# Patient Record
Sex: MALE | Race: WHITE | NOT HISPANIC OR LATINO | Employment: OTHER | URBAN - METROPOLITAN AREA
[De-identification: names, ages, dates, MRNs, and addresses within clinical notes are randomized per-mention and may not be internally consistent; named-entity substitution may affect disease eponyms.]

---

## 2022-08-03 ENCOUNTER — OFFICE VISIT (OUTPATIENT)
Dept: FAMILY MEDICINE CLINIC | Facility: HOSPITAL | Age: 77
End: 2022-08-03
Payer: COMMERCIAL

## 2022-08-03 VITALS
DIASTOLIC BLOOD PRESSURE: 62 MMHG | BODY MASS INDEX: 30.6 KG/M2 | SYSTOLIC BLOOD PRESSURE: 132 MMHG | WEIGHT: 206.6 LBS | HEIGHT: 69 IN | TEMPERATURE: 97.7 F | HEART RATE: 83 BPM

## 2022-08-03 DIAGNOSIS — E11.40 TYPE 2 DIABETES MELLITUS WITH DIABETIC NEUROPATHY, UNSPECIFIED WHETHER LONG TERM INSULIN USE (HCC): Primary | ICD-10-CM

## 2022-08-03 DIAGNOSIS — N40.0 BENIGN PROSTATIC HYPERPLASIA, UNSPECIFIED WHETHER LOWER URINARY TRACT SYMPTOMS PRESENT: ICD-10-CM

## 2022-08-03 DIAGNOSIS — Z11.4 SCREENING FOR HIV (HUMAN IMMUNODEFICIENCY VIRUS): ICD-10-CM

## 2022-08-03 DIAGNOSIS — Z11.59 NEED FOR HEPATITIS C SCREENING TEST: ICD-10-CM

## 2022-08-03 DIAGNOSIS — Z79.4 TYPE 2 DIABETES MELLITUS WITH HYPERGLYCEMIA, WITH LONG-TERM CURRENT USE OF INSULIN (HCC): ICD-10-CM

## 2022-08-03 DIAGNOSIS — K21.9 GASTROESOPHAGEAL REFLUX DISEASE WITHOUT ESOPHAGITIS: ICD-10-CM

## 2022-08-03 DIAGNOSIS — I48.91 ATRIAL FIBRILLATION, UNSPECIFIED TYPE (HCC): ICD-10-CM

## 2022-08-03 DIAGNOSIS — E11.65 TYPE 2 DIABETES MELLITUS WITH HYPERGLYCEMIA, WITH LONG-TERM CURRENT USE OF INSULIN (HCC): ICD-10-CM

## 2022-08-03 PROCEDURE — 1100F PTFALLS ASSESS-DOCD GE2>/YR: CPT | Performed by: FAMILY MEDICINE

## 2022-08-03 PROCEDURE — 3288F FALL RISK ASSESSMENT DOCD: CPT | Performed by: FAMILY MEDICINE

## 2022-08-03 PROCEDURE — 3725F SCREEN DEPRESSION PERFORMED: CPT | Performed by: FAMILY MEDICINE

## 2022-08-03 PROCEDURE — 99204 OFFICE O/P NEW MOD 45 MIN: CPT | Performed by: FAMILY MEDICINE

## 2022-08-03 PROCEDURE — 1160F RVW MEDS BY RX/DR IN RCRD: CPT | Performed by: FAMILY MEDICINE

## 2022-08-03 RX ORDER — PANTOPRAZOLE SODIUM 40 MG/1
40 TABLET, DELAYED RELEASE ORAL DAILY
COMMUNITY

## 2022-08-03 RX ORDER — METOPROLOL TARTRATE 50 MG/1
50 TABLET, FILM COATED ORAL DAILY
COMMUNITY

## 2022-08-03 RX ORDER — SERTRALINE HYDROCHLORIDE 25 MG/1
25 TABLET, FILM COATED ORAL DAILY
COMMUNITY

## 2022-08-03 RX ORDER — TRAZODONE HYDROCHLORIDE 100 MG/1
200 TABLET ORAL
COMMUNITY
End: 2022-09-29 | Stop reason: SDUPTHER

## 2022-08-03 RX ORDER — POTASSIUM CITRATE 15 MEQ/1
TABLET, EXTENDED RELEASE ORAL
COMMUNITY
End: 2022-09-01 | Stop reason: SDUPTHER

## 2022-08-03 RX ORDER — SENNOSIDES 8.6 MG
650 CAPSULE ORAL EVERY 8 HOURS PRN
COMMUNITY

## 2022-08-03 RX ORDER — BLOOD-GLUCOSE SENSOR
EACH MISCELLANEOUS
COMMUNITY
End: 2022-09-06 | Stop reason: ALTCHOICE

## 2022-08-03 RX ORDER — TAMSULOSIN HYDROCHLORIDE 0.4 MG/1
0.4 CAPSULE ORAL
COMMUNITY

## 2022-08-03 RX ORDER — DULAGLUTIDE 0.75 MG/.5ML
0.75 INJECTION, SOLUTION SUBCUTANEOUS WEEKLY
COMMUNITY
End: 2022-09-13 | Stop reason: SDUPTHER

## 2022-08-03 RX ORDER — PREGABALIN 100 MG/1
100 CAPSULE ORAL 3 TIMES DAILY
COMMUNITY
End: 2022-09-02 | Stop reason: SDUPTHER

## 2022-08-03 NOTE — PROGRESS NOTES
Assessment/Plan:      Problem List Items Addressed This Visit    None     Visit Diagnoses     Type 2 diabetes mellitus with diabetic neuropathy, unspecified whether long term insulin use (HCC)    -  Primary    Relevant Medications    insulin detemir (LEVEMIR) 100 units/mL subcutaneous injection    Insulin Aspart (NOVOLOG IJ)    saxagliptin (ONGLYZA) 5 MG tablet    Dulaglutide (Trulicity) 5 86 ZK/1 8EB SOPN    Other Relevant Orders    Ambulatory Referral to Endocrinology    CBC    Comprehensive metabolic panel    Hemoglobin A1C    Lipid Panel with Direct LDL reflex    TSH, 3rd generation with Free T4 reflex    Microalbumin / creatinine urine ratio    Magnesium    Type 2 diabetes mellitus with hyperglycemia, with long-term current use of insulin (HCC)        Relevant Medications    insulin detemir (LEVEMIR) 100 units/mL subcutaneous injection    Insulin Aspart (NOVOLOG IJ)    saxagliptin (ONGLYZA) 5 MG tablet    Dulaglutide (Trulicity) 4 08 IU/4 8TX SOPN    Gastroesophageal reflux disease without esophagitis        Relevant Medications    pantoprazole (PROTONIX) 40 mg tablet    Atrial fibrillation, unspecified type (HCC)        Relevant Medications    metoprolol tartrate (LOPRESSOR) 50 mg tablet    Other Relevant Orders    Ambulatory Referral to Cardiology    CBC    Comprehensive metabolic panel    TSH, 3rd generation with Free T4 reflex    Benign prostatic hyperplasia, unspecified whether lower urinary tract symptoms present        Relevant Orders    CBC    Comprehensive metabolic panel    Screening for HIV (human immunodeficiency virus)        Relevant Orders    Human Immunodeficiency Virus 1/2 Antigen / Antibody ( Fourth Generation) with Reflex Testing    Need for hepatitis C screening test        Relevant Orders    HCV Antibody reflex to MEE           Plan/Discussion:  Patient is here to establish care  Patient with uncontrolled diabetes mellitus with neuropathy  No changes made to his diabetic regimen  Referral to endocrinology  He reports an A1c of under 8  Update blood work  Old records to be obtained  Continue to monitor  He continues to follow-up with podiatry regularly  He will be establishing with a new ophthalmologist     Atrial fibrillation  Has been stable  He is on rate control using Lopressor as well as being on anticoagulation with Eliquis twice a day  He will continue with the same  Referral to cardiology to establish care  Reflux  Has been on Protonix 40 mg twice a day for quite a while  His symptoms are controlled  Agreeable to try to decrease Protonix to 40 mg once a day  Will obtain old records  Reports Pneumovax and Prevnar 13 are up-to-date  Will follow-up in 3 months  Subjective:   Chief Complaint   Patient presents with    Establish Care        Patient ID: Silverio Abraham is a 69 y o  male  Patient is 70-year-old male establishing care with our office due to logistics  He has known diabetes mellitus with insulin dependence, diabetic neuropathy  Reports A1c was under 8 but this was done about 8 months ago  No episodes of hypoglycemia  He uses Dexcom  Has been following up with endocrinology but needs to establish with an endocrinologist in the area  Patient with known atrial fibrillation  Has been doing well  Will be needing a cardiologist as well  Will be establishing with an ophthalmologist   He does see Podiatry regularly  Overall he has been doing okay  No new complaints  No issues with any of his medication  We will be requesting old records  The following portions of the patient's history were reviewed and updated as appropriate: allergies, current medications, past family history, past medical history, past social history, past surgical history and problem list     Review of Systems   Constitutional: Negative  Negative for activity change, appetite change, chills and diaphoresis     HENT: Negative for congestion and dental problem  Respiratory: Negative  Negative for apnea, chest tightness, shortness of breath and wheezing  Cardiovascular: Negative  Negative for chest pain, palpitations and leg swelling  Gastrointestinal: Negative  Negative for abdominal distention, abdominal pain, constipation, diarrhea and nausea  Genitourinary: Negative  Negative for difficulty urinating, dysuria and frequency  Objective:  Vitals:    08/03/22 0817   BP: 132/62   Pulse: 83   Temp: 97 7 °F (36 5 °C)   Weight: 93 7 kg (206 lb 9 6 oz)   Height: 5' 9" (1 753 m)     BP Readings from Last 6 Encounters:   08/03/22 132/62      Wt Readings from Last 6 Encounters:   08/03/22 93 7 kg (206 lb 9 6 oz)             Physical Exam  Vitals and nursing note reviewed  Constitutional:       General: He is not in acute distress  Appearance: He is well-developed  He is not ill-appearing  HENT:      Head: Normocephalic and atraumatic  Right Ear: Tympanic membrane, ear canal and external ear normal       Left Ear: Tympanic membrane, ear canal and external ear normal       Nose: Nose normal  No congestion or rhinorrhea  Mouth/Throat:      Mouth: Mucous membranes are moist       Pharynx: No oropharyngeal exudate or posterior oropharyngeal erythema  Eyes:      Extraocular Movements: Extraocular movements intact  Conjunctiva/sclera: Conjunctivae normal       Pupils: Pupils are equal, round, and reactive to light  Cardiovascular:      Rate and Rhythm: Normal rate and regular rhythm  Heart sounds: Normal heart sounds  No murmur heard  No friction rub  No gallop  Pulmonary:      Effort: Pulmonary effort is normal  No respiratory distress  Breath sounds: Normal breath sounds  No wheezing or rales  Chest:      Chest wall: No tenderness  Abdominal:      General: Bowel sounds are normal  There is no distension  Palpations: Abdomen is soft  There is no mass  Tenderness: There is no abdominal tenderness   There is no guarding or rebound  Musculoskeletal:         General: Normal range of motion  Cervical back: Normal range of motion and neck supple  Skin:     General: Skin is warm  Capillary Refill: Capillary refill takes less than 2 seconds  Neurological:      Mental Status: He is alert and oriented to person, place, and time     Psychiatric:         Mood and Affect: Mood normal          Behavior: Behavior normal

## 2022-08-03 NOTE — PROGRESS NOTES
Assessment/Plan:      Problem List Items Addressed This Visit    None     Visit Diagnoses     Type 2 diabetes mellitus with diabetic neuropathy, unspecified whether long term insulin use (HCC)    -  Primary    Relevant Medications    insulin detemir (LEVEMIR) 100 units/mL subcutaneous injection    Insulin Aspart (NOVOLOG IJ)    saxagliptin (ONGLYZA) 5 MG tablet    Dulaglutide (Trulicity) 9 94 HK/2 7ZM SOPN    Other Relevant Orders    Ambulatory Referral to Endocrinology    CBC    Comprehensive metabolic panel    Hemoglobin A1C    Lipid Panel with Direct LDL reflex    TSH, 3rd generation with Free T4 reflex    Microalbumin / creatinine urine ratio    Magnesium    Type 2 diabetes mellitus with hyperglycemia, with long-term current use of insulin (HCC)        Relevant Medications    insulin detemir (LEVEMIR) 100 units/mL subcutaneous injection    Insulin Aspart (NOVOLOG IJ)    saxagliptin (ONGLYZA) 5 MG tablet    Dulaglutide (Trulicity) 2 93 SZ/4 0TO SOPN    Gastroesophageal reflux disease without esophagitis        Relevant Medications    pantoprazole (PROTONIX) 40 mg tablet    Atrial fibrillation, unspecified type (HCC)        Relevant Medications    metoprolol tartrate (LOPRESSOR) 50 mg tablet    Other Relevant Orders    Ambulatory Referral to Cardiology    CBC    Comprehensive metabolic panel    TSH, 3rd generation with Free T4 reflex    Benign prostatic hyperplasia, unspecified whether lower urinary tract symptoms present        Relevant Orders    CBC    Comprehensive metabolic panel    Screening for HIV (human immunodeficiency virus)        Relevant Orders    Human Immunodeficiency Virus 1/2 Antigen / Antibody ( Fourth Generation) with Reflex Testing    Need for hepatitis C screening test        Relevant Orders    HCV Antibody reflex to MEE           Plan/Discussion:  Plan/Discussion:  Patient is here to establish care  Patient with uncontrolled diabetes mellitus with neuropathy    No changes made to his diabetic regimen  Referral to endocrinology  He reports an A1c of under 8  Update blood work  Old records to be obtained  Continue to monitor  He continues to follow-up with podiatry regularly  He will be establishing with a new ophthalmologist     Atrial fibrillation  Has been stable  He is on rate control using Lopressor as well as being on anticoagulation with Eliquis twice a day  He will continue with the same  Referral to cardiology to establish care  Reflux  Has been on Protonix 40 mg twice a day for quite a while  His symptoms are controlled  Agreeable to try to decrease Protonix to 40 mg once a day  Will obtain old records  Reports Pneumovax and Prevnar 13 are up-to-date  Will follow-up in 3 months  Subjective:   Chief Complaint   Patient presents with    Establish Care        Patient ID: Doretha Kidney is a 69 y o  male  Patient is 69-year-old male establishing care with our office due to logistics      He has known diabetes mellitus with insulin dependence, diabetic neuropathy  Reports A1c was under 8 but this was done about 8 months ago  No episodes of hypoglycemia  He uses Dexcom  Has been following up with endocrinology but needs to establish with an endocrinologist in the area      Patient with known atrial fibrillation  Has been doing well  Will be needing a cardiologist as well  Patient is 69-year-old male establishing care with our office due to logistics      He has known diabetes mellitus with insulin dependence, diabetic neuropathy  Reports A1c was under 8 but this was done about 8 months ago  No episodes of hypoglycemia  He uses Dexcom  Has been following up with endocrinology but needs to establish with an endocrinologist in the area      Patient with known atrial fibrillation  Has been doing well  Will be needing a cardiologist as well      Will be establishing with an ophthalmologist   He does see Podiatry regularly    Overall he has been doing okay   No new complaints  No issues with any of his medication  We will be requesting old records            The following portions of the patient's history were reviewed and updated as appropriate: allergies, current medications, past family history, past medical history, past social history, past surgical history and problem list     Review of Systems   Constitutional: Negative  Negative for activity change, appetite change, chills and diaphoresis  HENT: Negative for congestion and dental problem  Respiratory: Negative  Negative for apnea, chest tightness, shortness of breath and wheezing  Cardiovascular: Negative  Negative for chest pain, palpitations and leg swelling  Gastrointestinal: Negative  Negative for abdominal distention, abdominal pain, constipation, diarrhea and nausea  Genitourinary: Negative  Negative for difficulty urinating, dysuria and frequency  Objective:  Vitals:    08/03/22 0817   BP: 132/62   Pulse: 83   Temp: 97 7 °F (36 5 °C)   Weight: 93 7 kg (206 lb 9 6 oz)   Height: 5' 9" (1 753 m)     BP Readings from Last 6 Encounters:   08/03/22 132/62      Wt Readings from Last 6 Encounters:   08/03/22 93 7 kg (206 lb 9 6 oz)             Physical Exam  Vitals and nursing note reviewed  Constitutional:       General: He is not in acute distress  Appearance: Normal appearance  He is well-developed and normal weight  He is not ill-appearing  Comments: With hearing aids bilateral   HENT:      Head: Normocephalic and atraumatic  Right Ear: Tympanic membrane, ear canal and external ear normal       Left Ear: Tympanic membrane, ear canal and external ear normal       Nose: Nose normal  No congestion or rhinorrhea  Mouth/Throat:      Mouth: Mucous membranes are moist       Pharynx: No oropharyngeal exudate or posterior oropharyngeal erythema  Eyes:      Extraocular Movements: Extraocular movements intact        Conjunctiva/sclera: Conjunctivae normal  Pupils: Pupils are equal, round, and reactive to light  Cardiovascular:      Rate and Rhythm: Normal rate and regular rhythm  Pulses: no weak pulses          Dorsalis pedis pulses are 1+ on the right side and 1+ on the left side  Posterior tibial pulses are 1+ on the right side and 1+ on the left side  Heart sounds: Normal heart sounds  No murmur heard  No friction rub  No gallop  Pulmonary:      Effort: Pulmonary effort is normal  No respiratory distress  Breath sounds: Normal breath sounds  No wheezing or rales  Chest:      Chest wall: No tenderness  Abdominal:      General: Bowel sounds are normal  There is no distension  Palpations: Abdomen is soft  There is no mass  Tenderness: There is no abdominal tenderness  There is no guarding or rebound  Musculoskeletal:         General: Normal range of motion  Cervical back: Normal range of motion and neck supple  Feet:      Right foot:      Skin integrity: Callus and dry skin present  No ulcer, skin breakdown, erythema or warmth  Left foot:      Skin integrity: Callus and dry skin present  No ulcer, skin breakdown, erythema or warmth  Skin:     General: Skin is warm  Capillary Refill: Capillary refill takes less than 2 seconds  Neurological:      Mental Status: He is alert and oriented to person, place, and time  Psychiatric:         Mood and Affect: Mood normal          Behavior: Behavior normal        Patient's shoes and socks removed  Right Foot/Ankle   Right Foot Inspection  Skin Exam: skin normal, skin intact, dry skin, callus and callus  No warmth, no erythema, no maceration, no abnormal color, no pre-ulcer and no ulcer  Toe Exam: ROM and strength within normal limits  Sensory   Vibration: absent  Proprioception: intact  Monofilament testing: absent    Vascular  Capillary refills: < 3 seconds  The right DP pulse is 1+  The right PT pulse is 1+       Left Foot/Ankle  Left Foot Inspection  Skin Exam: skin normal, skin intact, dry skin and callus  No warmth, no erythema, no maceration, normal color, no pre-ulcer and no ulcer  Toe Exam: ROM and strength within normal limits  Sensory   Vibration: diminished  Proprioception: intact  Monofilament testing: intact    Vascular  Capillary refills: < 3 seconds  The left DP pulse is 1+  The left PT pulse is 1+       Assign Risk Category  No deformity present  No loss of protective sensation  No weak pulses  Risk: 1

## 2022-08-27 LAB
ALBUMIN SERPL-MCNC: 4 G/DL (ref 3.7–4.7)
ALBUMIN/CREAT UR: 140 MG/G CREAT (ref 0–29)
ALBUMIN/GLOB SERPL: 1.3 {RATIO} (ref 1.2–2.2)
ALP SERPL-CCNC: 96 IU/L (ref 44–121)
ALT SERPL-CCNC: 29 IU/L (ref 0–44)
AST SERPL-CCNC: 28 IU/L (ref 0–40)
BILIRUB SERPL-MCNC: 0.4 MG/DL (ref 0–1.2)
BUN SERPL-MCNC: 28 MG/DL (ref 8–27)
BUN/CREAT SERPL: 21 (ref 10–24)
CALCIUM SERPL-MCNC: 9.4 MG/DL (ref 8.6–10.2)
CHLORIDE SERPL-SCNC: 97 MMOL/L (ref 96–106)
CHOLEST SERPL-MCNC: 228 MG/DL (ref 100–199)
CO2 SERPL-SCNC: 24 MMOL/L (ref 20–29)
CREAT SERPL-MCNC: 1.35 MG/DL (ref 0.76–1.27)
CREAT UR-MCNC: 61 MG/DL
EGFR: 54 ML/MIN/1.73
ERYTHROCYTE [DISTWIDTH] IN BLOOD BY AUTOMATED COUNT: 14.6 % (ref 11.6–15.4)
EST. AVERAGE GLUCOSE BLD GHB EST-MCNC: >398 MG/DL
GLOBULIN SER-MCNC: 3 G/DL (ref 1.5–4.5)
GLUCOSE SERPL-MCNC: 349 MG/DL (ref 65–99)
HBA1C MFR BLD: >15.5 % (ref 4.8–5.6)
HCT VFR BLD AUTO: 43.6 % (ref 37.5–51)
HDLC SERPL-MCNC: 30 MG/DL
HGB BLD-MCNC: 14.2 G/DL (ref 13–17.7)
HIV 1+2 AB+HIV1 P24 AG SERPL QL IA: NON REACTIVE
LDLC SERPL CALC-MCNC: 116 MG/DL (ref 0–99)
LDLC/HDLC SERPL: 3.9 RATIO (ref 0–3.6)
MAGNESIUM SERPL-MCNC: 2 MG/DL (ref 1.6–2.3)
MCH RBC QN AUTO: 29.9 PG (ref 26.6–33)
MCHC RBC AUTO-ENTMCNC: 32.6 G/DL (ref 31.5–35.7)
MCV RBC AUTO: 92 FL (ref 79–97)
MICROALBUMIN UR-MCNC: 85.2 UG/ML
PLATELET # BLD AUTO: 203 X10E3/UL (ref 150–450)
POTASSIUM SERPL-SCNC: 5.5 MMOL/L (ref 3.5–5.2)
PROT SERPL-MCNC: 7 G/DL (ref 6–8.5)
RBC # BLD AUTO: 4.75 X10E6/UL (ref 4.14–5.8)
SL AMB VLDL CHOLESTEROL CALC: 82 MG/DL (ref 5–40)
SODIUM SERPL-SCNC: 136 MMOL/L (ref 134–144)
TRIGL SERPL-MCNC: 466 MG/DL (ref 0–149)
TSH SERPL DL<=0.005 MIU/L-ACNC: 4 UIU/ML (ref 0.45–4.5)
WBC # BLD AUTO: 5.8 X10E3/UL (ref 3.4–10.8)

## 2022-08-27 PROCEDURE — 3060F POS MICROALBUMINURIA REV: CPT | Performed by: FAMILY MEDICINE

## 2022-08-29 DIAGNOSIS — I48.91 ATRIAL FIBRILLATION, UNSPECIFIED TYPE (HCC): Primary | ICD-10-CM

## 2022-09-01 ENCOUNTER — TELEPHONE (OUTPATIENT)
Dept: FAMILY MEDICINE CLINIC | Facility: HOSPITAL | Age: 77
End: 2022-09-01

## 2022-09-01 ENCOUNTER — OFFICE VISIT (OUTPATIENT)
Dept: FAMILY MEDICINE CLINIC | Facility: HOSPITAL | Age: 77
End: 2022-09-01
Payer: COMMERCIAL

## 2022-09-01 ENCOUNTER — HOSPITAL ENCOUNTER (OUTPATIENT)
Dept: RADIOLOGY | Facility: HOSPITAL | Age: 77
Discharge: HOME | End: 2022-09-01
Payer: COMMERCIAL

## 2022-09-01 VITALS
WEIGHT: 209 LBS | HEART RATE: 66 BPM | BODY MASS INDEX: 30.96 KG/M2 | SYSTOLIC BLOOD PRESSURE: 138 MMHG | HEIGHT: 69 IN | DIASTOLIC BLOOD PRESSURE: 62 MMHG | OXYGEN SATURATION: 98 % | TEMPERATURE: 97.5 F

## 2022-09-01 DIAGNOSIS — I48.91 ATRIAL FIBRILLATION, UNSPECIFIED TYPE (HCC): ICD-10-CM

## 2022-09-01 DIAGNOSIS — E11.65 TYPE 2 DIABETES MELLITUS WITH HYPERGLYCEMIA, WITH LONG-TERM CURRENT USE OF INSULIN (HCC): ICD-10-CM
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
## 2022-09-01 DIAGNOSIS — E87.5 HYPERKALEMIA: ICD-10-CM

## 2022-09-01 DIAGNOSIS — N18.30 STAGE 3 CHRONIC KIDNEY DISEASE, UNSPECIFIED WHETHER STAGE 3A OR 3B CKD (HCC): ICD-10-CM

## 2022-09-01 DIAGNOSIS — Z00.00 MEDICARE ANNUAL WELLNESS VISIT, INITIAL: ICD-10-CM

## 2022-09-01 DIAGNOSIS — Z79.4 TYPE 2 DIABETES MELLITUS WITH HYPERGLYCEMIA, WITH LONG-TERM CURRENT USE OF INSULIN (HCC): ICD-10-CM

## 2022-09-01 DIAGNOSIS — M25.551 RIGHT HIP PAIN: ICD-10-CM

## 2022-09-01 DIAGNOSIS — M25.551 RIGHT HIP PAIN: Primary | ICD-10-CM

## 2022-09-01 DIAGNOSIS — E11.40 TYPE 2 DIABETES MELLITUS WITH DIABETIC NEUROPATHY, UNSPECIFIED WHETHER LONG TERM INSULIN USE (HCC): ICD-10-CM

## 2022-09-01 PROCEDURE — 1170F FXNL STATUS ASSESSED: CPT | Performed by: FAMILY MEDICINE

## 2022-09-01 PROCEDURE — G0439 PPPS, SUBSEQ VISIT: HCPCS | Performed by: FAMILY MEDICINE

## 2022-09-01 PROCEDURE — 3725F SCREEN DEPRESSION PERFORMED: CPT | Performed by: FAMILY MEDICINE

## 2022-09-01 PROCEDURE — 3288F FALL RISK ASSESSMENT DOCD: CPT | Performed by: FAMILY MEDICINE

## 2022-09-01 PROCEDURE — 73502 X-RAY EXAM HIP UNI 2-3 VIEWS: CPT

## 2022-09-01 PROCEDURE — 99214 OFFICE O/P EST MOD 30 MIN: CPT | Performed by: FAMILY MEDICINE

## 2022-09-01 PROCEDURE — 1125F AMNT PAIN NOTED PAIN PRSNT: CPT | Performed by: FAMILY MEDICINE

## 2022-09-01 RX ORDER — INSULIN ASPART 100 [IU]/ML
40 INJECTION, SOLUTION INTRAVENOUS; SUBCUTANEOUS
Qty: 10 ML | Refills: 0
Start: 2022-09-01

## 2022-09-01 RX ORDER — POTASSIUM CITRATE 15 MEQ/1
1 TABLET, EXTENDED RELEASE ORAL DAILY
Qty: 30 TABLET | Refills: 0
Start: 2022-09-01

## 2022-09-01 NOTE — PROGRESS NOTES
Assessment and Plan:     Problem List Items Addressed This Visit    None     Visit Diagnoses     Right hip pain    -  Primary    Atrial fibrillation, unspecified type ([place] [address] )        Type 2 diabetes mellitus with hyperglycemia, with long-term current use of insulin ([place] [address] )        Type 2 diabetes mellitus with diabetic neuropathy, unspecified whether long term insulin use ([place] [address] )        Medicare annual wellness visit, initial               Preventive health issues were discussed with patient, and age appropriate screening tests were ordered as noted in patient's After Visit Summary  Personalized health advice and appropriate referrals for health education or preventive services given if needed, as noted in patient's After Visit Summary  History of Present Illness:     Patient presents for a Medicare Wellness Visit    HPI   Patient Care Team:  Last [place] MD as PCP - General (Family Medicine)     Review of Systems:     Review of Systems     Problem List:     There is no problem list on file for this patient  Past Medical and Surgical History:     Past Medical History:   Diagnosis Date    Atrial fibrillation ([place] [address] )     Deep vein thrombosis (DVT) of right lower extremity ([place] [address] )     Diverticulitis      History reviewed  No pertinent surgical history  Family History:     History reviewed  No pertinent family history     Social History:     Social History     Socioeconomic History    Marital status: /Civil Union     Spouse name: None    Number of children: None    Years of education: None    Highest education level: None   Occupational History    None   Tobacco Use    Smoking status: Former Smoker     Start date:      Quit date:      Years since quittin 6    Smokeless tobacco: Never Used   Vaping Use    Vaping Use: Never used   Substance and Sexual Activity    Alcohol use: Yes     Comment: Socially    Drug use: Never    Sexual activity: None   Other Topics Concern    None Social History Narrative    None     Social Determinants of Health     Financial Resource Strain: Medium Risk    Difficulty of Paying Living Expenses: Somewhat hard   Food Insecurity: Not on file   Transportation Needs: No Transportation Needs    Lack of Transportation (Medical): No    Lack of Transportation (Non-Medical): No   Physical Activity: Not on file   Stress: Not on file   Social Connections: Not on file   Intimate Partner Violence: Not on file   Housing Stability: Not on file      Medications and Allergies:     Current Outpatient Medications   Medication Sig Dispense Refill    acetaminophen (TYLENOL) 650 mg CR tablet Take 650 mg by mouth every 8 (eight) hours as needed for mild pain      apixaban (ELIQUIS) 5 mg Take 5 mg by mouth 2 (two) times a day      Continuous Blood Gluc Sensor (Dexcom G4 Sensor) MISC Use      Dulaglutide (Trulicity) 1 19 KT/2 7HR SOPN Inject 0 75 mg under the skin once a week      Insulin Aspart (NOVOLOG IJ) Inject as directed 28 units at breakfast, 38 units at lunch and dinner      insulin detemir (LEVEMIR) 100 units/mL subcutaneous injection Inject 75 Units under the skin daily at bedtime      metoprolol tartrate (LOPRESSOR) 50 mg tablet Take 50 mg by mouth in the morning      Multiple Vitamin (MULTIVITAMIN ADULT PO) Take by mouth      pantoprazole (PROTONIX) 40 mg tablet Take 40 mg by mouth in the morning       Potassium Citrate ER 15 MEQ (1620 MG) TBCR Take by mouth      pregabalin (LYRICA) 100 mg capsule Take 100 mg by mouth 3 (three) times a day      saxagliptin (ONGLYZA) 5 MG tablet Take 5 mg by mouth daily      sertraline (ZOLOFT) 25 mg tablet Take 25 mg by mouth daily      SUPER B COMPLEX/C PO Take by mouth      tamsulosin (FLOMAX) 0 4 mg Take 0 4 mg by mouth daily with dinner      traZODone (DESYREL) 100 mg tablet Take 200 mg by mouth daily at bedtime       No current facility-administered medications for this visit       No Known Allergies Immunizations: There is no immunization history on file for this patient     Health Maintenance:         Topic Date Due    Hepatitis C Screening  Never done         Topic Date Due    COVID-19 Vaccine (1) Never done    Pneumococcal Vaccine: 65+ Years (1 - PCV) Never done    Influenza Vaccine (1) 09/01/2022      Medicare Screening Tests and Risk Assessments:     Annual Wellness Visit  No exam data present     Physical Exam:     /62   Pulse 66   Temp 97 5 °F (36 4 °C)   Ht 5' 9" (1 753 m)   Wt 94 8 kg (209 lb)   SpO2 98%   BMI 30 86 kg/m²     Physical Exam     Arlen Paris MD

## 2022-09-01 NOTE — PROGRESS NOTES
Assessment and Plan:     Problem List Items Addressed This Visit        Genitourinary    Stage 3 chronic kidney disease, unspecified whether stage 3a or 3b CKD ([facility] )      Other Visit Diagnoses     Right hip pain    -  Primary    Relevant Orders    XR hip/pelv 2-3 vws right if performed    Atrial fibrillation, unspecified type ([facility] )        Type 2 diabetes mellitus with hyperglycemia, with long-term current use of insulin (HCC)        Relevant Medications    Insulin Aspart (NovoLOG) 100 units/mL injection    Type 2 diabetes mellitus with diabetic neuropathy, unspecified whether long term insulin use (HCC)        Relevant Medications    Insulin Aspart (NovoLOG) 100 units/mL injection    Medicare annual wellness visit, initial        Fall, initial encounter        Hyperkalemia        Relevant Medications    Potassium Citrate ER 15 MEQ (1620 MG) TBCR    Other Relevant Orders    Basic metabolic panel      discussion:     Right hip pain  S/p fall last week  Consistent with contusion  Check plain films, stat, evlaute for fracture  Diabetes  Very uncontrolled  Increase novolog to 40 units three times a day with meals  Continue with long acting insulin, onglyza, and trulicity  Has apt with Endo in about 2 weeks  Continue to monitor   Work on dietary control  Hyperkalemia  Mild  Reduce potassium to once a day  Recheck labs  Preventive health issues were discussed with patient, and age appropriate screening tests were ordered as noted in patient's After Visit Summary  Personalized health advice and appropriate referrals for health education or preventive services given if needed, as noted in patient's After Visit Summary  History of Present Illness:     Patient presents for a Medicare Wellness Visit    Here for right hip pain  [place] Fix last week  Feels a bump on the right side  Able to walk on this  Normally uses a cane for ambulation  Diabetes  A1c > 15  5     worried about his potassium and potential side effects  Patient Care Team:  Addison Leung MD as PCP - General (Family Medicine)     Review of Systems:     Review of Systems   Constitutional: Negative  Negative for activity change, appetite change, chills and diaphoresis  HENT: Negative for congestion and dental problem  Respiratory: Negative  Negative for apnea, chest tightness, shortness of breath and wheezing  Cardiovascular: Negative  Negative for chest pain, palpitations and leg swelling  Gastrointestinal: Negative  Negative for abdominal distention, abdominal pain, constipation, diarrhea and nausea  Genitourinary: Negative  Negative for difficulty urinating, dysuria and frequency  Problem List:     Patient Active Problem List   Diagnosis    Stage 3 chronic kidney disease, unspecified whether stage 3a or 3b CKD ([place] [address] )      Past Medical and Surgical History:     Past Medical History:   Diagnosis Date    Atrial fibrillation ([place] [address] )     Deep vein thrombosis (DVT) of right lower extremity ([place] [address] )     Diverticulitis      History reviewed  No pertinent surgical history  Family History:     History reviewed  No pertinent family history     Social History:     Social History     Socioeconomic History    Marital status: /Civil Union     Spouse name: None    Number of children: None    Years of education: None    Highest education level: None   Occupational History    None   Tobacco Use    Smoking status: Former Smoker     Start date:      Quit date:      Years since quittin 6    Smokeless tobacco: Never Used   Vaping Use    Vaping Use: Never used   Substance and Sexual Activity    Alcohol use: Yes     Comment: Socially    Drug use: Never    Sexual activity: None   Other Topics Concern    None   Social History Narrative    None     Social Determinants of Health     Financial Resource Strain: Medium Risk    Difficulty of Paying Living Expenses: Somewhat hard   Food Insecurity: Not on file   Transportation Needs: No Transportation Needs    Lack of Transportation (Medical): No    Lack of Transportation (Non-Medical): No   Physical Activity: Not on file   Stress: Not on file   Social Connections: Not on file   Intimate Partner Violence: Not on file   Housing Stability: Not on file      Medications and Allergies:     Current Outpatient Medications   Medication Sig Dispense Refill    acetaminophen (TYLENOL) 650 mg CR tablet Take 650 mg by mouth every 8 (eight) hours as needed for mild pain      apixaban (ELIQUIS) 5 mg Take 5 mg by mouth 2 (two) times a day      Continuous Blood Gluc Sensor (Dexcom G4 Sensor) MISC Use      Dulaglutide (Trulicity) 7 43 KL/9 9WD SOPN Inject 0 75 mg under the skin once a week      Insulin Aspart (NovoLOG) 100 units/mL injection Inject 40 Units under the skin 3 (three) times a day with meals 28 units at breakfast, 38 units at lunch and dinner 10 mL 0    insulin detemir (LEVEMIR) 100 units/mL subcutaneous injection Inject 75 Units under the skin daily at bedtime      metoprolol tartrate (LOPRESSOR) 50 mg tablet Take 50 mg by mouth in the morning      Multiple Vitamin (MULTIVITAMIN ADULT PO) Take by mouth      pantoprazole (PROTONIX) 40 mg tablet Take 40 mg by mouth in the morning       Potassium Citrate ER 15 MEQ (1620 MG) TBCR Take 1 tablet by mouth in the morning 1 tab po daily 30 tablet 0    pregabalin (LYRICA) 100 mg capsule Take 100 mg by mouth 3 (three) times a day      saxagliptin (ONGLYZA) 5 MG tablet Take 5 mg by mouth daily      sertraline (ZOLOFT) 25 mg tablet Take 25 mg by mouth daily      SUPER B COMPLEX/C PO Take by mouth      tamsulosin (FLOMAX) 0 4 mg Take 0 4 mg by mouth daily with dinner      traZODone (DESYREL) 100 mg tablet Take 200 mg by mouth daily at bedtime       No current facility-administered medications for this visit  No Known Allergies   Immunizations:        There is no immunization history on file for this patient  Health Maintenance:         Topic Date Due    Hepatitis C Screening  Never done         Topic Date Due    COVID-19 Vaccine (1) Never done    Pneumococcal Vaccine: 65+ Years (1 - PCV) Never done    Influenza Vaccine (1) 09/01/2022      Medicare Screening Tests and Risk Assessments:     eClia Stephenson is here for his Subsequent Wellness visit  Health Risk Assessment:   Patient rates overall health as good  Patient feels that their physical health rating is same  Patient is very satisfied with their life  Eyesight was rated as same  Hearing was rated as same  Patient feels that their emotional and mental health rating is same  Patients states they are sometimes angry  Patient states they are sometimes unusually tired/fatigued  Pain experienced in the last 7 days has been a lot  Patient's pain rating has been 7/10  Patient states that he has experienced weight loss or gain in last 6 months  Depression Screening:   PHQ-2 Score: 0      Fall Risk Screening: In the past year, patient has experienced: history of falling in past year    Number of falls: 2 or more  Injured during fall?: Yes    Feels unsteady when standing or walking?: Yes    Worried about falling?: No      Home Safety:  Patient does not have trouble with stairs inside or outside of their home  Patient has working smoke alarms and has working carbon monoxide detector  Home safety hazards include: none  Nutrition:   Current diet is Regular  Medications:   Patient is currently taking over-the-counter supplements  OTC medications include: see medication list  Patient is able to manage medications  Activities of Daily Living (ADLs)/Instrumental Activities of Daily Living (IADLs):   Walk and transfer into and out of bed and chair?: Yes  Dress and groom yourself?: Yes    Bathe or shower yourself?: Yes    Feed yourself?  Yes  Do your laundry/housekeeping?: Yes  Manage your money, pay your bills and track your expenses?: Yes  Make your own meals?: Yes    Do your own shopping?: Yes    Previous Hospitalizations:   Any hospitalizations or ED visits within the last 12 months?: No      Advance Care Planning:   Living will: No    Durable POA for healthcare: No    Advanced directive: No    Advanced directive counseling given: Yes      Cognitive Screening:   Provider or family/friend/caregiver concerned regarding cognition?: No    PREVENTIVE SCREENINGS      Cardiovascular Screening:    General: Screening Current      Diabetes Screening:     General: Screening Not Indicated and History Diabetes      Colorectal Cancer Screening:     General: Screening Current and Patient Declines      Prostate Cancer Screening:    General: Screening Not Indicated      Osteoporosis Screening:    General: Risks and Benefits Discussed      Abdominal Aortic Aneurysm (AAA) Screening:    Risk factors include: tobacco use        General: Screening Not Indicated      Lung Cancer Screening:     General: Screening Not Indicated      Hepatitis C Screening:    General: Screening Not Indicated    Screening, Brief Intervention, and Referral to Treatment (SBIRT)    Screening      Single Item Drug Screening:  How often have you used an illegal drug (including marijuana) or a prescription medication for non-medical reasons in the past year? never    Single Item Drug Screen Score: 0  Interpretation: Negative screen for possible drug use disorder    No exam data present     Physical Exam:     /62   Pulse 66   Temp 97 5 °F (36 4 °C)   Ht 5' 9" (1 753 m)   Wt 94 8 kg (209 lb)   SpO2 98%   BMI 30 86 kg/m²     Physical Exam  Vitals and nursing note reviewed  Constitutional:       Appearance: Normal appearance  HENT:      Head: Normocephalic  Nose: Nose normal       Mouth/Throat:      Mouth: Mucous membranes are moist    Eyes:      Extraocular Movements: Extraocular movements intact  Pupils: Pupils are equal, round, and reactive to light     Cardiovascular:      Heart sounds: Normal heart sounds  Pulmonary:      Breath sounds: Normal breath sounds  Musculoskeletal:      Right hip: Tenderness present  No deformity, lacerations, bony tenderness or crepitus  Normal range of motion  Neurological:      General: No focal deficit present     Psychiatric:         Mood and Affect: Mood normal          Behavior: Behavior normal           Kaity Gallardo MD

## 2022-09-01 NOTE — PATIENT INSTRUCTIONS
Medicare Preventive Visit Patient Instructions  Thank you for completing your Welcome to Medicare Visit or Medicare Annual Wellness Visit today  Your next wellness visit will be due in one year (9/2/2023)  The screening/preventive services that you may require over the next 5-10 years are detailed below  Some tests may not apply to you based off risk factors and/or age  Screening tests ordered at today's visit but not completed yet may show as past due  Also, please note that scanned in results may not display below  Preventive Screenings:  Service Recommendations Previous Testing/Comments   Colorectal Cancer Screening  · Colonoscopy    · Fecal Occult Blood Test (FOBT)/Fecal Immunochemical Test (FIT)  · Fecal DNA/Cologuard Test  · Flexible Sigmoidoscopy Age: 39-70 years old   Colonoscopy: every 10 years (May be performed more frequently if at higher risk)  OR  FOBT/FIT: every 1 year  OR  Cologuard: every 3 years  OR  Sigmoidoscopy: every 5 years  Screening may be recommended earlier than age 39 if at higher risk for colorectal cancer  Also, an individualized decision between you and your healthcare provider will decide whether screening between the ages of 74-80 would be appropriate   Colonoscopy: Not on file  FOBT/FIT: Not on file  Cologuard: Not on file  Sigmoidoscopy: Not on file          Prostate Cancer Screening Individualized decision between patient and health care provider in men between ages of 53-78   Medicare will cover every 12 months beginning on the day after your 50th birthday PSA: No results in last 5 years     Screening Not Indicated     Hepatitis C Screening Once for adults born between [facility]  More frequently in patients at high risk for Hepatitis C Hep C Antibody: Not on file        Diabetes Screening 1-2 times per year if you're at risk for diabetes or have pre-diabetes Fasting glucose: No results in last 5 years (No results in last 5 years)  A1C: >15 5 % (8/26/2022)  Screening Not Indicated  History Diabetes   Cholesterol Screening Once every 5 years if you don't have a lipid disorder  May order more often based on risk factors  Lipid panel: 08/26/2022  Screening Current      Other Preventive Screenings Covered by Medicare:  1  Abdominal Aortic Aneurysm (AAA) Screening: covered once if your at risk  You're considered to be at risk if you have a family history of AAA or a male between the age of 73-68 who smoking at least 100 cigarettes in your lifetime  2  Lung Cancer Screening: covers low dose CT scan once per year if you meet all of the following conditions: (1) Age 50-69; (2) No signs or symptoms of lung cancer; (3) Current smoker or have quit smoking within the last 15 years; (4) You have a tobacco smoking history of at least 20 pack years (packs per day x number of years you smoked); (5) You get a written order from a healthcare provider  3  Glaucoma Screening: covered annually if you're considered high risk: (1) You have diabetes OR (2) Family history of glaucoma OR (3)  aged 48 and older OR (3)  American aged 72 and older  3  Osteoporosis Screening: covered every 2 years if you meet one of the following conditions: (1) Have a vertebral abnormality; (2) On glucocorticoid therapy for more than 3 months; (3) Have primary hyperparathyroidism; (4) On osteoporosis medications and need to assess response to drug therapy  5  HIV Screening: covered annually if you're between the age of 12-76  Also covered annually if you are younger than 13 and older than 72 with risk factors for HIV infection  For pregnant patients, it is covered up to 3 times per pregnancy      Immunizations:  Immunization Recommendations   Influenza Vaccine Annual influenza vaccination during flu season is recommended for all persons aged >= 6 months who do not have contraindications   Pneumococcal Vaccine   * Pneumococcal conjugate vaccine = PCV13 (Prevnar 13), PCV15 (Vaxneuvance), PCV20 (Prevnar 20)  * Pneumococcal polysaccharide vaccine = PPSV23 (Pneumovax) Adults 2364 years old: 1-3 doses may be recommended based on certain risk factors  Adults 72 years old: 1-2 doses may be recommended based off what pneumonia vaccine you previously received   Hepatitis B Vaccine 3 dose series if at intermediate or high risk (ex: diabetes, end stage renal disease, liver disease)   Tetanus (Td) Vaccine - COST NOT COVERED BY MEDICARE PART B Following completion of primary series, a booster dose should be given every 10 years to maintain immunity against tetanus  Td may also be given as tetanus wound prophylaxis  Tdap Vaccine - COST NOT COVERED BY MEDICARE PART B Recommended at least once for all adults  For pregnant patients, recommended with each pregnancy  Shingles Vaccine (Shingrix) - COST NOT COVERED BY MEDICARE PART B  2 shot series recommended in those aged 48 and above     Health Maintenance Due:      Topic Date Due    Hepatitis C Screening  Never done     Immunizations Due:      Topic Date Due    COVID-19 Vaccine (1) Never done    Pneumococcal Vaccine: 65+ Years (1 - PCV) Never done    Influenza Vaccine (1) 09/01/2022     Advance Directives   What are advance directives? Advance directives are legal documents that state your wishes and plans for medical care  These plans are made ahead of time in case you lose your ability to make decisions for yourself  Advance directives can apply to any medical decision, such as the treatments you want, and if you want to donate organs  What are the types of advance directives? There are many types of advance directives, and each state has rules about how to use them  You may choose a combination of any of the following:  · Living will: This is a written record of the treatment you want  You can also choose which treatments you do not want, which to limit, and which to stop at a certain time  This includes surgery, medicine, IV fluid, and tube feedings  · Durable power of  for healthcare [facility]): This is a written record that states who you want to make healthcare choices for you when you are unable to make them for yourself  This person, called a proxy, is usually a family member or a friend  You may choose more than 1 proxy  · Do not resuscitate (DNR) order:  A DNR order is used in case your heart stops beating or you stop breathing  It is a request not to have certain forms of treatment, such as CPR  A DNR order may be included in other types of advance directives  · Medical directive: This covers the care that you want if you are in a coma, near death, or unable to make decisions for yourself  You can list the treatments you want for each condition  Treatment may include pain medicine, surgery, blood transfusions, dialysis, IV or tube feedings, and a ventilator (breathing machine)  · Values history: This document has questions about your views, beliefs, and how you feel and think about life  This information can help others choose the care that you would choose  Why are advance directives important? An advance directive helps you control your care  Although spoken wishes may be used, it is better to have your wishes written down  Spoken wishes can be misunderstood, or not followed  Treatments may be given even if you do not want them  An advance directive may make it easier for your family to make difficult choices about your care  Fall Prevention    Fall prevention  includes ways to make your home and other areas safer  It also includes ways you can move more carefully to prevent a fall  Health conditions that cause changes in your blood pressure, vision, or muscle strength and coordination may increase your risk for falls  Medicines may also increase your risk for falls if they make you dizzy, weak, or sleepy  Fall prevention tips:   · Stand or sit up slowly  · Use assistive devices as directed      · Wear shoes that fit well and have soles that   · Wear a personal alarm  · Stay active  · Manage your medical conditions  Home Safety Tips:  · Add items to prevent falls in the bathroom  · Keep paths clear  · Install bright lights in your home  · Keep items you use often on shelves within reach  · Paint or place reflective tape on the edges of your stairs  Weight Management   Why it is important to manage your weight:  Being overweight increases your risk of health conditions such as heart disease, high blood pressure, type 2 diabetes, and certain types of cancer  It can also increase your risk for osteoarthritis, sleep apnea, and other respiratory problems  Aim for a slow, steady weight loss  Even a small amount of weight loss can lower your risk of health problems  How to lose weight safely:  A safe and healthy way to lose weight is to eat fewer calories and get regular exercise  You can lose up about 1 pound a week by decreasing the number of calories you eat by 500 calories each day  Healthy meal plan for weight management:  A healthy meal plan includes a variety of foods, contains fewer calories, and helps you stay healthy  A healthy meal plan includes the following:  · Eat whole-grain foods more often  A healthy meal plan should contain fiber  Fiber is the part of grains, fruits, and vegetables that is not broken down by your body  Whole-grain foods are healthy and provide extra fiber in your diet  Some examples of whole-grain foods are whole-wheat breads and pastas, oatmeal, brown rice, and bulgur  · Eat a variety of vegetables every day  Include dark, leafy greens such as spinach, kale, jewels greens, and mustard greens  Eat yellow and orange vegetables such as carrots, sweet potatoes, and winter squash  · Eat a variety of fruits every day  Choose fresh or canned fruit (canned in its own juice or light syrup) instead of juice  Fruit juice has very little or no fiber  · Eat low-fat dairy foods    Drink fat-free (skim) milk or 1% milk  Eat fat-free yogurt and low-fat cottage cheese  Try low-fat cheeses such as mozzarella and other reduced-fat cheeses  · Choose meat and other protein foods that are low in fat  Choose beans or other legumes such as split peas or lentils  Choose fish, skinless poultry (chicken or turkey), or lean cuts of red meat (beef or pork)  Before you cook meat or poultry, cut off any visible fat  · Use less fat and oil  Try baking foods instead of frying them  Add less fat, such as margarine, sour cream, regular salad dressing and mayonnaise to foods  Eat fewer high-fat foods  Some examples of high-fat foods include french fries, doughnuts, ice cream, and cakes  · Eat fewer sweets  Limit foods and drinks that are high in sugar  This includes candy, cookies, regular soda, and sweetened drinks  Exercise:  Exercise at least 30 minutes per day on most days of the week  Some examples of exercise include walking, biking, dancing, and swimming  You can also fit in more physical activity by taking the stairs instead of the elevator or parking farther away from stores  Ask your healthcare provider about the best exercise plan for you  © Copyright [employer] 2018 Information is for End User's use only and may not be sold, redistributed or otherwise used for commercial purposes  All illustrations and images included in CareNotes® are the copyrighted property of [employer]  or [facility] Preventive Visit Patient Instructions  Thank you for completing your Welcome to Medicare Visit or Medicare Annual Wellness Visit today  Your next wellness visit will be due in one year (9/2/2023)  The screening/preventive services that you may require over the next 5-10 years are detailed below  Some tests may not apply to you based off risk factors and/or age  Screening tests ordered at today's visit but not completed yet may show as past due   Also, please note that scanned in results may not display below  Preventive Screenings:  Service Recommendations Previous Testing/Comments   Colorectal Cancer Screening  · Colonoscopy    · Fecal Occult Blood Test (FOBT)/Fecal Immunochemical Test (FIT)  · Fecal DNA/Cologuard Test  · Flexible Sigmoidoscopy Age: 39-70 years old   Colonoscopy: every 10 years (May be performed more frequently if at higher risk)  OR  FOBT/FIT: every 1 year  OR  Cologuard: every 3 years  OR  Sigmoidoscopy: every 5 years  Screening may be recommended earlier than age 39 if at higher risk for colorectal cancer  Also, an individualized decision between you and your healthcare provider will decide whether screening between the ages of 74-80 would be appropriate  Colonoscopy: Not on file  FOBT/FIT: Not on file  Cologuard: Not on file  Sigmoidoscopy: Not on file          Prostate Cancer Screening Individualized decision between patient and health care provider in men between ages of 53-78   Medicare will cover every 12 months beginning on the day after your 50th birthday PSA: No results in last 5 years     Screening Not Indicated     Hepatitis C Screening Once for adults born between [facility]  More frequently in patients at high risk for Hepatitis C Hep C Antibody: Not on file        Diabetes Screening 1-2 times per year if you're at risk for diabetes or have pre-diabetes Fasting glucose: No results in last 5 years (No results in last 5 years)  A1C: >15 5 % (8/26/2022)  Screening Not Indicated  History Diabetes   Cholesterol Screening Once every 5 years if you don't have a lipid disorder  May order more often based on risk factors  Lipid panel: 08/26/2022  Screening Current      Other Preventive Screenings Covered by Medicare:  6  Abdominal Aortic Aneurysm (AAA) Screening: covered once if your at risk  You're considered to be at risk if you have a family history of AAA or a male between the age of 73-68 who smoking at least 100 cigarettes in your lifetime    7  Lung Cancer Screening: covers low dose CT scan once per year if you meet all of the following conditions: (1) Age 50-69; (2) No signs or symptoms of lung cancer; (3) Current smoker or have quit smoking within the last 15 years; (4) You have a tobacco smoking history of at least 20 pack years (packs per day x number of years you smoked); (5) You get a written order from a healthcare provider  8  Glaucoma Screening: covered annually if you're considered high risk: (1) You have diabetes OR (2) Family history of glaucoma OR (3)  aged 48 and older OR (3)  American aged 72 and older  5  Osteoporosis Screening: covered every 2 years if you meet one of the following conditions: (1) Have a vertebral abnormality; (2) On glucocorticoid therapy for more than 3 months; (3) Have primary hyperparathyroidism; (4) On osteoporosis medications and need to assess response to drug therapy  10  HIV Screening: covered annually if you're between the age of 12-76  Also covered annually if you are younger than 13 and older than 72 with risk factors for HIV infection  For pregnant patients, it is covered up to 3 times per pregnancy      Immunizations:  Immunization Recommendations   Influenza Vaccine Annual influenza vaccination during flu season is recommended for all persons aged >= 6 months who do not have contraindications   Pneumococcal Vaccine   * Pneumococcal conjugate vaccine = PCV13 (Prevnar 13), PCV15 (Vaxneuvance), PCV20 (Prevnar 20)  * Pneumococcal polysaccharide vaccine = PPSV23 (Pneumovax) Adults 25-60 years old: 1-3 doses may be recommended based on certain risk factors  Adults 72 years old: 1-2 doses may be recommended based off what pneumonia vaccine you previously received   Hepatitis B Vaccine 3 dose series if at intermediate or high risk (ex: diabetes, end stage renal disease, liver disease)   Tetanus (Td) Vaccine - COST NOT COVERED BY MEDICARE PART B Following completion of primary series, a booster dose should be given every 10 years to maintain immunity against tetanus  Td may also be given as tetanus wound prophylaxis  Tdap Vaccine - COST NOT COVERED BY MEDICARE PART B Recommended at least once for all adults  For pregnant patients, recommended with each pregnancy  Shingles Vaccine (Shingrix) - COST NOT COVERED BY MEDICARE PART B  2 shot series recommended in those aged 48 and above     Health Maintenance Due:      Topic Date Due    Hepatitis C Screening  Never done     Immunizations Due:      Topic Date Due    COVID-19 Vaccine (1) Never done    Pneumococcal Vaccine: 65+ Years (1 - PCV) Never done    Influenza Vaccine (1) 09/01/2022     Advance Directives   What are advance directives? Advance directives are legal documents that state your wishes and plans for medical care  These plans are made ahead of time in case you lose your ability to make decisions for yourself  Advance directives can apply to any medical decision, such as the treatments you want, and if you want to donate organs  What are the types of advance directives? There are many types of advance directives, and each state has rules about how to use them  You may choose a combination of any of the following:  · Living will: This is a written record of the treatment you want  You can also choose which treatments you do not want, which to limit, and which to stop at a certain time  This includes surgery, medicine, IV fluid, and tube feedings  · Durable power of  for healthcare [facility]): This is a written record that states who you want to make healthcare choices for you when you are unable to make them for yourself  This person, called a proxy, is usually a family member or a friend  You may choose more than 1 proxy  · Do not resuscitate (DNR) order:  A DNR order is used in case your heart stops beating or you stop breathing  It is a request not to have certain forms of treatment, such as CPR   A DNR order may be included in other types of advance directives  · Medical directive: This covers the care that you want if you are in a coma, near death, or unable to make decisions for yourself  You can list the treatments you want for each condition  Treatment may include pain medicine, surgery, blood transfusions, dialysis, IV or tube feedings, and a ventilator (breathing machine)  · Values history: This document has questions about your views, beliefs, and how you feel and think about life  This information can help others choose the care that you would choose  Why are advance directives important? An advance directive helps you control your care  Although spoken wishes may be used, it is better to have your wishes written down  Spoken wishes can be misunderstood, or not followed  Treatments may be given even if you do not want them  An advance directive may make it easier for your family to make difficult choices about your care  Fall Prevention    Fall prevention  includes ways to make your home and other areas safer  It also includes ways you can move more carefully to prevent a fall  Health conditions that cause changes in your blood pressure, vision, or muscle strength and coordination may increase your risk for falls  Medicines may also increase your risk for falls if they make you dizzy, weak, or sleepy  Fall prevention tips:   · Stand or sit up slowly  · Use assistive devices as directed  · Wear shoes that fit well and have soles that   · Wear a personal alarm  · Stay active  · Manage your medical conditions  Home Safety Tips:  · Add items to prevent falls in the bathroom  · Keep paths clear  · Install bright lights in your home  · Keep items you use often on shelves within reach  · Paint or place reflective tape on the edges of your stairs      Weight Management   Why it is important to manage your weight:  Being overweight increases your risk of health conditions such as heart disease, high blood pressure, type 2 diabetes, and certain types of cancer  It can also increase your risk for osteoarthritis, sleep apnea, and other respiratory problems  Aim for a slow, steady weight loss  Even a small amount of weight loss can lower your risk of health problems  How to lose weight safely:  A safe and healthy way to lose weight is to eat fewer calories and get regular exercise  You can lose up about 1 pound a week by decreasing the number of calories you eat by 500 calories each day  Healthy meal plan for weight management:  A healthy meal plan includes a variety of foods, contains fewer calories, and helps you stay healthy  A healthy meal plan includes the following:  · Eat whole-grain foods more often  A healthy meal plan should contain fiber  Fiber is the part of grains, fruits, and vegetables that is not broken down by your body  Whole-grain foods are healthy and provide extra fiber in your diet  Some examples of whole-grain foods are whole-wheat breads and pastas, oatmeal, brown rice, and bulgur  · Eat a variety of vegetables every day  Include dark, leafy greens such as spinach, kale, jewels greens, and mustard greens  Eat yellow and orange vegetables such as carrots, sweet potatoes, and winter squash  · Eat a variety of fruits every day  Choose fresh or canned fruit (canned in its own juice or light syrup) instead of juice  Fruit juice has very little or no fiber  · Eat low-fat dairy foods  Drink fat-free (skim) milk or 1% milk  Eat fat-free yogurt and low-fat cottage cheese  Try low-fat cheeses such as mozzarella and other reduced-fat cheeses  · Choose meat and other protein foods that are low in fat  Choose beans or other legumes such as split peas or lentils  Choose fish, skinless poultry (chicken or turkey), or lean cuts of red meat (beef or pork)  Before you cook meat or poultry, cut off any visible fat  · Use less fat and oil  Try baking foods instead of frying them   Add less fat, such as margarine, sour cream, regular salad dressing and mayonnaise to foods  Eat fewer high-fat foods  Some examples of high-fat foods include french fries, doughnuts, ice cream, and cakes  · Eat fewer sweets  Limit foods and drinks that are high in sugar  This includes candy, cookies, regular soda, and sweetened drinks  Exercise:  Exercise at least 30 minutes per day on most days of the week  Some examples of exercise include walking, biking, dancing, and swimming  You can also fit in more physical activity by taking the stairs instead of the elevator or parking farther away from stores  Ask your healthcare provider about the best exercise plan for you  © Copyright [employer] 2018 Information is for End User's use only and may not be sold, redistributed or otherwise used for commercial purposes   All illustrations and images included in CareNotes® are the copyrighted property of [employer]  or [address] [place] [address]

## 2022-09-02 DIAGNOSIS — Z79.4 TYPE 2 DIABETES MELLITUS WITH DIABETIC NEUROPATHY, WITH LONG-TERM CURRENT USE OF INSULIN (HCC): Primary | ICD-10-CM

## 2022-09-02 DIAGNOSIS — E11.40 TYPE 2 DIABETES MELLITUS WITH DIABETIC NEUROPATHY, WITH LONG-TERM CURRENT USE OF INSULIN (HCC): Primary | ICD-10-CM

## 2022-09-06 ENCOUNTER — CONSULT (OUTPATIENT)
Dept: ENDOCRINOLOGY | Facility: CLINIC | Age: 77
End: 2022-09-06
Payer: COMMERCIAL

## 2022-09-06 VITALS
DIASTOLIC BLOOD PRESSURE: 60 MMHG | HEART RATE: 70 BPM | WEIGHT: 209 LBS | SYSTOLIC BLOOD PRESSURE: 118 MMHG | BODY MASS INDEX: 28.31 KG/M2 | HEIGHT: 72 IN

## 2022-09-06 DIAGNOSIS — E11.40 TYPE 2 DIABETES MELLITUS WITH DIABETIC NEUROPATHY, UNSPECIFIED WHETHER LONG TERM INSULIN USE (HCC): Primary | ICD-10-CM

## 2022-09-06 DIAGNOSIS — G62.9 PERIPHERAL POLYNEUROPATHY: ICD-10-CM

## 2022-09-06 DIAGNOSIS — N18.30 STAGE 3 CHRONIC KIDNEY DISEASE, UNSPECIFIED WHETHER STAGE 3A OR 3B CKD (HCC): ICD-10-CM

## 2022-09-06 PROBLEM — E11.49 TYPE 2 DIABETES MELLITUS WITH NEUROLOGIC COMPLICATION, WITH LONG-TERM CURRENT USE OF INSULIN (HCC): Status: ACTIVE | Noted: 2022-09-06

## 2022-09-06 PROBLEM — Z79.4 TYPE 2 DIABETES MELLITUS WITH NEUROLOGIC COMPLICATION, WITH LONG-TERM CURRENT USE OF INSULIN (HCC): Status: ACTIVE | Noted: 2022-09-06

## 2022-09-06 PROCEDURE — 99204 OFFICE O/P NEW MOD 45 MIN: CPT | Performed by: INTERNAL MEDICINE

## 2022-09-06 PROCEDURE — 1160F RVW MEDS BY RX/DR IN RCRD: CPT | Performed by: INTERNAL MEDICINE

## 2022-09-06 NOTE — PATIENT INSTRUCTIONS
Medication:  Discontinue onglyza  Continue current insulin regimen  Continue Trulicity once weekly    Follow up:  Update office with glucose logs in 2 weeks - either via blood sugar log or through Dexcom chani  Repeat labs in 3 months

## 2022-09-06 NOTE — PROGRESS NOTES
[address] [place] [address]  OFFICE FOR DIABETES AND ENDOCRINOLOGY  ENDOCRINOLOGY OFFICE VISIT     PATIENT INFORMATION     Antione Molina   69 y o  male   MRN: [id number]    ASSESSMENT/PLAN     Diagnoses and all orders for this visit:    Type 2 diabetes mellitus with diabetic neuropathy, unspecified whether long term insulin use [facility])    Patient diagnosed diabetes but his [facility] years [facility]  Previously following with endocrinology in [facility], and since move has been establishing with new providers  Presents today to establish care for diabetes follow-up  Most recent A1c on 08/26/2022 greater than 15 5  However, patient not taking insulin for over 3 months prior to having lab work done and not following previous diabetic diet due to recent move  Blood glucose log reviewed  · Patient currently taking Levemir 80 units at bedtime, NovoLog 40 units with meals (most often BID),  Trulicity 1 time weekly, and Onglyza 5 mg at dinner  · Recommend continuing Levemir 80 units at bedtime  · Consider transitioning to concentrated insulin in future should insulin regimen continue to require such high dosing  · Continue NovoLog 40 units with meals  · Will continue Trulicity once weekly-consider increasing dose after repeat A1c in 3 months  · Discontinue Onglyza -discussed similar method of action as Trulicity, and unlikely adding significant benefit to overall blood glucose control  · Repeat lab work in 3 months prior to next office visit  · Will refer to diabetic Education and nutritionist   · Advised patient and his wife to contact office with blood glucose log either the written log (given to patient) or through a Dexcom sharing chani if able to connect  · Schedule 3 month follow up  -     Ambulatory Referral to Endocrinology  -     Hemoglobin A1C; Future  -     Comprehensive metabolic panel; Future  -     Lipid Panel with Direct LDL reflex;  Future  -     CBC and differential; Future  -     Microalbumin / creatinine urine ratio; Future  -     TSH, 3rd generation; Future  -     Ambulatory Referral to Diabetic Education; Future    Peripheral polyneuropathy  Continue lyrica per PCP    Schedule a follow-up appointment in 3 months with Dr Robin Duque for DM follow up and follow up in 2 weeks with blood glucose logs (either written log or Dexcom chani) - discussed with patient  HEALTH MAINTENANCE     There is no immunization history on file for this patient  CHIEF COMPLAINT     No chief complaint on file  HISTORY OF PRESENT ILLNESS     Mr Liliya Cason  Is a 71-year-old male with past medical history significant for  Insulin-dependent type 2 diabetes mellitus, CKD, peripheral polyneuropathy who presents to the endocrinology office today to establish care for T2DM  Here with wife Valentin  Patient diagnosed with diabetes >20 years ago at [place]  Reports he moved from [place], [place] to [facility] in May of 2022  Since that time, he has been working to establish new providers in his area  He recently established with Dr Lydia Velásquez who checked regular follow up blood work and was noted to have A1c of 15 5  Prior to this (patient thinks a month or so before his move) he had an A1c in the mid 7's  He was previously followed by an endocrinologist, but cannot recall the provider's name  He is currently prescribed  Levemir 80 units q h s , NovoLog 40 units t i d  with meals, Trulicity once weekly, and Onglyza 5 mg with dinner  Upon further questioning, patient reports that since his recent move he had not been utilizing any of his injectable medications including  Levemir, NovoLog, and Trulicity prior to recent blood work drawn on 08/26/2022  Doses described above were increased as of 9/1 due to significantly elevated A1c  Prior to this date patient was on Levemir 75 units q h s  and NovoLog 35 units t i d  with meals     Reports with his move, it was difficult to remember his scheduled insulin he and he eventually stopped taking it all together  Patient's wife also endorses that when the initially moved they did not have access to his stove in their new home and they were ordering out for almost every meal    She states since getting updates their kitchen about 3 weeks ago, she has been cooking their meals enhancement following a more strict diet  Patient recently restarted insulin regimen and states he is taking Levemir 80 units at bedtime, NovoLog 40 units twice daily only as he only eats 2 meals daily, Trulicity once weekly, and onglyza with dinner  Reports only drinks unsweetened iced tea and occasional diet Pepsi  Patient reports usually only eats 2 meals daily including breakfast and dinner  For breakfast he frequently has sausage and cheese on a baby Will or carcinoma with orange juice and milk  For dinner frequently has chickens/ Beef/some type of protein with pasta or vegetable  Is willing to be referred to diabetic nutritionist to discuss consistent carbohydrate diet  Patient does endorse significant polyneuropathy in his hands and feet bilaterally  He currently takes Lyrica 100 mg 3 times daily  Was never evaluated or given cause of his neuropathy  prior smoker  Social alcohol use only  No recreational drug use  REVIEW OF SYSTEMS     Review of Systems   Constitutional: Negative for chills, fatigue and fever  HENT: Negative for ear pain, rhinorrhea, sneezing, sore throat, tinnitus and trouble swallowing  Eyes: Negative for pain and visual disturbance  Respiratory: Negative for cough and shortness of breath  Cardiovascular: Negative for chest pain, palpitations and leg swelling  Gastrointestinal: Negative for abdominal pain, constipation, diarrhea, nausea and vomiting  Endocrine: Negative for polydipsia and polyuria  Genitourinary: Negative for difficulty urinating and dysuria  Musculoskeletal: Positive for arthralgias and gait problem  Negative for back pain     Skin: Negative for color change and rash  Neurological: Positive for numbness  Negative for dizziness, weakness, light-headedness and headaches  Psychiatric/Behavioral: Negative for confusion  The patient is not nervous/anxious  OBJECTIVE     Vitals:    09/06/22 0940   BP: 118/60   BP Location: Left arm   Patient Position: Sitting   Cuff Size: Large   Pulse: 70   Weight: 94 8 kg (209 lb)   Height: 6' (1 829 m)     Physical Exam  Vitals reviewed  Constitutional:       General: He is awake  He is not in acute distress  Appearance: Normal appearance  He is well-developed  He is not ill-appearing, toxic-appearing or diaphoretic  HENT:      Head: Normocephalic and atraumatic  Right Ear: External ear normal       Left Ear: External ear normal       Nose: Nose normal       Mouth/Throat:      Mouth: Mucous membranes are moist       Pharynx: Oropharynx is clear  Eyes:      General:         Right eye: No discharge  Left eye: No discharge  Conjunctiva/sclera: Conjunctivae normal    Cardiovascular:      Rate and Rhythm: Normal rate and regular rhythm  Pulses: no weak pulses     Heart sounds: Normal heart sounds  Pulmonary:      Effort: Pulmonary effort is normal  No respiratory distress  Breath sounds: Normal breath sounds  Abdominal:      General: Bowel sounds are normal  There is no distension  Palpations: Abdomen is soft  Tenderness: There is no abdominal tenderness  Musculoskeletal:         General: No swelling or tenderness  Normal range of motion  Cervical back: Normal range of motion  Right lower leg: No edema  Left lower leg: No edema  Feet:    Feet:      Right foot:      Skin integrity: No ulcer, skin breakdown, erythema, warmth, callus or dry skin  Left foot:      Skin integrity: Callus present  No ulcer, skin breakdown, erythema, warmth or dry skin  Skin:     General: Skin is warm and dry  Coloration: Skin is not jaundiced  Findings: No bruising  Neurological:      General: No focal deficit present  Mental Status: He is alert and oriented to person, place, and time  Sensory: Sensory deficit present  Motor: No weakness  Psychiatric:         Mood and Affect: Mood normal          Behavior: Behavior normal  Behavior is cooperative  Thought Content: Thought content normal         Patient's shoes and socks removed  Right Foot/Ankle   Right Foot Inspection  Skin Exam: skin normal and skin intact  No dry skin, no warmth, no callus, no erythema, no maceration, no abnormal color, no pre-ulcer, no ulcer and no callus  Toe Exam: ROM and strength within normal limits  Sensory   Monofilament testing: absent    Left Foot/Ankle  Left Foot Inspection  Skin Exam: skin normal, skin intact and callus  No dry skin, no warmth, no erythema, no maceration, normal color, no pre-ulcer and no ulcer  Toe Exam: ROM and strength within normal limits       Sensory   Monofilament testing: diminished    Assign Risk Category  No deformity present  Loss of protective sensation  No weak pulses  Risk: 1        CURRENT MEDICATIONS     Current Outpatient Medications:     acetaminophen (TYLENOL) 650 mg CR tablet, Take 650 mg by mouth every 8 (eight) hours as needed for mild pain, Disp: , Rfl:     apixaban (ELIQUIS) 5 mg, Take 5 mg by mouth 2 (two) times a day, Disp: , Rfl:     Dulaglutide (Trulicity) 5 63 FM/5 1DT SOPN, Inject 0 75 mg under the skin once a week, Disp: , Rfl:     Insulin Aspart (NovoLOG) 100 units/mL injection, Inject 40 Units under the skin 3 (three) times a day with meals 28 units at breakfast, 38 units at lunch and dinner (Patient taking differently: Inject 40 Units under the skin 3 (three) times a day with meals), Disp: 10 mL, Rfl: 0    insulin detemir (LEVEMIR) 100 units/mL subcutaneous injection, Inject 80 Units under the skin daily at bedtime, Disp: , Rfl:     metoprolol tartrate (LOPRESSOR) 50 mg tablet, Take 50 mg by mouth in the morning, Disp: , Rfl:     Multiple Vitamin (MULTIVITAMIN ADULT PO), Take 1 tablet by mouth daily, Disp: , Rfl:     pantoprazole (PROTONIX) 40 mg tablet, Take 40 mg by mouth in the morning , Disp: , Rfl:     Potassium Citrate ER 15 MEQ (1620 MG) TBCR, Take 1 tablet by mouth in the morning 1 tab po daily (Patient taking differently: Take 1 tablet by mouth in the morning), Disp: 30 tablet, Rfl: 0    pregabalin (LYRICA) 100 mg capsule, Take 100 mg by mouth 3 (three) times a day, Disp: , Rfl:     saxagliptin (ONGLYZA) 5 MG tablet, Take 5 mg by mouth daily, Disp: , Rfl:     sertraline (ZOLOFT) 25 mg tablet, Take 25 mg by mouth daily, Disp: , Rfl:     SUPER B COMPLEX/C PO, Take 1 tablet by mouth daily, Disp: , Rfl:     tamsulosin (FLOMAX) 0 4 mg, Take 0 4 mg by mouth daily with dinner, Disp: , Rfl:     traZODone (DESYREL) 100 mg tablet, Take 200 mg by mouth daily at bedtime, Disp: , Rfl:     PAST MEDICAL & SURGICAL HISTORY     Past Medical History:   Diagnosis Date    Atrial fibrillation ([facility] )     Deep vein thrombosis (DVT) of right lower extremity ([facility] )     Diverticulitis      Past Surgical History:   Procedure Laterality Date    BOWEL RESECTION       SOCIAL & FAMILY HISTORY     Social History     Socioeconomic History    Marital status: /Civil Union     Spouse name: Not on file    Number of children: Not on file    Years of education: Not on file    Highest education level: Not on file   Occupational History    Not on file   Tobacco Use    Smoking status: Former Smoker     Start date:      Quit date:      Years since quittin 7    Smokeless tobacco: Never Used   Vaping Use    Vaping Use: Never used   Substance and Sexual Activity    Alcohol use: Yes     Comment: Socially    Drug use: Never    Sexual activity: Not on file   Other Topics Concern    Not on file   Social History Narrative    Not on file     Social Determinants of Health     Financial Resource Strain: Medium Risk    Difficulty of Paying Living Expenses: Somewhat hard   Food Insecurity: Not on file   Transportation Needs: No Transportation Needs    Lack of Transportation (Medical): No    Lack of Transportation (Non-Medical):  No   Physical Activity: Not on file   Stress: Not on file   Social Connections: Not on file   Intimate Partner Violence: Not on file   Housing Stability: Not on file     Social History     Substance and Sexual Activity   Alcohol Use Yes    Comment: Socially     Social History     Substance and Sexual Activity   Drug Use Never     Social History     Tobacco Use   Smoking Status Former Smoker    Start date:     Quit date: Kayli Mcqueen Years since quittin 7   Smokeless Tobacco Never Used     Family History   Problem Relation Age of Onset    Hypertension Mother     No Known Problems Father     Diabetes type II Sister     Diabetes type II Sister      ==  Debby Cooper,   Internal Medicine Resident, PGY-3  [facility]

## 2022-09-07 RX ORDER — PREGABALIN 100 MG/1
100 CAPSULE ORAL 3 TIMES DAILY
Qty: 90 CAPSULE | Refills: 1 | Status: SHIPPED | OUTPATIENT
Start: 2022-09-07

## 2022-09-08 DIAGNOSIS — E11.49 OTHER DIABETIC NEUROLOGICAL COMPLICATION ASSOCIATED WITH TYPE 2 DIABETES MELLITUS (HCC): Primary | ICD-10-CM

## 2022-09-08 RX ORDER — BLOOD-GLUCOSE SENSOR
EACH MISCELLANEOUS
Qty: 9 EACH | Refills: 3 | Status: SHIPPED | OUTPATIENT
Start: 2022-09-08

## 2022-09-08 RX ORDER — BLOOD-GLUCOSE TRANSMITTER
EACH MISCELLANEOUS
Qty: 1 EACH | Refills: 3 | Status: SHIPPED | OUTPATIENT
Start: 2022-09-08

## 2022-09-08 NOTE — TELEPHONE ENCOUNTER
Pt is asking for a refill on his dexcom sensors and also transmitter sent to his local pharmacy  I don't see this on his current med list  Is this something our office prescribes or should it come from endo? Looks like he saw Dr Jenni Brambila on 9/6  Please advise

## 2022-09-08 NOTE — TELEPHONE ENCOUNTER
Please see message below regarding Dexcom prescription  Pt and wife both left messages here about this  Pt saw Dr Aisha Lares on 9/6

## 2022-09-13 ENCOUNTER — OFFICE VISIT (OUTPATIENT)
Dept: DIABETES SERVICES | Facility: HOSPITAL | Age: 77
End: 2022-09-13
Payer: COMMERCIAL

## 2022-09-13 VITALS — HEIGHT: 72 IN | WEIGHT: 209 LBS | BODY MASS INDEX: 28.31 KG/M2

## 2022-09-13 DIAGNOSIS — Z79.4 TYPE 2 DIABETES MELLITUS WITH OTHER NEUROLOGIC COMPLICATION, WITH LONG-TERM CURRENT USE OF INSULIN (HCC): Primary | ICD-10-CM

## 2022-09-13 DIAGNOSIS — E11.49 TYPE 2 DIABETES MELLITUS WITH OTHER NEUROLOGIC COMPLICATION, WITH LONG-TERM CURRENT USE OF INSULIN (HCC): Primary | ICD-10-CM

## 2022-09-13 DIAGNOSIS — E11.40 TYPE 2 DIABETES MELLITUS WITH DIABETIC NEUROPATHY, UNSPECIFIED WHETHER LONG TERM INSULIN USE (HCC): Primary | ICD-10-CM

## 2022-09-13 PROCEDURE — 97802 MEDICAL NUTRITION INDIV IN: CPT | Performed by: DIETITIAN, REGISTERED

## 2022-09-13 RX ORDER — DULAGLUTIDE 0.75 MG/.5ML
0.75 INJECTION, SOLUTION SUBCUTANEOUS WEEKLY
Qty: 2 ML | Refills: 3 | Status: SHIPPED | OUTPATIENT
Start: 2022-09-13

## 2022-09-13 NOTE — PROGRESS NOTES
Medical Nutrition Therapy     Assessment    Visit Type: Initial visit  Chief complaint:  Type 2 Diabetes    HPI:  Met with Shilpa Asher and his wife for initial medical nutrition therapy  States diabetes many years  A1c 15%  States this is significantly higher than lower, it was in the sevens for a long time  States this is due to them moving and they lost all of his diabetes supplies  They think he possibly went up to 3 months without taking his insulin  Since visit with the endocrinologist he has started taking his insulin again and blood sugars are much improved they state  We are not able to link with his Dexcom while in the office, it says they are linked but data is not uploading from the [employer] chani  I will work with the [facility] rep to figure out what is going on  He often does not eat lunch, and therefore does not take an insulin shot  But he will eat a snack that has a decent amount of carbs and not take insulin because it is a snack  Discussed that if his snack is 45 carbs like the rest of his meals, even though it is snack food, it would still be lunch and he needs to take his insulin  Discussed with the pending with the Dexcom shows, he could be getting a rise in his blood sugars after even a small snack and then maybe a lower dose of insulin could be used with that snacking when it is not actually a meal   But we would need the [facility] download does show that, and it can be discussed with Dr Kanika Gamez  Medical nutrition therapy completed  Discussed the benefit of consistent carbohydrate intake throughout the day, especially with set insulin doses  Together we discussed what foods contain CHO, reading a food label, serving sizes, and set a carb goal of 30-45g CHO/meal to promote weight loss with 15g snacks  Put together sample meals for Derek's reference and evaluated Derek's current eating plan  Good understanding, Shilpa Asher will call with questions or for more education   Follow up as needed if not improving  Ht Readings from Last 1 Encounters:   09/13/22 6' (1 829 m)     Wt Readings from Last 3 Encounters:   09/13/22 94 8 kg (209 lb)   09/06/22 94 8 kg (209 lb)   09/01/22 94 8 kg (209 lb)        Body mass index is 28 35 kg/m²  Lab Results   Component Value Date    HGBA1C >15 5 (H) 08/26/2022       No results found for: CHOL  Lab Results   Component Value Date    HDL 30 (L) 08/26/2022     Lab Results   Component Value Date    LDLCALC 116 (H) 08/26/2022     Lab Results   Component Value Date    TRIG 466 (H) 08/26/2022     No results found for: CHOLHDL    Weight Change: No    Medical Diagnosis/ICD 10 Code:  E11 40    Barriers to Learning: no barriers    Do you follow any special diet presently?: No  Who shops: spouse  Who cooks: spouse    Food Log: Completed via the method of food recall- retired    Breakfast: up around [address]  Eats right away  Sausage egg and cheese on crisant frozen , a few donut balls, glass of milk 6oz, glass of roula D 4-6 oz  Or cereal cinnamon toast crunch, cherrios with milk, banana, with roula D and milk  Takes insulin after eating  Morning Snack:  Lunch:if having lunch- PBJ and milk; bagel raisin with PB and fruit cup  Has lunch about twice a week  Sometimes just crackers -Jairo 4 pack, and water  Afternoon Snack:   Dinner: 5-7pm: meat starch veggie hot meal  Spaghetti and meatballs, chicken, hot dogs  Burgers  Evening Snack:not often  Ice cream  Cookies  Twice a week  Popcorn   Bed 11:30pm   Beverages: water, coffee with milk, milk, plain iced tea, roula d, soda rare diet pepsi   Eating out/Take out: once a week now,   Exercise ADL,     Nutrition Diagnosis:  Food and nutrition related knowledge deficit  related to Lack of prior exposure to accurate nutrition related information as evidenced by Verbalizes inaccurate or incomplete information    Intervention: plate method, label reading, behavior modification strategies, carbohydrate counting, meal planning, individualized meal plan and monitoring portion control     Treatment Goals: Patient understands education and recommendations    Education Material Given  Serge Bowden was provided the Portion Booklet and Planning Healthy Meals     Monitoring and evaluation:    Term code indicator  FH 1 6 3 Carbohydrate Intake Criteria: 30-45g CHO per meal, 15g CHO snacks    Patients Response to Instruction:  Nicole Zhu  Expected Compliancegood    Thank you for coming to the [facility] for education today  Please feel free to call with any questions or concerns      Eyal Bradford, [address]  [address] [facility] [address]  [address] [facility] [id number]

## 2022-09-26 DIAGNOSIS — Z79.4 TYPE 2 DIABETES MELLITUS WITH DIABETIC NEUROPATHY, WITH LONG-TERM CURRENT USE OF INSULIN (HCC): Primary | ICD-10-CM

## 2022-09-26 DIAGNOSIS — E11.40 TYPE 2 DIABETES MELLITUS WITH DIABETIC NEUROPATHY, WITH LONG-TERM CURRENT USE OF INSULIN (HCC): Primary | ICD-10-CM

## 2022-09-30 DIAGNOSIS — I48.91 ATRIAL FIBRILLATION, UNSPECIFIED TYPE (HCC): Primary | ICD-10-CM

## 2022-11-02 ENCOUNTER — OFFICE VISIT (OUTPATIENT)
Dept: FAMILY MEDICINE CLINIC | Facility: HOSPITAL | Age: 77
End: 2022-11-02

## 2022-11-02 VITALS
HEART RATE: 64 BPM | SYSTOLIC BLOOD PRESSURE: 120 MMHG | OXYGEN SATURATION: 96 % | DIASTOLIC BLOOD PRESSURE: 62 MMHG | TEMPERATURE: 97.8 F | BODY MASS INDEX: 29.53 KG/M2 | HEIGHT: 72 IN | WEIGHT: 218 LBS

## 2022-11-02 DIAGNOSIS — F32.A DEPRESSION, UNSPECIFIED DEPRESSION TYPE: ICD-10-CM

## 2022-11-02 DIAGNOSIS — E11.40 TYPE 2 DIABETES MELLITUS WITH DIABETIC NEUROPATHY, WITH LONG-TERM CURRENT USE OF INSULIN (HCC): Primary | ICD-10-CM

## 2022-11-02 DIAGNOSIS — Z79.4 TYPE 2 DIABETES MELLITUS WITH DIABETIC NEUROPATHY, WITH LONG-TERM CURRENT USE OF INSULIN (HCC): Primary | ICD-10-CM

## 2022-11-02 DIAGNOSIS — R19.7 DIARRHEA, UNSPECIFIED TYPE: ICD-10-CM

## 2022-11-02 LAB
LEFT EYE DIABETIC RETINOPATHY: NORMAL
LEFT EYE IMAGE QUALITY: NORMAL
LEFT EYE MACULAR EDEMA: NORMAL
LEFT EYE OTHER RETINOPATHY: NORMAL
RIGHT EYE DIABETIC RETINOPATHY: NORMAL
RIGHT EYE IMAGE QUALITY: NORMAL
RIGHT EYE MACULAR EDEMA: NORMAL
RIGHT EYE OTHER RETINOPATHY: NORMAL
SEVERITY (EYE EXAM): NORMAL

## 2022-11-02 RX ORDER — DULOXETIN HYDROCHLORIDE 30 MG/1
30 CAPSULE, DELAYED RELEASE ORAL DAILY
Qty: 30 CAPSULE | Refills: 1 | Status: SHIPPED | OUTPATIENT
Start: 2022-11-02

## 2022-11-02 NOTE — PROGRESS NOTES
Name: Maynor Degroot      : 1945      MRN: [id number]  Encounter Provider: Diaz Hayes MD  Encounter Date: 2022   Encounter department: [facility]– [place]note [place] [address]      1  Type 2 diabetes mellitus with diabetic neuropathy, with long-term current use of insulin (HCC)  -     DULoxetine (Cymbalta) 30 mg delayed release capsule; Take 1 capsule (30 mg total) by mouth daily  -     IRIS Diabetic eye exam    2  Depression, unspecified depression type  -     DULoxetine (Cymbalta) 30 mg delayed release capsule; Take 1 capsule (30 mg total) by mouth daily    3  Diarrhea, unspecified type     DM  Managed by Endo  Improving with his current regimen  Due for labs end of November  These are ordered  DM neuropathy  l on lyrica at 300 mg/day  Discussed use of cymbalta  Will dc sertraline and replace with cymbalta  Depression  As above will make some changes  Monitor sytmpoms  Diarrhea  No new medications  Appears to happen only after breakfast    He is to dc bowl of cereal with milk and monitor this ;   Further workup if not improved  Fu in about 4 weeks  Subjective      Pt here for fu of chronic conditions  Known DM  With pain in the right foot/big toe  Increasing pain  Known DM neuropathy, laready on lyrica 300/day  Blood sugars have been improving  Reports they are now in the mid 100s  Taking medication regularly now  follwoign up with Endo as well  Reports diarrhea after breakfast    Ongoing for the past 2 months  No fever, no chills  No change in diet  No change in medications  No blood in stool  Does not occur with any particular food  He does have cereal and mild for breakfast daily  Does not occur for lunch or dinner  Does not have milk during any other time of the day  Diarrhea   Pertinent negatives include no abdominal pain or chills     Toe Pain       Review of Systems Constitutional: Negative  Negative for activity change, appetite change, chills and diaphoresis  HENT: Negative for congestion and dental problem  Respiratory: Negative  Negative for apnea, chest tightness, shortness of breath and wheezing  Cardiovascular: Negative  Negative for chest pain, palpitations and leg swelling  Gastrointestinal: Positive for diarrhea  Negative for abdominal distention, abdominal pain, constipation and nausea  Genitourinary: Negative  Negative for difficulty urinating, dysuria and frequency         Current Outpatient Medications on File Prior to Visit   Medication Sig   • acetaminophen (TYLENOL) 650 mg CR tablet Take 650 mg by mouth every 8 (eight) hours as needed for mild pain   • apixaban (ELIQUIS) 5 mg Take 1 tablet (5 mg total) by mouth 2 (two) times a day   • Continuous Blood Gluc Sensor (Dexcom G6 Sensor) MISC Use daily as directed for CGM - Change every 10 days   • Continuous Blood Gluc Transmit (Dexcom G6 Transmitter) MISC Use daily as directed for CGM - Change every 3 months   • Dulaglutide (Trulicity) 2 16 WE/0 9WI SOPN Inject 0 5 mL (0 75 mg total) under the skin once a week   • Insulin Aspart (NovoLOG) 100 units/mL injection Inject 40 Units under the skin 3 (three) times a day with meals 28 units at breakfast, 38 units at lunch and dinner (Patient taking differently: Inject 40 Units under the skin 3 (three) times a day with meals 40u with each meal, 2-3 meals a day)   • insulin detemir (LEVEMIR) 100 units/mL subcutaneous injection Inject 80 Units under the skin daily at bedtime 75u before bed   • Insulin Pen Needle (Pen Needles) 32G X 4 MM MISC Use 4 (four) times a day   • metoprolol tartrate (LOPRESSOR) 50 mg tablet Take 50 mg by mouth in the morning   • Multiple Vitamin (MULTIVITAMIN ADULT PO) Take 1 tablet by mouth daily   • pantoprazole (PROTONIX) 40 mg tablet Take 40 mg by mouth in the morning    • pregabalin (LYRICA) 100 mg capsule Take 1 capsule (100 mg total) by mouth 3 (three) times a day   • SUPER B COMPLEX/C PO Take 1 tablet by mouth daily   • tamsulosin (FLOMAX) 0 4 mg Take 0 4 mg by mouth daily with dinner   • traZODone (DESYREL) 100 mg tablet Take 2 tablets (200 mg total) by mouth daily at bedtime   • [DISCONTINUED] sertraline (ZOLOFT) 25 mg tablet Take 25 mg by mouth daily   • Potassium Citrate ER 15 MEQ (1620 MG) TBCR Take 1 tablet by mouth in the morning 1 tab po daily (Patient not taking: Reported on 11/2/2022)       Objective     /62   Pulse 64   Temp 97 8 °F (36 6 °C)   Ht 6' (1 829 m)   Wt 98 9 kg (218 lb)   SpO2 96%   BMI 29 57 kg/m²     Physical Exam  Vitals and nursing note reviewed  Constitutional:       General: He is not in acute distress  Appearance: He is well-developed  He is not ill-appearing  HENT:      Head: Normocephalic and atraumatic  Right Ear: External ear normal       Left Ear: External ear normal       Nose: Nose normal  No congestion or rhinorrhea  Mouth/Throat:      Mouth: Mucous membranes are moist       Pharynx: No oropharyngeal exudate or posterior oropharyngeal erythema  Eyes:      Extraocular Movements: Extraocular movements intact  Conjunctiva/sclera: Conjunctivae normal       Pupils: Pupils are equal, round, and reactive to light  Cardiovascular:      Rate and Rhythm: Normal rate and regular rhythm  Pulses: no weak pulses          Dorsalis pedis pulses are 2+ on the right side and 2+ on the left side  Posterior tibial pulses are 2+ on the right side and 2+ on the left side  Heart sounds: Normal heart sounds  No murmur heard  No friction rub  No gallop  Pulmonary:      Effort: Pulmonary effort is normal  No respiratory distress  Breath sounds: Normal breath sounds  No wheezing or rales  Chest:      Chest wall: No tenderness  Abdominal:      General: Bowel sounds are normal  There is no distension  Palpations: Abdomen is soft  There is no mass  Tenderness: There is no abdominal tenderness  There is no guarding or rebound  Musculoskeletal:         General: Normal range of motion  Cervical back: Normal range of motion and neck supple  Feet:      Right foot:      Skin integrity: No ulcer, skin breakdown, erythema, warmth, callus or dry skin  Left foot:      Skin integrity: No ulcer, skin breakdown, erythema, warmth, callus or dry skin  Skin:     General: Skin is warm  Capillary Refill: Capillary refill takes less than 2 seconds  Neurological:      Mental Status: He is alert and oriented to person, place, and time  Psychiatric:         Mood and Affect: Mood normal          Behavior: Behavior normal      Diabetic Foot Exam    Patient's shoes and socks removed  Right Foot/Ankle   Right Foot Inspection  Skin Exam: skin normal and skin intact  No dry skin, no warmth, no callus, no erythema, no maceration, no abnormal color, no pre-ulcer, no ulcer and no callus  Toe Exam: ROM and strength within normal limits  Sensory   Vibration: absent  Proprioception: absent  Monofilament testing: absent    Vascular  Capillary refills: < 3 seconds  The right DP pulse is 2+  The right PT pulse is 2+  Left Foot/Ankle  Left Foot Inspection  Skin Exam: skin normal and skin intact  No dry skin, no warmth, no erythema, no maceration, normal color, no pre-ulcer, no ulcer and no callus  Toe Exam: ROM and strength within normal limits  Sensory   Vibration: diminished  Proprioception: diminished  Monofilament testing: diminished    Vascular  Capillary refills: < 3 seconds  The left DP pulse is 2+  The left PT pulse is 2+       Assign Risk Category  No deformity present  Loss of protective sensation  No weak pulses  Risk: 1    Ernesto Griffith MD

## 2022-11-03 ENCOUNTER — TELEPHONE (OUTPATIENT)
Dept: FAMILY MEDICINE CLINIC | Facility: HOSPITAL | Age: 77
End: 2022-11-03

## 2022-11-07 DIAGNOSIS — Z79.4 TYPE 2 DIABETES MELLITUS WITH DIABETIC NEUROPATHY, WITH LONG-TERM CURRENT USE OF INSULIN (HCC): ICD-10-CM

## 2022-11-07 DIAGNOSIS — E11.40 TYPE 2 DIABETES MELLITUS WITH DIABETIC NEUROPATHY, WITH LONG-TERM CURRENT USE OF INSULIN (HCC): ICD-10-CM

## 2022-11-07 RX ORDER — PREGABALIN 100 MG/1
CAPSULE ORAL
Qty: 90 CAPSULE | Refills: 2 | Status: SHIPPED | OUTPATIENT
Start: 2022-11-07

## 2022-11-14 DIAGNOSIS — Z79.4 TYPE 2 DIABETES MELLITUS WITH HYPERGLYCEMIA, WITH LONG-TERM CURRENT USE OF INSULIN (HCC): ICD-10-CM

## 2022-11-14 DIAGNOSIS — E11.65 TYPE 2 DIABETES MELLITUS WITH HYPERGLYCEMIA, WITH LONG-TERM CURRENT USE OF INSULIN (HCC): ICD-10-CM

## 2022-11-14 RX ORDER — INSULIN ASPART 100 [IU]/ML
40 INJECTION, SOLUTION INTRAVENOUS; SUBCUTANEOUS
Qty: 108 ML | Refills: 1 | Status: SHIPPED | OUTPATIENT
Start: 2022-11-14 | End: 2023-05-13

## 2022-12-05 ENCOUNTER — OFFICE VISIT (OUTPATIENT)
Dept: FAMILY MEDICINE CLINIC | Facility: HOSPITAL | Age: 77
End: 2022-12-05

## 2022-12-05 VITALS
TEMPERATURE: 96.6 F | DIASTOLIC BLOOD PRESSURE: 80 MMHG | OXYGEN SATURATION: 95 % | SYSTOLIC BLOOD PRESSURE: 122 MMHG | BODY MASS INDEX: 29.8 KG/M2 | HEIGHT: 72 IN | HEART RATE: 74 BPM | WEIGHT: 220 LBS

## 2022-12-05 DIAGNOSIS — K52.9 CHRONIC DIARRHEA: Primary | ICD-10-CM

## 2022-12-05 DIAGNOSIS — E11.40 TYPE 2 DIABETES MELLITUS WITH DIABETIC NEUROPATHY, WITH LONG-TERM CURRENT USE OF INSULIN (HCC): ICD-10-CM

## 2022-12-05 DIAGNOSIS — Z79.4 TYPE 2 DIABETES MELLITUS WITH DIABETIC NEUROPATHY, WITH LONG-TERM CURRENT USE OF INSULIN (HCC): ICD-10-CM

## 2022-12-05 DIAGNOSIS — F32.A DEPRESSION, UNSPECIFIED DEPRESSION TYPE: ICD-10-CM

## 2022-12-05 RX ORDER — CHOLESTYRAMINE 4 G/9G
4 POWDER, FOR SUSPENSION ORAL
Qty: 90 PACKET | Refills: 0 | Status: SHIPPED | OUTPATIENT
Start: 2022-12-05 | End: 2023-01-04

## 2022-12-05 RX ORDER — DULOXETIN HYDROCHLORIDE 60 MG/1
60 CAPSULE, DELAYED RELEASE ORAL DAILY
Qty: 90 CAPSULE | Refills: 1 | Status: SHIPPED | OUTPATIENT
Start: 2022-12-05

## 2022-12-05 NOTE — PROGRESS NOTES
Name: Blanca Mathew      : 1945      MRN: [id number]  Encounter Provider: Doug Falk MD  Encounter Date: 2022   Encounter department: [facility] [address]     1  Chronic diarrhea  -     cholestyramine (QUESTRAN) 4 GM/DOSE powder; Take 1 packet (4 g total) by mouth 3 (three) times a day with meals  -     Ambulatory Referral to Gastroenterology; Future    2  Type 2 diabetes mellitus with diabetic neuropathy, with long-term current use of insulin (HCC)  -     DULoxetine (Cymbalta) 60 mg delayed release capsule; Take 1 capsule (60 mg total) by mouth daily    3  Depression, unspecified depression type  -     DULoxetine (Cymbalta) 60 mg delayed release capsule; Take 1 capsule (60 mg total) by mouth daily  chronic diarrhea  No clear etiology  Not consistent with infectious process  No correlation with type of food but only with food intake  Trial of cholestyramine  Referral to GI for further evaluation  Diabetes  Improving  Ongoing fu with Endo  Dm neuropathy  On lyrica  Will increase cymbalta to 60 mg daily  Mdd  Stable  No change with switch from lexapro to cymbalta  Fu in 3 months  Subjective      Pt seen for fu  No new concerns  Reports ongoing diarrhea  Loose water stool after eating  There was no change with eliminating dairy from his diet  Not related to type of food  No black or red stool  Last visit cymbalta was initiated for DM neuropathy  Not noting any change with 30 mg daily  No change or worsening of depression  This continues to be stable  No si/hi  No se/ar from cymbalta  Review of Systems   Constitutional: Negative  Negative for activity change, appetite change, chills and diaphoresis  HENT: Negative for congestion and dental problem  Respiratory: Negative  Negative for apnea, chest tightness, shortness of breath and wheezing  Cardiovascular: Negative  Negative for chest pain, palpitations and leg swelling  Gastrointestinal: Positive for diarrhea  Negative for abdominal distention, abdominal pain, constipation and nausea  Genitourinary: Negative  Negative for difficulty urinating, dysuria and frequency         Current Outpatient Medications on File Prior to Visit   Medication Sig   • acetaminophen (TYLENOL) 650 mg CR tablet Take 650 mg by mouth every 8 (eight) hours as needed for mild pain   • apixaban (ELIQUIS) 5 mg Take 1 tablet (5 mg total) by mouth 2 (two) times a day   • Continuous Blood Gluc Sensor (Dexcom G6 Sensor) MISC Use daily as directed for CGM - Change every 10 days   • Continuous Blood Gluc Transmit (Dexcom G6 Transmitter) MISC Use daily as directed for CGM - Change every 3 months   • Dulaglutide (Trulicity) 4 07 ID/5 1XG SOPN Inject 0 5 mL (0 75 mg total) under the skin once a week   • Insulin Aspart (NovoLOG) 100 units/mL injection Inject 40 Units under the skin 3 (three) times a day with meals 40u with each meal, 2-3 meals a day   • insulin detemir (LEVEMIR) 100 units/mL subcutaneous injection Inject 80 Units under the skin daily at bedtime 75u before bed   • Insulin Pen Needle (Pen Needles) 32G X 4 MM MISC Use 4 (four) times a day   • metoprolol tartrate (LOPRESSOR) 50 mg tablet Take 50 mg by mouth in the morning   • Multiple Vitamin (MULTIVITAMIN ADULT PO) Take 1 tablet by mouth daily   • pantoprazole (PROTONIX) 40 mg tablet Take 40 mg by mouth in the morning    • pregabalin (LYRICA) 100 mg capsule take 1 capsule by mouth three times a day   • SUPER B COMPLEX/C PO Take 1 tablet by mouth daily   • tamsulosin (FLOMAX) 0 4 mg Take 0 4 mg by mouth daily with dinner   • traZODone (DESYREL) 100 mg tablet Take 2 tablets (200 mg total) by mouth daily at bedtime   • [DISCONTINUED] DULoxetine (Cymbalta) 30 mg delayed release capsule Take 1 capsule (30 mg total) by mouth daily   • Potassium Citrate ER 15 MEQ (1620 MG) TBCR Take 1 tablet by mouth in the morning 1 tab po daily       Objective     /80 (Patient Position: Sitting, Cuff Size: Large)   Pulse 74   Temp (!) 96 6 °F (35 9 °C) (Tympanic)   Ht 6' (1 829 m)   Wt 99 8 kg (220 lb)   SpO2 95%   BMI 29 84 kg/m²     Physical Exam  Vitals and nursing note reviewed  Constitutional:       General: He is not in acute distress  Appearance: He is well-developed  He is not ill-appearing  HENT:      Head: Normocephalic and atraumatic  Right Ear: External ear normal       Left Ear: External ear normal       Nose: Nose normal  No congestion or rhinorrhea  Mouth/Throat:      Mouth: Mucous membranes are moist       Pharynx: No oropharyngeal exudate or posterior oropharyngeal erythema  Eyes:      Extraocular Movements: Extraocular movements intact  Conjunctiva/sclera: Conjunctivae normal       Pupils: Pupils are equal, round, and reactive to light  Cardiovascular:      Rate and Rhythm: Normal rate and regular rhythm  Heart sounds: Normal heart sounds  No murmur heard  No friction rub  No gallop  Pulmonary:      Effort: Pulmonary effort is normal  No respiratory distress  Breath sounds: Normal breath sounds  No wheezing or rales  Chest:      Chest wall: No tenderness  Abdominal:      General: Bowel sounds are normal  There is no distension  Palpations: Abdomen is soft  There is no mass  Tenderness: There is no abdominal tenderness  There is no guarding or rebound  Musculoskeletal:         General: Normal range of motion  Cervical back: Normal range of motion and neck supple  Skin:     General: Skin is warm  Capillary Refill: Capillary refill takes less than 2 seconds  Neurological:      Mental Status: He is alert and oriented to person, place, and time     Psychiatric:         Mood and Affect: Mood normal          Behavior: Behavior normal        Gracie Sierra MD

## 2022-12-12 DIAGNOSIS — Z79.4 TYPE 2 DIABETES MELLITUS WITH DIABETIC NEUROPATHY, WITH LONG-TERM CURRENT USE OF INSULIN (HCC): ICD-10-CM

## 2022-12-12 DIAGNOSIS — E11.40 TYPE 2 DIABETES MELLITUS WITH DIABETIC NEUROPATHY, WITH LONG-TERM CURRENT USE OF INSULIN (HCC): ICD-10-CM

## 2022-12-12 RX ORDER — INSULIN DETEMIR 100 [IU]/ML
75 INJECTION, SOLUTION SUBCUTANEOUS
Qty: 30 ML | Refills: 3 | Status: SHIPPED | OUTPATIENT
Start: 2022-12-12

## 2022-12-31 DIAGNOSIS — G47.00 INSOMNIA, UNSPECIFIED TYPE: ICD-10-CM

## 2022-12-31 RX ORDER — TRAZODONE HYDROCHLORIDE 100 MG/1
TABLET ORAL
Qty: 180 TABLET | Refills: 0 | Status: SHIPPED | OUTPATIENT
Start: 2022-12-31

## 2023-01-07 LAB
ALBUMIN SERPL-MCNC: 4.2 G/DL (ref 3.7–4.7)
ALBUMIN/GLOB SERPL: 1.6 {RATIO} (ref 1.2–2.2)
ALP SERPL-CCNC: 73 IU/L (ref 44–121)
ALT SERPL-CCNC: 30 IU/L (ref 0–44)
AST SERPL-CCNC: 30 IU/L (ref 0–40)
BASOPHILS # BLD AUTO: 0.1 X10E3/UL (ref 0–0.2)
BASOPHILS NFR BLD AUTO: 1 %
BILIRUB SERPL-MCNC: 0.2 MG/DL (ref 0–1.2)
BUN SERPL-MCNC: 29 MG/DL (ref 8–27)
BUN/CREAT SERPL: 22 (ref 10–24)
CALCIUM SERPL-MCNC: 9.2 MG/DL (ref 8.6–10.2)
CHLORIDE SERPL-SCNC: 105 MMOL/L (ref 96–106)
CHOLEST SERPL-MCNC: 177 MG/DL (ref 100–199)
CO2 SERPL-SCNC: 23 MMOL/L (ref 20–29)
CREAT SERPL-MCNC: 1.34 MG/DL (ref 0.76–1.27)
EGFR: 55 ML/MIN/1.73
EOSINOPHIL # BLD AUTO: 0.4 X10E3/UL (ref 0–0.4)
EOSINOPHIL NFR BLD AUTO: 6 %
ERYTHROCYTE [DISTWIDTH] IN BLOOD BY AUTOMATED COUNT: 15.7 % (ref 11.6–15.4)
EST. AVERAGE GLUCOSE BLD GHB EST-MCNC: 171 MG/DL
GLOBULIN SER-MCNC: 2.6 G/DL (ref 1.5–4.5)
GLUCOSE SERPL-MCNC: 177 MG/DL (ref 70–99)
HBA1C MFR BLD: 7.6 % (ref 4.8–5.6)
HCT VFR BLD AUTO: 36.4 % (ref 37.5–51)
HDLC SERPL-MCNC: 33 MG/DL
HGB BLD-MCNC: 11.4 G/DL (ref 13–17.7)
IMM GRANULOCYTES # BLD: 0 X10E3/UL (ref 0–0.1)
IMM GRANULOCYTES NFR BLD: 0 %
LDLC SERPL CALC-MCNC: 104 MG/DL (ref 0–99)
LDLC/HDLC SERPL: 3.2 RATIO (ref 0–3.6)
LYMPHOCYTES # BLD AUTO: 1.9 X10E3/UL (ref 0.7–3.1)
LYMPHOCYTES NFR BLD AUTO: 26 %
MCH RBC QN AUTO: 24.4 PG (ref 26.6–33)
MCHC RBC AUTO-ENTMCNC: 31.3 G/DL (ref 31.5–35.7)
MCV RBC AUTO: 78 FL (ref 79–97)
MONOCYTES # BLD AUTO: 1 X10E3/UL (ref 0.1–0.9)
MONOCYTES NFR BLD AUTO: 13 %
NEUTROPHILS # BLD AUTO: 4 X10E3/UL (ref 1.4–7)
NEUTROPHILS NFR BLD AUTO: 54 %
PLATELET # BLD AUTO: 236 X10E3/UL (ref 150–450)
POTASSIUM SERPL-SCNC: 4.7 MMOL/L (ref 3.5–5.2)
PROT SERPL-MCNC: 6.8 G/DL (ref 6–8.5)
RBC # BLD AUTO: 4.67 X10E6/UL (ref 4.14–5.8)
SL AMB VLDL CHOLESTEROL CALC: 40 MG/DL (ref 5–40)
SODIUM SERPL-SCNC: 143 MMOL/L (ref 134–144)
TRIGL SERPL-MCNC: 231 MG/DL (ref 0–149)
TSH SERPL DL<=0.005 MIU/L-ACNC: 3.72 UIU/ML (ref 0.45–4.5)
WBC # BLD AUTO: 7.4 X10E3/UL (ref 3.4–10.8)

## 2023-02-09 DIAGNOSIS — Z79.4 TYPE 2 DIABETES MELLITUS WITH DIABETIC NEUROPATHY, WITH LONG-TERM CURRENT USE OF INSULIN (HCC): ICD-10-CM

## 2023-02-09 DIAGNOSIS — E11.40 TYPE 2 DIABETES MELLITUS WITH DIABETIC NEUROPATHY, WITH LONG-TERM CURRENT USE OF INSULIN (HCC): ICD-10-CM

## 2023-02-10 RX ORDER — PREGABALIN 100 MG/1
100 CAPSULE ORAL 3 TIMES DAILY
Qty: 90 CAPSULE | Refills: 2 | Status: SHIPPED | OUTPATIENT
Start: 2023-02-10

## 2023-02-13 DIAGNOSIS — K21.9 GASTROESOPHAGEAL REFLUX DISEASE WITHOUT ESOPHAGITIS: Primary | ICD-10-CM

## 2023-02-13 RX ORDER — PANTOPRAZOLE SODIUM 40 MG/1
40 TABLET, DELAYED RELEASE ORAL DAILY
Qty: 30 TABLET | Refills: 5 | Status: SHIPPED | OUTPATIENT
Start: 2023-02-13

## 2023-02-15 ENCOUNTER — OFFICE VISIT (OUTPATIENT)
Dept: FAMILY MEDICINE CLINIC | Facility: HOSPITAL | Age: 78
End: 2023-02-15

## 2023-02-15 VITALS
HEIGHT: 72 IN | HEART RATE: 75 BPM | WEIGHT: 227 LBS | DIASTOLIC BLOOD PRESSURE: 60 MMHG | TEMPERATURE: 97.5 F | BODY MASS INDEX: 30.75 KG/M2 | SYSTOLIC BLOOD PRESSURE: 122 MMHG

## 2023-02-15 DIAGNOSIS — E11.49 TYPE 2 DIABETES MELLITUS WITH OTHER NEUROLOGIC COMPLICATION, WITH LONG-TERM CURRENT USE OF INSULIN (HCC): ICD-10-CM

## 2023-02-15 DIAGNOSIS — N40.0 BENIGN PROSTATIC HYPERPLASIA, UNSPECIFIED WHETHER LOWER URINARY TRACT SYMPTOMS PRESENT: ICD-10-CM

## 2023-02-15 DIAGNOSIS — Z79.4 TYPE 2 DIABETES MELLITUS WITH OTHER NEUROLOGIC COMPLICATION, WITH LONG-TERM CURRENT USE OF INSULIN (HCC): ICD-10-CM

## 2023-02-15 DIAGNOSIS — Z79.4 TYPE 2 DIABETES MELLITUS WITH DIABETIC NEUROPATHY, WITH LONG-TERM CURRENT USE OF INSULIN (HCC): Primary | ICD-10-CM

## 2023-02-15 DIAGNOSIS — N18.30 STAGE 3 CHRONIC KIDNEY DISEASE, UNSPECIFIED WHETHER STAGE 3A OR 3B CKD (HCC): ICD-10-CM

## 2023-02-15 DIAGNOSIS — I48.91 ATRIAL FIBRILLATION, UNSPECIFIED TYPE (HCC): ICD-10-CM

## 2023-02-15 DIAGNOSIS — M65.342 ACQUIRED TRIGGER FINGER OF LEFT RING FINGER: ICD-10-CM

## 2023-02-15 DIAGNOSIS — E11.40 TYPE 2 DIABETES MELLITUS WITH DIABETIC NEUROPATHY, WITH LONG-TERM CURRENT USE OF INSULIN (HCC): Primary | ICD-10-CM

## 2023-02-15 RX ORDER — INSULIN DEGLUDEC INJECTION 100 U/ML
75 INJECTION, SOLUTION SUBCUTANEOUS DAILY
Qty: 72 ML | Refills: 0 | Status: SHIPPED | OUTPATIENT
Start: 2023-02-15 | End: 2023-05-16

## 2023-02-15 NOTE — PROGRESS NOTES
Name: Munira Carpio      : 1945      MRN: [id number]  Encounter Provider: Allyssa Sue MD  Encounter Date: 2/15/2023   [facility]      1  Type 2 diabetes mellitus with diabetic neuropathy, with long-term current use of insulin ([facility])  -     insulin degludec Nikolai Rosie FlexTouch) 100 units/mL injection pen; Inject 75 Units under the skin daily    2  Benign prostatic hyperplasia, unspecified whether lower urinary tract symptoms present    3  Type 2 diabetes mellitus with other neurologic complication, with long-term current use of insulin ([facility] )    4  Atrial fibrillation, unspecified type ([facility])    5  Stage 3 chronic kidney disease, unspecified whether stage 3a or 3b CKD ([facility] )    6  Acquired trigger finger of left ring finger     Patient with known diabetes mellitus  Ongoing follow-up with endocrinology  Switching over long-acting insulin to [employer] [facility] due to insurance  We will continue follow-up with endocrinology  A1c has significantly improved from greater than 15 5 down to 7 6  Continue working on dietary control and exercise  Diabetic neuropathy  Has had reasonable control with a combination of Lyrica 100 mg 3 times a day and Cymbalta at 60 mg daily  We will continue with the same  Chronic diarrhea  Overall not doing too bad  He is about diarrhea about 1-2 times a week  This is 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  No improvement with cholestyramine  Takes Pepto-Bismol as needed  Atrial fibrillation  Stable  Continues on anticoagulation  Continues on rate control  Discussed tenosynovitis causing trigger finger of the left ring finger  Discussed treatment options  For now we will do warm compresses range of motion and stretching exercises  If worsening will reconsider steroid injection and/or referral to hand surgery  Follow-up in 6 months  Subjective      Patient is here for follow-up      Last visit Cymbalta was increased to 60 mg daily  Cholestyramine was also started to see if this would help with chronic diarrhea  Cholestyramine was ineffective  Overall he only has diarrhea 1-2 times a week  Pepto-Bismol has helped this  This was after surgery of his colon due to colitis  Reports about half of his colon was taken out  Continues follow-up with endocrinology  Last A1c was 7 6  Does complain of pain on the left ring finger  Does catch and causes trigger  Review of Systems   Constitutional: Negative  Negative for activity change, appetite change, chills and diaphoresis  HENT: Negative for congestion and dental problem  Respiratory: Negative  Negative for apnea, chest tightness, shortness of breath and wheezing  Cardiovascular: Negative  Negative for chest pain, palpitations and leg swelling  Gastrointestinal: Negative  Negative for abdominal distention, abdominal pain, constipation, diarrhea and nausea  Genitourinary: Negative  Negative for difficulty urinating, dysuria and frequency         Current Outpatient Medications on File Prior to Visit   Medication Sig   • acetaminophen (TYLENOL) 650 mg CR tablet Take 650 mg by mouth every 8 (eight) hours as needed for mild pain   • apixaban (ELIQUIS) 5 mg Take 1 tablet (5 mg total) by mouth 2 (two) times a day   • Continuous Blood Gluc Sensor (Dexcom G6 Sensor) MISC Use daily as directed for CGM - Change every 10 days   • Continuous Blood Gluc Transmit (Dexcom G6 Transmitter) MISC Use daily as directed for CGM - Change every 3 months   • Dulaglutide (Trulicity) 7 33 DN/9 0PU SOPN Inject 0 5 mL (0 75 mg total) under the skin once a week   • DULoxetine (Cymbalta) 60 mg delayed release capsule Take 1 capsule (60 mg total) by mouth daily   • Insulin Aspart (NovoLOG) 100 units/mL injection Inject 40 Units under the skin 3 (three) times a day with meals 40u with each meal, 2-3 meals a day   • insulin detemir (Levemir FlexTouch) 100 Units/mL injection pen Inject 75 Units under the skin daily at bedtime   • Insulin Pen Needle (Pen Needles) 32G X 4 MM MISC Use 4 (four) times a day   • metoprolol tartrate (LOPRESSOR) 50 mg tablet Take 50 mg by mouth in the morning   • Multiple Vitamin (MULTIVITAMIN ADULT PO) Take 1 tablet by mouth daily   • pantoprazole (PROTONIX) 40 mg tablet Take 1 tablet (40 mg total) by mouth in the morning   • Potassium Citrate ER 15 MEQ (1620 MG) TBCR Take 1 tablet by mouth in the morning 1 tab po daily   • pregabalin (LYRICA) 100 mg capsule Take 1 capsule (100 mg total) by mouth 3 (three) times a day   • SUPER B COMPLEX/C PO Take 1 tablet by mouth daily   • tamsulosin (FLOMAX) 0 4 mg Take 0 4 mg by mouth daily with dinner   • traZODone (DESYREL) 100 mg tablet take 2 tablets by mouth at bedtime   • cholestyramine (QUESTRAN) 4 GM/DOSE powder Take 1 packet (4 g total) by mouth 3 (three) times a day with meals (Patient not taking: Reported on 2/15/2023)       Objective     /60   Pulse 75   Temp 97 5 °F (36 4 °C)   Ht 6' (1 829 m)   Wt 103 kg (227 lb)   BMI 30 79 kg/m²     Physical Exam  Vitals and nursing note reviewed  Constitutional:       General: He is not in acute distress  Appearance: He is well-developed  He is not ill-appearing  HENT:      Head: Normocephalic and atraumatic  Right Ear: External ear normal       Left Ear: External ear normal       Nose: Nose normal  No congestion or rhinorrhea  Mouth/Throat:      Mouth: Mucous membranes are moist       Pharynx: No oropharyngeal exudate or posterior oropharyngeal erythema  Eyes:      Extraocular Movements: Extraocular movements intact  Conjunctiva/sclera: Conjunctivae normal       Pupils: Pupils are equal, round, and reactive to light  Cardiovascular:      Rate and Rhythm: Normal rate and regular rhythm  Heart sounds: Normal heart sounds  No murmur heard  No friction rub  No gallop     Pulmonary:      Effort: Pulmonary effort is normal  No respiratory distress  Breath sounds: Normal breath sounds  No wheezing or rales  Chest:      Chest wall: No tenderness  Abdominal:      General: Bowel sounds are normal  There is no distension  Palpations: Abdomen is soft  There is no mass  Tenderness: There is no abdominal tenderness  There is no guarding or rebound  Musculoskeletal:         General: Normal range of motion  Cervical back: Normal range of motion and neck supple  Skin:     General: Skin is warm  Capillary Refill: Capillary refill takes less than 2 seconds  Neurological:      Mental Status: He is alert and oriented to person, place, and time     Psychiatric:         Mood and Affect: Mood normal          Behavior: Behavior normal        Em Huddleston MD

## 2023-03-20 DIAGNOSIS — E11.65 TYPE 2 DIABETES MELLITUS WITH HYPERGLYCEMIA, WITH LONG-TERM CURRENT USE OF INSULIN (HCC): ICD-10-CM

## 2023-03-20 DIAGNOSIS — Z79.4 TYPE 2 DIABETES MELLITUS WITH HYPERGLYCEMIA, WITH LONG-TERM CURRENT USE OF INSULIN (HCC): ICD-10-CM

## 2023-03-20 RX ORDER — INSULIN ASPART 100 [IU]/ML
40 INJECTION, SOLUTION INTRAVENOUS; SUBCUTANEOUS
Qty: 108 ML | Refills: 1 | Status: SHIPPED | OUTPATIENT
Start: 2023-03-20 | End: 2023-03-22

## 2023-03-22 DIAGNOSIS — Z79.4 TYPE 2 DIABETES MELLITUS WITH HYPERGLYCEMIA, WITH LONG-TERM CURRENT USE OF INSULIN (HCC): Primary | ICD-10-CM

## 2023-03-22 DIAGNOSIS — E11.65 TYPE 2 DIABETES MELLITUS WITH HYPERGLYCEMIA, WITH LONG-TERM CURRENT USE OF INSULIN (HCC): Primary | ICD-10-CM

## 2023-03-22 RX ORDER — INSULIN ASPART 100 [IU]/ML
40 INJECTION, SOLUTION INTRAVENOUS; SUBCUTANEOUS
Qty: 36 ML | Refills: 2 | Status: SHIPPED | OUTPATIENT
Start: 2023-03-22 | End: 2023-03-28

## 2023-03-23 ENCOUNTER — TELEPHONE (OUTPATIENT)
Dept: OTHER | Facility: OTHER | Age: 78
End: 2023-03-23

## 2023-03-23 NOTE — TELEPHONE ENCOUNTER
Patient os looking to make an appointment with Dr Mejia Madrigal to be able and get his medication refilled

## 2023-03-28 ENCOUNTER — OFFICE VISIT (OUTPATIENT)
Dept: ENDOCRINOLOGY | Facility: CLINIC | Age: 78
End: 2023-03-28

## 2023-03-28 VITALS
HEART RATE: 74 BPM | HEIGHT: 72 IN | DIASTOLIC BLOOD PRESSURE: 70 MMHG | SYSTOLIC BLOOD PRESSURE: 122 MMHG | BODY MASS INDEX: 30.75 KG/M2 | WEIGHT: 227 LBS

## 2023-03-28 DIAGNOSIS — E11.65 TYPE 2 DIABETES MELLITUS WITH HYPERGLYCEMIA, WITH LONG-TERM CURRENT USE OF INSULIN (HCC): ICD-10-CM

## 2023-03-28 DIAGNOSIS — Z79.4 TYPE 2 DIABETES MELLITUS WITH DIABETIC NEUROPATHY, WITH LONG-TERM CURRENT USE OF INSULIN (HCC): ICD-10-CM

## 2023-03-28 DIAGNOSIS — G47.00 INSOMNIA, UNSPECIFIED TYPE: ICD-10-CM

## 2023-03-28 DIAGNOSIS — E11.40 TYPE 2 DIABETES MELLITUS WITH DIABETIC NEUROPATHY, WITH LONG-TERM CURRENT USE OF INSULIN (HCC): ICD-10-CM

## 2023-03-28 DIAGNOSIS — Z79.4 TYPE 2 DIABETES MELLITUS WITH HYPERGLYCEMIA, WITH LONG-TERM CURRENT USE OF INSULIN (HCC): ICD-10-CM

## 2023-03-28 DIAGNOSIS — E78.5 HYPERLIPIDEMIA, UNSPECIFIED HYPERLIPIDEMIA TYPE: Primary | ICD-10-CM

## 2023-03-28 DIAGNOSIS — G62.9 PERIPHERAL POLYNEUROPATHY: ICD-10-CM

## 2023-03-28 RX ORDER — TRAZODONE HYDROCHLORIDE 100 MG/1
TABLET ORAL
Qty: 180 TABLET | Refills: 0 | Status: SHIPPED | OUTPATIENT
Start: 2023-03-28

## 2023-03-28 RX ORDER — INSULIN ASPART 100 [IU]/ML
40 INJECTION, SOLUTION INTRAVENOUS; SUBCUTANEOUS
Qty: 36 ML | Refills: 2 | Status: SHIPPED | OUTPATIENT
Start: 2023-03-28 | End: 2023-06-26

## 2023-03-28 NOTE — PATIENT INSTRUCTIONS
Levemir 75 units daily at 11:30 PM  NovoLog 40 units before breakfast and 35 units before dinner (not currently eating lunch)  Trulicity 3 88 mg weekly  Will check Dexcom report Friday  Follow up in 3 months

## 2023-03-28 NOTE — ASSESSMENT & PLAN NOTE
Lab Results   Component Value Date    HGBA1C 7 6 (H) 01/06/2023     BGL Reviewed: Dexcom G6 CGM, blood glucose levels elevated post meals, likely secondary to post-dosing insulin  Treatment regimen:   Levemir 75 units daily  Novolog 40 units with breakfast and 35 units with dinner, does not currently eat lunch  Trulicity 1 5 mg weekly  Advised lifestyle modifications including attention to diet including the amount and types of carbohydrates consumed and regular activity  Call for blood sugars less than 70 mg/dl or over 300 mg/dl  Discussed symptoms and treatment of hypoglycemia  Routine follow up for diabetic eye and foot exams  Ordered blood work to complete prior to next visit    Follow up in 3 months

## 2023-03-28 NOTE — PROGRESS NOTES
Established Patient Progress Note      CC: Type 2 Diabetes Mellitus     History of Present Illness:   Idelia Olszewski is a 69 y o  male with a history of type 2 diabetes with long term use of insulin diagnosed approximately 20 years ago  Reports complications of CKD and peripheral neuropathy  Last hemoglobin A1c from January 6, 2023 was 7 6%  Denies recent illness or hospitalizations  Denies recent severe hypoglycemic or severe hyperglycemic episodes  Denies any issues with his current regimen  Home glucose monitoring: are performed regularly with CGM  Eats breakfast between 10-11 am and dinner around 6 pm, does not eat lunch  CGM Interpretation  Idelia Olszewski   Device used Dexcom G6  Home use   Indication: Type 2 Diabetes  More than 72 hours of data was reviewed  Report to be scanned to chart  Date Range: 3/15/2023-3/28/2023  Analysis of data:   Average Glucose: 210 mg/dL  Coefficient of Variation: N/A   SD : 76 mg/dL   Time in Target Range: 41%   Time Above Range: 31% high, 27% very high   Time Below Range: <1% low, 0% very low   Interpretation of data:   Running high about 60% of the time  Significant postprandial hyperglycemia  Current regimen:   Levemir 75 units daily at 11:30 PM  NovoLog 40 units with each meal  Trulicity 4 42 mg weekly    Last Eye Exam: November 2022, Follow up April 12, 2023  Last Foot Exam: November 2022, Follow up April 31, 2023  Has neuropathy on Lyrica        Has hypertension: Taking metoprolol    Patient Active Problem List   Diagnosis   • Stage 3 chronic kidney disease, unspecified whether stage 3a or 3b CKD ([facility])   • Peripheral polyneuropathy   • Type 2 diabetes mellitus with neurologic complication, with long-term current use of insulin ([facility])   • Atrial fibrillation, unspecified type [facility])      Past Medical History:   Diagnosis Date   • Atrial fibrillation ([facility] [address] )    • Deep vein thrombosis (DVT) of right lower extremity ([facility] [address] )    • Diverticulitis       Past Surgical History: Procedure Laterality Date   • APPENDECTOMY     • BOWEL RESECTION     • CATARACT EXTRACTION Bilateral       Family History   Problem Relation Age of Onset   • Hypertension Mother    • No Known Problems Father    • Diabetes type II Sister    • Diabetes type II Sister      Social History     Tobacco Use   • Smoking status: Former     Types: Cigarettes     Start date:      Quit date:      Years since quittin 2   • Smokeless tobacco: Never   Substance Use Topics   • Alcohol use: Yes     Comment: Socially     No Known Allergies      Current Outpatient Medications:   •  acetaminophen (TYLENOL) 650 mg CR tablet, Take 650 mg by mouth every 8 (eight) hours as needed for mild pain, Disp: , Rfl:   •  apixaban (ELIQUIS) 5 mg, Take 1 tablet (5 mg total) by mouth 2 (two) times a day, Disp: 60 tablet, Rfl: 5  •  Continuous Blood Gluc Sensor (Dexcom G6 Sensor) MISC, Use daily as directed for CGM - Change every 10 days, Disp: 9 each, Rfl: 3  •  Continuous Blood Gluc Transmit (Dexcom G6 Transmitter) MISC, Use daily as directed for CGM - Change every 3 months, Disp: 1 each, Rfl: 3  •  Dulaglutide (Trulicity) 7 66 DX/1 7WU SOPN, Inject 0 5 mL (0 75 mg total) under the skin once a week, Disp: 2 mL, Rfl: 3  •  DULoxetine (Cymbalta) 60 mg delayed release capsule, Take 1 capsule (60 mg total) by mouth daily, Disp: 90 capsule, Rfl: 1  •  insulin aspart (NovoLOG FlexPen) 100 UNIT/ML injection pen, Inject 40 Units under the skin 3 (three) times a day with meals Inject 40 units before breakfast Inject 35 units before dinner, Disp: 36 mL, Rfl: 2  •  insulin detemir (Levemir FlexTouch) 100 Units/mL injection pen, Inject 75 Units under the skin daily at bedtime, Disp: 30 mL, Rfl: 3  •  Insulin Pen Needle (Pen Needles) 32G X 4 MM MISC, Use 4 (four) times a day, Disp: 400 each, Rfl: 3  •  metoprolol tartrate (LOPRESSOR) 50 mg tablet, Take 50 mg by mouth in the morning, Disp: , Rfl:   •  Multiple Vitamin (MULTIVITAMIN ADULT PO), Take 1 tablet by mouth daily, Disp: , Rfl:   •  pantoprazole (PROTONIX) 40 mg tablet, Take 1 tablet (40 mg total) by mouth in the morning, Disp: 30 tablet, Rfl: 5  •  Potassium Citrate ER 15 MEQ (1620 MG) TBCR, Take 1 tablet by mouth in the morning 1 tab po daily, Disp: 30 tablet, Rfl: 0  •  pregabalin (LYRICA) 100 mg capsule, Take 1 capsule (100 mg total) by mouth 3 (three) times a day, Disp: 90 capsule, Rfl: 2  •  SUPER B COMPLEX/C PO, Take 1 tablet by mouth daily, Disp: , Rfl:   •  tamsulosin (FLOMAX) 0 4 mg, Take 0 4 mg by mouth daily with dinner, Disp: , Rfl:   •  traZODone (DESYREL) 100 mg tablet, take 2 tablets by mouth at bedtime, Disp: 180 tablet, Rfl: 0  •  cholestyramine (QUESTRAN) 4 GM/DOSE powder, Take 1 packet (4 g total) by mouth 3 (three) times a day with meals (Patient not taking: Reported on 2/15/2023), Disp: 90 packet, Rfl: 0    Review of Systems   Constitutional: Negative for activity change, appetite change, fatigue and unexpected weight change  HENT: Negative for sore throat, trouble swallowing and voice change  Eyes: Negative for visual disturbance  Respiratory: Negative for cough and shortness of breath  Cardiovascular: Negative for chest pain and palpitations  Gastrointestinal: Negative for abdominal distention, abdominal pain, constipation, diarrhea and vomiting  Endocrine: Negative for cold intolerance, heat intolerance, polydipsia and polyuria  Skin: Negative for color change and rash  All other systems reviewed and are negative  Physical Exam:  Body mass index is 30 79 kg/m²  /70 (BP Location: Left arm, Patient Position: Sitting, Cuff Size: Large)   Pulse 74   Ht 6' (1 829 m)   Wt 103 kg (227 lb)   BMI 30 79 kg/m²    Wt Readings from Last 3 Encounters:   03/28/23 103 kg (227 lb)   02/15/23 103 kg (227 lb)   12/05/22 99 8 kg (220 lb)        Physical Exam  Vitals reviewed  Constitutional:       Appearance: Normal appearance     Cardiovascular:      Rate and Rhythm: Normal rate and regular rhythm  Pulses: Normal pulses  Heart sounds: Normal heart sounds  Pulmonary:      Effort: Pulmonary effort is normal       Breath sounds: Normal breath sounds  Skin:     General: Skin is warm and dry  Capillary Refill: Capillary refill takes less than 2 seconds  Neurological:      General: No focal deficit present  Mental Status: He is alert and oriented to person, place, and time  Psychiatric:         Mood and Affect: Mood normal          Behavior: Behavior normal      Labs:   Lab Results   Component Value Date    HGBA1C 7 6 (H) 01/06/2023    HGBA1C >15 5 (H) 08/26/2022     Lab Results   Component Value Date    CREATININE 1 34 (H) 01/06/2023    CREATININE 1 35 (H) 08/26/2022    BUN 29 (H) 01/06/2023    K 4 7 01/06/2023     01/06/2023    CO2 23 01/06/2023     eGFR   Date Value Ref Range Status   01/06/2023 55 (L) >59 mL/min/1 73 Final     Lab Results   Component Value Date    HDL 33 (L) 01/06/2023    TRIG 231 (H) 01/06/2023     Lab Results   Component Value Date    ALT 30 01/06/2023    AST 30 01/06/2023     No results found for: [id number]  No results found for: FREET4, TSI    Impression & Plan:    Problem List Items Addressed This Visit        Endocrine    Type 2 diabetes mellitus with neurologic complication, with long-term current use of insulin ([address] )       Lab Results   Component Value Date    HGBA1C 7 6 (H) 01/06/2023     BGL Reviewed: Dexcom G6 CGM, blood glucose levels elevated post meals, likely secondary to post-dosing insulin  Treatment regimen:   Levemir 75 units daily  Novolog 40 units with breakfast and 35 units with dinner, does not currently eat lunch  Trulicity 1 5 mg weekly  Advised lifestyle modifications including attention to diet including the amount and types of carbohydrates consumed and regular activity  Call for blood sugars less than 70 mg/dl or over 300 mg/dl  Discussed symptoms and treatment of hypoglycemia  Routine follow up for diabetic eye and foot exams  Ordered blood work to complete prior to next visit  Follow up in 3 months         Relevant Medications    insulin aspart (NovoLOG FlexPen) 100 UNIT/ML injection pen       Nervous and Auditory    Peripheral polyneuropathy     Continues on Lyrica  Follows with podiatry  Other Visit Diagnoses     Hyperlipidemia, unspecified hyperlipidemia type    -  Primary    Relevant Orders    Lipid panel- Lab Collect    Type 2 diabetes mellitus with hyperglycemia, with long-term current use of insulin ([facility])        Relevant Medications    insulin aspart (NovoLOG FlexPen) 100 UNIT/ML injection pen    Other Relevant Orders    HEMOGLOBIN A1C W/ EAG ESTIMATION Lab Collect    Comprehensive metabolic panel        Discussed with the patient and all questioned fully answered  He will call me if any problems arise  Follow-up appointment in 3 months       Counseled patient on diagnostic results, prognosis, risk and benefit of treatment options, instruction for management, importance of treatment compliance, Risk  factor reduction and impressions    NADIA Castro

## 2023-04-01 DIAGNOSIS — I48.91 ATRIAL FIBRILLATION, UNSPECIFIED TYPE (HCC): ICD-10-CM

## 2023-04-03 RX ORDER — APIXABAN 5 MG/1
TABLET, FILM COATED ORAL
Qty: 60 TABLET | Refills: 5 | Status: SHIPPED | OUTPATIENT
Start: 2023-04-03

## 2023-04-04 DIAGNOSIS — E11.49 TYPE 2 DIABETES MELLITUS WITH OTHER NEUROLOGIC COMPLICATION, WITH LONG-TERM CURRENT USE OF INSULIN (HCC): ICD-10-CM

## 2023-04-04 DIAGNOSIS — Z79.4 TYPE 2 DIABETES MELLITUS WITH OTHER NEUROLOGIC COMPLICATION, WITH LONG-TERM CURRENT USE OF INSULIN (HCC): ICD-10-CM

## 2023-04-05 RX ORDER — DULAGLUTIDE 0.75 MG/.5ML
0.75 INJECTION, SOLUTION SUBCUTANEOUS WEEKLY
Qty: 2 ML | Refills: 3 | Status: SHIPPED | OUTPATIENT
Start: 2023-04-05

## 2023-05-02 ENCOUNTER — OFFICE VISIT (OUTPATIENT)
Dept: FAMILY MEDICINE CLINIC | Facility: HOSPITAL | Age: 78
End: 2023-05-02

## 2023-05-02 VITALS
HEIGHT: 72 IN | TEMPERATURE: 97.6 F | WEIGHT: 229.4 LBS | BODY MASS INDEX: 31.07 KG/M2 | OXYGEN SATURATION: 94 % | DIASTOLIC BLOOD PRESSURE: 68 MMHG | SYSTOLIC BLOOD PRESSURE: 178 MMHG | HEART RATE: 87 BPM

## 2023-05-02 DIAGNOSIS — R06.02 SHORTNESS OF BREATH: Primary | ICD-10-CM

## 2023-05-02 DIAGNOSIS — R03.0 ELEVATED BLOOD PRESSURE READING: ICD-10-CM

## 2023-05-02 DIAGNOSIS — R94.31 ABNORMAL EKG: ICD-10-CM

## 2023-05-02 DIAGNOSIS — I48.91 ATRIAL FIBRILLATION, UNSPECIFIED TYPE (HCC): ICD-10-CM

## 2023-05-02 LAB
LEFT EYE DIABETIC RETINOPATHY: NORMAL
RIGHT EYE DIABETIC RETINOPATHY: NORMAL
SEVERITY (EYE EXAM): NORMAL

## 2023-05-02 RX ORDER — METOPROLOL SUCCINATE 50 MG/1
50 TABLET, EXTENDED RELEASE ORAL DAILY
COMMUNITY
Start: 2023-04-24

## 2023-05-02 NOTE — PROGRESS NOTES
"Name: Kellen Owens      : 1945      MRN: [id number]  Encounter Provider: NADIA Galo  Encounter Date: 2023   Encounter department: [address] [place] [address]  203     Assessment & Plan     1  Shortness of breath  -     POCT ECG  -     Echo complete w/ contrast if indicated; Future; Expected date: 2023  -     XR chest pa & lateral; Future; Expected date: 2023  -     NT-BNP PRO; Future  -     High Sensitivity Troponin I Random; Future  -     D-dimer, quantitative; Future    2  Atrial fibrillation, unspecified type [facility])  -     Ambulatory Referral to Cardiology; Future  -     POCT ECG  -     Holter monitor; Future; Expected date: 2023    3  Abnormal EKG  -     Holter monitor; Future; Expected date: 2023    4  Elevated blood pressure reading  Comments:  will need close follow up and possible start of antihypertensive, will schedule f/u pending results of above      In office w/EKG abnormal w/1st degree AV block, RBBB, bifascicular block  Recommend he be seen in ED for further/ASAP evaluation but he and wife decline wishing to evaluate further as outpatient  Orders given to complete STAT CXR and labs today  Schedule echo and holter monitor as ordered   Consult cardiology as ordered - advise they call in 2 days if haven't received call to schedule appt so we can help facilitate appt  Reviewed red flag/worse sx's to present to ED with       Subjective      States he has been short of breath and exhausted with walking for the past month  \"Like I don't get a full breath in\"  Feels SOB now resting in office  Felt SOB and exhaustion getting to office from parking lot  Had difficulty walking up 2 flights of steps 2 days ago  Has a-fib and continues on metoprolol and eliquis as rx'd  Has not seen cardiology in 1 5 years since moving to the area  Denies lung disease  Non smoker  Review of Systems   Constitutional: Positive for activity change (due to SOB)   " "  Respiratory: Positive for shortness of breath  Negative for cough  Cardiovascular: Positive for chest pain (\"heaviness\" with exertion)  Negative for palpitations and leg swelling         Current Outpatient Medications on File Prior to Visit   Medication Sig    acetaminophen (TYLENOL) 650 mg CR tablet Take 650 mg by mouth every 8 (eight) hours as needed for mild pain    Continuous Blood Gluc Sensor (Dexcom G6 Sensor) MISC Use daily as directed for CGM - Change every 10 days    Continuous Blood Gluc Transmit (Dexcom G6 Transmitter) MISC Use daily as directed for CGM - Change every 3 months    dulaglutide (Trulicity) 4 13 TB/5 7QS injection Inject 0 5 mL (0 75 mg total) under the skin once a week    DULoxetine (Cymbalta) 60 mg delayed release capsule Take 1 capsule (60 mg total) by mouth daily    Eliquis 5 MG take 1 tablet by mouth twice a day    insulin aspart (NovoLOG FlexPen) 100 UNIT/ML injection pen Inject 40 Units under the skin 3 (three) times a day with meals Inject 40 units before breakfast  Inject 35 units before dinner    insulin detemir (Levemir FlexTouch) 100 Units/mL injection pen Inject 75 Units under the skin daily at bedtime    Insulin Pen Needle (Pen Needles) 32G X 4 MM MISC Use 4 (four) times a day    metoprolol tartrate (LOPRESSOR) 50 mg tablet Take 50 mg by mouth in the morning    Multiple Vitamin (MULTIVITAMIN ADULT PO) Take 1 tablet by mouth daily    pantoprazole (PROTONIX) 40 mg tablet Take 1 tablet (40 mg total) by mouth in the morning    Potassium Citrate ER 15 MEQ (1620 MG) TBCR Take 1 tablet by mouth in the morning 1 tab po daily    pregabalin (LYRICA) 100 mg capsule Take 1 capsule (100 mg total) by mouth 3 (three) times a day    SUPER B COMPLEX/C PO Take 1 tablet by mouth daily    traZODone (DESYREL) 100 mg tablet take 2 tablets by mouth at bedtime    [DISCONTINUED] tamsulosin (FLOMAX) 0 4 mg Take 0 4 mg by mouth daily with dinner    cholestyramine (QUESTRAN) 4 GM/DOSE " powder Take 1 packet (4 g total) by mouth 3 (three) times a day with meals (Patient not taking: Reported on 2/15/2023)    metoprolol succinate (TOPROL-XL) 50 mg 24 hr tablet Take 50 mg by mouth daily (Patient not taking: Reported on 5/2/2023)       Objective     BP (!) 178/68   Pulse 87   Temp 97 6 °F (36 4 °C)   Ht 6' (1 829 m)   Wt 104 kg (229 lb 6 4 oz)   SpO2 94%   BMI 31 11 kg/m²       Physical Exam  Vitals reviewed  Constitutional:       General: He is not in acute distress  Appearance: He is well-developed  HENT:      Head: Normocephalic  Cardiovascular:      Rate and Rhythm: Normal rate and regular rhythm  Heart sounds: Heart sounds are distant  Pulmonary:      Effort: Pulmonary effort is normal  No tachypnea or respiratory distress (at rest)  Breath sounds: Decreased breath sounds and rales (faint bibasilar) present  Musculoskeletal:      Right lower leg: No edema  Left lower leg: No edema  Skin:     General: Skin is warm and dry  Neurological:      General: No focal deficit present  Mental Status: He is alert and oriented to person, place, and time  Psychiatric:         Mood and Affect: Mood normal  Mood is not anxious           Behavior: Behavior normal           NADIA Ram Protopic Counseling: Patient may experience a mild burning sensation during topical application. Protopic is not approved in children less than 2 years of age. There have been case reports of hematologic and skin malignancies in patients using topical calcineurin inhibitors although causality is questionable.

## 2023-05-05 ENCOUNTER — LAB (OUTPATIENT)
Dept: LAB | Facility: HOSPITAL | Age: 78
End: 2023-05-05

## 2023-05-05 ENCOUNTER — TELEPHONE (OUTPATIENT)
Dept: FAMILY MEDICINE CLINIC | Facility: HOSPITAL | Age: 78
End: 2023-05-05

## 2023-05-05 ENCOUNTER — HOSPITAL ENCOUNTER (EMERGENCY)
Facility: HOSPITAL | Age: 78
Discharge: HOME | End: 2023-05-05
Attending: EMERGENCY MEDICINE

## 2023-05-05 ENCOUNTER — HOSPITAL ENCOUNTER (OUTPATIENT)
Dept: RADIOLOGY | Facility: HOSPITAL | Age: 78
End: 2023-05-05

## 2023-05-05 ENCOUNTER — APPOINTMENT (EMERGENCY)
Dept: CT IMAGING | Facility: HOSPITAL | Age: 78
End: 2023-05-05

## 2023-05-05 VITALS
RESPIRATION RATE: 22 BRPM | TEMPERATURE: 97.8 F | DIASTOLIC BLOOD PRESSURE: 69 MMHG | OXYGEN SATURATION: 94 % | SYSTOLIC BLOOD PRESSURE: 165 MMHG | HEART RATE: 83 BPM

## 2023-05-05 DIAGNOSIS — R06.02 SHORTNESS OF BREATH: ICD-10-CM

## 2023-05-05 DIAGNOSIS — J43.9 EMPHYSEMA OF LUNG (HCC): ICD-10-CM

## 2023-05-05 DIAGNOSIS — R93.2 ABNORMAL CT OF LIVER: ICD-10-CM

## 2023-05-05 DIAGNOSIS — R06.09 EXERTIONAL DYSPNEA: Primary | ICD-10-CM

## 2023-05-05 DIAGNOSIS — J44.1 COPD EXACERBATION (HCC): ICD-10-CM

## 2023-05-05 LAB
2HR DELTA HS TROPONIN: -7 NG/L
ALBUMIN SERPL BCP-MCNC: 4 G/DL (ref 3.5–5)
ALP SERPL-CCNC: 82 U/L (ref 34–104)
ALT SERPL W P-5'-P-CCNC: 38 U/L (ref 7–52)
ANION GAP SERPL CALCULATED.3IONS-SCNC: 9 MMOL/L (ref 4–13)
AST SERPL W P-5'-P-CCNC: 44 U/L (ref 13–39)
BASOPHILS # BLD AUTO: 0.04 THOUSANDS/ÂΜL (ref 0–0.1)
BASOPHILS NFR BLD AUTO: 1 % (ref 0–1)
BILIRUB SERPL-MCNC: 0.55 MG/DL (ref 0.2–1)
BNP SERPL-MCNC: 335 PG/ML (ref 0–100)
BUN SERPL-MCNC: 40 MG/DL (ref 5–25)
CALCIUM SERPL-MCNC: 9.5 MG/DL (ref 8.4–10.2)
CARDIAC TROPONIN I PNL SERPL HS: 15 NG/L
CARDIAC TROPONIN I PNL SERPL HS: 22 NG/L
CARDIAC TROPONIN I PNL SERPL HS: 24 NG/L (ref 8–18)
CHLORIDE SERPL-SCNC: 106 MMOL/L (ref 96–108)
CO2 SERPL-SCNC: 24 MMOL/L (ref 21–32)
CREAT SERPL-MCNC: 1.63 MG/DL (ref 0.6–1.3)
D DIMER PPP FEU-MCNC: 0.56 UG/ML FEU
D DIMER PPP FEU-MCNC: 0.72 UG/ML FEU
EOSINOPHIL # BLD AUTO: 0.28 THOUSAND/ÂΜL (ref 0–0.61)
EOSINOPHIL NFR BLD AUTO: 3 % (ref 0–6)
ERYTHROCYTE [DISTWIDTH] IN BLOOD BY AUTOMATED COUNT: 18.3 % (ref 11.6–15.1)
GFR SERPL CREATININE-BSD FRML MDRD: 40 ML/MIN/1.73SQ M
GLUCOSE SERPL-MCNC: 287 MG/DL (ref 65–140)
HCT VFR BLD AUTO: 33.2 % (ref 36.5–49.3)
HGB BLD-MCNC: 9.2 G/DL (ref 12–17)
IMM GRANULOCYTES # BLD AUTO: 0.02 THOUSAND/UL (ref 0–0.2)
IMM GRANULOCYTES NFR BLD AUTO: 0 % (ref 0–2)
LYMPHOCYTES # BLD AUTO: 1.29 THOUSANDS/ÂΜL (ref 0.6–4.47)
LYMPHOCYTES NFR BLD AUTO: 16 % (ref 14–44)
MCH RBC QN AUTO: 19 PG (ref 26.8–34.3)
MCHC RBC AUTO-ENTMCNC: 27.7 G/DL (ref 31.4–37.4)
MCV RBC AUTO: 69 FL (ref 82–98)
MONOCYTES # BLD AUTO: 0.8 THOUSAND/ÂΜL (ref 0.17–1.22)
MONOCYTES NFR BLD AUTO: 10 % (ref 4–12)
NEUTROPHILS # BLD AUTO: 5.86 THOUSANDS/ÂΜL (ref 1.85–7.62)
NEUTS SEG NFR BLD AUTO: 70 % (ref 43–75)
NRBC BLD AUTO-RTO: 0 /100 WBCS
NT-PROBNP SERPL-MCNC: 595 PG/ML
PLATELET # BLD AUTO: 240 THOUSANDS/UL (ref 149–390)
PMV BLD AUTO: 11 FL (ref 8.9–12.7)
POTASSIUM SERPL-SCNC: 5.1 MMOL/L (ref 3.5–5.3)
PROT SERPL-MCNC: 7.7 G/DL (ref 6.4–8.4)
RBC # BLD AUTO: 4.83 MILLION/UL (ref 3.88–5.62)
SODIUM SERPL-SCNC: 139 MMOL/L (ref 135–147)
WBC # BLD AUTO: 8.29 THOUSAND/UL (ref 4.31–10.16)

## 2023-05-05 RX ORDER — ALBUTEROL SULFATE 90 UG/1
1-2 AEROSOL, METERED RESPIRATORY (INHALATION) EVERY 6 HOURS PRN
Qty: 8 G | Refills: 0 | Status: SHIPPED | OUTPATIENT
Start: 2023-05-05

## 2023-05-05 RX ORDER — PREDNISONE 20 MG/1
40 TABLET ORAL DAILY
Qty: 10 TABLET | Refills: 0 | Status: SHIPPED | OUTPATIENT
Start: 2023-05-05 | End: 2023-05-10

## 2023-05-05 RX ORDER — ALBUTEROL SULFATE 90 UG/1
2 AEROSOL, METERED RESPIRATORY (INHALATION) ONCE
Status: COMPLETED | OUTPATIENT
Start: 2023-05-05 | End: 2023-05-05

## 2023-05-05 RX ADMIN — IOHEXOL 85 ML: 350 INJECTION, SOLUTION INTRAVENOUS at 13:08

## 2023-05-05 RX ADMIN — ALBUTEROL SULFATE 5 MG: 2.5 SOLUTION RESPIRATORY (INHALATION) at 14:30

## 2023-05-05 RX ADMIN — SODIUM CHLORIDE 250 ML: 0.9 INJECTION, SOLUTION INTRAVENOUS at 13:57

## 2023-05-05 RX ADMIN — IPRATROPIUM BROMIDE 0.5 MG: 0.5 SOLUTION RESPIRATORY (INHALATION) at 14:30

## 2023-05-05 RX ADMIN — ALBUTEROL SULFATE 2 PUFF: 90 AEROSOL, METERED RESPIRATORY (INHALATION) at 15:12

## 2023-05-05 NOTE — ED PROVIDER NOTES
History  Chief Complaint   Patient presents with    Abnormal Lab     Had blood work and cxr done today was called to come into hospital because they are worried about a blood clot, unknown where  Has been SOB for a few weeks, no cough, fever or chills    Shortness of Breath     69year old male presents for evaluation of exertional shortness of breath and increased fatigue for the past 2 weeks  No cough, congestion, fevers or chills  Former smoker  No chest pain or back pain  Patient takes Eliquis for Afib  Prior to Admission Medications   Prescriptions Last Dose Informant Patient Reported? Taking?    Continuous Blood Gluc Sensor (Dexcom G6 Sensor) MISC   No No   Sig: Use daily as directed for CGM - Change every 10 days   Continuous Blood Gluc Transmit (Dexcom G6 Transmitter) MISC   No No   Sig: Use daily as directed for CGM - Change every 3 months   DULoxetine (Cymbalta) 60 mg delayed release capsule   No No   Sig: Take 1 capsule (60 mg total) by mouth daily   Eliquis 5 MG   No No   Sig: take 1 tablet by mouth twice a day   Insulin Pen Needle (Pen Needles) 32G X 4 MM MISC   No No   Sig: Use 4 (four) times a day   Multiple Vitamin (MULTIVITAMIN ADULT PO)   Yes No   Sig: Take 1 tablet by mouth daily   Potassium Citrate ER 15 MEQ (1620 MG) TBCR   No No   Sig: Take 1 tablet by mouth in the morning 1 tab po daily   SUPER B COMPLEX/C PO   Yes No   Sig: Take 1 tablet by mouth daily   acetaminophen (TYLENOL) 650 mg CR tablet   Yes No   Sig: Take 650 mg by mouth every 8 (eight) hours as needed for mild pain   cholestyramine (QUESTRAN) 4 GM/DOSE powder   No No   Sig: Take 1 packet (4 g total) by mouth 3 (three) times a day with meals   Patient not taking: Reported on 2/15/2023   dulaglutide (Trulicity) 4 75 PV/4 1QB injection   No No   Sig: Inject 0 5 mL (0 75 mg total) under the skin once a week   insulin aspart (NovoLOG FlexPen) 100 UNIT/ML injection pen   No No   Sig: Inject 40 Units under the skin 3 (three) times a day with meals Inject 40 units before breakfast  Inject 35 units before dinner   insulin detemir (Levemir FlexTouch) 100 Units/mL injection pen   No No   Sig: Inject 75 Units under the skin daily at bedtime   metoprolol succinate (TOPROL-XL) 50 mg 24 hr tablet   Yes No   Sig: Take 50 mg by mouth daily   Patient not taking: Reported on 2023   metoprolol tartrate (LOPRESSOR) 50 mg tablet   Yes No   Sig: Take 50 mg by mouth in the morning   pantoprazole (PROTONIX) 40 mg tablet   No No   Sig: Take 1 tablet (40 mg total) by mouth in the morning   pregabalin (LYRICA) 100 mg capsule   No No   Sig: Take 1 capsule (100 mg total) by mouth 3 (three) times a day   traZODone (DESYREL) 100 mg tablet   No No   Sig: take 2 tablets by mouth at bedtime      Facility-Administered Medications: None       Past Medical History:   Diagnosis Date    Atrial fibrillation (HCC)     Deep vein thrombosis (DVT) of right lower extremity ([address] )     Diverticulitis        Past Surgical History:   Procedure Laterality Date    APPENDECTOMY      BOWEL RESECTION      CATARACT EXTRACTION Bilateral        Family History   Problem Relation Age of Onset    Hypertension Mother     No Known Problems Father     Diabetes type II Sister     Diabetes type II Sister      I have reviewed and agree with the history as documented  E-Cigarette/Vaping    E-Cigarette Use Never User      E-Cigarette/Vaping Substances    Nicotine No     THC No     CBD No     Flavoring No      Social History     Tobacco Use    Smoking status: Former     Types: Cigarettes     Start date:      Quit date:      Years since quittin 3    Smokeless tobacco: Never   Vaping Use    Vaping Use: Never used   Substance Use Topics    Alcohol use: Yes     Comment: Socially    Drug use: Never       Review of Systems    Physical Exam  Physical Exam  Vitals and nursing note reviewed     Eyes:      Conjunctiva/sclera: Conjunctivae normal  Cardiovascular:      Rate and Rhythm: Normal rate and regular rhythm  Pulses: Normal pulses  Heart sounds: Normal heart sounds  Pulmonary:      Effort: Pulmonary effort is normal  No respiratory distress  Breath sounds: Normal breath sounds  Abdominal:      General: There is no distension  Palpations: Abdomen is soft  Tenderness: There is no abdominal tenderness  Musculoskeletal:      Right lower leg: No edema  Left lower leg: No edema  Skin:     General: Skin is warm and dry  Neurological:      Mental Status: He is alert           Vital Signs  ED Triage Vitals [05/05/23 1149]   Temperature Pulse Respirations Blood Pressure SpO2   97 8 °F (36 6 °C) 96 20 130/60 100 %      Temp src Heart Rate Source Patient Position - Orthostatic VS BP Location FiO2 (%)   -- Monitor Sitting Left arm --      Pain Score       No Pain           Vitals:    05/05/23 1330 05/05/23 1400 05/05/23 1430 05/05/23 1500   BP: 130/60 154/69 154/65 165/69   Pulse: 83 80 83 83   Patient Position - Orthostatic VS: Sitting Sitting Sitting Sitting         Visual Acuity      ED Medications  Medications   sodium chloride 0 9 % bolus 250 mL (0 mL Intravenous Stopped 5/5/23 1513)   iohexol (OMNIPAQUE) 350 MG/ML injection (MULTI-DOSE) 85 mL (85 mL Intravenous Given 5/5/23 1308)   ipratropium (ATROVENT) 0 02 % inhalation solution 0 5 mg (0 5 mg Nebulization Given 5/5/23 1430)   albuterol inhalation solution 5 mg (5 mg Nebulization Given 5/5/23 1430)   albuterol (PROVENTIL HFA,VENTOLIN HFA) inhaler 2 puff (2 puffs Inhalation Given 5/5/23 1512)       Diagnostic Studies  Results Reviewed     Procedure Component Value Units Date/Time    HS Troponin I 2hr [[id number]]  (Normal) Collected: 05/05/23 1357    Lab Status: Final result Specimen: Blood from Arm, Right Updated: 05/05/23 1425     hs TnI 2hr 15 ng/L      Delta 2hr hsTnI -7 ng/L     B-Type Natriuretic Peptide(BNP) [[id number]]  (Abnormal) Collected: 05/05/23 1150 Lab Status: Final result Specimen: Blood from Arm, Right Updated: 05/05/23 1255      pg/mL     HS Troponin 0hr (reflex protocol) [[id number]]  (Normal) Collected: 05/05/23 1159    Lab Status: Final result Specimen: Blood from Arm, Right Updated: 05/05/23 1228     hs TnI 0hr 22 ng/L     HS Troponin I 4hr [[id number]]     Lab Status: No result Specimen: Blood     D-Dimer [[id number]]  (Abnormal) Collected: 05/05/23 1159    Lab Status: Final result Specimen: Blood from Arm, Right Updated: 05/05/23 1227     D-Dimer, Quant 0 72 ug/ml FEU     Narrative: In the evaluation for possible pulmonary embolism, in the appropriate (Well's Score of 4 or less) patient, the age adjusted d-dimer cutoff for this patient can be calculated as:    Age x 0 01 (in ug/mL) for Age-adjusted D-dimer exclusion threshold for a patient over 50 years      Comprehensive metabolic panel [[id number]]  (Abnormal) Collected: 05/05/23 1159    Lab Status: Final result Specimen: Blood from Arm, Right Updated: 05/05/23 1219     Sodium 139 mmol/L      Potassium 5 1 mmol/L      Chloride 106 mmol/L      CO2 24 mmol/L      ANION GAP 9 mmol/L      BUN 40 mg/dL      Creatinine 1 63 mg/dL      Glucose 287 mg/dL      Calcium 9 5 mg/dL      AST 44 U/L      ALT 38 U/L      Alkaline Phosphatase 82 U/L      Total Protein 7 7 g/dL      Albumin 4 0 g/dL      Total Bilirubin 0 55 mg/dL      eGFR 40 ml/min/1 73sq m     Narrative:      [place] guidelines for Chronic Kidney Disease (CKD):     Stage 1 with normal or high GFR (GFR > 90 mL/min/1 73 square meters)    Stage 2 Mild CKD (GFR = 60-89 mL/min/1 73 square meters)    Stage 3A Moderate CKD (GFR = 45-59 mL/min/1 73 square meters)    Stage 3B Moderate CKD (GFR = 30-44 mL/min/1 73 square meters)    Stage 4 Severe CKD (GFR = 15-29 mL/min/1 73 square meters)    Stage 5 End Stage CKD (GFR <15 mL/min/1 73 square meters)  Note: GFR calculation is accurate only with a steady state creatinine    CBC and differential [[id number]]  (Abnormal) Collected: 05/05/23 1159    Lab Status: Final result Specimen: Blood from Arm, Right Updated: 05/05/23 1205     WBC 8 29 Thousand/uL      RBC 4 83 Million/uL      Hemoglobin 9 2 g/dL      Hematocrit 33 2 %      MCV 69 fL      MCH 19 0 pg      MCHC 27 7 g/dL      RDW 18 3 %      MPV 11 0 fL      Platelets 886 Thousands/uL      nRBC 0 /100 WBCs      Neutrophils Relative 70 %      Immat GRANS % 0 %      Lymphocytes Relative 16 %      Monocytes Relative 10 %      Eosinophils Relative 3 %      Basophils Relative 1 %      Neutrophils Absolute 5 86 Thousands/µL      Immature Grans Absolute 0 02 Thousand/uL      Lymphocytes Absolute 1 29 Thousands/µL      Monocytes Absolute 0 80 Thousand/µL      Eosinophils Absolute 0 28 Thousand/µL      Basophils Absolute 0 04 Thousands/µL                  CTA ED chest PE study   Final Result by Sofie Grubbs MD (05/05 5304)      No pulmonary embolus  2 ill-defined low-attenuation lesions in the right hepatic lobe, one partially imaged  While these could be areas of fatty infiltration, recommend further evaluation with nonemergent abdomen CT to exclude tumor  Emphysema with no acute disease  This study was marked in Epic for immediate notification and follow-up  Workstation performed: [id number]                    Procedures  ECG 12 Lead Documentation Only    Date/Time: 5/5/2023 12:00 PM  Performed by: Yolanda Yanez MD  Authorized by: Yolanda Yanez MD     Indications / Diagnosis:  Shortness of breath  Patient location:  ED  Previous ECG:     Previous ECG:  Unavailable  Interpretation:     Interpretation: abnormal    Rate:     ECG rate:  87    ECG rate assessment: normal    Rhythm:     Rhythm: sinus rhythm    Ectopy:     Ectopy: none    QRS:     QRS axis:  Left    QRS intervals:   Wide  Conduction:     Conduction: abnormal      Abnormal conduction: complete RBBB, LAFB and bifascicular block    ST segments:     ST segments:  Non-specific    Elevation:  V2 and V3             ED Course  ED Course as of 05/05/23 1548   Fri May 05, 2023   1229 Creatinine(!): 1 63  1 34 three months ago   1229 D-Dimer, Quant(!): 0 72  Increased from 0 56 two hours ago   1256 BNP(!): 335             HEART Risk Score    Flowsheet Row Most Recent Value   Heart Score Risk Calculator    History 0 Filed at: 05/05/2023 1157   ECG 1 Filed at: 05/05/2023 1157   Age 2 Filed at: 05/05/2023 1157   Risk Factors 2 Filed at: 05/05/2023 1157   Troponin 1 Filed at: 05/05/2023 1157   HEART Score 6 Filed at: 05/05/2023 1157                        SBIRT 20yo+    Flowsheet Row Most Recent Value   Initial Alcohol Screen: US AUDIT-C     1  How often do you have a drink containing alcohol? 0 Filed at: 05/05/2023 1200   2  How many drinks containing alcohol do you have on a typical day you are drinking? 0 Filed at: 05/05/2023 1200   3a  Male UNDER 65: How often do you have five or more drinks on one occasion? 0 Filed at: 05/05/2023 1200   3b  FEMALE Any Age, or MALE 65+: How often do you have 4 or more drinks on one occassion? 0 Filed at: 05/05/2023 1200   Audit-C Score 0 Filed at: 05/05/2023 1200   ORTEGA: How many times in the past year have you    Used an illegal drug or used a prescription medication for non-medical reasons?  Never Filed at: 05/05/2023 1200          Wells' Criteria for PE    Flowsheet Row Most Recent Value   Wells' Criteria for PE    Clinical signs and symptoms of DVT 0 Filed at: 05/05/2023 1157   PE is primary diagnosis or equally likely 0 Filed at: 05/05/2023 1157   HR >100 0 Filed at: 05/05/2023 1157   Immobilization at least 3 days or Surgery in the previous 4 weeks 0 Filed at: 05/05/2023 1157   Previous, objectively diagnosed PE or DVT 0 Filed at: 05/05/2023 1157   Hemoptysis 0 Filed at: 05/05/2023 1157   Malignancy with treatment within 6 months or palliative 0 Filed at: 05/05/2023 Fadi Mota Criteria Total 0 Filed at: 05/05/2023 1157                Medical Decision Making  69year old male presents for evaluation of exertional shortness of breath  Patient had outpatient CXR today which was concerning for pulmonary fibrosis  Outpatient hsTrop 24 (trending down to 22 in ED) with D-dimer 0 56 which is negative when adjusted for age; however, increasing on repeat in the ED (0 72)  HEART score 6  CT PE study negative for PE  Patient informed of incidental finding of hepatic lobe abnormality and advised to follow up with his PCP  Pulm referral placed for emphysema  Nebulizer treatment given in ED and albuterol inhaler prescribed  5 day course of prednisone prescribed and patient advised that this may increase his glucose levels  Return precautions provided  Amount and/or Complexity of Data Reviewed  Labs: ordered  Decision-making details documented in ED Course  Radiology: ordered  Risk  Prescription drug management            Disposition  Final diagnoses:   Abnormal CT of liver   Emphysema of lung ([facility] )   Exertional dyspnea   COPD exacerbation ([facility] )     Time reflects when diagnosis was documented in both MDM as applicable and the Disposition within this note     Time User Action Codes Description Comment    5/5/2023  2:06 PM William Bourgeois Add [R93 2] Abnormal CT of liver     5/5/2023  2:06 PM Latasha Levine Ala Add [T81 82XA] Subcutaneous emphysema resulting from a procedure, initial encounter     5/5/2023  2:06 PM Latasha Levine Ala Remove [T81 82XA] Subcutaneous emphysema resulting from a procedure, initial encounter     5/5/2023  2:06 PM Kassandra HERRING Add [J43 9] Emphysema of lung ([facility] )     5/5/2023  2:06 PM Kassandra Camejo J Add [R06 09] Exertional dyspnea     5/5/2023  2:07 PM William Bourgeois Modify [R93 2] Abnormal CT of liver     5/5/2023  2:07 PM William Bourgeois Modify [R06 09] Exertional dyspnea     5/5/2023  2:54 PM Latasha Levine Ala Add [J44 1] COPD exacerbation [facility])       ED Disposition     ED Disposition   Discharge    Condition   Stable    Date/Time   Fri May 5, 2023  2:54 PM    Comment   Adwoaantony  discharge to home/self care                 Follow-up Information     Follow up With Specialties Details Why Contact Info Additional Information    Ronda Wright MD Family Medicine Schedule an appointment as soon as possible for a visit in 1 week for re-evaluation of abnormal appearance of liver on CT April 80  [address] [address]  [facility] Pulmonology Schedule an appointment as soon as possible for a visit in 1 week for further evaluation and management of emphysema [address] [phone]  [phone] [place] [facility] [address], [place], [place], [address] [facility] [address] Emergency Department Emergency Medicine Go to  If symptoms worsen [address] [address]  [address] [facility] Emergency Department, [address] [facility] [address]          Discharge Medication List as of 5/5/2023  2:56 PM      START taking these medications    Details   albuterol (Ventolin HFA) 90 mcg/act inhaler Inhale 1-2 puffs every 6 (six) hours as needed for wheezing, Starting Fri 5/5/2023, Normal      predniSONE 20 mg tablet Take 2 tablets (40 mg total) by mouth daily for 5 days, Starting Fri 5/5/2023, Until Wed 5/10/2023, Normal         CONTINUE these medications which have NOT CHANGED    Details   acetaminophen (TYLENOL) 650 mg CR tablet Take 650 mg by mouth every 8 (eight) hours as needed for mild pain, Historical Med      cholestyramine (QUESTRAN) 4 GM/DOSE powder Take 1 packet (4 g total) by mouth 3 (three) times a day with meals, Starting Mon 12/5/2022, Until Wed 2/15/2023, Normal      Continuous Blood Gluc Sensor (Dexcom G6 Sensor) MISC Use daily as directed for CGM - Change every 10 days, Normal      Continuous Blood Gluc Transmit (Dexcom G6 Transmitter) MISC Use daily as directed for CGM - Change every 3 months, Normal      dulaglutide (Trulicity) 5 99 YJ/6 0YB injection Inject 0 5 mL (0 75 mg total) under the skin once a week, Starting Wed 4/5/2023, Normal      DULoxetine (Cymbalta) 60 mg delayed release capsule Take 1 capsule (60 mg total) by mouth daily, Starting Mon 12/5/2022, Normal      Eliquis 5 MG take 1 tablet by mouth twice a day, Normal      insulin aspart (NovoLOG FlexPen) 100 UNIT/ML injection pen Inject 40 Units under the skin 3 (three) times a day with meals Inject 40 units before breakfast  Inject 35 units before dinner, Starting Tue 3/28/2023, Until Mon 6/26/2023, Fill Later      insulin detemir (Levemir FlexTouch) 100 Units/mL injection pen Inject 75 Units under the skin daily at bedtime, Starting Mon 12/12/2022, Normal      Insulin Pen Needle (Pen Needles) 32G X 4 MM MISC Use 4 (four) times a day, Starting Thu 9/15/2022, Until Fri 9/15/2023, Normal      metoprolol succinate (TOPROL-XL) 50 mg 24 hr tablet Take 50 mg by mouth daily, Starting Mon 4/24/2023, Historical Med      metoprolol tartrate (LOPRESSOR) 50 mg tablet Take 50 mg by mouth in the morning, Historical Med      Multiple Vitamin (MULTIVITAMIN ADULT PO) Take 1 tablet by mouth daily, Historical Med      pantoprazole (PROTONIX) 40 mg tablet Take 1 tablet (40 mg total) by mouth in the morning, Starting Mon 2/13/2023, Normal      Potassium Citrate ER 15 MEQ (1620 MG) TBCR Take 1 tablet by mouth in the morning 1 tab po daily, Starting Thu 9/1/2022, No Print      pregabalin (LYRICA) 100 mg capsule Take 1 capsule (100 mg total) by mouth 3 (three) times a day, Starting Fri 2/10/2023, Normal      SUPER B COMPLEX/C PO Take 1 tablet by mouth daily, Historical Med      traZODone (DESYREL) 100 mg tablet take 2 tablets by mouth at bedtime, Normal                 PDMP Review     None          ED Provider  Electronically Signed by           Iman Mills MD  05/05/23 6860

## 2023-05-05 NOTE — LETTER
[address] [place] [address]  [address] [facility] [address]      May 10, 2023    MRN: [id number]     Phone: [phone]     Dear Mr Charisse Hinton recently had a(n) Diagnostic Imaging performed on 5/5/2023 at  [address] [place] [address] that was requested by Ivan Garcia  The study was reviewed by a radiologist, which is a physician who specializes in medical imaging  The radiologist issued a report describing his or her findings  In that report there was a finding that the radiologist felt warranted further discussion with your health care provider and that discussion would be beneficial to you  The results were sent to NADIA Garcia on 05/05/2023 10:50 AM  We recommend that you contact Tammy Mg at [phone] or set up an appointment to discuss the results of the imaging test  If you have already heard from Ivan Garcia regarding the results of your study, you can disregard this letter  This letter is not meant to alarm you, but intended to encourage you to follow-up on your results with the provider that sent you for the imaging study  In addition, we have enclosed answers to frequently asked questions by other patients who have also received a letter to review results with their health care provider (see page two)  Thank you for choosing [address] [place] [address] for your medical imaging needs  FREQUENTLY ASKED QUESTIONS    1  Why am I receiving this letter? [facility] requires us to notify patients who have findings on imaging exams that may require more testing or follow-up with a health professional within the next 3 months          2  How serious is the finding on the imaging test?  This letter is sent to all patients who may need follow-up or more testing within the next 3 months  Receiving this letter does not necessarily mean you have a life-threatening imaging finding or disease  Recommendations in the radiologist’s imaging report are general in nature and it is up to your healthcare provider to say whether those recommendations make sense for your situation  You are strongly encouraged to talk to your health care provider about the results and ask whether additional steps need to be taken  3  Where can I get a copy of the final report for my recent radiology exam?  To get a full copy of the report you can access your records online at [email or web address] or please contact [facility] Medical Records Department at [phone] Monday through Friday between 8 am and 6 pm          4  What do I need to do now? Please contact your health care provider who requested the imaging study to discuss what further actions (if any) are needed  You may have already heard from (your ordering provider) in regard to this test in which case you can disregard this letter  NOTICE IN ACCORDANCE WITH THE [place] STATE “PATIENT TEST RESULT INFORMATION ACT OF 2018”    You are receiving this notice as a result of a determination by your diagnostic imaging service that further discussions of your test results are warranted and would be beneficial to you  The complete results of your test or tests have been or will be sent to the health care practitioner that ordered the test or tests  It is recommended that you contact your health care practitioner to discuss your results as soon as possible

## 2023-05-05 NOTE — TELEPHONE ENCOUNTER
Frantz Garcia called to report that Lonny Zamorai had an x ray done today w/ significant findings Preceptor Attestation:   Patient seen, evaluated and discussed with the resident. I have verified the content of the note, which accurately reflects my assessment of the patient and the plan of care. I was present for the entire visit. I spent 45 min face to face with the patient and >50% was spent counselling the patient about the above medical conditions, educating patient on their medical conditions, behavioral interventions and supports.   Supervising Physician:  Jodi Sharpe MD

## 2023-05-06 LAB
ATRIAL RATE: 87 BPM
P AXIS: 19 DEGREES
PR INTERVAL: 264 MS
QRS AXIS: -53 DEGREES
QRSD INTERVAL: 128 MS
QT INTERVAL: 398 MS
QTC INTERVAL: 478 MS
T WAVE AXIS: 19 DEGREES
VENTRICULAR RATE: 87 BPM

## 2023-05-08 ENCOUNTER — VBI (OUTPATIENT)
Dept: ADMINISTRATIVE | Facility: OTHER | Age: 78
End: 2023-05-08

## 2023-05-08 NOTE — TELEPHONE ENCOUNTER
Annette Schaefer    ED Visit Information     Ed visit date: 5/5/2023  Diagnosis Description: SOB  In Network? YES, [facility]B  Discharge status: Home  Discharged with meds ? Yes  Number of ED visits to date: 1  ED Severity:2     Outreach Information    Outreach successful: Yes 1  Date letter mailed: not needed  Date Finalized: 5/8/2023    Care Coordination    Follow up appointment with pcp: yes 5/10/2023  Transportation issues ? No    [employer] type: 7 Day Outreach  Emergent necessity warranted by diagnosis: Yes  [facility] PCP: Yes  Transportation: Friend/Family Transport  Ambulance - Was Pt given choice of of ED: No  If able to choose an ED  Would you have chosen [facility]: Yes  Called PCP first?: No  Feels able to call PCP for urgent problems ?: Yes  Understands what emergencies can be handled by PCP ?: Yes  Ever any problems getting appointment with PCP for minor emergency/urgency problems?: No  Reason Patient went to ED instead of Urgent Care or PCP?: Perceived Severity of Illness  Urgent care Education?: Yes  05/08/2023 11:50 AM EDT by Anita Gibson MA 05/08/2023 11:50 AM EDT by ROSA Montgomery Cheryl (EC) [phone] ([id number])[phone] (Mobile)  [phone] (Mobile) Remove  - Communicated - reviewed ED questions with patient wife  Patient is doing well and has ED F/U with pcp 5/10/2023   No tranportation concerns

## 2023-05-10 ENCOUNTER — OFFICE VISIT (OUTPATIENT)
Dept: FAMILY MEDICINE CLINIC | Facility: HOSPITAL | Age: 78
End: 2023-05-10

## 2023-05-10 VITALS
HEART RATE: 70 BPM | SYSTOLIC BLOOD PRESSURE: 138 MMHG | WEIGHT: 224.4 LBS | TEMPERATURE: 98.8 F | DIASTOLIC BLOOD PRESSURE: 64 MMHG | HEIGHT: 72 IN | OXYGEN SATURATION: 92 % | BODY MASS INDEX: 30.4 KG/M2

## 2023-05-10 DIAGNOSIS — Z79.4 TYPE 2 DIABETES MELLITUS WITH DIABETIC NEUROPATHY, WITH LONG-TERM CURRENT USE OF INSULIN (HCC): ICD-10-CM

## 2023-05-10 DIAGNOSIS — R06.02 SOB (SHORTNESS OF BREATH) ON EXERTION: ICD-10-CM

## 2023-05-10 DIAGNOSIS — E11.40 TYPE 2 DIABETES MELLITUS WITH DIABETIC NEUROPATHY, WITH LONG-TERM CURRENT USE OF INSULIN (HCC): ICD-10-CM

## 2023-05-10 DIAGNOSIS — I48.91 ATRIAL FIBRILLATION, UNSPECIFIED TYPE (HCC): ICD-10-CM

## 2023-05-10 DIAGNOSIS — J44.1 CHRONIC OBSTRUCTIVE PULMONARY DISEASE WITH ACUTE EXACERBATION (HCC): Primary | ICD-10-CM

## 2023-05-10 DIAGNOSIS — N18.30 STAGE 3 CHRONIC KIDNEY DISEASE, UNSPECIFIED WHETHER STAGE 3A OR 3B CKD (HCC): ICD-10-CM

## 2023-05-10 DIAGNOSIS — R16.0 LIVER MASSES: ICD-10-CM

## 2023-05-12 ENCOUNTER — TELEPHONE (OUTPATIENT)
Dept: ADMINISTRATIVE | Facility: OTHER | Age: 78
End: 2023-05-12

## 2023-05-12 NOTE — PROGRESS NOTES
Name: Deshawn Huston      : 1945      MRN: [id number]  Encounter Provider: Roxana Hunt MD  Encounter Date: 5/10/2023   Encounter department: [facility]      1  Chronic obstructive pulmonary disease with acute exacerbation (HCC)  -     umeclidinium-vilanterol 62 5-25 mcg/actuation inhaler; Inhale 1 puff daily  -     Complete PFT with post bronchodilator; Future  -     Comprehensive metabolic panel; Future  -     Comprehensive metabolic panel    2  Atrial fibrillation, unspecified type ([facility] )  -     Comprehensive metabolic panel; Future  -     Comprehensive metabolic panel  -     Ambulatory Referral to Cardiology; Future    3  Stage 3 chronic kidney disease, unspecified whether stage 3a or 3b CKD ([facility] )    4  Type 2 diabetes mellitus with diabetic neuropathy, with long-term current use of insulin ([facility] )    5  SOB (shortness of breath) on exertion  -     NM myocardial perfusion spect (rx stress and/or rest); Future; Expected date: 05/10/2023  -     Ambulatory Referral to Cardiology; Future    6  Liver masses     Patient with shortness of breath on exertion  Reviewed ER notes  I do think we need to do further cardiac evaluation  Stress test is ordered  Pharmacologic stress test is ordered as patient unable to be on a treadmill while utilizing a cane  Having said that the shortness of breath may be due to emphysema  Emphysema seen on imaging  We will ask for a pulmonary function test   We will try the patient on Anoro  Continue with albuterol as needed  Currently completing steroid course  CKD 3  Stable  Blood sugars are elevated due to current use of steroids  Monitor  Diabetes mellitus  Stable  Continue with working on the dietary control  Liver masses  He will need further imaging  Has had multiple imaging studies and will defer for a couple of months due to need for contrast   Subjective      Patient is here for follow-up  He was recently in the ER due to shortness of breath  He has been having shortness of breath on exertion  This is not accompanied by any leg swelling, orthopnea, PND  There is no chest pain  No palpitations  But he does have a history of atrial fibrillation  He was given a course of steroids  No significant improvement  He does however relate he does have wheezing when he is very short of breath  He has no coughing no fever no chills  ER report reviewed  Labs reviewed  Review of Systems   Constitutional: Negative  Negative for activity change, appetite change, chills and diaphoresis  HENT: Negative for congestion and dental problem  Respiratory: Negative  Negative for apnea, chest tightness, shortness of breath and wheezing  Cardiovascular: Negative  Negative for chest pain, palpitations and leg swelling  Gastrointestinal: Negative  Negative for abdominal distention, abdominal pain, constipation, diarrhea and nausea  Genitourinary: Negative  Negative for difficulty urinating, dysuria and frequency         Current Outpatient Medications on File Prior to Visit   Medication Sig   • acetaminophen (TYLENOL) 650 mg CR tablet Take 650 mg by mouth every 8 (eight) hours as needed for mild pain   • albuterol (Ventolin HFA) 90 mcg/act inhaler Inhale 1-2 puffs every 6 (six) hours as needed for wheezing   • Continuous Blood Gluc Sensor (Dexcom G6 Sensor) MISC Use daily as directed for CGM - Change every 10 days   • Continuous Blood Gluc Transmit (Dexcom G6 Transmitter) MISC Use daily as directed for CGM - Change every 3 months   • dulaglutide (Trulicity) 3 42 UC/8 8MU injection Inject 0 5 mL (0 75 mg total) under the skin once a week   • DULoxetine (Cymbalta) 60 mg delayed release capsule Take 1 capsule (60 mg total) by mouth daily   • Eliquis 5 MG take 1 tablet by mouth twice a day   • insulin aspart (NovoLOG FlexPen) 100 UNIT/ML injection pen Inject 40 Units under the skin 3 (three) times a day with meals Inject 40 units before breakfast  Inject 35 units before dinner   • insulin detemir (Levemir FlexTouch) 100 Units/mL injection pen Inject 75 Units under the skin daily at bedtime   • Insulin Pen Needle (Pen Needles) 32G X 4 MM MISC Use 4 (four) times a day   • metoprolol tartrate (LOPRESSOR) 50 mg tablet Take 50 mg by mouth in the morning   • Multiple Vitamin (MULTIVITAMIN ADULT PO) Take 1 tablet by mouth daily   • pantoprazole (PROTONIX) 40 mg tablet Take 1 tablet (40 mg total) by mouth in the morning   • Potassium Citrate ER 15 MEQ (1620 MG) TBCR Take 1 tablet by mouth in the morning 1 tab po daily   • pregabalin (LYRICA) 100 mg capsule Take 1 capsule (100 mg total) by mouth 3 (three) times a day   • SUPER B COMPLEX/C PO Take 1 tablet by mouth daily   • traZODone (DESYREL) 100 mg tablet take 2 tablets by mouth at bedtime   • cholestyramine (QUESTRAN) 4 GM/DOSE powder Take 1 packet (4 g total) by mouth 3 (three) times a day with meals (Patient not taking: Reported on 5/10/2023)       Objective     /64   Pulse 70   Temp 98 8 °F (37 1 °C)   Ht 6' (1 829 m)   Wt 102 kg (224 lb 6 4 oz)   SpO2 92%   BMI 30 43 kg/m²     Physical Exam  Vitals and nursing note reviewed  Constitutional:       General: He is not in acute distress  Appearance: Normal appearance  He is well-developed  He is not ill-appearing  HENT:      Head: Normocephalic and atraumatic  Right Ear: External ear normal       Left Ear: External ear normal       Nose: Nose normal  No congestion or rhinorrhea  Mouth/Throat:      Mouth: Mucous membranes are moist       Pharynx: No oropharyngeal exudate or posterior oropharyngeal erythema  Eyes:      Extraocular Movements: Extraocular movements intact  Conjunctiva/sclera: Conjunctivae normal       Pupils: Pupils are equal, round, and reactive to light  Cardiovascular:      Rate and Rhythm: Normal rate and regular rhythm        Heart sounds: Normal heart sounds  No murmur heard  No friction rub  No gallop  Pulmonary:      Effort: Pulmonary effort is normal  No respiratory distress  Breath sounds: Decreased air movement present  No wheezing or rales  Chest:      Chest wall: No tenderness  Abdominal:      General: Bowel sounds are normal  There is no distension  Palpations: Abdomen is soft  There is no mass  Tenderness: There is no abdominal tenderness  There is no guarding or rebound  Musculoskeletal:         General: Normal range of motion  Cervical back: Normal range of motion and neck supple  Skin:     General: Skin is warm  Capillary Refill: Capillary refill takes less than 2 seconds  Neurological:      Mental Status: He is alert and oriented to person, place, and time     Psychiatric:         Mood and Affect: Mood normal          Behavior: Behavior normal        Diogenes Rivera MD

## 2023-05-12 NOTE — TELEPHONE ENCOUNTER
----- Message from Allen sent at 5/11/2023  2:29 PM EDT -----  Regarding: DM Eye Exam - [facility]  05/11/23 2:29 PM    Hello, our patient David Luther has had Diabetic Eye Exam completed/performed  Please assist in updating the patient chart by pulling the document from the Media Tab  The date of service is 05/03/2023       Thank you,  Jose Patel  [phone]  [phone]

## 2023-05-12 NOTE — TELEPHONE ENCOUNTER
Upon review of the In Basket request we were able to locate, review, and update the patient chart as requested for Diabetic Eye Exam     Any additional questions or concerns should be emailed to the Practice Liaisons via the appropriate education email address, please do not reply via In Basket      Thank you  Adalberto Mix MA

## 2023-05-16 LAB
ALBUMIN SERPL-MCNC: 3.9 G/DL (ref 3.7–4.7)
ALBUMIN/GLOB SERPL: 1.6 {RATIO} (ref 1.2–2.2)
ALP SERPL-CCNC: 88 IU/L (ref 44–121)
ALT SERPL-CCNC: 37 IU/L (ref 0–44)
AST SERPL-CCNC: 40 IU/L (ref 0–40)
BILIRUB SERPL-MCNC: 0.4 MG/DL (ref 0–1.2)
BUN SERPL-MCNC: 39 MG/DL (ref 8–27)
BUN/CREAT SERPL: 25 (ref 10–24)
CALCIUM SERPL-MCNC: 8.9 MG/DL (ref 8.6–10.2)
CHLORIDE SERPL-SCNC: 105 MMOL/L (ref 96–106)
CO2 SERPL-SCNC: 21 MMOL/L (ref 20–29)
CREAT SERPL-MCNC: 1.56 MG/DL (ref 0.76–1.27)
EGFR: 45 ML/MIN/1.73
GLOBULIN SER-MCNC: 2.5 G/DL (ref 1.5–4.5)
GLUCOSE SERPL-MCNC: 265 MG/DL (ref 70–99)
POTASSIUM SERPL-SCNC: 5.3 MMOL/L (ref 3.5–5.2)
PROT SERPL-MCNC: 6.4 G/DL (ref 6–8.5)
SODIUM SERPL-SCNC: 141 MMOL/L (ref 134–144)

## 2023-05-18 ENCOUNTER — HOSPITAL ENCOUNTER (OUTPATIENT)
Dept: PULMONOLOGY | Facility: HOSPITAL | Age: 78
End: 2023-05-18

## 2023-05-18 DIAGNOSIS — J44.1 CHRONIC OBSTRUCTIVE PULMONARY DISEASE WITH ACUTE EXACERBATION (HCC): ICD-10-CM

## 2023-05-18 RX ORDER — ALBUTEROL SULFATE 2.5 MG/3ML
2.5 SOLUTION RESPIRATORY (INHALATION) ONCE
Status: COMPLETED | OUTPATIENT
Start: 2023-05-18 | End: 2023-05-18

## 2023-05-18 RX ADMIN — ALBUTEROL SULFATE 2.5 MG: 2.5 SOLUTION RESPIRATORY (INHALATION) at 09:08

## 2023-05-21 DIAGNOSIS — Z79.4 TYPE 2 DIABETES MELLITUS WITH DIABETIC NEUROPATHY, WITH LONG-TERM CURRENT USE OF INSULIN (HCC): ICD-10-CM

## 2023-05-21 DIAGNOSIS — E11.40 TYPE 2 DIABETES MELLITUS WITH DIABETIC NEUROPATHY, WITH LONG-TERM CURRENT USE OF INSULIN (HCC): ICD-10-CM

## 2023-05-22 DIAGNOSIS — E11.40 TYPE 2 DIABETES MELLITUS WITH DIABETIC NEUROPATHY, WITH LONG-TERM CURRENT USE OF INSULIN (HCC): ICD-10-CM

## 2023-05-22 DIAGNOSIS — Z79.4 TYPE 2 DIABETES MELLITUS WITH DIABETIC NEUROPATHY, WITH LONG-TERM CURRENT USE OF INSULIN (HCC): ICD-10-CM

## 2023-05-22 DIAGNOSIS — F32.A DEPRESSION, UNSPECIFIED DEPRESSION TYPE: ICD-10-CM

## 2023-05-22 RX ORDER — DULOXETIN HYDROCHLORIDE 60 MG/1
CAPSULE, DELAYED RELEASE ORAL
Qty: 90 CAPSULE | Refills: 1 | Status: SHIPPED | OUTPATIENT
Start: 2023-05-22

## 2023-05-22 RX ORDER — PREGABALIN 100 MG/1
CAPSULE ORAL
Qty: 90 CAPSULE | Refills: 1 | Status: SHIPPED | OUTPATIENT
Start: 2023-05-22

## 2023-05-25 ENCOUNTER — HOSPITAL ENCOUNTER (OUTPATIENT)
Dept: NON INVASIVE DIAGNOSTICS | Facility: HOSPITAL | Age: 78
Discharge: HOME | End: 2023-05-25

## 2023-05-25 VITALS
SYSTOLIC BLOOD PRESSURE: 138 MMHG | WEIGHT: 224 LBS | HEART RATE: 68 BPM | BODY MASS INDEX: 30.38 KG/M2 | DIASTOLIC BLOOD PRESSURE: 64 MMHG

## 2023-05-25 DIAGNOSIS — I48.91 ATRIAL FIBRILLATION, UNSPECIFIED TYPE (HCC): ICD-10-CM

## 2023-05-25 DIAGNOSIS — R94.31 ABNORMAL EKG: ICD-10-CM

## 2023-05-25 DIAGNOSIS — R06.02 SHORTNESS OF BREATH: ICD-10-CM

## 2023-05-25 LAB
AORTIC ROOT: 3.9 CM
AORTIC VALVE MEAN VELOCITY: 10 M/S
APICAL FOUR CHAMBER EJECTION FRACTION: 57 %
ASCENDING AORTA: 3.4 CM
AV LVOT MEAN GRADIENT: 2 MMHG
AV LVOT PEAK GRADIENT: 4 MMHG
AV MEAN GRADIENT: 4 MMHG
AV PEAK GRADIENT: 7 MMHG
AV VELOCITY RATIO: 0.76
DOP CALC AO PEAK VEL: 1.35 M/S
DOP CALC AO VTI: 29.62 CM
DOP CALC LVOT PEAK VEL VTI: 22.83 CM
DOP CALC LVOT PEAK VEL: 1.03 M/S
DOP CALC MV VTI: 27.86 CM
E WAVE DECELERATION TIME: 297 MS
FRACTIONAL SHORTENING: 33 % (ref 28–44)
INTERVENTRICULAR SEPTUM IN DIASTOLE (PARASTERNAL SHORT AXIS VIEW): 1.2 CM
INTERVENTRICULAR SEPTUM: 1.2 CM (ref 0.6–1.1)
LAAS-AP2: 17.8 CM2
LAAS-AP4: 20 CM2
LEFT ATRIUM SIZE: 4.6 CM
LEFT INTERNAL DIMENSION IN SYSTOLE: 3.1 CM (ref 2.1–4)
LEFT VENTRICLE DIASTOLIC VOLUME (MOD BIPLANE): 113 ML
LEFT VENTRICLE SYSTOLIC VOLUME (MOD BIPLANE): 44 ML
LEFT VENTRICULAR INTERNAL DIMENSION IN DIASTOLE: 4.6 CM (ref 3.5–6)
LEFT VENTRICULAR POSTERIOR WALL IN END DIASTOLE: 1 CM
LEFT VENTRICULAR STROKE VOLUME: 61 ML
LV EF: 61 %
LVSV (TEICH): 61 ML
MV E'TISSUE VEL-LAT: 11 CM/S
MV E'TISSUE VEL-SEP: 6 CM/S
MV MEAN GRADIENT: 3 MMHG
MV PEAK A VEL: 1.33 M/S
MV PEAK E VEL: 101 CM/S
MV PEAK GRADIENT: 8 MMHG
MV STENOSIS PRESSURE HALF TIME: 86 MS
MV VALVE AREA P 1/2 METHOD: 2.56 CM2
RIGHT ATRIAL 2D VOLUME: 43 ML
RIGHT ATRIUM AREA SYSTOLE A4C: 16.1 CM2
RIGHT VENTRICLE ID DIMENSION: 4.7 CM
SL CV LEFT ATRIUM LENGTH A2C: 5.2 CM
SL CV LV EF: 60
SL CV PED ECHO LEFT VENTRICLE DIASTOLIC VOLUME (MOD BIPLANE) 2D: 99 ML
SL CV PED ECHO LEFT VENTRICLE SYSTOLIC VOLUME (MOD BIPLANE) 2D: 37 ML
TRICUSPID ANNULAR PLANE SYSTOLIC EXCURSION: 1.9 CM

## 2023-06-05 ENCOUNTER — OFFICE VISIT (OUTPATIENT)
Dept: FAMILY MEDICINE CLINIC | Facility: HOSPITAL | Age: 78
End: 2023-06-05
Payer: COMMERCIAL

## 2023-06-05 VITALS
HEART RATE: 74 BPM | WEIGHT: 225 LBS | SYSTOLIC BLOOD PRESSURE: 130 MMHG | BODY MASS INDEX: 30.48 KG/M2 | HEIGHT: 72 IN | TEMPERATURE: 97.9 F | DIASTOLIC BLOOD PRESSURE: 60 MMHG

## 2023-06-05 DIAGNOSIS — L20.9 ATOPIC DERMATITIS, UNSPECIFIED TYPE: ICD-10-CM

## 2023-06-05 DIAGNOSIS — J98.4 RESTRICTIVE LUNG DISEASE: ICD-10-CM

## 2023-06-05 DIAGNOSIS — R94.2 ABNORMAL PFTS (PULMONARY FUNCTION TESTS): ICD-10-CM

## 2023-06-05 DIAGNOSIS — K76.9 LESION OF LIVER GREATER THAN 1 CM IN DIAMETER: ICD-10-CM

## 2023-06-05 DIAGNOSIS — R06.02 SOB (SHORTNESS OF BREATH) ON EXERTION: Primary | ICD-10-CM

## 2023-06-05 PROCEDURE — 99214 OFFICE O/P EST MOD 30 MIN: CPT | Performed by: FAMILY MEDICINE

## 2023-06-05 RX ORDER — MOMETASONE FUROATE 1 MG/G
CREAM TOPICAL DAILY
Qty: 15 G | Refills: 0 | Status: SHIPPED | OUTPATIENT
Start: 2023-06-05 | End: 2023-06-12

## 2023-06-06 ENCOUNTER — HOSPITAL ENCOUNTER (OUTPATIENT)
Dept: NUCLEAR MEDICINE | Facility: HOSPITAL | Age: 78
Discharge: HOME | End: 2023-06-06
Payer: COMMERCIAL

## 2023-06-06 ENCOUNTER — HOSPITAL ENCOUNTER (OUTPATIENT)
Dept: NON INVASIVE DIAGNOSTICS | Facility: HOSPITAL | Age: 78
Discharge: HOME | End: 2023-06-06
Payer: COMMERCIAL

## 2023-06-06 DIAGNOSIS — R06.02 SOB (SHORTNESS OF BREATH) ON EXERTION: ICD-10-CM

## 2023-06-06 LAB
NUC STRESS EJECTION FRACTION: 58 %
SL CV REST NUCLEAR ISOTOPE DOSE: 9.6 MCI
SL CV STRESS NUCLEAR ISOTOPE DOSE: 32.2 MCI
STRESS ANGINA INDEX: 0
STRESS BASELINE HR: 61 BPM
STRESS POST PEAK BP: 139 MMHG
STRESS/REST PERFUSION RATIO: 0.99

## 2023-06-06 PROCEDURE — A9502 TC99M TETROFOSMIN: HCPCS

## 2023-06-06 PROCEDURE — 93017 CV STRESS TEST TRACING ONLY: CPT

## 2023-06-06 PROCEDURE — 93016 CV STRESS TEST SUPVJ ONLY: CPT | Performed by: INTERNAL MEDICINE

## 2023-06-06 PROCEDURE — 78452 HT MUSCLE IMAGE SPECT MULT: CPT | Performed by: INTERNAL MEDICINE

## 2023-06-06 PROCEDURE — G1004 CDSM NDSC: HCPCS

## 2023-06-06 PROCEDURE — 78452 HT MUSCLE IMAGE SPECT MULT: CPT

## 2023-06-06 PROCEDURE — 93018 CV STRESS TEST I&R ONLY: CPT | Performed by: INTERNAL MEDICINE

## 2023-06-06 RX ORDER — REGADENOSON 0.08 MG/ML
0.4 INJECTION, SOLUTION INTRAVENOUS ONCE
Status: COMPLETED | OUTPATIENT
Start: 2023-06-06 | End: 2023-06-06

## 2023-06-06 RX ADMIN — REGADENOSON 0.4 MG: 0.08 INJECTION, SOLUTION INTRAVENOUS at 13:03

## 2023-06-07 DIAGNOSIS — K76.9 LESION OF LIVER GREATER THAN 1 CM IN DIAMETER: Primary | ICD-10-CM

## 2023-06-07 NOTE — PROGRESS NOTES
Name: Sara Brooks      : 1945      MRN: [id number]  Encounter Provider: Oneida Eaton MD  Encounter Date: 2023   Encounter department: [facility]     1  SOB (shortness of breath) on exertion  -     Ambulatory Referral to Pulmonology; Future    2  Lesion of liver greater than 1 cm in diameter  -     CT abdomen pelvis w wo contrast; Future; Expected date: 2023    3  Restrictive lung disease  -     Ambulatory Referral to Pulmonology; Future    4  Abnormal PFTs (pulmonary function tests)  -     Ambulatory Referral to Pulmonology; Future    5  Atopic dermatitis, unspecified type  -     mometasone (ELOCON) 0 1 % cream; Apply topically daily for 7 days         Sob    pft showing restrictive pattern  Referral to Pulmonology for further evaluation  Await results of stress test scheduled  No chest pain or palptations  Lesion of the liver  Check Ct abdomen to further evaluate as recommended by Radiology  Raisa Pratt is here for fu  Ongoing shortness of breath on exertion  Does not note this at rest    No significant wheezing  No chest pain  Has a stress test scheduled  Completed PFT  reviewed results  Moderate restrice airflow defect  Carries a diagnosis of copd ( seen on imaging)  Review of Systems   Constitutional: Negative  Negative for activity change, appetite change, chills and diaphoresis  HENT: Negative for congestion and dental problem  Respiratory: Positive for cough and shortness of breath  Negative for apnea, chest tightness and wheezing  Cardiovascular: Negative  Negative for chest pain, palpitations and leg swelling  Gastrointestinal: Negative  Negative for abdominal distention, abdominal pain, constipation, diarrhea and nausea  Genitourinary: Negative  Negative for difficulty urinating, dysuria and frequency         Current Outpatient Medications on File Prior to Visit Medication Sig   • acetaminophen (TYLENOL) 650 mg CR tablet Take 650 mg by mouth every 8 (eight) hours as needed for mild pain   • albuterol (Ventolin HFA) 90 mcg/act inhaler Inhale 1-2 puffs every 6 (six) hours as needed for wheezing   • Continuous Blood Gluc Sensor (Dexcom G6 Sensor) MISC Use daily as directed for CGM - Change every 10 days   • Continuous Blood Gluc Transmit (Dexcom G6 Transmitter) MISC Use daily as directed for CGM - Change every 3 months   • dulaglutide (Trulicity) 6 56 UY/4 5MB injection Inject 0 5 mL (0 75 mg total) under the skin once a week   • DULoxetine (CYMBALTA) 60 mg delayed release capsule take 1 capsule by mouth once daily   • Eliquis 5 MG take 1 tablet by mouth twice a day   • insulin aspart (NovoLOG FlexPen) 100 UNIT/ML injection pen Inject 40 Units under the skin 3 (three) times a day with meals Inject 40 units before breakfast  Inject 35 units before dinner   • Insulin Pen Needle (Pen Needles) 32G X 4 MM MISC Use 4 (four) times a day   • Levemir FlexPen 100 units/mL injection pen INJECT 75 UNITS UNDER THE SKIN DAILY AT BEDTIME   • metoprolol tartrate (LOPRESSOR) 50 mg tablet Take 50 mg by mouth in the morning   • Multiple Vitamin (MULTIVITAMIN ADULT PO) Take 1 tablet by mouth daily   • pantoprazole (PROTONIX) 40 mg tablet Take 1 tablet (40 mg total) by mouth in the morning   • Potassium Citrate ER 15 MEQ (1620 MG) TBCR Take 1 tablet by mouth in the morning 1 tab po daily   • pregabalin (LYRICA) 100 mg capsule take 1 capsule by mouth three times a day   • SUPER B COMPLEX/C PO Take 1 tablet by mouth daily   • traZODone (DESYREL) 100 mg tablet take 2 tablets by mouth at bedtime   • umeclidinium-vilanterol 62 5-25 mcg/actuation inhaler Inhale 1 puff daily   • cholestyramine (QUESTRAN) 4 GM/DOSE powder Take 1 packet (4 g total) by mouth 3 (three) times a day with meals (Patient not taking: Reported on 5/10/2023)       Objective     /60   Pulse 74   Temp 97 9 °F (36 6 °C) Ht 6' (1 829 m)   Wt 102 kg (225 lb)   BMI 30 52 kg/m²     Physical Exam  Vitals and nursing note reviewed  Constitutional:       General: He is not in acute distress  Appearance: He is well-developed  He is not ill-appearing  HENT:      Head: Normocephalic and atraumatic  Right Ear: External ear normal       Left Ear: External ear normal       Nose: Nose normal  No congestion or rhinorrhea  Mouth/Throat:      Mouth: Mucous membranes are moist       Pharynx: No oropharyngeal exudate or posterior oropharyngeal erythema  Eyes:      Extraocular Movements: Extraocular movements intact  Conjunctiva/sclera: Conjunctivae normal       Pupils: Pupils are equal, round, and reactive to light  Cardiovascular:      Rate and Rhythm: Normal rate and regular rhythm  Heart sounds: Normal heart sounds  No murmur heard  No friction rub  No gallop  Pulmonary:      Effort: Pulmonary effort is normal  No respiratory distress  Breath sounds: Normal breath sounds  No wheezing or rales  Chest:      Chest wall: No tenderness  Abdominal:      General: Bowel sounds are normal  There is no distension  Palpations: Abdomen is soft  There is no mass  Tenderness: There is no abdominal tenderness  There is no guarding or rebound  Musculoskeletal:         General: Normal range of motion  Cervical back: Normal range of motion and neck supple  Skin:     General: Skin is warm  Capillary Refill: Capillary refill takes less than 2 seconds  Neurological:      Mental Status: He is alert and oriented to person, place, and time     Psychiatric:         Mood and Affect: Mood normal          Behavior: Behavior normal        Montserrat Terry MD

## 2023-06-08 LAB
CHEST PAIN STATEMENT: NORMAL
MAX DIASTOLIC BP: 68 MMHG
MAX HEART RATE: 82 BPM
MAX PREDICTED HEART RATE: 143 BPM
MAX. SYSTOLIC BP: 139 MMHG
PROTOCOL NAME: NORMAL
REASON FOR TERMINATION: NORMAL
TARGET HR FORMULA: NORMAL
TEST INDICATION: NORMAL
TIME IN EXERCISE PHASE: NORMAL

## 2023-06-12 ENCOUNTER — OFFICE VISIT (OUTPATIENT)
Dept: FAMILY MEDICINE CLINIC | Facility: HOSPITAL | Age: 78
End: 2023-06-12
Payer: COMMERCIAL

## 2023-06-12 VITALS
WEIGHT: 220.4 LBS | HEIGHT: 72 IN | HEART RATE: 76 BPM | SYSTOLIC BLOOD PRESSURE: 140 MMHG | DIASTOLIC BLOOD PRESSURE: 82 MMHG | BODY MASS INDEX: 29.85 KG/M2 | TEMPERATURE: 97.8 F

## 2023-06-12 DIAGNOSIS — R06.02 SOB (SHORTNESS OF BREATH) ON EXERTION: Primary | ICD-10-CM

## 2023-06-12 DIAGNOSIS — R94.2 ABNORMAL PFTS (PULMONARY FUNCTION TESTS): ICD-10-CM

## 2023-06-12 DIAGNOSIS — Z77.090 H/O ASBESTOS EXPOSURE: ICD-10-CM

## 2023-06-12 DIAGNOSIS — J98.4 RESTRICTIVE LUNG DISEASE: ICD-10-CM

## 2023-06-12 DIAGNOSIS — I48.91 ATRIAL FIBRILLATION, UNSPECIFIED TYPE (HCC): ICD-10-CM

## 2023-06-12 PROCEDURE — 99214 OFFICE O/P EST MOD 30 MIN: CPT | Performed by: FAMILY MEDICINE

## 2023-06-12 NOTE — PROGRESS NOTES
Name: Stephenie Gates      : 1945      MRN: [id number]  Encounter Provider: Lena Olson MD  Encounter Date: 2023   Encounter department: [facility] [address]     1  SOB (shortness of breath) on exertion    2  Restrictive lung disease  -     Ambulatory Referral to Pulmonology; Future    3  Abnormal PFTs (pulmonary function tests)  -     Ambulatory Referral to Pulmonology; Future    4  Atrial fibrillation, unspecified type ([address] )    5  H/O asbestos exposure         Ongoing sob  Cardiac stress testing with no evidence of ischemic disease  Has fu with cardiology  pft ocnsistent with restrictive lung disease  Imaging previously showing emphysema  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  Await evaluation by Pulmonology  Subjective      Pt here for fu  Since the last visit stress testing was done  This was reviewed  No ischemic evidence seen  He has no chest pain  Continues with sob  Using anoro currently  No sure if making a difference  Had PFT completed as well  Review of Systems   Constitutional: Positive for appetite change  Negative for activity change, diaphoresis, fatigue and fever  HENT: Negative for congestion and dental problem  Respiratory: Positive for cough and shortness of breath          Current Outpatient Medications on File Prior to Visit   Medication Sig   • acetaminophen (TYLENOL) 650 mg CR tablet Take 650 mg by mouth every 8 (eight) hours as needed for mild pain   • albuterol (Ventolin HFA) 90 mcg/act inhaler Inhale 1-2 puffs every 6 (six) hours as needed for wheezing   • Continuous Blood Gluc Sensor (Dexcom G6 Sensor) MISC Use daily as directed for CGM - Change every 10 days   • dulaglutide (Trulicity) 4 59 BW/9 3XB injection Inject 0 5 mL (0 75 mg total) under the skin once a week   • DULoxetine (CYMBALTA) 60 mg delayed release capsule take 1 capsule by mouth once daily   • Eliquis 5 MG take 1 tablet by mouth twice a day   • insulin aspart (NovoLOG FlexPen) 100 UNIT/ML injection pen Inject 40 Units under the skin 3 (three) times a day with meals Inject 40 units before breakfast  Inject 35 units before dinner   • Insulin Pen Needle (Pen Needles) 32G X 4 MM MISC Use 4 (four) times a day   • Levemir FlexPen 100 units/mL injection pen INJECT 75 UNITS UNDER THE SKIN DAILY AT BEDTIME   • metoprolol tartrate (LOPRESSOR) 50 mg tablet Take 50 mg by mouth in the morning   • mometasone (ELOCON) 0 1 % cream Apply topically daily for 7 days   • Multiple Vitamin (MULTIVITAMIN ADULT PO) Take 1 tablet by mouth daily   • pantoprazole (PROTONIX) 40 mg tablet Take 1 tablet (40 mg total) by mouth in the morning   • Potassium Citrate ER 15 MEQ (1620 MG) TBCR Take 1 tablet by mouth in the morning 1 tab po daily   • pregabalin (LYRICA) 100 mg capsule take 1 capsule by mouth three times a day   • SUPER B COMPLEX/C PO Take 1 tablet by mouth daily   • traZODone (DESYREL) 100 mg tablet take 2 tablets by mouth at bedtime   • umeclidinium-vilanterol 62 5-25 mcg/actuation inhaler Inhale 1 puff daily   • cholestyramine (QUESTRAN) 4 GM/DOSE powder Take 1 packet (4 g total) by mouth 3 (three) times a day with meals (Patient not taking: Reported on 5/10/2023)   • Continuous Blood Gluc Transmit (Dexcom G6 Transmitter) MISC Use daily as directed for CGM - Change every 3 months       Objective     /82 (BP Location: Left arm, Patient Position: Sitting, Cuff Size: Standard)   Pulse 76   Temp 97 8 °F (36 6 °C) (Tympanic)   Ht 6' (1 829 m)   Wt 100 kg (220 lb 6 4 oz)   BMI 29 89 kg/m²     Physical Exam  Vitals and nursing note reviewed  Constitutional:       General: He is not in acute distress  Appearance: He is well-developed  He is not ill-appearing  HENT:      Head: Normocephalic and atraumatic        Right Ear: External ear normal       Left Ear: External ear normal       Nose: Nose normal  No congestion or rhinorrhea  Mouth/Throat:      Mouth: Mucous membranes are moist       Pharynx: No oropharyngeal exudate or posterior oropharyngeal erythema  Eyes:      Extraocular Movements: Extraocular movements intact  Conjunctiva/sclera: Conjunctivae normal       Pupils: Pupils are equal, round, and reactive to light  Cardiovascular:      Rate and Rhythm: Normal rate and regular rhythm  Heart sounds: Normal heart sounds  No murmur heard  No friction rub  No gallop  Pulmonary:      Effort: Pulmonary effort is normal  No respiratory distress  Breath sounds: Normal breath sounds  No wheezing or rales  Chest:      Chest wall: No tenderness  Abdominal:      General: Bowel sounds are normal  There is no distension  Palpations: Abdomen is soft  There is no mass  Tenderness: There is no abdominal tenderness  There is no guarding or rebound  Musculoskeletal:         General: Normal range of motion  Cervical back: Normal range of motion and neck supple  Skin:     General: Skin is warm  Capillary Refill: Capillary refill takes less than 2 seconds  Neurological:      Mental Status: He is alert and oriented to person, place, and time     Psychiatric:         Mood and Affect: Mood normal          Behavior: Behavior normal        Jessica Singleton MD

## 2023-06-14 ENCOUNTER — HOSPITAL ENCOUNTER (OUTPATIENT)
Dept: CT IMAGING | Facility: HOSPITAL | Age: 78
Discharge: HOME | End: 2023-06-14
Payer: COMMERCIAL

## 2023-06-14 DIAGNOSIS — K76.9 LESION OF LIVER GREATER THAN 1 CM IN DIAMETER: ICD-10-CM

## 2023-06-14 PROCEDURE — 74160 CT ABDOMEN W/CONTRAST: CPT

## 2023-06-14 PROCEDURE — G1004 CDSM NDSC: HCPCS

## 2023-06-14 RX ADMIN — IOHEXOL 80 ML: 350 INJECTION, SOLUTION INTRAVENOUS at 13:59

## 2023-06-20 DIAGNOSIS — G47.00 INSOMNIA, UNSPECIFIED TYPE: ICD-10-CM

## 2023-06-20 RX ORDER — TRAZODONE HYDROCHLORIDE 100 MG/1
TABLET ORAL
Qty: 180 TABLET | Refills: 0 | Status: SHIPPED | OUTPATIENT
Start: 2023-06-20

## 2023-06-22 ENCOUNTER — OFFICE VISIT (OUTPATIENT)
Dept: CARDIOLOGY CLINIC | Facility: CLINIC | Age: 78
End: 2023-06-22
Payer: COMMERCIAL

## 2023-06-22 VITALS
DIASTOLIC BLOOD PRESSURE: 56 MMHG | HEART RATE: 79 BPM | SYSTOLIC BLOOD PRESSURE: 132 MMHG | HEIGHT: 72 IN | BODY MASS INDEX: 29.8 KG/M2 | WEIGHT: 220 LBS

## 2023-06-22 DIAGNOSIS — I48.91 ATRIAL FIBRILLATION, UNSPECIFIED TYPE (HCC): Primary | ICD-10-CM

## 2023-06-22 DIAGNOSIS — Z87.891 HISTORY OF TOBACCO ABUSE: ICD-10-CM

## 2023-06-22 PROCEDURE — 99204 OFFICE O/P NEW MOD 45 MIN: CPT | Performed by: INTERNAL MEDICINE

## 2023-06-22 PROCEDURE — 93000 ELECTROCARDIOGRAM COMPLETE: CPT | Performed by: INTERNAL MEDICINE

## 2023-06-22 RX ORDER — METOPROLOL SUCCINATE 50 MG/1
50 TABLET, EXTENDED RELEASE ORAL DAILY
COMMUNITY
Start: 2023-06-20

## 2023-06-22 NOTE — PATIENT INSTRUCTIONS
You were seen today in the Cardiology office to establish care  Please continue your current cardiac medications as prescribed  Thank you for choosing [address]  Please call our office or use [employer] with any questions

## 2023-06-22 NOTE — PROGRESS NOTES
[facility]    Name:Derek Herrmann   DOS: 6/22/2023     Chief Complaint:   Chief Complaint   Patient presents with   • Atrial Fibrillation     Consult - est care, prior of Bassam Le, Dr Corie Hutchison  • Shortness of Breath     Laying and w/ exertion   • Heart Murmur     Per prior cardio       HISTORY OF PRESENT ILLNESS:      HPI:  Julien Garcia is a 69 y o  male  He  has a past medical history of Atrial fibrillation ([facility] [address] ), Deep vein thrombosis (DVT) of right lower extremity ([facility] [address] ), and Diverticulitis  He presents to establish care with a new cardiologist  He previously followed with Dr Corie Hutchison at [facility], but has recently moved to [facility]  Records from his prior cardiologist are not available at the time of this encounter  He is chronically maintained on Eliquis 5mg BID with good compliance  No known history of CAD, stroke, or other arrhythmias  His EKG in office today demonstrates sinus rhythm with 1st degree AV block  RBBB, and LAFB  He has no active cardiopulmonary complaints, and specifically denies chest pain, shortness of breath, diaphoresis, dizziness, palpitations, orthopnea, PND, edema, syncope  Last ischemia evaluation performed earlier this month, 6/2023 as was found to demonstrate no evidence of ischemia or infarction on nuclear myocardial perfusion scan  I personally reviewed the images from his nuclear stress test, and I agree with these findings  Most recent ECHO 5/25/23, ordered in the setting of exertional dyspnea  The study demonstrated grade I diastolic dysfunction with preserved LV systolic function  Mild MR, trace TR       ROS    ROS: Pertinent positives and negatives as described in History of Present Illness  Remainder of a 14 point review of systems was negative       No Known Allergies     Current Outpatient Medications on File Prior to Visit   Medication Sig Dispense Refill   • acetaminophen (TYLENOL) 650 mg CR tablet Take 650 mg by mouth every 8 (eight) hours as needed for mild pain     • albuterol (Ventolin HFA) 90 mcg/act inhaler Inhale 1-2 puffs every 6 (six) hours as needed for wheezing 8 g 0   • Continuous Blood Gluc Sensor (Dexcom G6 Sensor) MISC Use daily as directed for CGM - Change every 10 days 9 each 3   • Continuous Blood Gluc Transmit (Dexcom G6 Transmitter) MISC Use daily as directed for CGM - Change every 3 months 1 each 3   • dulaglutide (Trulicity) 5 98 UE/6 0ZL injection Inject 0 5 mL (0 75 mg total) under the skin once a week 2 mL 3   • DULoxetine (CYMBALTA) 60 mg delayed release capsule take 1 capsule by mouth once daily 90 capsule 1   • Eliquis 5 MG take 1 tablet by mouth twice a day 60 tablet 5   • insulin aspart (NovoLOG FlexPen) 100 UNIT/ML injection pen Inject 40 Units under the skin 3 (three) times a day with meals Inject 40 units before breakfast  Inject 35 units before dinner (Patient taking differently: Inject 40 Units under the skin 3 (three) times a day with meals 40 u breakfast time, 40 u lunchtime, 35 u dinnertime) 36 mL 2   • Insulin Pen Needle (Pen Needles) 32G X 4 MM MISC Use 4 (four) times a day 400 each 3   • Levemir FlexPen 100 units/mL injection pen INJECT 75 UNITS UNDER THE SKIN DAILY AT BEDTIME 30 mL 0   • metoprolol succinate (TOPROL-XL) 50 mg 24 hr tablet Take 50 mg by mouth daily     • mometasone (ELOCON) 0 1 % cream Apply topically daily for 7 days 15 g 0   • Multiple Vitamin (MULTIVITAMIN ADULT PO) Take 1 tablet by mouth daily     • pantoprazole (PROTONIX) 40 mg tablet Take 1 tablet (40 mg total) by mouth in the morning 30 tablet 5   • Potassium Citrate ER 15 MEQ (1620 MG) TBCR Take 1 tablet by mouth in the morning 1 tab po daily 30 tablet 0   • pregabalin (LYRICA) 100 mg capsule take 1 capsule by mouth three times a day 90 capsule 1   • SUPER B COMPLEX/C PO Take 1 tablet by mouth daily     • traZODone (DESYREL) 100 mg tablet take 2 tablets by mouth at bedtime 180 tablet 0   • umeclidinium-vilanterol 62 5-25 mcg/actuation inhaler Inhale 1 puff daily 60 blister 5   • [DISCONTINUED] cholestyramine (QUESTRAN) 4 GM/DOSE powder Take 1 packet (4 g total) by mouth 3 (three) times a day with meals (Patient not taking: Reported on 5/10/2023) 90 packet 0   • [DISCONTINUED] metoprolol tartrate (LOPRESSOR) 50 mg tablet Take 50 mg by mouth in the morning       No current facility-administered medications on file prior to visit  Past Medical History:   Diagnosis Date   • Atrial fibrillation ([facility] [address] )    • Deep vein thrombosis (DVT) of right lower extremity (HCC)    • Diverticulitis        Past Surgical History:   Procedure Laterality Date   • APPENDECTOMY     • BOWEL RESECTION     • CATARACT EXTRACTION Bilateral        Family History   Problem Relation Age of Onset   • Hypertension Mother    • No Known Problems Father    • Diabetes type II Sister    • Diabetes type II Sister        Social History     Socioeconomic History   • Marital status: /Civil Union     Spouse name: Not on file   • Number of children: Not on file   • Years of education: Not on file   • Highest education level: Not on file   Occupational History   • Not on file   Tobacco Use   • Smoking status: Former     Types: Cigarettes     Start date:      Quit date:      Years since quittin 4   • Smokeless tobacco: Never   Vaping Use   • Vaping Use: Never used   Substance and Sexual Activity   • Alcohol use: Yes     Comment: Socially   • Drug use: Never   • Sexual activity: Not on file   Other Topics Concern   • Not on file   Social History Narrative   • Not on file     Social Determinants of Health     Financial Resource Strain: Medium Risk (2022)    Overall Financial Resource Strain (CARDIA)    • Difficulty of Paying Living Expenses: Somewhat hard   Food Insecurity: Not on file   Transportation Needs: No Transportation Needs (2022)    PRAPARE - Transportation    • Lack of Transportation (Medical):  No    • Lack of Transportation (Non-Medical): No   Physical Activity: Not on file   Stress: Not on file   Social Connections: Not on file   Intimate Partner Violence: Not on file   Housing Stability: Not on file       OBJECTIVE:    /56 (BP Location: Left arm, Patient Position: Sitting, Cuff Size: Standard)   Pulse 79   Ht 6' (1 829 m)   Wt 99 8 kg (220 lb)   BMI 29 84 kg/m²      BP Readings from Last 3 Encounters:   06/22/23 132/56   06/12/23 140/82   06/05/23 130/60       Wt Readings from Last 3 Encounters:   06/22/23 99 8 kg (220 lb)   06/12/23 100 kg (220 lb 6 4 oz)   06/05/23 102 kg (225 lb)         Physical Exam  Vitals reviewed  Constitutional:       General: He is not in acute distress  Appearance: Normal appearance  He is not diaphoretic  HENT:      Head: Normocephalic and atraumatic  Eyes:      Conjunctiva/sclera: Conjunctivae normal    Neck:      Vascular: No carotid bruit or JVD  Cardiovascular:      Rate and Rhythm: Normal rate and regular rhythm  Pulses: Normal pulses  Heart sounds: Normal heart sounds  No murmur heard  No friction rub  No gallop  Pulmonary:      Effort: Pulmonary effort is normal       Breath sounds: Decreased air movement present  Decreased breath sounds present  No wheezing, rhonchi or rales  Abdominal:      General: Abdomen is flat  Bowel sounds are normal  There is no distension  Palpations: Abdomen is soft  Musculoskeletal:      Right lower leg: No edema  Left lower leg: No edema  Skin:     General: Skin is warm and dry  Neurological:      General: No focal deficit present  Mental Status: He is alert and oriented to person, place, and time     Psychiatric:         Mood and Affect: Mood normal          Behavior: Behavior normal                                                        LABS:  Lab Results   Component Value Date    BUN 39 (H) 05/15/2023    CREATININE 1 56 (H) 05/15/2023    CALCIUM 9 5 05/05/2023    K 5 3 (H) 05/15/2023    CO2 21 05/15/2023  05/15/2023    ALKPHOS 82 05/05/2023    AST 40 05/15/2023    ALT 37 05/15/2023        Lab Results   Component Value Date    WBC 8 29 05/05/2023    HGB 9 2 (L) 05/05/2023    HCT 33 2 (L) 05/05/2023    MCV 69 (L) 05/05/2023     05/05/2023       Lab Results   Component Value Date    HDL 33 (L) 01/06/2023    LDLCALC 104 (H) 01/06/2023    TRIG 231 (H) 01/06/2023       Lab Results   Component Value Date    HGBA1C 7 6 (H) 01/06/2023       Lab Results   Component Value Date    TSH 3 720 01/06/2023       ASSESSMENT/PLAN:  Diagnoses and all orders for this visit:    Atrial fibrillation, unspecified type (HCC)  Paroxysmal, presently in sinus rhythm today  On Lopressor and Eliquis  I have recommended that he continue both of these medications  -     POCT ECG    History of tobacco abuse  Not actively smoking  Counseled on continued abstinence from tobacco products  Other orders  -     metoprolol succinate (TOPROL-XL) 50 mg 24 hr tablet;  Take 50 mg by mouth daily                Nnamdi Yu MD

## 2023-06-26 DIAGNOSIS — D49.0 TUMOR OF LIVER: Primary | ICD-10-CM

## 2023-07-03 DIAGNOSIS — E11.40 TYPE 2 DIABETES MELLITUS WITH DIABETIC NEUROPATHY, WITH LONG-TERM CURRENT USE OF INSULIN (HCC): ICD-10-CM

## 2023-07-03 DIAGNOSIS — Z79.4 TYPE 2 DIABETES MELLITUS WITH DIABETIC NEUROPATHY, WITH LONG-TERM CURRENT USE OF INSULIN (HCC): ICD-10-CM

## 2023-07-03 RX ORDER — INSULIN DETEMIR 100 [IU]/ML
INJECTION, SOLUTION SUBCUTANEOUS
Qty: 30 ML | Refills: 0 | Status: SHIPPED | OUTPATIENT
Start: 2023-07-03

## 2023-07-06 ENCOUNTER — HOSPITAL ENCOUNTER (OUTPATIENT)
Dept: MRI IMAGING | Facility: HOSPITAL | Age: 78
Discharge: HOME | End: 2023-07-06
Payer: COMMERCIAL

## 2023-07-06 DIAGNOSIS — D49.0 TUMOR OF LIVER: ICD-10-CM

## 2023-07-06 PROCEDURE — A9581 GADOXETATE DISODIUM INJ: HCPCS | Performed by: FAMILY MEDICINE

## 2023-07-06 PROCEDURE — 74183 MRI ABD W/O CNTR FLWD CNTR: CPT

## 2023-07-06 PROCEDURE — G1004 CDSM NDSC: HCPCS

## 2023-07-06 RX ADMIN — GADOXETATE DISODIUM 9 ML: 181.43 INJECTION, SOLUTION INTRAVENOUS at 16:43

## 2023-07-11 LAB
BUN SERPL-MCNC: 35 MG/DL (ref 8–27)
BUN/CREAT SERPL: 27 (ref 10–24)
CALCIUM SERPL-MCNC: 9 MG/DL (ref 8.6–10.2)
CHLORIDE SERPL-SCNC: 108 MMOL/L (ref 96–106)
CO2 SERPL-SCNC: 17 MMOL/L (ref 20–29)
CREAT SERPL-MCNC: 1.29 MG/DL (ref 0.76–1.27)
EGFR: 57 ML/MIN/1.73
GLUCOSE SERPL-MCNC: 165 MG/DL (ref 70–99)
POTASSIUM SERPL-SCNC: 5.2 MMOL/L (ref 3.5–5.2)
SODIUM SERPL-SCNC: 143 MMOL/L (ref 134–144)

## 2023-07-13 ENCOUNTER — TELEPHONE (OUTPATIENT)
Dept: FAMILY MEDICINE CLINIC | Facility: HOSPITAL | Age: 78
End: 2023-07-13

## 2023-07-14 ENCOUNTER — DOCUMENTATION (OUTPATIENT)
Dept: HEMATOLOGY ONCOLOGY | Facility: CLINIC | Age: 78
End: 2023-07-14

## 2023-07-14 DIAGNOSIS — R16.0 LIVER MASSES: Primary | ICD-10-CM

## 2023-07-14 NOTE — TELEPHONE ENCOUNTER
I did call patient and leave a message to call for a good time to call him. I wanted to review his MRI results. I will however already place a referral to Surgical Oncology to get this going as I do think he needs this.

## 2023-07-14 NOTE — PROGRESS NOTES
Intake received/ Chart reviewed for services completed outside of [facility]    Pathology completed: n/a    Imaging completed: MRI abd with/without 7/6/2023, CT abd 6/14/2023    All records needed are in patients chart. No records retrieval needed at this time.

## 2023-07-14 NOTE — PROGRESS NOTES
Called patient back. Discussed findings of MRI abdomen. Referral placed to Dr. Albert Vicente, Surgical Oncology.

## 2023-07-17 ENCOUNTER — PATIENT OUTREACH (OUTPATIENT)
Dept: HEMATOLOGY ONCOLOGY | Facility: CLINIC | Age: 78
End: 2023-07-17

## 2023-07-17 ENCOUNTER — PREP FOR PROCEDURE (OUTPATIENT)
Dept: INTERVENTIONAL RADIOLOGY/VASCULAR | Facility: CLINIC | Age: 78
End: 2023-07-17

## 2023-07-17 DIAGNOSIS — R16.0 LIVER MASS: Primary | ICD-10-CM

## 2023-07-17 DIAGNOSIS — R16.0 LIVER MASSES: Primary | ICD-10-CM

## 2023-07-17 NOTE — PROGRESS NOTES
Phone outreach to the patient, I introduced myself and explained my role. I informed the patient that I placed an order for Interventional Radiology for a liver biopsy and he should be receiving call from IR to schedule in the near future. He thanked me for the call.

## 2023-07-21 DIAGNOSIS — E11.40 TYPE 2 DIABETES MELLITUS WITH DIABETIC NEUROPATHY, WITH LONG-TERM CURRENT USE OF INSULIN (HCC): ICD-10-CM

## 2023-07-21 DIAGNOSIS — Z79.4 TYPE 2 DIABETES MELLITUS WITH DIABETIC NEUROPATHY, WITH LONG-TERM CURRENT USE OF INSULIN (HCC): ICD-10-CM

## 2023-07-21 RX ORDER — PREGABALIN 100 MG/1
CAPSULE ORAL
Qty: 90 CAPSULE | Refills: 0 | Status: SHIPPED | OUTPATIENT
Start: 2023-07-21

## 2023-07-26 ENCOUNTER — CONSULT (OUTPATIENT)
Dept: SURGICAL ONCOLOGY | Facility: CLINIC | Age: 78
End: 2023-07-26
Payer: COMMERCIAL

## 2023-07-26 VITALS
RESPIRATION RATE: 16 BRPM | OXYGEN SATURATION: 98 % | HEART RATE: 73 BPM | BODY MASS INDEX: 29.93 KG/M2 | SYSTOLIC BLOOD PRESSURE: 138 MMHG | DIASTOLIC BLOOD PRESSURE: 68 MMHG | HEIGHT: 72 IN | TEMPERATURE: 96.9 F | WEIGHT: 221 LBS

## 2023-07-26 DIAGNOSIS — R16.0 LIVER MASSES: Primary | ICD-10-CM

## 2023-07-26 PROCEDURE — 99205 OFFICE O/P NEW HI 60 MIN: CPT | Performed by: SURGERY

## 2023-07-26 NOTE — PROGRESS NOTES
Surgical Oncology Consult       [address] [facility] [place]  [address]  [place] [address]    Christina Furnace  1945  [id number]  [address] [facility] [place] [address]    Diagnoses and all orders for this visit:    Liver masses  -     Ambulatory Referral to Surgical Oncology  -     AFP tumor marker; Future  -     Ambulatory referral to Gastroenterology; Future  -     AFP tumor marker        Chief Complaint   Patient presents with   • Consult       Return in about 3 weeks (around 8/16/2023) for Office Visit, Labs - See Treatment Plan. Oncology History    No history exists. History of Present Illness: 78-year-old male who has been having worsening shortness of breath over the last 6 months. CTA from May 5, 2023 revealed an ill-defined mass in the liver. There was no pulmonary embolism. There was also emphysema seen. Follow-up CT from June 14, 2023 revealed a 3.8 x 3.6 x 3.6 cm mass in segment 7. This abutted the IVC. 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 Follow-up MRI from July 6, 2023 revealed a 3.7 cm segment 7 lesion. There was a 2.2 cm segment 4B lesion and a 1.4 cm segment 5 lesion. There was enhancing thrombus throughout the anterior right portal vein in the left portal vein. No suspicious adenopathy. I personally reviewed the films. He comes in now to discuss further therapy. He does get short of breath walking short distances. No change in appetite or unintentional weight loss. No history of hepatitis, cirrhosis, alcoholism or IV drug abuse. No personal history of malignancy. There is a family history of a brother with esophagus cancer and a father with some type of metastatic cancer to the bone.     Review of Systems  Complete ROS Surg Onc:   Constitutional: The patient denies new or recent history of general fatigue, no recent weight loss, no change in appetite. Eyes: No complaints of visual problems, no scleral icterus. ENT: no complaints of ear pain, no hoarseness, no difficulty swallowing,  no tinnitus and no new masses in head, oral cavity, or neck. Cardiovascular: Shortness of breath  Respiratory: Shortness of breath  Gastrointestinal: No complaints of jaundice, no bloody stools, no pale stools. Genitourinary: No complaints of dysuria, no hematuria, no nocturia, no frequent urination, no urethral discharge. Musculoskeletal: No complaints of weakness, paralysis, joint stiffness or arthralgias. Integumentary: No complaints of rash, no new lesions. Neurological: No complaints of convulsions, no seizures, no dizziness. Hematologic/Lymphatic: No complaints of easy bruising. Endocrine:  No hot or cold intolerance. No polydipsia, polyphagia, or polyuria. Allergy/immunology:  No environmental allergies. No food allergies. Not immunocompromised. Skin:  No pallor or rash. No wound.           Patient Active Problem List   Diagnosis   • Stage 3 chronic kidney disease, unspecified whether stage 3a or 3b CKD (HCC)   • Peripheral polyneuropathy   • Type 2 diabetes mellitus with neurologic complication, with long-term current use of insulin (HCC)   • Atrial fibrillation, unspecified type (HCC)   • Liver masses   • H/O asbestos exposure   • History of tobacco abuse     Past Medical History:   Diagnosis Date   • Atrial fibrillation (HCC)    • Deep vein thrombosis (DVT) of right lower extremity ([address])    • Diverticulitis      Past Surgical History:   Procedure Laterality Date   • APPENDECTOMY     • BOWEL RESECTION  2014   • CATARACT EXTRACTION Bilateral      Family History   Problem Relation Age of Onset   • Hypertension Mother    • Bone cancer Father    • Diabetes type II Sister    • Diabetes type II Sister    • Esophageal cancer Brother      Social History     Socioeconomic History   • Marital status: /Civil Union     Spouse name: Not on file   • Number of children: Not on file   • Years of education: Not on file   • Highest education level: Not on file   Occupational History   • Not on file   Tobacco Use   • Smoking status: Former     Types: Cigarettes     Start date:      Quit date: 12     Years since quittin.5   • Smokeless tobacco: Never   Vaping Use   • Vaping Use: Never used   Substance and Sexual Activity   • Alcohol use: Yes     Comment: Socially   • Drug use: Never   • Sexual activity: Not on file   Other Topics Concern   • Not on file   Social History Narrative   • Not on file     Social Determinants of Health     Financial Resource Strain: Medium Risk (2022)    Overall Financial Resource Strain (CARDIA)    • Difficulty of Paying Living Expenses: Somewhat hard   Food Insecurity: Not on file   Transportation Needs: No Transportation Needs (2022)    PRAPARE - Transportation    • Lack of Transportation (Medical): No    • Lack of Transportation (Non-Medical):  No   Physical Activity: Not on file   Stress: Not on file   Social Connections: Not on file   Intimate Partner Violence: Not on file   Housing Stability: Not on file       Current Outpatient Medications:   •  acetaminophen (TYLENOL) 650 mg CR tablet, Take 650 mg by mouth every 8 (eight) hours as needed for mild pain, Disp: , Rfl:   •  albuterol (Ventolin HFA) 90 mcg/act inhaler, Inhale 1-2 puffs every 6 (six) hours as needed for wheezing, Disp: 8 g, Rfl: 0  •  Continuous Blood Gluc Sensor (Dexcom G6 Sensor) MISC, Use daily as directed for CGM - Change every 10 days, Disp: 9 each, Rfl: 3  •  Continuous Blood Gluc Transmit (Dexcom G6 Transmitter) MISC, Use daily as directed for CGM - Change every 3 months, Disp: 1 each, Rfl: 3  •  dulaglutide (Trulicity) 7.90 BN/2.6AV injection, Inject 0.5 mL (0.75 mg total) under the skin once a week, Disp: 2 mL, Rfl: 3  •  DULoxetine (CYMBALTA) 60 mg delayed release capsule, take 1 capsule by mouth once daily, Disp: 90 capsule, Rfl: 1  •  Eliquis 5 MG, take 1 tablet by mouth twice a day, Disp: 60 tablet, Rfl: 5  •  Insulin Pen Needle (Pen Needles) 32G X 4 MM MISC, Use 4 (four) times a day, Disp: 400 each, Rfl: 3  •  Levemir FlexPen 100 units/mL injection pen, INJECT 75 UNITS UNDER THE SKIN DAILY AT BEDTIME, Disp: 30 mL, Rfl: 0  •  metoprolol succinate (TOPROL-XL) 50 mg 24 hr tablet, Take 50 mg by mouth daily, Disp: , Rfl:   •  Multiple Vitamin (MULTIVITAMIN ADULT PO), Take 1 tablet by mouth daily, Disp: , Rfl:   •  pantoprazole (PROTONIX) 40 mg tablet, Take 1 tablet (40 mg total) by mouth in the morning, Disp: 30 tablet, Rfl: 5  •  Potassium Citrate ER 15 MEQ (1620 MG) TBCR, Take 1 tablet by mouth in the morning 1 tab po daily, Disp: 30 tablet, Rfl: 0  •  pregabalin (LYRICA) 100 mg capsule, take 1 capsule by mouth three times a day, Disp: 90 capsule, Rfl: 0  •  SUPER B COMPLEX/C PO, Take 1 tablet by mouth daily, Disp: , Rfl:   •  traZODone (DESYREL) 100 mg tablet, take 2 tablets by mouth at bedtime, Disp: 180 tablet, Rfl: 0  •  umeclidinium-vilanterol 62.5-25 mcg/actuation inhaler, Inhale 1 puff daily, Disp: 60 blister, Rfl: 5  •  insulin aspart (NovoLOG FlexPen) 100 UNIT/ML injection pen, Inject 40 Units under the skin 3 (three) times a day with meals Inject 40 units before breakfast Inject 35 units before dinner (Patient taking differently: Inject 40 Units under the skin 3 (three) times a day with meals 40 u breakfast time, 40 u lunchtime, 35 u dinnertime), Disp: 36 mL, Rfl: 2  •  mometasone (ELOCON) 0.1 % cream, Apply topically daily for 7 days, Disp: 15 g, Rfl: 0  No Known Allergies  Vitals:    07/26/23 0859   BP: 138/68   Pulse: 73   Resp: 16   Temp: (!) 96.9 °F (36.1 °C)   SpO2: 98%       Physical Exam   Constitutional: General appearance: The Patient is well-developed and well-nourished who appears the stated age in no acute distress. Patient is pleasant and talkative.      HEENT: Normocephalic. Sclerae are anicteric. Mucous membranes are moist. Neck is supple without adenopathy. No JVD. Chest: The lungs are clear to auscultation. Cardiac: Heart is regular rate. Abdomen: Abdomen is soft, non-tender, non-distended and without masses. Extremities: There is no clubbing or cyanosis. There is no edema. Symmetric. Neuro: Grossly nonfocal. Gait is normal with a cane. Lymphatic: No evidence of cervical adenopathy bilaterally. No evidence of axillary adenopathy bilaterally. No evidence of inguinal adenopathy bilaterally. Skin: Warm, anicteric. Psych:  Patient is pleasant and talkative. Breasts:      Pathology:  [unfilled]    Labs:      Imaging  MRI abdomen w wo contrast    Result Date: 7/12/2023  Narrative: MRI - ABDOMEN - WITH AND WITHOUT CONTRAST INDICATION: 69 years / Male  D49.0: Neoplasm of unspecified behavior of digestive system. COMPARISON: CT abdomen 6/14/2023. TECHNIQUE:  Multiplanar/multisequence MRI of the abdomen was performed before and after administration of contrast. IV Contrast:  9 mL of gadoxetate disodium SOLN FINDINGS: LOWER CHEST:   Unremarkable. LIVER: Hepatomegaly. Numerous reidentified hepatic metastases suspicious for metastases. Dominant unchanged 3.7 cm segment 7 lesion #8/35 heterogeneous appearance and predominantly hypoenhancing to the surrounding hepatic parenchyma on all phases of contrast transit. Dominant 2.2 cm segment 4B lesion #8/279. Dominant 2.3 cm segment 6 lesion #8/319. Dominant 1.4 cm segment 5 lesion #8/319. Other smaller scattered metastatic lesions. Diffuse enhancing thrombus throughout the anterior right portal vein and diffusely throughout the left portal vein territory. Patent hepatic veins. BILE DUCTS: No intrahepatic or extrahepatic bile duct dilation. GALLBLADDER: Tiny gallstones. No signs of cholecystitis PANCREAS: Atrophic. No discrete lesion.  ADRENAL GLANDS:  Normal. SPLEEN:  Normal. KIDNEYS/PROXIMAL URETERS: No hydroureteronephrosis. No suspicious renal mass. Numerous simple renal cysts measuring up to 8 cm in the upper pole left kidney. BOWEL:   No dilated loops of bowel. PERITONEUM/RETROPERITONEUM: Trace perihepatic ascites. LYMPH NODES: A few scattered subcentimeter wiliam hepatic and celiac axis lymph nodes without adenopathy. Scattered subcentimeter mesenteric lymph nodes. VASCULAR STRUCTURES:  No aneurysm. ABDOMINAL WALL:  Unremarkable. OSSEOUS STRUCTURES:  No suspicious osseous lesion. Impression: Numerous hepatic lesions, unchanged from the recent CT, suspicious for metastases. Likely enhancing thrombus throughout the anterior right and diffusely throughout the left portal vein. The study was marked in EPIC for significant notification. Workstation performed: [id number]     I reviewed the above laboratory and imaging data. Discussion/Summary: 81-year-old male with multiple liver masses. These are likely malignant. 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 It is unclear if he really needs to be anticoagulated for this. I have recommended obtaining an AFP level. His liver does not appear cirrhotic. I discussed that this could be [address] If this is the case, I would recommend liver directed therapy if possible. If this is primary cholangiocarcinoma, chemotherapy would be the mainstay of his treatment. If this is metastatic disease, treatment would be based on the tissue type. He is already scheduled for an IR biopsy on August 9. We will await those results. His wife is going to see if this can get moved up. I have recommended that he undergo colonoscopy, since he has not had 1 for at least 5 years after his surgery for diverticulitis. I will see him once we have the biopsy results. He and his wife are agreeable to this. All of their questions were answered.

## 2023-07-28 ENCOUNTER — TELEPHONE (OUTPATIENT)
Dept: SURGICAL ONCOLOGY | Facility: CLINIC | Age: 78
End: 2023-07-28

## 2023-07-28 DIAGNOSIS — R16.0 LIVER MASSES: Primary | ICD-10-CM

## 2023-07-28 LAB — AFP-TM SERPL-MCNC: ABNORMAL NG/ML (ref 0–8.4)

## 2023-07-28 NOTE — TELEPHONE ENCOUNTER
Discussed that his AFP level is over 18,000. No need for IR biopsy. Would recommend that he undergo Y90 treatment given his disease burden in the liver. I discussed that he will need radiation and IR evaluation for SIR-Spheres. I told him that IR will reach out to him regarding SIR-Spheres.

## 2023-08-02 ENCOUNTER — TELEMEDICINE (OUTPATIENT)
Dept: INTERVENTIONAL RADIOLOGY/VASCULAR | Facility: CLINIC | Age: 78
End: 2023-08-02
Payer: COMMERCIAL

## 2023-08-02 ENCOUNTER — PREP FOR PROCEDURE (OUTPATIENT)
Dept: INTERVENTIONAL RADIOLOGY/VASCULAR | Facility: CLINIC | Age: 78
End: 2023-08-02

## 2023-08-02 ENCOUNTER — CONSULT (OUTPATIENT)
Age: 78
End: 2023-08-02
Payer: COMMERCIAL

## 2023-08-02 VITALS
DIASTOLIC BLOOD PRESSURE: 68 MMHG | OXYGEN SATURATION: 97 % | SYSTOLIC BLOOD PRESSURE: 122 MMHG | HEIGHT: 72 IN | WEIGHT: 222 LBS | BODY MASS INDEX: 30.07 KG/M2 | TEMPERATURE: 97.7 F | HEART RATE: 77 BPM

## 2023-08-02 DIAGNOSIS — R94.2 ABNORMAL PFTS (PULMONARY FUNCTION TESTS): ICD-10-CM

## 2023-08-02 DIAGNOSIS — J98.4 RESTRICTIVE LUNG DISEASE: ICD-10-CM

## 2023-08-02 DIAGNOSIS — R16.0 LIVER MASSES: Primary | ICD-10-CM

## 2023-08-02 PROBLEM — J43.8 OTHER EMPHYSEMA (HCC): Status: ACTIVE | Noted: 2023-08-02

## 2023-08-02 PROCEDURE — 99203 OFFICE O/P NEW LOW 30 MIN: CPT | Performed by: RADIOLOGY

## 2023-08-02 PROCEDURE — 99204 OFFICE O/P NEW MOD 45 MIN: CPT | Performed by: INTERNAL MEDICINE

## 2023-08-02 RX ORDER — SODIUM CHLORIDE 9 MG/ML
125 INJECTION, SOLUTION INTRAVENOUS CONTINUOUS
OUTPATIENT
Start: 2023-08-02

## 2023-08-02 RX ORDER — METHYLPREDNISOLONE 4 MG/1
TABLET ORAL
Qty: 1 EACH | Refills: 0 | Status: SHIPPED | OUTPATIENT
Start: 2023-08-02 | End: 2023-08-07 | Stop reason: CLARIF

## 2023-08-02 NOTE — PROGRESS NOTES
Virtual Regular Visit    Verification of patient location:    Patient is located at Home in the following state in which I hold an active license [place]      Assessment/Plan:    Problem List Items Addressed This Visit        Other    Liver masses - Primary    Relevant Orders    IR Y-90 PRE-ANGIO/EMBO W/ LUNG SCAN     In summary, 28-year-old male with multiple liver masses and elevated AFP (greater than 18,000) with CT and MRI work-up. Imaging was concerning for metastatic process; however given the elevated AFP this felt that these lesions represent [address] I described possible liver directed therapy options and specifically focused on Y90 split dose whole liver treatment. Discussed the procedural details, risks and benefits. Per Dr. Bebe Johnson, we will forego liver lesion biopsy. Patient will keep appointment for upcoming colonoscopy. We will coordinate with scheduling to get Y90 scheduled. Will refer to [address]. Reason for visit is   Chief Complaint   Patient presents with   • Virtual Regular Visit        Encounter provider Nitin Rojas DO    Provider located at [address]  [phone]      Recent Visits  No visits were found meeting these conditions. Showing recent visits within past 7 days and meeting all other requirements  Today's Visits  Date Type Provider Dept   08/02/23 Telemedicine DO Chong Chapin today's visits and meeting all other requirements  Future Appointments  No visits were found meeting these conditions. Showing future appointments within next 150 days and meeting all other requirements       The patient was identified by name and date of birth. Brie Gillilnad was informed that this is a telemedicine visit and that the visit is being conducted through Telephone. My office door was closed. No one else was in the room.   He acknowledged consent and understanding of privacy and security of the video platform. The patient has agreed to participate and understands they can discontinue the visit at any time. Patient is aware this is a billable service. CC: Liver masses. HPI: 68y M who presented to the emergency room in May of this year for shortness of breath with CTA 5/5/2023 incidental findings of liver masses. Follow up CT 6/14/2023 showed multiple liver masses c/f malignancy and nonspecific R colonic low attenuation density area. Follow up MRI 7/6/2023 demonstrated multiple hepatic masses and PV tumor thrombus. No overt cirrhosis. Recent AFP >18,000. He has a history of COPD, atrial fibrillation (on Eliquis), diabetes, diverticular disease (s/p left colon resection), and chronic kidney disease. He was referred to Dr. Blane Rollins of surgical oncology and then referred to IR to discuss Y90. No active cardiopulmonary complaints, abdominal pain, weight loss, fever. No GIB, jaundice or confusion. Good appetite. Mildly active, able to carry out chores.          Past Medical History:   Diagnosis Date   • Atrial fibrillation ([address])    • Deep vein thrombosis (DVT) of right lower extremity ([address])    • Diverticulitis        Past Surgical History:   Procedure Laterality Date   • APPENDECTOMY     • BOWEL RESECTION  2014   • CATARACT EXTRACTION Bilateral        Current Outpatient Medications   Medication Sig Dispense Refill   • acetaminophen (TYLENOL) 650 mg CR tablet Take 650 mg by mouth every 8 (eight) hours as needed for mild pain     • albuterol (Ventolin HFA) 90 mcg/act inhaler Inhale 1-2 puffs every 6 (six) hours as needed for wheezing 8 g 0   • Continuous Blood Gluc Sensor (Dexcom G6 Sensor) MISC Use daily as directed for CGM - Change every 10 days 9 each 3   • Continuous Blood Gluc Transmit (Dexcom G6 Transmitter) MISC Use daily as directed for CGM - Change every 3 months 1 each 3   • dulaglutide (Trulicity) 1.88 QW/2.4YM injection Inject 0.5 mL (0.75 mg total) under the skin once a week 2 mL 3   • DULoxetine (CYMBALTA) 60 mg delayed release capsule take 1 capsule by mouth once daily 90 capsule 1   • Eliquis 5 MG take 1 tablet by mouth twice a day 60 tablet 5   • insulin aspart (NovoLOG FlexPen) 100 UNIT/ML injection pen Inject 40 Units under the skin 3 (three) times a day with meals Inject 40 units before breakfast  Inject 35 units before dinner (Patient taking differently: Inject 40 Units under the skin 3 (three) times a day with meals 40 u breakfast time, 40 u lunchtime, 35 u dinnertime) 36 mL 2   • Insulin Pen Needle (Pen Needles) 32G X 4 MM MISC Use 4 (four) times a day 400 each 3   • Levemir FlexPen 100 units/mL injection pen INJECT 75 UNITS UNDER THE SKIN DAILY AT BEDTIME 30 mL 0   • metoprolol succinate (TOPROL-XL) 50 mg 24 hr tablet Take 50 mg by mouth daily     • mometasone (ELOCON) 0.1 % cream Apply topically daily for 7 days 15 g 0   • Multiple Vitamin (MULTIVITAMIN ADULT PO) Take 1 tablet by mouth daily     • pantoprazole (PROTONIX) 40 mg tablet Take 1 tablet (40 mg total) by mouth in the morning 30 tablet 5   • Potassium Citrate ER 15 MEQ (1620 MG) TBCR Take 1 tablet by mouth in the morning 1 tab po daily 30 tablet 0   • pregabalin (LYRICA) 100 mg capsule take 1 capsule by mouth three times a day 90 capsule 0   • SUPER B COMPLEX/C PO Take 1 tablet by mouth daily     • traZODone (DESYREL) 100 mg tablet take 2 tablets by mouth at bedtime 180 tablet 0   • umeclidinium-vilanterol 62.5-25 mcg/actuation inhaler Inhale 1 puff daily 60 blister 5     No current facility-administered medications for this visit. No Known Allergies    Review of Systems   All other systems reviewed and are negative.       Visit Time  Total Visit Duration: 30

## 2023-08-02 NOTE — ASSESSMENT & PLAN NOTE
Although he has radiographic evidence of emphysema, there is no obstruction on PFTs.   In addition, he has not found inhaler therapy to be beneficial.

## 2023-08-02 NOTE — PROGRESS NOTES
Pulmonary Outpatient Consultation Note   Oseas Mcdonough 69 y.o. male MRN: [id number]  8/2/2023    Referring provider: Mckay Rosales, [address],  [address]     Assessment/Plan:      Other emphysema [facility])  Although he has radiographic evidence of emphysema, there is no obstruction on PFTs. In addition, he has not found inhaler therapy to be beneficial.    Restrictive lung disease  More prominent finding on PFTs is restriction with diffusion impairment. I suspect he has an element of fibrosis/interstitial lung disease on top of emphysema. For further characterization, he will undergo high-resolution chest CT. It is certainly possible that his occupational exposures contributed to interstitial changes. If he has findings to suggest ILD, he will need serologies to exclude rheumatologic issue as well. I will call him with results to determine next appropriate steps. Visit orders:    Diagnoses and all orders for this visit:    Restrictive lung disease  -     Ambulatory Referral to Pulmonology  -     CT chest high resolution; Future    Abnormal PFTs (pulmonary function tests)  -     Ambulatory Referral to Pulmonology      History of Present Illness   HPI:  Oseas Mcdonough is a 69 y.o. male who is here today for referral regarding shortness of breath. Patient has been struggling with progressively worsening shortness of breath of the past several months. He denies dyspnea at rest.  It is most notable when he is exerting himself. He has no associated cough, wheeze or sputum production. He was prescribed Anoro Ellipta and albuterol but has not found those to be beneficial.  He has a history of right lower extremity DVT and has been chronically on Eliquis. Recently was diagnosed with liver cancer and will be starting radiation in the near future. He will be starting radiation in the near future. He has a lack of energy. He denies lower extremity edema.   He denies arthralgias or myalgias. He denies any skin rashes. He has a remote smoking history, quit . 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 Review of Systems   Constitutional: Negative for chills, fever and unexpected weight change. HENT: Negative for postnasal drip and sore throat. Eyes: Negative for visual disturbance. Respiratory:        As noted in HPI   Cardiovascular: Negative for chest pain. Gastrointestinal: Negative for abdominal pain, diarrhea and vomiting. Musculoskeletal: Negative for arthralgias. Skin: Negative for rash. Neurological: Negative for headaches. Hematological: Negative for adenopathy. Psychiatric/Behavioral: Negative. All other systems reviewed and are negative.        Historical Information   Past Medical History:   Diagnosis Date   • Atrial fibrillation ([address])    • Deep vein thrombosis (DVT) of right lower extremity (HCC)    • Diverticulitis      Past Surgical History:   Procedure Laterality Date   • APPENDECTOMY     • BOWEL RESECTION     • CATARACT EXTRACTION Bilateral      Family History   Problem Relation Age of Onset   • Hypertension Mother    • Bone cancer Father    • Diabetes type II Sister    • Diabetes type II Sister    • Esophageal cancer Brother          Social History     Tobacco Use   Smoking Status Former   • Packs/day: 1.00   • Years: 30.00   • Total pack years: 30.00   • Types: Cigarettes   • Start date:    • Quit date:    • Years since quittin.6   Smokeless Tobacco Never       Meds/Allergies     Current Outpatient Medications:   •  acetaminophen (TYLENOL) 650 mg CR tablet, Take 650 mg by mouth every 8 (eight) hours as needed for mild pain, Disp: , Rfl:   •  albuterol (Ventolin HFA) 90 mcg/act inhaler, Inhale 1-2 puffs every 6 (six) hours as needed for wheezing, Disp: 8 g, Rfl: 0  •  Continuous Blood Gluc Sensor (Dexcom G6 Sensor) MISC, Use daily as directed for CGM - Change every 10 days, Disp: 9 each, Rfl: 3  •  Continuous Blood Gluc Transmit (Dexcom G6 Transmitter) MISC, Use daily as directed for CGM - Change every 3 months, Disp: 1 each, Rfl: 3  •  dulaglutide (Trulicity) 3.48 QQ/8.5KD injection, Inject 0.5 mL (0.75 mg total) under the skin once a week, Disp: 2 mL, Rfl: 3  •  DULoxetine (CYMBALTA) 60 mg delayed release capsule, take 1 capsule by mouth once daily, Disp: 90 capsule, Rfl: 1  •  Eliquis 5 MG, take 1 tablet by mouth twice a day, Disp: 60 tablet, Rfl: 5  •  insulin aspart (NovoLOG FlexPen) 100 UNIT/ML injection pen, Inject 40 Units under the skin 3 (three) times a day with meals Inject 40 units before breakfast Inject 35 units before dinner (Patient taking differently: Inject 40 Units under the skin 3 (three) times a day with meals 40 u breakfast time, 40 u lunchtime, 35 u dinnertime), Disp: 36 mL, Rfl: 2  •  Insulin Pen Needle (Pen Needles) 32G X 4 MM MISC, Use 4 (four) times a day, Disp: 400 each, Rfl: 3  •  Levemir FlexPen 100 units/mL injection pen, INJECT 75 UNITS UNDER THE SKIN DAILY AT BEDTIME, Disp: 30 mL, Rfl: 0  •  metoprolol succinate (TOPROL-XL) 50 mg 24 hr tablet, Take 50 mg by mouth daily, Disp: , Rfl:   •  mometasone (ELOCON) 0.1 % cream, Apply topically daily for 7 days, Disp: 15 g, Rfl: 0  •  Multiple Vitamin (MULTIVITAMIN ADULT PO), Take 1 tablet by mouth daily, Disp: , Rfl:   •  pantoprazole (PROTONIX) 40 mg tablet, Take 1 tablet (40 mg total) by mouth in the morning, Disp: 30 tablet, Rfl: 5  •  Potassium Citrate ER 15 MEQ (1620 MG) TBCR, Take 1 tablet by mouth in the morning 1 tab po daily, Disp: 30 tablet, Rfl: 0  •  pregabalin (LYRICA) 100 mg capsule, take 1 capsule by mouth three times a day, Disp: 90 capsule, Rfl: 0  •  SUPER B COMPLEX/C PO, Take 1 tablet by mouth daily, Disp: , Rfl:   •  traZODone (DESYREL) 100 mg tablet, take 2 tablets by mouth at bedtime, Disp: 180 tablet, Rfl: 0  •  umeclidinium-vilanterol 62.5-25 mcg/actuation inhaler, Inhale 1 puff daily, Disp: 60 blister, Rfl: 5  No Known Allergies    Vitals: Blood pressure 122/68, pulse 77, temperature 97.7 °F (36.5 °C), height 6' (1.829 m), weight 101 kg (222 lb), SpO2 97 %. , Body mass index is 30.11 kg/m². Oxygen Therapy  SpO2: 97 %  Oxygen Therapy: None (Room air)    Physical Exam   Physical Exam  Constitutional:       General: He is not in acute distress. HENT:      Head: Normocephalic. Mouth/Throat:      Pharynx: No oropharyngeal exudate. Eyes:      General: No scleral icterus. Pupils: Pupils are equal, round, and reactive to light. Neck:      Vascular: No JVD. Cardiovascular:      Rate and Rhythm: Normal rate and regular rhythm. Pulmonary:      Breath sounds: Rales (Faint at the bases) present. No wheezing or rhonchi. Abdominal:      Palpations: Abdomen is soft. Tenderness: There is no abdominal tenderness. Musculoskeletal:      Cervical back: Neck supple. Lymphadenopathy:      Cervical: No cervical adenopathy. Skin:     General: Skin is warm and dry. Neurological:      Mental Status: He is alert and oriented to person, place, and time. Psychiatric:         Mood and Affect: Mood normal.         Labs: I have personally reviewed pertinent lab results. Lab Results   Component Value Date    WBC 8.29 05/05/2023    HGB 9.2 (L) 05/05/2023    HCT 33.2 (L) 05/05/2023    MCV 69 (L) 05/05/2023     05/05/2023     Lab Results   Component Value Date    CALCIUM 9.5 05/05/2023    K 5.2 07/10/2023    CO2 17 (L) 07/10/2023     (H) 07/10/2023    BUN 35 (H) 07/10/2023    CREATININE 1.29 (H) 07/10/2023     No results found for: "IGE"  Lab Results   Component Value Date    ALT 37 05/15/2023    AST 40 05/15/2023    ALKPHOS 82 05/05/2023     Imaging and other studies: I have personally reviewed pertinent reports. and I have personally reviewed pertinent films in PACS  CT of the chest from 5/5/2023 shows no evidence of pulmonary embolism. There is moderate emphysema.     Pulmonary function testing:  Performed May 2023 and personally interpreted  FEV1/FVC ratio 82%    FEV1 73% predicted  FVC 66% predicted  No response to bronchodilators  TLC 61 % predicted  RV 58 % predicted  DLCO corrected for hemoglobin 30 % predicted  PFTs with moderate restriction and severe diffusion impairment    Other Studies: I have personally reviewed pertinent reports.    and I have personally reviewed pertinent films in PACS  Echocardiogram from 5/25/2023 shows an EF of 60%, normal RV size and systolic function, mild mitral regurgitation

## 2023-08-02 NOTE — ASSESSMENT & PLAN NOTE
More prominent finding on PFTs is restriction with diffusion impairment. I suspect he has an element of fibrosis/interstitial lung disease on top of emphysema. For further characterization, he will undergo high-resolution chest CT. It is certainly possible that his occupational exposures contributed to interstitial changes. If he has findings to suggest ILD, he will need serologies to exclude rheumatologic issue as well. I will call him with results to determine next appropriate steps.

## 2023-08-07 ENCOUNTER — APPOINTMENT (EMERGENCY)
Dept: CT IMAGING | Facility: HOSPITAL | Age: 78
DRG: 435 | End: 2023-08-07
Payer: COMMERCIAL

## 2023-08-07 ENCOUNTER — HOSPITAL ENCOUNTER (INPATIENT)
Facility: HOSPITAL | Age: 78
LOS: 7 days | Discharge: HOME | DRG: 435 | End: 2023-08-14
Attending: EMERGENCY MEDICINE | Admitting: INTERNAL MEDICINE
Payer: COMMERCIAL

## 2023-08-07 ENCOUNTER — APPOINTMENT (EMERGENCY)
Dept: RADIOLOGY | Facility: HOSPITAL | Age: 78
DRG: 435 | End: 2023-08-07
Payer: COMMERCIAL

## 2023-08-07 DIAGNOSIS — J44.1 COPD EXACERBATION (HCC): ICD-10-CM

## 2023-08-07 DIAGNOSIS — R77.8 TROPONIN LEVEL ELEVATED: ICD-10-CM

## 2023-08-07 DIAGNOSIS — J96.21 ACUTE ON CHRONIC RESPIRATORY FAILURE WITH HYPOXIA (HCC): ICD-10-CM

## 2023-08-07 DIAGNOSIS — K92.2 ACUTE GI BLEEDING: ICD-10-CM

## 2023-08-07 DIAGNOSIS — E16.2 HYPOGLYCEMIA: ICD-10-CM

## 2023-08-07 DIAGNOSIS — K21.9 GASTROESOPHAGEAL REFLUX DISEASE WITHOUT ESOPHAGITIS: ICD-10-CM

## 2023-08-07 DIAGNOSIS — I50.33 ACUTE ON CHRONIC DIASTOLIC HF (HEART FAILURE) (HCC): ICD-10-CM

## 2023-08-07 DIAGNOSIS — N18.30 STAGE 3 CHRONIC KIDNEY DISEASE, UNSPECIFIED WHETHER STAGE 3A OR 3B CKD (HCC): ICD-10-CM

## 2023-08-07 DIAGNOSIS — D64.9 ANEMIA, UNSPECIFIED TYPE: ICD-10-CM

## 2023-08-07 DIAGNOSIS — D64.9 ACUTE ANEMIA: Primary | ICD-10-CM

## 2023-08-07 DIAGNOSIS — C22.0 HEPATOCELLULAR CARCINOMA (HCC): ICD-10-CM

## 2023-08-07 PROBLEM — R55 SYNCOPE: Status: ACTIVE | Noted: 2023-08-07

## 2023-08-07 PROBLEM — Z86.718 HISTORY OF DVT (DEEP VEIN THROMBOSIS): Status: ACTIVE | Noted: 2023-08-07

## 2023-08-07 PROBLEM — I50.32 CHRONIC DIASTOLIC HEART FAILURE (HCC): Status: ACTIVE | Noted: 2023-08-07

## 2023-08-07 PROBLEM — J96.01 ACUTE RESPIRATORY FAILURE WITH HYPOXIA (HCC): Status: ACTIVE | Noted: 2023-08-07

## 2023-08-07 LAB
2HR DELTA HS TROPONIN: 222 NG/L
ABO GROUP BLD: NORMAL
ABO GROUP BLD: NORMAL
ALBUMIN SERPL BCP-MCNC: 3.9 G/DL (ref 3.5–5)
ALP SERPL-CCNC: 69 U/L (ref 34–104)
ALT SERPL W P-5'-P-CCNC: 36 U/L (ref 7–52)
ANION GAP SERPL CALCULATED.3IONS-SCNC: 12 MMOL/L
AST SERPL W P-5'-P-CCNC: 39 U/L (ref 13–39)
ATRIAL RATE: 78 BPM
BASOPHILS # BLD AUTO: 0.04 THOUSANDS/ÂΜL (ref 0–0.1)
BASOPHILS NFR BLD AUTO: 0 % (ref 0–1)
BILIRUB SERPL-MCNC: 0.47 MG/DL (ref 0.2–1)
BLD GP AB SCN SERPL QL: NEGATIVE
BNP SERPL-MCNC: 1073 PG/ML (ref 0–100)
BUN SERPL-MCNC: 55 MG/DL (ref 5–25)
CALCIUM SERPL-MCNC: 8.6 MG/DL (ref 8.4–10.2)
CARDIAC TROPONIN I PNL SERPL HS: 3871 NG/L
CARDIAC TROPONIN I PNL SERPL HS: 4093 NG/L
CHLORIDE SERPL-SCNC: 111 MMOL/L (ref 96–108)
CO2 SERPL-SCNC: 19 MMOL/L (ref 21–32)
CREAT SERPL-MCNC: 1.66 MG/DL (ref 0.6–1.3)
EOSINOPHIL # BLD AUTO: 0.27 THOUSAND/ÂΜL (ref 0–0.61)
EOSINOPHIL NFR BLD AUTO: 3 % (ref 0–6)
ERYTHROCYTE [DISTWIDTH] IN BLOOD BY AUTOMATED COUNT: 22 % (ref 11.6–15.1)
FERRITIN SERPL-MCNC: 10 NG/ML (ref 24–336)
FLUAV RNA RESP QL NAA+PROBE: NEGATIVE
FLUBV RNA RESP QL NAA+PROBE: NEGATIVE
GFR SERPL CREATININE-BSD FRML MDRD: 39 ML/MIN/1.73SQ M
GLUCOSE SERPL-MCNC: 189 MG/DL (ref 65–140)
GLUCOSE SERPL-MCNC: 202 MG/DL (ref 65–140)
GLUCOSE SERPL-MCNC: 246 MG/DL (ref 65–140)
GLUCOSE SERPL-MCNC: 272 MG/DL (ref 65–140)
HCT VFR BLD AUTO: 24.7 % (ref 36.5–49.3)
HCT VFR BLD AUTO: 29.7 % (ref 36.5–49.3)
HCT VFR BLD AUTO: 30.3 % (ref 36.5–49.3)
HGB BLD-MCNC: 6.4 G/DL (ref 12–17)
HGB BLD-MCNC: 7.9 G/DL (ref 12–17)
HGB BLD-MCNC: 7.9 G/DL (ref 12–17)
IMM GRANULOCYTES # BLD AUTO: 0.11 THOUSAND/UL (ref 0–0.2)
IMM GRANULOCYTES NFR BLD AUTO: 1 % (ref 0–2)
IRON SATN MFR SERPL: 3 % (ref 20–50)
IRON SERPL-MCNC: 14 UG/DL (ref 65–175)
LYMPHOCYTES # BLD AUTO: 1.93 THOUSANDS/ÂΜL (ref 0.6–4.47)
LYMPHOCYTES NFR BLD AUTO: 19 % (ref 14–44)
MCH RBC QN AUTO: 16.3 PG (ref 26.8–34.3)
MCHC RBC AUTO-ENTMCNC: 25.9 G/DL (ref 31.4–37.4)
MCV RBC AUTO: 63 FL (ref 82–98)
MONOCYTES # BLD AUTO: 1.61 THOUSAND/ÂΜL (ref 0.17–1.22)
MONOCYTES NFR BLD AUTO: 16 % (ref 4–12)
NEUTROPHILS # BLD AUTO: 6.38 THOUSANDS/ÂΜL (ref 1.85–7.62)
NEUTS SEG NFR BLD AUTO: 61 % (ref 43–75)
NRBC BLD AUTO-RTO: 1 /100 WBCS
P AXIS: 65 DEGREES
PLATELET # BLD AUTO: 210 THOUSANDS/UL (ref 149–390)
POTASSIUM SERPL-SCNC: 4.5 MMOL/L (ref 3.5–5.3)
PR INTERVAL: 248 MS
PROT SERPL-MCNC: 6.8 G/DL (ref 6.4–8.4)
QRS AXIS: -27 DEGREES
QRSD INTERVAL: 134 MS
QT INTERVAL: 428 MS
QTC INTERVAL: 487 MS
RBC # BLD AUTO: 3.92 MILLION/UL (ref 3.88–5.62)
RH BLD: POSITIVE
RH BLD: POSITIVE
RSV RNA RESP QL NAA+PROBE: NEGATIVE
SARS-COV-2 RNA RESP QL NAA+PROBE: NEGATIVE
SODIUM SERPL-SCNC: 142 MMOL/L (ref 135–147)
SPECIMEN EXPIRATION DATE: NORMAL
T WAVE AXIS: 266 DEGREES
TIBC SERPL-MCNC: 462 UG/DL (ref 250–450)
VENTRICULAR RATE: 78 BPM
WBC # BLD AUTO: 10.34 THOUSAND/UL (ref 4.31–10.16)

## 2023-08-07 PROCEDURE — 86920 COMPATIBILITY TEST SPIN: CPT

## 2023-08-07 PROCEDURE — 71045 X-RAY EXAM CHEST 1 VIEW: CPT

## 2023-08-07 PROCEDURE — 99285 EMERGENCY DEPT VISIT HI MDM: CPT

## 2023-08-07 PROCEDURE — 84484 ASSAY OF TROPONIN QUANT: CPT | Performed by: INTERNAL MEDICINE

## 2023-08-07 PROCEDURE — 86850 RBC ANTIBODY SCREEN: CPT | Performed by: EMERGENCY MEDICINE

## 2023-08-07 PROCEDURE — 93010 ELECTROCARDIOGRAM REPORT: CPT | Performed by: INTERNAL MEDICINE

## 2023-08-07 PROCEDURE — 82948 REAGENT STRIP/BLOOD GLUCOSE: CPT

## 2023-08-07 PROCEDURE — 83880 ASSAY OF NATRIURETIC PEPTIDE: CPT | Performed by: INTERNAL MEDICINE

## 2023-08-07 PROCEDURE — C9113 INJ PANTOPRAZOLE SODIUM, VIA: HCPCS | Performed by: INTERNAL MEDICINE

## 2023-08-07 PROCEDURE — 99222 1ST HOSP IP/OBS MODERATE 55: CPT | Performed by: INTERNAL MEDICINE

## 2023-08-07 PROCEDURE — 30233N1 TRANSFUSION OF NONAUTOLOGOUS RED BLOOD CELLS INTO PERIPHERAL VEIN, PERCUTANEOUS APPROACH: ICD-10-PCS | Performed by: HOSPITALIST

## 2023-08-07 PROCEDURE — 99223 1ST HOSP IP/OBS HIGH 75: CPT | Performed by: INTERNAL MEDICINE

## 2023-08-07 PROCEDURE — 96374 THER/PROPH/DIAG INJ IV PUSH: CPT

## 2023-08-07 PROCEDURE — 82728 ASSAY OF FERRITIN: CPT | Performed by: NURSE PRACTITIONER

## 2023-08-07 PROCEDURE — 93005 ELECTROCARDIOGRAM TRACING: CPT

## 2023-08-07 PROCEDURE — 85018 HEMOGLOBIN: CPT

## 2023-08-07 PROCEDURE — 80053 COMPREHEN METABOLIC PANEL: CPT | Performed by: EMERGENCY MEDICINE

## 2023-08-07 PROCEDURE — 0241U HB NFCT DS VIR RESP RNA 4 TRGT: CPT | Performed by: INTERNAL MEDICINE

## 2023-08-07 PROCEDURE — 85014 HEMATOCRIT: CPT

## 2023-08-07 PROCEDURE — 99291 CRITICAL CARE FIRST HOUR: CPT | Performed by: EMERGENCY MEDICINE

## 2023-08-07 PROCEDURE — 94760 N-INVAS EAR/PLS OXIMETRY 1: CPT

## 2023-08-07 PROCEDURE — 94640 AIRWAY INHALATION TREATMENT: CPT

## 2023-08-07 PROCEDURE — 86900 BLOOD TYPING SEROLOGIC ABO: CPT | Performed by: EMERGENCY MEDICINE

## 2023-08-07 PROCEDURE — P9016 RBC LEUKOCYTES REDUCED: HCPCS

## 2023-08-07 PROCEDURE — 96361 HYDRATE IV INFUSION ADD-ON: CPT

## 2023-08-07 PROCEDURE — 94002 VENT MGMT INPAT INIT DAY: CPT

## 2023-08-07 PROCEDURE — 86901 BLOOD TYPING SEROLOGIC RH(D): CPT | Performed by: EMERGENCY MEDICINE

## 2023-08-07 PROCEDURE — 83550 IRON BINDING TEST: CPT | Performed by: NURSE PRACTITIONER

## 2023-08-07 PROCEDURE — C9113 INJ PANTOPRAZOLE SODIUM, VIA: HCPCS | Performed by: EMERGENCY MEDICINE

## 2023-08-07 PROCEDURE — 85025 COMPLETE CBC W/AUTO DIFF WBC: CPT | Performed by: EMERGENCY MEDICINE

## 2023-08-07 PROCEDURE — 36415 COLL VENOUS BLD VENIPUNCTURE: CPT | Performed by: EMERGENCY MEDICINE

## 2023-08-07 PROCEDURE — 72125 CT NECK SPINE W/O DYE: CPT

## 2023-08-07 PROCEDURE — 83540 ASSAY OF IRON: CPT | Performed by: NURSE PRACTITIONER

## 2023-08-07 PROCEDURE — 70450 CT HEAD/BRAIN W/O DYE: CPT

## 2023-08-07 RX ORDER — TAMSULOSIN HYDROCHLORIDE 0.4 MG/1
0.4 CAPSULE ORAL
Status: DISCONTINUED | OUTPATIENT
Start: 2023-08-07 | End: 2023-08-14 | Stop reason: HOSPADM

## 2023-08-07 RX ORDER — TAMSULOSIN HYDROCHLORIDE 0.4 MG/1
0.4 CAPSULE ORAL
COMMUNITY

## 2023-08-07 RX ORDER — PREGABALIN 100 MG/1
100 CAPSULE ORAL 3 TIMES DAILY
Status: DISCONTINUED | OUTPATIENT
Start: 2023-08-07 | End: 2023-08-14 | Stop reason: HOSPADM

## 2023-08-07 RX ORDER — ACETAMINOPHEN 325 MG/1
650 TABLET ORAL EVERY 6 HOURS PRN
Status: DISCONTINUED | OUTPATIENT
Start: 2023-08-07 | End: 2023-08-14 | Stop reason: HOSPADM

## 2023-08-07 RX ORDER — INSULIN LISPRO 100 [IU]/ML
1-5 INJECTION, SOLUTION INTRAVENOUS; SUBCUTANEOUS
Status: DISCONTINUED | OUTPATIENT
Start: 2023-08-07 | End: 2023-08-10

## 2023-08-07 RX ORDER — SERTRALINE HYDROCHLORIDE 25 MG/1
25 TABLET, FILM COATED ORAL DAILY
COMMUNITY

## 2023-08-07 RX ORDER — PANTOPRAZOLE SODIUM 40 MG/10ML
40 INJECTION, POWDER, LYOPHILIZED, FOR SOLUTION INTRAVENOUS EVERY 12 HOURS SCHEDULED
Status: DISCONTINUED | OUTPATIENT
Start: 2023-08-07 | End: 2023-08-13

## 2023-08-07 RX ORDER — METOPROLOL SUCCINATE 50 MG/1
50 TABLET, EXTENDED RELEASE ORAL DAILY
Status: DISCONTINUED | OUTPATIENT
Start: 2023-08-08 | End: 2023-08-07

## 2023-08-07 RX ORDER — INSULIN LISPRO 100 [IU]/ML
2-12 INJECTION, SOLUTION INTRAVENOUS; SUBCUTANEOUS
Status: DISCONTINUED | OUTPATIENT
Start: 2023-08-07 | End: 2023-08-10

## 2023-08-07 RX ORDER — FUROSEMIDE 10 MG/ML
20 INJECTION INTRAMUSCULAR; INTRAVENOUS ONCE
Status: COMPLETED | OUTPATIENT
Start: 2023-08-07 | End: 2023-08-07

## 2023-08-07 RX ORDER — METHYLPREDNISOLONE SODIUM SUCCINATE 40 MG/ML
40 INJECTION, POWDER, LYOPHILIZED, FOR SOLUTION INTRAMUSCULAR; INTRAVENOUS ONCE
Status: COMPLETED | OUTPATIENT
Start: 2023-08-07 | End: 2023-08-07

## 2023-08-07 RX ORDER — INSULIN LISPRO 100 [IU]/ML
2-12 INJECTION, SOLUTION INTRAVENOUS; SUBCUTANEOUS EVERY 6 HOURS SCHEDULED
Status: DISCONTINUED | OUTPATIENT
Start: 2023-08-07 | End: 2023-08-07

## 2023-08-07 RX ORDER — HYDRALAZINE HYDROCHLORIDE 20 MG/ML
10 INJECTION INTRAMUSCULAR; INTRAVENOUS EVERY 6 HOURS PRN
Status: DISCONTINUED | OUTPATIENT
Start: 2023-08-07 | End: 2023-08-14 | Stop reason: HOSPADM

## 2023-08-07 RX ORDER — FERROUS SULFATE 325(65) MG
325 TABLET ORAL
Status: ON HOLD | COMMUNITY
End: 2023-08-14 | Stop reason: SDUPTHER

## 2023-08-07 RX ORDER — METHYLPREDNISOLONE SODIUM SUCCINATE 125 MG/2ML
80 INJECTION, POWDER, LYOPHILIZED, FOR SOLUTION INTRAMUSCULAR; INTRAVENOUS ONCE
Status: COMPLETED | OUTPATIENT
Start: 2023-08-07 | End: 2023-08-07

## 2023-08-07 RX ORDER — SERTRALINE HYDROCHLORIDE 25 MG/1
25 TABLET, FILM COATED ORAL DAILY
Status: DISCONTINUED | OUTPATIENT
Start: 2023-08-08 | End: 2023-08-14 | Stop reason: HOSPADM

## 2023-08-07 RX ORDER — METOPROLOL SUCCINATE 50 MG/1
50 TABLET, EXTENDED RELEASE ORAL DAILY
Status: DISCONTINUED | OUTPATIENT
Start: 2023-08-07 | End: 2023-08-14 | Stop reason: HOSPADM

## 2023-08-07 RX ORDER — ONDANSETRON 2 MG/ML
4 INJECTION INTRAMUSCULAR; INTRAVENOUS EVERY 6 HOURS PRN
Status: DISCONTINUED | OUTPATIENT
Start: 2023-08-07 | End: 2023-08-14 | Stop reason: HOSPADM

## 2023-08-07 RX ORDER — FERROUS SULFATE 325(65) MG
325 TABLET ORAL
Status: DISCONTINUED | OUTPATIENT
Start: 2023-08-08 | End: 2023-08-08

## 2023-08-07 RX ADMIN — INSULIN DETEMIR 15 UNITS: 100 INJECTION, SOLUTION SUBCUTANEOUS at 22:21

## 2023-08-07 RX ADMIN — TRAZODONE HYDROCHLORIDE 200 MG: 50 TABLET ORAL at 22:29

## 2023-08-07 RX ADMIN — TAMSULOSIN HYDROCHLORIDE 0.4 MG: 0.4 CAPSULE ORAL at 17:39

## 2023-08-07 RX ADMIN — METHYLPREDNISOLONE SODIUM SUCCINATE 80 MG: 125 INJECTION, POWDER, FOR SOLUTION INTRAMUSCULAR; INTRAVENOUS at 06:19

## 2023-08-07 RX ADMIN — METOPROLOL SUCCINATE 50 MG: 50 TABLET, EXTENDED RELEASE ORAL at 17:39

## 2023-08-07 RX ADMIN — PANTOPRAZOLE SODIUM 40 MG: 40 INJECTION, POWDER, FOR SOLUTION INTRAVENOUS at 20:08

## 2023-08-07 RX ADMIN — PANTOPRAZOLE SODIUM 80 MG: 40 INJECTION, POWDER, FOR SOLUTION INTRAVENOUS at 07:42

## 2023-08-07 RX ADMIN — METHYLPREDNISOLONE SODIUM SUCCINATE 40 MG: 40 INJECTION, POWDER, FOR SOLUTION INTRAMUSCULAR; INTRAVENOUS at 20:08

## 2023-08-07 RX ADMIN — ALBUTEROL SULFATE 5 MG: 2.5 SOLUTION RESPIRATORY (INHALATION) at 06:20

## 2023-08-07 RX ADMIN — PREGABALIN 100 MG: 100 CAPSULE ORAL at 22:29

## 2023-08-07 RX ADMIN — INSULIN LISPRO 6 UNITS: 100 INJECTION, SOLUTION INTRAVENOUS; SUBCUTANEOUS at 13:17

## 2023-08-07 RX ADMIN — FUROSEMIDE 20 MG: 10 INJECTION, SOLUTION INTRAMUSCULAR; INTRAVENOUS at 20:09

## 2023-08-07 RX ADMIN — SODIUM CHLORIDE 1000 ML: 0.9 INJECTION, SOLUTION INTRAVENOUS at 06:16

## 2023-08-07 RX ADMIN — INSULIN LISPRO 2 UNITS: 100 INJECTION, SOLUTION INTRAVENOUS; SUBCUTANEOUS at 22:21

## 2023-08-07 RX ADMIN — IPRATROPIUM BROMIDE 0.5 MG: 0.5 SOLUTION RESPIRATORY (INHALATION) at 06:20

## 2023-08-07 RX ADMIN — FUROSEMIDE 20 MG: 10 INJECTION, SOLUTION INTRAMUSCULAR; INTRAVENOUS at 12:39

## 2023-08-07 NOTE — CONSULTS
Consultation - GI   Verónica Higgins 69 y.o. male MRN: [id number]  Unit/Bed#: ED 06 Encounter: [id number]      Assessment/Plan     69y.o. year old male with past medical history of diverticulitis with resection, atrial fibrillation currently on Eliquis, DVT, GERD, and recent finding of liver mass currently being evaluated by radiation oncology presented to the emergency room this morning after patient lost consciousness. Hemoglobin on admission 6.4. He is not aware of having anemia in the past.  He denies any melena or overt GI bleeding. 1.  Acute anemia  Patient is not aware of anemia history however his last hemoglobin in May of this year was 9.2 and per EMR patient has been taking iron oral supplement. Hemoglobin 6.4 on admission. No overt GI bleeding noted however patient states there was blood in stool when he lost consciousness this morning.    -Patient can have clear liquids if tolerated with current pulmonary status.  -Currently holding Eliquis  -Continue PPI, taupe resolved 40 mg IV push every 12 hours  -Monitor hemoglobin, transfuse as necessary, less than 7.  -Iron panel pending. Consider iron infusion if patient iron deficient. Patient's does not remember having an EGD in the past.  Does note colonoscopy approximately 5 years ago and history of colon resection with diverticulitis. Discuss patient with Dr. Arabella Gentile on rounds. Consider EGD pending patient's pulmonary stability. 2.  Gastroesophageal reflux disease  Patient does admit to having acid reflux symptoms however states while taking pantoprazole 40 mg daily he is symptom-free. Pantoprazole 40 mg IV push every 12 hours currently for acute anemia. 3.  History of diverticulitis with resection  Per EMR, patient did have colonoscopy approximately 5 years ago at [facility]. 4.  Hepatic mass  MRI 7/6/2023; numerous reidentified hepatic lesions suspicious for metastases.   Patient states he was to have a consult with oncology today however patient admitted to hospital.  Patient was also recommended for follow-up colonoscopy which has been scheduled for later this month 8/23. Patient had been ordered for liver biopsy as an outpatient that has not been completed to date. Inpatient consult to gastroenterology  Consult performed by: NADIA Jordan  Consult ordered by: Eladio Tirado, [address] [facility]          Physician Requesting Consult: Maylene Osgood  Reason for Consult / Principal Problem: Acute anemia    HPI: Matthew Gr is a 69y.o. year old male with past medical history of diverticulitis with resection, atrial fibrillation currently on Eliquis, DVT, GERD, and recent finding of liver mass currently being evaluated by radiation oncology presented to the emergency room this morning after patient lost consciousness. Patient states his blood sugar was approximately 40. He is not aware of having anemia in the past.  He denies any melena or overt GI bleeding. On exam, patient does have mild mid lower abdominal discomfort with palpation. Pallor in color. Patient does get [place] with questions and answers. Currently on nasal cannula O2 which was increased to 6 L with O2 sat 88%. Patient's nurse was notified. Patient denies any history of nausea vomiting. States he is having daily normal bowel movements. States she does have occasional hematochezia however states she does have a hemorrhoid. Denies melena. Former smoker, occasional EtOH use. Patient did have MRI 7/6/2023 which noted numerous hepatic lesions, unchanged from recent CT, suspicious for metastases. Patient has also ordered for biopsy of the liver. Patient states he was to have a consult with radiation oncology today however obviously missed due to admission to the hospital.  Get labs on admission, hemoglobin 6.4. Patient was ordered for 1 unit of PRBCs. Per EMR patient's more recent hemoglobin 9.2 on 5/2023.       Review of Systems: Negative for 14 point exam except for HPI.    Historical Information   Past Medical History:   Diagnosis Date   • Atrial fibrillation ([address])    • Deep vein thrombosis (DVT) of right lower extremity (HCC)    • Diverticulitis      Past Surgical History:   Procedure Laterality Date   • APPENDECTOMY     • BOWEL RESECTION     • CATARACT EXTRACTION Bilateral      Social History   Social History     Substance and Sexual Activity   Alcohol Use Yes    Comment: Socially     Social History     Substance and Sexual Activity   Drug Use Never     Social History     Tobacco Use   Smoking Status Former   • Packs/day: 1.00   • Years: 30.00   • Total pack years: 30.00   • Types: Cigarettes   • Start date:    • Quit date: 12   • Years since quittin.6   Smokeless Tobacco Never     Family History   Problem Relation Age of Onset   • Hypertension Mother    • Bone cancer Father    • Diabetes type II Sister    • Diabetes type II Sister    • Esophageal cancer Brother        Meds/Allergies   Current Facility-Administered Medications   Medication Dose Route Frequency   • acetaminophen (TYLENOL) tablet 650 mg  650 mg Oral Q6H PRN   • insulin lispro (HumaLOG) 100 units/mL subcutaneous injection 2-12 Units  2-12 Units Subcutaneous Q6H [facility]   • ondansetron (ZOFRAN) injection 4 mg  4 mg Intravenous Q6H PRN   • pantoprazole (PROTONIX) injection 40 mg  40 mg Intravenous Q12H [facility]     (Not in a hospital admission)      No Known Allergies        Physical Exam   Constitutional: Is oriented to person, place, and time. Appears well-developed and well-nourished. Pallor. HENT: WNL  Head: Normocephalic and atraumatic. Eyes: Pupils are equal, round, and reactive to light. Neck: Normal range of motion. Neck supple. Cardiovascular: Normal rate and regular rhythm. Pulmonary/Chest: Increased upper airway congestion. [place]. O2 nasal cannula 6 L, percent sat 88%. Nurse notified. Abdominal: Soft.  Bowel sounds are normal. Lower mid abdominal discomfort with palpation. Musculoskeletal: Normal range of motion. Extremities:  No edema  Neurological: Is alert and oriented to person, place, and time. Skin: Skin is warm and dry. Psychiatric: Has a normal mood and affect.        Lab Results:   Admission on 08/07/2023   Component Date Value   • WBC 08/07/2023 10.34 (H)    • RBC 08/07/2023 3.92    • Hemoglobin 08/07/2023 6.4 (LL)    • Hematocrit 08/07/2023 24.7 (L)    • MCV 08/07/2023 63 (L)    • MCH 08/07/2023 16.3 (L)    • MCHC 08/07/2023 25.9 (L)    • RDW 08/07/2023 22.0 (H)    • Platelets 76/94/4655 210    • nRBC 08/07/2023 1    • Neutrophils Relative 08/07/2023 61    • Immat GRANS % 08/07/2023 1    • Lymphocytes Relative 08/07/2023 19    • Monocytes Relative 08/07/2023 16 (H)    • Eosinophils Relative 08/07/2023 3    • Basophils Relative 08/07/2023 0    • Neutrophils Absolute 08/07/2023 6.38    • Immature Grans Absolute 08/07/2023 0.11    • Lymphocytes Absolute 08/07/2023 1.93    • Monocytes Absolute 08/07/2023 1.61 (H)    • Eosinophils Absolute 08/07/2023 0.27    • Basophils Absolute 08/07/2023 0.04    • Sodium 08/07/2023 142    • Potassium 08/07/2023 4.5    • Chloride 08/07/2023 111 (H)    • CO2 08/07/2023 19 (L)    • ANION GAP 08/07/2023 12    • BUN 08/07/2023 55 (H)    • Creatinine 08/07/2023 1.66 (H)    • Glucose 08/07/2023 189 (H)    • Calcium 08/07/2023 8.6    • AST 08/07/2023 39    • ALT 08/07/2023 36    • Alkaline Phosphatase 08/07/2023 69    • Total Protein 08/07/2023 6.8    • Albumin 08/07/2023 3.9    • Total Bilirubin 08/07/2023 0.47    • eGFR 08/07/2023 39    • Ventricular Rate 08/07/2023 78    • Atrial Rate 08/07/2023 78    • KS Interval 08/07/2023 248    • QRSD Interval 08/07/2023 134    • QT Interval 08/07/2023 428    • QTC Interval 08/07/2023 487    • P Axis 08/07/2023 65    • QRS [place] 08/07/2023 -27    • T Wave Axis 08/07/2023 266    • POC Glucose 08/07/2023 202 (H)    • ABO Grouping 08/07/2023 A    • Rh Factor 08/07/2023 Positive    • Antibody Screen 08/07/2023 Negative    • Specimen Expiration Date 08/07/2023 [id number]    • Unit Product Code 08/07/2023 [id number]    • Unit Number 08/07/2023 [id number]    • Unit ABO 08/07/2023 A    • Unit [facility] 08/07/2023 POS    • Crossmatch 08/07/2023 Compatible    • Unit Dispense Status 08/07/2023 Crossmatched    • Unit Product Volume 08/07/2023 350    • ABO Grouping 08/07/2023 A    • Rh Factor 08/07/2023 Positive      Imaging Studies: I have personally reviewed pertinent reports. EKG, Pathology, and Other Studies: I have personally reviewed pertinent reports. Counseling / Coordination of Care  Total floor / unit time spent today 45 minutes.

## 2023-08-07 NOTE — H&P
[id number]  H&P  Name: Ghazala Mckeon 69 y.o. male I MRN: [id number]  Unit/Bed#: ED 06 I Date of Admission: 8/7/2023   Date of Service: 8/7/2023 I Hospital Day: 0      Assessment/Plan   * Anemia  Assessment & Plan  Acute on chronic. No much labs to compare with but appears had h/h in 5/2023 with hgb of 9.2  Normocytic  No sign of acute GI bleed as observed stool myself noted to be brown, though heme positive  Pt w/ chronic fe deficiency on iron supplements. Pending iron studies  Will be receiving prbc x 1  GI consulted, holding endoscopy eval given tenous pulmonary status. Pt and wife denies hx EGD. Had colonoscopy 5 yrs ago and hx of colonic resection. He is actually due to have a colonoscopy on 8/23  Cont serial h/h monitoring, transfuse to keep > 7  On clears  On IV PPI  Holding eliquis    Syncope  Assessment & Plan  Likely secondary to anemia  Care as above  Will keep on telemetry monitor to rule out underlying arrhythmia  S/p CT head, CT cervical  Recent NM stress (6/2023) no evidence of ischemia  S/p echo, preserved ef, grade I diastolic, no significant AS      Acute respiratory failure with hypoxia [facility])  Assessment & Plan  Presently been evaluated per Pulm for possibility of restrictive lung disease. Plan was to proceed with high resolution CT scan  Doubt thromboembolism disease as chronically on eliquis.  Had CTA chest done in 5/2023 neg for PE  No sign of acute infectious process or volume overload  Received IV solumedrol in the ED  Will consult pulmonary  Will give IV lasix 20mg x 1 in prep for pending prbc  Cont oxygen supplementation and titrate off as able to    History of DVT (deep vein thrombosis)  Assessment & Plan  On Eliquis, holding in setting of anemia    Chronic diastolic heart failure [facility])  Assessment & Plan  Wt Readings from Last 3 Encounters:   08/07/23 103 kg (226 lb 6.6 oz)   08/02/23 101 kg (222 lb)   07/26/23 100 kg (221 lb)   No clinical sign of acute decompensation per clinical exam and CXR  Cont to monitor fluid status          Atrial fibrillation, unspecified type [facility])  Assessment & Plan  Rate controlled  Holding eliquis given anemia    Type 2 diabetes mellitus with neurologic complication, with long-term current use of insulin [facility])  Assessment & Plan  Lab Results   Component Value Date    HGBA1C 7.6 (H) 01/06/2023       Recent Labs     08/07/23  0510   POCGLU 202*       Blood Sugar Average: Last 72 hrs:  (P) 202   Given only on clears, Will decrease insulin regimen. Resumed at lower dose levemir  On ISS  Cont to monitor    Stage 3 chronic kidney disease, unspecified whether stage 3a or 3b CKD ([address])  Assessment & Plan  Lab Results   Component Value Date    EGFR 39 08/07/2023    EGFR 57 (L) 07/10/2023    EGFR 45 (L) 05/15/2023    CREATININE 1.66 (H) 08/07/2023    CREATININE 1.29 (H) 07/10/2023    CREATININE 1.56 (H) 05/15/2023   Still relatively w/in baseline range      VTE Prophylaxis: Pharmacologic VTE Prophylaxis contraindicated due to anemia  / sequential compression device   Code Status: Full code  POLST: There is no POLST form on file for this patient (pre-hospital)  Discussion with family: With patient and wife    Anticipated Length of Stay:  Patient will be admitted on an Inpatient basis with an anticipated length of stay of  > 2 midnights. Justification for Hospital Stay: Acute on chronic anemia, acute resp failure    Total Time for Visit, including Counseling / Coordination of Care: 70 minutes. Greater than 50% of this total time spent on direct patient counseling and coordination of care. Chief Complaint:   Syncope    History of Present Illness:    Ghazala Mckeon is a 69 y.o. male medical history significant for hypertension, atrial fibrillation, DVT, insulin-dependent diabetes presents to the ER with syncopal event.   Patient reports that for the past 2 days prior to the event he had been having progressively worsening generalized weakness and lightheadedness. He reports that on the morning of presentation to the ER he was alerted by his glucose monitor revealing a fingerstick at 41. He had subsequently walked to the kitchen where his wife was preparing him something to eat. His wife states that he then sat on the kitchen table and subsequently had a syncopal event. He denies chest pain, palpitation prior to the event. Upon presentation to the ER, noted with drop in hemoglobin compared to most recent one stated in 5/2023. Patient denies any melena, blood per rectum, hematic emesis, hematuria. He is chronically on Eliquis. His wife states that he is actually due for colonoscopy on 8/23/23. He has been ordered for PRBC x 1. While in the ED patient was found requiring oxygen supplementation. He also reports of progressively worsening dyspnea though has been chronic and patient is presently being worked up for pulmonary for underlying history restrictive lung disease. Review of Systems:    Review of Systems   Skin: Positive for pallor. Neurological: Positive for syncope, weakness and light-headedness. Past Medical and Surgical History:     Past Medical History:   Diagnosis Date   • Atrial fibrillation ([address])    • Deep vein thrombosis (DVT) of right lower extremity ([address])    • Diverticulitis        Past Surgical History:   Procedure Laterality Date   • APPENDECTOMY     • BOWEL RESECTION  2014   • CATARACT EXTRACTION Bilateral        Meds/Allergies:    Prior to Admission medications    Medication Sig Start Date End Date Taking?  Authorizing Provider   ferrous sulfate 325 (65 Fe) mg tablet Take 325 mg by mouth daily with breakfast   Yes Historical Provider, MD   saxagliptin (Onglyza) 5 MG tablet Take 5 mg by mouth daily with dinner   Yes Historical Provider, MD   sertraline (ZOLOFT) 25 mg tablet Take 25 mg by mouth daily   Yes Historical Provider, MD   tamsulosin (FLOMAX) 0.4 mg Take 0.4 mg by mouth daily with dinner   Yes Historical Provider, MD   acetaminophen (TYLENOL) 650 mg CR tablet Take 650 mg by mouth every 8 (eight) hours as needed for mild pain    Historical Provider, MD   Continuous Blood Gluc Sensor (Dexcom G6 Sensor) MISC Use daily as directed for CGM - Change every 10 days 9/8/22   Francis Sumner DO   Continuous Blood Gluc Transmit (Dexcom G6 Transmitter) MISC Use daily as directed for CGM - Change every 3 months 9/8/22   Francis Sumner DO   dulaglutide (Trulicity) 7.16 EG/3.7BJ injection Inject 0.5 mL (0.75 mg total) under the skin once a week 4/5/23   Nirali Fraire MD   Eliquis 5 MG take 1 tablet by mouth twice a day 4/3/23   Ernesto Griffith MD   insulin aspart (NovoLOG FlexPen) 100 UNIT/ML injection pen Inject 40 Units under the skin 3 (three) times a day with meals Inject 40 units before breakfast  Inject 35 units before dinner  Patient taking differently: Inject 40 Units under the skin 3 (three) times a day with meals 28 u breakfast time, 38 u lunchtime, 38 u dinnertime 3/28/23 8/2/23  NADIA Paez   Insulin Pen Needle (Pen Needles) 32G X 4 MM MISC Use 4 (four) times a day 9/15/22 9/15/23  Ernesto Griffith MD   Levemir FlexPen 100 units/mL injection pen INJECT 75 UNITS UNDER THE SKIN DAILY AT BEDTIME 7/3/23   Francis Sumner DO   metoprolol succinate (TOPROL-XL) 50 mg 24 hr tablet Take 50 mg by mouth daily 6/20/23   Historical Provider, MD   mometasone (ELOCON) 0.1 % cream Apply topically daily for 7 days 6/5/23 8/2/23  Nirali Fraire MD   Multiple Vitamin (MULTIVITAMIN ADULT PO) Take 1 tablet by mouth daily    Historical Provider, MD   pantoprazole (PROTONIX) 40 mg tablet Take 1 tablet (40 mg total) by mouth in the morning  Patient taking differently: Take 40 mg by mouth 2 (two) times a day 2/13/23   Nirali Fraire MD   Potassium Citrate ER 15 MEQ (1620 MG) TBCR Take 1 tablet by mouth in the morning 1 tab po daily  Patient taking differently: Take 1 tablet by mouth 2 (two) times a day 9/1/22 Ernesto Griffith MD   pregabalin (LYRICA) 100 mg capsule take 1 capsule by mouth three times a day 23   Fabian Stevens MD   SUPER B COMPLEX/C PO Take 1 tablet by mouth daily    Historical Provider, MD   traZODone (DESYREL) 100 mg tablet take 2 tablets by mouth at bedtime 23   Fabian Stevens MD   umeclidinium-vilanterol 62.5-25 mcg/actuation inhaler Inhale 1 puff daily 5/10/23   Fabian Stevens MD   albuterol (Ventolin HFA) 90 mcg/act inhaler Inhale 1-2 puffs every 6 (six) hours as needed for wheezing 23  Tika Connor MD   DULoxetine (CYMBALTA) 60 mg delayed release capsule take 1 capsule by mouth once daily  Patient not taking: Reported on 2023  Fabian Stevens MD   methylPREDNISolone 4 MG tablet therapy pack Begin day of procedure; take as directed  Patient not taking: Reported on 2023  Nayely Cox DO     I have reviewed home medications with patient personally.     Allergies: No Known Allergies    Social History:     Marital Status: /Civil Union   Occupation:   Patient Pre-hospital Living Situation: Resides with wife  Patient Pre-hospital Level of Mobility: Independent  Patient Pre-hospital Diet Restrictions: None  Substance Use History:   Social History     Substance and Sexual Activity   Alcohol Use Yes    Comment: Socially     Social History     Tobacco Use   Smoking Status Former   • Packs/day: 1.00   • Years: 30.00   • Total pack years: 30.00   • Types: Cigarettes   • Start date:    • Quit date: 12   • Years since quittin.6   Smokeless Tobacco Never     Social History     Substance and Sexual Activity   Drug Use Never       Family History:    Family History   Problem Relation Age of Onset   • Hypertension Mother    • Bone cancer Father    • Diabetes type II Sister    • Diabetes type II Sister    • Esophageal cancer Brother        Physical Exam:     Vitals:   Blood Pressure: (!) 172/76 (08/07/23 1235)  Pulse: 98 (08/07/23 1235)  Temperature: (!) 97.3 °F (36.3 °C) (08/07/23 0507)  Temp Source: Oral (08/07/23 0507)  Respirations: 16 (08/07/23 1235)  Height: 6' (182.9 cm) (08/07/23 0507)  Weight - Scale: 103 kg (226 lb 6.6 oz) (08/07/23 0507)  SpO2: 95 % (08/07/23 1320)    Physical Exam  Constitutional:       Appearance: He is obese. Cardiovascular:      Rate and Rhythm: Normal rate and regular rhythm. Pulses: Normal pulses. Heart sounds: Normal heart sounds. Pulmonary:      Effort: Pulmonary effort is normal. No respiratory distress. Breath sounds: Normal breath sounds. No wheezing or rales. Abdominal:      General: Bowel sounds are normal. There is no distension. Palpations: Abdomen is soft. Tenderness: There is no abdominal tenderness. There is no guarding. Musculoskeletal:         General: Normal range of motion. Right lower leg: No edema. Left lower leg: No edema. Skin:     General: Skin is warm and dry. Coloration: Skin is pale. Neurological:      General: No focal deficit present. Mental Status: He is alert and oriented to person, place, and time. Mental status is at baseline. Cranial Nerves: No cranial nerve deficit. Motor: No weakness. Additional Data:     Lab Results: I have personally reviewed pertinent reports.       Results from last 7 days   Lab Units 08/07/23  0515   WBC Thousand/uL 10.34*   HEMOGLOBIN g/dL 6.4*   HEMATOCRIT % 24.7*   PLATELETS Thousands/uL 210   NEUTROS PCT % 61   LYMPHS PCT % 19   MONOS PCT % 16*   EOS PCT % 3     Results from last 7 days   Lab Units 08/07/23  0515   SODIUM mmol/L 142   POTASSIUM mmol/L 4.5   CHLORIDE mmol/L 111*   CO2 mmol/L 19*   BUN mg/dL 55*   CREATININE mg/dL 1.66*   ANION GAP mmol/L 12   CALCIUM mg/dL 8.6   ALBUMIN g/dL 3.9   TOTAL BILIRUBIN mg/dL 0.47   ALK PHOS U/L 69   ALT U/L 36   AST U/L 39   GLUCOSE RANDOM mg/dL 189*         Results from last 7 days   Lab Units 08/07/23  1242 08/07/23  0510   POC GLUCOSE mg/dl 272* 202*               Imaging: I have personally reviewed pertinent reports. TRAUMA - CT head wo contrast   Final Result by Narciso Salinas MD (08/07 0543)      No acute intracranial process. No skull fracture. Chronic microangiopathy. Workstation performed: [id number]         TRAUMA - CT spine cervical wo contrast   Final Result by Narciso Salinas MD (08/07 7019)      No cervical spine fracture or traumatic malalignment. Workstation performed: [id number]         XR Trauma chest portable   ED Interpretation by Donya Park MD (08/07 0561)   No acute pulmonary pathology. No displaced rib fractures. No hemo/pneumothorax      Final Result by Teresa Kirby MD (08/07 0533)   No acute cardiopulmonary findings. Workstation performed: [id number]             EKG, Pathology, and Other Studies Reviewed on Admission:   · EKG: SR with first degree av block @ 78bpm, RBBB    Allscripts / Epic Records Reviewed: No     ** Please Note: This note has been constructed using a voice recognition system.  **

## 2023-08-07 NOTE — ASSESSMENT & PLAN NOTE
Presently been evaluated per [facility] for possibility of restrictive lung disease. Plan was to proceed with high resolution CT scan  Doubt thromboembolism disease as chronically on eliquis.  Had CTA chest done in 5/2023 neg for PE  No sign of acute infectious process or volume overload  Received IV solumedrol in the ED  Will consult pulmonary  Will give IV lasix 20mg x 1 in prep for pending prbc  Cont oxygen supplementation and titrate off as able to

## 2023-08-07 NOTE — CASE MANAGEMENT
Case Management Assessment & Discharge Planning Note    Patient name Catalina Le  Location ED 06/ED 06 MRN [id number]  : 1945 Date 2023       Current Admission Date: 2023  Current Admission Diagnosis:Stage 3 chronic kidney disease, unspecified whether stage 3a or 3b CKD [facility])   Patient Active Problem List    Diagnosis Date Noted   • Acute respiratory failure with hypoxia ([address]) 2023   • Syncope 2023   • Anemia 2023   • Restrictive lung disease 2023   • Other emphysema ([address]) 2023   • History of tobacco abuse 2023   • H/O asbestos exposure 2023   • Liver masses 05/10/2023   • Atrial fibrillation, unspecified type ([address]) 02/15/2023   • Peripheral polyneuropathy 2022   • Type 2 diabetes mellitus with neurologic complication, with long-term current use of insulin ([address]) 2022   • Stage 3 chronic kidney disease, unspecified whether stage 3a or 3b CKD ([address]) 2022      LOS (days): 0  Geometric Mean LOS (GMLOS) (days):   Days to GMLOS:     OBJECTIVE:    Risk of Unplanned Readmission Score: 16.49         Current admission status: Inpatient       Preferred Pharmacy:   [id number] [address] [address]  Phone: [phone] Fax: [phone]    Primary Care Provider: Fabian Stevens MD    Primary Insurance: Bianca Zuniga [facility]  Secondary Insurance:     ASSESSMENT:  [address]   Primary Phone: [phone] (Mobile)               Advance Directives  Does patient have a [address]?: Yes  Does patient have Advance Directives?: Yes  Advance Directives: Living will, Power of  for health care  Primary Contact: Donato Quintana         Readmission Root Cause  30 Day Readmission: No    Patient Information  Admitted from[de-identified] Home  Mental Status: Alert  During Assessment patient was accompanied by: Spouse  Assessment information provided by[de-identified] Patient, Spouse  Primary Caregiver: Self  Support Systems: Self, Spouse/significant other, Family members  [place] of Residence: [address] do you live in?: [address] entry access options. Select all that apply.: Ramp, Stairs  Number of steps to enter home. : 1  Do the steps have railings?: No  Type of Current Residence: [place]  In the last 12 months, was there a time when you were not able to pay the mortgage or rent on time?: No  In the last 12 months, how many places have you lived?: 1  In the last 12 months, was there a time when you did not have a steady place to sleep or slept in a shelter (including now)?: No  Homeless/housing insecurity resource given?: N/A  Living Arrangements: Lives w/ Spouse/significant other  Is patient a ?: No    Activities of Daily Living Prior to Admission  Functional Status: Independent  Completes ADLs independently?: Yes  Ambulates independently?: Yes  Does patient use assisted devices?: Yes  Assisted Devices (DME) used: Straight Cane  Does patient currently own DME?: Yes  What DME does the patient currently own?: Straight Cane  Does patient have a history of Outpatient Therapy (PT/OT)?: Yes  Does the patient have a history of Short-Term Rehab?: Yes  Does patient have a history of HHC?: Yes  Does patient currently have [address]?: No         Patient Information Continued  Income Source: Pension/shelter  Does patient have prescription coverage?: Yes  Within the past 12 months, you worried that your food would run out before you got the money to buy more.: Never true  Within the past 12 months, the food you bought just didn't last and you didn't have money to get more.: Never true  Food insecurity resource given?: N/A  Does patient receive dialysis treatments?: No  Does patient have a history of substance abuse?: No  Does patient have a history of Mental Health Diagnosis?: No         Means of Transportation  [facility] D[facility] Self  In the past 12 months, has lack of transportation kept you from medical appointments or from getting medications?: No  In the past 12 months, has lack of transportation kept you from meetings, work, or from getting things needed for daily living?: No  Was application for public transport provided?: N/A        DISCHARGE DETAILS:    Discharge planning discussed with[de-identified] patient and wife in the ED  Freedom of Choice: Yes  Comments - Freedom of Choice: discussed dc planning and role of Cm  CM contacted family/caregiver?: Yes  Were Treatment Team discharge recommendations reviewed with patient/caregiver?: Yes  Did patient/caregiver verbalize understanding of patient care needs?: Yes       Contacts  Patient Contacts: Jerrica Peterson  Relationship to Patient[de-identified] Family  Contact Method: In Person  Reason/Outcome: Discharge [address]         Is the patient interested in [address] at discharge?: No (pending recommendations)         Other Referral/Resources/Interventions Provided:  Interventions: HHC, None Indicated  Referral Comments: Pt/Ot to see pt. Needs TBD. Pt reports he has not been walking as well. Discharge Destination Plan[de-identified] Home, Home with [address] [facility], Short Term Rehab  Transport at Discharge : Family                             IMM Given (Date):: 08/07/23  IMM Given to[de-identified] Family  Family notified[de-identified] Jerrica Peterson  Additional Comments: Cm met with pt and his wife at bedside in the ED. Pt reports that he and his spouse reside [place] home with 1 jen[address] and a ramp access as well. pt uses a cane at baseline and has a Dexcom for his diabetic needs. Pt reports that he has been more weak recently and not walking as well. PT/OT consult requested through Attending. Pt reports that he has a hx of HHC but he does not recall the agency. He has been to STR in a facility in [facility] before. He has particpated in Op PT and has no hx of MH/DA. Pts wife is his POA.  Pt reports that typically he does drive. He sees [facility] and uses [employer] [facility]. Pt is on Eliquis pta. Pt is on oxymizer in the ED. Pt is not on O2 at baseline.

## 2023-08-07 NOTE — ED PROVIDER NOTES
Emergency Department Trauma Note  Krystin Malhotra 69 y.o. male MRN: [id number]  Unit/Bed#: ED 06/ED 06 Encounter: [id number]      Trauma Alert: Trauma Acuity: Trauma Evaluation  Model of Arrival: Mode of Arrival: ALS via Trauma Squad Name and Number: [id number]  Trauma Team: Current Providers  Attending Provider: Alma Farfan MD  Attending Provider: Lance Costa MD  Registered Nurse: Matthew Charles, RN  Registered Nurse: Chris Hurley RN  Consultants:     None      History of Present Illness     Chief Complaint:   Chief Complaint   Patient presents with   • Fall     EMS from home; wife woke pt due to dexcom reading sugar at 41, walked pt to kitchen and pt blacked out falling and hitting R side of head, takes Eliquis; EMS gave 15g PO glucose PTA     HPI:  Krystin Malhotra is a 69 y.o. male who presents with head strike on Eliquis. Mechanism:Details of Incident: Pt was woken by wife due to dexcom reading 39, wife walked pt to kitchen, pt blacked out and pt fell to floor hitting R side of head, takes Eliquis Injury Date: 08/07/23 Injury Time: 0430 Injury Occurence Location - [address] [place] [address]: [place]    69year old male brought by EMS after losing consciousness in his kitchen this morning secondary to hypoglycemia. Patient states his wife woke him at 4 am this morning because his glucose monitor was alarming. His blood glucose was 41 when he woke. He tried to go to the kitchen, but lost consciousness, striking the right side of his face on the ground. Patient was given 15 g of oral glucose with improvement in his blood glucose level to over 200. With resolution of his hypoglycemia, his mental status improved and he is currently alert and oriented x 4. Patient was able to ambulate at the scene without difficulty. He is anticoagulated with Eliquis due to Afib.           Review of Systems    Historical Information     Immunizations:   Immunization History   Administered Date(s) Administered   • Travisfort vac (Dane-sucrose, gray cap) 12 yr+ IM 2021, 2021, 10/02/2021, 10/05/2022   • INFLUENZA 10/22/2022   • Pneumococcal 20-valent Conjugate (Historical) 10/22/2022       Past Medical History:   Diagnosis Date   • Atrial fibrillation ([address])    • Deep vein thrombosis (DVT) of right lower extremity ([address])    • Diverticulitis        Family History   Problem Relation Age of Onset   • Hypertension Mother    • Bone cancer Father    • Diabetes type II Sister    • Diabetes type II Sister    • Esophageal cancer Brother      Past Surgical History:   Procedure Laterality Date   • APPENDECTOMY     • BOWEL RESECTION     • CATARACT EXTRACTION Bilateral      Social History     Tobacco Use   • Smoking status: Former     Packs/day: 1.00     Years: 30.00     Total pack years: 30.00     Types: Cigarettes     Start date:      Quit date:      Years since quittin.6   • Smokeless tobacco: Never   Vaping Use   • Vaping Use: Never used   Substance Use Topics   • Alcohol use: Yes     Comment: Socially   • Drug use: Never     E-Cigarette/Vaping   • E-Cigarette Use Never User      E-Cigarette/Vaping Substances   • Nicotine No    • THC No    • CBD No    • Flavoring No        Family History: non-contributory    Meds/Allergies   Prior to Admission Medications   Prescriptions Last Dose Informant Patient Reported? Taking?    Continuous Blood Gluc Sensor (Dexcom G6 Sensor) MISC  Self No No   Sig: Use daily as directed for CGM - Change every 10 days   Continuous Blood Gluc Transmit (Dexcom G6 Transmitter) MISC  Self No No   Sig: Use daily as directed for CGM - Change every 3 months   DULoxetine (CYMBALTA) 60 mg delayed release capsule Not Taking Self No No   Sig: take 1 capsule by mouth once daily   Patient not taking: Reported on 2023   Eliquis 5 MG  Self No No   Sig: take 1 tablet by mouth twice a day   Insulin Pen Needle (Pen Needles) 32G X 4 MM MISC  Self No No   Sig: Use 4 (four) times a day   Levemir FlexPen 100 units/mL injection pen  Self No No   Sig: INJECT 75 UNITS UNDER THE SKIN DAILY AT BEDTIME   Multiple Vitamin (MULTIVITAMIN ADULT PO)  Self Yes No   Sig: Take 1 tablet by mouth daily   Potassium Citrate ER 15 MEQ (1620 MG) TBCR  Self No No   Sig: Take 1 tablet by mouth in the morning 1 tab po daily   Patient taking differently: Take 1 tablet by mouth 2 (two) times a day   SUPER B COMPLEX/C PO  Self Yes No   Sig: Take 1 tablet by mouth daily   acetaminophen (TYLENOL) 650 mg CR tablet  Self Yes No   Sig: Take 650 mg by mouth every 8 (eight) hours as needed for mild pain   albuterol (Ventolin HFA) 90 mcg/act inhaler  Self No No   Sig: Inhale 1-2 puffs every 6 (six) hours as needed for wheezing   dulaglutide (Trulicity) 5.48 XT/7.3PW injection  Self No No   Sig: Inject 0.5 mL (0.75 mg total) under the skin once a week   ferrous sulfate 325 (65 Fe) mg tablet   Yes Yes   Sig: Take 325 mg by mouth daily with breakfast   insulin aspart (NovoLOG FlexPen) 100 UNIT/ML injection pen  Self No No   Sig: Inject 40 Units under the skin 3 (three) times a day with meals Inject 40 units before breakfast  Inject 35 units before dinner   Patient taking differently: Inject 40 Units under the skin 3 (three) times a day with meals 28 u breakfast time, 38 u lunchtime, 38 u dinnertime   methylPREDNISolone 4 MG tablet therapy pack Not Taking  No No   Sig: Begin day of procedure; take as directed   Patient not taking: Reported on 8/7/2023   metoprolol succinate (TOPROL-XL) 50 mg 24 hr tablet  Self Yes No   Sig: Take 50 mg by mouth daily   mometasone (ELOCON) 0.1 % cream  Self No No   Sig: Apply topically daily for 7 days   pantoprazole (PROTONIX) 40 mg tablet  Self No No   Sig: Take 1 tablet (40 mg total) by mouth in the morning   pregabalin (LYRICA) 100 mg capsule  Self No No   Sig: take 1 capsule by mouth three times a day   saxagliptin (Onglyza) 5 MG tablet   Yes Yes   Sig: Take 5 mg by mouth daily with dinner   sertraline (ZOLOFT) 25 mg tablet   Yes Yes   Sig: Take 25 mg by mouth daily   tamsulosin (FLOMAX) 0.4 mg   Yes Yes   Sig: Take 0.4 mg by mouth daily with dinner   traZODone (DESYREL) 100 mg tablet  Self No No   Sig: take 2 tablets by mouth at bedtime   umeclidinium-vilanterol 62.5-25 mcg/actuation inhaler  Self No No   Sig: Inhale 1 puff daily      Facility-Administered Medications: None       No Known Allergies    PHYSICAL EXAM    PE limited by: none    Objective   Vitals:   First set: Temperature: (!) 97.3 °F (36.3 °C) (08/07/23 0507)  Pulse: 81 (08/07/23 0507)  Respirations: 18 (08/07/23 0507)  Blood Pressure: 137/63 (08/07/23 0507)  SpO2: (!) 87 % (08/07/23 0507)    Primary Survey:   (A) Airway: patent  (B) Breathing: bilateral breath sounds  (C) Circulation: Pulses:   normal  (D) Disabliity:  GCS Total:  15  (E) Expose:  Completed    Secondary Survey: (Click on Physical Exam tab above)  Physical Exam  Vitals and nursing note reviewed. Constitutional:       Interventions: Cervical collar in place. HENT:      Head: Normocephalic and atraumatic. Eyes:      Extraocular Movements: Extraocular movements intact. Conjunctiva/sclera: Conjunctivae normal.      Pupils: Pupils are equal, round, and reactive to light. Cardiovascular:      Rate and Rhythm: Normal rate and regular rhythm. Pulses: Normal pulses. Heart sounds: Normal heart sounds. Pulmonary:      Effort: Pulmonary effort is normal. No respiratory distress. Breath sounds: Normal breath sounds. Abdominal:      General: There is no distension. Palpations: Abdomen is soft. Tenderness: There is no abdominal tenderness. Genitourinary:     Rectum: Guaiac result positive. Musculoskeletal:      Comments: No midline C/T/L spine tenderness. No step offs or deformities. No pelvic tenderness or instability. Skin:     General: Skin is warm and dry. Neurological:      Mental Status: He is alert. Cervical spine cleared by clinical criteria?  No (imaging required)      Invasive Devices     Peripheral Intravenous Line  Duration           Peripheral IV 08/07/23 Distal;Right;Ventral (anterior) Forearm <1 day    Peripheral IV 08/07/23 Left Hand <1 day                Lab Results:   Results Reviewed     Procedure Component Value Units Date/Time    CBC and differential [[id number]]  (Abnormal) Collected: 08/07/23 0515    Lab Status: Final result Specimen: Blood from Arm, Left Updated: 08/07/23 0607     WBC 10.34 Thousand/uL      RBC 3.92 Million/uL      Hemoglobin 6.4 g/dL      Hematocrit 24.7 %      MCV 63 fL      MCH 16.3 pg      MCHC 25.9 g/dL      RDW 22.0 %      Platelets 023 Thousands/uL      nRBC 1 /100 WBCs      Neutrophils Relative 61 %      Immat GRANS % 1 %      Lymphocytes Relative 19 %      Monocytes Relative 16 %      Eosinophils Relative 3 %      Basophils Relative 0 %      Neutrophils Absolute 6.38 Thousands/µL      Immature Grans Absolute 0.11 Thousand/uL      Lymphocytes Absolute 1.93 Thousands/µL      Monocytes Absolute 1.61 Thousand/µL      Eosinophils Absolute 0.27 Thousand/µL      Basophils Absolute 0.04 Thousands/µL     Narrative: This is an appended report. These results have been appended to a previously verified report.     Comprehensive metabolic panel [[id number]]  (Abnormal) Collected: 08/07/23 0515    Lab Status: Final result Specimen: Blood from Arm, Left Updated: 08/07/23 0539     Sodium 142 mmol/L      Potassium 4.5 mmol/L      Chloride 111 mmol/L      CO2 19 mmol/L      ANION GAP 12 mmol/L      BUN 55 mg/dL      Creatinine 1.66 mg/dL      Glucose 189 mg/dL      Calcium 8.6 mg/dL      AST 39 U/L      ALT 36 U/L      Alkaline Phosphatase 69 U/L      Total Protein 6.8 g/dL      Albumin 3.9 g/dL      Total Bilirubin 0.47 mg/dL      eGFR 39 ml/min/1.73sq m     Narrative:      [place] guidelines for Chronic Kidney Disease (CKD):   •  Stage 1 with normal or high GFR (GFR > 90 mL/min/1.73 square meters)  • Stage 2 Mild CKD (GFR = 60-89 mL/min/1.73 square meters)  •  Stage 3A Moderate CKD (GFR = 45-59 mL/min/1.73 square meters)  •  Stage 3B Moderate CKD (GFR = 30-44 mL/min/1.73 square meters)  •  Stage 4 Severe CKD (GFR = 15-29 mL/min/1.73 square meters)  •  Stage 5 End Stage CKD (GFR <15 mL/min/1.73 square meters)  Note: GFR calculation is accurate only with a steady state creatinine    Fingerstick Glucose (POCT) [[id number]]  (Abnormal) Collected: 08/07/23 0510    Lab Status: Final result Updated: 08/07/23 0512     POC Glucose 202 mg/dl                  Imaging Studies:   Direct to CT: No  TRAUMA - CT head wo contrast   Final Result by Ruben Scott MD (08/07 0525)      No acute intracranial process. No skull fracture. Chronic microangiopathy. Workstation performed: [id number]         TRAUMA - CT spine cervical wo contrast   Final Result by Ruben Scott MD (08/07 1553)      No cervical spine fracture or traumatic malalignment. Workstation performed: [id number]         XR Trauma chest portable   ED Interpretation by Alma Farfan MD (08/07 8746)   No acute pulmonary pathology. No displaced rib fractures. No hemo/pneumothorax      Final Result by Lexy Hawkins MD (08/07 0570)   No acute cardiopulmonary findings.                   Workstation performed: [id number]               Procedures  ECG 12 Lead Documentation Only    Date/Time: 8/7/2023 5:14 AM    Performed by: Alma Farfan MD  Authorized by: Alma Farfan MD    Indications / Diagnosis:  Syncope  ECG reviewed by me, the ED Provider: yes    Patient location:  ED  Previous ECG:     Previous ECG:  Compared to current    Comparison ECG info:  5/5/23 sinus rhythm with bifascicular block    Similarity:  Changes noted  Interpretation:     Interpretation: abnormal    Quality:     Tracing quality:  Limited by artifact  Rate:     ECG rate:  78    ECG rate assessment: normal    Rhythm:     Rhythm: sinus rhythm    Ectopy:     Ectopy: none    QRS:     QRS axis:  Normal    QRS intervals: Wide  Conduction:     Conduction: abnormal      Abnormal conduction: complete RBBB and 1st degree    ST segments:     ST segments:  Normal  T waves:     T waves: inverted      Inverted:  III    CriticalCare Time    Date/Time: 8/7/2023 7:18 AM    Performed by: Liang Alvarez MD  Authorized by: Liang Alvarez MD    Critical care provider statement:     Critical care time (minutes):  37    Critical care time was exclusive of:  Separately billable procedures and treating other patients and teaching time    Critical care was necessary to treat or prevent imminent or life-threatening deterioration of the following conditions:  Shock and circulatory failure    Critical care was time spent personally by me on the following activities:  Obtaining history from patient or surrogate, development of treatment plan with patient or surrogate, evaluation of patient's response to treatment, examination of patient, re-evaluation of patient's condition, ordering and review of radiographic studies, ordering and review of laboratory studies and ordering and performing treatments and interventions             ED Course  ED Course as of 08/07/23 0721   [facility] Aug 07, 2023   0549 Creatinine(!): 1.66  1.29 four weeks ago; however, 1.56 two months ago   0552 Patient 92% on 2L NC. No home O2. Hx COPD with worsening shortness of breath for the past month. No recent steriods. 4042 C-collar cleared at bedside   0718 Symptomatic improvement after nebulizer treatment; however, continues to require 2 L NC. Medical Decision Making  69year old male presents for evaluation of loss of consciousness secondary to hypoglycemia resulting in head strike while on Eliquis. Patient hypoxic on arrival requiring 2 L NC.   No acute traumatic pathology identified on imaging; however, labs with significant worsening of his chronic anemia. Stool guaiac positive. Patient states his last colonoscopy was 2 years ago and he believes it was normal.  No history of anemia requiring transfusion in the past or prior GI bleeding. Patient started on protonix and 1 unit PRBCs ordered. Solumedrol and neb treatment given for COPD exacerbation with only symptomatic improvement. Patient continues to require 2 L NC. No home oxygen. Patient admitted for further evaluation and management. Amount and/or Complexity of Data Reviewed  Labs: ordered. Decision-making details documented in ED Course. Radiology: ordered and independent interpretation performed. Risk  Prescription drug management. Decision regarding hospitalization. Disposition  [facility] Transfer: No  Final diagnoses:   Hypoglycemia   Acute anemia   Acute GI bleeding   COPD exacerbation ([address])     Time reflects when diagnosis was documented in both MDM as applicable and the Disposition within this note     Time User Action Codes Description Comment    8/7/2023  7:16 AM Richelle Gautam Add [E16.2] Hypoglycemia     8/7/2023  7:17 AM Richelle Gautam Add [D64.9] Acute anemia     8/7/2023  7:17 AM Patrick Estrada Add [K92.2] Acute GI bleeding     8/7/2023  7:17 AM Patrick Estrada Add [J44.1] COPD exacerbation ([address])     8/7/2023  7:17 AM Richelle Gautam Modify [E16.2] Hypoglycemia     8/7/2023  7:17 AM Richelle Gautam Modify [D64.9] Acute anemia       ED Disposition     ED Disposition   Admit    Condition   Stable    Date/Time   Mon Aug 7, 2023  7:17 AM    Comment   Case was discussed with VINNY and the patient's admission status was agreed to be Admission Status: inpatient status to the service of Dr. Ankur Dolan . Follow-up Information    None       Patient's Medications   Discharge Prescriptions    No medications on file     No discharge procedures on file.     PDMP Review       Value Time User    PDMP Reviewed  Yes 7/21/2023 3:04 Riky Mora MD          ED Provider  Electronically Signed by         Darío Kaiser MD  08/07/23 6014

## 2023-08-07 NOTE — ASSESSMENT & PLAN NOTE
Acute on chronic. No much labs to compare with but appears had h/h in 5/2023 with hgb of 9.2  Normocytic  No sign of acute GI bleed as observed stool myself noted to be brown, though heme positive  Pt w/ chronic fe deficiency on iron supplements. Pending iron studies  Will be receiving prbc x 1  GI consulted, holding endoscopy eval given tenous pulmonary status. Pt and wife denies hx EGD. Had colonoscopy 5 yrs ago and hx of colonic resection.  He is actually due to have a colonoscopy on 8/23  Cont serial h/h monitoring, transfuse to keep > 7  On clears  On IV PPI  Holding eliquis

## 2023-08-07 NOTE — ASSESSMENT & PLAN NOTE
Likely secondary to anemia  Care as above  Will keep on telemetry monitor to rule out underlying arrhythmia  S/p CT head, CT cervical  Recent NM stress (6/2023) no evidence of ischemia  S/p echo, preserved ef, grade I diastolic, no significant AS

## 2023-08-07 NOTE — ASSESSMENT & PLAN NOTE
Lab Results   Component Value Date    HGBA1C 7.6 (H) 01/06/2023       Recent Labs     08/07/23  0510   POCGLU 202*       Blood Sugar Average: Last 72 hrs:  (P) 202   Given only on clears, Will decrease insulin regimen.  Resumed at lower dose levemir  On ISS  Cont to monitor

## 2023-08-07 NOTE — H&P (VIEW-ONLY)
Consultation - GI   Ish Christal 69 y.o. male MRN: [id number]  Unit/Bed#: ED 06 Encounter: [id number]      Assessment/Plan     69y.o. year old male with past medical history of diverticulitis with resection, atrial fibrillation currently on Eliquis, DVT, GERD, and recent finding of liver mass currently being evaluated by radiation oncology presented to the emergency room this morning after patient lost consciousness. Hemoglobin on admission 6.4. He is not aware of having anemia in the past.  He denies any melena or overt GI bleeding. 1.  Acute anemia  Patient is not aware of anemia history however his last hemoglobin in May of this year was 9.2 and per EMR patient has been taking iron oral supplement. Hemoglobin 6.4 on admission. No overt GI bleeding noted however patient states there was blood in stool when he lost consciousness this morning.    -Patient can have clear liquids if tolerated with current pulmonary status.  -Currently holding Eliquis  -Continue PPI, taupe resolved 40 mg IV push every 12 hours  -Monitor hemoglobin, transfuse as necessary, less than 7.  -Iron panel pending. Consider iron infusion if patient iron deficient. Patient's does not remember having an EGD in the past.  Does note colonoscopy approximately 5 years ago and history of colon resection with diverticulitis. Discuss patient with Dr. Guille Kinney on rounds. Consider EGD pending patient's pulmonary stability. 2.  Gastroesophageal reflux disease  Patient does admit to having acid reflux symptoms however states while taking pantoprazole 40 mg daily he is symptom-free. Pantoprazole 40 mg IV push every 12 hours currently for acute anemia. 3.  History of diverticulitis with resection  Per EMR, patient did have colonoscopy approximately 5 years ago at [facility]. 4.  Hepatic mass  MRI 7/6/2023; numerous reidentified hepatic lesions suspicious for metastases.   Patient states he was to have a consult with oncology today however patient admitted to hospital.  Patient was also recommended for follow-up colonoscopy which has been scheduled for later this month 8/23. Patient had been ordered for liver biopsy as an outpatient that has not been completed to date. Inpatient consult to gastroenterology  Consult performed by: NADIA Larkin  Consult ordered by: Narendra Jordan, [address] [facility]          Physician Requesting Consult: Rene Gonzalez  Reason for Consult / Principal Problem: Acute anemia    HPI: Melinda Mancilla is a 69y.o. year old male with past medical history of diverticulitis with resection, atrial fibrillation currently on Eliquis, DVT, GERD, and recent finding of liver mass currently being evaluated by radiation oncology presented to the emergency room this morning after patient lost consciousness. Patient states his blood sugar was approximately 40. He is not aware of having anemia in the past.  He denies any melena or overt GI bleeding. On exam, patient does have mild mid lower abdominal discomfort with palpation. Pallor in color. Patient does get [place] with questions and answers. Currently on nasal cannula O2 which was increased to 6 L with O2 sat 88%. Patient's nurse was notified. Patient denies any history of nausea vomiting. States he is having daily normal bowel movements. States she does have occasional hematochezia however states she does have a hemorrhoid. Denies melena. Former smoker, occasional EtOH use. Patient did have MRI 7/6/2023 which noted numerous hepatic lesions, unchanged from recent CT, suspicious for metastases. Patient has also ordered for biopsy of the liver. Patient states he was to have a consult with radiation oncology today however obviously missed due to admission to the hospital.  Get labs on admission, hemoglobin 6.4. Patient was ordered for 1 unit of PRBCs. Per EMR patient's more recent hemoglobin 9.2 on 5/2023.       Review of Systems: Negative for 14 point exam except for HPI.    Historical Information   Past Medical History:   Diagnosis Date   • Atrial fibrillation ([address])    • Deep vein thrombosis (DVT) of right lower extremity (HCC)    • Diverticulitis      Past Surgical History:   Procedure Laterality Date   • APPENDECTOMY     • BOWEL RESECTION     • CATARACT EXTRACTION Bilateral      Social History   Social History     Substance and Sexual Activity   Alcohol Use Yes    Comment: Socially     Social History     Substance and Sexual Activity   Drug Use Never     Social History     Tobacco Use   Smoking Status Former   • Packs/day: 1.00   • Years: 30.00   • Total pack years: 30.00   • Types: Cigarettes   • Start date:    • Quit date: 12   • Years since quittin.6   Smokeless Tobacco Never     Family History   Problem Relation Age of Onset   • Hypertension Mother    • Bone cancer Father    • Diabetes type II Sister    • Diabetes type II Sister    • Esophageal cancer Brother        Meds/Allergies   Current Facility-Administered Medications   Medication Dose Route Frequency   • acetaminophen (TYLENOL) tablet 650 mg  650 mg Oral Q6H PRN   • insulin lispro (HumaLOG) 100 units/mL subcutaneous injection 2-12 Units  2-12 Units Subcutaneous Q6H [address]   • ondansetron (ZOFRAN) injection 4 mg  4 mg Intravenous Q6H PRN   • pantoprazole (PROTONIX) injection 40 mg  40 mg Intravenous Q12H [address]     (Not in a hospital admission)      No Known Allergies        Physical Exam   Constitutional: Is oriented to person, place, and time. Appears well-developed and well-nourished. Pallor. HENT: WNL  Head: Normocephalic and atraumatic. Eyes: Pupils are equal, round, and reactive to light. Neck: Normal range of motion. Neck supple. Cardiovascular: Normal rate and regular rhythm. Pulmonary/Chest: Increased upper airway congestion. [place]. O2 nasal cannula 6 L, percent sat 88%. Nurse notified. Abdominal: Soft.  Bowel sounds are normal. Lower mid abdominal discomfort with palpation. Musculoskeletal: Normal range of motion. Extremities:  No edema  Neurological: Is alert and oriented to person, place, and time. Skin: Skin is warm and dry. Psychiatric: Has a normal mood and affect.        Lab Results:   Admission on 08/07/2023   Component Date Value   • WBC 08/07/2023 10.34 (H)    • RBC 08/07/2023 3.92    • Hemoglobin 08/07/2023 6.4 (LL)    • Hematocrit 08/07/2023 24.7 (L)    • MCV 08/07/2023 63 (L)    • MCH 08/07/2023 16.3 (L)    • MCHC 08/07/2023 25.9 (L)    • RDW 08/07/2023 22.0 (H)    • Platelets 85/73/2148 210    • nRBC 08/07/2023 1    • Neutrophils Relative 08/07/2023 61    • Immat GRANS % 08/07/2023 1    • Lymphocytes Relative 08/07/2023 19    • Monocytes Relative 08/07/2023 16 (H)    • Eosinophils Relative 08/07/2023 3    • Basophils Relative 08/07/2023 0    • Neutrophils Absolute 08/07/2023 6.38    • Immature Grans Absolute 08/07/2023 0.11    • Lymphocytes Absolute 08/07/2023 1.93    • Monocytes Absolute 08/07/2023 1.61 (H)    • Eosinophils Absolute 08/07/2023 0.27    • Basophils Absolute 08/07/2023 0.04    • Sodium 08/07/2023 142    • Potassium 08/07/2023 4.5    • Chloride 08/07/2023 111 (H)    • CO2 08/07/2023 19 (L)    • ANION GAP 08/07/2023 12    • BUN 08/07/2023 55 (H)    • Creatinine 08/07/2023 1.66 (H)    • Glucose 08/07/2023 189 (H)    • Calcium 08/07/2023 8.6    • AST 08/07/2023 39    • ALT 08/07/2023 36    • Alkaline Phosphatase 08/07/2023 69    • Total Protein 08/07/2023 6.8    • Albumin 08/07/2023 3.9    • Total Bilirubin 08/07/2023 0.47    • eGFR 08/07/2023 39    • Ventricular Rate 08/07/2023 78    • Atrial Rate 08/07/2023 78    • AZ Interval 08/07/2023 248    • QRSD Interval 08/07/2023 134    • QT Interval 08/07/2023 428    • QTC Interval 08/07/2023 487    • P Axis 08/07/2023 65    • QRS [place] 08/07/2023 -27    • T Wave Axis 08/07/2023 266    • POC Glucose 08/07/2023 202 (H)    • ABO Grouping 08/07/2023 A    • Rh Factor 08/07/2023 Positive    • Antibody Screen 08/07/2023 Negative    • Specimen Expiration Date 08/07/2023 [id number]    • Unit Product Code 08/07/2023 [id number]    • Unit Number 08/07/2023 [id number]    • Unit ABO 08/07/2023 A    • Unit [facility] 08/07/2023 POS    • Crossmatch 08/07/2023 Compatible    • Unit Dispense Status 08/07/2023 Crossmatched    • Unit Product Volume 08/07/2023 350    • ABO Grouping 08/07/2023 A    • Rh Factor 08/07/2023 Positive      Imaging Studies: I have personally reviewed pertinent reports. EKG, Pathology, and Other Studies: I have personally reviewed pertinent reports. Counseling / Coordination of Care  Total floor / unit time spent today 45 minutes.

## 2023-08-07 NOTE — PLAN OF CARE
Problem: Potential for Falls  Goal: Patient will remain free of falls  Description: INTERVENTIONS:  - Educate patient/family on patient safety including physical limitations  - Instruct patient to call for assistance with activity   - Consult OT/PT to assist with strengthening/mobility   - Keep Call bell within reach  - Keep bed low and locked with side rails adjusted as appropriate  - Keep care items and personal belongings within reach  - Initiate and maintain comfort rounds  - Make Fall Risk Sign visible to staff  - Offer Toileting every 2 Hours, in advance of need  - Initiate/Maintain bed alarm  - Obtain necessary fall risk management equipment: non-slip socks  - Apply yellow socks and bracelet for high fall risk patients  - Consider moving patient to room near nurses station  Outcome: Progressing     Problem: MOBILITY - ADULT  Goal: Maintain or return to baseline ADL function  Description: INTERVENTIONS:  -  Assess patient's ability to carry out ADLs; assess patient's baseline for ADL function and identify physical deficits which impact ability to perform ADLs (bathing, care of mouth/teeth, toileting, grooming, dressing, etc.)  - Assess/evaluate cause of self-care deficits   - Assess range of motion  - Assess patient's mobility; develop plan if impaired  - Assess patient's need for assistive devices and provide as appropriate  - Encourage maximum independence but intervene and supervise when necessary  - Involve family in performance of ADLs  - Assess for home care needs following discharge   - Consider OT consult to assist with ADL evaluation and planning for discharge  - Provide patient education as appropriate  Outcome: Progressing  Goal: Maintains/Returns to pre admission functional level  Description: INTERVENTIONS:  - Perform BMAT or MOVE assessment daily.   - Set and communicate daily mobility goal to care team and patient/family/caregiver.    - Collaborate with rehabilitation services on mobility goals if consulted  - Perform Range of Motion 3 times a day. - Reposition patient every 2 hours.   - Dangle patient 3 times a day  - Stand patient 3 times a day  - Ambulate patient 3 times a day  - Out of bed to chair 3 times a day for meals  - Out of bed for toileting  - Record patient progress and toleration of activity level   Outcome: Progressing     Problem: Prexisting or High Potential for Compromised Skin Integrity  Goal: Skin integrity is maintained or improved  Description: INTERVENTIONS:  - Identify patients at risk for skin breakdown  - Assess and monitor skin integrity  - Assess and monitor nutrition and hydration status  - Monitor labs   - Assess for incontinence   - Turn and reposition patient  - Assist with mobility/ambulation  - Relieve pressure over bony prominences  - Avoid friction and shearing  - Provide appropriate hygiene as needed including keeping skin clean and dry  - Evaluate need for skin moisturizer/barrier cream  - Collaborate with interdisciplinary team   - Patient/family teaching  - Consider wound care consult   Outcome: Progressing     Problem: RESPIRATORY - ADULT  Goal: Achieves optimal ventilation and oxygenation  Description: INTERVENTIONS:  - Assess for changes in respiratory status  - Assess for changes in mentation and behavior  - Position to facilitate oxygenation and minimize respiratory effort  - Oxygen administered by appropriate delivery if ordered  - Initiate smoking cessation education as indicated  - Encourage broncho-pulmonary hygiene including cough, deep breathe, Incentive Spirometry  - Assess the need for suctioning and aspirate as needed  - Assess and instruct to report SOB or any respiratory difficulty  - Respiratory Therapy support as indicated  Outcome: Progressing     Problem: GASTROINTESTINAL - ADULT  Goal: Minimal or absence of nausea and/or vomiting  Description: INTERVENTIONS:  - Administer IV fluids if ordered to ensure adequate hydration  - Maintain NPO status until nausea and vomiting are resolved  - Nasogastric tube if ordered  - Administer ordered antiemetic medications as needed  - Provide nonpharmacologic comfort measures as appropriate  - Advance diet as tolerated, if ordered  - Consider nutrition services referral to assist patient with adequate nutrition and appropriate food choices  Outcome: Progressing     Problem: HEMATOLOGIC - ADULT  Goal: Maintains hematologic stability  Description: INTERVENTIONS  - Assess for signs and symptoms of bleeding or hemorrhage  - Monitor labs  - Administer supportive blood products/factors as ordered and appropriate  Outcome: Progressing     Problem: PAIN - ADULT  Goal: Verbalizes/displays adequate comfort level or baseline comfort level  Description: Interventions:  - Encourage patient to monitor pain and request assistance  - Assess pain using appropriate pain scale  - Administer analgesics based on type and severity of pain and evaluate response  - Implement non-pharmacological measures as appropriate and evaluate response  - Consider cultural and social influences on pain and pain management  - Notify physician/advanced practitioner if interventions unsuccessful or patient reports new pain  Outcome: Progressing     Problem: INFECTION - ADULT  Goal: Absence or prevention of progression during hospitalization  Description: INTERVENTIONS:  - Assess and monitor for signs and symptoms of infection  - Monitor lab/diagnostic results  - Monitor all insertion sites, i.e. indwelling lines, tubes, and drains  - Monitor endotracheal if appropriate and nasal secretions for changes in amount and color  - [place] appropriate cooling/warming therapies per order  - Administer medications as ordered  - Instruct and encourage patient and family to use good hand hygiene technique  - Identify and instruct in appropriate isolation precautions for identified infection/condition  Outcome: Progressing     Problem: DISCHARGE PLANNING  Goal: Discharge to home or other facility with appropriate resources  Description: INTERVENTIONS:  - Identify barriers to discharge w/patient and caregiver  - Arrange for needed discharge resources and transportation as appropriate  - Identify discharge learning needs (meds, wound care, etc.)  - Arrange for interpretive services to assist at discharge as needed  - Refer to Case Management Department for coordinating discharge planning if the patient needs post-hospital services based on physician/advanced practitioner order or complex needs related to functional status, cognitive ability, or social support system  Outcome: Progressing     Problem: Knowledge Deficit  Goal: Patient/family/caregiver demonstrates understanding of disease process, treatment plan, medications, and discharge instructions  Description: Complete learning assessment and assess knowledge base.   Interventions:  - Provide teaching at level of understanding  - Provide teaching via preferred learning methods  Outcome: Progressing

## 2023-08-07 NOTE — ASSESSMENT & PLAN NOTE
Wt Readings from Last 3 Encounters:   08/07/23 103 kg (226 lb 6.6 oz)   08/02/23 101 kg (222 lb)   07/26/23 100 kg (221 lb)   No clinical sign of acute decompensation per clinical exam and CXR  Cont to monitor fluid status

## 2023-08-07 NOTE — ASSESSMENT & PLAN NOTE
Lab Results   Component Value Date    EGFR 39 08/07/2023    EGFR 57 (L) 07/10/2023    EGFR 45 (L) 05/15/2023    CREATININE 1.66 (H) 08/07/2023    CREATININE 1.29 (H) 07/10/2023    CREATININE 1.56 (H) 05/15/2023   Still relatively w/in baseline range

## 2023-08-07 NOTE — ED NOTES
Report given to Lainey Patrick RN on MS 3, unit of blood still has not arrived from blood bank. Nurse made aware to call when ready to give blood.      Nishant Dodge RN  08/07/23 [id number]

## 2023-08-08 ENCOUNTER — APPOINTMENT (INPATIENT)
Dept: RADIOLOGY | Facility: HOSPITAL | Age: 78
DRG: 435 | End: 2023-08-08
Payer: COMMERCIAL

## 2023-08-08 ENCOUNTER — APPOINTMENT (INPATIENT)
Dept: NON INVASIVE DIAGNOSTICS | Facility: HOSPITAL | Age: 78
DRG: 435 | End: 2023-08-08
Payer: COMMERCIAL

## 2023-08-08 PROBLEM — K63.89 HEPATIC FLEXURE MASS: Status: ACTIVE | Noted: 2023-08-08

## 2023-08-08 PROBLEM — R77.8 ELEVATED TROPONIN: Status: ACTIVE | Noted: 2023-08-08

## 2023-08-08 PROBLEM — I50.33 ACUTE ON CHRONIC DIASTOLIC HF (HEART FAILURE) (HCC): Status: ACTIVE | Noted: 2023-08-07

## 2023-08-08 PROBLEM — R79.89 ELEVATED TROPONIN: Status: ACTIVE | Noted: 2023-08-08

## 2023-08-08 LAB
4HR DELTA HS TROPONIN: 1244 NG/L
ABO GROUP BLD BPU: NORMAL
ANION GAP SERPL CALCULATED.3IONS-SCNC: 11 MMOL/L
AORTIC VALVE MEAN VELOCITY: 8.9 M/S
ATRIAL RATE: 58 BPM
ATRIAL RATE: 83 BPM
ATRIAL RATE: 85 BPM
ATRIAL RATE: 85 BPM
ATRIAL RATE: 92 BPM
AV LVOT MEAN GRADIENT: 2 MMHG
AV LVOT PEAK GRADIENT: 3 MMHG
AV MEAN GRADIENT: 3 MMHG
AV PEAK GRADIENT: 6 MMHG
AV VELOCITY RATIO: 0.7
BPU ID: NORMAL
BUN SERPL-MCNC: 49 MG/DL (ref 5–25)
CALCIUM SERPL-MCNC: 8.7 MG/DL (ref 8.4–10.2)
CARDIAC TROPONIN I PNL SERPL HS: 3759 NG/L (ref 8–18)
CARDIAC TROPONIN I PNL SERPL HS: 5115 NG/L
CHLORIDE SERPL-SCNC: 108 MMOL/L (ref 96–108)
CO2 SERPL-SCNC: 20 MMOL/L (ref 21–32)
CREAT SERPL-MCNC: 1.53 MG/DL (ref 0.6–1.3)
CROSSMATCH: NORMAL
DOP CALC AO PEAK VEL: 1.25 M/S
DOP CALC AO VTI: 25.27 CM
DOP CALC LVOT PEAK VEL VTI: 16.97 CM
DOP CALC LVOT PEAK VEL: 0.88 M/S
GFR SERPL CREATININE-BSD FRML MDRD: 43 ML/MIN/1.73SQ M
GLUCOSE SERPL-MCNC: 176 MG/DL (ref 65–140)
GLUCOSE SERPL-MCNC: 244 MG/DL (ref 65–140)
GLUCOSE SERPL-MCNC: 251 MG/DL (ref 65–140)
GLUCOSE SERPL-MCNC: 257 MG/DL (ref 65–140)
GLUCOSE SERPL-MCNC: 257 MG/DL (ref 65–140)
HCT VFR BLD AUTO: 29.5 % (ref 36.5–49.3)
HCT VFR BLD AUTO: 31.9 % (ref 36.5–49.3)
HGB BLD-MCNC: 7.8 G/DL (ref 12–17)
HGB BLD-MCNC: 8.4 G/DL (ref 12–17)
P AXIS: 66 DEGREES
P AXIS: 91 DEGREES
POTASSIUM SERPL-SCNC: 4.6 MMOL/L (ref 3.5–5.3)
PR INTERVAL: 246 MS
PR INTERVAL: 248 MS
PR INTERVAL: 264 MS
QRS AXIS: -29 DEGREES
QRS AXIS: -30 DEGREES
QRS AXIS: -31 DEGREES
QRS AXIS: -31 DEGREES
QRS AXIS: -32 DEGREES
QRSD INTERVAL: 122 MS
QRSD INTERVAL: 126 MS
QRSD INTERVAL: 130 MS
QRSD INTERVAL: 130 MS
QRSD INTERVAL: 134 MS
QT INTERVAL: 404 MS
QT INTERVAL: 408 MS
QT INTERVAL: 412 MS
QT INTERVAL: 424 MS
QT INTERVAL: 428 MS
QTC INTERVAL: 485 MS
QTC INTERVAL: 486 MS
QTC INTERVAL: 490 MS
QTC INTERVAL: 498 MS
QTC INTERVAL: 509 MS
RIGHT VENTRICLE ID DIMENSION: 4.1 CM
SL CV LV EF: 45
SODIUM SERPL-SCNC: 139 MMOL/L (ref 135–147)
T WAVE AXIS: -32 DEGREES
T WAVE AXIS: -60 DEGREES
T WAVE AXIS: 0 DEGREES
T WAVE AXIS: 141 DEGREES
T WAVE AXIS: 242 DEGREES
TR MAX PG: 50 MMHG
TR PEAK VELOCITY: 3.5 M/S
TRICUSPID ANNULAR PLANE SYSTOLIC EXCURSION: 1.7 CM
TRICUSPID VALVE PEAK REGURGITATION VELOCITY: 3.54 M/S
UNIT DISPENSE STATUS: NORMAL
UNIT PRODUCT CODE: NORMAL
UNIT PRODUCT VOLUME: 350 ML
UNIT RH: NORMAL
VENTRICULAR RATE: 83 BPM
VENTRICULAR RATE: 85 BPM
VENTRICULAR RATE: 87 BPM

## 2023-08-08 PROCEDURE — 71045 X-RAY EXAM CHEST 1 VIEW: CPT

## 2023-08-08 PROCEDURE — 84484 ASSAY OF TROPONIN QUANT: CPT | Performed by: INTERNAL MEDICINE

## 2023-08-08 PROCEDURE — 93325 DOPPLER ECHO COLOR FLOW MAPG: CPT | Performed by: INTERNAL MEDICINE

## 2023-08-08 PROCEDURE — 99223 1ST HOSP IP/OBS HIGH 75: CPT | Performed by: INTERNAL MEDICINE

## 2023-08-08 PROCEDURE — 94760 N-INVAS EAR/PLS OXIMETRY 1: CPT

## 2023-08-08 PROCEDURE — 80048 BASIC METABOLIC PNL TOTAL CA: CPT | Performed by: INTERNAL MEDICINE

## 2023-08-08 PROCEDURE — 99232 SBSQ HOSP IP/OBS MODERATE 35: CPT | Performed by: INTERNAL MEDICINE

## 2023-08-08 PROCEDURE — 85014 HEMATOCRIT: CPT

## 2023-08-08 PROCEDURE — 94640 AIRWAY INHALATION TREATMENT: CPT

## 2023-08-08 PROCEDURE — C9113 INJ PANTOPRAZOLE SODIUM, VIA: HCPCS | Performed by: INTERNAL MEDICINE

## 2023-08-08 PROCEDURE — 93005 ELECTROCARDIOGRAM TRACING: CPT

## 2023-08-08 PROCEDURE — 93321 DOPPLER ECHO F-UP/LMTD STD: CPT | Performed by: INTERNAL MEDICINE

## 2023-08-08 PROCEDURE — 93308 TTE F-UP OR LMTD: CPT | Performed by: INTERNAL MEDICINE

## 2023-08-08 PROCEDURE — 93010 ELECTROCARDIOGRAM REPORT: CPT | Performed by: INTERNAL MEDICINE

## 2023-08-08 PROCEDURE — 85018 HEMOGLOBIN: CPT

## 2023-08-08 PROCEDURE — 82948 REAGENT STRIP/BLOOD GLUCOSE: CPT

## 2023-08-08 PROCEDURE — 93308 TTE F-UP OR LMTD: CPT

## 2023-08-08 RX ORDER — LEVALBUTEROL INHALATION SOLUTION 1.25 MG/3ML
1.25 SOLUTION RESPIRATORY (INHALATION)
Status: DISCONTINUED | OUTPATIENT
Start: 2023-08-08 | End: 2023-08-14 | Stop reason: HOSPADM

## 2023-08-08 RX ORDER — INSULIN LISPRO 100 [IU]/ML
5 INJECTION, SOLUTION INTRAVENOUS; SUBCUTANEOUS
Status: DISCONTINUED | OUTPATIENT
Start: 2023-08-08 | End: 2023-08-10

## 2023-08-08 RX ORDER — FUROSEMIDE 10 MG/ML
40 INJECTION INTRAMUSCULAR; INTRAVENOUS DAILY
Status: DISCONTINUED | OUTPATIENT
Start: 2023-08-09 | End: 2023-08-09

## 2023-08-08 RX ORDER — FUROSEMIDE 10 MG/ML
40 INJECTION INTRAMUSCULAR; INTRAVENOUS ONCE
Status: COMPLETED | OUTPATIENT
Start: 2023-08-08 | End: 2023-08-08

## 2023-08-08 RX ADMIN — LEVALBUTEROL HYDROCHLORIDE 1.25 MG: 1.25 SOLUTION RESPIRATORY (INHALATION) at 19:49

## 2023-08-08 RX ADMIN — TRAZODONE HYDROCHLORIDE 200 MG: 50 TABLET ORAL at 21:08

## 2023-08-08 RX ADMIN — IPRATROPIUM BROMIDE 0.5 MG: 0.5 SOLUTION RESPIRATORY (INHALATION) at 14:00

## 2023-08-08 RX ADMIN — INSULIN LISPRO 6 UNITS: 100 INJECTION, SOLUTION INTRAVENOUS; SUBCUTANEOUS at 08:07

## 2023-08-08 RX ADMIN — TAMSULOSIN HYDROCHLORIDE 0.4 MG: 0.4 CAPSULE ORAL at 15:56

## 2023-08-08 RX ADMIN — INSULIN LISPRO 4 UNITS: 100 INJECTION, SOLUTION INTRAVENOUS; SUBCUTANEOUS at 11:40

## 2023-08-08 RX ADMIN — INSULIN LISPRO 6 UNITS: 100 INJECTION, SOLUTION INTRAVENOUS; SUBCUTANEOUS at 16:59

## 2023-08-08 RX ADMIN — PANTOPRAZOLE SODIUM 40 MG: 40 INJECTION, POWDER, FOR SOLUTION INTRAVENOUS at 08:08

## 2023-08-08 RX ADMIN — IRON SUCROSE 100 MG: 20 INJECTION, SOLUTION INTRAVENOUS at 11:31

## 2023-08-08 RX ADMIN — INSULIN LISPRO 1 UNITS: 100 INJECTION, SOLUTION INTRAVENOUS; SUBCUTANEOUS at 21:08

## 2023-08-08 RX ADMIN — INSULIN LISPRO 5 UNITS: 100 INJECTION, SOLUTION INTRAVENOUS; SUBCUTANEOUS at 16:59

## 2023-08-08 RX ADMIN — LEVALBUTEROL HYDROCHLORIDE 1.25 MG: 1.25 SOLUTION RESPIRATORY (INHALATION) at 14:00

## 2023-08-08 RX ADMIN — METOPROLOL SUCCINATE 50 MG: 50 TABLET, EXTENDED RELEASE ORAL at 08:08

## 2023-08-08 RX ADMIN — PREGABALIN 100 MG: 100 CAPSULE ORAL at 08:08

## 2023-08-08 RX ADMIN — PREGABALIN 100 MG: 100 CAPSULE ORAL at 20:39

## 2023-08-08 RX ADMIN — PANTOPRAZOLE SODIUM 40 MG: 40 INJECTION, POWDER, FOR SOLUTION INTRAVENOUS at 20:39

## 2023-08-08 RX ADMIN — PERFLUTREN 1.2 ML/MIN: 6.52 INJECTION, SUSPENSION INTRAVENOUS at 11:59

## 2023-08-08 RX ADMIN — IPRATROPIUM BROMIDE 0.5 MG: 0.5 SOLUTION RESPIRATORY (INHALATION) at 19:49

## 2023-08-08 RX ADMIN — SERTRALINE HYDROCHLORIDE 25 MG: 25 TABLET ORAL at 08:22

## 2023-08-08 RX ADMIN — INSULIN DETEMIR 18 UNITS: 100 INJECTION, SOLUTION SUBCUTANEOUS at 21:08

## 2023-08-08 RX ADMIN — MULTIPLE VITAMINS W/ MINERALS TAB 1 TABLET: TAB ORAL at 08:08

## 2023-08-08 RX ADMIN — UMECLIDINIUM BROMIDE AND VILANTEROL TRIFENATATE 1 PUFF: 62.5; 25 POWDER RESPIRATORY (INHALATION) at 08:23

## 2023-08-08 RX ADMIN — PREGABALIN 100 MG: 100 CAPSULE ORAL at 15:56

## 2023-08-08 RX ADMIN — FERROUS SULFATE TAB 325 MG (65 MG ELEMENTAL FE) 325 MG: 325 (65 FE) TAB at 08:08

## 2023-08-08 RX ADMIN — FUROSEMIDE 40 MG: 10 INJECTION, SOLUTION INTRAMUSCULAR; INTRAVENOUS at 11:32

## 2023-08-08 NOTE — ASSESSMENT & PLAN NOTE
Acute on chronic. No much labs to compare with but appears had h/h in 5/2023 with hgb of 9.2  Normocytic  Pt w/ chronic fe deficiency on iron supplements. s/p iron studies; will start on IV venofer x 3 days  S/p prbc transfusion x 1  GI following, endoscopic evaluation pending stable pulmonary status. Pt and wife denies hx EGD. Had colonoscopy 5 yrs ago and hx of colonic resection. He is actually due to have a colonoscopy on 8/23  Cont serial h/h monitoring, transfuse to keep > 7.   With appropriate increase status post PRBC transfusion and continues to remain stable  On clears  On IV PPI  Holding eliquis

## 2023-08-08 NOTE — ASSESSMENT & PLAN NOTE
Likely non mi secondary to anemia, acute resp failure  Cardiology on board  Echo ef 45%, mildly reduced.

## 2023-08-08 NOTE — ASSESSMENT & PLAN NOTE
Wt Readings from Last 3 Encounters:   08/08/23 103 kg (226 lb)   08/02/23 101 kg (222 lb)   07/26/23 100 kg (221 lb)   Likely volume overload due to his PRBC transfusion  Status post repeat chest x-ray revealing pulmonary edema  Subsequent labs after transfusion revealing elevated BNP  Will place on IV Lasix 40 mg daily  Cardiology on board  Repeat echocardiogram pending

## 2023-08-08 NOTE — PLAN OF CARE
Problem: Potential for Falls  Goal: Patient will remain free of falls  Description: INTERVENTIONS:  - Educate patient/family on patient safety including physical limitations  - Instruct patient to call for assistance with activity   - Consult OT/PT to assist with strengthening/mobility   - Keep Call bell within reach  - Keep bed low and locked with side rails adjusted as appropriate  - Keep care items and personal belongings within reach  - Initiate and maintain comfort rounds  - Make Fall Risk Sign visible to staff  - Offer Toileting every 2 Hours, in advance of need  - Initiate/Maintain bed/chair alarm  - Obtain necessary fall risk management equipment: non-slip socks, walker  - Apply yellow socks and bracelet for high fall risk patients  - Consider moving patient to room near nurses station  Outcome: Progressing     Problem: MOBILITY - ADULT  Goal: Maintain or return to baseline ADL function  Description: INTERVENTIONS:  -  Assess patient's ability to carry out ADLs; assess patient's baseline for ADL function and identify physical deficits which impact ability to perform ADLs (bathing, care of mouth/teeth, toileting, grooming, dressing, etc.)  - Assess/evaluate cause of self-care deficits   - Assess range of motion  - Assess patient's mobility; develop plan if impaired  - Assess patient's need for assistive devices and provide as appropriate  - Encourage maximum independence but intervene and supervise when necessary  - Involve family in performance of ADLs  - Assess for home care needs following discharge   - Consider OT consult to assist with ADL evaluation and planning for discharge  - Provide patient education as appropriate  Outcome: Progressing  Goal: Maintains/Returns to pre admission functional level  Description: INTERVENTIONS:  - Perform BMAT or MOVE assessment daily.   - Set and communicate daily mobility goal to care team and patient/family/caregiver.    - Collaborate with rehabilitation services on mobility goals if consulted  - Perform Range of Motion 3 times a day. - Reposition patient every 2 hours.   - Dangle patient 3 times a day  - Stand patient 3 times a day  - Ambulate patient 3 times a day  - Out of bed to chair 3 times a day   - Out of bed for meals 3 times a day  - Out of bed for toileting  - Record patient progress and toleration of activity level   Outcome: Progressing     Problem: Prexisting or High Potential for Compromised Skin Integrity  Goal: Skin integrity is maintained or improved  Description: INTERVENTIONS:  - Identify patients at risk for skin breakdown  - Assess and monitor skin integrity  - Assess and monitor nutrition and hydration status  - Monitor labs   - Assess for incontinence   - Turn and reposition patient  - Assist with mobility/ambulation  - Relieve pressure over bony prominences  - Avoid friction and shearing  - Provide appropriate hygiene as needed including keeping skin clean and dry  - Evaluate need for skin moisturizer/barrier cream  - Collaborate with interdisciplinary team   - Patient/family teaching  - Consider wound care consult   Outcome: Progressing     Problem: RESPIRATORY - ADULT  Goal: Achieves optimal ventilation and oxygenation  Description: INTERVENTIONS:  - Assess for changes in respiratory status  - Assess for changes in mentation and behavior  - Position to facilitate oxygenation and minimize respiratory effort  - Oxygen administered by appropriate delivery if ordered  - Initiate smoking cessation education as indicated  - Encourage broncho-pulmonary hygiene including cough, deep breathe, Incentive Spirometry  - Assess the need for suctioning and aspirate as needed  - Assess and instruct to report SOB or any respiratory difficulty  - Respiratory Therapy support as indicated  Outcome: Progressing     Problem: GASTROINTESTINAL - ADULT  Goal: Minimal or absence of nausea and/or vomiting  Description: INTERVENTIONS:  - Administer IV fluids if ordered to ensure adequate hydration  - Maintain NPO status until nausea and vomiting are resolved  - Nasogastric tube if ordered  - Administer ordered antiemetic medications as needed  - Provide nonpharmacologic comfort measures as appropriate  - Advance diet as tolerated, if ordered  - Consider nutrition services referral to assist patient with adequate nutrition and appropriate food choices  Outcome: Progressing     Problem: HEMATOLOGIC - ADULT  Goal: Maintains hematologic stability  Description: INTERVENTIONS  - Assess for signs and symptoms of bleeding or hemorrhage  - Monitor labs  - Administer supportive blood products/factors as ordered and appropriate  Outcome: Progressing     Problem: PAIN - ADULT  Goal: Verbalizes/displays adequate comfort level or baseline comfort level  Description: Interventions:  - Encourage patient to monitor pain and request assistance  - Assess pain using appropriate pain scale  - Administer analgesics based on type and severity of pain and evaluate response  - Implement non-pharmacological measures as appropriate and evaluate response  - Consider cultural and social influences on pain and pain management  - Notify physician/advanced practitioner if interventions unsuccessful or patient reports new pain  Outcome: Progressing     Problem: INFECTION - ADULT  Goal: Absence or prevention of progression during hospitalization  Description: INTERVENTIONS:  - Assess and monitor for signs and symptoms of infection  - Monitor lab/diagnostic results  - Monitor all insertion sites, i.e. indwelling lines, tubes, and drains  - Monitor endotracheal if appropriate and nasal secretions for changes in amount and color  - [place] appropriate cooling/warming therapies per order  - Administer medications as ordered  - Instruct and encourage patient and family to use good hand hygiene technique  - Identify and instruct in appropriate isolation precautions for identified infection/condition  Outcome: Progressing Problem: DISCHARGE PLANNING  Goal: Discharge to home or other facility with appropriate resources  Description: INTERVENTIONS:  - Identify barriers to discharge w/patient and caregiver  - Arrange for needed discharge resources and transportation as appropriate  - Identify discharge learning needs (meds, wound care, etc.)  - Arrange for interpretive services to assist at discharge as needed  - Refer to Case Management Department for coordinating discharge planning if the patient needs post-hospital services based on physician/advanced practitioner order or complex needs related to functional status, cognitive ability, or social support system  Outcome: Progressing     Problem: Knowledge Deficit  Goal: Patient/family/caregiver demonstrates understanding of disease process, treatment plan, medications, and discharge instructions  Description: Complete learning assessment and assess knowledge base.   Interventions:  - Provide teaching at level of understanding  - Provide teaching via preferred learning methods  Outcome: Progressing     Problem: CARDIOVASCULAR - ADULT  Goal: Maintains optimal cardiac output and hemodynamic stability  Description: INTERVENTIONS:  - Monitor I/O, vital signs and rhythm  - Monitor for S/S and trends of decreased cardiac output  - Administer and titrate ordered vasoactive medications to optimize hemodynamic stability  - Assess quality of pulses, skin color and temperature  - Assess for signs of decreased coronary artery perfusion  - Instruct patient to report change in severity of symptoms  Outcome: Progressing  Goal: Absence of cardiac dysrhythmias or at baseline rhythm  Description: INTERVENTIONS:  - Continuous cardiac monitoring, vital signs, obtain 12 lead EKG if ordered  - Administer antiarrhythmic and heart rate control medications as ordered  - Monitor electrolytes and administer replacement therapy as ordered  Outcome: Progressing

## 2023-08-08 NOTE — PROGRESS NOTES
[id number]  Progress Note  Name: Magdalena Rutherford  MRN: [id number]  Unit/Bed#: -01 I Date of Admission: 8/7/2023   Date of Service: 8/8/2023 I Hospital Day: 1    Assessment/Plan   * Anemia  Assessment & Plan  Acute on chronic. No much labs to compare with but appears had h/h in 5/2023 with hgb of 9.2  Normocytic  Pt w/ chronic fe deficiency on iron supplements. s/p iron studies; will start on IV venofer x 3 days  S/p prbc transfusion x 1  GI following, endoscopic evaluation pending stable pulmonary status. Pt and wife denies hx EGD. Had colonoscopy 5 yrs ago and hx of colonic resection. He is actually due to have a colonoscopy on 8/23  Cont serial h/h monitoring, transfuse to keep > 7. With appropriate increase status post PRBC transfusion and continues to remain stable  On clears  On IV PPI  Holding eliquis    Syncope  Assessment & Plan  Likely secondary to anemia  Care as above  S/p CT head, CT cervical  Recent NM stress (6/2023) no evidence of ischemia  S/p echo [5/2023], follow-up repeat echo      Acute respiratory failure with hypoxia [facility])  Assessment & Plan  Possibly related to acute on chronic anemia and worsening may have been due to volume overload secondary to transfusion. Subsequent labs revealing elevated BNP and repeat chest x-ray revealing pulmonary edema  As outpatient been evaluated per Pulm for possibility of restrictive lung disease. Plan was to proceed with high resolution CT scan in the future  Doubt thromboembolism disease as chronically on eliquis. Had CTA chest done in 5/2023 neg for PE  Received multiple doses of IV diuretics and IV Solu-Medrol in the ED  Status post pulmonary consult. Recommending to hold on further steroids  Cont on diuresis with IV lasix daily.   Monitor input/output, daily weights  Cont oxygen supplementation and titrate off as able to    Acute on chronic diastolic HF (heart failure) (HCC)  Assessment & Plan  Wt Readings from Last 3 Encounters:   08/08/23 103 kg (226 lb)   08/02/23 101 kg (222 lb)   07/26/23 100 kg (221 lb)   Likely volume overload due to his PRBC transfusion  Status post repeat chest x-ray revealing pulmonary edema  Subsequent labs after transfusion revealing elevated BNP  Will place on IV Lasix 40 mg daily  Cardiology on board  Repeat echocardiogram pending          Elevated troponin  Assessment & Plan  Likely non mi secondary to anemia, acute resp failure  Cardiology on board  Echo pending     Hepatic flexure mass  Assessment & Plan  Being wkup as outpt. Arranged for NM liver spec scan and liver bx as outpt  Planned also to have colonoscopy on 8/23    History of DVT (deep vein thrombosis)  Assessment & Plan  On Eliquis, holding in setting of anemia    Atrial fibrillation, unspecified type [facility])  Assessment & Plan  Rate controlled  Holding eliquis given anemia    Type 2 diabetes mellitus with neurologic complication, with long-term current use of insulin [facility])  Assessment & Plan  Lab Results   Component Value Date    HGBA1C 7.6 (H) 01/06/2023       Recent Labs     08/07/23  1242 08/07/23  2002 08/08/23  0732 08/08/23  1110   POCGLU 272* 246* 257* 244*       Blood Sugar Average: Last 72 hrs:  (P) 244.2   Given only on clears, on decreased insulin regimen. Cont to monitor and titrate up for optimal control    Stage 3 chronic kidney disease, unspecified whether stage 3a or 3b CKD [facility])  Assessment & Plan  Lab Results   Component Value Date    EGFR 43 08/08/2023    EGFR 39 08/07/2023    EGFR 57 (L) 07/10/2023    CREATININE 1.53 (H) 08/08/2023    CREATININE 1.66 (H) 08/07/2023    CREATININE 1.29 (H) 07/10/2023   Still relatively w/in baseline range          VTE Pharmacologic Prophylaxis:   Pharmacologic: Pharmacologic VTE Prophylaxis contraindicated due to Anemia  Mechanical VTE Prophylaxis in Place: No    Patient Centered Rounds: I have performed bedside rounds with nursing staff today.     Discussions with Specialists or Other Care Team Provider: Gastroenterology    Education and Discussions with Family / Patient: Patient, Called wife and updated at length    Time Spent for Care: 30 minutes. More than 50% of total time spent on counseling and coordination of care as described above. Current Length of Stay: 1 day(s)    Current Patient Status: Inpatient   Certification Statement: The patient will continue to require additional inpatient hospital stay due to Presenting illness    Discharge Plan:     Code Status: Level 1 - Full Code      Subjective:   Overnight patient with worsening respiratory status requiring BiPAP and increased oxygen requirement up to 10 L. However patient states he was unable to tolerate BiPAP. Subsequent labs revealing elevated troponin and BNP. He was given another dose of IV Lasix  Patient denies chest pain and reports of improvement in dyspnea. Presently on 4 L nasal cannula  Overnight no melena, hematochezia or coffee-ground emesis  Objective:     Vitals:   Temp (24hrs), Av.9 °F (36.6 °C), Min:97.4 °F (36.3 °C), Max:98.3 °F (36.8 °C)    Temp:  [97.4 °F (36.3 °C)-98.3 °F (36.8 °C)] 97.4 °F (36.3 °C)  HR:  [] 87  Resp:  [18-28] 22  BP: (130-173)/() 140/78  SpO2:  [83 %-98 %] 92 %  Body mass index is 30.65 kg/m². Input and Output Summary (last 24 hours): Intake/Output Summary (Last 24 hours) at 2023 1417  Last data filed at 2023 1300  Gross per 24 hour   Intake 678.33 ml   Output 2650 ml   Net -1971.67 ml       Physical Exam:     Physical Exam  Constitutional:       Appearance: He is obese. Cardiovascular:      Rate and Rhythm: Normal rate and regular rhythm. Pulses: Normal pulses. Heart sounds: Normal heart sounds. Pulmonary:      Effort: Pulmonary effort is normal. No respiratory distress. Breath sounds: Rales present. No wheezing. Abdominal:      General: Abdomen is flat. Bowel sounds are normal. There is no distension. Palpations: Abdomen is soft. Tenderness: There is no abdominal tenderness. There is no guarding. Musculoskeletal:      Cervical back: Normal range of motion and neck supple. Right lower leg: No edema. Left lower leg: No edema. Skin:     General: Skin is warm and dry. Neurological:      General: No focal deficit present. Mental Status: He is alert. Mental status is at baseline. Cranial Nerves: No cranial nerve deficit. Motor: No weakness. Additional Data:     Labs:    Results from last 7 days   Lab Units 08/08/23  0952 08/07/23  2041 08/07/23  0515   WBC Thousand/uL  --   --  10.34*   HEMOGLOBIN g/dL 7.8*   < > 6.4*   HEMATOCRIT % 29.5*   < > 24.7*   PLATELETS Thousands/uL  --   --  210   NEUTROS PCT %  --   --  61   LYMPHS PCT %  --   --  19   MONOS PCT %  --   --  16*   EOS PCT %  --   --  3    < > = values in this interval not displayed. Results from last 7 days   Lab Units 08/08/23  0442 08/07/23  0515   SODIUM mmol/L 139 142   POTASSIUM mmol/L 4.6 4.5   CHLORIDE mmol/L 108 111*   CO2 mmol/L 20* 19*   BUN mg/dL 49* 55*   CREATININE mg/dL 1.53* 1.66*   ANION GAP mmol/L 11 12   CALCIUM mg/dL 8.7 8.6   ALBUMIN g/dL  --  3.9   TOTAL BILIRUBIN mg/dL  --  0.47   ALK PHOS U/L  --  69   ALT U/L  --  36   AST U/L  --  39   GLUCOSE RANDOM mg/dL 257* 189*         Results from last 7 days   Lab Units 08/08/23  1110 08/08/23  0732 08/07/23  2002 08/07/23  1242 08/07/23  0510   POC GLUCOSE mg/dl 244* 257* 246* 272* 202*                   * I Have Reviewed All Lab Data Listed Above. * Additional Pertinent Lab Tests Reviewed:  All Labs Within Last 24 Hours Reviewed    Imaging:    Imaging Reports Reviewed Today Include: Chest x-ray  Imaging Personally Reviewed by Myself Includes:      Recent Cultures (last 7 days):           Last 24 Hours Medication List:   Current Facility-Administered Medications   Medication Dose Route Frequency Provider Last Rate   • acetaminophen  650 mg Oral Q6H PRN Tressa Reese Jerre Kayser, DO     • [START ON 8/9/2023] furosemide  40 mg Intravenous Daily Gayla Osgood, DO     • hydrALAZINE  10 mg Intravenous Q6H PRN Gayla Osgood, DO     • insulin detemir  18 Units Subcutaneous HS Tressakiran Styles, DO     • insulin lispro  1-5 Units Subcutaneous HS [facility] Mary Ann Styles, DO     • insulin lispro  2-12 Units Subcutaneous TID AC [facility] Mary Ann Styles, DO     • insulin lispro  5 Units Subcutaneous TID With Meals Gayla Osgood, DO     • ipratropium  0.5 mg Nebulization TID Zamzam Bates PA-C     • iron sucrose  100 mg Intravenous Daily Gayla Osgood, DO     • levalbuterol  1.25 mg Nebulization TID Zamzam Bates PA-C     • metoprolol succinate  50 mg Oral Daily Gayla Osgood, DO     • multivitamin-minerals  1 tablet Oral Daily Gayla Osgood, DO     • ondansetron  4 mg Intravenous Q6H PRN Gayla Osgood, DO     • pantoprazole  40 mg Intravenous Q12H [facility] Mary Ann Styles, DO     • pregabalin  100 mg Oral TID Riky White PA-C     • sertraline  25 mg Oral Daily Gayla Osgood, DO     • tamsulosin  0.4 mg Oral Daily With Jossygabriella Oswald, DO     • traZODone  200 mg Oral HS Gayla Osgood, DO          Today, Patient Was Seen By: Maylene Osgood, DO    ** Please Note: Dictation voice to text software may have been used in the creation of this document.  **

## 2023-08-08 NOTE — UTILIZATION REVIEW
Initial Clinical Review  Date: 8/9/23    Day 3: Has surpassed a 2nd midnight with active treatments and services, which include diuresis with IV lasix, trnd BMP, echo, trend CBC, IV venofer, accuchecks with SSI. Transfuse  PRBC for hgb < 7. Monitor oxygen sat, wean oxygen as able. GI, Cardiology and Pulmonary following. Admission: Date/Time/Statement:   Admission Orders (From admission, onward)     Ordered        08/07/23 1029  INPATIENT ADMISSION  Once                      Orders Placed This Encounter   Procedures   • INPATIENT ADMISSION     Standing Status:   Standing     Number of Occurrences:   1     Order Specific Question:   Level of Care     Answer:   Med Surg [16]     Order Specific Question:   Estimated length of stay     Answer:   More than 2 Midnights     Order Specific Question:   Certification     Answer:   I certify that inpatient services are medically necessary for this patient for a duration of greater than two midnights. See H&P and MD Progress Notes for additional information about the patient's course of treatment. ED Arrival Information     Expected   -    Arrival   8/7/2023 05:03    Acuity   Emergent            Means of arrival   Ambulance    Escorted by   [facility]    Service   Hospitalist    Admission type   Emergency            Arrival complaint   fall/ facial injury/ hypoglycemic             Chief Complaint   Patient presents with   • Fall     EMS from home; wife woke pt due to dexcom reading sugar at 41, walked pt to kitchen and pt blacked out falling and hitting R side of head, takes Eliquis; EMS gave 15g PO glucose PTA       Initial Presentation: 69 y.o. male from home to ED via ems admitted inpatient due to Anemia/Syncope/acute respiratory failure with hypoxia. PMH of hpt, atrial fib on Eliquis, DVT, IDDM. Presented due to loss of consciousness on morning of arrival with fall striking right side of face on ground.   Per wife, he awoke at 0400 with glucose alarm of 41, given oral glucose. Starting 2 days prior to arrival with worsening weakness, lightheaded. Worsening dyspnea. On exam rectal guaiac positive. Hypoxic. Wbc 10.34. H&H 6.4/24.7. Bun 55. Creatinine 1.66 and baseline 1.29 - 1.56. [facility] Glucose 189. Ecg inverted T wave III. In the ED placed on oxygen, given Solumedrol and neb.   started on Protonix, given 1 liter IVF. Given PRBC. Plan is consult GI. Oxygen as needed. Monitor H&H. Clears. IV PPI. Hold eliquis. Consult Pulmonary. IV lasix. Start SSI. Decrease home insulin. 8/7/23 per GI - patient with acute anemia/GERD/history of diverticulitis with resection/heptic masses. Plan is hold Eliquis. Can advance diet. Optimize pulmonary status prior to any scopes. Continue PPI. Trend iron panel, Iron infusion if deficient. Date: 8/8/23    Day 2: Overnight worsening respiratory status requiring Bipap and was unable to tolerate. Oxygen to 10 liters. Given IV lasix. Net -1971. On exam obese. Rales. H&H 6.4/24.7. Bun 49. Creatinine 1.53. Glucose 257. Plan is transfuse. Serial H&H. IV PPI. Clears. Hold eliquis. Steroids on hold. Continue diuresis with IV lasix. Oxygen titration as able. Continue SSI.      8/8/23 per Pulmonary - patient with acute hypoxic respiratory failure/acute anemia/syncope/restrictive lung disease/history of DVT, CHF, PAF, CKD/non troponin MI elevation/liver mass. Suspect hypoxia due to pulmonary edema. Plan is oxygen for pulse ox of > 89%, has required up to 15 liters, now on 6 liters. IV lasix. Hold steroids. Monitor volume status. Echo. CxR. Start nebs.  egd when respiratory status more stable. 8/8/23 per Cardiology - patient with acute respiratory failure and suspect secondary to pulmonary edema. Plan is IV lasix. Echo. Anticoagulation on hold due to anemia. GI for egd. Continue Toprol XL.      ED Triage Vitals [08/07/23 0507]   Temperature Pulse Respirations Blood Pressure SpO2 Libra Hutchinson ) 97.3 °F (36.3 °C) 81 18 137/63 (!) 87 %      Temp Source Heart Rate Source Patient Position - Orthostatic VS BP Location FiO2 (%)   Oral Monitor Lying Left arm --      Pain Score       2          Wt Readings from Last 1 Encounters:   08/09/23 97 kg (213 lb 13.5 oz)     Additional Vital Signs:   08/09/23 07:33:03 -- 87 -- 143/73 96 99 % -- -- -- -- -- --   08/09/23 0727 -- -- -- -- -- 91 % 36 -- 4 L/min Nasal cannula -- --   08/08/23 2218 -- -- -- -- -- 90 % -- -- -- -- -- --   08/08/23 20:34:59 97.6 °F (36.4 °C) 88 -- 143/71 95 90 % -- -- -- -- -- --   08/08/23 1951 -- 76 20 -- -- 90 % 36 4 L/min 4 L/min Nasal cannula -- --   08/08/23 15:06:09 97.6 °F (36.4 °C) 91 -- 141/62 88 94 % 32 -- 3 L/min Nasal cannula       08/08/23 1400 -- -- -- -- -- 92 % 36 -- 4 L/min Nasal cannula -- --   08/08/23 11:42:10 -- 87 22 -- -- 91 % 44 -- 6 L/min Nasal cannula -- --   08/08/23 1031 -- 90 -- 140/78 -- -- -- -- -- -- -- --   08/08/23 07:35:53 97.4 °F (36.3 °C) Abnormal  95 -- 140/78 99 93 % -- -- -- -- -- --   08/08/23 07:17:53 97.7 °F (36.5 °C) 92 18 142/79 100 83 % Abnormal  -- -- -- -- -- --   08/08/23 0445 -- -- -- -- -- 91 % 44 6 L/min 6 L/min Simple mask -- --   08/07/23 2223 -- -- -- -- -- 94 % -- -- -- BiPAP -- --   08/07/23 21:45:57 -- 100 -- 140/82 101 95 % -- -- -- -- -- --   08/07/23 2100 98.3 °F (36.8 °C) 99 25 Abnormal  140/84 -- 93 % -- -- -- -- Face mask --   08/07/23 2040 98.3 °F (36.8 °C) 108 Abnormal  -- 140/84 103 89 % Abnormal  -- 6 L/min -- Simple mask -- --   08/07/23 19:01:38 97.5 °F (36.4 °C) 104 28 Abnormal  134/82 99 94 % -- -- -- -- -- --   08/07/23 1851 97.7 °F (36.5 °C) 102 22 133/82 99 93 % 60 -- 10 L/min Non-rebreather mask -- --   08/07/23 1818 -- -- -- -- -- 92 % 60 -- 10 L/min -- -- --   08/07/23 18:08:45 98.1 °F (36.7 °C) 116 Abnormal  20 140/101 Abnormal  109 87 % Abnormal  -- -- -- -- -- --   08/07/23 1730 -- 106 Abnormal  24 Abnormal  170/79 113 94 % -- -- -- -- -- --   08/07/23 1600 -- 104 18 173/77 Abnormal  111 98 % -- -- -- -- -- Lying   08/07/23 1320 -- -- -- -- -- 95 % -- -- -- Non-rebreather mask -- --   08/07/23 1318 -- -- -- -- -- 85 % Abnormal   40 -- 5 L/min Nasal cannula -- --   08/07/23 1235 -- 98 16 172/76 Abnormal  109 99 % 40 -- 5 L/min Nasal cannula -- Lying   08/07/23 1103 -- -- -- -- -- 96 % -- 15 L/min -- Non-rebreather mask  -- --   08/07/23 1100 -- -- -- -- -- 86 % Abnormal   44 -- 6 L/min Nasal cannula -- --   08/07/23 0810 -- 88 22 151/69 99 92 % 28 -- 2 L/min Nasal cannula -- Lying   08/07/23 0700 -- 82 19 155/70 -- 91 % -- -- -- Nasal cannula -- Lying   08/07/23 0548 -- 81 23 Abnormal  147/68 -- 90 % -- -- -- Nasal cannula       Pertinent Labs/Diagnostic Test Results:   XR chest portable   Final Result by Mary Murdock MD (08/08 1337)      CHF and pulmonary edema, superimposed on emphysema. Small right pleural effusion. Workstation performed: [id number]         TRAUMA - CT head wo contrast   Final Result by Leland Hartley MD (08/07 7785)      No acute intracranial process. No skull fracture. Chronic microangiopathy. Workstation performed: [id number]         TRAUMA - CT spine cervical wo contrast   Final Result by Leland Hartley MD (08/07 1071)      No cervical spine fracture or traumatic malalignment. Workstation performed: [id number]         XR Trauma chest portable   ED Interpretation by Kareem Ruth MD (08/07 3700)   No acute pulmonary pathology. No displaced rib fractures. No hemo/pneumothorax      Final Result by Tammie Vasques MD (08/07 8762)   No acute cardiopulmonary findings.                   Workstation performed: [id number]           8/7/23 ecg - Sinus rhythm with 1st degree A-V block  Right bundle branch block  Cannot rule out Inferior infarct , age undetermined  Abnormal ECG  When compared with ECG of 05-MAY-2023 12:28,  Minimal criteria for Inferior infarct are now Present  Nonspecific T wave abnormality, worse in Inferior leads    8/7/23 ecg Atrial fibrillation  Left axis deviation  Non-specific intra-ventricular conduction block  Nonspecific T wave abnormality  Abnormal ECG  When compared with ECG of 07-AUG-2023 05:11,  Atrial fibrillation has replaced Sinus rhythm  Non-specific intra-ventricular conduction block has replaced Right bundle branch block    8/7/23 ecg Atrial fibrillation  Left axis deviation  RSR' or QR pattern in V1 suggests right ventricular conduction delay  ST & T wave abnormality, consider lateral ischemia  Abnormal ECG  When compared with ECG of 07-AUG-2023 22:33, (unconfirmed)  ST no longer depressed in Inferior leads    8/8/23 ecg Sinus rhythm with 1st degree A-V block  Left axis deviation  Right bundle branch block  Cannot rule out Inferior infarct , age undetermined  T wave abnormality, consider lateral ischemia  Abnormal ECG  When compared with ECG of 07-AUG-2023 22:33, (unconfirmed)  Sinus rhythm has replaced Atrial fibrillation  T wave inversion less evident in Lateral leads    8/8/23 ecg Poor data quality, interpretation may be adversely affected  Sinus rhythm with 1st degree A-V block  Non-specific intra-ventricular conduction block  Abnormal ECG  When compared with ECG of 08-AUG-2023 04:44, (unconfirmed)  No significant change was found    8/8/23 ecg Sinus rhythm with 1st degree A-V block  Left axis deviation  Non-specific intra-ventricular conduction block  Cannot rule out Inferior infarct , age undetermined  T wave abnormality, consider lateral ischemia  Abnormal ECG  When compared with ECG of 08-AUG-2023 04:45, (unconfirmed)  No significant change was found    8/9/23 echo Left Ventricle: Left ventricular cavity size is normal. Wall thickness is mildly increased. The left ventricular ejection fraction is 45%. Systolic function is mildly reduced. There is mild global hypokinesis.  Diastolic function is mildly abnormal, consistent with grade I (abnormal) relaxation. •  Right Ventricle: Right ventricular cavity size is normal. Systolic function is normal.  •  Aortic Valve: There is trace regurgitation. •  Mitral Valve: There is mild regurgitation. •  Tricuspid Valve: There is mild regurgitation    Results from last 7 days   Lab Units 08/07/23  1712   SARS-COV-2  Negative     Results from last 7 days   Lab Units 08/09/23  0815 08/09/23  0036 08/08/23  1659 08/08/23  0952 08/07/23  2219 08/07/23  2041 08/07/23  0515   WBC Thousand/uL  --   --   --   --   --   --  10.34*   HEMOGLOBIN g/dL 7.7* 7.7* 8.4* 7.8* 7.9*   < > 6.4*   HEMATOCRIT % 29.2* 28.7* 31.9* 29.5* 29.7*   < > 24.7*   PLATELETS Thousands/uL  --   --   --   --   --   --  210   NEUTROS ABS Thousands/µL  --   --   --   --   --   --  6.38    < > = values in this interval not displayed.      Results from last 7 days   Lab Units 08/08/23 0442 08/07/23  0515   SODIUM mmol/L 139 142   POTASSIUM mmol/L 4.6 4.5   CHLORIDE mmol/L 108 111*   CO2 mmol/L 20* 19*   ANION GAP mmol/L 11 12   BUN mg/dL 49* 55*   CREATININE mg/dL 1.53* 1.66*   EGFR ml/min/1.73sq m 43 39   CALCIUM mg/dL 8.7 8.6     Results from last 7 days   Lab Units 08/07/23  0515   AST U/L 39   ALT U/L 36   ALK PHOS U/L 69   TOTAL PROTEIN g/dL 6.8   ALBUMIN g/dL 3.9   TOTAL BILIRUBIN mg/dL 0.47     Results from last 7 days   Lab Units 08/09/23  0713 08/08/23  2040 08/08/23  1553 08/08/23  1110 08/08/23  0732 08/07/23  2002 08/07/23  1242 08/07/23  0510   POC GLUCOSE mg/dl 177* 176* 251* 244* 257* 246* 272* 202*     Results from last 7 days   Lab Units 08/08/23  0442 08/07/23  0515   GLUCOSE RANDOM mg/dL 257* 189*     Results from last 7 days   Lab Units 08/08/23  0043 08/07/23  2219 08/07/23 2041   HS TNI 0HR ng/L  --   --  3,871*   HS TNI 2HR ng/L  --  4,093*  --    HSTNI D2 ng/L  --  222*  --    HS TNI 4HR ng/L 5,115*  --   --    HSTNI D4 ng/L 1,244*  --   --      Results from last 7 days   Lab Units 08/07/23  2041   BNP pg/mL 1,073*     Results from last 7 days   Lab Units 08/07/23  0515   FERRITIN ng/mL 10*   IRON SATURATION % 3*   IRON ug/dL 14*   TIBC ug/dL 462*     Results from last 7 days   Lab Units 08/08/23  0549   UNIT PRODUCT CODE  [id number]   UNIT NUMBER  [id number]   UNITABO  A   UNITRH  POS   CROSSMATCH  Compatible   UNIT DISPENSE STATUS  Presumed Trans   UNIT PRODUCT VOL ml 350     Results from last 7 days   Lab Units 08/07/23  1712   INFLUENZA A PCR  Negative   INFLUENZA B PCR  Negative   RSV PCR  Negative       ED Treatment:   Medication Administration from 08/07/2023 0503 to 08/07/2023 1746       Date/Time Order Dose Route Action Comments     08/07/2023 0616 EDT sodium chloride 0.9 % bolus 1,000 mL 1,000 mL Intravenous New Bag --     08/07/2023 0620 EDT ipratropium (ATROVENT) 0.02 % inhalation solution 0.5 mg 0.5 mg Nebulization Given --     08/07/2023 4612 EDT albuterol inhalation solution 5 mg 5 mg Nebulization Given --     08/07/2023 1298 EDT methylPREDNISolone sodium succinate (Solu-MEDROL) injection 80 mg 80 mg Intravenous Given --     08/07/2023 0742 EDT pantoprazole (PROTONIX) 80 mg in sodium chloride 0.9 % 100 mL IVPB 80 mg Intravenous New Bag --     08/07/2023 1317 EDT insulin lispro (HumaLOG) 100 units/mL subcutaneous injection 2-12 Units 6 Units Subcutaneous Given      08/07/2023 1239 EDT furosemide (LASIX) injection 20 mg 20 mg Intravenous Given --     08/07/2023 1739 EDT tamsulosin (FLOMAX) capsule 0.4 mg 0.4 mg Oral Given --     08/07/2023 1739 EDT metoprolol succinate (TOPROL-XL) 24 hr tablet 50 mg 50 mg Oral Given --        Past Medical History:   Diagnosis Date   • Atrial fibrillation ([address])    • Deep vein thrombosis (DVT) of right lower extremity (HCC)    • Diverticulitis      Present on Admission:  • Stage 3 chronic kidney disease, unspecified whether stage 3a or 3b CKD ([address])  • Atrial fibrillation, unspecified type ([address])      Admitting Diagnosis: Hypoglycemia [E16.2]  Acute GI bleeding [K92.2]  COPD exacerbation ([address] [facility]) [J44.1]  Facial injury [S09.93XA]  Acute on chronic respiratory failure with hypoxia (HCC) [J96.21]  Acute anemia [D64.9]  Hypoglycemic episode in patient with diabetes mellitus ([address]) [E11.649]  Age/Sex: 69 y.o. male  Admission Orders:  08/07/23 1029 inpatient   Scheduled Medications:  [START ON 8/9/2023] furosemide, 40 mg, Intravenous, Daily  insulin detemir, 18 Units, Subcutaneous, HS  insulin lispro, 1-5 Units, Subcutaneous, HS  insulin lispro, 2-12 Units, Subcutaneous, TID AC  insulin lispro, 5 Units, Subcutaneous, TID With Meals  ipratropium, 0.5 mg, Nebulization, TID  iron sucrose, 100 mg, Intravenous, Daily  levalbuterol, 1.25 mg, Nebulization, TID  metoprolol succinate, 50 mg, Oral, Daily  multivitamin-minerals, 1 tablet, Oral, Daily  pantoprazole, 40 mg, Intravenous, Q12H RANDALL  pregabalin, 100 mg, Oral, TID  sertraline, 25 mg, Oral, Daily  tamsulosin, 0.4 mg, Oral, Daily With Dinner  traZODone, 200 mg, Oral, HS    ferrous sulfate tablet 325 mg  Dose: 325 mg  Freq: Daily with breakfast Route: PO  Start: 08/08/23 0730 End: 08/08/23 0833    furosemide (LASIX) injection 20 mg  Dose: 20 mg  Freq: Once Route: IV  Start: 08/07/23 1945 End: 08/07/23 2009  furosemide (LASIX) injection 40 mg  Dose: 40 mg  Freq: Once Route: IV  Start: 08/08/23 1015 End: 08/08/23 1132    insulin detemir (LEVEMIR) subcutaneous injection 15 Units  Dose: 15 Units  Freq: Daily at bedtime Route: SC  Start: 08/07/23 2200 End: 08/08/23 1402    methylPREDNISolone sodium succinate (Solu-MEDROL) injection 40 mg  Dose: 40 mg  Freq: Once Route: IV  Start: 08/07/23 1945 End: 08/07/23 2011    umeclidinium-vilanterol 62.5-25 mcg/actuation inhaler 1 puff  Dose: 1 puff  Freq: Daily Route: IN  Start: 08/08/23 0900 End: 08/08/23 1058      Continuous IV Infusions: none      PRN Meds:not used.    acetaminophen, 650 mg, Oral, Q6H PRN  hydrALAZINE, 10 mg, Intravenous, Q6H PRN  ondansetron, 4 mg, Intravenous, Q6H PRN    Telemetry   Clears   Bipap at bedtime. Mid flow oxygen  8/7/23 transfuse leukoreduced RBC    IP CONSULT TO GASTROENTEROLOGY  IP CONSULT TO PULMONOLOGY  IP CONSULT TO CARDIOLOGY    Network Utilization Review Department  ATTENTION: Please call with any questions or concerns to [phone] and carefully listen to the prompts so that you are directed to the right person. All voicemails are confidential.  Jann Morgan all requests for admission clinical reviews, approved or denied determinations and any other requests to dedicated fax number below belonging to the campus where the patient is receiving treatment.  List of dedicated fax numbers for the Facilities:  [place] DENIALS (Administrative/Medical Necessity) [phone] [address] [facility] (Maternity/NICU/Pediatrics) [phone]   [address] [place] [address] Drive [phone]   [facility] [address] [facility] [phone] [address] [facility] [address] [address] [facility] [phone]   [address] [facility] [address] [facility]  [facility] [phone]

## 2023-08-08 NOTE — PLAN OF CARE
Problem: Potential for Falls  Goal: Patient will remain free of falls  Description: INTERVENTIONS:  - Educate patient/family on patient safety including physical limitations  - Instruct patient to call for assistance with activity   - Consult OT/PT to assist with strengthening/mobility   - Keep Call bell within reach  - Keep bed low and locked with side rails adjusted as appropriate  - Keep care items and personal belongings within reach  - Initiate and maintain comfort rounds  - Make Fall Risk Sign visible to staff  - Offer Toileting every 2 Hours, in advance of need  - Initiate/Maintain bed alarm  - Obtain necessary fall risk management equipment:   - Apply yellow socks and bracelet for high fall risk patients  - Consider moving patient to room near nurses station  Outcome: Progressing     Problem: MOBILITY - ADULT  Goal: Maintain or return to baseline ADL function  Description: INTERVENTIONS:  -  Assess patient's ability to carry out ADLs; assess patient's baseline for ADL function and identify physical deficits which impact ability to perform ADLs (bathing, care of mouth/teeth, toileting, grooming, dressing, etc.)  - Assess/evaluate cause of self-care deficits   - Assess range of motion  - Assess patient's mobility; develop plan if impaired  - Assess patient's need for assistive devices and provide as appropriate  - Encourage maximum independence but intervene and supervise when necessary  - Involve family in performance of ADLs  - Assess for home care needs following discharge   - Consider OT consult to assist with ADL evaluation and planning for discharge  - Provide patient education as appropriate  Outcome: Progressing  Goal: Maintains/Returns to pre admission functional level  Description: INTERVENTIONS:  - Perform BMAT or MOVE assessment daily.   - Set and communicate daily mobility goal to care team and patient/family/caregiver.    - Collaborate with rehabilitation services on mobility goals if consulted  - Perform Range of Motion 3 times a day. - Reposition patient every 2 hours.   - Dangle patient 3 times a day  - Stand patient 3 times a day  - Ambulate patient 3 times a day  - Out of bed to chair 3 times a day   - Out of bed for meals 3 times a day  - Out of bed for toileting  - Record patient progress and toleration of activity level   Outcome: Progressing     Problem: Prexisting or High Potential for Compromised Skin Integrity  Goal: Skin integrity is maintained or improved  Description: INTERVENTIONS:  - Identify patients at risk for skin breakdown  - Assess and monitor skin integrity  - Assess and monitor nutrition and hydration status  - Monitor labs   - Assess for incontinence   - Turn and reposition patient  - Assist with mobility/ambulation  - Relieve pressure over bony prominences  - Avoid friction and shearing  - Provide appropriate hygiene as needed including keeping skin clean and dry  - Evaluate need for skin moisturizer/barrier cream  - Collaborate with interdisciplinary team   - Patient/family teaching  - Consider wound care consult   Outcome: Progressing     Problem: RESPIRATORY - ADULT  Goal: Achieves optimal ventilation and oxygenation  Description: INTERVENTIONS:  - Assess for changes in respiratory status  - Assess for changes in mentation and behavior  - Position to facilitate oxygenation and minimize respiratory effort  - Oxygen administered by appropriate delivery if ordered  - Initiate smoking cessation education as indicated  - Encourage broncho-pulmonary hygiene including cough, deep breathe, Incentive Spirometry  - Assess the need for suctioning and aspirate as needed  - Assess and instruct to report SOB or any respiratory difficulty  - Respiratory Therapy support as indicated  Outcome: Progressing     Problem: GASTROINTESTINAL - ADULT  Goal: Minimal or absence of nausea and/or vomiting  Description: INTERVENTIONS:  - Administer IV fluids if ordered to ensure adequate hydration  - Maintain NPO status until nausea and vomiting are resolved  - Nasogastric tube if ordered  - Administer ordered antiemetic medications as needed  - Provide nonpharmacologic comfort measures as appropriate  - Advance diet as tolerated, if ordered  - Consider nutrition services referral to assist patient with adequate nutrition and appropriate food choices  Outcome: Progressing     Problem: HEMATOLOGIC - ADULT  Goal: Maintains hematologic stability  Description: INTERVENTIONS  - Assess for signs and symptoms of bleeding or hemorrhage  - Monitor labs  - Administer supportive blood products/factors as ordered and appropriate  Outcome: Progressing     Problem: CARDIOVASCULAR - ADULT  Goal: Maintains optimal cardiac output and hemodynamic stability  Description: INTERVENTIONS:  - Monitor I/O, vital signs and rhythm  - Monitor for S/S and trends of decreased cardiac output  - Administer and titrate ordered vasoactive medications to optimize hemodynamic stability  - Assess quality of pulses, skin color and temperature  - Assess for signs of decreased coronary artery perfusion  - Instruct patient to report change in severity of symptoms  Outcome: Progressing  Goal: Absence of cardiac dysrhythmias or at baseline rhythm  Description: INTERVENTIONS:  - Continuous cardiac monitoring, vital signs, obtain 12 lead EKG if ordered  - Administer antiarrhythmic and heart rate control medications as ordered  - Monitor electrolytes and administer replacement therapy as ordered  Outcome: Progressing     Problem: PAIN - ADULT  Goal: Verbalizes/displays adequate comfort level or baseline comfort level  Description: Interventions:  - Encourage patient to monitor pain and request assistance  - Assess pain using appropriate pain scale  - Administer analgesics based on type and severity of pain and evaluate response  - Implement non-pharmacological measures as appropriate and evaluate response  - Consider cultural and social influences on pain and pain management  - Notify physician/advanced practitioner if interventions unsuccessful or patient reports new pain  Outcome: Progressing     Problem: INFECTION - ADULT  Goal: Absence or prevention of progression during hospitalization  Description: INTERVENTIONS:  - Assess and monitor for signs and symptoms of infection  - Monitor lab/diagnostic results  - Monitor all insertion sites, i.e. indwelling lines, tubes, and drains  - Monitor endotracheal if appropriate and nasal secretions for changes in amount and color  - [place] appropriate cooling/warming therapies per order  - Administer medications as ordered  - Instruct and encourage patient and family to use good hand hygiene technique  - Identify and instruct in appropriate isolation precautions for identified infection/condition  Outcome: Progressing     Problem: DISCHARGE PLANNING  Goal: Discharge to home or other facility with appropriate resources  Description: INTERVENTIONS:  - Identify barriers to discharge w/patient and caregiver  - Arrange for needed discharge resources and transportation as appropriate  - Identify discharge learning needs (meds, wound care, etc.)  - Arrange for interpretive services to assist at discharge as needed  - Refer to Case Management Department for coordinating discharge planning if the patient needs post-hospital services based on physician/advanced practitioner order or complex needs related to functional status, cognitive ability, or social support system  Outcome: Progressing     Problem: Knowledge Deficit  Goal: Patient/family/caregiver demonstrates understanding of disease process, treatment plan, medications, and discharge instructions  Description: Complete learning assessment and assess knowledge base.   Interventions:  - Provide teaching at level of understanding  - Provide teaching via preferred learning methods  Outcome: Progressing

## 2023-08-08 NOTE — ASSESSMENT & PLAN NOTE
Acute on chronic. No much labs to compare with but appears had h/h in 5/2023 with hgb of 9.2  Normocytic  Pt w/ chronic fe deficiency on iron supplements. s/p iron studies; will start on IV venofer x 3 days  S/p prbc transfusion x 1  GI following, endoscopic evaluation pending stable pulmonary status. Pt and wife denies hx EGD. Had colonoscopy 5 yrs ago and hx of colonic resection. He is actually due to have a colonoscopy on 8/23  Cont serial h/h monitoring, transfuse to keep > 7. With appropriate increase status post PRBC transfusion and continues to remain stable  On IV PPI  Hb stable.     • Holding eliquis    EGD  IMPRESSION:  • 2 small angioectasias in the fundus of the stomach and body of the stomach; bleeding was observed; placed 2 clips successfully; hemostasis achieved  • Single small angioectasia in the 3rd part of the duodenum; placed 1 clip successfully  • The esophagus appeared normal.  • No evidence of primary malignancy        COLONOSCOPY  IMPRESSION:  • Subcentimeter polyp in the hepatic flexure was removed with cold snare  • One polyp measuring 5-9 mm in the descending colon; bleeding occurred after intervention; removed by cold snare; placed 1 clip successfully; hemostasis achieved  • Small hemorrhoids  • Healthy end-to-side colocolonic anastomosis in the distal descending colon 20 cm from the anal verge  • 2 benign appearing polyps removed as noted above, no evidence of primary malignancy seen

## 2023-08-08 NOTE — ASSESSMENT & PLAN NOTE
Possibly related to acute on chronic anemia and worsening may have been due to volume overload secondary to transfusion. Subsequent labs revealing elevated BNP and repeat chest x-ray revealing pulmonary edema  As outpatient been evaluated per Pulm for possibility of restrictive lung disease. Plan was to proceed with high resolution CT scan in the future  Doubt thromboembolism disease as chronically on eliquis. Had CTA chest done in 5/2023 neg for PE  Received multiple doses of IV diuretics and IV Solu-Medrol in the ED  Status post pulmonary consult. Recommending to hold on further steroids  Completed IV lasix per cards. .  Monitor input/output, daily weights  Cont oxygen supplementation and titrate off as able to

## 2023-08-08 NOTE — ASSESSMENT & PLAN NOTE
Likely secondary to anemia  Care as above  S/p CT head, CT cervical  Recent NM stress (6/2023) no evidence of ischemia  See echo results under elevated trop.

## 2023-08-08 NOTE — ASSESSMENT & PLAN NOTE
Wt Readings from Last 3 Encounters:   08/08/23 103 kg (226 lb)   08/02/23 101 kg (222 lb)   07/26/23 100 kg (221 lb)   No clinical sign of acute decompensation per clinical exam and CXR  Cont to monitor fluid status

## 2023-08-08 NOTE — CONSULTS
Consultation - Pulmonary Medicine   Oseas Mcdonough 69 y.o. male MRN: [id number]  Unit/Bed#: -01 Encounter: [id number]      Assessment & Plan:   Acute hypoxic respiratory failure  Acute anemia  Syncope  Restrictive lung disease  Hx of DVT, Eliquis on hold for anemia  Diastolic CHF  Paroxysmal Afib  Non-MI troponin elevation  CKD  Liver mass    · Patient was requiring 15 L currently 90 to 92% on 6 L. Monitor maintain pulse ox greater than 89%. No baseline oxygen needs. · Chest x-ray negative for acute pulmonary disease  · Patient to receive third dose of IV Lasix today, received 120 mg IV Solu-Medrol yesterday  · No wheezing on exam. Can hold on additional steroids for now. · Closely monitor volume status, especially as patient receiving transfusions  · Echo ordered, f/u results  · Suspect hypoxia 2/2 pulmonary edema  · Continue diuretics & rate control per primary/Cardiology  · Repeat CXR today  · Can start TID nebs while admitted. Resume Anoro at d/c  · Likely will need EGD once respiratory status is more stable. Depending on course, could pursue CT chest while inpatient. · GI following    History of Present Illness   Physician Requesting Consult: Keenan Camp  Reason for Consult / Principal Problem: hypoxia  Hx and PE limited by: none  HPI: Oseas Mcdonough is a 69y.o. year old male who presented yesterday after syncopal event at home. For few days prior, patient had been feeling weak and lightheaded. That morning, his Dexcom alerted with blood sugar of 41. When he walked to the kitchen to get something to eat he passed out and was helped by his wife. He was found to be anemic and hypoxic in the ED. He reports that his shortness of breath has been chronically worsening for months. He established with Dr. Justo Reyes from our pulmonary team last month and was being evaluated for possible asbestos related pulmonary disease. His PFTs revealed restriction and decreased DLCO.   No obstructive lung disease or bronchodilator responsiveness. Today, patient says that he is feeling much better since he was admitted. He feels less short of breath. He has a bit of a dry cough which he says is less bothersome than it was yesterday. He denies preceding fevers or chills prior to hospitalization. No sore throat. Not currently having chest pain. He does not have any lower extremity edema or pain. He reports that he has been taking his Eliquis exactly as prescribed. He has been on it since a provoked DVT he had 3 years ago. He was told that he would need to take it lifelong. He reports he was never diagnosed with any hypercoagulable or bleeding disorders and does not know of any family history of these conditions. He has suspected liver cancer. He has not been able to get his liver biopsy done yet. He does not have any prior history of malignancy. 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 Inpatient consult to Pulmonology  Consult performed by: Guerda Mcdaniels PA-C  Consult ordered by: Ren Henley DO          Review of Systems   Constitutional: Positive for fatigue. Negative for fever. HENT: Negative for congestion. Respiratory: Positive for cough and shortness of breath. Cardiovascular: Negative for leg swelling. Gastrointestinal: Negative for abdominal pain. Neurological: Positive for syncope. All other systems reviewed and are negative.       Historical Information   Past Medical History:   Diagnosis Date   • Atrial fibrillation ([address])    • Deep vein thrombosis (DVT) of right lower extremity ([address])    • Diverticulitis      Past Surgical History:   Procedure Laterality Date   • APPENDECTOMY     • BOWEL RESECTION  2014   • CATARACT EXTRACTION Bilateral      Social History   Social History     Substance and Sexual Activity   Alcohol Use Yes    Comment: Socially Social History     Substance and Sexual Activity   Drug Use Never     E-Cigarette/Vaping   • E-Cigarette Use Never User      E-Cigarette/Vaping Substances   • Nicotine No    • THC No    • CBD No    • Flavoring No      Social History     Tobacco Use   Smoking Status Former   • Packs/day: 1.00   • Years: 30.00   • Total pack years: 30.00   • Types: Cigarettes   • Start date:    • Quit date: 12   • Years since quittin.6   Smokeless Tobacco Never     Occupational History: construction    Family History:   Family History   Problem Relation Age of Onset   • Hypertension Mother    • Bone cancer Father    • Diabetes type II Sister    • Diabetes type II Sister    • Esophageal cancer Brother        Meds/Allergies   all current active meds have been reviewed    No Known Allergies    Objective   Vitals: Blood pressure 140/78, pulse 95, temperature (!) 97.4 °F (36.3 °C), resp. rate 18, height 6' (1.829 m), weight 103 kg (226 lb 6.6 oz), SpO2 93 %. ,Body mass index is 30.71 kg/m². Intake/Output Summary (Last 24 hours) at 2023 0953  Last data filed at 2023 0735  Gross per 24 hour   Intake 378.33 ml   Output 1900 ml   Net -1521.67 ml     Invasive Devices     Peripheral Intravenous Line  Duration           Peripheral IV 23 Distal;Right;Ventral (anterior) Forearm 1 day    Peripheral IV 23 Left Hand 1 day                Physical Exam  Vitals reviewed. Constitutional:       General: He is not in acute distress. Appearance: He is not toxic-appearing. HENT:      Head: Normocephalic and atraumatic. Eyes:      General: No scleral icterus. Cardiovascular:      Rate and Rhythm: Normal rate. Pulmonary:      Effort: Pulmonary effort is normal.      Breath sounds: Rales present. Musculoskeletal:         General: No tenderness or signs of injury. Right lower leg: No edema. Left lower leg: No edema. Skin:     General: Skin is warm and dry.    Neurological:      General: No focal deficit present. Mental Status: He is alert. Mental status is at baseline. Psychiatric:         Mood and Affect: Mood normal.         Behavior: Behavior normal.         Lab Results: I have personally reviewed pertinent lab results. Imaging Studies: I have personally reviewed pertinent reports. and I have personally reviewed pertinent films in PACS  EKG, Pathology, and Other Studies: I have personally reviewed pertinent reports.     VTE Prophylaxis: Reason for no pharmacologic prophylaxis anemia    Code Status: Level 1 - Full Code  Advance Directive and Living Will:      Power of :    POLST:

## 2023-08-08 NOTE — CONSULTS
Consult - Cardiology   [facility] 69 y.o. male MRN: [id number]  Unit/Bed#: -01 Encounter: [id number]        Reason For Consult: Shortness of breath  Outpatient Cardiologist: Dr. Timo Lagunas:  1. Acute respiratory failure, suspect secondary to pulmonary edema  a. Initial chest x-ray clear this admission but patient noted to be tachypneic and hypoxic after receiving a blood transfusion, suspect that he may have some mild transfusion associated circulatory overload  b. At the time of this consult he is on 6 L nasal cannula oxygen  2. Non-MI troponin elevation 3871 --> 4093 --> 5115 --> 3759  a. This is likely secondary to his acute issues including respiratory failure from volume overload as well as severe anemia requiring transfusion of blood  b. 5/2023 Echo: LVEF 10%, grade 1 diastolic dysfunction, mild mitral insufficiency  c. 6/2023 NST: No ischemia  3. Acute anemia  a. Hemoglobin 6.4 on arrival status post 1 unit of blood now currently trending in the high 7's  4. Paroxysmal atrial fibrillation  a. 5/2023 Holter: Sinus rhythm average heart rate in the 80s  b. Prehospital he is on Eliquis both for DVT and PAF  c. AV blocking Rx: Toprol 50 daily  5. CKD, baseline creat 1.3- 1.5  6. History of DVT for which he takes Eliquis  7. Type 2 diabetes    PLAN/ DISCUSSION:     • Update echo  • Give 40 of IV Lasix, consider another dose later today as well pending respone  • Repeat chest x-ray  • Eliquis on hold, restart once reasonable from GI perspective  • Continue Toprol-XL 50 daily    History Of Present Illness:   Mindi Lora is a pleasant 41-year-old gentleman who just establish care with our group. He had a full cardiac work-up in the spring and early summer of this year including Holter monitor, echocardiogram, and stress test.  These tests were unremarkable. He has been doing fine from a CV perspective. Presently he is in the hospital having sustained a syncopal episode at home.   His wife woke him up yesterday very early as his blood glucose monitor was beeping. His glucose was noted to be 40. He got out of bed and walk to his kitchen where he then sat down in a chair and had a syncopal episode falling onto the floor. 911 was called. Was brought to the hospital for evaluation. He was found to be severely anemic and given a unit of blood. He was also given IV glucose with improvement of his blood sugars. Yesterday evening he was noted to be tachypneic and hypoxic. He required Overnight. Troponins were trended which were elevated as above. There is also concern for congestive heart failure for which we are asked see him in consultation. Past Medical History:        Past Medical History:   Diagnosis Date   • Atrial fibrillation ([address])    • Deep vein thrombosis (DVT) of right lower extremity ([address])    • Diverticulitis       Past Surgical History:   Procedure Laterality Date   • APPENDECTOMY     • BOWEL RESECTION  2014   • CATARACT EXTRACTION Bilateral         Allergy:        No Known Allergies    Medications:       Prior to Admission medications    Medication Sig Start Date End Date Taking?  Authorizing Provider   ferrous sulfate 325 (65 Fe) mg tablet Take 325 mg by mouth daily with breakfast   Yes Historical Provider, MD   saxagliptin (Onglyza) 5 MG tablet Take 5 mg by mouth daily with dinner   Yes Historical Provider, MD   sertraline (ZOLOFT) 25 mg tablet Take 25 mg by mouth daily   Yes Historical Provider, MD   tamsulosin (FLOMAX) 0.4 mg Take 0.4 mg by mouth daily with dinner   Yes Historical Provider, MD   acetaminophen (TYLENOL) 650 mg CR tablet Take 650 mg by mouth every 8 (eight) hours as needed for mild pain    Historical Provider, MD   Continuous Blood Gluc Sensor (Dexcom G6 Sensor) MISC Use daily as directed for CGM - Change every 10 days 9/8/22   Yolette Briscoe,    Continuous Blood Gluc Transmit (Dexcom G6 Transmitter) MISC Use daily as directed for CGM - Change every 3 months 9/8/22   Dino Barrera DO   dulaglutide (Trulicity) 8.66 ZW/8.2RX injection Inject 0.5 mL (0.75 mg total) under the skin once a week 4/5/23   Alat Paredes MD   Eliquis 5 MG take 1 tablet by mouth twice a day 4/3/23   Ernesto Griffith MD   insulin aspart (NovoLOG FlexPen) 100 UNIT/ML injection pen Inject 40 Units under the skin 3 (three) times a day with meals Inject 40 units before breakfast  Inject 35 units before dinner  Patient taking differently: Inject 40 Units under the skin 3 (three) times a day with meals 28 u breakfast time, 38 u lunchtime, 38 u dinnertime 3/28/23 8/2/23  NADIA Avila   Insulin Pen Needle (Pen Needles) 32G X 4 MM MISC Use 4 (four) times a day 9/15/22 9/15/23  Ernesto Griffith MD   Levemir FlexPen 100 units/mL injection pen INJECT 75 UNITS UNDER THE SKIN DAILY AT BEDTIME 7/3/23   Dino Barrera DO   metoprolol succinate (TOPROL-XL) 50 mg 24 hr tablet Take 50 mg by mouth daily 6/20/23   Historical Provider, MD   mometasone (ELOCON) 0.1 % cream Apply topically daily for 7 days 6/5/23 8/2/23  Alta Paredes MD   Multiple Vitamin (MULTIVITAMIN ADULT PO) Take 1 tablet by mouth daily    Historical Provider, MD   pantoprazole (PROTONIX) 40 mg tablet Take 1 tablet (40 mg total) by mouth in the morning  Patient taking differently: Take 40 mg by mouth 2 (two) times a day 2/13/23   Alta Paredes MD   Potassium Citrate ER 15 MEQ (1620 MG) TBCR Take 1 tablet by mouth in the morning 1 tab po daily  Patient taking differently: Take 1 tablet by mouth 2 (two) times a day 9/1/22   Ernesto Griffith MD   pregabalin (LYRICA) 100 mg capsule take 1 capsule by mouth three times a day 7/21/23   Alta Paredes MD   SUPER B COMPLEX/C PO Take 1 tablet by mouth daily    Historical Provider, MD   traZODone (DESYREL) 100 mg tablet take 2 tablets by mouth at bedtime 6/20/23   Ernesto Griffith MD   umeclidinium-vilanterol 62.5-25 mcg/actuation inhaler Inhale 1 puff daily 5/10/23   Wai Greene MD       Family History:     Family History   Problem Relation Age of Onset   • Hypertension Mother    • Bone cancer Father    • Diabetes type II Sister    • Diabetes type II Sister    • Esophageal cancer Brother         Social History:       Social History     Socioeconomic History   • Marital status: /Civil Union     Spouse name: None   • Number of children: None   • Years of education: None   • Highest education level: None   Occupational History   • None   Tobacco Use   • Smoking status: Former     Packs/day: 1.00     Years: 30.00     Total pack years: 30.00     Types: Cigarettes     Start date:      Quit date:      Years since quittin.6   • Smokeless tobacco: Never   Vaping Use   • Vaping Use: Never used   Substance and Sexual Activity   • Alcohol use: Yes     Comment: Socially   • Drug use: Never   • Sexual activity: None   Other Topics Concern   • None   Social History Narrative   • None     Social Determinants of Health     Financial Resource Strain: Medium Risk (2022)    Overall Financial Resource Strain (CARDIA)    • Difficulty of Paying Living Expenses: Somewhat hard   Food Insecurity: No Food Insecurity (2023)    Hunger Vital Sign    • Worried About Running Out of Food in the Last Year: Never true    • Ran Out of Food in the Last Year: Never true   Transportation Needs: No Transportation Needs (2023)    PRAPARE - Transportation    • Lack of Transportation (Medical): No    • Lack of Transportation (Non-Medical):  No   Physical Activity: Not on file   Stress: Not on file   Social Connections: Not on file   Intimate Partner Violence: Not on file   Housing Stability: Low Risk  (2023)    Housing Stability Vital Sign    • Unable to Pay for Housing in the Last Year: No    • Number of Places Lived in the Last Year: 1    • Unstable Housing in the Last Year: No       ROS:  14 point ROS negative except as outlined above  Remainder review of systems is negative    Exam:  General:  alert, oriented and in no distress, cooperative  Head: Normocephalic, atraumatic. Eyes:  EOMI. Pupils - equal, round, reactive to accomodation. No icterus. Normal Conjunctiva. Oropharynx: moist and normal-appearing mucosa  Neck: supple, symmetrical, trachea midline and no JVD  Heart:  RRR, No: murmer, rub or gallop, S1 & S2 normal   Respiratory effort / Chest Inspection: unlabored  Lungs:  normal air entry, lungs clear to auscultation and no rales, rhonchi or wheezing   Abdomen: flat, normal findings: bowel sounds normal and soft, non-tender  Lower Limbs:  no pitting edema  Pulses[de-identified]  RLE - DP: present 2+                 LLE - DP: present 2+  Musculoskeletal: ROM grossly normal        DATA:      ECG:                     Telemetry: Normal sinus rhythm, . HR 80's          Echocardiogram:           Ischemic Testing:         Weights: Wt Readings from Last 3 Encounters:   08/07/23 103 kg (226 lb 6.6 oz)   08/02/23 101 kg (222 lb)   07/26/23 100 kg (221 lb)   , Body mass index is 30.71 kg/m².          Lab Studies:             Results from last 7 days   Lab Units 08/08/23  0952 08/07/23  2219 08/07/23  2041 08/07/23  0515   WBC Thousand/uL  --   --   --  10.34*   HEMOGLOBIN g/dL 7.8* 7.9* 7.9* 6.4*   HEMATOCRIT % 29.5* 29.7* 30.3* 24.7*   PLATELETS Thousands/uL  --   --   --  210   ,   Results from last 7 days   Lab Units 08/08/23  0442 08/07/23  0515   POTASSIUM mmol/L 4.6 4.5   CHLORIDE mmol/L 108 111*   CO2 mmol/L 20* 19*   BUN mg/dL 49* 55*   CREATININE mg/dL 1.53* 1.66*   CALCIUM mg/dL 8.7 8.6   ALK PHOS U/L  --  69   ALT U/L  --  36   AST U/L  --  39

## 2023-08-08 NOTE — ASSESSMENT & PLAN NOTE
Wt Readings from Last 3 Encounters:   08/08/23 103 kg (226 lb)   08/02/23 101 kg (222 lb)   07/26/23 100 kg (221 lb)   Likely volume overload due to his PRBC transfusion  Status post repeat chest x-ray revealing pulmonary edema  Subsequent labs after transfusion revealing elevated BNP  Transitioned to oral lasix per cards.   Cardiology on board

## 2023-08-08 NOTE — ASSESSMENT & PLAN NOTE
Possibly related to acute on chronic anemia and worsening may have been due to volume overload secondary to transfusion. Subsequent labs revealing elevated BNP and repeat chest x-ray revealing pulmonary edema  As outpatient been evaluated per Pulm for possibility of restrictive lung disease. Plan was to proceed with high resolution CT scan in the future  Doubt thromboembolism disease as chronically on eliquis. Had CTA chest done in 5/2023 neg for PE  Received multiple doses of IV diuretics and IV Solu-Medrol in the ED  Status post pulmonary consult. Recommending to hold on further steroids  Cont on diuresis with IV lasix daily.   Monitor input/output, daily weights  Cont oxygen supplementation and titrate off as able to

## 2023-08-08 NOTE — ASSESSMENT & PLAN NOTE
Lab Results   Component Value Date    EGFR 43 08/08/2023    EGFR 39 08/07/2023    EGFR 57 (L) 07/10/2023    CREATININE 1.53 (H) 08/08/2023    CREATININE 1.66 (H) 08/07/2023    CREATININE 1.29 (H) 07/10/2023   Still relatively w/in baseline range

## 2023-08-08 NOTE — ASSESSMENT & PLAN NOTE
Likely secondary to anemia  Care as above  S/p CT head, CT cervical  Recent NM stress (6/2023) no evidence of ischemia  S/p echo [5/2023], follow-up repeat echo

## 2023-08-08 NOTE — PROGRESS NOTES
Progress note - Gastroenterology   Jl Ford 69 y.o. male MRN: [id number]  Unit/Bed#: -01 Encounter: [id number]    ASSESSMENT and PLAN    69y.o. year old male with past medical history of diverticulitis with resection, atrial fibrillation currently on Eliquis, DVT, GERD, and recent finding of liver mass currently being evaluated by radiation oncology presented to the emergency room this morning after patient lost consciousness. Hemoglobin on admission 6.4. He is not aware of having anemia in the past.  He denies any melena or overt GI bleeding.     1. Acute anemia  Patient is not aware of anemia history however his last hemoglobin in May of this year was 9.2 and per EMR patient has been taking iron oral supplement. Hemoglobin 6.4 on admission. No overt GI bleeding noted however patient states there was blood in stool when he lost consciousness this morning.     -Patient can have clear liquids if tolerated with current pulmonary status.  -Currently holding Eliquis  -Continue PPI, Pantoprazole 40 mg IV push every 12 hours  -Monitor hemoglobin, transfuse as necessary, less than 7. Hgb 7.8 with a.m. labs. -Discussed with Hospitalist initiating IV iron for iron deficiency anemia     Patient's does not remember having an EGD in the past.  Does note colonoscopy approximately 5 years ago and history of colon resection with diverticulitis.     Discuss patient with Dr. Adriana Pablo on rounds. Consider EGD pending patient's pulmonary stability.     2.  Gastroesophageal reflux disease  Patient does admit to having acid reflux symptoms however states while taking pantoprazole 40 mg daily he is symptom-free. Pantoprazole 40 mg IV push every 12 hours currently for acute anemia.     3. History of diverticulitis with resection  Per EMR, patient did have colonoscopy approximately 5 years ago at [facility].       4.  Hepatic mass  MRI 7/6/2023; numerous reidentified hepatic lesions suspicious for metastases.   Patient states he was to have a consult with oncology today however patient admitted to hospital.  Patient was also recommended for follow-up colonoscopy which has been scheduled for later this month 8/23. Patient had been ordered for liver biopsy as an outpatient that has not been completed to date. Chief Complaint   Patient presents with   • Fall     EMS from home; wife woke pt due to dexcom reading sugar at 41, walked pt to kitchen and pt blacked out falling and hitting R side of head, takes Eliquis; EMS gave 15g PO glucose PTA       SUBJECTIVE/HPI   Patient denies any abdominal pain, nausea or vomiting. He is tolerating clear liquids at this time. No overt GI bleeding noted by patient's nurse. Awaiting a.m. blood work for hemoglobin. Discussed with patient IV iron will be initiated for his iron deficiency anemia. /78   Pulse 95   Temp (!) 97.4 °F (36.3 °C)   Resp 18   Ht 6' (1.829 m)   Wt 103 kg (226 lb 6.6 oz)   SpO2 93%   BMI 30.71 kg/m²     PHYSICALEXAM  General appearance: alert, appears stated age and cooperative, Pallor.    Eyes: PERLLA, EOMI, no icterus   Head: Normocephalic, without obvious abnormality, atraumatic  Lungs: Decreased bilaterally, O2 nasal cannula 6 L  Heart: regular rate and rhythm, S1, S2 normal, no murmur, click, rub or gallop  Abdomen: soft, non-tender; bowel sounds normal; no masses,  no organomegaly  Extremities: extremities normal, atraumatic, no cyanosis or edema  Neurologic: Grossly normal    Lab Results   Component Value Date    CALCIUM 8.7 08/08/2023    K 4.6 08/08/2023    CO2 20 (L) 08/08/2023     08/08/2023    BUN 49 (H) 08/08/2023    CREATININE 1.53 (H) 08/08/2023     Lab Results   Component Value Date    WBC 10.34 (H) 08/07/2023    HGB 7.9 (L) 08/07/2023    HCT 29.7 (L) 08/07/2023    MCV 63 (L) 08/07/2023     08/07/2023     Lab Results   Component Value Date    ALT 36 08/07/2023    AST 39 08/07/2023    ALKPHOS 69 08/07/2023     No results found for:  "AMYLASE"  No results found for: "LIPASE"  Lab Results   Component Value Date    IRON 14 (L) 08/07/2023    TIBC 462 (H) 08/07/2023    FERRITIN 10 (L) 08/07/2023     No results found for: "INR"

## 2023-08-08 NOTE — QUICK NOTE
1930: Brought to patient's bedside due to continual need for 6 L NC with tachycardia and some mild tachypnea. Patient had just been brought up from the ER. Admitted for syncope, anemia, hypoglycemia and possible metastatic cancer workup. Patient had been feeling short of breath for weeks but has been worsening over the past couple of days. He feels his breathing has been the same throughout the day and is no worse currently. However, stated he felt a little bit better after receiving Lasix earlier in the day for a brief period. Decreased breath sounds bilaterally with crackles in the lower lungs.     -Placed order for additional IV Lasix and IV steroids.   -Ordered lab for troponin and BNP  -EKG  -Informed nursing patient was okay to receive planned blood transfusion. 2230: Patient with continued tachycardia and mild tachypnea. Not requiring increased oxygen requirement but patient with increased work of breathing. Order placed for BiPAP initiation. Patient visibly more comfortable. CC aware. Troponin [id number], 4093, 5115. BNP 1073  EKG obtained. Patient denies having chest pain currently or at all throughout the day. Cardiology contacted. Believes increased troponin is due to demand from GI bleed. Patient unable to be started on IV heparin or aspirin due to GI bleed per cardiology. Telemetry.   Cardiology will see in consult in the morning

## 2023-08-08 NOTE — ASSESSMENT & PLAN NOTE
Lab Results   Component Value Date    HGBA1C 7.6 (H) 01/06/2023       Recent Labs     08/07/23  1242 08/07/23 2002 08/08/23  0732 08/08/23  1110   POCGLU 272* 246* 257* 244*       Blood Sugar Average: Last 72 hrs:  (P) 244.2   Given only on clears, on decreased insulin regimen.  Cont to monitor and titrate up for optimal control

## 2023-08-09 ENCOUNTER — HOSPITAL ENCOUNTER (OUTPATIENT)
Dept: CT IMAGING | Facility: HOSPITAL | Age: 78
Discharge: HOME | End: 2023-08-09
Attending: RADIOLOGY

## 2023-08-09 LAB
GLUCOSE SERPL-MCNC: 146 MG/DL (ref 65–140)
GLUCOSE SERPL-MCNC: 177 MG/DL (ref 65–140)
GLUCOSE SERPL-MCNC: 205 MG/DL (ref 65–140)
GLUCOSE SERPL-MCNC: 258 MG/DL (ref 65–140)
HCT VFR BLD AUTO: 28.7 % (ref 36.5–49.3)
HCT VFR BLD AUTO: 29.2 % (ref 36.5–49.3)
HGB BLD-MCNC: 7.7 G/DL (ref 12–17)
HGB BLD-MCNC: 7.7 G/DL (ref 12–17)

## 2023-08-09 PROCEDURE — 82948 REAGENT STRIP/BLOOD GLUCOSE: CPT

## 2023-08-09 PROCEDURE — 99232 SBSQ HOSP IP/OBS MODERATE 35: CPT | Performed by: INTERNAL MEDICINE

## 2023-08-09 PROCEDURE — 94760 N-INVAS EAR/PLS OXIMETRY 1: CPT

## 2023-08-09 PROCEDURE — 94640 AIRWAY INHALATION TREATMENT: CPT

## 2023-08-09 PROCEDURE — 99232 SBSQ HOSP IP/OBS MODERATE 35: CPT | Performed by: STUDENT IN AN ORGANIZED HEALTH CARE EDUCATION/TRAINING PROGRAM

## 2023-08-09 PROCEDURE — 85018 HEMOGLOBIN: CPT

## 2023-08-09 PROCEDURE — 85014 HEMATOCRIT: CPT

## 2023-08-09 PROCEDURE — C9113 INJ PANTOPRAZOLE SODIUM, VIA: HCPCS | Performed by: INTERNAL MEDICINE

## 2023-08-09 RX ORDER — FUROSEMIDE 40 MG/1
40 TABLET ORAL DAILY
Status: DISCONTINUED | OUTPATIENT
Start: 2023-08-10 | End: 2023-08-10

## 2023-08-09 RX ADMIN — LEVALBUTEROL HYDROCHLORIDE 1.25 MG: 1.25 SOLUTION RESPIRATORY (INHALATION) at 20:57

## 2023-08-09 RX ADMIN — PANTOPRAZOLE SODIUM 40 MG: 40 INJECTION, POWDER, FOR SOLUTION INTRAVENOUS at 20:46

## 2023-08-09 RX ADMIN — INSULIN LISPRO 2 UNITS: 100 INJECTION, SOLUTION INTRAVENOUS; SUBCUTANEOUS at 09:12

## 2023-08-09 RX ADMIN — PREGABALIN 100 MG: 100 CAPSULE ORAL at 17:15

## 2023-08-09 RX ADMIN — INSULIN LISPRO 1 UNITS: 100 INJECTION, SOLUTION INTRAVENOUS; SUBCUTANEOUS at 21:56

## 2023-08-09 RX ADMIN — PREGABALIN 100 MG: 100 CAPSULE ORAL at 09:11

## 2023-08-09 RX ADMIN — IPRATROPIUM BROMIDE 0.5 MG: 0.5 SOLUTION RESPIRATORY (INHALATION) at 13:13

## 2023-08-09 RX ADMIN — POLYETHYLENE GLYCOL 3350, SODIUM SULFATE ANHYDROUS, SODIUM BICARBONATE, SODIUM CHLORIDE, POTASSIUM CHLORIDE 4000 ML: 236; 22.74; 6.74; 5.86; 2.97 POWDER, FOR SOLUTION ORAL at 20:48

## 2023-08-09 RX ADMIN — IRON SUCROSE 100 MG: 20 INJECTION, SOLUTION INTRAVENOUS at 09:11

## 2023-08-09 RX ADMIN — INSULIN DETEMIR 18 UNITS: 100 INJECTION, SOLUTION SUBCUTANEOUS at 21:53

## 2023-08-09 RX ADMIN — PANTOPRAZOLE SODIUM 40 MG: 40 INJECTION, POWDER, FOR SOLUTION INTRAVENOUS at 09:12

## 2023-08-09 RX ADMIN — INSULIN LISPRO 6 UNITS: 100 INJECTION, SOLUTION INTRAVENOUS; SUBCUTANEOUS at 12:11

## 2023-08-09 RX ADMIN — LEVALBUTEROL HYDROCHLORIDE 1.25 MG: 1.25 SOLUTION RESPIRATORY (INHALATION) at 13:13

## 2023-08-09 RX ADMIN — LEVALBUTEROL HYDROCHLORIDE 1.25 MG: 1.25 SOLUTION RESPIRATORY (INHALATION) at 07:27

## 2023-08-09 RX ADMIN — IPRATROPIUM BROMIDE 0.5 MG: 0.5 SOLUTION RESPIRATORY (INHALATION) at 07:27

## 2023-08-09 RX ADMIN — PREGABALIN 100 MG: 100 CAPSULE ORAL at 20:46

## 2023-08-09 RX ADMIN — TAMSULOSIN HYDROCHLORIDE 0.4 MG: 0.4 CAPSULE ORAL at 17:15

## 2023-08-09 RX ADMIN — FUROSEMIDE 40 MG: 10 INJECTION, SOLUTION INTRAMUSCULAR; INTRAVENOUS at 09:11

## 2023-08-09 RX ADMIN — SERTRALINE HYDROCHLORIDE 25 MG: 25 TABLET ORAL at 09:25

## 2023-08-09 RX ADMIN — METOPROLOL SUCCINATE 50 MG: 50 TABLET, EXTENDED RELEASE ORAL at 09:11

## 2023-08-09 RX ADMIN — INSULIN LISPRO 5 UNITS: 100 INJECTION, SOLUTION INTRAVENOUS; SUBCUTANEOUS at 09:12

## 2023-08-09 RX ADMIN — INSULIN LISPRO 5 UNITS: 100 INJECTION, SOLUTION INTRAVENOUS; SUBCUTANEOUS at 12:11

## 2023-08-09 RX ADMIN — MULTIPLE VITAMINS W/ MINERALS TAB 1 TABLET: TAB ORAL at 09:11

## 2023-08-09 NOTE — PROGRESS NOTES
INTERNAL MEDICINE  PROGRESS NOTE     Name: Lauren Auguste   Age & Sex: 69 y.o. male   MRN: [id number]  Unit/Bed#: -01   Encounter: [id number]    PATIENT INFORMATION     Name: Lauren Auguste   Age & Sex: 69 y.o. male   MRN: [id number]  Hospital Stay Days: 2    ASSESSMENT/PLAN     Principal Problem:    Anemia  Active Problems:    Stage 3 chronic kidney disease, unspecified whether stage 3a or 3b CKD (HCC)    Type 2 diabetes mellitus with neurologic complication, with long-term current use of insulin (HCC)    Atrial fibrillation, unspecified type ([address])    Acute respiratory failure with hypoxia (HCC)    Syncope    Acute on chronic diastolic HF (heart failure) (HCC)    History of DVT (deep vein thrombosis)    Elevated troponin    Hepatic flexure mass      * Anemia  Assessment & Plan  Acute on chronic. No much labs to compare with but appears had h/h in 5/2023 with hgb of 9.2  Normocytic  Pt w/ chronic fe deficiency on iron supplements. s/p iron studies; will start on IV venofer x 3 days  S/p prbc transfusion x 1  GI following, endoscopic evaluation pending stable pulmonary status. Pt and wife denies hx EGD. Had colonoscopy 5 yrs ago and hx of colonic resection. He is actually due to have a colonoscopy on 8/23  Cont serial h/h monitoring, transfuse to keep > 7. With appropriate increase status post PRBC transfusion and continues to remain stable  On clears  On IV PPI  Hb stable. GI will proceed w/ EGD/Colon likely Friday    Holding eliquis    Hepatic flexure mass  Assessment & Plan  Being wkup as outpt.  Arranged for NM liver spec scan and liver bx as outpt  Planned also to have colonoscopy on 8/23    Elevated troponin  Assessment & Plan  Likely non mi secondary to anemia, acute resp failure  Cardiology on board  Echo pending     History of DVT (deep vein thrombosis)  Assessment & Plan  On Eliquis, holding in setting of anemia    Acute on chronic diastolic HF (heart failure) (HCC)  Assessment & Plan  Wt Readings from Last 3 Encounters:   08/08/23 103 kg (226 lb)   08/02/23 101 kg (222 lb)   07/26/23 100 kg (221 lb)   Likely volume overload due to his PRBC transfusion  Status post repeat chest x-ray revealing pulmonary edema  Subsequent labs after transfusion revealing elevated BNP  Will place on IV Lasix 40 mg daily  Cardiology on board  Repeat echocardiogram pending          Syncope  Assessment & Plan  Likely secondary to anemia  Care as above  S/p CT head, CT cervical  Recent NM stress (6/2023) no evidence of ischemia  S/p echo [5/2023], follow-up repeat echo      Acute respiratory failure with hypoxia (HCC)  Assessment & Plan  Possibly related to acute on chronic anemia and worsening may have been due to volume overload secondary to transfusion. Subsequent labs revealing elevated BNP and repeat chest x-ray revealing pulmonary edema  As outpatient been evaluated per Pulm for possibility of restrictive lung disease. Plan was to proceed with high resolution CT scan in the future  Doubt thromboembolism disease as chronically on eliquis. Had CTA chest done in 5/2023 neg for PE  Received multiple doses of IV diuretics and IV Solu-Medrol in the ED  Status post pulmonary consult. Recommending to hold on further steroids  Cont on diuresis with IV lasix daily. Monitor input/output, daily weights  Cont oxygen supplementation and titrate off as able to    Atrial fibrillation, unspecified type [facility])  Assessment & Plan  Rate controlled  Holding eliquis given anemia    Type 2 diabetes mellitus with neurologic complication, with long-term current use of insulin [facility])  Assessment & Plan  Lab Results   Component Value Date    HGBA1C 7.6 (H) 01/06/2023       Recent Labs     08/07/23  1242 08/07/23 2002 08/08/23  0732 08/08/23  1110   POCGLU 272* 246* 257* 244*       Blood Sugar Average: Last 72 hrs:  (P) 244.2   Given only on clears, on decreased insulin regimen.  Cont to monitor and titrate up for optimal control    Stage 3 chronic kidney disease, unspecified whether stage 3a or 3b CKD [facility])  Assessment & Plan  Lab Results   Component Value Date    EGFR 43 2023    EGFR 39 2023    EGFR 57 (L) 07/10/2023    CREATININE 1.53 (H) 2023    CREATININE 1.66 (H) 2023    CREATININE 1.29 (H) 07/10/2023   Still relatively w/in baseline range        Disposition: pending EGD/Colon     SUBJECTIVE     Patient seen and examined. No acute events overnight. Denies any chest pain, SOB, abdominal pain, nausea, vomiting, fever or chills. OBJECTIVE     Vitals:    23 0527 23 0727 23 0733 23 1313   BP:   143/73    Pulse:   87    Resp:       Temp:       TempSrc:       SpO2:  91% 99% 96%   Weight: 97 kg (213 lb 13.5 oz)      Height:          Temperature:   Temp (24hrs), Av.6 °F (36.4 °C), Min:97.6 °F (36.4 °C), Max:97.6 °F (36.4 °C)    Temperature: 97.6 °F (36.4 °C)  Intake & Output:  I/O        07 07 07 07 0701  08/10 07    P. O.  420 180    Blood 258.3      IV Piggyback       Total Intake(mL/kg) 258.3 (2.5) 420 (4.3) 180 (1.9)    Urine (mL/kg/hr) 1900 (0.8) 2600 (1.1) 450 (1.5)    Total Output 1900 2600 450    Net -1641.7 -2180 -270               Weights:   IBW (Ideal Body Weight): 77.6 kg    Body mass index is 29 kg/m². Weight (last 2 days)     Date/Time Weight    23 0527 97 (213.85)    23 1400 98.2 (216.49)    23 1031 103 (226)    23 0507 103 (226.41)        Physical Exam  Constitutional:       General: He is not in acute distress. Appearance: He is not ill-appearing, toxic-appearing or diaphoretic. HENT:      Head: Normocephalic and atraumatic. Cardiovascular:      Rate and Rhythm: Normal rate and regular rhythm. Pulses: Normal pulses. Heart sounds: Normal heart sounds. No gallop. Pulmonary:      Effort: Pulmonary effort is normal.      Breath sounds: Normal breath sounds. No wheezing or rales. Abdominal:      Tenderness:  There is no abdominal tenderness. There is no right CVA tenderness or left CVA tenderness. Hernia: No hernia is present. Musculoskeletal:      Right lower leg: No edema. Left lower leg: No edema. Neurological:      Mental Status: He is oriented to person, place, and time. Mental status is at baseline. Psychiatric:         Mood and Affect: Mood normal.         Behavior: Behavior normal.       LABORATORY DATA     Labs: I have personally reviewed pertinent reports. Results from last 7 days   Lab Units 08/09/23  0815 08/09/23  0036 08/08/23  1659 08/07/23  2041 08/07/23  0515   WBC Thousand/uL  --   --   --   --  10.34*   HEMOGLOBIN g/dL 7.7* 7.7* 8.4*   < > 6.4*   HEMATOCRIT % 29.2* 28.7* 31.9*   < > 24.7*   PLATELETS Thousands/uL  --   --   --   --  210   NEUTROS PCT %  --   --   --   --  61   MONOS PCT %  --   --   --   --  16*   EOS PCT %  --   --   --   --  3    < > = values in this interval not displayed. Results from last 7 days   Lab Units 08/08/23  0442 08/07/23  0515   POTASSIUM mmol/L 4.6 4.5   CHLORIDE mmol/L 108 111*   CO2 mmol/L 20* 19*   BUN mg/dL 49* 55*   CREATININE mg/dL 1.53* 1.66*   CALCIUM mg/dL 8.7 8.6   ALK PHOS U/L  --  69   ALT U/L  --  36   AST U/L  --  39                            IMAGING & DIAGNOSTIC TESTING     Radiology Results: I have personally reviewed pertinent reports. XR chest portable    Result Date: 8/8/2023  Impression: CHF and pulmonary edema, superimposed on emphysema. Small right pleural effusion. Workstation performed: [id number]     TRAUMA - CT spine cervical wo contrast    Result Date: 8/7/2023  Impression: No cervical spine fracture or traumatic malalignment. Workstation performed: [id number]     TRAUMA - CT head wo contrast    Result Date: 8/7/2023  Impression: No acute intracranial process. No skull fracture. Chronic microangiopathy.  Workstation performed: [id number]     XR Trauma chest portable    Result Date: 8/7/2023  Impression: No acute cardiopulmonary findings. Workstation performed: [id number]     Other Diagnostic Testing: I have personally reviewed pertinent reports. ACTIVE MEDICATIONS     Current Facility-Administered Medications   Medication Dose Route Frequency   • acetaminophen (TYLENOL) tablet 650 mg  650 mg Oral Q6H PRN   • [START ON 8/10/2023] furosemide (LASIX) tablet 40 mg  40 mg Oral Daily   • hydrALAZINE (APRESOLINE) injection 10 mg  10 mg Intravenous Q6H PRN   • insulin detemir (LEVEMIR) subcutaneous injection 18 Units  18 Units Subcutaneous HS   • insulin lispro (HumaLOG) 100 units/mL subcutaneous injection 1-5 Units  1-5 Units Subcutaneous HS   • insulin lispro (HumaLOG) 100 units/mL subcutaneous injection 2-12 Units  2-12 Units Subcutaneous TID AC   • insulin lispro (HumaLOG) 100 units/mL subcutaneous injection 5 Units  5 Units Subcutaneous TID With Meals   • ipratropium (ATROVENT) 0.02 % inhalation solution 0.5 mg  0.5 mg Nebulization TID   • iron sucrose (VENOFER) 100 mg in sodium chloride 0.9 % 100 mL IVPB  100 mg Intravenous Daily   • levalbuterol (XOPENEX) inhalation solution 1.25 mg  1.25 mg Nebulization TID   • metoprolol succinate (TOPROL-XL) 24 hr tablet 50 mg  50 mg Oral Daily   • multivitamin-minerals (CENTRUM) tablet 1 tablet  1 tablet Oral Daily   • ondansetron (ZOFRAN) injection 4 mg  4 mg Intravenous Q6H PRN   • pantoprazole (PROTONIX) injection 40 mg  40 mg Intravenous Q12H RANDALL   • polyethylene glycol (GOLYTELY) bowel prep 4,000 mL  4,000 mL Oral Once   • pregabalin (LYRICA) capsule 100 mg  100 mg Oral TID   • sertraline (ZOLOFT) tablet 25 mg  25 mg Oral Daily   • tamsulosin (FLOMAX) capsule 0.4 mg  0.4 mg Oral Daily With Dinner   • traZODone (DESYREL) tablet 200 mg  200 mg Oral HS       VTE Pharmacologic Prophylaxis: RX contraindicated due to: bleeding  VTE Mechanical Prophylaxis: sequential compression device    Portions of the record may have been created with voice recognition software.   Occasional wrong word or "sound a like" substitutions may have occurred due to the inherent limitations of voice recognition software.   Read the chart carefully and recognize, using context, where substitutions have occurred.  ==  Sd Carbajal MD  [id number]

## 2023-08-09 NOTE — PLAN OF CARE
Problem: Potential for Falls  Goal: Patient will remain free of falls  Description: INTERVENTIONS:  - Educate patient/family on patient safety including physical limitations  - Instruct patient to call for assistance with activity   - Consult OT/PT to assist with strengthening/mobility   - Keep Call bell within reach  - Keep bed low and locked with side rails adjusted as appropriate  - Keep care items and personal belongings within reach  - Initiate and maintain comfort rounds  - Make Fall Risk Sign visible to staff  - Offer Toileting every 2 Hours, in advance of need  - Initiate/Maintain bed alarm  - Obtain necessary fall risk management equipment:   - Apply yellow socks and bracelet for high fall risk patients  - Consider moving patient to room near nurses station  Outcome: Progressing     Problem: MOBILITY - ADULT  Goal: Maintain or return to baseline ADL function  Description: INTERVENTIONS:  -  Assess patient's ability to carry out ADLs; assess patient's baseline for ADL function and identify physical deficits which impact ability to perform ADLs (bathing, care of mouth/teeth, toileting, grooming, dressing, etc.)  - Assess/evaluate cause of self-care deficits   - Assess range of motion  - Assess patient's mobility; develop plan if impaired  - Assess patient's need for assistive devices and provide as appropriate  - Encourage maximum independence but intervene and supervise when necessary  - Involve family in performance of ADLs  - Assess for home care needs following discharge   - Consider OT consult to assist with ADL evaluation and planning for discharge  - Provide patient education as appropriate  Outcome: Progressing  Goal: Maintains/Returns to pre admission functional level  Description: INTERVENTIONS:  - Perform BMAT or MOVE assessment daily.   - Set and communicate daily mobility goal to care team and patient/family/caregiver.    - Collaborate with rehabilitation services on mobility goals if consulted  - Perform Range of Motion 3 times a day. - Reposition patient every 2 hours.   - Dangle patient 3 times a day  - Stand patient 3 times a day  - Ambulate patient 3 times a day  - Out of bed to chair 3 times a day   - Out of bed for meals 3 times a day  - Out of bed for toileting  - Record patient progress and toleration of activity level   Outcome: Progressing     Problem: Prexisting or High Potential for Compromised Skin Integrity  Goal: Skin integrity is maintained or improved  Description: INTERVENTIONS:  - Identify patients at risk for skin breakdown  - Assess and monitor skin integrity  - Assess and monitor nutrition and hydration status  - Monitor labs   - Assess for incontinence   - Turn and reposition patient  - Assist with mobility/ambulation  - Relieve pressure over bony prominences  - Avoid friction and shearing  - Provide appropriate hygiene as needed including keeping skin clean and dry  - Evaluate need for skin moisturizer/barrier cream  - Collaborate with interdisciplinary team   - Patient/family teaching  - Consider wound care consult   Outcome: Progressing     Problem: RESPIRATORY - ADULT  Goal: Achieves optimal ventilation and oxygenation  Description: INTERVENTIONS:  - Assess for changes in respiratory status  - Assess for changes in mentation and behavior  - Position to facilitate oxygenation and minimize respiratory effort  - Oxygen administered by appropriate delivery if ordered  - Initiate smoking cessation education as indicated  - Encourage broncho-pulmonary hygiene including cough, deep breathe, Incentive Spirometry  - Assess the need for suctioning and aspirate as needed  - Assess and instruct to report SOB or any respiratory difficulty  - Respiratory Therapy support as indicated  Outcome: Progressing     Problem: GASTROINTESTINAL - ADULT  Goal: Minimal or absence of nausea and/or vomiting  Description: INTERVENTIONS:  - Administer IV fluids if ordered to ensure adequate hydration  - Maintain NPO status until nausea and vomiting are resolved  - Nasogastric tube if ordered  - Administer ordered antiemetic medications as needed  - Provide nonpharmacologic comfort measures as appropriate  - Advance diet as tolerated, if ordered  - Consider nutrition services referral to assist patient with adequate nutrition and appropriate food choices  Outcome: Progressing     Problem: HEMATOLOGIC - ADULT  Goal: Maintains hematologic stability  Description: INTERVENTIONS  - Assess for signs and symptoms of bleeding or hemorrhage  - Monitor labs  - Administer supportive blood products/factors as ordered and appropriate  Outcome: Progressing     Problem: CARDIOVASCULAR - ADULT  Goal: Maintains optimal cardiac output and hemodynamic stability  Description: INTERVENTIONS:  - Monitor I/O, vital signs and rhythm  - Monitor for S/S and trends of decreased cardiac output  - Administer and titrate ordered vasoactive medications to optimize hemodynamic stability  - Assess quality of pulses, skin color and temperature  - Assess for signs of decreased coronary artery perfusion  - Instruct patient to report change in severity of symptoms  Outcome: Progressing  Goal: Absence of cardiac dysrhythmias or at baseline rhythm  Description: INTERVENTIONS:  - Continuous cardiac monitoring, vital signs, obtain 12 lead EKG if ordered  - Administer antiarrhythmic and heart rate control medications as ordered  - Monitor electrolytes and administer replacement therapy as ordered  Outcome: Progressing     Problem: PAIN - ADULT  Goal: Verbalizes/displays adequate comfort level or baseline comfort level  Description: Interventions:  - Encourage patient to monitor pain and request assistance  - Assess pain using appropriate pain scale  - Administer analgesics based on type and severity of pain and evaluate response  - Implement non-pharmacological measures as appropriate and evaluate response  - Consider cultural and social influences on pain and pain management  - Notify physician/advanced practitioner if interventions unsuccessful or patient reports new pain  Outcome: Progressing     Problem: INFECTION - ADULT  Goal: Absence or prevention of progression during hospitalization  Description: INTERVENTIONS:  - Assess and monitor for signs and symptoms of infection  - Monitor lab/diagnostic results  - Monitor all insertion sites, i.e. indwelling lines, tubes, and drains  - Monitor endotracheal if appropriate and nasal secretions for changes in amount and color  - [place] appropriate cooling/warming therapies per order  - Administer medications as ordered  - Instruct and encourage patient and family to use good hand hygiene technique  - Identify and instruct in appropriate isolation precautions for identified infection/condition  Outcome: Progressing     Problem: DISCHARGE PLANNING  Goal: Discharge to home or other facility with appropriate resources  Description: INTERVENTIONS:  - Identify barriers to discharge w/patient and caregiver  - Arrange for needed discharge resources and transportation as appropriate  - Identify discharge learning needs (meds, wound care, etc.)  - Arrange for interpretive services to assist at discharge as needed  - Refer to Case Management Department for coordinating discharge planning if the patient needs post-hospital services based on physician/advanced practitioner order or complex needs related to functional status, cognitive ability, or social support system  Outcome: Progressing     Problem: Knowledge Deficit  Goal: Patient/family/caregiver demonstrates understanding of disease process, treatment plan, medications, and discharge instructions  Description: Complete learning assessment and assess knowledge base.   Interventions:  - Provide teaching at level of understanding  - Provide teaching via preferred learning methods  Outcome: Progressing

## 2023-08-09 NOTE — PROGRESS NOTES
Progress note - Gastroenterology   Matthew Gr 69 y.o. male MRN: [id number]  Unit/Bed#: -01 Encounter: [id number]    ASSESSMENT and PLAN      69 y. o. year old male with past medical history of diverticulitis with resection, atrial fibrillation currently on Eliquis, DVT, GERD, and recent finding of liver mass currently being evaluated by radiation oncology presented to the emergency room this morning after patient lost consciousness.  Hemoglobin on admission 6.4. Carlota Purvis is not aware of having anemia in the past. Carlota Purvis denies any melena or overt GI bleeding.     1.  Acute anemia  Patient is not aware of anemia history however his last hemoglobin in May of this year was 9.2 and per EMR patient has been taking iron oral supplement.  Hemoglobin 6.4 on admission.  No overt GI bleeding noted however patient states there was blood in stool when he lost consciousness this morning.     -Tolerating clear liquids   -Currently holding Eliquis  -Continue PPI, Pantoprazole 40 mg IV push every 12 hours  -Monitor hemoglobin, transfuse as necessary, less than 7. Hgb 7.7 with a.m. labs. -Agree with IV iron      Patient's does not remember having an EGD in the past.  Does note colonoscopy approximately 5 years ago and history of colon resection with diverticulitis.     Discuss patient with Dr. Lima Ortega on rounds.  Scheduling patient for EGD and colonoscopy for tomorrow 8/10.       2.  Gastroesophageal reflux disease  Patient does admit to having acid reflux symptoms however states while taking pantoprazole 40 mg daily he is symptom-free.   Pantoprazole 40 mg IV push every 12 hours currently for acute anemia.     3.  History of diverticulitis with resection  Per EMR, patient did have colonoscopy approximately 5 years ago at [facility].       4.  Hepatic mass  MRI 7/6/2023; numerous reidentified hepatic lesions suspicious for metastases.  Patient states he was to have a consult with oncology today however patient admitted to hospital.  Patient was also recommended for follow-up colonoscopy which has been scheduled for later this month 8/23. Wanda Arrington had been ordered for liver biopsy as an outpatient that has not been completed to date    Chief Complaint   Patient presents with   • Fall     EMS from home; wife woke pt due to dexcom reading sugar at 41, walked pt to kitchen and pt blacked out falling and hitting R side of head, takes Eliquis; EMS gave 15g PO glucose PTA       SUBJECTIVE/HPI   Tolerating clear liquids. He denies abdominal pain, nausea or vomiting. Discussed with patient scheduling EGD and colonoscopy for tomorrow. Patient was concerned about being able to get out of bed for completing the prep. Discussed with patient's nurse.     /73   Pulse 87   Temp 97.6 °F (36.4 °C)   Resp 20   Ht 6' (1.829 m)   Wt 97 kg (213 lb 13.5 oz)   SpO2 99%   BMI 29.00 kg/m²     PHYSICALEXAM  General appearance: alert, appears stated age and cooperative, pallor  Eyes: PERLLA, EOMI, no icterus   Head: Normocephalic, without obvious abnormality, atraumatic  Lungs: Decreased to auscultation bilaterally, O2 3 L nasal cannula  Heart: regular rate and rhythm, S1, S2 normal, no murmur, click, rub or gallop  Abdomen: soft, non-tender; bowel sounds normal; no masses,  no organomegaly  Extremities: extremities normal, atraumatic, no cyanosis or edema  Neurologic: Grossly normal    Lab Results   Component Value Date    CALCIUM 8.7 08/08/2023    K 4.6 08/08/2023    CO2 20 (L) 08/08/2023     08/08/2023    BUN 49 (H) 08/08/2023    CREATININE 1.53 (H) 08/08/2023     Lab Results   Component Value Date    WBC 10.34 (H) 08/07/2023    HGB 7.7 (L) 08/09/2023    HCT 29.2 (L) 08/09/2023    MCV 63 (L) 08/07/2023     08/07/2023     Lab Results   Component Value Date    ALT 36 08/07/2023    AST 39 08/07/2023    ALKPHOS 69 08/07/2023     No results found for: "AMYLASE"  No results found for: "LIPASE"  Lab Results   Component Value Date    IRON 14 (L) 08/07/2023    TIBC 462 (H) 08/07/2023    FERRITIN 10 (L) 08/07/2023     No results found for: "INR"

## 2023-08-09 NOTE — PROGRESS NOTES
Progress Note - Cardiology   Maciej Parsons 69 y.o. male MRN: [id number]  Unit/Bed#: -01 Encounter: [id number]        Problem List:  Principal Problem:    Anemia  Active Problems:    Stage 3 chronic kidney disease, unspecified whether stage 3a or 3b CKD (HCC)    Type 2 diabetes mellitus with neurologic complication, with long-term current use of insulin (HCC)    Atrial fibrillation, unspecified type ([address])    Acute respiratory failure with hypoxia (HCC)    Syncope    Acute on chronic diastolic HF (heart failure) (HCC)    History of DVT (deep vein thrombosis)    Elevated troponin    Hepatic flexure mass      Assessment:  1. Acute respiratory failure, suspect secondary to pulmonary edema  a. Initial chest x-ray clear this admission but patient noted to be tachypneic and hypoxic after receiving a blood transfusion, suspect that he may have some mild transfusion associated circulatory overload  b. As of 8/9/2023 weaned down to 2 L  c. Currently recorded net -3.6 L  d. Renal function and electrolytes stable on 40 IV Lasix daily (baseline Cr. 1.3-1.6)  2. Non-MI troponin elevation 3871 --> 4093 --> 5115 --> 3759  a. This is likely secondary to his acute issues including respiratory failure from volume overload as well as severe anemia requiring transfusion of blood  b. 5/2023 Echo: LVEF 19%, grade 1 diastolic dysfunction, mild mitral insufficiency  c. 6/2023 NST: No ischemia  3. HFmrEF  1. 5/2023 echo: LVEF 60%  2. 8/2023 echo: LVEF 45%, mild global hypokinesis  4. Acute anemia  a. Hemoglobin 6.4 on arrival status post 1 unit of blood now currently trending in the high 7's  b. HgB still trending in high 7's to low 8's  5. Paroxysmal atrial fibrillation  a. 5/2023 Holter: Sinus rhythm average heart rate in the 80s  b. Prehospital he is on Eliquis both for DVT and PAF  c. AV blocking Rx: Toprol 50 daily  6. CKD, baseline creat 1.3- 1.6  7. History of DVT for which he takes Eliquis  8.  Type 2 diabetes    Plan/ Discussion:  • Continue IV Lasix today and hopefully transition to oral diuretic tomorrow noting that previously he is not on any diuretic he may be able to use Lasix on an as-needed basis moving forward as his volume overload is likely secondary to his blood transfusion  • Respiratory status much improved, pt acceptable for EGC/ colonoscopy tomorrow  • Restart anticoagulation once hemoglobin stable  • Continue remainder of cardiac meds  • Continue to wean oxygen  • BMP tomorrow  • Noted mild drop in LVEF, possibly related to his current acute illness. He had normal nuclear stress test earlier this year. Can consider either repeat stress testing or repeating limited echo once he is over this hospitalization. Subjective:  Feeling better today compared to yesterday. Breathing is improved. Urinating frequently.   No chest pain    Vitals:  Vitals:    08/08/23 1400 08/09/23 0527   Weight: 98.2 kg (216 lb 7.9 oz) 97 kg (213 lb 13.5 oz)   ,  Vitals:    08/08/23 2218 08/09/23 0527 08/09/23 0727 08/09/23 0733   BP:    143/73   Pulse:    87   Resp:       Temp:       TempSrc:       SpO2: 90%  91% 99%   Weight:  97 kg (213 lb 13.5 oz)     Height:           Exam:  General: Alert awake and oriented, no acute distress  Heart:  Regular rate and rhythm, no murmurs, Normal S1, no edema    Respiratory effort/ Lungs:  Breathing comfortably on 2 liters nasal cannula, clear bilaterally without wheezing, rales, crackles   Abdominal: Non-tender to palpation, + bowel sounds, soft, no masses or distension  Lower Limbs:  No edema            Telemetry:           Medications:    Current Facility-Administered Medications:   •  acetaminophen (TYLENOL) tablet 650 mg, 650 mg, Oral, Q6H PRN, Sherl Curly, DO  •  furosemide (LASIX) injection 40 mg, 40 mg, Intravenous, Daily, Christine Styles DO, 40 mg at 08/09/23 0911  •  hydrALAZINE (APRESOLINE) injection 10 mg, 10 mg, Intravenous, Q6H PRN, Sherl Sudhaly, DO  • insulin detemir (LEVEMIR) subcutaneous injection 18 Units, 18 Units, Subcutaneous, HS, Gayla Osgood, DO, 18 Units at 08/08/23 2108  •  insulin lispro (HumaLOG) 100 units/mL subcutaneous injection 1-5 Units, 1-5 Units, Subcutaneous, HS, Gayla Osgood, DO, 1 Units at 08/08/23 2108  •  insulin lispro (HumaLOG) 100 units/mL subcutaneous injection 2-12 Units, 2-12 Units, Subcutaneous, TID AC, 2 Units at 08/09/23 0912 **AND** Fingerstick Glucose (POCT), , , TID AC, Trsesa Styles DO  •  insulin lispro (HumaLOG) 100 units/mL subcutaneous injection 5 Units, 5 Units, Subcutaneous, TID With Meals, Gayla Osgood, DO, 5 Units at 08/09/23 0912  •  ipratropium (ATROVENT) 0.02 % inhalation solution 0.5 mg, 0.5 mg, Nebulization, TID, Zamzam Bates PA-C, 0.5 mg at 08/09/23 7521  •  iron sucrose (VENOFER) 100 mg in sodium chloride 0.9 % 100 mL IVPB, 100 mg, Intravenous, Daily, Gayla Osgood, DO, 100 mg at 08/09/23 0911  •  levalbuterol (XOPENEX) inhalation solution 1.25 mg, 1.25 mg, Nebulization, TID, HAKAN Krishna-C, 1.25 mg at 08/09/23 6267  •  metoprolol succinate (TOPROL-XL) 24 hr tablet 50 mg, 50 mg, Oral, Daily, Gayla Osgood, DO, 50 mg at 08/09/23 0911  •  multivitamin-minerals (CENTRUM) tablet 1 tablet, 1 tablet, Oral, Daily, Gayla Osgood, DO, 1 tablet at 08/09/23 0911  •  ondansetron (ZOFRAN) injection 4 mg, 4 mg, Intravenous, Q6H PRN, Gayla Osgood, DO  •  pantoprazole (PROTONIX) injection 40 mg, 40 mg, Intravenous, Q12H [address], Tressa Styles DO, 40 mg at 08/09/23 9004  •  pregabalin (LYRICA) capsule 100 mg, 100 mg, Oral, TID, Maria Esther Stephenson PA-C, 100 mg at 08/09/23 7346  •  sertraline (ZOLOFT) tablet 25 mg, 25 mg, Oral, Daily, Renea Styles DO, 25 mg at 08/09/23 9723  •  tamsulosin (FLOMAX) capsule 0.4 mg, 0.4 mg, Oral, Daily With Dinner, Maylene Osgood, DO, 0.4 mg at 08/08/23 1556  •  traZODone (DESYREL) tablet 200 mg, 200 mg, Oral, HS, Yenni Styles, , 200 mg at 08/08/23 2108      Labs/Data:        Results from last 7 days   Lab Units 08/09/23  0815 08/09/23  0036 08/08/23  1659 08/07/23  2041 08/07/23  0515   WBC Thousand/uL  --   --   --   --  10.34*   HEMOGLOBIN g/dL 7.7* 7.7* 8.4*   < > 6.4*   HEMATOCRIT % 29.2* 28.7* 31.9*   < > 24.7*   PLATELETS Thousands/uL  --   --   --   --  210    < > = values in this interval not displayed.      Results from last 7 days   Lab Units 08/08/23  0442 08/07/23  0515   POTASSIUM mmol/L 4.6 4.5   CHLORIDE mmol/L 108 111*   CO2 mmol/L 20* 19*   BUN mg/dL 49* 55*   CREATININE mg/dL 1.53* 1.66*

## 2023-08-09 NOTE — PROGRESS NOTES
Progress Note - Pulmonary   Jose Mckinley 69 y.o. male MRN: [id number]  Unit/Bed#: -01 Encounter: [id number]    Assessment & Plan:  Acute hypoxic respiratory failure  Acute anemia  Syncope  Restrictive lung disease  History of DVT, Eliquis on hold for anemia  Combined systolic and diastolic CHF  Paroxysmal A-fib  Non-MI troponin elevation  CKD  Liver mass     • Patient was requiring 15 L initially now down to 3 L. Monitor & maintain pulse ox greater than 89%. No baseline oxygen needs. • Chest x-ray with evidence of volume overload  • Continue diuretics per Cardiology  • No wheezing on exam. Can hold on additional steroids for now. • Closely monitor volume status, especially as patient receiving transfusions  • Continue TID nebs while admitted. Resume Anoro at d/c  • GI following    Subjective:   Gege Presume feels his breathing is doing better    Objective:     Vitals: Blood pressure 143/73, pulse 87, temperature 97.6 °F (36.4 °C), resp. rate 20, height 6' (1.829 m), weight 97 kg (213 lb 13.5 oz), SpO2 99 %. ,Body mass index is 29 kg/m². Intake/Output Summary (Last 24 hours) at 8/9/2023 0956  Last data filed at 8/9/2023 8769  Gross per 24 hour   Intake 480 ml   Output 2725 ml   Net -2245 ml       Invasive Devices     Peripheral Intravenous Line  Duration           Peripheral IV 08/07/23 Distal;Right;Ventral (anterior) Forearm 2 days                Physical Exam:   General appearance: Alert and oriented, in no acute distress  Head: Normocephalic, without obvious abnormality, atraumatic  Eyes: EOMI. No discharge bilaterally. No scleral icterus. Neck: Supple, symmetrical, trachea midline  Lungs: Decreased breath sounds  Heart: Regular rate  Abdomen:  No appreciable distension or tenderness  Extremities:No edema or tenderness  Skin: Warm and dry  Neurologic: No acute focal deficits are noted    Labs: I have personally reviewed pertinent lab results.   Imaging and other studies: I have personally reviewed pertinent reports.

## 2023-08-09 NOTE — PLAN OF CARE
Problem: Potential for Falls  Goal: Patient will remain free of falls  Description: INTERVENTIONS:  - Educate patient/family on patient safety including physical limitations  - Instruct patient to call for assistance with activity   - Consult OT/PT to assist with strengthening/mobility   - Keep Call bell within reach  - Keep bed low and locked with side rails adjusted as appropriate  - Keep care items and personal belongings within reach  - Initiate and maintain comfort rounds  - Make Fall Risk Sign visible to staff  - Offer Toileting every 2 Hours, in advance of need  - Initiate/Maintain bed alarm  - Obtain necessary fall risk management equipment:   - Apply yellow socks and bracelet for high fall risk patients  - Consider moving patient to room near nurses station  Outcome: Progressing     Problem: MOBILITY - ADULT  Goal: Maintain or return to baseline ADL function  Description: INTERVENTIONS:  -  Assess patient's ability to carry out ADLs; assess patient's baseline for ADL function and identify physical deficits which impact ability to perform ADLs (bathing, care of mouth/teeth, toileting, grooming, dressing, etc.)  - Assess/evaluate cause of self-care deficits   - Assess range of motion  - Assess patient's mobility; develop plan if impaired  - Assess patient's need for assistive devices and provide as appropriate  - Encourage maximum independence but intervene and supervise when necessary  - Involve family in performance of ADLs  - Assess for home care needs following discharge   - Consider OT consult to assist with ADL evaluation and planning for discharge  - Provide patient education as appropriate  Outcome: Progressing  Goal: Maintains/Returns to pre admission functional level  Description: INTERVENTIONS:  - Perform BMAT or MOVE assessment daily.   - Set and communicate daily mobility goal to care team and patient/family/caregiver.    - Collaborate with rehabilitation services on mobility goals if consulted  - Perform Range of Motion 3 times a day. - Reposition patient every 2 hours.   - Dangle patient 3 times a day  - Stand patient 3 times a day  - Ambulate patient 3 times a day  - Out of bed to chair 3 times a day   - Out of bed for meals 3 times a day  - Out of bed for toileting  - Record patient progress and toleration of activity level   Outcome: Progressing     Problem: Prexisting or High Potential for Compromised Skin Integrity  Goal: Skin integrity is maintained or improved  Description: INTERVENTIONS:  - Identify patients at risk for skin breakdown  - Assess and monitor skin integrity  - Assess and monitor nutrition and hydration status  - Monitor labs   - Assess for incontinence   - Turn and reposition patient  - Assist with mobility/ambulation  - Relieve pressure over bony prominences  - Avoid friction and shearing  - Provide appropriate hygiene as needed including keeping skin clean and dry  - Evaluate need for skin moisturizer/barrier cream  - Collaborate with interdisciplinary team   - Patient/family teaching  - Consider wound care consult   Outcome: Progressing     Problem: RESPIRATORY - ADULT  Goal: Achieves optimal ventilation and oxygenation  Description: INTERVENTIONS:  - Assess for changes in respiratory status  - Assess for changes in mentation and behavior  - Position to facilitate oxygenation and minimize respiratory effort  - Oxygen administered by appropriate delivery if ordered  - Initiate smoking cessation education as indicated  - Encourage broncho-pulmonary hygiene including cough, deep breathe, Incentive Spirometry  - Assess the need for suctioning and aspirate as needed  - Assess and instruct to report SOB or any respiratory difficulty  - Respiratory Therapy support as indicated  Outcome: Progressing     Problem: GASTROINTESTINAL - ADULT  Goal: Minimal or absence of nausea and/or vomiting  Description: INTERVENTIONS:  - Administer IV fluids if ordered to ensure adequate hydration  - Maintain NPO status until nausea and vomiting are resolved  - Nasogastric tube if ordered  - Administer ordered antiemetic medications as needed  - Provide nonpharmacologic comfort measures as appropriate  - Advance diet as tolerated, if ordered  - Consider nutrition services referral to assist patient with adequate nutrition and appropriate food choices  Outcome: Progressing     Problem: HEMATOLOGIC - ADULT  Goal: Maintains hematologic stability  Description: INTERVENTIONS  - Assess for signs and symptoms of bleeding or hemorrhage  - Monitor labs  - Administer supportive blood products/factors as ordered and appropriate  Outcome: Progressing     Problem: PAIN - ADULT  Goal: Verbalizes/displays adequate comfort level or baseline comfort level  Description: Interventions:  - Encourage patient to monitor pain and request assistance  - Assess pain using appropriate pain scale  - Administer analgesics based on type and severity of pain and evaluate response  - Implement non-pharmacological measures as appropriate and evaluate response  - Consider cultural and social influences on pain and pain management  - Notify physician/advanced practitioner if iventions unsuccessful or patient reports new pain  Outcome: Progressing     Problem: INFECTION - ADULT  Goal: Absence or prevention of progression during hospitalization  Description: INTERVENTIONS:  - Assess and monitor for signs and symptoms of infection  - Monitor lab/diagnostic results  - Monitor all insertion sites, i.e. indwelling lines, tubes, and drains  - Monitor endotracheal if appropriate and nasal secretions for changes in amount and color  - [place] appropriate cooling/warming therapies per order  - Administer medications as ordered  - Instruct and encourage patient and family to use good hand hygiene technique  - Identify and instruct in appropriate isolation precautions for identified infection/condition  Outcome: Progressing     Problem: DISCHARGE PLANNING  Goal: Discharge to home or other facility with appropriate resources  Description: INTERVENTIONS:  - Identify barriers to discharge w/patient and caregiver  - Arrange for needed discharge resources and transportation as appropriate  - Identify discharge learning needs (meds, wound care, etc.)  - Arrange for interpretive services to assist at discharge as needed  - Refer to Case Management Department for coordinating discharge planning if the patient needs post-hospital services based on physician/advanced practitioner order or complex needs related to functional status, cognitive ability, or social support system  Outcome: Progressing     Problem: Knowledge Deficit  Goal: Patient/family/caregiver demonstrates understanding of disease process, treatment plan, medications, and discharge instructions  Description: Complete learning assessment and assess knowledge base.   Interventions:  - Provide teaching at level of understanding  - Provide teaching via preferred learning methods  Outcome: Progressing     Problem: CARDIOVASCULAR - ADULT  Goal: Maintains optimal cardiac output and hemodynamic stability  Description: INTERVENTIONS:  - Monitor I/O, vital signs and rhythm  - Monitor for S/S and trends of decreased cardiac output  - Administer and titrate ordered vasoactive medications to optimize hemodynamic stability  - Assess quality of pulses, skin color and temperature  - Assess for signs of decreased coronary artery perfusion  - Instruct patient to report change in severity of symptoms  Outcome: Progressing  Goal: Absence of cardiac dysrhythmias or at baseline rhythm  Description: INTERVENTIONS:  - Continuous cardiac monitoring, vital signs, obtain 12 lead EKG if ordered  - Administer antiarrhythmic and heart rate control medications as ordered  - Monitor electrolytes and administer replacement therapy as ordered  Outcome: Progressing

## 2023-08-10 ENCOUNTER — APPOINTMENT (INPATIENT)
Dept: GASTROENTEROLOGY | Facility: HOSPITAL | Age: 78
DRG: 435 | End: 2023-08-10
Payer: COMMERCIAL

## 2023-08-10 ENCOUNTER — ANESTHESIA EVENT (INPATIENT)
Dept: GASTROENTEROLOGY | Facility: HOSPITAL | Age: 78
DRG: 435 | End: 2023-08-10
Payer: COMMERCIAL

## 2023-08-10 ENCOUNTER — ANESTHESIA (INPATIENT)
Dept: GASTROENTEROLOGY | Facility: HOSPITAL | Age: 78
DRG: 435 | End: 2023-08-10
Payer: COMMERCIAL

## 2023-08-10 ENCOUNTER — APPOINTMENT (INPATIENT)
Dept: RADIOLOGY | Facility: HOSPITAL | Age: 78
DRG: 435 | End: 2023-08-10
Payer: COMMERCIAL

## 2023-08-10 LAB
2HR DELTA HS TROPONIN: 9 NG/L
4HR DELTA HS TROPONIN: -13 NG/L
ALBUMIN SERPL BCP-MCNC: 4 G/DL (ref 3.5–5)
ALP SERPL-CCNC: 75 U/L (ref 34–104)
ALT SERPL W P-5'-P-CCNC: 32 U/L (ref 7–52)
AMMONIA PLAS-SCNC: 40 UMOL/L (ref 18–72)
ANION GAP SERPL CALCULATED.3IONS-SCNC: 12 MMOL/L
AST SERPL W P-5'-P-CCNC: 43 U/L (ref 13–39)
BACTERIA UR QL AUTO: ABNORMAL /HPF
BILIRUB SERPL-MCNC: 0.95 MG/DL (ref 0.2–1)
BILIRUB UR QL STRIP: NEGATIVE
BUN SERPL-MCNC: 47 MG/DL (ref 5–25)
CALCIUM SERPL-MCNC: 9 MG/DL (ref 8.4–10.2)
CARDIAC TROPONIN I PNL SERPL HS: 938 NG/L
CARDIAC TROPONIN I PNL SERPL HS: 951 NG/L
CARDIAC TROPONIN I PNL SERPL HS: 960 NG/L
CHLORIDE SERPL-SCNC: 101 MMOL/L (ref 96–108)
CLARITY UR: CLEAR
CO2 SERPL-SCNC: 26 MMOL/L (ref 21–32)
COLOR UR: YELLOW
CREAT SERPL-MCNC: 1.92 MG/DL (ref 0.6–1.3)
ERYTHROCYTE [DISTWIDTH] IN BLOOD BY AUTOMATED COUNT: 25.7 % (ref 11.6–15.1)
GFR SERPL CREATININE-BSD FRML MDRD: 32 ML/MIN/1.73SQ M
GLUCOSE SERPL-MCNC: 146 MG/DL (ref 65–140)
GLUCOSE SERPL-MCNC: 172 MG/DL (ref 65–140)
GLUCOSE SERPL-MCNC: 181 MG/DL (ref 65–140)
GLUCOSE SERPL-MCNC: 203 MG/DL (ref 65–140)
GLUCOSE SERPL-MCNC: 261 MG/DL (ref 65–140)
GLUCOSE SERPL-MCNC: 292 MG/DL (ref 65–140)
GLUCOSE SERPL-MCNC: 366 MG/DL (ref 65–140)
GLUCOSE UR STRIP-MCNC: NEGATIVE MG/DL
HCT VFR BLD AUTO: 26.5 % (ref 36.5–49.3)
HCT VFR BLD AUTO: 29.9 % (ref 36.5–49.3)
HCT VFR BLD AUTO: 32.8 % (ref 36.5–49.3)
HGB BLD-MCNC: 7.1 G/DL (ref 12–17)
HGB BLD-MCNC: 8 G/DL (ref 12–17)
HGB BLD-MCNC: 8.6 G/DL (ref 12–17)
HGB UR QL STRIP.AUTO: ABNORMAL
HYALINE CASTS #/AREA URNS LPF: ABNORMAL /LPF
KETONES UR STRIP-MCNC: NEGATIVE MG/DL
LACTATE SERPL-SCNC: 1.9 MMOL/L (ref 0.5–2)
LACTATE SERPL-SCNC: 2.6 MMOL/L (ref 0.5–2)
LACTATE SERPL-SCNC: 2.7 MMOL/L (ref 0.5–2)
LEUKOCYTE ESTERASE UR QL STRIP: NEGATIVE
MAGNESIUM SERPL-MCNC: 2 MG/DL (ref 1.9–2.7)
MCH RBC QN AUTO: 17.4 PG (ref 26.8–34.3)
MCHC RBC AUTO-ENTMCNC: 26.2 G/DL (ref 31.4–37.4)
MCV RBC AUTO: 66 FL (ref 82–98)
NITRITE UR QL STRIP: NEGATIVE
NON-SQ EPI CELLS URNS QL MICRO: ABNORMAL /HPF
PH UR STRIP.AUTO: 5 [PH]
PLATELET # BLD AUTO: 271 THOUSANDS/UL (ref 149–390)
POTASSIUM SERPL-SCNC: 4.3 MMOL/L (ref 3.5–5.3)
PROCALCITONIN SERPL-MCNC: 0.44 NG/ML
PROT SERPL-MCNC: 6.9 G/DL (ref 6.4–8.4)
PROT UR STRIP-MCNC: ABNORMAL MG/DL
RBC # BLD AUTO: 4.95 MILLION/UL (ref 3.88–5.62)
RBC #/AREA URNS AUTO: ABNORMAL /HPF
SODIUM SERPL-SCNC: 139 MMOL/L (ref 135–147)
SP GR UR STRIP.AUTO: 1.01 (ref 1–1.03)
UROBILINOGEN UR STRIP-ACNC: <2 MG/DL
WBC # BLD AUTO: 11.88 THOUSAND/UL (ref 4.31–10.16)
WBC #/AREA URNS AUTO: ABNORMAL /HPF

## 2023-08-10 PROCEDURE — 82948 REAGENT STRIP/BLOOD GLUCOSE: CPT

## 2023-08-10 PROCEDURE — 71045 X-RAY EXAM CHEST 1 VIEW: CPT

## 2023-08-10 PROCEDURE — 80053 COMPREHEN METABOLIC PANEL: CPT | Performed by: STUDENT IN AN ORGANIZED HEALTH CARE EDUCATION/TRAINING PROGRAM

## 2023-08-10 PROCEDURE — 85027 COMPLETE CBC AUTOMATED: CPT | Performed by: STUDENT IN AN ORGANIZED HEALTH CARE EDUCATION/TRAINING PROGRAM

## 2023-08-10 PROCEDURE — 85014 HEMATOCRIT: CPT | Performed by: STUDENT IN AN ORGANIZED HEALTH CARE EDUCATION/TRAINING PROGRAM

## 2023-08-10 PROCEDURE — 85018 HEMOGLOBIN: CPT | Performed by: STUDENT IN AN ORGANIZED HEALTH CARE EDUCATION/TRAINING PROGRAM

## 2023-08-10 PROCEDURE — 84145 PROCALCITONIN (PCT): CPT | Performed by: STUDENT IN AN ORGANIZED HEALTH CARE EDUCATION/TRAINING PROGRAM

## 2023-08-10 PROCEDURE — 44366 SMALL BOWEL ENDOSCOPY: CPT | Performed by: INTERNAL MEDICINE

## 2023-08-10 PROCEDURE — 83605 ASSAY OF LACTIC ACID: CPT | Performed by: PHYSICIAN ASSISTANT

## 2023-08-10 PROCEDURE — 99232 SBSQ HOSP IP/OBS MODERATE 35: CPT | Performed by: HOSPITALIST

## 2023-08-10 PROCEDURE — 94640 AIRWAY INHALATION TREATMENT: CPT

## 2023-08-10 PROCEDURE — 97116 GAIT TRAINING THERAPY: CPT

## 2023-08-10 PROCEDURE — 81001 URINALYSIS AUTO W/SCOPE: CPT | Performed by: STUDENT IN AN ORGANIZED HEALTH CARE EDUCATION/TRAINING PROGRAM

## 2023-08-10 PROCEDURE — 94760 N-INVAS EAR/PLS OXIMETRY 1: CPT

## 2023-08-10 PROCEDURE — 0DBL8ZZ EXCISION OF TRANSVERSE COLON, VIA NATURAL OR ARTIFICIAL OPENING ENDOSCOPIC: ICD-10-PCS | Performed by: INTERNAL MEDICINE

## 2023-08-10 PROCEDURE — C9113 INJ PANTOPRAZOLE SODIUM, VIA: HCPCS | Performed by: INTERNAL MEDICINE

## 2023-08-10 PROCEDURE — 97167 OT EVAL HIGH COMPLEX 60 MIN: CPT

## 2023-08-10 PROCEDURE — 93005 ELECTROCARDIOGRAM TRACING: CPT

## 2023-08-10 PROCEDURE — 83735 ASSAY OF MAGNESIUM: CPT | Performed by: STUDENT IN AN ORGANIZED HEALTH CARE EDUCATION/TRAINING PROGRAM

## 2023-08-10 PROCEDURE — 97163 PT EVAL HIGH COMPLEX 45 MIN: CPT

## 2023-08-10 PROCEDURE — 87040 BLOOD CULTURE FOR BACTERIA: CPT | Performed by: STUDENT IN AN ORGANIZED HEALTH CARE EDUCATION/TRAINING PROGRAM

## 2023-08-10 PROCEDURE — 45385 COLONOSCOPY W/LESION REMOVAL: CPT | Performed by: INTERNAL MEDICINE

## 2023-08-10 PROCEDURE — 0W3P8ZZ CONTROL BLEEDING IN GASTROINTESTINAL TRACT, VIA NATURAL OR ARTIFICIAL OPENING ENDOSCOPIC: ICD-10-PCS | Performed by: INTERNAL MEDICINE

## 2023-08-10 PROCEDURE — 84484 ASSAY OF TROPONIN QUANT: CPT | Performed by: STUDENT IN AN ORGANIZED HEALTH CARE EDUCATION/TRAINING PROGRAM

## 2023-08-10 PROCEDURE — 82140 ASSAY OF AMMONIA: CPT | Performed by: STUDENT IN AN ORGANIZED HEALTH CARE EDUCATION/TRAINING PROGRAM

## 2023-08-10 PROCEDURE — 99233 SBSQ HOSP IP/OBS HIGH 50: CPT | Performed by: STUDENT IN AN ORGANIZED HEALTH CARE EDUCATION/TRAINING PROGRAM

## 2023-08-10 PROCEDURE — 88305 TISSUE EXAM BY PATHOLOGIST: CPT | Performed by: PATHOLOGY

## 2023-08-10 PROCEDURE — 83605 ASSAY OF LACTIC ACID: CPT | Performed by: STUDENT IN AN ORGANIZED HEALTH CARE EDUCATION/TRAINING PROGRAM

## 2023-08-10 RX ORDER — PHENYLEPHRINE HCL IN 0.9% NACL 1 MG/10 ML
SYRINGE (ML) INTRAVENOUS AS NEEDED
Status: DISCONTINUED | OUTPATIENT
Start: 2023-08-10 | End: 2023-08-10

## 2023-08-10 RX ORDER — SODIUM CHLORIDE, SODIUM LACTATE, POTASSIUM CHLORIDE, CALCIUM CHLORIDE 600; 310; 30; 20 MG/100ML; MG/100ML; MG/100ML; MG/100ML
INJECTION, SOLUTION INTRAVENOUS CONTINUOUS PRN
Status: DISCONTINUED | OUTPATIENT
Start: 2023-08-10 | End: 2023-08-10

## 2023-08-10 RX ORDER — ALBUMIN, HUMAN INJ 5% 5 %
12.5 SOLUTION INTRAVENOUS ONCE
Status: COMPLETED | OUTPATIENT
Start: 2023-08-10 | End: 2023-08-10

## 2023-08-10 RX ORDER — MAGNESIUM SULFATE HEPTAHYDRATE 40 MG/ML
2 INJECTION, SOLUTION INTRAVENOUS ONCE
Status: COMPLETED | OUTPATIENT
Start: 2023-08-10 | End: 2023-08-10

## 2023-08-10 RX ORDER — PROPOFOL 10 MG/ML
INJECTION, EMULSION INTRAVENOUS AS NEEDED
Status: DISCONTINUED | OUTPATIENT
Start: 2023-08-10 | End: 2023-08-10

## 2023-08-10 RX ORDER — ALBUTEROL SULFATE 90 UG/1
AEROSOL, METERED RESPIRATORY (INHALATION) AS NEEDED
Status: DISCONTINUED | OUTPATIENT
Start: 2023-08-10 | End: 2023-08-10

## 2023-08-10 RX ORDER — SODIUM CHLORIDE, SODIUM GLUCONATE, SODIUM ACETATE, POTASSIUM CHLORIDE, MAGNESIUM CHLORIDE, SODIUM PHOSPHATE, DIBASIC, AND POTASSIUM PHOSPHATE .53; .5; .37; .037; .03; .012; .00082 G/100ML; G/100ML; G/100ML; G/100ML; G/100ML; G/100ML; G/100ML
50 INJECTION, SOLUTION INTRAVENOUS CONTINUOUS
Status: DISPENSED | OUTPATIENT
Start: 2023-08-10 | End: 2023-08-10

## 2023-08-10 RX ORDER — GLYCOPYRROLATE 0.2 MG/ML
INJECTION INTRAMUSCULAR; INTRAVENOUS AS NEEDED
Status: DISCONTINUED | OUTPATIENT
Start: 2023-08-10 | End: 2023-08-10

## 2023-08-10 RX ORDER — FENTANYL CITRATE 50 UG/ML
INJECTION, SOLUTION INTRAMUSCULAR; INTRAVENOUS AS NEEDED
Status: DISCONTINUED | OUTPATIENT
Start: 2023-08-10 | End: 2023-08-10

## 2023-08-10 RX ORDER — INSULIN LISPRO 100 [IU]/ML
1-5 INJECTION, SOLUTION INTRAVENOUS; SUBCUTANEOUS EVERY 6 HOURS SCHEDULED
Status: DISCONTINUED | OUTPATIENT
Start: 2023-08-10 | End: 2023-08-11

## 2023-08-10 RX ADMIN — INSULIN LISPRO 5 UNITS: 100 INJECTION, SOLUTION INTRAVENOUS; SUBCUTANEOUS at 16:39

## 2023-08-10 RX ADMIN — FUROSEMIDE 40 MG: 40 TABLET ORAL at 11:45

## 2023-08-10 RX ADMIN — PROPOFOL 20 MG: 10 INJECTION, EMULSION INTRAVENOUS at 10:03

## 2023-08-10 RX ADMIN — INSULIN LISPRO 1 UNITS: 100 INJECTION, SOLUTION INTRAVENOUS; SUBCUTANEOUS at 23:57

## 2023-08-10 RX ADMIN — SERTRALINE HYDROCHLORIDE 25 MG: 25 TABLET ORAL at 11:45

## 2023-08-10 RX ADMIN — TAMSULOSIN HYDROCHLORIDE 0.4 MG: 0.4 CAPSULE ORAL at 16:40

## 2023-08-10 RX ADMIN — INSULIN LISPRO 5 UNITS: 100 INJECTION, SOLUTION INTRAVENOUS; SUBCUTANEOUS at 08:21

## 2023-08-10 RX ADMIN — IRON SUCROSE 100 MG: 20 INJECTION, SOLUTION INTRAVENOUS at 11:45

## 2023-08-10 RX ADMIN — Medication 200 MCG: at 10:00

## 2023-08-10 RX ADMIN — LEVALBUTEROL HYDROCHLORIDE 1.25 MG: 1.25 SOLUTION RESPIRATORY (INHALATION) at 07:32

## 2023-08-10 RX ADMIN — PIPERACILLIN AND TAZOBACTAM 3.38 G: 3; .375 INJECTION, POWDER, LYOPHILIZED, FOR SOLUTION INTRAVENOUS at 21:13

## 2023-08-10 RX ADMIN — FENTANYL CITRATE 50 MCG: 50 INJECTION, SOLUTION INTRAMUSCULAR; INTRAVENOUS at 10:00

## 2023-08-10 RX ADMIN — INSULIN DETEMIR 18 UNITS: 100 INJECTION, SOLUTION SUBCUTANEOUS at 21:22

## 2023-08-10 RX ADMIN — GLYCOPYRROLATE 0.1 MCG: 0.2 INJECTION INTRAMUSCULAR; INTRAVENOUS at 09:50

## 2023-08-10 RX ADMIN — PREGABALIN 100 MG: 100 CAPSULE ORAL at 15:28

## 2023-08-10 RX ADMIN — INSULIN LISPRO 2 UNITS: 100 INJECTION, SOLUTION INTRAVENOUS; SUBCUTANEOUS at 08:20

## 2023-08-10 RX ADMIN — INSULIN LISPRO 3 UNITS: 100 INJECTION, SOLUTION INTRAVENOUS; SUBCUTANEOUS at 20:17

## 2023-08-10 RX ADMIN — ALBUTEROL SULFATE 2 PUFF: 90 AEROSOL, METERED RESPIRATORY (INHALATION) at 09:48

## 2023-08-10 RX ADMIN — LEVALBUTEROL HYDROCHLORIDE 1.25 MG: 1.25 SOLUTION RESPIRATORY (INHALATION) at 14:12

## 2023-08-10 RX ADMIN — UMECLIDINIUM BROMIDE AND VILANTEROL TRIFENATATE 1 PUFF: 62.5; 25 POWDER RESPIRATORY (INHALATION) at 08:29

## 2023-08-10 RX ADMIN — SODIUM CHLORIDE, SODIUM LACTATE, POTASSIUM CHLORIDE, AND CALCIUM CHLORIDE: .6; .31; .03; .02 INJECTION, SOLUTION INTRAVENOUS at 09:48

## 2023-08-10 RX ADMIN — MULTIPLE VITAMINS W/ MINERALS TAB 1 TABLET: TAB ORAL at 11:45

## 2023-08-10 RX ADMIN — PROPOFOL 30 MG: 10 INJECTION, EMULSION INTRAVENOUS at 10:15

## 2023-08-10 RX ADMIN — INSULIN LISPRO 10 UNITS: 100 INJECTION, SOLUTION INTRAVENOUS; SUBCUTANEOUS at 16:38

## 2023-08-10 RX ADMIN — LEVALBUTEROL HYDROCHLORIDE 1.25 MG: 1.25 SOLUTION RESPIRATORY (INHALATION) at 19:34

## 2023-08-10 RX ADMIN — SODIUM CHLORIDE, SODIUM GLUCONATE, SODIUM ACETATE, POTASSIUM CHLORIDE, MAGNESIUM CHLORIDE, SODIUM PHOSPHATE, DIBASIC, AND POTASSIUM PHOSPHATE 50 ML/HR: .53; .5; .37; .037; .03; .012; .00082 INJECTION, SOLUTION INTRAVENOUS at 19:59

## 2023-08-10 RX ADMIN — MAGNESIUM SULFATE HEPTAHYDRATE 2 G: 2 INJECTION, SOLUTION INTRAVENOUS at 18:26

## 2023-08-10 RX ADMIN — PROPOFOL 30 MG: 10 INJECTION, EMULSION INTRAVENOUS at 09:55

## 2023-08-10 RX ADMIN — INSULIN LISPRO 5 UNITS: 100 INJECTION, SOLUTION INTRAVENOUS; SUBCUTANEOUS at 12:41

## 2023-08-10 RX ADMIN — PROPOFOL 50 MG: 10 INJECTION, EMULSION INTRAVENOUS at 09:50

## 2023-08-10 RX ADMIN — PROPOFOL 20 MG: 10 INJECTION, EMULSION INTRAVENOUS at 09:58

## 2023-08-10 RX ADMIN — PANTOPRAZOLE SODIUM 40 MG: 40 INJECTION, POWDER, FOR SOLUTION INTRAVENOUS at 08:16

## 2023-08-10 RX ADMIN — PANTOPRAZOLE SODIUM 40 MG: 40 INJECTION, POWDER, FOR SOLUTION INTRAVENOUS at 21:05

## 2023-08-10 RX ADMIN — FENTANYL CITRATE 50 MCG: 50 INJECTION, SOLUTION INTRAMUSCULAR; INTRAVENOUS at 09:50

## 2023-08-10 RX ADMIN — ALBUMIN (HUMAN) 12.5 G: 12.5 INJECTION, SOLUTION INTRAVENOUS at 17:20

## 2023-08-10 RX ADMIN — TRAZODONE HYDROCHLORIDE 200 MG: 50 TABLET ORAL at 21:05

## 2023-08-10 RX ADMIN — PREGABALIN 100 MG: 100 CAPSULE ORAL at 08:16

## 2023-08-10 RX ADMIN — PREGABALIN 100 MG: 100 CAPSULE ORAL at 21:05

## 2023-08-10 NOTE — PLAN OF CARE
Problem: Potential for Falls  Goal: Patient will remain free of falls  Description: INTERVENTIONS:  - Educate patient/family on patient safety including physical limitations  - Instruct patient to call for assistance with activity   - Consult OT/PT to assist with strengthening/mobility   - Keep Call bell within reach  - Keep bed low and locked with side rails adjusted as appropriate  - Keep care items and personal belongings within reach  - Initiate and maintain comfort rounds  - Make Fall Risk Sign visible to staff  - Offer Toileting every 2 Hours, in advance of need  - Initiate/Maintain bed alarm  - Obtain necessary fall risk management equipment:   - Apply yellow socks and bracelet for high fall risk patients  - Consider moving patient to room near nurses station  Outcome: Progressing     Problem: MOBILITY - ADULT  Goal: Maintain or return to baseline ADL function  Description: INTERVENTIONS:  -  Assess patient's ability to carry out ADLs; assess patient's baseline for ADL function and identify physical deficits which impact ability to perform ADLs (bathing, care of mouth/teeth, toileting, grooming, dressing, etc.)  - Assess/evaluate cause of self-care deficits   - Assess range of motion  - Assess patient's mobility; develop plan if impaired  - Assess patient's need for assistive devices and provide as appropriate  - Encourage maximum independence but intervene and supervise when necessary  - Involve family in performance of ADLs  - Assess for home care needs following discharge   - Consider OT consult to assist with ADL evaluation and planning for discharge  - Provide patient education as appropriate  Outcome: Progressing  Goal: Maintains/Returns to pre admission functional level  Description: INTERVENTIONS:  - Perform BMAT or MOVE assessment daily.   - Set and communicate daily mobility goal to care team and patient/family/caregiver.    - Collaborate with rehabilitation services on mobility goals if consulted  - Perform Range of Motion 3 times a day. - Reposition patient every 2 hours.   - Dangle patient 3 times a day  - Stand patient 3 times a day  - Ambulate patient 3 times a day  - Out of bed to chair 3 times a day   - Out of bed for meals 3 times a day  - Out of bed for toileting  - Record patient progress and toleration of activity level   Outcome: Progressing     Problem: Prexisting or High Potential for Compromised Skin Integrity  Goal: Skin integrity is maintained or improved  Description: INTERVENTIONS:  - Identify patients at risk for skin breakdown  - Assess and monitor skin integrity  - Assess and monitor nutrition and hydration status  - Monitor labs   - Assess for incontinence   - Turn and reposition patient  - Assist with mobility/ambulation  - Relieve pressure over bony prominences  - Avoid friction and shearing  - Provide appropriate hygiene as needed including keeping skin clean and dry  - Evaluate need for skin moisturizer/barrier cream  - Collaborate with interdisciplinary team   - Patient/family teaching  - Consider wound care consult   Outcome: Progressing     Problem: RESPIRATORY - ADULT  Goal: Achieves optimal ventilation and oxygenation  Description: INTERVENTIONS:  - Assess for changes in respiratory status  - Assess for changes in mentation and behavior  - Position to facilitate oxygenation and minimize respiratory effort  - Oxygen administered by appropriate delivery if ordered  - Initiate smoking cessation education as indicated  - Encourage broncho-pulmonary hygiene including cough, deep breathe, Incentive Spirometry  - Assess the need for suctioning and aspirate as needed  - Assess and instruct to report SOB or any respiratory difficulty  - Respiratory Therapy support as indicated  Outcome: Progressing     Problem: GASTROINTESTINAL - ADULT  Goal: Minimal or absence of nausea and/or vomiting  Description: INTERVENTIONS:  - Administer IV fluids if ordered to ensure adequate hydration  - Maintain NPO status until nausea and vomiting are resolved  - Nasogastric tube if ordered  - Administer ordered antiemetic medications as needed  - Provide nonpharmacologic comfort measures as appropriate  - Advance diet as tolerated, if ordered  - Consider nutrition services referral to assist patient with adequate nutrition and appropriate food choices  Outcome: Progressing     Problem: HEMATOLOGIC - ADULT  Goal: Maintains hematologic stability  Description: INTERVENTIONS  - Assess for signs and symptoms of bleeding or hemorrhage  - Monitor labs  - Administer supportive blood products/factors as ordered and appropriate  Outcome: Progressing     Problem: PAIN - ADULT  Goal: Verbalizes/displays adequate comfort level or baseline comfort level  Description: Interventions:  - Encourage patient to monitor pain and request assistance  - Assess pain using appropriate pain scale  - Administer analgesics based on type and severity of pain and evaluate response  - Implement non-pharmacological measures as appropriate and evaluate response  - Consider cultural and social influences on pain and pain management  - Notify physician/advanced practitioner if interventions unsuccessful or patient reports new pain  Outcome: Progressing     Problem: INFECTION - ADULT  Goal: Absence or prevention of progression during hospitalization  Description: INTERVENTIONS:  - Assess and monitor for signs and symptoms of infection  - Monitor lab/diagnostic results  - Monitor all insertion sites, i.e. indwelling lines, tubes, and drains  - Monitor endotracheal if appropriate and nasal secretions for changes in amount and color  - [place] appropriate cooling/warming therapies per order  - Administer medications as ordered  - Instruct and encourage patient and family to use good hand hygiene technique  - Identify and instruct in appropriate isolation precautions for identified infection/condition  Outcome: Progressing     Problem: DISCHARGE PLANNING  Goal: Discharge to home or other facility with appropriate resources  Description: INTERVENTIONS:  - Identify barriers to discharge w/patient and caregiver  - Arrange for needed discharge resources and transportation as appropriate  - Identify discharge learning needs (meds, wound care, etc.)  - Arrange for interpretive services to assist at discharge as needed  - Refer to Case Management Department for coordinating discharge planning if the patient needs post-hospital services based on physician/advanced practitioner order or complex needs related to functional status, cognitive ability, or social support system  Outcome: Progressing     Problem: Knowledge Deficit  Goal: Patient/family/caregiver demonstrates understanding of disease process, treatment plan, medications, and discharge instructions  Description: Complete learning assessment and assess knowledge base.   Interventions:  - Provide teaching at level of understanding  - Provide teaching via preferred learning methods  Outcome: Progressing     Problem: CARDIOVASCULAR - ADULT  Goal: Maintains optimal cardiac output and hemodynamic stability  Description: INTERVENTIONS:  - Monitor I/O, vital signs and rhythm  - Monitor for S/S and trends of decreased cardiac output  - Administer and titrate ordered vasoactive medications to optimize hemodynamic stability  - Assess quality of pulses, skin color and temperature  - Assess for signs of decreased coronary artery perfusion  - Instruct patient to report change in severity of symptoms  Outcome: Progressing  Goal: Absence of cardiac dysrhythmias or at baseline rhythm  Description: INTERVENTIONS:  - Continuous cardiac monitoring, vital signs, obtain 12 lead EKG if ordered  - Administer antiarrhythmic and heart rate control medications as ordered  - Monitor electrolytes and administer replacement therapy as ordered  Outcome: Progressing

## 2023-08-10 NOTE — OCCUPATIONAL THERAPY NOTE
Occupational Therapy Evaluation      Noman Ganas    8/10/2023    Principal Problem:    Anemia  Active Problems:    Stage 3 chronic kidney disease, unspecified whether stage 3a or 3b CKD (HCC)    Type 2 diabetes mellitus with neurologic complication, with long-term current use of insulin (HCC)    Atrial fibrillation, unspecified type ([address])    Acute respiratory failure with hypoxia (HCC)    Syncope    Acute on chronic diastolic HF (heart failure) (HCC)    History of DVT (deep vein thrombosis)    Elevated troponin    Hepatic flexure mass      Past Medical History:   Diagnosis Date    Atrial fibrillation ([address])     Deep vein thrombosis (DVT) of right lower extremity ([address])     Diverticulitis        Past Surgical History:   Procedure Laterality Date    APPENDECTOMY      BOWEL RESECTION  2014    CATARACT EXTRACTION Bilateral         08/10/23 1215   OT Last Visit   OT Visit Date 08/10/23   Note Type   Note type Evaluation   Pain Assessment   Pain Assessment Tool 0-10   Pain Score No Pain   Restrictions/Precautions   Weight Bearing Precautions Per Order No   Other Precautions Chair Alarm; Bed Alarm; Fall Risk;O2  (3L O2 NC)   Home Living   Type of Home House  ([facility])   Home Layout One level  (1STE)   Home Equipment Quad cane   Prior Function   Level of [place] Independent with ADLs; Independent with functional mobility; Independent with IADLS   Lives With Spouse   Receives Help From Family   IADLs Independent with driving; Independent with meal prep; Independent with medication management   Falls in the last 6 months 1 to 4  (1; due to syncopal event)   Vocational Retired  ()   ADL   Eating Assistance 7  Independent   Grooming Assistance 7  [address] [facility]  Floor   Supine to [address] [facility] Sit to Stand 6  Modified independent   Stand to Sit 6  Modified independent   Stand pivot 6  Modified independent  (RW)   Functional Mobility   Functional Mobility 5  Supervision   Additional items Rolling walker   Activity Tolerance   Activity Tolerance Patient tolerated treatment well   Medical Staff Made Aware PT Lanie   Nurse Made Aware JACKIE Knox   RUDAVID Assessment   RUE Assessment WFL   LUE Assessment   LUE Assessment WFL   Cognition   Overall Cognitive Status WFL   Arousal/Participation Alert; Cooperative   Attention Within functional limits   Orientation Level Oriented X4   Memory Within functional limits   Following Commands Follows multistep commands with increased time or repetition   Assessment   Prognosis Good   Assessment Pt is a 69 y.o. male seen for OT evaluation at [facility], admitted 8/7/2023 w/ Anemia. OT completed extensive review of pt's medical and social history. Comorbidities affecting pt's functional performance at time of assessment include: CKD, DM type 2, aFib, syncope, CHF, hx DVT, liver mass, polyneuropathy, emphysema, hepatocellular carcinoma. Prior to admission, pt was living in a ranch home, [facility], with spouse, and was independent with ADL/IADL, uses a cane. Upon evaluation, pt presents to OT close to functional baseline. Pt is currently requiring use of a RW for ADL/IADL tasks, rather than a cane, and will therefore remain on PT services. The patient's raw score on the AM-PAC Daily Activity inpatient short form is 24, standardized score is 57.54, greater than 39.4. Patients at this level are likely to benefit from DC to home. Based on findings, pt is of high complexity, due to medical complexity. At this time, OT recommendations at time of discharge are level 4 & continue with PT recommendations. No further acute OT needs indicated at this time - Recommend pt continue to be OOB for meals, ambulation to/from BR, perform self care tasks, and mobility in hallway with nursing. D/C from OT caseload with above recommendations.    Plan   OT Frequency Eval only   Recommendation   UB Rehab Discharge Recommendation (PT/OT) Level 4   AM-PAC Daily Activity Inpatient   Lower Body Dressing 4   Bathing 4   Toileting 4   Upper Body Dressing 4   Grooming 4   Eating 4   Daily Activity Raw Score 24   Daily Activity Standardized Score (Calc for Raw Score >=11) 57.54   AM-PAC Applied Cognition Inpatient   Following a Speech/Presentation 4   Understanding Ordinary Conversation 4   Taking Medications 4   Remembering Where Things Are Placed or Put Away 4   Remembering List of 4-5 Errands 4   Taking Care of Complicated Tasks 4   Applied Cognition Raw Score 24   Applied Cognition Standardized Score 62.21     [employer], MS, OTR/L

## 2023-08-10 NOTE — PHYSICAL THERAPY NOTE
PHYSICAL THERAPY CANCELLATION NOTE      Patient Name: Lexie Garza  [id number] Date: 8/10/2023       08/10/23 0947   PT Last Visit   PT Visit Date 08/10/23   Note Type   Note type Cancelled Session   Cancel Reasons Patient off floor/test   Additional Comments PT orders received, chart review performed. Attempted to see pt for PT eval, however pt currently off floor for a colonoscopy per JACKIE Knox, pt has been OOB (per report) and she watched him ambulate a few steps w/ [address] w/ supervision to the kashif.  Will f/u for PT eval as appropriate         Jairo Lipscomb, PT, DPT

## 2023-08-10 NOTE — PHYSICAL THERAPY NOTE
PHYSICAL THERAPY EVALUATION NOTE    Patient Name: Ree NICOLE Date: 8/10/2023    AGE:   69 y.o. Mrn:   [id number]  ADMIT DX:  Hypoglycemia [E16.2]  Acute GI bleeding [K92.2]  COPD exacerbation (HCC) [J44.1]  Facial injury [S09.93XA]  Acute on chronic respiratory failure with hypoxia (HCC) [J96.21]  Acute anemia [D64.9]  Hypoglycemic episode in patient with diabetes mellitus ([address]) [E11.649]    Past Medical History:   Diagnosis Date    Atrial fibrillation ([address])     Deep vein thrombosis (DVT) of right lower extremity ([address])     Diverticulitis      Length Of Stay: 3  PHYSICAL THERAPY EVALUATION :   Patient's identity confirmed via 2 patient identifiers (full name and ) at start of session       08/10/23 1225   PT Last Visit   PT Visit Date 08/10/23   Note Type   Note type Evaluation   Pain Assessment   Pain Assessment Tool 0-10   Pain Score No Pain   Restrictions/Precautions   Weight Bearing Precautions Per Order No   Other Precautions Chair Alarm; Bed Alarm;O2;Fall Risk  (3L NC O2)   Home Living   Type of [address] [place] [address]  One level  (1 DASH)   Home Equipment Quad cane;Walker   Additional Comments Pt resides in a 1 SH w/ 3 DASH, uses quad cane PTA   Prior Function   Level of [place] Independent with ADLs; Independent with functional mobility   Lives With Spouse   Receives Help From Family   IADLs Independent with driving; Independent with meal prep; Independent with medication management   Falls in the last 6 months 1 to 4  (1; due to syncopal event)   Vocational Retired  ()   General   Family/Caregiver Present No   Cognition   Overall Cognitive Status WFL   Arousal/Participation Alert   Attention Within functional limits   Orientation Level Oriented X4   Memory Within functional limits   Following Commands Follows multistep commands with increased time or repetition   Comments Pt pleasant and agreeable to participate in PT evaluation   RLE Assessment   RLE Assessment WFL   Strength RLE   RLE Overall Strength 4-/5   LLE Assessment   LLE Assessment WFL   Strength LLE   LLE Overall Strength 4-/5   Vision-Basic Assessment   Current Vision Wears glasses only for reading   Bed Mobility   Supine to Sit 6  Modified independent   Additional items Assist x 1;HOB elevated; Increased time required   Transfers   Sit to Stand 5  Supervision   Additional items Assist x 1   Stand to Sit 5  Supervision   Additional items Assist x 1   Ambulation/Elevation   Gait pattern Decreased foot clearance; Short stride   Gait Assistance 4  Minimal assist  (CGA)   Additional items Assist x 1   Assistive Device Small base quad cane  (pt's own)   Distance 10 ft   Ambulation/Elevation Additional Comments see treatment note below for additional mobility   Balance   Static Sitting Fair +   Static Standing Fair   Ambulatory Fair -   Activity Tolerance   Activity Tolerance Patient tolerated treatment well   Medical Staff Made Aware ALEX Weber   Nurse Made Aware Spoke to RN Crystal   Assessment   Problem List Impaired balance;Decreased mobility; Impaired vision   Assessment Nikky Tran is a 69 y.o. Male who presents to [address] [place] [address] on 8/7/2023 from home w/ c/o s/p syncopal event due to low BG and anemia w/ dx of anemia. Orders for PT eval and treat received. Pt presents w/ comorbidities of Afib, DMII, CKD Stage 3, hx of DVT. At baseline, pt mobilizes mod I w/ SPC, and reports 1 falls in the last 6 months. Upon evaluation, pt presents w/ the following deficits: weakness, impaired balance and decreased endurance. Upon eval, pt requires modified I for bed mobility, supervision for transfers, and min A x 1 for gait. Based on this PT evaluation today, patient's discharge recommendation is for Level III.  During this admission, pt would benefit from continued skilled inpatient PT in the acute care setting in order to address the abovementioned deficits to maximize function and mobility before DC from acute care. Goals   Patient Goals to eat lunch   STG Expiration Date 08/20/23   Short Term Goal #1 Patient will: Perform all bed mobility tasks independently to improve pt's independence w/ repositioning for decrease risk of skin breakdown, Perform all transfers modified independent consistently from various height surfaces in order to improve I w/ engagement w/ real-world environments/situations, Ambulate at least 150 ft. with quad cane w/ supervision w/o LOB to facilitate return and engagement w/ previous living environment, Navigate 3 stairs w/ supervision with unilateral handrail to either improve independence w/ entering home and/or so patient can fully access living areas in home, Increase all balance 1/2 grade to decrease risk for falls and Complete exercise program independently   PT Treatment Day 0   Plan   Treatment/Interventions Functional transfer training;LE strengthening/ROM; Elevations; Therapeutic exercise; Endurance training;Patient/family training;Equipment eval/education; Bed mobility;Gait training   PT Frequency 2-3x/wk   Recommendation   UB Rehab Discharge Recommendation (PT/OT) Level 3   Equipment Recommended Walker   AM-PAC Basic Mobility Inpatient   Turning in Flat Bed Without Bedrails 4   Lying on Back to Sitting on Edge of Flat Bed Without Bedrails 3   Moving Bed to Chair 3   Standing Up From Chair Using Arms 3   Walk in Room 3   Climb 3-5 Stairs With Railing 3   Basic Mobility Inpatient Raw Score 19   Basic Mobility Standardized Score 42.48   Highest Level Of Mobility   JH-HLM Goal 6: Walk 10 steps or more   JH-HLM Achieved 7: Walk 25 feet or more   Additional Treatment Session   Start Time 1225   End Time 1233   Treatment Assessment Pt provided w/ RW as he is noted to be furniture surfing w/ open hand. Pt is able to ambulate 20 ft w/ RW w/ supervision without appartent difficulty. Pt sating 99% on 3L NC O2, no SOB.  Pt seated OOB in recliner chair at end of session w/ lunch tray arriving (pt was NPO for procedures this AM)   Equipment Use RW   Additional Treatment Day 1   End of Consult   Patient Position at End of Consult Bedside chair;Bed/Chair alarm activated; All needs within reach         The patient's AM-PAC Basic Mobility Inpatient Short Form Raw Score is 19, Standardized Score is 42.48. A standardized score greater than 38.32 (raw score of 16) suggests the patient may benefit from discharge to home which may not coincide with above PT recommendations. However please refer to therapist recommendation for discharge planning given other factors that may influence destination.     Pt would benefit from skilled inpatient PT during this admission in order to facilitate progress towards goals to maximize functional independence    Benito Jamison, PT, DPT

## 2023-08-10 NOTE — QUICK NOTE
Contacted by SLIM to discuss patient. Briefly, Malina Pino is a 67yo M with PMH CHF, CKD3, DM2, af RVR, hx DVT, hepatic flexure mass currently being worked up for [address] [facility] who is HD4 for hypoglycemia, ABLA 2/2 GIB. Per the patient's wife, he was in his normal state of health until the day of admission, when she received an alert on her cell phone from his Dexcom that his glucose was low. She woke him, walked him to the kitchen for a meal and he suddenly lost consciousness, at which time she activated EMS. In the ER, he was found to have hgb 6 and was transfused 1U PRBC on 8/7. Today, he underwent EGD and colonoscopy with GI. EGD showed oozing at the fundus and body of the stomach, for which 2 clips were successfully placed, along with 1 small angioectasia at the duodenum, from which there was no bleeding and 1 clip was placed. Colonoscopy showed small polyps, and when removing the polyp in his descending colon, bleeding occurred and 1 clip was placed successfully. He returned to his room and felt at his baseline. He states he had both lunch and dinner, however after dinner he was attempting to ambulate to the waste basket when he began to feel lightheaded and dizzy. He also shares he has been feeling tremulous since lunchtime. Glucose WNL. New tachycardia 115-120 noted with drop in SBP from his baseline. He was assisted back to bed and placed in trendelenberg, and given albumin x1 with resolution of SBP and improvement in degree of tachycardia to 110-115. On my assessment, he is AAO x3 and reports feeling tremulous. His labs reveal persistent leukocytosis, hgb 8.6, PC 0.44, LA 2.6. He appears pale but RN informs me this is partly his baseline.      Assessment:  ABLA 2/2 GIB s/p clips x4 (upper and lower)  SIRS AEB tachycardia, leukocytosis, relative drop in BP  Lactic acidosis, mild   Elevated procal, mild  Dizziness  Elevated troponin     Plan:   CBC, CMP, mag, repeat LA, BC x2  Add continuous telemetry Zosyn x1  q6h hgb   CXR largely unrevealing, aside from poss small R sided developing effusion   Isolyte 50/hr x4 hours, then reevaluate   SLIM to contact GI to update     At the time of assessment, I inserted 22g PIV in LAC and retrieved blood work. IV flushed and blood return easily obtained. No s/s infiltrate. Dressing applied by myself. Plan of care discussed with Dr. Marko Salcido and primary RN  Staff aware they may call me with any questions, concerns or updates.

## 2023-08-10 NOTE — INTERVAL H&P NOTE
H&P reviewed. After examining the patient I find no changes in the patients condition since the H&P had been written.     Vitals:    08/10/23 0844   BP: 135/63   Pulse: 82   Resp: 16   Temp: 97.9 °F (36.6 °C)   SpO2: 93%

## 2023-08-10 NOTE — ANESTHESIA POSTPROCEDURE EVALUATION
Post-Op Assessment Note    CV Status:  Stable  Pain Score: 0    Pain management: adequate     Mental Status:  Alert and awake   Hydration Status:  Euvolemic and stable   PONV Controlled:  Controlled   Airway Patency:  Patent and adequate      Post Op Vitals Reviewed: Yes      Staff: CRNA         No notable events documented.     BP      Temp      Pulse     Resp      SpO2

## 2023-08-10 NOTE — PLAN OF CARE
Problem: PHYSICAL THERAPY ADULT  Goal: Performs mobility at highest level of function for planned discharge setting. See evaluation for individualized goals. Description: Treatment/Interventions: Functional transfer training, LE strengthening/ROM, Elevations, Therapeutic exercise, Endurance training, Patient/family training, Equipment eval/education, Bed mobility, Gait training  Equipment Recommended: Riki Garza       See flowsheet documentation for full assessment, interventions and recommendations. 8/10/2023 1347 by Deb Alvarado, PT  Note:    Problem List: Impaired balance, Decreased mobility, Impaired vision  Assessment: Lauren Auguste is a 69 y.o. Male who presents to [address] [place] [address] on 8/7/2023 from home w/ c/o s/p syncopal event due to low BG and anemia w/ dx of anemia. Orders for PT eval and treat received. Pt presents w/ comorbidities of Afib, DMII, CKD Stage 3, hx of DVT. At baseline, pt mobilizes mod I w/ SPC, and reports 1 falls in the last 6 months. Upon evaluation, pt presents w/ the following deficits: weakness, impaired balance and decreased endurance. Upon eval, pt requires modified I for bed mobility, supervision for transfers, and min A x 1 for gait. Based on this PT evaluation today, patient's discharge recommendation is for Level III. During this admission, pt would benefit from continued skilled inpatient PT in the acute care setting in order to address the abovementioned deficits to maximize function and mobility before DC from acute care. See flowsheet documentation for full assessment.

## 2023-08-10 NOTE — PROGRESS NOTES
Called to bedside due to dizziness lightheadedness vertigo symptoms and shaking. Possibly due to volume depletion due to overdiuresis. Patient is also tachycardic. EKG  CBC  CMP  Lactic acid  Pro-Damian  Troponins  Mag  Ammonia    X 1 dose of albumin  X 2 2g mag    Will reevaluate after albumin has done. Patient in Trendelenburg position.     Physical exam  General alert oriented x 3 clinically unwell not diaphoretic tremulous nontraumatic  Eyes no nystagmus  Cardio irregular rhythm tachycardic  Lungs CTA BL  Abdomen soft NT ND BS x 4  Extremities no edema    Loly Meng

## 2023-08-10 NOTE — CONSULTS
e-Consult (IPC)  - Interventional Radiology  Indu Lopez 69 y.o. male MRN: [id number]  Unit/Bed#: -01 Encounter: [id number]          Interventional Radiology has been consulted to evaluate Indu Lopez    We were consulted by VINNY concerning this patient with multiple hepatic masses. Inpatient Consult to IR  Consult performed by: Renita Mansfield MD  Consult ordered by: Verena Robles MD        08/10/23    Assessment/Recommendation:   Please refer to Dr. Isis Rodriguez IR telemedicine consult from 8/2/2023.    68 yo male recently found to have multiple hepatic masses on CT 6/14/2023 and MRI 7/6/2023, with portal vein tumor thrombus, and AFP > 18K, with overall appearance most consistent with [address] Patient evaluated by Dr. Vincent Escobar on 7/26/2023 and 7/28/2023, who felt that hepatic lesion biopsy not necessary, with referral to Dr. Christiano Avalos for Y90 radioembolization, which was discussed with patient on 8/2/2023, and which is in the process of being scheduled. 11-20 minutes, >50% of the total time devoted to medical consultative verbal/EMR discussion between providers. Written report will be generated in the EMR. Thank you for allowing Interventional Radiology to participate in the care of Indu Lopez. Please don't hesitate to call or TigerText us with any questions.      Renita Mansfield MD

## 2023-08-10 NOTE — PROGRESS NOTES
[address]  Progress Note  Name: Pk Dove  MRN: [id number]  Unit/Bed#: -01 I Date of Admission: 8/7/2023   Date of Service: 8/10/2023 I Hospital Day: 3    Assessment/Plan   Hepatic flexure mass  Assessment & Plan  Being wkup as outpt. D/w GI and we will consult IR for biopsy of liver mass as patient off DOAC. Elevated troponin  Assessment & Plan  Likely non mi secondary to anemia, acute resp failure  Cardiology on board  Echo ef 45%, mildly reduced. History of DVT (deep vein thrombosis)  Assessment & Plan  On Eliquis, holding in setting of anemia    Acute on chronic diastolic HF (heart failure) (HCC)  Assessment & Plan  Wt Readings from Last 3 Encounters:   08/08/23 103 kg (226 lb)   08/02/23 101 kg (222 lb)   07/26/23 100 kg (221 lb)   Likely volume overload due to his PRBC transfusion  Status post repeat chest x-ray revealing pulmonary edema  Subsequent labs after transfusion revealing elevated BNP  Transitioned to oral lasix per cards. Cardiology on board           Syncope  Assessment & Plan  Likely secondary to anemia  Care as above  S/p CT head, CT cervical  Recent NM stress (6/2023) no evidence of ischemia  See echo results under elevated trop. Acute respiratory failure with hypoxia (HCC)  Assessment & Plan  Possibly related to acute on chronic anemia and worsening may have been due to volume overload secondary to transfusion. Subsequent labs revealing elevated BNP and repeat chest x-ray revealing pulmonary edema  As outpatient been evaluated per Pulm for possibility of restrictive lung disease. Plan was to proceed with high resolution CT scan in the future  Doubt thromboembolism disease as chronically on eliquis. Had CTA chest done in 5/2023 neg for PE  Received multiple doses of IV diuretics and IV Solu-Medrol in the ED  Status post pulmonary consult. Recommending to hold on further steroids  Completed IV lasix per cards. .  Monitor input/output, daily weights  Cont oxygen supplementation and titrate off as able to    Atrial fibrillation, unspecified type [facility])  Assessment & Plan  Rate controlled  Holding eliquis given anemia    Type 2 diabetes mellitus with neurologic complication, with long-term current use of insulin [facility])  Assessment & Plan  Lab Results   Component Value Date    HGBA1C 7.6 (H) 01/06/2023       Recent Labs     08/07/23  1242 08/07/23  2002 08/08/23  0732 08/08/23  1110   POCGLU 272* 246* 257* 244*       Blood Sugar Average: Last 72 hrs:  (P) 244.2   Given only on clears, on decreased insulin regimen. Cont to monitor and titrate up for optimal control    Stage 3 chronic kidney disease, unspecified whether stage 3a or 3b CKD [facility])  Assessment & Plan  Lab Results   Component Value Date    EGFR 43 08/08/2023    EGFR 39 08/07/2023    EGFR 57 (L) 07/10/2023    CREATININE 1.53 (H) 08/08/2023    CREATININE 1.66 (H) 08/07/2023    CREATININE 1.29 (H) 07/10/2023   Still relatively w/in baseline range      * Anemia  Assessment & Plan  Acute on chronic. No much labs to compare with but appears had h/h in 5/2023 with hgb of 9.2  Normocytic  Pt w/ chronic fe deficiency on iron supplements. s/p iron studies; will start on IV venofer x 3 days  S/p prbc transfusion x 1  GI following, endoscopic evaluation pending stable pulmonary status. Pt and wife denies hx EGD. Had colonoscopy 5 yrs ago and hx of colonic resection. He is actually due to have a colonoscopy on 8/23  Cont serial h/h monitoring, transfuse to keep > 7. With appropriate increase status post PRBC transfusion and continues to remain stable  On IV PPI  Hb stable.     • Holding eliquis    EGD  IMPRESSION:  • 2 small angioectasias in the fundus of the stomach and body of the stomach; bleeding was observed; placed 2 clips successfully; hemostasis achieved  • Single small angioectasia in the 3rd part of the duodenum; placed 1 clip successfully  • The esophagus appeared normal.  • No evidence of primary malignancy        COLONOSCOPY  IMPRESSION:  • Subcentimeter polyp in the hepatic flexure was removed with cold snare  • One polyp measuring 5-9 mm in the descending colon; bleeding occurred after intervention; removed by cold snare; placed 1 clip successfully; hemostasis achieved  • Small hemorrhoids  • Healthy end-to-side colocolonic anastomosis in the distal descending colon 20 cm from the anal verge  • 2 benign appearing polyps removed as noted above, no evidence of primary malignancy seen           VTE  Prophylaxis:   Pharmacologic: in place  Mechanical VTE Prophylaxis in Place: Yes    Patient Centered Rounds: I have performed bedside rounds with nursing staff today. Discussions with Specialists or Other Care Team Provider: case management    Education and Discussions with Family / Patient: pt      Current Length of Stay: 3 day(s)    Current Patient Status: Inpatient        Code Status: Level 1 - Full Code    Discharge Plan: Pt will require continued inpatient hospitalization. Subjective:     Pt denies phyllis gi bleeding  Had soft blood pressure/remains on o2 today. Patient is seen and examined at bedside. All other ROS are negative. Objective:     Vitals:   Temp (24hrs), Av.4 °F (36.3 °C), Min:96.6 °F (35.9 °C), Max:98 °F (36.7 °C)    Temp:  [96.6 °F (35.9 °C)-98 °F (36.7 °C)] 96.6 °F (35.9 °C)  HR:  [82-98] 91  Resp:  [12-16] 16  BP: ()/(53-79) 148/68  SpO2:  [91 %-100 %] 93 %  Body mass index is 27.75 kg/m². Input and Output Summary (last 24 hours): Intake/Output Summary (Last 24 hours) at 8/10/2023 1208  Last data filed at 8/10/2023 1026  Gross per 24 hour   Intake 800 ml   Output 350 ml   Net 450 ml       Physical Exam:       GEN: No acute distress, comfortable, on o2.   HEEENT: No JVD, PERRLA, no scleral icterus  RESP: Lungs clear to auscultation bilaterally  CV: RRR, +s1/s2   ABD: SOFT NON TENDER, POSITIVE BOWEL SOUNDS, NO DISTENTION  PSYCH: CALM  NEURO: Mentation baseline, NO FOCAL DEFICITS  SKIN: NO RASH  EXTREM: NO EDEMA    Additional Data:     Labs:    Results from last 7 days   Lab Units 08/10/23  0456 08/07/23  2041 08/07/23  0515   WBC Thousand/uL  --   --  10.34*   HEMOGLOBIN g/dL 8.0*   < > 6.4*   HEMATOCRIT % 29.9*   < > 24.7*   PLATELETS Thousands/uL  --   --  210   NEUTROS PCT %  --   --  61   LYMPHS PCT %  --   --  19   MONOS PCT %  --   --  16*   EOS PCT %  --   --  3    < > = values in this interval not displayed. Results from last 7 days   Lab Units 08/08/23  0442 08/07/23  0515   SODIUM mmol/L 139 142   POTASSIUM mmol/L 4.6 4.5   CHLORIDE mmol/L 108 111*   CO2 mmol/L 20* 19*   BUN mg/dL 49* 55*   CREATININE mg/dL 1.53* 1.66*   ANION GAP mmol/L 11 12   CALCIUM mg/dL 8.7 8.6   ALBUMIN g/dL  --  3.9   TOTAL BILIRUBIN mg/dL  --  0.47   ALK PHOS U/L  --  69   ALT U/L  --  36   AST U/L  --  39   GLUCOSE RANDOM mg/dL 257* 189*         Results from last 7 days   Lab Units 08/10/23  1121 08/10/23  0711 08/09/23  2136 08/09/23  1623 08/09/23  1046 08/09/23  0713 08/08/23  2040 08/08/23  1553 08/08/23  1110 08/08/23  0732 08/07/23  2002 08/07/23  1242   POC GLUCOSE mg/dl 146* 181* 205* 146* 258* 177* 176* 251* 244* 257* 246* 272*               Lines/Drains:  Invasive Devices     Peripheral Intravenous Line  Duration           Peripheral IV 08/07/23 Distal;Right;Ventral (anterior) Forearm 3 days                Telemetry:        * I Have Reviewed All Lab Data Listed Above. Imaging:     Results for orders placed during the hospital encounter of 08/07/23    XR chest portable    Narrative  CHEST    INDICATION:   Hypoxia. COMPARISON: Portable chest from August 7, 2023. CTA of the chest from May 5, 2023. EXAM PERFORMED/VIEWS:  XR CHEST PORTABLE      FINDINGS:    Heart enlarged. Pulmonary vessels mildly distended. Reticular opacities throughout both lungs, developing since 8/7/2023, superimposed on emphysematous changes, especially in the upper lobes.  Small right pleural effusion. No evidence of pneumothorax. Osseous structures appear within normal limits for patient age. Impression  CHF and pulmonary edema, superimposed on emphysema. Small right pleural effusion. Workstation performed: [id number]    Results for orders placed during the hospital encounter of 05/05/23    XR chest pa & lateral    Narrative  CHEST    INDICATION:   R06.02: Shortness of breath. COMPARISON:  None. EXAM PERFORMED/VIEWS:  XR CHEST PA & LATERAL. DUAL ENERGY SUBTRACTION. FINDINGS:    Cardiomediastinal silhouette normal.    No acute disease. Mild peripheral reticulation. No effusion or pneumothorax. Upper abdomen normal. Bones normal for age. Impression  No acute pulmonary disease. Mild peripheral reticulation which could be due to pulmonary fibrosis. Confirmation could be obtained with high-resolution chest CT. This study was marked in Epic for significant notification and follow up.           Workstation performed: [id number]      *I have reviewed all imaging reports listed above      Recent Cultures (last 7 days):           Last 24 Hours Medication List:   Current Facility-Administered Medications   Medication Dose Route Frequency Provider Last Rate   • acetaminophen  650 mg Oral Q6H PRN Torrey Sow MD     • furosemide  40 mg Oral Daily Torrey Sow MD     • hydrALAZINE  10 mg Intravenous Q6H PRN Torrey Sow MD     • insulin detemir  18 Units Subcutaneous HS Torrey Sow MD     • insulin lispro  1-5 Units Subcutaneous HS Torrey Sow MD     • insulin lispro  2-12 Units Subcutaneous TID AC Torrey Sow MD     • insulin lispro  5 Units Subcutaneous TID With Meals Torrey Sow MD     • iron sucrose  100 mg Intravenous Daily Carlos Alberto Almendarez DO     • levalbuterol  1.25 mg Nebulization TID Torrey Sow MD     • metoprolol succinate  50 mg Oral Daily Torrey Sow MD     • multivitamin-minerals  1 tablet Oral Daily Torrey Sow MD     • ondansetron  4 mg Intravenous Q6H PRN Zhao Quintanilla MD     • pantoprazole  40 mg Intravenous Q12H [address] Zhao Quintanilla MD     • pregabalin  100 mg Oral TID Zhao Quintanilla MD     • sertraline  25 mg Oral Daily Zhao Quintanilla MD     • tamsulosin  0.4 mg Oral Daily With Rylie Garcia MD     • traZODone  200 mg Oral HS Zhao Quintanilla MD     • umeclidinium-vilanterol  1 puff Inhalation Daily Zhao Quintanilla MD          Today, Patient Was Seen By: Sadie Chase MD    ** Please Note: Dictation voice to text software may have been used in the creation of this document.  **

## 2023-08-10 NOTE — ANESTHESIA PREPROCEDURE EVALUATION
Procedure:  EGD  COLONOSCOPY    Relevant Problems   CARDIO   (+) Atrial fibrillation, unspecified type ([address])      ENDO   (+) Type 2 diabetes mellitus with neurologic complication, with long-term current use of insulin ([facility])      /RENAL   (+) Stage 3 chronic kidney disease, unspecified whether stage 3a or 3b CKD ([facility])      HEMATOLOGY   (+) Anemia      PULMONARY   (+) Acute respiratory failure with hypoxia ([facility])   (+) Other emphysema ([address])     TTE (08/2023): Findings    Left Ventricle Left ventricular cavity size is normal. Wall thickness is mildly increased. The left ventricular ejection fraction is 45%. Systolic function is mildly reduced. There is mild global hypokinesis. Diastolic function is mildly abnormal, consistent with grade I (abnormal) relaxation. Right Ventricle Right ventricular cavity size is normal. Systolic function is normal. Wall thickness is normal.      Left Atrium The atrium is normal in size. Right Atrium The atrium is normal in size. Aortic Valve The aortic valve is trileaflet. The leaflets are not thickened. The leaflets are mildly calcified. The leaflets exhibit normal mobility. There is trace regurgitation. The aortic valve has no significant stenosis. Mitral Valve There is mild calcification. There is mild regurgitation. There is no evidence of stenosis. Tricuspid Valve Tricuspid valve structure is normal. There is mild regurgitation. There is no evidence of stenosis. There is no indirect evidence of pulmonary hypertension. Pulmonic Valve Pulmonic valve structure is normal. There is no evidence of regurgitation. There is no evidence of stenosis. Ascending Aorta The aortic root is normal in size. IVC/SVC The inferior vena cava is dilated. Pericardium There is no pericardial effusion. The pericardium is normal in appearance.          Sinus rhythm with 1st degree A-V block  Left axis deviation  Non-specific intra-ventricular conduction block  Cannot rule out Inferior infarct , age undetermined  T wave abnormality, consider lateral ischemia  Abnormal ECG  When compared with ECG of 08-AUG-2023 04:45, (unconfirmed)  No significant change was found  Confirmed by Nereyda Tavares ([id number]) on 8/8/2023 3:10:28 PM    Physical Exam    Airway    Mallampati score: III  TM Distance: >3 FB  Neck ROM: full     Dental   No notable dental hx     Cardiovascular  Rhythm: regular, Rate: normal,     Pulmonary  Breath sounds clear to auscultation,     Other Findings  Intercisor Distance > 3cm          Anesthesia Plan  ASA Score- 4     Anesthesia Type- IV sedation with anesthesia with ASA Monitors. Additional Monitors:   Airway Plan:     Comment: NPO appropriate. Discussed benefits/risks of monitored anesthetic care which involves providing a dynamic level of mild to deep sedation. Complications include awareness and/or airway obstruction/aspiration which may necessitate conversion to general anesthesia. All questions answered. Patient understands and wishes to proceed. .       Plan Factors-Exercise tolerance (METS): >4 METS. Chart reviewed. EKG reviewed. Existing labs reviewed. Induction-     Postoperative Plan- Plan for postoperative opioid use. Informed Consent- Anesthetic plan and risks discussed with patient. I personally reviewed this patient with the CRNA. Discussed and agreed on the Anesthesia Plan with the CRNA. Evita Bae

## 2023-08-10 NOTE — PLAN OF CARE
Problem: Potential for Falls  Goal: Patient will remain free of falls  Description: INTERVENTIONS:  - Educate patient/family on patient safety including physical limitations  - Instruct patient to call for assistance with activity   - Consult OT/PT to assist with strengthening/mobility   - Keep Call bell within reach  - Keep bed low and locked with side rails adjusted as appropriate  - Keep care items and personal belongings within reach  - Initiate and maintain comfort rounds  - Make Fall Risk Sign visible to staff  - Offer Toileting every 2 Hours, in advance of need  - Initiate/Maintain bed alarm  - Obtain necessary fall risk management equipment:   - Apply yellow socks and bracelet for high fall risk patients  - Consider moving patient to room near nurses station  Outcome: Progressing     Problem: MOBILITY - ADULT  Goal: Maintain or return to baseline ADL function  Description: INTERVENTIONS:  -  Assess patient's ability to carry out ADLs; assess patient's baseline for ADL function and identify physical deficits which impact ability to perform ADLs (bathing, care of mouth/teeth, toileting, grooming, dressing, etc.)  - Assess/evaluate cause of self-care deficits   - Assess range of motion  - Assess patient's mobility; develop plan if impaired  - Assess patient's need for assistive devices and provide as appropriate  - Encourage maximum independence but intervene and supervise when necessary  - Involve family in performance of ADLs  - Assess for home care needs following discharge   - Consider OT consult to assist with ADL evaluation and planning for discharge  - Provide patient education as appropriate  Outcome: Progressing  Goal: Maintains/Returns to pre admission functional level  Description: INTERVENTIONS:  - Perform BMAT or MOVE assessment daily.   - Set and communicate daily mobility goal to care team and patient/family/caregiver.    - Collaborate with rehabilitation services on mobility goals if consulted  - Perform Range of Motion 3 times a day. - Reposition patient every 2 hours.   - Dangle patient 3 times a day  - Stand patient 3 times a day  - Ambulate patient 3 times a day  - Out of bed to chair 3 times a day   - Out of bed for meals 3 times a day  - Out of bed for toileting  - Record patient progress and toleration of activity level   Outcome: Progressing     Problem: Prexisting or High Potential for Compromised Skin Integrity  Goal: Skin integrity is maintained or improved  Description: INTERVENTIONS:  - Identify patients at risk for skin breakdown  - Assess and monitor skin integrity  - Assess and monitor nutrition and hydration status  - Monitor labs   - Assess for incontinence   - Turn and reposition patient  - Assist with mobility/ambulation  - Relieve pressure over bony prominences  - Avoid friction and shearing  - Provide appropriate hygiene as needed including keeping skin clean and dry  - Evaluate need for skin moisturizer/barrier cream  - Collaborate with interdisciplinary team   - Patient/family teaching  - Consider wound care consult   Outcome: Progressing     Problem: RESPIRATORY - ADULT  Goal: Achieves optimal ventilation and oxygenation  Description: INTERVENTIONS:  - Assess for changes in respiratory status  - Assess for changes in mentation and behavior  - Position to facilitate oxygenation and minimize respiratory effort  - Oxygen administered by appropriate delivery if ordered  - Initiate smoking cessation education as indicated  - Encourage broncho-pulmonary hygiene including cough, deep breathe, Incentive Spirometry  - Assess the need for suctioning and aspirate as needed  - Assess and instruct to report SOB or any respiratory difficulty  - Respiratory Therapy support as indicated  Outcome: Progressing     Problem: GASTROINTESTINAL - ADULT  Goal: Minimal or absence of nausea and/or vomiting  Description: INTERVENTIONS:  - Administer IV fluids if ordered to ensure adequate hydration  - Maintain NPO status until nausea and vomiting are resolved  - Nasogastric tube if ordered  - Administer ordered antiemetic medications as needed  - Provide nonpharmacologic comfort measures as appropriate  - Advance diet as tolerated, if ordered  - Consider nutrition services referral to assist patient with adequate nutrition and appropriate food choices  Outcome: Progressing     Problem: HEMATOLOGIC - ADULT  Goal: Maintains hematologic stability  Description: INTERVENTIONS  - Assess for signs and symptoms of bleeding or hemorrhage  - Monitor labs  - Administer supportive blood products/factors as ordered and appropriate  Outcome: Progressing     Problem: PAIN - ADULT  Goal: Verbalizes/displays adequate comfort level or baseline comfort level  Description: Interventions:  - Encourage patient to monitor pain and request assistance  - Assess pain using appropriate pain scale  - Administer analgesics based on type and severity of pain and evaluate response  - Implement non-pharmacological measures as appropriate and evaluate response  - Consider cultural and social influences on pain and pain management  - Notify physician/advanced practitioner if interventions unsuccessful or patient reports new pain  Outcome: Progressing     Problem: INFECTION - ADULT  Goal: Absence or prevention of progression during hospitalization  Description: INTERVENTIONS:  - Assess and monitor for signs and symptoms of infection  - Monitor lab/diagnostic results  - Monitor all insertion sites, i.e. indwelling lines, tubes, and drains  - Monitor endotracheal if appropriate and nasal secretions for changes in amount and color  - Zullinger appropriate cooling/warming therapies per order  - Administer medications as ordered  - Instruct and encourage patient and family to use good hand hygiene technique  - Identify and instruct in appropriate isolation precautions for identified infection/condition  Outcome: Progressing     Problem: DISCHARGE PLANNING  Goal: Discharge to home or other facility with appropriate resources  Description: INTERVENTIONS:  - Identify barriers to discharge w/patient and caregiver  - Arrange for needed discharge resources and transportation as appropriate  - Identify discharge learning needs (meds, wound care, etc.)  - Arrange for interpretive services to assist at discharge as needed  - Refer to Case Management Department for coordinating discharge planning if the patient needs post-hospital services based on physician/advanced practitioner order or complex needs related to functional status, cognitive ability, or social support system  Outcome: Progressing     Problem: Knowledge Deficit  Goal: Patient/family/caregiver demonstrates understanding of disease process, treatment plan, medications, and discharge instructions  Description: Complete learning assessment and assess knowledge base.   Interventions:  - Provide teaching at level of understanding  - Provide teaching via preferred learning methods  Outcome: Progressing     Problem: CARDIOVASCULAR - ADULT  Goal: Maintains optimal cardiac output and hemodynamic stability  Description: INTERVENTIONS:  - Monitor I/O, vital signs and rhythm  - Monitor for S/S and trends of decreased cardiac output  - Administer and titrate ordered vasoactive medications to optimize hemodynamic stability  - Assess quality of pulses, skin color and temperature  - Assess for signs of decreased coronary artery perfusion  - Instruct patient to report change in severity of symptoms  Outcome: Progressing  Goal: Absence of cardiac dysrhythmias or at baseline rhythm  Description: INTERVENTIONS:  - Continuous cardiac monitoring, vital signs, obtain 12 lead EKG if ordered  - Administer antiarrhythmic and heart rate control medications as ordered  - Monitor electrolytes and administer replacement therapy as ordered  Outcome: Progressing

## 2023-08-10 NOTE — BRIEF OP NOTE (RAD/CATH)
DATE: 9/6/2022  PATIENT: Vanna Baldwin    Supervising Physician: Javier Kathleen M.D.     CHIEF COMPLAINT: bilateral leg pain    HISTORY:  Vanna Baldwin is a 84 y.o. female with a pmhx of DM II here for initial evaluation of bilateral leg pain (Back - 0, Leg - 10).  The pain in the back of the left leg is what bothers her most.  The pain has been present for 6 months, worsening over time. The patient describes the pain as burning.  The pain is worse with walking and improved by sitting and resting. There is positive associated numbness and tingling. There is positive subjective weakness. Prior treatments have included PT, but no ESIs or surgery. Pt was seen by Reyna Carrera PA-C on 9/2/22 for similar complaints and was given gabapentin.    The patient denies myelopathic symptoms such as handwriting changes or difficulty with buttons/coins/keys. Denies perineal paresthesias, bowel/bladder dysfunction.    PAST MEDICAL/SURGICAL HISTORY:  Past Medical History:   Diagnosis Date    Ataxia 1/23/2020    Benign paroxysmal positional vertigo of left ear 1/23/2020    Cataract     Diabetes mellitus, type 2     Hyperlipidemia     Hypertension     Hypertension, benign 4/27/2018    Imbalance 1/23/2020     Past Surgical History:   Procedure Laterality Date    black removed from breast Right 1979    removed in office    HYSTERECTOMY         Medications:   Current Outpatient Medications on File Prior to Visit   Medication Sig Dispense Refill    ACCU-CHEK JONNY PLUS TEST STRP Strp USE EVERY  strip 3    ACCU-CHEK SOFTCLIX LANCETS Misc USE EVERY  each 3    amLODIPine (NORVASC) 5 MG tablet TAKE 1 TABLET EVERY DAY 90 tablet 3    amoxicillin (AMOXIL) 500 MG Tab       atorvastatin (LIPITOR) 80 MG tablet TAKE 1 TABLET EVERY DAY 90 tablet 1    BD ALCOHOL SWABS PadM USE EVERY  each 3    blood glucose control high,low (ACCU-CHEK JONNY CONTROL SOLN) Soln Qd usage 1 each 3    calcium-vitamin D3 (OS-WANG 500 + D3) 500 mg(1,250mg)  EGD  IMPRESSION:  · 2 small angioectasias in the fundus of the stomach and body of the stomach; bleeding was observed; placed 2 clips successfully; hemostasis achieved  · Single small angioectasia in the 3rd part of the duodenum; placed 1 clip successfully  · The esophagus appeared normal.  · No evidence of primary malignancy  RECOMMENDATION:  Okay to resume usual diet  Observe for any clinical signs of rebleeding  Follow H&H and transfuse as needed  If signs of rebleeding, recommend outpatient capsule endoscopy.        COLONOSCOPY  IMPRESSION:  · Subcentimeter polyp in the hepatic flexure was removed with cold snare  · One polyp measuring 5-9 mm in the descending colon; bleeding occurred after intervention; removed by cold snare; placed 1 clip successfully; hemostasis achieved  · Small hemorrhoids  · Healthy end-to-side colocolonic anastomosis in the distal descending colon 20 cm from the anal verge  · 2 benign appearing polyps removed as noted above, no evidence of primary malignancy seen  RECOMMENDATION:   Repeat screening colonoscopy in 10 years    No further screening colonoscopies necessary     Resume usual diet  Follow H&H and transfuse as needed  If signs of recurrent bleeding consider outpatient small bowel capsule    If Eliquis is to be restarted and no further procedures, can restart med tomorrow  IR follow-up for planned biopsy of hepatic lesion -200 unit per tablet Take 1 tablet by mouth 2 (two) times daily with meals.      diclofenac sodium (VOLTAREN) 1 % Gel 4 grams 4 times daily do not exceed 32 grams per day 1 g 2    FLUAD QUAD 2021-22,65Y UP,,PF, 60 mcg (15 mcg x 4)/0.5 mL Syrg       glimepiride (AMARYL) 1 MG tablet Take 1 tablet (1 mg total) by mouth before breakfast. 90 tablet 3    lisinopriL (PRINIVIL,ZESTRIL) 40 MG tablet TAKE 1 TABLET EVERY DAY 90 tablet 3    metFORMIN (GLUCOPHAGE) 1000 MG tablet TAKE 1 TABLET TWICE DAILY WITH MEALS 180 tablet 1    ORTHOVISC 30 mg/2 mL       pyridoxine, vitamin B6, (B-6) 50 MG Tab Take 1 tablet (50 mg total) by mouth once daily. 30 tablet 12    triamterene-hydrochlorothiazide 37.5-25 mg (MAXZIDE-25) 37.5-25 mg per tablet TAKE 1 TABLET EVERY DAY 90 tablet 3    [DISCONTINUED] gabapentin (NEURONTIN) 100 MG capsule Take 1 capsule (100 mg total) by mouth 3 (three) times daily. 30 capsule 0     No current facility-administered medications on file prior to visit.       Social History:   Social History     Socioeconomic History    Marital status:    Tobacco Use    Smoking status: Never    Smokeless tobacco: Never   Substance and Sexual Activity    Alcohol use: Yes     Comment: socially    Drug use: No    Sexual activity: Not Currently       REVIEW OF SYSTEMS:  Constitution: Negative. Negative for chills, fever and night sweats.   Cardiovascular: Negative for chest pain and syncope.   Respiratory: Negative for cough and shortness of breath.   Gastrointestinal: See HPI. Negative for nausea/vomiting. Negative for abdominal pain.  Genitourinary: See HPI. Negative for discoloration or dysuria.  Skin: Negative for dry skin, itching and rash.   Hematologic/Lymphatic: Negative for bleeding problem. Does not bruise/bleed easily.   Musculoskeletal: Negative for falls and muscle weakness.   Neurological: See HPI. No seizures.   Endocrine: Negative for polydipsia, polyphagia and polyuria.   Allergic/Immunologic: Negative for  hives and persistent infections.     EXAM:  There were no vitals taken for this visit.    General: The patient is a very pleasant 84 y.o. female in no apparent distress, the patient is oriented to person, place and time.  Psych: Normal mood and affect  HEENT: Vision grossly intact, hearing intact to the spoken word.  Lungs: Respirations unlabored.  Gait: Antalgic station and gait, no difficulty with toe or heel walk.   Skin: Dorsal lumbar skin negative for rashes, lesions, hairy patches and surgical scars. There is negative lumbar tenderness to palpation.  Range of motion: Lumbar range of motion is acceptable.  Spinal Balance: Global saggital and coronal spinal balance acceptable, not significant for scoliosis and kyphosis.  Musculoskeletal: No pain with the range of motion of the bilateral hips. No trochanteric tenderness to palpation.  Vascular: Bilateral lower extremities warm and well perfused, dorsalis pedis pulses 2+ bilaterally.  Neurological: Normal strength and tone in all major motor groups in the bilateral lower extremities. Altered sensation to light touch in the L2-S1 dermatomes in LLE compared to RLE.  Deep tendon reflexes symmetric 2+ in the bilateral lower extremities.  Negative Babinski bilaterally. Straight leg raise positive on left.    IMAGING:      Today I personally reviewed AP, Lat and Flex/Ex  upright L-spine films that demonstrate significant degenerative changes with trace anterolisthesis of L3 on L4.      There is no height or weight on file to calculate BMI.    Hemoglobin A1C   Date Value Ref Range Status   06/16/2022 6.8 (H) 4.0 - 5.6 % Final     Comment:     ADA Screening Guidelines:  5.7-6.4%  Consistent with prediabetes  >or=6.5%  Consistent with diabetes    High levels of fetal hemoglobin interfere with the HbA1C  assay. Heterozygous hemoglobin variants (HbS, HgC, etc)do  not significantly interfere with this assay.   However, presence of multiple variants may affect accuracy.      05/03/2022 7.3 (H) 4.0 - 5.6 % Final     Comment:     ADA Screening Guidelines:  5.7-6.4%  Consistent with prediabetes  >or=6.5%  Consistent with diabetes    High levels of fetal hemoglobin interfere with the HbA1C  assay. Heterozygous hemoglobin variants (HbS, HgC, etc)do  not significantly interfere with this assay.   However, presence of multiple variants may affect accuracy.     03/16/2022 8.3 (H) 4.0 - 5.6 % Final     Comment:     ADA Screening Guidelines:  5.7-6.4%  Consistent with prediabetes  >or=6.5%  Consistent with diabetes    High levels of fetal hemoglobin interfere with the HbA1C  assay. Heterozygous hemoglobin variants (HbS, HgC, etc)do  not significantly interfere with this assay.   However, presence of multiple variants may affect accuracy.             ASSESSMENT/PLAN:    There are no diagnoses linked to this encounter.    Today we discussed at length all of the different treatment options including anti-inflammatories, acetaminophen, rest, ice, heat, physical therapy including strengthening and stretching exercises, home exercises, ROM, aerobic conditioning, aqua therapy, other modalities including ultrasound, massage, and dry needling, epidural steroid injections and finally surgical intervention.      Pt presents with chronic lumbar radiculopathy (L>R). Failure of conservative rx. Will send medrol dose pack to pharmacy. Will obtain lumbar MRI to further evaluate and call with results.

## 2023-08-11 ENCOUNTER — HOME HEALTH ADMISSION (OUTPATIENT)
Dept: HOME HEALTH SERVICES | Facility: HOME HEALTHCARE | Age: 78
End: 2023-08-11

## 2023-08-11 LAB
ALBUMIN SERPL BCP-MCNC: 3.5 G/DL (ref 3.5–5)
ALP SERPL-CCNC: 70 U/L (ref 34–104)
ALT SERPL W P-5'-P-CCNC: 29 U/L (ref 7–52)
ANION GAP SERPL CALCULATED.3IONS-SCNC: 10 MMOL/L
AST SERPL W P-5'-P-CCNC: 34 U/L (ref 13–39)
ATRIAL RATE: 117 BPM
BASOPHILS # BLD AUTO: 0.03 THOUSANDS/ÂΜL (ref 0–0.1)
BASOPHILS NFR BLD AUTO: 0 % (ref 0–1)
BILIRUB SERPL-MCNC: 0.88 MG/DL (ref 0.2–1)
BUN SERPL-MCNC: 38 MG/DL (ref 5–25)
CALCIUM SERPL-MCNC: 8.1 MG/DL (ref 8.4–10.2)
CHLORIDE SERPL-SCNC: 106 MMOL/L (ref 96–108)
CO2 SERPL-SCNC: 26 MMOL/L (ref 21–32)
CREAT SERPL-MCNC: 1.69 MG/DL (ref 0.6–1.3)
EOSINOPHIL # BLD AUTO: 0.36 THOUSAND/ÂΜL (ref 0–0.61)
EOSINOPHIL NFR BLD AUTO: 3 % (ref 0–6)
ERYTHROCYTE [DISTWIDTH] IN BLOOD BY AUTOMATED COUNT: 25.7 % (ref 11.6–15.1)
GFR SERPL CREATININE-BSD FRML MDRD: 38 ML/MIN/1.73SQ M
GLUCOSE SERPL-MCNC: 170 MG/DL (ref 65–140)
GLUCOSE SERPL-MCNC: 213 MG/DL (ref 65–140)
GLUCOSE SERPL-MCNC: 320 MG/DL (ref 65–140)
GLUCOSE SERPL-MCNC: 332 MG/DL (ref 65–140)
HCT VFR BLD AUTO: 26.6 % (ref 36.5–49.3)
HCT VFR BLD AUTO: 30.5 % (ref 36.5–49.3)
HGB BLD-MCNC: 7 G/DL (ref 12–17)
HGB BLD-MCNC: 8 G/DL (ref 12–17)
IMM GRANULOCYTES # BLD AUTO: 0.12 THOUSAND/UL (ref 0–0.2)
IMM GRANULOCYTES NFR BLD AUTO: 1 % (ref 0–2)
LYMPHOCYTES # BLD AUTO: 1.21 THOUSANDS/ÂΜL (ref 0.6–4.47)
LYMPHOCYTES NFR BLD AUTO: 11 % (ref 14–44)
MCH RBC QN AUTO: 17.5 PG (ref 26.8–34.3)
MCHC RBC AUTO-ENTMCNC: 26.3 G/DL (ref 31.4–37.4)
MCV RBC AUTO: 67 FL (ref 82–98)
MONOCYTES # BLD AUTO: 1.5 THOUSAND/ÂΜL (ref 0.17–1.22)
MONOCYTES NFR BLD AUTO: 14 % (ref 4–12)
NEUTROPHILS # BLD AUTO: 7.7 THOUSANDS/ÂΜL (ref 1.85–7.62)
NEUTS SEG NFR BLD AUTO: 71 % (ref 43–75)
NRBC BLD AUTO-RTO: 1 /100 WBCS
PLATELET # BLD AUTO: 216 THOUSANDS/UL (ref 149–390)
POTASSIUM SERPL-SCNC: 3.5 MMOL/L (ref 3.5–5.3)
PR INTERVAL: 216 MS
PROT SERPL-MCNC: 6.3 G/DL (ref 6.4–8.4)
QRS AXIS: -48 DEGREES
QRSD INTERVAL: 132 MS
QT INTERVAL: 350 MS
QTC INTERVAL: 488 MS
RBC # BLD AUTO: 4 MILLION/UL (ref 3.88–5.62)
SODIUM SERPL-SCNC: 142 MMOL/L (ref 135–147)
T WAVE AXIS: 34 DEGREES
VENTRICULAR RATE: 117 BPM
WBC # BLD AUTO: 10.92 THOUSAND/UL (ref 4.31–10.16)

## 2023-08-11 PROCEDURE — 85018 HEMOGLOBIN: CPT | Performed by: INTERNAL MEDICINE

## 2023-08-11 PROCEDURE — 94640 AIRWAY INHALATION TREATMENT: CPT

## 2023-08-11 PROCEDURE — 82948 REAGENT STRIP/BLOOD GLUCOSE: CPT

## 2023-08-11 PROCEDURE — 85014 HEMATOCRIT: CPT | Performed by: INTERNAL MEDICINE

## 2023-08-11 PROCEDURE — 85025 COMPLETE CBC W/AUTO DIFF WBC: CPT | Performed by: HOSPITALIST

## 2023-08-11 PROCEDURE — 99232 SBSQ HOSP IP/OBS MODERATE 35: CPT | Performed by: INTERNAL MEDICINE

## 2023-08-11 PROCEDURE — 80053 COMPREHEN METABOLIC PANEL: CPT | Performed by: HOSPITALIST

## 2023-08-11 PROCEDURE — 84145 PROCALCITONIN (PCT): CPT | Performed by: PHYSICIAN ASSISTANT

## 2023-08-11 PROCEDURE — 94760 N-INVAS EAR/PLS OXIMETRY 1: CPT

## 2023-08-11 PROCEDURE — C9113 INJ PANTOPRAZOLE SODIUM, VIA: HCPCS | Performed by: INTERNAL MEDICINE

## 2023-08-11 PROCEDURE — 94664 DEMO&/EVAL PT USE INHALER: CPT

## 2023-08-11 PROCEDURE — 93010 ELECTROCARDIOGRAM REPORT: CPT | Performed by: INTERNAL MEDICINE

## 2023-08-11 RX ORDER — FUROSEMIDE 40 MG/1
40 TABLET ORAL DAILY
Status: DISCONTINUED | OUTPATIENT
Start: 2023-08-12 | End: 2023-08-13

## 2023-08-11 RX ORDER — INSULIN LISPRO 100 [IU]/ML
1-6 INJECTION, SOLUTION INTRAVENOUS; SUBCUTANEOUS
Status: DISCONTINUED | OUTPATIENT
Start: 2023-08-12 | End: 2023-08-14 | Stop reason: HOSPADM

## 2023-08-11 RX ORDER — ALBUTEROL SULFATE 2.5 MG/3ML
2.5 SOLUTION RESPIRATORY (INHALATION) EVERY 6 HOURS PRN
Status: DISCONTINUED | OUTPATIENT
Start: 2023-08-11 | End: 2023-08-14 | Stop reason: HOSPADM

## 2023-08-11 RX ORDER — INSULIN LISPRO 100 [IU]/ML
5 INJECTION, SOLUTION INTRAVENOUS; SUBCUTANEOUS
Status: DISCONTINUED | OUTPATIENT
Start: 2023-08-12 | End: 2023-08-13

## 2023-08-11 RX ORDER — INSULIN LISPRO 100 [IU]/ML
1-5 INJECTION, SOLUTION INTRAVENOUS; SUBCUTANEOUS
Status: DISCONTINUED | OUTPATIENT
Start: 2023-08-11 | End: 2023-08-14 | Stop reason: HOSPADM

## 2023-08-11 RX ADMIN — INSULIN LISPRO 2 UNITS: 100 INJECTION, SOLUTION INTRAVENOUS; SUBCUTANEOUS at 12:06

## 2023-08-11 RX ADMIN — PREGABALIN 100 MG: 100 CAPSULE ORAL at 08:10

## 2023-08-11 RX ADMIN — TRAZODONE HYDROCHLORIDE 200 MG: 50 TABLET ORAL at 21:39

## 2023-08-11 RX ADMIN — PANTOPRAZOLE SODIUM 40 MG: 40 INJECTION, POWDER, FOR SOLUTION INTRAVENOUS at 20:12

## 2023-08-11 RX ADMIN — PREGABALIN 100 MG: 100 CAPSULE ORAL at 15:55

## 2023-08-11 RX ADMIN — INSULIN LISPRO 3 UNITS: 100 INJECTION, SOLUTION INTRAVENOUS; SUBCUTANEOUS at 21:38

## 2023-08-11 RX ADMIN — LEVALBUTEROL HYDROCHLORIDE 1.25 MG: 1.25 SOLUTION RESPIRATORY (INHALATION) at 13:54

## 2023-08-11 RX ADMIN — LEVALBUTEROL HYDROCHLORIDE 1.25 MG: 1.25 SOLUTION RESPIRATORY (INHALATION) at 07:19

## 2023-08-11 RX ADMIN — INSULIN DETEMIR 18 UNITS: 100 INJECTION, SOLUTION SUBCUTANEOUS at 21:38

## 2023-08-11 RX ADMIN — PANTOPRAZOLE SODIUM 40 MG: 40 INJECTION, POWDER, FOR SOLUTION INTRAVENOUS at 08:10

## 2023-08-11 RX ADMIN — PIPERACILLIN AND TAZOBACTAM 3.38 G: 3; .375 INJECTION, POWDER, LYOPHILIZED, FOR SOLUTION INTRAVENOUS at 04:54

## 2023-08-11 RX ADMIN — MULTIPLE VITAMINS W/ MINERALS TAB 1 TABLET: TAB ORAL at 08:10

## 2023-08-11 RX ADMIN — METOPROLOL SUCCINATE 50 MG: 50 TABLET, EXTENDED RELEASE ORAL at 08:10

## 2023-08-11 RX ADMIN — SERTRALINE HYDROCHLORIDE 25 MG: 25 TABLET ORAL at 08:15

## 2023-08-11 RX ADMIN — PIPERACILLIN AND TAZOBACTAM 3.38 G: 3; .375 INJECTION, POWDER, LYOPHILIZED, FOR SOLUTION INTRAVENOUS at 20:13

## 2023-08-11 RX ADMIN — TAMSULOSIN HYDROCHLORIDE 0.4 MG: 0.4 CAPSULE ORAL at 15:55

## 2023-08-11 RX ADMIN — UMECLIDINIUM BROMIDE AND VILANTEROL TRIFENATATE 1 PUFF: 62.5; 25 POWDER RESPIRATORY (INHALATION) at 08:10

## 2023-08-11 RX ADMIN — LEVALBUTEROL HYDROCHLORIDE 1.25 MG: 1.25 SOLUTION RESPIRATORY (INHALATION) at 19:00

## 2023-08-11 RX ADMIN — INSULIN LISPRO 3 UNITS: 100 INJECTION, SOLUTION INTRAVENOUS; SUBCUTANEOUS at 16:37

## 2023-08-11 RX ADMIN — PIPERACILLIN AND TAZOBACTAM 3.38 G: 3; .375 INJECTION, POWDER, LYOPHILIZED, FOR SOLUTION INTRAVENOUS at 12:06

## 2023-08-11 RX ADMIN — INSULIN LISPRO 2 UNITS: 100 INJECTION, SOLUTION INTRAVENOUS; SUBCUTANEOUS at 06:22

## 2023-08-11 RX ADMIN — PREGABALIN 100 MG: 100 CAPSULE ORAL at 20:13

## 2023-08-11 NOTE — PLAN OF CARE
Problem: Potential for Falls  Goal: Patient will remain free of falls  Description: INTERVENTIONS:  - Educate patient/family on patient safety including physical limitations  - Instruct patient to call for assistance with activity   - Consult OT/PT to assist with strengthening/mobility   - Keep Call bell within reach  - Keep bed low and locked with side rails adjusted as appropriate  - Keep care items and personal belongings within reach  - Initiate and maintain comfort rounds  - Make Fall Risk Sign visible to staff  - Offer Toileting every 2 Hours, in advance of need  - Initiate/Maintain bed alarm  - Obtain necessary fall risk management equipment:   - Apply yellow socks and bracelet for high fall risk patients  - Consider moving patient to room near nurses station  Outcome: Progressing     Problem: MOBILITY - ADULT  Goal: Maintain or return to baseline ADL function  Description: INTERVENTIONS:  -  Assess patient's ability to carry out ADLs; assess patient's baseline for ADL function and identify physical deficits which impact ability to perform ADLs (bathing, care of mouth/teeth, toileting, grooming, dressing, etc.)  - Assess/evaluate cause of self-care deficits   - Assess range of motion  - Assess patient's mobility; develop plan if impaired  - Assess patient's need for assistive devices and provide as appropriate  - Encourage maximum independence but intervene and supervise when necessary  - Involve family in performance of ADLs  - Assess for home care needs following discharge   - Consider OT consult to assist with ADL evaluation and planning for discharge  - Provide patient education as appropriate  Outcome: Progressing  Goal: Maintains/Returns to pre admission functional level  Description: INTERVENTIONS:  - Perform BMAT or MOVE assessment daily.   - Set and communicate daily mobility goal to care team and patient/family/caregiver.    - Collaborate with rehabilitation services on mobility goals if consulted  - Perform Range of Motion 3 times a day. - Reposition patient every 2 hours.   - Dangle patient 3 times a day  - Stand patient 3 times a day  - Ambulate patient 3 times a day  - Out of bed to chair 3 times a day   - Out of bed for meals 3 times a day  - Out of bed for toileting  - Record patient progress and toleration of activity level   Outcome: Progressing     Problem: Prexisting or High Potential for Compromised Skin Integrity  Goal: Skin integrity is maintained or improved  Description: INTERVENTIONS:  - Identify patients at risk for skin breakdown  - Assess and monitor skin integrity  - Assess and monitor nutrition and hydration status  - Monitor labs   - Assess for incontinence   - Turn and reposition patient  - Assist with mobility/ambulation  - Relieve pressure over bony prominences  - Avoid friction and shearing  - Provide appropriate hygiene as needed including keeping skin clean and dry  - Evaluate need for skin moisturizer/barrier cream  - Collaborate with interdisciplinary team   - Patient/family teaching  - Consider wound care consult   Outcome: Progressing     Problem: RESPIRATORY - ADULT  Goal: Achieves optimal ventilation and oxygenation  Description: INTERVENTIONS:  - Assess for changes in respiratory status  - Assess for changes in mentation and behavior  - Position to facilitate oxygenation and minimize respiratory effort  - Oxygen administered by appropriate delivery if ordered  - Initiate smoking cessation education as indicated  - Encourage broncho-pulmonary hygiene including cough, deep breathe, Incentive Spirometry  - Assess the need for suctioning and aspirate as needed  - Assess and instruct to report SOB or any respiratory difficulty  - Respiratory Therapy support as indicated  Outcome: Progressing     Problem: GASTROINTESTINAL - ADULT  Goal: Minimal or absence of nausea and/or vomiting  Description: INTERVENTIONS:  - Administer IV fluids if ordered to ensure adequate hydration  - Maintain NPO status until nausea and vomiting are resolved  - Nasogastric tube if ordered  - Administer ordered antiemetic medications as needed  - Provide nonpharmacologic comfort measures as appropriate  - Advance diet as tolerated, if ordered  - Consider nutrition services referral to assist patient with adequate nutrition and appropriate food choices  Outcome: Progressing     Problem: HEMATOLOGIC - ADULT  Goal: Maintains hematologic stability  Description: INTERVENTIONS  - Assess for signs and symptoms of bleeding or hemorrhage  - Monitor labs  - Administer supportive blood products/factors as ordered and appropriate  Outcome: Progressing     Problem: PAIN - ADULT  Goal: Verbalizes/displays adequate comfort level or baseline comfort level  Description: Interventions:  - Encourage patient to monitor pain and request assistance  - Assess pain using appropriate pain scale  - Administer analgesics based on type and severity of pain and evaluate response  - Implement non-pharmacological measures as appropriate and evaluate response  - Consider cultural and social influences on pain and pain management  - Notify physician/advanced practitioner if interventions unsuccessful or patient reports new pain  Outcome: Progressing     Problem: INFECTION - ADULT  Goal: Absence or prevention of progression during hospitalization  Description: INTERVENTIONS:  - Assess and monitor for signs and symptoms of infection  - Monitor lab/diagnostic results  - Monitor all insertion sites, i.e. indwelling lines, tubes, and drains  - Monitor endotracheal if appropriate and nasal secretions for changes in amount and color  - [place] appropriate cooling/warming therapies per order  - Administer medications as ordered  - Instruct and encourage patient and family to use good hand hygiene technique  - Identify and instruct in appropriate isolation precautions for identified infection/condition  Outcome: Progressing     Problem: DISCHARGE PLANNING  Goal: Discharge to home or other facility with appropriate resources  Description: INTERVENTIONS:  - Identify barriers to discharge w/patient and caregiver  - Arrange for needed discharge resources and transportation as appropriate  - Identify discharge learning needs (meds, wound care, etc.)  - Arrange for interpretive services to assist at discharge as needed  - Refer to Case Management Department for coordinating discharge planning if the patient needs post-hospital services based on physician/advanced practitioner order or complex needs related to functional status, cognitive ability, or social support system  Outcome: Progressing     Problem: Knowledge Deficit  Goal: Patient/family/caregiver demonstrates understanding of disease process, treatment plan, medications, and discharge instructions  Description: Complete learning assessment and assess knowledge base.   Interventions:  - Provide teaching at level of understanding  - Provide teaching via preferred learning methods  Outcome: Progressing     Problem: CARDIOVASCULAR - ADULT  Goal: Maintains optimal cardiac output and hemodynamic stability  Description: INTERVENTIONS:  - Monitor I/O, vital signs and rhythm  - Monitor for S/S and trends of decreased cardiac output  - Administer and titrate ordered vasoactive medications to optimize hemodynamic stability  - Assess quality of pulses, skin color and temperature  - Assess for signs of decreased coronary artery perfusion  - Instruct patient to report change in severity of symptoms  Outcome: Progressing  Goal: Absence of cardiac dysrhythmias or at baseline rhythm  Description: INTERVENTIONS:  - Continuous cardiac monitoring, vital signs, obtain 12 lead EKG if ordered  - Administer antiarrhythmic and heart rate control medications as ordered  - Monitor electrolytes and administer replacement therapy as ordered  Outcome: Progressing

## 2023-08-11 NOTE — PLAN OF CARE
Problem: Potential for Falls  Goal: Patient will remain free of falls  Description: INTERVENTIONS:  - Educate patient/family on patient safety including physical limitations  - Instruct patient to call for assistance with activity   - Consult OT/PT to assist with strengthening/mobility   - Keep Call bell within reach  - Keep bed low and locked with side rails adjusted as appropriate  - Keep care items and personal belongings within reach  - Initiate and maintain comfort rounds  - Make Fall Risk Sign visible to staff  - Offer Toileting every 2 Hours, in advance of need  - Initiate/Maintain bed alarm  - Obtain necessary fall risk management equipment:   - Apply yellow socks and bracelet for high fall risk patients  - Consider moving patient to room near nurses station  Outcome: Progressing     Problem: MOBILITY - ADULT  Goal: Maintain or return to baseline ADL function  Description: INTERVENTIONS:  -  Assess patient's ability to carry out ADLs; assess patient's baseline for ADL function and identify physical deficits which impact ability to perform ADLs (bathing, care of mouth/teeth, toileting, grooming, dressing, etc.)  - Assess/evaluate cause of self-care deficits   - Assess range of motion  - Assess patient's mobility; develop plan if impaired  - Assess patient's need for assistive devices and provide as appropriate  - Encourage maximum independence but intervene and supervise when necessary  - Involve family in performance of ADLs  - Assess for home care needs following discharge   - Consider OT consult to assist with ADL evaluation and planning for discharge  - Provide patient education as appropriate  Outcome: Progressing  Goal: Maintains/Returns to pre admission functional level  Description: INTERVENTIONS:  - Perform BMAT or MOVE assessment daily.   - Set and communicate daily mobility goal to care team and patient/family/caregiver.    - Collaborate with rehabilitation services on mobility goals if consulted  - Perform Range of Motion 3 times a day. - Reposition patient every 2 hours.   - Dangle patient 3 times a day  - Stand patient 3 times a day  - Ambulate patient 3 times a day  - Out of bed to chair 3 times a day   - Out of bed for meals 3 times a day  - Out of bed for toileting  - Record patient progress and toleration of activity level   Outcome: Progressing     Problem: Prexisting or High Potential for Compromised Skin Integrity  Goal: Skin integrity is maintained or improved  Description: INTERVENTIONS:  - Identify patients at risk for skin breakdown  - Assess and monitor skin integrity  - Assess and monitor nutrition and hydration status  - Monitor labs   - Assess for incontinence   - Turn and reposition patient  - Assist with mobility/ambulation  - Relieve pressure over bony prominences  - Avoid friction and shearing  - Provide appropriate hygiene as needed including keeping skin clean and dry  - Evaluate need for skin moisturizer/barrier cream  - Collaborate with interdisciplinary team   - Patient/family teaching  - Consider wound care consult   Outcome: Progressing     Problem: RESPIRATORY - ADULT  Goal: Achieves optimal ventilation and oxygenation  Description: INTERVENTIONS:  - Assess for changes in respiratory status  - Assess for changes in mentation and behavior  - Position to facilitate oxygenation and minimize respiratory effort  - Oxygen administered by appropriate delivery if ordered  - Initiate smoking cessation education as indicated  - Encourage broncho-pulmonary hygiene including cough, deep breathe, Incentive Spirometry  - Assess the need for suctioning and aspirate as needed  - Assess and instruct to report SOB or any respiratory difficulty  - Respiratory Therapy support as indicated  Outcome: Progressing     Problem: GASTROINTESTINAL - ADULT  Goal: Minimal or absence of nausea and/or vomiting  Description: INTERVENTIONS:  - Administer IV fluids if ordered to ensure adequate hydration  - Maintain NPO status until nausea and vomiting are resolved  - Nasogastric tube if ordered  - Administer ordered antiemetic medications as needed  - Provide nonpharmacologic comfort measures as appropriate  - Advance diet as tolerated, if ordered  - Consider nutrition services referral to assist patient with adequate nutrition and appropriate food choices  Outcome: Progressing     Problem: HEMATOLOGIC - ADULT  Goal: Maintains hematologic stability  Description: INTERVENTIONS  - Assess for signs and symptoms of bleeding or hemorrhage  - Monitor labs  - Administer supportive blood products/factors as ordered and appropriate  Outcome: Progressing     Problem: PAIN - ADULT  Goal: Verbalizes/displays adequate comfort level or baseline comfort level  Description: Interventions:  - Encourage patient to monitor pain and request assistance  - Assess pain using appropriate pain scale  - Administer analgesics based on type and severity of pain and evaluate response  - Implement non-pharmacological measures as appropriate and evaluate response  - Consider cultural and social influences on pain and pain management  - Notify physician/advanced practitioner if interventions unsuccessful or patient reports new pain  Outcome: Progressing     Problem: INFECTION - ADULT  Goal: Absence or prevention of progression during hospitalization  Description: INTERVENTIONS:  - Assess and monitor for signs and symptoms of infection  - Monitor lab/diagnostic results  - Monitor all insertion sites, i.e. indwelling lines, tubes, and drains  - Monitor endotracheal if appropriate and nasal secretions for changes in amount and color  - [place] appropriate cooling/warming therapies per order  - Administer medications as ordered  - Instruct and encourage patient and family to use good hand hygiene technique  - Identify and instruct in appropriate isolation precautions for identified infection/condition  Outcome: Progressing     Problem: DISCHARGE PLANNING  Goal: Discharge to home or other facility with appropriate resources  Description: INTERVENTIONS:  - Identify barriers to discharge w/patient and caregiver  - Arrange for needed discharge resources and transportation as appropriate  - Identify discharge learning needs (meds, wound care, etc.)  - Arrange for interpretive services to assist at discharge as needed  - Refer to Case Management Department for coordinating discharge planning if the patient needs post-hospital services based on physician/advanced practitioner order or complex needs related to functional status, cognitive ability, or social support system  Outcome: Progressing     Problem: Knowledge Deficit  Goal: Patient/family/caregiver demonstrates understanding of disease process, treatment plan, medications, and discharge instructions  Description: Complete learning assessment and assess knowledge base.   Interventions:  - Provide teaching at level of understanding  - Provide teaching via preferred learning methods  Outcome: Progressing     Problem: CARDIOVASCULAR - ADULT  Goal: Maintains optimal cardiac output and hemodynamic stability  Description: INTERVENTIONS:  - Monitor I/O, vital signs and rhythm  - Monitor for S/S and trends of decreased cardiac output  - Administer and titrate ordered vasoactive medications to optimize hemodynamic stability  - Assess quality of pulses, skin color and temperature  - Assess for signs of decreased coronary artery perfusion  - Instruct patient to report change in severity of symptoms  Outcome: Progressing  Goal: Absence of cardiac dysrhythmias or at baseline rhythm  Description: INTERVENTIONS:  - Continuous cardiac monitoring, vital signs, obtain 12 lead EKG if ordered  - Administer antiarrhythmic and heart rate control medications as ordered  - Monitor electrolytes and administer replacement therapy as ordered  Outcome: Progressing

## 2023-08-11 NOTE — PROGRESS NOTES
The patient’s standard-infusion Piperacillin-Tazobactam / Zosyn (infused over 30-60 minutes) has been converted to extended-infusion (infused over 4 hours) per [facility] Extended-Infusion Piperacillin-Tazobactam Protocol for Adults as approved by the Pharmacy and Therapeutics Committee (accessible here on MyNET).      The patient met ALL eligible criteria:    Age >= 25years old     And did NOT have ANY exclusions:     Drug incompatibilities that could NOT be avoided with timing or separate line administration    The following are reminders for Nursing regarding administration:  Infuse the first dose of Zosyn over 30min as a load (if new start), and then all subsequent doses will be given as an extended-infusion over 4 hours (see dosing below)  Use primary tubing as an intermittent infusion; change out primary tubing every 24 hours   Ensure full dose of the medication is given at the appropriate rate  Most incompatible drugs can be scheduled during times when the Zosyn is not being infused; however, if one requires administration during the same time, a separate site or lumen MUST be used  If access is limited and an incompatible medication urgently needs to be given, the Zosyn extended-infusion can be held for up to 30min (remember to flush line before/after)  Extended-infusion Zosyn does NOT require special timing around hemodialysis (it can even be given simultaneously)  If a patient needs an urgent MRI while Zosyn is infusing and there is not a MRI-compatible pump available for use, finish the infusion over the traditional length (30min) and ask Pharmacy to reschedule the next doses so that they start a few hours earlier  Pharmacy will assist nursing in troubleshooting other administration issues as they arise  Dosing for Piperacillin-Tazobactam  CrCl (mL/min) Traditional Dosing Extended-Infusion Dosing #   CrCl > 40 High-Dose  CrCl > 40 Low-Dose 4.5g Q6H (over 30min)  3.375g IV Q6H (over 30min) 3.375g IV Q8H (over 4hr)*    *1st dose loaded over 30min, then start extended-infusion dosing 4hr later   CrCl 20-40 High-Dose  CrCl 20-40 Low-Dose 3.375g IV Q6H (over 30min)  2.25g IV Q6H (over 30min)    CrCl < 20 High-Dose  CrCl < 20 Low-Dose 2.25g IV Q6H (over 30min)  2.25g IV Q8H (over 30min) 3.375g IV Q12H (over 4hr)*    *1st dose loaded over 30min, then start extended-infusion dosing 6hr later   Hemo/Peritoneal Dialysis High-Dose  Hemo/Peritoneal Dialysis Low-Dose 2.25g IV Q6H (over 30min)  2.25g IV Q8H (over 30min)    CVVH/D High-Dose  CVVH/D Low-Dose 3.375g IV Q6H (over 30min)  2.25 IV Q6H (over 30min) 3.375g IV Q8H (over 4hr)*    *1st dose loaded over 30min, then start extended-infusion dosing 4hr later   # = Use 4.5g dosing (same interval) if morbidly obese (BMI ?40)    Please call the Pharmacy with any questions or concerns.

## 2023-08-11 NOTE — ASSESSMENT & PLAN NOTE
· Has not been confirmed by tissue diagnosis. However has been following with Dr. Daisy Antonio with surgical oncology as an outpatient. He did not feel like a biopsy was necessary and was referred to Dr. Miguel Pascual for Y90 radioembolization. This is being scheduled as an outpatient. IR was consulted for possible biopsy while inpatient, though they have deferred given above.

## 2023-08-11 NOTE — ASSESSMENT & PLAN NOTE
· Acute on chronic anemia  · History of chronic iron deficiency anemia on supplementation at home  · Status post 1 unit PRBCs this admission  · Status post EGD and colonoscopy on 8/10  · 2 angioectasias in the stomach clipped  · 1 angioectasia in the duodenum clipped  · Polypectomy and clip in the descending colon    · Hemoglobin again low today at 7.0 g  · Rechecking hemoglobin this afternoon; may need additional transfusion  · Continue to monitor daily hemoglobin

## 2023-08-11 NOTE — PROGRESS NOTES
Progress Note - Cardiology   Gael Hansen 69 y.o. male MRN: [id number]  Unit/Bed#: -01 Encounter: [id number]        Problem List:  Principal Problem:    Anemia  Active Problems:    Stage 3 chronic kidney disease, unspecified whether stage 3a or 3b CKD (HCC)    Type 2 diabetes mellitus with neurologic complication, with long-term current use of insulin (HCC)    Atrial fibrillation, unspecified type ([address])    Acute respiratory failure with hypoxia (HCC)    Syncope    Acute on chronic diastolic HF (heart failure) (HCC)    History of DVT (deep vein thrombosis)    Elevated troponin    Hepatic flexure mass      Assessment:  1. Acute respiratory failure secondary to pulmonary edema  a. His pulmonary edema occurred after receiving a blood transfusion and has been treated with IV diuretics with improvement  2. Non-MI troponin elevation 3871 --> 4093 --> 5115 --> 3759  a. This is likely secondary to his acute issues including respiratory failure from volume overload as well as severe anemia requiring transfusion of blood  b. 5/2023 Echo: LVEF 86%, grade 1 diastolic dysfunction, mild mitral insufficiency  c. 6/2023 NST: No ischemia  3. HFmrEF  1. 5/2023 echo: LVEF 60%  2. 8/2023 echo: LVEF 45%, mild global hypokinesis  4. Acute anemia  a. Hemoglobin 6.4 on arrival status post 1 unit of blood now currently trending in the high 7's  b. HgB still trending in high 7's to low 8's  5. Paroxysmal atrial fibrillation  a. 5/2023 Holter: Sinus rhythm average heart rate in the 80s  b. Prehospital he is on Eliquis both for DVT and PAF  c. AV blocking Rx: Toprol 50 daily  6. CKD, baseline creat 1.3- 1.6  7. History of DVT for which he takes Eliquis  8. Type 2 diabetes    Plan/ Discussion:  • Start lasix 40 daily tomorrow  • Wean oxygen  • Restart Eliquis once HgB stable   • Continue metoprolol  • Noted this admission he has had mild drop if LVEF 60 --> 45%. He had an ischemic evaluation earlier this year which was negative.  His drop in EF may be related to his acute illness, blood loss anemia, and respiratory failure. We will plan for repeat echo as outpatient once he has recovered. • We will follow peripherally over the weekend and see back formally Monday 8/14/23. If otherwise stable for DC over the weekend, this would be fine from our perspective and he can follow up as outpatient. Message sent to office for follow up appt. Subjective:  Feeling ok. No acute complaints.  Eating and drinking ok    Vitals:  Vitals:    08/11/23 0502 08/11/23 0600   Weight: 93.8 kg (206 lb 12.7 oz) 93.8 kg (206 lb 12.7 oz)   ,  Vitals:    08/11/23 0502 08/11/23 0600 08/11/23 0721 08/11/23 0729   BP:    126/64   BP Location:       Pulse:    102   Resp:   20 17   Temp:    98.3 °F (36.8 °C)   TempSrc:       SpO2:   94% 99%   Weight: 93.8 kg (206 lb 12.7 oz) 93.8 kg (206 lb 12.7 oz)     Height:           Exam:  General: Alert awake and oriented, no acute distress  Heart:  Regular rate and rhythm, no murmurs, Normal S1, no edema    Respiratory effort/ Lungs:  Breathing comfortably on room air, clear bilaterally without wheezing, rales, crackles   Abdominal: Non-tender to palpation, + bowel sounds, soft, no masses or distension  Lower Limbs:  No edema            Telemetry:       SR 80's    Medications:    Current Facility-Administered Medications:   •  acetaminophen (TYLENOL) tablet 650 mg, 650 mg, Oral, Q6H PRN, Rafaela Prado MD  •  albuterol inhalation solution 2.5 mg, 2.5 mg, Nebulization, Q6H PRN, Pedrito Santos DO  •  hydrALAZINE (APRESOLINE) injection 10 mg, 10 mg, Intravenous, Q6H PRN, Rafaela Prado MD  •  insulin detemir (LEVEMIR) subcutaneous injection 18 Units, 18 Units, Subcutaneous, HS, Rafaela Prado MD, 18 Units at 08/10/23 2122  •  insulin lispro (HumaLOG) 100 units/mL subcutaneous injection 1-5 Units, 1-5 Units, Subcutaneous, Q6H [address], 2 Units at 08/11/23 0622 **AND** Fingerstick Glucose (POCT), , , Q6H, Мария Garcia PA-C  • levalbuterol (XOPENEX) inhalation solution 1.25 mg, 1.25 mg, Nebulization, TID, Evelia Bhakta MD, 1.25 mg at 08/11/23 0719  •  metoprolol succinate (TOPROL-XL) 24 hr tablet 50 mg, 50 mg, Oral, Daily, Evelia Bhakta MD, 50 mg at 08/11/23 0810  •  multivitamin-minerals (CENTRUM) tablet 1 tablet, 1 tablet, Oral, Daily, Evelia Bhakta MD, 1 tablet at 08/11/23 0810  •  ondansetron (ZOFRAN) injection 4 mg, 4 mg, Intravenous, Q6H PRN, Evelia Bhakta MD  •  pantoprazole (PROTONIX) injection 40 mg, 40 mg, Intravenous, Q12H [address], Evelia Bhakta MD, 40 mg at 08/11/23 0810  •  piperacillin-tazobactam (ZOSYN) IVPB 3.375 g, 3.375 g, Intravenous, Q8H, Mitchell Goeva MD, 3.375 g at 08/11/23 0454  •  pregabalin (LYRICA) capsule 100 mg, 100 mg, Oral, TID, Evelia Bhakta MD, 100 mg at 08/11/23 0810  •  sertraline (ZOLOFT) tablet 25 mg, 25 mg, Oral, Daily, Evelia Bhakta MD, 25 mg at 08/11/23 0815  •  tamsulosin (FLOMAX) capsule 0.4 mg, 0.4 mg, Oral, Daily With Amy Mejia MD, 0.4 mg at 08/10/23 1640  •  traZODone (DESYREL) tablet 200 mg, 200 mg, Oral, HS, Evelia Bhakta MD, 200 mg at 08/10/23 2105  •  umeclidinium-vilanterol 62.5-25 mcg/actuation inhaler 1 puff, 1 puff, Inhalation, Daily, Evelia Bhakta MD, 1 puff at 08/11/23 0810      Labs/Data:        Results from last 7 days   Lab Units 08/11/23  0452 08/10/23  2256 08/10/23  1751 08/07/23  2041 08/07/23  0515   WBC Thousand/uL 10.92*  --  11.88*  --  10.34*   HEMOGLOBIN g/dL 7.0* 7.1* 8.6*   < > 6.4*   HEMATOCRIT % 26.6* 26.5* 32.8*   < > 24.7*   PLATELETS Thousands/uL 216  --  271  --  210    < > = values in this interval not displayed.      Results from last 7 days   Lab Units 08/11/23  0452 08/10/23  1910 08/08/23  0442   POTASSIUM mmol/L 3.5 4.3 4.6   CHLORIDE mmol/L 106 101 108   CO2 mmol/L 26 26 20*   BUN mg/dL 38* 47* 49*   CREATININE mg/dL 1.69* 1.92* 1.53*

## 2023-08-11 NOTE — NURSING NOTE
Patient oxygen saturations om 2 liters were fluctuating between 86-90 on 2 liters -spoke with Patient educated using incentive spirometer and encouraged to use BIpap -did refuse Bipap-also spoke with respiratory in regards to this also and Nadia Sloan PA-C aware that patient oxygen needs were increased to 4 liters and was refusing Bipap.  current on 4 liters sleeping and sat are 96% on 4 liters

## 2023-08-11 NOTE — ASSESSMENT & PLAN NOTE
· Thought to be secondary to volume overload after blood transfusion. Has since been diuresed and oxygenation is improving. Currently only on 1 L. Continue p.o. Lasix 40 mg daily. · Was also started empirically on IV steroids for possible transfusion reaction. Has since been transitioned to p.o. prednisone. Would plan for short course of prednisone as patient is clinically improving.   · Continue to wean as tolerated

## 2023-08-11 NOTE — CASE MANAGEMENT
Case Management Discharge Planning Note    Patient name Dior Galeas  Location [address] [facility] [place] 336/-07 MRN [id number]  : 1945 Date 2023       Current Admission Date: 2023  Current Admission [facility]   Patient Active Problem List    Diagnosis Date Noted   • Hepatocellular carcinoma ([address])    • Elevated troponin 2023   • Hepatic flexure mass 2023   • Acute respiratory failure with hypoxia ([address]) 2023   • Syncope 2023   • Anemia 2023   • Acute on chronic diastolic HF (heart failure) ([address]) 2023   • History of DVT (deep vein thrombosis) 2023   • Restrictive lung disease 2023   • Other emphysema ([address]) 2023   • History of tobacco abuse 2023   • H/O asbestos exposure 2023   • Liver mass 05/10/2023   • Atrial fibrillation, unspecified type ([address]) 02/15/2023   • Peripheral polyneuropathy 2022   • Type 2 diabetes mellitus with neurologic complication, with long-term current use of insulin ([address]) 2022   • Stage 3 chronic kidney disease, unspecified whether stage 3a or 3b CKD ([address]) 2022      LOS (days): 4  Geometric Mean LOS (GMLOS) (days): 4.50  Days to GMLOS:0.3     OBJECTIVE:  Risk of Unplanned Readmission Score: 27.15      Current admission status: Inpatient   Preferred Pharmacy:   [facility] [place] [id number] [address] - [address] [place] [address] [address] [address]  Phone: [phone] Fax: [phone]    Primary Care Provider: Guido Greenberg MD    Primary Insurance: William Calderon [facility] REP  Secondary Insurance:     DISCHARGE DETAILS:    [address]         Is the patient interested in [address] at discharge?: Yes  [facility] requested[de-identified] Nursing, Occupational Therapy, Physical [address] Name[de-identified] Tiffanie Provider[de-identified] PCP  Home Health Services Needed[de-identified] Evaluate Functional Status and Safety, Strengthening/Theraputic Exercises to Improve Function, Gait/ADL Training  Homebound Criteria Met[de-identified] Requires the Assistance of Another Person for Safe Ambulation or to Leave the Home, Uses an Assist Device (i.e. cane, walker, etc)  Supporting Clincal Findings[de-identified] Fatigues Easliy in [employer], Limited Endurance    DME Referral Provided  Referral made for DME?: No    Treatment Team Recommendation: Home with [address] [facility]  Discharge Destination Plan[de-identified] Home with [address] at Discharge : Family     IMM Given (Date):: 08/11/23  IMM Given to[de-identified] Patient  Family notified[de-identified] Reviewed IMM with pt. Additional Comments: Cm met with pt to discuss therapy recommendations. Referral made to [facility] based on his request for home PT, OT, and RN. Pt also given IMM and cm reviewed. Pt may need Home O2. Pt approved by [facility].

## 2023-08-11 NOTE — ASSESSMENT & PLAN NOTE
· Thought to be secondary to iatrogenic volume overload from blood transfusion  · Has since been diuresed and repeat chest x-ray is clear  · Continue p.o.  Lasix 40 mg daily  · Has been weaned down to 1 L  · Continue to wean as able

## 2023-08-11 NOTE — PROGRESS NOTES
Progress note - Gastroenterology   Yoon Jenna 69 y.o. male MRN: [id number]  Unit/Bed#: -01 Encounter: [id number]    ASSESSMENT and PLAN    69y.o. year old male with past medical history of diverticulitis with resection, atrial fibrillation currently on Eliquis, DVT, GERD, and recent finding of liver mass currently being evaluated by radiation oncology presented to the emergency room this morning after patient lost consciousness. Hemoglobin on admission 6.4. He is not aware of having anemia in the past.  He denies any melena or overt GI bleeding.     Discussed patient with his nurse, no signs or symptoms of overt GI bleeding. Patient did have episode last evening after returning from endoscopy of tachycardia and orthostasis. 1.  Acute anemia  Patient is not aware of anemia history however his last hemoglobin in May of this year was 9.2 and per EMR patient has been taking iron oral supplement. Hemoglobin 6.4 on admission. No overt GI bleeding noted however patient states there was blood in stool when he lost consciousness this morning. Iron panel noted for iron deficiency. Hemoglobin 7 with a.m. labs today.      -Patient can have clear liquids if tolerated with current pulmonary status.  -Currently holding Eliquis  -Continue PPI, taupe resolved 40 mg IV push every 12 hours  -Monitor hemoglobin, transfuse as necessary, less than 7.   -IV iron x 3 doses      Patient's does not remember having an EGD in the past.  Does note colonoscopy approximately 5 years ago and history of colon resection with diverticulitis.     8/10; colonoscopy, subcentimeter polyp in the hepatic flexure removed, polyp measuring 5 to 7 mm in descending colon, bleeding occurred after intervention, placed 1 clip successfully, hemostasis achieved. Small hemorrhoid.   Healthy and an colocolonic anastomosis in the descending distal colon\    EGD;   2 small angioectasias in the fundus of the stomach and body of the stomach; bleeding was observed; placed 2 clips successfully; hemostasis achieved    Single small angioectasia in the 3rd part of the duodenum; placed 1 clip successfully  The esophagus appeared normal.  No evidence of primary malignancy    If signs of rebleeding, recommend outpatient capsule endoscopy     2. Gastroesophageal reflux disease  Patient does admit to having acid reflux symptoms however states while taking pantoprazole 40 mg daily he is symptom-free. Pantoprazole 40 mg IV push every 12 hours currently for acute anemia.     3. History of diverticulitis with resection  Per EMR, patient did have colonoscopy approximately 5 years ago at [facility].       4.  Hepatic mass  MRI 7/6/2023; numerous reidentified hepatic lesions suspicious for metastases. Patient states he was to have a consult with oncology today however patient admitted to hospital.  Patient was also recommended for follow-up colonoscopy which has been scheduled for later this month 8/23. Discussion with Hospitalist/ Dr. Janett Severs, pt will follow with [facility] surg/onc for OP testing and treatment. No liver biopsy needed at this time per Dr. Blane Rollins. Chief Complaint   Patient presents with   • Fall     EMS from home; wife woke pt due to dexcom reading sugar at 41, walked pt to kitchen and pt blacked out falling and hitting R side of head, takes Eliquis; EMS gave 15g PO glucose PTA       SUBJECTIVE/HPI   Patient currently out of bed to the chair. Patient tolerating meals well. No abdominal pain, nausea or vomiting.     /64   Pulse 102   Temp 98.3 °F (36.8 °C)   Resp 17   Ht 6' (1.829 m)   Wt 93.8 kg (206 lb 12.7 oz)   SpO2 99%   BMI 28.05 kg/m²     PHYSICALEXAM  General appearance: alert, appears stated age and cooperative  Eyes: PERLLA, EOMI, no icterus   Head: Normocephalic, without obvious abnormality, atraumatic  Lungs: clear to auscultation bilaterally, O2 nasal cannula  Heart: regular rate and rhythm, S1, S2 normal, no murmur, click, rub or gallop  Abdomen: soft, non-tender; bowel sounds normal; no masses,  no organomegaly  Extremities: extremities normal, atraumatic, no cyanosis or edema  Neurologic: Grossly normal    Lab Results   Component Value Date    CALCIUM 8.1 (L) 08/11/2023    K 3.5 08/11/2023    CO2 26 08/11/2023     08/11/2023    BUN 38 (H) 08/11/2023    CREATININE 1.69 (H) 08/11/2023     Lab Results   Component Value Date    WBC 10.92 (H) 08/11/2023    HGB 7.0 (L) 08/11/2023    HCT 26.6 (L) 08/11/2023    MCV 67 (L) 08/11/2023     08/11/2023     Lab Results   Component Value Date    ALT 29 08/11/2023    AST 34 08/11/2023    ALKPHOS 70 08/11/2023     No results found for: "AMYLASE"  No results found for: "LIPASE"  Lab Results   Component Value Date    IRON 14 (L) 08/07/2023    TIBC 462 (H) 08/07/2023    FERRITIN 10 (L) 08/07/2023     No results found for: "INR"

## 2023-08-11 NOTE — PROGRESS NOTES
[facility]  Progress Note  Name: Chitra Borden  MRN: [id number]  Unit/Bed#: -01 I Date of Admission: 8/7/2023   Date of Service: 8/11/2023 I Hospital Day: 4    Assessment/Plan   * Anemia  Assessment & Plan  · Acute on chronic anemia  · History of chronic iron deficiency anemia on supplementation at home  · Status post 1 unit PRBCs this admission  · Status post EGD and colonoscopy on 8/10  · 2 angioectasias in the stomach clipped  · 1 angioectasia in the duodenum clipped  · Polypectomy and clip in the descending colon    · Hemoglobin again low today at 7.0 g  · Rechecking hemoglobin this afternoon; may need additional transfusion  · Continue to monitor daily hemoglobin    Acute on chronic diastolic HF (heart failure) ([address])  Assessment & Plan  · Thought to be secondary to iatrogenic volume overload from blood transfusion  · Has since been diuresed and repeat chest x-ray is clear  · Continue p.o. Lasix 40 mg daily  · Has been weaned down to 1 L  · Continue to wean as able               Hepatocellular carcinoma ([address])  Assessment & Plan  · Has not been confirmed by tissue diagnosis. However has been following with Dr. Lily Land with surgical oncology as an outpatient. He did not feel like a biopsy was necessary and was referred to Dr. Lalo Crenshaw for Y90 radioembolization. This is being scheduled as an outpatient. IR was consulted for possible biopsy while inpatient, though they have deferred given above. Acute respiratory failure with hypoxia (HCC)  Assessment & Plan  · Thought to be secondary to volume overload after blood transfusion. Has since been diuresed and oxygenation is improving. Currently only on 1 L. Continue p.o. Lasix 40 mg daily. · Was also started empirically on IV steroids for possible transfusion reaction. Has since been transitioned to p.o. prednisone. Would plan for short course of prednisone as patient is clinically improving.   · Continue to wean as tolerated    History of DVT (deep vein thrombosis)  Assessment & Plan  · Eliquis on hold in the setting of GI bleed  · Restart when okay from GI standpoint    Syncope  Assessment & Plan  Likely secondary to anemia  Care as above  S/p CT head, CT cervical  Recent NM stress (2023) no evidence of ischemia  See echo results under elevated trop. Atrial fibrillation, unspecified type ([address])  Assessment & Plan  · Rates remain controlled on metoprolol  · Home Eliquis on hold given GI bleeding           VTE Pharmacologic Prophylaxis:   High Risk (Score >/= 5) - Pharmacological DVT Prophylaxis Contraindicated. Sequential Compression Devices Ordered. Patient Centered Rounds: I performed bedside rounds with nursing staff today. Discussions with Specialists or Other Care Team Provider: DEAN GARCIA. Education and Discussions with Family / Patient: Updated  (wife) at bedside. Total Time Spent on Date of Encounter in care of patient: 45 minutes This time was spent on one or more of the following: performing physical exam; counseling and coordination of care; obtaining or reviewing history; documenting in the medical record; reviewing/ordering tests, medications or procedures; communicating with other healthcare professionals and discussing with patient's family/caregivers. Current Length of Stay: 4 day(s)  Current Patient Status: Inpatient   Certification Statement: The patient will continue to require additional inpatient hospital stay due to clinical monitoring, serial laboratory monitoring. Discharge Plan: Anticipate discharge in 24-48 hrs to home with home services. Code Status: Level 1 - Full Code    Subjective:   Patient seen and examined. No further evidence of bleeding. Patient tired this morning states he did not sleep well last evening. Otherwise no new complaints.     Objective:     Vitals:   Temp (24hrs), Av.3 °F (36.8 °C), Min:97.1 °F (36.2 °C), Max:99.3 °F (37.4 °C)    Temp: [97.1 °F (36.2 °C)-99.3 °F (37.4 °C)] 97.8 °F (36.6 °C)  HR:  [] 95  Resp:  [14-20] 18  BP: (105-136)/(49-65) 105/49  SpO2:  [86 %-99 %] 96 %  Body mass index is 28.05 kg/m². Input and Output Summary (last 24 hours): Intake/Output Summary (Last 24 hours) at 8/11/2023 1625  Last data filed at 8/11/2023 0447  Gross per 24 hour   Intake 619.17 ml   Output 1525 ml   Net -905.83 ml       PHYSICAL EXAM:    Vitals signs reviewed  Constitutional   Awake and cooperative. NAD. Head/Neck   Normocephalic. Atraumatic. HEENT   No scleral icterus. EOMI. Heart   Regular rate and rhythm. No murmers. Lungs   Clear to auscultation bilaterally. Respirations unlaboured. Abdomen   Soft. Nontender. Nondistended. Skin   Skin color normal. No rashes. Extremities   No deformities. No peripheral edema. Neuro   Alert and oriented. No new deficits. Psych   Mood stable.  Affect normal.         Additional Data:     Labs:  Results from last 7 days   Lab Units 08/11/23  0452   WBC Thousand/uL 10.92*   HEMOGLOBIN g/dL 7.0*   HEMATOCRIT % 26.6*   PLATELETS Thousands/uL 216   NEUTROS PCT % 71   LYMPHS PCT % 11*   MONOS PCT % 14*   EOS PCT % 3     Results from last 7 days   Lab Units 08/11/23  0452   SODIUM mmol/L 142   POTASSIUM mmol/L 3.5   CHLORIDE mmol/L 106   CO2 mmol/L 26   BUN mg/dL 38*   CREATININE mg/dL 1.69*   ANION GAP mmol/L 10   CALCIUM mg/dL 8.1*   ALBUMIN g/dL 3.5   TOTAL BILIRUBIN mg/dL 0.88   ALK PHOS U/L 70   ALT U/L 29   AST U/L 34   GLUCOSE RANDOM mg/dL 170*         Results from last 7 days   Lab Units 08/11/23  0612 08/10/23  2353 08/10/23  2121 08/10/23  1848 08/10/23  1603 08/10/23  1121 08/10/23  0711 08/09/23  2136 08/09/23  1623 08/09/23  1046 08/09/23  0713 08/08/23  2040   POC GLUCOSE mg/dl 213* 172* 203* 292* 366* 146* 181* 205* 146* 258* 177* 176*         Results from last 7 days   Lab Units 08/10/23  2256 08/10/23  1910 08/10/23  1806 08/10/23  1751   LACTIC ACID mmol/L 1.9 2.7* 2.6*  -- PROCALCITONIN ng/ml  --   --   --  0.44*       Lines/Drains:  Invasive Devices     Peripheral Intravenous Line  Duration           Peripheral IV 08/10/23 Dorsal (posterior); Right Forearm <1 day    Peripheral IV 08/10/23 Left Antecubital <1 day    Peripheral IV 08/10/23 Right;Ventral (anterior) Wrist <1 day                  T             Imaging: No pertinent imaging reviewed. Recent Cultures (last 7 days):   Results from last 7 days   Lab Units 08/10/23  1910   BLOOD CULTURE  Received in Microbiology Lab. Culture in Progress. Received in Microbiology Lab. Culture in Progress.        Last 24 Hours Medication List:   Current Facility-Administered Medications   Medication Dose Route Frequency Provider Last Rate   • acetaminophen  650 mg Oral Q6H PRN Evelia Bhakta MD     • albuterol  2.5 mg Nebulization Q6H PRN Kelley Santos DO     • [START ON 8/12/2023] furosemide  40 mg Oral Daily Cari Bobo PA-C     • hydrALAZINE  10 mg Intravenous Q6H PRN Evelia Bhakta MD     • insulin detemir  18 Units Subcutaneous HS Evelia Bhakta MD     • insulin lispro  1-5 Units Subcutaneous Q6H [facility] Tyesha Heredia PA-C     • levalbuterol  1.25 mg Nebulization TID Evelia Bhakta MD     • metoprolol succinate  50 mg Oral Daily Evelia Bhakta MD     • multivitamin-minerals  1 tablet Oral Daily Evelia Bhakta MD     • ondansetron  4 mg Intravenous Q6H PRN Evelia Bhakta MD     • pantoprazole  40 mg Intravenous Q12H [facility] Evelia Bhakta MD     • piperacillin-tazobactam  3.375 g Intravenous Neena Ho MD     • pregabalin  100 mg Oral TID Evelia Bhakta MD     • sertraline  25 mg Oral Daily Evelia Bhakta MD     • tamsulosin  0.4 mg Oral Daily With Amy Mejia MD     • traZODone  200 mg Oral HS Evelia Bhakta MD     • umeclidinium-vilanterol  1 puff Inhalation Daily Evelia Bhakta MD          Today, Patient Was Seen By: Kelley Santos DO    **Please Note: This note may have been constructed using a voice recognition system. **

## 2023-08-12 LAB
ABO GROUP BLD: NORMAL
BLD GP AB SCN SERPL QL: NEGATIVE
ERYTHROCYTE [DISTWIDTH] IN BLOOD BY AUTOMATED COUNT: 27.8 % (ref 11.6–15.1)
GLUCOSE SERPL-MCNC: 165 MG/DL (ref 65–140)
GLUCOSE SERPL-MCNC: 178 MG/DL (ref 65–140)
GLUCOSE SERPL-MCNC: 328 MG/DL (ref 65–140)
GLUCOSE SERPL-MCNC: 419 MG/DL (ref 65–140)
HCT VFR BLD AUTO: 27 % (ref 36.5–49.3)
HGB BLD-MCNC: 7.2 G/DL (ref 12–17)
MCH RBC QN AUTO: 18.1 PG (ref 26.8–34.3)
MCHC RBC AUTO-ENTMCNC: 26.7 G/DL (ref 31.4–37.4)
MCV RBC AUTO: 68 FL (ref 82–98)
PLATELET # BLD AUTO: 186 THOUSANDS/UL (ref 149–390)
PROCALCITONIN SERPL-MCNC: 0.39 NG/ML
RBC # BLD AUTO: 3.97 MILLION/UL (ref 3.88–5.62)
RH BLD: POSITIVE
SPECIMEN EXPIRATION DATE: NORMAL
WBC # BLD AUTO: 9.12 THOUSAND/UL (ref 4.31–10.16)

## 2023-08-12 PROCEDURE — 86901 BLOOD TYPING SEROLOGIC RH(D): CPT | Performed by: PHYSICIAN ASSISTANT

## 2023-08-12 PROCEDURE — 86900 BLOOD TYPING SEROLOGIC ABO: CPT | Performed by: PHYSICIAN ASSISTANT

## 2023-08-12 PROCEDURE — 86923 COMPATIBILITY TEST ELECTRIC: CPT

## 2023-08-12 PROCEDURE — C9113 INJ PANTOPRAZOLE SODIUM, VIA: HCPCS | Performed by: INTERNAL MEDICINE

## 2023-08-12 PROCEDURE — 94640 AIRWAY INHALATION TREATMENT: CPT

## 2023-08-12 PROCEDURE — 85027 COMPLETE CBC AUTOMATED: CPT | Performed by: INTERNAL MEDICINE

## 2023-08-12 PROCEDURE — 82948 REAGENT STRIP/BLOOD GLUCOSE: CPT

## 2023-08-12 PROCEDURE — 99233 SBSQ HOSP IP/OBS HIGH 50: CPT | Performed by: PHYSICIAN ASSISTANT

## 2023-08-12 PROCEDURE — 99232 SBSQ HOSP IP/OBS MODERATE 35: CPT | Performed by: INTERNAL MEDICINE

## 2023-08-12 PROCEDURE — P9016 RBC LEUKOCYTES REDUCED: HCPCS

## 2023-08-12 PROCEDURE — 94760 N-INVAS EAR/PLS OXIMETRY 1: CPT

## 2023-08-12 PROCEDURE — 86850 RBC ANTIBODY SCREEN: CPT | Performed by: PHYSICIAN ASSISTANT

## 2023-08-12 RX ORDER — ACETAMINOPHEN 325 MG/1
650 TABLET ORAL ONCE
Status: COMPLETED | OUTPATIENT
Start: 2023-08-12 | End: 2023-08-12

## 2023-08-12 RX ORDER — METHYLPREDNISOLONE SODIUM SUCCINATE 40 MG/ML
40 INJECTION, POWDER, LYOPHILIZED, FOR SOLUTION INTRAMUSCULAR; INTRAVENOUS ONCE
Status: COMPLETED | OUTPATIENT
Start: 2023-08-12 | End: 2023-08-12

## 2023-08-12 RX ORDER — DIPHENHYDRAMINE HYDROCHLORIDE 50 MG/ML
25 INJECTION INTRAMUSCULAR; INTRAVENOUS ONCE
Status: DISCONTINUED | OUTPATIENT
Start: 2023-08-12 | End: 2023-08-12

## 2023-08-12 RX ORDER — FUROSEMIDE 10 MG/ML
20 INJECTION INTRAMUSCULAR; INTRAVENOUS ONCE
Status: COMPLETED | OUTPATIENT
Start: 2023-08-12 | End: 2023-08-12

## 2023-08-12 RX ORDER — DIPHENHYDRAMINE HYDROCHLORIDE 50 MG/ML
25 INJECTION INTRAMUSCULAR; INTRAVENOUS ONCE
Status: COMPLETED | OUTPATIENT
Start: 2023-08-12 | End: 2023-08-12

## 2023-08-12 RX ADMIN — PREGABALIN 100 MG: 100 CAPSULE ORAL at 20:55

## 2023-08-12 RX ADMIN — APIXABAN 5 MG: 5 TABLET, FILM COATED ORAL at 18:05

## 2023-08-12 RX ADMIN — PREGABALIN 100 MG: 100 CAPSULE ORAL at 17:11

## 2023-08-12 RX ADMIN — LEVALBUTEROL HYDROCHLORIDE 1.25 MG: 1.25 SOLUTION RESPIRATORY (INHALATION) at 07:21

## 2023-08-12 RX ADMIN — METHYLPREDNISOLONE SODIUM SUCCINATE 40 MG: 40 INJECTION, POWDER, FOR SOLUTION INTRAMUSCULAR; INTRAVENOUS at 13:40

## 2023-08-12 RX ADMIN — UMECLIDINIUM BROMIDE AND VILANTEROL TRIFENATATE 1 PUFF: 62.5; 25 POWDER RESPIRATORY (INHALATION) at 08:06

## 2023-08-12 RX ADMIN — ACETAMINOPHEN 650 MG: 325 TABLET, FILM COATED ORAL at 12:00

## 2023-08-12 RX ADMIN — FUROSEMIDE 40 MG: 40 TABLET ORAL at 08:06

## 2023-08-12 RX ADMIN — INSULIN LISPRO 5 UNITS: 100 INJECTION, SOLUTION INTRAVENOUS; SUBCUTANEOUS at 17:11

## 2023-08-12 RX ADMIN — PIPERACILLIN AND TAZOBACTAM 3.38 G: 3; .375 INJECTION, POWDER, LYOPHILIZED, FOR SOLUTION INTRAVENOUS at 12:01

## 2023-08-12 RX ADMIN — INSULIN LISPRO 5 UNITS: 100 INJECTION, SOLUTION INTRAVENOUS; SUBCUTANEOUS at 12:01

## 2023-08-12 RX ADMIN — SERTRALINE HYDROCHLORIDE 25 MG: 25 TABLET ORAL at 08:06

## 2023-08-12 RX ADMIN — METOPROLOL SUCCINATE 50 MG: 50 TABLET, EXTENDED RELEASE ORAL at 08:06

## 2023-08-12 RX ADMIN — INSULIN LISPRO 1 UNITS: 100 INJECTION, SOLUTION INTRAVENOUS; SUBCUTANEOUS at 12:00

## 2023-08-12 RX ADMIN — INSULIN LISPRO 5 UNITS: 100 INJECTION, SOLUTION INTRAVENOUS; SUBCUTANEOUS at 08:06

## 2023-08-12 RX ADMIN — FUROSEMIDE 20 MG: 10 INJECTION, SOLUTION INTRAMUSCULAR; INTRAVENOUS at 18:05

## 2023-08-12 RX ADMIN — PANTOPRAZOLE SODIUM 40 MG: 40 INJECTION, POWDER, FOR SOLUTION INTRAVENOUS at 08:06

## 2023-08-12 RX ADMIN — LEVALBUTEROL HYDROCHLORIDE 1.25 MG: 1.25 SOLUTION RESPIRATORY (INHALATION) at 18:55

## 2023-08-12 RX ADMIN — DIPHENHYDRAMINE HYDROCHLORIDE 25 MG: 50 INJECTION, SOLUTION INTRAMUSCULAR; INTRAVENOUS at 15:41

## 2023-08-12 RX ADMIN — PIPERACILLIN AND TAZOBACTAM 3.38 G: 3; .375 INJECTION, POWDER, LYOPHILIZED, FOR SOLUTION INTRAVENOUS at 04:41

## 2023-08-12 RX ADMIN — INSULIN LISPRO 1 UNITS: 100 INJECTION, SOLUTION INTRAVENOUS; SUBCUTANEOUS at 08:05

## 2023-08-12 RX ADMIN — MULTIPLE VITAMINS W/ MINERALS TAB 1 TABLET: TAB ORAL at 08:06

## 2023-08-12 RX ADMIN — PIPERACILLIN AND TAZOBACTAM 3.38 G: 3; .375 INJECTION, POWDER, LYOPHILIZED, FOR SOLUTION INTRAVENOUS at 20:54

## 2023-08-12 RX ADMIN — TAMSULOSIN HYDROCHLORIDE 0.4 MG: 0.4 CAPSULE ORAL at 17:11

## 2023-08-12 RX ADMIN — INSULIN LISPRO 4 UNITS: 100 INJECTION, SOLUTION INTRAVENOUS; SUBCUTANEOUS at 21:06

## 2023-08-12 RX ADMIN — PANTOPRAZOLE SODIUM 40 MG: 40 INJECTION, POWDER, FOR SOLUTION INTRAVENOUS at 20:54

## 2023-08-12 RX ADMIN — PREGABALIN 100 MG: 100 CAPSULE ORAL at 08:06

## 2023-08-12 RX ADMIN — LEVALBUTEROL HYDROCHLORIDE 1.25 MG: 1.25 SOLUTION RESPIRATORY (INHALATION) at 13:53

## 2023-08-12 RX ADMIN — INSULIN DETEMIR 18 UNITS: 100 INJECTION, SOLUTION SUBCUTANEOUS at 21:07

## 2023-08-12 RX ADMIN — TRAZODONE HYDROCHLORIDE 200 MG: 50 TABLET ORAL at 21:05

## 2023-08-12 NOTE — ASSESSMENT & PLAN NOTE
Lab Results   Component Value Date    HGBA1C 7.6 (H) 01/06/2023       Recent Labs     08/11/23  1629 08/11/23 2055 08/12/23  0726 08/12/23  1101   POCGLU 320* 332* 178* 165*       Blood Sugar Average: Last 72 hrs:  (P) 223.6   · Home regimen: Levemir 75U qHS, NovoLog 28U with breakfast and 38U with lunch/dinner.    · Intermittent hypoglycemia, likely with poor oral intake/diet  · Continue Levemir 18U qHS, Humalog 5U TID AC  · SSI coverage with Accu-Cheks

## 2023-08-12 NOTE — ASSESSMENT & PLAN NOTE
Hemoglobin 6.4 on arrival to ED. Presented after syncopal event, with 2-day history of worsening generalized weakness, lightheadedness. Hx iron-deficiency anemia on supplements.   · Likely acute blood loss on chronic anemia with AVM, likely HCC  · S/p EGD and colonoscopy on 8/10:  · 2 angioectasias in the stomach clipped  · 1 angioectasia in the duodenum clipped  · Polypectomy and clip in the descending colon  · S/p 1U PRBC on arrival to ED  · With persistent symptomatic anemia, give 1U PRBC today  · GI following:  · With no evidence of acute GIB, okay to resume Eliquis  · Continue PPI BID, PO iron supplementation  · Monitor H&H closely

## 2023-08-12 NOTE — ASSESSMENT & PLAN NOTE
Lab Results   Component Value Date    EGFR 38 08/11/2023    EGFR 32 08/10/2023    EGFR 43 08/08/2023    CREATININE 1.69 (H) 08/11/2023    CREATININE 1.92 (H) 08/10/2023    CREATININE 1.53 (H) 08/08/2023     · Baseline creatinine around 1.3-1.6   · Cr had trended up, since improving  · Monitor BMP closely

## 2023-08-12 NOTE — PLAN OF CARE
Problem: Potential for Falls  Goal: Patient will remain free of falls  Description: INTERVENTIONS:  - Educate patient/family on patient safety including physical limitations  - Instruct patient to call for assistance with activity   - Consult OT/PT to assist with strengthening/mobility   - Keep Call bell within reach  - Keep bed low and locked with side rails adjusted as appropriate  - Keep care items and personal belongings within reach  - Initiate and maintain comfort rounds  - Make Fall Risk Sign visible to staff  - Offer Toileting every 2 Hours, in advance of need  - Initiate/Maintain bed/chair alarm  - Obtain necessary fall risk management equipment: non-slip socks, walker  - Apply yellow socks and bracelet for high fall risk patients  - Consider moving patient to room near nurses station  Outcome: Progressing     Problem: MOBILITY - ADULT  Goal: Maintain or return to baseline ADL function  Description: INTERVENTIONS:  -  Assess patient's ability to carry out ADLs; assess patient's baseline for ADL function and identify physical deficits which impact ability to perform ADLs (bathing, care of mouth/teeth, toileting, grooming, dressing, etc.)  - Assess/evaluate cause of self-care deficits   - Assess range of motion  - Assess patient's mobility; develop plan if impaired  - Assess patient's need for assistive devices and provide as appropriate  - Encourage maximum independence but intervene and supervise when necessary  - Involve family in performance of ADLs  - Assess for home care needs following discharge   - Consider OT consult to assist with ADL evaluation and planning for discharge  - Provide patient education as appropriate  Outcome: Progressing  Goal: Maintains/Returns to pre admission functional level  Description: INTERVENTIONS:  - Perform BMAT or MOVE assessment daily.   - Set and communicate daily mobility goal to care team and patient/family/caregiver.    - Collaborate with rehabilitation services on mobility goals if consulted  - Perform Range of Motion 3 times a day. - Reposition patient every 2 hours.   - Dangle patient 3 times a day  - Stand patient 3 times a day  - Ambulate patient 3 times a day  - Out of bed to chair 3 times a day   - Out of bed for meals 3 times a day  - Out of bed for toileting  - Record patient progress and toleration of activity level   Outcome: Progressing     Problem: Prexisting or High Potential for Compromised Skin Integrity  Goal: Skin integrity is maintained or improved  Description: INTERVENTIONS:  - Identify patients at risk for skin breakdown  - Assess and monitor skin integrity  - Assess and monitor nutrition and hydration status  - Monitor labs   - Assess for incontinence   - Turn and reposition patient  - Assist with mobility/ambulation  - Relieve pressure over bony prominences  - Avoid friction and shearing  - Provide appropriate hygiene as needed including keeping skin clean and dry  - Evaluate need for skin moisturizer/barrier cream  - Collaborate with interdisciplinary team   - Patient/family teaching  - Consider wound care consult   Outcome: Progressing     Problem: RESPIRATORY - ADULT  Goal: Achieves optimal ventilation and oxygenation  Description: INTERVENTIONS:  - Assess for changes in respiratory status  - Assess for changes in mentation and behavior  - Position to facilitate oxygenation and minimize respiratory effort  - Oxygen administered by appropriate delivery if ordered  - Initiate smoking cessation education as indicated  - Encourage broncho-pulmonary hygiene including cough, deep breathe, Incentive Spirometry  - Assess the need for suctioning and aspirate as needed  - Assess and instruct to report SOB or any respiratory difficulty  - Respiratory Therapy support as indicated  Outcome: Progressing     Problem: GASTROINTESTINAL - ADULT  Goal: Minimal or absence of nausea and/or vomiting  Description: INTERVENTIONS:  - Administer IV fluids if ordered to ensure adequate hydration  - Maintain NPO status until nausea and vomiting are resolved  - Nasogastric tube if ordered  - Administer ordered antiemetic medications as needed  - Provide nonpharmacologic comfort measures as appropriate  - Advance diet as tolerated, if ordered  - Consider nutrition services referral to assist patient with adequate nutrition and appropriate food choices  Outcome: Progressing     Problem: HEMATOLOGIC - ADULT  Goal: Maintains hematologic stability  Description: INTERVENTIONS  - Assess for signs and symptoms of bleeding or hemorrhage  - Monitor labs  - Administer supportive blood products/factors as ordered and appropriate  Outcome: Progressing     Problem: PAIN - ADULT  Goal: Verbalizes/displays adequate comfort level or baseline comfort level  Description: Interventions:  - Encourage patient to monitor pain and request assistance  - Assess pain using appropriate pain scale  - Administer analgesics based on type and severity of pain and evaluate response  - Implement non-pharmacological measures as appropriate and evaluate response  - Consider cultural and social influences on pain and pain management  - Notify physician/advanced practitioner if interventions unsuccessful or patient reports new pain  Outcome: Progressing     Problem: INFECTION - ADULT  Goal: Absence or prevention of progression during hospitalization  Description: INTERVENTIONS:  - Assess and monitor for signs and symptoms of infection  - Monitor lab/diagnostic results  - Monitor all insertion sites, i.e. indwelling lines, tubes, and drains  - Monitor endotracheal if appropriate and nasal secretions for changes in amount and color  - [place] appropriate cooling/warming therapies per order  - Administer medications as ordered  - Instruct and encourage patient and family to use good hand hygiene technique  - Identify and instruct in appropriate isolation precautions for identified infection/condition  Outcome: Progressing Problem: DISCHARGE PLANNING  Goal: Discharge to home or other facility with appropriate resources  Description: INTERVENTIONS:  - Identify barriers to discharge w/patient and caregiver  - Arrange for needed discharge resources and transportation as appropriate  - Identify discharge learning needs (meds, wound care, etc.)  - Arrange for interpretive services to assist at discharge as needed  - Refer to Case Management Department for coordinating discharge planning if the patient needs post-hospital services based on physician/advanced practitioner order or complex needs related to functional status, cognitive ability, or social support system  Outcome: Progressing     Problem: Knowledge Deficit  Goal: Patient/family/caregiver demonstrates understanding of disease process, treatment plan, medications, and discharge instructions  Description: Complete learning assessment and assess knowledge base.   Interventions:  - Provide teaching at level of understanding  - Provide teaching via preferred learning methods  Outcome: Progressing     Problem: CARDIOVASCULAR - ADULT  Goal: Maintains optimal cardiac output and hemodynamic stability  Description: INTERVENTIONS:  - Monitor I/O, vital signs and rhythm  - Monitor for S/S and trends of decreased cardiac output  - Administer and titrate ordered vasoactive medications to optimize hemodynamic stability  - Assess quality of pulses, skin color and temperature  - Assess for signs of decreased coronary artery perfusion  - Instruct patient to report change in severity of symptoms  Outcome: Progressing  Goal: Absence of cardiac dysrhythmias or at baseline rhythm  Description: INTERVENTIONS:  - Continuous cardiac monitoring, vital signs, obtain 12 lead EKG if ordered  - Administer antiarrhythmic and heart rate control medications as ordered  - Monitor electrolytes and administer replacement therapy as ordered  Outcome: Progressing

## 2023-08-12 NOTE — ASSESSMENT & PLAN NOTE
· Initial troponin >5,000 on admission, since trended down to 938 as of 8/10  · Likely non-MI troponin elevation in setting of acute respiratory failure, volume overload and anemia  · Cardiology following, recommendations appreciated  · Notify provider if chest pain develops

## 2023-08-12 NOTE — ASSESSMENT & PLAN NOTE
· Suspected HCC with multiple hepatic masses on imaging and  AFP >18k, has not been confirmed by tissue diagnosis  · Following with Dr. Parveen Parker with surgical oncology as an outpatient, did not feel like a biopsy was necessary and was referred to Dr. Cee Cmapbell for Y90 radioembolization in process of being scheduled  · IR was consulted for possible biopsy while inpatient, deferred with radioembolization already in process of scheduling

## 2023-08-12 NOTE — ASSESSMENT & PLAN NOTE
Thought to be secondary to iatrogenic volume overload from blood transfusion, since been diuresed and repeat CXR clear. · Echo: EF 45%, mild global hypokinesis.   · Cardiology following, recommendations appreciated  · Continue Lasix 40 mg PO daily, will give additional 20 mg IV Lasix posttransfusion today  · Continue Toprol  · Daily weights, I/Os

## 2023-08-12 NOTE — ASSESSMENT & PLAN NOTE
Thought to be secondary to volume overload after initial blood transfusion. Since been diuresed and oxygenation is improving.    · S/p IV Solu-Medrol posttransfusion initially on admission  · Improving, weaned to 1L NC O2   · Give IV Solu-Medrol 40 mg x 1 prior to blood transfusion

## 2023-08-12 NOTE — PROGRESS NOTES
[facility]  Progress Note  Name: Yrn Alcantara  MRN: [id number]  Unit/Bed#: -01 I Date of Admission: 8/7/2023   Date of Service: 8/12/2023 I Hospital Day: 5    Assessment/Plan   * Anemia  Assessment & Plan  Hemoglobin 6.4 on arrival to ED. Presented after syncopal event, with 2-day history of worsening generalized weakness, lightheadedness. Hx iron-deficiency anemia on supplements. · Likely acute blood loss on chronic anemia with AVM, likely HCC  · S/p EGD and colonoscopy on 8/10:  · 2 angioectasias in the stomach clipped  · 1 angioectasia in the duodenum clipped  · Polypectomy and clip in the descending colon  · S/p 1U PRBC on arrival to ED  · With persistent symptomatic anemia, give 1U PRBC today  · GI following:  · With no evidence of acute GIB, okay to resume Eliquis  · Continue PPI BID, PO iron supplementation  · Monitor H&H closely    Acute on chronic diastolic HF (heart failure) ([address])  Assessment & Plan  Thought to be secondary to iatrogenic volume overload from blood transfusion, since been diuresed and repeat CXR clear. · Echo: EF 45%, mild global hypokinesis. · Cardiology following, recommendations appreciated  · Continue Lasix 40 mg PO daily, will give additional 20 mg IV Lasix posttransfusion today  · Continue Toprol  · Daily weights, I/Os    Acute respiratory failure with hypoxia ([address])  Assessment & Plan  Thought to be secondary to volume overload after initial blood transfusion. Since been diuresed and oxygenation is improving. · S/p IV Solu-Medrol posttransfusion initially on admission  · Improving, weaned to 1L NC O2   · Give IV Solu-Medrol 40 mg x 1 prior to blood transfusion     Syncope  Assessment & Plan  Patient initially presented after syncopal episode, worsening lightheadedness/dizziness and tremulousness throughout admission. Likely secondary to acute on chronic anemia.    · Recent NM stress (6/2023) no evidence of ischemia  · CT head, C-spine negative for acute findings  · New tachycardia with decrease in SBP (8/10), evaluated by critical care AP:  · Lactic acid: 2.7->1.9 / Procal: 0.44->0.39  · BC x2: NGTD (24 hrs)  · D3 IV Zosyn for empiric coverage, D/C monitor off abx with improvement in labs/symptoms, no underlying infectious source  · No further syncopal episodes, supportive care    Hepatocellular carcinoma (HCC)  Assessment & Plan  · Suspected HCC with multiple hepatic masses on imaging and  AFP >18k, has not been confirmed by tissue diagnosis  · Following with Dr. Lily Land with surgical oncology as an outpatient, did not feel like a biopsy was necessary and was referred to Dr. Lalo Crenshaw for Y90 radioembolization in process of being scheduled  · IR was consulted for possible biopsy while inpatient, deferred with radioembolization already in process of scheduling    Elevated troponin  Assessment & Plan  · Initial troponin >5,000 on admission, since trended down to 938 as of 8/10  · Likely non-MI troponin elevation in setting of acute respiratory failure, volume overload and anemia  · Cardiology following, recommendations appreciated  · Notify provider if chest pain develops    History of DVT (deep vein thrombosis)  Assessment & Plan  · Eliquis on hold in the setting of GI bleed  · Restart when okay from GI standpoint    Atrial fibrillation, unspecified type ([address])  Assessment & Plan  · Rates remain controlled on metoprolol  · Home Eliquis on hold given GI bleeding    Type 2 diabetes mellitus with neurologic complication, with long-term current use of insulin [facility])  Assessment & Plan  Lab Results   Component Value Date    HGBA1C 7.6 (H) 01/06/2023       Recent Labs     08/11/23  1629 08/11/23  2055 08/12/23  0726 08/12/23  1101   POCGLU 320* 332* 178* 165*       Blood Sugar Average: Last 72 hrs:  (P) 223.6   · Home regimen: Levemir 75U qHS, NovoLog 28U with breakfast and 38U with lunch/dinner.    · Intermittent hypoglycemia, likely with poor oral intake/diet  · Continue Levemir 18U qHS, Humalog 5U TID AC  · SSI coverage with [facility]    Stage 3 chronic kidney disease, unspecified whether stage 3a or 3b CKD [facility])  Assessment & Plan  Lab Results   Component Value Date    EGFR 38 08/11/2023    EGFR 32 08/10/2023    EGFR 43 08/08/2023    CREATININE 1.69 (H) 08/11/2023    CREATININE 1.92 (H) 08/10/2023    CREATININE 1.53 (H) 08/08/2023     · Baseline creatinine around 1.3-1.6   · Cr had trended up, since improving  · Monitor BMP closely           VTE Pharmacologic Prophylaxis: VTE Score: 10 High Risk (Score >/= 5) - Pharmacological DVT Prophylaxis Ordered: apixaban (Eliquis). Sequential Compression Devices Ordered. Patient Centered Rounds: I performed bedside rounds with nursing staff today. Discussions with Specialists or Other Care Team Provider: GI    Education and Discussions with Family / Patient: Updated  (wife) via phone. Total Time Spent on Date of Encounter in care of patient: 55 minutes This time was spent on one or more of the following: performing physical exam; counseling and coordination of care; obtaining or reviewing history; documenting in the medical record; reviewing/ordering tests, medications or procedures; communicating with other healthcare professionals and discussing with patient's family/caregivers. Current Length of Stay: 5 day(s)  Current Patient Status: Inpatient   Certification Statement: The patient will continue to require additional inpatient hospital stay due to Symptomatic anemia, blood transfusion, respiratory status improvement  Discharge Plan: Anticipate discharge in 24-48 hrs to home. Code Status: Level 1 - Full Code    Subjective:   Patient is seen at bedside this a.m., seen sitting up on side of bed, tremulous and reporting persistent dizziness/weakness. Denies any recent BM or active bleeding, denies chest pain, SOB, nausea/vomiting, diarrhea.   Discussed with patient plan to give blood transfusion given persistent symptomatic anemia, agreed with plan. Objective:     Vitals:   Temp (24hrs), Av °F (36.7 °C), Min:97.6 °F (36.4 °C), Max:98.7 °F (37.1 °C)    Temp:  [97.6 °F (36.4 °C)-98.7 °F (37.1 °C)] 97.6 °F (36.4 °C)  HR:  [86-98] 87  Resp:  [17-20] 20  BP: ()/(45-60) 97/45  SpO2:  [91 %-97 %] 91 %  Body mass index is 28.61 kg/m². Input and Output Summary (last 24 hours): Intake/Output Summary (Last 24 hours) at 2023 1347  Last data filed at 2023 0001  Gross per 24 hour   Intake 120 ml   Output --   Net 120 ml       Physical Exam:   Physical Exam  Constitutional:       General: He is not in acute distress. Appearance: He is not ill-appearing, toxic-appearing or diaphoretic. Cardiovascular:      Rate and Rhythm: Normal rate and regular rhythm. Pulses: Normal pulses. Heart sounds: Normal heart sounds. Pulmonary:      Effort: Pulmonary effort is normal. No respiratory distress. Comments: Decreased breath sounds across lung fields. Abdominal:      General: Bowel sounds are normal. There is no distension. Palpations: Abdomen is soft. Tenderness: There is no abdominal tenderness. Musculoskeletal:         General: No swelling or tenderness. Skin:     General: Skin is warm. Coloration: Skin is not jaundiced or pale. Neurological:      General: No focal deficit present. Mental Status: He is alert. Comments: Upper extremity tremors.    Psychiatric:         Mood and Affect: Mood normal.         Behavior: Behavior normal.          Additional Data:     Labs:  Results from last 7 days   Lab Units 23  0451 23  1628 23  0452   WBC Thousand/uL 9.12  --  10.92*   HEMOGLOBIN g/dL 7.2*   < > 7.0*   HEMATOCRIT % 27.0*   < > 26.6*   PLATELETS Thousands/uL 186  --  216   NEUTROS PCT %  --   --  71   LYMPHS PCT %  --   --  11*   MONOS PCT %  --   --  14*   EOS PCT %  --   --  3    < > = values in this interval not displayed. Results from last 7 days   Lab Units 08/11/23  0452   SODIUM mmol/L 142   POTASSIUM mmol/L 3.5   CHLORIDE mmol/L 106   CO2 mmol/L 26   BUN mg/dL 38*   CREATININE mg/dL 1.69*   ANION GAP mmol/L 10   CALCIUM mg/dL 8.1*   ALBUMIN g/dL 3.5   TOTAL BILIRUBIN mg/dL 0.88   ALK PHOS U/L 70   ALT U/L 29   AST U/L 34   GLUCOSE RANDOM mg/dL 170*         Results from last 7 days   Lab Units 08/12/23  1101 08/12/23  0726 08/11/23  2055 08/11/23  1629 08/11/23  0612 08/10/23  2353 08/10/23  2121 08/10/23  1848 08/10/23  1603 08/10/23  1121 08/10/23  0711 08/09/23  2136   POC GLUCOSE mg/dl 165* 178* 332* 320* 213* 172* 203* 292* 366* 146* 181* 205*         Results from last 7 days   Lab Units 08/11/23  0452 08/10/23  2256 08/10/23  1910 08/10/23  1806 08/10/23  1751   LACTIC ACID mmol/L  --  1.9 2.7* 2.6*  --    PROCALCITONIN ng/ml 0.39*  --   --   --  0.44*       Lines/Drains:  Invasive Devices     Peripheral Intravenous Line  Duration           Peripheral IV 08/10/23 Dorsal (posterior); Right Forearm 1 day    Peripheral IV 08/10/23 Left Antecubital 1 day    Peripheral IV 08/10/23 Right;Ventral (anterior) Wrist 1 day                      Imaging: Reviewed radiology reports from this admission including: chest xray, CT head and procedure reports    Recent Cultures (last 7 days):   Results from last 7 days   Lab Units 08/10/23  1910   BLOOD CULTURE  No Growth at 24 hrs. No Growth at 24 hrs.        Last 24 Hours Medication List:   Current Facility-Administered Medications   Medication Dose Route Frequency Provider Last Rate   • acetaminophen  650 mg Oral Q6H PRN Zhao Quintanilla MD     • albuterol  2.5 mg Nebulization Q6H PRN Guadalupe Santos DO     • apixaban  5 mg Oral BID Mark Natarajan PA-C     • furosemide  20 mg Intravenous Once Mark Natarajan PA-C     • furosemide  40 mg Oral Daily Ebenezer Bobo PA-C     • hydrALAZINE  10 mg Intravenous Q6H PRN Zhao Quintanilla MD     • insulin detemir  18 Units Subcutaneous HS Sharee Youssef MD     • insulin lispro  1-5 Units Subcutaneous HS Eliana Rodgers PA-C     • insulin lispro  1-6 Units Subcutaneous TID AC Eliana Rodgers PA-C     • insulin lispro  5 Units Subcutaneous TID With Meals Eliana Rodgers PA-C     • levalbuterol  1.25 mg Nebulization TID Sharee Youssef MD     • metoprolol succinate  50 mg Oral Daily Sharee Youssef MD     • multivitamin-minerals  1 tablet Oral Daily Sharee Youssef MD     • ondansetron  4 mg Intravenous Q6H PRN Sharee Youssef MD     • pantoprazole  40 mg Intravenous Q12H [address] Sharee Youssef MD     • piperacillin-tazobactam  3.375 g Intravenous Q8H Mali Melendez PA-C 3.375 g (08/12/23 1201)   • pregabalin  100 mg Oral TID Sharee Youssef MD     • sertraline  25 mg Oral Daily Sharee Youssef MD     • tamsulosin  0.4 mg Oral Daily With Winnie Phillips MD     • traZODone  200 mg Oral HS Sharee Youssef MD     • umeclidinium-vilanterol  1 puff Inhalation Daily Sharee Youssef MD          Today, Patient Was Seen By: Mali Melendez PA-C    **Please Note: This note may have been constructed using a voice recognition system. **

## 2023-08-12 NOTE — PROGRESS NOTES
Progress note - Gastroenterology   Jeramy Wisdom 69 y.o. male MRN: [id number]  Unit/Bed#: -01 Encounter: [id number]    ASSESSMENT and PLAN       79M h/o diverticulitis s/p resection, AFib on Eliquis, DVT, GERD, liver mass adm w anemia - EGD/COLON this week w clipping of avm. No overt GIB since but did have an episode of lightheadedness. Repeat hgb stable today at 7.2.     1. Acute blood loss anemia  a. Hgb stable, cont to trend  b. Given symptomatic anemia, consider transfusing 1U prbc. D/w SLIM AP.  c. Cont ppi bid  d. IV iron  e. Resume diet as tolerated  2. Hepatic mass, likely [address] based on imaging  a. New dx following w dr Jasper Alcantar  b. Melinda Doherty AFP  c. Going to Y90 TACE as outpatient  3. AFib - OK to resume Eliquis given no further GIB  4. Acute respiratory failure with pulm edema  - cardiology on board, resume diuretics    Chief Complaint   Patient presents with   • Fall     EMS from home; wife woke pt due to dexcom reading sugar at 41, walked pt to kitchen and pt blacked out falling and hitting R side of head, takes Eliquis; EMS gave 15g PO glucose PTA       SUBJECTIVE/HPI   No further bleeding. Still feels lightheaded when he gets up. /54 (BP Location: Left arm)   Pulse 90   Temp 98.7 °F (37.1 °C)   Resp 17   Ht 6' (1.829 m)   Wt 95.7 kg (210 lb 15.7 oz)   SpO2 92%   BMI 28.61 kg/m²     PHYSICALEXAM  General appearance: alert, appears stated age and cooperative  Eyes: Shellye Deiters, no scleral icterus   Head: Normocephalic, without obvious abnormality, atraumatic  Lungs: clear to auscultation bilaterally, no c/w/r  Heart: regular rate and rhythm, S1, S2 normal, no murmur, click, rub or gallop  Abdomen: soft, non-tender, non-distended; bowel sounds normal; no masses,  no organomegaly  Extremities: extremities normal, atraumatic, no clubbing or cyanosis.  No LE edema  Neurologic: Grossly normal, AAOx3, no asterixis    Lab Results   Component Value Date    CALCIUM 8.1 (L) 08/11/2023    K 3.5 08/11/2023 CO2 26 08/11/2023     08/11/2023    BUN 38 (H) 08/11/2023    CREATININE 1.69 (H) 08/11/2023     Lab Results   Component Value Date    WBC 9.12 08/12/2023    HGB 7.2 (L) 08/12/2023    HCT 27.0 (L) 08/12/2023    MCV 68 (L) 08/12/2023     08/12/2023     Lab Results   Component Value Date    ALT 29 08/11/2023    AST 34 08/11/2023    ALKPHOS 70 08/11/2023     No results found for: "AMYLASE"  No results found for: "LIPASE"  Lab Results   Component Value Date    IRON 14 (L) 08/07/2023    TIBC 462 (H) 08/07/2023    FERRITIN 10 (L) 08/07/2023     No results found for: "INR"

## 2023-08-12 NOTE — ASSESSMENT & PLAN NOTE
Patient initially presented after syncopal episode, worsening lightheadedness/dizziness and tremulousness throughout admission. Likely secondary to acute on chronic anemia.    · Recent NM stress (6/2023) no evidence of ischemia  · CT head, C-spine negative for acute findings  · New tachycardia with decrease in SBP (8/10), evaluated by critical care AP:  · Lactic acid: 2.7->1.9 / Procal: 0.44->0.39  · BC x2: NGTD (24 hrs)  · D3 IV Zosyn for empiric coverage, D/C monitor off abx with improvement in labs/symptoms, no underlying infectious source  · No further syncopal episodes, supportive care

## 2023-08-13 LAB
ABO GROUP BLD BPU: NORMAL
ANION GAP SERPL CALCULATED.3IONS-SCNC: 7 MMOL/L
BASOPHILS # BLD AUTO: 0.02 THOUSANDS/ÂΜL (ref 0–0.1)
BASOPHILS NFR BLD AUTO: 0 % (ref 0–1)
BPU ID: NORMAL
BUN SERPL-MCNC: 41 MG/DL (ref 5–25)
CALCIUM SERPL-MCNC: 8.7 MG/DL (ref 8.4–10.2)
CHLORIDE SERPL-SCNC: 102 MMOL/L (ref 96–108)
CO2 SERPL-SCNC: 29 MMOL/L (ref 21–32)
CREAT SERPL-MCNC: 1.82 MG/DL (ref 0.6–1.3)
CROSSMATCH: NORMAL
EOSINOPHIL # BLD AUTO: 0.01 THOUSAND/ÂΜL (ref 0–0.61)
EOSINOPHIL NFR BLD AUTO: 0 % (ref 0–6)
ERYTHROCYTE [DISTWIDTH] IN BLOOD BY AUTOMATED COUNT: 29.7 % (ref 11.6–15.1)
GFR SERPL CREATININE-BSD FRML MDRD: 35 ML/MIN/1.73SQ M
GLUCOSE SERPL-MCNC: 252 MG/DL (ref 65–140)
GLUCOSE SERPL-MCNC: 288 MG/DL (ref 65–140)
GLUCOSE SERPL-MCNC: 292 MG/DL (ref 65–140)
GLUCOSE SERPL-MCNC: 306 MG/DL (ref 65–140)
GLUCOSE SERPL-MCNC: 306 MG/DL (ref 65–140)
HCT VFR BLD AUTO: 29.6 % (ref 36.5–49.3)
HGB BLD-MCNC: 8.1 G/DL (ref 12–17)
IMM GRANULOCYTES # BLD AUTO: 0.1 THOUSAND/UL (ref 0–0.2)
IMM GRANULOCYTES NFR BLD AUTO: 1 % (ref 0–2)
LYMPHOCYTES # BLD AUTO: 0.78 THOUSANDS/ÂΜL (ref 0.6–4.47)
LYMPHOCYTES NFR BLD AUTO: 9 % (ref 14–44)
MCH RBC QN AUTO: 19.3 PG (ref 26.8–34.3)
MCHC RBC AUTO-ENTMCNC: 27.4 G/DL (ref 31.4–37.4)
MCV RBC AUTO: 71 FL (ref 82–98)
MONOCYTES # BLD AUTO: 0.84 THOUSAND/ÂΜL (ref 0.17–1.22)
MONOCYTES NFR BLD AUTO: 9 % (ref 4–12)
NEUTROPHILS # BLD AUTO: 7.31 THOUSANDS/ÂΜL (ref 1.85–7.62)
NEUTS SEG NFR BLD AUTO: 81 % (ref 43–75)
NRBC BLD AUTO-RTO: 0 /100 WBCS
PLATELET # BLD AUTO: 213 THOUSANDS/UL (ref 149–390)
POTASSIUM SERPL-SCNC: 4.6 MMOL/L (ref 3.5–5.3)
PROCALCITONIN SERPL-MCNC: 0.25 NG/ML
RBC # BLD AUTO: 4.2 MILLION/UL (ref 3.88–5.62)
SODIUM SERPL-SCNC: 138 MMOL/L (ref 135–147)
UNIT DISPENSE STATUS: NORMAL
UNIT PRODUCT CODE: NORMAL
UNIT PRODUCT VOLUME: 350 ML
UNIT RH: NORMAL
WBC # BLD AUTO: 9.06 THOUSAND/UL (ref 4.31–10.16)

## 2023-08-13 PROCEDURE — C9113 INJ PANTOPRAZOLE SODIUM, VIA: HCPCS | Performed by: INTERNAL MEDICINE

## 2023-08-13 PROCEDURE — 82948 REAGENT STRIP/BLOOD GLUCOSE: CPT

## 2023-08-13 PROCEDURE — 84145 PROCALCITONIN (PCT): CPT | Performed by: PHYSICIAN ASSISTANT

## 2023-08-13 PROCEDURE — 80048 BASIC METABOLIC PNL TOTAL CA: CPT | Performed by: PHYSICIAN ASSISTANT

## 2023-08-13 PROCEDURE — 94760 N-INVAS EAR/PLS OXIMETRY 1: CPT

## 2023-08-13 PROCEDURE — 99232 SBSQ HOSP IP/OBS MODERATE 35: CPT | Performed by: PHYSICIAN ASSISTANT

## 2023-08-13 PROCEDURE — 99232 SBSQ HOSP IP/OBS MODERATE 35: CPT | Performed by: INTERNAL MEDICINE

## 2023-08-13 PROCEDURE — 85025 COMPLETE CBC W/AUTO DIFF WBC: CPT | Performed by: PHYSICIAN ASSISTANT

## 2023-08-13 PROCEDURE — 94640 AIRWAY INHALATION TREATMENT: CPT

## 2023-08-13 RX ORDER — PANTOPRAZOLE SODIUM 40 MG/1
40 TABLET, DELAYED RELEASE ORAL
Status: DISCONTINUED | OUTPATIENT
Start: 2023-08-13 | End: 2023-08-14 | Stop reason: HOSPADM

## 2023-08-13 RX ORDER — INSULIN LISPRO 100 [IU]/ML
7 INJECTION, SOLUTION INTRAVENOUS; SUBCUTANEOUS
Status: DISCONTINUED | OUTPATIENT
Start: 2023-08-13 | End: 2023-08-14 | Stop reason: HOSPADM

## 2023-08-13 RX ORDER — FUROSEMIDE 40 MG/1
40 TABLET ORAL DAILY
Status: DISCONTINUED | OUTPATIENT
Start: 2023-08-13 | End: 2023-08-14 | Stop reason: HOSPADM

## 2023-08-13 RX ADMIN — PANTOPRAZOLE SODIUM 40 MG: 40 INJECTION, POWDER, FOR SOLUTION INTRAVENOUS at 09:24

## 2023-08-13 RX ADMIN — INSULIN LISPRO 5 UNITS: 100 INJECTION, SOLUTION INTRAVENOUS; SUBCUTANEOUS at 12:03

## 2023-08-13 RX ADMIN — LEVALBUTEROL HYDROCHLORIDE 1.25 MG: 1.25 SOLUTION RESPIRATORY (INHALATION) at 07:40

## 2023-08-13 RX ADMIN — INSULIN LISPRO 7 UNITS: 100 INJECTION, SOLUTION INTRAVENOUS; SUBCUTANEOUS at 16:35

## 2023-08-13 RX ADMIN — PANTOPRAZOLE SODIUM 40 MG: 40 TABLET, DELAYED RELEASE ORAL at 16:35

## 2023-08-13 RX ADMIN — INSULIN LISPRO 4 UNITS: 100 INJECTION, SOLUTION INTRAVENOUS; SUBCUTANEOUS at 12:03

## 2023-08-13 RX ADMIN — MULTIPLE VITAMINS W/ MINERALS TAB 1 TABLET: TAB ORAL at 09:24

## 2023-08-13 RX ADMIN — FUROSEMIDE 40 MG: 40 TABLET ORAL at 09:25

## 2023-08-13 RX ADMIN — APIXABAN 5 MG: 5 TABLET, FILM COATED ORAL at 18:03

## 2023-08-13 RX ADMIN — TRAZODONE HYDROCHLORIDE 200 MG: 50 TABLET ORAL at 21:02

## 2023-08-13 RX ADMIN — PREGABALIN 100 MG: 100 CAPSULE ORAL at 16:34

## 2023-08-13 RX ADMIN — IRON SUCROSE 200 MG: 20 INJECTION, SOLUTION INTRAVENOUS at 14:06

## 2023-08-13 RX ADMIN — INSULIN LISPRO 3 UNITS: 100 INJECTION, SOLUTION INTRAVENOUS; SUBCUTANEOUS at 07:51

## 2023-08-13 RX ADMIN — PREGABALIN 100 MG: 100 CAPSULE ORAL at 09:24

## 2023-08-13 RX ADMIN — INSULIN LISPRO 2 UNITS: 100 INJECTION, SOLUTION INTRAVENOUS; SUBCUTANEOUS at 21:03

## 2023-08-13 RX ADMIN — SERTRALINE HYDROCHLORIDE 25 MG: 25 TABLET ORAL at 09:30

## 2023-08-13 RX ADMIN — INSULIN LISPRO 4 UNITS: 100 INJECTION, SOLUTION INTRAVENOUS; SUBCUTANEOUS at 16:35

## 2023-08-13 RX ADMIN — INSULIN DETEMIR 22 UNITS: 100 INJECTION, SOLUTION SUBCUTANEOUS at 21:03

## 2023-08-13 RX ADMIN — PREGABALIN 100 MG: 100 CAPSULE ORAL at 21:04

## 2023-08-13 RX ADMIN — UMECLIDINIUM BROMIDE AND VILANTEROL TRIFENATATE 1 PUFF: 62.5; 25 POWDER RESPIRATORY (INHALATION) at 09:30

## 2023-08-13 RX ADMIN — LEVALBUTEROL HYDROCHLORIDE 1.25 MG: 1.25 SOLUTION RESPIRATORY (INHALATION) at 13:26

## 2023-08-13 RX ADMIN — LEVALBUTEROL HYDROCHLORIDE 1.25 MG: 1.25 SOLUTION RESPIRATORY (INHALATION) at 19:01

## 2023-08-13 RX ADMIN — TAMSULOSIN HYDROCHLORIDE 0.4 MG: 0.4 CAPSULE ORAL at 16:35

## 2023-08-13 RX ADMIN — APIXABAN 5 MG: 5 TABLET, FILM COATED ORAL at 09:25

## 2023-08-13 RX ADMIN — METOPROLOL SUCCINATE 50 MG: 50 TABLET, EXTENDED RELEASE ORAL at 09:25

## 2023-08-13 RX ADMIN — INSULIN LISPRO 5 UNITS: 100 INJECTION, SOLUTION INTRAVENOUS; SUBCUTANEOUS at 07:51

## 2023-08-13 NOTE — ASSESSMENT & PLAN NOTE
· Suspected HCC with multiple hepatic masses on imaging and AFP >18k, has not been confirmed by tissue diagnosis  · Following with Dr. Nikolai Romero with surgical oncology as an outpatient, did not feel like a biopsy was necessary and was referred to Dr. Francis Corral for Y90 radioembolization  · IR was consulted for possible biopsy while inpatient, deferred with radioembolization already in process of scheduling

## 2023-08-13 NOTE — PLAN OF CARE
Problem: Potential for Falls  Goal: Patient will remain free of falls  Description: INTERVENTIONS:  - Educate patient/family on patient safety including physical limitations  - Instruct patient to call for assistance with activity   - Consult OT/PT to assist with strengthening/mobility   - Keep Call bell within reach  - Keep bed low and locked with side rails adjusted as appropriate  - Keep care items and personal belongings within reach  - Initiate and maintain comfort rounds  - Make Fall Risk Sign visible to staff  - Offer Toileting every 2 Hours, in advance of need  - Initiate/Maintain bed/chair alarm  - Obtain necessary fall risk management equipment: non-slip socks, walker  - Apply yellow socks and bracelet for high fall risk patients  - Consider moving patient to room near nurses station  Outcome: Progressing     Problem: MOBILITY - ADULT  Goal: Maintain or return to baseline ADL function  Description: INTERVENTIONS:  -  Assess patient's ability to carry out ADLs; assess patient's baseline for ADL function and identify physical deficits which impact ability to perform ADLs (bathing, care of mouth/teeth, toileting, grooming, dressing, etc.)  - Assess/evaluate cause of self-care deficits   - Assess range of motion  - Assess patient's mobility; develop plan if impaired  - Assess patient's need for assistive devices and provide as appropriate  - Encourage maximum independence but intervene and supervise when necessary  - Involve family in performance of ADLs  - Assess for home care needs following discharge   - Consider OT consult to assist with ADL evaluation and planning for discharge  - Provide patient education as appropriate  Outcome: Progressing  Goal: Maintains/Returns to pre admission functional level  Description: INTERVENTIONS:  - Perform BMAT or MOVE assessment daily.   - Set and communicate daily mobility goal to care team and patient/family/caregiver.    - Collaborate with rehabilitation services on mobility goals if consulted  - Perform Range of Motion 3 times a day. - Reposition patient every 2 hours.   - Dangle patient 3 times a day  - Stand patient 3 times a day  - Ambulate patient 3 times a day  - Out of bed to chair 3 times a day for meals  - Out of bed for toileting  - Record patient progress and toleration of activity level   Outcome: Progressing     Problem: Prexisting or High Potential for Compromised Skin Integrity  Goal: Skin integrity is maintained or improved  Description: INTERVENTIONS:  - Identify patients at risk for skin breakdown  - Assess and monitor skin integrity  - Assess and monitor nutrition and hydration status  - Monitor labs   - Assess for incontinence   - Turn and reposition patient  - Assist with mobility/ambulation  - Relieve pressure over bony prominences  - Avoid friction and shearing  - Provide appropriate hygiene as needed including keeping skin clean and dry  - Evaluate need for skin moisturizer/barrier cream  - Collaborate with interdisciplinary team   - Patient/family teaching  - Consider wound care consult   Outcome: Progressing     Problem: RESPIRATORY - ADULT  Goal: Achieves optimal ventilation and oxygenation  Description: INTERVENTIONS:  - Assess for changes in respiratory status  - Assess for changes in mentation and behavior  - Position to facilitate oxygenation and minimize respiratory effort  - Oxygen administered by appropriate delivery if ordered  - Initiate smoking cessation education as indicated  - Encourage broncho-pulmonary hygiene including cough, deep breathe, Incentive Spirometry  - Assess the need for suctioning and aspirate as needed  - Assess and instruct to report SOB or any respiratory difficulty  - Respiratory Therapy support as indicated  Outcome: Progressing     Problem: GASTROINTESTINAL - ADULT  Goal: Minimal or absence of nausea and/or vomiting  Description: INTERVENTIONS:  - Administer IV fluids if ordered to ensure adequate hydration  - Maintain NPO status until nausea and vomiting are resolved  - Nasogastric tube if ordered  - Administer ordered antiemetic medications as needed  - Provide nonpharmacologic comfort measures as appropriate  - Advance diet as tolerated, if ordered  - Consider nutrition services referral to assist patient with adequate nutrition and appropriate food choices  Outcome: Progressing     Problem: HEMATOLOGIC - ADULT  Goal: Maintains hematologic stability  Description: INTERVENTIONS  - Assess for signs and symptoms of bleeding or hemorrhage  - Monitor labs  - Administer supportive blood products/factors as ordered and appropriate  Outcome: Progressing     Problem: PAIN - ADULT  Goal: Verbalizes/displays adequate comfort level or baseline comfort level  Description: Interventions:  - Encourage patient to monitor pain and request assistance  - Assess pain using appropriate pain scale  - Administer analgesics based on type and severity of pain and evaluate response  - Implement non-pharmacological measures as appropriate and evaluate response  - Consider cultural and social influences on pain and pain management  - Notify physician/advanced practitioner if interventions unsuccessful or patient reports new pain  Outcome: Progressing     Problem: INFECTION - ADULT  Goal: Absence or prevention of progression during hospitalization  Description: INTERVENTIONS:  - Assess and monitor for signs and symptoms of infection  - Monitor lab/diagnostic results  - Monitor all insertion sites, i.e. indwelling lines, tubes, and drains  - Monitor endotracheal if appropriate and nasal secretions for changes in amount and color  - [employer] appropriate cooling/warming therapies per order  - Administer medications as ordered  - Instruct and encourage patient and family to use good hand hygiene technique  - Identify and instruct in appropriate isolation precautions for identified infection/condition  Outcome: Progressing     Problem: DISCHARGE PLANNING  Goal: Discharge to home or other facility with appropriate resources  Description: INTERVENTIONS:  - Identify barriers to discharge w/patient and caregiver  - Arrange for needed discharge resources and transportation as appropriate  - Identify discharge learning needs (meds, wound care, etc.)  - Arrange for interpretive services to assist at discharge as needed  - Refer to Case Management Department for coordinating discharge planning if the patient needs post-hospital services based on physician/advanced practitioner order or complex needs related to functional status, cognitive ability, or social support system  Outcome: Progressing     Problem: Knowledge Deficit  Goal: Patient/family/caregiver demonstrates understanding of disease process, treatment plan, medications, and discharge instructions  Description: Complete learning assessment and assess knowledge base.   Interventions:  - Provide teaching at level of understanding  - Provide teaching via preferred learning methods  Outcome: Progressing     Problem: CARDIOVASCULAR - ADULT  Goal: Maintains optimal cardiac output and hemodynamic stability  Description: INTERVENTIONS:  - Monitor I/O, vital signs and rhythm  - Monitor for S/S and trends of decreased cardiac output  - Administer and titrate ordered vasoactive medications to optimize hemodynamic stability  - Assess quality of pulses, skin color and temperature  - Assess for signs of decreased coronary artery perfusion  - Instruct patient to report change in severity of symptoms  Outcome: Progressing  Goal: Absence of cardiac dysrhythmias or at baseline rhythm  Description: INTERVENTIONS:  - Continuous cardiac monitoring, vital signs, obtain 12 lead EKG if ordered  - Administer antiarrhythmic and heart rate control medications as ordered  - Monitor electrolytes and administer replacement therapy as ordered  Outcome: Progressing

## 2023-08-13 NOTE — PLAN OF CARE
Problem: Potential for Falls  Goal: Patient will remain free of falls  Description: INTERVENTIONS:  - Educate patient/family on patient safety including physical limitations  - Instruct patient to call for assistance with activity   - Consult OT/PT to assist with strengthening/mobility   - Keep Call bell within reach  - Keep bed low and locked with side rails adjusted as appropriate  - Keep care items and personal belongings within reach  - Initiate and maintain comfort rounds  - Make Fall Risk Sign visible to staff  - Offer Toileting every 2 Hours, in advance of need  - Initiate/Maintain bed alarm  - Obtain necessary fall risk management equipment:   - Apply yellow socks and bracelet for high fall risk patients  - Consider moving patient to room near nurses station  Outcome: Progressing     Problem: MOBILITY - ADULT  Goal: Maintain or return to baseline ADL function  Description: INTERVENTIONS:  -  Assess patient's ability to carry out ADLs; assess patient's baseline for ADL function and identify physical deficits which impact ability to perform ADLs (bathing, care of mouth/teeth, toileting, grooming, dressing, etc.)  - Assess/evaluate cause of self-care deficits   - Assess range of motion  - Assess patient's mobility; develop plan if impaired  - Assess patient's need for assistive devices and provide as appropriate  - Encourage maximum independence but intervene and supervise when necessary  - Involve family in performance of ADLs  - Assess for home care needs following discharge   - Consider OT consult to assist with ADL evaluation and planning for discharge  - Provide patient education as appropriate  Outcome: Progressing  Goal: Maintains/Returns to pre admission functional level  Description: INTERVENTIONS:  - Perform BMAT or MOVE assessment daily.   - Set and communicate daily mobility goal to care team and patient/family/caregiver.    - Collaborate with rehabilitation services on mobility goals if consulted  - Perform Range of Motion 3 times a day. - Reposition patient every 2 hours.   - Dangle patient 3 times a day  - Stand patient 3 times a day  - Ambulate patient 3 times a day  - Out of bed to chair 3 times a day   - Out of bed for meals 3 times a day  - Out of bed for toileting  - Record patient progress and toleration of activity level   Outcome: Progressing     Problem: RESPIRATORY - ADULT  Goal: Achieves optimal ventilation and oxygenation  Description: INTERVENTIONS:  - Assess for changes in respiratory status  - Assess for changes in mentation and behavior  - Position to facilitate oxygenation and minimize respiratory effort  - Oxygen administered by appropriate delivery if ordered  - Initiate smoking cessation education as indicated  - Encourage broncho-pulmonary hygiene including cough, deep breathe, Incentive Spirometry  - Assess the need for suctioning and aspirate as needed  - Assess and instruct to report SOB or any respiratory difficulty  - Respiratory Therapy support as indicated  Outcome: Progressing

## 2023-08-13 NOTE — ASSESSMENT & PLAN NOTE
Hemoglobin 6.4 on arrival to ED. Presented after syncopal event, with 2-day history of worsening generalized weakness, lightheadedness. Hx iron-deficiency anemia on supplements. · Likely acute blood loss on chronic anemia with AVM, likely [address]  · S/p EGD and colonoscopy on 8/10: 2 angioectasias in the stomach clipped. 1 angioectasia in the duodenum clipped. Polypectomy and clip in the descending colon.   · S/p 2U PRBCs total throughout admission  · Hgb improved to 8.1 following 1U PRBC (8/12)  · GI following:  · With no evidence of acute GIB, resumed Eliquis (8/12)  · Start IV Venofer x 2 doses, if anemia stable can plan for discharge tomorrow  · Continue PPI BID, PO iron supplementation  · Monitor H&H closely

## 2023-08-13 NOTE — DISCHARGE SUMMARY
[address] [facility]  Discharge- Emory Jiménez 1945, 69 y.o. male MRN: [id number]  Unit/Bed#: -01 Encounter: [id number]  Primary Care Provider: Varsha Breaux MD   Date and time admitted to hospital: 8/7/2023  5:05 AM    * Anemia  Assessment & Plan  Hemoglobin 6.4 on arrival to ED. Presented after syncopal event, with 2-day history of worsening generalized weakness, lightheadedness. Hx iron-deficiency anemia on supplements. · Likely acute blood loss on chronic anemia with AVM, likely [address]  · S/p EGD and colonoscopy on 8/10: 2 angioectasias in the stomach clipped. 1 angioectasia in the duodenum clipped. Polypectomy and clip in the descending colon.   · S/p 2U PRBCs total throughout admission  · Hgb improved to 8.1 following 1U PRBC (8/12)  · GI following:  · Resumed Eliquis (8/12)  · S/P IV venofer  · Hgb stable > 9, no evidence of further bleeding (last BM this morning)  · Cleared for DC per GI, will repeat CBC 1 week to monitor hgb  · Continue PPI BID, PO iron supplementation  · Monitor H&H closely    Hepatocellular carcinoma (HCC)  Assessment & Plan  · Suspected HCC with multiple hepatic masses on imaging and AFP >18k, has not been confirmed by tissue diagnosis  · Following with Dr. Nikita Kumar with surgical oncology as an outpatient, did not feel like a biopsy was necessary and was referred to Dr. Macy Fortune for Y90 radioembolization  · IR was consulted for possible biopsy while inpatient, deferred with radioembolization already in process of scheduling  · Continue outpatient follow up     Elevated troponin  Assessment & Plan  · Initial troponin >5,000 on admission, since trended down to 938 as of 8/10  · Likely non-MI troponin elevation in setting of acute respiratory failure, volume overload and anemia  · Cardiology following, recommendations appreciated  · Notify provider if chest pain develops    History of DVT (deep vein thrombosis)  Assessment & Plan  · Eliquis initially on hold in the setting of GI bleed  · Resumed Eliquis per GI    Acute on chronic diastolic HF (heart failure) ([address])  Assessment & Plan  Thought to be secondary to iatrogenic volume overload from blood transfusion, since been diuresed and repeat CXR clear. · Echo: EF 45%, mild global hypokinesis. · Cardiology following, recommendations appreciated  · Continue Toprol, Lasix 40 mg PO daily  · Cr stable at 1.52 today  · BMP in 1 week after DC  · Outpatient f/u with cardiology, cleared from cards stand point. May need repeat echo as outpatient. · Stable on RA. Syncope  Assessment & Plan  Patient initially presented after syncopal episode, worsening lightheadedness/dizziness and tremulousness throughout admission. Likely secondary to acute on chronic anemia, possible anxiety component. · Recent NM stress (6/2023) no evidence of ischemia  · CT head, C-spine negative for acute findings  · New tachycardia with decrease in SBP, evaluated by critical care AP (8/10):  · Lactic acid: 2.7->1.9 / Procal: 0.44->0.39->0.25  · BC x2: NGTD (72 hrs)  · Completed 3 days IV Zosyn for empiric coverage  · No further syncopal episodes, monitor off abx with no infectious source  · No fevers/infectious sx off of abx    Atrial fibrillation, unspecified type ([address])  Assessment & Plan  · Rates remain controlled on metoprolol  · Resumed Eliquis, cleared by GI    Type 2 diabetes mellitus with neurologic complication, with long-term current use of insulin [facility])  Assessment & Plan  Lab Results   Component Value Date    HGBA1C 7.6 (H) 01/06/2023       Recent Labs     08/13/23  1611 08/13/23  2056 08/14/23  0710 08/14/23  1105   POCGLU 306* 288* 150* 276*       Blood Sugar Average: Last 72 hrs:  (P) 271.9351770334315495   · Home regimen: Levemir 75U qHS, NovoLog 28U with breakfast and 38U with lunch/dinner.    · Regimen initially decreased due to Intermittent hypoglycemia with poor oral intake/diet, however then hperglycemic while inpatient (also did receive IV steroids for transfusion reaction ppx)  · While inpatient: Levemir 22U qHS, Humalog 5U TID AC  · Patient remains with elevated BG on this regimen, recommend resuming home regimen upon DC as patient is eating well  · SSI coverage with [facility]    Stage 3 chronic kidney disease, unspecified whether stage 3a or 3b CKD [facility])  Assessment & Plan  Lab Results   Component Value Date    EGFR 43 08/14/2023    EGFR 35 08/13/2023    EGFR 38 08/11/2023    CREATININE 1.52 (H) 08/14/2023    CREATININE 1.82 (H) 08/13/2023    CREATININE 1.69 (H) 08/11/2023     · Baseline creatinine around 1.3-1.6   · Cr slightly trended up to 1.8, possibly 2/2 blood transfusion and additional IV Lasix (8/12)  · Cr stable at 1.52  · BMP 1 week    Acute respiratory failure with hypoxia (HCC)-resolved as of 8/14/2023  Assessment & Plan  Thought to be secondary to volume overload after initial blood transfusion. Since been diuresed and oxygenation is improving. · S/p IV Solu-Medrol posttransfusion initially on admission  · S/p IV Solu-Medrol 40 mg, IV Benadryl 25 mg with blood transfusion (8/12)  · Resolved, stable on RA following diuresis       Medical Problems     Resolved Problems  Date Reviewed: 8/14/2023          Resolved    Acute respiratory failure with hypoxia ([address]) 8/14/2023     Resolved by  Jake Barroso PA-C        Discharging Physician / Practitioner: Jake Barroso PA-C  PCP: Laura Williamson MD  Admission Date:   Admission Orders (From admission, onward)     Ordered        08/07/23 1029  INPATIENT ADMISSION  Once                      Discharge Date: 08/14/23    Consultations During Hospital Stay:  · Gastroenterology  · Pulmonology  · Cardiology  · Interventional radiology  · Critical care  · PT/OT    Procedures Performed:   · EGD 8/10:   · 2 small angioectasias in the fundus of the stomach and body of the stomach; bleeding was observed; placed 2 clips successfully; hemostasis achieved.    · Single small angioectasia in the 3rd part of the duodenum; placed 1 clip successfully. · The esophagus appeared normal. No evidence of primary malignancy. · Colonoscopy 8/10:   · Subcentimeter polyp in the hepatic flexure was removed with cold snare. · One polyp measuring 5-9 mm in the descending colon; bleeding occurred after intervention; removed by cold snare; placed 1 clip successfully; hemostasis achieved. · Small hemorrhoids. Healthy end-to-side colocolonic anastomosis in the distal descending colon 20 cm from the anal verge. · 2 benign appearing polyps removed as noted above, no evidence of primary malignancy seen. Significant Findings / Test Results:   · CXR: No acute cardiopulmonary findings. · CT head, C-spine: No acute findings. · CXR (8/8): CHF and pulmonary edema, superimposed on emphysema. Small right pleural effusion. · CXR (8/10): No focal consolidation, pleural effusion, or pneumothorax. · Echo:    · Left Ventricle: Left ventricular cavity size is normal. Wall thickness is mildly increased. The left ventricular ejection fraction is 45%. Systolic function is mildly reduced. There is mild global hypokinesis. Diastolic function is mildly abnormal, consistent with grade I (abnormal) relaxation. · Right Ventricle: Right ventricular cavity size is normal. Systolic function is normal.  · Aortic Valve: There is trace regurgitation. · Mitral Valve: There is mild regurgitation. · Tricuspid Valve: There is mild regurgitation. Incidental Findings:   · None    Test Results Pending at Discharge (will require follow up):    · Final colonoscopy polyp biopsy results     Outpatient Tests Requested:  · Follow-up with GI outpatient for ongoing management of anemia, suspected hepatocellular carcinoma  · Follow-up with cardiology outpatient for management of chronic diastolic CHF  · Follow-up with endocrinology outpatient for management of DM  · Follow-up with surgical oncology, in process of being scheduled for radioembolization treatment for hepatic masses    Complications: None    Reason for Admission: Acute on chronic anemia    Hospital Course:   Pk Alonso is a 69 y.o. male patient, PMH chronic diastolic CHF, multiple hepatic masses concerning for [address], DVT on Eliquis, atrial fibrillation, DM, CKD 3, who originally presented to the hospital on 8/7/2023 due to acute on chronic anemia, presented after syncopal event. On arrival to ED, symptomatic anemia with hemoglobin 6.4. Patient initially treated with 1 unit PRBC, however had transfusion reaction requiring IV Lasix and IV Solu-Medrol, with subsequent volume overload and acute respiratory failure. Critical care evaluated patient, treated with 3 days IV Zosyn for empiric coverage with no infectious source identified. Patient also had significantly elevated troponins, which were thought to be non-MI elevation in setting of acute respiratory failure and anemia. Cardiology and pulmonology were consulted, empiric steroids eventually weaned off and was diuresed with IV Lasix with improvement in volume status. Patient will be discharged on PO lasix with outpatient cardiology follow up for repeat echo. Patient remained stable on RA prior to discharge + stable Cr on diuretic. No further inpatient work up from cards standpoint. GI evaluated patient regarding anemia concerning for GIB. EGD and colonoscopy were performed on 8/10 which revealed 2 AVMs in stomach/duodenum that were clipped, 2 colon polyps removed and clipped. Patient received additional 1 unit PRBC on 8/12, prophylactically treated with IV Solu-Medrol, IV Benadryl and IV Lasix with no significant reactions and stable volume status. Patient was also treated with IV Venofer and resumed on PO eliquis without further evidence of GI bleeding and stabilization of Hgb (>9) prior to discharge. Patient was cleared for DC per GI on protonix BID and repeat CBC in 1 week. PT/OT recommended HHC on discharge.  Recommend close outpatient follow-up with GI, cardiology outpatient. Patient already is in process for scheduling radioembolization of hepatic masses, IR deferred biopsy while inpatient. Please see above list of diagnoses and related plan for additional information. Condition at Discharge: stable    Discharge Day Visit / Exam:   Subjective:  Patient denies any pain or complaints today. Notes last BM was this morning, brown and formed, no evidence of bleeding per his report. Patient looking forward to discharge. Vitals: Blood Pressure: 133/63 (08/14/23 0712)  Pulse: 75 (08/14/23 0712)  Temperature: 97.6 °F (36.4 °C) (08/14/23 0712)  Temp Source: Oral (08/12/23 1749)  Respirations: 18 (08/14/23 0712)  Height: 6' (182.9 cm) (08/08/23 1031)  Weight - Scale: 95 kg (209 lb 7 oz) (08/14/23 0600)  SpO2: 92 % (08/14/23 1509)  Exam:   Physical Exam  Vitals and nursing note reviewed. Constitutional:       General: He is not in acute distress. Appearance: He is well-developed. He is not ill-appearing. HENT:      Head: Normocephalic and atraumatic. Eyes:      General:         Right eye: No discharge. Left eye: No discharge. Extraocular Movements: Extraocular movements intact. Conjunctiva/sclera: Conjunctivae normal.   Cardiovascular:      Rate and Rhythm: Normal rate and regular rhythm. Heart sounds: No murmur heard. Pulmonary:      Effort: Pulmonary effort is normal. No respiratory distress. Breath sounds: Normal breath sounds. No wheezing, rhonchi or rales. Abdominal:      General: Bowel sounds are normal. There is no distension. Palpations: Abdomen is soft. Tenderness: There is no abdominal tenderness. Musculoskeletal:      Cervical back: Neck supple. Right lower leg: No edema. Left lower leg: No edema. Skin:     General: Skin is warm and dry. Capillary Refill: Capillary refill takes less than 2 seconds.    Neurological:      General: No focal deficit present. Mental Status: He is alert and oriented to person, place, and time. Mental status is at baseline. Cranial Nerves: No cranial nerve deficit. Psychiatric:         Mood and Affect: Mood normal.         Behavior: Behavior normal.          Discussion with Family: Updated  (wife) at bedside. Discharge instructions/Information to patient and family:   See after visit summary for information provided to patient and family. Provisions for Follow-Up Care:  See after visit summary for information related to follow-up care and any pertinent home health orders. Disposition:   Home with [facility] (Reminder: Complete face to face encounter)    Planned Readmission: None      Discharge Statement:  I spent 65 minutes discharging the patient. This time was spent on the day of discharge. I had direct contact with the patient on the day of discharge. Greater than 50% of the total time was spent examining patient, answering all patient questions, arranging and discussing plan of care with patient as well as directly providing post-discharge instructions. Additional time then spent on discharge activities. Discharge Medications:  See after visit summary for reconciled discharge medications provided to patient and/or family.       **Please Note: This note may have been constructed using a voice recognition system**

## 2023-08-13 NOTE — ASSESSMENT & PLAN NOTE
Thought to be secondary to volume overload after initial blood transfusion. Since been diuresed and oxygenation is improving.    · S/p IV Solu-Medrol posttransfusion initially on admission  · S/p IV Solu-Medrol 40 mg, IV Benadryl 25 mg with blood transfusion (8/12)  · Improving, now stable on room air

## 2023-08-13 NOTE — ASSESSMENT & PLAN NOTE
Patient initially presented after syncopal episode, worsening lightheadedness/dizziness and tremulousness throughout admission. Likely secondary to acute on chronic anemia, possible anxiety component.   · Recent NM stress (6/2023) no evidence of ischemia  · CT head, C-spine negative for acute findings  · New tachycardia with decrease in SBP, evaluated by critical care AP (8/10):  · Lactic acid: 2.7->1.9 / Procal: 0.44->0.39->0.25  · BC x2: NGTD (48 hrs)  · Completed 3 days IV Zosyn for empiric coverage  · No further syncopal episodes, monitor off abx with no infectious source

## 2023-08-13 NOTE — PROGRESS NOTES
Progress note - Gastroenterology   Cheryl Mina 69 y.o. male MRN: [id number]  Unit/Bed#: -01 Encounter: [id number]    ASSESSMENT and PLAN    79M h/o diverticulitis s/p resection, AFib on Eliquis, DVT, GERD, liver mass adm w anemia - EGD/COLON this week w clipping of avm. No overt GIB since but did have an episode of lightheadedness. Repeat hgb stable today.     1. Acute blood loss anemia  a. Hgb stable, cont to trend  b. S/p 2U during this adm  c. Cont ppi bid  d. D/W SLIM: Rec IV Iron given TRU   e. Resume diet as tolerated  2. Hepatic mass, likely [address] based on imaging  a. New dx following w dr Kris Roldan AFP  c. Going to [facility] as outpatient  3. AFib - back on Eliquis given no further GIB  4. Acute respiratory failure with pulm edema  - cardiology on board, resume diuretics    Chief Complaint   Patient presents with   • Fall     EMS from home; wife woke pt due to dexcom reading sugar at 41, walked pt to kitchen and pt blacked out falling and hitting R side of head, takes Eliquis; EMS gave 15g PO glucose PTA       SUBJECTIVE/HPI   No complaints. No gib. /61   Pulse 75   Temp 97.6 °F (36.4 °C)   Resp 16   Ht 6' (1.829 m)   Wt 95.1 kg (209 lb 10.5 oz)   SpO2 95%   BMI 28.43 kg/m²     PHYSICALEXAM  General appearance: alert, appears stated age and cooperative  Eyes:  Pares, no scleral icterus   Head: Normocephalic, without obvious abnormality, atraumatic  Lungs: clear to auscultation bilaterally, no c/w/r  Heart: regular rate and rhythm, S1, S2 normal, no murmur, click, rub or gallop  Abdomen: soft, non-tender, non-distended; bowel sounds normal; no masses,  no organomegaly  Extremities: extremities normal, atraumatic, no clubbing or cyanosis.  No LE edema  Neurologic: Grossly normal, AAOx3, no asterixis    Lab Results   Component Value Date    CALCIUM 8.7 08/13/2023    K 4.6 08/13/2023    CO2 29 08/13/2023     08/13/2023    BUN 41 (H) 08/13/2023    CREATININE 1.82 (H) 08/13/2023 Lab Results   Component Value Date    WBC 9.06 08/13/2023    HGB 8.1 (L) 08/13/2023    HCT 29.6 (L) 08/13/2023    MCV 71 (L) 08/13/2023     08/13/2023     Lab Results   Component Value Date    ALT 29 08/11/2023    AST 34 08/11/2023    ALKPHOS 70 08/11/2023     No results found for: "AMYLASE"  No results found for: "LIPASE"  Lab Results   Component Value Date    IRON 14 (L) 08/07/2023    TIBC 462 (H) 08/07/2023    FERRITIN 10 (L) 08/07/2023     No results found for: "INR"

## 2023-08-13 NOTE — ASSESSMENT & PLAN NOTE
Lab Results   Component Value Date    EGFR 35 08/13/2023    EGFR 38 08/11/2023    EGFR 32 08/10/2023    CREATININE 1.82 (H) 08/13/2023    CREATININE 1.69 (H) 08/11/2023    CREATININE 1.92 (H) 08/10/2023     · Baseline creatinine around 1.3-1.6   · Cr slightly trended up today, possibly 2/2 blood transfusion and additional IV Lasix (8/12)  · Monitor BMP closely

## 2023-08-13 NOTE — PROGRESS NOTES
[address]  Progress Note  Name: Mimi Tsai  MRN: [id number]  Unit/Bed#: -01 I Date of Admission: 8/7/2023   Date of Service: 8/13/2023 I Hospital Day: 6    Assessment/Plan   * Anemia  Assessment & Plan  Hemoglobin 6.4 on arrival to ED. Presented after syncopal event, with 2-day history of worsening generalized weakness, lightheadedness. Hx iron-deficiency anemia on supplements. · Likely acute blood loss on chronic anemia with AVM, likely [address]  · S/p EGD and colonoscopy on 8/10: 2 angioectasias in the stomach clipped. 1 angioectasia in the duodenum clipped. Polypectomy and clip in the descending colon. · S/p 2U PRBCs total throughout admission  · Hgb improved to 8.1 following 1U PRBC (8/12)  · GI following:  · With no evidence of acute GIB, resumed Eliquis (8/12)  · Start IV Venofer x 2 doses, if anemia stable can plan for discharge tomorrow  · Continue PPI BID, PO iron supplementation  · Monitor H&H closely    Acute on chronic diastolic HF (heart failure) ([address])  Assessment & Plan  Thought to be secondary to iatrogenic volume overload from blood transfusion, since been diuresed and repeat CXR clear. · Echo: EF 45%, mild global hypokinesis. · Cardiology following, recommendations appreciated  · Received additional 20 mg IV Lasix posttransfusion (8/12), slight elevation in creatinine 1.82  · Clinically euvolemic, repeat BMP in a.m. to trend creatinine  · Continue Toprol, Lasix 40 mg PO daily, will give   · Daily weights, I/Os    Acute respiratory failure with hypoxia ([address])  Assessment & Plan  Thought to be secondary to volume overload after initial blood transfusion. Since been diuresed and oxygenation is improving.    · S/p IV Solu-Medrol posttransfusion initially on admission  · S/p IV Solu-Medrol 40 mg, IV Benadryl 25 mg with blood transfusion (8/12)  · Improving, now stable on room air    Syncope  Assessment & Plan  Patient initially presented after syncopal episode, worsening lightheadedness/dizziness and tremulousness throughout admission. Likely secondary to acute on chronic anemia, possible anxiety component.   · Recent NM stress (6/2023) no evidence of ischemia  · CT head, C-spine negative for acute findings  · New tachycardia with decrease in SBP, evaluated by critical care AP (8/10):  · Lactic acid: 2.7->1.9 / Procal: 0.44->0.39->0.25  · BC x2: NGTD (48 hrs)  · Completed 3 days IV Zosyn for empiric coverage  · No further syncopal episodes, monitor off abx with no infectious source    Hepatocellular carcinoma (HCC)  Assessment & Plan  · Suspected HCC with multiple hepatic masses on imaging and AFP >18k, has not been confirmed by tissue diagnosis  · Following with Dr. Dilia Kumar with surgical oncology as an outpatient, did not feel like a biopsy was necessary and was referred to Dr. Uche Shipman for Y90 radioembolization  · IR was consulted for possible biopsy while inpatient, deferred with radioembolization already in process of scheduling    Elevated troponin  Assessment & Plan  · Initial troponin >5,000 on admission, since trended down to 938 as of 8/10  · Likely non-MI troponin elevation in setting of acute respiratory failure, volume overload and anemia  · Cardiology following, recommendations appreciated  · Notify provider if chest pain develops    History of DVT (deep vein thrombosis)  Assessment & Plan  · Eliquis initially on hold in the setting of GI bleed  · Resumed Eliquis per GI    Atrial fibrillation, unspecified type ([address])  Assessment & Plan  · Rates remain controlled on metoprolol  · Resumed Eliquis, cleared by GI    Type 2 diabetes mellitus with neurologic complication, with long-term current use of insulin [facility])  Assessment & Plan  Lab Results   Component Value Date    HGBA1C 7.6 (H) 01/06/2023       Recent Labs     08/12/23  1620 08/12/23  2105 08/13/23  0718 08/13/23  1053   POCGLU 328* 419* 252* 306*       Blood Sugar Average: Last 72 hrs:  (P) 258.2   · Home regimen: Levemir 75U qHS, NovoLog 28U with breakfast and 38U with lunch/dinner. · Intermittent hypoglycemia with poor oral intake/diet  · Hyperglycemic today likely from IV steroids for transfusion reaction ppx  · Continue Levemir 18U qHS, Humalog 5U TID AC  · [facility]    Stage 3 chronic kidney disease, unspecified whether stage 3a or 3b CKD [facility])  Assessment & Plan  Lab Results   Component Value Date    EGFR 35 08/13/2023    EGFR 38 08/11/2023    EGFR 32 08/10/2023    CREATININE 1.82 (H) 08/13/2023    CREATININE 1.69 (H) 08/11/2023    CREATININE 1.92 (H) 08/10/2023     · Baseline creatinine around 1.3-1.6   · Cr slightly trended up today, possibly 2/2 blood transfusion and additional IV Lasix (8/12)  · Monitor BMP closely             VTE Pharmacologic Prophylaxis: VTE Score: 10 High Risk (Score >/= 5) - Pharmacological DVT Prophylaxis Ordered: apixaban (Eliquis). Sequential Compression Devices Ordered. Patient Centered Rounds: I performed bedside rounds with nursing staff today. Discussions with Specialists or Other Care Team Provider: GI    Education and Discussions with Family / Patient: Updated  (wife) via phone. Total Time Spent on Date of Encounter in care of patient: 45 minutes This time was spent on one or more of the following: performing physical exam; counseling and coordination of care; obtaining or reviewing history; documenting in the medical record; reviewing/ordering tests, medications or procedures; communicating with other healthcare professionals and discussing with patient's family/caregivers. Current Length of Stay: 6 day(s)  Current Patient Status: Inpatient   Certification Statement: The patient will continue to require additional inpatient hospital stay due to IV Venofer, monitoring creatinine  Discharge Plan: Anticipate discharge in 24-48 hrs to home with home services.     Code Status: Level 1 - Full Code    Subjective:   Patient is seen at bedside this a.m., denies further weakness, dizziness since blood transfusion yesterday. Reports that his tremors are at his current baseline, denies increased anxiety, SOB, chest pain. Objective:     Vitals:   Temp (24hrs), Av.6 °F (36.4 °C), Min:97.3 °F (36.3 °C), Max:97.9 °F (36.6 °C)    Temp:  [97.3 °F (36.3 °C)-97.9 °F (36.6 °C)] 97.6 °F (36.4 °C)  HR:  [66-96] 75  Resp:  [16-20] 16  BP: ()/(44-62) 132/61  SpO2:  [86 %-95 %] 95 %  Body mass index is 28.43 kg/m². Input and Output Summary (last 24 hours): Intake/Output Summary (Last 24 hours) at 2023 1144  Last data filed at 2023 2101  Gross per 24 hour   Intake 502.5 ml   Output --   Net 502.5 ml       Physical Exam:   Physical Exam  Constitutional:       General: He is not in acute distress. Appearance: He is not ill-appearing, toxic-appearing or diaphoretic. Cardiovascular:      Rate and Rhythm: Normal rate and regular rhythm. Pulses: Normal pulses. Heart sounds: Normal heart sounds. Pulmonary:      Effort: Pulmonary effort is normal. No respiratory distress. Comments: Diminished breath sounds across lung fields, no overt rales  Abdominal:      General: Bowel sounds are normal. There is no distension. Palpations: Abdomen is soft. Tenderness: There is no abdominal tenderness. Musculoskeletal:         General: No swelling or tenderness. Skin:     General: Skin is warm. Coloration: Skin is not pale. Neurological:      General: No focal deficit present. Mental Status: He is alert.    Psychiatric:         Mood and Affect: Mood normal.         Behavior: Behavior normal.          Additional Data:     Labs:  Results from last 7 days   Lab Units 23  0450   WBC Thousand/uL 9.06   HEMOGLOBIN g/dL 8.1*   HEMATOCRIT % 29.6*   PLATELETS Thousands/uL 213   NEUTROS PCT % 81*   LYMPHS PCT % 9*   MONOS PCT % 9   EOS PCT % 0     Results from last 7 days   Lab Units 23  0450 23  3433 SODIUM mmol/L 138 142   POTASSIUM mmol/L 4.6 3.5   CHLORIDE mmol/L 102 106   CO2 mmol/L 29 26   BUN mg/dL 41* 38*   CREATININE mg/dL 1.82* 1.69*   ANION GAP mmol/L 7 10   CALCIUM mg/dL 8.7 8.1*   ALBUMIN g/dL  --  3.5   TOTAL BILIRUBIN mg/dL  --  0.88   ALK PHOS U/L  --  70   ALT U/L  --  29   AST U/L  --  34   GLUCOSE RANDOM mg/dL 292* 170*         Results from last 7 days   Lab Units 08/13/23  1053 08/13/23  0718 08/12/23  2105 08/12/23  1620 08/12/23  1101 08/12/23  0726 08/11/23  2055 08/11/23  1629 08/11/23  0612 08/10/23  2353 08/10/23  2121 08/10/23  1848   POC GLUCOSE mg/dl 306* 252* 419* 328* 165* 178* 332* 320* 213* 172* 203* 292*         Results from last 7 days   Lab Units 08/13/23  0450 08/11/23  0452 08/10/23  2256 08/10/23  1910 08/10/23  1806 08/10/23  1751   LACTIC ACID mmol/L  --   --  1.9 2.7* 2.6*  --    PROCALCITONIN ng/ml 0.25 0.39*  --   --   --  0.44*       Lines/Drains:  Invasive Devices     Peripheral Intravenous Line  Duration           Peripheral IV 08/10/23 Dorsal (posterior); Right Forearm 2 days    Peripheral IV 08/10/23 Left Antecubital 2 days                  Imaging: No pertinent imaging reviewed. Recent Cultures (last 7 days):   Results from last 7 days   Lab Units 08/10/23  1910   BLOOD CULTURE  No Growth at 48 hrs. No Growth at 48 hrs.        Last 24 Hours Medication List:   Current Facility-Administered Medications   Medication Dose Route Frequency Provider Last Rate   • acetaminophen  650 mg Oral Q6H PRN Anirudh Feldman MD     • albuterol  2.5 mg Nebulization Q6H PRN Margie Santos DO     • apixaban  5 mg Oral BID Fitz Levels, PA-C     • furosemide  40 mg Oral Daily Fitz Levels, PA-C     • hydrALAZINE  10 mg Intravenous Q6H PRN Anirudh Feldman MD     • insulin detemir  18 Units Subcutaneous HS Anirudh Feldman MD     • insulin lispro  1-5 Units Subcutaneous HS Nilson Rojas PA-C     • insulin lispro  1-6 Units Subcutaneous TID AC Nilson Rojas PA-C • insulin lispro  5 Units Subcutaneous TID With Meals Elsy Taylor PA-C     • iron sucrose  200 mg Intravenous Daily Karen Acevedo PA-C     • levalbuterol  1.25 mg Nebulization TID Radha Pedersen MD     • metoprolol succinate  50 mg Oral Daily Radha Pedersen MD     • multivitamin-minerals  1 tablet Oral Daily Radha Pedersen MD     • ondansetron  4 mg Intravenous Q6H PRN Radha Pedersen MD     • pantoprazole  40 mg Intravenous Q12H [address] Radha Pedersen MD     • pregabalin  100 mg Oral TID Radha Pedersen MD     • sertraline  25 mg Oral Daily Radha Pedersen MD     • tamsulosin  0.4 mg Oral Daily With Nitin Huddleston MD     • traZODone  200 mg Oral HS Radha Pedersen MD     • umeclidinium-vilanterol  1 puff Inhalation Daily Radha Pedersen MD          Today, Patient Was Seen By: Karen Acevedo PA-C    **Please Note: This note may have been constructed using a voice recognition system. **

## 2023-08-13 NOTE — PROGRESS NOTES
The pantoprazole has / have been converted to Oral per [facility] IV-to-PO Auto-Conversion Protocol for Adults as approved by the Pharmacy and Therapeutics Committee. The patient met all eligible criteria:  3 Age = 25years old   2) Received at least one dose of the IV form   3) Receiving at least one other scheduled oral/enteral medication   4) Tolerating an oral/enteral diet   and did not have any exclusions:   1) Critical care patient   2) Active GI bleed (IF assessing H2RAs or PPIs)   3) Continuous tube feeding (IF assessing cipro, doxycycline, levofloxacin, minocycline, rifampin, or voriconazole)   4) Receiving PO vancomycin (IF assessing metronidazole)   5) Persistent nausea and/or vomiting   6) Ileus or gastrointestinal obstruction   7) Mani/nasogastric tube set for continuous suction   8) Specific order not to automatically convert to PO (in the order's comments or if discussed in the most recent Infectious Disease or primary team's progress notes).

## 2023-08-13 NOTE — ASSESSMENT & PLAN NOTE
Lab Results   Component Value Date    HGBA1C 7.6 (H) 01/06/2023       Recent Labs     08/12/23  1620 08/12/23  2105 08/13/23  0718 08/13/23  1053   POCGLU 328* 419* 252* 306*       Blood Sugar Average: Last 72 hrs:  (P) 258.2   · Home regimen: Levemir 75U qHS, NovoLog 28U with breakfast and 38U with lunch/dinner.    · Intermittent hypoglycemia with poor oral intake/diet  · Hyperglycemic today likely from IV steroids for transfusion reaction ppx  · Continue Levemir 18U qHS, Humalog 5U TID AC  · SSI coverage with Accu-Cheks

## 2023-08-13 NOTE — ASSESSMENT & PLAN NOTE
Thought to be secondary to iatrogenic volume overload from blood transfusion, since been diuresed and repeat CXR clear. · Echo: EF 45%, mild global hypokinesis.   · Cardiology following, recommendations appreciated  · Received additional 20 mg IV Lasix posttransfusion (8/12), slight elevation in creatinine 1.82  · Clinically euvolemic, repeat BMP in a.m. to trend creatinine  · Continue Toprol, Lasix 40 mg PO daily, will give   · Daily weights, I/Os

## 2023-08-14 VITALS
RESPIRATION RATE: 18 BRPM | DIASTOLIC BLOOD PRESSURE: 63 MMHG | HEART RATE: 75 BPM | WEIGHT: 209.44 LBS | TEMPERATURE: 97.6 F | HEIGHT: 72 IN | OXYGEN SATURATION: 92 % | SYSTOLIC BLOOD PRESSURE: 133 MMHG | BODY MASS INDEX: 28.37 KG/M2

## 2023-08-14 PROBLEM — J96.01 ACUTE RESPIRATORY FAILURE WITH HYPOXIA (HCC): Status: RESOLVED | Noted: 2023-08-07 | Resolved: 2023-08-14

## 2023-08-14 LAB
ANION GAP SERPL CALCULATED.3IONS-SCNC: 9 MMOL/L
BASOPHILS # BLD AUTO: 0.04 THOUSANDS/ÂΜL (ref 0–0.1)
BASOPHILS NFR BLD AUTO: 0 % (ref 0–1)
BUN SERPL-MCNC: 39 MG/DL (ref 5–25)
CALCIUM SERPL-MCNC: 9.3 MG/DL (ref 8.4–10.2)
CHLORIDE SERPL-SCNC: 103 MMOL/L (ref 96–108)
CO2 SERPL-SCNC: 29 MMOL/L (ref 21–32)
CREAT SERPL-MCNC: 1.52 MG/DL (ref 0.6–1.3)
EOSINOPHIL # BLD AUTO: 0.64 THOUSAND/ÂΜL (ref 0–0.61)
EOSINOPHIL NFR BLD AUTO: 5 % (ref 0–6)
ERYTHROCYTE [DISTWIDTH] IN BLOOD BY AUTOMATED COUNT: 31.1 % (ref 11.6–15.1)
GFR SERPL CREATININE-BSD FRML MDRD: 43 ML/MIN/1.73SQ M
GLUCOSE SERPL-MCNC: 150 MG/DL (ref 65–140)
GLUCOSE SERPL-MCNC: 159 MG/DL (ref 65–140)
GLUCOSE SERPL-MCNC: 276 MG/DL (ref 65–140)
HCT VFR BLD AUTO: 33.6 % (ref 36.5–49.3)
HGB BLD-MCNC: 9.1 G/DL (ref 12–17)
IMM GRANULOCYTES # BLD AUTO: 0.08 THOUSAND/UL (ref 0–0.2)
IMM GRANULOCYTES NFR BLD AUTO: 1 % (ref 0–2)
LYMPHOCYTES # BLD AUTO: 2.29 THOUSANDS/ÂΜL (ref 0.6–4.47)
LYMPHOCYTES NFR BLD AUTO: 19 % (ref 14–44)
MCH RBC QN AUTO: 19 PG (ref 26.8–34.3)
MCHC RBC AUTO-ENTMCNC: 27.1 G/DL (ref 31.4–37.4)
MCV RBC AUTO: 70 FL (ref 82–98)
MONOCYTES # BLD AUTO: 1.31 THOUSAND/ÂΜL (ref 0.17–1.22)
MONOCYTES NFR BLD AUTO: 11 % (ref 4–12)
NEUTROPHILS # BLD AUTO: 7.73 THOUSANDS/ÂΜL (ref 1.85–7.62)
NEUTS SEG NFR BLD AUTO: 64 % (ref 43–75)
NRBC BLD AUTO-RTO: 0 /100 WBCS
PLATELET # BLD AUTO: 229 THOUSANDS/UL (ref 149–390)
POTASSIUM SERPL-SCNC: 4.1 MMOL/L (ref 3.5–5.3)
RBC # BLD AUTO: 4.78 MILLION/UL (ref 3.88–5.62)
SODIUM SERPL-SCNC: 141 MMOL/L (ref 135–147)
WBC # BLD AUTO: 12.09 THOUSAND/UL (ref 4.31–10.16)

## 2023-08-14 PROCEDURE — 80048 BASIC METABOLIC PNL TOTAL CA: CPT | Performed by: PHYSICIAN ASSISTANT

## 2023-08-14 PROCEDURE — 94760 N-INVAS EAR/PLS OXIMETRY 1: CPT

## 2023-08-14 PROCEDURE — 85025 COMPLETE CBC W/AUTO DIFF WBC: CPT | Performed by: PHYSICIAN ASSISTANT

## 2023-08-14 PROCEDURE — 99239 HOSP IP/OBS DSCHRG MGMT >30: CPT

## 2023-08-14 PROCEDURE — 82948 REAGENT STRIP/BLOOD GLUCOSE: CPT

## 2023-08-14 PROCEDURE — 99232 SBSQ HOSP IP/OBS MODERATE 35: CPT | Performed by: INTERNAL MEDICINE

## 2023-08-14 PROCEDURE — 94640 AIRWAY INHALATION TREATMENT: CPT

## 2023-08-14 RX ORDER — PANTOPRAZOLE SODIUM 40 MG/1
40 TABLET, DELAYED RELEASE ORAL 2 TIMES DAILY
Qty: 30 TABLET | Refills: 0 | Status: SHIPPED | OUTPATIENT
Start: 2023-08-14

## 2023-08-14 RX ORDER — FERROUS SULFATE 325(65) MG
325 TABLET ORAL
Qty: 30 TABLET | Refills: 0 | Status: SHIPPED | OUTPATIENT
Start: 2023-08-14

## 2023-08-14 RX ORDER — FUROSEMIDE 40 MG/1
40 TABLET ORAL DAILY
Qty: 30 TABLET | Refills: 0 | Status: SHIPPED | OUTPATIENT
Start: 2023-08-15 | End: 2023-09-14

## 2023-08-14 RX ADMIN — SERTRALINE HYDROCHLORIDE 25 MG: 25 TABLET ORAL at 09:02

## 2023-08-14 RX ADMIN — INSULIN LISPRO 4 UNITS: 100 INJECTION, SOLUTION INTRAVENOUS; SUBCUTANEOUS at 13:15

## 2023-08-14 RX ADMIN — INSULIN LISPRO 7 UNITS: 100 INJECTION, SOLUTION INTRAVENOUS; SUBCUTANEOUS at 08:46

## 2023-08-14 RX ADMIN — UMECLIDINIUM BROMIDE AND VILANTEROL TRIFENATATE 1 PUFF: 62.5; 25 POWDER RESPIRATORY (INHALATION) at 09:03

## 2023-08-14 RX ADMIN — METOPROLOL SUCCINATE 50 MG: 50 TABLET, EXTENDED RELEASE ORAL at 08:47

## 2023-08-14 RX ADMIN — INSULIN LISPRO 1 UNITS: 100 INJECTION, SOLUTION INTRAVENOUS; SUBCUTANEOUS at 08:46

## 2023-08-14 RX ADMIN — INSULIN LISPRO 7 UNITS: 100 INJECTION, SOLUTION INTRAVENOUS; SUBCUTANEOUS at 13:16

## 2023-08-14 RX ADMIN — FUROSEMIDE 40 MG: 40 TABLET ORAL at 08:47

## 2023-08-14 RX ADMIN — PANTOPRAZOLE SODIUM 40 MG: 40 TABLET, DELAYED RELEASE ORAL at 04:33

## 2023-08-14 RX ADMIN — PREGABALIN 100 MG: 100 CAPSULE ORAL at 08:48

## 2023-08-14 RX ADMIN — MULTIPLE VITAMINS W/ MINERALS TAB 1 TABLET: TAB ORAL at 08:47

## 2023-08-14 RX ADMIN — APIXABAN 5 MG: 5 TABLET, FILM COATED ORAL at 09:29

## 2023-08-14 RX ADMIN — IRON SUCROSE 200 MG: 20 INJECTION, SOLUTION INTRAVENOUS at 09:21

## 2023-08-14 RX ADMIN — LEVALBUTEROL HYDROCHLORIDE 1.25 MG: 1.25 SOLUTION RESPIRATORY (INHALATION) at 07:35

## 2023-08-14 NOTE — PLAN OF CARE
Problem: Potential for Falls  Goal: Patient will remain free of falls  Description: INTERVENTIONS:  - Educate patient/family on patient safety including physical limitations  - Instruct patient to call for assistance with activity   - Consult OT/PT to assist with strengthening/mobility   - Keep Call bell within reach  - Keep bed low and locked with side rails adjusted as appropriate  - Keep care items and personal belongings within reach  - Initiate and maintain comfort rounds  - Make Fall Risk Sign visible to staff  - Offer Toileting every 2 Hours, in advance of need  - Initiate/Maintain bed alarm  - Obtain necessary fall risk management equipment:   - Apply yellow socks and bracelet for high fall risk patients  - Consider moving patient to room near nurses station  Outcome: Progressing     Problem: MOBILITY - ADULT  Goal: Maintain or return to baseline ADL function  Description: INTERVENTIONS:  -  Assess patient's ability to carry out ADLs; assess patient's baseline for ADL function and identify physical deficits which impact ability to perform ADLs (bathing, care of mouth/teeth, toileting, grooming, dressing, etc.)  - Assess/evaluate cause of self-care deficits   - Assess range of motion  - Assess patient's mobility; develop plan if impaired  - Assess patient's need for assistive devices and provide as appropriate  - Encourage maximum independence but intervene and supervise when necessary  - Involve family in performance of ADLs  - Assess for home care needs following discharge   - Consider OT consult to assist with ADL evaluation and planning for discharge  - Provide patient education as appropriate  Outcome: Progressing  Goal: Maintains/Returns to pre admission functional level  Description: INTERVENTIONS:  - Perform BMAT or MOVE assessment daily.   - Set and communicate daily mobility goal to care team and patient/family/caregiver.    - Collaborate with rehabilitation services on mobility goals if consulted  - Perform Range of Motion 3 times a day. - Reposition patient every 2 hours.   - Dangle patient 3 times a day  - Stand patient 3 times a day  - Ambulate patient 3 times a day  - Out of bed to chair 3 times a day   - Out of bed for meals 3 times a day  - Out of bed for toileting  - Record patient progress and toleration of activity level   Outcome: Progressing     Problem: Prexisting or High Potential for Compromised Skin Integrity  Goal: Skin integrity is maintained or improved  Description: INTERVENTIONS:  - Identify patients at risk for skin breakdown  - Assess and monitor skin integrity  - Assess and monitor nutrition and hydration status  - Monitor labs   - Assess for incontinence   - Turn and reposition patient  - Assist with mobility/ambulation  - Relieve pressure over bony prominences  - Avoid friction and shearing  - Provide appropriate hygiene as needed including keeping skin clean and dry  - Evaluate need for skin moisturizer/barrier cream  - Collaborate with interdisciplinary team   - Patient/family teaching  - Consider wound care consult   Outcome: Progressing     Problem: RESPIRATORY - ADULT  Goal: Achieves optimal ventilation and oxygenation  Description: INTERVENTIONS:  - Assess for changes in respiratory status  - Assess for changes in mentation and behavior  - Position to facilitate oxygenation and minimize respiratory effort  - Oxygen administered by appropriate delivery if ordered  - Initiate smoking cessation education as indicated  - Encourage broncho-pulmonary hygiene including cough, deep breathe, Incentive Spirometry  - Assess the need for suctioning and aspirate as needed  - Assess and instruct to report SOB or any respiratory difficulty  - Respiratory Therapy support as indicated  Outcome: Progressing     Problem: GASTROINTESTINAL - ADULT  Goal: Minimal or absence of nausea and/or vomiting  Description: INTERVENTIONS:  - Administer IV fluids if ordered to ensure adequate hydration  - Maintain NPO status until nausea and vomiting are resolved  - Nasogastric tube if ordered  - Administer ordered antiemetic medications as needed  - Provide nonpharmacologic comfort measures as appropriate  - Advance diet as tolerated, if ordered  - Consider nutrition services referral to assist patient with adequate nutrition and appropriate food choices  Outcome: Progressing     Problem: HEMATOLOGIC - ADULT  Goal: Maintains hematologic stability  Description: INTERVENTIONS  - Assess for signs and symptoms of bleeding or hemorrhage  - Monitor labs  - Administer supportive blood products/factors as ordered and appropriate  Outcome: Progressing     Problem: PAIN - ADULT  Goal: Verbalizes/displays adequate comfort level or baseline comfort level  Description: Interventions:  - Encourage patient to monitor pain and request assistance  - Assess pain using appropriate pain scale  - Administer analgesics based on type and severity of pain and evaluate response  - Implement non-pharmacological measures as appropriate and evaluate response  - Consider cultural and social influences on pain and pain management  - Notify physician/advanced practitioner if interventions unsuccessful or patient reports new pain  Outcome: Progressing     Problem: INFECTION - ADULT  Goal: Absence or prevention of progression during hospitalization  Description: INTERVENTIONS:  - Assess and monitor for signs and symptoms of infection  - Monitor lab/diagnostic results  - Monitor all insertion sites, i.e. indwelling lines, tubes, and drains  - Monitor endotracheal if appropriate and nasal secretions for changes in amount and color  - Jeremiah appropriate cooling/warming therapies per order  - Administer medications as ordered  - Instruct and encourage patient and family to use good hand hygiene technique  - Identify and instruct in appropriate isolation precautions for identified infection/condition  Outcome: Progressing     Problem: DISCHARGE PLANNING  Goal: Discharge to home or other facility with appropriate resources  Description: INTERVENTIONS:  - Identify barriers to discharge w/patient and caregiver  - Arrange for needed discharge resources and transportation as appropriate  - Identify discharge learning needs (meds, wound care, etc.)  - Arrange for interpretive services to assist at discharge as needed  - Refer to Case Management Department for coordinating discharge planning if the patient needs post-hospital services based on physician/advanced practitioner order or complex needs related to functional status, cognitive ability, or social support system  Outcome: Progressing     Problem: Knowledge Deficit  Goal: Patient/family/caregiver demonstrates understanding of disease process, treatment plan, medications, and discharge instructions  Description: Complete learning assessment and assess knowledge base.   Interventions:  - Provide teaching at level of understanding  - Provide teaching via preferred learning methods  Outcome: Progressing     Problem: CARDIOVASCULAR - ADULT  Goal: Maintains optimal cardiac output and hemodynamic stability  Description: INTERVENTIONS:  - Monitor I/O, vital signs and rhythm  - Monitor for S/S and trends of decreased cardiac output  - Administer and titrate ordered vasoactive medications to optimize hemodynamic stability  - Assess quality of pulses, skin color and temperature  - Assess for signs of decreased coronary artery perfusion  - Instruct patient to report change in severity of symptoms  Outcome: Progressing  Goal: Absence of cardiac dysrhythmias or at baseline rhythm  Description: INTERVENTIONS:  - Continuous cardiac monitoring, vital signs, obtain 12 lead EKG if ordered  - Administer antiarrhythmic and heart rate control medications as ordered  - Monitor electrolytes and administer replacement therapy as ordered  Outcome: Progressing

## 2023-08-14 NOTE — ASSESSMENT & PLAN NOTE
Lab Results   Component Value Date    EGFR 43 08/14/2023    EGFR 35 08/13/2023    EGFR 38 08/11/2023    CREATININE 1.52 (H) 08/14/2023    CREATININE 1.82 (H) 08/13/2023    CREATININE 1.69 (H) 08/11/2023     · Baseline creatinine around 1.3-1.6   · Cr slightly trended up to 1.8, possibly 2/2 blood transfusion and additional IV Lasix (8/12)  · Cr stable at 1.52  · BMP 1 week

## 2023-08-14 NOTE — ASSESSMENT & PLAN NOTE
Patient initially presented after syncopal episode, worsening lightheadedness/dizziness and tremulousness throughout admission. Likely secondary to acute on chronic anemia, possible anxiety component.   · Recent NM stress (6/2023) no evidence of ischemia  · CT head, C-spine negative for acute findings  · New tachycardia with decrease in SBP, evaluated by critical care AP (8/10):  · Lactic acid: 2.7->1.9 / Procal: 0.44->0.39->0.25  · BC x2: NGTD (72 hrs)  · Completed 3 days IV Zosyn for empiric coverage  · No further syncopal episodes, monitor off abx with no infectious source  · No fevers/infectious sx off of abx

## 2023-08-14 NOTE — PLAN OF CARE
Problem: Potential for Falls  Goal: Patient will remain free of falls  Description: INTERVENTIONS:  - Educate patient/family on patient safety including physical limitations  - Instruct patient to call for assistance with activity   - Consult OT/PT to assist with strengthening/mobility   - Keep Call bell within reach  - Keep bed low and locked with side rails adjusted as appropriate  - Keep care items and personal belongings within reach  - Initiate and maintain comfort rounds  - Make Fall Risk Sign visible to staff  - Offer Toileting every 2 Hours, in advance of need  - Initiate/Maintain bed alarm  - Obtain necessary fall risk management equipment:   - Apply yellow socks and bracelet for high fall risk patients  - Consider moving patient to room near nurses station  Outcome: Progressing     Problem: MOBILITY - ADULT  Goal: Maintain or return to baseline ADL function  Description: INTERVENTIONS:  -  Assess patient's ability to carry out ADLs; assess patient's baseline for ADL function and identify physical deficits which impact ability to perform ADLs (bathing, care of mouth/teeth, toileting, grooming, dressing, etc.)  - Assess/evaluate cause of self-care deficits   - Assess range of motion  - Assess patient's mobility; develop plan if impaired  - Assess patient's need for assistive devices and provide as appropriate  - Encourage maximum independence but intervene and supervise when necessary  - Involve family in performance of ADLs  - Assess for home care needs following discharge   - Consider OT consult to assist with ADL evaluation and planning for discharge  - Provide patient education as appropriate  Outcome: Progressing     Problem: RESPIRATORY - ADULT  Goal: Achieves optimal ventilation and oxygenation  Description: INTERVENTIONS:  - Assess for changes in respiratory status  - Assess for changes in mentation and behavior  - Position to facilitate oxygenation and minimize respiratory effort  - Oxygen administered by appropriate delivery if ordered  - Initiate smoking cessation education as indicated  - Encourage broncho-pulmonary hygiene including cough, deep breathe, Incentive Spirometry  - Assess the need for suctioning and aspirate as needed  - Assess and instruct to report SOB or any respiratory difficulty  - Respiratory Therapy support as indicated  Outcome: Progressing      Problem: CARDIOVASCULAR - ADULT  Goal: Maintains optimal cardiac output and hemodynamic stability  Description: INTERVENTIONS:  - Monitor I/O, vital signs and rhythm  - Monitor for S/S and trends of decreased cardiac output  - Administer and titrate ordered vasoactive medications to optimize hemodynamic stability  - Assess quality of pulses, skin color and temperature  - Assess for signs of decreased coronary artery perfusion  - Instruct patient to report change in severity of symptoms  Outcome: Progressing  Problem: PAIN - ADULT  Goal: Verbalizes/displays adequate comfort level or baseline comfort level  Description: Interventions:  - Encourage patient to monitor pain and request assistance  - Assess pain using appropriate pain scale  - Administer analgesics based on type and severity of pain and evaluate response  - Implement non-pharmacological measures as appropriate and evaluate response  - Consider cultural and social influences on pain and pain management  - Notify physician/advanced practitioner if interventions unsuccessful or patient reports new pain  Outcome: Progressing

## 2023-08-14 NOTE — ASSESSMENT & PLAN NOTE
Thought to be secondary to iatrogenic volume overload from blood transfusion, since been diuresed and repeat CXR clear. · Echo: EF 45%, mild global hypokinesis. · Cardiology following, recommendations appreciated  · Continue Toprol, Lasix 40 mg PO daily  · Cr stable at 1.52 today  · BMP in 1 week after DC  · Outpatient f/u with cardiology, cleared from cards stand point. May need repeat echo as outpatient. · Stable on RA.

## 2023-08-14 NOTE — CASE MANAGEMENT
Case Management Discharge Planning Note    Patient name Catalina Le  [facility] [id number]  : 1945 Date 2023       Current Admission Date: 2023  Current Admission [address]   Patient Active Problem List    Diagnosis Date Noted   • Hepatocellular carcinoma ([address])    • Elevated troponin 2023   • Hepatic flexure mass 2023   • Syncope 2023   • Anemia 2023   • Acute on chronic diastolic HF (heart failure) ([address]) 2023   • History of DVT (deep vein thrombosis) 2023   • Restrictive lung disease 2023   • Other emphysema ([address]) 2023   • History of tobacco abuse 2023   • H/O asbestos exposure 2023   • Liver mass 05/10/2023   • Atrial fibrillation, unspecified type ([address]) 02/15/2023   • Peripheral polyneuropathy 2022   • Type 2 diabetes mellitus with neurologic complication, with long-term current use of insulin ([address]) 2022   • Stage 3 chronic kidney disease, unspecified whether stage 3a or 3b CKD ([address]) 2022      LOS (days): 7  Geometric Mean LOS (GMLOS) (days): 4.50  Days to GMLOS:-2.7     OBJECTIVE:  Risk of Unplanned Readmission Score: 25.19      Current admission status: Inpatient   Preferred Pharmacy:   [facility] [address] [id number] [address] [address]  Phone: [phone] Fax: [phone]    Primary Care Provider: Fabian Stevens MD    Primary Insurance: Bianca Zuniga [facility]  Secondary Insurance:     DISCHARGE DETAILS:    Discharge planning discussed with[de-identified] pt  Freedom of Choice: Yes     CM contacted family/caregiver?: Yes  Were Treatment Team discharge recommendations reviewed with patient/caregiver?: Yes  Did patient/caregiver verbalize understanding of patient care needs?: Yes  Were patient/caregiver advised of the risks associated with not following Treatment Team discharge recommendations?: Yes    Contacts  Patient Contacts: Triston Schaefer Jose Angel  Relationship to Patient[de-identified] Family  Contact Method: In Person  Reason/Outcome: Discharge [address]         Is the patient interested in [facility] at discharge?: No    DME Referral Provided  Referral made for DME?: No    Treatment Team Recommendation: Home with [address]  Discharge Destination Plan[de-identified] Home  Transport at Discharge : Family     IMM Given (Date):: 08/14/23  IMM Given to[de-identified] Patient  Family notified[de-identified] Reviewed IMM with pt. Copy given. He is in agreement with d/c. Additional Comments: Pt declined home health services. He has a walker at home. Reviewed IMM. He is in agreement with d/c.

## 2023-08-14 NOTE — ASSESSMENT & PLAN NOTE
Thought to be secondary to volume overload after initial blood transfusion. Since been diuresed and oxygenation is improving.    · S/p IV Solu-Medrol posttransfusion initially on admission  · S/p IV Solu-Medrol 40 mg, IV Benadryl 25 mg with blood transfusion (8/12)  · Resolved, stable on RA following diuresis

## 2023-08-14 NOTE — ASSESSMENT & PLAN NOTE
Hemoglobin 6.4 on arrival to ED. Presented after syncopal event, with 2-day history of worsening generalized weakness, lightheadedness. Hx iron-deficiency anemia on supplements. · Likely acute blood loss on chronic anemia with AVM, likely [address]  · S/p EGD and colonoscopy on 8/10: 2 angioectasias in the stomach clipped. 1 angioectasia in the duodenum clipped. Polypectomy and clip in the descending colon.   · S/p 2U PRBCs total throughout admission  · Hgb improved to 8.1 following 1U PRBC (8/12)  · GI following:  · Resumed Eliquis (8/12)  · S/P IV venofer  · Hgb stable > 9, no evidence of further bleeding (last BM this morning)  · Cleared for DC per GI, will repeat CBC 1 week to monitor hgb  · Continue PPI BID, PO iron supplementation  · Monitor H&H closely

## 2023-08-14 NOTE — ASSESSMENT & PLAN NOTE
· Suspected HCC with multiple hepatic masses on imaging and AFP >18k, has not been confirmed by tissue diagnosis  · Following with Dr. Suzie Guo with surgical oncology as an outpatient, did not feel like a biopsy was necessary and was referred to Dr. Dayanara Huynh for Y90 radioembolization  · IR was consulted for possible biopsy while inpatient, deferred with radioembolization already in process of scheduling  · Continue outpatient follow up

## 2023-08-14 NOTE — ASSESSMENT & PLAN NOTE
Lab Results   Component Value Date    HGBA1C 7.6 (H) 01/06/2023       Recent Labs     08/13/23  1611 08/13/23  2056 08/14/23  0710 08/14/23  1105   POCGLU 306* 288* 150* 276*       Blood Sugar Average: Last 72 hrs:  (P) [id number]   · Home regimen: Levemir 75U qHS, NovoLog 28U with breakfast and 38U with lunch/dinner.    · Regimen initially decreased due to Intermittent hypoglycemia with poor oral intake/diet, however then hperglycemic while inpatient (also did receive IV steroids for transfusion reaction ppx)  · While inpatient: Levemir 22U qHS, Humalog 5U TID AC  · Patient remains with elevated BG on this regimen, recommend resuming home regimen upon DC as patient is eating well  · SSI coverage with Accu-Cheks

## 2023-08-14 NOTE — DISCHARGE INSTR - AVS FIRST PAGE
Follow up providers:   Please follow up with PCP within 1 week of discharge   Please follow up with outpatient GI, call to make appointment  Please follow up with outpatient Cardiology, call to make appointment     Medications:  Please begin taking protonix 40 mg by mouth twice daily   Please begin taking lasix 40 mg by mouth once daily   Please resume all prior home medications    Lab work:  Please complete BMP in 1 week to follow up renal function  Please complete CBC in 1 week to follow up hemoglobin     Please return to ED if you have experience fevers chills, chest pain, trouble breathing, severe abdominal pain, signs of bleeding including black or red stools,, severe fatigue, or any additional concerning symptoms.

## 2023-08-14 NOTE — PROGRESS NOTES
Progress note - Gastroenterology   Carlota Alvarez 69 y.o. male MRN: [id number]  Unit/Bed#: -01 Encounter: [id number]    ASSESSMENT and PLAN  1. Iron deficiency anemia due to acute blood loss  Admitted with dizziness and weakness. Hemoglobin 6.4 -transfused with 2 units PRBCs  Hemoglobin stable this morning at 9.1  EGD 8/10 with gastric and duodenal AVMs which were clipped  Denies melena or rectal bleeding  -Continue to trend H&H  -Continue PPI twice daily  -Continue IV iron  -Tolerating regular diet well  -Stable from GI standpoint for discharge. Check CBC in 1 week after discharge    2.   Hepatic mass  MRI 7/6/2023 showed multiple hepatic lesions suspicious for metastatic disease  Recommend outpatient follow-up with oncology      Chief Complaint   Patient presents with   • Fall     EMS from home; wife woke pt due to dexcom reading sugar at 41, walked pt to kitchen and pt blacked out falling and hitting R side of head, takes Eliquis; EMS gave 15g PO glucose PTA       SUBJECTIVE/HPI   No abdominal pain  Feels stronger  Formed bowel movement last evening-no melena or rectal bleeding with wiping  Hemoglobin stable at 9.1 this a.m.    /63   Pulse 75   Temp 97.6 °F (36.4 °C)   Resp 18   Ht 6' (1.829 m)   Wt 95 kg (209 lb 7 oz)   SpO2 92%   BMI 28.40 kg/m²     PHYSICALEXAM  General appearance: alert, NAD   eyes:  no icterus   Head: Normocephalic, without obvious abnormality, atraumatic  Lungs: clear to auscultation bilaterally  Heart: regular rate and rhythm, S1, S2 normal, no murmur, click, rub or gallop  Abdomen: soft, non-tender; bowel sounds normal.  Reports formed bowel movement last evening-no melena or rectal bleeding  Extremities: extremities normal, atraumatic, no cyanosis or edema  Neurologic: Grossly normal    Lab Results   Component Value Date    CALCIUM 9.3 08/14/2023    K 4.1 08/14/2023    CO2 29 08/14/2023     08/14/2023    BUN 39 (H) 08/14/2023    CREATININE 1.52 (H) 08/14/2023 Lab Results   Component Value Date    WBC 12.09 (H) 08/14/2023    HGB 9.1 (L) 08/14/2023    HCT 33.6 (L) 08/14/2023    MCV 70 (L) 08/14/2023     08/14/2023     Lab Results   Component Value Date    ALT 29 08/11/2023    AST 34 08/11/2023    ALKPHOS 70 08/11/2023       Lab Results   Component Value Date    IRON 14 (L) 08/07/2023    TIBC 462 (H) 08/07/2023    FERRITIN 10 (L) 08/07/2023

## 2023-08-15 ENCOUNTER — HOME CARE VISIT (OUTPATIENT)
Dept: HOME HEALTH SERVICES | Facility: HOME HEALTHCARE | Age: 78
End: 2023-08-15

## 2023-08-15 ENCOUNTER — APPOINTMENT (OUTPATIENT)
Dept: RADIOLOGY | Facility: HOSPITAL | Age: 78
DRG: 291 | End: 2023-08-15
Payer: COMMERCIAL

## 2023-08-15 ENCOUNTER — TRANSITIONAL CARE MANAGEMENT (OUTPATIENT)
Dept: FAMILY MEDICINE CLINIC | Facility: HOSPITAL | Age: 78
End: 2023-08-15

## 2023-08-15 ENCOUNTER — HOSPITAL ENCOUNTER (INPATIENT)
Facility: HOSPITAL | Age: 78
LOS: 3 days | DRG: 291 | End: 2023-08-20
Attending: EMERGENCY MEDICINE | Admitting: HOSPITALIST
Payer: COMMERCIAL

## 2023-08-15 VITALS
HEART RATE: 84 BPM | OXYGEN SATURATION: 94 % | DIASTOLIC BLOOD PRESSURE: 54 MMHG | TEMPERATURE: 96 F | SYSTOLIC BLOOD PRESSURE: 102 MMHG

## 2023-08-15 DIAGNOSIS — I50.9 CHF EXACERBATION (HCC): ICD-10-CM

## 2023-08-15 DIAGNOSIS — R07.9 CHEST PAIN: Primary | ICD-10-CM

## 2023-08-15 LAB
2HR DELTA HS TROPONIN: -32 NG/L
4HR DELTA HS TROPONIN: -7 NG/L
ALBUMIN SERPL BCP-MCNC: 4 G/DL (ref 3.5–5)
ALP SERPL-CCNC: 97 U/L (ref 34–104)
ALT SERPL W P-5'-P-CCNC: 38 U/L (ref 7–52)
ANION GAP SERPL CALCULATED.3IONS-SCNC: 7 MMOL/L
AST SERPL W P-5'-P-CCNC: 34 U/L (ref 13–39)
BASOPHILS # BLD AUTO: 0.05 THOUSANDS/ÂΜL (ref 0–0.1)
BASOPHILS NFR BLD AUTO: 0 % (ref 0–1)
BILIRUB SERPL-MCNC: 0.54 MG/DL (ref 0.2–1)
BNP SERPL-MCNC: 1278 PG/ML (ref 0–100)
BUN SERPL-MCNC: 44 MG/DL (ref 5–25)
CALCIUM SERPL-MCNC: 9.5 MG/DL (ref 8.4–10.2)
CARDIAC TROPONIN I PNL SERPL HS: 245 NG/L
CARDIAC TROPONIN I PNL SERPL HS: 270 NG/L
CARDIAC TROPONIN I PNL SERPL HS: 277 NG/L
CHLORIDE SERPL-SCNC: 105 MMOL/L (ref 96–108)
CO2 SERPL-SCNC: 31 MMOL/L (ref 21–32)
CREAT SERPL-MCNC: 1.69 MG/DL (ref 0.6–1.3)
EOSINOPHIL # BLD AUTO: 0.45 THOUSAND/ÂΜL (ref 0–0.61)
EOSINOPHIL NFR BLD AUTO: 4 % (ref 0–6)
ERYTHROCYTE [DISTWIDTH] IN BLOOD BY AUTOMATED COUNT: 31.8 % (ref 11.6–15.1)
FLUAV RNA RESP QL NAA+PROBE: NEGATIVE
FLUBV RNA RESP QL NAA+PROBE: NEGATIVE
GFR SERPL CREATININE-BSD FRML MDRD: 38 ML/MIN/1.73SQ M
GLUCOSE SERPL-MCNC: 134 MG/DL (ref 65–140)
GLUCOSE SERPL-MCNC: 164 MG/DL (ref 65–140)
GLUCOSE SERPL-MCNC: 169 MG/DL (ref 65–140)
GLUCOSE SERPL-MCNC: 65 MG/DL (ref 65–140)
HCT VFR BLD AUTO: 36.8 % (ref 36.5–49.3)
HGB BLD-MCNC: 9.9 G/DL (ref 12–17)
IMM GRANULOCYTES # BLD AUTO: 0.12 THOUSAND/UL (ref 0–0.2)
IMM GRANULOCYTES NFR BLD AUTO: 1 % (ref 0–2)
LYMPHOCYTES # BLD AUTO: 1.25 THOUSANDS/ÂΜL (ref 0.6–4.47)
LYMPHOCYTES NFR BLD AUTO: 11 % (ref 14–44)
MCH RBC QN AUTO: 19.3 PG (ref 26.8–34.3)
MCHC RBC AUTO-ENTMCNC: 26.9 G/DL (ref 31.4–37.4)
MCV RBC AUTO: 72 FL (ref 82–98)
MONOCYTES # BLD AUTO: 1.32 THOUSAND/ÂΜL (ref 0.17–1.22)
MONOCYTES NFR BLD AUTO: 11 % (ref 4–12)
NEUTROPHILS # BLD AUTO: 8.47 THOUSANDS/ÂΜL (ref 1.85–7.62)
NEUTS SEG NFR BLD AUTO: 73 % (ref 43–75)
NRBC BLD AUTO-RTO: 0 /100 WBCS
PLATELET # BLD AUTO: 225 THOUSANDS/UL (ref 149–390)
POTASSIUM SERPL-SCNC: 4.6 MMOL/L (ref 3.5–5.3)
PROT SERPL-MCNC: 7.1 G/DL (ref 6.4–8.4)
RBC # BLD AUTO: 5.12 MILLION/UL (ref 3.88–5.62)
RSV RNA RESP QL NAA+PROBE: NEGATIVE
SARS-COV-2 RNA RESP QL NAA+PROBE: NEGATIVE
SODIUM SERPL-SCNC: 143 MMOL/L (ref 135–147)
WBC # BLD AUTO: 11.66 THOUSAND/UL (ref 4.31–10.16)

## 2023-08-15 PROCEDURE — 99215 OFFICE O/P EST HI 40 MIN: CPT | Performed by: INTERNAL MEDICINE

## 2023-08-15 PROCEDURE — 84484 ASSAY OF TROPONIN QUANT: CPT

## 2023-08-15 PROCEDURE — 88305 TISSUE EXAM BY PATHOLOGIST: CPT | Performed by: PATHOLOGY

## 2023-08-15 PROCEDURE — 84484 ASSAY OF TROPONIN QUANT: CPT | Performed by: EMERGENCY MEDICINE

## 2023-08-15 PROCEDURE — 99205 OFFICE O/P NEW HI 60 MIN: CPT | Performed by: INTERNAL MEDICINE

## 2023-08-15 PROCEDURE — 0241U HB NFCT DS VIR RESP RNA 4 TRGT: CPT

## 2023-08-15 PROCEDURE — 85025 COMPLETE CBC W/AUTO DIFF WBC: CPT | Performed by: EMERGENCY MEDICINE

## 2023-08-15 PROCEDURE — 99223 1ST HOSP IP/OBS HIGH 75: CPT | Performed by: HOSPITALIST

## 2023-08-15 PROCEDURE — 94760 N-INVAS EAR/PLS OXIMETRY 1: CPT

## 2023-08-15 PROCEDURE — 36415 COLL VENOUS BLD VENIPUNCTURE: CPT

## 2023-08-15 PROCEDURE — 82948 REAGENT STRIP/BLOOD GLUCOSE: CPT

## 2023-08-15 PROCEDURE — 94664 DEMO&/EVAL PT USE INHALER: CPT

## 2023-08-15 PROCEDURE — 80053 COMPREHEN METABOLIC PANEL: CPT | Performed by: EMERGENCY MEDICINE

## 2023-08-15 PROCEDURE — 83880 ASSAY OF NATRIURETIC PEPTIDE: CPT

## 2023-08-15 PROCEDURE — 99285 EMERGENCY DEPT VISIT HI MDM: CPT | Performed by: EMERGENCY MEDICINE

## 2023-08-15 PROCEDURE — 99285 EMERGENCY DEPT VISIT HI MDM: CPT

## 2023-08-15 PROCEDURE — 71046 X-RAY EXAM CHEST 2 VIEWS: CPT

## 2023-08-15 PROCEDURE — 93005 ELECTROCARDIOGRAM TRACING: CPT

## 2023-08-15 RX ORDER — PANTOPRAZOLE SODIUM 40 MG/1
40 TABLET, DELAYED RELEASE ORAL
Status: DISCONTINUED | OUTPATIENT
Start: 2023-08-15 | End: 2023-08-20 | Stop reason: HOSPADM

## 2023-08-15 RX ORDER — TAMSULOSIN HYDROCHLORIDE 0.4 MG/1
0.4 CAPSULE ORAL
Status: DISCONTINUED | OUTPATIENT
Start: 2023-08-15 | End: 2023-08-20 | Stop reason: HOSPADM

## 2023-08-15 RX ORDER — FERROUS SULFATE 325(65) MG
325 TABLET ORAL
Status: DISCONTINUED | OUTPATIENT
Start: 2023-08-16 | End: 2023-08-20 | Stop reason: HOSPADM

## 2023-08-15 RX ORDER — INSULIN LISPRO 100 [IU]/ML
10 INJECTION, SOLUTION INTRAVENOUS; SUBCUTANEOUS
Status: DISCONTINUED | OUTPATIENT
Start: 2023-08-15 | End: 2023-08-17

## 2023-08-15 RX ORDER — INSULIN LISPRO 100 [IU]/ML
1-6 INJECTION, SOLUTION INTRAVENOUS; SUBCUTANEOUS
Status: DISCONTINUED | OUTPATIENT
Start: 2023-08-15 | End: 2023-08-20 | Stop reason: HOSPADM

## 2023-08-15 RX ORDER — FUROSEMIDE 10 MG/ML
40 INJECTION INTRAMUSCULAR; INTRAVENOUS ONCE
Status: COMPLETED | OUTPATIENT
Start: 2023-08-15 | End: 2023-08-15

## 2023-08-15 RX ORDER — SERTRALINE HYDROCHLORIDE 25 MG/1
25 TABLET, FILM COATED ORAL DAILY
Status: DISCONTINUED | OUTPATIENT
Start: 2023-08-15 | End: 2023-08-20 | Stop reason: HOSPADM

## 2023-08-15 RX ORDER — ACETAMINOPHEN 325 MG/1
650 TABLET ORAL EVERY 6 HOURS PRN
Status: DISCONTINUED | OUTPATIENT
Start: 2023-08-15 | End: 2023-08-20 | Stop reason: HOSPADM

## 2023-08-15 RX ORDER — PREGABALIN 100 MG/1
100 CAPSULE ORAL 3 TIMES DAILY
Status: DISCONTINUED | OUTPATIENT
Start: 2023-08-15 | End: 2023-08-20 | Stop reason: HOSPADM

## 2023-08-15 RX ORDER — FUROSEMIDE 10 MG/ML
40 INJECTION INTRAMUSCULAR; INTRAVENOUS
Status: DISCONTINUED | OUTPATIENT
Start: 2023-08-16 | End: 2023-08-16

## 2023-08-15 RX ORDER — METOPROLOL SUCCINATE 50 MG/1
50 TABLET, EXTENDED RELEASE ORAL DAILY
Status: DISCONTINUED | OUTPATIENT
Start: 2023-08-15 | End: 2023-08-20 | Stop reason: HOSPADM

## 2023-08-15 RX ORDER — INSULIN LISPRO 100 [IU]/ML
1-5 INJECTION, SOLUTION INTRAVENOUS; SUBCUTANEOUS
Status: DISCONTINUED | OUTPATIENT
Start: 2023-08-15 | End: 2023-08-20 | Stop reason: HOSPADM

## 2023-08-15 RX ADMIN — APIXABAN 5 MG: 5 TABLET, FILM COATED ORAL at 17:33

## 2023-08-15 RX ADMIN — PANTOPRAZOLE SODIUM 40 MG: 40 TABLET, DELAYED RELEASE ORAL at 15:49

## 2023-08-15 RX ADMIN — TAMSULOSIN HYDROCHLORIDE 0.4 MG: 0.4 CAPSULE ORAL at 15:49

## 2023-08-15 RX ADMIN — INSULIN LISPRO 1 UNITS: 100 INJECTION, SOLUTION INTRAVENOUS; SUBCUTANEOUS at 21:45

## 2023-08-15 RX ADMIN — FUROSEMIDE 40 MG: 10 INJECTION, SOLUTION INTRAMUSCULAR; INTRAVENOUS at 15:49

## 2023-08-15 RX ADMIN — INSULIN DETEMIR 30 UNITS: 100 INJECTION, SOLUTION SUBCUTANEOUS at 21:45

## 2023-08-15 RX ADMIN — PREGABALIN 100 MG: 100 CAPSULE ORAL at 15:49

## 2023-08-15 RX ADMIN — INSULIN LISPRO 10 UNITS: 100 INJECTION, SOLUTION INTRAVENOUS; SUBCUTANEOUS at 18:32

## 2023-08-15 RX ADMIN — PREGABALIN 100 MG: 100 CAPSULE ORAL at 21:45

## 2023-08-15 NOTE — CASE COMMUNICATION
Notification of Assess not Admit    [facility] has assessed your patient for Home Health services and has determined the patient is not eligible for service due to the following: Needs Beyond [address]    TT to Guido Greenberg MD. PCP on service, so TT to cardiologist Dr. Ni Nurse to report patient's report of [place] on left side patient reports funny feeling dull mild pain on sternum to left side of chest started after returning from h ospital associated with SOB intermittently. Last couple minutes and goes away. Currently having symptoms at rest. Not associated with exertion, GI symptoms or diaphoresis. Patient reports dyspnea with lying on right side. HR 84 Pulse ox 94% /54. Also reports fuzzy vision and lightheadedness at rest. Dr. Ni Nurse would like patient to return to ED since symptoms started after hospital. Patient and wife refuse ambulance and wife will  take patient to ED it is 5 minutes away.      [employer] Destiny Wynn RN

## 2023-08-15 NOTE — ASSESSMENT & PLAN NOTE
· Continue rate control with metoprolol 50 mg twice daily  · Continue OAC Eliquis 5 mg twice daily   · EKG on admission is NSR

## 2023-08-15 NOTE — ASSESSMENT & PLAN NOTE
· Patient with elevated troponins on prior admission, peaked at 1000 and down trended to 900  · Cardiology was consulted, troponin elevation thought secondary to acute hypoxia/respiratory distress rather than true ACS/ischemia  · Patient noted chest discomfort beginning overnight/early morning beginning on 8/15  · Troponin on admission 277, follow-up 2 and 4-hour troponin  · EKG interpreted by ED without ST changes, no STEMI criteria  · Cardiology consulted  · Telemetry

## 2023-08-15 NOTE — PLAN OF CARE
Problem: Potential for Falls  Goal: Patient will remain free of falls  Description: INTERVENTIONS:  - Educate patient/family on patient safety including physical limitations  - Instruct patient to call for assistance with activity   - Consult OT/PT to assist with strengthening/mobility   - Keep Call bell within reach  - Keep bed low and locked with side rails adjusted as appropriate  - Keep care items and personal belongings within reach  - Initiate and maintain comfort rounds  - Make Fall Risk Sign visible to staff  - Offer Toileting every  Hours, in advance of need  - Initiate/Maintain alarm  - Obtain necessary fall risk management equipment:   - Apply yellow socks and bracelet for high fall risk patients  - Consider moving patient to room near nurses station  Outcome: Progressing     Problem: MOBILITY - ADULT  Goal: Maintain or return to baseline ADL function  Description: INTERVENTIONS:  -  Assess patient's ability to carry out ADLs; assess patient's baseline for ADL function and identify physical deficits which impact ability to perform ADLs (bathing, care of mouth/teeth, toileting, grooming, dressing, etc.)  - Assess/evaluate cause of self-care deficits   - Assess range of motion  - Assess patient's mobility; develop plan if impaired  - Assess patient's need for assistive devices and provide as appropriate  - Encourage maximum independence but intervene and supervise when necessary  - Involve family in performance of ADLs  - Assess for home care needs following discharge   - Consider OT consult to assist with ADL evaluation and planning for discharge  - Provide patient education as appropriate  Outcome: Progressing  Goal: Maintains/Returns to pre admission functional level  Description: INTERVENTIONS:  - Perform BMAT or MOVE assessment daily.   - Set and communicate daily mobility goal to care team and patient/family/caregiver.    - Collaborate with rehabilitation services on mobility goals if consulted  - Perform Range of Motion  times a day. - Reposition patient every  hours.   - Dangle patient  times a day  - Stand patient  times a day  - Ambulate patient  times a day  - Out of bed to chair  times a day   - Out of bed for meals  times a day  - Out of bed for toileting  - Record patient progress and toleration of activity level   Outcome: Progressing     Problem: PAIN - ADULT  Goal: Verbalizes/displays adequate comfort level or baseline comfort level  Description: Interventions:  - Encourage patient to monitor pain and request assistance  - Assess pain using appropriate pain scale  - Administer analgesics based on type and severity of pain and evaluate response  - Implement non-pharmacological measures as appropriate and evaluate response  - Consider cultural and social influences on pain and pain management  - Notify physician/advanced practitioner if interventions unsuccessful or patient reports new pain  Outcome: Progressing     Problem: INFECTION - ADULT  Goal: Absence or prevention of progression during hospitalization  Description: INTERVENTIONS:  - Assess and monitor for signs and symptoms of infection  - Monitor lab/diagnostic results  - Monitor all insertion sites, i.e. indwelling lines, tubes, and drains  - Monitor endotracheal if appropriate and nasal secretions for changes in amount and color  - [place] appropriate cooling/warming therapies per order  - Administer medications as ordered  - Instruct and encourage patient and family to use good hand hygiene technique  - Identify and instruct in appropriate isolation precautions for identified infection/condition  Outcome: Progressing  Goal: Absence of fever/infection during neutropenic period  Description: INTERVENTIONS:  - Monitor WBC    Outcome: Progressing     Problem: SAFETY ADULT  Goal: Patient will remain free of falls  Description: INTERVENTIONS:  - Educate patient/family on patient safety including physical limitations  - Instruct patient to call for assistance with activity   - Consult OT/PT to assist with strengthening/mobility   - Keep Call bell within reach  - Keep bed low and locked with side rails adjusted as appropriate  - Keep care items and personal belongings within reach  - Initiate and maintain comfort rounds  - Make Fall Risk Sign visible to staff  - Offer Toileting every  Hours, in advance of need  - Initiate/Maintain alarm  - Obtain necessary fall risk management equipment:   - Apply yellow socks and bracelet for high fall risk patients  - Consider moving patient to room near nurses station  Outcome: Progressing  Goal: Maintain or return to baseline ADL function  Description: INTERVENTIONS:  -  Assess patient's ability to carry out ADLs; assess patient's baseline for ADL function and identify physical deficits which impact ability to perform ADLs (bathing, care of mouth/teeth, toileting, grooming, dressing, etc.)  - Assess/evaluate cause of self-care deficits   - Assess range of motion  - Assess patient's mobility; develop plan if impaired  - Assess patient's need for assistive devices and provide as appropriate  - Encourage maximum independence but intervene and supervise when necessary  - Involve family in performance of ADLs  - Assess for home care needs following discharge   - Consider OT consult to assist with ADL evaluation and planning for discharge  - Provide patient education as appropriate  Outcome: Progressing  Goal: Maintains/Returns to pre admission functional level  Description: INTERVENTIONS:  - Perform BMAT or MOVE assessment daily.   - Set and communicate daily mobility goal to care team and patient/family/caregiver. - Collaborate with rehabilitation services on mobility goals if consulted  - Perform Range of Motion  times a day. - Reposition patient every  hours.   - Dangle patient  times a day  - Stand patient  times a day  - Ambulate patient  times a day  - Out of bed to chair  times a day   - Out of bed for norah times a day  - Out of bed for toileting  - Record patient progress and toleration of activity level   Outcome: Progressing     Problem: DISCHARGE PLANNING  Goal: Discharge to home or other facility with appropriate resources  Description: INTERVENTIONS:  - Identify barriers to discharge w/patient and caregiver  - Arrange for needed discharge resources and transportation as appropriate  - Identify discharge learning needs (meds, wound care, etc.)  - Arrange for interpretive services to assist at discharge as needed  - Refer to Case Management Department for coordinating discharge planning if the patient needs post-hospital services based on physician/advanced practitioner order or complex needs related to functional status, cognitive ability, or social support system  Outcome: Progressing     Problem: Knowledge Deficit  Goal: Patient/family/caregiver demonstrates understanding of disease process, treatment plan, medications, and discharge instructions  Description: Complete learning assessment and assess knowledge base.   Interventions:  - Provide teaching at level of understanding  - Provide teaching via preferred learning methods  Outcome: Progressing

## 2023-08-15 NOTE — H&P
[facility]  H&P  Name: Luis Perry 69 y.o. male I MRN: [id number]  Unit/Bed#: Z2 H1 I Date of Admission: 8/15/2023   Date of Service: 8/15/2023 I Hospital Day: 0      Assessment/Plan   * Acute on chronic diastolic HF (heart failure) ([address])  Assessment & Plan  Wt Readings from Last 3 Encounters:   08/15/23 94.8 kg (209 lb)   08/14/23 95 kg (209 lb 7 oz)   08/02/23 101 kg (222 lb)     · Patient recently treated for acute on chronic CHF during prior admission which was thought 2/2 volume overload due to blood transfusion for GIB. Received IV diuretics, was transitioned to p.o. Lasix 40 mg daily, stable on room air on this dose prior to discharge 8/14. Patient returned to ED 8/15 due to new chest discomfort and intermittent SOB which began early morning 8/15 causing disruption of sleep, worse with laying on L side.   · Placed on 2 L by EMS for SPO2 88%  · CXR pending final read  · Cardiology consulted  · Repeat echo pending  · IV Lasix 40 mg x 1 today  · Telemetry, I/O, daily weights  · Wean O2 as able    Elevated troponin  Assessment & Plan  · Patient with elevated troponins on prior admission, peaked at 1000 and down trended to 900  · Cardiology was consulted, troponin elevation thought secondary to acute hypoxia/respiratory distress rather than true ACS/ischemia  · Patient noted chest discomfort beginning overnight/early morning beginning on 8/15  · Troponin on admission 277, follow-up 2 and 4-hour troponin  · EKG interpreted by ED without ST changes, no STEMI criteria  · Cardiology consulted  · Telemetry    Anemia  Assessment & Plan  · Recently treated for GIB on prior admission- s/p EGD & C-scope with clipping of gastric/duodenal angiectasias + 2 units PRBC with stabilization  · Restarted on eliquis during prior admission following GI clearance   · Hgb currently stable at 9.9, patient denies recurrent bleeding  · Continue PPI BID   · Daily CBC    Hepatocellular carcinoma [facility])  Assessment & Plan  • Suspected HCC with multiple hepatic masses on imaging and AFP >18k, has not been confirmed by tissue diagnosis  • Following with Dr. Doe Schmidt with surgical oncology as an outpatient, did not feel like a biopsy was necessary and was referred to Dr. Sigrid Liu for Y90 radioembolization  • IR was consulted for possible biopsy while inpatient, deferred with radioembolization already in process of scheduling  • Continue outpatient follow up     Atrial fibrillation, unspecified type [facility])  Assessment & Plan  · Continue rate control with metoprolol 50 mg twice daily  · Continue OAC Eliquis 5 mg twice daily   · EKG on admission is NSR    Type 2 diabetes mellitus with neurologic complication, with long-term current use of insulin (HCC)  Assessment & Plan  Lab Results   Component Value Date    HGBA1C 7.6 (H) 01/06/2023     · Home regimen:  · Trulicity, saxagliptin (hold)  · Levemir 75 units at bedtime, NovoLog 20 units breakfast, 30 units lunch/dinner  · While inpatient:  · Levemir reduced to 30 units at bedtime  · NovoLog 10 units 3 times daily AC  · ISS and Accu-Cheks, increase insulin as needed  · Diabetic diet    Stage 3 chronic kidney disease, unspecified whether stage 3a or 3b CKD [facility])  Assessment & Plan  Lab Results   Component Value Date    EGFR 38 08/15/2023    EGFR 43 08/14/2023    EGFR 35 08/13/2023    CREATININE 1.69 (H) 08/15/2023    CREATININE 1.52 (H) 08/14/2023    CREATININE 1.82 (H) 08/13/2023     · Creatinine baseline is 1.3-1.6  · Creatinine slightly elevated 1.69 up from 1.52 on discharge yesterday, suspect more likely due to volume overload rather than dehydration  · Monitor response with 1 dose IV Lasix  · Avoid hypotension and nephrotoxins         VTE Pharmacologic Prophylaxis:   High Risk (Score >/= 5) - Pharmacological DVT Prophylaxis Ordered: apixaban (Eliquis). Sequential Compression Devices Ordered.   Code Status: Level 1 - Full Code as discussed with patient and S/O  Discussion with family: Updated  (wife) at bedside. Anticipated Length of Stay: Patient will be admitted on an observation basis with an anticipated length of stay of less than 2 midnights secondary to chest pain + monitoring. Total Time Spent on Date of Encounter in care of patient: 65 minutes This time was spent on one or more of the following: performing physical exam; counseling and coordination of care; obtaining or reviewing history; documenting in the medical record; reviewing/ordering tests, medications or procedures; communicating with other healthcare professionals and discussing with patient's family/caregivers. Chief Complaint: chest pain     History of Present Illness:  Janneth Madden is a 69 y.o. male with a PMH of CKD, HTN, atrial fibrillation on Eliquis, history of DVT, history of emphysema, suspected hepatocellular carcinoma, DM 2 on insulin, anemia secondary to recent GI bleeding who presents with complaint of chest pain/discomfort with associated intermittent shortness of breath which began early morning/overnight 8/15 disrupting sleep. Patient states he woke up with left-sided chest pain and difficulty breathing when laying on left side, he denied radiation of pain to jaw, neck or arms. Patient currently sitting up in ED bed, denies any chest pain at this time. Notably patient was discharged from hospital yesterday 8/14 after admission for syncope in setting of GI bleed complicated by acute on chronic CHF requiring diuresis given volume overload resulting in hypoxia following transfusion. Patient had been discharged on p.o. Lasix 40 mg once daily, was stable on room air prior to discharge. Patient denies any fevers or chills, headaches, dizziness, congestion, cough, nausea/vomiting, abdominal pain, diarrhea, urinary changes, additional complaints. Review of Systems:  Review of Systems   Constitutional: Positive for fatigue. Negative for chills and fever.    HENT: Negative for congestion, rhinorrhea and sore throat. Eyes: Negative for visual disturbance. Respiratory: Positive for chest tightness and shortness of breath. Negative for cough and wheezing. Cardiovascular: Positive for chest pain. Negative for palpitations and leg swelling. Gastrointestinal: Negative for abdominal pain, constipation, diarrhea, nausea and vomiting. Genitourinary: Negative for difficulty urinating, dysuria and frequency. Musculoskeletal: Positive for back pain. Negative for arthralgias and myalgias. Skin: Negative for rash and wound. Neurological: Negative for dizziness, light-headedness and headaches. All other systems reviewed and are negative. Past Medical and Surgical History:   Past Medical History:   Diagnosis Date   • Atrial fibrillation ([address])    • Deep vein thrombosis (DVT) of right lower extremity ([address])    • Diverticulitis        Past Surgical History:   Procedure Laterality Date   • APPENDECTOMY     • BOWEL RESECTION  2014   • CATARACT EXTRACTION Bilateral        Meds/Allergies:  Prior to Admission medications    Medication Sig Start Date End Date Taking?  Authorizing Provider   acetaminophen (TYLENOL) 650 mg CR tablet Take 650 mg by mouth every 8 (eight) hours as needed for mild pain    Historical Provider, MD   Continuous Blood Gluc Sensor (Dexcom G6 Sensor) MISC Use daily as directed for CGM - Change every 10 days 9/8/22   Karo Gibbs DO   Continuous Blood Gluc Transmit (Dexcom G6 Transmitter) MISC Use daily as directed for CGM - Change every 3 months 9/8/22   Karo Gibbs DO   dulaglutide (Trulicity) 1.43 KH/7.4XY injection Inject 0.5 mL (0.75 mg total) under the skin once a week 4/5/23   Ernesto Griffith MD   Eliquis 5 MG take 1 tablet by mouth twice a day 4/3/23   Jayde Childers MD   ferrous sulfate 325 (65 Fe) mg tablet Take 1 tablet (325 mg total) by mouth daily with breakfast 8/14/23   Elijah Titus PA-C   furosemide (LASIX) 40 mg tablet Take 1 tablet (40 mg total) by mouth daily Do not start before August 15, 2023. 8/15/23 9/14/23  Miroslava Titus PA-C   insulin aspart (NovoLOG FlexPen) 100 UNIT/ML injection pen Inject 40 Units under the skin 3 (three) times a day with meals Inject 40 units before breakfast  Inject 35 units before dinner  Patient taking differently: Inject 40 Units under the skin 3 (three) times a day with meals 28 u breakfast time, 38 u lunchtime, 38 u dinnertime 3/28/23 8/2/23  NADIA Eid   Insulin Pen Needle (Pen Needles) 32G X 4 MM MISC Use 4 (four) times a day 9/15/22 9/15/23  Ernesto Griffith MD   Levemir FlexPen 100 units/mL injection pen INJECT 75 UNITS UNDER THE SKIN DAILY AT BEDTIME 7/3/23   Susanne Musa,    metoprolol succinate (TOPROL-XL) 50 mg 24 hr tablet Take 50 mg by mouth daily 6/20/23   Historical Provider, MD   mometasone (ELOCON) 0.1 % cream Apply topically daily for 7 days 6/5/23 8/2/23  Addi Zhao MD   Multiple Vitamin (MULTIVITAMIN ADULT PO) Take 1 tablet by mouth daily    Historical Provider, MD   pantoprazole (PROTONIX) 40 mg tablet Take 1 tablet (40 mg total) by mouth 2 (two) times a day 8/14/23   Miroslava Titus PA-C   Potassium Citrate ER 15 MEQ (1620 MG) TBCR Take 1 tablet by mouth in the morning 1 tab po daily  Patient taking differently: Take 1 tablet by mouth 2 (two) times a day 9/1/22   Ernesto Griffith MD   pregabalin (LYRICA) 100 mg capsule take 1 capsule by mouth three times a day 7/21/23   Ernesto Griffith MD   saxagliptin (Onglyza) 5 MG tablet Take 5 mg by mouth daily with dinner    Historical Provider, MD   sertraline (ZOLOFT) 25 mg tablet Take 25 mg by mouth daily    Historical Provider, MD   SUPER B COMPLEX/C PO Take 1 tablet by mouth daily    Historical Provider, MD   tamsulosin (FLOMAX) 0.4 mg Take 0.4 mg by mouth daily with dinner    Historical Provider, MD   traZODone (DESYREL) 100 mg tablet take 2 tablets by mouth at bedtime 23   Cory Travis MD   umeclidinium-vilanterol 62.5-25 mcg/actuation inhaler Inhale 1 puff daily 5/10/23   Cory Travis MD     I have reviewed home medications with patient personally. Allergies: No Known Allergies    Social History:  Marital Status: /Civil Union   Occupation: none   Patient Pre-hospital Living Situation: Home, With spouse  Patient Pre-hospital Level of Mobility: walks with cane  Patient Pre-hospital Diet Restrictions: diabetic  Substance Use History:   Social History     Substance and Sexual Activity   Alcohol Use Yes    Comment: Socially     Social History     Tobacco Use   Smoking Status Former   • Packs/day: 1.00   • Years: 30.00   • Total pack years: 30.00   • Types: Cigarettes   • Start date:    • Quit date: 12   • Years since quittin.6   Smokeless Tobacco Never     Social History     Substance and Sexual Activity   Drug Use Never       Family History:  Family History   Problem Relation Age of Onset   • Hypertension Mother    • Bone cancer Father    • Diabetes type II Sister    • Diabetes type II Sister    • Esophageal cancer Brother        Physical Exam:     Vitals:   Blood Pressure: 113/64 (08/15/23 1236)  Pulse: 74 (08/15/23 1236)  Temperature: 97.8 °F (36.6 °C) (08/15/23 1236)  Temp Source: Temporal (08/15/23 1236)  Respirations: 20 (08/15/23 1236)  Height: 6' (182.9 cm) (08/15/23 1236)  Weight - Scale: 94.8 kg (209 lb) (08/15/23 1236)  SpO2: 95 % (08/15/23 1236)    Physical Exam  Vitals and nursing note reviewed. Constitutional:       General: He is not in acute distress. Appearance: He is well-developed. He is not ill-appearing. HENT:      Head: Normocephalic and atraumatic. Eyes:      General:         Right eye: No discharge. Left eye: No discharge. Extraocular Movements: Extraocular movements intact. Conjunctiva/sclera: Conjunctivae normal.   Cardiovascular:      Rate and Rhythm: Normal rate and regular rhythm. Heart sounds: No murmur heard. Pulmonary:      Effort: Pulmonary effort is normal. No respiratory distress. Breath sounds: Normal breath sounds. No wheezing, rhonchi or rales. Abdominal:      General: Bowel sounds are normal. There is no distension. Palpations: Abdomen is soft. Tenderness: There is no abdominal tenderness. Musculoskeletal:      Cervical back: Neck supple. Right lower leg: No edema. Left lower leg: No edema. Skin:     General: Skin is warm and dry. Capillary Refill: Capillary refill takes less than 2 seconds. Neurological:      General: No focal deficit present. Mental Status: He is alert and oriented to person, place, and time. Mental status is at baseline. Cranial Nerves: No cranial nerve deficit.    Psychiatric:         Mood and Affect: Mood normal.         Behavior: Behavior normal.          Additional Data:     Lab Results:  Results from last 7 days   Lab Units 08/15/23  1242   WBC Thousand/uL 11.66*   HEMOGLOBIN g/dL 9.9*   HEMATOCRIT % 36.8   PLATELETS Thousands/uL 225   NEUTROS PCT % 73   LYMPHS PCT % 11*   MONOS PCT % 11   EOS PCT % 4     Results from last 7 days   Lab Units 08/15/23  1242   SODIUM mmol/L 143   POTASSIUM mmol/L 4.6   CHLORIDE mmol/L 105   CO2 mmol/L 31   BUN mg/dL 44*   CREATININE mg/dL 1.69*   ANION GAP mmol/L 7   CALCIUM mg/dL 9.5   ALBUMIN g/dL 4.0   TOTAL BILIRUBIN mg/dL 0.54   ALK PHOS U/L 97   ALT U/L 38   AST U/L 34   GLUCOSE RANDOM mg/dL 164*         Results from last 7 days   Lab Units 08/14/23  1105 08/14/23  0710 08/13/23  2056 08/13/23  1611 08/13/23  1053 08/13/23  0718 08/12/23  2105 08/12/23  1620 08/12/23  1101 08/12/23  0726 08/11/23  2055 08/11/23  1629   POC GLUCOSE mg/dl 276* 150* 288* 306* 306* 252* 419* 328* 165* 178* 332* 320*         Results from last 7 days   Lab Units 08/13/23  0450 08/11/23  0452 08/10/23  2256 08/10/23  1910 08/10/23  1806 08/10/23  1751   LACTIC ACID mmol/L  --   --  1.9 2.7* 2.6*  --    PROCALCITONIN ng/ml 0.25 0.39*  --   --   --  0.44*       Lines/Drains:  Invasive Devices     None                     Imaging: Reviewed radiology reports from this admission including: chest xray  XR chest pa & lateral   ED Interpretation by Jamilah Mejia MD (08/15 1312)   Small left pleural effusion. No pulmonary infiltrate      Final Result by Joann Herman MD (08/15 3539)      Minimal right-sided pleural effusion. Emphysematous changes. No focal consolidative airspace opacity to suggest pneumonia. Resident: Ann Richey, the attending radiologist, have reviewed the images and agree with the final report above. Workstation performed: [id number]             EKG and Other Studies Reviewed on Admission:   · EKG: NSR. HR 66.    ** Please Note: This note has been constructed using a voice recognition system.  **

## 2023-08-15 NOTE — UTILIZATION REVIEW
NOTIFICATION OF ADMISSION DISCHARGE   This is a Notification of Discharge from [facility] [address] [facility]. Please be advised that this patient has been discharge from our facility. Below you will find the admission and discharge date and time including the patient’s disposition. UTILIZATION REVIEW CONTACT:  Prem Joshi  Utilization   Network Utilization Review Department  Phone: [phone] carefully listen to the prompts. All voicemails are confidential.  Email: [email or web address]     ADMISSION INFORMATION  PRESENTATION DATE: 8/7/2023  5:05 AM  OBERVATION ADMISSION DATE:   INPATIENT ADMISSION DATE: 8/7/23 10:29 AM   DISCHARGE DATE: 8/14/2023  1:30 PM   DISPOSITION:Home/Self Care    IMPORTANT INFORMATION:  Send all requests for admission clinical reviews, approved or denied determinations and any other requests to dedicated fax number below belonging to the campus where the patient is receiving treatment.  List of dedicated fax numbers:  [place] DENIALS (Administrative/Medical Necessity) [phone] [address] [facility] (Maternity/NICU/Pediatrics) [phone]   [facility] [phone]   [facility] [phone] [address] [facility] [phone]   [address] [place] [address] [phone]   [facility] [phone]   [address] [place] [address] [address] [facility] [phone]   [address] [facility] [phone] [address] [facility] [phone] [address] [facility] [address] [facility] [phone]

## 2023-08-15 NOTE — ASSESSMENT & PLAN NOTE
Lab Results   Component Value Date    EGFR 38 08/15/2023    EGFR 43 08/14/2023    EGFR 35 08/13/2023    CREATININE 1.69 (H) 08/15/2023    CREATININE 1.52 (H) 08/14/2023    CREATININE 1.82 (H) 08/13/2023     · Creatinine baseline is 1.3-1.6  · Creatinine slightly elevated 1.69 up from 1.52 on discharge yesterday, suspect more likely due to volume overload rather than dehydration  · Monitor response with 1 dose IV Lasix  · Avoid hypotension and nephrotoxins

## 2023-08-15 NOTE — ASSESSMENT & PLAN NOTE
Wt Readings from Last 3 Encounters:   08/15/23 94.8 kg (209 lb)   08/14/23 95 kg (209 lb 7 oz)   08/02/23 101 kg (222 lb)     · Patient recently treated for acute on chronic CHF during prior admission which was thought 2/2 volume overload due to blood transfusion for GIB. Received IV diuretics, was transitioned to p.o. Lasix 40 mg daily, stable on room air on this dose prior to discharge 8/14. Patient returned to ED 8/15 due to new chest discomfort and intermittent SOB which began early morning 8/15 causing disruption of sleep, worse with laying on L side.   · Placed on 2 L by EMS for SPO2 88%  · CXR pending final read  · Cardiology consulted  · Repeat echo pending  · IV Lasix 40 mg x 1 today  · Telemetry, I/O, daily weights  · Wean O2 as able

## 2023-08-15 NOTE — ED PROVIDER NOTES
History  Chief Complaint   Patient presents with   • Shortness of Breath     Patient reports to ED c/o dizziness/lightheadedness, shortness of breath, and chest pain since this morning. Symptoms are intermittent. Cardiologist and home care nurse referred patient to ER. 69year old male presents for evaluation of chest pain associated with lightheadedness and shortness of breath which began at 8:45 am this morning. Patient describes the pain as dull in nature and it has been intermittent since onset. He states he was feeling well when he left the hospital yesterday, but was restless and did not sleep well last night. He awoke with symptoms this morning. Patient's home health nurse contacted cardiology and he was advised to come to the ED. Prior to Admission Medications   Prescriptions Last Dose Informant Patient Reported? Taking?    Continuous Blood Gluc Sensor (Dexcom G6 Sensor) MISC  Self No No   Sig: Use daily as directed for CGM - Change every 10 days   Continuous Blood Gluc Transmit (Dexcom G6 Transmitter) MISC  Self No No   Sig: Use daily as directed for CGM - Change every 3 months   Eliquis 5 MG  Self No No   Sig: take 1 tablet by mouth twice a day   Insulin Pen Needle (Pen Needles) 32G X 4 MM MISC  Self No No   Sig: Use 4 (four) times a day   Levemir FlexPen 100 units/mL injection pen  Self No No   Sig: INJECT 75 UNITS UNDER THE SKIN DAILY AT BEDTIME   Multiple Vitamin (MULTIVITAMIN ADULT PO)  Self Yes No   Sig: Take 1 tablet by mouth daily   Potassium Citrate ER 15 MEQ (1620 MG) TBCR  Self No No   Sig: Take 1 tablet by mouth in the morning 1 tab po daily   Patient taking differently: Take 1 tablet by mouth 2 (two) times a day   SUPER B COMPLEX/C PO  Self Yes No   Sig: Take 1 tablet by mouth daily   acetaminophen (TYLENOL) 650 mg CR tablet  Self Yes No   Sig: Take 650 mg by mouth every 8 (eight) hours as needed for mild pain   dulaglutide (Trulicity) 9.17 SJ/0.9XI injection  Self No No   Sig: Inject 0.5 mL (0.75 mg total) under the skin once a week   ferrous sulfate 325 (65 Fe) mg tablet   No No   Sig: Take 1 tablet (325 mg total) by mouth daily with breakfast   furosemide (LASIX) 40 mg tablet   No No   Sig: Take 1 tablet (40 mg total) by mouth daily Do not start before August 15, 2023.    insulin aspart (NovoLOG FlexPen) 100 UNIT/ML injection pen  Self No No   Sig: Inject 40 Units under the skin 3 (three) times a day with meals Inject 40 units before breakfast  Inject 35 units before dinner   Patient taking differently: Inject 40 Units under the skin 3 (three) times a day with meals 28 u breakfast time, 38 u lunchtime, 38 u dinnertime   metoprolol succinate (TOPROL-XL) 50 mg 24 hr tablet  Self Yes No   Sig: Take 50 mg by mouth daily   mometasone (ELOCON) 0.1 % cream  Self No No   Sig: Apply topically daily for 7 days   pantoprazole (PROTONIX) 40 mg tablet   No No   Sig: Take 1 tablet (40 mg total) by mouth 2 (two) times a day   pregabalin (LYRICA) 100 mg capsule  Self No No   Sig: take 1 capsule by mouth three times a day   saxagliptin (Onglyza) 5 MG tablet   Yes No   Sig: Take 5 mg by mouth daily with dinner   sertraline (ZOLOFT) 25 mg tablet   Yes No   Sig: Take 25 mg by mouth daily   tamsulosin (FLOMAX) 0.4 mg   Yes No   Sig: Take 0.4 mg by mouth daily with dinner   traZODone (DESYREL) 100 mg tablet  Self No No   Sig: take 2 tablets by mouth at bedtime   umeclidinium-vilanterol 62.5-25 mcg/actuation inhaler  Self No No   Sig: Inhale 1 puff daily      Facility-Administered Medications: None       Past Medical History:   Diagnosis Date   • Atrial fibrillation (HCC)    • Deep vein thrombosis (DVT) of right lower extremity (HCC)    • Diverticulitis        Past Surgical History:   Procedure Laterality Date   • APPENDECTOMY     • BOWEL RESECTION  2014   • CATARACT EXTRACTION Bilateral        Family History   Problem Relation Age of Onset   • Hypertension Mother    • Bone cancer Father    • Diabetes type II Sister    • Diabetes type II Sister    • Esophageal cancer Brother      I have reviewed and agree with the history as documented. E-Cigarette/Vaping   • E-Cigarette Use Never User      E-Cigarette/Vaping Substances   • Nicotine No    • THC No    • CBD No    • Flavoring No      Social History     Tobacco Use   • Smoking status: Former     Packs/day: 1.00     Years: 30.00     Total pack years: 30.00     Types: Cigarettes     Start date:      Quit date:      Years since quittin.6   • Smokeless tobacco: Never   Vaping Use   • Vaping Use: Never used   Substance Use Topics   • Alcohol use: Yes     Comment: Socially   • Drug use: Never       Review of Systems    Physical Exam  Physical Exam  Vitals and nursing note reviewed. HENT:      Head: Normocephalic and atraumatic. Cardiovascular:      Rate and Rhythm: Normal rate and regular rhythm. Pulses: Normal pulses. Heart sounds: Normal heart sounds. Pulmonary:      Effort: Pulmonary effort is normal. No respiratory distress. Breath sounds: Normal breath sounds. Abdominal:      General: There is no distension. Palpations: Abdomen is soft. Tenderness: There is no abdominal tenderness. Musculoskeletal:      Right lower leg: No edema. Left lower leg: No edema. Skin:     General: Skin is warm and dry. Neurological:      Mental Status: He is alert.          Vital Signs  ED Triage Vitals [08/15/23 1236]   Temperature Pulse Respirations Blood Pressure SpO2   97.8 °F (36.6 °C) 74 20 113/64 95 %      Temp Source Heart Rate Source Patient Position - Orthostatic VS BP Location FiO2 (%)   Temporal Monitor Sitting Left arm --      Pain Score       3           Vitals:    08/15/23 1236   BP: 113/64   Pulse: 74   Patient Position - Orthostatic VS: Sitting         Visual Acuity      ED Medications  Medications   furosemide (LASIX) injection 40 mg (has no administration in time range)       Diagnostic Studies  Results Reviewed Procedure Component Value Units Date/Time    COVID/FLU/RSV [[id number]]     Lab Status: No result Specimen: Nares from Nose     B-Type Natriuretic Peptide(BNP) [[id number]]  (Abnormal) Collected: 08/15/23 1242    Lab Status: Final result Specimen: Blood from Arm, Right Updated: 08/15/23 1409     BNP 1,278 pg/mL     HS Troponin 0hr (reflex protocol) [[id number]]  (Abnormal) Collected: 08/15/23 1242    Lab Status: Final result Specimen: Blood from Arm, Right Updated: 08/15/23 1316     hs TnI 0hr 277 ng/L     HS Troponin I 2hr [[id number]]     Lab Status: No result Specimen: Blood     Comprehensive metabolic panel [[id number]]  (Abnormal) Collected: 08/15/23 1242    Lab Status: Final result Specimen: Blood from Arm, Right Updated: 08/15/23 1309     Sodium 143 mmol/L      Potassium 4.6 mmol/L      Chloride 105 mmol/L      CO2 31 mmol/L      ANION GAP 7 mmol/L      BUN 44 mg/dL      Creatinine 1.69 mg/dL      Glucose 164 mg/dL      Calcium 9.5 mg/dL      AST 34 U/L      ALT 38 U/L      Alkaline Phosphatase 97 U/L      Total Protein 7.1 g/dL      Albumin 4.0 g/dL      Total Bilirubin 0.54 mg/dL      eGFR 38 ml/min/1.73sq m     Narrative:      [facility] guidelines for Chronic Kidney Disease (CKD):   •  Stage 1 with normal or high GFR (GFR > 90 mL/min/1.73 square meters)  •  Stage 2 Mild CKD (GFR = 60-89 mL/min/1.73 square meters)  •  Stage 3A Moderate CKD (GFR = 45-59 mL/min/1.73 square meters)  •  Stage 3B Moderate CKD (GFR = 30-44 mL/min/1.73 square meters)  •  Stage 4 Severe CKD (GFR = 15-29 mL/min/1.73 square meters)  •  Stage 5 End Stage CKD (GFR <15 mL/min/1.73 square meters)  Note: GFR calculation is accurate only with a steady state creatinine    CBC and differential [[id number]]  (Abnormal) Collected: 08/15/23 1242    Lab Status: Final result Specimen: Blood from Arm, Right Updated: 08/15/23 1247     WBC 11.66 Thousand/uL      RBC 5.12 Million/uL      Hemoglobin 9.9 g/dL      Hematocrit 36.8 % MCV 72 fL      MCH 19.3 pg      MCHC 26.9 g/dL      RDW 31.8 %      Platelets 394 Thousands/uL      nRBC 0 /100 WBCs      Neutrophils Relative 73 %      Immat GRANS % 1 %      Lymphocytes Relative 11 %      Monocytes Relative 11 %      Eosinophils Relative 4 %      Basophils Relative 0 %      Neutrophils Absolute 8.47 Thousands/µL      Immature Grans Absolute 0.12 Thousand/uL      Lymphocytes Absolute 1.25 Thousands/µL      Monocytes Absolute 1.32 Thousand/µL      Eosinophils Absolute 0.45 Thousand/µL      Basophils Absolute 0.05 Thousands/µL                  XR chest pa & lateral   ED Interpretation by Dennie Gibbs, MD (08/15 1312)   Small left pleural effusion. No pulmonary infiltrate                 Procedures  ECG 12 Lead Documentation Only    Date/Time: 8/15/2023 12:42 PM    Performed by: Dennie Gibbs, MD  Authorized by: Dennie Gibbs, MD    Indications / Diagnosis:  Chest pain  ECG reviewed by me, the ED Provider: yes    Patient location:  ED  Previous ECG:     Previous ECG:  Unavailable  Interpretation:     Interpretation: non-specific    Rate:     ECG rate:  75    ECG rate assessment: normal    Rhythm:     Rhythm: sinus rhythm    Ectopy:     Ectopy: none    QRS:     QRS axis:  Left    QRS intervals:  Normal  Conduction:     Conduction: normal    ST segments:     ST segments:  Normal  T waves:     T waves: inverted      Inverted:  III and aVF             ED Course  ED Course as of 08/15/23 1425   Tue Aug 15, 2023   1333 hs TnI 0hr(!): 277  951 five days ago   1336 Creatinine(!): 1.69  1.52 yesterday   3426 Discussed with cardiology. MI unlikely.   Okay to remain at [address] [facility] Most Recent Value   Heart Score Risk Calculator    History 1 Filed at: 08/15/2023 1337   ECG 1 Filed at: 08/15/2023 1337   Age 2 Filed at: 08/15/2023 1337   Risk Factors 2 Filed at: 08/15/2023 1337   Troponin 2 Filed at: 08/15/2023 1337   HEART Score 8 Filed at: 08/15/2023 1337                        SBIRT 22yo+    Flowsheet Row Most Recent Value   Initial Alcohol Screen: US AUDIT-C     1. How often do you have a drink containing alcohol? 0 Filed at: 08/15/2023 1238   2. How many drinks containing alcohol do you have on a typical day you are drinking? 0 Filed at: 08/15/2023 1238   3a. Male UNDER 65: How often do you have five or more drinks on one occasion? 0 Filed at: 08/15/2023 1238   3b. FEMALE Any Age, or MALE 65+: How often do you have 4 or more drinks on one occassion? 0 Filed at: 08/15/2023 1238   Audit-C Score 0 Filed at: 08/15/2023 1238   ORTEGA: How many times in the past year have you. .. Used an illegal drug or used a prescription medication for non-medical reasons? Never Filed at: 08/15/2023 1238                    Medical Decision Making  69year old male presents for evaluation of chest pain. EKG without acute ischemic changes or arrhythmia on my independent interpretation. CXR with small pleural effusion, no infiltrate on my independent interpretation. BNP elevated consistent with CHF exacerbation. Lasix ordered. Stable CKD and anemia on labs. Elevated troponin; however, decreased since it was checked 5 days ago. HEART score 8. Discussed with cardiology. Patient admitted for observation. Amount and/or Complexity of Data Reviewed  Labs: ordered. Decision-making details documented in ED Course. Radiology: independent interpretation performed. Risk  Prescription drug management. Decision regarding hospitalization.           Disposition  Final diagnoses:   Chest pain   CHF exacerbation ([address])     Time reflects when diagnosis was documented in both MDM as applicable and the Disposition within this note     Time User Action Codes Description Comment    8/15/2023  1:50 PM Kaila Frame Add [R07.9] Chest pain     8/15/2023  2:08 PM Lori Hence Add [R77.8] Elevated troponin     8/15/2023  2:08 PM Dimitry Titus [R77.8] Elevated troponin     8/15/2023  2:24 PM Mary Levine Add [I50.9] CHF exacerbation [facility])       ED Disposition     ED Disposition   Admit    Condition   Stable    Date/Time   Tue Aug 15, 2023  1:51 PM    Comment   Case was discussed with VINNY and the patient's admission status was agreed to be Admission Status: observation status to the service of Dr. Lisa Wilson . Follow-up Information    None         Patient's Medications   Discharge Prescriptions    No medications on file       No discharge procedures on file.     PDMP Review       Value Time User    PDMP Reviewed  Yes 8/7/2023 10:22 PM Olivier Flaherty PA-C          ED Provider  Electronically Signed by           Janay Baumann MD  08/15/23 1098       Janay Baumann MD  08/15/23 7916

## 2023-08-15 NOTE — ASSESSMENT & PLAN NOTE
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 Michelle Headley with surgical oncology as an outpatient, did not feel like a biopsy was necessary and was referred to Dr. Filipe De Santiago for Y90 radioembolization  • IR was consulted for possible biopsy while inpatient, deferred with radioembolization already in process of scheduling  • Continue outpatient follow up

## 2023-08-15 NOTE — CONSULTS
Consult - Cardiology   Robert White 69 y.o. male MRN: [id number]  Unit/Bed#: Z2 H1 Encounter: [id number]        Reason For Consult: Heart failure   outpatient Cardiologist: Dr. Rica Redding:  1. Acute on chronic heart failure with mildly reduced EF  a. 5/2023 Echo: LVEF 36%, grade 1 diastolic dysfunction, mild mitral insufficiency  b. 8/2023 echo: LVEF 45%, mild global hypokinesis  c. CXR on admission showing bilateral pulmonary edema probably with small pleural effusions and BNP increased from prior admission which was around 1000 now up to 1200  2. Paroxysmal atrial fibrillation  a. Eliquis for CVA risk reduction  b. Toprol-XL 50 daily for AV blocking  3. Non-MI troponin elevation secondary to heart failure  a. 6/2023 NST: No ischemia  a. Initial troponin 277 --> 245  4. Recent history of anemia  a. 8/2023 admission for blood loss anemia at which time he did require 2 units of packed red blood cells and underwent EGD and colonoscopy with subsequent clipping of AV malformations  b. Hemoglobin has since been stable even with restarting Eliquis currently trending around 8-9 and per patient having bowel movements at home without any noticeable blood  5. CKD baseline creatinine 1.3-1.6  6. History of DVT for which he takes Eliquis  7. Hepatocellular cancer  8. Type 2 diabetes    PLAN/ DISCUSSION:     • Diurese with IV Lasix  • Repeat limited echo  • If EF remains down likely will need repeat ischemic evaluation  • Trend daily labs, standing weight, I/O  • Continue remainder of medications as ordered    History Of Present Illness: There is a pleasant 69year-old just discharged from the hospital yesterday. He was hospitalized for acute anemia with blood loss from a GI source. He has a cardiac past medical history as outlined above and has recently established with our group having last been seen several months ago.   Earlier this year he had a cardiac work-up including echocardiogram and nuclear stress test both of which were unremarkable. On his recent admission after receiving 1 unit of blood he went to pulmonary edema. We saw him in consultation. We treated him with IV diuretics and he did well. He was able to be weaned down to room air and looks euvolemic. We placed him on 40 of oral Lasix daily. He was noted to have a significant troponin elevation this admission which peaked around 5000 and was thought to be related to his acute blood loss. An echo was performed which showed an EF of 45% which was thought to be related to his acute illness and there were plans for repeat echo once he had recovered. He felt okay on discharge yesterday but overnight he tells me he had a lot of trouble sleeping and was "tossing and turning" all night. He felt some left-sided chest pain whenever he would turn onto his left side but this would resolve with changing position. He felt somewhat weak. This morning he was in contact with home nursing who called his cardiologist who advised that he come to the ER for evaluation. Past Medical History:        Past Medical History:   Diagnosis Date   • Atrial fibrillation ([address])    • Deep vein thrombosis (DVT) of right lower extremity ([address])    • Diverticulitis       Past Surgical History:   Procedure Laterality Date   • APPENDECTOMY     • BOWEL RESECTION  2014   • CATARACT EXTRACTION Bilateral         Allergy:        No Known Allergies    Medications:       Prior to Admission medications    Medication Sig Start Date End Date Taking?  Authorizing Provider   acetaminophen (TYLENOL) 650 mg CR tablet Take 650 mg by mouth every 8 (eight) hours as needed for mild pain    Historical Provider, MD   Continuous Blood Gluc Sensor (Dexcom G6 Sensor) MISC Use daily as directed for CGM - Change every 10 days 9/8/22   Jackie Rowe DO   Continuous Blood Gluc Transmit (Dexcom G6 Transmitter) MISC Use daily as directed for CGM - Change every 3 months 9/8/22   Jackie Rowe DO   dulaglutide (Trulicity) 0.09 RU/4.1QO injection Inject 0.5 mL (0.75 mg total) under the skin once a week 4/5/23   Pollo Owusu MD   Eliquis 5 MG take 1 tablet by mouth twice a day 4/3/23   Pollo Owusu MD   ferrous sulfate 325 (65 Fe) mg tablet Take 1 tablet (325 mg total) by mouth daily with breakfast 8/14/23   Mena Titus PA-C   furosemide (LASIX) 40 mg tablet Take 1 tablet (40 mg total) by mouth daily Do not start before August 15, 2023. 8/15/23 9/14/23  Mena Titus PA-C   insulin aspart (NovoLOG FlexPen) 100 UNIT/ML injection pen Inject 40 Units under the skin 3 (three) times a day with meals Inject 40 units before breakfast  Inject 35 units before dinner  Patient taking differently: Inject 40 Units under the skin 3 (three) times a day with meals 28 u breakfast time, 38 u lunchtime, 38 u dinnertime 3/28/23 8/2/23  NADIA Bowman   Insulin Pen Needle (Pen Needles) 32G X 4 MM MISC Use 4 (four) times a day 9/15/22 9/15/23  Ernesto Griffith MD   Levemir FlexPen 100 units/mL injection pen INJECT 75 UNITS UNDER THE SKIN DAILY AT BEDTIME 7/3/23   Natalie Chavez,    metoprolol succinate (TOPROL-XL) 50 mg 24 hr tablet Take 50 mg by mouth daily 6/20/23   Historical Provider, MD   mometasone (ELOCON) 0.1 % cream Apply topically daily for 7 days 6/5/23 8/2/23  Pollo Owusu MD   Multiple Vitamin (MULTIVITAMIN ADULT PO) Take 1 tablet by mouth daily    Historical Provider, MD   pantoprazole (PROTONIX) 40 mg tablet Take 1 tablet (40 mg total) by mouth 2 (two) times a day 8/14/23   Mena Titus PA-C   Potassium Citrate ER 15 MEQ (1620 MG) TBCR Take 1 tablet by mouth in the morning 1 tab po daily  Patient taking differently: Take 1 tablet by mouth 2 (two) times a day 9/1/22   Ernesto Griffith MD   pregabalin (LYRICA) 100 mg capsule take 1 capsule by mouth three times a day 7/21/23   Ernesto Griffith MD   saxagliptin (Onglyza) 5 MG tablet Take 5 mg by mouth daily with dinner    Historical Provider, MD   sertraline (ZOLOFT) 25 mg tablet Take 25 mg by mouth daily    Historical Provider, MD   SUPER B COMPLEX/C PO Take 1 tablet by mouth daily    Historical Provider, MD   tamsulosin (FLOMAX) 0.4 mg Take 0.4 mg by mouth daily with dinner    Historical Provider, MD   traZODone (DESYREL) 100 mg tablet take 2 tablets by mouth at bedtime 23   Minnie Jacinto MD   umeclidinium-vilanterol 62.5-25 mcg/actuation inhaler Inhale 1 puff daily 5/10/23   Minnie Jacinto MD       Family History:     Family History   Problem Relation Age of Onset   • Hypertension Mother    • Bone cancer Father    • Diabetes type II Sister    • Diabetes type II Sister    • Esophageal cancer Brother         Social History:       Social History     Socioeconomic History   • Marital status: /Civil Union     Spouse name: None   • Number of children: None   • Years of education: None   • Highest education level: None   Occupational History   • None   Tobacco Use   • Smoking status: Former     Packs/day: 1.00     Years: 30.00     Total pack years: 30.00     Types: Cigarettes     Start date:      Quit date:      Years since quittin.6   • Smokeless tobacco: Never   Vaping Use   • Vaping Use: Never used   Substance and Sexual Activity   • Alcohol use: Yes     Comment: Socially   • Drug use: Never   • Sexual activity: None   Other Topics Concern   • None   Social History Narrative   • None     Social Determinants of Health     Financial Resource Strain: Medium Risk (2022)    Overall Financial Resource Strain (CARDIA)    • Difficulty of Paying Living Expenses: Somewhat hard   Food Insecurity: No Food Insecurity (2023)    Hunger Vital Sign    • Worried About Running Out of Food in the Last Year: Never true    • Ran Out of Food in the Last Year: Never true   Transportation Needs: No Transportation Needs (2023)    PRAPARE - Transportation    • Lack of Transportation (Medical): No    • Lack of Transportation (Non-Medical): No   Physical Activity: Not on file   Stress: Not on file   Social Connections: Not on file   Intimate Partner Violence: Not on file   Housing Stability: Low Risk  (8/7/2023)    Housing Stability Vital Sign    • Unable to Pay for Housing in the Last Year: No    • Number of Places Lived in the Last Year: 1    • Unstable Housing in the Last Year: No       ROS:  14 point ROS negative except as outlined above  Remainder review of systems is negative    Exam:  General:  alert, oriented and in no distress, cooperative  Head: Normocephalic, atraumatic. Eyes:  EOMI. Pupils - equal, round, reactive to accomodation. No icterus. Normal Conjunctiva. Oropharynx: moist and normal-appearing mucosa  Neck: supple, symmetrical, trachea midline and no JVD  Heart:  RRR, No: murmer, rub or gallop, S1 & S2 normal   Respiratory effort / Chest Inspection: unlabored  Lungs: No wheezing or rhonchi. Decreased breath sounds at lung bases, some faint rales at lung bases  Abdomen: flat, normal findings: bowel sounds normal and soft, non-tender  Lower Limbs:  no pitting edema  Pulses[de-identified]  RLE - DP: present 2+                 LLE - DP: present 2+  Musculoskeletal: ROM grossly normal        DATA:      ECG:                     Telemetry: Normal sinus rhythm, . HR 60s and 70s          Echocardiogram:           Ischemic Testing:         Weights: Wt Readings from Last 3 Encounters:   08/15/23 94.8 kg (209 lb)   08/14/23 95 kg (209 lb 7 oz)   08/02/23 101 kg (222 lb)   , Body mass index is 28.35 kg/m².          Lab Studies:             Results from last 7 days   Lab Units 08/15/23  1242 08/14/23  0425 08/13/23  0450   WBC Thousand/uL 11.66* 12.09* 9.06   HEMOGLOBIN g/dL 9.9* 9.1* 8.1*   HEMATOCRIT % 36.8 33.6* 29.6*   PLATELETS Thousands/uL 225 229 213   ,   Results from last 7 days   Lab Units 08/15/23  1242 08/14/23  0425 08/13/23  0450 08/11/23  0452 08/10/23  1910 POTASSIUM mmol/L 4.6 4.1 4.6 3.5 4.3   CHLORIDE mmol/L 105 103 102 106 101   CO2 mmol/L 31 29 29 26 26   BUN mg/dL 44* 39* 41* 38* 47*   CREATININE mg/dL 1.69* 1.52* 1.82* 1.69* 1.92*   CALCIUM mg/dL 9.5 9.3 8.7 8.1* 9.0   ALK PHOS U/L 97  --   --  70 75   ALT U/L 38  --   --  29 32   AST U/L 34  --   --  34 43*

## 2023-08-15 NOTE — ASSESSMENT & PLAN NOTE
Lab Results   Component Value Date    HGBA1C 7.6 (H) 01/06/2023     · Home regimen:  · Trulicity, saxagliptin (hold)  · Levemir 75 units at bedtime, NovoLog 20 units breakfast, 30 units lunch/dinner  · While inpatient:  · Levemir reduced to 30 units at bedtime  · NovoLog 10 units 3 times daily AC  · ISS and Accu-Cheks, increase insulin as needed  · Diabetic diet

## 2023-08-15 NOTE — CASE COMMUNICATION
Unable to lock [facility] visit. Please let me know when I can change the visit type to assess not admit. Trista Owens RN  ----- Message -----  From: Cherelle Francois RN  Sent: 8/15/2023   3:26 PM EDT  To: Suzette Jimenez RN; Mike Owusu, RN; *      Patient was placed on OBServation will not close this episode yet. .. ----- Message -----  From: Cherelle Francois RN  Sent: 8/15/2023  12:56 PM EDT  To: Suzette Jimenez RN; Mike Owusu RN; Senthil Cruz , I will wait till we get confirmation that the patient is admitted before I close this chart. . I see you did start a SOC. .... If they keep him in OBS or outpatient then he  could be appropriate for [address] [place] [address],[address]   ----- Message -----  From: Yamila De Los Santos RN  Sent: 8/15/2023  12:20 PM EDT  To: Suzette Jimenez RN; Jos Cintron RN; *    Notification of Assess not Admit    [place] [facility] has assessed your patient for Home Health services and has dete rmined the patient is not eligible for service due to the following: Needs [facility]    TT to Nitin Cadena MD. PCP on service, so TT to cardiologist Dr. America Zaldivar to report patient's report of Lynita Bonita on left side patient reports funny feeling dull mild pain on sternum to left side of chest started after returning from hospital associated with SOB intermittently. Last couple minutes and goes away. Currently having symptoms at rest.  Not associated with exertion, GI symptoms or diaphoresis. Patient reports dyspnea with lying on right side. HR 84 Pulse ox 94% /54. Also reports fuzzy vision and lightheadedness at rest. Dr. America Zaldivar would like patient to return to ED since symptoms started after hospital. Patient and wife refuse ambulance and wife will take patient to ED it is 5 minutes away.      Trista Owens RN

## 2023-08-15 NOTE — ASSESSMENT & PLAN NOTE
· Recently treated for GIB on prior admission- s/p EGD & C-scope with clipping of gastric/duodenal angiectasias + 2 units PRBC with stabilization  · Restarted on eliquis during prior admission following GI clearance   · Hgb currently stable at 9.9, patient denies recurrent bleeding  · Continue PPI BID   · Daily CBC

## 2023-08-16 ENCOUNTER — APPOINTMENT (OUTPATIENT)
Dept: NON INVASIVE DIAGNOSTICS | Facility: HOSPITAL | Age: 78
DRG: 291 | End: 2023-08-16
Payer: COMMERCIAL

## 2023-08-16 LAB
ALBUMIN SERPL BCP-MCNC: 3.9 G/DL (ref 3.5–5)
ALP SERPL-CCNC: 95 U/L (ref 34–104)
ALT SERPL W P-5'-P-CCNC: 36 U/L (ref 7–52)
ANION GAP SERPL CALCULATED.3IONS-SCNC: 8 MMOL/L
AORTIC ROOT: 3.6 CM
APICAL FOUR CHAMBER EJECTION FRACTION: 43 %
AST SERPL W P-5'-P-CCNC: 36 U/L (ref 13–39)
BACTERIA BLD CULT: NORMAL
BACTERIA BLD CULT: NORMAL
BASOPHILS # BLD AUTO: 0.08 THOUSANDS/ÂΜL (ref 0–0.1)
BASOPHILS NFR BLD AUTO: 1 % (ref 0–1)
BILIRUB SERPL-MCNC: 0.72 MG/DL (ref 0.2–1)
BUN SERPL-MCNC: 42 MG/DL (ref 5–25)
CALCIUM SERPL-MCNC: 9.5 MG/DL (ref 8.4–10.2)
CHLORIDE SERPL-SCNC: 101 MMOL/L (ref 96–108)
CO2 SERPL-SCNC: 32 MMOL/L (ref 21–32)
CREAT SERPL-MCNC: 1.69 MG/DL (ref 0.6–1.3)
EOSINOPHIL # BLD AUTO: 0.56 THOUSAND/ÂΜL (ref 0–0.61)
EOSINOPHIL NFR BLD AUTO: 5 % (ref 0–6)
ERYTHROCYTE [DISTWIDTH] IN BLOOD BY AUTOMATED COUNT: 32.3 % (ref 11.6–15.1)
FRACTIONAL SHORTENING: 28 % (ref 28–44)
GFR SERPL CREATININE-BSD FRML MDRD: 38 ML/MIN/1.73SQ M
GLUCOSE SERPL-MCNC: 121 MG/DL (ref 65–140)
GLUCOSE SERPL-MCNC: 160 MG/DL (ref 65–140)
GLUCOSE SERPL-MCNC: 181 MG/DL (ref 65–140)
GLUCOSE SERPL-MCNC: 225 MG/DL (ref 65–140)
GLUCOSE SERPL-MCNC: 284 MG/DL (ref 65–140)
HCT VFR BLD AUTO: 37.7 % (ref 36.5–49.3)
HGB BLD-MCNC: 10.2 G/DL (ref 12–17)
IMM GRANULOCYTES # BLD AUTO: 0.09 THOUSAND/UL (ref 0–0.2)
IMM GRANULOCYTES NFR BLD AUTO: 1 % (ref 0–2)
INTERVENTRICULAR SEPTUM IN DIASTOLE (PARASTERNAL SHORT AXIS VIEW): 0.9 CM
INTERVENTRICULAR SEPTUM: 0.9 CM (ref 0.6–1.1)
LEFT ATRIUM SIZE: 3.7 CM
LEFT INTERNAL DIMENSION IN SYSTOLE: 3.8 CM (ref 2.1–4)
LEFT VENTRICLE DIASTOLIC VOLUME (MOD BIPLANE): 140 ML
LEFT VENTRICLE SYSTOLIC VOLUME (MOD BIPLANE): 64 ML
LEFT VENTRICULAR INTERNAL DIMENSION IN DIASTOLE: 5.3 CM (ref 3.5–6)
LEFT VENTRICULAR POSTERIOR WALL IN END DIASTOLE: 0.9 CM
LEFT VENTRICULAR STROKE VOLUME: 76 ML
LV EF: 54 %
LVSV (TEICH): 76 ML
LYMPHOCYTES # BLD AUTO: 1.79 THOUSANDS/ÂΜL (ref 0.6–4.47)
LYMPHOCYTES NFR BLD AUTO: 15 % (ref 14–44)
MCH RBC QN AUTO: 19.7 PG (ref 26.8–34.3)
MCHC RBC AUTO-ENTMCNC: 27.1 G/DL (ref 31.4–37.4)
MCV RBC AUTO: 73 FL (ref 82–98)
MONOCYTES # BLD AUTO: 1.48 THOUSAND/ÂΜL (ref 0.17–1.22)
MONOCYTES NFR BLD AUTO: 13 % (ref 4–12)
NEUTROPHILS # BLD AUTO: 7.69 THOUSANDS/ÂΜL (ref 1.85–7.62)
NEUTS SEG NFR BLD AUTO: 65 % (ref 43–75)
NRBC BLD AUTO-RTO: 0 /100 WBCS
PLATELET # BLD AUTO: 233 THOUSANDS/UL (ref 149–390)
POTASSIUM SERPL-SCNC: 4.7 MMOL/L (ref 3.5–5.3)
PROT SERPL-MCNC: 6.9 G/DL (ref 6.4–8.4)
RBC # BLD AUTO: 5.18 MILLION/UL (ref 3.88–5.62)
SL CV LV EF: 55
SL CV PED ECHO LEFT VENTRICLE DIASTOLIC VOLUME (MOD BIPLANE) 2D: 138 ML
SL CV PED ECHO LEFT VENTRICLE SYSTOLIC VOLUME (MOD BIPLANE) 2D: 62 ML
SODIUM SERPL-SCNC: 141 MMOL/L (ref 135–147)
WBC # BLD AUTO: 11.69 THOUSAND/UL (ref 4.31–10.16)

## 2023-08-16 PROCEDURE — 93308 TTE F-UP OR LMTD: CPT | Performed by: INTERNAL MEDICINE

## 2023-08-16 PROCEDURE — 85025 COMPLETE CBC W/AUTO DIFF WBC: CPT

## 2023-08-16 PROCEDURE — 99232 SBSQ HOSP IP/OBS MODERATE 35: CPT

## 2023-08-16 PROCEDURE — 93308 TTE F-UP OR LMTD: CPT

## 2023-08-16 PROCEDURE — 93325 DOPPLER ECHO COLOR FLOW MAPG: CPT | Performed by: INTERNAL MEDICINE

## 2023-08-16 PROCEDURE — 93321 DOPPLER ECHO F-UP/LMTD STD: CPT | Performed by: INTERNAL MEDICINE

## 2023-08-16 PROCEDURE — 80053 COMPREHEN METABOLIC PANEL: CPT

## 2023-08-16 PROCEDURE — 99214 OFFICE O/P EST MOD 30 MIN: CPT | Performed by: INTERNAL MEDICINE

## 2023-08-16 PROCEDURE — 82948 REAGENT STRIP/BLOOD GLUCOSE: CPT

## 2023-08-16 RX ORDER — FUROSEMIDE 10 MG/ML
40 INJECTION INTRAMUSCULAR; INTRAVENOUS
Status: DISCONTINUED | OUTPATIENT
Start: 2023-08-16 | End: 2023-08-16

## 2023-08-16 RX ORDER — FUROSEMIDE 10 MG/ML
80 INJECTION INTRAMUSCULAR; INTRAVENOUS
Status: DISCONTINUED | OUTPATIENT
Start: 2023-08-16 | End: 2023-08-17

## 2023-08-16 RX ADMIN — INSULIN LISPRO 10 UNITS: 100 INJECTION, SOLUTION INTRAVENOUS; SUBCUTANEOUS at 12:44

## 2023-08-16 RX ADMIN — PREGABALIN 100 MG: 100 CAPSULE ORAL at 21:01

## 2023-08-16 RX ADMIN — INSULIN LISPRO 4 UNITS: 100 INJECTION, SOLUTION INTRAVENOUS; SUBCUTANEOUS at 12:44

## 2023-08-16 RX ADMIN — INSULIN DETEMIR 30 UNITS: 100 INJECTION, SOLUTION SUBCUTANEOUS at 21:02

## 2023-08-16 RX ADMIN — TAMSULOSIN HYDROCHLORIDE 0.4 MG: 0.4 CAPSULE ORAL at 17:00

## 2023-08-16 RX ADMIN — FUROSEMIDE 80 MG: 10 INJECTION, SOLUTION INTRAMUSCULAR; INTRAVENOUS at 17:04

## 2023-08-16 RX ADMIN — APIXABAN 5 MG: 5 TABLET, FILM COATED ORAL at 17:00

## 2023-08-16 RX ADMIN — INSULIN LISPRO 10 UNITS: 100 INJECTION, SOLUTION INTRAVENOUS; SUBCUTANEOUS at 16:58

## 2023-08-16 RX ADMIN — PANTOPRAZOLE SODIUM 40 MG: 40 TABLET, DELAYED RELEASE ORAL at 17:00

## 2023-08-16 RX ADMIN — PREGABALIN 100 MG: 100 CAPSULE ORAL at 17:00

## 2023-08-16 RX ADMIN — APIXABAN 5 MG: 5 TABLET, FILM COATED ORAL at 08:31

## 2023-08-16 RX ADMIN — INSULIN LISPRO 1 UNITS: 100 INJECTION, SOLUTION INTRAVENOUS; SUBCUTANEOUS at 08:31

## 2023-08-16 RX ADMIN — FERROUS SULFATE TAB 325 MG (65 MG ELEMENTAL FE) 325 MG: 325 (65 FE) TAB at 08:32

## 2023-08-16 RX ADMIN — PREGABALIN 100 MG: 100 CAPSULE ORAL at 08:32

## 2023-08-16 RX ADMIN — UMECLIDINIUM BROMIDE AND VILANTEROL TRIFENATATE 1 PUFF: 62.5; 25 POWDER RESPIRATORY (INHALATION) at 08:32

## 2023-08-16 RX ADMIN — INSULIN LISPRO 10 UNITS: 100 INJECTION, SOLUTION INTRAVENOUS; SUBCUTANEOUS at 08:30

## 2023-08-16 RX ADMIN — SERTRALINE HYDROCHLORIDE 25 MG: 25 TABLET ORAL at 09:11

## 2023-08-16 RX ADMIN — PERFLUTREN 0.6 ML/MIN: 6.52 INJECTION, SUSPENSION INTRAVENOUS at 09:37

## 2023-08-16 RX ADMIN — PANTOPRAZOLE SODIUM 40 MG: 40 TABLET, DELAYED RELEASE ORAL at 06:08

## 2023-08-16 RX ADMIN — FUROSEMIDE 40 MG: 10 INJECTION, SOLUTION INTRAMUSCULAR; INTRAVENOUS at 08:52

## 2023-08-16 RX ADMIN — INSULIN LISPRO 1 UNITS: 100 INJECTION, SOLUTION INTRAVENOUS; SUBCUTANEOUS at 16:59

## 2023-08-16 RX ADMIN — INSULIN LISPRO 2 UNITS: 100 INJECTION, SOLUTION INTRAVENOUS; SUBCUTANEOUS at 21:02

## 2023-08-16 NOTE — CASE MANAGEMENT
Case Management Assessment & Discharge Planning Note    Patient name Adrian Fabian  Location [address] [facility] [place] 330/-01 MRN [id number]  : 1945 Date 2023       Current Admission Date: 8/15/2023  Current Admission Diagnosis:Acute on chronic diastolic HF (heart failure) [facility])   Patient Active Problem List    Diagnosis Date Noted   • Hepatocellular carcinoma ([address])    • Elevated troponin 2023   • Hepatic flexure mass 2023   • Syncope 2023   • Anemia 2023   • Acute on chronic diastolic HF (heart failure) ([address]) 2023   • History of DVT (deep vein thrombosis) 2023   • Restrictive lung disease 2023   • Other emphysema ([address]) 2023   • History of tobacco abuse 2023   • H/O asbestos exposure 2023   • Liver mass 05/10/2023   • Atrial fibrillation, unspecified type ([address]) 02/15/2023   • Peripheral polyneuropathy 2022   • Type 2 diabetes mellitus with neurologic complication, with long-term current use of insulin ([address]) 2022   • Stage 3 chronic kidney disease, unspecified whether stage 3a or 3b CKD ([address]) 2022      LOS (days): 0  Geometric Mean LOS (GMLOS) (days):   Days to GMLOS:     OBJECTIVE:      Current admission status: Observation    [facility]   [id number] [address] [address]  Phone: [phone] Fax: [phone]    Primary Care Provider: Loretta Spears MD    Primary Insurance: Deidre Swenson [facility]  Secondary Insurance:     ASSESSMENT:  [address], [place] [facility]   Primary Phone: [phone] (Mobile)               Advance Directives  Does patient have a [address]?: Yes  Does patient have Advance Directives?: Yes  Advance Directives: Living will, Power of  for health care  Primary Contact: Alois Sol    Readmission Root Cause  30 Day Readmission:  (is obs now but it changed to inpt will be a readmission)    Patient Information  Admitted from[de-identified] Home  Mental Status: Alert  During Assessment patient was accompanied by: Not accompanied during assessment  Assessment information provided by[de-identified] Patient  Primary Caregiver: Self  Support Systems: Self, Spouse/significant other, Family members  [place] of Residence: [address] do you live in?: [facility] entry access options.  Select all that apply.: Stairs  Number of steps to enter home.: 1  Type of Current Residence: Ranch  In the last 12 months, was there a time when you were not able to pay the mortgage or rent on time?: No  In the last 12 months, how many places have you lived?: 1  In the last 12 months, was there a time when you did not have a steady place to sleep or slept in a shelter (including now)?: No  Homeless/housing insecurity resource given?: N/A  Living Arrangements: Lives w/ Spouse/significant other  Is patient a ?: No    Activities of Daily Living Prior to Admission  Functional Status: Independent  Completes ADLs independently?: Yes  Ambulates independently?: Yes  Does patient use assisted devices?: Yes  Assisted Devices (DME) used: Straight Cane  Does patient currently own DME?: Yes  What DME does the patient currently own?: Straight Cane  Does patient have a history of Outpatient Therapy (PT/OT)?: Yes  Does the patient have a history of Short-Term Rehab?: Yes (STR somewhere in [place])  Does patient have a history of HHC?: Yes  Does patient currently have [address]?: Yes    Current Home Health Care  Type of Current Home Care Services: Home PT, Home OT, Nurse visit  [facility][de-identified] [address] [place] [address] Provider[de-identified] PCP    Patient Information Continued  Does patient have prescription coverage?: Yes  Within the past 12 months, you worried that your food would run out before you got the money to buy more.: Never true  Within the past 12 months, the food you bought just didn't last and you didn't have money to get more.: Never true  Food insecurity resource given?: N/A  Does patient receive dialysis treatments?: No  Does patient have a history of substance abuse?: No  Does patient have a history of Mental Health Diagnosis?: No    Means of Transportation  Means of Transport to Appts[de-identified] Drives Self  In the past 12 months, has lack of transportation kept you from medical appointments or from getting medications?: No  In the past 12 months, has lack of transportation kept you from meetings, work, or from getting things needed for daily living?: No  Was application for public transport provided?: N/A    DISCHARGE DETAILS:    Discharge planning discussed with[de-identified] pt  Freedom of Choice: Yes     CM contacted family/caregiver?: No- see comments  Were Treatment Team discharge recommendations reviewed with patient/caregiver?: Yes  Did patient/caregiver verbalize understanding of patient care needs?: Yes  Were patient/caregiver advised of the risks associated with not following Treatment Team discharge recommendations?: Yes    [address]         Is the patient interested in [facility] at discharge?: Yes  [facility] requested[de-identified] Nursing, Occupational Therapy, Physical [address] [facility] Name[de-identified] [place] Provider[de-identified] PCP  Home Health Services Needed[de-identified] Evaluate Functional Status and Safety, Strengthening/Theraputic Exercises to Improve Function, Gait/ADL Training  Supporting Clincal Findings[de-identified] Fatigues Easliy in [employer], Limited Endurance    DME Referral Provided  Referral made for DME?: No    Other Referral/Resources/Interventions Provided:  Referral Comments[facility] Team Recommendation: Home with [facility]  Discharge Destination Plan[de-identified] Home with [facility] at Discharge : Family     Additional Comments: Cm met with pt and reviewed cm role. He will be a readmission if he becomes inpt.  He lives in a ranch home with his spouse with 1 DASH. He is ind with a cane. He has had outpt PT and was had a STR in [place] in the past. He was current with [facility]. He has no MH or D/A hx. Referral sent to [facility] on [facility] for [facility]. Pt did not need O2 at last d/c earlier this week. He uses [facility] for rx needs. He drives himself but spouse will  at d/c.

## 2023-08-16 NOTE — UTILIZATION REVIEW
Initial Clinical Review    Observation 8/15 @ 1351 and changed to Inpatient on 8/17 @ 0709. Pt requiring continued stay for Acute on chronic CHF on Iv Lasix, Increase creat and workup/ treat    Admission: Date/Time/Statement:   Admission Orders (From admission, onward)     Ordered        08/17/23 0709  Inpatient Admission  Once            08/15/23 1351  Place in Observation  Once                      Orders Placed This Encounter   Procedures   • Inpatient Admission     Standing Status:   Standing     Number of Occurrences:   1     Order Specific Question:   Level of Care     Answer:   Med Surg [16]     Order Specific Question:   Estimated length of stay     Answer:   More than 2 Midnights     Order Specific Question:   Certification     Answer:   I certify that inpatient services are medically necessary for this patient for a duration of greater than two midnights. See H&P and MD Progress Notes for additional information about the patient's course of treatment. ED Arrival Information     Expected   -    Arrival   8/15/2023 12:30    Acuity   Emergent            Means of arrival   Wheelchair    Escorted by   Family Member    Service   Hospitalist    Admission type   Emergency            Arrival complaint   Chest pain, Sob           Chief Complaint   Patient presents with   • Shortness of Breath     Patient reports to ED c/o dizziness/lightheadedness, shortness of breath, and chest pain since this morning. Symptoms are intermittent. Cardiologist and home care nurse referred patient to ER. Initial Presentation: 69 y.o. male to ED presents for chest pain/ discomfort with intermittent shortness of breath, started early am disrupting his sleet. Pt states he woke up with left-sided chest pain and difficulty breathing when laying on left side. Placed on O2 2L NC by EMS for SpO2 of 88%.  He was discharged from the hospital yesterday 8/14 after being admitted for syncope and GI bleed complicated by acute on chronic CHF, requiring diuresis given volume overload resulting in hypoxia following transfusion. Eugenie Owusu had been discharged on p.o. Lasix 40 mg once daily, was stable on room air prior to discharge. PMH for CKD, HTN, atrial fibrillation on Eliquis, DVT, history of emphysema, suspected hepatocellular carcinoma, DM 2 on insulin, anemia secondary to recent GI bleeding. Admit Observation level of care for Acute on chronic diastolic HF. Elevated troponin, Anemia. Wean O2 as able. Cardiology consult. Repeat echo. Iv Lasix 40 mg x1 today. Tele monitoring. Troponin on admit 277, f/u 2 and 4 troponin. Continue PPI Bid. Daily Cbc.     8/15  Cardiology cons; Acute on chronic heart failure with mildly reduced EF. Non-MI elevated troponin. Continue Iv Diurese with Iv Lasix. Repeat limited Echo. If EF remains down likely will need repeat ischemic evaluation. Trend daily labs. On exam; Decreased breath sounds at lung bases, some faint rales at lung bases. 8/16  Progress notes; Continue Iv Diuretic. Troponin 277 > 245 > 270. Per Cardiology; LVEF 50-55% on today's echo. There is possible inferior WMA. Continue diuretics -- increased to lasix 80 mg IV BID. Non-MI troponin elevation - although no active s/s of ACS, he does have WMA on echo and troponins were quite elevated last week, although confounded by GIB and anemia. Once euvolemic we can check NM SPECT MPI to eval for infarct or ischemia. Hgb continues to improve and is 10 today (tolerating Eliquis). Continue Toprol-XL. 8/17 Changed to Inpatient status   Progress notes; Continues on Iv diuretic with Iv Lasix 80 mg bid. Creat increase to 1.94. Check PVR, UA and monitor for urinary retention. Echo 50 to 55%, basal inferior mid inferior hypokinetic. Once euvolemic plan for ischemic evaluation. Continue tele monitoring. BMP daily.    Pt chest pain-free at this time.      Date: 8/17   Day 3: Has surpassed a 2nd midnight with active treatments and services, which include Acute CHF, continues on Iv diuretics. ED Triage Vitals [08/15/23 1236]   Temperature Pulse Respirations Blood Pressure SpO2   97.8 °F (36.6 °C) 74 20 113/64 95 %      Temp Source Heart Rate Source Patient Position - Orthostatic VS BP Location FiO2 (%)   Temporal Monitor Sitting Left arm --      Pain Score       3          Wt Readings from Last 1 Encounters:   08/17/23 94.2 kg (207 lb 10.8 oz)     Additional Vital Signs:   08/17/23 07:35:19 97.4 °F (36.3 °C)   Abnormal  90 14 133/61 85 93 % -- -- -- --   08/16/23 20:43:30 97.5 °F (36.4 °C) 85 16 136/61 86 96 % -- -- -- --   08/16/23 1841 -- -- -- -- -- 96 % 24 1 L/min Nasal cannula --   08/16/23 1704 -- 72 -- 118/61 -- 96 % -- -- None (Room air) --   08/16/23 14:20:08 97.7 °F (36.5 °C) 71 -- 109/57 74 94 % -- -- --      08/16/23 08:26:52 97.4 °F (36.3 °C)   Abnormal  80 16 109/87 94 93 % -- -- -- --   08/15/23 2100 -- -- -- -- -- -- 24 1 L/min Nasal cannula --   08/15/23 20:02:01 97.5 °F (36.4 °C) 71 18 128/59 82 93 % -- -- -- --   08/15/23 1709 -- -- -- -- -- -- 28 2 L/min Nasal cannula --   08/15/23 1602 -- -- -- -- -- 92 % 28 2 L/min Nasal cannula --   08/15/23 15:49:17 97.9 °F (36.6 °C) 67 16 137/60 86 96 % -- -- -- --   08/15/23 1309 -- -- -- -- -- -- -- -- None (Room air)      Pertinent Labs/Diagnostic Test Results:   XR chest pa & lateral   ED Interpretation by Olman Adams MD (08/15 1312)   Small left pleural effusion. No pulmonary infiltrate      Final Result by Roel Mckenna MD (08/15 2430)      Minimal right-sided pleural effusion. Emphysematous changes. No focal consolidative airspace opacity to suggest pneumonia. Resident: Roxanna Morton, the attending radiologist, have reviewed the images and agree with the final report above.       Workstation performed: [id number]           8/16 Echo -    •  Left Ventricle: Left ventricular cavity size is normal. Wall thickness is normal. The left ventricular ejection fraction is 50-55% by visual estimation. . Systolic function is low normal.  •  The following segments are hypokinetic: basal inferior and mid inferior.   •  All other segments are normal.  •  Right Ventricle: Right ventricular cavity size is normal. Systolic function is normal.    Results from last 7 days   Lab Units 08/15/23  1438   SARS-COV-2  Negative     Lab Units 08/16/23  0442 08/15/23  1242   WBC Thousand/uL 11.69* 11.66*   HEMOGLOBIN g/dL 10.2* 9.9*   HEMATOCRIT % 37.7 36.8   PLATELETS Thousands/uL 233 225   NEUTROS ABS Thousands/µL 7.69* 8.47*         Lab Units 08/16/23  0442 08/15/23  1242   SODIUM mmol/L 141 143   POTASSIUM mmol/L 4.7 4.6   CHLORIDE mmol/L 101 105   CO2 mmol/L 32 31   ANION GAP mmol/L 8 7   BUN mg/dL 42* 44*   CREATININE mg/dL 1.69* 1.69*   EGFR ml/min/1.73sq m 38 38   CALCIUM mg/dL 9.5 9.5   MAGNESIUM mg/dL  --   --      Lab Units 08/16/23  0442 08/15/23  1242   AST U/L 36 34   ALT U/L 36 38   ALK PHOS U/L 95 97   TOTAL PROTEIN g/dL 6.9 7.1   ALBUMIN g/dL 3.9 4.0   TOTAL BILIRUBIN mg/dL 0.72 0.54   AMMONIA umol/L  --   --      Lab Units 08/16/23  0733 08/15/23  2119 08/15/23  1832 08/15/23  1605   POC GLUCOSE mg/dl 160* 169* 134 65     Lab Units 08/16/23  0442 08/15/23  1242   GLUCOSE RANDOM mg/dL 121 164*         Lab Units 08/15/23  1741 08/15/23  1453 08/15/23  1242   HS TNI 0HR ng/L  --   --  277*   HS TNI 2HR ng/L  --  245*  --    HSTNI D2 ng/L  --  -32  --    HS TNI 4HR ng/L 270*  --   --    HSTNI D4 ng/L -7  --   --                Results from last 7 days   Lab Units 08/15/23  1242   BNP pg/mL 1,278*       ED Treatment:   Medication Administration from 08/15/2023 1230 to 08/15/2023 1540     None        Past Medical History:   Diagnosis Date   • Atrial fibrillation ([address])    • Deep vein thrombosis (DVT) of right lower extremity ([address])    • Diverticulitis      Present on Admission:  • Anemia  • Atrial fibrillation, unspecified type ([address])  • Acute on chronic diastolic HF (heart failure) ([facility])  • Elevated troponin  • Stage 3 chronic kidney disease, unspecified whether stage 3a or 3b CKD (HCC)  • Hepatocellular carcinoma (HCC)      Admitting Diagnosis: Shortness of breath [R06.02]  Chest pain [R07.9]  CHF exacerbation (HCC) [I50.9]  Age/Sex: 69 y.o. male     Admission Orders:  Scheduled Medications:  apixaban, 5 mg, Oral, BID  ferrous sulfate, 325 mg, Oral, Daily With Breakfast  furosemide, 80 mg, Intravenous, BID (diuretic)  insulin detemir, 32 Units, Subcutaneous, HS  insulin lispro, 1-5 Units, Subcutaneous, HS  insulin lispro, 1-6 Units, Subcutaneous, TID AC  insulin lispro, 12 Units, Subcutaneous, TID With Meals  metoprolol succinate, 50 mg, Oral, Daily  pantoprazole, 40 mg, Oral, BID AC  potassium chloride, 20 mEq, Oral, Daily  pregabalin, 100 mg, Oral, TID  sertraline, 25 mg, Oral, Daily  tamsulosin, 0.4 mg, Oral, Daily With Dinner  umeclidinium-vilanterol, 1 puff, Inhalation, Daily    furosemide (LASIX) injection 40 mg  Dose: 40 mg  Freq: 2 times daily (diuretic) Route: IV  Start: 08/16/23 0800 End: 08/16/23 1056      Continuous IV Infusions: None     PRN Meds:  acetaminophen, 650 mg, Oral, Q6H PRN        IP CONSULT TO CARDIOLOGY     Network Utilization Review Department  ATTENTION: Please call with any questions or concerns to [phone] and carefully listen to the prompts so that you are directed to the right person. All voicemails are confidential.  Owen Mckeon all requests for admission clinical reviews, approved or denied determinations and any other requests to dedicated fax number below belonging to the campus where the patient is receiving treatment.  List of dedicated fax numbers for the Facilities:  [place] DENIALS (Administrative/Medical Necessity) [phone] [address] [facility] (Maternity/NICU/Pediatrics) [phone]   [address] [facility] Kina Keys [phone]   [address] [facility] [address] [address] [facility] [address] [place] [address] [phone]

## 2023-08-16 NOTE — ASSESSMENT & PLAN NOTE
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 Solo Barber with surgical oncology as an outpatient, did not feel like a biopsy was necessary and was referred to Dr. Micky Nixon for Y90 radioembolization  • IR was consulted for possible biopsy while inpatient, deferred with radioembolization already in process of scheduling  • Continue outpatient follow up

## 2023-08-16 NOTE — ASSESSMENT & PLAN NOTE
· Recently treated for GIB on prior admission- s/p EGD & C-scope with clipping of gastric/duodenal angiectasias + 2 units PRBC with stabilization  · Restarted on eliquis during prior admission following GI clearance   · Hgb currently stable at 10.2 patient denies recurrent bleeding  · Continue PPI BID   · Daily CBC

## 2023-08-16 NOTE — ASSESSMENT & PLAN NOTE
Patient with elevated troponins on prior admission, peaked at 1000 and down trended to 900  · Cardiology consulted, thought 2/2 to acute hypoxia/volume overload rather than true ACS/ischemia  · Patient noted chest discomfort when laying on L side beginning overnight/early morning on 8/15. Has since resolved.   · Troponin on admission 277 > 245 > 270  · Suspect residual elevation from prior admission  · EKG interpreted by ED without ST changes, no STEMI criteria  · Cardiology consulted  · Telemetry

## 2023-08-16 NOTE — ASSESSMENT & PLAN NOTE
Lab Results   Component Value Date    EGFR 38 08/16/2023    EGFR 38 08/15/2023    EGFR 43 08/14/2023    CREATININE 1.69 (H) 08/16/2023    CREATININE 1.69 (H) 08/15/2023    CREATININE 1.52 (H) 08/14/2023     · Creatinine baseline is 1.3-1.6  · Creatinine slightly elevated 1.69 up from 1.52 on prior discharge , suspect more likely due to volume overload rather than dehydration  · Monitor response with IV Lasix  · Avoid hypotension and nephrotoxins

## 2023-08-16 NOTE — PROGRESS NOTES
Progress Note - Cardiology   Luis Perry 69 y.o. male MRN: [id number]  Unit/Bed#: -01 Encounter: [id number]        Problem List:  Principal Problem:    Acute on chronic diastolic HF (heart failure) ([address])  Active Problems:    Stage 3 chronic kidney disease, unspecified whether stage 3a or 3b CKD ([address])    Type 2 diabetes mellitus with neurologic complication, with long-term current use of insulin (HCC)    Atrial fibrillation, unspecified type ([address])    Anemia    Elevated troponin    Hepatocellular carcinoma ([address])      Assessment:  1. Acute on chronic heart failure with mildly reduced EF  a. 5/2023 Echo: LVEF 33%, grade 1 diastolic dysfunction, mild mitral insufficiency  b. 8/2023 echo: LVEF 45%, mild global hypokinesis  c. CXR on admission showing bilateral pulmonary edema probably with small pleural effusions and BNP increased from prior admission which was around 1000 now up to 1200  2. Paroxysmal atrial fibrillation  a. Eliquis for CVA risk reduction  b. Toprol-XL 50 daily for AV blocking  3. Non-MI troponin elevation secondary to heart failure  a. 6/2023 NST: No ischemia  a. Initial troponin 277 --> 245  4. Recent history of anemia  a. 8/2023 admission for blood loss anemia at which time he did require 2 units of packed red blood cells and underwent EGD and colonoscopy with subsequent clipping of AV malformations  b. Hemoglobin has since been stable even with restarting Eliquis currently trending around 8-9 and per patient having bowel movements at home without any noticeable blood  5. CKD baseline creatinine 1.3-1.6  6. History of DVT for which he takes Eliquis  7. Hepatocellular cancer  8.  Type 2 diabetes    Plan/ Discussion:  • Currently diuresing total output yesterday -2.4 L with net -1.6 since admission  • Creatinine remains stable at 1.7  • Hemoglobin continues to improve and is 10 today (tolerating Eliquis)  • Continue IV Lasix but can consider increasing to 80 BID  • Continue Toprol-XL  • Repeat limited echo pending if EF remains depressed will likely need ischemic evaluation    Subjective:  Feeling ok. Still has not difficulty sleeping last night due to shortness of breath. Not urinate very much.   Some left-sided chest pain when he lays on his left side last night but resolved with changing position    Vitals:  Vitals:    08/16/23 0443 08/16/23 0805   Weight: 93.4 kg (205 lb 14.6 oz) 94.8 kg (209 lb)   ,  Vitals:    08/16/23 0443 08/16/23 0805 08/16/23 0826 08/16/23 0900   BP:  113/64 109/87    BP Location:       Pulse:  72 80    Resp:   16    Temp:   (!) 97.4 °F (36.3 °C)    TempSrc:       SpO2:   93% 92%   Weight: 93.4 kg (205 lb 14.6 oz) 94.8 kg (209 lb)     Height:  6' (1.829 m)         Exam:  General: Alert awake and oriented, no acute distress  Heart:  Regular rate and rhythm, no murmurs, Normal S1, no edema    Respiratory effort/ Lungs:  Breathing comfortably on nasal cannula oxygen, clear bilaterally without wheezing, rales, crackles   Abdominal: Non-tender to palpation, + bowel sounds, soft, no masses or distension  Lower Limbs:  No edema            Telemetry:       Normal sinus rhythm, , Heart Rate 70s    Medications:    Current Facility-Administered Medications:   •  acetaminophen (TYLENOL) tablet 650 mg, 650 mg, Oral, Q6H PRN, Dianne Titus PA-C  •  apixaban (ELIQUIS) tablet 5 mg, 5 mg, Oral, BID, Dianne Titus PA-C, 5 mg at 08/16/23 2922  •  ferrous sulfate tablet 325 mg, 325 mg, Oral, Daily With Breakfast, HAKAN Cuevas-C, 325 mg at 08/16/23 7718  •  furosemide (LASIX) injection 40 mg, 40 mg, Intravenous, BID (diuretic), Queta Bobo PA-C, 40 mg at 08/16/23 0118  •  insulin detemir (LEVEMIR) subcutaneous injection 30 Units, 30 Units, Subcutaneous, HS, Dianne Titus PA-C, 30 Units at 08/15/23 5222  •  insulin lispro (HumaLOG) 100 units/mL subcutaneous injection 1-5 Units, 1-5 Units, Subcutaneous, HS, Dianne Samantha Mata PA-C, 1 Units at 08/15/23 2145  •  insulin lispro (HumaLOG) 100 units/mL subcutaneous injection 1-6 Units, 1-6 Units, Subcutaneous, TID AC, 1 Units at 08/16/23 0831 **AND** Fingerstick Glucose (POCT), , , TID AC, Yong Titus PA-C  •  insulin lispro (HumaLOG) 100 units/mL subcutaneous injection 10 Units, 10 Units, Subcutaneous, TID With Meals, Yong Titus PA-C, 10 Units at 08/16/23 0830  •  metoprolol succinate (TOPROL-XL) 24 hr tablet 50 mg, 50 mg, Oral, Daily, Yong Titus PA-C  •  pantoprazole (PROTONIX) EC tablet 40 mg, 40 mg, Oral, BID AC, Yong Titus, PA-C, 40 mg at 08/16/23 6932  •  pregabalin (LYRICA) capsule 100 mg, 100 mg, Oral, TID, Yong Titus, PA-C, 100 mg at 08/16/23 9981  •  sertraline (ZOLOFT) tablet 25 mg, 25 mg, Oral, Daily, Yong Titus, PA-C, 25 mg at 08/16/23 3900  •  tamsulosin (FLOMAX) capsule 0.4 mg, 0.4 mg, Oral, Daily With Dinner, Yong Titus, PA-C, 0.4 mg at 08/15/23 1549  •  umeclidinium-vilanterol 62.5-25 mcg/actuation inhaler 1 puff, 1 puff, Inhalation, Daily, Yong Titus, PA-C, 1 puff at 08/16/23 3467      Labs/Data:        Results from last 7 days   Lab Units 08/16/23 0442 08/15/23  1242 08/14/23  0425   WBC Thousand/uL 11.69* 11.66* 12.09*   HEMOGLOBIN g/dL 10.2* 9.9* 9.1*   HEMATOCRIT % 37.7 36.8 33.6*   PLATELETS Thousands/uL 233 225 229     Results from last 7 days   Lab Units 08/16/23  0442 08/15/23  1242 08/14/23  0425   POTASSIUM mmol/L 4.7 4.6 4.1   CHLORIDE mmol/L 101 105 103   CO2 mmol/L 32 31 29   BUN mg/dL 42* 44* 39*   CREATININE mg/dL 1.69* 1.69* 1.52*

## 2023-08-16 NOTE — RESULT ENCOUNTER NOTE
To: Christina Furnace    The polyps removed during your colonoscopy were adenomas, which are "precancerous", but completely benign, no cancer seen. No further colonoscopy is recommended for screening purposes given your age and medical issues.   No new recommendations other than those discussed in the hospital.  Lyssa Baca regards,    Susanne Lucas MD

## 2023-08-16 NOTE — PLAN OF CARE
Problem: Prexisting or High Potential for Compromised Skin Integrity  Goal: Skin integrity is maintained or improved  Description: INTERVENTIONS:  - Identify patients at risk for skin breakdown  - Assess and monitor skin integrity  - Assess and monitor nutrition and hydration status  - Monitor labs   - Assess for incontinence   - Turn and reposition patient  - Assist with mobility/ambulation  - Relieve pressure over bony prominences  - Avoid friction and shearing  - Provide appropriate hygiene as needed including keeping skin clean and dry  - Evaluate need for skin moisturizer/barrier cream  - Collaborate with interdisciplinary team   - Patient/family teaching  - Consider wound care consult   Outcome: Progressing     Problem: SAFETY ADULT  Goal: Patient will remain free of falls  Description: INTERVENTIONS:  - Educate patient/family on patient safety including physical limitations  - Instruct patient to call for assistance with activity   - Consult OT/PT to assist with strengthening/mobility   - Keep Call bell within reach  - Keep bed low and locked with side rails adjusted as appropriate  - Keep care items and personal belongings within reach  - Initiate and maintain comfort rounds  - Make Fall Risk Sign visible to staff  - Offer Toileting every 2 Hours, in advance of need  - Initiate/Maintain bed alarm  - Obtain necessary fall risk management equipment: socks, walker  - Apply yellow socks and bracelet for high fall risk patients  - Consider moving patient to room near nurses station  Outcome: Progressing  Goal: Maintain or return to baseline ADL function  Description: INTERVENTIONS:  -  Assess patient's ability to carry out ADLs; assess patient's baseline for ADL function and identify physical deficits which impact ability to perform ADLs (bathing, care of mouth/teeth, toileting, grooming, dressing, etc.)  - Assess/evaluate cause of self-care deficits   - Assess range of motion  - Assess patient's mobility; develop plan if impaired  - Assess patient's need for assistive devices and provide as appropriate  - Encourage maximum independence but intervene and supervise when necessary  - Involve family in performance of ADLs  - Assess for home care needs following discharge   - Consider OT consult to assist with ADL evaluation and planning for discharge  - Provide patient education as appropriate  Outcome: Progressing  Goal: Maintains/Returns to pre admission functional level  Description: INTERVENTIONS:  - Perform BMAT or MOVE assessment daily.   - Set and communicate daily mobility goal to care team and patient/family/caregiver. - Collaborate with rehabilitation services on mobility goals if consulted  - Perform Range of Motion 3 times a day. - Reposition patient every 3 hours.   - Dangle patient 3 times a day  - Stand patient 3 times a day  - Ambulate patient 3 times a day  - Out of bed to chair 3 times a day   - Out of bed for meals 3 times a day  - Out of bed for toileting  - Record patient progress and toleration of activity level   Outcome: Progressing     Problem: MOBILITY - ADULT  Goal: Maintain or return to baseline ADL function  Description: INTERVENTIONS:  -  Assess patient's ability to carry out ADLs; assess patient's baseline for ADL function and identify physical deficits which impact ability to perform ADLs (bathing, care of mouth/teeth, toileting, grooming, dressing, etc.)  - Assess/evaluate cause of self-care deficits   - Assess range of motion  - Assess patient's mobility; develop plan if impaired  - Assess patient's need for assistive devices and provide as appropriate  - Encourage maximum independence but intervene and supervise when necessary  - Involve family in performance of ADLs  - Assess for home care needs following discharge   - Consider OT consult to assist with ADL evaluation and planning for discharge  - Provide patient education as appropriate  Outcome: Progressing  Goal: Maintains/Returns to pre admission functional level  Description: INTERVENTIONS:  - Perform BMAT or MOVE assessment daily.   - Set and communicate daily mobility goal to care team and patient/family/caregiver. - Collaborate with rehabilitation services on mobility goals if consulted  - Perform Range of Motion 3 times a day. - Reposition patient every 3 hours.   - Dangle patient 3 times a day  - Stand patient 3 times a day  - Ambulate patient 3 times a day  - Out of bed to chair 3 times a day   - Out of bed for meals 3 times a day  - Out of bed for toileting  - Record patient progress and toleration of activity level   Outcome: Progressing

## 2023-08-16 NOTE — PROGRESS NOTES
[address] [facility]  Progress Note  Name: Adrian Fabian I  MRN: [id number]  Unit/Bed#: -01 I Date of Admission: 8/15/2023   Date of Service: 8/16/2023 I Hospital Day: 0    Assessment/Plan   * Acute on chronic diastolic HF (heart failure) ([address])  Assessment & Plan  Wt Readings from Last 3 Encounters:   08/16/23 94.8 kg (209 lb)   08/14/23 95 kg (209 lb 7 oz)   08/02/23 101 kg (222 lb)     Patient recently treated for acute on chronic CHF during prior admission which was thought 2/2 volume overload due to blood transfusion for GIB. Received IV diuretics, was transitioned to p.o. Lasix 40 mg daily, stable on room air on this dose prior to discharge 8/14. Patient returned to ED 8/15 due to new chest discomfort and intermittent SOB which began early morning 8/15 causing disruption of sleep, worse with laying on L side. · Placed on 2 L by EMS for SPO2 88% --> now on RA  · CXR: Minimal right-sided pleural effusion. Emphysematous changes. No focal consolidative airspace opacity to suggest pneumonia. · Cardiology consulted  · Repeat echo pending  · IV Lasix 40 mg BID today  · Telemetry, I/O, daily weights  · Wean O2 as able    Elevated troponin  Assessment & Plan  Patient with elevated troponins on prior admission, peaked at 1000 and down trended to 900  · Cardiology consulted, thought 2/2 to acute hypoxia/volume overload rather than true ACS/ischemia  · Patient noted chest discomfort when laying on L side beginning overnight/early morning on 8/15. Has since resolved.   · Troponin on admission 277 > 245 > 270  · Suspect residual elevation from prior admission  · EKG interpreted by ED without ST changes, no STEMI criteria  · Cardiology consulted  · Telemetry     Anemia  Assessment & Plan  · Recently treated for GIB on prior admission- s/p EGD & C-scope with clipping of gastric/duodenal angiectasias + 2 units PRBC with stabilization  · Restarted on eliquis during prior admission following GI clearance · Hgb currently stable at 10.2 patient denies recurrent bleeding  · Continue PPI BID   · Daily CBC    Hepatocellular carcinoma (HCC)  Assessment & Plan  • Suspected HCC with multiple hepatic masses on imaging and AFP >18k, has not been confirmed by tissue diagnosis  • Following with Dr. Karishma Shah with surgical oncology as an outpatient, did not feel like a biopsy was necessary and was referred to Dr. Selvin Clayton for Y90 radioembolization  • IR was consulted for possible biopsy while inpatient, deferred with radioembolization already in process of scheduling  • Continue outpatient follow up     Atrial fibrillation, unspecified type [facility])  Assessment & Plan  · Continue rate control with metoprolol 50 mg twice daily  · Continue OAC Eliquis 5 mg twice daily   · EKG on admission is NSR    Type 2 diabetes mellitus with neurologic complication, with long-term current use of insulin (HCC)  Assessment & Plan  Lab Results   Component Value Date    HGBA1C 7.6 (H) 01/06/2023     · Home regimen:  · Trulicity, saxagliptin (hold)  · Levemir 75 units at bedtime, NovoLog 20 units breakfast, 30 units lunch/dinner  · While inpatient:  · Levemir reduced to 30 units at bedtime  · NovoLog 10 units 3 times daily AC  · ISS and Accu-Cheks, increase insulin as needed  · Diabetic diet    Stage 3 chronic kidney disease, unspecified whether stage 3a or 3b CKD [facility])  Assessment & Plan  Lab Results   Component Value Date    EGFR 38 08/16/2023    EGFR 38 08/15/2023    EGFR 43 08/14/2023    CREATININE 1.69 (H) 08/16/2023    CREATININE 1.69 (H) 08/15/2023    CREATININE 1.52 (H) 08/14/2023     · Creatinine baseline is 1.3-1.6  · Creatinine slightly elevated 1.69 up from 1.52 on prior discharge , suspect more likely due to volume overload rather than dehydration  · Monitor response with IV Lasix  · Avoid hypotension and nephrotoxins               VTE Pharmacologic Prophylaxis:   High Risk (Score >/= 5) - Pharmacological DVT Prophylaxis Ordered: apixaban (Eliquis). Sequential Compression Devices Ordered. Patient Centered Rounds: I performed bedside rounds with nursing staff today. Discussions with Specialists or Other Care Team Provider: None     Education and Discussions with Family / Patient: Updated  (wife) via phone. Total Time Spent on Date of Encounter in care of patient: 35 minutes This time was spent on one or more of the following: performing physical exam; counseling and coordination of care; obtaining or reviewing history; documenting in the medical record; reviewing/ordering tests, medications or procedures; communicating with other healthcare professionals and discussing with patient's family/caregivers. Current Length of Stay: 0 day(s)  Current Patient Status: Observation   Certification Statement: The patient will continue to require additional inpatient hospital stay due to IV diuresis and echo  Discharge Plan: Anticipate discharge in 24-48 hrs to home. Code Status: Level 1 - Full Code    Subjective:   Patient denies SOB or chest pain. Stable on RA. No additional complaints. Objective:     Vitals:   Temp (24hrs), Av.3 °F (36.3 °C), Min:96 °F (35.6 °C), Max:97.9 °F (36.6 °C)    Temp:  [96 °F (35.6 °C)-97.9 °F (36.6 °C)] 97.4 °F (36.3 °C)  HR:  [67-84] 80  Resp:  [16-20] 16  BP: (102-137)/(54-87) 109/87  SpO2:  [92 %-98 %] 93 %  Body mass index is 28.35 kg/m². Input and Output Summary (last 24 hours): Intake/Output Summary (Last 24 hours) at 2023 1022  Last data filed at 2023 0857  Gross per 24 hour   Intake 840 ml   Output 2475 ml   Net -1635 ml       Physical Exam:   Physical Exam  Vitals and nursing note reviewed. Constitutional:       General: He is not in acute distress. Appearance: He is well-developed. He is not ill-appearing. HENT:      Head: Normocephalic and atraumatic. Eyes:      General:         Right eye: No discharge. Left eye: No discharge.       Extraocular Movements: Extraocular movements intact. Conjunctiva/sclera: Conjunctivae normal.   Cardiovascular:      Rate and Rhythm: Normal rate and regular rhythm. Heart sounds: No murmur heard. Pulmonary:      Effort: Pulmonary effort is normal. No respiratory distress. Breath sounds: Normal breath sounds. No wheezing, rhonchi or rales. Abdominal:      General: Bowel sounds are normal. There is no distension. Palpations: Abdomen is soft. Tenderness: There is no abdominal tenderness. Musculoskeletal:      Cervical back: Neck supple. Right lower leg: No edema. Left lower leg: No edema. Skin:     General: Skin is warm and dry. Capillary Refill: Capillary refill takes less than 2 seconds. Neurological:      General: No focal deficit present. Mental Status: He is alert and oriented to person, place, and time. Mental status is at baseline. Cranial Nerves: No cranial nerve deficit.    Psychiatric:         Mood and Affect: Mood normal.         Behavior: Behavior normal.          Additional Data:     Labs:  Results from last 7 days   Lab Units 08/16/23  0442   WBC Thousand/uL 11.69*   HEMOGLOBIN g/dL 10.2*   HEMATOCRIT % 37.7   PLATELETS Thousands/uL 233   NEUTROS PCT % 65   LYMPHS PCT % 15   MONOS PCT % 13*   EOS PCT % 5     Results from last 7 days   Lab Units 08/16/23  0442   SODIUM mmol/L 141   POTASSIUM mmol/L 4.7   CHLORIDE mmol/L 101   CO2 mmol/L 32   BUN mg/dL 42*   CREATININE mg/dL 1.69*   ANION GAP mmol/L 8   CALCIUM mg/dL 9.5   ALBUMIN g/dL 3.9   TOTAL BILIRUBIN mg/dL 0.72   ALK PHOS U/L 95   ALT U/L 36   AST U/L 36   GLUCOSE RANDOM mg/dL 121         Results from last 7 days   Lab Units 08/16/23  0733 08/15/23  2119 08/15/23  1832 08/15/23  1605 08/14/23  1105 08/14/23  0710 08/13/23  2056 08/13/23  1611 08/13/23  1053 08/13/23  0718 08/12/23  2105 08/12/23  1620   POC GLUCOSE mg/dl 160* 169* 134 65 276* 150* 288* 306* 306* 252* 419* 328*         Results from last 7 days   Lab Units 08/13/23  0450 08/11/23  0452 08/10/23  2256 08/10/23  1910 08/10/23  1806 08/10/23  1751   LACTIC ACID mmol/L  --   --  1.9 2.7* 2.6*  --    PROCALCITONIN ng/ml 0.25 0.39*  --   --   --  0.44*       Lines/Drains:  Invasive Devices     Peripheral Intravenous Line  Duration           Peripheral IV 08/15/23 Left;Proximal;Ventral (anterior) Forearm <1 day                  Telemetry:  Telemetry Orders (From admission, onward)             24 Hour Telemetry Monitoring  (ED Bridging Orders Panel)  Continuous x 24 Hours (Telem)        Expiring   Question:  Reason for 24 Hour Telemetry  Answer:  PCI/EP study (including pacer and ICD implementation), Cardiac surgery, MI, abnormal cardiac cath, and chest pain- rule out MI                 Telemetry Reviewed: Normal Sinus Rhythm  Indication for Continued Telemetry Use: Acute CHF on >200 mg lasix/day or equivalent dose or with new reduced EF. Imaging: Reviewed radiology reports from this admission including: chest xray    Recent Cultures (last 7 days):   Results from last 7 days   Lab Units 08/10/23  1910   BLOOD CULTURE  No Growth After 5 Days. No Growth After 5 Days.        Last 24 Hours Medication List:   Current Facility-Administered Medications   Medication Dose Route Frequency Provider Last Rate   • acetaminophen  650 mg Oral Q6H PRN Jarred Titus PA-C     • apixaban  5 mg Oral BID Jarred Mata PA-C     • ferrous sulfate  325 mg Oral Daily With Breakfast Jarred Titus PA-C     • furosemide  40 mg Intravenous BID (diuretic) Kyara Bobo PA-C     • insulin detemir  30 Units Subcutaneous HS Jarred Titus PA-C     • insulin lispro  1-5 Units Subcutaneous HS Jarred Titus PA-C     • insulin lispro  1-6 Units Subcutaneous TID ASHWINI Titus PA-C     • insulin lispro  10 Units Subcutaneous TID With Meals Jarred Titus PA-C     • metoprolol succinate  50 mg Oral Daily Dianne Mata PA-C     • pantoprazole  40 mg Oral BID AC Dianne Mata PA-C     • pregabalin  100 mg Oral TID Dianne Mata PA-C     • sertraline  25 mg Oral Daily Pauly Samson     • tamsulosin  0.4 mg Oral Daily With Dinner Nish Haskins PA-C     • umeclidinium-vilanterol  1 puff Inhalation Daily Pauly Roque          Today, Patient Was Seen By: Bina Zepeda PA-C    **Please Note: This note may have been constructed using a voice recognition system. **

## 2023-08-16 NOTE — ASSESSMENT & PLAN NOTE
Wt Readings from Last 3 Encounters:   08/16/23 94.8 kg (209 lb)   08/14/23 95 kg (209 lb 7 oz)   08/02/23 101 kg (222 lb)     Patient recently treated for acute on chronic CHF during prior admission which was thought 2/2 volume overload due to blood transfusion for GIB. Received IV diuretics, was transitioned to p.o. Lasix 40 mg daily, stable on room air on this dose prior to discharge 8/14. Patient returned to ED 8/15 due to new chest discomfort and intermittent SOB which began early morning 8/15 causing disruption of sleep, worse with laying on L side. · Placed on 2 L by EMS for SPO2 88% --> now on RA  · CXR: Minimal right-sided pleural effusion. Emphysematous changes. No focal consolidative airspace opacity to suggest pneumonia.   · Cardiology consulted  · Repeat echo pending  · IV Lasix 40 mg BID today  · Telemetry, I/O, daily weights  · Wean O2 as able

## 2023-08-17 ENCOUNTER — APPOINTMENT (INPATIENT)
Dept: RADIOLOGY | Facility: HOSPITAL | Age: 78
DRG: 291 | End: 2023-08-17
Payer: COMMERCIAL

## 2023-08-17 LAB
2HR DELTA HS TROPONIN: 17 NG/L
ANION GAP SERPL CALCULATED.3IONS-SCNC: 10 MMOL/L
ATRIAL RATE: 66 BPM
ATRIAL RATE: 72 BPM
ATRIAL RATE: 75 BPM
ATRIAL RATE: 81 BPM
BACTERIA UR QL AUTO: NORMAL /HPF
BASOPHILS # BLD AUTO: 0.06 THOUSANDS/ÂΜL (ref 0–0.1)
BASOPHILS NFR BLD AUTO: 1 % (ref 0–1)
BILIRUB UR QL STRIP: NEGATIVE
BUN SERPL-MCNC: 53 MG/DL (ref 5–25)
CALCIUM SERPL-MCNC: 9.6 MG/DL (ref 8.4–10.2)
CARDIAC TROPONIN I PNL SERPL HS: 108 NG/L
CARDIAC TROPONIN I PNL SERPL HS: 125 NG/L
CHLORIDE SERPL-SCNC: 98 MMOL/L (ref 96–108)
CLARITY UR: CLEAR
CO2 SERPL-SCNC: 30 MMOL/L (ref 21–32)
COLOR UR: YELLOW
CREAT SERPL-MCNC: 1.94 MG/DL (ref 0.6–1.3)
EOSINOPHIL # BLD AUTO: 0.41 THOUSAND/ÂΜL (ref 0–0.61)
EOSINOPHIL NFR BLD AUTO: 4 % (ref 0–6)
ERYTHROCYTE [DISTWIDTH] IN BLOOD BY AUTOMATED COUNT: 33.1 % (ref 11.6–15.1)
GFR SERPL CREATININE-BSD FRML MDRD: 32 ML/MIN/1.73SQ M
GLUCOSE P FAST SERPL-MCNC: 165 MG/DL (ref 65–99)
GLUCOSE SERPL-MCNC: 134 MG/DL (ref 65–140)
GLUCOSE SERPL-MCNC: 165 MG/DL (ref 65–140)
GLUCOSE SERPL-MCNC: 180 MG/DL (ref 65–140)
GLUCOSE SERPL-MCNC: 188 MG/DL (ref 65–140)
GLUCOSE SERPL-MCNC: 280 MG/DL (ref 65–140)
GLUCOSE SERPL-MCNC: 354 MG/DL (ref 65–140)
GLUCOSE UR STRIP-MCNC: NEGATIVE MG/DL
HCT VFR BLD AUTO: 37.6 % (ref 36.5–49.3)
HGB BLD-MCNC: 10.5 G/DL (ref 12–17)
HGB UR QL STRIP.AUTO: NEGATIVE
IMM GRANULOCYTES # BLD AUTO: 0.11 THOUSAND/UL (ref 0–0.2)
IMM GRANULOCYTES NFR BLD AUTO: 1 % (ref 0–2)
KETONES UR STRIP-MCNC: NEGATIVE MG/DL
LEUKOCYTE ESTERASE UR QL STRIP: NEGATIVE
LYMPHOCYTES # BLD AUTO: 1.76 THOUSANDS/ÂΜL (ref 0.6–4.47)
LYMPHOCYTES NFR BLD AUTO: 15 % (ref 14–44)
MCH RBC QN AUTO: 19.9 PG (ref 26.8–34.3)
MCHC RBC AUTO-ENTMCNC: 27.9 G/DL (ref 31.4–37.4)
MCV RBC AUTO: 71 FL (ref 82–98)
MONOCYTES # BLD AUTO: 1.54 THOUSAND/ÂΜL (ref 0.17–1.22)
MONOCYTES NFR BLD AUTO: 13 % (ref 4–12)
NEUTROPHILS # BLD AUTO: 7.69 THOUSANDS/ÂΜL (ref 1.85–7.62)
NEUTS SEG NFR BLD AUTO: 66 % (ref 43–75)
NITRITE UR QL STRIP: NEGATIVE
NON-SQ EPI CELLS URNS QL MICRO: NORMAL /HPF
NRBC BLD AUTO-RTO: 0 /100 WBCS
P AXIS: 212 DEGREES
P AXIS: 41 DEGREES
P AXIS: 45 DEGREES
PH UR STRIP.AUTO: 5.5 [PH]
PLATELET # BLD AUTO: 250 THOUSANDS/UL (ref 149–390)
POTASSIUM SERPL-SCNC: 4 MMOL/L (ref 3.5–5.3)
PR INTERVAL: 194 MS
PR INTERVAL: 194 MS
PR INTERVAL: 200 MS
PROT UR STRIP-MCNC: NEGATIVE MG/DL
QRS AXIS: -43 DEGREES
QRS AXIS: -44 DEGREES
QRS AXIS: -53 DEGREES
QRS AXIS: -54 DEGREES
QRSD INTERVAL: 124 MS
QRSD INTERVAL: 126 MS
QRSD INTERVAL: 126 MS
QRSD INTERVAL: 128 MS
QT INTERVAL: 402 MS
QT INTERVAL: 414 MS
QT INTERVAL: 426 MS
QT INTERVAL: 436 MS
QTC INTERVAL: 457 MS
QTC INTERVAL: 466 MS
QTC INTERVAL: 475 MS
QTC INTERVAL: 483 MS
RBC # BLD AUTO: 5.27 MILLION/UL (ref 3.88–5.62)
RBC #/AREA URNS AUTO: NORMAL /HPF
SODIUM SERPL-SCNC: 138 MMOL/L (ref 135–147)
SP GR UR STRIP.AUTO: 1.01 (ref 1–1.03)
T WAVE AXIS: -19 DEGREES
T WAVE AXIS: -22 DEGREES
T WAVE AXIS: -29 DEGREES
T WAVE AXIS: -38 DEGREES
UROBILINOGEN UR STRIP-ACNC: <2 MG/DL
VENTRICULAR RATE: 66 BPM
VENTRICULAR RATE: 75 BPM
VENTRICULAR RATE: 81 BPM
VENTRICULAR RATE: 82 BPM
WBC # BLD AUTO: 11.57 THOUSAND/UL (ref 4.31–10.16)
WBC #/AREA URNS AUTO: NORMAL /HPF

## 2023-08-17 PROCEDURE — 85025 COMPLETE CBC W/AUTO DIFF WBC: CPT

## 2023-08-17 PROCEDURE — 99232 SBSQ HOSP IP/OBS MODERATE 35: CPT | Performed by: INTERNAL MEDICINE

## 2023-08-17 PROCEDURE — 93010 ELECTROCARDIOGRAM REPORT: CPT | Performed by: INTERNAL MEDICINE

## 2023-08-17 PROCEDURE — 93005 ELECTROCARDIOGRAM TRACING: CPT

## 2023-08-17 PROCEDURE — 81001 URINALYSIS AUTO W/SCOPE: CPT | Performed by: PHYSICIAN ASSISTANT

## 2023-08-17 PROCEDURE — 82948 REAGENT STRIP/BLOOD GLUCOSE: CPT

## 2023-08-17 PROCEDURE — 71046 X-RAY EXAM CHEST 2 VIEWS: CPT

## 2023-08-17 PROCEDURE — 80048 BASIC METABOLIC PNL TOTAL CA: CPT

## 2023-08-17 PROCEDURE — 99232 SBSQ HOSP IP/OBS MODERATE 35: CPT | Performed by: PHYSICIAN ASSISTANT

## 2023-08-17 PROCEDURE — 84484 ASSAY OF TROPONIN QUANT: CPT | Performed by: PHYSICIAN ASSISTANT

## 2023-08-17 RX ORDER — INSULIN LISPRO 100 [IU]/ML
12 INJECTION, SOLUTION INTRAVENOUS; SUBCUTANEOUS
Status: DISCONTINUED | OUTPATIENT
Start: 2023-08-17 | End: 2023-08-20 | Stop reason: HOSPADM

## 2023-08-17 RX ORDER — FUROSEMIDE 10 MG/ML
80 INJECTION INTRAMUSCULAR; INTRAVENOUS
Status: COMPLETED | OUTPATIENT
Start: 2023-08-17 | End: 2023-08-17

## 2023-08-17 RX ORDER — POTASSIUM CHLORIDE 20 MEQ/1
20 TABLET, EXTENDED RELEASE ORAL DAILY
Status: DISCONTINUED | OUTPATIENT
Start: 2023-08-17 | End: 2023-08-20 | Stop reason: HOSPADM

## 2023-08-17 RX ADMIN — PANTOPRAZOLE SODIUM 40 MG: 40 TABLET, DELAYED RELEASE ORAL at 06:40

## 2023-08-17 RX ADMIN — SERTRALINE HYDROCHLORIDE 25 MG: 25 TABLET ORAL at 09:11

## 2023-08-17 RX ADMIN — UMECLIDINIUM BROMIDE AND VILANTEROL TRIFENATATE 1 PUFF: 62.5; 25 POWDER RESPIRATORY (INHALATION) at 08:58

## 2023-08-17 RX ADMIN — PREGABALIN 100 MG: 100 CAPSULE ORAL at 16:35

## 2023-08-17 RX ADMIN — INSULIN LISPRO 1 UNITS: 100 INJECTION, SOLUTION INTRAVENOUS; SUBCUTANEOUS at 08:58

## 2023-08-17 RX ADMIN — INSULIN LISPRO 12 UNITS: 100 INJECTION, SOLUTION INTRAVENOUS; SUBCUTANEOUS at 08:58

## 2023-08-17 RX ADMIN — APIXABAN 5 MG: 5 TABLET, FILM COATED ORAL at 08:57

## 2023-08-17 RX ADMIN — PREGABALIN 100 MG: 100 CAPSULE ORAL at 21:11

## 2023-08-17 RX ADMIN — FUROSEMIDE 80 MG: 10 INJECTION, SOLUTION INTRAMUSCULAR; INTRAVENOUS at 16:36

## 2023-08-17 RX ADMIN — INSULIN LISPRO 4 UNITS: 100 INJECTION, SOLUTION INTRAVENOUS; SUBCUTANEOUS at 11:50

## 2023-08-17 RX ADMIN — METOPROLOL SUCCINATE 50 MG: 50 TABLET, EXTENDED RELEASE ORAL at 08:57

## 2023-08-17 RX ADMIN — INSULIN LISPRO 12 UNITS: 100 INJECTION, SOLUTION INTRAVENOUS; SUBCUTANEOUS at 16:36

## 2023-08-17 RX ADMIN — POTASSIUM CHLORIDE 20 MEQ: 1500 TABLET, EXTENDED RELEASE ORAL at 08:57

## 2023-08-17 RX ADMIN — INSULIN LISPRO 12 UNITS: 100 INJECTION, SOLUTION INTRAVENOUS; SUBCUTANEOUS at 11:51

## 2023-08-17 RX ADMIN — FERROUS SULFATE TAB 325 MG (65 MG ELEMENTAL FE) 325 MG: 325 (65 FE) TAB at 08:57

## 2023-08-17 RX ADMIN — APIXABAN 5 MG: 5 TABLET, FILM COATED ORAL at 17:07

## 2023-08-17 RX ADMIN — TAMSULOSIN HYDROCHLORIDE 0.4 MG: 0.4 CAPSULE ORAL at 16:35

## 2023-08-17 RX ADMIN — INSULIN LISPRO 1 UNITS: 100 INJECTION, SOLUTION INTRAVENOUS; SUBCUTANEOUS at 21:13

## 2023-08-17 RX ADMIN — PREGABALIN 100 MG: 100 CAPSULE ORAL at 08:57

## 2023-08-17 RX ADMIN — FUROSEMIDE 80 MG: 10 INJECTION, SOLUTION INTRAMUSCULAR; INTRAVENOUS at 08:57

## 2023-08-17 RX ADMIN — INSULIN DETEMIR 32 UNITS: 100 INJECTION, SOLUTION SUBCUTANEOUS at 21:13

## 2023-08-17 RX ADMIN — PANTOPRAZOLE SODIUM 40 MG: 40 TABLET, DELAYED RELEASE ORAL at 16:36

## 2023-08-17 NOTE — ASSESSMENT & PLAN NOTE
Lab Results   Component Value Date    HGBA1C 7.6 (H) 01/06/2023     · Home regimen:  · Trulicity, saxagliptin (hold)  · Levemir 75 units at bedtime, NovoLog 20 units breakfast, 30 units lunch/dinner  · While inpatient:  · Levemir reduced to 32 units at bedtime  · NovoLog 12 units 3 times daily AC  · ISS and Accu-Cheks,adjust insulin as needed   · Diabetic diet

## 2023-08-17 NOTE — ASSESSMENT & PLAN NOTE
Patient with elevated troponins on prior admission, peaked at 1000 and down trended to 900 in the setting of GI bleed  · Cardiology consulted, non-MI troponin elevation although echo with wall motion abnormality planning for ischemic evaluation once euvolemic  · Continue to monitor on telemetry  · Chest pain-free at this time  · Continue beta-blocker, Eliquis  · Recent GI bleed not on aspirin  · Troponin on admission 277 > 245 > 270

## 2023-08-17 NOTE — ASSESSMENT & PLAN NOTE
Lab Results   Component Value Date    EGFR 32 08/17/2023    EGFR 38 08/16/2023    EGFR 38 08/15/2023    CREATININE 1.94 (H) 08/17/2023    CREATININE 1.69 (H) 08/16/2023    CREATININE 1.69 (H) 08/15/2023     · Creatinine baseline is 1.3-1.6  · Creatinine fatou to 1.9 today  · May need to allow for higher creatinine to get to euvolemia  · Check I/O, PVR and placed on retention protocol today  · Continue Flomax  · Avoid hypotension and nephrotoxins  · BMP daily

## 2023-08-17 NOTE — PLAN OF CARE
Problem: Potential for Falls  Goal: Patient will remain free of falls  Description: INTERVENTIONS:  - Educate patient/family on patient safety including physical limitations  - Instruct patient to call for assistance with activity   - Consult OT/PT to assist with strengthening/mobility   - Keep Call bell within reach  - Keep bed low and locked with side rails adjusted as appropriate  - Keep care items and personal belongings within reach  - Initiate and maintain comfort rounds  - Make Fall Risk Sign visible to staff  - Offer Toileting every 2 Hours, in advance of need  - Obtain necessary fall risk management equipment: nonskid footwear  - Apply yellow socks and bracelet for high fall risk patients  - Consider moving patient to room near nurses station  Outcome: Progressing     Problem: MOBILITY - ADULT  Goal: Maintain or return to baseline ADL function  Description: INTERVENTIONS:  -  Assess patient's ability to carry out ADLs; assess patient's baseline for ADL function and identify physical deficits which impact ability to perform ADLs (bathing, care of mouth/teeth, toileting, grooming, dressing, etc.)  - Assess/evaluate cause of self-care deficits   - Assess range of motion  - Assess patient's mobility; develop plan if impaired  - Assess patient's need for assistive devices and provide as appropriate  - Encourage maximum independence but intervene and supervise when necessary  - Involve family in performance of ADLs  - Assess for home care needs following discharge   - Consider OT consult to assist with ADL evaluation and planning for discharge  - Provide patient education as appropriate  Outcome: Progressing    Problem: PAIN - ADULT  Goal: Verbalizes/displays adequate comfort level or baseline comfort level  Description: Interventions:  - Encourage patient to monitor pain and request assistance  - Assess pain using appropriate pain scale  - Administer analgesics based on type and severity of pain and evaluate response  - Implement non-pharmacological measures as appropriate and evaluate response  - Consider cultural and social influences on pain and pain management  - Notify physician/advanced practitioner if interventions unsuccessful or patient reports new pain  Outcome: Progressing     Problem: INFECTION - ADULT  Goal: Absence or prevention of progression during hospitalization  Description: INTERVENTIONS:  - Assess and monitor for signs and symptoms of infection  - Monitor lab/diagnostic results  - Monitor all insertion sites, i.e. indwelling lines, tubes, and drains  - Monitor endotracheal if appropriate and nasal secretions for changes in amount and color  - [place] appropriate cooling/warming therapies per order  - Administer medications as ordered  - Instruct and encourage patient and family to use good hand hygiene technique  - Identify and instruct in appropriate isolation precautions for identified infection/condition  Outcome: Progressing  Goal: Absence of fever/infection during neutropenic period  Description: INTERVENTIONS:  - Monitor WBC    Outcome: Progressing     Goal: Maintain or return to baseline ADL function  Description: INTERVENTIONS:  -  Assess patient's ability to carry out ADLs; assess patient's baseline for ADL function and identify physical deficits which impact ability to perform ADLs (bathing, care of mouth/teeth, toileting, grooming, dressing, etc.)  - Assess/evaluate cause of self-care deficits   - Assess range of motion  - Assess patient's mobility; develop plan if impaired  - Assess patient's need for assistive devices and provide as appropriate  - Encourage maximum independence but intervene and supervise when necessary  - Involve family in performance of ADLs  - Assess for home care needs following discharge   - Consider OT consult to assist with ADL evaluation and planning for discharge  - Provide patient education as appropriate  Outcome: Progressing     Problem: DISCHARGE PLANNING  Goal: Discharge to home or other facility with appropriate resources  Description: INTERVENTIONS:  - Identify barriers to discharge w/patient and caregiver  - Arrange for needed discharge resources and transportation as appropriate  - Identify discharge learning needs (meds, wound care, etc.)  - Arrange for interpretive services to assist at discharge as needed  - Refer to Case Management Department for coordinating discharge planning if the patient needs post-hospital services based on physician/advanced practitioner order or complex needs related to functional status, cognitive ability, or social support system  Outcome: Progressing     Problem: Knowledge Deficit  Goal: Patient/family/caregiver demonstrates understanding of disease process, treatment plan, medications, and discharge instructions  Description: Complete learning assessment and assess knowledge base.   Interventions:  - Provide teaching at level of understanding  - Provide teaching via preferred learning methods  Outcome: Progressing     Problem: Prexisting or High Potential for Compromised Skin Integrity  Goal: Skin integrity is maintained or improved  Description: INTERVENTIONS:  - Identify patients at risk for skin breakdown  - Assess and monitor skin integrity  - Assess and monitor nutrition and hydration status  - Monitor labs   - Assess for incontinence   - Turn and reposition patient  - Assist with mobility/ambulation  - Relieve pressure over bony prominences  - Avoid friction and shearing  - Provide appropriate hygiene as needed including keeping skin clean and dry  - Evaluate need for skin moisturizer/barrier cream  - Collaborate with interdisciplinary team   - Patient/family teaching  - Consider wound care consult   Outcome: Progressing

## 2023-08-17 NOTE — PROGRESS NOTES
Progress Note - Cardiology   Suzanna Castaneda 69 y.o. male MRN: [id number]  Unit/Bed#: -01 Encounter: [id number]        Problem List:  Principal Problem:    Acute on chronic diastolic HF (heart failure) ([address])  Active Problems:    Stage 3 chronic kidney disease, unspecified whether stage 3a or 3b CKD ([address])    Type 2 diabetes mellitus with neurologic complication, with long-term current use of insulin (HCC)    Atrial fibrillation, unspecified type ([address])    Anemia    Elevated troponin    Hepatocellular carcinoma ([address])      Assessment:  1. Acute on chronic heart failure with mildly reduced EF  a. 5/2023 Echo: LVEF 21%, grade 1 diastolic dysfunction, mild mitral insufficiency  b. 8/2023 echo: LVEF 45%, mild global hypokinesis  c. 8/2023 Limited echo: LVEF 50-55%, basal inferior and mid inferior hypokinesis  2. Paroxysmal atrial fibrillation  a. Eliquis for CVA risk reduction  b. Toprol-XL 50 daily for AV blocking  3. Non-MI troponin elevation secondary to heart failure  a. 6/2023 NST: No ischemia  a. Initial troponin 277 --> 245  4. Recent history of anemia  a. 8/2023 admission for blood loss anemia at which time he did require 2 units of packed red blood cells and underwent EGD and colonoscopy with subsequent clipping of AV malformations  b. Hemoglobin has since been stable even with restarting Eliquis currently trending around 8-9 and per patient having bowel movements at home without any noticeable blood  5. CKD baseline creatinine 1.3-1.6  6. History of DVT for which he takes Eliquis  7. Hepatocellular cancer  8. Type 2 diabetes    Plan/ Discussion:  • Cr up slightly today at 1.9, but not much UOP recorded  • Check PA lateral chest x-ray  • Check nuclear stress test tomorrow  • Change to oral diuretic tomorrow  • He went to atrial flutter this morning and is possibly symptomatic with some worsening dyspnea as well as some atypical chest discomfort.  EKGs were warm which did not show any ischemic changes but do show atrial flutter with controlled VR. His heart rate is in the 70s. Will evaluate for possible rhythm control strategy pending on diuresis today and chest x-ray    Subjective:  Felt okay overnight with stable breathing. Ate breakfast fine this morning. Soon thereafter developed some left-sided chest discomfort and some shortness of breath.     Vitals:  Vitals:    08/16/23 0805 08/17/23 0536   Weight: 94.8 kg (209 lb) 94.2 kg (207 lb 10.8 oz)   ,  Vitals:    08/16/23 1841 08/16/23 2043 08/17/23 0536 08/17/23 0735   BP:  136/61  133/61   Pulse:  85  90   Resp:  16  14   Temp:  97.5 °F (36.4 °C)  (!) 97.4 °F (36.3 °C)   TempSrc:       SpO2: 96% 96%  93%   Weight:   94.2 kg (207 lb 10.8 oz)    Height:           Exam:  General: Alert awake and oriented, no acute distress  Heart: Rhythm is regular rate is well controlled, no murmurs, Normal S1, no edema    Respiratory effort/ Lungs:  Breathing comfortably on room air, clear bilaterally without wheezing, rales, crackles   Abdominal: Non-tender to palpation, + bowel sounds, soft, no masses or distension  Lower Limbs:  No edema            Telemetry:       Atrial flutter, Heart Rate 70s    Medications:    Current Facility-Administered Medications:   •  acetaminophen (TYLENOL) tablet 650 mg, 650 mg, Oral, Q6H PRN, Jolynn Titus PA-C  •  apixaban (ELIQUIS) tablet 5 mg, 5 mg, Oral, BID, Jolynn Titus PA-C, 5 mg at 08/17/23 5471  •  ferrous sulfate tablet 325 mg, 325 mg, Oral, Daily With Breakfast, HAKAN Jerez-C, 325 mg at 08/17/23 0857  •  furosemide (LASIX) injection 80 mg, 80 mg, Intravenous, BID (diuretic), Amy Foster MD, 80 mg at 08/17/23 0857  •  insulin detemir (LEVEMIR) subcutaneous injection 32 Units, 32 Units, Subcutaneous, HS, Chrissy Diggs PA-C  •  insulin lispro (HumaLOG) 100 units/mL subcutaneous injection 1-5 Units, 1-5 Units, Subcutaneous, HS, Jolynn Mata PA-C, 2 Units at 08/16/23 2102  • insulin lispro (HumaLOG) 100 units/mL subcutaneous injection 1-6 Units, 1-6 Units, Subcutaneous, TID AC, 1 Units at 08/17/23 0858 **AND** Fingerstick Glucose (POCT), , , TID AC, Ora Mavan Crooksuntaine, PA-C  •  insulin lispro (HumaLOG) 100 units/mL subcutaneous injection 12 Units, 12 Units, Subcutaneous, TID With Meals, Charmayne Shines, PA-C, 12 Units at 08/17/23 0858  •  metoprolol succinate (TOPROL-XL) 24 hr tablet 50 mg, 50 mg, Oral, Daily, Ora Mabrayanlin Fountaine, PA-C, 50 mg at 08/17/23 0857  •  pantoprazole (PROTONIX) EC tablet 40 mg, 40 mg, Oral, BID AC, Ora Mabrayanlin Fountaine, PA-C, 40 mg at 08/17/23 3127  •  potassium chloride (K-DUR,KLOR-CON) CR tablet 20 mEq, 20 mEq, Oral, Daily, Chrissy Diggs PA-C, 20 mEq at 08/17/23 0857  •  pregabalin (LYRICA) capsule 100 mg, 100 mg, Oral, TID, Ora Maudlin Fountaine, PA-C, 100 mg at 08/17/23 2660  •  sertraline (ZOLOFT) tablet 25 mg, 25 mg, Oral, Daily, Ora Maudlin Fountaine, PA-C, 25 mg at 08/17/23 8054  •  tamsulosin (FLOMAX) capsule 0.4 mg, 0.4 mg, Oral, Daily With Dinner, Ora Mavan Fountaine, PA-C, 0.4 mg at 08/16/23 1700  •  umeclidinium-vilanterol 62.5-25 mcg/actuation inhaler 1 puff, 1 puff, Inhalation, Daily, Ora Robert Crooksuntaine, PA-C, 1 puff at 08/17/23 0858      Labs/Data:        Results from last 7 days   Lab Units 08/17/23  0455 08/16/23  0442 08/15/23  1242   WBC Thousand/uL 11.57* 11.69* 11.66*   HEMOGLOBIN g/dL 10.5* 10.2* 9.9*   HEMATOCRIT % 37.6 37.7 36.8   PLATELETS Thousands/uL 250 233 225     Results from last 7 days   Lab Units 08/17/23  0455 08/16/23  0442 08/15/23  1242   POTASSIUM mmol/L 4.0 4.7 4.6   CHLORIDE mmol/L 98 101 105   CO2 mmol/L 30 32 31   BUN mg/dL 53* 42* 44*   CREATININE mg/dL 1.94* 1.69* 1.69*

## 2023-08-17 NOTE — PROGRESS NOTES
[facility]  Progress Note  Name: Phi Aaron I  MRN: [id number]  Unit/Bed#: -01 I Date of Admission: 8/15/2023   Date of Service: 8/17/2023 I Hospital Day: 0    Assessment/Plan   * Acute on chronic diastolic HF (heart failure) (HCC)  Assessment & Plan  Wt Readings from Last 3 Encounters:   08/17/23 94.2 kg (207 lb 10.8 oz)   08/14/23 95 kg (209 lb 7 oz)   08/02/23 101 kg (222 lb)     Patient recently treated for acute on chronic CHF during prior admission which was thought 2/2 volume overload due to blood transfusion for GIB. Discharged on room air 8/14 and po diuretics. Patient returned to ED 8/15 due to new chest discomfort and intermittent SOB which began early morning 8/15 causing disruption of sleep, worse with laying on L side. · Appreciate ongoing recommendations per cardiology  · Currently on IV Lasix 80 mg twice daily  · Potassium stable today  · Creatinine did rise to 1.94  · Check PVR, urinalysis, and monitor on retention protocol  · Continue to monitor I's/O  · Weight 207 today but this is reported on bed scale, please try to obtain standing weight daily   · CXR: Minimal right-sided pleural effusion. Emphysematous changes. No focal consolidative airspace opacity to suggest pneumonia.   · Echocardiogram: 50 to 55%, basal inferior mid inferior hypokinetic  · Once euvolemic plan for ischemic evaluation  · Maintain telemetry  · Continue Toprol-XL 50 mg daily  · BMP daily    Hepatocellular carcinoma (HCC)  Assessment & Plan  • Suspected HCC with multiple hepatic masses on imaging and AFP >18k, has not been confirmed by tissue diagnosis  • Following with Dr. Jose Krueger with surgical oncology as an outpatient, did not feel like a biopsy was necessary and was referred to Dr. Myriam Teran for Y90 radioembolization  • Continue outpatient follow up     Elevated troponin  Assessment & Plan  Patient with elevated troponins on prior admission, peaked at 1000 and down trended to 900 in the setting of GI bleed  · Cardiology consulted, non-MI troponin elevation although echo with wall motion abnormality planning for ischemic evaluation once euvolemic  · Continue to monitor on telemetry  · Chest pain-free at this time  · Continue beta-blocker, Eliquis  · Recent GI bleed not on aspirin  · Troponin on admission 277 > 245 > 270      Anemia  Assessment & Plan  · Recently treated for GIB on prior admission- s/p EGD & C-scope with clipping of gastric/duodenal angiectasias + 2 units PRBC with stabilization of hemoglobin  · Restarted on eliquis  following GI clearance   · Continue PPI BID   · Daily CBC    Lab Results   Component Value Date    HGB 10.5 (L) 08/17/2023    HGB 10.2 (L) 08/16/2023    HGB 9.9 (L) 08/15/2023    HGB 9.1 (L) 08/14/2023    HGB 8.1 (L) 08/13/2023         Atrial fibrillation, unspecified type ([address])  Assessment & Plan  · Continue rate control with metoprolol 50 mg daily  · Anticoagulated with Eliquis 5 mg twice daily      Type 2 diabetes mellitus with neurologic complication, with long-term current use of insulin ([facility])  Assessment & Plan  Lab Results   Component Value Date    HGBA1C 7.6 (H) 01/06/2023     · Home regimen:  · Trulicity, saxagliptin (hold)  · Levemir 75 units at bedtime, NovoLog 20 units breakfast, 30 units lunch/dinner  · While inpatient:  · Levemir reduced to 32 units at bedtime  · NovoLog 12 units 3 times daily AC  · ISS and Accu-Cheks,adjust insulin as needed   · Diabetic diet    Stage 3 chronic kidney disease, unspecified whether stage 3a or 3b CKD [facility])  Assessment & Plan  Lab Results   Component Value Date    EGFR 32 08/17/2023    EGFR 38 08/16/2023    EGFR 38 08/15/2023    CREATININE 1.94 (H) 08/17/2023    CREATININE 1.69 (H) 08/16/2023    CREATININE 1.69 (H) 08/15/2023     · Creatinine baseline is 1.3-1.6  · Creatinine fatou to 1.9 today  · May need to allow for higher creatinine to get to euvolemia  · Check I/O, PVR and placed on retention protocol today  · Continue Flomax  · Avoid hypotension and nephrotoxins  · BMP daily             VTE Pharmacologic Prophylaxis:   Moderate Risk (Score 3-4) - Pharmacological DVT Prophylaxis Ordered: apixaban (Eliquis). Patient Centered Rounds: I performed bedside rounds with nursing staff today. Discussions with Specialists or Other Care Team Provider:     Education and Discussions with Family / Patient: Updated  (wife) via phone. Total Time Spent on Date of Encounter in care of patient: 25 minutes This time was spent on one or more of the following: performing physical exam; counseling and coordination of care; obtaining or reviewing history; documenting in the medical record; reviewing/ordering tests, medications or procedures; communicating with other healthcare professionals and discussing with patient's family/caregivers. Current Length of Stay: 0 day(s)  Current Patient Status: Inpatient   Certification Statement: The patient will continue to require additional inpatient hospital stay due to acute CHF  Discharge Plan: Anticipate discharge in 48-72 hrs to once euvolemic needs ishcemic eval     Code Status: Level 1 - Full Code    Subjective:   Doing well. States feels better than yesterday. Did have episode of chest tightness this morning that resolved. Did get dizzy when walking back from the bathroom to his chair. Nursing was at his side. No longer lightheaded or dizzy or having chest discomfort. No increased shortness of breath. Currently on room air. No abdominal pain nausea or vomiting. I updated wife over the phone     Objective:     Vitals:   Temp (24hrs), Av.6 °F (36.4 °C), Min:97.4 °F (36.3 °C), Max:97.7 °F (36.5 °C)    Temp:  [97.4 °F (36.3 °C)-97.7 °F (36.5 °C)] 97.5 °F (36.4 °C)  HR:  [71-85] 85  Resp:  [16] 16  BP: (109-136)/(57-87) 136/61  SpO2:  [92 %-96 %] 96 %  Body mass index is 28.17 kg/m². Input and Output Summary (last 24 hours):      Intake/Output Summary (Last 24 hours) at 8/17/2023 0720  Last data filed at 8/16/2023 2325  Gross per 24 hour   Intake 1520 ml   Output 600 ml   Net 920 ml       Physical Exam:   Physical Exam  Vitals and nursing note reviewed. Constitutional:       General: He is not in acute distress. Appearance: He is ill-appearing. He is not toxic-appearing. Cardiovascular:      Rate and Rhythm: Normal rate and regular rhythm. Pulmonary:      Effort: Pulmonary effort is normal. No respiratory distress. Comments: decreased throughout, on room air at 94%   Abdominal:      General: Bowel sounds are normal.      Palpations: Abdomen is soft. Musculoskeletal:      Right lower leg: No edema. Left lower leg: No edema. Skin:     General: Skin is warm. Coloration: Skin is pale. Neurological:      Mental Status: He is alert. Mental status is at baseline.    Psychiatric:         Mood and Affect: Mood normal.          Additional Data:     Labs:  Results from last 7 days   Lab Units 08/17/23  0455   WBC Thousand/uL 11.57*   HEMOGLOBIN g/dL 10.5*   HEMATOCRIT % 37.6   PLATELETS Thousands/uL 250   NEUTROS PCT % 66   LYMPHS PCT % 15   MONOS PCT % 13*   EOS PCT % 4     Results from last 7 days   Lab Units 08/17/23  0455 08/16/23  0442   SODIUM mmol/L 138 141   POTASSIUM mmol/L 4.0 4.7   CHLORIDE mmol/L 98 101   CO2 mmol/L 30 32   BUN mg/dL 53* 42*   CREATININE mg/dL 1.94* 1.69*   ANION GAP mmol/L 10 8   CALCIUM mg/dL 9.6 9.5   ALBUMIN g/dL  --  3.9   TOTAL BILIRUBIN mg/dL  --  0.72   ALK PHOS U/L  --  95   ALT U/L  --  36   AST U/L  --  36   GLUCOSE RANDOM mg/dL 165* 121         Results from last 7 days   Lab Units 08/16/23  2036 08/16/23  1625 08/16/23  1135 08/16/23  0733 08/15/23  2119 08/15/23  1832 08/15/23  1605 08/14/23  1105 08/14/23  0710 08/13/23  2056 08/13/23  1611 08/13/23  1053   POC GLUCOSE mg/dl 225* 181* 284* 160* 169* 134 65 276* 150* 288* 306* 306*         Results from last 7 days   Lab Units 08/13/23  0450 08/11/23  0452 08/10/23  2253 08/10/23  1910 08/10/23  1806 08/10/23  1751   LACTIC ACID mmol/L  --   --  1.9 2.7* 2.6*  --    PROCALCITONIN ng/ml 0.25 0.39*  --   --   --  0.44*       Lines/Drains:  Invasive Devices     Peripheral Intravenous Line  Duration           Peripheral IV 08/15/23 Left;Proximal;Ventral (anterior) Forearm 1 day                  Telemetry:  Telemetry Orders (From admission, onward)             24 Hour Telemetry Monitoring  (ED Bridging Orders Panel)  Continuous x 24 Hours (Telem)        Question:  Reason for 24 Hour Telemetry  Answer:  PCI/EP study (including pacer and ICD implementation), Cardiac surgery, MI, abnormal cardiac cath, and chest pain- rule out MI                          Imaging: Reviewed radiology reports from this admission including: chest xray    Recent Cultures (last 7 days):   Results from last 7 days   Lab Units 08/10/23  1910   BLOOD CULTURE  No Growth After 5 Days. No Growth After 5 Days.        Last 24 Hours Medication List:   Current Facility-Administered Medications   Medication Dose Route Frequency Provider Last Rate   • acetaminophen  650 mg Oral Q6H PRN Genna Titus PA-C     • apixaban  5 mg Oral BID Genna Titus PA-C     • ferrous sulfate  325 mg Oral Daily With Breakfast Genna Titus PA-C     • furosemide  80 mg Intravenous BID (diuretic) Jeff Last MD     • insulin detemir  32 Units Subcutaneous HS Chrissy Diggs PA-C     • insulin lispro  1-5 Units Subcutaneous HS Genna Titus PA-C     • insulin lispro  1-6 Units Subcutaneous TID  Jennifer Titus PA-C     • insulin lispro  12 Units Subcutaneous TID With Meals Misty Leung PA-C     • metoprolol succinate  50 mg Oral Daily Genna Titus PA-C     • pantoprazole  40 [facility] B[facility]     • pregabalin  100 mg Oral TID Genna Titus PA-C     • sertraline  25 mg Oral Daily Pauly Kendall • tamsulosin  0.4 mg Oral Daily With Elijah Whiting PA-C     • umeclidinium-vilanterol  1 puff Inhalation Daily Pauly Coe          Today, Patient Was Seen By: Kyung Og PA-C    **Please Note: This note may have been constructed using a voice recognition system. **

## 2023-08-17 NOTE — ASSESSMENT & PLAN NOTE
Wt Readings from Last 3 Encounters:   08/17/23 94.2 kg (207 lb 10.8 oz)   08/14/23 95 kg (209 lb 7 oz)   08/02/23 101 kg (222 lb)     Patient recently treated for acute on chronic CHF during prior admission which was thought 2/2 volume overload due to blood transfusion for GIB. Discharged on room air 8/14 and po diuretics. Patient returned to ED 8/15 due to new chest discomfort and intermittent SOB which began early morning 8/15 causing disruption of sleep, worse with laying on L side. · Appreciate ongoing recommendations per cardiology  · Currently on IV Lasix 80 mg twice daily  · Potassium stable today  · Creatinine did rise to 1.94  · Check PVR, urinalysis, and monitor on retention protocol  · Continue to monitor I's/O  · Weight 207 today but this is reported on bed scale, please try to obtain standing weight daily   · CXR: Minimal right-sided pleural effusion. Emphysematous changes. No focal consolidative airspace opacity to suggest pneumonia.   · Echocardiogram: 50 to 55%, basal inferior mid inferior hypokinetic  · Once euvolemic plan for ischemic evaluation  · Maintain telemetry  · Continue Toprol-XL 50 mg daily  · BMP daily

## 2023-08-17 NOTE — ASSESSMENT & PLAN NOTE
· Recently treated for GIB on prior admission- s/p EGD & C-scope with clipping of gastric/duodenal angiectasias + 2 units PRBC with stabilization of hemoglobin  · Restarted on eliquis  following GI clearance   · Continue PPI BID   · Daily CBC    Lab Results   Component Value Date    HGB 10.5 (L) 08/17/2023    HGB 10.2 (L) 08/16/2023    HGB 9.9 (L) 08/15/2023    HGB 9.1 (L) 08/14/2023    HGB 8.1 (L) 08/13/2023

## 2023-08-17 NOTE — PLAN OF CARE
Problem: Potential for Falls  Goal: Patient will remain free of falls  Description: INTERVENTIONS:  - Educate patient/family on patient safety including physical limitations  - Instruct patient to call for assistance with activity   - Consult OT/PT to assist with strengthening/mobility   - Keep Call bell within reach  - Keep bed low and locked with side rails adjusted as appropriate  - Keep care items and personal belongings within reach  - Initiate and maintain comfort rounds  - Make Fall Risk Sign visible to staff  - Offer Toileting every 2 Hours, in advance of need  - Initiate/Maintain alarm  - Obtain necessary fall risk management equipment  - Apply yellow socks and bracelet for high fall risk patients  - Consider moving patient to room near nurses station  Outcome: Progressing     Problem: MOBILITY - ADULT  Goal: Maintain or return to baseline ADL function  Description: INTERVENTIONS:  -  Assess patient's ability to carry out ADLs; assess patient's baseline for ADL function and identify physical deficits which impact ability to perform ADLs (bathing, care of mouth/teeth, toileting, grooming, dressing, etc.)  - Assess/evaluate cause of self-care deficits   - Assess range of motion  - Assess patient's mobility; develop plan if impaired  - Assess patient's need for assistive devices and provide as appropriate  - Encourage maximum independence but intervene and supervise when necessary  - Involve family in performance of ADLs  - Assess for home care needs following discharge   - Consider OT consult to assist with ADL evaluation and planning for discharge  - Provide patient education as appropriate  Outcome: Progressing  Goal: Maintains/Returns to pre admission functional level  Description: INTERVENTIONS:  - Perform BMAT or MOVE assessment daily.   - Set and communicate daily mobility goal to care team and patient/family/caregiver.    - Collaborate with rehabilitation services on mobility goals if consulted  - Perform Range of Motion 3 times a day. - Reposition patient every 3 hours.   - Dangle patient 3 times a day  - Stand patient 3 times a day  - Ambulate patient 3 times a day  - Out of bed to chair 3 times a day   - Out of bed for meals 3 times a day  - Out of bed for toileting  - Record patient progress and toleration of activity level   Outcome: Progressing     Problem: PAIN - ADULT  Goal: Verbalizes/displays adequate comfort level or baseline comfort level  Description: Interventions:  - Encourage patient to monitor pain and request assistance  - Assess pain using appropriate pain scale  - Administer analgesics based on type and severity of pain and evaluate response  - Implement non-pharmacological measures as appropriate and evaluate response  - Consider cultural and social influences on pain and pain management  - Notify physician/advanced practitioner if interventions unsuccessful or patient reports new pain  Outcome: Progressing     Problem: INFECTION - ADULT  Goal: Absence or prevention of progression during hospitalization  Description: INTERVENTIONS:  - Assess and monitor for signs and symptoms of infection  - Monitor lab/diagnostic results  - Monitor all insertion sites, i.e. indwelling lines, tubes, and drains  - Monitor endotracheal if appropriate and nasal secretions for changes in amount and color  - [place] appropriate cooling/warming therapies per order  - Administer medications as ordered  - Instruct and encourage patient and family to use good hand hygiene technique  - Identify and instruct in appropriate isolation precautions for identified infection/condition  Outcome: Progressing  Goal: Absence of fever/infection during neutropenic period  Description: INTERVENTIONS:  - Monitor WBC    Outcome: Progressing     Problem: SAFETY ADULT  Goal: Patient will remain free of falls  Description: INTERVENTIONS:  - Educate patient/family on patient safety including physical limitations  - Instruct patient to call for assistance with activity   - Consult OT/PT to assist with strengthening/mobility   - Keep Call bell within reach  - Keep bed low and locked with side rails adjusted as appropriate  - Keep care items and personal belongings within reach  - Initiate and maintain comfort rounds  - Make Fall Risk Sign visible to staff  - Offer Toileting every 2 Hours, in advance of need  - Initiate/Maintain alarm  - Obtain necessary fall risk management equipment  - Apply yellow socks and bracelet for high fall risk patients  - Consider moving patient to room near nurses station  Outcome: Progressing  Goal: Maintain or return to baseline ADL function  Description: INTERVENTIONS:  -  Assess patient's ability to carry out ADLs; assess patient's baseline for ADL function and identify physical deficits which impact ability to perform ADLs (bathing, care of mouth/teeth, toileting, grooming, dressing, etc.)  - Assess/evaluate cause of self-care deficits   - Assess range of motion  - Assess patient's mobility; develop plan if impaired  - Assess patient's need for assistive devices and provide as appropriate  - Encourage maximum independence but intervene and supervise when necessary  - Involve family in performance of ADLs  - Assess for home care needs following discharge   - Consider OT consult to assist with ADL evaluation and planning for discharge  - Provide patient education as appropriate  Outcome: Progressing  Goal: Maintains/Returns to pre admission functional level  Description: INTERVENTIONS:  - Perform BMAT or MOVE assessment daily.   - Set and communicate daily mobility goal to care team and patient/family/caregiver. - Collaborate with rehabilitation services on mobility goals if consulted  - Perform Range of Motion 3 times a day. - Reposition patient every 3 hours.   - Dangle patient 3 times a day  - Stand patient 3 times a day  - Ambulate patient 3 times a day  - Out of bed to chair 3 times a day   - Out of bed for meals 3 times a day  - Out of bed for toileting  - Record patient progress and toleration of activity level   Outcome: Progressing     Problem: DISCHARGE PLANNING  Goal: Discharge to home or other facility with appropriate resources  Description: INTERVENTIONS:  - Identify barriers to discharge w/patient and caregiver  - Arrange for needed discharge resources and transportation as appropriate  - Identify discharge learning needs (meds, wound care, etc.)  - Arrange for interpretive services to assist at discharge as needed  - Refer to Case Management Department for coordinating discharge planning if the patient needs post-hospital services based on physician/advanced practitioner order or complex needs related to functional status, cognitive ability, or social support system  Outcome: Progressing     Problem: Knowledge Deficit  Goal: Patient/family/caregiver demonstrates understanding of disease process, treatment plan, medications, and discharge instructions  Description: Complete learning assessment and assess knowledge base.   Interventions:  - Provide teaching at level of understanding  - Provide teaching via preferred learning methods  Outcome: Progressing     Problem: Prexisting or High Potential for Compromised Skin Integrity  Goal: Skin integrity is maintained or improved  Description: INTERVENTIONS:  - Identify patients at risk for skin breakdown  - Assess and monitor skin integrity  - Assess and monitor nutrition and hydration status  - Monitor labs   - Assess for incontinence   - Turn and reposition patient  - Assist with mobility/ambulation  - Relieve pressure over bony prominences  - Avoid friction and shearing  - Provide appropriate hygiene as needed including keeping skin clean and dry  - Evaluate need for skin moisturizer/barrier cream  - Collaborate with interdisciplinary team   - Patient/family teaching  - Consider wound care consult   Outcome: Progressing

## 2023-08-17 NOTE — NURSING NOTE
Pt rang call bell complaining of chest pain & SOB. Pt describes his pain as tight but not radiating anywhere. Pt's BS noted to be 354. Vitals WNL. Pt's O2 94% on RA. SLIM and Cardiology PA notified. ECG obtained. Tele showing rhythm in and out of A-Flutter. Pt states he's had this before when his "A-fib acts up."  ESTEFANY ordered by Cardio AP. No other new orders @ this time. Pt states his chest pain is slowly improving. Tele reviewed showing similar rhythm as ECG. Pt's seemingly resting comfortably in bed watching TV. Care continuing.

## 2023-08-17 NOTE — ASSESSMENT & PLAN NOTE
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 Sonia Lugo with surgical oncology as an outpatient, did not feel like a biopsy was necessary and was referred to Dr. Claudine Thompson for Y90 radioembolization  • Continue outpatient follow up

## 2023-08-18 ENCOUNTER — APPOINTMENT (INPATIENT)
Dept: NON INVASIVE DIAGNOSTICS | Facility: HOSPITAL | Age: 78
DRG: 291 | End: 2023-08-18
Payer: COMMERCIAL

## 2023-08-18 ENCOUNTER — APPOINTMENT (INPATIENT)
Dept: NUCLEAR MEDICINE | Facility: HOSPITAL | Age: 78
DRG: 291 | End: 2023-08-18
Payer: COMMERCIAL

## 2023-08-18 LAB
4HR DELTA HS TROPONIN: 6 NG/L
ANION GAP SERPL CALCULATED.3IONS-SCNC: 14 MMOL/L
ATRIAL RATE: 70 BPM
ATRIAL RATE: 71 BPM
ATRIAL RATE: 72 BPM
ATRIAL RATE: 83 BPM
BUN SERPL-MCNC: 63 MG/DL (ref 5–25)
CALCIUM SERPL-MCNC: 9.8 MG/DL (ref 8.4–10.2)
CARDIAC TROPONIN I PNL SERPL HS: 114 NG/L
CHEST PAIN STATEMENT: NORMAL
CHLORIDE SERPL-SCNC: 97 MMOL/L (ref 96–108)
CO2 SERPL-SCNC: 27 MMOL/L (ref 21–32)
CREAT SERPL-MCNC: 2.14 MG/DL (ref 0.6–1.3)
ERYTHROCYTE [DISTWIDTH] IN BLOOD BY AUTOMATED COUNT: 34.3 % (ref 11.6–15.1)
GFR SERPL CREATININE-BSD FRML MDRD: 28 ML/MIN/1.73SQ M
GLUCOSE SERPL-MCNC: 136 MG/DL (ref 65–140)
GLUCOSE SERPL-MCNC: 187 MG/DL (ref 65–140)
GLUCOSE SERPL-MCNC: 259 MG/DL (ref 65–140)
GLUCOSE SERPL-MCNC: 287 MG/DL (ref 65–140)
HCT VFR BLD AUTO: 39.8 % (ref 36.5–49.3)
HGB BLD-MCNC: 11 G/DL (ref 12–17)
MAX DIASTOLIC BP: 66 MMHG
MAX HEART RATE: 86 BPM
MAX PREDICTED HEART RATE: 143 BPM
MAX. SYSTOLIC BP: 117 MMHG
MCH RBC QN AUTO: 19.9 PG (ref 26.8–34.3)
MCHC RBC AUTO-ENTMCNC: 27.6 G/DL (ref 31.4–37.4)
MCV RBC AUTO: 72 FL (ref 82–98)
NUC STRESS EJECTION FRACTION: 36 %
P AXIS: 16 DEGREES
P AXIS: 19 DEGREES
P AXIS: 20 DEGREES
P AXIS: 8 DEGREES
PLATELET # BLD AUTO: 301 THOUSANDS/UL (ref 149–390)
POTASSIUM SERPL-SCNC: 4 MMOL/L (ref 3.5–5.3)
PR INTERVAL: 224 MS
PR INTERVAL: 232 MS
PR INTERVAL: 248 MS
PR INTERVAL: 280 MS
PROTOCOL NAME: NORMAL
QRS AXIS: -50 DEGREES
QRS AXIS: -52 DEGREES
QRS AXIS: -57 DEGREES
QRS AXIS: -57 DEGREES
QRSD INTERVAL: 126 MS
QRSD INTERVAL: 126 MS
QRSD INTERVAL: 128 MS
QRSD INTERVAL: 130 MS
QT INTERVAL: 414 MS
QT INTERVAL: 416 MS
QT INTERVAL: 424 MS
QT INTERVAL: 428 MS
QTC INTERVAL: 449 MS
QTC INTERVAL: 453 MS
QTC INTERVAL: 460 MS
QTC INTERVAL: 502 MS
RBC # BLD AUTO: 5.52 MILLION/UL (ref 3.88–5.62)
REASON FOR TERMINATION: NORMAL
SL CV REST NUCLEAR ISOTOPE DOSE: 10.7 MCI
SL CV STRESS NUCLEAR ISOTOPE DOSE: 33 MCI
SODIUM SERPL-SCNC: 138 MMOL/L (ref 135–147)
STRESS BASELINE HR: 74 BPM
STRESS POST PEAK BP: 117 MMHG
STRESS/REST PERFUSION RATIO: 1.06
T WAVE AXIS: -17 DEGREES
T WAVE AXIS: -27 DEGREES
T WAVE AXIS: -28 DEGREES
T WAVE AXIS: -54 DEGREES
TARGET HR FORMULA: NORMAL
TEST INDICATION: NORMAL
TIME IN EXERCISE PHASE: NORMAL
VENTRICULAR RATE: 70 BPM
VENTRICULAR RATE: 71 BPM
VENTRICULAR RATE: 72 BPM
VENTRICULAR RATE: 83 BPM
WBC # BLD AUTO: 12 THOUSAND/UL (ref 4.31–10.16)

## 2023-08-18 PROCEDURE — 82948 REAGENT STRIP/BLOOD GLUCOSE: CPT

## 2023-08-18 PROCEDURE — 99232 SBSQ HOSP IP/OBS MODERATE 35: CPT | Performed by: INTERNAL MEDICINE

## 2023-08-18 PROCEDURE — 93016 CV STRESS TEST SUPVJ ONLY: CPT | Performed by: INTERNAL MEDICINE

## 2023-08-18 PROCEDURE — 80048 BASIC METABOLIC PNL TOTAL CA: CPT | Performed by: PHYSICIAN ASSISTANT

## 2023-08-18 PROCEDURE — 78452 HT MUSCLE IMAGE SPECT MULT: CPT | Performed by: INTERNAL MEDICINE

## 2023-08-18 PROCEDURE — 84484 ASSAY OF TROPONIN QUANT: CPT | Performed by: PHYSICIAN ASSISTANT

## 2023-08-18 PROCEDURE — G1004 CDSM NDSC: HCPCS

## 2023-08-18 PROCEDURE — 93017 CV STRESS TEST TRACING ONLY: CPT

## 2023-08-18 PROCEDURE — 85027 COMPLETE CBC AUTOMATED: CPT | Performed by: PHYSICIAN ASSISTANT

## 2023-08-18 PROCEDURE — A9502 TC99M TETROFOSMIN: HCPCS

## 2023-08-18 PROCEDURE — 93010 ELECTROCARDIOGRAM REPORT: CPT | Performed by: INTERNAL MEDICINE

## 2023-08-18 PROCEDURE — 94762 N-INVAS EAR/PLS OXIMTRY CONT: CPT

## 2023-08-18 PROCEDURE — 99232 SBSQ HOSP IP/OBS MODERATE 35: CPT | Performed by: PHYSICIAN ASSISTANT

## 2023-08-18 PROCEDURE — 93005 ELECTROCARDIOGRAM TRACING: CPT

## 2023-08-18 PROCEDURE — 78452 HT MUSCLE IMAGE SPECT MULT: CPT

## 2023-08-18 PROCEDURE — 93018 CV STRESS TEST I&R ONLY: CPT | Performed by: INTERNAL MEDICINE

## 2023-08-18 RX ORDER — REGADENOSON 0.08 MG/ML
0.4 INJECTION, SOLUTION INTRAVENOUS ONCE
Status: COMPLETED | OUTPATIENT
Start: 2023-08-18 | End: 2023-08-18

## 2023-08-18 RX ADMIN — INSULIN LISPRO 1 UNITS: 100 INJECTION, SOLUTION INTRAVENOUS; SUBCUTANEOUS at 08:15

## 2023-08-18 RX ADMIN — METOPROLOL SUCCINATE 50 MG: 50 TABLET, EXTENDED RELEASE ORAL at 08:14

## 2023-08-18 RX ADMIN — APIXABAN 5 MG: 5 TABLET, FILM COATED ORAL at 17:01

## 2023-08-18 RX ADMIN — PREGABALIN 100 MG: 100 CAPSULE ORAL at 21:54

## 2023-08-18 RX ADMIN — INSULIN LISPRO 12 UNITS: 100 INJECTION, SOLUTION INTRAVENOUS; SUBCUTANEOUS at 08:15

## 2023-08-18 RX ADMIN — REGADENOSON 0.4 MG: 0.08 INJECTION, SOLUTION INTRAVENOUS at 13:35

## 2023-08-18 RX ADMIN — PANTOPRAZOLE SODIUM 40 MG: 40 TABLET, DELAYED RELEASE ORAL at 17:01

## 2023-08-18 RX ADMIN — SERTRALINE HYDROCHLORIDE 25 MG: 25 TABLET ORAL at 08:17

## 2023-08-18 RX ADMIN — INSULIN LISPRO 12 UNITS: 100 INJECTION, SOLUTION INTRAVENOUS; SUBCUTANEOUS at 16:59

## 2023-08-18 RX ADMIN — UMECLIDINIUM BROMIDE AND VILANTEROL TRIFENATATE 1 PUFF: 62.5; 25 POWDER RESPIRATORY (INHALATION) at 08:15

## 2023-08-18 RX ADMIN — INSULIN LISPRO 3 UNITS: 100 INJECTION, SOLUTION INTRAVENOUS; SUBCUTANEOUS at 16:59

## 2023-08-18 RX ADMIN — PREGABALIN 100 MG: 100 CAPSULE ORAL at 08:14

## 2023-08-18 RX ADMIN — INSULIN DETEMIR 32 UNITS: 100 INJECTION, SOLUTION SUBCUTANEOUS at 21:56

## 2023-08-18 RX ADMIN — PREGABALIN 100 MG: 100 CAPSULE ORAL at 16:59

## 2023-08-18 RX ADMIN — PANTOPRAZOLE SODIUM 40 MG: 40 TABLET, DELAYED RELEASE ORAL at 05:33

## 2023-08-18 RX ADMIN — APIXABAN 5 MG: 5 TABLET, FILM COATED ORAL at 08:14

## 2023-08-18 RX ADMIN — TAMSULOSIN HYDROCHLORIDE 0.4 MG: 0.4 CAPSULE ORAL at 16:59

## 2023-08-18 RX ADMIN — FERROUS SULFATE TAB 325 MG (65 MG ELEMENTAL FE) 325 MG: 325 (65 FE) TAB at 08:14

## 2023-08-18 RX ADMIN — INSULIN LISPRO 3 UNITS: 100 INJECTION, SOLUTION INTRAVENOUS; SUBCUTANEOUS at 21:56

## 2023-08-18 RX ADMIN — INSULIN LISPRO 4 UNITS: 100 INJECTION, SOLUTION INTRAVENOUS; SUBCUTANEOUS at 14:49

## 2023-08-18 RX ADMIN — POTASSIUM CHLORIDE 20 MEQ: 1500 TABLET, EXTENDED RELEASE ORAL at 08:14

## 2023-08-18 NOTE — PLAN OF CARE
Problem: Potential for Falls  Goal: Patient will remain free of falls  Description: INTERVENTIONS:  - Educate patient/family on patient safety including physical limitations  - Instruct patient to call for assistance with activity   - Consult OT/PT to assist with strengthening/mobility   - Keep Call bell within reach  - Keep bed low and locked with side rails adjusted as appropriate  - Keep care items and personal belongings within reach  - Initiate and maintain comfort rounds  - Make Fall Risk Sign visible to staff  - Offer Toileting every 2 Hours, in advance of need  - Initiate/Maintain alarm  - Obtain necessary fall risk management equipment  - Apply yellow socks and bracelet for high fall risk patients  - Consider moving patient to room near nurses station  Outcome: Progressing     Problem: MOBILITY - ADULT  Goal: Maintain or return to baseline ADL function  Description: INTERVENTIONS:  -  Assess patient's ability to carry out ADLs; assess patient's baseline for ADL function and identify physical deficits which impact ability to perform ADLs (bathing, care of mouth/teeth, toileting, grooming, dressing, etc.)  - Assess/evaluate cause of self-care deficits   - Assess range of motion  - Assess patient's mobility; develop plan if impaired  - Assess patient's need for assistive devices and provide as appropriate  - Encourage maximum independence but intervene and supervise when necessary  - Involve family in performance of ADLs  - Assess for home care needs following discharge   - Consider OT consult to assist with ADL evaluation and planning for discharge  - Provide patient education as appropriate  Outcome: Progressing  Goal: Maintains/Returns to pre admission functional level  Description: INTERVENTIONS:  - Perform BMAT or MOVE assessment daily.   - Set and communicate daily mobility goal to care team and patient/family/caregiver.    - Collaborate with rehabilitation services on mobility goals if consulted  - Perform Range of Motion 3 times a day. - Reposition patient every 3 hours.   - Dangle patient 3 times a day  - Stand patient 3 times a day  - Ambulate patient 3 times a day  - Out of bed to chair 3 times a day   - Out of bed for meals 3 times a day  - Out of bed for toileting  - Record patient progress and toleration of activity level   Outcome: Progressing     Problem: PAIN - ADULT  Goal: Verbalizes/displays adequate comfort level or baseline comfort level  Description: Interventions:  - Encourage patient to monitor pain and request assistance  - Assess pain using appropriate pain scale  - Administer analgesics based on type and severity of pain and evaluate response  - Implement non-pharmacological measures as appropriate and evaluate response  - Consider cultural and social influences on pain and pain management  - Notify physician/advanced practitioner if interventions unsuccessful or patient reports new pain  Outcome: Progressing     Problem: INFECTION - ADULT  Goal: Absence or prevention of progression during hospitalization  Description: INTERVENTIONS:  - Assess and monitor for signs and symptoms of infection  - Monitor lab/diagnostic results  - Monitor all insertion sites, i.e. indwelling lines, tubes, and drains  - Monitor endotracheal if appropriate and nasal secretions for changes in amount and color  - [place] appropriate cooling/warming therapies per order  - Administer medications as ordered  - Instruct and encourage patient and family to use good hand hygiene technique  - Identify and instruct in appropriate isolation precautions for identified infection/condition  Outcome: Progressing  Goal: Absence of fever/infection during neutropenic period  Description: INTERVENTIONS:  - Monitor WBC    Outcome: Progressing     Problem: SAFETY ADULT  Goal: Patient will remain free of falls  Description: INTERVENTIONS:  - Educate patient/family on patient safety including physical limitations  - Instruct patient to call for assistance with activity   - Consult OT/PT to assist with strengthening/mobility   - Keep Call bell within reach  - Keep bed low and locked with side rails adjusted as appropriate  - Keep care items and personal belongings within reach  - Initiate and maintain comfort rounds  - Make Fall Risk Sign visible to staff  - Offer Toileting every 2 Hours, in advance of need  - Initiate/Maintain alarm  - Obtain necessary fall risk management equipment  - Apply yellow socks and bracelet for high fall risk patients  - Consider moving patient to room near nurses station  Outcome: Progressing  Goal: Maintain or return to baseline ADL function  Description: INTERVENTIONS:  -  Assess patient's ability to carry out ADLs; assess patient's baseline for ADL function and identify physical deficits which impact ability to perform ADLs (bathing, care of mouth/teeth, toileting, grooming, dressing, etc.)  - Assess/evaluate cause of self-care deficits   - Assess range of motion  - Assess patient's mobility; develop plan if impaired  - Assess patient's need for assistive devices and provide as appropriate  - Encourage maximum independence but intervene and supervise when necessary  - Involve family in performance of ADLs  - Assess for home care needs following discharge   - Consider OT consult to assist with ADL evaluation and planning for discharge  - Provide patient education as appropriate  Outcome: Progressing  Goal: Maintains/Returns to pre admission functional level  Description: INTERVENTIONS:  - Perform BMAT or MOVE assessment daily.   - Set and communicate daily mobility goal to care team and patient/family/caregiver. - Collaborate with rehabilitation services on mobility goals if consulted  - Perform Range of Motion 3 times a day. - Reposition patient every 3 hours.   - Dangle patient 3 times a day  - Stand patient 3 times a day  - Ambulate patient 3 times a day  - Out of bed to chair 3 times a day   - Out of bed for meals 3 times a day  - Out of bed for toileting  - Record patient progress and toleration of activity level   Outcome: Progressing     Problem: DISCHARGE PLANNING  Goal: Discharge to home or other facility with appropriate resources  Description: INTERVENTIONS:  - Identify barriers to discharge w/patient and caregiver  - Arrange for needed discharge resources and transportation as appropriate  - Identify discharge learning needs (meds, wound care, etc.)  - Arrange for interpretive services to assist at discharge as needed  - Refer to Case Management Department for coordinating discharge planning if the patient needs post-hospital services based on physician/advanced practitioner order or complex needs related to functional status, cognitive ability, or social support system  Outcome: Progressing     Problem: Knowledge Deficit  Goal: Patient/family/caregiver demonstrates understanding of disease process, treatment plan, medications, and discharge instructions  Description: Complete learning assessment and assess knowledge base.   Interventions:  - Provide teaching at level of understanding  - Provide teaching via preferred learning methods  Outcome: Progressing     Problem: Prexisting or High Potential for Compromised Skin Integrity  Goal: Skin integrity is maintained or improved  Description: INTERVENTIONS:  - Identify patients at risk for skin breakdown  - Assess and monitor skin integrity  - Assess and monitor nutrition and hydration status  - Monitor labs   - Assess for incontinence   - Turn and reposition patient  - Assist with mobility/ambulation  - Relieve pressure over bony prominences  - Avoid friction and shearing  - Provide appropriate hygiene as needed including keeping skin clean and dry  - Evaluate need for skin moisturizer/barrier cream  - Collaborate with interdisciplinary team   - Patient/family teaching  - Consider wound care consult   Outcome: Progressing

## 2023-08-18 NOTE — PROGRESS NOTES
[address]  Progress Note  Name: Sebastián Butterfield  MRN: [id number]  Unit/Bed#: -01 I Date of Admission: 8/15/2023   Date of Service: 8/18/2023 I Hospital Day: 1    Assessment/Plan   Hepatocellular carcinoma [facility])  Assessment & Plan  • Suspected HCC with multiple hepatic masses on imaging and AFP >18k, has not been confirmed by tissue diagnosis  • Following with Dr. Stacy Garcia with surgical oncology as an outpatient, did not feel like a biopsy was necessary and was referred to Dr. Luis Angel Lee for Y90 radioembolization  • Continue outpatient follow up     Elevated troponin  Assessment & Plan  Patient with elevated troponins on prior admission, peaked at 1000 and down trended to 900 in the setting of GI bleed  · Cardiology consulted, non-MI troponin elevation although echo with wall motion abnormality planning for ischemic evaluation once euvolemic  · Continue to monitor on telemetry  · Chest pain-free at this time  · Continue beta-blocker, Eliquis  · Recent GI bleed not on aspirin  · Troponin on admission 277 > 245 > 270  · 8/18 Nuclear stress test with no ischemic ECG changes, however does have a new reduced EF to 36% as well as large fixed defect of the inferior and inferolateral walls with associated wall motion abnormality suspicious for RCA territory infarct.   · Plan to transfer for cardiac cath      Anemia  Assessment & Plan  · Recently treated for GIB on prior admission- s/p EGD & C-scope with clipping of gastric/duodenal angiectasias + 2 units PRBC with stabilization of hemoglobin  · Restarted on eliquis  following GI clearance   · Continue PPI BID   · Daily CBC  · Hemoglobin uptrending    Lab Results   Component Value Date    HGB 11.0 (L) 08/18/2023    HGB 10.5 (L) 08/17/2023    HGB 10.2 (L) 08/16/2023    HGB 9.9 (L) 08/15/2023    HGB 9.1 (L) 08/14/2023         Atrial fibrillation, unspecified type ([address])  Assessment & Plan  · Continue rate control with metoprolol 50 mg daily  · Anticoagulated with Eliquis 5 mg twice daily      Type 2 diabetes mellitus with neurologic complication, with long-term current use of insulin ([facility])  Assessment & Plan  Lab Results   Component Value Date    HGBA1C 7.6 (H) 01/06/2023     · Home regimen:  · Trulicity, saxagliptin (hold)  · Levemir 75 units at bedtime, NovoLog 20 units breakfast, 30 units lunch/dinner  · While inpatient:  · Levemir reduced to 32 units at bedtime  · NovoLog 12 units 3 times daily AC  · ISS and Accu-Cheks,adjust insulin as needed   · Diabetic diet    Stage 3 chronic kidney disease, unspecified whether stage 3a or 3b CKD [facility])  Assessment & Plan  Lab Results   Component Value Date    EGFR 28 08/18/2023    EGFR 32 08/17/2023    EGFR 38 08/16/2023    CREATININE 2.14 (H) 08/18/2023    CREATININE 1.94 (H) 08/17/2023    CREATININE 1.69 (H) 08/16/2023     · Creatinine baseline is 1.3-1.6  · Creatinine fatou to 1.9 --> 2.1  · Check I/O, PVR and placed on retention protocol today  · Continue Flomax  · Avoid hypotension and nephrotoxins  · BMP daily  · Hold diuretics    * Acute on chronic diastolic HF (heart failure) ([facility])  Assessment & Plan  Wt Readings from Last 3 Encounters:   08/18/23 91.8 kg (202 lb 6.4 oz)   08/14/23 95 kg (209 lb 7 oz)   08/02/23 101 kg (222 lb)     Patient recently treated for acute on chronic CHF during prior admission which was thought 2/2 volume overload due to blood transfusion for GIB. Discharged on room air 8/14 and po diuretics. Patient returned to ED 8/15 due to new chest discomfort and intermittent SOB which began early morning 8/15 causing disruption of sleep, worse with laying on L side.   · Appreciate ongoing recommendations per cardiology  · Currently on IV Lasix 80 mg twice daily  · Potassium stable today  · Creatinine did rise to 1.94  · Check PVR, urinalysis, and monitor on retention protocol  · Continue to monitor I's/O  · Weight 207 today but this is reported on bed scale, please try to obtain standing weight daily   · CXR: Minimal right-sided pleural effusion. Emphysematous changes. No focal consolidative airspace opacity to suggest pneumonia. · Echocardiogram: 50 to 55%, basal inferior mid inferior hypokinetic  · Once euvolemic plan for ischemic evaluation  · Maintain telemetry  · Continue Toprol-XL 50 mg daily  · Holding diuretics in the setting of elevated renal function  · Appears close to euvolemia             VTE Pharmacologic Prophylaxis:   High Risk (Score >/= 5) - Pharmacological DVT Prophylaxis Ordered: apixaban (Eliquis). Sequential Compression Devices Ordered. Patient Centered Rounds: I performed bedside rounds with nursing staff today. Discussions with Specialists or Other Care Team Provider: Cardiology    Education and Discussions with Family / Patient: Updated  (wife) via phone. Total Time Spent on Date of Encounter in care of patient: 55 minutes This time was spent on one or more of the following: performing physical exam; counseling and coordination of care; obtaining or reviewing history; documenting in the medical record; reviewing/ordering tests, medications or procedures; communicating with other healthcare professionals and discussing with patient's family/caregivers. Current Length of Stay: 1 day(s)  Current Patient Status: Inpatient   Certification Statement: The patient will continue to require additional inpatient hospital stay due to Pending transfer for cardiac cath  Discharge Plan: To \[facility]\"" for cardiac cath    Code Status: Level 1 - Full Code    Subjective:   Had an episode of chest pain overnight. ECGs remained nonischemic, troponins flat. Discussed the results of nuclear stress test and need for transfer for cardiac cath. Patient is agreeable.     Objective:     Vitals:   Temp (24hrs), Av.4 °F (36.3 °C), Min:97.3 °F (36.3 °C), Max:97.4 °F (36.3 °C)    Temp:  [97.3 °F (36.3 °C)-97.4 °F (36.3 °C)] 97.4 °F (36.3 °C)  HR:  [69-86] 86  Resp:  [16-17] 17  BP: (105-115)/(48-63) 111/56  SpO2:  [89 %-96 %] 91 %  Body mass index is 27.45 kg/m². Input and Output Summary (last 24 hours): Intake/Output Summary (Last 24 hours) at 8/18/2023 1635  Last data filed at 8/18/2023 1441  Gross per 24 hour   Intake 450 ml   Output 200 ml   Net 250 ml       Physical Exam:   Physical Exam  Vitals and nursing note reviewed. Constitutional:       Appearance: Normal appearance. HENT:      Head: Normocephalic and atraumatic. Mouth/Throat:      Mouth: Mucous membranes are moist.      Pharynx: Oropharynx is clear. No oropharyngeal exudate. Eyes:      Extraocular Movements: Extraocular movements intact. Cardiovascular:      Rate and Rhythm: Normal rate and regular rhythm. Pulses: Normal pulses. Heart sounds: Normal heart sounds. No murmur heard. No friction rub. No gallop. Pulmonary:      Effort: Pulmonary effort is normal. No respiratory distress. Breath sounds: Normal breath sounds. No stridor. No wheezing or rales. Abdominal:      General: Abdomen is flat. Bowel sounds are normal. There is no distension. Palpations: Abdomen is soft. Tenderness: There is no abdominal tenderness. Musculoskeletal:      Right lower leg: No edema. Left lower leg: No edema. Skin:     General: Skin is warm and dry. Neurological:      General: No focal deficit present. Mental Status: He is alert and oriented to person, place, and time.           Additional Data:     Labs:  Results from last 7 days   Lab Units 08/18/23 0527 08/17/23 0455   WBC Thousand/uL 12.00* 11.57*   HEMOGLOBIN g/dL 11.0* 10.5*   HEMATOCRIT % 39.8 37.6   PLATELETS Thousands/uL 301 250   NEUTROS PCT %  --  66   LYMPHS PCT %  --  15   MONOS PCT %  --  13*   EOS PCT %  --  4     Results from last 7 days   Lab Units 08/18/23 0527 08/17/23  0455 08/16/23  0442   SODIUM mmol/L 138   < > 141   POTASSIUM mmol/L 4.0   < > 4.7   CHLORIDE mmol/L 97   < > 101   CO2 mmol/L 27   < > 32   BUN mg/dL 63*   < > 42*   CREATININE mg/dL 2.14*   < > 1.69*   ANION GAP mmol/L 14   < > 8   CALCIUM mg/dL 9.8   < > 9.5   ALBUMIN g/dL  --   --  3.9   TOTAL BILIRUBIN mg/dL  --   --  0.72   ALK PHOS U/L  --   --  95   ALT U/L  --   --  36   AST U/L  --   --  36   GLUCOSE RANDOM mg/dL 136   < > 121    < > = values in this interval not displayed.          Results from last 7 days   Lab Units 08/18/23  1618 08/18/23  0816 08/17/23  2050 08/17/23  1631 08/17/23  1107 08/17/23  1017 08/17/23  0733 08/16/23  2036 08/16/23  1625 08/16/23  1135 08/16/23  0733 08/15/23  2119   POC GLUCOSE mg/dl 259* 187* 180* 134 280* 354* 188* 225* 181* 284* 160* 169*         Results from last 7 days   Lab Units 08/13/23  0450   PROCALCITONIN ng/ml 0.25       Lines/Drains:  Invasive Devices     Peripheral Intravenous Line  Duration           Peripheral IV 08/18/23 Right Forearm <1 day                  Telemetry:  Telemetry Orders (From admission, onward)             24 Hour Telemetry Monitoring  Continuous x 24 Hours (Telem)        Question:  Reason for 24 Hour Telemetry  Answer:  PCI/EP study (including pacer and ICD implementation), Cardiac surgery, MI, abnormal cardiac cath, and chest pain- rule out MI                 Telemetry Reviewed: Flipped back and forth between A-fib and normal sinus rhythm overnight  Indication for Continued Telemetry Use: Acute MI/Unstable Angina/Rule out ACS             Imaging: Reviewed radiology reports from this admission including: Nuclear stress test    Recent Cultures (last 7 days):         Last 24 Hours Medication List:   Current Facility-Administered Medications   Medication Dose Route Frequency Provider Last Rate   • acetaminophen  650 mg Oral Q6H PRN Kirstie Titus PA-C     • apixaban  5 mg Oral BID Oreden Titus PA-C     • ferrous sulfate  325 mg Oral Daily With Breakfast Kirstie Titus PA-C     • insulin detemir  32 Units Subcutaneous HS Charmayne Shines, AURELIA     • insulin lispro  1-5 Units Subcutaneous HS Ashu Titus PA-C     • insulin lispro  1-6 Units Subcutaneous TID AC Jennifer Titus PA-C     • insulin lispro  12 Units Subcutaneous TID With Meals Clyde Gonzalez PA-C     • metoprolol succinate  50 mg Oral Daily Ashu Titus PA-C     • pantoprazole  40 mg Oral BID AC Jennifer Titus PA-C     • potassium chloride  20 mEq Oral Daily Chrissy Diggs PA-C     • pregabalin  100 mg Oral TID Ashu Titus PA-C     • sertraline  25 mg Oral Daily Ashu Mata PA-C     • tamsulosin  0.4 mg Oral Daily With Scott's AURELIA Titus     • umeclidinium-vilanterol  1 puff Inhalation Daily Melly Whipple PA-C          Today, Patient Was Seen By: Daria Nuñez PA-C    **Please Note: This note may have been constructed using a voice recognition system. **

## 2023-08-18 NOTE — PLAN OF CARE
Problem: Potential for Falls  Goal: Patient will remain free of falls  Description: INTERVENTIONS:  - Educate patient/family on patient safety including physical limitations  - Instruct patient to call for assistance with activity   - Consult OT/PT to assist with strengthening/mobility   - Keep Call bell within reach  - Keep bed low and locked with side rails adjusted as appropriate  - Keep care items and personal belongings within reach  - Initiate and maintain comfort rounds  - Make Fall Risk Sign visible to staff  - Offer Toileting every 2 Hours, in advance of need  - Obtain necessary fall risk management equipment: nonskid footwear  - Apply yellow socks and bracelet for high fall risk patients  - Consider moving patient to room near nurses station  Outcome: Progressing     Problem: MOBILITY - ADULT  Goal: Maintain or return to baseline ADL function  Description: INTERVENTIONS:  -  Assess patient's ability to carry out ADLs; assess patient's baseline for ADL function and identify physical deficits which impact ability to perform ADLs (bathing, care of mouth/teeth, toileting, grooming, dressing, etc.)  - Assess/evaluate cause of self-care deficits   - Assess range of motion  - Assess patient's mobility; develop plan if impaired  - Assess patient's need for assistive devices and provide as appropriate  - Encourage maximum independence but intervene and supervise when necessary  - Involve family in performance of ADLs  - Assess for home care needs following discharge   - Consider OT consult to assist with ADL evaluation and planning for discharge  - Provide patient education as appropriate  Outcome: Progressing     Problem: PAIN - ADULT  Goal: Verbalizes/displays adequate comfort level or baseline comfort level  Description: Interventions:  - Encourage patient to monitor pain and request assistance  - Assess pain using appropriate pain scale  - Administer analgesics based on type and severity of pain and evaluate response  - Implement non-pharmacological measures as appropriate and evaluate response  - Consider cultural and social influences on pain and pain management  - Notify physician/advanced practitioner if interventions unsuccessful or patient reports new pain  Outcome: Progressing     Problem: INFECTION - ADULT  Goal: Absence or prevention of progression during hospitalization  Description: INTERVENTIONS:  - Assess and monitor for signs and symptoms of infection  - Monitor lab/diagnostic results  - Monitor all insertion sites, i.e. indwelling lines, tubes, and drains  - Monitor endotracheal if appropriate and nasal secretions for changes in amount and color  - [place] appropriate cooling/warming therapies per order  - Administer medications as ordered  - Instruct and encourage patient and family to use good hand hygiene technique  - Identify and instruct in appropriate isolation precautions for identified infection/condition  Outcome: Progressing  Goal: Absence of fever/infection during neutropenic period  Description: INTERVENTIONS:  - Monitor WBC    Outcome: Progressing     Goal: Maintain or return to baseline ADL function  Description: INTERVENTIONS:  -  Assess patient's ability to carry out ADLs; assess patient's baseline for ADL function and identify physical deficits which impact ability to perform ADLs (bathing, care of mouth/teeth, toileting, grooming, dressing, etc.)  - Assess/evaluate cause of self-care deficits   - Assess range of motion  - Assess patient's mobility; develop plan if impaired  - Assess patient's need for assistive devices and provide as appropriate  - Encourage maximum independence but intervene and supervise when necessary  - Involve family in performance of ADLs  - Assess for home care needs following discharge   - Consider OT consult to assist with ADL evaluation and planning for discharge  - Provide patient education as appropriate  Outcome: Progressing     Problem: DISCHARGE PLANNING  Goal: Discharge to home or other facility with appropriate resources  Description: INTERVENTIONS:  - Identify barriers to discharge w/patient and caregiver  - Arrange for needed discharge resources and transportation as appropriate  - Identify discharge learning needs (meds, wound care, etc.)  - Arrange for interpretive services to assist at discharge as needed  - Refer to Case Management Department for coordinating discharge planning if the patient needs post-hospital services based on physician/advanced practitioner order or complex needs related to functional status, cognitive ability, or social support system  Outcome: Progressing     Problem: Knowledge Deficit  Goal: Patient/family/caregiver demonstrates understanding of disease process, treatment plan, medications, and discharge instructions  Description: Complete learning assessment and assess knowledge base.   Interventions:  - Provide teaching at level of understanding  - Provide teaching via preferred learning methods  Outcome: Progressing     Problem: Prexisting or High Potential for Compromised Skin Integrity  Goal: Skin integrity is maintained or improved  Description: INTERVENTIONS:  - Identify patients at risk for skin breakdown  - Assess and monitor skin integrity  - Assess and monitor nutrition and hydration status  - Monitor labs   - Assess for incontinence   - Turn and reposition patient  - Assist with mobility/ambulation  - Relieve pressure over bony prominences  - Avoid friction and shearing  - Provide appropriate hygiene as needed including keeping skin clean and dry  - Evaluate need for skin moisturizer/barrier cream  - Collaborate with interdisciplinary team   - Patient/family teaching  - Consider wound care consult   Outcome: Progressing

## 2023-08-18 NOTE — QUICK NOTE
Notified of chest pain around 1900. Patient reports chest tightness was similar to pain that occurred earlier in the day that had been attributed to possibly A-fib/a flutter by cardiology. EKG at 1900 without any signs of ischemia. Repeat EKG 30 minutes later showed some T wave flattening, so troponins were trended. Troponins were trended throughout the evening and continued to be less than troponins that were drawn on admit. T wave flattening initially seen on EKG had resolved with no residual ischemia on serial EKGs. Patient was seen sleeping around 2350. When 4-hour troponin was drawn patient states he felt slightly improved.

## 2023-08-18 NOTE — PROGRESS NOTES
Progress Note - Cardiology   Rob Gandara 69 y.o. male MRN: [id number]  Unit/Bed#: -Kevin Encounter: [id number]        Problem List:  Principal Problem:    Acute on chronic diastolic HF (heart failure) ([address])  Active Problems:    Stage 3 chronic kidney disease, unspecified whether stage 3a or 3b CKD ([address])    Type 2 diabetes mellitus with neurologic complication, with long-term current use of insulin ([facility])    Atrial fibrillation, unspecified type ([address])    Anemia    Elevated troponin    Hepatocellular carcinoma ([address])      Assessment:  1. Acute on chronic heart failure with mildly reduced EF  a. 5/2023 Echo: LVEF 29%, grade 1 diastolic dysfunction, mild mitral insufficiency  b. 8/2023 echo: LVEF 45%, mild global hypokinesis  c. 8/2023 Limited echo: LVEF 50-55%, basal inferior and mid inferior hypokinesis  2. Paroxysmal atrial fibrillation  a. Eliquis for CVA risk reduction  b. Toprol-XL 50 daily for AV blocking  3. Non-MI troponin elevation secondary to heart failure  a. 6/2023 NST: No ischemia  a. Initial troponin 277 --> 245  4. Recent history of anemia  a. 8/2023 admission for blood loss anemia at which time he did require 2 units of packed red blood cells and underwent EGD and colonoscopy with subsequent clipping of AV malformations  b. Hemoglobin has since been stable even with restarting Eliquis currently trending around 8-9 and per patient having bowel movements at home without any noticeable blood  5. CKD baseline creatinine 1.3-1.6  6. History of DVT for which he takes Eliquis  7. Hepatocellular cancer  8. Type 2 diabetes    Plan/ Discussion:  • Creat up again today, 2.1 up from 1.9. Hold off on further diuretics  • Back in NSR on telemetry  • Check nuclear stress test today  • Await renal recovery & start torsemide 40 daily once back to baseline  • Continue Eliquis  • Continue Toprol XL  • If stress test is unremarkable anticipate DC once creatinine is back to baseline. Continue to hold diuretics as above. While await labs to normalize, we will follow peripherally and see back 8/21/23. If otherwise stable for DC, could go home over the weekend from our standpoint as long as stress test shows no ischemia. Of course if stress test is ischemic, will likely need to pursue cardiac cath    Subjective:  Feeling okay. Breathing is fine. Had some chest pain this morning after breakfast but this has resolved spontaneously.     Vitals:  Vitals:    08/17/23 0536 08/18/23 0500   Weight: 94.2 kg (207 lb 10.8 oz) 91.8 kg (202 lb 6.4 oz)   ,  Vitals:    08/17/23 1918 08/18/23 0500 08/18/23 0722 08/18/23 0902   BP: (!) 105/48  115/63 111/56   Pulse: 73  84 86   Resp: 16  17    Temp: (!) 97.3 °F (36.3 °C)  (!) 97.4 °F (36.3 °C)    TempSrc:       SpO2: 94%  (!) 89% 91%   Weight:  91.8 kg (202 lb 6.4 oz)     Height:           Exam:  General: Alert awake and oriented, no acute distress  Heart:  Regular rate and rhythm, no murmurs, Normal S1, no edema    Respiratory effort/ Lungs:  Breathing comfortably on room air, clear bilaterally without wheezing, rales, crackles   Abdominal: Non-tender to palpation, + bowel sounds, soft, no masses or distension  Lower Limbs:  No edema            Telemetry:       Normal sinus rhythm, , Heart Rate 80s    Medications:    Current Facility-Administered Medications:   •  acetaminophen (TYLENOL) tablet 650 mg, 650 mg, Oral, Q6H PRN, Margarito Titus PA-C  •  apixaban (ELIQUIS) tablet 5 mg, 5 mg, Oral, BID, Margarito Titus PA-C, 5 mg at 08/18/23 9637  •  ferrous sulfate tablet 325 mg, 325 mg, Oral, Daily With Breakfast, Margarito Titus PA-C, 325 mg at 08/18/23 4524  •  insulin detemir (LEVEMIR) subcutaneous injection 32 Units, 32 Units, Subcutaneous, HS, Chrissy Diggs PA-C, 32 Units at 08/17/23 9673  •  insulin lispro (HumaLOG) 100 units/mL subcutaneous injection 1-5 Units, 1-5 Units, Subcutaneous, CAROLE, Margarito Mata PA-C, 1 Units at 08/17/23 2113  •  insulin lispro (HumaLOG) 100 units/mL subcutaneous injection 1-6 Units, 1-6 Units, Subcutaneous, TID AC, 1 Units at 08/18/23 0815 **AND** Fingerstick Glucose (POCT), , , TID AC, Gabe Titus PA-C  •  insulin lispro (HumaLOG) 100 units/mL subcutaneous injection 12 Units, 12 Units, Subcutaneous, TID With Meals, HAKAN Pastrana-C, 12 Units at 08/18/23 0815  •  metoprolol succinate (TOPROL-XL) 24 hr tablet 50 mg, 50 mg, Oral, Daily, Gabe Titus, PA-C, 50 mg at 08/18/23 5026  •  pantoprazole (PROTONIX) EC tablet 40 mg, 40 mg, Oral, BID AC, Gabe Titus, PA-C, 40 mg at 08/18/23 0533  •  potassium chloride (K-DUR,KLOR-CON) CR tablet 20 mEq, 20 mEq, Oral, Daily, HAKAN Ratliff-C, 20 mEq at 08/18/23 1840  •  pregabalin (LYRICA) capsule 100 mg, 100 mg, Oral, TID, Gabe Titus, PA-C, 100 mg at 08/18/23 0133  •  sertraline (ZOLOFT) tablet 25 mg, 25 mg, Oral, Daily, Gabe Titus, PA-C, 25 mg at 08/18/23 7086  •  tamsulosin (FLOMAX) capsule 0.4 mg, 0.4 mg, Oral, Daily With Makenzie Titus PA-C, 0.4 mg at 08/17/23 1635  •  umeclidinium-vilanterol 62.5-25 mcg/actuation inhaler 1 puff, 1 puff, Inhalation, Daily, Gabe Titus PA-C, 1 puff at 08/18/23 0815      Labs/Data:        Results from last 7 days   Lab Units 08/18/23  0527 08/17/23  0455 08/16/23  0442   WBC Thousand/uL 12.00* 11.57* 11.69*   HEMOGLOBIN g/dL 11.0* 10.5* 10.2*   HEMATOCRIT % 39.8 37.6 37.7   PLATELETS Thousands/uL 301 250 233     Results from last 7 days   Lab Units 08/18/23  0527 08/17/23  0455 08/16/23  0442   POTASSIUM mmol/L 4.0 4.0 4.7   CHLORIDE mmol/L 97 98 101   CO2 mmol/L 27 30 32   BUN mg/dL 63* 53* 42*   CREATININE mg/dL 2.14* 1.94* 1.69*

## 2023-08-18 NOTE — ASSESSMENT & PLAN NOTE
Wt Readings from Last 3 Encounters:   08/18/23 91.8 kg (202 lb 6.4 oz)   08/14/23 95 kg (209 lb 7 oz)   08/02/23 101 kg (222 lb)     Patient recently treated for acute on chronic CHF during prior admission which was thought 2/2 volume overload due to blood transfusion for GIB. Discharged on room air 8/14 and po diuretics. Patient returned to ED 8/15 due to new chest discomfort and intermittent SOB which began early morning 8/15 causing disruption of sleep, worse with laying on L side. · Appreciate ongoing recommendations per cardiology  · Currently on IV Lasix 80 mg twice daily  · Potassium stable today  · Creatinine did rise to 1.94  · Check PVR, urinalysis, and monitor on retention protocol  · Continue to monitor I's/O  · Weight 207 today but this is reported on bed scale, please try to obtain standing weight daily   · CXR: Minimal right-sided pleural effusion. Emphysematous changes. No focal consolidative airspace opacity to suggest pneumonia.   · Echocardiogram: 50 to 55%, basal inferior mid inferior hypokinetic  · Once euvolemic plan for ischemic evaluation  · Maintain telemetry  · Continue Toprol-XL 50 mg daily  · Holding diuretics in the setting of elevated renal function  · Appears close to euvolemia

## 2023-08-18 NOTE — ASSESSMENT & PLAN NOTE
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 Nikole Caba with surgical oncology as an outpatient, did not feel like a biopsy was necessary and was referred to Dr. Tyrell Logan for Y90 radioembolization  • Continue outpatient follow up

## 2023-08-18 NOTE — ASSESSMENT & PLAN NOTE
· Recently treated for GIB on prior admission- s/p EGD & C-scope with clipping of gastric/duodenal angiectasias + 2 units PRBC with stabilization of hemoglobin  · Restarted on eliquis  following GI clearance   · Continue PPI BID   · Daily CBC  · Hemoglobin uptrending    Lab Results   Component Value Date    HGB 11.0 (L) 08/18/2023    HGB 10.5 (L) 08/17/2023    HGB 10.2 (L) 08/16/2023    HGB 9.9 (L) 08/15/2023    HGB 9.1 (L) 08/14/2023

## 2023-08-18 NOTE — ASSESSMENT & PLAN NOTE
Lab Results   Component Value Date    EGFR 28 08/18/2023    EGFR 32 08/17/2023    EGFR 38 08/16/2023    CREATININE 2.14 (H) 08/18/2023    CREATININE 1.94 (H) 08/17/2023    CREATININE 1.69 (H) 08/16/2023     · Creatinine baseline is 1.3-1.6  · Creatinine fatou to 1.9 --> 2.1  · Check I/O, PVR and placed on retention protocol today  · Continue Flomax  · Avoid hypotension and nephrotoxins  · BMP daily  · Hold diuretics

## 2023-08-18 NOTE — ASSESSMENT & PLAN NOTE
Patient with elevated troponins on prior admission, peaked at 1000 and down trended to 900 in the setting of GI bleed  · Cardiology consulted, non-MI troponin elevation although echo with wall motion abnormality planning for ischemic evaluation once euvolemic  · Continue to monitor on telemetry  · Chest pain-free at this time  · Continue beta-blocker, Eliquis  · Recent GI bleed not on aspirin  · Troponin on admission 277 > 245 > 270  · 8/18 Nuclear stress test with no ischemic ECG changes, however does have a new reduced EF to 36% as well as large fixed defect of the inferior and inferolateral walls with associated wall motion abnormality suspicious for RCA territory infarct.   · Plan to transfer for cardiac cath

## 2023-08-18 NOTE — CASE MANAGEMENT
Case Management Discharge Planning Note    Patient name Amelia Maynard  Location [address] [facility] 330/-01 MRN [id number]  : 1945 Date 2023       Current Admission Date: 8/15/2023  Current Admission Diagnosis:Acute on chronic diastolic HF (heart failure) [facility])   Patient Active Problem List    Diagnosis Date Noted   • Hepatocellular carcinoma ([address])    • Elevated troponin 2023   • Hepatic flexure mass 2023   • Syncope 2023   • Anemia 2023   • Acute on chronic diastolic HF (heart failure) ([address]) 2023   • History of DVT (deep vein thrombosis) 2023   • Restrictive lung disease 2023   • Other emphysema ([address]) 2023   • History of tobacco abuse 2023   • H/O asbestos exposure 2023   • Liver mass 05/10/2023   • Atrial fibrillation, unspecified type ([address]) 02/15/2023   • Peripheral polyneuropathy 2022   • Type 2 diabetes mellitus with neurologic complication, with long-term current use of insulin ([address]) 2022   • Stage 3 chronic kidney disease, unspecified whether stage 3a or 3b CKD ([address]) 2022      LOS (days): 1  Geometric Mean LOS (GMLOS) (days): 3.90  Days to GMLOS:2.6     OBJECTIVE:  Risk of Unplanned Readmission Score: 24.96         Current admission status: Inpatient   Preferred Pharmacy:   [facility] [id number] [facility][id number] [address] [address]  Phone: [phone] Fax: [phone]    Primary Care Provider: Disha Encarnacion MD    Primary Insurance: [facility]  Secondary Insurance:     DISCHARGE DETAILS:       IMM Given (Date):: 23  IMM Given to[de-identified] Patient

## 2023-08-19 LAB
ANION GAP SERPL CALCULATED.3IONS-SCNC: 10 MMOL/L
BUN SERPL-MCNC: 63 MG/DL (ref 5–25)
CALCIUM SERPL-MCNC: 9.8 MG/DL (ref 8.4–10.2)
CHLORIDE SERPL-SCNC: 99 MMOL/L (ref 96–108)
CO2 SERPL-SCNC: 29 MMOL/L (ref 21–32)
CREAT SERPL-MCNC: 1.88 MG/DL (ref 0.6–1.3)
ERYTHROCYTE [DISTWIDTH] IN BLOOD BY AUTOMATED COUNT: 34.7 % (ref 11.6–15.1)
GFR SERPL CREATININE-BSD FRML MDRD: 33 ML/MIN/1.73SQ M
GLUCOSE SERPL-MCNC: 160 MG/DL (ref 65–140)
GLUCOSE SERPL-MCNC: 171 MG/DL (ref 65–140)
GLUCOSE SERPL-MCNC: 178 MG/DL (ref 65–140)
GLUCOSE SERPL-MCNC: 186 MG/DL (ref 65–140)
GLUCOSE SERPL-MCNC: 284 MG/DL (ref 65–140)
GLUCOSE SERPL-MCNC: 286 MG/DL (ref 65–140)
HCT VFR BLD AUTO: 40.2 % (ref 36.5–49.3)
HGB BLD-MCNC: 11.2 G/DL (ref 12–17)
MCH RBC QN AUTO: 20.6 PG (ref 26.8–34.3)
MCHC RBC AUTO-ENTMCNC: 27.9 G/DL (ref 31.4–37.4)
MCV RBC AUTO: 74 FL (ref 82–98)
PLATELET # BLD AUTO: 297 THOUSANDS/UL (ref 149–390)
POTASSIUM SERPL-SCNC: 4.4 MMOL/L (ref 3.5–5.3)
RBC # BLD AUTO: 5.44 MILLION/UL (ref 3.88–5.62)
SODIUM SERPL-SCNC: 138 MMOL/L (ref 135–147)
WBC # BLD AUTO: 11 THOUSAND/UL (ref 4.31–10.16)

## 2023-08-19 PROCEDURE — 80048 BASIC METABOLIC PNL TOTAL CA: CPT | Performed by: PHYSICIAN ASSISTANT

## 2023-08-19 PROCEDURE — 82948 REAGENT STRIP/BLOOD GLUCOSE: CPT

## 2023-08-19 PROCEDURE — 99239 HOSP IP/OBS DSCHRG MGMT >30: CPT | Performed by: PHYSICIAN ASSISTANT

## 2023-08-19 PROCEDURE — 85027 COMPLETE CBC AUTOMATED: CPT | Performed by: PHYSICIAN ASSISTANT

## 2023-08-19 RX ORDER — FERROUS SULFATE 325(65) MG
325 TABLET ORAL
Status: CANCELLED | OUTPATIENT
Start: 2023-08-20

## 2023-08-19 RX ORDER — ACETAMINOPHEN 325 MG/1
650 TABLET ORAL EVERY 6 HOURS PRN
Status: CANCELLED | OUTPATIENT
Start: 2023-08-19

## 2023-08-19 RX ORDER — PREGABALIN 100 MG/1
100 CAPSULE ORAL 3 TIMES DAILY
Status: CANCELLED | OUTPATIENT
Start: 2023-08-19

## 2023-08-19 RX ORDER — INSULIN LISPRO 100 [IU]/ML
1-6 INJECTION, SOLUTION INTRAVENOUS; SUBCUTANEOUS
Status: CANCELLED | OUTPATIENT
Start: 2023-08-20

## 2023-08-19 RX ORDER — SERTRALINE HYDROCHLORIDE 25 MG/1
25 TABLET, FILM COATED ORAL DAILY
Status: CANCELLED | OUTPATIENT
Start: 2023-08-20

## 2023-08-19 RX ORDER — TAMSULOSIN HYDROCHLORIDE 0.4 MG/1
0.4 CAPSULE ORAL
Status: CANCELLED | OUTPATIENT
Start: 2023-08-20

## 2023-08-19 RX ORDER — POTASSIUM CHLORIDE 20 MEQ/1
20 TABLET, EXTENDED RELEASE ORAL DAILY
Status: CANCELLED | OUTPATIENT
Start: 2023-08-20

## 2023-08-19 RX ORDER — INSULIN LISPRO 100 [IU]/ML
1-5 INJECTION, SOLUTION INTRAVENOUS; SUBCUTANEOUS
Status: CANCELLED | OUTPATIENT
Start: 2023-08-19

## 2023-08-19 RX ORDER — PANTOPRAZOLE SODIUM 40 MG/1
40 TABLET, DELAYED RELEASE ORAL
Status: CANCELLED | OUTPATIENT
Start: 2023-08-20

## 2023-08-19 RX ORDER — INSULIN LISPRO 100 [IU]/ML
12 INJECTION, SOLUTION INTRAVENOUS; SUBCUTANEOUS
Status: CANCELLED | OUTPATIENT
Start: 2023-08-20

## 2023-08-19 RX ORDER — METOPROLOL SUCCINATE 50 MG/1
50 TABLET, EXTENDED RELEASE ORAL DAILY
Status: CANCELLED | OUTPATIENT
Start: 2023-08-20

## 2023-08-19 RX ADMIN — INSULIN LISPRO 12 UNITS: 100 INJECTION, SOLUTION INTRAVENOUS; SUBCUTANEOUS at 17:31

## 2023-08-19 RX ADMIN — POTASSIUM CHLORIDE 20 MEQ: 1500 TABLET, EXTENDED RELEASE ORAL at 08:47

## 2023-08-19 RX ADMIN — TAMSULOSIN HYDROCHLORIDE 0.4 MG: 0.4 CAPSULE ORAL at 17:22

## 2023-08-19 RX ADMIN — INSULIN LISPRO 1 UNITS: 100 INJECTION, SOLUTION INTRAVENOUS; SUBCUTANEOUS at 17:31

## 2023-08-19 RX ADMIN — FERROUS SULFATE TAB 325 MG (65 MG ELEMENTAL FE) 325 MG: 325 (65 FE) TAB at 08:47

## 2023-08-19 RX ADMIN — INSULIN LISPRO 12 UNITS: 100 INJECTION, SOLUTION INTRAVENOUS; SUBCUTANEOUS at 12:34

## 2023-08-19 RX ADMIN — UMECLIDINIUM BROMIDE AND VILANTEROL TRIFENATATE 1 PUFF: 62.5; 25 POWDER RESPIRATORY (INHALATION) at 08:48

## 2023-08-19 RX ADMIN — INSULIN LISPRO 4 UNITS: 100 INJECTION, SOLUTION INTRAVENOUS; SUBCUTANEOUS at 12:34

## 2023-08-19 RX ADMIN — PREGABALIN 100 MG: 100 CAPSULE ORAL at 21:15

## 2023-08-19 RX ADMIN — METOPROLOL SUCCINATE 50 MG: 50 TABLET, EXTENDED RELEASE ORAL at 08:47

## 2023-08-19 RX ADMIN — APIXABAN 5 MG: 5 TABLET, FILM COATED ORAL at 08:47

## 2023-08-19 RX ADMIN — PANTOPRAZOLE SODIUM 40 MG: 40 TABLET, DELAYED RELEASE ORAL at 06:10

## 2023-08-19 RX ADMIN — APIXABAN 5 MG: 5 TABLET, FILM COATED ORAL at 17:22

## 2023-08-19 RX ADMIN — PANTOPRAZOLE SODIUM 40 MG: 40 TABLET, DELAYED RELEASE ORAL at 17:23

## 2023-08-19 RX ADMIN — INSULIN DETEMIR 32 UNITS: 100 INJECTION, SOLUTION SUBCUTANEOUS at 21:16

## 2023-08-19 RX ADMIN — SERTRALINE HYDROCHLORIDE 25 MG: 25 TABLET ORAL at 08:50

## 2023-08-19 RX ADMIN — PREGABALIN 100 MG: 100 CAPSULE ORAL at 17:22

## 2023-08-19 RX ADMIN — PREGABALIN 100 MG: 100 CAPSULE ORAL at 08:47

## 2023-08-19 RX ADMIN — INSULIN LISPRO 1 UNITS: 100 INJECTION, SOLUTION INTRAVENOUS; SUBCUTANEOUS at 21:16

## 2023-08-19 RX ADMIN — INSULIN LISPRO 1 UNITS: 100 INJECTION, SOLUTION INTRAVENOUS; SUBCUTANEOUS at 08:45

## 2023-08-19 RX ADMIN — INSULIN LISPRO 12 UNITS: 100 INJECTION, SOLUTION INTRAVENOUS; SUBCUTANEOUS at 08:45

## 2023-08-19 NOTE — ASSESSMENT & PLAN NOTE
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 Raisa Cruz with surgical oncology as an outpatient, did not feel like a biopsy was necessary and was referred to Dr. Jackson Cagle for Y90 radioembolization  • Continue outpatient follow up

## 2023-08-19 NOTE — PLAN OF CARE
Problem: PAIN - ADULT  Goal: Verbalizes/displays adequate comfort level or baseline comfort level  Description: Interventions:  - Encourage patient to monitor pain and request assistance  - Assess pain using appropriate pain scale  - Administer analgesics based on type and severity of pain and evaluate response  - Implement non-pharmacological measures as appropriate and evaluate response  - Consider cultural and social influences on pain and pain management  - Notify physician/advanced practitioner if interventions unsuccessful or patient reports new pain  Outcome: Progressing     Problem: INFECTION - ADULT  Goal: Absence or prevention of progression during hospitalization  Description: INTERVENTIONS:  - Assess and monitor for signs and symptoms of infection  - Monitor lab/diagnostic results  - Monitor all insertion sites, i.e. indwelling lines, tubes, and drains  - Monitor endotracheal if appropriate and nasal secretions for changes in amount and color  - [place] appropriate cooling/warming therapies per order  - Administer medications as ordered  - Instruct and encourage patient and family to use good hand hygiene technique  - Identify and instruct in appropriate isolation precautions for identified infection/condition  Outcome: Progressing  Goal: Absence of fever/infection during neutropenic period  Description: INTERVENTIONS:  - Monitor WBC    Outcome: Progressing     Problem: DISCHARGE PLANNING  Goal: Discharge to home or other facility with appropriate resources  Description: INTERVENTIONS:  - Identify barriers to discharge w/patient and caregiver  - Arrange for needed discharge resources and transportation as appropriate  - Identify discharge learning needs (meds, wound care, etc.)  - Arrange for interpretive services to assist at discharge as needed  - Refer to Case Management Department for coordinating discharge planning if the patient needs post-hospital services based on physician/advanced practitioner order or complex needs related to functional status, cognitive ability, or social support system  Outcome: Progressing     Problem: Knowledge Deficit  Goal: Patient/family/caregiver demonstrates understanding of disease process, treatment plan, medications, and discharge instructions  Description: Complete learning assessment and assess knowledge base.   Interventions:  - Provide teaching at level of understanding  - Provide teaching via preferred learning methods  Outcome: Progressing

## 2023-08-19 NOTE — CASE MANAGEMENT
Teaching Attestation:  I have personally interviewed and examined the patient via face-to-face. I confirmed the findings listed below:    Mild cognitive impairment  (primary encounter diagnosis)  Mixed hyperlipidemia  Primary hypertension  Permanent atrial fibrillation (CMD)     This was discussed with the resident and I agree with the assessment and plan as documented. The plan of care was discussed with the patient.    Lacy Rodriguez MD       Case Management Discharge Planning Note    Patient name Felicia Ricci  Location [address] [facility] 330/-01 MRN [id number]  : 1945 Date 2023       Current Admission Date: 8/15/2023  Current Admission Diagnosis:Acute on chronic diastolic HF (heart failure) [facility])   Patient Active Problem List    Diagnosis Date Noted   • Hepatocellular carcinoma ([address])    • Elevated troponin 2023   • Hepatic flexure mass 2023   • Syncope 2023   • Anemia 2023   • Acute on chronic diastolic HF (heart failure) ([address]) 2023   • History of DVT (deep vein thrombosis) 2023   • Restrictive lung disease 2023   • Other emphysema ([address]) 2023   • History of tobacco abuse 2023   • H/O asbestos exposure 2023   • Liver mass 05/10/2023   • Atrial fibrillation, unspecified type ([address]) 02/15/2023   • Peripheral polyneuropathy 2022   • Type 2 diabetes mellitus with neurologic complication, with long-term current use of insulin ([address]) 2022   • Stage 3 chronic kidney disease, unspecified whether stage 3a or 3b CKD ([address]) 2022      LOS (days): 2  Geometric Mean LOS (GMLOS) (days): 3.90  Days to GMLOS:1.9     OBJECTIVE:  Risk of Unplanned Readmission Score: 25.24         Current admission status: Inpatient   Preferred Pharmacy:   [facility] [address] [id number] [address]  Phone: [phone] Fax: [phone]    Primary Care Provider: Yazmin Brito MD    Primary Insurance: [facility]  Secondary Insurance:     DISCHARGE DETAILS:        CM checked that [facility] could accept pt in [address] HUIVAJAY stated pt was never opened with them and they are closed to referrals in his area due to staffing. CM reopened referral and added new providers. Awaiting acceptance from another agency.

## 2023-08-19 NOTE — ASSESSMENT & PLAN NOTE
Lab Results   Component Value Date    EGFR 33 08/19/2023    EGFR 28 08/18/2023    EGFR 32 08/17/2023    CREATININE 1.88 (H) 08/19/2023    CREATININE 2.14 (H) 08/18/2023    CREATININE 1.94 (H) 08/17/2023     · Creatinine baseline is 1.3-1.6  · Creatinine fatou to 1.9 --> 2.1  · Check I/O, PVR and placed on retention protocol today  · Continue Flomax  · Avoid hypotension and nephrotoxins  · BMP daily  · Hold diuretics

## 2023-08-19 NOTE — DISCHARGE SUMMARY
[address] [facility]  Discharge- [facility] 1945, 69 y.o. male MRN: [id number]  Unit/Bed#: -Kevin Encounter: [id number]  Primary Care Provider: Cory Travis MD   Date and time admitted to hospital: 8/15/2023 12:56 PM    Hepatocellular carcinoma [facility])  Assessment & Plan  • Suspected HCC with multiple hepatic masses on imaging and AFP >18k, has not been confirmed by tissue diagnosis  • Following with Dr. Mary Arce with surgical oncology as an outpatient, did not feel like a biopsy was necessary and was referred to Dr. Alida Dhillon for Y90 radioembolization  • Continue outpatient follow up     Elevated troponin  Assessment & Plan  Patient with elevated troponins on prior admission, peaked at 1000 and down trended to 900 in the setting of GI bleed  · Cardiology consulted, non-MI troponin elevation although echo with wall motion abnormality planning for ischemic evaluation once euvolemic  · Continue to monitor on telemetry  · Chest pain-free at this time  · Continue beta-blocker, Eliquis  · Recent GI bleed not on aspirin  · Troponin on admission 277 > 245 > 270  · 8/18 Nuclear stress test with no ischemic ECG changes, however does have a new reduced EF to 36% as well as large fixed defect of the inferior and inferolateral walls with associated wall motion abnormality suspicious for RCA territory infarct.   · Plan to transfer for cardiac cath      Anemia  Assessment & Plan  · Recently treated for GIB on prior admission- s/p EGD & C-scope with clipping of gastric/duodenal angiectasias + 2 units PRBC with stabilization of hemoglobin  · Restarted on eliquis  following GI clearance   · Continue PPI BID   · Daily CBC  · Hemoglobin uptrending    Lab Results   Component Value Date    HGB 11.2 (L) 08/19/2023    HGB 11.0 (L) 08/18/2023    HGB 10.5 (L) 08/17/2023    HGB 10.2 (L) 08/16/2023    HGB 9.9 (L) 08/15/2023         Atrial fibrillation, unspecified type ([address])  Assessment & Plan  · Continue rate control with metoprolol 50 mg daily  · Anticoagulated with Eliquis 5 mg twice daily      Type 2 diabetes mellitus with neurologic complication, with long-term current use of insulin ([facility])  Assessment & Plan  Lab Results   Component Value Date    HGBA1C 7.6 (H) 01/06/2023     · Home regimen:  · Trulicity, saxagliptin (hold)  · Levemir 75 units at bedtime, NovoLog 20 units breakfast, 30 units lunch/dinner  · While inpatient:  · Levemir reduced to 32 units at bedtime  · NovoLog 12 units 3 times daily AC  · ISS and Accu-Cheks,adjust insulin as needed   · Diabetic diet    Stage 3 chronic kidney disease, unspecified whether stage 3a or 3b CKD [facility])  Assessment & Plan  Lab Results   Component Value Date    EGFR 33 08/19/2023    EGFR 28 08/18/2023    EGFR 32 08/17/2023    CREATININE 1.88 (H) 08/19/2023    CREATININE 2.14 (H) 08/18/2023    CREATININE 1.94 (H) 08/17/2023     · Creatinine baseline is 1.3-1.6  · Creatinine fatou to 1.9 --> 2.1  · Check I/O, PVR and placed on retention protocol today  · Continue Flomax  · Avoid hypotension and nephrotoxins  · BMP daily  · Hold diuretics    * Acute on chronic diastolic HF (heart failure) ([facility])  Assessment & Plan  Wt Readings from Last 3 Encounters:   08/19/23 92.7 kg (204 lb 5.9 oz)   08/14/23 95 kg (209 lb 7 oz)   08/02/23 101 kg (222 lb)     Patient recently treated for acute on chronic CHF during prior admission which was thought 2/2 volume overload due to blood transfusion for GIB. Discharged on room air 8/14 and po diuretics. Patient returned to ED 8/15 due to new chest discomfort and intermittent SOB which began early morning 8/15 causing disruption of sleep, worse with laying on L side.   · Appreciate ongoing recommendations per cardiology  · Currently on IV Lasix 80 mg twice daily  · Potassium stable today  · Creatinine did rise to 1.94  · Check PVR, urinalysis, and monitor on retention protocol  · Continue to monitor I's/O  · Weight 207 today but this is reported on bed scale, please try to obtain standing weight daily   · CXR: Minimal right-sided pleural effusion. Emphysematous changes. No focal consolidative airspace opacity to suggest pneumonia. · Echocardiogram: 50 to 55%, basal inferior mid inferior hypokinetic  · Once euvolemic plan for ischemic evaluation  · Maintain telemetry  · Continue Toprol-XL 50 mg daily  · Holding diuretics in the setting of elevated renal function  · Appears close to euvolemia        Medical Problems     Resolved Problems  Date Reviewed: 8/19/2023   None       Discharging Physician / Practitioner: Ankur Guerrier PA-C  PCP: Shi Rayo MD  Admission Date:   Admission Orders (From admission, onward)     Ordered        08/17/23 0709  Inpatient Admission  Once            08/15/23 1351  Place in Observation  Once                      Discharge Date: 08/19/23    Consultations During Hospital Stay:  · Cardiology    Procedures Performed:   · Echo- EF 50-55%, inferior wall motion abnormality  · Nuclear stress test -with large fixed defect of the inferior and inferolateral wall suspicious for RCA territory infarct. No ECG or SPECT evidence of ischemia. XR chest pa & lateral    Result Date: 8/18/2023  Impression: No active pulmonary disease. Workstation performed: [id number]     XR chest pa & lateral    Result Date: 8/15/2023  · Impression: Minimal right-sided pleural effusion. Emphysematous changes. No focal consolidative airspace opacity to suggest pneumonia. Resident: Bradly Abdalla, the attending radiologist, have reviewed the images and agree with the final report above. Workstation performed: [id number]   ·     Significant Findings / Test Results:   · Wall motion abnormality on echo, abnormality on nuclear stress test    Incidental Findings:   · None    Test Results Pending at Discharge (will require follow up):    · None     Outpatient Tests Requested:  · None    Complications: None    Reason for Admission: Chest [facility]    Hospital Course:   Manohar Hollins is a 69 y.o. male patient with PMH CKD, HTN, A-fib on Eliquis, history of DVT, emphysema, suspected hepatocellular carcinoma, T2DM, anemia with recent admission for acute blood loss requiring blood transfusions who originally presented to the hospital on 8/15/2023 due to chest pain. Patient developed chest pain the night he was sent home from his admission for his GI bleed. Associated shortness of breath. On arrival to the ED, vital signs are stable. He had troponin elevation of 277, however had a significant troponin elevation during his recent admission which peaked at 5000, which was thought to be secondary to profound anemia. He underwent a repeat echocardiogram which demonstrated a possible inferior wall motion abnormality. Patient had intermittent episodes of chest pain throughout his hospital stay. He also had intermittent episodes of A-fib. Evaluated by cardiology, and had a nuclear stress test performed which showed a large defect consistent with RCA territory infarct, although no ECG/SPECT evidence of ischemia. Given his recurrent chest pain with shortness of breath, abnormal echo, abnormal nuclear stress test, decision was made to transfer patient to [facility] for cardiac catheterization. Please see above list of diagnoses and related plan for additional information. Condition at Discharge: stable    Discharge Day Visit / Exam:   Subjective: Did have another episode of shortness of breath overnight, lasted approximately 30 minutes. Did not have associated chest pain this time. Resolved on his own. Vitals: Blood Pressure: 127/52 (08/19/23 1642)  Pulse: 72 (08/19/23 0717)  Temperature: 97.6 °F (36.4 °C) (08/19/23 1642)  Temp Source: Temporal (08/19/23 0717)  Respirations: 18 (08/19/23 1642)  Height: 6' (182.9 cm) (08/16/23 0805)  Weight - Scale: 92.7 kg (204 lb 5.9 oz) (08/19/23 0503)  SpO2: 95 % (08/19/23 0717)  Exam:   Physical Exam  Vitals and nursing note reviewed. Constitutional:       Appearance: Normal appearance. HENT:      Head: Normocephalic and atraumatic. Mouth/Throat:      Mouth: Mucous membranes are moist.      Pharynx: Oropharynx is clear. No oropharyngeal exudate. Eyes:      Extraocular Movements: Extraocular movements intact. Cardiovascular:      Rate and Rhythm: Normal rate and regular rhythm. Pulses: Normal pulses. Heart sounds: Normal heart sounds. No murmur heard. No friction rub. No gallop. Pulmonary:      Effort: Pulmonary effort is normal. No respiratory distress. Breath sounds: Normal breath sounds. No stridor. No wheezing or rales. Abdominal:      General: Abdomen is flat. Bowel sounds are normal. There is no distension. Palpations: Abdomen is soft. Tenderness: There is no abdominal tenderness. Musculoskeletal:      Right lower leg: No edema. Left lower leg: No edema. Skin:     General: Skin is warm and dry. Neurological:      General: No focal deficit present. Mental Status: He is alert and oriented to person, place, and time. Discussion with Family: Updated  (wife) via phone. Discharge instructions/Information to patient and family:   See after visit summary for information provided to patient and family. Provisions for Follow-Up Care:  See after visit summary for information related to follow-up care and any pertinent home health orders. Disposition:   Other: [facility]    Planned Readmission: None     Discharge Statement:  I spent 60 minutes discharging the patient. This time was spent on the day of discharge. I had direct contact with the patient on the day of discharge. Greater than 50% of the total time was spent examining patient, answering all patient questions, arranging and discussing plan of care with patient as well as directly providing post-discharge instructions. Additional time then spent on discharge activities.     Discharge Medications:  See after visit summary for reconciled discharge medications provided to patient and/or family.       **Please Note: This note may have been constructed using a voice recognition system**

## 2023-08-19 NOTE — CASE MANAGEMENT
Case Management Discharge Planning Note    Patient name Joleen Holland  Location [address] [facility] 330/-01 MRN [id number]  : 1945 Date 2023       Current Admission Date: 8/15/2023  Current Admission Diagnosis:Acute on chronic diastolic HF (heart failure) [facility])   Patient Active Problem List    Diagnosis Date Noted   • Hepatocellular carcinoma ([address])    • Elevated troponin 2023   • Hepatic flexure mass 2023   • Syncope 2023   • Anemia 2023   • Acute on chronic diastolic HF (heart failure) ([address]) 2023   • History of DVT (deep vein thrombosis) 2023   • Restrictive lung disease 2023   • Other emphysema ([address]) 2023   • History of tobacco abuse 2023   • H/O asbestos exposure 2023   • Liver mass 05/10/2023   • Atrial fibrillation, unspecified type ([address]) 02/15/2023   • Peripheral polyneuropathy 2022   • Type 2 diabetes mellitus with neurologic complication, with long-term current use of insulin ([address]) 2022   • Stage 3 chronic kidney disease, unspecified whether stage 3a or 3b CKD ([address]) 2022      LOS (days): 2  Geometric Mean LOS (GMLOS) (days): 3.90  Days to GMLOS:1.7     OBJECTIVE:  Risk of Unplanned Readmission Score: 25.29         Current admission status: Inpatient   Preferred Pharmacy:   [facility] [address] [facility] #[id number] [address] [address]  Phone: [phone] Fax: [phone]    Primary Care Provider: Flex Muniz MD    Primary Insurance: [facility]  Secondary Insurance:     DISCHARGE DETAILS:     Informed pt is being transferred to [facility] for a cardiac cath and will need cardiac monitoring en route. Medical Necessity completed and placed on chart.

## 2023-08-19 NOTE — PROGRESS NOTES
Accidentally documented in acudose that we wasted 25mg  Of pregablin with Temitope Tamez RN instead of 100mg. Called Tia the pharmacist to make her aware of what happened. My co worker and I actually wasted 100 mg of Pregablin.

## 2023-08-19 NOTE — TRANSPORTATION MEDICAL NECESSITY
Section I - General Information    Name of Patient: Thierry Matthews                 : 1945    Medicare #: [id number]  Transport Date: 23 (PCS is valid for round trips on this date and for all repetitive trips in the 60-day range as noted below.)  Origin: [facility] MED SURG [facility]IT                                                         Destination: [facility]  Is the pt's stay covered under Medicare Part A (PPS/DRG)   []     Closest appropriate facility? If no, why is transport to more distant facility required? Yes  If hospice pt, is this transport related to pt's terminal illness? NA       Section II - Medical Necessity Questionnaire  Ambulance transportation is medically necessary only if other means of transport are contraindicated or would be potentially harmful to the patient. To meet this requirement, the patient must either be "bed confined" or suffer from a condition such that transport by means other than ambulance is contraindicated by the patient's condition. The following questions must be answered by the medical professional signing below for this form to be valid:    1)  Describe the MEDICAL CONDITION (physical and/or mental) of this patient  [address] that requires the patient to be transported in an ambulance and why transport by other means is contraindicated by the patient's condition:cardiac monitor    2) Is the patient "bed confined" as defined below? No  To be "be confined" the patient must satisfy all three of the following conditions: (1) unable to get up from bed without Assistance; AND (2) unable to ambulate; AND (3) unable to sit in a chair or wheelchair. 3) Can this patient safely be transported by car or wheelchair van (i.e., seated during transport without a medical attendant or monitoring)?    No    4) In addition to completing questions 1-3 above, please check any of the following conditions that apply*:   *Note: supporting documentation for any boxes checked must be maintained in the patient's medical records. If hosp-hosp transfer, describe services needed at 2nd facility not available at 1st facility? Medical attendant required   Cardiac monitoring required en route       Section III - Signature of Physician or Healthcare Professional  I certify that the above information is true and correct based on my evaluation of this patient, and represent that the patient requires transport by ambulance and that other forms of transport are contraindicated. I understand that this information will be used by the Centers for Medicare and Medicaid Services (CMS) to support the determination of medical necessity for ambulance services, and I represent that I have personal knowledge of the patient's condition at time of transport. []  If this box is checked, I also certify that the patient is physically or mentally incapable of signing the ambulance service's claim and that the institution with which I am affiliated has furnished care, services, or assistance to the patient. My signature below is made on behalf of the patient pursuant to 42 CFR §424.36(b)(4). In accordance with 42 CFR §424.37, the specific reason(s) that the patient is physically or mentally incapable of signing the claim form is as follows: Ryan Morrell of Physician* or Healthcare Professional__________________________________________  Signature Date 08/19/23 (For scheduled repetitive transports, this form is not valid for transports performed more than 60 days after this date)    Printed Name & Credentials of Physician or Healthcare Professional (MD, , RN, etc.)_Radha Marks RN  *Form must be signed by patient's attending physician for scheduled, repetitive transports.  For non-repetitive, unscheduled ambulance transports, if unable to obtain the signature of the attending physician, any of the following may sign (choose appropriate option below)  [] Physician Assistant []  Clinical Nurse Specialist []  Registered Nurse  []  Nurse Practitioner  [x] Discharge Planner

## 2023-08-19 NOTE — ASSESSMENT & PLAN NOTE
· Recently treated for GIB on prior admission- s/p EGD & C-scope with clipping of gastric/duodenal angiectasias + 2 units PRBC with stabilization of hemoglobin  · Restarted on eliquis  following GI clearance   · Continue PPI BID   · Daily CBC  · Hemoglobin uptrending    Lab Results   Component Value Date    HGB 11.2 (L) 08/19/2023    HGB 11.0 (L) 08/18/2023    HGB 10.5 (L) 08/17/2023    HGB 10.2 (L) 08/16/2023    HGB 9.9 (L) 08/15/2023

## 2023-08-19 NOTE — ASSESSMENT & PLAN NOTE
Wt Readings from Last 3 Encounters:   08/19/23 92.7 kg (204 lb 5.9 oz)   08/14/23 95 kg (209 lb 7 oz)   08/02/23 101 kg (222 lb)     Patient recently treated for acute on chronic CHF during prior admission which was thought 2/2 volume overload due to blood transfusion for GIB. Discharged on room air 8/14 and po diuretics. Patient returned to ED 8/15 due to new chest discomfort and intermittent SOB which began early morning 8/15 causing disruption of sleep, worse with laying on L side. · Appreciate ongoing recommendations per cardiology  · Currently on IV Lasix 80 mg twice daily  · Potassium stable today  · Creatinine did rise to 1.94  · Check PVR, urinalysis, and monitor on retention protocol  · Continue to monitor I's/O  · Weight 207 today but this is reported on bed scale, please try to obtain standing weight daily   · CXR: Minimal right-sided pleural effusion. Emphysematous changes. No focal consolidative airspace opacity to suggest pneumonia.   · Echocardiogram: 50 to 55%, basal inferior mid inferior hypokinetic  · Once euvolemic plan for ischemic evaluation  · Maintain telemetry  · Continue Toprol-XL 50 mg daily  · Holding diuretics in the setting of elevated renal function  · Appears close to euvolemia

## 2023-08-19 NOTE — PLAN OF CARE
Problem: Potential for Falls  Goal: Patient will remain free of falls  Description: INTERVENTIONS:  - Educate patient/family on patient safety including physical limitations  - Instruct patient to call for assistance with activity   - Consult OT/PT to assist with strengthening/mobility   - Keep Call bell within reach  - Keep bed low and locked with side rails adjusted as appropriate  - Keep care items and personal belongings within reach  - Initiate and maintain comfort rounds  - Make Fall Risk Sign visible to staff  - Offer Toileting every 2 Hours, in advance of need  - Initiate/Maintain bed alarm  - Obtain necessary fall risk management equipment: yellow socks, yellow bracelet  - Apply yellow socks and bracelet for high fall risk patients  - Consider moving patient to room near nurses station  Outcome: Progressing     Problem: MOBILITY - ADULT  Goal: Maintain or return to baseline ADL function  Description: INTERVENTIONS:  -  Assess patient's ability to carry out ADLs; assess patient's baseline for ADL function and identify physical deficits which impact ability to perform ADLs (bathing, care of mouth/teeth, toileting, grooming, dressing, etc.)  - Assess/evaluate cause of self-care deficits   - Assess range of motion  - Assess patient's mobility; develop plan if impaired  - Assess patient's need for assistive devices and provide as appropriate  - Encourage maximum independence but intervene and supervise when necessary  - Involve family in performance of ADLs  - Assess for home care needs following discharge   - Consider OT consult to assist with ADL evaluation and planning for discharge  - Provide patient education as appropriate  Outcome: Progressing  Goal: Maintains/Returns to pre admission functional level  Description: INTERVENTIONS:  - Perform BMAT or MOVE assessment daily.   - Set and communicate daily mobility goal to care team and patient/family/caregiver.    - Collaborate with rehabilitation services on mobility goals if consulted  - Perform Range of Motion 2 times a day. - Reposition patient every 2 hours.   - Dangle patient 2 times a day  - Stand patient 2 times a day  - Ambulate patient 2 times a day  - Out of bed to chair 2 times a day   - Out of bed for meals 2 times a day  - Out of bed for toileting  - Record patient progress and toleration of activity level   Outcome: Progressing     Problem: PAIN - ADULT  Goal: Verbalizes/displays adequate comfort level or baseline comfort level  Description: Interventions:  - Encourage patient to monitor pain and request assistance  - Assess pain using appropriate pain scale  - Administer analgesics based on type and severity of pain and evaluate response  - Implement non-pharmacological measures as appropriate and evaluate response  - Consider cultural and social influences on pain and pain management  - Notify physician/advanced practitioner if interventions unsuccessful or patient reports new pain  Outcome: Progressing     Problem: INFECTION - ADULT  Goal: Absence or prevention of progression during hospitalization  Description: INTERVENTIONS:  - Assess and monitor for signs and symptoms of infection  - Monitor lab/diagnostic results  - Monitor all insertion sites, i.e. indwelling lines, tubes, and drains  - Monitor endotracheal if appropriate and nasal secretions for changes in amount and color  - [place] appropriate cooling/warming therapies per order  - Administer medications as ordered  - Instruct and encourage patient and family to use good hand hygiene technique  - Identify and instruct in appropriate isolation precautions for identified infection/condition  Outcome: Progressing  Goal: Absence of fever/infection during neutropenic period  Description: INTERVENTIONS:  - Monitor WBC    Outcome: Progressing     Problem: SAFETY ADULT  Goal: Patient will remain free of falls  Description: INTERVENTIONS:  - Educate patient/family on patient safety including physical limitations  - Instruct patient to call for assistance with activity   - Consult OT/PT to assist with strengthening/mobility   - Keep Call bell within reach  - Keep bed low and locked with side rails adjusted as appropriate  - Keep care items and personal belongings within reach  - Initiate and maintain comfort rounds  - Make Fall Risk Sign visible to staff  - Offer Toileting every 2 Hours, in advance of need  - Initiate/Maintain bed alarm  - Obtain necessary fall risk management equipment: yellow socks, yellow bracelet, walker  - Apply yellow socks and bracelet for high fall risk patients  - Consider moving patient to room near nurses station  Outcome: Progressing  Goal: Maintain or return to baseline ADL function  Description: INTERVENTIONS:  -  Assess patient's ability to carry out ADLs; assess patient's baseline for ADL function and identify physical deficits which impact ability to perform ADLs (bathing, care of mouth/teeth, toileting, grooming, dressing, etc.)  - Assess/evaluate cause of self-care deficits   - Assess range of motion  - Assess patient's mobility; develop plan if impaired  - Assess patient's need for assistive devices and provide as appropriate  - Encourage maximum independence but intervene and supervise when necessary  - Involve family in performance of ADLs  - Assess for home care needs following discharge   - Consider OT consult to assist with ADL evaluation and planning for discharge  - Provide patient education as appropriate  Outcome: Progressing  Goal: Maintains/Returns to pre admission functional level  Description: INTERVENTIONS:  - Perform BMAT or MOVE assessment daily.   - Set and communicate daily mobility goal to care team and patient/family/caregiver. - Collaborate with rehabilitation services on mobility goals if consulted  - Perform Range of Motion 2 times a day. - Reposition patient every 2 hours.   - Dangle patient 2 times a day  - Stand patient 2 times a day  - Ambulate patient 2 times a day  - Out of bed to chair 2 times a day   - Out of bed for meals 2 times a day  - Out of bed for toileting  - Record patient progress and toleration of activity level   Outcome: Progressing     Problem: DISCHARGE PLANNING  Goal: Discharge to home or other facility with appropriate resources  Description: INTERVENTIONS:  - Identify barriers to discharge w/patient and caregiver  - Arrange for needed discharge resources and transportation as appropriate  - Identify discharge learning needs (meds, wound care, etc.)  - Arrange for interpretive services to assist at discharge as needed  - Refer to Case Management Department for coordinating discharge planning if the patient needs post-hospital services based on physician/advanced practitioner order or complex needs related to functional status, cognitive ability, or social support system  Outcome: Progressing     Problem: Knowledge Deficit  Goal: Patient/family/caregiver demonstrates understanding of disease process, treatment plan, medications, and discharge instructions  Description: Complete learning assessment and assess knowledge base.   Interventions:  - Provide teaching at level of understanding  - Provide teaching via preferred learning methods  Outcome: Progressing     Problem: Prexisting or High Potential for Compromised Skin Integrity  Goal: Skin integrity is maintained or improved  Description: INTERVENTIONS:  - Identify patients at risk for skin breakdown  - Assess and monitor skin integrity  - Assess and monitor nutrition and hydration status  - Monitor labs   - Assess for incontinence   - Turn and reposition patient  - Assist with mobility/ambulation  - Relieve pressure over bony prominences  - Avoid friction and shearing  - Provide appropriate hygiene as needed including keeping skin clean and dry  - Evaluate need for skin moisturizer/barrier cream  - Collaborate with interdisciplinary team   - Patient/family teaching  - Consider wound care consult   Outcome: Progressing

## 2023-08-20 ENCOUNTER — HOSPITAL ENCOUNTER (INPATIENT)
Facility: HOSPITAL | Age: 78
LOS: 6 days | Discharge: HOME HEALTH | DRG: 246 | End: 2023-08-26
Attending: FAMILY MEDICINE | Admitting: INTERNAL MEDICINE
Payer: COMMERCIAL

## 2023-08-20 DIAGNOSIS — Z79.4 TYPE 2 DIABETES MELLITUS WITH HYPERGLYCEMIA, WITH LONG-TERM CURRENT USE OF INSULIN (HCC): ICD-10-CM

## 2023-08-20 DIAGNOSIS — Z95.5 STATUS POST INSERTION OF DRUG ELUTING CORONARY ARTERY STENT: Primary | ICD-10-CM

## 2023-08-20 DIAGNOSIS — I25.10 CAD (CORONARY ARTERY DISEASE): ICD-10-CM

## 2023-08-20 DIAGNOSIS — N18.30 STAGE 3 CHRONIC KIDNEY DISEASE, UNSPECIFIED WHETHER STAGE 3A OR 3B CKD (HCC): ICD-10-CM

## 2023-08-20 DIAGNOSIS — E87.5 HYPERKALEMIA: ICD-10-CM

## 2023-08-20 DIAGNOSIS — E11.65 TYPE 2 DIABETES MELLITUS WITH HYPERGLYCEMIA, WITH LONG-TERM CURRENT USE OF INSULIN (HCC): ICD-10-CM

## 2023-08-20 DIAGNOSIS — R77.8 ELEVATED TROPONIN: ICD-10-CM

## 2023-08-20 DIAGNOSIS — I50.33 ACUTE ON CHRONIC DIASTOLIC HF (HEART FAILURE) (HCC): ICD-10-CM

## 2023-08-20 LAB
ANION GAP SERPL CALCULATED.3IONS-SCNC: 5 MMOL/L
BUN SERPL-MCNC: 58 MG/DL (ref 5–25)
CALCIUM SERPL-MCNC: 9.5 MG/DL (ref 8.3–10.1)
CHLORIDE SERPL-SCNC: 108 MMOL/L (ref 96–108)
CO2 SERPL-SCNC: 27 MMOL/L (ref 21–32)
CREAT SERPL-MCNC: 1.82 MG/DL (ref 0.6–1.3)
ERYTHROCYTE [DISTWIDTH] IN BLOOD BY AUTOMATED COUNT: 35.2 % (ref 11.6–15.1)
GFR SERPL CREATININE-BSD FRML MDRD: 35 ML/MIN/1.73SQ M
GLUCOSE SERPL-MCNC: 130 MG/DL (ref 65–140)
GLUCOSE SERPL-MCNC: 151 MG/DL (ref 65–140)
GLUCOSE SERPL-MCNC: 157 MG/DL (ref 65–140)
GLUCOSE SERPL-MCNC: 228 MG/DL (ref 65–140)
GLUCOSE SERPL-MCNC: 236 MG/DL (ref 65–140)
HCT VFR BLD AUTO: 38.8 % (ref 36.5–49.3)
HGB BLD-MCNC: 10.9 G/DL (ref 12–17)
MCH RBC QN AUTO: 20.9 PG (ref 26.8–34.3)
MCHC RBC AUTO-ENTMCNC: 28.1 G/DL (ref 31.4–37.4)
MCV RBC AUTO: 75 FL (ref 82–98)
PLATELET # BLD AUTO: 287 THOUSANDS/UL (ref 149–390)
POTASSIUM SERPL-SCNC: 4.4 MMOL/L (ref 3.5–5.3)
RBC # BLD AUTO: 5.21 MILLION/UL (ref 3.88–5.62)
SODIUM SERPL-SCNC: 140 MMOL/L (ref 135–147)
TSH SERPL DL<=0.05 MIU/L-ACNC: 3.27 UIU/ML (ref 0.45–4.5)
WBC # BLD AUTO: 9.66 THOUSAND/UL (ref 4.31–10.16)

## 2023-08-20 PROCEDURE — 85027 COMPLETE CBC AUTOMATED: CPT | Performed by: INTERNAL MEDICINE

## 2023-08-20 PROCEDURE — 82948 REAGENT STRIP/BLOOD GLUCOSE: CPT

## 2023-08-20 PROCEDURE — 94760 N-INVAS EAR/PLS OXIMETRY 1: CPT

## 2023-08-20 PROCEDURE — 99232 SBSQ HOSP IP/OBS MODERATE 35: CPT | Performed by: INTERNAL MEDICINE

## 2023-08-20 PROCEDURE — 99223 1ST HOSP IP/OBS HIGH 75: CPT | Performed by: INTERNAL MEDICINE

## 2023-08-20 PROCEDURE — 84443 ASSAY THYROID STIM HORMONE: CPT | Performed by: INTERNAL MEDICINE

## 2023-08-20 PROCEDURE — 99232 SBSQ HOSP IP/OBS MODERATE 35: CPT | Performed by: NURSE PRACTITIONER

## 2023-08-20 PROCEDURE — NC001 PR NO CHARGE: Performed by: INTERNAL MEDICINE

## 2023-08-20 PROCEDURE — 80048 BASIC METABOLIC PNL TOTAL CA: CPT | Performed by: INTERNAL MEDICINE

## 2023-08-20 PROCEDURE — 99222 1ST HOSP IP/OBS MODERATE 55: CPT | Performed by: INTERNAL MEDICINE

## 2023-08-20 RX ORDER — INSULIN LISPRO 100 [IU]/ML
12 INJECTION, SOLUTION INTRAVENOUS; SUBCUTANEOUS
Status: DISCONTINUED | OUTPATIENT
Start: 2023-08-20 | End: 2023-08-26 | Stop reason: HOSPADM

## 2023-08-20 RX ORDER — METOPROLOL SUCCINATE 50 MG/1
50 TABLET, EXTENDED RELEASE ORAL DAILY
Status: DISCONTINUED | OUTPATIENT
Start: 2023-08-20 | End: 2023-08-25

## 2023-08-20 RX ORDER — ACETYLCYSTEINE 200 MG/ML
1200 SOLUTION ORAL; RESPIRATORY (INHALATION) 2 TIMES DAILY
Status: COMPLETED | OUTPATIENT
Start: 2023-08-20 | End: 2023-08-21

## 2023-08-20 RX ORDER — PREGABALIN 100 MG/1
100 CAPSULE ORAL 3 TIMES DAILY
Status: DISCONTINUED | OUTPATIENT
Start: 2023-08-20 | End: 2023-08-26 | Stop reason: HOSPADM

## 2023-08-20 RX ORDER — ACETAMINOPHEN 325 MG/1
650 TABLET ORAL EVERY 6 HOURS PRN
Status: DISCONTINUED | OUTPATIENT
Start: 2023-08-20 | End: 2023-08-26 | Stop reason: HOSPADM

## 2023-08-20 RX ORDER — FERROUS SULFATE 325(65) MG
325 TABLET ORAL
Status: DISCONTINUED | OUTPATIENT
Start: 2023-08-20 | End: 2023-08-26 | Stop reason: HOSPADM

## 2023-08-20 RX ORDER — SODIUM CHLORIDE 9 MG/ML
50 INJECTION, SOLUTION INTRAVENOUS CONTINUOUS
Status: DISCONTINUED | OUTPATIENT
Start: 2023-08-21 | End: 2023-08-21

## 2023-08-20 RX ORDER — PANTOPRAZOLE SODIUM 40 MG/1
40 TABLET, DELAYED RELEASE ORAL
Status: DISCONTINUED | OUTPATIENT
Start: 2023-08-20 | End: 2023-08-26 | Stop reason: HOSPADM

## 2023-08-20 RX ORDER — TAMSULOSIN HYDROCHLORIDE 0.4 MG/1
0.4 CAPSULE ORAL
Status: DISCONTINUED | OUTPATIENT
Start: 2023-08-20 | End: 2023-08-26 | Stop reason: HOSPADM

## 2023-08-20 RX ORDER — POTASSIUM CHLORIDE 20 MEQ/1
20 TABLET, EXTENDED RELEASE ORAL DAILY
Status: DISCONTINUED | OUTPATIENT
Start: 2023-08-20 | End: 2023-08-20

## 2023-08-20 RX ORDER — SERTRALINE HYDROCHLORIDE 25 MG/1
25 TABLET, FILM COATED ORAL DAILY
Status: DISCONTINUED | OUTPATIENT
Start: 2023-08-20 | End: 2023-08-26 | Stop reason: HOSPADM

## 2023-08-20 RX ORDER — INSULIN LISPRO 100 [IU]/ML
1-5 INJECTION, SOLUTION INTRAVENOUS; SUBCUTANEOUS
Status: DISCONTINUED | OUTPATIENT
Start: 2023-08-20 | End: 2023-08-26 | Stop reason: HOSPADM

## 2023-08-20 RX ORDER — INSULIN LISPRO 100 [IU]/ML
1-6 INJECTION, SOLUTION INTRAVENOUS; SUBCUTANEOUS
Status: DISCONTINUED | OUTPATIENT
Start: 2023-08-20 | End: 2023-08-26 | Stop reason: HOSPADM

## 2023-08-20 RX ADMIN — PREGABALIN 100 MG: 100 CAPSULE ORAL at 09:05

## 2023-08-20 RX ADMIN — ACETYLCYSTEINE 1200 MG: 200 SOLUTION ORAL; RESPIRATORY (INHALATION) at 13:29

## 2023-08-20 RX ADMIN — INSULIN LISPRO 12 UNITS: 100 INJECTION, SOLUTION INTRAVENOUS; SUBCUTANEOUS at 13:48

## 2023-08-20 RX ADMIN — PREGABALIN 100 MG: 100 CAPSULE ORAL at 17:59

## 2023-08-20 RX ADMIN — INSULIN LISPRO 12 UNITS: 100 INJECTION, SOLUTION INTRAVENOUS; SUBCUTANEOUS at 09:11

## 2023-08-20 RX ADMIN — UMECLIDINIUM BROMIDE AND VILANTEROL TRIFENATATE 1 PUFF: 62.5; 25 POWDER RESPIRATORY (INHALATION) at 09:07

## 2023-08-20 RX ADMIN — TAMSULOSIN HYDROCHLORIDE 0.4 MG: 0.4 CAPSULE ORAL at 17:59

## 2023-08-20 RX ADMIN — APIXABAN 5 MG: 5 TABLET, FILM COATED ORAL at 09:05

## 2023-08-20 RX ADMIN — FERROUS SULFATE TAB 325 MG (65 MG ELEMENTAL FE) 325 MG: 325 (65 FE) TAB at 09:05

## 2023-08-20 RX ADMIN — PANTOPRAZOLE SODIUM 40 MG: 40 TABLET, DELAYED RELEASE ORAL at 09:05

## 2023-08-20 RX ADMIN — POTASSIUM CHLORIDE 20 MEQ: 1500 TABLET, EXTENDED RELEASE ORAL at 09:05

## 2023-08-20 RX ADMIN — INSULIN LISPRO 12 UNITS: 100 INJECTION, SOLUTION INTRAVENOUS; SUBCUTANEOUS at 17:59

## 2023-08-20 RX ADMIN — INSULIN LISPRO 1 UNITS: 100 INJECTION, SOLUTION INTRAVENOUS; SUBCUTANEOUS at 21:20

## 2023-08-20 RX ADMIN — PREGABALIN 100 MG: 100 CAPSULE ORAL at 21:19

## 2023-08-20 RX ADMIN — APIXABAN 5 MG: 5 TABLET, FILM COATED ORAL at 17:59

## 2023-08-20 RX ADMIN — ACETYLCYSTEINE 1200 MG: 200 SOLUTION ORAL; RESPIRATORY (INHALATION) at 17:59

## 2023-08-20 RX ADMIN — METOPROLOL SUCCINATE 50 MG: 50 TABLET, EXTENDED RELEASE ORAL at 09:05

## 2023-08-20 RX ADMIN — SODIUM CHLORIDE 50 ML/HR: 0.9 INJECTION, SOLUTION INTRAVENOUS at 23:18

## 2023-08-20 RX ADMIN — SERTRALINE HYDROCHLORIDE 25 MG: 25 TABLET ORAL at 09:05

## 2023-08-20 RX ADMIN — INSULIN DETEMIR 32 UNITS: 100 INJECTION, SOLUTION SUBCUTANEOUS at 21:19

## 2023-08-20 RX ADMIN — INSULIN LISPRO 3 UNITS: 100 INJECTION, SOLUTION INTRAVENOUS; SUBCUTANEOUS at 09:11

## 2023-08-20 RX ADMIN — INSULIN LISPRO 4 UNITS: 100 INJECTION, SOLUTION INTRAVENOUS; SUBCUTANEOUS at 13:47

## 2023-08-20 RX ADMIN — PANTOPRAZOLE SODIUM 40 MG: 40 TABLET, DELAYED RELEASE ORAL at 17:59

## 2023-08-20 NOTE — ASSESSMENT & PLAN NOTE
Wt Readings from Last 3 Encounters:   08/20/23 92.6 kg (204 lb 3.2 oz)   08/19/23 92.7 kg (204 lb 5.9 oz)   08/14/23 95 kg (209 lb 7 oz)     · Patient originally presented to [facility] ED with complaint of chest discomfort and intermittent shortness of breath  · In ED, patient hypoxic required 2 L oxygen via nasal cannula, elevated proBNP noted  · Chest x-ray showed right-sided pleural effusion.   · Echocardiogram: 50 to 55%, basal inferior mid inferior hypokinetic  · Underwent nuclear stress test that showed large fixed defect suspicious for RCA territory infarct  · Patient currently appears euvolemic  · Diuretics on hold in setting of elevated cr and need for cardiac cath  · Nephrology consulted for CKD management  · Patient to be started on gentle IV hydration at midnight and 4 hours post procedure  · Mucomyst 2 doses before and 2 doses after

## 2023-08-20 NOTE — PROGRESS NOTES
General Cardiology   Progress Note -  Team One   Claire Cardona 69 y.o. male MRN: [id number]    Unit/Bed#: -Kevin Encounter: [id number]    Assessment/ Plan    1. Acute on chronic HFmrEF    Received IV diuretics this admission: furosemide 80 mg IV BID. Last received 8/17. On furosemide 40 mg PO daily at home   Creatinine 1.8 today   He reports no SOB or orthopnea. Appears euvolemic on exam. Will hold on further diuretics today in prep for cath tomorrow   I/Os  Daily weights 204 lbs today     2. Chest pain with elevated troponin believed to be type II MI in setting of anemia   S/p nuclear stress test showing fixed defect in inferior and inferolateral walls  Transferred from SLUB to B for cardiac cath now that volume status has improved  No complaint of chest pain since admission     3. Paroxysmal atrial fibrillation   On metoprolol XL 50 mg PO daily   On eliquis 5 mg PO BID for OAC   Currently in NSR     4. Recent history of Anemia  Recent admission for anemia 8/7-8/14  Received 2 units of PRBC  S/p EGD and colonoscopy 8/10 with clipping of AV malformations   H/H stable now. Hemoglobin 10.9 today     5. CKD stage III   Creatinine baseline 1.3-1.6  Creatinine 1.8 today     6. Type II diabetes   Hemoglobin A1C 7.6    7. Hx of DVT   On eliquis 5 mg PO BID     8. Type II diabetes   On levemir, novolog and SSI       Subjective  Patient resting in bed. He reports no complaint of chest pain or SOB. No fever or chills. He is laying flat with no complaint of orthopnea. Review of Systems   Constitutional: Negative for chills and fever. HENT: Negative for congestion. Cardiovascular: Negative for chest pain, dyspnea on exertion, leg swelling, orthopnea and palpitations. Respiratory: Negative for shortness of breath. Musculoskeletal: Negative for falls. Gastrointestinal: Negative for bloating, nausea and vomiting. Neurological: Negative for dizziness and light-headedness.    Psychiatric/Behavioral: Negative for altered mental status. All other systems reviewed and are negative. Objective:   Vitals: Blood pressure 132/62, pulse 74, temperature 97.5 °F (36.4 °C), resp. rate 18, height 6' (1.829 m), weight 92.6 kg (204 lb 3.2 oz), SpO2 96 %. ,       Body mass index is 27.69 kg/m². ,     Systolic (86DWD), DMN:221 , Min:127 , LYV:930     Diastolic (01YUQ), DK, Min:47, Max:62          Intake/Output Summary (Last 24 hours) at 2023 0944  Last data filed at 2023 0301  Gross per 24 hour   Intake --   Output 250 ml   Net -250 ml     Weight (last 2 days)     Date/Time Weight    23 0600 92.6 (204.2)    23 2300 93 (205)        Telemetry Review: normal sinus rhythm     Physical Exam  Constitutional:       General: He is not in acute distress. HENT:      Head: Normocephalic. Mouth/Throat:      Mouth: Mucous membranes are moist.   Cardiovascular:      Rate and Rhythm: Normal rate and regular rhythm. Pulses: Normal pulses. Pulmonary:      Effort: Pulmonary effort is normal. No respiratory distress. Breath sounds: Normal breath sounds. Abdominal:      General: Bowel sounds are normal.      Palpations: Abdomen is soft. Musculoskeletal:         General: No swelling. Normal range of motion. Cervical back: Neck supple. Skin:     General: Skin is warm and dry. Capillary Refill: Capillary refill takes less than 2 seconds. Neurological:      General: No focal deficit present. Mental Status: He is alert and oriented to person, place, and time.    Psychiatric:         Mood and Affect: Mood normal.         LABORATORY RESULTS      CBC with diff:   Results from last 7 days   Lab Units 23  0456 23  0503 23  0527 23  0455 23  0442 08/15/23  1242 23  0425   WBC Thousand/uL 9.66 11.00* 12.00* 11.57* 11.69* 11.66* 12.09*   HEMOGLOBIN g/dL 10.9* 11.2* 11.0* 10.5* 10.2* 9.9* 9.1*   HEMATOCRIT % 38.8 40.2 39.8 37.6 37.7 36.8 33.6*   MCV fL 75* 74* 72* 71* 73* 72* 70*   PLATELETS Thousands/uL 287 297 301 250 233 225 229   RBC Million/uL 5.21 5.44 5.52 5.27 5.18 5.12 4.78   MCH pg 20.9* 20.6* 19.9* 19.9* 19.7* 19.3* 19.0*   MCHC g/dL 28.1* 27.9* 27.6* 27.9* 27.1* 26.9* 27.1*   RDW % 35.2* 34.7* 34.3* 33.1* 32.3* 31.8* 31.1*   NRBC AUTO /100 WBCs  --   --   --  0 0 0 0       CMP:  Results from last 7 days   Lab Units 08/20/23  0456 08/19/23  0503 08/18/23  0527 08/17/23  0455 08/16/23  0442 08/15/23  1242 08/14/23  0425   POTASSIUM mmol/L 4.4 4.4 4.0 4.0 4.7 4.6 4.1   CHLORIDE mmol/L 108 99 97 98 101 105 103   CO2 mmol/L 27 29 27 30 32 31 29   BUN mg/dL 58* 63* 63* 53* 42* 44* 39*   CREATININE mg/dL 1.82* 1.88* 2.14* 1.94* 1.69* 1.69* 1.52*   CALCIUM mg/dL 9.5 9.8 9.8 9.6 9.5 9.5 9.3   AST U/L  --   --   --   --  36 34  --    ALT U/L  --   --   --   --  36 38  --    ALK PHOS U/L  --   --   --   --  95 97  --    EGFR ml/min/1.73sq m 35 33 28 32 38 38 43       BMP:  Results from last 7 days   Lab Units 08/20/23  0456 08/19/23  0503 08/18/23  0527 08/17/23  0455 08/16/23  0442 08/15/23  1242 08/14/23  0425   POTASSIUM mmol/L 4.4 4.4 4.0 4.0 4.7 4.6 4.1   CHLORIDE mmol/L 108 99 97 98 101 105 103   CO2 mmol/L 27 29 27 30 32 31 29   BUN mg/dL 58* 63* 63* 53* 42* 44* 39*   CREATININE mg/dL 1.82* 1.88* 2.14* 1.94* 1.69* 1.69* 1.52*   CALCIUM mg/dL 9.5 9.8 9.8 9.6 9.5 9.5 9.3       Lab Results   Component Value Date    NTBNP 595 (H) 05/05/2023                           Results from last 7 days   Lab Units 08/20/23  0456   TSH 3RD GENERATON uIU/mL 3.267             Lipid Profile:   No results found for: "CHOL"  Lab Results   Component Value Date    HDL 33 (L) 01/06/2023    HDL 30 (L) 08/26/2022     Lab Results   Component Value Date    LDLCALC 104 (H) 01/06/2023    LDLCALC 116 (H) 08/26/2022     Lab Results   Component Value Date    TRIG 231 (H) 01/06/2023    TRIG 466 (H) 08/26/2022       Cardiac testing:   No results found for this or any previous visit.     No results found for this or any previous visit. No results found for this or any previous visit. No valid procedures specified. No results found for this or any previous visit.     Meds/Allergies   all current active meds have been reviewed and current meds:   Current Facility-Administered Medications   Medication Dose Route Frequency   • acetaminophen (TYLENOL) tablet 650 mg  650 mg Oral Q6H PRN   • apixaban (ELIQUIS) tablet 5 mg  5 mg Oral BID   • ferrous sulfate tablet 325 mg  325 mg Oral Daily With Breakfast   • insulin detemir (LEVEMIR) subcutaneous injection 32 Units  32 Units Subcutaneous HS   • insulin lispro (HumaLOG) 100 units/mL subcutaneous injection 1-5 Units  1-5 Units Subcutaneous HS   • insulin lispro (HumaLOG) 100 units/mL subcutaneous injection 1-6 Units  1-6 Units Subcutaneous TID AC   • insulin lispro (HumaLOG) 100 units/mL subcutaneous injection 12 Units  12 Units Subcutaneous TID With Meals   • metoprolol succinate (TOPROL-XL) 24 hr tablet 50 mg  50 mg Oral Daily   • pantoprazole (PROTONIX) EC tablet 40 mg  40 mg Oral BID AC   • potassium chloride (K-DUR,KLOR-CON) CR tablet 20 mEq  20 mEq Oral Daily   • pregabalin (LYRICA) capsule 100 mg  100 mg Oral TID   • sertraline (ZOLOFT) tablet 25 mg  25 mg Oral Daily   • tamsulosin (FLOMAX) capsule 0.4 mg  0.4 mg Oral Daily With Dinner   • umeclidinium-vilanterol 62.5-25 mcg/actuation inhaler 1 puff  1 puff Inhalation Daily     Medications Prior to Admission   Medication   • acetaminophen (TYLENOL) 650 mg CR tablet   • Continuous Blood Gluc Sensor (Dexcom G6 Sensor) MISC   • Continuous Blood Gluc Transmit (Dexcom G6 Transmitter) MISC   • dulaglutide (Trulicity) 0.46 GD/1.4LK injection   • Eliquis 5 MG   • ferrous sulfate 325 (65 Fe) mg tablet   • furosemide (LASIX) 40 mg tablet   • insulin aspart (NovoLOG FlexPen) 100 UNIT/ML injection pen   • Insulin Pen Needle (Pen Needles) 32G X 4 MM MISC   • Levemir FlexPen 100 units/mL injection pen   • metoprolol succinate (TOPROL-XL) 50 mg 24 hr tablet   • mometasone (ELOCON) 0.1 % cream   • Multiple Vitamin (MULTIVITAMIN ADULT PO)   • pantoprazole (PROTONIX) 40 mg tablet   • Potassium Citrate ER 15 MEQ (1620 MG) TBCR   • pregabalin (LYRICA) 100 mg capsule   • saxagliptin (Onglyza) 5 MG tablet   • sertraline (ZOLOFT) 25 mg tablet   • SUPER B COMPLEX/C PO   • tamsulosin (FLOMAX) 0.4 mg   • traZODone (DESYREL) 100 mg tablet   • umeclidinium-vilanterol 62.5-25 mcg/actuation inhaler     Counseling / Coordination of Care  Total floor / unit time spent today 20 minutes. Greater than 50% of total time was spent with the patient and / or family counseling and / or coordination of care. ** Please Note: Dragon 360 Dictation voice to text software may have been used in the creation of this document.  **

## 2023-08-20 NOTE — PLAN OF CARE
Problem: MOBILITY - ADULT  Goal: Maintain or return to baseline ADL function  Description: INTERVENTIONS:  -  Assess patient's ability to carry out ADLs; assess patient's baseline for ADL function and identify physical deficits which impact ability to perform ADLs (bathing, care of mouth/teeth, toileting, grooming, dressing, etc.)  - Assess/evaluate cause of self-care deficits   - Assess range of motion  - Assess patient's mobility; develop plan if impaired  - Assess patient's need for assistive devices and provide as appropriate  - Encourage maximum independence but intervene and supervise when necessary  - Involve family in performance of ADLs  - Assess for home care needs following discharge   - Consider OT consult to assist with ADL evaluation and planning for discharge  - Provide patient education as appropriate  Outcome: Progressing  Goal: Maintains/Returns to pre admission functional level

## 2023-08-20 NOTE — ASSESSMENT & PLAN NOTE
Lab Results   Component Value Date    EGFR 33 08/19/2023    EGFR 28 08/18/2023    EGFR 32 08/17/2023    CREATININE 1.88 (H) 08/19/2023    CREATININE 2.14 (H) 08/18/2023    CREATININE 1.94 (H) 08/17/2023     · Creatinine baseline is 1.3-1.6  · Creatinine peaked at 2.1, today 1.80  · Hold diuretics

## 2023-08-20 NOTE — CONSULTS
Consultation    Nephrology   Rob [place] 69 y.o. male MRN: [id number]  Unit/Bed#: -91 Encounter: [id number]    History of Present Illness   Physician Requesting Consult: Fernando Benz, *  Reason for Consult : -Perioperative optimization to reduce incidence for acute kidney injury    ASSESSMENT/PLAN:   69 y.o.  male with pmh of A-fib, CHF, prior DVT, hypertension, diabetes, suspected hepatocellular carcinoma and CKD stage III who initially presented with syncopal episode on 8/7 and was treated for volume overload and anemia subsequently discharged on 8/14 and readmitted on 8/15 with chest pain and shortness of breath at outside facility found to have NSTEMI and work-up concerning for RCA infarct subsequently transferred over to [facility] and need for cardiac catheterization. Nephrology has been consulted for perioperative optimization to reduce incidence for acute kidney injury as patient is likely for cardiac cath on 8/21/2023. Elevated creatinine on CKD stage III/perioperative optimization to reduce incidence for acute kidney injury:  At risk for NA multifactorial most likely secondary to ongoing cardiorenal syndrome with NSTEMI and ischemic injury from anemia and due to DAVON from planned cardiac cath on 8/21/2023 in light of underlying comorbidities. After review of records In [facility] as well as Care everywhere patient has a baseline Creatinine of 1.3-1.7 mg/dL. Admission creatinine of 1.69 mg/dL on 8/15/2023. Peak creatinine thus far this hospitalization at 2.14 mg/dL on 8/18/2023  Creatinine today is at 1.82 mg/dL. patient is stable and cleared from renal standpoint to undergo planned cardiac catheterization tomorrow as long as serum creatinine is stable at or below above-mentioned baseline. Minimize IV contrast exposure as much as possible both in terms of the amount of dye used and in the frequency of studies performed.     Please use iso-osmolar or low osmolar contrast material.  Please initiate intravenous hydration with saline @ 50cc/hr at midnight and DC 4 hours postprocedure. Recommend giving mucomyst 1200mg po bid for two doses pre procedure and two doses post procedure for total of 4 doses. Please check the renal function approxinately 48 hours after dye exposure to assess renal response to contrast.   Please avoid the radial or brachial artery access for cardiac cath for those with ESRD or stage 4 CKD. All risks and benefits of the procedure from a renal perspective were discussed with the patient in depth including risk for acute kidney injury due to DAVON as well as probability of needing renal replacement therapy for severe acute kidney injury if it occurs and the patient voiced understanding. All questions were answered. check BMP in a.m. Await renal recovery. CT chest abdomen pelvis from 6/14/2023 showing multiple hepatic lesions concerning for malignancy, bilateral renal cysts and small nonobstructing left renal calculi. Bosniak 2 cysts  Optimize hemodynamic status to avoid delay in renal recovery. Avoid nephrotoxins, adjust meds to appropriate GFR. Strict I/O. 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   Outpatient nephrology follow-up with no nephrologist    Acid-base electrolytes:    Electrolytes:    Most recent potassium stable at 4.4 mill equivalents currently on K-Dur 20 mg p.o. daily will hold for now check BMP in a.m. if needs further supplementation    Acid-base:    Most recent carbon 27 stable    H/H/anemia:  most recent hemoglobin at 10.9 g/dL  maintain hemoglobin greater than 8 grams/deciliter  Management primary team follow-up with GI status post recent PRBC transfusion at recent hospitalization. Continue p.o. iron    Blood pressure/A-fib:  home medications: Lasix 40 mg p.o. daily, potassium citrate 15 mEq p.o. daily  current medications: Eliquis  recommendations: Avoid diuretics unless patient with volume overload  Optimize hemodynamics. Maintain MAP > 65mmHg  Avoid BP fluctuations. Other medical problems:  Diabetes:  Management per primary team.  On insulin. Most recent A1c 7.6% as of January 2023. Encourage tight glycemic control  CHF:  Management per primary team.  Hold diuretics for now unless patient with worsening volume status as patient plan for likely cardiac cath in a.m.  Echo from 8/16/2023 EF of 50 to 55%. Nuclear stress test from 8/18 showing EF of 36% suspicious RCC a infarct territory infarct  HCC: Suspected [address] with multiple hepatic masses follow-up with surgical oncology as an outpatient. Has not had outpatient biopsy  NSTEMI: Follow-up with cardiology plan for likely cardiac cath tomorrow stress test concerning for RCA infarct. Thanks for the consult  Will continue to follow  Please call with questions/ concerns. Above-mentioned orders and Plan starting IV fluids at midnight discontinuing potassium checking blood work in a.m. were discussed with the team in depth and they agree. TERESE FANG MD, FASN, 8/20/2023, 12:34 PM          HISTORY OF PRESENT ILLNESS:   Linda Johnson is a 69 y.o.  male with pmh of A-fib, CHF, prior DVT, hypertension, diabetes, suspected hepatocellular carcinoma and CKD stage III who initially presented with syncopal episode on 8/7 and was treated for volume overload and anemia subsequently discharged on 8/14 and readmitted on 8/15 with chest pain and shortness of breath at outside facility found to have NSTEMI and work-up concerning for RCA infarct subsequently transferred over to [facility] and need for cardiac catheterization.  Nephrology has been consulted for perioperative optimization to reduce incidence for acute kidney injury as patient is likely for cardiac cath on 8/21/2023. Patient seen and examined in his room no overnight events hemodynamically stable remains afebrile urine output 250 cc plus. Records reveal patient is a baseline creatinine 1.3 to 1.7 mg/dL did have some elevation in creatinine up to 1.82 mg/dL on 8/13 on recent hospitalization. Now readmitted on 8/15 with a creatinine 1.69 mg/dL peak creatinine thus far 2.14 mg/dL on 8/18/2023 creatinine today at 1.82 mg/dL. No history of NSAID use never seen a nephrologist before no history of NA needing dialysis no complaints at this time overall feels well  History obtained from chart review and the patient    Inpatient consult to Nephrology  Consult performed by: TERESE FANG MD  Consult ordered by: NADIA Santos          Review of Systems   Constitutional: Negative for chills and fever. HENT: Negative for congestion. Respiratory: Negative for cough and shortness of breath. Cardiovascular: Negative for leg swelling. Gastrointestinal: Negative for abdominal pain and diarrhea. Genitourinary: Negative for dysuria. Musculoskeletal: Negative for back pain. Neurological: Negative for dizziness and headaches. Psychiatric/Behavioral: Negative for agitation and confusion. All other systems reviewed and are negative.        Historical Information   Patient Active Problem List   Diagnosis   • Stage 3 chronic kidney disease, unspecified whether stage 3a or 3b CKD ([address])   • Peripheral polyneuropathy   • Type 2 diabetes mellitus with neurologic complication, with long-term current use of insulin (HCC)   • Atrial fibrillation, unspecified type (HCC)   • Liver mass   • H/O asbestos exposure   • History of tobacco abuse   • Restrictive lung disease   • Other emphysema (HCC)   • Syncope   • Anemia   • Acute on chronic diastolic HF (heart failure) (HCC)   • History of DVT (deep vein thrombosis)   • Elevated troponin   • Hepatic flexure mass   • Hepatocellular carcinoma [facility])     Past Medical History:   Diagnosis Date   • Atrial fibrillation ([address])    • Deep vein thrombosis (DVT) of right lower extremity (HCC)    • Diverticulitis      Past Surgical History:   Procedure Laterality Date   • APPENDECTOMY     • BOWEL RESECTION     • CATARACT EXTRACTION Bilateral      Social History   Social History     Substance and Sexual Activity   Alcohol Use Yes    Comment: Socially     Social History     Substance and Sexual Activity   Drug Use Never     Social History     Tobacco Use   Smoking Status Former   • Packs/day: 1.00   • Years: 30.00   • Total pack years: 30.00   • Types: Cigarettes   • Start date:    • Quit date: 12   • Years since quittin.6   Smokeless Tobacco Never     Family History   Problem Relation Age of Onset   • Hypertension Mother    • Bone cancer Father    • Diabetes type II Sister    • Diabetes type II Sister    • Esophageal cancer Brother        Meds/Allergies   current meds:   Current Facility-Administered Medications   Medication Dose Route Frequency   • acetaminophen (TYLENOL) tablet 650 mg  650 mg Oral Q6H PRN   • acetylcysteine (MUCOMYST) 200 mg/mL oral solution 1,200 mg  1,200 mg Oral BID   • apixaban (ELIQUIS) tablet 5 mg  5 mg Oral BID   • ferrous sulfate tablet 325 mg  325 mg Oral Daily With Breakfast   • insulin detemir (LEVEMIR) subcutaneous injection 32 Units  32 Units Subcutaneous HS   • insulin lispro (HumaLOG) 100 units/mL subcutaneous injection 1-5 Units  1-5 Units Subcutaneous HS   • insulin lispro (HumaLOG) 100 units/mL subcutaneous injection 1-6 Units  1-6 Units Subcutaneous TID AC   • insulin lispro (HumaLOG) 100 units/mL subcutaneous injection 12 Units  12 Units Subcutaneous TID With Meals   • metoprolol succinate (TOPROL-XL) 24 hr tablet 50 mg  50 mg Oral Daily   • pantoprazole (PROTONIX) EC tablet 40 mg  40 mg Oral BID AC   • pregabalin (LYRICA) capsule 100 mg  100 mg Oral TID   • sertraline (ZOLOFT) tablet 25 mg  25 mg Oral Daily   • [START ON 8/21/2023] sodium chloride 0.9 % infusion  50 mL/hr Intravenous Continuous   • tamsulosin (FLOMAX) capsule 0.4 mg  0.4 mg Oral Daily With Dinner   • umeclidinium-vilanterol 62.5-25 mcg/actuation inhaler 1 puff  1 puff Inhalation Daily    and PTA meds:   Prior to Admission Medications   Prescriptions Last Dose Informant Patient Reported? Taking? Continuous Blood Gluc Sensor (Dexcom G6 Sensor) MISC  Self No No   Sig: Use daily as directed for CGM - Change every 10 days   Continuous Blood Gluc Transmit (Dexcom G6 Transmitter) MISC  Self No No   Sig: Use daily as directed for CGM - Change every 3 months   Eliquis 5 MG  Self No No   Sig: take 1 tablet by mouth twice a day   Insulin Pen Needle (Pen Needles) 32G X 4 MM MISC  Self No No   Sig: Use 4 (four) times a day   Levemir FlexPen 100 units/mL injection pen  Self No No   Sig: INJECT 75 UNITS UNDER THE SKIN DAILY AT BEDTIME   Multiple Vitamin (MULTIVITAMIN ADULT PO)  Self Yes No   Sig: Take 1 tablet by mouth daily   Potassium Citrate ER 15 MEQ (1620 MG) TBCR  Self No No   Sig: Take 1 tablet by mouth in the morning 1 tab po daily   Patient taking differently: Take 1 tablet by mouth 2 (two) times a day   SUPER B COMPLEX/C PO  Self Yes No   Sig: Take 1 tablet by mouth daily   acetaminophen (TYLENOL) 650 mg CR tablet  Self Yes No   Sig: Take 650 mg by mouth every 8 (eight) hours as needed for mild pain   dulaglutide (Trulicity) 6.91 CN/7.5FV injection  Self No No   Sig: Inject 0.5 mL (0.75 mg total) under the skin once a week   ferrous sulfate 325 (65 Fe) mg tablet   No No   Sig: Take 1 tablet (325 mg total) by mouth daily with breakfast   furosemide (LASIX) 40 mg tablet   No No   Sig: Take 1 tablet (40 mg total) by mouth daily Do not start before August 15, 2023.    insulin aspart (NovoLOG FlexPen) 100 UNIT/ML injection pen  Self No No   Sig: Inject 40 Units under the skin 3 (three) times a day with meals Inject 40 units before breakfast  Inject 35 units before dinner   Patient taking differently: Inject 40 Units under the skin 3 (three) times a day with meals 28 u breakfast time, 38 u lunchtime, 38 u dinnertime   metoprolol succinate (TOPROL-XL) 50 mg 24 hr tablet  Self Yes No   Sig: Take 50 mg by mouth daily   mometasone (ELOCON) 0.1 % cream  Self No No   Sig: Apply topically daily for 7 days   pantoprazole (PROTONIX) 40 mg tablet   No No   Sig: Take 1 tablet (40 mg total) by mouth 2 (two) times a day   pregabalin (LYRICA) 100 mg capsule  Self No No   Sig: take 1 capsule by mouth three times a day   saxagliptin (Onglyza) 5 MG tablet   Yes No   Sig: Take 5 mg by mouth daily with dinner   sertraline (ZOLOFT) 25 mg tablet   Yes No   Sig: Take 25 mg by mouth daily   tamsulosin (FLOMAX) 0.4 mg   Yes No   Sig: Take 0.4 mg by mouth daily with dinner   traZODone (DESYREL) 100 mg tablet  Self No No   Sig: take 2 tablets by mouth at bedtime   umeclidinium-vilanterol 62.5-25 mcg/actuation inhaler  Self No No   Sig: Inhale 1 puff daily      Facility-Administered Medications: None       Scheduled Meds:  Current Facility-Administered Medications   Medication Dose Route Frequency Provider Last Rate   • acetaminophen  650 mg Oral Q6H PRN Dolly Stack DO     • apixaban  5 mg Oral BID Dolly Stack, DO     • ferrous sulfate  325 mg Oral Daily With Breakfast Dolly Stack, DO     • insulin detemir  32 Units Subcutaneous HS Dolly Stack DO     • insulin lispro  1-5 Units Subcutaneous HS Dolly Stack DO     • insulin lispro  1-6 Units Subcutaneous TID AC Dolly Stack DO     • insulin lispro  12 Units Subcutaneous TID With Meals Dolly Stack, DO     • metoprolol succinate  50 mg Oral Daily Dolly Stack DO     • pantoprazole  40 mg Oral BID AC Dolly Stack DO     • potassium chloride  20 mEq Oral Daily Dolly Stack DO     • pregabalin  100 mg Oral TID Dolly Stack DO     • sertraline  25 mg Oral Daily Dolly Stack, DO     • tamsulosin  0.4 mg Oral Daily With Kacy Felton DO     • umeclidinium-vilanterol  1 puff Inhalation Daily So Mena DO         PRN Meds:.•  acetaminophen    Continuous Infusions:     No Known Allergies      Invasive Devices: Invasive Devices     Peripheral Intravenous Line  Duration           Peripheral IV 23 Right Forearm 1 day                  PHYSICAL EXAM  /61   Pulse 64   Temp 97.7 °F (36.5 °C)   Resp 17   Ht 6' (1.829 m)   Wt 92.6 kg (204 lb 3.2 oz)   SpO2 94%   BMI 27.69 kg/m²   Temp (24hrs), Av.7 °F (36.5 °C), Min:97.2 °F (36.2 °C), Max:98.5 °F (36.9 °C)        Intake/Output Summary (Last 24 hours) at 2023 1234  Last data filed at 2023 0907  Gross per 24 hour   Intake 240 ml   Output 250 ml   Net -10 ml       I/O last 24 hours: In: 240 [P.O.:240]  Out: 250 [Urine:250]          Current Weight: Weight - Scale: 92.6 kg (204 lb 3.2 oz)  First Weight: Weight - Scale: 93 kg (205 lb)  Physical Exam  Vitals and nursing note reviewed. Constitutional:       General: He is not in acute distress. Appearance: Normal appearance. He is normal weight. He is not ill-appearing, toxic-appearing or diaphoretic. HENT:      Head: Normocephalic and atraumatic. Mouth/Throat:      Mouth: Mucous membranes are moist.      Pharynx: No oropharyngeal exudate. Eyes:      General: No scleral icterus. Conjunctiva/sclera: Conjunctivae normal.   Neck:      Comments: No jvd    Cardiovascular:      Rate and Rhythm: Normal rate. Heart sounds: No friction rub. Pulmonary:      Effort: No respiratory distress. Breath sounds: Normal breath sounds. No stridor. Abdominal:      General: There is no distension. Palpations: Abdomen is soft. There is no mass. Tenderness: There is no abdominal tenderness. Musculoskeletal:         General: No swelling. Cervical back: Normal range of motion. No rigidity. Skin:     General: Skin is warm.       Coloration: Skin is not jaundiced. Neurological:      General: No focal deficit present. Mental Status: He is alert and oriented to person, place, and time. Psychiatric:         Mood and Affect: Mood normal.         Behavior: Behavior normal.           LABORATORY:    Results from last 7 days   Lab Units 08/20/23  0456 08/19/23  0503 08/18/23  0527 08/17/23  0455 08/16/23  0442 08/15/23  1242 08/14/23  0425   WBC Thousand/uL 9.66 11.00* 12.00* 11.57* 11.69* 11.66* 12.09*   HEMOGLOBIN g/dL 10.9* 11.2* 11.0* 10.5* 10.2* 9.9* 9.1*   HEMATOCRIT % 38.8 40.2 39.8 37.6 37.7 36.8 33.6*   PLATELETS Thousands/uL 287 297 301 250 233 225 229   POTASSIUM mmol/L 4.4 4.4 4.0 4.0 4.7 4.6 4.1   CHLORIDE mmol/L 108 99 97 98 101 105 103   CO2 mmol/L 27 29 27 30 32 31 29   BUN mg/dL 58* 63* 63* 53* 42* 44* 39*   CREATININE mg/dL 1.82* 1.88* 2.14* 1.94* 1.69* 1.69* 1.52*   CALCIUM mg/dL 9.5 9.8 9.8 9.6 9.5 9.5 9.3      rest all reviewed    RADIOLOGY:  No orders to display     Rest all reviewed    Portions of the record may have been created with voice recognition software. Occasional wrong word or "sound a like" substitutions may have occurred due to the inherent limitations of voice recognition software. Read the chart carefully and recognize, using context, where substitutions have occurred. If you have any questions, please contact the dictating provider.

## 2023-08-20 NOTE — ASSESSMENT & PLAN NOTE
· Heart rate currently controlled  · Continue metoprolol with parameters  · Continue Eliquis for [facility]

## 2023-08-20 NOTE — H&P
[address] [facility]  H&P  Name: Osmar Anderson 69 y.o. male I MRN: [id number]  Unit/Bed#: -81 I Date of Admission: 8/20/2023   Date of Service: 8/20/2023 I Hospital Day: 0      Assessment/Plan   Hepatocellular carcinoma [facility])  Assessment & Plan  • Suspected HCC with multiple hepatic masses on imaging and AFP >18k, has not been confirmed by tissue diagnosis  • Following with Dr. Stacy Garcia with surgical oncology as an outpatient, did not feel like a biopsy was necessary and was referred to Dr. Luis Angel Lee for Y90 radioembolization  • Continue outpatient follow up     Elevated troponin  Assessment & Plan  Patient with elevated troponins on prior admission, peaked at 1000 and down trended to 900 in the setting of GI bleed  · Cardiology consulted, non-MI troponin elevation although echo with wall motion abnormality planning for ischemic evaluation once euvolemic  · 8/18 Nuclear stress test with no ischemic ECG changes, however does have a new reduced EF to 36% as well as large fixed defect of the inferior and inferolateral walls with associated wall motion abnormality suspicious for RCA territory infarct.   · Transferred to [address] [place] [address] for cardiac catheterization      Anemia  Assessment & Plan  · Recently treated for GIB on prior admission- s/p EGD & C-scope with clipping of gastric/duodenal angiectasias + 2 units PRBC with stabilization of hemoglobin  · Restarted on eliquis  following GI clearance   · Continue PPI BID   · Daily CBC  · Hemoglobin uptrending    Lab Results   Component Value Date    HGB 11.2 (L) 08/19/2023    HGB 11.0 (L) 08/18/2023    HGB 10.5 (L) 08/17/2023    HGB 10.2 (L) 08/16/2023    HGB 9.9 (L) 08/15/2023         Atrial fibrillation, unspecified type ([address])  Assessment & Plan  · Continue rate control with metoprolol 50 mg daily  · Anticoagulated with Eliquis 5 mg twice daily      Type 2 diabetes mellitus with neurologic complication, with long-term current use of insulin [facility])  Assessment & Plan  Lab Results   Component Value Date    HGBA1C 7.6 (H) 01/06/2023     · Home regimen:  · Trulicity, saxagliptin (hold)  · Levemir 75 units at bedtime, NovoLog 20 units breakfast, 30 units lunch/dinner  · While inpatient:  · Levemir reduced to 32 units at bedtime  · NovoLog 12 units 3 times daily AC  · ISS and Accu-Cheks,adjust insulin as needed   · Diabetic diet    Stage 3 chronic kidney disease, unspecified whether stage 3a or 3b CKD [facility])  Assessment & Plan  Lab Results   Component Value Date    EGFR 33 08/19/2023    EGFR 28 08/18/2023    EGFR 32 08/17/2023    CREATININE 1.88 (H) 08/19/2023    CREATININE 2.14 (H) 08/18/2023    CREATININE 1.94 (H) 08/17/2023     · Creatinine baseline is 1.3-1.6  · Creatinine peaked at 2.1, today 1.80  · Hold diuretics    * Acute on chronic diastolic HF (heart failure) ([facility])  Assessment & Plan  Wt Readings from Last 3 Encounters:   08/19/23 92.7 kg (204 lb 5.9 oz)   08/14/23 95 kg (209 lb 7 oz)   08/02/23 101 kg (222 lb)     · Patient originally presented to [facility] ED with complaint of chest discomfort and intermittent shortness of breath  · In ED, patient hypoxic required 2 L oxygen via nasal cannula, elevated proBNP noted  · Chest x-ray showed right-sided pleural effusion. · Echocardiogram: 50 to 55%, basal inferior mid inferior hypokinetic  · Underwent nuclear stress test that showed large fixed defect suspicious for RCA territory infarct  · Was treated with IV Lasix, currently on hold due to elevated creatinine  · Appears close to euvolemia  · Transferred to [facility] for cardiac catheterization       VTE Prophylaxis: Apixaban (Eliquis)  / sequential compression device   Code Status: Full code  POLST: POLST form is not discussed and not completed at this time.   Discussion with family: Patient declines at this time, would like family updated during daytime hours    Anticipated Length of Stay:  Patient will be admitted on an Inpatient basis with an anticipated length of stay of  > 2 midnights. Justification for Hospital Stay: Acute on chronic diastolic heart failure, abnormal nuclear stress test, need cardiac cath    Total Time for Visit, including Counseling / Coordination of Care: 60 minutes. Greater than 50% of this total time spent on direct patient counseling and coordination of care. Chief Complaint:   Chest pain, shortness of breath    History of Present Illness:    Huy Abrams is a 69 y.o. male with PMH of diastolic CHF, CKD 3, A-fib on Eliquis, HTN, HCC, IDDM 2, originally presented to [facility] ED with complaint of chest pain, and intermittent shortness of breath. In ER, patient noted to be hypoxic requiring up to 2 L oxygen via nasal cannula, elevated proBNP. Chest x-ray revealed right-sided pleural effusion. Patient was admitted for CHF exacerbation, started on IV Lasix. Patient only was discharged the day prior to admission after GI bleed and transfusions when he had elevated troponin. Therefore a nuclear stress test was obtained this admission that revealed large fixed defect concerning for RCA infarct. Cardiology was consulted who recommended patient to be transferred to [address] [place] [address] for cardiac catheterization. Patient's IV Lasix is currently on hold due to elevated creatinine. Currently patient is lying in bed in no acute distress and comfortable appearing. Offers no acute complaints at this time; specifically denies any chest pain/pressure, shortness of breath, nausea/vomiting, or diaphoresis. Review of Systems:    Review of Systems   Constitutional: Negative for chills and fever. HENT: Negative for ear pain and sore throat. Eyes: Negative for pain and visual disturbance. Respiratory: Negative for cough and shortness of breath. Cardiovascular: Negative for chest pain and palpitations. Gastrointestinal: Negative for abdominal pain and vomiting.    Genitourinary: Negative for dysuria and hematuria. Musculoskeletal: Negative for arthralgias and back pain. Skin: Negative for color change and rash. Neurological: Negative for seizures and syncope. All other systems reviewed and are negative. Past Medical and Surgical History:     Past Medical History:   Diagnosis Date   • Atrial fibrillation ([address])    • Deep vein thrombosis (DVT) of right lower extremity ([address])    • Diverticulitis        Past Surgical History:   Procedure Laterality Date   • APPENDECTOMY     • BOWEL RESECTION  2014   • CATARACT EXTRACTION Bilateral        Meds/Allergies:    Prior to Admission medications    Medication Sig Start Date End Date Taking?  Authorizing Provider   acetaminophen (TYLENOL) 650 mg CR tablet Take 650 mg by mouth every 8 (eight) hours as needed for mild pain    Historical Provider, MD   Continuous Blood Gluc Sensor (Dexcom G6 Sensor) MISC Use daily as directed for CGM - Change every 10 days 9/8/22   Jessica Pretty, DO   Continuous Blood Gluc Transmit (Dexcom G6 Transmitter) MISC Use daily as directed for CGM - Change every 3 months 9/8/22   Jessica Pretty, DO   dulaglutide (Trulicity) 2.68 NS/5.6QE injection Inject 0.5 mL (0.75 mg total) under the skin once a week 4/5/23   Yazmin Brito MD   Eliquis 5 MG take 1 tablet by mouth twice a day 4/3/23   Yazmin Brito MD   ferrous sulfate 325 (65 Fe) mg tablet Take 1 tablet (325 mg total) by mouth daily with breakfast 8/14/23   Tayla Titus PA-C   furosemide (LASIX) 40 mg tablet Take 1 tablet (40 mg total) by mouth daily Do not start before August 15, 2023. 8/15/23 9/14/23  Tayla Titus PA-C   insulin aspart (NovoLOG FlexPen) 100 UNIT/ML injection pen Inject 40 Units under the skin 3 (three) times a day with meals Inject 40 units before breakfast  Inject 35 units before dinner  Patient taking differently: Inject 40 Units under the skin 3 (three) times a day with meals 28 u breakfast time, 38 u lunchtime, 38 u dinnertime 3/28/23 8/2/23  NADIA Cerda   Insulin Pen Needle (Pen Needles) 32G X 4 MM MISC Use 4 (four) times a day 9/15/22 9/15/23  Ernesto Griffith MD   Levemir FlexPen 100 units/mL injection pen INJECT 75 UNITS UNDER THE SKIN DAILY AT BEDTIME 7/3/23   Jose G Orlando DO   metoprolol succinate (TOPROL-XL) 50 mg 24 hr tablet Take 50 mg by mouth daily 6/20/23   Historical Provider, MD   mometasone (ELOCON) 0.1 % cream Apply topically daily for 7 days 6/5/23 8/2/23  Flex Muniz MD   Multiple Vitamin (MULTIVITAMIN ADULT PO) Take 1 tablet by mouth daily    Historical Provider, MD   pantoprazole (PROTONIX) 40 mg tablet Take 1 tablet (40 mg total) by mouth 2 (two) times a day 8/14/23   Jennifer Titus PA-C   Potassium Citrate ER 15 MEQ (1620 MG) TBCR Take 1 tablet by mouth in the morning 1 tab po daily  Patient taking differently: Take 1 tablet by mouth 2 (two) times a day 9/1/22   Ernesto Griffith MD   pregabalin (LYRICA) 100 mg capsule take 1 capsule by mouth three times a day 7/21/23   Ernesto Griffith MD   saxagliptin (Onglyza) 5 MG tablet Take 5 mg by mouth daily with dinner    Historical Provider, MD   sertraline (ZOLOFT) 25 mg tablet Take 25 mg by mouth daily    Historical Provider, MD   SUPER B COMPLEX/C PO Take 1 tablet by mouth daily    Historical Provider, MD   tamsulosin (FLOMAX) 0.4 mg Take 0.4 mg by mouth daily with dinner    Historical Provider, MD   traZODone (DESYREL) 100 mg tablet take 2 tablets by mouth at bedtime 6/20/23   Flex Muniz MD   umeclidinium-vilanterol 62.5-25 mcg/actuation inhaler Inhale 1 puff daily 5/10/23   Flex Muniz MD     I have reviewed home medications using allscripts.     Allergies: No Known Allergies    Social History:     Marital Status: /Civil Union     Substance Use History:   Social History     Substance and Sexual Activity   Alcohol Use Yes    Comment: Socially     Social History     Tobacco Use   Smoking Status Former   • Packs/day: 1.00   • Years: 30.00   • Total pack years: 30.00   • Types: Cigarettes   • Start date: 57   • Quit date: 12   • Years since quittin.6   Smokeless Tobacco Never     Social History     Substance and Sexual Activity   Drug Use Never       Family History:    Family History   Problem Relation Age of Onset   • Hypertension Mother    • Bone cancer Father    • Diabetes type II Sister    • Diabetes type II Sister    • Esophageal cancer Brother        Physical Exam:     Vitals:   Blood Pressure: (!) 132/47 (23 0042)  Pulse: 65 (23)  Temperature: 98.5 °F (36.9 °C) (23)  Temp Source: Oral (23)  Respirations: 18 (23)  Height: 6' (182.9 cm) (23)  Weight - Scale: 93 kg (205 lb) (23)  SpO2: 95 % (23)    Physical Exam  Vitals and nursing note reviewed. Constitutional:       General: He is not in acute distress. Appearance: He is well-developed. HENT:      Head: Normocephalic and atraumatic. Mouth/Throat:      Mouth: Mucous membranes are moist.      Pharynx: Oropharynx is clear. Eyes:      Conjunctiva/sclera: Conjunctivae normal.   Cardiovascular:      Rate and Rhythm: Normal rate and regular rhythm. Heart sounds: No murmur heard. Pulmonary:      Effort: Pulmonary effort is normal. No respiratory distress. Breath sounds: Normal breath sounds. Abdominal:      Palpations: Abdomen is soft. Tenderness: There is no abdominal tenderness. Musculoskeletal:         General: No swelling or tenderness. Normal range of motion. Cervical back: Neck supple. No rigidity or tenderness. Skin:     General: Skin is warm and dry. Capillary Refill: Capillary refill takes less than 2 seconds. Neurological:      General: No focal deficit present. Mental Status: He is alert and oriented to person, place, and time. Mental status is at baseline.    Psychiatric:         Mood and Affect: Mood normal. Behavior: Behavior normal.         Thought Content: Thought content normal.         Judgment: Judgment normal.           Additional Data:     Lab Results: I have personally reviewed pertinent reports. Results from last 7 days   Lab Units 08/19/23  0503 08/18/23  0527 08/17/23  0455   WBC Thousand/uL 11.00*   < > 11.57*   HEMOGLOBIN g/dL 11.2*   < > 10.5*   HEMATOCRIT % 40.2   < > 37.6   PLATELETS Thousands/uL 297   < > 250   NEUTROS PCT %  --   --  66   LYMPHS PCT %  --   --  15   MONOS PCT %  --   --  13*   EOS PCT %  --   --  4    < > = values in this interval not displayed. Results from last 7 days   Lab Units 08/19/23  0503 08/17/23 0455 08/16/23  0442   SODIUM mmol/L 138   < > 141   POTASSIUM mmol/L 4.4   < > 4.7   CHLORIDE mmol/L 99   < > 101   CO2 mmol/L 29   < > 32   BUN mg/dL 63*   < > 42*   CREATININE mg/dL 1.88*   < > 1.69*   ANION GAP mmol/L 10   < > 8   CALCIUM mg/dL 9.8   < > 9.5   ALBUMIN g/dL  --   --  3.9   TOTAL BILIRUBIN mg/dL  --   --  0.72   ALK PHOS U/L  --   --  95   ALT U/L  --   --  36   AST U/L  --   --  36   GLUCOSE RANDOM mg/dL 171*   < > 121    < > = values in this interval not displayed. Results from last 7 days   Lab Units 08/19/23 2058 08/19/23  1638 08/19/23  1138 08/19/23  0715 08/18/23  2115 08/18/23  1618 08/18/23  1142 08/18/23  0816 08/17/23  2050 08/17/23  1631 08/17/23  1107 08/17/23  1017   POC GLUCOSE mg/dl 178* 160* 286* 186* 287* 259* 284* 187* 180* 134 280* 354*         Results from last 7 days   Lab Units 08/13/23  0450   PROCALCITONIN ng/ml 0.25       Imaging: I have personally reviewed pertinent reports. No orders to display       EKG, Pathology, and Other Studies Reviewed on Admission:   · EKG: Reviewed    Allscripts / Epic Records Reviewed: Yes     ** Please Note: This note has been constructed using a voice recognition system.  **

## 2023-08-20 NOTE — PLAN OF CARE
Problem: MOBILITY - ADULT  Goal: Maintain or return to baseline ADL function  Description: INTERVENTIONS:  -  Assess patient's ability to carry out ADLs; assess patient's baseline for ADL function and identify physical deficits which impact ability to perform ADLs (bathing, care of mouth/teeth, toileting, grooming, dressing, etc.)  - Assess/evaluate cause of self-care deficits   - Assess range of motion  - Assess patient's mobility; develop plan if impaired  - Assess patient's need for assistive devices and provide as appropriate  - Encourage maximum independence but intervene and supervise when necessary  - Involve family in performance of ADLs  - Assess for home care needs following discharge   - Consider OT consult to assist with ADL evaluation and planning for discharge  - Provide patient education as appropriate  Outcome: Progressing     Problem: PAIN - ADULT  Goal: Verbalizes/displays adequate comfort level or baseline comfort level  Description: Interventions:  - Encourage patient to monitor pain and request assistance  - Assess pain using appropriate pain scale  - Administer analgesics based on type and severity of pain and evaluate response  - Implement non-pharmacological measures as appropriate and evaluate response  - Consider cultural and social influences on pain and pain management  - Notify physician/advanced practitioner if interventions unsuccessful or patient reports new pain  Outcome: Progressing     Problem: INFECTION - ADULT  Goal: Absence or prevention of progression during hospitalization  Description: INTERVENTIONS:  - Assess and monitor for signs and symptoms of infection  - Monitor lab/diagnostic results  - Monitor all insertion sites, i.e. indwelling lines, tubes, and drains  - Monitor endotracheal if appropriate and nasal secretions for changes in amount and color  - [place] appropriate cooling/warming therapies per order  - Administer medications as ordered  - Instruct and encourage patient and family to use good hand hygiene technique  - Identify and instruct in appropriate isolation precautions for identified infection/condition  Outcome: Progressing

## 2023-08-20 NOTE — RESPIRATORY THERAPY NOTE
RT Protocol Note  Rob Gandara 69 y.o. male MRN: [id number]  Unit/Bed#: -01 Encounter: [id number]    Assessment    Principal Problem:    Acute on chronic diastolic HF (heart failure) ([address])  Active Problems:    Stage 3 chronic kidney disease, unspecified whether stage 3a or 3b CKD (HCC)    Type 2 diabetes mellitus with neurologic complication, with long-term current use of insulin (HCC)    Atrial fibrillation, unspecified type (HCC)    Anemia    Elevated troponin    Hepatocellular carcinoma (HCC)      Home Pulmonary Medications:  Umeclidinium/Vilanterol MDI       Past Medical History:   Diagnosis Date    Atrial fibrillation (HCC)     Deep vein thrombosis (DVT) of right lower extremity (HCC)     Diverticulitis      Social History     Socioeconomic History    Marital status: /Civil Union     Spouse name: Not on file    Number of children: Not on file    Years of education: Not on file    Highest education level: Not on file   Occupational History    Not on file   Tobacco Use    Smoking status: Former     Packs/day: 1.00     Years: 30.00     Total pack years: 30.00     Types: Cigarettes     Start date:      Quit date: 12     Years since quittin.6    Smokeless tobacco: Never   Vaping Use    Vaping Use: Never used   Substance and Sexual Activity    Alcohol use: Yes     Comment: Socially    Drug use: Never    Sexual activity: Not on file   Other Topics Concern    Not on file   Social History Narrative    Not on file     Social Determinants of Health     Financial Resource Strain: Medium Risk (2022)    Overall Financial Resource Strain (CARDIA)     Difficulty of Paying Living Expenses: Somewhat hard   Food Insecurity: No Food Insecurity (2023)    Hunger Vital Sign     Worried About Running Out of Food in the Last Year: Never true     Ran Out of Food in the Last Year: Never true   Transportation Needs: No Transportation Needs (2023)    PRAPARE - Transportation     Lack of Transportation (Medical): No     Lack of Transportation (Non-Medical): No   Physical Activity: Not on file   Stress: Not on file   Social Connections: Not on file   Intimate Partner Violence: Not on file   Housing Stability: Low Risk  (8/16/2023)    Housing Stability Vital Sign     Unable to Pay for Housing in the Last Year: No     Number of Places Lived in the Last Year: 1     Unstable Housing in the Last Year: No       Subjective         Objective    Physical Exam:   Assessment Type: Assess only  General Appearance: Awake  Respiratory Pattern: Spontaneous, Normal  Chest Assessment: Chest expansion symmetrical  Bilateral Breath Sounds: Diminished  Cough: None    Vitals:  Blood pressure (!) 130/47, pulse 60, temperature 98.5 °F (36.9 °C), temperature source Oral, resp. rate 18, height 6' (1.829 m), weight 92.6 kg (204 lb 3.2 oz), SpO2 95 %. Imaging and other studies: I have personally reviewed pertinent reports. Plan    Respiratory Plan: (P) Home Bronchodilator Patient pathway        Resp Comments: (P) Assessed for respiratory protocol, currently on room air - SpO2 95%, VS stable, no distress; Home bronchodilator pathway indicated.

## 2023-08-20 NOTE — ASSESSMENT & PLAN NOTE
Patient with elevated troponins on prior admission, peaked at 1000 and down trended to 900 in the setting of GI bleed  · Cardiology consulted, non-MI troponin elevation although echo with wall motion abnormality planning for ischemic evaluation once euvolemic  · 8/18 Nuclear stress test with no ischemic ECG changes, however does have a new reduced EF to 36% as well as large fixed defect of the inferior and inferolateral walls with associated wall motion abnormality suspicious for RCA territory infarct.   · Transferred to [facility] for cardiac catheterization  · Cath tentative for tomorrow NPO at midnight

## 2023-08-20 NOTE — Clinical Note
The ECG shows a sinus bradycardia and an A-V block. The A-V block is 1st degree.  ECG rate  = 57 bpm.

## 2023-08-20 NOTE — ASSESSMENT & PLAN NOTE
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 Vero Spencer with surgical oncology as an outpatient, did not feel like a biopsy was necessary and was referred to Dr. Zuly Casarez for Y90 radioembolization  • Continue outpatient follow up

## 2023-08-20 NOTE — CASE MANAGEMENT
Case Management Assessment & Discharge Planning Note    Patient name Robert White  [id number]N [id number]  : 1945 Date 2023       Current Admission Date: 2023  Current Admission Diagnosis:Acute on chronic diastolic HF (heart failure) [facility])   Patient Active Problem List    Diagnosis Date Noted   • Hepatocellular carcinoma ([address])    • Elevated troponin 2023   • Hepatic flexure mass 2023   • Syncope 2023   • Anemia 2023   • Acute on chronic diastolic HF (heart failure) ([address]) 2023   • History of DVT (deep vein thrombosis) 2023   • Restrictive lung disease 2023   • Other emphysema ([address]) 2023   • History of tobacco abuse 2023   • H/O asbestos exposure 2023   • Liver mass 05/10/2023   • Atrial fibrillation, unspecified type ([address]) 02/15/2023   • Peripheral polyneuropathy 2022   • Type 2 diabetes mellitus with neurologic complication, with long-term current use of insulin ([address]) 2022   • Stage 3 chronic kidney disease, unspecified whether stage 3a or 3b CKD ([address]) 2022      LOS (days): 0  Geometric Mean LOS (GMLOS) (days):   Days to GMLOS:     OBJECTIVE:  PATIENT READMITTED TO HOSPITAL  Risk of Unplanned Readmission Score: 26.16         Current admission status: Inpatient       Preferred Pharmacy:   [facility] [address] [id number] [address] [address]  Phone: [phone] Fax: [phone]    Primary Care Provider: Jenny Barnett MD    Primary Insurance: [facility]  Secondary Insurance:     ASSESSMENT:  [address], [address]   Primary Phone: [phone] (Mobile)                              Patient Information  Admitted from[de-identified] Facility ([facility] [place] transfer)  Mental Status: Alert  During Assessment patient was accompanied by: Not accompanied during assessment  Assessment information provided by[de-identified] Patient  Primary Caregiver: Self  Support Systems: Self, Spouse/significant other, Family members  [place] of Residence: [address] do you live in?: [address] [facility] entry access options. Select all that apply.: Stairs  Number of steps to enter home. : 1  Do the steps have railings?: Yes  Type of Current Residence: [facility]  In the last 12 months, was there a time when you were not able to pay the mortgage or rent on time?: No  In the last 12 months, how many places have you lived?: 1  In the last 12 months, was there a time when you did not have a steady place to sleep or slept in a shelter (including now)?: No  Homeless/housing insecurity resource given?: N/A  Living Arrangements: Lives w/ Spouse/significant other  Is patient a ?: No    Activities of Daily Living Prior to Admission  Functional Status: Independent  Completes ADLs independently?: Yes  Ambulates independently?: Yes  Does patient use assisted devices?: Yes  Assisted Devices (DME) used: Straight Cane  Does patient currently own DME?: Yes  What DME does the patient currently own?: Straight Cane  Does patient have a history of Outpatient Therapy (PT/OT)?: Yes  Does the patient have a history of Short-Term Rehab?: Yes  Does patient have a history of HHC?: Yes  Does patient currently have [address] [facility]?: No    Current Home Health Care  Type of Current Home Care Services: Home PT, Home OT, Nurse visit  [address] [facility][de-identified] [facility] Provider[de-identified] PCP    Patient Information Continued  Does patient have prescription coverage?: Yes  Within the past 12 months, you worried that your food would run out before you got the money to buy more.: Never true  Within the past 12 months, the food you bought just didn't last and you didn't have money to get more.: Never true  Food insecurity resource given?: N/A  Does patient receive dialysis treatments?: No         Means of Transportation  Means of Transport to [facility][de-identified] Drives Self  In the past 12 months, has lack of transportation kept you from medical appointments or from getting medications?: No  In the past 12 months, has lack of transportation kept you from meetings, work, or from getting things needed for daily living?: No  Was application for public transport provided?: N/A        Discharge planning discussed with[de-identified] Pt  Freedom of Choice: Yes  Comments - Freedom of Choice: Choice offered  CM contacted family/caregiver?: No- see comments  Were Treatment Team discharge recommendations reviewed with patient/caregiver?: Yes          Contacts  Patient Contacts: Nikolay Jaquez  Relationship to Patient[de-identified] Family  Contact Method: Phone  Phone Number: [phone]  Reason/Outcome: Discharge [address]         Is the patient interested in [address] at discharge?: Yes  [address] [facility] requested[de-identified] Nursing, Physical Therapy, Occupational [address] [facility] Name[de-identified] [facility] External Referral Reason (only applicable if external HHA name selected): Services not provided in network or near patient location (denial in [facility] reviewed from [facility], not open for new referrals at this time)  [address] [facility] Provider[de-identified] PCP  Home Health Services Needed[de-identified] Evaluate Functional Status and Safety, Gait/ADL Training, Heart Failure Management  Homebound Criteria Met[de-identified] Requires the Assistance of Another Person for Safe Ambulation or to Leave the Home, Uses an Assist Device (i.e. cane, walker, etc)  Supporting Clincal Findings[de-identified] Dyspnea with Exertion, Fatigues Easliy in [employer], Limited Endurance    DME Referral Provided  Referral made for DME?: No    Other Referral/Resources/Interventions Provided:  Interventions: [facility]  Referral Comments: CM reviewed [address] referrals in [facility] completed by [facility] [place] CM. [facility] denial. [facility] and [facility] both accepting. Review w pt, chose [facility], reserved in [facility] for post d/c f/u.          Treatment Team Recommendation: Home with Home Health Care  Discharge Destination Plan[de-identified] Home with [address] at Discharge : Family                                      Additional Comments: Initial CM assessment completed with pt. Pt was referred for [address] while IP at [facility], is still agreeable to this plan upon d/c to home. Reviewed accepting agency's w pt, pt chose [facility]. CM reserved in [address] CM encourageed pt to reach out if additional questions. CM dept following for d/c coordination.

## 2023-08-20 NOTE — PLAN OF CARE
Problem: MOBILITY - ADULT  Goal: Maintain or return to baseline ADL function  Description: INTERVENTIONS:  -  Assess patient's ability to carry out ADLs; assess patient's baseline for ADL function and identify physical deficits which impact ability to perform ADLs (bathing, care of mouth/teeth, toileting, grooming, dressing, etc.)  - Assess/evaluate cause of self-care deficits   - Assess range of motion  - Assess patient's mobility; develop plan if impaired  - Assess patient's need for assistive devices and provide as appropriate  - Encourage maximum independence but intervene and supervise when necessary  - Involve family in performance of ADLs  - Assess for home care needs following discharge   - Consider OT consult to assist with ADL evaluation and planning for discharge  - Provide patient education as appropriate  Outcome: Progressing  Goal: Maintains/Returns to pre admission functional level  Description: INTERVENTIONS:  - Perform BMAT or MOVE assessment daily.   - Set and communicate daily mobility goal to care team and patient/family/caregiver. - Collaborate with rehabilitation services on mobility goals if consulted  - Perform Range of Motion  times a day. - Reposition patient every  hours.   - Dangle patient  times a day  - Stand patient  times a day  - Ambulate patient  times a day  - Out of bed to chair  times a day   - Out of bed for meals  times a day  - Out of bed for toileting  - Record patient progress and toleration of activity level   Outcome: Progressing     Problem: PAIN - ADULT  Goal: Verbalizes/displays adequate comfort level or baseline comfort level  Description: Interventions:  - Encourage patient to monitor pain and request assistance  - Assess pain using appropriate pain scale  - Administer analgesics based on type and severity of pain and evaluate response  - Implement non-pharmacological measures as appropriate and evaluate response  - Consider cultural and social influences on pain and pain management  - Notify physician/advanced practitioner if interventions unsuccessful or patient reports new pain  Outcome: Progressing     Problem: INFECTION - ADULT  Goal: Absence or prevention of progression during hospitalization  Description: INTERVENTIONS:  - Assess and monitor for signs and symptoms of infection  - Monitor lab/diagnostic results  - Monitor all insertion sites, i.e. indwelling lines, tubes, and drains  - Monitor endotracheal if appropriate and nasal secretions for changes in amount and color  - [place] appropriate cooling/warming therapies per order  - Administer medications as ordered  - Instruct and encourage patient and family to use good hand hygiene technique  - Identify and instruct in appropriate isolation precautions for identified infection/condition  Outcome: Progressing  Goal: Absence of fever/infection during neutropenic period  Description: INTERVENTIONS:  - Monitor WBC    Outcome: Progressing     Problem: SAFETY ADULT  Goal: Maintain or return to baseline ADL function  Description: INTERVENTIONS:  -  Assess patient's ability to carry out ADLs; assess patient's baseline for ADL function and identify physical deficits which impact ability to perform ADLs (bathing, care of mouth/teeth, toileting, grooming, dressing, etc.)  - Assess/evaluate cause of self-care deficits   - Assess range of motion  - Assess patient's mobility; develop plan if impaired  - Assess patient's need for assistive devices and provide as appropriate  - Encourage maximum independence but intervene and supervise when necessary  - Involve family in performance of ADLs  - Assess for home care needs following discharge   - Consider OT consult to assist with ADL evaluation and planning for discharge  - Provide patient education as appropriate  Outcome: Progressing  Goal: Maintains/Returns to pre admission functional level  Description: INTERVENTIONS:  - Perform BMAT or MOVE assessment daily.   - Set and communicate daily mobility goal to care team and patient/family/caregiver. - Collaborate with rehabilitation services on mobility goals if consulted  - Perform Range of Motion  times a day. - Reposition patient every  hours.   - Dangle patient  times a day  - Stand patient  times a day  - Ambulate patient  times a day  - Out of bed to chair  times a day   - Out of bed for meals  times a day  - Out of bed for toileting  - Record patient progress and toleration of activity level   Outcome: Progressing  Goal: Patient will remain free of falls  Description: INTERVENTIONS:  - Educate patient/family on patient safety including physical limitations  - Instruct patient to call for assistance with activity   - Consult OT/PT to assist with strengthening/mobility   - Keep Call bell within reach  - Keep bed low and locked with side rails adjusted as appropriate  - Keep care items and personal belongings within reach  - Initiate and maintain comfort rounds  - Make Fall Risk Sign visible to staff  - Offer Toileting every  Hours, in advance of need  - Initiate/Maintain alarm  - Obtain necessary fall risk management equipment  - Apply yellow socks and bracelet for high fall risk patients  - Consider moving patient to room near nurses station  Outcome: Progressing     Problem: DISCHARGE PLANNING  Goal: Discharge to home or other facility with appropriate resources  Description: INTERVENTIONS:  - Identify barriers to discharge w/patient and caregiver  - Arrange for needed discharge resources and transportation as appropriate  - Identify discharge learning needs (meds, wound care, etc.)  - Arrange for interpretive services to assist at discharge as needed  - Refer to Case Management Department for coordinating discharge planning if the patient needs post-hospital services based on physician/advanced practitioner order or complex needs related to functional status, cognitive ability, or social support system  Outcome: Progressing Problem: Knowledge Deficit  Goal: Patient/family/caregiver demonstrates understanding of disease process, treatment plan, medications, and discharge instructions  Description: Complete learning assessment and assess knowledge base. Interventions:  - Provide teaching at level of understanding  - Provide teaching via preferred learning methods  Outcome: Progressing     Problem: Nutrition/Hydration-ADULT  Goal: Nutrient/Hydration intake appropriate for improving, restoring or maintaining nutritional needs  Description: Monitor and assess patient's nutrition/hydration status for malnutrition. Collaborate with interdisciplinary team and initiate plan and interventions as ordered. Monitor patient's weight and dietary intake as ordered or per policy. Utilize nutrition screening tool and intervene as necessary. Determine patient's food preferences and provide high-protein, high-caloric foods as appropriate.      INTERVENTIONS:  - Monitor oral intake, urinary output, labs, and treatment plans  - Assess nutrition and hydration status and recommend course of action  - Evaluate amount of meals eaten  - Assist patient with eating if necessary   - Allow adequate time for meals  - Recommend/ encourage appropriate diets, oral nutritional supplements, and vitamin/mineral supplements  - Order, calculate, and assess calorie counts as needed  - Recommend, monitor, and adjust tube feedings and TPN/PPN based on assessed needs  - Assess need for intravenous fluids  - Provide specific nutrition/hydration education as appropriate  - Include patient/family/caregiver in decisions related to nutrition  Outcome: Progressing

## 2023-08-20 NOTE — Clinical Note
The ECG shows a sinus bradycardia and an A-V block. The A-V block is 1st degree.  ECG rate  = 53 bpm.

## 2023-08-20 NOTE — PROGRESS NOTES
[address] [facility]  Progress Note  Name: Rob Gandara I  MRN: [id number]  Unit/Bed#: -38 I Date of Admission: 8/20/2023   Date of Service: 8/20/2023 I Hospital Day: 0    Assessment/Plan   * Acute on chronic diastolic HF (heart failure) (HCC)  Assessment & Plan  Wt Readings from Last 3 Encounters:   08/20/23 92.6 kg (204 lb 3.2 oz)   08/19/23 92.7 kg (204 lb 5.9 oz)   08/14/23 95 kg (209 lb 7 oz)     · Patient originally presented to [facility] ED with complaint of chest discomfort and intermittent shortness of breath  · In ED, patient hypoxic required 2 L oxygen via nasal cannula, elevated proBNP noted  · Chest x-ray showed right-sided pleural effusion.   · Echocardiogram: 50 to 55%, basal inferior mid inferior hypokinetic  · Underwent nuclear stress test that showed large fixed defect suspicious for RCA territory infarct  · Patient currently appears euvolemic  · Diuretics on hold in setting of elevated cr and need for cardiac cath  · Nephrology consulted for CKD management  · Patient to be started on gentle IV hydration at midnight and 4 hours post procedure  · Mucomyst 2 doses before and 2 doses after    Hepatocellular carcinoma (HCC)  Assessment & Plan  • Suspected HCC with multiple hepatic masses on imaging and AFP >18k, has not been confirmed by tissue diagnosis  • Following with Dr. Saúl Horne with surgical oncology as an outpatient, did not feel like a biopsy was necessary and was referred to Dr. Maureen Magaña for Y90 radioembolization  • Continue outpatient follow up     Elevated troponin  Assessment & Plan  Patient with elevated troponins on prior admission, peaked at 1000 and down trended to 900 in the setting of GI bleed  · Cardiology consulted, non-MI troponin elevation although echo with wall motion abnormality planning for ischemic evaluation once euvolemic  · 8/18 Nuclear stress test with no ischemic ECG changes, however does have a new reduced EF to 36% as well as large fixed defect of the inferior and inferolateral walls with associated wall motion abnormality suspicious for RCA territory infarct.   · Transferred to [facility] for cardiac catheterization  · Cath tentative for tomorrow NPO at midnight       Anemia  Assessment & Plan  · Recently treated for GIB on prior admission- s/p EGD & C-scope with clipping of gastric/duodenal angiectasias + 2 units PRBC with stabilization of hemoglobin  · Restarted on eliquis  following GI clearance   · Continue PPI BID   · Daily CBC  · Hemoglobin uptrending    Lab Results   Component Value Date    HGB 10.9 (L) 08/20/2023    HGB 11.2 (L) 08/19/2023    HGB 11.0 (L) 08/18/2023    HGB 10.5 (L) 08/17/2023    HGB 10.2 (L) 08/16/2023         Atrial fibrillation, unspecified type ([address])  Assessment & Plan  · Heart rate currently controlled  · Continue metoprolol with parameters  · Continue Eliquis for AC    Type 2 diabetes mellitus with neurologic complication, with long-term current use of insulin ([facility])  Assessment & Plan  Lab Results   Component Value Date    HGBA1C 7.6 (H) 01/06/2023     · Home regimen:  · Trulicity, saxagliptin (hold)  · Levemir 75 units at bedtime, NovoLog 20 units breakfast, 30 units lunch/dinner  · While inpatient:  · Levemir reduced to 32 units at bedtime  · NovoLog 12 units 3 times daily AC  · ISS and Accu-Cheks,adjust insulin as needed   · Diabetic diet  · Adjust as needed     Stage 3 chronic kidney disease, unspecified whether stage 3a or 3b CKD [facility])  Assessment & Plan  Lab Results   Component Value Date    EGFR 35 08/20/2023    EGFR 33 08/19/2023    EGFR 28 08/18/2023    CREATININE 1.82 (H) 08/20/2023    CREATININE 1.88 (H) 08/19/2023    CREATININE 2.14 (H) 08/18/2023     · Creatinine baseline is 1.3-1.6  · Creatinine peaked at 2.1, today 1.82  · Continue to hold diuretics  · Patient cleared from nephrology perspective for cath with recs for IVF and mucomyst              VTE Pharmacologic Prophylaxis: VTE Score: 7 High Risk (Score >/= 5) - Pharmacological DVT Prophylaxis Ordered: apixaban (Eliquis). Sequential Compression Devices Ordered. Patient Centered Rounds: I performed bedside rounds with nursing staff today. Discussions with Specialists or Other Care Team Provider: cardiology and nephrology discussions     Education and Discussions with Family / Patient: Updated  (wife) at bedside. Total Time Spent on Date of Encounter in care of patient: 35 minutes This time was spent on one or more of the following: performing physical exam; counseling and coordination of care; obtaining or reviewing history; documenting in the medical record; reviewing/ordering tests, medications or procedures; communicating with other healthcare professionals and discussing with patient's family/caregivers. Current Length of Stay: 0 day(s)  Current Patient Status: Inpatient   Certification Statement: The patient will continue to require additional inpatient hospital stay due to elevated troponin, chest pain with need for cardiac cath  Discharge Plan: Anticipate discharge in 24-48 hrs to home. Code Status: Level 1 - Full Code    Subjective:   Patient currently denies chest pain, dizziness, sob, palpitations and has no concerns at this time. Patient and wife at bedside remain agreeable to the plan of care and proceeding with the cardiac cath. Objective:     Vitals:   Temp (24hrs), Av.7 °F (36.5 °C), Min:97.2 °F (36.2 °C), Max:98.5 °F (36.9 °C)    Temp:  [97.2 °F (36.2 °C)-98.5 °F (36.9 °C)] 97.7 °F (36.5 °C)  HR:  [60-74] 64  Resp:  [16-18] 17  BP: (127-132)/(47-62) 129/61  SpO2:  [94 %-96 %] 94 %  Body mass index is 27.69 kg/m². Input and Output Summary (last 24 hours): Intake/Output Summary (Last 24 hours) at 2023 1404  Last data filed at 2023 0907  Gross per 24 hour   Intake 240 ml   Output 250 ml   Net -10 ml       Physical Exam:   Physical Exam  Vitals and nursing note reviewed.    Constitutional: General: He is not in acute distress. Appearance: He is well-developed. HENT:      Head: Normocephalic and atraumatic. Eyes:      Conjunctiva/sclera: Conjunctivae normal.   Cardiovascular:      Rate and Rhythm: Normal rate and regular rhythm. Heart sounds: No murmur heard. Pulmonary:      Effort: Pulmonary effort is normal. No respiratory distress. Breath sounds: Normal breath sounds. Abdominal:      Palpations: Abdomen is soft. Tenderness: There is no abdominal tenderness. Musculoskeletal:         General: No swelling. Cervical back: Neck supple. Skin:     General: Skin is warm and dry. Capillary Refill: Capillary refill takes less than 2 seconds. Neurological:      Mental Status: He is alert and oriented to person, place, and time. Psychiatric:         Mood and Affect: Mood normal.           Additional Data:     Labs:  Results from last 7 days   Lab Units 08/20/23  0456 08/18/23  0527 08/17/23  0455   WBC Thousand/uL 9.66   < > 11.57*   HEMOGLOBIN g/dL 10.9*   < > 10.5*   HEMATOCRIT % 38.8   < > 37.6   PLATELETS Thousands/uL 287   < > 250   NEUTROS PCT %  --   --  66   LYMPHS PCT %  --   --  15   MONOS PCT %  --   --  13*   EOS PCT %  --   --  4    < > = values in this interval not displayed. Results from last 7 days   Lab Units 08/20/23  0456 08/17/23  0455 08/16/23  0442   SODIUM mmol/L 140   < > 141   POTASSIUM mmol/L 4.4   < > 4.7   CHLORIDE mmol/L 108   < > 101   CO2 mmol/L 27   < > 32   BUN mg/dL 58*   < > 42*   CREATININE mg/dL 1.82*   < > 1.69*   ANION GAP mmol/L 5   < > 8   CALCIUM mg/dL 9.5   < > 9.5   ALBUMIN g/dL  --   --  3.9   TOTAL BILIRUBIN mg/dL  --   --  0.72   ALK PHOS U/L  --   --  95   ALT U/L  --   --  36   AST U/L  --   --  36   GLUCOSE RANDOM mg/dL 157*   < > 121    < > = values in this interval not displayed.          Results from last 7 days   Lab Units 08/20/23  1204 08/20/23  0905 08/19/23  2058 08/19/23  1638 08/19/23  1132 08/19/23  0715 08/18/23  2115 08/18/23  1618 08/18/23  1142 08/18/23  0816 08/17/23  2050 08/17/23  1631   POC GLUCOSE mg/dl 228* 236* 178* 160* 286* 186* 287* 259* 284* 187* 180* 134               Lines/Drains:  Invasive Devices     Peripheral Intravenous Line  Duration           Peripheral IV 08/18/23 Right Forearm 2 days                  Telemetry:  Telemetry Orders (From admission, onward)             24 Hour Telemetry Monitoring  Continuous x 24 Hours (Telem)        Question:  Reason for 24 Hour Telemetry  Answer:  PCI/EP study (including pacer and ICD implementation), Cardiac surgery, MI, abnormal cardiac cath, and chest pain- rule out MI                 Telemetry Reviewed: Normal Sinus Rhythm  Indication for Continued Telemetry Use: Acute MI/Unstable Angina/Rule out ACS             Imaging: Reviewed radiology reports from this admission including: chest xray    Recent Cultures (last 7 days):         Last 24 Hours Medication List:   Current Facility-Administered Medications   Medication Dose Route Frequency Provider Last Rate   • acetaminophen  650 mg Oral Q6H PRN Penelope Chen DO     • acetylcysteine  1,200 mg Oral BID Yanni Cabral MD     • apixaban  5 mg Oral BID Penelope Chen, DO     • ferrous sulfate  325 mg Oral Daily With Breakfast Penelope Chen DO     • insulin detemir  32 Units Subcutaneous HS Penelope Chen, DO     • insulin lispro  1-5 Units Subcutaneous HS Penelope Chen, DO     • insulin lispro  1-6 Units Subcutaneous TID AC Penelope Chen DO     • insulin lispro  12 Units Subcutaneous TID With Meals Penelope Chen DO     • metoprolol succinate  50 mg Oral Daily Penelope Chen DO     • pantoprazole  40 mg Oral BID AC Penelope Chen DO     • pregabalin  100 mg Oral TID Penelope Chen, DO     • sertraline  25 mg Oral Daily Penelope Chen DO     • [START ON 8/21/2023] sodium chloride  50 mL/hr Intravenous Continuous Yanni Cabral MD     • tamsulosin  0.4 mg Oral Daily With [employer] DO Juan     • umeclidinium-vilanterol  1 puff Inhalation Daily Elizabeth Melendez DO          Today, Patient Was Seen By: NADIA Coles    **Please Note: This note may have been constructed using a voice recognition system. **

## 2023-08-20 NOTE — ASSESSMENT & PLAN NOTE
Patient with elevated troponins on prior admission, peaked at 1000 and down trended to 900 in the setting of GI bleed  · Cardiology consulted, non-MI troponin elevation although echo with wall motion abnormality planning for ischemic evaluation once euvolemic  · 8/18 Nuclear stress test with no ischemic ECG changes, however does have a new reduced EF to 36% as well as large fixed defect of the inferior and inferolateral walls with associated wall motion abnormality suspicious for RCA territory infarct.   · Transferred to [address] [place] [address] for cardiac catheterization

## 2023-08-20 NOTE — ASSESSMENT & PLAN NOTE
Lab Results   Component Value Date    HGBA1C 7.6 (H) 01/06/2023     · Home regimen:  · Trulicity, saxagliptin (hold)  · Levemir 75 units at bedtime, NovoLog 20 units breakfast, 30 units lunch/dinner  · While inpatient:  · Levemir reduced to 32 units at bedtime  · NovoLog 12 units 3 times daily AC  · ISS and Accu-Cheks,adjust insulin as needed   · Diabetic diet  · Adjust as needed

## 2023-08-20 NOTE — ASSESSMENT & PLAN NOTE
· Recently treated for GIB on prior admission- s/p EGD & C-scope with clipping of gastric/duodenal angiectasias + 2 units PRBC with stabilization of hemoglobin  · Restarted on eliquis  following GI clearance   · Continue PPI BID   · Daily CBC  · Hemoglobin uptrending    Lab Results   Component Value Date    HGB 10.9 (L) 08/20/2023    HGB 11.2 (L) 08/19/2023    HGB 11.0 (L) 08/18/2023    HGB 10.5 (L) 08/17/2023    HGB 10.2 (L) 08/16/2023

## 2023-08-20 NOTE — ASSESSMENT & PLAN NOTE
Lab Results   Component Value Date    EGFR 35 08/20/2023    EGFR 33 08/19/2023    EGFR 28 08/18/2023    CREATININE 1.82 (H) 08/20/2023    CREATININE 1.88 (H) 08/19/2023    CREATININE 2.14 (H) 08/18/2023     · Creatinine baseline is 1.3-1.6  · Creatinine peaked at 2.1, today 1.82  · Continue to hold diuretics  · Patient cleared from nephrology perspective for cath with recs for IVF and mucomyst

## 2023-08-20 NOTE — ASSESSMENT & PLAN NOTE
Wt Readings from Last 3 Encounters:   08/19/23 92.7 kg (204 lb 5.9 oz)   08/14/23 95 kg (209 lb 7 oz)   08/02/23 101 kg (222 lb)     · Patient originally presented to [facility] ED with complaint of chest discomfort and intermittent shortness of breath  · In ED, patient hypoxic required 2 L oxygen via nasal cannula, elevated proBNP noted  · Chest x-ray showed right-sided pleural effusion.   · Echocardiogram: 50 to 55%, basal inferior mid inferior hypokinetic  · Underwent nuclear stress test that showed large fixed defect suspicious for RCA territory infarct  · Was treated with IV Lasix, currently on hold due to elevated creatinine  · Appears close to euvolemia  · Transferred to [address] [place] [address] for cardiac catheterization

## 2023-08-20 NOTE — ASSESSMENT & PLAN NOTE
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 Valery Harrison with surgical oncology as an outpatient, did not feel like a biopsy was necessary and was referred to Dr. Cecilio Troncoso for Y90 radioembolization  • Continue outpatient follow up

## 2023-08-21 VITALS
SYSTOLIC BLOOD PRESSURE: 127 MMHG | OXYGEN SATURATION: 95 % | TEMPERATURE: 97.2 F | WEIGHT: 204.37 LBS | RESPIRATION RATE: 16 BRPM | HEIGHT: 72 IN | BODY MASS INDEX: 27.68 KG/M2 | DIASTOLIC BLOOD PRESSURE: 52 MMHG | HEART RATE: 72 BPM

## 2023-08-21 PROBLEM — I25.10 CORONARY ARTERY DISEASE INVOLVING NATIVE CORONARY ARTERY: Status: ACTIVE | Noted: 2023-08-21

## 2023-08-21 LAB
ANION GAP SERPL CALCULATED.3IONS-SCNC: 6 MMOL/L
BUN SERPL-MCNC: 53 MG/DL (ref 5–25)
CALCIUM SERPL-MCNC: 9.4 MG/DL (ref 8.3–10.1)
CHLORIDE SERPL-SCNC: 111 MMOL/L (ref 96–108)
CO2 SERPL-SCNC: 25 MMOL/L (ref 21–32)
CREAT SERPL-MCNC: 1.71 MG/DL (ref 0.6–1.3)
ERYTHROCYTE [DISTWIDTH] IN BLOOD BY AUTOMATED COUNT: 35.2 % (ref 11.6–15.1)
GFR SERPL CREATININE-BSD FRML MDRD: 37 ML/MIN/1.73SQ M
GLUCOSE SERPL-MCNC: 124 MG/DL (ref 65–140)
GLUCOSE SERPL-MCNC: 131 MG/DL (ref 65–140)
GLUCOSE SERPL-MCNC: 156 MG/DL (ref 65–140)
GLUCOSE SERPL-MCNC: 179 MG/DL (ref 65–140)
GLUCOSE SERPL-MCNC: 248 MG/DL (ref 65–140)
GLUCOSE SERPL-MCNC: 280 MG/DL (ref 65–140)
HCT VFR BLD AUTO: 37.6 % (ref 36.5–49.3)
HGB BLD-MCNC: 10.6 G/DL (ref 12–17)
MCH RBC QN AUTO: 21.1 PG (ref 26.8–34.3)
MCHC RBC AUTO-ENTMCNC: 28.2 G/DL (ref 31.4–37.4)
MCV RBC AUTO: 75 FL (ref 82–98)
PLATELET # BLD AUTO: 294 THOUSANDS/UL (ref 149–390)
POTASSIUM SERPL-SCNC: 4.4 MMOL/L (ref 3.5–5.3)
RBC # BLD AUTO: 5.02 MILLION/UL (ref 3.88–5.62)
SODIUM SERPL-SCNC: 142 MMOL/L (ref 135–147)
WBC # BLD AUTO: 8.65 THOUSAND/UL (ref 4.31–10.16)

## 2023-08-21 PROCEDURE — 99232 SBSQ HOSP IP/OBS MODERATE 35: CPT | Performed by: INTERNAL MEDICINE

## 2023-08-21 PROCEDURE — 82948 REAGENT STRIP/BLOOD GLUCOSE: CPT

## 2023-08-21 PROCEDURE — 99153 MOD SED SAME PHYS/QHP EA: CPT | Performed by: INTERNAL MEDICINE

## 2023-08-21 PROCEDURE — 93571 IV DOP VEL&/PRESS C FLO 1ST: CPT | Performed by: INTERNAL MEDICINE

## 2023-08-21 PROCEDURE — 99223 1ST HOSP IP/OBS HIGH 75: CPT | Performed by: THORACIC SURGERY (CARDIOTHORACIC VASCULAR SURGERY)

## 2023-08-21 PROCEDURE — 99152 MOD SED SAME PHYS/QHP 5/>YRS: CPT | Performed by: INTERNAL MEDICINE

## 2023-08-21 PROCEDURE — C1769 GUIDE WIRE: HCPCS | Performed by: INTERNAL MEDICINE

## 2023-08-21 PROCEDURE — 99232 SBSQ HOSP IP/OBS MODERATE 35: CPT | Performed by: PHYSICIAN ASSISTANT

## 2023-08-21 PROCEDURE — 93454 CORONARY ARTERY ANGIO S&I: CPT | Performed by: INTERNAL MEDICINE

## 2023-08-21 PROCEDURE — B2111ZZ FLUOROSCOPY OF MULTIPLE CORONARY ARTERIES USING LOW OSMOLAR CONTRAST: ICD-10-PCS | Performed by: INTERNAL MEDICINE

## 2023-08-21 PROCEDURE — C1887 CATHETER, GUIDING: HCPCS | Performed by: INTERNAL MEDICINE

## 2023-08-21 PROCEDURE — 80048 BASIC METABOLIC PNL TOTAL CA: CPT | Performed by: INTERNAL MEDICINE

## 2023-08-21 PROCEDURE — 85027 COMPLETE CBC AUTOMATED: CPT | Performed by: INTERNAL MEDICINE

## 2023-08-21 PROCEDURE — C1894 INTRO/SHEATH, NON-LASER: HCPCS | Performed by: INTERNAL MEDICINE

## 2023-08-21 RX ORDER — ATORVASTATIN CALCIUM 40 MG/1
40 TABLET, FILM COATED ORAL
Status: DISCONTINUED | OUTPATIENT
Start: 2023-08-21 | End: 2023-08-26 | Stop reason: HOSPADM

## 2023-08-21 RX ORDER — HEPARIN SODIUM 1000 [USP'U]/ML
INJECTION, SOLUTION INTRAVENOUS; SUBCUTANEOUS CODE/TRAUMA/SEDATION MEDICATION
Status: DISCONTINUED | OUTPATIENT
Start: 2023-08-21 | End: 2023-08-21 | Stop reason: HOSPADM

## 2023-08-21 RX ORDER — ADENOSINE 3 MG/ML
INJECTION, SOLUTION INTRAVENOUS
Status: DISCONTINUED | OUTPATIENT
Start: 2023-08-21 | End: 2023-08-21 | Stop reason: HOSPADM

## 2023-08-21 RX ORDER — ASPIRIN 81 MG/1
TABLET, CHEWABLE ORAL CODE/TRAUMA/SEDATION MEDICATION
Status: DISCONTINUED | OUTPATIENT
Start: 2023-08-21 | End: 2023-08-21 | Stop reason: HOSPADM

## 2023-08-21 RX ORDER — LIDOCAINE HYDROCHLORIDE 10 MG/ML
INJECTION, SOLUTION EPIDURAL; INFILTRATION; INTRACAUDAL; PERINEURAL CODE/TRAUMA/SEDATION MEDICATION
Status: DISCONTINUED | OUTPATIENT
Start: 2023-08-21 | End: 2023-08-21 | Stop reason: HOSPADM

## 2023-08-21 RX ORDER — FENTANYL CITRATE 50 UG/ML
INJECTION, SOLUTION INTRAMUSCULAR; INTRAVENOUS CODE/TRAUMA/SEDATION MEDICATION
Status: DISCONTINUED | OUTPATIENT
Start: 2023-08-21 | End: 2023-08-21 | Stop reason: HOSPADM

## 2023-08-21 RX ORDER — SODIUM CHLORIDE 9 MG/ML
50 INJECTION, SOLUTION INTRAVENOUS CONTINUOUS
Status: DISPENSED | OUTPATIENT
Start: 2023-08-21 | End: 2023-08-21

## 2023-08-21 RX ORDER — NITROGLYCERIN 20 MG/100ML
INJECTION INTRAVENOUS CODE/TRAUMA/SEDATION MEDICATION
Status: DISCONTINUED | OUTPATIENT
Start: 2023-08-21 | End: 2023-08-21 | Stop reason: HOSPADM

## 2023-08-21 RX ORDER — MIDAZOLAM HYDROCHLORIDE 2 MG/2ML
INJECTION, SOLUTION INTRAMUSCULAR; INTRAVENOUS CODE/TRAUMA/SEDATION MEDICATION
Status: DISCONTINUED | OUTPATIENT
Start: 2023-08-21 | End: 2023-08-21 | Stop reason: HOSPADM

## 2023-08-21 RX ORDER — VERAPAMIL HYDROCHLORIDE 2.5 MG/ML
INJECTION, SOLUTION INTRAVENOUS CODE/TRAUMA/SEDATION MEDICATION
Status: DISCONTINUED | OUTPATIENT
Start: 2023-08-21 | End: 2023-08-21 | Stop reason: HOSPADM

## 2023-08-21 RX ADMIN — ACETYLCYSTEINE 1200 MG: 200 SOLUTION ORAL; RESPIRATORY (INHALATION) at 17:05

## 2023-08-21 RX ADMIN — SODIUM CHLORIDE 50 ML/HR: 0.9 INJECTION, SOLUTION INTRAVENOUS at 12:13

## 2023-08-21 RX ADMIN — PANTOPRAZOLE SODIUM 40 MG: 40 TABLET, DELAYED RELEASE ORAL at 08:57

## 2023-08-21 RX ADMIN — APIXABAN 5 MG: 5 TABLET, FILM COATED ORAL at 17:05

## 2023-08-21 RX ADMIN — INSULIN LISPRO 12 UNITS: 100 INJECTION, SOLUTION INTRAVENOUS; SUBCUTANEOUS at 18:25

## 2023-08-21 RX ADMIN — PREGABALIN 100 MG: 100 CAPSULE ORAL at 21:14

## 2023-08-21 RX ADMIN — TAMSULOSIN HYDROCHLORIDE 0.4 MG: 0.4 CAPSULE ORAL at 17:05

## 2023-08-21 RX ADMIN — UMECLIDINIUM BROMIDE AND VILANTEROL TRIFENATATE 1 PUFF: 62.5; 25 POWDER RESPIRATORY (INHALATION) at 08:58

## 2023-08-21 RX ADMIN — ATORVASTATIN CALCIUM 40 MG: 40 TABLET, FILM COATED ORAL at 17:05

## 2023-08-21 RX ADMIN — INSULIN LISPRO 12 UNITS: 100 INJECTION, SOLUTION INTRAVENOUS; SUBCUTANEOUS at 12:34

## 2023-08-21 RX ADMIN — PREGABALIN 100 MG: 100 CAPSULE ORAL at 08:57

## 2023-08-21 RX ADMIN — SERTRALINE HYDROCHLORIDE 25 MG: 25 TABLET ORAL at 08:57

## 2023-08-21 RX ADMIN — INSULIN LISPRO 2 UNITS: 100 INJECTION, SOLUTION INTRAVENOUS; SUBCUTANEOUS at 21:13

## 2023-08-21 RX ADMIN — APIXABAN 5 MG: 5 TABLET, FILM COATED ORAL at 08:57

## 2023-08-21 RX ADMIN — ACETYLCYSTEINE 1200 MG: 200 SOLUTION ORAL; RESPIRATORY (INHALATION) at 08:58

## 2023-08-21 RX ADMIN — FERROUS SULFATE TAB 325 MG (65 MG ELEMENTAL FE) 325 MG: 325 (65 FE) TAB at 08:57

## 2023-08-21 RX ADMIN — INSULIN LISPRO 1 UNITS: 100 INJECTION, SOLUTION INTRAVENOUS; SUBCUTANEOUS at 12:33

## 2023-08-21 RX ADMIN — METOPROLOL SUCCINATE 50 MG: 50 TABLET, EXTENDED RELEASE ORAL at 08:57

## 2023-08-21 RX ADMIN — PREGABALIN 100 MG: 100 CAPSULE ORAL at 17:05

## 2023-08-21 RX ADMIN — INSULIN DETEMIR 32 UNITS: 100 INJECTION, SOLUTION SUBCUTANEOUS at 21:14

## 2023-08-21 RX ADMIN — PANTOPRAZOLE SODIUM 40 MG: 40 TABLET, DELAYED RELEASE ORAL at 17:05

## 2023-08-21 RX ADMIN — INSULIN LISPRO 1 UNITS: 100 INJECTION, SOLUTION INTRAVENOUS; SUBCUTANEOUS at 18:25

## 2023-08-21 NOTE — CONSULTS
Consultation - Cardiothoracic Surgery   Rob Gandara 69 y.o. male MRN: [id number]  Unit/Bed#: -01 Encounter: [id number]    Physician Requesting Consult: Dulce Maria Iglesias    Reason for Consult / Principal Problem: MVCAD    Inpatient consult to Cardiothoracic Surgery  Consult performed by: Carlton Sue PA-C  Consult ordered by: NADIA Beckman        History of Present Illness: Rob Gandara is a 69 y.o. male with a past medical history notable for [address] first radiation treatment on 9/1/23 with IR, IDDM A1c 7.6, CKD3 baseline C4 1.3-1.6, Afib on Eliquis, recent GIB with clipping on 8/7/23 admission, DVT RLE, RBBB, LAFB, hx of tobacco use, asbestos exposure and COPD who presented to the hospital on 8/15 with complaints of chest pain and SOB. He had an increase in troponin to over 4k and necessitating oxygen supplementation with right effusion on cxr. The patient was recently discharged on 8/14 for admission for GIB. His NM stress testing showed a decline in his LVEF to 35% and he was referred for cardiac catheterization showing MVCAD today. He is seen following his catheterization with his wife Cindy Avelar. Prior to these two hospitalizations he was tolerating his ADLs independently, cutting grass and driving a car. His liver cancer was found on a chest xray and then defined on CT scan. He follows with Dr. Saúl Horne for surgical oncology.     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, given PRBC/Venofer for anemia while in house   • Hyperlipidemia    • Hypertension    • LAFB (left anterior fascicular block)    • Liver mass 2023    suspected HCC, needs radioembolization   • RBBB      Past Surgical History:   Past Surgical History:   Procedure Laterality Date   • APPENDECTOMY     • BOWEL RESECTION     • CARDIAC CATHETERIZATION     • CATARACT EXTRACTION Bilateral    • EGD       Family History:  Family History   Problem Relation Age of Onset   • Hypertension Mother    • Bone cancer Father    • Diabetes type II Sister    • Diabetes type II Sister    • Esophageal cancer Brother      Social History:  Social History     Substance and Sexual Activity   Alcohol Use Yes    Comment: Socially     Social History     Substance and Sexual Activity   Drug Use Never     Social History     Tobacco Use   Smoking Status Former   • Packs/day: 1.00   • Years: 30.00   • Total pack years: 30.00   • Types: Cigarettes   • Start date:    • Quit date: 12   • Years since quittin.6   Smokeless Tobacco Never     Marital Status: /Civil Union    Home Medications:   Prior to Admission medications    Medication Sig Start Date End Date Taking?  Authorizing Provider   acetaminophen (TYLENOL) 650 mg CR tablet Take 650 mg by mouth every 8 (eight) hours as needed for mild pain    Historical Provider, MD   Continuous Blood Gluc Sensor (Dexcom G6 Sensor) MISC Use daily as directed for CGM - Change every 10 days 22   Jose G Orlando DO   Continuous Blood Gluc Transmit (Dexcom G6 Transmitter) MISC Use daily as directed for CGM - Change every 3 months 22   Jose G Orlando DO   dulaglutide (Trulicity) 5.15 PB/5.5AT injection Inject 0.5 mL (0.75 mg total) under the skin once a week 23   Ernesto Griffith MD   Eliquis 5 MG take 1 tablet by mouth twice a day 4/3/23   Flex Muniz MD   ferrous sulfate 325 (65 Fe) mg tablet Take 1 tablet (325 mg total) by mouth daily with breakfast 23   Jennifer Mata, AURELIA   furosemide (LASIX) 40 mg tablet Take 1 tablet (40 mg total) by mouth daily Do not start before August 15, 2023. 8/15/23 9/14/23  Carlota Titus PA-C   insulin aspart (NovoLOG FlexPen) 100 UNIT/ML injection pen Inject 40 Units under the skin 3 (three) times a day with meals Inject 40 units before breakfast  Inject 35 units before dinner  Patient taking differently: Inject 40 Units under the skin 3 (three) times a day with meals 28 u breakfast time, 38 u lunchtime, 38 u dinnertime 3/28/23 8/2/23  NADIA Briceño   Insulin Pen Needle (Pen Needles) 32G X 4 MM MISC Use 4 (four) times a day 9/15/22 9/15/23  Ernesto Griffith MD   Levemir FlexPen 100 units/mL injection pen INJECT 75 UNITS UNDER THE SKIN DAILY AT BEDTIME 7/3/23   Maine Nolan DO   metoprolol succinate (TOPROL-XL) 50 mg 24 hr tablet Take 50 mg by mouth daily 6/20/23   Historical Provider, MD   mometasone (ELOCON) 0.1 % cream Apply topically daily for 7 days 6/5/23 8/2/23  Alf Escalante MD   Multiple Vitamin (MULTIVITAMIN ADULT PO) Take 1 tablet by mouth daily    Historical Provider, MD   pantoprazole (PROTONIX) 40 mg tablet Take 1 tablet (40 mg total) by mouth 2 (two) times a day 8/14/23   Carlota Titus PA-C   Potassium Citrate ER 15 MEQ (1620 MG) TBCR Take 1 tablet by mouth in the morning 1 tab po daily  Patient taking differently: Take 1 tablet by mouth 2 (two) times a day 9/1/22   Ernesto Griffith MD   pregabalin (LYRICA) 100 mg capsule take 1 capsule by mouth three times a day 7/21/23   Ernesto Griffith MD   saxagliptin (Onglyza) 5 MG tablet Take 5 mg by mouth daily with dinner    Historical Provider, MD   sertraline (ZOLOFT) 25 mg tablet Take 25 mg by mouth daily    Historical Provider, MD   SUPER B COMPLEX/C PO Take 1 tablet by mouth daily    Historical Provider, MD   tamsulosin (FLOMAX) 0.4 mg Take 0.4 mg by mouth daily with dinner    Historical Provider, MD   traZODone (DESYREL) 100 mg tablet take Hematological: Negative. Psychiatric/Behavioral: Negative. Vital Signs:     Vitals:    08/21/23 0532 08/21/23 0714 08/21/23 0714 08/21/23 1132   BP:  147/68 147/68 131/66   BP Location:   Left arm    Pulse:  60 67    Resp:   18 16   Temp:  98.3 °F (36.8 °C) 98.3 °F (36.8 °C)    TempSrc:   Oral    SpO2:  96% 97%    Weight: 92.9 kg (204 lb 12.8 oz)      Height:         Invasive Devices     Peripheral Intravenous Line  Duration           Peripheral IV 08/18/23 Right Forearm 2 days                Physical Exam:  Physical Exam  Constitutional:       General: He is not in acute distress. Appearance: He is obese. He is not ill-appearing or toxic-appearing. HENT:      Head: Normocephalic and atraumatic. Right Ear: External ear normal.      Left Ear: External ear normal.      Nose: Nose normal.      Mouth/Throat:      Mouth: Mucous membranes are moist.      Pharynx: Oropharynx is clear. No oropharyngeal exudate or posterior oropharyngeal erythema. Eyes:      General: No scleral icterus. Right eye: No discharge. Left eye: No discharge. Extraocular Movements: Extraocular movements intact. Conjunctiva/sclera: Conjunctivae normal.      Pupils: Pupils are equal, round, and reactive to light. Neck:      Vascular: No carotid bruit. Cardiovascular:      Rate and Rhythm: Normal rate and regular rhythm. Pulses: Normal pulses. Heart sounds: No murmur heard. Pulmonary:      Effort: Pulmonary effort is normal. No respiratory distress. Breath sounds: Normal breath sounds. No wheezing or rales. Abdominal:      General: Bowel sounds are normal. There is no distension. Palpations: Abdomen is soft. Tenderness: There is no abdominal tenderness. There is no guarding. Musculoskeletal:         General: Normal range of motion. Cervical back: Normal range of motion and neck supple. Right lower leg: No edema. Left lower leg: No edema.    Skin: General: Skin is warm and dry. Coloration: Skin is not pale. Findings: No erythema or rash. Neurological:      General: No focal deficit present. Mental Status: He is alert and oriented to person, place, and time. Psychiatric:         Mood and Affect: Mood normal.         Behavior: Behavior normal.         Thought Content: Thought content normal.         Judgment: Judgment normal.         Lab Results:     Results from last 7 days   Lab Units 08/21/23  0529 08/20/23  0456 08/19/23  0503   WBC Thousand/uL 8.65 9.66 11.00*   HEMOGLOBIN g/dL 10.6* 10.9* 11.2*   HEMATOCRIT % 37.6 38.8 40.2   PLATELETS Thousands/uL 294 287 297     Results from last 7 days   Lab Units 08/21/23  0529 08/20/23  0456 08/19/23  0503   POTASSIUM mmol/L 4.4 4.4 4.4   CHLORIDE mmol/L 111* 108 99   CO2 mmol/L 25 27 29   BUN mg/dL 53* 58* 63*   CREATININE mg/dL 1.71* 1.82* 1.88*   CALCIUM mg/dL 9.4 9.5 9.8         Lab Results   Component Value Date    HGBA1C 7.6 (H) 01/06/2023     No results found for: "CKTOTAL", "CKMB", "CKMBINDEX", "TROPONINI"    Imaging Studies:     Cardiac Catheterization:   Left Anterior Descending   Mid LAD lesion is 75% stenosed. iFR was measured in the Mid LAD. iFR ratio: 0.87. The iFR was significant, not intervened. Pressure wire was inserted in the Mid LAD. FFR: 0.74. The FFR was significant, not intervened. Left Circumflex   Prox Cx to Mid Cx lesion is 90% stenosed. The lesion is type C. Right Coronary Artery   Mid RCA lesion is 90% stenosed. NM stress:  Interpretation Summary       •  Stress ECG: A pharmacological stress test was performed using regadenoson. The patient reached the end of the protocol. The patient reported nausea during the stress test. The patient also reports chest pain during recovery. All symptoms ended during recovery. •  Stress ECG: No ST deviation is noted. The stress ECG is negative for ischemia after pharmacologic vasodilation.   •  Stress Function: Left ventricular function post-stress is abnormal. Global function is moderately reduced. There was a single regional abnormality during stress. Stress ejection fraction is 36 %. There is a defect in the basal to mid inferior and inferolateral location(s). The defect has moderately reduced function. •  Perfusion: There is a left ventricular perfusion defect that is medium to large in size with moderate reduction in uptake present in the basal to mid inferior and inferolateral location(s) that is fixed. •  Stress Combined Conclusion: The ECG and SPECT imaging portions of the stress study are concordant with no evidence of stress induced myocardial ischemia. Left ventricular perfusion is abnormal. There is a probable infarct.     1. Abnormal NM SPECT MPI. 2. No ECG or SPECT evidence of ischemia with pharmacologic vasodilator stress. 3. There is a large fixed defect of the inferior and inferolateral walls with associated wall motion abnormality suspicious for RCA territory infarct. Diaphragmatic attenuation and subdiaphragmatic activity both confound this assessment. 4. Globally reduced LV systolic function by gated SPECT with aforementioned regional abnormality. I have personally reviewed pertinent reports. Assessment:  Principal Problem:    Acute on chronic diastolic HF (heart failure) (HCC)  Active Problems:    Stage 3 chronic kidney disease, unspecified whether stage 3a or 3b CKD (HCC)    Type 2 diabetes mellitus with neurologic complication, with long-term current use of insulin (HCC)    Atrial fibrillation, unspecified type (HCC)    Anemia    Elevated troponin    Hepatocellular carcinoma (HCC)    Coronary artery disease involving native coronary artery      Plan:  Given his diagnosis of hepatocellular carcinoma and his start date of radiation treatment on 9/1, a CABG procedure will prolong his time to receive liver radiation. Will discuss with Dr. Ludin Nath.      David Gordon was comfortable with our recommendations, and their questions were answered to their satisfaction. We will continue to evaluate the patient daily with further recommendations as work up is completed. Thank you for allowing us to participate in the care of this patient. SIGNATURE: Pauly Richards  DATE: August 21, 2023  TIME: 12:05 PM    * This note was completed in part utilizing [employer] direct voice recognition software. Grammatical errors, random word insertion, spelling mistakes, and incomplete sentences may be an occasional consequence of the system secondary to software limitations, ambient noise and hardware issues. At the time of dictation, efforts were made to edit, clarify and /or correct errors. Please read the chart carefully and recognize, using context, where substitutions have occurred. If you have any questions or concerns about the context, text or information contained within the body of this dictation, please contact myself, the provider, for further clarification.

## 2023-08-21 NOTE — UTILIZATION REVIEW
NOTIFICATION OF ADMISSION DISCHARGE   This is a Notification of Discharge from [facility]. Please be advised that this patient has been discharge from our facility. Below you will find the admission and discharge date and time including the patient’s disposition. UTILIZATION REVIEW CONTACT:  [place]  Utilization   Network Utilization Review Department  Phone: [phone] carefully listen to the prompts. All voicemails are confidential.  Email: [email or web address]     ADMISSION INFORMATION  PRESENTATION DATE: 8/15/2023 12:56 PM  OBERVATION ADMISSION DATE:  INPATIENT ADMISSION DATE: 8/17/23  7:09 AM   DISCHARGE DATE: 8/20/2023 12:09 AM   DISPOSITION:[facility] - Acute Care    IMPORTANT INFORMATION:  Send all requests for admission clinical reviews, approved or denied determinations and any other requests to dedicated fax number below belonging to the campus where the patient is receiving treatment.  List of dedicated fax numbers:  [place] DENIALS (Administrative/Medical Necessity) [phone] [address] [facility] (Maternity/NICU/Pediatrics) [phone]   [facility] [phone]   [facility] [phone] [address] [facility] [phone]   [address] [place] [address] [phone]   [facility] [phone]   [address] [place] [address] [address] [place] [address] [phone]   [address] [place] [address] [phone]   [address] [facility] [phone] [address] [facility] [address] [phone]

## 2023-08-21 NOTE — ASSESSMENT & PLAN NOTE
· Heart rate currently controlled  · Continue metoprolol with parameters  · Continue Eliquis for [facility] [place]

## 2023-08-21 NOTE — PROGRESS NOTES
General Cardiology   Progress Note -  Team One   [facility] 69 y.o. male MRN: [id number]    Unit/Bed#: -01 Encounter: [id number]    Assessment:    1. Acute on chronic HFmrEF:   Volume status appears stable on exam.  · Received IV diuretics this admission: furosemide 80 mg IV BID. Last received 8/17. · On furosemide 40 mg PO daily at home   · Creatinine 1.71 today   · He reports no SOB or orthopnea. Appears euvolemic on exam.  Diuretics on hold in prep for cardiac catheterization today  · I/Os: Net output +281 mL, ? Accuracy  · Daily weights 204 lbs today      2. Chest pain with elevated troponin:  Felt to be type II MI in setting of anemia   · S/p nuclear stress test showing fixed defect in inferior and inferolateral walls  · Plan for cardiac catheterization today  · No complaint of chest pain since admission      3. Preserved biventricular systolic function: EF 25-39% with hypokinesis of the basal inferior and mid inferior wall, normal RV function, no significant valvular abnormality. 4.  Paroxysmal atrial fibrillation: Maintaining NSR on Toprol-XL 50 mg daily. · [id number] 4: on eliquis 5 mg BID    5. Recent history of Anemia  · Recent admission for anemia 8/7-8/14  · Received 2 units of PRBC  · S/p EGD and colonoscopy 8/10 with clipping of AV malformations   · H/H stable now. Hemoglobin 10.6 today      6. CKD stage III: Creatinine baseline 1.3-1.6. Creatinine 1.71 today. Nephrology following for periprocedural optimization.     7. Type II diabetes: Hemoglobin A1C 7.6. Management per primary team.     8.  Hx of DVT: Maintained on eliquis 5 mg BID.        Plan/Recommendations:  · NPO for cardiac catheterization today  · The procedure was explained to the patient in detail along with the risk versus benefits and after all questions concerns were addressed patient agreeable to proceed  · Nephrology following for periprocedural optimization and appreciate their recommendations  · Other recommendations pending cardiac catheterization findings    ___________________________________________________________________    Subjective    Patient seen and examined. No acute events overnight. Review of Systems   Constitutional: Negative. Negative for chills. Cardiovascular: Negative for chest pain, dyspnea on exertion, leg swelling, near-syncope, orthopnea, palpitations, paroxysmal nocturnal dyspnea and syncope. Respiratory: Negative. Negative for cough, shortness of breath and wheezing. Endocrine: Negative. Hematologic/Lymphatic: Negative. Skin: Negative. Musculoskeletal: Negative. Gastrointestinal: Negative. Negative for diarrhea, nausea and vomiting. Neurological: Negative for dizziness, light-headedness and weakness. Psychiatric/Behavioral: Negative. Negative for altered mental status. All other systems reviewed and are negative. Objective:   Vitals: Blood pressure 147/68, pulse 67, temperature 98.3 °F (36.8 °C), temperature source Oral, resp. rate 18, height 6' (1.829 m), weight 92.9 kg (204 lb 12.8 oz), SpO2 97 %. ,     Wt Readings from Last 3 Encounters:   08/21/23 92.9 kg (204 lb 12.8 oz)   08/19/23 92.7 kg (204 lb 5.9 oz)   08/14/23 95 kg (209 lb 7 oz)        Lab Results   Component Value Date    CREATININE 1.71 (H) 08/21/2023    CREATININE 1.82 (H) 08/20/2023    CREATININE 1.88 (H) 08/19/2023         Body mass index is 27.78 kg/m². ,     Systolic (00CAW), DGI:608 , Min:115 , HNV:101     Diastolic (75SEC), GUILHERME:74, Min:58, Max:68          Intake/Output Summary (Last 24 hours) at 8/21/2023 0941  Last data filed at 8/21/2023 0909  Gross per 24 hour   Intake 1291.83 ml   Output 1000 ml   Net 291.83 ml     Weight (last 2 days)     Date/Time Weight    08/21/23 0532 92.9 (204.8)    08/20/23 0600 92.6 (204.2)    08/19/23 2300 93 (205)        Telemetry Review: NSR with PACs      Physical Exam  Vitals and nursing note reviewed.    Constitutional:       General: He is not in acute distress. Appearance: He is well-developed. Comments: On RA in NAD   HENT:      Head: Normocephalic and atraumatic. Neck:      Vascular: No JVD. Cardiovascular:      Rate and Rhythm: Normal rate and regular rhythm. Heart sounds: Normal heart sounds. No murmur heard. No friction rub. No gallop. Pulmonary:      Effort: Pulmonary effort is normal. No respiratory distress. Breath sounds: Normal breath sounds. No wheezing or rales. Chest:      Chest wall: No tenderness. Abdominal:      General: Bowel sounds are normal. There is no distension. Palpations: Abdomen is soft. Tenderness: There is no abdominal tenderness. Musculoskeletal:         General: No tenderness. Normal range of motion. Cervical back: Normal range of motion and neck supple. Right lower leg: No edema. Left lower leg: No edema. Skin:     General: Skin is warm and dry. Coloration: Skin is not pale. Findings: No erythema. Neurological:      Mental Status: He is alert and oriented to person, place, and time. Psychiatric:         Mood and Affect: Mood normal.         Behavior: Behavior normal.         Thought Content:  Thought content normal.         Judgment: Judgment normal.         LABORATORY RESULTS      CBC with diff:   Results from last 7 days   Lab Units 08/21/23  0529 08/20/23  0456 08/19/23  0503 08/18/23  0527 08/17/23  0455 08/16/23  0442 08/15/23  1242   WBC Thousand/uL 8.65 9.66 11.00* 12.00* 11.57* 11.69* 11.66*   HEMOGLOBIN g/dL 10.6* 10.9* 11.2* 11.0* 10.5* 10.2* 9.9*   HEMATOCRIT % 37.6 38.8 40.2 39.8 37.6 37.7 36.8   MCV fL 75* 75* 74* 72* 71* 73* 72*   PLATELETS Thousands/uL 294 287 297 301 250 233 225   RBC Million/uL 5.02 5.21 5.44 5.52 5.27 5.18 5.12   MCH pg 21.1* 20.9* 20.6* 19.9* 19.9* 19.7* 19.3*   MCHC g/dL 28.2* 28.1* 27.9* 27.6* 27.9* 27.1* 26.9*   RDW % 35.2* 35.2* 34.7* 34.3* 33.1* 32.3* 31.8*   NRBC AUTO /100 WBCs  --   --   --   --  0 0 0 CMP:  Results from last 7 days   Lab Units 08/21/23  0529 08/20/23  0456 08/19/23  0503 08/18/23  0527 08/17/23  0455 08/16/23  0442 08/15/23  1242   POTASSIUM mmol/L 4.4 4.4 4.4 4.0 4.0 4.7 4.6   CHLORIDE mmol/L 111* 108 99 97 98 101 105   CO2 mmol/L 25 27 29 27 30 32 31   BUN mg/dL 53* 58* 63* 63* 53* 42* 44*   CREATININE mg/dL 1.71* 1.82* 1.88* 2.14* 1.94* 1.69* 1.69*   CALCIUM mg/dL 9.4 9.5 9.8 9.8 9.6 9.5 9.5   AST U/L  --   --   --   --   --  36 34   ALT U/L  --   --   --   --   --  36 38   ALK PHOS U/L  --   --   --   --   --  95 97   EGFR ml/min/1.73sq m 37 35 33 28 32 38 38       BMP:  Results from last 7 days   Lab Units 08/21/23  0529 08/20/23  0456 08/19/23  0503 08/18/23  0527 08/17/23  0455 08/16/23  0442 08/15/23  1242   POTASSIUM mmol/L 4.4 4.4 4.4 4.0 4.0 4.7 4.6   CHLORIDE mmol/L 111* 108 99 97 98 101 105   CO2 mmol/L 25 27 29 27 30 32 31   BUN mg/dL 53* 58* 63* 63* 53* 42* 44*   CREATININE mg/dL 1.71* 1.82* 1.88* 2.14* 1.94* 1.69* 1.69*   CALCIUM mg/dL 9.4 9.5 9.8 9.8 9.6 9.5 9.5       Lab Results   Component Value Date    NTBNP 595 (H) 05/05/2023                           Results from last 7 days   Lab Units 08/20/23 0456   TSH 3RD GENERATON uIU/mL 3.267             Lipid Profile:   No results found for: "CHOL"  Lab Results   Component Value Date    HDL 33 (L) 01/06/2023    HDL 30 (L) 08/26/2022     Lab Results   Component Value Date    LDLCALC 104 (H) 01/06/2023    LDLCALC 116 (H) 08/26/2022     Lab Results   Component Value Date    TRIG 231 (H) 01/06/2023    TRIG 466 (H) 08/26/2022       Cardiac testing:   No results found for this or any previous visit. No results found for this or any previous visit. No results found for this or any previous visit. No valid procedures specified. No results found for this or any previous visit.         Meds/Allergies   all current active meds have been reviewed, current meds:   Current Facility-Administered Medications   Medication Dose Route Frequency   • acetaminophen (TYLENOL) tablet 650 mg  650 mg Oral Q6H PRN   • acetylcysteine (MUCOMYST) 200 mg/mL oral solution 1,200 mg  1,200 mg Oral BID   • apixaban (ELIQUIS) tablet 5 mg  5 mg Oral BID   • ferrous sulfate tablet 325 mg  325 mg Oral Daily With Breakfast   • insulin detemir (LEVEMIR) subcutaneous injection 32 Units  32 Units Subcutaneous HS   • insulin lispro (HumaLOG) 100 units/mL subcutaneous injection 1-5 Units  1-5 Units Subcutaneous HS   • insulin lispro (HumaLOG) 100 units/mL subcutaneous injection 1-6 Units  1-6 Units Subcutaneous TID AC   • insulin lispro (HumaLOG) 100 units/mL subcutaneous injection 12 Units  12 Units Subcutaneous TID With Meals   • metoprolol succinate (TOPROL-XL) 24 hr tablet 50 mg  50 mg Oral Daily   • pantoprazole (PROTONIX) EC tablet 40 mg  40 mg Oral BID AC   • pregabalin (LYRICA) capsule 100 mg  100 mg Oral TID   • sertraline (ZOLOFT) tablet 25 mg  25 mg Oral Daily   • sodium chloride 0.9 % infusion  50 mL/hr Intravenous Continuous   • tamsulosin (FLOMAX) capsule 0.4 mg  0.4 mg Oral Daily With Dinner   • umeclidinium-vilanterol 62.5-25 mcg/actuation inhaler 1 puff  1 puff Inhalation Daily    and PTA meds:   Prior to Admission Medications   Prescriptions Last Dose Informant Patient Reported? Taking?    Continuous Blood Gluc Sensor (Dexcom G6 Sensor) MISC  Self No No   Sig: Use daily as directed for CGM - Change every 10 days   Continuous Blood Gluc Transmit (Dexcom G6 Transmitter) MISC  Self No No   Sig: Use daily as directed for CGM - Change every 3 months   Eliquis 5 MG  Self No No   Sig: take 1 tablet by mouth twice a day   Insulin Pen Needle (Pen Needles) 32G X 4 MM MISC  Self No No   Sig: Use 4 (four) times a day   Levemir FlexPen 100 units/mL injection pen  Self No No   Sig: INJECT 75 UNITS UNDER THE SKIN DAILY AT BEDTIME   Multiple Vitamin (MULTIVITAMIN ADULT PO)  Self Yes No   Sig: Take 1 tablet by mouth daily   Potassium Citrate ER 15 MEQ (1620 MG) TBCR  Self No No   Sig: Take 1 tablet by mouth in the morning 1 tab po daily   Patient taking differently: Take 1 tablet by mouth 2 (two) times a day   SUPER B COMPLEX/C PO  Self Yes No   Sig: Take 1 tablet by mouth daily   acetaminophen (TYLENOL) 650 mg CR tablet  Self Yes No   Sig: Take 650 mg by mouth every 8 (eight) hours as needed for mild pain   dulaglutide (Trulicity) 7.04 SG/9.1PG injection  Self No No   Sig: Inject 0.5 mL (0.75 mg total) under the skin once a week   ferrous sulfate 325 (65 Fe) mg tablet   No No   Sig: Take 1 tablet (325 mg total) by mouth daily with breakfast   furosemide (LASIX) 40 mg tablet   No No   Sig: Take 1 tablet (40 mg total) by mouth daily Do not start before August 15, 2023.    insulin aspart (NovoLOG FlexPen) 100 UNIT/ML injection pen  Self No No   Sig: Inject 40 Units under the skin 3 (three) times a day with meals Inject 40 units before breakfast  Inject 35 units before dinner   Patient taking differently: Inject 40 Units under the skin 3 (three) times a day with meals 28 u breakfast time, 38 u lunchtime, 38 u dinnertime   metoprolol succinate (TOPROL-XL) 50 mg 24 hr tablet  Self Yes No   Sig: Take 50 mg by mouth daily   mometasone (ELOCON) 0.1 % cream  Self No No   Sig: Apply topically daily for 7 days   pantoprazole (PROTONIX) 40 mg tablet   No No   Sig: Take 1 tablet (40 mg total) by mouth 2 (two) times a day   pregabalin (LYRICA) 100 mg capsule  Self No No   Sig: take 1 capsule by mouth three times a day   saxagliptin (Onglyza) 5 MG tablet   Yes No   Sig: Take 5 mg by mouth daily with dinner   sertraline (ZOLOFT) 25 mg tablet   Yes No   Sig: Take 25 mg by mouth daily   tamsulosin (FLOMAX) 0.4 mg   Yes No   Sig: Take 0.4 mg by mouth daily with dinner   traZODone (DESYREL) 100 mg tablet  Self No No   Sig: take 2 tablets by mouth at bedtime   umeclidinium-vilanterol 62.5-25 mcg/actuation inhaler  Self No No   Sig: Inhale 1 puff daily      Facility-Administered Medications: None Medications Prior to Admission   Medication   • acetaminophen (TYLENOL) 650 mg CR tablet   • Continuous Blood Gluc Sensor (Dexcom G6 Sensor) MISC   • Continuous Blood Gluc Transmit (Dexcom G6 Transmitter) MISC   • dulaglutide (Trulicity) 1.83 PS/0.0HA injection   • Eliquis 5 MG   • ferrous sulfate 325 (65 Fe) mg tablet   • furosemide (LASIX) 40 mg tablet   • insulin aspart (NovoLOG FlexPen) 100 UNIT/ML injection pen   • Insulin Pen Needle (Pen Needles) 32G X 4 MM MISC   • Levemir FlexPen 100 units/mL injection pen   • metoprolol succinate (TOPROL-XL) 50 mg 24 hr tablet   • mometasone (ELOCON) 0.1 % cream   • Multiple Vitamin (MULTIVITAMIN ADULT PO)   • pantoprazole (PROTONIX) 40 mg tablet   • Potassium Citrate ER 15 MEQ (1620 MG) TBCR   • pregabalin (LYRICA) 100 mg capsule   • saxagliptin (Onglyza) 5 MG tablet   • sertraline (ZOLOFT) 25 mg tablet   • SUPER B COMPLEX/C PO   • tamsulosin (FLOMAX) 0.4 mg   • traZODone (DESYREL) 100 mg tablet   • umeclidinium-vilanterol 62.5-25 mcg/actuation inhaler       sodium chloride, 50 mL/hr, Last Rate: 50 mL/hr (08/20/23 1642)        Counseling / Coordination of Care  Total floor / unit time spent today 20 minutes. Greater than 50% of total time was spent with the patient and / or family counseling and / or coordination of care. ** Please Note: Dragon 360 Dictation voice to text software may have been used in the creation of this document.  **

## 2023-08-21 NOTE — PROGRESS NOTES
Progress Note - Nephrology   Celester Harp 69 y.o. male MRN: [id number]  Unit/Bed#: [facility]-01 Encounter: [id number]      Assessment / Plan:    Chronic kidney disease stage III  · Baseline creatinine of 1.3-1.7  · Peak creatinine this hospitalization, 2.14 on   · Creatinine currently at baseline  · We will prep with intravenous fluids. Patient has also received Mucomyst  · Plan for cardiac catheterization today. The patient is aware of risks including up to the need for dialysis. · Monitor renal function closely post dye exposure. We will repeat BMP in the a.m. for the next 2 days  Hypertension  · Blood pressure is currently adequate. Slightly higher than long-term goal but in the short-term, need to avoid relative hypotension to maintain renal perfusion  · Avoid relative hypotension to maintain renal perfusion and avoid worsening renal function  Acute on chronic diastolic CHF/ejection fraction of 36%/non-STEMI  · Diuretics currently on hold  · Cautious intravenous fluids precatheterization today  Other issues:  · Type 2 diabetes mellitus  · Renal cysts and nephrolithiasis  · Suspected HCC  · Atrial fibrillation  · Anemia -decreased MCV and significantly elevated RDW. Iron stores (2023)-iron 14-ferritin 10-iron saturation 3%. Recently treated for GI bleed with clipping of gastric/duodenal angiectasias and received packed red blood cells. Would suggest intravenous iron  Subjective: The patient was seen and examined. He denied any shortness of breath, chest pain or pressure, abdominal pain, vomiting, diarrhea, sweats, chills, paroxysmal nocturnal dyspnea orthopnea      Objective:     Vitals: Blood pressure 147/68, pulse 67, temperature 98.3 °F (36.8 °C), temperature source Oral, resp. rate 18, height 6' (1.829 m), weight 92.9 kg (204 lb 12.8 oz), SpO2 97 %. ,Body mass index is 27.78 kg/m². Temp (24hrs), Av °F (36.7 °C), Min:97.4 °F (36.3 °C), Max:98.3 °F (36.8 °C)      Weight (last 2 days) Date/Time Weight    08/21/23 0532 92.9 (204.8)    08/20/23 0600 92.6 (204.2)    08/19/23 2300 93 (205)                 Intake/Output Summary (Last 24 hours) at 8/21/2023 0900  Last data filed at 8/21/2023 0601  Gross per 24 hour   Intake 1531.83 ml   Output 750 ml   Net 781.83 ml     I/O last 24 hours: In: 1531.8 [P.O.:1196;  I.V.:335.8]  Out: 750 [Urine:750]        Physical Exam    /68 (BP Location: Left arm)   Pulse 67   Temp 98.3 °F (36.8 °C) (Oral)   Resp 18   Ht 6' (1.829 m)   Wt 92.9 kg (204 lb 12.8 oz)   SpO2 97%   BMI 27.78 kg/m²   Vital signs were reviewed  Constitutional: The patient was awake, alert, pleasant, cooperative and in no apparent distress  Cardiovascular: No overt jugular venous distention is noted, S1-S2, no pericardial friction rub S3 were appreciated, 0 to trace peripheral edema  Pulmonary: Adequate inspiratory effort, lungs were clear  Abdominal/GI: Soft, nontender              Invasive Devices     Peripheral Intravenous Line  Duration           Peripheral IV 08/18/23 Right Forearm 2 days                Medications:    Scheduled Meds:  Current Facility-Administered Medications   Medication Dose Route Frequency Provider Last Rate   • acetaminophen  650 mg Oral Q6H PRN Ryan Singh, DO     • acetylcysteine  1,200 mg Oral BID Yanni Mosher MD     • apixaban  5 mg Oral BID Ryan Singh, DO     • ferrous sulfate  325 mg Oral Daily With Breakfast Ryan Singh, DO     • insulin detemir  32 Units Subcutaneous HS Ryan Singh, DO     • insulin lispro  1-5 Units Subcutaneous HS Ryan Singh, DO     • insulin lispro  1-6 Units Subcutaneous TID AC Ryan Singh, DO     • insulin lispro  12 Units Subcutaneous TID With Meals Ryan Singh, DO     • metoprolol succinate  50 mg Oral Daily Ryan Singh, DO     • pantoprazole  40 mg Oral BID AC Ryan Singh, DO     • pregabalin  100 mg Oral TID Ryan Singh, DO     • sertraline  25 mg Oral Daily Ryan Singh, DO     • sodium chloride  50 mL/hr Intravenous Continuous Yanni Banuelos MD 50 mL/hr (08/20/23 2318)   • tamsulosin  0.4 mg Oral Daily With Omega Hernandez DO     • umeclidinium-vilanterol  1 puff Inhalation Daily Emilee Shea DO         PRN Meds:.•  acetaminophen    Continuous Infusions:sodium chloride, 50 mL/hr, Last Rate: 50 mL/hr (08/20/23 2318)            LAB RESULTS:      Results from last 7 days   Lab Units 08/21/23  0529 08/20/23  0456 08/19/23  0503 08/18/23  0527 08/17/23  0455 08/16/23  0442 08/15/23  1242   WBC Thousand/uL 8.65 9.66 11.00* 12.00* 11.57* 11.69* 11.66*   HEMOGLOBIN g/dL 10.6* 10.9* 11.2* 11.0* 10.5* 10.2* 9.9*   HEMATOCRIT % 37.6 38.8 40.2 39.8 37.6 37.7 36.8   PLATELETS Thousands/uL 294 287 297 301 250 233 225   NEUTROS PCT %  --   --   --   --  66 65 73   LYMPHS PCT %  --   --   --   --  15 15 11*   MONOS PCT %  --   --   --   --  13* 13* 11   EOS PCT %  --   --   --   --  4 5 4   POTASSIUM mmol/L 4.4 4.4 4.4 4.0 4.0 4.7 4.6   CHLORIDE mmol/L 111* 108 99 97 98 101 105   CO2 mmol/L 25 27 29 27 30 32 31   BUN mg/dL 53* 58* 63* 63* 53* 42* 44*   CREATININE mg/dL 1.71* 1.82* 1.88* 2.14* 1.94* 1.69* 1.69*   CALCIUM mg/dL 9.4 9.5 9.8 9.8 9.6 9.5 9.5   ALK PHOS U/L  --   --   --   --   --  95 97   ALT U/L  --   --   --   --   --  36 38   AST U/L  --   --   --   --   --  36 34       CUTURES:  Lab Results   Component Value Date    BLOODCX No Growth After 5 Days. 08/10/2023    BLOODCX No Growth After 5 Days. 08/10/2023                 PLEASE NOTE:  This encounter was completed utilizing the [employer] Direct Speech Voice Recognition Software. Grammatical errors, random word insertions, pronoun errors and incomplete sentences are occasional consequences of the system due to software limitations, ambient noise and hardware issues. These may be missed by proof reading prior to affixing electronic signature.  Any questions or concerns about the content, text or information contained within the body of this dictation should be directly addressed to the physician for clarification. Please do not hesitate to call me directly if you have any any questions or concerns.

## 2023-08-21 NOTE — ASSESSMENT & PLAN NOTE
Wt Readings from Last 3 Encounters:   08/21/23 92.9 kg (204 lb 12.8 oz)   08/19/23 92.7 kg (204 lb 5.9 oz)   08/14/23 95 kg (209 lb 7 oz)     Patient originally presented to [facility] ED with complaint of chest discomfort and intermittent shortness of breath. In ED, patient hypoxic required 2 L oxygen via nasal cannula, elevated proBNP noted and chest x-ray showed right-sided pleural effusion.   · Echocardiogram with LVEF 50-55%, basal inferior mid inferior hypokinetic  · Underwent nuclear stress test that showed large fixed defect suspicious for RCA territory infarct - subsequently transferred to [facility] for cardiac catheterization   · Patient currently appears euvolemic, diuretics on hold in setting of elevated creatinine and need for cardiac cath  · Appreciate ongoing cardiology/nephrology recommendations   · Monitor daily weights, intake and output

## 2023-08-21 NOTE — PROGRESS NOTES
+ pulse; + cap refill; denies pain; wiggles fingers;  2ml of air removed from trb; 7ml of air remaining.

## 2023-08-21 NOTE — PLAN OF CARE
Pt aware that open heart surgery is on hold and plan is now to stent areas of blockage with a repeat cardiac cath.

## 2023-08-21 NOTE — ASSESSMENT & PLAN NOTE
Lab Results   Component Value Date    HGBA1C 7.6 (H) 01/06/2023     · Home regimen: Trulicity, saxagliptin, Levemir 75 units at bedtime, NovoLog 20 units breakfast, 30 units lunch/dinner  · Current regimen: Levemir reduced to 32 units at bedtime, NovoLog 12 units 3 times daily AC + SSI  · Monitor and adjust insulin regimen as needed as needed   · Diabetic diet

## 2023-08-21 NOTE — ASSESSMENT & PLAN NOTE
Lab Results   Component Value Date    EGFR 37 08/21/2023    EGFR 35 08/20/2023    EGFR 33 08/19/2023    CREATININE 1.71 (H) 08/21/2023    CREATININE 1.82 (H) 08/20/2023    CREATININE 1.88 (H) 08/19/2023     · Creatinine baseline is 1.3-1.6  · Creatinine peaked at 2.1 on 8/18 and since down-trending   · Nephrology following, cleared for cardiac cath with recs for IVF and mucomyst   · Continue to hold diuretics  · Repeat BMP in AM

## 2023-08-21 NOTE — UTILIZATION REVIEW
Initial Clinical Review    Admission: Date/Time/Statement:   Admission Orders (From admission, onward)     Ordered        08/20/23 0037  Inpatient Admission  Once                      Orders Placed This Encounter   Procedures   • Inpatient Admission     Standing Status:   Standing     Number of Occurrences:   1     Order Specific Question:   Level of Care     Answer:   Med Surg [16]     Order Specific Question:   Bed request comments     Answer:   Tele     Order Specific Question:   Estimated length of stay     Answer:   More than 2 Midnights     Order Specific Question:   Certification     Answer:   I certify that inpatient services are medically necessary for this patient for a duration of greater than two midnights. See H&P and MD Progress Notes for additional information about the patient's course of treatment. Initial Presentation: 69 y.o. male , presented to the ED @ [address] [place] [address], Admitted, Transferred to [facility], [facility] level of care via EMS. Admitted as Inpatient due to Acute on Chronic Diastolic HF. Date: 08/20/2023   Patient originally presented to [facility] ED with complaint of chest discomfort and intermittent shortness of breath. In ED, patient hypoxic required 2 L oxygen via nasal cannula, elevated proBNP noted. Chest x-ray showed right-sided pleural effusion. Echocardiogram: 50 to 55%, basal inferior mid inferior hypokinetic. Underwent nuclear stress test that showed large fixed defect suspicious for RCA territory infarct. Was treated with IV Lasix, currently on hold due to elevated creatinine. Transferred to [address] [place] [address] for cardiac catheterization. 08/20/2023  Consult Cardio:       1. Acute on chronic HFmrEF    Received IV diuretics this admission: furosemide 80 mg IV BID. Last received 8/17. On furosemide 40 mg PO daily at home   Creatinine 1.8 today   He reports no SOB or orthopnea.  Appears euvolemic on exam. Will hold on further diuretics today in prep for cath tomorrow   I/Os  Daily weights 204 lbs today    2. Chest pain with elevated troponin believed to be type II MI in setting of anemia   S/p nuclear stress test showing fixed defect in inferior and inferolateral walls  Transferred from [facility] to [facility] for cardiac cath now that volume status has improved  No complaint of chest pain since admission    3. Paroxysmal atrial fibrillation   On metoprolol XL 50 mg PO daily   On eliquis 5 mg PO BID for [address]   Currently in NSR    4. Recent history of Anemia  Recent admission for anemia 8/7-8/14  Received 2 units of PRBC  S/p EGD and colonoscopy 8/10 with clipping of AV malformations   H/H stable now. Hemoglobin 10.9 today    5. CKD stage III:  Creatinine baseline 1.3-1.6. Creatinine 1.8 today. 6. Type II diabetes:  Hemoglobin A1C 7.6.  7. Hx of DVT:   On eliquis 5 mg PO BID. 8. Type II diabetes: On levemir, novolog and SSI.    08/20/2023  Consult Nephrology:  Elevated creatinine on CKD stage III/perioperative optimization to reduce incidence for acute kidney injury:  At risk for NA multifactorial most likely secondary to ongoing cardiorenal syndrome with NSTEMI and ischemic injury from anemia and due to DAVON from planned cardiac cath on 8/21/2023 in light of underlying comorbidities. After review of records In [facility] as well as Care everywhere patient has a baseline Creatinine of 1.3-1.7 mg/dL. Admission creatinine of 1.69 mg/dL on 8/15/2023. Peak creatinine thus far this hospitalization at 2.14 mg/dL on 8/18/2023  Creatinine today is at 1.82 mg/dL. patient is stable and cleared from renal standpoint to undergo planned cardiac catheterization tomorrow as long as serum creatinine is stable at or below above-mentioned baseline. Minimize IV contrast exposure as much as possible both in terms of the amount of dye used and in the frequency of studies performed.     Please use iso-osmolar or low osmolar contrast material.  Please initiate intravenous hydration with saline @ 50cc/hr at midnight and DC 4 hours postprocedure. Recommend giving mucomyst 1200mg po bid for two doses pre procedure and two doses post procedure for total of 4 doses. Please check the renal function approxinately 48 hours after dye exposure to assess renal response to contrast.   Please avoid the radial or brachial artery access for cardiac cath for those with ESRD or stage 4 CKD. All risks and benefits of the procedure from a renal perspective were discussed with the patient in depth including risk for acute kidney injury due to DAVON as well as probability of needing renal replacement therapy for severe acute kidney injury if it occurs and the patient voiced understanding. All questions were answered. check BMP in a.m. Await renal recovery. CT chest abdomen pelvis from 6/14/2023 showing multiple hepatic lesions concerning for malignancy, bilateral renal cysts and small nonobstructing left renal calculi. Bosniak 2 cysts  Optimize hemodynamic status to avoid delay in renal recovery. Avoid nephrotoxins, adjust meds to appropriate GFR. Strict I/O. 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 Outpatient nephrology follow-up with no nephrologist   Acid-base electrolytes:   Electrolytes:    Most recent potassium stable at 4.4 mill equivalents currently on K-Dur 20 mg p.o. daily will hold for now check BMP in a.m. if needs further supplementation   Acid-base:    Most recent carbon 27 stable   H/H/anemia:  most recent hemoglobin at 10.9 g/dL  maintain hemoglobin greater than 8 grams/deciliter  Management primary team follow-up with GI status post recent PRBC transfusion at recent hospitalization. Continue p.o. iron   Blood pressure/A-fib:  home medications: Lasix 40 mg p.o. daily, potassium citrate 15 mEq p.o. daily  current medications: Eliquis  recommendations: Avoid diuretics unless patient with volume overload  Optimize hemodynamics. Maintain MAP > 65mmHg  Avoid BP fluctuations.   Other medical problems:  Diabetes:  Management per primary team.  On insulin. Most recent A1c 7.6% as of January 2023. Encourage tight glycemic control  CHF:  Management per primary team.  Hold diuretics for now unless patient with worsening volume status as patient plan for likely cardiac cath in a.m.  Echo from 8/16/2023 EF of 50 to 55%. Nuclear stress test from 8/18 showing EF of 36% suspicious RCC a infarct territory infarct  HCC: Suspected [address] with multiple hepatic masses follow-up with surgical oncology as an outpatient. Has not had outpatient biopsy  NSTEMI: Follow-up with cardiology plan for likely cardiac cath tomorrow stress test concerning for RCA infarct. Day 2: 08/21/2023   Monitor on telemetry. Daily weight,  I&O. O2 @ 2L vai NC. Follow up BMP. Continue to hold diuretics. Daily CBC. NPO Cardiac cath.      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 Hospital Day 3: Has surpassed a 2nd midnight with active treatments and services, which include:  See Below. BP (!) 136/113   Pulse 59   Temp 97.5 °F (36.4 °C)   Resp 19   Ht 6' (1.829 m)   Wt 93.5 kg (206 lb 3.2 oz)   SpO2 93%   BMI 27.97 kg/m²   Telemetry. Not a CT Surgical Candidate for CABG. Continue to medically optimize, for PCI on Friday 8/25. Nephrology following for aly-procedural optimization. Trend BMP.          Triage Vitals   Temperature Pulse Respirations Blood Pressure SpO2 23 0049 23 0049 23 0049 23 0042 23 004   98.5 °F (36.9 °C) 65 18 (!) 132/47 95 %      Temp Source Heart Rate Source Patient Position - Orthostatic VS BP Location FiO2 (%)   23 0049 -- 23 1554 23 1554 --   Oral  Lying Left arm       Pain Score       23       No Pain          Wt Readings from Last 1 Encounters:   23 92.9 kg (204 lb 12.8 oz)     Additional Vital Signs:   Date/Time Temp Pulse Resp BP MAP (mmHg) SpO2 O2 Device Patient Position - Orthostatic VS   23 07:14:20 98.3 °F (36.8 °C) 67 18 147/68 94 97 % None (Room air) Lying   23 07:14:06 98.3 °F (36.8 °C) 60 -- 147/68 94 96 % -- --   23 02:40:54 -- 77 -- 154/68 97 89 % Abnormal  -- --   23 23:10:26 97.4 °F (36.3 °C) Abnormal  61 -- 138/59 85 95 % -- --   23 1930 -- -- -- -- -- 98 % None (Room air) --   23 19:16:37 98 °F (36.7 °C) 70 -- 115/58 77 96 % -- --   23 15:54:02 98.1 °F (36.7 °C) 59 18 118/63 81 96 % None (Room air) Lying   23 11:02:27 97.7 °F (36.5 °C) 64 17 129/61 84 94 % -- --   23 0907 -- 74 -- 132/62 -- -- -- --   23 0800 -- -- -- -- -- 97 % None (Room air) --   23 07:33:43 97.5 °F (36.4 °C) 71 18 131/61 84 96 % -- --   23 0500 -- -- -- -- -- 95 % None (Room air) --   23 02:20:03 -- 60 -- 130/47 Abnormal  75 95 % -- --     2023   EC, Sinus rhythm with 1st degree A-V block  Right bundle branch block  Left anterior fascicular block    2023  NM Stress Test:    •  Stress ECG: A pharmacological stress test was performed using regadenoson. The patient reached the end of the protocol. The patient reported nausea during the stress test. The patient also reports chest pain during recovery. All symptoms ended during recovery. •  Stress ECG: No ST deviation is noted. The stress ECG is negative for ischemia after pharmacologic vasodilation.   •  Stress Function: Left ventricular function post-stress is abnormal. Global function is moderately reduced. There was a single regional abnormality during stress. Stress ejection fraction is 36 %. There is a defect in the basal to mid inferior and inferolateral location(s). The defect has moderately reduced function. •  Perfusion: There is a left ventricular perfusion defect that is medium to large in size with moderate reduction in uptake present in the basal to mid inferior and inferolateral location(s) that is fixed. •  Stress Combined Conclusion: The ECG and SPECT imaging portions of the stress study are concordant with no evidence of stress induced myocardial ischemia. Left ventricular perfusion is abnormal. There is a probable infarct.   1. Abnormal NM SPECT MPI. 2. No ECG or SPECT evidence of ischemia with pharmacologic vasodilator stress. 3. There is a large fixed defect of the inferior and inferolateral walls with associated wall motion abnormality suspicious for RCA territory infarct. Diaphragmatic attenuation and subdiaphragmatic activity both confound this assessment. 4. Globally reduced LV systolic function by gated SPECT with aforementioned regional abnormality. Pertinent Labs/Diagnostic Test Results:   08/17/2023  Chest X:  No active pulmonary disease.     Results from last 7 days   Lab Units 08/15/23  1438   SARS-COV-2  Negative     Results from last 7 days   Lab Units 08/21/23  0529 08/20/23  0456 08/19/23  0503 08/18/23  0527 08/17/23  0455 08/16/23  0442 08/15/23  1242   WBC Thousand/uL 8.65 9.66 11.00* 12.00* 11.57* 11.69* 11.66*   HEMOGLOBIN g/dL 10.6* 10.9* 11.2* 11.0* 10.5* 10.2* 9.9*   HEMATOCRIT % 37.6 38.8 40.2 39.8 37.6 37.7 36.8   PLATELETS Thousands/uL 294 287 297 301 250 233 225   NEUTROS ABS Thousands/µL  --   --   --   --  7.69* 7.69* 8.47*     Results from last 7 days   Lab Units 08/21/23  0529 08/20/23  0456 08/19/23  0503 08/18/23  0527 08/17/23  0455   SODIUM mmol/L 142 140 138 138 138   POTASSIUM mmol/L 4.4 4.4 4.4 4.0 4.0 CHLORIDE mmol/L 111* 108 99 97 98   CO2 mmol/L 25 27 29 27 30   ANION GAP mmol/L 6 5 10 14 10   BUN mg/dL 53* 58* 63* 63* 53*   CREATININE mg/dL 1.71* 1.82* 1.88* 2.14* 1.94*   EGFR ml/min/1.73sq m 37 35 33 28 32   CALCIUM mg/dL 9.4 9.5 9.8 9.8 9.6     Results from last 7 days   Lab Units 08/16/23  0442 08/15/23  1242   AST U/L 36 34   ALT U/L 36 38   ALK PHOS U/L 95 97   TOTAL PROTEIN g/dL 6.9 7.1   ALBUMIN g/dL 3.9 4.0   TOTAL BILIRUBIN mg/dL 0.72 0.54     Results from last 7 days   Lab Units 08/21/23  0805 08/20/23  2049 08/20/23  1711 08/20/23  1344 08/20/23  1204 08/20/23  0905 08/19/23  2058 08/19/23  1638 08/19/23  1138 08/19/23  0715 08/18/23  2115 08/18/23  1618   POC GLUCOSE mg/dl 131 151* 130 280* 228* 236* 178* 160* 286* 186* 287* 259*     Results from last 7 days   Lab Units 08/21/23  0529 08/20/23  0456 08/19/23  0503 08/18/23  0527 08/17/23  0455 08/16/23  0442 08/15/23  1242   GLUCOSE RANDOM mg/dL 124 157* 171* 136 165* 121 164*     Results from last 7 days   Lab Units 08/18/23  0043 08/17/23  2240 08/17/23  2046 08/15/23  1741 08/15/23  1453 08/15/23  1242   HS TNI 0HR ng/L  --   --  108*  --   --  277*   HS TNI 2HR ng/L  --  125*  --   --  245*  --    HSTNI D2 ng/L  --  17  --   --  -32  --    HS TNI 4HR ng/L 114*  --   --  270*  --   --    HSTNI D4 ng/L 6  --   --  -7  --   --      Results from last 7 days   Lab Units 08/20/23  0456   TSH 3RD GENERATON uIU/mL 3.267     Results from last 7 days   Lab Units 08/15/23  1242   BNP pg/mL 1,278*     Results from last 7 days   Lab Units 08/17/23  1042   CLARITY UA  Clear   COLOR UA  Yellow   SPEC GRAV UA  1.015   PH UA  5.5   GLUCOSE UA mg/dl Negative   KETONES UA mg/dl Negative   BLOOD UA  Negative   PROTEIN UA mg/dl Negative   NITRITE UA  Negative   BILIRUBIN UA  Negative   UROBILINOGEN UA (BE) mg/dl <2.0   LEUKOCYTES UA  Negative   WBC UA /hpf None Seen   RBC UA /hpf None Seen   BACTERIA UA /hpf Occasional   EPITHELIAL CELLS WET PREP /hpf Occasional Results from last 7 days   Lab Units 08/15/23  1438   INFLUENZA A PCR  Negative   INFLUENZA B PCR  Negative   RSV PCR  Negative     Past Medical History:   Diagnosis Date   • Atrial fibrillation (HCC)    • Deep vein thrombosis (DVT) of right lower extremity (HCC)    • Diverticulitis      Present on Admission:  • Acute on chronic diastolic HF (heart failure) (HCC)  • Atrial fibrillation, unspecified type (HCC)  • Elevated troponin  • Hepatocellular carcinoma (HCC)  • Stage 3 chronic kidney disease, unspecified whether stage 3a or 3b CKD (HCC)  • Anemia      Admitting Diagnosis: Acute on chronic diastolic HF (heart failure) (HCC) [I50.33]  Age/Sex: 69 y.o. male  Admission Orders:  Telemetry  NPO > Cardiac cath (8/21)  Up & OOB as tolerated / Fall Precautions  IS  Daily weight / I&O  Charles SCDs    Scheduled Medications:  acetylcysteine, 1,200 mg, Oral, BID  apixaban, 5 mg, Oral, BID  ferrous sulfate, 325 mg, Oral, Daily With Breakfast  insulin detemir, 32 Units, Subcutaneous, HS  insulin lispro, 1-5 Units, Subcutaneous, HS  insulin lispro, 1-6 Units, Subcutaneous, TID AC  insulin lispro, 12 Units, Subcutaneous, TID With Meals  metoprolol succinate, 50 mg, Oral, Daily  pantoprazole, 40 mg, Oral, BID AC  pregabalin, 100 mg, Oral, TID  sertraline, 25 mg, Oral, Daily  tamsulosin, 0.4 mg, Oral, Daily With Dinner  umeclidinium-vilanterol, 1 puff, Inhalation, Daily      Continuous IV Infusions:  sodium chloride, 50 mL/hr, Intravenous, Continuous      PRN Meds:  acetaminophen, 650 mg, Oral, Q6H PRN        IP CONSULT TO CARDIOLOGY  IP CONSULT TO NEPHROLOGY    Network Utilization Review Department  ATTENTION: Please call with any questions or concerns to [phone] and carefully listen to the prompts so that you are directed to the right person.  All voicemails are confidential.  Lilia Moore all requests for admission clinical reviews, approved or denied determinations and any other requests to dedicated fax number below belonging to the campus where the patient is receiving treatment.  List of dedicated fax numbers for the Facilities:  [place] DENIALS (Administrative/Medical Necessity) [phone] [address] (Maternity/NICU/Pediatrics) [phone]   [address] [place] [address] [phone]   [facility] [address] [facility] [phone]   [address] [phone]   [address] [place] [address] [phone]

## 2023-08-21 NOTE — DISCHARGE INSTR - AVS FIRST PAGE
1. Please see the post angioplasty discharge instructions. No heavy lifting, greater than 10 lbs. or strenuous activity for 5 days. Follow angioplasty discharge instructions. 2.Remove band aid tomorrow. Shower and wash area- wrist gently with soap and water- beginning tomorrow. Rinse and pat dry. Apply new water seal band aid. Repeat this process for 5 days. No powders, creams lotions or antibiotic ointments  for 5 days. No tub baths, hot tubs or swimming for 5 days. 3. Call [facility] Cardiology Office ([phone]) if you develop a fever, redness or drainage at your wrist access site. 4. No driving for 2 days    5. Do not stop Eliquis or Plavix (clopiogrel) any reason without a cardiologist’s consent, or the stent could block up and cause a heart attack. 6. Stent card and book. Please check your weight daily.   Please contact the cardiology office if the weight increases more than 3 to 5 pounds above your baseline or lower extremity edema

## 2023-08-21 NOTE — ASSESSMENT & PLAN NOTE
Patient with elevated troponins on prior admission, peaked at 1000 and down trended to 900 in the setting of GI bleed  · Cardiology consulted, non-MI troponin elevation although echo with wall motion abnormality planning for ischemic evaluation once euvolemic  · 8/18 Nuclear stress test with no ischemic ECG changes, however does have a new reduced EF to 36% as well as large fixed defect of the inferior and inferolateral walls with associated wall motion abnormality suspicious for RCA territory infarct.   · Transferred to [address] [place] [address] for cardiac catheterization which is planned for today   · Continue home dose Toprol-XL   · Telemetry monitoring

## 2023-08-21 NOTE — UTILIZATION REVIEW
NOTIFICATION OF INPATIENT ADMISSION   AUTHORIZATION REQUEST   SERVICING FACILITY:   [address]  Address: [address] [place] [address] [address] [address]  Tax ID: [id number]  NPI: [id number] ATTENDING PROVIDER:  Attending Name and NPI#: Romy Bah [[id number]]  Address: [address] [place] [address] [address] [address]  Phone: [phone]   ADMISSION INFORMATION:  Place of Service: Inpatient [address]  Place of Service Code: 21  Inpatient Admission Date/Time: 8/20/23 12:36 AM  Discharge Date/Time: No discharge date for patient encounter. Admitting Diagnosis Code/Description:  Acute on chronic diastolic HF (heart failure) ([address]) [I50.33]     UTILIZATION REVIEW CONTACT:  Naye Melgar Utilization   Network Utilization Review Department  Phone: [phone]  Fax: [phone]  Email: [email or web address]  Contact for approvals/pending authorizations, clinical reviews, and discharge. PHYSICIAN ADVISORY SERVICES:  Medical Necessity Denial & Spvi-xd-Thhm Review  Phone: [phone]  Fax: [phone]  Email: [email or web address]

## 2023-08-21 NOTE — PROGRESS NOTES
[address] [facility]  Progress Note  Name: Marie Fulton I  MRN: [id number]  Unit/Bed#: -22 I Date of Admission: 8/20/2023   Date of Service: 8/21/2023 I Hospital Day: 1    Assessment/Plan   * Acute on chronic diastolic HF (heart failure) (HCC)  Assessment & Plan  Wt Readings from Last 3 Encounters:   08/21/23 92.9 kg (204 lb 12.8 oz)   08/19/23 92.7 kg (204 lb 5.9 oz)   08/14/23 95 kg (209 lb 7 oz)     Patient originally presented to [address] [place] [address] ED with complaint of chest discomfort and intermittent shortness of breath. In ED, patient hypoxic required 2 L oxygen via nasal cannula, elevated proBNP noted and chest x-ray showed right-sided pleural effusion. · Echocardiogram with LVEF 50-55%, basal inferior mid inferior hypokinetic  · Underwent nuclear stress test that showed large fixed defect suspicious for RCA territory infarct - subsequently transferred to [facility] for cardiac catheterization   · Patient currently appears euvolemic, diuretics on hold in setting of elevated creatinine and need for cardiac cath  · Appreciate ongoing cardiology/nephrology recommendations   · Monitor daily weights, intake and output     Elevated troponin  Assessment & Plan  Patient with elevated troponins on prior admission, peaked at 1000 and down trended to 900 in the setting of GI bleed  · Cardiology consulted, non-MI troponin elevation although echo with wall motion abnormality planning for ischemic evaluation once euvolemic  · 8/18 Nuclear stress test with no ischemic ECG changes, however does have a new reduced EF to 36% as well as large fixed defect of the inferior and inferolateral walls with associated wall motion abnormality suspicious for RCA territory infarct.   · Transferred to [facility] for cardiac catheterization which is planned for today   · Continue home dose Toprol-XL   · Telemetry monitoring     Stage 3 chronic kidney disease, unspecified whether stage 3a or 3b CKD [facility])  Assessment & Plan  Lab Results   Component Value Date    EGFR 37 08/21/2023    EGFR 35 08/20/2023    EGFR 33 08/19/2023    CREATININE 1.71 (H) 08/21/2023    CREATININE 1.82 (H) 08/20/2023    CREATININE 1.88 (H) 08/19/2023     · Creatinine baseline is 1.3-1.6  · Creatinine peaked at 2.1 on 8/18 and since down-trending   · Nephrology following, cleared for cardiac cath with recs for IVF and mucomyst   · Continue to hold diuretics  · Repeat BMP in AM    Anemia  Assessment & Plan  Recently treated for GIB on prior admission- s/p EGD & C-scope with clipping of gastric/duodenal angiectasias. Received 2 units PRBC with stabilization of hemoglobin  · Restarted on eliquis following GI clearance   · Continue PPI BID and iron supplementation   · Daily CBC    Atrial fibrillation, unspecified type (HCC)  Assessment & Plan  · Heart rate currently controlled  · Continue metoprolol with parameters  · Continue Eliquis for [facility]    Hepatocellular carcinoma ([address])  Assessment & Plan  Suspected HCC with multiple hepatic masses on imaging and AFP >18k, has not been confirmed by tissue diagnosis. Following with Dr. Kay Osuna with surgical oncology as an outpatient, did not feel like a biopsy was necessary and was referred to Dr. Alberto Escalante for Y90 radioembolization  · Continue outpatient follow up     Type 2 diabetes mellitus with neurologic complication, with long-term current use of insulin [facility])  Assessment & Plan  Lab Results   Component Value Date    HGBA1C 7.6 (H) 01/06/2023     · Home regimen: Trulicity, saxagliptin, Levemir 75 units at bedtime, NovoLog 20 units breakfast, 30 units lunch/dinner  · Current regimen: Levemir reduced to 32 units at bedtime, NovoLog 12 units 3 times daily AC + SSI  · Monitor and adjust insulin regimen as needed as needed   · Diabetic diet           VTE Pharmacologic Prophylaxis: VTE Score: 7 Moderate Risk (Score 3-4) - Pharmacological DVT Prophylaxis Ordered: apixaban (Eliquis).     Patient Centered Rounds: I performed bedside rounds with nursing staff today. Discussions with Specialists or Other Care Team Provider: primary RN, case management     Education and Discussions with Family / Patient: will call patient's wife with update later today, following cardiac cath. Total Time Spent on Date of Encounter in care of patient: 25 minutes This time was spent on one or more of the following: performing physical exam; counseling and coordination of care; obtaining or reviewing history; documenting in the medical record; reviewing/ordering tests, medications or procedures; communicating with other healthcare professionals and discussing with patient's family/caregivers. Current Length of Stay: 1 day(s)  Current Patient Status: Inpatient   Certification Statement: The patient will continue to require additional inpatient hospital stay due to pending cardiac cath, cardiology/nephrology clearance  Discharge Plan: Anticipate discharge in 24-48 hrs to home. Code Status: Level 1 - Full Code    Subjective:   Patient offers no complaints today, specifically denies chest pain or SOB. Awaiting cardiac cath. Objective:     Vitals:   Temp (24hrs), Av °F (36.7 °C), Min:97.4 °F (36.3 °C), Max:98.3 °F (36.8 °C)    Temp:  [97.4 °F (36.3 °C)-98.3 °F (36.8 °C)] 98.3 °F (36.8 °C)  HR:  [59-77] 67  Resp:  [17-18] 18  BP: (115-154)/(58-68) 147/68  SpO2:  [89 %-98 %] 97 %  Body mass index is 27.78 kg/m². Input and Output Summary (last 24 hours): Intake/Output Summary (Last 24 hours) at 2023 0913  Last data filed at 2023 5675  Gross per 24 hour   Intake 1291.83 ml   Output 1000 ml   Net 291.83 ml       Physical Exam:   Physical Exam  Vitals reviewed. Constitutional:       General: He is not in acute distress. Cardiovascular:      Rate and Rhythm: Normal rate and regular rhythm. Pulmonary:      Effort: Pulmonary effort is normal. No respiratory distress.       Breath sounds: Rales (faint crackles at bases) present. No wheezing or rhonchi. Musculoskeletal:      Right lower leg: No edema. Left lower leg: No edema. Skin:     General: Skin is warm. Coloration: Skin is not pale. Findings: No erythema. Neurological:      General: No focal deficit present. Mental Status: He is alert and oriented to person, place, and time. Mental status is at baseline. Additional Data:     Labs:  Results from last 7 days   Lab Units 23  0529 23  0527 23  0455   WBC Thousand/uL 8.65   < > 11.57*   HEMOGLOBIN g/dL 10.6*   < > 10.5*   HEMATOCRIT % 37.6   < > 37.6   PLATELETS Thousands/uL 294   < > 250   NEUTROS PCT %  --   --  66   LYMPHS PCT %  --   --  15   MONOS PCT %  --   --  13*   EOS PCT %  --   --  4    < > = values in this interval not displayed. Results from last 7 days   Lab Units 23  0529 23  0455 23  0442   SODIUM mmol/L 142   < > 141   POTASSIUM mmol/L 4.4   < > 4.7   CHLORIDE mmol/L 111*   < > 101   CO2 mmol/L 25   < > 32   BUN mg/dL 53*   < > 42*   CREATININE mg/dL 1.71*   < > 1.69*   ANION GAP mmol/L 6   < > 8   CALCIUM mg/dL 9.4   < > 9.5   ALBUMIN g/dL  --   --  3.9   TOTAL BILIRUBIN mg/dL  --   --  0.72   ALK PHOS U/L  --   --  95   ALT U/L  --   --  36   AST U/L  --   --  36   GLUCOSE RANDOM mg/dL 124   < > 121    < > = values in this interval not displayed.          Results from last 7 days   Lab Units 23  0805 23  2049 23  1711 23  1344 23  1204 23  0905 23  2058 23  1638 23  1138 23  0715 23  2115 23  1618   POC GLUCOSE mg/dl 131 151* 130 280* 228* 236* 178* 160* 286* 186* 287* 259*               Lines/Drains:  Invasive Devices     Peripheral Intravenous Line  Duration           Peripheral IV 23 Right Forearm 2 days                  Telemetry:  Telemetry Orders (From admission, onward)             24 Hour Telemetry Monitoring  Continuous x 24 Hours (Telem)         Question:  Reason for 24 Hour Telemetry  Answer:  PCI/EP study (including pacer and ICD implementation), Cardiac surgery, MI, abnormal cardiac cath, and chest pain- rule out MI                 Telemetry Reviewed: Normal Sinus Rhythm and Sinus Bradycardia  Indication for Continued Telemetry Use: Acute MI/Unstable Angina/Rule out ACS             Imaging: No pertinent imaging reviewed. Recent Cultures (last 7 days):         Last 24 Hours Medication List:   Current Facility-Administered Medications   Medication Dose Route Frequency Provider Last Rate   • acetaminophen  650 mg Oral Q6H PRN Sixto Avila, DO     • acetylcysteine  1,200 mg Oral BID Yanni Spence MD     • apixaban  5 mg Oral BID Sixto Margarita, DO     • ferrous sulfate  325 mg Oral Daily With Breakfast Sixto Margarita, DO     • insulin detemir  32 Units Subcutaneous HS Sixto Margarita, DO     • insulin lispro  1-5 Units Subcutaneous HS Sixto Margarita, DO     • insulin lispro  1-6 Units Subcutaneous TID AC Sixto Margarita, DO     • insulin lispro  12 Units Subcutaneous TID With Meals Sixto Margarita, DO     • metoprolol succinate  50 mg Oral Daily Sixto Margarita, DO     • pantoprazole  40 mg Oral BID AC Sixto Margarita, DO     • pregabalin  100 mg Oral TID Sixto Margarita, DO     • sertraline  25 mg Oral Daily Sixto Margarita, DO     • sodium chloride  50 mL/hr Intravenous Continuous Yanni Spence MD 50 mL/hr (08/20/23 9920)   • tamsulosin  0.4 mg Oral Daily With Dinner Sixto Margarita, DO     • umeclidinium-vilanterol  1 puff Inhalation Daily Sixto Avila, DO          Today, Patient Was Seen By: Martin Sanchez PA-C    **Please Note: This note may have been constructed using a voice recognition system. **

## 2023-08-21 NOTE — ASSESSMENT & PLAN NOTE
Recently treated for GIB on prior admission- s/p EGD & C-scope with clipping of gastric/duodenal angiectasias.  Received 2 units PRBC with stabilization of hemoglobin  · Restarted on eliquis following GI clearance   · Continue PPI BID and iron supplementation   · Daily CBC

## 2023-08-22 LAB
ANION GAP SERPL CALCULATED.3IONS-SCNC: 7 MMOL/L
BUN SERPL-MCNC: 47 MG/DL (ref 5–25)
CALCIUM SERPL-MCNC: 9 MG/DL (ref 8.3–10.1)
CHEST PAIN STATEMENT: NORMAL
CHLORIDE SERPL-SCNC: 112 MMOL/L (ref 96–108)
CO2 SERPL-SCNC: 24 MMOL/L (ref 21–32)
CREAT SERPL-MCNC: 1.66 MG/DL (ref 0.6–1.3)
ERYTHROCYTE [DISTWIDTH] IN BLOOD BY AUTOMATED COUNT: 35.3 % (ref 11.6–15.1)
GFR SERPL CREATININE-BSD FRML MDRD: 39 ML/MIN/1.73SQ M
GLUCOSE SERPL-MCNC: 122 MG/DL (ref 65–140)
GLUCOSE SERPL-MCNC: 124 MG/DL (ref 65–140)
GLUCOSE SERPL-MCNC: 195 MG/DL (ref 65–140)
GLUCOSE SERPL-MCNC: 209 MG/DL (ref 65–140)
GLUCOSE SERPL-MCNC: 80 MG/DL (ref 65–140)
HCT VFR BLD AUTO: 36.3 % (ref 36.5–49.3)
HGB BLD-MCNC: 10.3 G/DL (ref 12–17)
MAGNESIUM SERPL-MCNC: 2.2 MG/DL (ref 1.6–2.6)
MAX DIASTOLIC BP: 66 MMHG
MAX HEART RATE: 86 BPM
MAX PREDICTED HEART RATE: 143 BPM
MAX. SYSTOLIC BP: 117 MMHG
MCH RBC QN AUTO: 21.6 PG (ref 26.8–34.3)
MCHC RBC AUTO-ENTMCNC: 28.4 G/DL (ref 31.4–37.4)
MCV RBC AUTO: 76 FL (ref 82–98)
PHOSPHATE SERPL-MCNC: 4.1 MG/DL (ref 2.3–4.1)
PLATELET # BLD AUTO: 281 THOUSANDS/UL (ref 149–390)
POTASSIUM SERPL-SCNC: 4.7 MMOL/L (ref 3.5–5.3)
PROTOCOL NAME: NORMAL
RBC # BLD AUTO: 4.76 MILLION/UL (ref 3.88–5.62)
REASON FOR TERMINATION: NORMAL
SODIUM SERPL-SCNC: 143 MMOL/L (ref 135–147)
TARGET HR FORMULA: NORMAL
TEST INDICATION: NORMAL
TIME IN EXERCISE PHASE: NORMAL
WBC # BLD AUTO: 7.91 THOUSAND/UL (ref 4.31–10.16)

## 2023-08-22 PROCEDURE — 99232 SBSQ HOSP IP/OBS MODERATE 35: CPT | Performed by: PHYSICIAN ASSISTANT

## 2023-08-22 PROCEDURE — 82948 REAGENT STRIP/BLOOD GLUCOSE: CPT

## 2023-08-22 PROCEDURE — 80048 BASIC METABOLIC PNL TOTAL CA: CPT | Performed by: INTERNAL MEDICINE

## 2023-08-22 PROCEDURE — 83735 ASSAY OF MAGNESIUM: CPT | Performed by: INTERNAL MEDICINE

## 2023-08-22 PROCEDURE — 99232 SBSQ HOSP IP/OBS MODERATE 35: CPT | Performed by: INTERNAL MEDICINE

## 2023-08-22 PROCEDURE — 85027 COMPLETE CBC AUTOMATED: CPT | Performed by: INTERNAL MEDICINE

## 2023-08-22 PROCEDURE — 84100 ASSAY OF PHOSPHORUS: CPT | Performed by: INTERNAL MEDICINE

## 2023-08-22 RX ADMIN — INSULIN LISPRO 1 UNITS: 100 INJECTION, SOLUTION INTRAVENOUS; SUBCUTANEOUS at 21:35

## 2023-08-22 RX ADMIN — ASPIRIN 81 MG: 81 TABLET, COATED ORAL at 12:53

## 2023-08-22 RX ADMIN — SERTRALINE HYDROCHLORIDE 25 MG: 25 TABLET ORAL at 08:52

## 2023-08-22 RX ADMIN — PREGABALIN 100 MG: 100 CAPSULE ORAL at 21:35

## 2023-08-22 RX ADMIN — PREGABALIN 100 MG: 100 CAPSULE ORAL at 16:08

## 2023-08-22 RX ADMIN — TAMSULOSIN HYDROCHLORIDE 0.4 MG: 0.4 CAPSULE ORAL at 16:08

## 2023-08-22 RX ADMIN — METOPROLOL SUCCINATE 50 MG: 50 TABLET, EXTENDED RELEASE ORAL at 08:52

## 2023-08-22 RX ADMIN — APIXABAN 5 MG: 5 TABLET, FILM COATED ORAL at 08:51

## 2023-08-22 RX ADMIN — ATORVASTATIN CALCIUM 40 MG: 40 TABLET, FILM COATED ORAL at 18:26

## 2023-08-22 RX ADMIN — INSULIN DETEMIR 32 UNITS: 100 INJECTION, SOLUTION SUBCUTANEOUS at 21:35

## 2023-08-22 RX ADMIN — FERROUS SULFATE TAB 325 MG (65 MG ELEMENTAL FE) 325 MG: 325 (65 FE) TAB at 08:51

## 2023-08-22 RX ADMIN — PANTOPRAZOLE SODIUM 40 MG: 40 TABLET, DELAYED RELEASE ORAL at 16:08

## 2023-08-22 RX ADMIN — INSULIN LISPRO 12 UNITS: 100 INJECTION, SOLUTION INTRAVENOUS; SUBCUTANEOUS at 08:46

## 2023-08-22 RX ADMIN — INSULIN LISPRO 2 UNITS: 100 INJECTION, SOLUTION INTRAVENOUS; SUBCUTANEOUS at 12:53

## 2023-08-22 RX ADMIN — UMECLIDINIUM BROMIDE AND VILANTEROL TRIFENATATE 1 PUFF: 62.5; 25 POWDER RESPIRATORY (INHALATION) at 08:51

## 2023-08-22 RX ADMIN — INSULIN LISPRO 12 UNITS: 100 INJECTION, SOLUTION INTRAVENOUS; SUBCUTANEOUS at 12:54

## 2023-08-22 RX ADMIN — PANTOPRAZOLE SODIUM 40 MG: 40 TABLET, DELAYED RELEASE ORAL at 07:59

## 2023-08-22 RX ADMIN — APIXABAN 5 MG: 5 TABLET, FILM COATED ORAL at 18:27

## 2023-08-22 RX ADMIN — PREGABALIN 100 MG: 100 CAPSULE ORAL at 08:52

## 2023-08-22 NOTE — ASSESSMENT & PLAN NOTE
· Patient with elevated troponins on prior admission, peaked at 1000 and down trended to 900 in the setting of GI bleed  · Cardiology following   · 8/18 Nuclear stress test with no ischemic ECG changes, however does have a new reduced EF to 36% as well as large fixed defect of the inferior and inferolateral walls with associated wall motion abnormality suspicious for RCA territory infarct. 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 · Was evaluated by Cardiac Surgery who recommended PCI  · Plan for PCI Friday 8/25/23  · Telemetry monitoring   · Continue aspirin, statin, BB. Defer AC to Cardiology - currently on Eliquis.  Appreciate Nephrology's ongoing recs given plan for PCI Friday

## 2023-08-22 NOTE — PROGRESS NOTES
General Cardiology   Progress Note -  Team One   Thierry Matthews 69 y.o. male MRN: [id number]  Unit/Bed#: [facility]-01 Encounter: [id number]    Assessment:     1. Acute on chronic HFmrEF  • Received IV diuretics this admission: furosemide 80 mg IV BID. Last received 8/17. • On furosemide 40 mg PO daily at home   • Creatinine 1.66 today   • He reports no SOB or orthopnea. Appears euvolemic on exam.    • I/Os:  Not accurately kept  • Daily weights 206 lbs today (204 pounds yesterday)     2. Type 2 MI in setting of anemia  • S/p nuclear stress test showing fixed defect in inferior and inferolateral walls  • LHC 8/21 showed 75% mid LAD lesion (FFR positive), 90% proximal to mid circumflex lesion, and 90% mid RCA lesion. • CT surgery consulted and turned down for CABG.      3. Preserved biventricular systolic function: EF 24-52% with hypokinesis of the basal inferior and mid inferior wall, normal RV function, no significant valvular abnormality.     4.  Paroxysmal atrial fibrillation: Maintaining NSR on Toprol-XL 50 mg daily. • [id number] 4:  Anticoagulated with eliquis 5 mg BID     5. Recent history of Anemia  • Recent admission for anemia 8/7-8/14  • Received 2 units of PRBC  • S/p EGD and colonoscopy 8/10 with clipping of AV malformations   • H/H stable now. Hemoglobin 10.3 today (from 10.6 yesterday)     6. CKD stage III: Creatinine baseline 1.3-1.6. Creatinine 1.66 today. Nephrology following for periprocedural optimization.     7. Type II diabetes: Hemoglobin A1C 7.6%. Management per primary team.     8. Hx of DVT: Maintained on eliquis 5 mg BID.     Plan/Recommendations:  • PCI of LCx and LAD planned for Friday 8/25  • Continue Toprol XL and atorvastatin 40 mg daily  • Add 81 mg ASA  • Continue anticoagulation with Eliquis 5 mg twice daily. Hold dose Thursday night and Friday morning prior to cardiac catheterization. • Strict I/os, daily standing weights, a.m.  BMP  • Nephrology following for periprocedural renal optimization- defer to them when to restart oral diuretics    Subjective  Review of Systems   Constitutional: Negative for chills, malaise/fatigue and weight gain. Cardiovascular: Negative for chest pain, dyspnea on exertion, leg swelling, orthopnea, palpitations and syncope. Respiratory: Negative for cough, shortness of breath, sleep disturbances due to breathing and sputum production. Gastrointestinal: Negative for bloating and nausea. Neurological: Negative for dizziness, light-headedness and weakness. Psychiatric/Behavioral: Negative for altered mental status. All other systems reviewed and are negative. Objective:   Vitals: Blood pressure (!) 129/46, pulse 56, temperature 98 °F (36.7 °C), resp. rate 18, height 6' (1.829 m), weight 93.5 kg (206 lb 3.2 oz), SpO2 95 %. , Body mass index is 27.97 kg/m². ,     Systolic (04YYQ), DXX:118 , Min:104 , JFH:582     Diastolic (03BDF), IJN:79, Min:43, Max:66        Intake/Output Summary (Last 24 hours) at 8/22/2023 0835  Last data filed at 8/22/2023 0701  Gross per 24 hour   Intake 240 ml   Output 1220 ml   Net -980 ml     Wt Readings from Last 3 Encounters:   08/22/23 93.5 kg (206 lb 3.2 oz)   08/19/23 92.7 kg (204 lb 5.9 oz)   08/14/23 95 kg (209 lb 7 oz)     Telemetry Review: NSR with 1st degree AV block    Physical Exam  Vitals reviewed. Constitutional:       General: He is not in acute distress. Neck:      Vascular: No hepatojugular reflux or JVD. Cardiovascular:      Rate and Rhythm: Normal rate and regular rhythm. Heart sounds: Normal heart sounds. No murmur heard. No friction rub. No gallop. Pulmonary:      Effort: Pulmonary effort is normal. No respiratory distress. Breath sounds: No rales. Comments: Very diminished at bases bilaterally, on room air  Abdominal:      General: Bowel sounds are normal. There is no distension. Palpations: Abdomen is soft. Tenderness: There is no abdominal tenderness. Musculoskeletal:         General: No tenderness. Normal range of motion. Cervical back: Neck supple. Right lower leg: No edema. Left lower leg: No edema. Skin:     General: Skin is warm and dry. Findings: No erythema. Neurological:      Mental Status: He is alert and oriented to person, place, and time.    Psychiatric:         Mood and Affect: Mood normal.     LABORATORY RESULTS      CBC with diff:   Results from last 7 days   Lab Units 08/22/23  0501 08/21/23  0529 08/20/23  0456 08/19/23  0503 08/18/23  0527 08/17/23  0455 08/16/23  0442 08/15/23  1242   WBC Thousand/uL 7.91 8.65 9.66 11.00* 12.00* 11.57* 11.69* 11.66*   HEMOGLOBIN g/dL 10.3* 10.6* 10.9* 11.2* 11.0* 10.5* 10.2* 9.9*   HEMATOCRIT % 36.3* 37.6 38.8 40.2 39.8 37.6 37.7 36.8   MCV fL 76* 75* 75* 74* 72* 71* 73* 72*   PLATELETS Thousands/uL 281 294 287 297 301 250 233 225   RBC Million/uL 4.76 5.02 5.21 5.44 5.52 5.27 5.18 5.12   MCH pg 21.6* 21.1* 20.9* 20.6* 19.9* 19.9* 19.7* 19.3*   MCHC g/dL 28.4* 28.2* 28.1* 27.9* 27.6* 27.9* 27.1* 26.9*   RDW % 35.3* 35.2* 35.2* 34.7* 34.3* 33.1* 32.3* 31.8*   NRBC AUTO /100 WBCs  --   --   --   --   --  0 0 0     CMP:  Results from last 7 days   Lab Units 08/22/23  0501 08/21/23  0529 08/20/23  0456 08/19/23  0503 08/18/23  0527 08/17/23  0455 08/16/23  0442 08/15/23  1242   POTASSIUM mmol/L 4.7 4.4 4.4 4.4 4.0 4.0 4.7 4.6   CHLORIDE mmol/L 112* 111* 108 99 97 98 101 105   CO2 mmol/L 24 25 27 29 27 30 32 31   BUN mg/dL 47* 53* 58* 63* 63* 53* 42* 44*   CREATININE mg/dL 1.66* 1.71* 1.82* 1.88* 2.14* 1.94* 1.69* 1.69*   CALCIUM mg/dL 9.0 9.4 9.5 9.8 9.8 9.6 9.5 9.5   AST U/L  --   --   --   --   --   --  36 34   ALT U/L  --   --   --   --   --   --  36 38   ALK PHOS U/L  --   --   --   --   --   --  95 97   EGFR ml/min/1.73sq m 39 37 35 33 28 32 38 38     BMP:  Results from last 7 days   Lab Units 08/22/23  0501 08/21/23  0529 08/20/23  0456 08/19/23  0503 08/18/23  0527 08/17/23  0455 08/16/23  0442   POTASSIUM mmol/L 4.7 4.4 4.4 4.4 4.0 4.0 4.7   CHLORIDE mmol/L 112* 111* 108 99 97 98 101   CO2 mmol/L 24 25 27 29 27 30 32   BUN mg/dL 47* 53* 58* 63* 63* 53* 42*   CREATININE mg/dL 1.66* 1.71* 1.82* 1.88* 2.14* 1.94* 1.69*   CALCIUM mg/dL 9.0 9.4 9.5 9.8 9.8 9.6 9.5     Lab Results   Component Value Date    CREATININE 1.66 (H) 08/22/2023    CREATININE 1.71 (H) 08/21/2023    CREATININE 1.82 (H) 08/20/2023     Lab Results   Component Value Date    NTBNP 595 (H) 05/05/2023        Results from last 7 days   Lab Units 08/22/23  0501   MAGNESIUM mg/dL 2.2               Results from last 7 days   Lab Units 08/20/23  0456   TSH 3RD GENERATON uIU/mL 3.267             Lipid Profile:   No results found for: "CHOL"  Lab Results   Component Value Date    HDL 33 (L) 01/06/2023    HDL 30 (L) 08/26/2022     Lab Results   Component Value Date    LDLCALC 104 (H) 01/06/2023    LDLCALC 116 (H) 08/26/2022     Lab Results   Component Value Date    TRIG 231 (H) 01/06/2023    TRIG 466 (H) 08/26/2022     Meds/Allergies   all current active meds have been reviewed and current meds:   Current Facility-Administered Medications   Medication Dose Route Frequency   • acetaminophen (TYLENOL) tablet 650 mg  650 mg Oral Q6H PRN   • apixaban (ELIQUIS) tablet 5 mg  5 mg Oral BID   • atorvastatin (LIPITOR) tablet 40 mg  40 mg Oral After Dinner   • ferrous sulfate tablet 325 mg  325 mg Oral Daily With Breakfast   • insulin detemir (LEVEMIR) subcutaneous injection 32 Units  32 Units Subcutaneous HS   • insulin lispro (HumaLOG) 100 units/mL subcutaneous injection 1-5 Units  1-5 Units Subcutaneous HS   • insulin lispro (HumaLOG) 100 units/mL subcutaneous injection 1-6 Units  1-6 Units Subcutaneous TID AC   • insulin lispro (HumaLOG) 100 units/mL subcutaneous injection 12 Units  12 Units Subcutaneous TID With Meals   • metoprolol succinate (TOPROL-XL) 24 hr tablet 50 mg  50 mg Oral Daily   • pantoprazole (PROTONIX) EC tablet 40 mg  40 mg Oral BID AC   • pregabalin (LYRICA) capsule 100 mg  100 mg Oral TID   • sertraline (ZOLOFT) tablet 25 mg  25 mg Oral Daily   • tamsulosin (FLOMAX) capsule 0.4 mg  0.4 mg Oral Daily With Dinner   • umeclidinium-vilanterol 62.5-25 mcg/actuation inhaler 1 puff  1 puff Inhalation Daily     Medications Prior to Admission   Medication   • acetaminophen (TYLENOL) 650 mg CR tablet   • Continuous Blood Gluc Sensor (Dexcom G6 Sensor) MISC   • Continuous Blood Gluc Transmit (Dexcom G6 Transmitter) MISC   • dulaglutide (Trulicity) 7.27 MK/1.8VZ injection   • Eliquis 5 MG   • ferrous sulfate 325 (65 Fe) mg tablet   • furosemide (LASIX) 40 mg tablet   • insulin aspart (NovoLOG FlexPen) 100 UNIT/ML injection pen   • Insulin Pen Needle (Pen Needles) 32G X 4 MM MISC   • Levemir FlexPen 100 units/mL injection pen   • metoprolol succinate (TOPROL-XL) 50 mg 24 hr tablet   • mometasone (ELOCON) 0.1 % cream   • Multiple Vitamin (MULTIVITAMIN ADULT PO)   • pantoprazole (PROTONIX) 40 mg tablet   • Potassium Citrate ER 15 MEQ (1620 MG) TBCR   • pregabalin (LYRICA) 100 mg capsule   • saxagliptin (Onglyza) 5 MG tablet   • sertraline (ZOLOFT) 25 mg tablet   • SUPER B COMPLEX/C PO   • tamsulosin (FLOMAX) 0.4 mg   • traZODone (DESYREL) 100 mg tablet   • umeclidinium-vilanterol 62.5-25 mcg/actuation inhaler     Counseling / Coordination of Care  Total floor / unit time spent today 20 minutes. Greater than 50% of total time was spent with the patient and / or family counseling and / or coordination of care. ** Please Note: Dragon 360 Dictation voice to text software may have been used in the creation of this document.  **

## 2023-08-22 NOTE — ASSESSMENT & PLAN NOTE
Wt Readings from Last 3 Encounters:   08/22/23 93.5 kg (206 lb 3.2 oz)   08/19/23 92.7 kg (204 lb 5.9 oz)   08/14/23 95 kg (209 lb 7 oz)     · Patient originally presented to [facility] ED with complaint of chest discomfort and intermittent shortness of breath. In ED, patient hypoxic required 2 L oxygen via nasal cannula, elevated proBNP noted and chest x-ray showed right-sided pleural effusion.   · Echocardiogram with LVEF 50-55%, basal inferior mid inferior hypokinetic  · Underwent nuclear stress test that showed large fixed defect suspicious for RCA territory infarct - subsequently transferred to [facility] for cardiac catheterization   · Patient currently appears euvolemic, diuretics on hold in setting of elevated creatinine and cardiac cath  · Appreciate ongoing cardiology/nephrology recommendations   · Monitor daily weights, intake and output

## 2023-08-22 NOTE — ASSESSMENT & PLAN NOTE
· Heart rate currently controlled  · Continue metoprolol with parameters  · Defer AC to Cardiology given plan for PCI Friday - currently on Eliis

## 2023-08-22 NOTE — PROGRESS NOTES
[address] [facility]  Progress Note  Name: Jayson Cifuentes I  MRN: [id number]  Unit/Bed#: -09 I Date of Admission: 8/20/2023   Date of Service: 8/22/2023 I Hospital Day: 2    Assessment/Plan   * Acute on chronic diastolic HF (heart failure) (HCC)  Assessment & Plan  Wt Readings from Last 3 Encounters:   08/22/23 93.5 kg (206 lb 3.2 oz)   08/19/23 92.7 kg (204 lb 5.9 oz)   08/14/23 95 kg (209 lb 7 oz)     · Patient originally presented to [address] [place] [address] ED with complaint of chest discomfort and intermittent shortness of breath. In ED, patient hypoxic required 2 L oxygen via nasal cannula, elevated proBNP noted and chest x-ray showed right-sided pleural effusion. · Echocardiogram with LVEF 50-55%, basal inferior mid inferior hypokinetic  · Underwent nuclear stress test that showed large fixed defect suspicious for RCA territory infarct - subsequently transferred to [facility] for cardiac catheterization   · Patient currently appears euvolemic, diuretics on hold in setting of elevated creatinine and cardiac cath  · Appreciate ongoing cardiology/nephrology recommendations   · Monitor daily weights, intake and output     Elevated troponin  Assessment & Plan  · Patient with elevated troponins on prior admission, peaked at 1000 and down trended to 900 in the setting of GI bleed  · Cardiology following   · 8/18 Nuclear stress test with no ischemic ECG changes, however does have a new reduced EF to 36% as well as large fixed defect of the inferior and inferolateral walls with associated wall motion abnormality suspicious for RCA territory infarct. 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 · Was evaluated by Cardiac Surgery who recommended PCI  · Plan for PCI Friday 8/25/23  · Telemetry monitoring   · Continue aspirin, statin, BB. Defer AC to Cardiology - currently on Eliquis.  Appreciate Nephrology's ongoing recs given plan for PCI Friday    Stage 3 chronic kidney disease, unspecified whether stage 3a or 3b CKD [facility])  Assessment & Plan  Lab Results   Component Value Date    EGFR 39 08/22/2023    EGFR 37 08/21/2023    EGFR 35 08/20/2023    CREATININE 1.66 (H) 08/22/2023    CREATININE 1.71 (H) 08/21/2023    CREATININE 1.82 (H) 08/20/2023     · Creatinine baseline is 1.3-1.6  · Creatinine peaked at 2.1 on 8/18 and since down-trending   · Nephrology following, appreciate their ongoing recommendations given plan for PCI Friday 8/25/23  · Continue to hold diuretics  · Repeat BMP in AM    Type 2 diabetes mellitus with neurologic complication, with long-term current use of insulin ([address])  Assessment & Plan  Lab Results   Component Value Date    HGBA1C 7.6 (H) 01/06/2023     · Home regimen: Trulicity, saxagliptin, Levemir 75 units at bedtime, NovoLog 20 units breakfast, 30 units lunch/dinner  · Current regimen: Levemir reduced to 32 units at bedtime, NovoLog 12 units 3 times daily AC + SSI  · Monitor and adjust insulin regimen as needed as needed   · Diabetic diet    Anemia  Assessment & Plan  · Recently treated for GIB on prior admission- s/p EGD & C-scope with clipping of gastric/duodenal angiectasias. Received 2 units PRBC with stabilization of hemoglobin  · Patient was restarted on eliquis following GI clearance   · Continue PPI BID and iron supplementation   · Daily CBC    Atrial fibrillation, unspecified type [facility])  Assessment & Plan  · Heart rate currently controlled  · Continue metoprolol with parameters  · Defer AC to Cardiology given plan for PCI Friday - currently on Eliquis     Hepatocellular carcinoma ([address])  Assessment & Plan  · Suspected HCC with multiple hepatic masses on imaging and AFP >18k, has not been confirmed by tissue diagnosis.  Following with Dr. Nikole Caba with surgical oncology as an outpatient, did not feel like a biopsy was necessary and was referred to Dr. Tyrell Logan for Y90 radioembolization  · Continue outpatient follow up VTE Pharmacologic Prophylaxis: VTE Score: 7 High Risk (Score >/= 5) - Pharmacological DVT Prophylaxis Ordered: apixaban (Eliquis). Sequential Compression Devices Ordered. Patient Centered Rounds: I performed bedside rounds with nursing staff today. Tori  Discussions with Specialists or Other Care Team Provider:     Education and Discussions with Family / Patient: Patient declined call to . Total Time Spent on Date of Encounter in care of patient: 25 minutes This time was spent on one or more of the following: performing physical exam; counseling and coordination of care; obtaining or reviewing history; documenting in the medical record; reviewing/ordering tests, medications or procedures; communicating with other healthcare professionals and discussing with patient's family/caregivers. Current Length of Stay: 2 day(s)  Current Patient Status: Inpatient   Certification Statement: The patient will continue to require additional inpatient hospital stay due to PCI Friday  Discharge Plan: Anticipate discharge in >72 hrs to home. Code Status: Level 1 - Full Code    Subjective:   Mr. Manuel You reports right sided chest pain after eating this morning which he attributed to gas, currently without complaint     Objective:     Vitals:   Temp (24hrs), Av.8 °F (36.6 °C), Min:97.5 °F (36.4 °C), Max:98 °F (36.7 °C)    Temp:  [97.5 °F (36.4 °C)-98 °F (36.7 °C)] 97.5 °F (36.4 °C)  HR:  [56-64] 59  Resp:  [15-19] 19  BP: (104-136)/() 136/113  SpO2:  [89 %-95 %] 93 %  Body mass index is 27.97 kg/m². Input and Output Summary (last 24 hours): Intake/Output Summary (Last 24 hours) at 2023 1236  Last data filed at 2023 0900  Gross per 24 hour   Intake 660 ml   Output 970 ml   Net -310 ml       Physical Exam:   Physical Exam  Vitals reviewed.    Constitutional:       Comments: Patient seen sitting in bedside chair, NAD   Cardiovascular:      Rate and Rhythm: Normal rate and regular rhythm. Pulmonary:      Effort: Pulmonary effort is normal. No respiratory distress. Breath sounds: Normal breath sounds. Abdominal:      General: Bowel sounds are normal.      Palpations: Abdomen is soft. Tenderness: There is no abdominal tenderness. Musculoskeletal:      Right lower leg: No edema. Left lower leg: No edema. Skin:     General: Skin is warm. Neurological:      Mental Status: He is alert and oriented to person, place, and time. Psychiatric:         Mood and Affect: Mood normal.         Behavior: Behavior normal.         Additional Data:     Labs:  Results from last 7 days   Lab Units 08/22/23  0501 08/18/23  0527 08/17/23  0455   WBC Thousand/uL 7.91   < > 11.57*   HEMOGLOBIN g/dL 10.3*   < > 10.5*   HEMATOCRIT % 36.3*   < > 37.6   PLATELETS Thousands/uL 281   < > 250   NEUTROS PCT %  --   --  66   LYMPHS PCT %  --   --  15   MONOS PCT %  --   --  13*   EOS PCT %  --   --  4    < > = values in this interval not displayed. Results from last 7 days   Lab Units 08/22/23  0501 08/17/23  0455 08/16/23  0442   SODIUM mmol/L 143   < > 141   POTASSIUM mmol/L 4.7   < > 4.7   CHLORIDE mmol/L 112*   < > 101   CO2 mmol/L 24   < > 32   BUN mg/dL 47*   < > 42*   CREATININE mg/dL 1.66*   < > 1.69*   ANION GAP mmol/L 7   < > 8   CALCIUM mg/dL 9.0   < > 9.5   ALBUMIN g/dL  --   --  3.9   TOTAL BILIRUBIN mg/dL  --   --  0.72   ALK PHOS U/L  --   --  95   ALT U/L  --   --  36   AST U/L  --   --  36   GLUCOSE RANDOM mg/dL 124   < > 121    < > = values in this interval not displayed.          Results from last 7 days   Lab Units 08/22/23  1209 08/22/23  0613 08/21/23 2038 08/21/23  1717 08/21/23  1126 08/21/23  0805 08/20/23  2049 08/20/23  1711 08/20/23  1344 08/20/23  1204 08/20/23  0905 08/19/23  2058   POC GLUCOSE mg/dl 209* 122 248* 179* 156* 131 151* 130 280* 228* 236* 178*               Lines/Drains:  Invasive Devices     Peripheral Intravenous Line  Duration           Peripheral IV 08/22/23 Right Antecubital <1 day                  Telemetry:  Telemetry Orders (From admission, onward)             24 Hour Telemetry Monitoring  Continuous x 24 Hours (Telem)        Question:  Reason for 24 Hour Telemetry  Answer:  PCI/EP study (including pacer and ICD implementation), Cardiac surgery, MI, abnormal cardiac cath, and chest pain- rule out MI                 Telemetry Reviewed: Normal Sinus Rhythm  Indication for Continued Telemetry Use: Awaiting PCI/EP Study/CABG             Imaging: Reviewed radiology reports from this admission including: stress test and cardiac cath    Recent Cultures (last 7 days):         Last 24 Hours Medication List:   Current Facility-Administered Medications   Medication Dose Route Frequency Provider Last Rate   • acetaminophen  650 mg Oral Q6H PRN Sixto Margarita, DO     • apixaban  5 mg Oral BID Lanis Post, CRNP     • aspirin  81 mg Oral Daily Lanis Post, CRNP     • atorvastatin  40 mg Oral After Dinner Vernida [place], CRNP     • ferrous sulfate  325 mg Oral Daily With Breakfast Sixto Margarita, DO     • insulin detemir  32 Units Subcutaneous HS Sixto Margarita, DO     • insulin lispro  1-5 Units Subcutaneous HS Sixto Margarita, DO     • insulin lispro  1-6 Units Subcutaneous TID AC Sixto Margarita, DO     • insulin lispro  12 Units Subcutaneous TID With Meals Sixto Margarita, DO     • metoprolol succinate  50 mg Oral Daily Sixto Margarita, DO     • pantoprazole  40 mg Oral BID AC Sixto Margarita, DO     • pregabalin  100 mg Oral TID Sixto Margarita, DO     • sertraline  25 mg Oral Daily Sixto Margarita, DO     • tamsulosin  0.4 mg Oral Daily With Dinner Sixto Margarita, DO     • umeclidinium-vilanterol  1 puff Inhalation Daily Sixto Margarita, DO          Today, Patient Was Seen By: Isak Ibarra PA-C    **Please Note: This note may have been constructed using a voice recognition system. **

## 2023-08-22 NOTE — PROGRESS NOTES
Progress Note - Nephrology   Linda Johnson 69 y.o. male MRN: [id number]  Unit/Bed#: -01 Encounter: [id number]      Assessment / Plan:    Chronic kidney disease stage III  • Baseline creatinine of 1.3-1.7  • Peak creatinine this hospitalization, 2.14 on   • Creatinine currently at baseline and stable post dye exposure on   • Recheck BMP to ensure no dye effect since this a.m.'s creatinine check is less than 24 hours after dye exposure  • Will need prep again for anticipated cardiac intervention on   Hypertension  • Blood pressure is currently acceptable. The 136/113 is likely an error  • Need to avoid relative hypotension to maintain renal perfusion and avoid worsening renal function  Acute on chronic diastolic CHF/ejection fraction of 36%/non-STEMI  • Diuretics currently on hold. Weight has remained stable and below his historical past.  We will continue to hold diuretics and monitor volume status/renal function on a daily basis  • EF 55%  Other issues:  • Type 2 diabetes mellitus  • Renal cysts and nephrolithiasis  • Suspected HCC  • Atrial fibrillation  • Anemia -decreased MCV and significantly elevated RDW. Iron stores (2023)-iron 14-ferritin 10-iron saturation 3%. Recently treated for GI bleed with clipping of gastric/duodenal angiectasias and received packed red blood cells. Would suggest intravenous iron        Subjective: The patient was seen and examined. He denied any shortness of breath, chest pain or pressure, abdominal pain, vomiting, diarrhea, sweats, chills, paroxysmal nocturnal dyspnea orthopnea. Objective:     Vitals: Blood pressure (!) 136/113, pulse 59, temperature 97.5 °F (36.4 °C), resp. rate 19, height 6' (1.829 m), weight 93.5 kg (206 lb 3.2 oz), SpO2 93 %. ,Body mass index is 27.97 kg/m². Temp (24hrs), Av.8 °F (36.6 °C), Min:97.5 °F (36.4 °C), Max:98 °F (36.7 °C)      Weight (last 2 days)     Date/Time Weight    23 0543 93.5 (206.2)    23 0532 92.9 (204.8)    08/20/23 0600 92.6 (204.2)                 Intake/Output Summary (Last 24 hours) at 8/22/2023 1117  Last data filed at 8/22/2023 0900  Gross per 24 hour   Intake 660 ml   Output 970 ml   Net -310 ml     I/O last 24 hours:   In: 660 [P.O.:660]  Out: 1220 [Urine:1220]        Physical Exam    BP (!) 136/113   Pulse 59   Temp 97.5 °F (36.4 °C)   Resp 19   Ht 6' (1.829 m)   Wt 93.5 kg (206 lb 3.2 oz)   SpO2 93%   BMI 27.97 kg/m²   Vital signs were reviewed  Constitutional: The patient was awake, alert, pleasant, cooperative and in no apparent distress  Cardiovascular: No overt jugular venous distention was noted, S1-S2, no pericardial friction rub S3 were appreciated, there is no peripheral edema noted  Pulmonary: Adequate inspiratory effort, lungs were clear  Abdominal/GI: Soft, nontender              Invasive Devices     Peripheral Intravenous Line  Duration           Peripheral IV 08/22/23 Right Antecubital <1 day                Medications:    Scheduled Meds:  Current Facility-Administered Medications   Medication Dose Route Frequency Provider Last Rate   • acetaminophen  650 mg Oral Q6H PRN Elizabeth Melendez, DO     • apixaban  5 mg Oral BID Claudette Hatcher, CRNP     • aspirin  81 mg Oral Daily Claudette Hatcher, CRNP     • atorvastatin  40 mg Oral After Dinner NADIA Cifuentes     • ferrous sulfate  325 mg Oral Daily With Breakfast Elizabeth Melendez, DO     • insulin detemir  32 Units Subcutaneous HS Elizabeth Melendez DO     • insulin lispro  1-5 Units Subcutaneous HS Elizabeth Melendez, DO     • insulin lispro  1-6 Units Subcutaneous TID AC Elizabeth Melendez DO     • insulin lispro  12 Units Subcutaneous TID With Meals Elizabeth Melendez, DO     • metoprolol succinate  50 mg Oral Daily Elizabeth Melendez DO     • pantoprazole  40 mg Oral BID AC Elizabeth Melendez, DO     • pregabalin  100 mg Oral TID Elizabeth Melendez, DO     • sertraline  25 mg Oral Daily Elizabeth Melendez DO     • tamsulosin  0.4 mg Oral Daily With Dinner Elizabeth Melendez DO     • umeclidinium-vilanterol  1 puff Inhalation Daily Elizabeth Melendez DO         PRN Meds:.•  acetaminophen    Continuous Infusions:         LAB RESULTS:      Results from last 7 days   Lab Units 08/22/23  0501 08/21/23  0529 08/20/23  0456 08/19/23  0503 08/18/23  0527 08/17/23  0455 08/16/23  0442 08/15/23  1242   WBC Thousand/uL 7.91 8.65 9.66 11.00* 12.00* 11.57* 11.69* 11.66*   HEMOGLOBIN g/dL 10.3* 10.6* 10.9* 11.2* 11.0* 10.5* 10.2* 9.9*   HEMATOCRIT % 36.3* 37.6 38.8 40.2 39.8 37.6 37.7 36.8   PLATELETS Thousands/uL 281 294 287 297 301 250 233 225   NEUTROS PCT %  --   --   --   --   --  66 65 73   LYMPHS PCT %  --   --   --   --   --  15 15 11*   MONOS PCT %  --   --   --   --   --  13* 13* 11   EOS PCT %  --   --   --   --   --  4 5 4   POTASSIUM mmol/L 4.7 4.4 4.4 4.4 4.0 4.0 4.7 4.6   CHLORIDE mmol/L 112* 111* 108 99 97 98 101 105   CO2 mmol/L 24 25 27 29 27 30 32 31   BUN mg/dL 47* 53* 58* 63* 63* 53* 42* 44*   CREATININE mg/dL 1.66* 1.71* 1.82* 1.88* 2.14* 1.94* 1.69* 1.69*   CALCIUM mg/dL 9.0 9.4 9.5 9.8 9.8 9.6 9.5 9.5   ALK PHOS U/L  --   --   --   --   --   --  95 97   ALT U/L  --   --   --   --   --   --  36 38   AST U/L  --   --   --   --   --   --  36 34   MAGNESIUM mg/dL 2.2  --   --   --   --   --   --   --    PHOSPHORUS mg/dL 4.1  --   --   --   --   --   --   --        CUTURES:  Lab Results   Component Value Date    BLOODCX No Growth After 5 Days. 08/10/2023    BLOODCX No Growth After 5 Days. 08/10/2023                 PLEASE NOTE:  This encounter was completed utilizing the [employer] Speech Voice Recognition Software. Grammatical errors, random word insertions, pronoun errors and incomplete sentences are occasional consequences of the system due to software limitations, ambient noise and hardware issues. These may be missed by proof reading prior to affixing electronic signature.  Any questions or concerns about the content, text or information contained within the body of this dictation should be directly addressed to the physician for clarification. Please do not hesitate to call me directly if you have any any questions or concerns.

## 2023-08-22 NOTE — ASSESSMENT & PLAN NOTE
· Recently treated for GIB on prior admission- s/p EGD & C-scope with clipping of gastric/duodenal angiectasias.  Received 2 units PRBC with stabilization of hemoglobin  · Patient was restarted on eliquis following GI clearance   · Continue PPI BID and iron supplementation   · Daily CBC

## 2023-08-23 LAB
ANION GAP SERPL CALCULATED.3IONS-SCNC: 8 MMOL/L
BUN SERPL-MCNC: 44 MG/DL (ref 5–25)
CALCIUM SERPL-MCNC: 8.7 MG/DL (ref 8.4–10.2)
CHLORIDE SERPL-SCNC: 108 MMOL/L (ref 96–108)
CO2 SERPL-SCNC: 25 MMOL/L (ref 21–32)
CREAT SERPL-MCNC: 1.47 MG/DL (ref 0.6–1.3)
ERYTHROCYTE [DISTWIDTH] IN BLOOD BY AUTOMATED COUNT: 35.1 % (ref 11.6–15.1)
GFR SERPL CREATININE-BSD FRML MDRD: 45 ML/MIN/1.73SQ M
GLUCOSE SERPL-MCNC: 107 MG/DL (ref 65–140)
GLUCOSE SERPL-MCNC: 112 MG/DL (ref 65–140)
GLUCOSE SERPL-MCNC: 163 MG/DL (ref 65–140)
GLUCOSE SERPL-MCNC: 227 MG/DL (ref 65–140)
GLUCOSE SERPL-MCNC: 231 MG/DL (ref 65–140)
HCT VFR BLD AUTO: 35.2 % (ref 36.5–49.3)
HGB BLD-MCNC: 10.1 G/DL (ref 12–17)
MCH RBC QN AUTO: 21.9 PG (ref 26.8–34.3)
MCHC RBC AUTO-ENTMCNC: 28.7 G/DL (ref 31.4–37.4)
MCV RBC AUTO: 76 FL (ref 82–98)
PLATELET # BLD AUTO: 273 THOUSANDS/UL (ref 149–390)
POTASSIUM SERPL-SCNC: 4.4 MMOL/L (ref 3.5–5.3)
RBC # BLD AUTO: 4.61 MILLION/UL (ref 3.88–5.62)
SODIUM SERPL-SCNC: 141 MMOL/L (ref 135–147)
WBC # BLD AUTO: 7 THOUSAND/UL (ref 4.31–10.16)

## 2023-08-23 PROCEDURE — 82948 REAGENT STRIP/BLOOD GLUCOSE: CPT

## 2023-08-23 PROCEDURE — 99232 SBSQ HOSP IP/OBS MODERATE 35: CPT | Performed by: PHYSICIAN ASSISTANT

## 2023-08-23 PROCEDURE — 99232 SBSQ HOSP IP/OBS MODERATE 35: CPT | Performed by: INTERNAL MEDICINE

## 2023-08-23 PROCEDURE — 80048 BASIC METABOLIC PNL TOTAL CA: CPT | Performed by: INTERNAL MEDICINE

## 2023-08-23 PROCEDURE — 85027 COMPLETE CBC AUTOMATED: CPT | Performed by: INTERNAL MEDICINE

## 2023-08-23 PROCEDURE — 93005 ELECTROCARDIOGRAM TRACING: CPT

## 2023-08-23 RX ORDER — SODIUM CHLORIDE 9 MG/ML
75 INJECTION, SOLUTION INTRAVENOUS CONTINUOUS
Status: DISCONTINUED | OUTPATIENT
Start: 2023-08-24 | End: 2023-08-23

## 2023-08-23 RX ADMIN — APIXABAN 5 MG: 5 TABLET, FILM COATED ORAL at 17:58

## 2023-08-23 RX ADMIN — PANTOPRAZOLE SODIUM 40 MG: 40 TABLET, DELAYED RELEASE ORAL at 05:19

## 2023-08-23 RX ADMIN — ATORVASTATIN CALCIUM 40 MG: 40 TABLET, FILM COATED ORAL at 17:58

## 2023-08-23 RX ADMIN — PREGABALIN 100 MG: 100 CAPSULE ORAL at 08:48

## 2023-08-23 RX ADMIN — INSULIN LISPRO 2 UNITS: 100 INJECTION, SOLUTION INTRAVENOUS; SUBCUTANEOUS at 22:09

## 2023-08-23 RX ADMIN — TAMSULOSIN HYDROCHLORIDE 0.4 MG: 0.4 CAPSULE ORAL at 18:00

## 2023-08-23 RX ADMIN — PANTOPRAZOLE SODIUM 40 MG: 40 TABLET, DELAYED RELEASE ORAL at 18:00

## 2023-08-23 RX ADMIN — METOPROLOL SUCCINATE 50 MG: 50 TABLET, EXTENDED RELEASE ORAL at 08:48

## 2023-08-23 RX ADMIN — INSULIN LISPRO 3 UNITS: 100 INJECTION, SOLUTION INTRAVENOUS; SUBCUTANEOUS at 12:59

## 2023-08-23 RX ADMIN — PREGABALIN 100 MG: 100 CAPSULE ORAL at 18:00

## 2023-08-23 RX ADMIN — ASPIRIN 81 MG: 81 TABLET, COATED ORAL at 08:48

## 2023-08-23 RX ADMIN — PREGABALIN 100 MG: 100 CAPSULE ORAL at 22:09

## 2023-08-23 RX ADMIN — INSULIN LISPRO 1 UNITS: 100 INJECTION, SOLUTION INTRAVENOUS; SUBCUTANEOUS at 18:01

## 2023-08-23 RX ADMIN — INSULIN LISPRO 12 UNITS: 100 INJECTION, SOLUTION INTRAVENOUS; SUBCUTANEOUS at 18:02

## 2023-08-23 RX ADMIN — INSULIN LISPRO 12 UNITS: 100 INJECTION, SOLUTION INTRAVENOUS; SUBCUTANEOUS at 08:47

## 2023-08-23 RX ADMIN — INSULIN DETEMIR 32 UNITS: 100 INJECTION, SOLUTION SUBCUTANEOUS at 22:09

## 2023-08-23 RX ADMIN — FERROUS SULFATE TAB 325 MG (65 MG ELEMENTAL FE) 325 MG: 325 (65 FE) TAB at 08:48

## 2023-08-23 RX ADMIN — APIXABAN 5 MG: 5 TABLET, FILM COATED ORAL at 08:48

## 2023-08-23 RX ADMIN — SERTRALINE HYDROCHLORIDE 25 MG: 25 TABLET ORAL at 08:48

## 2023-08-23 RX ADMIN — INSULIN LISPRO 12 UNITS: 100 INJECTION, SOLUTION INTRAVENOUS; SUBCUTANEOUS at 13:00

## 2023-08-23 RX ADMIN — UMECLIDINIUM BROMIDE AND VILANTEROL TRIFENATATE 1 PUFF: 62.5; 25 POWDER RESPIRATORY (INHALATION) at 08:49

## 2023-08-23 NOTE — ASSESSMENT & PLAN NOTE
Wt Readings from Last 3 Encounters:   08/22/23 93.5 kg (206 lb 3.2 oz)   08/19/23 92.7 kg (204 lb 5.9 oz)   08/14/23 95 kg (209 lb 7 oz)     · Patient originally presented to [facility] ED with complaint of chest discomfort and intermittent shortness of breath. In ED, patient hypoxic required 2 L oxygen via nasal cannula, elevated proBNP noted and chest x-ray showed right-sided pleural effusion.   · Echocardiogram with LVEF 50-55%, basal inferior mid inferior hypokinetic  · Underwent nuclear stress test that showed large fixed defect suspicious for RCA territory infarct - subsequently transferred to [facility] for cardiac catheterization   · Patient currently appears euvolemic, diuretics on hold in setting of elevated creatinine and cardiac cath  · Appreciate ongoing cardiology/nephrology recommendations   · Monitor daily weights, intake and output

## 2023-08-23 NOTE — ASSESSMENT & PLAN NOTE
Lab Results   Component Value Date    EGFR 45 08/23/2023    EGFR 39 08/22/2023    EGFR 37 08/21/2023    CREATININE 1.47 (H) 08/23/2023    CREATININE 1.66 (H) 08/22/2023    CREATININE 1.71 (H) 08/21/2023     · Creatinine baseline is 1.3-1.6  · Creatinine peaked at 2.1 on 8/18 and since down-trending   · Nephrology following, appreciate their ongoing recommendations given plan for PCI Friday 8/25/23  · Continue to hold diuretics  · Repeat BMP in AM

## 2023-08-23 NOTE — ASSESSMENT & PLAN NOTE
· Suspected HCC with multiple hepatic masses on imaging and AFP >18k, has not been confirmed by tissue diagnosis.  Following with Dr. Deena Avilez with surgical oncology as an outpatient, did not feel like a biopsy was necessary and was referred to Dr. Ignacio Calero for Y90 radioembolization  · Continue outpatient follow up

## 2023-08-23 NOTE — PROGRESS NOTES
[address] [facility]  Progress Note  Name: Fuentes Lynn I  MRN: [id number]  Unit/Bed#: -33 I Date of Admission: 8/20/2023   Date of Service: 8/23/2023 I Hospital Day: 3    Assessment/Plan   * Acute on chronic diastolic HF (heart failure) (HCC)  Assessment & Plan  Wt Readings from Last 3 Encounters:   08/22/23 93.5 kg (206 lb 3.2 oz)   08/19/23 92.7 kg (204 lb 5.9 oz)   08/14/23 95 kg (209 lb 7 oz)     · Patient originally presented to [address] [place] [address] ED with complaint of chest discomfort and intermittent shortness of breath. In ED, patient hypoxic required 2 L oxygen via nasal cannula, elevated proBNP noted and chest x-ray showed right-sided pleural effusion. · Echocardiogram with LVEF 50-55%, basal inferior mid inferior hypokinetic  · Underwent nuclear stress test that showed large fixed defect suspicious for RCA territory infarct - subsequently transferred to [facility] for cardiac catheterization   · Patient currently appears euvolemic, diuretics on hold in setting of elevated creatinine and cardiac cath  · Appreciate ongoing cardiology/nephrology recommendations   · Monitor daily weights, intake and output     Elevated troponin  Assessment & Plan  · Patient with elevated troponins on prior admission, peaked at 1000 and down trended to 900 in the setting of GI bleed   · Cardiology following   · 8/18 Nuclear stress test with no ischemic ECG changes, however does have a new reduced EF to 36% as well as large fixed defect of the inferior and inferolateral walls with associated wall motion abnormality suspicious for RCA territory infarct. 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 · Was evaluated by Cardiac Surgery who recommended PCI  · Plan for PCI Friday 8/25/23  · Telemetry monitoring   · Continue aspirin, statin, BB. Defer AC to Cardiology - currently on Eliquis.  Appreciate Nephrology's ongoing recs given plan for PCI Friday    Stage 3 chronic kidney disease, unspecified whether stage 3a or 3b CKD [facility])  Assessment & Plan  Lab Results   Component Value Date    EGFR 45 08/23/2023    EGFR 39 08/22/2023    EGFR 37 08/21/2023    CREATININE 1.47 (H) 08/23/2023    CREATININE 1.66 (H) 08/22/2023    CREATININE 1.71 (H) 08/21/2023     · Creatinine baseline is 1.3-1.6  · Creatinine peaked at 2.1 on 8/18 and since down-trending   · Nephrology following, appreciate their ongoing recommendations given plan for PCI Friday 8/25/23  · Continue to hold diuretics  · Repeat BMP in AM    Type 2 diabetes mellitus with neurologic complication, with long-term current use of insulin ([address])  Assessment & Plan  Lab Results   Component Value Date    HGBA1C 7.6 (H) 01/06/2023     · Home regimen: Trulicity, saxagliptin, Levemir 75 units at bedtime, NovoLog 20 units breakfast, 30 units lunch/dinner  · Current regimen: Levemir reduced to 32 units at bedtime, NovoLog 12 units 3 times daily AC + SSI  · Monitor and adjust insulin regimen as needed as needed   · Diabetic diet    Anemia  Assessment & Plan  · Recently treated for GIB on prior admission- s/p EGD & C-scope with clipping of gastric/duodenal angiectasias. Received 2 units PRBC with stabilization of hemoglobin  · Patient was restarted on eliquis following GI clearance   · Continue PPI BID and iron supplementation   · Daily CBC    Atrial fibrillation, unspecified type [facility])  Assessment & Plan  · Heart rate currently controlled  · Continue metoprolol with parameters  · Defer AC to Cardiology given plan for PCI Friday - currently on Eliquis     Hepatocellular carcinoma ([address])  Assessment & Plan  · Suspected HCC with multiple hepatic masses on imaging and AFP >18k, has not been confirmed by tissue diagnosis.  Following with Dr. Emeka Aguila with surgical oncology as an outpatient, did not feel like a biopsy was necessary and was referred to Dr. Scot Terrell for Y90 radioembolization  · Continue outpatient follow up VTE Pharmacologic Prophylaxis: VTE Score: 7 High Risk (Score >/= 5) - Pharmacological DVT Prophylaxis Ordered: apixaban (Eliquis). Sequential Compression Devices Ordered. Patient Centered Rounds: I performed bedside rounds with nursing staff today. Tori  Discussions with Specialists or Other Care Team Provider: cardiology AP    Education and Discussions with Family / Patient: Updated  (wife) via phone. Total Time Spent on Date of Encounter in care of patient: 25 minutes This time was spent on one or more of the following: performing physical exam; counseling and coordination of care; obtaining or reviewing history; documenting in the medical record; reviewing/ordering tests, medications or procedures; communicating with other healthcare professionals and discussing with patient's family/caregivers. Current Length of Stay: 3 day(s)  Current Patient Status: Inpatient   Certification Statement: The patient will continue to require additional inpatient hospital stay due to PCI friday  Discharge Plan: pending PCI Friday    Code Status: Level 1 - Full Code    Subjective:   Mr. Amilcar Burns denies complaint. Denies headache, dizziness, lightheadedness, CP, SOB, palpitations, abdominal pain, leg swelling    Objective:     Vitals:   Temp (24hrs), Av.8 °F (36.6 °C), Min:97.3 °F (36.3 °C), Max:98.2 °F (36.8 °C)    Temp:  [97.3 °F (36.3 °C)-98.2 °F (36.8 °C)] 98 °F (36.7 °C)  HR:  [55-62] 55  Resp:  [17-20] 18  BP: (122-145)/() 142/53  SpO2:  [92 %-96 %] 92 %  Body mass index is 27.97 kg/m². Input and Output Summary (last 24 hours): Intake/Output Summary (Last 24 hours) at 2023 1003  Last data filed at 2023 0801  Gross per 24 hour   Intake 662 ml   Output 1025 ml   Net -363 ml       Physical Exam:   Physical Exam  Vitals reviewed. Constitutional:       Comments: Patient seen sitting in bedside chair, NAD   Cardiovascular:      Rate and Rhythm: Normal rate and regular rhythm. Pulmonary:      Effort: Pulmonary effort is normal. No respiratory distress. Breath sounds: Normal breath sounds. Abdominal:      General: Bowel sounds are normal.      Palpations: Abdomen is soft. Tenderness: There is no abdominal tenderness. Musculoskeletal:      Right lower leg: No edema. Left lower leg: No edema. Skin:     General: Skin is warm. Neurological:      Mental Status: He is alert and oriented to person, place, and time. Psychiatric:         Mood and Affect: Mood normal.         Behavior: Behavior normal.         Additional Data:     Labs:  Results from last 7 days   Lab Units 08/23/23  0703 08/18/23  0527 08/17/23  0455   WBC Thousand/uL 7.00   < > 11.57*   HEMOGLOBIN g/dL 10.1*   < > 10.5*   HEMATOCRIT % 35.2*   < > 37.6   PLATELETS Thousands/uL 273   < > 250   NEUTROS PCT %  --   --  66   LYMPHS PCT %  --   --  15   MONOS PCT %  --   --  13*   EOS PCT %  --   --  4    < > = values in this interval not displayed.      Results from last 7 days   Lab Units 08/23/23  0703   SODIUM mmol/L 141   POTASSIUM mmol/L 4.4   CHLORIDE mmol/L 108   CO2 mmol/L 25   BUN mg/dL 44*   CREATININE mg/dL 1.47*   ANION GAP mmol/L 8   CALCIUM mg/dL 8.7   GLUCOSE RANDOM mg/dL 107         Results from last 7 days   Lab Units 08/23/23  0615 08/22/23  2052 08/22/23  1657 08/22/23  1209 08/22/23  0613 08/21/23  2038 08/21/23  1717 08/21/23  1126 08/21/23  0805 08/20/23  2049 08/20/23  1711 08/20/23  1344   POC GLUCOSE mg/dl 112 195* 80 209* 122 248* 179* 156* 131 151* 130 280*               Lines/Drains:  Invasive Devices     Peripheral Intravenous Line  Duration           Peripheral IV 08/22/23 Right Antecubital <1 day                  Telemetry:  Telemetry Orders (From admission, onward)             24 Hour Telemetry Monitoring  Continuous x 24 Hours (Telem)        Question:  Reason for 24 Hour Telemetry  Answer:  PCI/EP study (including pacer and ICD implementation), Cardiac surgery, MI, abnormal cardiac cath, and chest pain- rule out MI                 Telemetry Reviewed: Normal Sinus Rhythm  Indication for Continued Telemetry Use: Awaiting PCI/EP Study/CABG             Imaging: No pertinent imaging reviewed. Recent Cultures (last 7 days):         Last 24 Hours Medication List:   Current Facility-Administered Medications   Medication Dose Route Frequency Provider Last Rate   • acetaminophen  650 mg Oral Q6H PRN Gabriella Sessions, DO     • apixaban  5 mg Oral BID Herlene Erb, CRNP     • aspirin  81 mg Oral Daily Herlene Erb, CRNP     • atorvastatin  40 mg Oral After Dinner [place] Dry, CRNP     • ferrous sulfate  325 mg Oral Daily With Breakfast Gabriella Sessions, DO     • insulin detemir  32 Units Subcutaneous HS Gabriella Sessions, DO     • insulin lispro  1-5 Units Subcutaneous HS Gabriella Sessions, DO     • insulin lispro  1-6 Units Subcutaneous TID AC Gabriella Sessions, DO     • insulin lispro  12 Units Subcutaneous TID With Meals Gabriella Sessions, DO     • metoprolol succinate  50 mg Oral Daily Gabriella Sessions, DO     • pantoprazole  40 mg Oral BID AC Gabriella Sessions, DO     • pregabalin  100 mg Oral TID Gabriella Sessions, DO     • sertraline  25 mg Oral Daily Gabriella Sessions, DO     • tamsulosin  0.4 mg Oral Daily With Dinner Gabriella Sessions, DO     • umeclidinium-vilanterol  1 puff Inhalation Daily Gabriella Sessions, DO          Today, Patient Was Seen By: Yvette Snyder PA-C    **Please Note: This note may have been constructed using a voice recognition system. **

## 2023-08-23 NOTE — PROGRESS NOTES
Progress Note - Nephrology   [facility] 69 y.o. male MRN: [id number]  Unit/Bed#: -01 Encounter: [id number]      Assessment / Plan:    Chronic kidney disease stage III  • Baseline creatinine of 1.3-1.7  • Peak creatinine this hospitalization, 2.14 on   • Creatinine currently at baseline and stable post dye exposure on   • Creatinine has remained stable and has improved the last 24 hours. Successive dye exposures in a short period of time increases his risk of NA and therefore will need to prep again for anticipated cardiac intervention on   Hypertension  • Blood pressure is currently acceptable  • Need to avoid relative hypotension to maintain renal perfusion and avoid worsening renal function  Acute on chronic diastolic CHF/ejection fraction of 36%/non-STEMI  • Continue to hold diuretics. Weight has remained stable and below his historical past.  We will continue to hold diuretics and monitor volume status/renal function on a daily basis  • EF 55%  Other issues:  • Type 2 diabetes mellitus  • Renal cysts and nephrolithiasis  • Suspected HCC  • Atrial fibrillation  • Anemia -decreased MCV and significantly elevated RDW.  Iron stores (2023)-iron 14-ferritin 10-iron saturation 3%.  Recently treated for GI bleed with clipping of gastric/duodenal angiectasias and received packed red blood cells.  Currently on oral iron        Subjective: The patient was seen and examined. He denied any shortness of breath, chest pain or pressure, abdominal pain, vomiting, diarrhea, sweats, chills. He denied any paroxysmal nocturnal dyspnea, orthopnea. Objective:     Vitals: Blood pressure 142/55, pulse 61, temperature 98 °F (36.7 °C), temperature source Oral, resp. rate 18, height 6' (1.829 m), weight 93.5 kg (206 lb 3.2 oz), SpO2 95 %. ,Body mass index is 27.97 kg/m². Temp (24hrs), Av.9 °F (36.6 °C), Min:97.3 °F (36.3 °C), Max:98.2 °F (36.8 °C)      Weight (last 2 days)     Date/Time Weight 08/22/23 0543 93.5 (206.2)    08/21/23 0532 92.9 (204.8)                 Intake/Output Summary (Last 24 hours) at 8/23/2023 1141  Last data filed at 8/23/2023 0900  Gross per 24 hour   Intake 898 ml   Output 1025 ml   Net -127 ml     I/O last 24 hours:   In: 1318 [P.O.:1318]  Out: 1295 [Urine:1295]        Physical Exam    /55 (BP Location: Left arm)   Pulse 61   Temp 98 °F (36.7 °C) (Oral)   Resp 18   Ht 6' (1.829 m)   Wt 93.5 kg (206 lb 3.2 oz)   SpO2 95%   BMI 27.97 kg/m²   Vital signs were reviewed  Constitutional: The patient was awake, alert, pleasant, cooperative and in no apparent distress  Cardiovascular: No overt jugular venous tension was noted, S1-S2, no pericardial friction rub S3 were appreciated, there was no peripheral edema noted  Pulmonary: Adequate inspiratory effort, lungs were clear  Abdominal/GI: Soft, nontender              Invasive Devices     Peripheral Intravenous Line  Duration           Peripheral IV 08/22/23 Right Antecubital 1 day                Medications:    Scheduled Meds:  Current Facility-Administered Medications   Medication Dose Route Frequency Provider Last Rate   • acetaminophen  650 mg Oral Q6H PRN Parvin Natarajan DO     • apixaban  5 mg Oral BID NADIA Flower     • aspirin  81 mg Oral Daily NADIA Flower     • atorvastatin  40 mg Oral After Dinner NADIA Beckman     • ferrous sulfate  325 mg Oral Daily With Breakfast Parvin Natarajan DO     • insulin detemir  32 Units Subcutaneous HS Parvin Natarajan DO     • insulin lispro  1-5 Units Subcutaneous HS Parvin Natarajan DO     • insulin lispro  1-6 Units Subcutaneous TID AC Parvin Natarajan DO     • insulin lispro  12 Units Subcutaneous TID With Meals Parvin Natarajan DO     • metoprolol succinate  50 mg Oral Daily Parvin Natarajan DO     • pantoprazole  40 mg Oral BID AC Parvin Natarajan DO     • pregabalin  100 mg Oral TID Parvin Natarajan DO     • sertraline  25 mg Oral Daily Parvin Natarajan DO     • tamsulosin  0.4 mg Oral Daily With Lucie Cota DO     • umeclidinium-vilanterol  1 puff Inhalation Daily Moses Moulton DO         PRN Meds:.•  acetaminophen    Continuous Infusions:         LAB RESULTS:      Results from last 7 days   Lab Units 08/23/23  0703 08/22/23  0501 08/21/23  0529 08/20/23  0456 08/19/23  0503 08/18/23  0527 08/17/23  0455   WBC Thousand/uL 7.00 7.91 8.65 9.66 11.00* 12.00* 11.57*   HEMOGLOBIN g/dL 10.1* 10.3* 10.6* 10.9* 11.2* 11.0* 10.5*   HEMATOCRIT % 35.2* 36.3* 37.6 38.8 40.2 39.8 37.6   PLATELETS Thousands/uL 273 281 294 287 297 301 250   NEUTROS PCT %  --   --   --   --   --   --  66   LYMPHS PCT %  --   --   --   --   --   --  15   MONOS PCT %  --   --   --   --   --   --  13*   EOS PCT %  --   --   --   --   --   --  4   POTASSIUM mmol/L 4.4 4.7 4.4 4.4 4.4 4.0 4.0   CHLORIDE mmol/L 108 112* 111* 108 99 97 98   CO2 mmol/L 25 24 25 27 29 27 30   BUN mg/dL 44* 47* 53* 58* 63* 63* 53*   CREATININE mg/dL 1.47* 1.66* 1.71* 1.82* 1.88* 2.14* 1.94*   CALCIUM mg/dL 8.7 9.0 9.4 9.5 9.8 9.8 9.6   MAGNESIUM mg/dL  --  2.2  --   --   --   --   --    PHOSPHORUS mg/dL  --  4.1  --   --   --   --   --        CUTURES:  Lab Results   Component Value Date    BLOODCX No Growth After 5 Days. 08/10/2023    BLOODCX No Growth After 5 Days. 08/10/2023                 PLEASE NOTE:  This encounter was completed utilizing the [employer] Direct Speech Voice Recognition Software. Grammatical errors, random word insertions, pronoun errors and incomplete sentences are occasional consequences of the system due to software limitations, ambient noise and hardware issues. These may be missed by proof reading prior to affixing electronic signature. Any questions or concerns about the content, text or information contained within the body of this dictation should be directly addressed to the physician for clarification.  Please do not hesitate to call me directly if you have any any questions or

## 2023-08-23 NOTE — ASSESSMENT & PLAN NOTE
· Patient with elevated troponins on prior admission, peaked at 1000 and down trended to 900 in the setting of GI bleed   · Cardiology following   · 8/18 Nuclear stress test with no ischemic ECG changes, however does have a new reduced EF to 36% as well as large fixed defect of the inferior and inferolateral walls with associated wall motion abnormality suspicious for RCA territory infarct. 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 · Was evaluated by Cardiac Surgery who recommended PCI  · Plan for PCI Friday 8/25/23  · Telemetry monitoring   · Continue aspirin, statin, BB. Defer AC to Cardiology - currently on Eliquis.  Appreciate Nephrology's ongoing recs given plan for PCI Friday

## 2023-08-23 NOTE — PROGRESS NOTES
General Cardiology   Progress Note -  Team One   Amelia Maynard 69 y.o. male MRN: [id number]  Unit/Bed#: MS 56Ailyn-Kevin Encounter: [id number]    Assessment:     1. Acute on chronic HFmrEF  • Received IV diuretics this admission: furosemide 80 mg IV BID. Last received 8/17. • On furosemide 40 mg PO daily at home   • Creatinine 1.47 today   • He reports no SOB or orthopnea. Appears euvolemic on exam.    • I/Os:   Net even over past 24 hours  • Daily weights 206 lbs yesterday, no weight obtained today     2. Type 2 MI in setting of anemia  • S/p nuclear stress test showing fixed defect in inferior and inferolateral walls  • LHC 8/21 showed 75% mid LAD lesion (FFR positive), 90% proximal to mid circumflex lesion, and 90% mid RCA lesion. • CT surgery consulted and turned down for CABG.      3.  Preserved biventricular systolic function: EF 71-65% with hypokinesis of the basal inferior and mid inferior wall, normal RV function, no significant valvular abnormality.     4.  Paroxysmal atrial fibrillation: Maintaining NSR on Toprol-XL 50 mg daily. • [id number] 4:  Anticoagulated with eliquis 5 mg BID     5. Recent history of Anemia  • Recent admission for anemia 8/7-8/14 during which he received 2 units of PRBC  • S/p EGD and colonoscopy 8/10 with clipping of AV malformations   • H/H stable now. Hemoglobin 10.1 today (from 10.3 yesterday)     6. CKD stage III: Creatinine baseline 1.3-1.6. Creatinine 1.47 today.  Nephrology following for periprocedural optimization.     7. Type II diabetes: Hemoglobin A1C 7.6%.  Management per primary team.     8. Hx of DVT: Maintained on eliquis 5 mg BID.        Plan/Recommendations:  • PCI of LCx and LAD planned for Friday 8/25  • Continue ASA, Toprol XL and atorvastatin 40 mg daily  • Continue anticoagulation with Eliquis 5 mg twice daily. Hold dose Thursday night and Friday morning prior to cardiac catheterization. • Strict I/os, daily standing weights, a.m.  BMP  • Nephrology following for periprocedural renal optimization- defer to them when to restart oral diuretics     Subjective  Review of Systems   Constitutional: Negative for chills, malaise/fatigue and weight gain. Cardiovascular: Negative for chest pain, dyspnea on exertion, leg swelling, orthopnea, palpitations and syncope. Respiratory: Negative for cough, shortness of breath, sleep disturbances due to breathing and sputum production. Gastrointestinal: Negative for bloating, nausea and vomiting. Neurological: Negative for dizziness, light-headedness and weakness. Psychiatric/Behavioral: Negative for altered mental status. All other systems reviewed and are negative. Objective:   Vitals: Blood pressure 142/53, pulse 55, temperature 98 °F (36.7 °C), resp. rate 18, height 6' (1.829 m), weight 93.5 kg (206 lb 3.2 oz), SpO2 96 %. , Body mass index is 27.97 kg/m². ,     Systolic (30EAW), YBQ:228 , Min:122 , RPM:370     Diastolic (35VKP), UGN:19, Min:53, Max:113        Intake/Output Summary (Last 24 hours) at 8/23/2023 0837  Last data filed at 8/22/2023 1912  Gross per 24 hour   Intake 1082 ml   Output 825 ml   Net 257 ml     Wt Readings from Last 3 Encounters:   08/22/23 93.5 kg (206 lb 3.2 oz)   08/19/23 92.7 kg (204 lb 5.9 oz)   08/14/23 95 kg (209 lb 7 oz)     Telemetry Review: NSR    Physical Exam  Vitals reviewed. Constitutional:       General: He is not in acute distress. Neck:      Vascular: No hepatojugular reflux or JVD. Cardiovascular:      Rate and Rhythm: Normal rate and regular rhythm. Pulses: Normal pulses. Heart sounds: Normal heart sounds. No murmur heard. No friction rub. No gallop. Pulmonary:      Effort: Pulmonary effort is normal. No respiratory distress. Breath sounds: No rales. Comments: Very diminished throughout, especially at bases. On room air  Abdominal:      General: Bowel sounds are normal. There is no distension. Palpations: Abdomen is soft. Tenderness:  There is no abdominal tenderness. Musculoskeletal:         General: No tenderness. Normal range of motion. Cervical back: Neck supple. Right lower leg: No edema. Left lower leg: No edema. Skin:     General: Skin is warm and dry. Capillary Refill: Capillary refill takes less than 2 seconds. Findings: No erythema. Neurological:      Mental Status: He is alert and oriented to person, place, and time.    Psychiatric:         Mood and Affect: Mood normal.         LABORATORY RESULTS      CBC with diff:   Results from last 7 days   Lab Units 08/23/23  0703 08/22/23  0501 08/21/23  0529 08/20/23  0456 08/19/23  0503 08/18/23  0527 08/17/23 0455   WBC Thousand/uL 7.00 7.91 8.65 9.66 11.00* 12.00* 11.57*   HEMOGLOBIN g/dL 10.1* 10.3* 10.6* 10.9* 11.2* 11.0* 10.5*   HEMATOCRIT % 35.2* 36.3* 37.6 38.8 40.2 39.8 37.6   MCV fL 76* 76* 75* 75* 74* 72* 71*   PLATELETS Thousands/uL 273 281 294 287 297 301 250   RBC Million/uL 4.61 4.76 5.02 5.21 5.44 5.52 5.27   MCH pg 21.9* 21.6* 21.1* 20.9* 20.6* 19.9* 19.9*   MCHC g/dL 28.7* 28.4* 28.2* 28.1* 27.9* 27.6* 27.9*   RDW % 35.1* 35.3* 35.2* 35.2* 34.7* 34.3* 33.1*   NRBC AUTO /100 WBCs  --   --   --   --   --   --  0     CMP:  Results from last 7 days   Lab Units 08/22/23  0501 08/21/23  0529 08/20/23 0456 08/19/23  0503 08/18/23  0527 08/17/23  0455   POTASSIUM mmol/L 4.7 4.4 4.4 4.4 4.0 4.0   CHLORIDE mmol/L 112* 111* 108 99 97 98   CO2 mmol/L 24 25 27 29 27 30   BUN mg/dL 47* 53* 58* 63* 63* 53*   CREATININE mg/dL 1.66* 1.71* 1.82* 1.88* 2.14* 1.94*   CALCIUM mg/dL 9.0 9.4 9.5 9.8 9.8 9.6   EGFR ml/min/1.73sq m 39 37 35 33 28 32     BMP:  Results from last 7 days   Lab Units 08/22/23  0501 08/21/23  0529 08/20/23  0456 08/19/23  0503 08/18/23  0527 08/17/23  0455   POTASSIUM mmol/L 4.7 4.4 4.4 4.4 4.0 4.0   CHLORIDE mmol/L 112* 111* 108 99 97 98   CO2 mmol/L 24 25 27 29 27 30   BUN mg/dL 47* 53* 58* 63* 63* 53*   CREATININE mg/dL 1.66* 1.71* 1.82* 1.88* 2.14* 1.94*   CALCIUM mg/dL 9.0 9.4 9.5 9.8 9.8 9.6     Lab Results   Component Value Date    CREATININE 1.66 (H) 08/22/2023    CREATININE 1.71 (H) 08/21/2023    CREATININE 1.82 (H) 08/20/2023     Lab Results   Component Value Date    NTBNP 595 (H) 05/05/2023      Results from last 7 days   Lab Units 08/22/23  0501   MAGNESIUM mg/dL 2.2      Results from last 7 days   Lab Units 08/20/23  0456   TSH 3RD GENERATON uIU/mL 3.267     Lipid Profile:   No results found for: "CHOL"  Lab Results   Component Value Date    HDL 33 (L) 01/06/2023    HDL 30 (L) 08/26/2022     Lab Results   Component Value Date    LDLCALC 104 (H) 01/06/2023    LDLCALC 116 (H) 08/26/2022     Lab Results   Component Value Date    TRIG 231 (H) 01/06/2023    TRIG 466 (H) 08/26/2022   Meds/Allergies   all current active meds have been reviewed and current meds:   Current Facility-Administered Medications   Medication Dose Route Frequency   • acetaminophen (TYLENOL) tablet 650 mg  650 mg Oral Q6H PRN   • apixaban (ELIQUIS) tablet 5 mg  5 mg Oral BID   • aspirin (ECOTRIN LOW STRENGTH) EC tablet 81 mg  81 mg Oral Daily   • atorvastatin (LIPITOR) tablet 40 mg  40 mg Oral After Dinner   • ferrous sulfate tablet 325 mg  325 mg Oral Daily With Breakfast   • insulin detemir (LEVEMIR) subcutaneous injection 32 Units  32 Units Subcutaneous HS   • insulin lispro (HumaLOG) 100 units/mL subcutaneous injection 1-5 Units  1-5 Units Subcutaneous HS   • insulin lispro (HumaLOG) 100 units/mL subcutaneous injection 1-6 Units  1-6 Units Subcutaneous TID AC   • insulin lispro (HumaLOG) 100 units/mL subcutaneous injection 12 Units  12 Units Subcutaneous TID With Meals   • metoprolol succinate (TOPROL-XL) 24 hr tablet 50 mg  50 mg Oral Daily   • pantoprazole (PROTONIX) EC tablet 40 mg  40 mg Oral BID AC   • pregabalin (LYRICA) capsule 100 mg  100 mg Oral TID   • sertraline (ZOLOFT) tablet 25 mg  25 mg Oral Daily   • tamsulosin (FLOMAX) capsule 0.4 mg  0.4 mg Oral Daily With [employer] • umeclidinium-vilanterol 62.5-25 mcg/actuation inhaler 1 puff  1 puff Inhalation Daily     Medications Prior to Admission   Medication   • acetaminophen (TYLENOL) 650 mg CR tablet   • Continuous Blood Gluc Sensor (Dexcom G6 Sensor) MISC   • Continuous Blood Gluc Transmit (Dexcom G6 Transmitter) MISC   • dulaglutide (Trulicity) 6.45 BJ/6.2EU injection   • Eliquis 5 MG   • ferrous sulfate 325 (65 Fe) mg tablet   • furosemide (LASIX) 40 mg tablet   • insulin aspart (NovoLOG FlexPen) 100 UNIT/ML injection pen   • Insulin Pen Needle (Pen Needles) 32G X 4 MM MISC   • Levemir FlexPen 100 units/mL injection pen   • metoprolol succinate (TOPROL-XL) 50 mg 24 hr tablet   • mometasone (ELOCON) 0.1 % cream   • Multiple Vitamin (MULTIVITAMIN ADULT PO)   • pantoprazole (PROTONIX) 40 mg tablet   • Potassium Citrate ER 15 MEQ (1620 MG) TBCR   • pregabalin (LYRICA) 100 mg capsule   • saxagliptin (Onglyza) 5 MG tablet   • sertraline (ZOLOFT) 25 mg tablet   • SUPER B COMPLEX/C PO   • tamsulosin (FLOMAX) 0.4 mg   • traZODone (DESYREL) 100 mg tablet   • umeclidinium-vilanterol 62.5-25 mcg/actuation inhaler     Counseling / Coordination of Care  Total floor / unit time spent today 20 minutes. Greater than 50% of total time was spent with the patient and / or family counseling and / or coordination of care. ** Please Note: Dragon 360 Dictation voice to text software may have been used in the creation of this document.  **

## 2023-08-24 DIAGNOSIS — C22.0 HEPATOCELLULAR CARCINOMA (HCC): Primary | ICD-10-CM

## 2023-08-24 PROBLEM — I25.10 CAD (CORONARY ARTERY DISEASE): Status: ACTIVE | Noted: 2023-08-08

## 2023-08-24 LAB
ANION GAP SERPL CALCULATED.3IONS-SCNC: 9 MMOL/L
ATRIAL RATE: 51 BPM
BUN SERPL-MCNC: 45 MG/DL (ref 5–25)
CALCIUM SERPL-MCNC: 8.6 MG/DL (ref 8.4–10.2)
CHLORIDE SERPL-SCNC: 108 MMOL/L (ref 96–108)
CO2 SERPL-SCNC: 25 MMOL/L (ref 21–32)
CREAT SERPL-MCNC: 1.45 MG/DL (ref 0.6–1.3)
ERYTHROCYTE [DISTWIDTH] IN BLOOD BY AUTOMATED COUNT: 34.9 % (ref 11.6–15.1)
GFR SERPL CREATININE-BSD FRML MDRD: 46 ML/MIN/1.73SQ M
GLUCOSE SERPL-MCNC: 102 MG/DL (ref 65–140)
GLUCOSE SERPL-MCNC: 107 MG/DL (ref 65–140)
GLUCOSE SERPL-MCNC: 162 MG/DL (ref 65–140)
GLUCOSE SERPL-MCNC: 196 MG/DL (ref 65–140)
GLUCOSE SERPL-MCNC: 199 MG/DL (ref 65–140)
HCT VFR BLD AUTO: 35.3 % (ref 36.5–49.3)
HGB BLD-MCNC: 10.1 G/DL (ref 12–17)
MCH RBC QN AUTO: 22.1 PG (ref 26.8–34.3)
MCHC RBC AUTO-ENTMCNC: 28.6 G/DL (ref 31.4–37.4)
MCV RBC AUTO: 77 FL (ref 82–98)
P AXIS: 32 DEGREES
PLATELET # BLD AUTO: 257 THOUSANDS/UL (ref 149–390)
POTASSIUM SERPL-SCNC: 4.2 MMOL/L (ref 3.5–5.3)
PR INTERVAL: 280 MS
QRS AXIS: -46 DEGREES
QRSD INTERVAL: 138 MS
QT INTERVAL: 452 MS
QTC INTERVAL: 416 MS
RBC # BLD AUTO: 4.58 MILLION/UL (ref 3.88–5.62)
SODIUM SERPL-SCNC: 142 MMOL/L (ref 135–147)
T WAVE AXIS: -43 DEGREES
VENTRICULAR RATE: 51 BPM
WBC # BLD AUTO: 7.73 THOUSAND/UL (ref 4.31–10.16)

## 2023-08-24 PROCEDURE — 80048 BASIC METABOLIC PNL TOTAL CA: CPT | Performed by: INTERNAL MEDICINE

## 2023-08-24 PROCEDURE — 99232 SBSQ HOSP IP/OBS MODERATE 35: CPT | Performed by: INTERNAL MEDICINE

## 2023-08-24 PROCEDURE — 93010 ELECTROCARDIOGRAM REPORT: CPT | Performed by: INTERNAL MEDICINE

## 2023-08-24 PROCEDURE — 82948 REAGENT STRIP/BLOOD GLUCOSE: CPT

## 2023-08-24 PROCEDURE — 99232 SBSQ HOSP IP/OBS MODERATE 35: CPT | Performed by: FAMILY MEDICINE

## 2023-08-24 PROCEDURE — 85027 COMPLETE CBC AUTOMATED: CPT | Performed by: INTERNAL MEDICINE

## 2023-08-24 RX ORDER — ACETYLCYSTEINE 200 MG/ML
1200 SOLUTION ORAL; RESPIRATORY (INHALATION) 2 TIMES DAILY
Status: COMPLETED | OUTPATIENT
Start: 2023-08-24 | End: 2023-08-26

## 2023-08-24 RX ORDER — SODIUM CHLORIDE 9 MG/ML
70 INJECTION, SOLUTION INTRAVENOUS CONTINUOUS
Status: DISCONTINUED | OUTPATIENT
Start: 2023-08-24 | End: 2023-08-26 | Stop reason: HOSPADM

## 2023-08-24 RX ORDER — ASPIRIN 81 MG/1
324 TABLET, CHEWABLE ORAL ONCE
Status: COMPLETED | OUTPATIENT
Start: 2023-08-25 | End: 2023-08-25

## 2023-08-24 RX ADMIN — PANTOPRAZOLE SODIUM 40 MG: 40 TABLET, DELAYED RELEASE ORAL at 16:15

## 2023-08-24 RX ADMIN — ATORVASTATIN CALCIUM 40 MG: 40 TABLET, FILM COATED ORAL at 17:59

## 2023-08-24 RX ADMIN — INSULIN LISPRO 12 UNITS: 100 INJECTION, SOLUTION INTRAVENOUS; SUBCUTANEOUS at 17:59

## 2023-08-24 RX ADMIN — ACETYLCYSTEINE 1200 MG: 200 SOLUTION ORAL; RESPIRATORY (INHALATION) at 21:02

## 2023-08-24 RX ADMIN — SODIUM CHLORIDE 70 ML/HR: 0.9 INJECTION, SOLUTION INTRAVENOUS at 22:14

## 2023-08-24 RX ADMIN — FERROUS SULFATE TAB 325 MG (65 MG ELEMENTAL FE) 325 MG: 325 (65 FE) TAB at 06:43

## 2023-08-24 RX ADMIN — INSULIN LISPRO 2 UNITS: 100 INJECTION, SOLUTION INTRAVENOUS; SUBCUTANEOUS at 12:26

## 2023-08-24 RX ADMIN — PREGABALIN 100 MG: 100 CAPSULE ORAL at 16:15

## 2023-08-24 RX ADMIN — INSULIN LISPRO 2 UNITS: 100 INJECTION, SOLUTION INTRAVENOUS; SUBCUTANEOUS at 18:00

## 2023-08-24 RX ADMIN — PANTOPRAZOLE SODIUM 40 MG: 40 TABLET, DELAYED RELEASE ORAL at 06:44

## 2023-08-24 RX ADMIN — PREGABALIN 100 MG: 100 CAPSULE ORAL at 21:02

## 2023-08-24 RX ADMIN — INSULIN DETEMIR 32 UNITS: 100 INJECTION, SOLUTION SUBCUTANEOUS at 21:02

## 2023-08-24 RX ADMIN — PREGABALIN 100 MG: 100 CAPSULE ORAL at 08:21

## 2023-08-24 RX ADMIN — METOPROLOL SUCCINATE 50 MG: 50 TABLET, EXTENDED RELEASE ORAL at 08:20

## 2023-08-24 RX ADMIN — INSULIN LISPRO 12 UNITS: 100 INJECTION, SOLUTION INTRAVENOUS; SUBCUTANEOUS at 12:27

## 2023-08-24 RX ADMIN — UMECLIDINIUM BROMIDE AND VILANTEROL TRIFENATATE 1 PUFF: 62.5; 25 POWDER RESPIRATORY (INHALATION) at 08:20

## 2023-08-24 RX ADMIN — TAMSULOSIN HYDROCHLORIDE 0.4 MG: 0.4 CAPSULE ORAL at 16:15

## 2023-08-24 RX ADMIN — ASPIRIN 81 MG: 81 TABLET, COATED ORAL at 08:21

## 2023-08-24 RX ADMIN — SERTRALINE HYDROCHLORIDE 25 MG: 25 TABLET ORAL at 08:21

## 2023-08-24 RX ADMIN — INSULIN LISPRO 1 UNITS: 100 INJECTION, SOLUTION INTRAVENOUS; SUBCUTANEOUS at 21:02

## 2023-08-24 RX ADMIN — INSULIN LISPRO 12 UNITS: 100 INJECTION, SOLUTION INTRAVENOUS; SUBCUTANEOUS at 08:19

## 2023-08-24 RX ADMIN — APIXABAN 5 MG: 5 TABLET, FILM COATED ORAL at 08:20

## 2023-08-24 NOTE — ASSESSMENT & PLAN NOTE
Wt Readings from Last 3 Encounters:   08/24/23 93.4 kg (206 lb)   08/19/23 92.7 kg (204 lb 5.9 oz)   08/14/23 95 kg (209 lb 7 oz)     · Patient originally presented to [address] [place] [address] ED with complaint of chest discomfort and intermittent shortness of breath. In ED, patient hypoxic required 2 L oxygen via nasal cannula, elevated proBNP noted and chest x-ray showed right-sided pleural effusion.   · Echocardiogram with LVEF 50-55%, basal inferior mid inferior hypokinetic  · Underwent nuclear stress test that showed large fixed defect suspicious for RCA territory infarct - subsequently transferred to [facility] for cardiac catheterization   · Patient currently appears euvolemic, diuretics on hold in setting of elevated creatinine and cardiac cath  · Appreciate ongoing cardiology/nephrology recommendations   · Monitor daily weights, intake and output   · Monitor creatinine

## 2023-08-24 NOTE — ASSESSMENT & PLAN NOTE
· Recently treated for GIB on prior admission- s/p EGD & C-scope with clipping of gastric/duodenal angiectasias.  Received 2 units PRBC with stabilization of hemoglobin  · Patient was restarted on eliquis following GI clearance   · Continue PPI BID and iron supplementation   · Daily CBC  · Hemoglobin remains stable

## 2023-08-24 NOTE — ASSESSMENT & PLAN NOTE
· Patient with elevated troponins on prior admission, peaked at 1000 and down trended to 900 in the setting of GI bleed   · Cardiology following   · 8/18 Nuclear stress test with no ischemic ECG changes, however does have a new reduced EF to 36% as well as large fixed defect of the inferior and inferolateral walls with associated wall motion abnormality suspicious for RCA territory infarct. · Transferred to [address] [place] [address] for cardiac catheterization - revealed 3V CAD in nondominant RCA system. · Was evaluated by Cardiac Surgery who recommended PCI  · Plan for PCI Friday 8/25/23  · Telemetry monitoring   · Continue aspirin, statin, BB. Defer AC to Cardiology - currently on Eliquis.  Appreciate Nephrology's ongoing recs given plan for PCI Friday

## 2023-08-24 NOTE — PROGRESS NOTES
Progress Note - Nephrology   Huy Abrams 69 y.o. male MRN: [id number]  Unit/Bed#: -01 Encounter: [id number]      Assessment / Plan:    Chronic kidney disease stage III  • Baseline creatinine of 1.3-1.7  • Peak creatinine this hospitalization, 2.14 on   • Creatinine currently at baseline and stable  • Successive dye exposures in a short period of time increases his risk of NA and therefore will need to prep again for anticipated cardiac intervention on   • We will prep with intravenous fluids precath exposure  Hypertension  • Blood pressure is currently acceptable  • Need to avoid relative hypotension to maintain renal perfusion and avoid worsening renal function  Acute on chronic diastolic CHF/ejection fraction of 36% by stress testing and 50- 55% by echo/non-STEMI  • Continue to hold diuretics.  Weight has remained stable and below his historical past. Hortencia Borden will continue to hold diuretics and monitor volume status/renal function on a daily basis EF 55%  Other issues:  • Type 2 diabetes mellitus  • Renal cysts and nephrolithiasis  • Suspected HCC  • Atrial fibrillation  • Anemia -decreased MCV and significantly elevated RDW.  Iron stores (2023)-iron 14-ferritin 10-iron saturation 3%.  Recently treated for GI bleed with clipping of gastric/duodenal angiectasias and received packed red blood cells.  Currently on oral iron        Subjective: The patient was seen and examined earlier this morning. Overall, he felt well. He denied any shortness of breath, chest pain or pressure, abdominal pain, vomiting, diarrhea, sweats, chills, paroxysmal nocturnal dyspnea orthopnea. Objective:     Vitals: Blood pressure 125/53, pulse (!) 54, temperature 97.8 °F (36.6 °C), temperature source Oral, resp. rate 18, height 6' (1.829 m), weight 93.4 kg (206 lb), SpO2 93 %. ,Body mass index is 27.94 kg/m². Temp (24hrs), Av.8 °F (36.6 °C), Min:97.4 °F (36.3 °C), Max:98.3 °F (36.8 °C)      Weight (last 2 days) Date/Time Weight    08/24/23 0500 93.4 (206)    08/22/23 0543 93.5 (206.2)                 Intake/Output Summary (Last 24 hours) at 8/24/2023 1241  Last data filed at 8/24/2023 1028  Gross per 24 hour   Intake 1540 ml   Output 900 ml   Net 640 ml     I/O last 24 hours:   In: 7371 [P.O.:1776]  Out: 1100 [Urine:1100]        Physical Exam    /53 (BP Location: Left arm)   Pulse (!) 54   Temp 97.8 °F (36.6 °C) (Oral)   Resp 18   Ht 6' (1.829 m)   Wt 93.4 kg (206 lb)   SpO2 93%   BMI 27.94 kg/m²   Vital signs were reviewed  Constitutional: The patient was awake, alert, pleasant, cooperative and in no apparent distress  Cardiovascular: No overt jugular venous tension was noted, S1-S2, no pericardial friction rub S3 were appreciated, there was no peripheral edema noted  Pulmonary: Adequate inspiratory effort, lungs were clear  Abdominal/GI: Soft, nontender              Invasive Devices     Peripheral Intravenous Line  Duration           Peripheral IV 08/22/23 Right Antecubital 2 days                Medications:    Scheduled Meds:  Current Facility-Administered Medications   Medication Dose Route Frequency Provider Last Rate   • acetaminophen  650 mg Oral Q6H PRN Roselyn Cristinaine, DO     • [START ON 8/26/2023] aspirin  81 mg Oral Daily NADIA Márquez     • [START ON 8/25/2023] aspirin  324 mg Oral Once NADIA Vidal     • atorvastatin  40 mg Oral After Dinner NADIA Ahumada     • ferrous sulfate  325 mg Oral Daily With Breakfast Roselyn Cristinaine, DO     • insulin detemir  32 Units Subcutaneous HS Roselyn Alvine, DO     • insulin lispro  1-5 Units Subcutaneous HS Roselyn Cristinaine, DO     • insulin lispro  1-6 Units Subcutaneous TID AC Roselyn Cristinaine, DO     • insulin lispro  12 Units Subcutaneous TID With Meals Roselyn Cristinaine, DO     • metoprolol succinate  50 mg Oral Daily Roselyn Cristinaine, DO     • pantoprazole  40 mg Oral BID AC Roselyn Cristinaine, DO     • pregabalin  100 mg Oral TID Roselyn Cristinaine, DO • sertraline  25 mg Oral Daily Sixto Avila DO     • tamsulosin  0.4 mg Oral Daily With Dinner Sixto Avila DO     • umeclidinium-vilanterol  1 puff Inhalation Daily Sixto Avila DO         PRN Meds:.•  acetaminophen    Continuous Infusions:         LAB RESULTS:      Results from last 7 days   Lab Units 08/24/23  0542 08/23/23  0703 08/22/23  0501 08/21/23  0529 08/20/23  0456 08/19/23  0503 08/18/23  0527   WBC Thousand/uL 7.73 7.00 7.91 8.65 9.66 11.00* 12.00*   HEMOGLOBIN g/dL 10.1* 10.1* 10.3* 10.6* 10.9* 11.2* 11.0*   HEMATOCRIT % 35.3* 35.2* 36.3* 37.6 38.8 40.2 39.8   PLATELETS Thousands/uL 257 273 281 294 287 297 301   POTASSIUM mmol/L 4.2 4.4 4.7 4.4 4.4 4.4 4.0   CHLORIDE mmol/L 108 108 112* 111* 108 99 97   CO2 mmol/L 25 25 24 25 27 29 27   BUN mg/dL 45* 44* 47* 53* 58* 63* 63*   CREATININE mg/dL 1.45* 1.47* 1.66* 1.71* 1.82* 1.88* 2.14*   CALCIUM mg/dL 8.6 8.7 9.0 9.4 9.5 9.8 9.8   MAGNESIUM mg/dL  --   --  2.2  --   --   --   --    PHOSPHORUS mg/dL  --   --  4.1  --   --   --   --        CUTURES:  Lab Results   Component Value Date    BLOODCX No Growth After 5 Days. 08/10/2023    BLOODCX No Growth After 5 Days. 08/10/2023                 PLEASE NOTE:  This encounter was completed utilizing the [employer] Speech Voice Recognition Software. Grammatical errors, random word insertions, pronoun errors and incomplete sentences are occasional consequences of the system due to software limitations, ambient noise and hardware issues. These may be missed by proof reading prior to affixing electronic signature. Any questions or concerns about the content, text or information contained within the body of this dictation should be directly addressed to the physician for clarification. Please do not hesitate to call me directly if you have any any questions or concerns.

## 2023-08-24 NOTE — PROGRESS NOTES
[address] [facility]  Progress Note  Name: Rob Gandara I  MRN: [id number]  Unit/Bed#: -22 I Date of Admission: 8/20/2023   Date of Service: 8/24/2023 I Hospital Day: 4    Assessment/Plan   Hepatocellular carcinoma [facility])  Assessment & Plan  · Suspected HCC with multiple hepatic masses on imaging and AFP >18k, has not been confirmed by tissue diagnosis. Following with Dr. Saúl Horne with surgical oncology as an outpatient, did not feel like a biopsy was necessary and was referred to Dr. Maureen Magaña for Y90 radioembolization  · Continue outpatient follow up     CAD (coronary artery disease)  Assessment & Plan  · Patient with elevated troponins on prior admission, peaked at 1000 and down trended to 900 in the setting of GI bleed   · Cardiology following   · 8/18 Nuclear stress test with no ischemic ECG changes, however does have a new reduced EF to 36% as well as large fixed defect of the inferior and inferolateral walls with associated wall motion abnormality suspicious for RCA territory infarct. · Transferred to [address] [place] [address] for cardiac catheterization - revealed 3V CAD in nondominant RCA system. · Was evaluated by Cardiac Surgery who recommended PCI  · Plan for PCI Friday 8/25/23  · Telemetry monitoring   · Continue aspirin, statin, BB. Defer AC to Cardiology - currently on Eliquis. Appreciate Nephrology's ongoing recs given plan for PCI Friday    Anemia  Assessment & Plan  · Recently treated for GIB on prior admission- s/p EGD & C-scope with clipping of gastric/duodenal angiectasias.  Received 2 units PRBC with stabilization of hemoglobin  · Patient was restarted on eliquis following GI clearance   · Continue PPI BID and iron supplementation   · Daily CBC  · Hemoglobin remains stable    Atrial fibrillation, unspecified type [facility])  Assessment & Plan  · Heart rate currently controlled  · Continue metoprolol with parameters  · Defer AC to Cardiology given plan for PCI Friday - currently on Eliquis   ·     Type 2 diabetes mellitus with neurologic complication, with long-term current use of insulin (Formerly [facility])  Assessment & Plan  Lab Results   Component Value Date    HGBA1C 7.6 (H) 01/06/2023     · Home regimen: Trulicity, saxagliptin, Levemir 75 units at bedtime, NovoLog 20 units breakfast, 30 units lunch/dinner  · Current regimen: Levemir reduced to 32 units at bedtime, NovoLog 12 units 3 times daily AC + SSI  · Monitor and adjust insulin regimen as needed as needed   · Diabetic diet    Stage 3 chronic kidney disease, unspecified whether stage 3a or 3b CKD [facility])  Assessment & Plan  Lab Results   Component Value Date    EGFR 46 08/24/2023    EGFR 45 08/23/2023    EGFR 39 08/22/2023    CREATININE 1.45 (H) 08/24/2023    CREATININE 1.47 (H) 08/23/2023    CREATININE 1.66 (H) 08/22/2023     · Creatinine baseline is 1.3-1.6  · Creatinine peaked at 2.1 on 8/18 and since down-trending   · Nephrology following, appreciate their ongoing recommendations given plan for PCI Friday 8/25/23  · Continue to hold diuretics  · Repeat BMP in AM    * Acute on chronic diastolic HF (heart failure) ([address])  Assessment & Plan  Wt Readings from Last 3 Encounters:   08/24/23 93.4 kg (206 lb)   08/19/23 92.7 kg (204 lb 5.9 oz)   08/14/23 95 kg (209 lb 7 oz)     · Patient originally presented to [facility] ED with complaint of chest discomfort and intermittent shortness of breath. In ED, patient hypoxic required 2 L oxygen via nasal cannula, elevated proBNP noted and chest x-ray showed right-sided pleural effusion.   · Echocardiogram with LVEF 50-55%, basal inferior mid inferior hypokinetic  · Underwent nuclear stress test that showed large fixed defect suspicious for RCA territory infarct - subsequently transferred to [facility] for cardiac catheterization   · Patient currently appears euvolemic, diuretics on hold in setting of elevated creatinine and cardiac cath  · Appreciate ongoing cardiology/nephrology recommendations · Monitor daily weights, intake and output   · Monitor creatinine       . VTE Pharmacologic Prophylaxis: VTE Score: 7 Moderate Risk (Score 3-4) - Pharmacological DVT Prophylaxis Ordered: apixaban (Eliquis). Patient Centered Rounds: I performed bedside rounds with nursing staff today. Discussions with Specialists or Other Care Team Provider:     Education and Discussions with Family / Patient: Updated patient. Total Time Spent on Date of Encounter in care of patient: 35 minutes This time was spent on one or more of the following: performing physical exam; counseling and coordination of care; obtaining or reviewing history; documenting in the medical record; reviewing/ordering tests, medications or procedures; communicating with other healthcare professionals and discussing with patient's family/caregivers. Current Length of Stay: 4 day(s)  Current Patient Status: Inpatient   Certification Statement: The patient will continue to require additional inpatient hospital stay due to Pending cardiac catheterization tomorrow  Discharge Plan:     Code Status: Level 1 - Full Code    Subjective:   Patient seen and examined. No events overnight  Waiting for cardiac catheterization with PCI tomorrow    Objective:     Vitals:   Temp (24hrs), Av.8 °F (36.6 °C), Min:97.4 °F (36.3 °C), Max:98.3 °F (36.8 °C)    Temp:  [97.4 °F (36.3 °C)-98.3 °F (36.8 °C)] 97.8 °F (36.6 °C)  HR:  [50-83] 54  Resp:  [17-18] 18  BP: (111-174)/(53-74) 125/53  SpO2:  [93 %-97 %] 93 %  Body mass index is 27.94 kg/m². Input and Output Summary (last 24 hours): Intake/Output Summary (Last 24 hours) at 2023 1411  Last data filed at 2023 1028  Gross per 24 hour   Intake 780 ml   Output 700 ml   Net 80 ml       Physical Exam:   Physical Exam  Constitutional:       General: He is not in acute distress. HENT:      Head: Normocephalic. Nose: Nose normal.   Eyes:      General: No scleral icterus.   Cardiovascular:      Rate and Rhythm: Normal rate and regular rhythm. Pulses: Normal pulses. Heart sounds: Normal heart sounds. Pulmonary:      Effort: Pulmonary effort is normal. No respiratory distress. Comments: Decreased breath sounds bilateral  Abdominal:      General: Abdomen is flat. There is no distension. Tenderness: There is no abdominal tenderness. Musculoskeletal:         General: No swelling. Normal range of motion. Skin:     General: Skin is warm. Coloration: Skin is not jaundiced. Neurological:      Mental Status: He is alert. Mental status is at baseline. Cranial Nerves: No cranial nerve deficit.           Additional Data:     Labs:  Results from last 7 days   Lab Units 08/24/23  0542   WBC Thousand/uL 7.73   HEMOGLOBIN g/dL 10.1*   HEMATOCRIT % 35.3*   PLATELETS Thousands/uL 257     Results from last 7 days   Lab Units 08/24/23  0542   SODIUM mmol/L 142   POTASSIUM mmol/L 4.2   CHLORIDE mmol/L 108   CO2 mmol/L 25   BUN mg/dL 45*   CREATININE mg/dL 1.45*   ANION GAP mmol/L 9   CALCIUM mg/dL 8.6   GLUCOSE RANDOM mg/dL 102         Results from last 7 days   Lab Units 08/24/23  1152 08/24/23  0550 08/23/23  2037 08/23/23  1723 08/23/23  1211 08/23/23  0615 08/22/23  2052 08/22/23  1657 08/22/23  1209 08/22/23  0613 08/21/23  2038 08/21/23  1717   POC GLUCOSE mg/dl 196* 107 227* 163* 231* 112 195* 80 209* 122 248* 179*               Lines/Drains:  Invasive Devices     Peripheral Intravenous Line  Duration           Peripheral IV 08/22/23 Right Antecubital 2 days                  Telemetry:  Telemetry Orders (From admission, onward)             24 Hour Telemetry Monitoring  Continuous x 24 Hours (Telem)        Question:  Reason for 24 Hour Telemetry  Answer:  PCI/EP study (including pacer and ICD implementation), Cardiac surgery, MI, abnormal cardiac cath, and chest pain- rule out MI                 Telemetry Reviewed: Normal Sinus Rhythm  Indication for Continued Telemetry Use: Awaiting PCI/EP Study/CABG             Imaging: Reviewed radiology reports from this admission including: chest xray    Recent Cultures (last 7 days):         Last 24 Hours Medication List:   Current Facility-Administered Medications   Medication Dose Route Frequency Provider Last Rate   • acetaminophen  650 mg Oral Q6H PRN Enedelia Brannon, DO     • [START ON 8/26/2023] aspirin  81 mg Oral Daily NADIA Hewitt     • [START ON 8/25/2023] aspirin  324 mg Oral Once NADIA Hewitt     • atorvastatin  40 mg Oral After Dinner NADIA Cisneros     • ferrous sulfate  325 mg Oral Daily With Breakfast Enedelia Brannon, DO     • insulin detemir  32 Units Subcutaneous HS Enedelia Brannon, DO     • insulin lispro  1-5 Units Subcutaneous HS Enedelia Brannon, DO     • insulin lispro  1-6 Units Subcutaneous TID AC Enedelia Brannon, DO     • insulin lispro  12 Units Subcutaneous TID With Meals Enedelia Brannon, DO     • metoprolol succinate  50 mg Oral Daily Enedelia Brannon, DO     • pantoprazole  40 mg Oral BID AC Enedelia Brannon, DO     • pregabalin  100 mg Oral TID Enedelia Brannon, DO     • sertraline  25 mg Oral Daily Enedelia Brannon, DO     • sodium chloride  70 mL/hr Intravenous Continuous Charisse Aguilera MD     • tamsulosin  0.4 mg Oral Daily With Dinner Enedelia Brannon, DO     • umeclidinium-vilanterol  1 puff Inhalation Daily Enedelia Brannon DO          Today, Patient Was Seen By: Priscilla Fajardo MD    **Please Note: This note may have been constructed using a voice recognition system. **

## 2023-08-24 NOTE — PROGRESS NOTES
General Cardiology   Progress Note -  Team One   Miguelito Chaparro 69 y.o. male MRN: [id number]  Unit/Bed#: -Kevin Encounter: [id number]    Assessment:     1. Acute on chronic HFmrEF  • Received IV diuretics this admission: furosemide 80 mg IV BID. Last received 8/17. • On furosemide 40 mg PO daily at home   • Creatinine stable, 1.45 today   • He reports no SOB or orthopnea. Appears euvolemic on exam.    • I/Os:  +676 mL/24 hours  • Daily weights 206 lbs (stable)     2. Type 2 MI in setting of anemia  • S/p nuclear stress test showing fixed defect in inferior and inferolateral walls  • LHC 8/21 showed 75% mid LAD lesion (FFR positive), 90% proximal to mid circumflex lesion, and 90% mid RCA lesion. • CT surgery consulted and turned down for CABG. • Plan for PCI of OM and LAD on Friday 8/25      3.  Preserved biventricular systolic function: EF 79-64% with hypokinesis of the basal inferior and mid inferior wall, normal RV function, no significant valvular abnormality.     4.  Paroxysmal atrial fibrillation: Maintaining NSR on Toprol-XL 50 mg daily. • [id number] 4:  Anticoagulated with eliquis 5 mg BID     5. Recent history of Anemia  • Recent admission for anemia 8/7-8/14 during which he received 2 units of PRBC  • S/p EGD and colonoscopy 8/10 with clipping of AV malformations   • H/H stable now. Hemoglobin 10.1 today (from 10.3 yesterday)     6. CKD stage III: Creatinine baseline 1.3-1.6. Creatinine 1.45 today.  Nephrology following for periprocedural optimization.     7. Type II diabetes: Hemoglobin A1C 7.6%.  Management per primary team.     8. Hx of DVT: Maintained on eliquis 5 mg BID.        Plan/Recommendations:  • PCI of LCx and LAD planned for Friday 8/25  • Continue ASA, Toprol XL and atorvastatin 40 mg daily  • Continue anticoagulation with Eliquis 5 mg twice daily. Hold dose tonight and tomorrow morning prior to cardiac catheterization. • Strict I/os, daily standing weights, a.m.  BMP  • Nephrology following for periprocedural renal optimization- [facility] planned for 8/25 at 10:30 am.    Subjective  Review of Systems   Constitutional: Negative for chills, malaise/fatigue and weight gain. Cardiovascular: Negative for chest pain, dyspnea on exertion, leg swelling, orthopnea, palpitations and syncope. Respiratory: Negative for cough, shortness of breath, sleep disturbances due to breathing and sputum production. Endocrine: Negative. Hematologic/Lymphatic: Negative. Gastrointestinal: Negative for bloating and nausea. Genitourinary: Negative. Neurological: Negative for dizziness, light-headedness and weakness. Psychiatric/Behavioral: Negative for altered mental status. All other systems reviewed and are negative. Objective:   Vitals: Blood pressure 125/54, pulse 59, temperature (!) 97.4 °F (36.3 °C), temperature source Oral, resp. rate 18, height 6' (1.829 m), weight 93.4 kg (206 lb), SpO2 94 %. , Body mass index is 27.94 kg/m². ,     Systolic (53NWU), OBM:439 , Min:111 , CBZ:981     Diastolic (53IXG), CBU:02, Min:53, Max:74      Intake/Output Summary (Last 24 hours) at 8/24/2023 0857  Last data filed at 8/24/2023 0801  Gross per 24 hour   Intake 1476 ml   Output 900 ml   Net 576 ml     Wt Readings from Last 3 Encounters:   08/24/23 93.4 kg (206 lb)   08/19/23 92.7 kg (204 lb 5.9 oz)   08/14/23 95 kg (209 lb 7 oz)     Telemetry Review: NSR with first-degree block    Physical Exam  Vitals reviewed. Constitutional:       General: He is not in acute distress. Neck:      Vascular: No hepatojugular reflux or JVD. Cardiovascular:      Rate and Rhythm: Normal rate and regular rhythm. Heart sounds: Normal heart sounds. No murmur heard. No friction rub. No gallop. Pulmonary:      Effort: Pulmonary effort is normal. No respiratory distress. Breath sounds: No rales.       Comments: Diminished throughout, on room air  Abdominal:      General: Bowel sounds are normal. There is no distension. Palpations: Abdomen is soft. Tenderness: There is no abdominal tenderness. Musculoskeletal:         General: No tenderness. Normal range of motion. Cervical back: Neck supple. Right lower leg: No edema. Left lower leg: No edema. Skin:     General: Skin is warm and dry. Findings: No erythema. Neurological:      Mental Status: He is alert and oriented to person, place, and time.    Psychiatric:         Mood and Affect: Mood normal.         LABORATORY RESULTS      CBC with diff:   Results from last 7 days   Lab Units 08/24/23  0542 08/23/23  0703 08/22/23  0501 08/21/23  0529 08/20/23  0456 08/19/23  0503 08/18/23  0527   WBC Thousand/uL 7.73 7.00 7.91 8.65 9.66 11.00* 12.00*   HEMOGLOBIN g/dL 10.1* 10.1* 10.3* 10.6* 10.9* 11.2* 11.0*   HEMATOCRIT % 35.3* 35.2* 36.3* 37.6 38.8 40.2 39.8   MCV fL 77* 76* 76* 75* 75* 74* 72*   PLATELETS Thousands/uL 257 273 281 294 287 297 301   RBC Million/uL 4.58 4.61 4.76 5.02 5.21 5.44 5.52   MCH pg 22.1* 21.9* 21.6* 21.1* 20.9* 20.6* 19.9*   MCHC g/dL 28.6* 28.7* 28.4* 28.2* 28.1* 27.9* 27.6*   RDW % 34.9* 35.1* 35.3* 35.2* 35.2* 34.7* 34.3*     CMP:  Results from last 7 days   Lab Units 08/24/23  0542 08/23/23  0703 08/22/23  0501 08/21/23  0529 08/20/23  0456 08/19/23  0503 08/18/23  0527   POTASSIUM mmol/L 4.2 4.4 4.7 4.4 4.4 4.4 4.0   CHLORIDE mmol/L 108 108 112* 111* 108 99 97   CO2 mmol/L 25 25 24 25 27 29 27   BUN mg/dL 45* 44* 47* 53* 58* 63* 63*   CREATININE mg/dL 1.45* 1.47* 1.66* 1.71* 1.82* 1.88* 2.14*   CALCIUM mg/dL 8.6 8.7 9.0 9.4 9.5 9.8 9.8   EGFR ml/min/1.73sq m 46 45 39 37 35 33 28     BMP:  Results from last 7 days   Lab Units 08/24/23  0542 08/23/23  0703 08/22/23  0501 08/21/23  0529 08/20/23  0456 08/19/23  0503 08/18/23  0527   POTASSIUM mmol/L 4.2 4.4 4.7 4.4 4.4 4.4 4.0   CHLORIDE mmol/L 108 108 112* 111* 108 99 97   CO2 mmol/L 25 25 24 25 27 29 27   BUN mg/dL 45* 44* 47* 53* 58* 63* 63*   CREATININE mg/dL 1.45* 1.47* 1.66* 1.71* 1.82* 1.88* 2.14*   CALCIUM mg/dL 8.6 8.7 9.0 9.4 9.5 9.8 9.8     Lab Results   Component Value Date    CREATININE 1.45 (H) 08/24/2023    CREATININE 1.47 (H) 08/23/2023    CREATININE 1.66 (H) 08/22/2023     Lab Results   Component Value Date    NTBNP 595 (H) 05/05/2023      Results from last 7 days   Lab Units 08/22/23  0501   MAGNESIUM mg/dL 2.2     Results from last 7 days   Lab Units 08/20/23  0456   TSH 3RD GENERATON uIU/mL 3.267      Lipid Profile:   No results found for: "CHOL"  Lab Results   Component Value Date    HDL 33 (L) 01/06/2023    HDL 30 (L) 08/26/2022     Lab Results   Component Value Date    LDLCALC 104 (H) 01/06/2023    LDLCALC 116 (H) 08/26/2022     Lab Results   Component Value Date    TRIG 231 (H) 01/06/2023    TRIG 466 (H) 08/26/2022     Meds/Allergies   all current active meds have been reviewed and current meds:   Current Facility-Administered Medications   Medication Dose Route Frequency   • acetaminophen (TYLENOL) tablet 650 mg  650 mg Oral Q6H PRN   • [START ON 8/26/2023] aspirin (ECOTRIN LOW STRENGTH) EC tablet 81 mg  81 mg Oral Daily   • [START ON 8/25/2023] aspirin chewable tablet 324 mg  324 mg Oral Once   • atorvastatin (LIPITOR) tablet 40 mg  40 mg Oral After Dinner   • ferrous sulfate tablet 325 mg  325 mg Oral Daily With Breakfast   • insulin detemir (LEVEMIR) subcutaneous injection 32 Units  32 Units Subcutaneous HS   • insulin lispro (HumaLOG) 100 units/mL subcutaneous injection 1-5 Units  1-5 Units Subcutaneous HS   • insulin lispro (HumaLOG) 100 units/mL subcutaneous injection 1-6 Units  1-6 Units Subcutaneous TID AC   • insulin lispro (HumaLOG) 100 units/mL subcutaneous injection 12 Units  12 Units Subcutaneous TID With Meals   • metoprolol succinate (TOPROL-XL) 24 hr tablet 50 mg  50 mg Oral Daily   • pantoprazole (PROTONIX) EC tablet 40 mg  40 mg Oral BID AC   • pregabalin (LYRICA) capsule 100 mg  100 mg Oral TID   • sertraline (ZOLOFT) tablet 25 mg 25 mg Oral Daily   • tamsulosin (FLOMAX) capsule 0.4 mg  0.4 mg Oral Daily With Dinner   • umeclidinium-vilanterol 62.5-25 mcg/actuation inhaler 1 puff  1 puff Inhalation Daily     Medications Prior to Admission   Medication   • acetaminophen (TYLENOL) 650 mg CR tablet   • Continuous Blood Gluc Sensor (Dexcom G6 Sensor) MISC   • Continuous Blood Gluc Transmit (Dexcom G6 Transmitter) MISC   • dulaglutide (Trulicity) 6.06 OU/0.8OZ injection   • Eliquis 5 MG   • ferrous sulfate 325 (65 Fe) mg tablet   • furosemide (LASIX) 40 mg tablet   • insulin aspart (NovoLOG FlexPen) 100 UNIT/ML injection pen   • Insulin Pen Needle (Pen Needles) 32G X 4 MM MISC   • Levemir FlexPen 100 units/mL injection pen   • metoprolol succinate (TOPROL-XL) 50 mg 24 hr tablet   • mometasone (ELOCON) 0.1 % cream   • Multiple Vitamin (MULTIVITAMIN ADULT PO)   • pantoprazole (PROTONIX) 40 mg tablet   • Potassium Citrate ER 15 MEQ (1620 MG) TBCR   • pregabalin (LYRICA) 100 mg capsule   • saxagliptin (Onglyza) 5 MG tablet   • sertraline (ZOLOFT) 25 mg tablet   • SUPER B COMPLEX/C PO   • tamsulosin (FLOMAX) 0.4 mg   • traZODone (DESYREL) 100 mg tablet   • umeclidinium-vilanterol 62.5-25 mcg/actuation inhaler     Counseling / Coordination of Care  Total floor / unit time spent today 20 minutes. Greater than 50% of total time was spent with the patient and / or family counseling and / or coordination of care. ** Please Note: Dragon 360 Dictation voice to text software may have been used in the creation of this document.  **

## 2023-08-24 NOTE — ASSESSMENT & PLAN NOTE
· Heart rate currently controlled  · Continue metoprolol with parameters  · Defer AC to Cardiology given plan for PCI Friday - currently on Eliquis   ·

## 2023-08-24 NOTE — ASSESSMENT & PLAN NOTE
· Suspected HCC with multiple hepatic masses on imaging and AFP >18k, has not been confirmed by tissue diagnosis.  Following with Dr. Chadd Jones with surgical oncology as an outpatient, did not feel like a biopsy was necessary and was referred to Dr. Natalie Moura for Y90 radioembolization  · Continue outpatient follow up

## 2023-08-24 NOTE — ASSESSMENT & PLAN NOTE
Lab Results   Component Value Date    EGFR 46 08/24/2023    EGFR 45 08/23/2023    EGFR 39 08/22/2023    CREATININE 1.45 (H) 08/24/2023    CREATININE 1.47 (H) 08/23/2023    CREATININE 1.66 (H) 08/22/2023     · Creatinine baseline is 1.3-1.6  · Creatinine peaked at 2.1 on 8/18 and since down-trending   · Nephrology following, appreciate their ongoing recommendations given plan for PCI Friday 8/25/23  · Continue to hold diuretics  · Repeat BMP in AM

## 2023-08-25 PROBLEM — Z95.5 STATUS POST INSERTION OF DRUG ELUTING CORONARY ARTERY STENT: Status: ACTIVE | Noted: 2023-08-25

## 2023-08-25 LAB
ANION GAP SERPL CALCULATED.3IONS-SCNC: 8 MMOL/L
ATRIAL RATE: 51 BPM
BUN SERPL-MCNC: 42 MG/DL (ref 5–25)
CALCIUM SERPL-MCNC: 8.5 MG/DL (ref 8.4–10.2)
CHLORIDE SERPL-SCNC: 110 MMOL/L (ref 96–108)
CO2 SERPL-SCNC: 23 MMOL/L (ref 21–32)
CREAT SERPL-MCNC: 1.38 MG/DL (ref 0.6–1.3)
GFR SERPL CREATININE-BSD FRML MDRD: 48 ML/MIN/1.73SQ M
GLUCOSE SERPL-MCNC: 104 MG/DL (ref 65–140)
GLUCOSE SERPL-MCNC: 115 MG/DL (ref 65–140)
GLUCOSE SERPL-MCNC: 138 MG/DL (ref 65–140)
GLUCOSE SERPL-MCNC: 179 MG/DL (ref 65–140)
GLUCOSE SERPL-MCNC: 187 MG/DL (ref 65–140)
HCT VFR BLD AUTO: 34.7 % (ref 36.5–49.3)
HGB BLD-MCNC: 10.2 G/DL (ref 12–17)
KCT BLD-ACNC: 341 SEC (ref 89–137)
MCH RBC QN AUTO: 22.6 PG (ref 26.8–34.3)
MCHC RBC AUTO-ENTMCNC: 29.4 G/DL (ref 31.4–37.4)
MCV RBC AUTO: 77 FL (ref 82–98)
P AXIS: 40 DEGREES
PLATELET # BLD AUTO: 291 THOUSANDS/UL (ref 149–390)
POTASSIUM SERPL-SCNC: 4.4 MMOL/L (ref 3.5–5.3)
PR INTERVAL: 264 MS
QRS AXIS: -51 DEGREES
QRSD INTERVAL: 136 MS
QT INTERVAL: 492 MS
QTC INTERVAL: 453 MS
RBC # BLD AUTO: 4.51 MILLION/UL (ref 3.88–5.62)
SODIUM SERPL-SCNC: 141 MMOL/L (ref 135–147)
SPECIMEN SOURCE: ABNORMAL
T WAVE AXIS: -34 DEGREES
VENTRICULAR RATE: 51 BPM
WBC # BLD AUTO: 7.44 THOUSAND/UL (ref 4.31–10.16)

## 2023-08-25 PROCEDURE — C1769 GUIDE WIRE: HCPCS | Performed by: INTERNAL MEDICINE

## 2023-08-25 PROCEDURE — 82948 REAGENT STRIP/BLOOD GLUCOSE: CPT

## 2023-08-25 PROCEDURE — C1874 STENT, COATED/COV W/DEL SYS: HCPCS | Performed by: INTERNAL MEDICINE

## 2023-08-25 PROCEDURE — C1725 CATH, TRANSLUMIN NON-LASER: HCPCS | Performed by: INTERNAL MEDICINE

## 2023-08-25 PROCEDURE — 85347 COAGULATION TIME ACTIVATED: CPT

## 2023-08-25 PROCEDURE — 027236Z DILATION OF CORONARY ARTERY, THREE ARTERIES WITH THREE DRUG-ELUTING INTRALUMINAL DEVICES, PERCUTANEOUS APPROACH: ICD-10-PCS | Performed by: INTERNAL MEDICINE

## 2023-08-25 PROCEDURE — C1887 CATHETER, GUIDING: HCPCS | Performed by: INTERNAL MEDICINE

## 2023-08-25 PROCEDURE — 93454 CORONARY ARTERY ANGIO S&I: CPT | Performed by: INTERNAL MEDICINE

## 2023-08-25 PROCEDURE — C1894 INTRO/SHEATH, NON-LASER: HCPCS | Performed by: INTERNAL MEDICINE

## 2023-08-25 PROCEDURE — C1753 CATH, INTRAVAS ULTRASOUND: HCPCS | Performed by: INTERNAL MEDICINE

## 2023-08-25 PROCEDURE — 80048 BASIC METABOLIC PNL TOTAL CA: CPT | Performed by: INTERNAL MEDICINE

## 2023-08-25 PROCEDURE — 99232 SBSQ HOSP IP/OBS MODERATE 35: CPT | Performed by: INTERNAL MEDICINE

## 2023-08-25 PROCEDURE — 92928 PRQ TCAT PLMT NTRAC ST 1 LES: CPT | Performed by: INTERNAL MEDICINE

## 2023-08-25 PROCEDURE — B2111ZZ FLUOROSCOPY OF MULTIPLE CORONARY ARTERIES USING LOW OSMOLAR CONTRAST: ICD-10-PCS | Performed by: INTERNAL MEDICINE

## 2023-08-25 PROCEDURE — 99152 MOD SED SAME PHYS/QHP 5/>YRS: CPT | Performed by: INTERNAL MEDICINE

## 2023-08-25 PROCEDURE — 99232 SBSQ HOSP IP/OBS MODERATE 35: CPT | Performed by: FAMILY MEDICINE

## 2023-08-25 PROCEDURE — 92978 ENDOLUMINL IVUS OCT C 1ST: CPT | Performed by: INTERNAL MEDICINE

## 2023-08-25 PROCEDURE — C9600 PERC DRUG-EL COR STENT SING: HCPCS | Performed by: INTERNAL MEDICINE

## 2023-08-25 PROCEDURE — 93005 ELECTROCARDIOGRAM TRACING: CPT

## 2023-08-25 PROCEDURE — 93010 ELECTROCARDIOGRAM REPORT: CPT | Performed by: INTERNAL MEDICINE

## 2023-08-25 PROCEDURE — 99153 MOD SED SAME PHYS/QHP EA: CPT | Performed by: INTERNAL MEDICINE

## 2023-08-25 PROCEDURE — 85027 COMPLETE CBC AUTOMATED: CPT | Performed by: INTERNAL MEDICINE

## 2023-08-25 DEVICE — STENT ONYXNG27526UX ONYX 2.75X26RX
Type: IMPLANTABLE DEVICE | Site: CORONARY | Status: FUNCTIONAL
Brand: ONYX FRONTIER™

## 2023-08-25 DEVICE — STENT ONYXNG30034UX ONYX 3.00X34RX
Type: IMPLANTABLE DEVICE | Site: CORONARY | Status: FUNCTIONAL
Brand: ONYX FRONTIER™

## 2023-08-25 DEVICE — STENT ONYXNG30038UX ONYX 3.00X38RX
Type: IMPLANTABLE DEVICE | Site: CORONARY | Status: FUNCTIONAL
Brand: ONYX FRONTIER™

## 2023-08-25 RX ORDER — MIDAZOLAM HYDROCHLORIDE 2 MG/2ML
INJECTION, SOLUTION INTRAMUSCULAR; INTRAVENOUS CODE/TRAUMA/SEDATION MEDICATION
Status: DISCONTINUED | OUTPATIENT
Start: 2023-08-25 | End: 2023-08-25 | Stop reason: HOSPADM

## 2023-08-25 RX ORDER — CLOPIDOGREL BISULFATE 75 MG/1
600 TABLET ORAL ONCE
Status: COMPLETED | OUTPATIENT
Start: 2023-08-25 | End: 2023-08-25

## 2023-08-25 RX ORDER — CLOPIDOGREL BISULFATE 75 MG/1
75 TABLET ORAL DAILY
Status: DISCONTINUED | OUTPATIENT
Start: 2023-08-26 | End: 2023-08-26 | Stop reason: HOSPADM

## 2023-08-25 RX ORDER — NITROGLYCERIN 20 MG/100ML
INJECTION INTRAVENOUS CODE/TRAUMA/SEDATION MEDICATION
Status: DISCONTINUED | OUTPATIENT
Start: 2023-08-25 | End: 2023-08-25 | Stop reason: HOSPADM

## 2023-08-25 RX ORDER — HEPARIN SODIUM 1000 [USP'U]/ML
INJECTION, SOLUTION INTRAVENOUS; SUBCUTANEOUS CODE/TRAUMA/SEDATION MEDICATION
Status: DISCONTINUED | OUTPATIENT
Start: 2023-08-25 | End: 2023-08-25 | Stop reason: HOSPADM

## 2023-08-25 RX ORDER — FENTANYL CITRATE 50 UG/ML
INJECTION, SOLUTION INTRAMUSCULAR; INTRAVENOUS CODE/TRAUMA/SEDATION MEDICATION
Status: DISCONTINUED | OUTPATIENT
Start: 2023-08-25 | End: 2023-08-25 | Stop reason: HOSPADM

## 2023-08-25 RX ORDER — SODIUM CHLORIDE 9 MG/ML
75 INJECTION, SOLUTION INTRAVENOUS CONTINUOUS
Status: DISPENSED | OUTPATIENT
Start: 2023-08-25 | End: 2023-08-25

## 2023-08-25 RX ORDER — VERAPAMIL HYDROCHLORIDE 2.5 MG/ML
INJECTION, SOLUTION INTRAVENOUS CODE/TRAUMA/SEDATION MEDICATION
Status: DISCONTINUED | OUTPATIENT
Start: 2023-08-25 | End: 2023-08-25 | Stop reason: HOSPADM

## 2023-08-25 RX ORDER — METOPROLOL SUCCINATE 25 MG/1
25 TABLET, EXTENDED RELEASE ORAL DAILY
Status: DISCONTINUED | OUTPATIENT
Start: 2023-08-25 | End: 2023-08-26 | Stop reason: HOSPADM

## 2023-08-25 RX ORDER — LIDOCAINE HYDROCHLORIDE 10 MG/ML
INJECTION, SOLUTION EPIDURAL; INFILTRATION; INTRACAUDAL; PERINEURAL CODE/TRAUMA/SEDATION MEDICATION
Status: DISCONTINUED | OUTPATIENT
Start: 2023-08-25 | End: 2023-08-25 | Stop reason: HOSPADM

## 2023-08-25 RX ADMIN — INSULIN DETEMIR 32 UNITS: 100 INJECTION, SOLUTION SUBCUTANEOUS at 21:00

## 2023-08-25 RX ADMIN — PREGABALIN 100 MG: 100 CAPSULE ORAL at 08:16

## 2023-08-25 RX ADMIN — SODIUM CHLORIDE 70 ML/HR: 0.9 INJECTION, SOLUTION INTRAVENOUS at 11:39

## 2023-08-25 RX ADMIN — UMECLIDINIUM BROMIDE AND VILANTEROL TRIFENATATE 1 PUFF: 62.5; 25 POWDER RESPIRATORY (INHALATION) at 08:16

## 2023-08-25 RX ADMIN — FERROUS SULFATE TAB 325 MG (65 MG ELEMENTAL FE) 325 MG: 325 (65 FE) TAB at 08:16

## 2023-08-25 RX ADMIN — INSULIN LISPRO 1 UNITS: 100 INJECTION, SOLUTION INTRAVENOUS; SUBCUTANEOUS at 18:03

## 2023-08-25 RX ADMIN — PANTOPRAZOLE SODIUM 40 MG: 40 TABLET, DELAYED RELEASE ORAL at 06:29

## 2023-08-25 RX ADMIN — INSULIN LISPRO 1 UNITS: 100 INJECTION, SOLUTION INTRAVENOUS; SUBCUTANEOUS at 23:10

## 2023-08-25 RX ADMIN — INSULIN LISPRO 12 UNITS: 100 INJECTION, SOLUTION INTRAVENOUS; SUBCUTANEOUS at 13:06

## 2023-08-25 RX ADMIN — PREGABALIN 100 MG: 100 CAPSULE ORAL at 17:37

## 2023-08-25 RX ADMIN — ATORVASTATIN CALCIUM 40 MG: 40 TABLET, FILM COATED ORAL at 17:37

## 2023-08-25 RX ADMIN — METOPROLOL SUCCINATE 25 MG: 25 TABLET, EXTENDED RELEASE ORAL at 08:16

## 2023-08-25 RX ADMIN — CLOPIDOGREL BISULFATE 600 MG: 75 TABLET ORAL at 10:05

## 2023-08-25 RX ADMIN — INSULIN LISPRO 12 UNITS: 100 INJECTION, SOLUTION INTRAVENOUS; SUBCUTANEOUS at 18:03

## 2023-08-25 RX ADMIN — SODIUM CHLORIDE 75 ML/HR: 0.9 INJECTION, SOLUTION INTRAVENOUS at 12:05

## 2023-08-25 RX ADMIN — ACETYLCYSTEINE 1200 MG: 200 SOLUTION ORAL; RESPIRATORY (INHALATION) at 08:15

## 2023-08-25 RX ADMIN — ASPIRIN 81 MG CHEWABLE TABLET 324 MG: 81 TABLET CHEWABLE at 06:29

## 2023-08-25 RX ADMIN — PANTOPRAZOLE SODIUM 40 MG: 40 TABLET, DELAYED RELEASE ORAL at 17:37

## 2023-08-25 RX ADMIN — SERTRALINE HYDROCHLORIDE 25 MG: 25 TABLET ORAL at 08:16

## 2023-08-25 RX ADMIN — TAMSULOSIN HYDROCHLORIDE 0.4 MG: 0.4 CAPSULE ORAL at 17:37

## 2023-08-25 RX ADMIN — APIXABAN 5 MG: 5 TABLET, FILM COATED ORAL at 20:49

## 2023-08-25 RX ADMIN — ACETYLCYSTEINE 1200 MG: 200 SOLUTION ORAL; RESPIRATORY (INHALATION) at 17:39

## 2023-08-25 NOTE — PROGRESS NOTES
[address] [facility]  Progress Note  Name: Vaibhav Munoz I  MRN: [id number]  Unit/Bed#: -65 I Date of Admission: 8/20/2023   Date of Service: 8/25/2023 I Hospital Day: 5    Assessment/Plan   Hepatocellular carcinoma [facility])  Assessment & Plan  · Suspected HCC with multiple hepatic masses on imaging and AFP >18k, has not been confirmed by tissue diagnosis. Following with Dr. Chadd Jones with surgical oncology as an outpatient, did not feel like a biopsy was necessary and was referred to Dr. Natalie Moura for Y90 radioembolization  · Continue outpatient follow up     CAD (coronary artery disease)  Assessment & Plan  · Patient with elevated troponins on prior admission, peaked at 1000 and down trended to 900 in the setting of GI bleed   · Cardiology following   · As per cardiology progress note type II MI in the setting of anemia  · 8/18 Nuclear stress test with no ischemic ECG changes, however does have a new reduced EF to 36% as well as large fixed defect of the inferior and inferolateral walls with associated wall motion abnormality suspicious for RCA territory infarct. · Transferred to [address] [place] [address] for cardiac catheterization - revealed 3V CAD in nondominant RCA system. · Was evaluated by Cardiac Surgery who recommended PCI  · Status post cardiac catheterization with PCI today. CALLIE x3 to LAD ,first ramus and left circumflex  · Current on aspirin Plavix  · Patient is also on Eliquis  · Continue statin and beta-blocker    Anemia  Assessment & Plan  · Recently treated for GIB on prior admission- s/p EGD & C-scope with clipping of gastric/duodenal angiectasias.  Received 2 units PRBC with stabilization of hemoglobin  · Patient was restarted on eliquis following GI clearance   · Continue PPI BID and iron supplementation   · Daily CBC  · Hemoglobin remains stable    Atrial fibrillation, unspecified type ([address])  Assessment & Plan  · Heart rate currently controlled  · Continue metoprolol with parameters  · Anticoagulated with Eliquis      Type 2 diabetes mellitus with neurologic complication, with long-term current use of insulin ([facility])  Assessment & Plan  Lab Results   Component Value Date    HGBA1C 7.6 (H) 01/06/2023     · Home regimen: Trulicity, saxagliptin, Levemir 75 units at bedtime, NovoLog 20 units breakfast, 30 units lunch/dinner  · Current regimen: Levemir reduced to 32 units at bedtime, NovoLog 12 units 3 times daily AC + SSI  · Monitor and adjust insulin regimen as needed as needed   · Diabetic diet  · Blood sugar at acceptable range    Stage 3 chronic kidney disease, unspecified whether stage 3a or 3b CKD [facility])  Assessment & Plan  Lab Results   Component Value Date    EGFR 48 08/25/2023    EGFR 46 08/24/2023    EGFR 45 08/23/2023    CREATININE 1.38 (H) 08/25/2023    CREATININE 1.45 (H) 08/24/2023    CREATININE 1.47 (H) 08/23/2023     · Creatinine baseline is 1.3-1.6  · Creatinine peaked at 2.1 on 8/18 and since down-trending   · Nephrology following, appreciate their ongoing recommendations given plan for PCI Friday 8/25/23  · Diuretics on hold. Patient received IV fluids per protocol for the cardiac cath  · Patient also received Mucomyst  · Monitor creatinine    * Acute on chronic diastolic HF (heart failure) ([facility])  Assessment & Plan  Wt Readings from Last 3 Encounters:   08/25/23 94.3 kg (208 lb)   08/19/23 92.7 kg (204 lb 5.9 oz)   08/14/23 95 kg (209 lb 7 oz)     · Patient originally presented to [address] [place] [address] ED with complaint of chest discomfort and intermittent shortness of breath. In ED, patient hypoxic required 2 L oxygen via nasal cannula, elevated proBNP noted and chest x-ray showed right-sided pleural effusion.   · Echocardiogram with LVEF 50-55%, basal inferior mid inferior hypokinetic  · Underwent nuclear stress test that showed large fixed defect suspicious for RCA territory infarct - subsequently transferred to [facility] for cardiac catheterization   · Patient currently appears euvolemic, diuretics on hold in setting of elevated creatinine and cardiac cath  · Appreciate ongoing cardiology/nephrology recommendations   · Monitor daily weights, intake and output   · Monitor creatinine  · Appears euvolemic on exam-patient is receiving IV fluids per cardiac catheterization protocol            VTE Pharmacologic Prophylaxis: VTE Score: 7 Moderate Risk (Score 3-4) - Pharmacological DVT Prophylaxis Ordered: apixaban (Eliquis). Patient Centered Rounds: I performed bedside rounds with nursing staff today. Discussions with Specialists or Other Care Team Provider: Cardiology    Education and Discussions with Family / Patient: Updated  (wife) at bedside. Total Time Spent on Date of Encounter in care of patient: 35 minutes This time was spent on one or more of the following: performing physical exam; counseling and coordination of care; obtaining or reviewing history; documenting in the medical record; reviewing/ordering tests, medications or procedures; communicating with other healthcare professionals and discussing with patient's family/caregivers. Current Length of Stay: 5 day(s)  Current Patient Status: Inpatient   Certification Statement: The patient will continue to require additional inpatient hospital stay due to Status post cardiac catheterization and stent placement  Discharge Plan: Anticipate discharge in 24-48 hrs to home with home services. Code Status: Level 1 - Full Code    Subjective:   Patient seen and examined. Status post cardiac catheterization with CALLIE x3. Currently patient is on aspirin and Plavix. Patient got loading dose of handspring and Plavix. Restarted back on the Eliquis. Patient denies any pain.      Objective:     Vitals:   Temp (24hrs), Av.8 °F (36.6 °C), Min:97.4 °F (36.3 °C), Max:98.2 °F (36.8 °C)    Temp:  [97.4 °F (36.3 °C)-98.2 °F (36.8 °C)] 97.7 °F (36.5 °C)  HR:  [50-63] 50  Resp:  [17-20] 20  BP: (119-156)/(47-96) 156/54  SpO2: [92 %-100 %] 92 %  Body mass index is 28.21 kg/m². Input and Output Summary (last 24 hours): Intake/Output Summary (Last 24 hours) at 8/25/2023 1457  Last data filed at 8/25/2023 1300  Gross per 24 hour   Intake 458 ml   Output 950 ml   Net -492 ml       Physical Exam:   Physical Exam  Constitutional:       General: He is not in acute distress. HENT:      Head: Normocephalic and atraumatic. Nose: Nose normal.   Eyes:      General: No scleral icterus. Conjunctiva/sclera: Conjunctivae normal.   Cardiovascular:      Rate and Rhythm: Normal rate. Pulmonary:      Effort: Pulmonary effort is normal.      Breath sounds: Normal breath sounds. Abdominal:      General: Bowel sounds are normal. There is no distension. Tenderness: There is no abdominal tenderness. Musculoskeletal:         General: Normal range of motion. Comments: Right wrist cath site stable   Neurological:      General: No focal deficit present. Mental Status: He is alert.          Additional Data:     Labs:  Results from last 7 days   Lab Units 08/25/23  0608   WBC Thousand/uL 7.44   HEMOGLOBIN g/dL 10.2*   HEMATOCRIT % 34.7*   PLATELETS Thousands/uL 291     Results from last 7 days   Lab Units 08/25/23  0608   SODIUM mmol/L 141   POTASSIUM mmol/L 4.4   CHLORIDE mmol/L 110*   CO2 mmol/L 23   BUN mg/dL 42*   CREATININE mg/dL 1.38*   ANION GAP mmol/L 8   CALCIUM mg/dL 8.5   GLUCOSE RANDOM mg/dL 104         Results from last 7 days   Lab Units 08/25/23  1234 08/25/23  0604 08/24/23  2101 08/24/23  1706 08/24/23  1152 08/24/23  0550 08/23/23  2037 08/23/23  1723 08/23/23  1211 08/23/23  0615 08/22/23  2052 08/22/23  1657   POC GLUCOSE mg/dl 138 115 162* 199* 196* 107 227* 163* 231* 112 195* 80               Lines/Drains:  Invasive Devices     Peripheral Intravenous Line  Duration           Peripheral IV 08/22/23 Right Antecubital 3 days                  Telemetry:  Telemetry Orders (From admission, onward)             24 Hour Telemetry Monitoring  Continuous x 24 Hours (Telem)        Question:  Reason for 24 Hour Telemetry  Answer:  PCI/EP study (including pacer and ICD implementation), Cardiac surgery, MI, abnormal cardiac cath, and chest pain- rule out MI                 Telemetry Reviewed: Normal Sinus Rhythm -bradycardia  Indication for Continued Telemetry Use: Awaiting PCI/EP Study/CABG             Imaging: No new images to review  Recent Cultures (last 7 days):         Last 24 Hours Medication List:   Current Facility-Administered Medications   Medication Dose Route Frequency Provider Last Rate   • acetaminophen  650 mg Oral Q6H PRN Calvin Sing, DO     • acetylcysteine  1,200 mg Oral BID Camila Paz MD     • apixaban  5 mg Oral BID NADIA Dixon     • [START ON 8/26/2023] aspirin  81 mg Oral Daily NADIA Anderson     • atorvastatin  40 mg Oral After Dinner NADIA Dixon     • [START ON 8/26/2023] clopidogrel  75 mg Oral Daily NADIA Dioxn     • ferrous sulfate  325 mg Oral Daily With Breakfast Calvin Sing, DO     • insulin detemir  32 Units Subcutaneous HS Calvin Sing, DO     • insulin lispro  1-5 Units Subcutaneous HS Calvin Sing, DO     • insulin lispro  1-6 Units Subcutaneous TID AC Calvin Sing, DO     • insulin lispro  12 Units Subcutaneous TID With Meals Calvin Sing, DO     • metoprolol succinate  25 mg Oral Daily Rica Muñiz MD     • pantoprazole  40 mg Oral BID AC Calvin Sing, DO     • pregabalin  100 mg Oral TID Calvin Sing, DO     • sertraline  25 mg Oral Daily Calvin Sing, DO     • sodium chloride  70 mL/hr Intravenous Continuous Una Schreiber MD 70 mL/hr (08/25/23 1139)   • sodium chloride  75 mL/hr Intravenous Continuous NADIA Dixon 75 mL/hr (08/25/23 1205)   • tamsulosin  0.4 mg Oral Daily With Dinner Calvin Sing, DO     • umeclidinium-vilanterol  1 puff Inhalation Daily Calvin Sing, DO          Today, Patient Was Seen By: Miranda Ahumada, MD    **Please Note: This note may have been constructed using a voice recognition system. **

## 2023-08-25 NOTE — ASSESSMENT & PLAN NOTE
· Patient with elevated troponins on prior admission, peaked at 1000 and down trended to 900 in the setting of GI bleed   · Cardiology following   · 8/18 Nuclear stress test with no ischemic ECG changes, however does have a new reduced EF to 36% as well as large fixed defect of the inferior and inferolateral walls with associated wall motion abnormality suspicious for RCA territory infarct. · Transferred to [facility] for cardiac catheterization - revealed 3V CAD in nondominant RCA system. · Was evaluated by Cardiac Surgery who recommended PCI  · Status post cardiac catheterization with PCI today.   CALLIE x3 to LAD ,first ramus and left circumflex  · Current on aspirin Plavix  · Patient is also on Eliquis  · Continue statin and beta-blocker

## 2023-08-25 NOTE — PROGRESS NOTES
NEPHROLOGY PROGRESS NOTE   Linda Johnson 69 y.o. male MRN: [id number]  Unit/Bed#: -01 Encounter: [id number]  Reason for Consult: CKD    ASSESSMENT/PLAN:  1. Chronic kidney disease, stage III, baseline creatinine 1.3-1.7 most recent creatinine stable at 1.38  2. Coronary artery disease status post cardiac catheterization with angioplasty and stent placement to the proximal LAD, ramus lesion and mid circumflex. 3. 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 Hypertension blood pressure currently appears stable no changes in his current regimen  5. Ischemic cardiomyopathy ejection fraction of 36%  6. Atrial fibrillation    PLAN:  · Function overall appears stable to creatinine 1.38 current EGFR 48  · Continue with IV fluids pre and postcardiac catheterization  · Okay to continue with Mucomyst pre and postcardiac catheter  · No changes from nephrology standpoint    SUBJECTIVE:  Seen and examined. Patient resting comfortably. Status post cardiac catheterization. Wife at bedside. Review of Systems    OBJECTIVE:  Current Weight: Weight - Scale: 94.3 kg (208 lb)  Vitals:    08/25/23 0600 08/25/23 0734 08/25/23 1030 08/25/23 1153   BP:  119/96  156/54   BP Location:  Left arm  Left arm   Pulse:  58  (!) 50   Resp:  20     Temp:  (!) 97.4 °F (36.3 °C)  97.7 °F (36.5 °C)   TempSrc:  Oral     SpO2:  95% 100% 92%   Weight: 94.3 kg (208 lb)      Height:           Intake/Output Summary (Last 24 hours) at 8/25/2023 1455  Last data filed at 8/25/2023 1300  Gross per 24 hour   Intake 458 ml   Output 950 ml   Net -492 ml       Physical Exam  Constitutional:       Appearance: He is not ill-appearing. Cardiovascular:      Rate and Rhythm: Normal rate and regular rhythm. Pulmonary:      Effort: Pulmonary effort is normal.      Breath sounds: Normal breath sounds. Abdominal:      General: There is no distension. Palpations: Abdomen is soft. Musculoskeletal:      Right lower leg: No edema.       Left lower leg: No edema. Skin:     General: Skin is warm and dry. Neurological:      Mental Status: He is alert and oriented to person, place, and time.          Medications:    Current Facility-Administered Medications:   •  acetaminophen (TYLENOL) tablet 650 mg, 650 mg, Oral, Q6H PRN, Mark Hammondnds, DO  •  acetylcysteine (MUCOMYST) 200 mg/mL oral solution 1,200 mg, 1,200 mg, Oral, BID, Fidel Campbell MD, 1,200 mg at 08/25/23 0815  •  apixaban (ELIQUIS) tablet 5 mg, 5 mg, Oral, BID, NADIA Belcher  •  [START ON 8/26/2023] aspirin (ECOTRIN LOW STRENGTH) EC tablet 81 mg, 81 mg, Oral, Daily, NADIA Borrego  •  atorvastatin (LIPITOR) tablet 40 mg, 40 mg, Oral, After Dinner, NADIA Belcher, 40 mg at 08/24/23 1759  •  [START ON 8/26/2023] clopidogrel (PLAVIX) tablet 75 mg, 75 mg, Oral, Daily, NADIA Belcher  •  ferrous sulfate tablet 325 mg, 325 mg, Oral, Daily With Breakfast, Mark Lackeys, DO, 325 mg at 08/25/23 0816  •  insulin detemir (LEVEMIR) subcutaneous injection 32 Units, 32 Units, Subcutaneous, HS, Elgabriella Pounds, DO, 32 Units at 08/24/23 2102  •  insulin lispro (HumaLOG) 100 units/mL subcutaneous injection 1-5 Units, 1-5 Units, Subcutaneous, HS, Elgabriella Hammondnds, DO, 1 Units at 08/24/23 2102  •  insulin lispro (HumaLOG) 100 units/mL subcutaneous injection 1-6 Units, 1-6 Units, Subcutaneous, TID AC, 2 Units at 08/24/23 1800 **AND** Fingerstick Glucose (POCT), , , TID AC, Elgabriella Hammondnds, DO  •  insulin lispro (HumaLOG) 100 units/mL subcutaneous injection 12 Units, 12 Units, Subcutaneous, TID With Meals, Mark Lackeys, DO, 12 Units at 08/25/23 1306  •  metoprolol succinate (TOPROL-XL) 24 hr tablet 25 mg, 25 mg, Oral, Daily, Daphine Federico, MD, 25 mg at 08/25/23 0816  •  pantoprazole (PROTONIX) EC tablet 40 mg, 40 mg, Oral, BID AC, Mark Joe DO, 40 mg at 08/25/23 7774  •  pregabalin (LYRICA) capsule 100 mg, 100 mg, Oral, TID, Mark Joe DO, 100 mg at 08/25/23 9563  •  sertraline (ZOLOFT) tablet 25 mg, 25 mg, Oral, Daily, Ryan Singh DO, 25 mg at 08/25/23 2154  •  sodium chloride 0.9 % infusion, 70 mL/hr, Intravenous, Continuous, Marika Cotton MD, Last Rate: 70 mL/hr at 08/25/23 1139, 70 mL/hr at 08/25/23 1139  •  sodium chloride 0.9 % infusion, 75 mL/hr, Intravenous, Continuous, NADIA Esparza, Last Rate: 75 mL/hr at 08/25/23 1205, 75 mL/hr at 08/25/23 1205  •  tamsulosin (FLOMAX) capsule 0.4 mg, 0.4 mg, Oral, Daily With Dinner, Ryan Singh DO, 0.4 mg at 08/24/23 1615  •  umeclidinium-vilanterol 62.5-25 mcg/actuation inhaler 1 puff, 1 puff, Inhalation, Daily, Ryan Singh DO, 1 puff at 08/25/23 0816    Laboratory Results:  Results from last 7 days   Lab Units 08/25/23  0608 08/24/23  0542 08/23/23  0703 08/22/23  0501 08/21/23  0529 08/20/23  0456 08/19/23  0503   WBC Thousand/uL 7.44 7.73 7.00 7.91 8.65 9.66 11.00*   HEMOGLOBIN g/dL 10.2* 10.1* 10.1* 10.3* 10.6* 10.9* 11.2*   HEMATOCRIT % 34.7* 35.3* 35.2* 36.3* 37.6 38.8 40.2   PLATELETS Thousands/uL 291 257 273 281 294 287 297   POTASSIUM mmol/L 4.4 4.2 4.4 4.7 4.4 4.4 4.4   CHLORIDE mmol/L 110* 108 108 112* 111* 108 99   CO2 mmol/L 23 25 25 24 25 27 29   BUN mg/dL 42* 45* 44* 47* 53* 58* 63*   CREATININE mg/dL 1.38* 1.45* 1.47* 1.66* 1.71* 1.82* 1.88*   CALCIUM mg/dL 8.5 8.6 8.7 9.0 9.4 9.5 9.8   MAGNESIUM mg/dL  --   --   --  2.2  --   --   --    PHOSPHORUS mg/dL  --   --   --  4.1  --   --   --

## 2023-08-25 NOTE — ASSESSMENT & PLAN NOTE
Lab Results   Component Value Date    EGFR 48 08/25/2023    EGFR 46 08/24/2023    EGFR 45 08/23/2023    CREATININE 1.38 (H) 08/25/2023    CREATININE 1.45 (H) 08/24/2023    CREATININE 1.47 (H) 08/23/2023     · Creatinine baseline is 1.3-1.6  · Creatinine peaked at 2.1 on 8/18 and since down-trending   · Nephrology following, appreciate their ongoing recommendations given plan for PCI Friday 8/25/23  · Diuretics on hold.   Patient received IV fluids per protocol for the cardiac cath  · Patient also received Mucomyst  · Monitor creatinine

## 2023-08-25 NOTE — ASSESSMENT & PLAN NOTE
· Heart rate currently controlled  · Continue metoprolol with parameters  · Anticoagulated with Eliquis

## 2023-08-25 NOTE — ASSESSMENT & PLAN NOTE
· Suspected HCC with multiple hepatic masses on imaging and AFP >18k, has not been confirmed by tissue diagnosis.  Following with Dr. Deena White with surgical oncology as an outpatient, did not feel like a biopsy was necessary and was referred to Dr. Rosa M Alcala for Y90 radioembolization  · Continue outpatient follow up

## 2023-08-25 NOTE — ASSESSMENT & PLAN NOTE
Lab Results   Component Value Date    HGBA1C 7.6 (H) 01/06/2023     · Home regimen: Trulicity, saxagliptin, Levemir 75 units at bedtime, NovoLog 20 units breakfast, 30 units lunch/dinner  · Current regimen: Levemir reduced to 32 units at bedtime, NovoLog 12 units 3 times daily AC + SSI  · Monitor and adjust insulin regimen as needed as needed   · Diabetic diet  · Blood sugar at acceptable range

## 2023-08-25 NOTE — PROGRESS NOTES
Cardiology Progress Note - Kasandra Tran 69 y.o. male MRN: [id number]    Unit/Bed#: -01 Encounter: [id number]      Assessment:  Principal Problem:    Acute on chronic diastolic HF (heart failure) ([address])  Active Problems:    Stage 3 chronic kidney disease, unspecified whether stage 3a or 3b CKD (HCC)    Type 2 diabetes mellitus with neurologic complication, with long-term current use of insulin (HCC)    Atrial fibrillation, unspecified type (HCC)    Anemia    CAD (coronary artery disease)    Hepatocellular carcinoma (HCC)    Coronary artery disease involving native coronary artery      Plan:  Patient comfortable this morning. He has no chest pain or significant dyspnea. Nephrology assistance appreciated. Patient for PCI today. Noted on telemetry to be bradycardic. We will reduce dose of metoprolol succinate. No evidence of high degree AV block or significant sinus pause. BMP today with potassium of 4.4 and creatinine of 1.38. Hemoglobin 10.2. Subjective:   Patient seen and examined. No significant events overnight.  negative. Objective:     Vitals: Blood pressure 119/96, pulse 58, temperature (!) 97.4 °F (36.3 °C), resp. rate 20, height 6' (1.829 m), weight 94.3 kg (208 lb), SpO2 95 %. , Body mass index is 28.21 kg/m².,   Orthostatic Blood Pressures    Flowsheet Row Most Recent Value   Blood Pressure 119/96 filed at 08/25/2023 0257   Patient Position - Orthostatic VS Lying filed at 08/24/2023 2213      ,      Intake/Output Summary (Last 24 hours) at 8/25/2023 0846  Last data filed at 8/24/2023 1900  Gross per 24 hour   Intake 776 ml   Output 250 ml   Net 526 ml       No significant arrhythmias seen on telemetry review.   Sinus bradycardia      Physical Exam:    GEN: Kasandra Tran appears well, alert and oriented x 3, pleasant and cooperative   NECK: supple, no carotid bruits, no JVD or HJR  HEART: normal rate, regular rhythm, normal S1 and S2, no murmurs, clicks, gallops or rubs   LUNGS: clear to auscultation bilaterally; no wheezes, rales, or rhonchi   ABDOMEN: normal bowel sounds, soft, no tenderness, no distention  EXTREMITIES: peripheral pulses normal; no clubbing, cyanosis, or edema  SKIN: warm and well perfused, no suspicious lesions on exposed skin    Labs & Results:    Admission on 08/20/2023   Component Date Value   • WBC 08/20/2023 9.66    • RBC 08/20/2023 5.21    • Hemoglobin 08/20/2023 10.9 (L)    • Hematocrit 08/20/2023 38.8    • MCV 08/20/2023 75 (L)    • MCH 08/20/2023 20.9 (L)    • MCHC 08/20/2023 28.1 (L)    • RDW 08/20/2023 35.2 (H)    • Platelets 85/04/7451 287    • Sodium 08/20/2023 140    • Potassium 08/20/2023 4.4    • Chloride 08/20/2023 108    • CO2 08/20/2023 27    • ANION GAP 08/20/2023 5    • BUN 08/20/2023 58 (H)    • Creatinine 08/20/2023 1.82 (H)    • Glucose 08/20/2023 157 (H)    • Calcium 08/20/2023 9.5    • eGFR 08/20/2023 35    • TSH 3RD GENERATON 08/20/2023 3.267    • POC Glucose 08/20/2023 236 (H)    • POC Glucose 08/20/2023 228 (H)    • POC Glucose 08/20/2023 130    • POC Glucose 08/20/2023 151 (H)    • WBC 08/21/2023 8.65    • RBC 08/21/2023 5.02    • Hemoglobin 08/21/2023 10.6 (L)    • Hematocrit 08/21/2023 37.6    • MCV 08/21/2023 75 (L)    • MCH 08/21/2023 21.1 (L)    • MCHC 08/21/2023 28.2 (L)    • RDW 08/21/2023 35.2 (H)    • Platelets 79/00/4875 294    • Sodium 08/21/2023 142    • Potassium 08/21/2023 4.4    • Chloride 08/21/2023 111 (H)    • CO2 08/21/2023 25    • ANION GAP 08/21/2023 6    • BUN 08/21/2023 53 (H)    • Creatinine 08/21/2023 1.71 (H)    • Glucose 08/21/2023 124    • Calcium 08/21/2023 9.4    • eGFR 08/21/2023 37    • POC Glucose 08/20/2023 280 (H)    • POC Glucose 08/21/2023 131    • POC Glucose 08/21/2023 156 (H)    • POC Glucose 08/21/2023 179 (H)    • POC Glucose 08/21/2023 248 (H)    • WBC 08/22/2023 7.91    • RBC 08/22/2023 4.76    • Hemoglobin 08/22/2023 10.3 (L)    • Hematocrit 08/22/2023 36.3 (L)    • MCV 08/22/2023 76 (L)    • MCH 08/22/2023 21.6 (L)    • MCHC 08/22/2023 28.4 (L)    • RDW 08/22/2023 35.3 (H)    • Platelets 31/36/1355 281    • Sodium 08/22/2023 143    • Potassium 08/22/2023 4.7    • Chloride 08/22/2023 112 (H)    • CO2 08/22/2023 24    • ANION GAP 08/22/2023 7    • BUN 08/22/2023 47 (H)    • Creatinine 08/22/2023 1.66 (H)    • Glucose 08/22/2023 124    • Calcium 08/22/2023 9.0    • eGFR 08/22/2023 39    • Magnesium 08/22/2023 2.2    • Phosphorus 08/22/2023 4.1    • POC Glucose 08/22/2023 122    • POC Glucose 08/22/2023 209 (H)    • POC Glucose 08/22/2023 80    • POC Glucose 08/22/2023 195 (H)    • WBC 08/23/2023 7.00    • RBC 08/23/2023 4.61    • Hemoglobin 08/23/2023 10.1 (L)    • Hematocrit 08/23/2023 35.2 (L)    • MCV 08/23/2023 76 (L)    • MCH 08/23/2023 21.9 (L)    • MCHC 08/23/2023 28.7 (L)    • RDW 08/23/2023 35.1 (H)    • Platelets 12/57/5525 273    • Sodium 08/23/2023 141    • Potassium 08/23/2023 4.4    • Chloride 08/23/2023 108    • CO2 08/23/2023 25    • ANION GAP 08/23/2023 8    • BUN 08/23/2023 44 (H)    • Creatinine 08/23/2023 1.47 (H)    • Glucose 08/23/2023 107    • Calcium 08/23/2023 8.7    • eGFR 08/23/2023 45    • POC Glucose 08/23/2023 112    • POC Glucose 08/23/2023 231 (H)    • POC Glucose 08/23/2023 163 (H)    • POC Glucose 08/23/2023 227 (H)    • Ventricular Rate 08/23/2023 51    • Atrial Rate 08/23/2023 51    • UT Interval 08/23/2023 280    • QRSD Interval 08/23/2023 138    • QT Interval 08/23/2023 452    • QTC Interval 08/23/2023 416    • P Axis 08/23/2023 32    • QRS Axis 08/23/2023 -46    • T Wave Axis 08/23/2023 -43    • WBC 08/24/2023 7.73    • RBC 08/24/2023 4.58    • Hemoglobin 08/24/2023 10.1 (L)    • Hematocrit 08/24/2023 35.3 (L)    • MCV 08/24/2023 77 (L)    • MCH 08/24/2023 22.1 (L)    • MCHC 08/24/2023 28.6 (L)    • RDW 08/24/2023 34.9 (H)    • Platelets 49/76/6233 257    • Sodium 08/24/2023 142    • Potassium 08/24/2023 4.2    • Chloride 08/24/2023 108    • CO2 08/24/2023 25    • ANION GAP 08/24/2023 9    • BUN 08/24/2023 45 (H)    • Creatinine 08/24/2023 1.45 (H)    • Glucose 08/24/2023 102    • Calcium 08/24/2023 8.6    • eGFR 08/24/2023 46    • POC Glucose 08/24/2023 107    • POC Glucose 08/24/2023 196 (H)    • POC Glucose 08/24/2023 199 (H)    • POC Glucose 08/24/2023 162 (H)    • WBC 08/25/2023 7.44    • RBC 08/25/2023 4.51    • Hemoglobin 08/25/2023 10.2 (L)    • Hematocrit 08/25/2023 34.7 (L)    • MCV 08/25/2023 77 (L)    • MCH 08/25/2023 22.6 (L)    • MCHC 08/25/2023 29.4 (L)    • Platelets 62/24/9140 291    • Sodium 08/25/2023 141    • Potassium 08/25/2023 4.4    • Chloride 08/25/2023 110 (H)    • CO2 08/25/2023 23    • ANION GAP 08/25/2023 8    • BUN 08/25/2023 42 (H)    • Creatinine 08/25/2023 1.38 (H)    • Glucose 08/25/2023 104    • Calcium 08/25/2023 8.5    • eGFR 08/25/2023 48    • POC Glucose 08/25/2023 115        Stress strip    Result Date: 8/22/2023  Narrative: Confirmed by UPPER BUCKS, INBASKET ([id number]),  Tiffanie Ruggiero ([id number]) on 5/97/3361 10:24:20 AM    Cardiac catheterization    Result Date: 8/21/2023  Narrative: •  3V CAD in nondominant RCA system     NM myocardial perfusion spect (rx stress and/or rest)    Result Date: 8/18/2023  Narrative: •  Stress ECG: A pharmacological stress test was performed using regadenoson. The patient reached the end of the protocol. The patient reported nausea during the stress test. The patient also reports chest pain during recovery. All symptoms ended during recovery. •  Stress ECG: No ST deviation is noted. The stress ECG is negative for ischemia after pharmacologic vasodilation. •  Stress Function: Left ventricular function post-stress is abnormal. Global function is moderately reduced. There was a single regional abnormality during stress. Stress ejection fraction is 36 %. There is a defect in the basal to mid inferior and inferolateral location(s). The defect has moderately reduced function.  •  Perfusion: There is a left ventricular perfusion defect that is medium to large in size with moderate reduction in uptake present in the basal to mid inferior and inferolateral location(s) that is fixed. •  Stress Combined Conclusion: The ECG and SPECT imaging portions of the stress study are concordant with no evidence of stress induced myocardial ischemia. Left ventricular perfusion is abnormal. There is a probable infarct. Abnormal NM SPECT MPI. No ECG or SPECT evidence of ischemia with pharmacologic vasodilator stress. There is a large fixed defect of the inferior and inferolateral walls with associated wall motion abnormality suspicious for RCA territory infarct. Diaphragmatic attenuation and subdiaphragmatic activity both confound this assessment. Globally reduced LV systolic function by gated SPECT with aforementioned regional abnormality. XR chest pa & lateral    Result Date: 8/18/2023  Narrative: CHEST INDICATION:   Shortness of breath. COMPARISON: Chest x-ray 8/15/2023 EXAM PERFORMED/VIEWS:  XR CHEST PA & LATERAL FINDINGS: Heart is mildly enlarged, stable. The lungs are clear. No pneumothorax or pleural effusion. Osseous structures appear within normal limits for patient age. Impression: No active pulmonary disease. Workstation performed: [id number]     Echo follow up/limited w/ contrast if indicated    Result Date: 8/16/2023  Narrative: •  Left Ventricle: Left ventricular cavity size is normal. Wall thickness is normal. The left ventricular ejection fraction is 50-55% by visual estimation. . Systolic function is low normal. •  The following segments are hypokinetic: basal inferior and mid inferior. •  All other segments are normal. •  Right Ventricle: Right ventricular cavity size is normal. Systolic function is normal.     XR chest pa & lateral    Result Date: 8/15/2023  Narrative: CHEST INDICATION:   Chest pain, dizziness and shortness of breath. COMPARISON: Chest radiograph dated August 10, 2023.  EXAM PERFORMED/VIEWS:  XR CHEST PA & LATERAL Images: 3. FINDINGS: Cardiomediastinal silhouette appears unremarkable. Prominent interstitial markings likely secondary to poor inspiratory effort and posture of patient. Lungs otherwise clear. Slight blunting of the right costophrenic angle. No pneumothorax. No acute osseous abnormality. Impression: Minimal right-sided pleural effusion. Emphysematous changes. No focal consolidative airspace opacity to suggest pneumonia. Resident: Chaim Hurley, the attending radiologist, have reviewed the images and agree with the final report above. Workstation performed: [id number]     XR chest portable    Result Date: 8/11/2023  Narrative: CHEST INDICATION:   tachycardia post op. COMPARISON: Chest radiograph August 8, 2020. EXAM PERFORMED/VIEWS:  XR CHEST PORTABLE FINDINGS: Cardiomediastinal silhouette appears unremarkable. No focal consolidation, pneumothorax or pleural effusion. Osseous structures appear within normal limits for patient age. Impression: No focal consolidation, pleural effusion, or pneumothorax. Workstation performed: [id number]     Colonoscopy    Addendum Date: 8/10/2023 Addendum:   [address] [facility] Endoscopy [address] [facility],Third Floor [address] [phone] DATE OF SERVICE: 8/10/23 PHYSICIAN(S): Attending: Efe Laws MD Fellow: No Staff Documented INDICATION: Anemia, unspecified type POST-OP DIAGNOSIS: See the impression below. HISTORY: Prior colonoscopy: 6 years ago. BOWEL PREPARATION: Golytely/Colyte/Trilyte PREPROCEDURE: Informed consent was obtained for the procedure, including sedation. Risks including but not limited to bleeding, infection, perforation, adverse drug reaction and aspiration were explained in detail. Also explained about less than 100% sensitivity with the exam and other alternatives. The patient was placed in the left lateral decubitus position.  Procedure: Colonoscopy DETAILS OF PROCEDURE: Patient was taken to the procedure room where a time out was performed to confirm correct patient and correct procedure. The patient underwent monitored anesthesia care, which was administered by an anesthesia professional. The patient's blood pressure, heart rate, level of consciousness, oxygen, respirations and ECG were monitored throughout the procedure. A digital rectal exam was performed. A perianal exam was performed. The scope was introduced through the anus and advanced to the cecum. Retroflexion was performed in the cecum and rectum. The quality of bowel preparation was evaluated using the [facility] Bowel Preparation Scale with scores of: right colon = 2, transverse colon = 2, left colon = 2. The total BBPS score was 6. Bowel prep was adequate. The patient experienced no blood loss. The procedure was not difficult. The patient tolerated the procedure well. There were no apparent adverse events.  ANESTHESIA INFORMATION: ASA: IV Anesthesia Type: IV Sedation with Anesthesia MEDICATIONS: No administrations occurring from 0945 to 1034 on 08/10/23 FINDINGS: One sessile, benign-appearing polyp measuring 5-9 mm in the hepatic flexure; no bleeding was identified; completely removed en bloc by cold snare and retrieved specimen One sessile, benign-appearing polyp measuring 5-9 mm in the descending colon; bleeding occurred after intervention; completely removed en bloc by cold snare and retrieved specimen; placed 1 clip successfully; hemostasis achieved Internal small hemorrhoids; no bleeding was identified Healthy end-to-side colocolonic anastomosis in the distal descending colon 20 cm from the anal verge; no bleeding was identified EVENTS: Procedure Events Event Event Time ENDO SCOPE OUT TIME 8/10/2023 10:04 AM ENDO CECUM REACHED 8/10/2023 10:11 AM ENDO SCOPE OUT TIME 8/10/2023 10:23 AM SPECIMENS: ID Type Source Tests Collected by Time Destination 1 : polyp Tissue Large Intestine, Hepatic Flexure TISSUE EXAM Chick MD Vanda 8/10/2023 10:17 AM  2 : polyp Tissue Large Intestine, Left/Descending Colon TISSUE EXAM Amna Franz MD 8/10/2023 10:18 AM  EQUIPMENT: Colonoscope -[id number] IMPRESSION: Subcentimeter polyp in the hepatic flexure was removed with cold snare One polyp measuring 5-9 mm in the descending colon; bleeding occurred after intervention; removed by cold snare; placed 1 clip successfully; hemostasis achieved Small hemorrhoids Healthy end-to-side colocolonic anastomosis in the distal descending colon 20 cm from the anal verge 2 benign appearing polyps removed as noted above, no evidence of primary malignancy seen RECOMMENDATION:  Repeat screening colonoscopy in 10 years  No further screening colonoscopies necessary Resume usual diet Follow H&H and transfuse as needed If signs of recurrent bleeding consider outpatient small bowel capsule If Eliquis is to be restarted and no further procedures, can restart med tomorrow IR follow-up for planned biopsy of hepatic lesion  Amna Franz MD     Addendum Date: 8/10/2023 Addendum:   [address] [facility] Endoscopy [address] [facility],Third Floor [address] [phone] DATE OF SERVICE: 8/10/23 PHYSICIAN(S): Attending: Amna Franz MD Fellow: No Staff Documented INDICATION: Anemia, unspecified type POST-OP DIAGNOSIS: See the impression below. HISTORY: Prior colonoscopy: 6 years ago. BOWEL PREPARATION: Golytely/Colyte/Trilyte PREPROCEDURE: Informed consent was obtained for the procedure, including sedation. Risks including but not limited to bleeding, infection, perforation, adverse drug reaction and aspiration were explained in detail. Also explained about less than 100% sensitivity with the exam and other alternatives. The patient was placed in the left lateral decubitus position. Procedure: Colonoscopy DETAILS OF PROCEDURE: Patient was taken to the procedure room where a time out was performed to confirm correct patient and correct procedure.  The patient underwent monitored anesthesia care, which was administered by an anesthesia professional. The patient's blood pressure, heart rate, level of consciousness, oxygen, respirations and ECG were monitored throughout the procedure. A digital rectal exam was performed. A perianal exam was performed. The scope was introduced through the anus and advanced to the cecum. Retroflexion was performed in the cecum and rectum. The quality of bowel preparation was evaluated using the [facility] Bowel Preparation Scale with scores of: right colon = 2, transverse colon = 2, left colon = 2. The total BBPS score was 6. Bowel prep was adequate. The patient experienced no blood loss. The procedure was not difficult. The patient tolerated the procedure well. There were no apparent adverse events.  ANESTHESIA INFORMATION: ASA: IV Anesthesia Type: IV Sedation with Anesthesia MEDICATIONS: No administrations occurring from 0945 to 1034 on 08/10/23 FINDINGS: One sessile, benign-appearing polyp measuring 5-9 mm in the hepatic flexure; no bleeding was identified; completely removed en bloc by cold snare and retrieved specimen One sessile, benign-appearing polyp measuring 5-9 mm in the descending colon; bleeding occurred after intervention; completely removed en bloc by cold snare and retrieved specimen; placed 1 clip successfully; hemostasis achieved Internal small hemorrhoids; no bleeding was identified Healthy end-to-side colocolonic anastomosis in the distal descending colon 20 cm from the anal verge; no bleeding was identified EVENTS: Procedure Events Event Event Time ENDO SCOPE OUT TIME 8/10/2023 10:04 AM ENDO CECUM REACHED 8/10/2023 10:11 AM ENDO SCOPE OUT TIME 8/10/2023 10:23 AM SPECIMENS: ID Type Source Tests Collected by Time Destination 1 : polyp Tissue Large Intestine, Hepatic Flexure TISSUE EXAM Joanie Estrada MD 8/10/2023 10:17 AM  2 : polyp Tissue Large Intestine, Left/Descending Colon TISSUE EXAM Joanie Estrada MD 8/10/2023 10:18 AM  EQUIPMENT: Colonoscope -[id number] IMPRESSION: Subcentimeter polyp in the hepatic flexure was removed with cold snare One polyp measuring 5-9 mm in the descending colon; bleeding occurred after intervention; removed by cold snare; placed 1 clip successfully; hemostasis achieved Small hemorrhoids Healthy end-to-side colocolonic anastomosis in the distal descending colon 20 cm from the anal verge 2 benign appearing polyps removed as noted above, no evidence of primary malignancy seen RECOMMENDATION:  Repeat screening colonoscopy in 10 years  No further screening colonoscopies necessary Resume usual diet Follow H&H and transfuse as needed If signs of recurrent bleeding consider outpatient small bowel capsule IR follow-up for planned biopsy of hepatic lesion  Vance Henry MD     Result Date: 8/10/2023  Narrative: Table formatting from the original result was not included. [address] [facility] Endoscopy [address] [facility],[address] [facility] [address] [phone] DATE OF SERVICE: 8/10/23 PHYSICIAN(S): Attending: Vance Henry MD Fellow: No Staff Documented INDICATION: Anemia, unspecified type POST-OP DIAGNOSIS: See the impression below. HISTORY: Prior colonoscopy: 6 years ago. BOWEL PREPARATION: Golytely/Colyte/Trilyte PREPROCEDURE: Informed consent was obtained for the procedure, including sedation. Risks including but not limited to bleeding, infection, perforation, adverse drug reaction and aspiration were explained in detail. Also explained about less than 100% sensitivity with the exam and other alternatives. The patient was placed in the left lateral decubitus position. Procedure: Colonoscopy DETAILS OF PROCEDURE: Patient was taken to the procedure room where a time out was performed to confirm correct patient and correct procedure.  The patient underwent monitored anesthesia care, which was administered by an anesthesia professional. The patient's blood pressure, heart rate, level of consciousness, oxygen, respirations and ECG were monitored throughout the procedure. A digital rectal exam was performed. A perianal exam was performed. The scope was introduced through the anus and advanced to the cecum. Retroflexion was performed in the cecum and rectum. The quality of bowel preparation was evaluated using the [facility] Bowel Preparation Scale with scores of: right colon = 2, transverse colon = 2, left colon = 2. The total BBPS score was 6. Bowel prep was adequate. The patient experienced no blood loss. The procedure was not difficult. The patient tolerated the procedure well. There were no apparent adverse events.  ANESTHESIA INFORMATION: ASA: IV Anesthesia Type: IV Sedation with Anesthesia MEDICATIONS: No administrations occurring from 0945 to 1034 on 08/10/23 FINDINGS: One sessile, benign-appearing polyp measuring 5-9 mm in the hepatic flexure; no bleeding was identified; completely removed en bloc by cold snare and retrieved specimen One sessile, benign-appearing polyp measuring 5-9 mm in the descending colon; bleeding occurred after intervention; completely removed en bloc by cold snare and retrieved specimen; placed 1 clip successfully; hemostasis achieved Internal small hemorrhoids; no bleeding was identified Healthy end-to-side colocolonic anastomosis in the distal descending colon 20 cm from the anal verge; no bleeding was identified EVENTS: Procedure Events Event Event Time ENDO SCOPE OUT TIME 8/10/2023 10:04 AM ENDO CECUM REACHED 8/10/2023 10:11 AM ENDO SCOPE OUT TIME 8/10/2023 10:23 AM SPECIMENS: ID Type Source Tests Collected by Time Destination 1 : polyp Tissue Large Intestine, Hepatic Flexure TISSUE EXAM Joe Arteaga MD 8/10/2023 10:17 AM  2 : polyp Tissue Large Intestine, Left/Descending Colon TISSUE EXAM Joe Arteaga MD 8/10/2023 10:18 AM  EQUIPMENT: Colonoscope -[id number]     Impression: Subcentimeter polyp in the hepatic flexure was removed with cold snare One polyp measuring 5-9 mm in the descending colon; bleeding occurred after intervention; removed by cold snare; placed 1 clip successfully; hemostasis achieved Small hemorrhoids Healthy end-to-side colocolonic anastomosis in the distal descending colon 20 cm from the anal verge 2 benign appearing polyps removed as noted above, no evidence of primary malignancy seen RECOMMENDATION:  Repeat screening colonoscopy in 10 years  No further screening colonoscopies necessary  Age greater than 72 Overall health  Resume usual diet Follow H&H and transfuse as needed If signs of recurrent bleeding consider outpatient small bowel capsule IR follow-up for planned biopsy of hepatic lesion  Julia Calhoun MD     EGD    Addendum Date: 8/10/2023 Addendum:   [address] [facility] Endoscopy [address] [facility],Third Floor [address] [phone] DATE OF SERVICE: 8/10/23 PHYSICIAN(S): Attending: Julia Calhoun MD Fellow: No Staff Documented INDICATION: Anemia, unspecified type POST-OP DIAGNOSIS: See the impression below. PREPROCEDURE: Informed consent was obtained for the procedure, including sedation. Risks of perforation, hemorrhage, adverse drug reaction and aspiration were discussed. The patient was placed in the left lateral decubitus position. Patient was explained about the risks and benefits of the procedure. Risks including but not limited to bleeding, infection, and perforation were explained in detail. Also explained about less than 100% sensitivity with the exam and other alternatives. PROCEDURE: EGD DETAILS OF PROCEDURE: Patient was taken to the procedure room where a time out was performed to confirm correct patient and correct procedure. The patient underwent monitored anesthesia care, which was administered by an anesthesia professional. The patient's blood pressure, heart rate, level of consciousness, respirations and oxygen were monitored throughout the procedure. The scope was introduced through the mouth and advanced to the proximal part of the jejunum.  Retroflexion was performed in the fundus. The patient experienced no blood loss. The procedure was not difficult. The patient tolerated the procedure well. There were no apparent adverse events. ANESTHESIA INFORMATION: ASA: IV Anesthesia Type: IV Sedation with Anesthesia MEDICATIONS: No administrations occurring from 0945 to 1031 on 08/10/23 FINDINGS: 2 small angioectasias in the fundus of the stomach and body of the stomach; bleeding was observed; placed 2 clips successfully; hemostasis achieved. 2 small gastric angioectasias, 1 in the fundus with oozing prior to clipping. Good hemostasis after clipping of both lesions. Single small angioectasia in the 3rd part of the duodenum; no bleeding was identified; placed 1 clip successfully The esophagus appeared normal. Z-line is 39 cm from the incisors. SPECIMENS: ID Type Source Tests Collected by Time Destination 1 : polyp Tissue Large Intestine, Hepatic Flexure TISSUE EXAM Caryle Bridge, MD 8/10/2023 10:17 AM  2 : polyp Tissue Large Intestine, Left/Descending Colon TISSUE EXAM Caryle Bridge, MD 8/10/2023 10:18 AM  IMPRESSION: 2 small angioectasias in the fundus of the stomach and body of the stomach; bleeding was observed; placed 2 clips successfully; hemostasis achieved Single small angioectasia in the 3rd part of the duodenum; placed 1 clip successfully The esophagus appeared normal. No evidence of primary malignancy RECOMMENDATION: Okay to resume usual diet Observe for any clinical signs of rebleeding Follow H&H and transfuse as needed If signs of rebleeding, recommend outpatient capsule endoscopy. Caryle Bridge, MD     Result Date: 8/10/2023  Narrative: Table formatting from the original result was not included.  [address] [facility] End[facility] [address] [facility] [address] [phone] DATE OF SERVICE: 8/10/23 PHYSICIAN(S): Attending: Caryle Bridge, MD Fellow: No Staff Documented INDICATION: Anemia, unspecified type POST-OP DIAGNOSIS: See the impression below. PREPROCEDURE: Informed consent was obtained for the procedure, including sedation. Risks of perforation, hemorrhage, adverse drug reaction and aspiration were discussed. The patient was placed in the left lateral decubitus position. Patient was explained about the risks and benefits of the procedure. Risks including but not limited to bleeding, infection, and perforation were explained in detail. Also explained about less than 100% sensitivity with the exam and other alternatives. PROCEDURE: EGD DETAILS OF PROCEDURE: Patient was taken to the procedure room where a time out was performed to confirm correct patient and correct procedure. The patient underwent monitored anesthesia care, which was administered by an anesthesia professional. The patient's blood pressure, heart rate, level of consciousness, respirations and oxygen were monitored throughout the procedure. The scope was introduced through the mouth and advanced to the proximal part of the jejunum. Retroflexion was performed in the fundus. The patient experienced no blood loss. The procedure was not difficult. The patient tolerated the procedure well. There were no apparent adverse events. ANESTHESIA INFORMATION: ASA: IV Anesthesia Type: IV Sedation with Anesthesia MEDICATIONS: No administrations occurring from 0945 to 1031 on 08/10/23 FINDINGS: 2 small angioectasias in the fundus of the stomach and body of the stomach; bleeding was observed; placed 2 clips successfully; hemostasis achieved. 2 small gastric angioectasias, 1 in the fundus with oozing prior to clipping. Good hemostasis after clipping of both lesions. Single small angioectasia in the 3rd part of the duodenum; no bleeding was identified; placed 1 clip successfully The esophagus appeared normal. Z-line is 39 cm from the incisors.  SPECIMENS: ID Type Source Tests Collected by Time Destination 1 : polyp Tissue Large Intestine, Hepatic Flexure TISSUE EXAM Faizan Ontiveros MD 8/10/2023 10:17 AM 2 : polyp Tissue Large Intestine, Left/Descending Colon TISSUE EXAM Amna Franz MD 8/10/2023 10:18 AM      Impression: 2 small angioectasias in the fundus of the stomach and body of the stomach; bleeding was observed; placed 2 clips successfully; hemostasis achieved Single small angioectasia in the 3rd part of the duodenum; placed 1 clip successfully The esophagus appeared normal. RECOMMENDATION: Okay to resume usual diet Observe for any clinical signs of rebleeding Follow H&H and transfuse as needed If signs of rebleeding, recommend outpatient capsule endoscopy. Amna Franz MD     XR chest portable    Result Date: 8/8/2023  Narrative: CHEST INDICATION:   Hypoxia. COMPARISON: Portable chest from August 7, 2023. CTA of the chest from May 5, 2023. EXAM PERFORMED/VIEWS:  XR CHEST PORTABLE FINDINGS: Heart enlarged. Pulmonary vessels mildly distended. Reticular opacities throughout both lungs, developing since 8/7/2023, superimposed on emphysematous changes, especially in the upper lobes. Small right pleural effusion. No evidence of pneumothorax. Osseous structures appear within normal limits for patient age. Impression: CHF and pulmonary edema, superimposed on emphysema. Small right pleural effusion. Workstation performed: [id number]     Echo follow up/limited w/ contrast if indicated    Result Date: 8/8/2023  Narrative: •  Left Ventricle: Left ventricular cavity size is normal. Wall thickness is mildly increased. The left ventricular ejection fraction is 45%. Systolic function is mildly reduced. There is mild global hypokinesis. Diastolic function is mildly abnormal, consistent with grade I (abnormal) relaxation. •  Right Ventricle: Right ventricular cavity size is normal. Systolic function is normal. •  Aortic Valve: There is trace regurgitation. •  Mitral Valve: There is mild regurgitation. •  Tricuspid Valve: There is mild regurgitation.      TRAUMA - CT spine cervical wo contrast    Result Date: 8/7/2023  Narrative: CT CERVICAL SPINE - WITHOUT CONTRAST INDICATION:   TRAUMA. COMPARISON:  None. TECHNIQUE:  CT examination of the cervical spine was performed without intravenous contrast.  Contiguous axial images were obtained. Multiplanar 2D reformatted images were created from the source data. Radiation dose length product (DLP) for this visit:  390 mGy-cm . This examination, like all CT scans performed in the [address] [place] [address], was performed utilizing techniques to minimize radiation dose exposure, including the use of iterative reconstruction and automated exposure control. IMAGE QUALITY:  Diagnostic. FINDINGS: ALIGNMENT:  Normal alignment of the cervical spine. No subluxation. VERTEBRAE:  No fracture. DEGENERATIVE CHANGES: Mild multilevel cervical degenerative changes are noted without critical central canal stenosis. PREVERTEBRAL AND PARASPINAL SOFT TISSUES: No prevertebral soft tissue swelling. Bilateral carotid artery calcification. Focal calcification of the nuchal ligament at the level of C4-5. THORACIC INLET: COPD. Impression: No cervical spine fracture or traumatic malalignment. Workstation performed: [id number]     TRAUMA - CT head wo contrast    Result Date: 8/7/2023  Narrative: CT BRAIN - WITHOUT CONTRAST INDICATION:   TRAUMA. COMPARISON:  None. TECHNIQUE:  CT examination of the brain was performed. Multiplanar 2D reformatted images were created from the source data. Radiation dose length product (DLP) for this visit:  932 mGy-cm . This examination, like all CT scans performed in the [address] [place] [address], was performed utilizing techniques to minimize radiation dose exposure, including the use of iterative reconstruction and automated exposure control. IMAGE QUALITY:  Diagnostic. FINDINGS: PARENCHYMA:  No intracranial mass, mass effect or midline shift. No CT signs of acute infarction. No acute parenchymal hemorrhage.  Periventricular, centrum semiovale, and subcortical white matter hypodensity is a nonspecific finding likely representing chronic microangiopathy. Calcification bilateral cavernous internal carotid arteries. VENTRICLES AND EXTRA-AXIAL SPACES: No acute hydrocephalus. Mild prominence of CSF spaces diffusely attributed to generalized volume loss. VISUALIZED ORBITS: Changes of bilateral lens replacements noted. PARANASAL SINUSES: Trace bifrontal and right maxillary sinus mucosal thickening. CALVARIUM AND EXTRACRANIAL SOFT TISSUES:  Normal.     Impression: No acute intracranial process. No skull fracture. Chronic microangiopathy. Workstation performed: [id number]     XR Trauma chest portable    Result Date: 8/7/2023  Narrative: CHEST INDICATION:   TRAUMA. COMPARISON: 5/5/2023 EXAM PERFORMED/VIEWS:  XR CHEST PORTABLE FINDINGS: Study limited by portable technique and large body habitus. Lungs are clear. No effusion or pneumothorax. Heart, mediastinal and hilar structures are within normal limits. No acute osseous or soft tissue pathology. Findings agree with the ED preliminary interpretation. Impression: No acute cardiopulmonary findings. Workstation performed: [id number]       EKG personally reviewed by Bren Berumen MD.     Counseling / Coordination of Care  Total floor / unit time spent today 30 minutes. Greater than 50% of total time was spent with the patient and / or family counseling and / or coordination of care.

## 2023-08-25 NOTE — ASSESSMENT & PLAN NOTE
Wt Readings from Last 3 Encounters:   08/25/23 94.3 kg (208 lb)   08/19/23 92.7 kg (204 lb 5.9 oz)   08/14/23 95 kg (209 lb 7 oz)     · Patient originally presented to [address] [place] [address] ED with complaint of chest discomfort and intermittent shortness of breath. In ED, patient hypoxic required 2 L oxygen via nasal cannula, elevated proBNP noted and chest x-ray showed right-sided pleural effusion.   · Echocardiogram with LVEF 50-55%, basal inferior mid inferior hypokinetic  · Underwent nuclear stress test that showed large fixed defect suspicious for RCA territory infarct - subsequently transferred to [facility] for cardiac catheterization   · Patient currently appears euvolemic, diuretics on hold in setting of elevated creatinine and cardiac cath  · Appreciate ongoing cardiology/nephrology recommendations   · Monitor daily weights, intake and output   · Monitor creatinine  · Appears euvolemic on exam-patient is receiving IV fluids per cardiac catheterization protocol

## 2023-08-25 NOTE — TREATMENT TEAM
Renal on call note. Received message from pt RN that pt and wife are requesting mucomyst as they had received it prior to recent cath earlier this week    From a data perspective data is mixed on mucomyst, but given the risk vs benefit, no objection to dose. Main aspect of DAVON prevention is already in progress with IVF/vol expansion. I have asked RN to review with pt about mucomyst. Order placed.

## 2023-08-26 VITALS
TEMPERATURE: 98.2 F | RESPIRATION RATE: 18 BRPM | DIASTOLIC BLOOD PRESSURE: 59 MMHG | OXYGEN SATURATION: 95 % | HEIGHT: 72 IN | HEART RATE: 58 BPM | WEIGHT: 208.22 LBS | SYSTOLIC BLOOD PRESSURE: 134 MMHG | BODY MASS INDEX: 28.2 KG/M2

## 2023-08-26 LAB
ANION GAP SERPL CALCULATED.3IONS-SCNC: 6 MMOL/L
BUN SERPL-MCNC: 35 MG/DL (ref 5–25)
CALCIUM SERPL-MCNC: 8.4 MG/DL (ref 8.4–10.2)
CHLORIDE SERPL-SCNC: 111 MMOL/L (ref 96–108)
CO2 SERPL-SCNC: 23 MMOL/L (ref 21–32)
CREAT SERPL-MCNC: 1.24 MG/DL (ref 0.6–1.3)
GFR SERPL CREATININE-BSD FRML MDRD: 55 ML/MIN/1.73SQ M
GLUCOSE SERPL-MCNC: 116 MG/DL (ref 65–140)
GLUCOSE SERPL-MCNC: 119 MG/DL (ref 65–140)
GLUCOSE SERPL-MCNC: 182 MG/DL (ref 65–140)
HCT VFR BLD AUTO: 33.1 % (ref 36.5–49.3)
HCT VFR BLD AUTO: 35.2 % (ref 36.5–49.3)
HGB BLD-MCNC: 9.5 G/DL (ref 12–17)
HGB BLD-MCNC: 9.9 G/DL (ref 12–17)
MCH RBC QN AUTO: 22.2 PG (ref 26.8–34.3)
MCHC RBC AUTO-ENTMCNC: 28.7 G/DL (ref 31.4–37.4)
MCV RBC AUTO: 78 FL (ref 82–98)
PLATELET # BLD AUTO: 264 THOUSANDS/UL (ref 149–390)
POTASSIUM SERPL-SCNC: 4.4 MMOL/L (ref 3.5–5.3)
RBC # BLD AUTO: 4.27 MILLION/UL (ref 3.88–5.62)
SODIUM SERPL-SCNC: 140 MMOL/L (ref 135–147)
WBC # BLD AUTO: 8.28 THOUSAND/UL (ref 4.31–10.16)

## 2023-08-26 PROCEDURE — 85018 HEMOGLOBIN: CPT | Performed by: FAMILY MEDICINE

## 2023-08-26 PROCEDURE — 85027 COMPLETE CBC AUTOMATED: CPT | Performed by: INTERNAL MEDICINE

## 2023-08-26 PROCEDURE — 99232 SBSQ HOSP IP/OBS MODERATE 35: CPT | Performed by: INTERNAL MEDICINE

## 2023-08-26 PROCEDURE — 85014 HEMATOCRIT: CPT | Performed by: FAMILY MEDICINE

## 2023-08-26 PROCEDURE — 82948 REAGENT STRIP/BLOOD GLUCOSE: CPT

## 2023-08-26 PROCEDURE — 99239 HOSP IP/OBS DSCHRG MGMT >30: CPT | Performed by: FAMILY MEDICINE

## 2023-08-26 PROCEDURE — 80048 BASIC METABOLIC PNL TOTAL CA: CPT | Performed by: INTERNAL MEDICINE

## 2023-08-26 RX ORDER — POTASSIUM CITRATE 15 MEQ/1
1 TABLET, EXTENDED RELEASE ORAL 2 TIMES DAILY
Start: 2023-08-26

## 2023-08-26 RX ORDER — METOPROLOL SUCCINATE 50 MG/1
25 TABLET, EXTENDED RELEASE ORAL DAILY
Refills: 0
Start: 2023-08-26 | End: 2023-08-30 | Stop reason: SDUPTHER

## 2023-08-26 RX ORDER — CLOPIDOGREL BISULFATE 75 MG/1
75 TABLET ORAL DAILY
Qty: 30 TABLET | Refills: 0 | Status: SHIPPED | OUTPATIENT
Start: 2023-08-27 | End: 2023-08-30 | Stop reason: SDUPTHER

## 2023-08-26 RX ORDER — ATORVASTATIN CALCIUM 40 MG/1
40 TABLET, FILM COATED ORAL
Qty: 30 TABLET | Refills: 0 | Status: SHIPPED | OUTPATIENT
Start: 2023-08-26 | End: 2023-08-30 | Stop reason: SDUPTHER

## 2023-08-26 RX ORDER — INSULIN ASPART 100 [IU]/ML
40 INJECTION, SOLUTION INTRAVENOUS; SUBCUTANEOUS
Start: 2023-08-26 | End: 2023-11-24

## 2023-08-26 RX ADMIN — CLOPIDOGREL BISULFATE 75 MG: 75 TABLET ORAL at 08:23

## 2023-08-26 RX ADMIN — PREGABALIN 100 MG: 100 CAPSULE ORAL at 08:23

## 2023-08-26 RX ADMIN — APIXABAN 5 MG: 5 TABLET, FILM COATED ORAL at 08:23

## 2023-08-26 RX ADMIN — UMECLIDINIUM BROMIDE AND VILANTEROL TRIFENATATE 1 PUFF: 62.5; 25 POWDER RESPIRATORY (INHALATION) at 08:27

## 2023-08-26 RX ADMIN — SERTRALINE HYDROCHLORIDE 25 MG: 25 TABLET ORAL at 08:23

## 2023-08-26 RX ADMIN — FERROUS SULFATE TAB 325 MG (65 MG ELEMENTAL FE) 325 MG: 325 (65 FE) TAB at 08:23

## 2023-08-26 RX ADMIN — PANTOPRAZOLE SODIUM 40 MG: 40 TABLET, DELAYED RELEASE ORAL at 06:00

## 2023-08-26 RX ADMIN — INSULIN LISPRO 1 UNITS: 100 INJECTION, SOLUTION INTRAVENOUS; SUBCUTANEOUS at 12:21

## 2023-08-26 RX ADMIN — INSULIN LISPRO 12 UNITS: 100 INJECTION, SOLUTION INTRAVENOUS; SUBCUTANEOUS at 08:21

## 2023-08-26 RX ADMIN — ACETYLCYSTEINE 1200 MG: 200 SOLUTION ORAL; RESPIRATORY (INHALATION) at 08:22

## 2023-08-26 RX ADMIN — METOPROLOL SUCCINATE 25 MG: 25 TABLET, EXTENDED RELEASE ORAL at 08:26

## 2023-08-26 RX ADMIN — INSULIN LISPRO 12 UNITS: 100 INJECTION, SOLUTION INTRAVENOUS; SUBCUTANEOUS at 12:21

## 2023-08-26 NOTE — PROGRESS NOTES
Cardiology Progress Note - Felicia Ricci 69 y.o. male MRN: [id number]    Unit/Bed#: -01 Encounter: [id number]      Assessment:  Principal Problem:    Acute on chronic diastolic HF (heart failure) ([address])  Active Problems:    Stage 3 chronic kidney disease, unspecified whether stage 3a or 3b CKD (HCC)    Type 2 diabetes mellitus with neurologic complication, with long-term current use of insulin (HCC)    Atrial fibrillation, unspecified type (HCC)    Anemia    CAD (coronary artery disease)    Hepatocellular carcinoma (HCC)    Coronary artery disease involving native coronary artery    Status post insertion of drug eluting coronary artery stent      Plan:  Patient is comfortable today. He has no chest pain or significant dyspnea. He is in sinus rhythm on telemetry with heart rate in the 60s. Had LAD and LCx PCI yesterday. Patient feels well. Hemoglobin today 9.5. BMP pending. Nephrology note appreciated. Okay for discharge from cardiac point of view as long as creatinine is stable and nephrology approves. He will follow-up with our office as an outpatient at the [address] [place] [address] office. Subjective:   Patient seen and examined. No significant events overnight.  negative. Objective:     Vitals: Blood pressure (!) 125/48, pulse 62, temperature 98.2 °F (36.8 °C), resp. rate 18, height 6' (1.829 m), weight 94.4 kg (208 lb 3.6 oz), SpO2 94 %. , Body mass index is 28.24 kg/m².,   Orthostatic Blood Pressures    Flowsheet Row Most Recent Value   Blood Pressure 125/48 filed at 08/26/2023 0240   Patient Position - Orthostatic VS Lying filed at 08/25/2023 1153      ,      Intake/Output Summary (Last 24 hours) at 8/26/2023 0746  Last data filed at 8/26/2023 0600  Gross per 24 hour   Intake 744.5 ml   Output 2050 ml   Net -1305.5 ml       No significant arrhythmias seen on telemetry review.        Physical Exam:    GEN: Felicia Ricci appears well, alert and oriented x 3, pleasant and cooperative   NECK: supple, no carotid bruits, no JVD or HJR  HEART: normal rate, regular rhythm, normal S1 and S2, no murmurs, clicks, gallops or rubs   LUNGS: clear to auscultation bilaterally; no wheezes, rales, or rhonchi   ABDOMEN: normal bowel sounds, soft, no tenderness, no distention  EXTREMITIES: peripheral pulses normal; no clubbing, cyanosis, or edema  SKIN: warm and well perfused, no suspicious lesions on exposed skin    Labs & Results:    Admission on 08/20/2023   Component Date Value   • WBC 08/20/2023 9.66    • RBC 08/20/2023 5.21    • Hemoglobin 08/20/2023 10.9 (L)    • Hematocrit 08/20/2023 38.8    • MCV 08/20/2023 75 (L)    • MCH 08/20/2023 20.9 (L)    • MCHC 08/20/2023 28.1 (L)    • RDW 08/20/2023 35.2 (H)    • Platelets 98/61/8891 287    • Sodium 08/20/2023 140    • Potassium 08/20/2023 4.4    • Chloride 08/20/2023 108    • CO2 08/20/2023 27    • ANION GAP 08/20/2023 5    • BUN 08/20/2023 58 (H)    • Creatinine 08/20/2023 1.82 (H)    • Glucose 08/20/2023 157 (H)    • Calcium 08/20/2023 9.5    • eGFR 08/20/2023 35    • TSH 3RD GENERATON 08/20/2023 3.267    • POC Glucose 08/20/2023 236 (H)    • POC Glucose 08/20/2023 228 (H)    • POC Glucose 08/20/2023 130    • POC Glucose 08/20/2023 151 (H)    • WBC 08/21/2023 8.65    • RBC 08/21/2023 5.02    • Hemoglobin 08/21/2023 10.6 (L)    • Hematocrit 08/21/2023 37.6    • MCV 08/21/2023 75 (L)    • MCH 08/21/2023 21.1 (L)    • MCHC 08/21/2023 28.2 (L)    • RDW 08/21/2023 35.2 (H)    • Platelets 35/61/4157 294    • Sodium 08/21/2023 142    • Potassium 08/21/2023 4.4    • Chloride 08/21/2023 111 (H)    • CO2 08/21/2023 25    • ANION GAP 08/21/2023 6    • BUN 08/21/2023 53 (H)    • Creatinine 08/21/2023 1.71 (H)    • Glucose 08/21/2023 124    • Calcium 08/21/2023 9.4    • eGFR 08/21/2023 37    • POC Glucose 08/20/2023 280 (H)    • POC Glucose 08/21/2023 131    • POC Glucose 08/21/2023 156 (H)    • POC Glucose 08/21/2023 179 (H)    • POC Glucose 08/21/2023 248 (H)    • WBC 08/22/2023 7.91    • RBC 08/22/2023 4.76    • Hemoglobin 08/22/2023 10.3 (L)    • Hematocrit 08/22/2023 36.3 (L)    • MCV 08/22/2023 76 (L)    • MCH 08/22/2023 21.6 (L)    • MCHC 08/22/2023 28.4 (L)    • RDW 08/22/2023 35.3 (H)    • Platelets 75/87/8774 281    • Sodium 08/22/2023 143    • Potassium 08/22/2023 4.7    • Chloride 08/22/2023 112 (H)    • CO2 08/22/2023 24    • ANION GAP 08/22/2023 7    • BUN 08/22/2023 47 (H)    • Creatinine 08/22/2023 1.66 (H)    • Glucose 08/22/2023 124    • Calcium 08/22/2023 9.0    • eGFR 08/22/2023 39    • Magnesium 08/22/2023 2.2    • Phosphorus 08/22/2023 4.1    • POC Glucose 08/22/2023 122    • POC Glucose 08/22/2023 209 (H)    • POC Glucose 08/22/2023 80    • POC Glucose 08/22/2023 195 (H)    • WBC 08/23/2023 7.00    • RBC 08/23/2023 4.61    • Hemoglobin 08/23/2023 10.1 (L)    • Hematocrit 08/23/2023 35.2 (L)    • MCV 08/23/2023 76 (L)    • MCH 08/23/2023 21.9 (L)    • MCHC 08/23/2023 28.7 (L)    • RDW 08/23/2023 35.1 (H)    • Platelets 00/41/2042 273    • Sodium 08/23/2023 141    • Potassium 08/23/2023 4.4    • Chloride 08/23/2023 108    • CO2 08/23/2023 25    • ANION GAP 08/23/2023 8    • BUN 08/23/2023 44 (H)    • Creatinine 08/23/2023 1.47 (H)    • Glucose 08/23/2023 107    • Calcium 08/23/2023 8.7    • eGFR 08/23/2023 45    • POC Glucose 08/23/2023 112    • POC Glucose 08/23/2023 231 (H)    • POC Glucose 08/23/2023 163 (H)    • POC Glucose 08/23/2023 227 (H)    • Ventricular Rate 08/23/2023 51    • Atrial Rate 08/23/2023 51    • WA Interval 08/23/2023 280    • QRSD Interval 08/23/2023 138    • QT Interval 08/23/2023 452    • QTC Interval 08/23/2023 416    • P Axis 08/23/2023 32    • QRS Axis 08/23/2023 -46    • T Wave Axis 08/23/2023 -43    • WBC 08/24/2023 7.73    • RBC 08/24/2023 4.58    • Hemoglobin 08/24/2023 10.1 (L)    • Hematocrit 08/24/2023 35.3 (L)    • MCV 08/24/2023 77 (L)    • MCH 08/24/2023 22.1 (L)    • MCHC 08/24/2023 28.6 (L)    • RDW 08/24/2023 34.9 (H)    • Platelets 86/15/4024 257    • Sodium 08/24/2023 142    • Potassium 08/24/2023 4.2    • Chloride 08/24/2023 108    • CO2 08/24/2023 25    • ANION GAP 08/24/2023 9    • BUN 08/24/2023 45 (H)    • Creatinine 08/24/2023 1.45 (H)    • Glucose 08/24/2023 102    • Calcium 08/24/2023 8.6    • eGFR 08/24/2023 46    • POC Glucose 08/24/2023 107    • POC Glucose 08/24/2023 196 (H)    • POC Glucose 08/24/2023 199 (H)    • POC Glucose 08/24/2023 162 (H)    • WBC 08/25/2023 7.44    • RBC 08/25/2023 4.51    • Hemoglobin 08/25/2023 10.2 (L)    • Hematocrit 08/25/2023 34.7 (L)    • MCV 08/25/2023 77 (L)    • MCH 08/25/2023 22.6 (L)    • MCHC 08/25/2023 29.4 (L)    • Platelets 14/77/6769 291    • Sodium 08/25/2023 141    • Potassium 08/25/2023 4.4    • Chloride 08/25/2023 110 (H)    • CO2 08/25/2023 23    • ANION GAP 08/25/2023 8    • BUN 08/25/2023 42 (H)    • Creatinine 08/25/2023 1.38 (H)    • Glucose 08/25/2023 104    • Calcium 08/25/2023 8.5    • eGFR 08/25/2023 48    • POC Glucose 08/25/2023 115    • Activated Clotting Time,* 08/25/2023 341 (H)    • Specimen Type 08/25/2023 VENOUS    • Ventricular Rate 08/25/2023 51    • Atrial Rate 08/25/2023 51    • VA Interval 08/25/2023 264    • QRSD Interval 08/25/2023 136    • QT Interval 08/25/2023 492    • QTC Interval 08/25/2023 453    • P Axis 08/25/2023 40    • QRS Axis 08/25/2023 -51    • T Wave Axis 08/25/2023 -34    • POC Glucose 08/25/2023 138    • POC Glucose 08/25/2023 179 (H)    • POC Glucose 08/25/2023 187 (H)    • WBC 08/26/2023 8.28    • RBC 08/26/2023 4.27    • Hemoglobin 08/26/2023 9.5 (L)    • Hematocrit 08/26/2023 33.1 (L)    • MCV 08/26/2023 78 (L)    • MCH 08/26/2023 22.2 (L)    • MCHC 08/26/2023 28.7 (L)    • Platelets 47/93/1733 264    • POC Glucose 08/26/2023 119        Cardiac catheterization    Result Date: 8/25/2023  Narrative: •  Multivessel PCI with CALLIE placement circumflex, ramus/high diagonal branch and proximal LAD     Stress strip    Result Date: 8/22/2023  Narrative: Confirmed by UPPER BUCKS, INBASKET ([id number]),  Tiffanie Ruggiero ([id number]) on 5/47/7979 10:24:20 AM    Cardiac catheterization    Result Date: 8/21/2023  Narrative: •  3V CAD in nondominant RCA system     NM myocardial perfusion spect (rx stress and/or rest)    Result Date: 8/18/2023  Narrative: •  Stress ECG: A pharmacological stress test was performed using regadenoson. The patient reached the end of the protocol. The patient reported nausea during the stress test. The patient also reports chest pain during recovery. All symptoms ended during recovery. •  Stress ECG: No ST deviation is noted. The stress ECG is negative for ischemia after pharmacologic vasodilation. •  Stress Function: Left ventricular function post-stress is abnormal. Global function is moderately reduced. There was a single regional abnormality during stress. Stress ejection fraction is 36 %. There is a defect in the basal to mid inferior and inferolateral location(s). The defect has moderately reduced function. •  Perfusion: There is a left ventricular perfusion defect that is medium to large in size with moderate reduction in uptake present in the basal to mid inferior and inferolateral location(s) that is fixed. •  Stress Combined Conclusion: The ECG and SPECT imaging portions of the stress study are concordant with no evidence of stress induced myocardial ischemia. Left ventricular perfusion is abnormal. There is a probable infarct. Abnormal NM SPECT MPI. No ECG or SPECT evidence of ischemia with pharmacologic vasodilator stress. There is a large fixed defect of the inferior and inferolateral walls with associated wall motion abnormality suspicious for RCA territory infarct. Diaphragmatic attenuation and subdiaphragmatic activity both confound this assessment. Globally reduced LV systolic function by gated SPECT with aforementioned regional abnormality.      XR chest pa & lateral    Result Date: 8/18/2023  Narrative: CHEST INDICATION:   Shortness of breath. COMPARISON: Chest x-ray 8/15/2023 EXAM PERFORMED/VIEWS:  XR CHEST PA & LATERAL FINDINGS: Heart is mildly enlarged, stable. The lungs are clear. No pneumothorax or pleural effusion. Osseous structures appear within normal limits for patient age. Impression: No active pulmonary disease. Workstation performed: [id number]     Echo follow up/limited w/ contrast if indicated    Result Date: 8/16/2023  Narrative: •  Left Ventricle: Left ventricular cavity size is normal. Wall thickness is normal. The left ventricular ejection fraction is 50-55% by visual estimation. . Systolic function is low normal. •  The following segments are hypokinetic: basal inferior and mid inferior. •  All other segments are normal. •  Right Ventricle: Right ventricular cavity size is normal. Systolic function is normal.     XR chest pa & lateral    Result Date: 8/15/2023  Narrative: CHEST INDICATION:   Chest pain, dizziness and shortness of breath. COMPARISON: Chest radiograph dated August 10, 2023. EXAM PERFORMED/VIEWS:  XR CHEST PA & LATERAL Images: 3. FINDINGS: Cardiomediastinal silhouette appears unremarkable. Prominent interstitial markings likely secondary to poor inspiratory effort and posture of patient. Lungs otherwise clear. Slight blunting of the right costophrenic angle. No pneumothorax. No acute osseous abnormality. Impression: Minimal right-sided pleural effusion. Emphysematous changes. No focal consolidative airspace opacity to suggest pneumonia. Resident: Xenia Daily, the attending radiologist, have reviewed the images and agree with the final report above. Workstation performed: [id number]     XR chest portable    Result Date: 8/11/2023  Narrative: CHEST INDICATION:   tachycardia post op. COMPARISON: Chest radiograph August 8, 2020. EXAM PERFORMED/VIEWS:  XR CHEST PORTABLE FINDINGS: Cardiomediastinal silhouette appears unremarkable. No focal consolidation, pneumothorax or pleural effusion.  Osseous structures appear within normal limits for patient age. Impression: No focal consolidation, pleural effusion, or pneumothorax. Workstation performed: [id number]     Colonoscopy    Addendum Date: 8/10/2023 Addendum:   [address] [facility] Endoscopy [address] [facility],Third Floor [address] [phone] DATE OF SERVICE: 8/10/23 PHYSICIAN(S): Attending: Jacque Chavez MD Fellow: No Staff Documented INDICATION: Anemia, unspecified type POST-OP DIAGNOSIS: See the impression below. HISTORY: Prior colonoscopy: 6 years ago. BOWEL PREPARATION: Golytely/Colyte/Trilyte PREPROCEDURE: Informed consent was obtained for the procedure, including sedation. Risks including but not limited to bleeding, infection, perforation, adverse drug reaction and aspiration were explained in detail. Also explained about less than 100% sensitivity with the exam and other alternatives. The patient was placed in the left lateral decubitus position. Procedure: Colonoscopy DETAILS OF PROCEDURE: Patient was taken to the procedure room where a time out was performed to confirm correct patient and correct procedure. The patient underwent monitored anesthesia care, which was administered by an anesthesia professional. The patient's blood pressure, heart rate, level of consciousness, oxygen, respirations and ECG were monitored throughout the procedure. A digital rectal exam was performed. A perianal exam was performed. The scope was introduced through the anus and advanced to the cecum. Retroflexion was performed in the cecum and rectum. The quality of bowel preparation was evaluated using the [facility] Bowel Preparation Scale with scores of: right colon = 2, transverse colon = 2, left colon = 2. The total BBPS score was 6. Bowel prep was adequate. The patient experienced no blood loss. The procedure was not difficult. The patient tolerated the procedure well. There were no apparent adverse events.  ANESTHESIA INFORMATION: ASA: IV Anesthesia Type: IV Sedation with Anesthesia MEDICATIONS: No administrations occurring from 0945 to 1034 on 08/10/23 FINDINGS: One sessile, benign-appearing polyp measuring 5-9 mm in the hepatic flexure; no bleeding was identified; completely removed en bloc by cold snare and retrieved specimen One sessile, benign-appearing polyp measuring 5-9 mm in the descending colon; bleeding occurred after intervention; completely removed en bloc by cold snare and retrieved specimen; placed 1 clip successfully; hemostasis achieved Internal small hemorrhoids; no bleeding was identified Healthy end-to-side colocolonic anastomosis in the distal descending colon 20 cm from the anal verge; no bleeding was identified EVENTS: Procedure Events Event Event Time ENDO SCOPE OUT TIME 8/10/2023 10:04 AM ENDO CECUM REACHED 8/10/2023 10:11 AM ENDO SCOPE OUT TIME 8/10/2023 10:23 AM SPECIMENS: ID Type Source Tests Collected by Time Destination 1 : polyp Tissue Large Intestine, Hepatic Flexure TISSUE EXAM Inetta Hodgkins, MD 8/10/2023 10:17 AM  2 : polyp Tissue Large Intestine, Left/Descending Colon TISSUE EXAM Inetta Hodgkins, MD 8/10/2023 10:18 AM  EQUIPMENT: Colonoscope -[id number] IMPRESSION: Subcentimeter polyp in the hepatic flexure was removed with cold snare One polyp measuring 5-9 mm in the descending colon; bleeding occurred after intervention; removed by cold snare; placed 1 clip successfully; hemostasis achieved Small hemorrhoids Healthy end-to-side colocolonic anastomosis in the distal descending colon 20 cm from the anal verge 2 benign appearing polyps removed as noted above, no evidence of primary malignancy seen RECOMMENDATION:  Repeat screening colonoscopy in 10 years  No further screening colonoscopies necessary Resume usual diet Follow H&H and transfuse as needed If signs of recurrent bleeding consider outpatient small bowel capsule If Eliquis is to be restarted and no further procedures, can restart med tomorrow IR follow-up for planned biopsy of hepatic lesion  Autumn Velazquez MD     Addendum Date: 8/10/2023 Addendum:   [address] [facility] Endoscopy [address] [facility],Third [address] [address] [phone] DATE OF SERVICE: 8/10/23 PHYSICIAN(S): Attending: Autumn Velazquez MD Fellow: No Staff Documented INDICATION: Anemia, unspecified type POST-OP DIAGNOSIS: See the impression below. HISTORY: Prior colonoscopy: 6 years ago. BOWEL PREPARATION: Golytely/Colyte/Trilyte PREPROCEDURE: Informed consent was obtained for the procedure, including sedation. Risks including but not limited to bleeding, infection, perforation, adverse drug reaction and aspiration were explained in detail. Also explained about less than 100% sensitivity with the exam and other alternatives. The patient was placed in the left lateral decubitus position. Procedure: Colonoscopy DETAILS OF PROCEDURE: Patient was taken to the procedure room where a time out was performed to confirm correct patient and correct procedure. The patient underwent monitored anesthesia care, which was administered by an anesthesia professional. The patient's blood pressure, heart rate, level of consciousness, oxygen, respirations and ECG were monitored throughout the procedure. A digital rectal exam was performed. A perianal exam was performed. The scope was introduced through the anus and advanced to the cecum. Retroflexion was performed in the cecum and rectum. The quality of bowel preparation was evaluated using the [facility] Bowel Preparation Scale with scores of: right colon = 2, transverse colon = 2, left colon = 2. The total BBPS score was 6. Bowel prep was adequate. The patient experienced no blood loss. The procedure was not difficult. The patient tolerated the procedure well. There were no apparent adverse events.  ANESTHESIA INFORMATION: ASA: IV Anesthesia Type: IV Sedation with Anesthesia MEDICATIONS: No administrations occurring from 0945 to 1034 on 08/10/23 FINDINGS: One sessile, benign-appearing polyp measuring 5-9 mm in the hepatic flexure; no bleeding was identified; completely removed en bloc by cold snare and retrieved specimen One sessile, benign-appearing polyp measuring 5-9 mm in the descending colon; bleeding occurred after intervention; completely removed en bloc by cold snare and retrieved specimen; placed 1 clip successfully; hemostasis achieved Internal small hemorrhoids; no bleeding was identified Healthy end-to-side colocolonic anastomosis in the distal descending colon 20 cm from the anal verge; no bleeding was identified EVENTS: Procedure Events Event Event Time ENDO SCOPE OUT TIME 8/10/2023 10:04 AM ENDO CECUM REACHED 8/10/2023 10:11 AM ENDO SCOPE OUT TIME 8/10/2023 10:23 AM SPECIMENS: ID Type Source Tests Collected by Time Destination 1 : polyp Tissue Large Intestine, Hepatic Flexure TISSUE EXAM Cruzito Brito MD 8/10/2023 10:17 AM  2 : polyp Tissue Large Intestine, Left/Descending Colon TISSUE EXAM Cruzito Brito MD 8/10/2023 10:18 AM  EQUIPMENT: Colonoscope -[id number] IMPRESSION: Subcentimeter polyp in the hepatic flexure was removed with cold snare One polyp measuring 5-9 mm in the descending colon; bleeding occurred after intervention; removed by cold snare; placed 1 clip successfully; hemostasis achieved Small hemorrhoids Healthy end-to-side colocolonic anastomosis in the distal descending colon 20 cm from the anal verge 2 benign appearing polyps removed as noted above, no evidence of primary malignancy seen RECOMMENDATION:  Repeat screening colonoscopy in 10 years  No further screening colonoscopies necessary Resume usual diet Follow H&H and transfuse as needed If signs of recurrent bleeding consider outpatient small bowel capsule IR follow-up for planned biopsy of hepatic lesion  Cruzito Brito MD     Result Date: 8/10/2023  Narrative: Table formatting from the original result was not included.  [address] [facility] Endoscopy [address] [place] [address] [phone] DATE OF SERVICE: 8/10/23 PHYSICIAN(S): Attending: Srinivas Spence MD Fellow: No Staff Documented INDICATION: Anemia, unspecified type POST-OP DIAGNOSIS: See the impression below. HISTORY: Prior colonoscopy: 6 years ago. BOWEL PREPARATION: Golytely/Colyte/Trilyte PREPROCEDURE: Informed consent was obtained for the procedure, including sedation. Risks including but not limited to bleeding, infection, perforation, adverse drug reaction and aspiration were explained in detail. Also explained about less than 100% sensitivity with the exam and other alternatives. The patient was placed in the left lateral decubitus position. Procedure: Colonoscopy DETAILS OF PROCEDURE: Patient was taken to the procedure room where a time out was performed to confirm correct patient and correct procedure. The patient underwent monitored anesthesia care, which was administered by an anesthesia professional. The patient's blood pressure, heart rate, level of consciousness, oxygen, respirations and ECG were monitored throughout the procedure. A digital rectal exam was performed. A perianal exam was performed. The scope was introduced through the anus and advanced to the cecum. Retroflexion was performed in the cecum and rectum. The quality of bowel preparation was evaluated using the [facility] Bowel Preparation Scale with scores of: right colon = 2, transverse colon = 2, left colon = 2. The total BBPS score was 6. Bowel prep was adequate. The patient experienced no blood loss. The procedure was not difficult. The patient tolerated the procedure well. There were no apparent adverse events.  ANESTHESIA INFORMATION: ASA: IV Anesthesia Type: IV Sedation with Anesthesia MEDICATIONS: No administrations occurring from 0945 to 1034 on 08/10/23 FINDINGS: One sessile, benign-appearing polyp measuring 5-9 mm in the hepatic flexure; no bleeding was identified; completely removed en bloc by cold snare and retrieved specimen One sessile, benign-appearing polyp measuring 5-9 mm in the descending colon; bleeding occurred after intervention; completely removed en bloc by cold snare and retrieved specimen; placed 1 clip successfully; hemostasis achieved Internal small hemorrhoids; no bleeding was identified Healthy end-to-side colocolonic anastomosis in the distal descending colon 20 cm from the anal verge; no bleeding was identified EVENTS: Procedure Events Event Event Time ENDO SCOPE OUT TIME 8/10/2023 10:04 AM ENDO CECUM REACHED 8/10/2023 10:11 AM ENDO SCOPE OUT TIME 8/10/2023 10:23 AM SPECIMENS: ID Type Source Tests Collected by Time Destination 1 : polyp Tissue Large Intestine, Hepatic Flexure TISSUE EXAM Sapna Spence MD 8/10/2023 10:17 AM  2 : polyp Tissue Large Intestine, Left/Descending Colon TISSUE EXAM Sapna Spence MD 8/10/2023 10:18 AM  EQUIPMENT: Colonoscope -[id number]     Impression: Subcentimeter polyp in the hepatic flexure was removed with cold snare One polyp measuring 5-9 mm in the descending colon; bleeding occurred after intervention; removed by cold snare; placed 1 clip successfully; hemostasis achieved Small hemorrhoids Healthy end-to-side colocolonic anastomosis in the distal descending colon 20 cm from the anal verge 2 benign appearing polyps removed as noted above, no evidence of primary malignancy seen RECOMMENDATION:  Repeat screening colonoscopy in 10 years  No further screening colonoscopies necessary  Age greater than 72 Overall health  Resume usual diet Follow H&H and transfuse as needed If signs of recurrent bleeding consider outpatient small bowel capsule IR follow-up for planned biopsy of hepatic lesion  Sapna Spence MD     EGD    Addendum Date: 8/10/2023 Addendum:   [address] [facility] Endoscopy [address] [address] [phone] DATE OF SERVICE: 8/10/23 PHYSICIAN(S): Attending: Sapna Spence MD Fellow: No Staff Documented INDICATION: Anemia, unspecified type POST-OP DIAGNOSIS: See the impression below. PREPROCEDURE: Informed consent was obtained for the procedure, including sedation. Risks of perforation, hemorrhage, adverse drug reaction and aspiration were discussed. The patient was placed in the left lateral decubitus position. Patient was explained about the risks and benefits of the procedure. Risks including but not limited to bleeding, infection, and perforation were explained in detail. Also explained about less than 100% sensitivity with the exam and other alternatives. PROCEDURE: EGD DETAILS OF PROCEDURE: Patient was taken to the procedure room where a time out was performed to confirm correct patient and correct procedure. The patient underwent monitored anesthesia care, which was administered by an anesthesia professional. The patient's blood pressure, heart rate, level of consciousness, respirations and oxygen were monitored throughout the procedure. The scope was introduced through the mouth and advanced to the proximal part of the jejunum. Retroflexion was performed in the fundus. The patient experienced no blood loss. The procedure was not difficult. The patient tolerated the procedure well. There were no apparent adverse events. ANESTHESIA INFORMATION: ASA: IV Anesthesia Type: IV Sedation with Anesthesia MEDICATIONS: No administrations occurring from 0945 to 1031 on 08/10/23 FINDINGS: 2 small angioectasias in the fundus of the stomach and body of the stomach; bleeding was observed; placed 2 clips successfully; hemostasis achieved. 2 small gastric angioectasias, 1 in the fundus with oozing prior to clipping. Good hemostasis after clipping of both lesions. Single small angioectasia in the 3rd part of the duodenum; no bleeding was identified; placed 1 clip successfully The esophagus appeared normal. Z-line is 39 cm from the incisors.  SPECIMENS: ID Type Source Tests Collected by Time Destination 1 : polyp Tissue Large Intestine, Hepatic Flexure TISSUE EXAM Ryan Marquez Kay Snell MD 8/10/2023 10:17 AM  2 : polyp Tissue Large Intestine, Left/Descending Colon TISSUE EXAM Stacie Cruz MD 8/10/2023 10:18 AM  IMPRESSION: 2 small angioectasias in the fundus of the stomach and body of the stomach; bleeding was observed; placed 2 clips successfully; hemostasis achieved Single small angioectasia in the 3rd part of the duodenum; placed 1 clip successfully The esophagus appeared normal. No evidence of primary malignancy RECOMMENDATION: Okay to resume usual diet Observe for any clinical signs of rebleeding Follow H&H and transfuse as needed If signs of rebleeding, recommend outpatient capsule endoscopy. Stacie Cruz MD     Result Date: 8/10/2023  Narrative: Table formatting from the original result was not included. [address] [facility] Endoscopy [address] [facility],Third Floor [id number] [phone] DATE OF SERVICE: 8/10/23 PHYSICIAN(S): Attending: Stacie Cruz MD Fellow: No Staff Documented INDICATION: Anemia, unspecified type POST-OP DIAGNOSIS: See the impression below. PREPROCEDURE: Informed consent was obtained for the procedure, including sedation. Risks of perforation, hemorrhage, adverse drug reaction and aspiration were discussed. The patient was placed in the left lateral decubitus position. Patient was explained about the risks and benefits of the procedure. Risks including but not limited to bleeding, infection, and perforation were explained in detail. Also explained about less than 100% sensitivity with the exam and other alternatives. PROCEDURE: EGD DETAILS OF PROCEDURE: Patient was taken to the procedure room where a time out was performed to confirm correct patient and correct procedure. The patient underwent monitored anesthesia care, which was administered by an anesthesia professional. The patient's blood pressure, heart rate, level of consciousness, respirations and oxygen were monitored throughout the procedure.  The scope was introduced through the mouth and advanced to the proximal part of the jejunum. Retroflexion was performed in the fundus. The patient experienced no blood loss. The procedure was not difficult. The patient tolerated the procedure well. There were no apparent adverse events. ANESTHESIA INFORMATION: ASA: IV Anesthesia Type: IV Sedation with Anesthesia MEDICATIONS: No administrations occurring from 0945 to 1031 on 08/10/23 FINDINGS: 2 small angioectasias in the fundus of the stomach and body of the stomach; bleeding was observed; placed 2 clips successfully; hemostasis achieved. 2 small gastric angioectasias, 1 in the fundus with oozing prior to clipping. Good hemostasis after clipping of both lesions. Single small angioectasia in the 3rd part of the duodenum; no bleeding was identified; placed 1 clip successfully The esophagus appeared normal. Z-line is 39 cm from the incisors. SPECIMENS: ID Type Source Tests Collected by Time Destination 1 : polyp Tissue Large Intestine, Hepatic Flexure TISSUE EXAM Joanie Estrada MD 8/10/2023 10:17 AM  2 : polyp Tissue Large Intestine, Left/Descending Colon TISSUE EXAM Joanie Estrada MD 8/10/2023 10:18 AM      Impression: 2 small angioectasias in the fundus of the stomach and body of the stomach; bleeding was observed; placed 2 clips successfully; hemostasis achieved Single small angioectasia in the 3rd part of the duodenum; placed 1 clip successfully The esophagus appeared normal. RECOMMENDATION: Okay to resume usual diet Observe for any clinical signs of rebleeding Follow H&H and transfuse as needed If signs of rebleeding, recommend outpatient capsule endoscopy. Joanie Estrada MD     XR chest portable    Result Date: 8/8/2023  Narrative: CHEST INDICATION:   Hypoxia. COMPARISON: Portable chest from August 7, 2023. CTA of the chest from May 5, 2023. EXAM PERFORMED/VIEWS:  XR CHEST PORTABLE FINDINGS: Heart enlarged. Pulmonary vessels mildly distended.  Reticular opacities throughout both lungs, developing since 8/7/2023, superimposed on emphysematous changes, especially in the upper lobes. Small right pleural effusion. No evidence of pneumothorax. Osseous structures appear within normal limits for patient age. Impression: CHF and pulmonary edema, superimposed on emphysema. Small right pleural effusion. Workstation performed: [id number]     Echo follow up/limited w/ contrast if indicated    Result Date: 8/8/2023  Narrative: •  Left Ventricle: Left ventricular cavity size is normal. Wall thickness is mildly increased. The left ventricular ejection fraction is 45%. Systolic function is mildly reduced. There is mild global hypokinesis. Diastolic function is mildly abnormal, consistent with grade I (abnormal) relaxation. •  Right Ventricle: Right ventricular cavity size is normal. Systolic function is normal. •  Aortic Valve: There is trace regurgitation. •  Mitral Valve: There is mild regurgitation. •  Tricuspid Valve: There is mild regurgitation. TRAUMA - CT spine cervical wo contrast    Result Date: 8/7/2023  Narrative: CT CERVICAL SPINE - WITHOUT CONTRAST INDICATION:   TRAUMA. COMPARISON:  None. TECHNIQUE:  CT examination of the cervical spine was performed without intravenous contrast.  Contiguous axial images were obtained. Multiplanar 2D reformatted images were created from the source data. Radiation dose length product (DLP) for this visit:  390 mGy-cm . This examination, like all CT scans performed in the [facility], was performed utilizing techniques to minimize radiation dose exposure, including the use of iterative reconstruction and automated exposure control. IMAGE QUALITY:  Diagnostic. FINDINGS: ALIGNMENT:  Normal alignment of the cervical spine. No subluxation. VERTEBRAE:  No fracture. DEGENERATIVE CHANGES: Mild multilevel cervical degenerative changes are noted without critical central canal stenosis.  PREVERTEBRAL AND PARASPINAL SOFT TISSUES: No prevertebral soft tissue swelling. Bilateral carotid artery calcification. Focal calcification of the nuchal ligament at the level of C4-5. THORACIC INLET: COPD. Impression: No cervical spine fracture or traumatic malalignment. Workstation performed: [id number]     TRAUMA - CT head wo contrast    Result Date: 8/7/2023  Narrative: CT BRAIN - WITHOUT CONTRAST INDICATION:   TRAUMA. COMPARISON:  None. TECHNIQUE:  CT examination of the brain was performed. Multiplanar 2D reformatted images were created from the source data. Radiation dose length product (DLP) for this visit:  932 mGy-cm . This examination, like all CT scans performed in the [facility], was performed utilizing techniques to minimize radiation dose exposure, including the use of iterative reconstruction and automated exposure control. IMAGE QUALITY:  Diagnostic. FINDINGS: PARENCHYMA:  No intracranial mass, mass effect or midline shift. No CT signs of acute infarction. No acute parenchymal hemorrhage. Periventricular, centrum semiovale, and subcortical white matter hypodensity is a nonspecific finding likely representing chronic microangiopathy. Calcification bilateral cavernous internal carotid arteries. VENTRICLES AND EXTRA-AXIAL SPACES: No acute hydrocephalus. Mild prominence of CSF spaces diffusely attributed to generalized volume loss. VISUALIZED ORBITS: Changes of bilateral lens replacements noted. PARANASAL SINUSES: Trace bifrontal and right maxillary sinus mucosal thickening. CALVARIUM AND EXTRACRANIAL SOFT TISSUES:  Normal.     Impression: No acute intracranial process. No skull fracture. Chronic microangiopathy. Workstation performed: [id number]     XR Trauma chest portable    Result Date: 8/7/2023  Narrative: CHEST INDICATION:   TRAUMA. COMPARISON: 5/5/2023 EXAM PERFORMED/VIEWS:  XR CHEST PORTABLE FINDINGS: Study limited by portable technique and large body habitus. Lungs are clear. No effusion or pneumothorax.  Heart, mediastinal and hilar structures are within normal limits. No acute osseous or soft tissue pathology. Findings agree with the ED preliminary interpretation. Impression: No acute cardiopulmonary findings. Workstation performed: [id number]       EKG personally reviewed by Hunter Green MD.     Counseling / Coordination of Care  Total floor / unit time spent today 30 minutes. Greater than 50% of total time was spent with the patient and / or family counseling and / or coordination of care.

## 2023-08-26 NOTE — PROGRESS NOTES
NEPHROLOGY PROGRESS NOTE   Miguelito Chaparro 69 y.o. male MRN: [id number]  Unit/Bed#: -60 Encounter: [id number]  Reason for Consult: Chronic kidney disease    ASSESSMENT/PLAN:  1. Chronic kidney disease, stage III, baseline creatinine 1.3-1.7 most recent creatinine stable at 1.24  2. Coronary artery disease status post cardiac catheterization with angioplasty and stent placement to the proximal LAD, ramus lesion and mid circumflex. 3. 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 Hypertension blood pressure currently appears stable no changes in his current regimen  5. Anemia of chronic disease hemoglobin currently 9.5.  6. Ischemic cardiomyopathy ejection fraction of 36%  7. Atrial fibrillation     PLAN:  · Renal function overall appears stable with a creatinine of 1.24  · Volume status and blood pressure appear acceptable  · Okay for discharge from nephrology standpoint. SUBJECTIVE:  Seen and examined. Offers no complaints. Denies any chest pain shortness of breath or abdominal pain. Denies any lower extremity swelling. Urinating without difficulty. Review of Systems    OBJECTIVE:  Current Weight: Weight - Scale: 94.4 kg (208 lb 3.6 oz)  Vitals:    08/25/23 1922 08/25/23 2241 08/26/23 0240 08/26/23 0600   BP: 153/60 151/61 (!) 125/48    BP Location:       Pulse: 66 67 62    Resp: 16 16 18    Temp: (!) 97.4 °F (36.3 °C) 98.5 °F (36.9 °C) 98.2 °F (36.8 °C)    TempSrc:       SpO2: 95% 95% 94%    Weight:    94.4 kg (208 lb 3.6 oz)   Height:           Intake/Output Summary (Last 24 hours) at 8/26/2023 0818  Last data filed at 8/26/2023 0600  Gross per 24 hour   Intake 744.5 ml   Output 2050 ml   Net -1305.5 ml       Physical Exam  Constitutional:       Appearance: He is not ill-appearing. Cardiovascular:      Rate and Rhythm: Normal rate and regular rhythm. Pulmonary:      Effort: Pulmonary effort is normal.      Breath sounds: Rales (Few rales right base) present.    Abdominal:      General: There is no distension. Palpations: Abdomen is soft. Musculoskeletal:      Right lower leg: No edema. Left lower leg: No edema. Skin:     General: Skin is warm and dry. Neurological:      Mental Status: He is alert and oriented to person, place, and time.          Medications:    Current Facility-Administered Medications:   •  acetaminophen (TYLENOL) tablet 650 mg, 650 mg, Oral, Q6H PRN, Nidhi Heads, DO  •  acetylcysteine (MUCOMYST) 200 mg/mL oral solution 1,200 mg, 1,200 mg, Oral, BID, Vale Soni MD, 1,200 mg at 08/25/23 1739  •  apixaban (ELIQUIS) tablet 5 mg, 5 mg, Oral, BID, NADIA Esparza, 5 mg at 08/25/23 2049  •  atorvastatin (LIPITOR) tablet 40 mg, 40 mg, Oral, After Dinner, Ele Guido, CRNP, 40 mg at 08/25/23 1737  •  clopidogrel (PLAVIX) tablet 75 mg, 75 mg, Oral, Daily, Ele Erp, CRNP  •  ferrous sulfate tablet 325 mg, 325 mg, Oral, Daily With Breakfast, Nidhi Heads, DO, 325 mg at 08/25/23 0816  •  insulin detemir (LEVEMIR) subcutaneous injection 32 Units, 32 Units, Subcutaneous, HS, Nidhi Heads, DO, 32 Units at 08/25/23 2100  •  insulin lispro (HumaLOG) 100 units/mL subcutaneous injection 1-5 Units, 1-5 Units, Subcutaneous, HS, Nidhi Heads, DO, 1 Units at 08/25/23 2310  •  insulin lispro (HumaLOG) 100 units/mL subcutaneous injection 1-6 Units, 1-6 Units, Subcutaneous, TID AC, 1 Units at 08/25/23 1803 **AND** Fingerstick Glucose (POCT), , , TID AC, Nidhi Heads, DO  •  insulin lispro (HumaLOG) 100 units/mL subcutaneous injection 12 Units, 12 Units, Subcutaneous, TID With Meals, Nidhi Heads, DO, 12 Units at 08/25/23 1803  •  metoprolol succinate (TOPROL-XL) 24 hr tablet 25 mg, 25 mg, Oral, Daily, Yaya Sol MD, 25 mg at 08/25/23 0816  •  pantoprazole (PROTONIX) EC tablet 40 mg, 40 mg, Oral, BID ACNidhi DO, 40 mg at 08/26/23 0600  •  pregabalin (LYRICA) capsule 100 mg, 100 mg, Oral, TID, Nidhi Elmore DO, 100 mg at 08/25/23 1737  •  sertraline (ZOLOFT) tablet 25 mg, 25 mg, Oral, Daily, Nidhi Elmore DO, 25 mg at 08/25/23 0064  •  sodium chloride 0.9 % infusion, 70 mL/hr, Intravenous, Continuous, Larry Salinas MD, Last Rate: 70 mL/hr at 08/25/23 1139, 70 mL/hr at 08/25/23 1139  •  tamsulosin (FLOMAX) capsule 0.4 mg, 0.4 mg, Oral, Daily With Dinner, Nidhi Elmore, , 0.4 mg at 08/25/23 1737  •  umeclidinium-vilanterol 62.5-25 mcg/actuation inhaler 1 puff, 1 puff, Inhalation, Daily, Nidhi Elmore DO, 1 puff at 08/25/23 0816    Laboratory Results:  Results from last 7 days   Lab Units 08/26/23  0651 08/25/23  0608 08/24/23  0542 08/23/23  0703 08/22/23  0501 08/21/23  0529 08/20/23  0456   WBC Thousand/uL 8.28 7.44 7.73 7.00 7.91 8.65 9.66   HEMOGLOBIN g/dL 9.5* 10.2* 10.1* 10.1* 10.3* 10.6* 10.9*   HEMATOCRIT % 33.1* 34.7* 35.3* 35.2* 36.3* 37.6 38.8   PLATELETS Thousands/uL 264 291 257 273 281 294 287   POTASSIUM mmol/L 4.4 4.4 4.2 4.4 4.7 4.4 4.4   CHLORIDE mmol/L 111* 110* 108 108 112* 111* 108   CO2 mmol/L 23 23 25 25 24 25 27   BUN mg/dL 35* 42* 45* 44* 47* 53* 58*   CREATININE mg/dL 1.24 1.38* 1.45* 1.47* 1.66* 1.71* 1.82*   CALCIUM mg/dL 8.4 8.5 8.6 8.7 9.0 9.4 9.5   MAGNESIUM mg/dL  --   --   --   --  2.2  --   --    PHOSPHORUS mg/dL  --   --   --   --  4.1  --   --

## 2023-08-28 ENCOUNTER — TRANSITIONAL CARE MANAGEMENT (OUTPATIENT)
Dept: FAMILY MEDICINE CLINIC | Facility: HOSPITAL | Age: 78
End: 2023-08-28

## 2023-08-28 ENCOUNTER — TELEPHONE (OUTPATIENT)
Dept: NEPHROLOGY | Facility: CLINIC | Age: 78
End: 2023-08-28

## 2023-08-28 ENCOUNTER — TELEPHONE (OUTPATIENT)
Dept: CARDIOLOGY CLINIC | Facility: CLINIC | Age: 78
End: 2023-08-28

## 2023-08-28 DIAGNOSIS — N18.30 STAGE 3 CHRONIC KIDNEY DISEASE, UNSPECIFIED WHETHER STAGE 3A OR 3B CKD (HCC): Primary | ICD-10-CM

## 2023-08-28 NOTE — DISCHARGE SUMMARY
Discharge Summary - [facility] Internal Medicine    Patient Information: Filemon Chu 69 y.o. male MRN: [id number]  Unit/Bed#: -01 Encounter: [id number]    Discharging Physician / Practitioner: Melvina Cuenca MD  PCP: Harsha Baxter MD  Admission Date: 8/20/2023  Discharge Date: 8/26/2020  Disposition:     Home with VNA    Reason for Admission: Chest pain with elevated troponin    Discharge Diagnoses:     Principal Problem:    Acute on chronic diastolic HF (heart failure) ([facility])  Active Problems:    Stage 3 chronic kidney disease, unspecified whether stage 3a or 3b CKD ([address] )    Type 2 diabetes mellitus with neurologic complication, with long-term current use of insulin (HCC)    Atrial fibrillation, unspecified type ([facility])    Anemia    CAD (coronary artery disease)    Hepatocellular carcinoma ([facility])    Coronary artery disease involving native coronary artery    Status post insertion of drug eluting coronary artery stent  Resolved Problems:    * No resolved hospital problems. *      Consultations During Hospital Stay:  · Nephrology  · Cardiology  · Cardiothoracic surgery    Procedures Performed:   Cardiac catheterization-multivessel coronary artery disease CALLIE x3 to LAD first ramus and left circumflex  ·     Significant Findings / Test Results:     · Chest x-ray-no active pulmonary disease    Incidental Findings:   ·     Test Results Pending at Discharge (will require follow up):   ·      Outpatient Tests Requested:  · BMP in a week    Complications:      Hospital Course:     Filemon Chu is a 69 y.o. male patient who originally presented to the hospital on 8/20/2023 as a transfer from [address]. This is a 81-year-old male with past medical history significant for diastolic CHF, CKD, A-fib on Eliquis, hypertension hepatocellular carcinoma, type 2 diabetes mellitus originally presented to [facility] ED with complaints of chest pain and intermittent shortness of breath.   Patient noted to be hypoxic in the emergency room requiring oxygen and noted to have elevated proBNP. Chest x-ray revealed right-sided pleural effusion. Patient was admitted with CHF exacerbation and started on IV Lasix. Day before admission to [address] [place] patient was discharged from the hospital after a GI bleed with transfusion. Patient had a nuclear stress test obtained due to elevated troponin during the prior admission and noted to have a fixed defect concerning for an RCA infract. Cardiology was consulted and plan to switch to [facility] for cardiac catheterization. Patient also with elevated creatinine so nephrology evaluated the patient. Patient had a initial cardiac catheterization done on 8/21. Noted to have multivessel coronary artery disease. Was evaluated by CT surgery. CT surgery recommended PCI. Had repeat get a catheterization on 8/25 with PCI and stent placement to LAD first ramus and left circumflex. Patient tolerated the procedure well. Postprocedure creatinine remained stable. Cleared by cardiology and nephrology for discharge. Patient will be discharged with Plavix and Eliquis. For details refer to the chart. Patient noted to have a slight drop in hemoglobin but a repeat hemoglobin was obtained which was stable and uptrending. Condition at Discharge: good     Discharge Day Visit / Exam:     Subjective: Patient seen and examined. No events overnight. Status post cardiac catheterization. Cleared by cardiology and nephrology for discharge  Vitals: Blood Pressure: 134/59 (08/26/23 0826)  Pulse: 58 (08/26/23 0826)  Temperature: 98.2 °F (36.8 °C) (08/26/23 0240)  Temp Source: Oral (08/25/23 0734)  Respirations: 18 (08/26/23 0240)  Height: 6' (182.9 cm) (08/19/23 2300)  Weight - Scale: 94.4 kg (208 lb 3.6 oz) (08/26/23 0600)  SpO2: 95 % (08/26/23 0826)  Exam:   Physical Exam  Constitutional:       General: He is not in acute distress. HENT:      Head: Normocephalic.       Nose: Nose normal.   Eyes:      General: No scleral icterus. Cardiovascular:      Rate and Rhythm: Normal rate and regular rhythm. Pulses: Normal pulses. Pulmonary:      Effort: Pulmonary effort is normal.   Abdominal:      General: There is no distension. Musculoskeletal:         General: Normal range of motion. Skin:     General: Skin is warm. Coloration: Skin is not jaundiced. Neurological:      Mental Status: He is alert. Mental status is at baseline. Discussion with Family: Wife in the room    Discharge instructions/Information to patient and family:   See after visit summary for information provided to patient and family. Provisions for Follow-Up Care:  See after visit summary for information related to follow-up care and any pertinent home health orders. Planned Readmission:  none     Discharge Statement:  I spent 45 minutes discharging the patient. This time was spent on the day of discharge. I had direct contact with the patient on the day of discharge. Greater than 50% of the total time was spent examining patient, answering all patient questions, arranging and discussing plan of care with patient as well as directly providing post-discharge instructions. Additional time then spent on discharge activities. Discharge Medications:  See after visit summary for reconciled discharge medications provided to patient and family.       ** Please Note: This note has been constructed using a voice recognition system **

## 2023-08-28 NOTE — TELEPHONE ENCOUNTER
Symptoms:  -leg swelling []  -abdominal bloating, distension, pants fitting tighter []    -loss of appetite, feeling full sooner than usual []  -worsening shortness of breath/[place], activity intolerance[]  -difficulty lying flat, waking up a night feeling breathless, using more pillows, sleeping in a recliner []  -palpitations []  -chest pain/pressure []  -new or worsening cough []  -unusual fatigue []    Comments:  Spoke with wife , denies any CHF symptoms. Weight:   -weighs self daily [x]  -dry/target weight _____  -today's weight _206____  -understands what to do for rapid weight gain, ie 3lbs in 24 hours or 5 lbs in one week[x]    Comments:        Diet:   -consuming any high sodium foods (canned soups, lunch meats, fast food/take out, snack food, prepared foods, sport drinks) yes[]no[x]  - fluid consumed per day-        60-64   oz   -2g (2000 mg) Sodium, 2L (2000 ml, around 60-64 oz) fluid restriction] reinforced [x]      Comments:         Medications:  -med list reviewed [x]   -missed doses or taking a different dose than prescribed?  yes[]no[x]  -still urinates well on current diuretic ? yes[x]no[]  -using O2 as directed? yes[]no[]    Comments:      Home vital signs: (if available)  BP ____  HR____  Pulse ox____    Recent labs:   BMP/CMP -   BNP, NT Pro BNP-  CBC-    Device check: n/A  Arrhythmia burden ? Optivol/Corvue crossed ?       Other possible triggers ?:  Recent viral symptoms/infection []  NSAID use []  Steroids []  Anemia []  COPD/hypoxia []  Not using CPAP/BiPAP []    Comments:  Self-management/education and teach back:  Primary learner: wife  Following low sodium diet: y  Following fluid restriction:y  Hospital discharge weight: 208.4  Weighing daily:    y        Recording:y  1st home weight 206      Weight today:206  Monitoring symptoms: y  Any current symptoms: n  Knows when to call provider: educated on when to call office  Medication reviewed and taking all as prescribed:y  Knows name of diuretic:y  Escalation plan: not yet    Care Coordination:  Aware of cardiology follow up appointment: went over date, time and location  Aware of PCP follow up appointment:y  Transportation:y  Social Support:lives with wife  Insurance/financial concerns:n  Home health care: y  Health literacy:good  [address] stay out of the hospital  Additional comments:they are ok with me calling weekly for at least a mth for updates.

## 2023-08-28 NOTE — TELEPHONE ENCOUNTER
----- Message from Yajaira Galvez DO sent at 8/27/2023  7:26 AM EDT -----  Recent hospitalization status post cardiac catheterization. Renal function stable with a creatinine of 1.2. Recommend repeat BMP in 1 week. Will need nonurgent follow-up scheduled. Thank you.

## 2023-08-28 NOTE — TELEPHONE ENCOUNTER
Called and spoke with pt's wife. Pt was scheduled for hospital follow up on 12/5 in [facility] with Dr Howell Left. Aware of location.  Wife requested pt be added to waitlist.

## 2023-08-29 ENCOUNTER — PATIENT OUTREACH (OUTPATIENT)
Dept: HEMATOLOGY ONCOLOGY | Facility: CLINIC | Age: 78
End: 2023-08-29

## 2023-08-29 ENCOUNTER — TELEPHONE (OUTPATIENT)
Dept: INTERVENTIONAL RADIOLOGY/VASCULAR | Facility: HOSPITAL | Age: 78
End: 2023-08-29

## 2023-08-29 NOTE — PROGRESS NOTES
Phone outreach to the patient for initial contact, tracking, and assessment. I left message with my contact information on the patient's vm introducing myself and asking to please return my call to discuss next steps.

## 2023-08-29 NOTE — PROGRESS NOTES
Heart Failure Outpatient Progress Note - Joleen Holland 69 y.o. male MRN: [id number]    @ Encounter: [id number]      Assessment/Plan:    Patient Active Problem List    Diagnosis Date Noted   • Status post insertion of drug eluting coronary artery stent 08/25/2023   • Coronary artery disease involving native coronary artery 08/21/2023   • Hepatocellular carcinoma ([address])    • CAD (coronary artery disease) 08/08/2023   • Hepatic flexure mass 08/08/2023   • Syncope 08/07/2023   • Anemia 08/07/2023   • Acute on chronic diastolic HF (heart failure) ([address]) 08/07/2023   • History of DVT (deep vein thrombosis) 08/07/2023   • Restrictive lung disease 08/02/2023   • Other emphysema ([address]) 08/02/2023   • History of tobacco abuse 06/22/2023   • H/O asbestos exposure 06/12/2023   • Liver mass 05/10/2023   • Atrial fibrillation, unspecified type ([address]) 02/15/2023   • Peripheral polyneuropathy 09/06/2022   • Type 2 diabetes mellitus with neurologic complication, with long-term current use of insulin ([address]) 09/06/2022   • Stage 3 chronic kidney disease, unspecified whether stage 3a or 3b CKD ([address]) 09/01/2022     Chronic HFmrEF. Etiology: iCM with PAF. Warm well perfused on exam. Euvolemic. Needs to start daily weights. Continue 2gNa, 2 L fluid restriction. Consider addition of SGLT2i in the future. Weight at discharge 208 lbs, today, 211 lbs. -C 8/25/23: Multivessel PCI with CALLIE placement circumflex, ramus/high diagonal branch and proximal LAD  -C 8/21/23 showed 75% mid LAD lesion (FFR positive), 90% proximal to mid circumflex lesion, and 90% mid RCA lesion  -Nuclear stress test 8/18/23 showing fixed defect in inferior and inferolateral walls  -TTE 8/16/23: LVEF 50-55%    Paroxysmal atrial fibrillation: Maintaining NSR on Toprol-XL 50 mg daily. • [id number] 4:  Anticoagulated with eliquis 5 mg BID    CAD. Not a surgical candidate. Recent PCIs as below. Rx Metoprolol, Plavix, statin, on Eliquis 5 mg BID for AF.  Triple therapy discouraged per interventional.  -[facility] 8/25/23: Multivessel PCI with CALLIE placement circumflex, ramus/high diagonal branch and proximal LAD  -[facility] 8/21/23 showed 75% mid LAD lesion (FFR positive), 90% proximal to mid circumflex lesion, and 90% mid RCA lesion  -Nuclear stress test 8/18/23 showing fixed defect in inferior and inferolateral walls    Anemia related to GIB  • Recent admission for anemia 8/7-8/14 during which he received 2 units of PRBC  • S/p EGD and colonoscopy 8/10 with clipping of AV malformations   • Hgb 9.9 on 8/26    CKD stage III: Creatinine baseline 1.3-1.6  Type II diabetes: Hemoglobin A1C 7.6%.  Management per primary team.  Hx of DVT: Maintained on eliquis 5 mg BID.   Hepatocellular carcinoma. To start radiation treatments in upcoming weeks. HPI:  69year old with PMH as above who presents for hospital follow up. Had two recent admissions. First one for anemia related to a GIB requiring transfusion and a second due to acute on chronic CHF. Was diuresed. Transferred from the [address] [place] [address] to [facility] for [facility]. Ultimately underwent stenting to a proximal LAD lesion, Ramus/high Diagonal branch and the Left Circumflex. Plavix and Eliquis recommended, no ASA. Discharged home on Lasix 40 mg daily. He feels ok today, just deconditioned. His wife is helping him with monitoring salt and fluid intake. He has a referral to start cardiac rehab in upcoming weeks. Denies CP, SOB, orthopnea or PND. Adherent to all medications.    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     Current Outpatient Medications:   •  acetaminophen (TYLENOL) 650 mg CR tablet, Take 650 mg by mouth every 8 (eight) hours as needed for mild pain, Disp: , Rfl:   •  atorvastatin (LIPITOR) 40 mg tablet, Take 1 tablet (40 mg total) by mouth daily after dinner, Disp: 30 tablet, Rfl: 0  •  clopidogrel (PLAVIX) 75 mg tablet, Take 1 tablet (75 mg total) by mouth daily Do not start before August 27, 2023., Disp: 30 tablet, Rfl: 0  •  Continuous Blood Gluc Sensor (Dexcom G6 Sensor) MISC, Use daily as directed for CGM - Change every 10 days, Disp: 9 each, Rfl: 3  •  Continuous Blood Gluc Transmit (Dexcom G6 Transmitter) MISC, Use daily as directed for CGM - Change every 3 months, Disp: 1 each, Rfl: 3  •  dulaglutide (Trulicity) 1.55 QJ/1.0GQ injection, Inject 0.5 mL (0.75 mg total) under the skin once a week, Disp: 2 mL, Rfl: 3  •  Eliquis 5 MG, take 1 tablet by mouth twice a day, Disp: 60 tablet, Rfl: 5  •  ferrous sulfate 325 (65 Fe) mg tablet, Take 1 tablet (325 mg total) by mouth daily with breakfast, Disp: 30 tablet, Rfl: 0  •  furosemide (LASIX) 40 mg tablet, Take 1 tablet (40 mg total) by mouth daily Do not start before August 15, 2023., Disp: 30 tablet, Rfl: 0  •  insulin aspart (NovoLOG FlexPen) 100 UNIT/ML injection pen, Inject 40 Units under the skin 3 (three) times a day with meals 28 u breakfast time, 38 u lunchtime, 38 u dinnertime, Disp: , Rfl:   •  Insulin Pen Needle (Pen Needles) 32G X 4 MM MISC, Use 4 (four) times a day, Disp: 400 each, Rfl: 3  •  Levemir FlexPen 100 units/mL injection pen, INJECT 75 UNITS UNDER THE SKIN DAILY AT BEDTIME, Disp: 30 mL, Rfl: 0  • metoprolol succinate (TOPROL-XL) 50 mg 24 hr tablet, Take 0.5 tablets (25 mg total) by mouth daily, Disp: , Rfl: 0  •  mometasone (ELOCON) 0.1 % cream, Apply topically daily for 7 days, Disp: 15 g, Rfl: 0  •  Multiple Vitamin (MULTIVITAMIN ADULT PO), Take 1 tablet by mouth daily, Disp: , Rfl:   •  pantoprazole (PROTONIX) 40 mg tablet, Take 1 tablet (40 mg total) by mouth 2 (two) times a day, Disp: 30 tablet, Rfl: 0  •  Potassium Citrate ER 15 MEQ (1620 MG) TBCR, Take 1 tablet by mouth 2 (two) times a day, Disp: , Rfl:   •  pregabalin (LYRICA) 100 mg capsule, take 1 capsule by mouth three times a day, Disp: 90 capsule, Rfl: 0  •  saxagliptin (Onglyza) 5 MG tablet, Take 5 mg by mouth daily with dinner, Disp: , Rfl:   •  sertraline (ZOLOFT) 25 mg tablet, Take 25 mg by mouth daily, Disp: , Rfl:   •  SUPER B COMPLEX/C PO, Take 1 tablet by mouth daily, Disp: , Rfl:   •  tamsulosin (FLOMAX) 0.4 mg, Take 0.4 mg by mouth daily with dinner, Disp: , Rfl:   •  traZODone (DESYREL) 100 mg tablet, take 2 tablets by mouth at bedtime, Disp: 180 tablet, Rfl: 0  •  umeclidinium-vilanterol 62.5-25 mcg/actuation inhaler, Inhale 1 puff daily, Disp: 60 blister, Rfl: 5    Social History     Socioeconomic History   • Marital status: /Civil Union     Spouse name: Not on file   • Number of children: Not on file   • Years of education: Not on file   • Highest education level: Not on file   Occupational History   • Not on file   Tobacco Use   • Smoking status: Former     Packs/day: 1.00     Years: 30.00     Total pack years: 30.00     Types: Cigarettes     Start date: 57     Quit date: 12     Years since quittin.6   • Smokeless tobacco: Never   Vaping Use   • Vaping Use: Never used   Substance and Sexual Activity   • Alcohol use: Yes     Comment: Socially   • Drug use: Never   • Sexual activity: Not on file   Other Topics Concern   • Not on file   Social History Narrative   • Not on file     Social Determinants of Health Financial Resource Strain: Medium Risk (9/1/2022)    Overall Financial Resource Strain (CARDIA)    • Difficulty of Paying Living Expenses: Somewhat hard   Food Insecurity: No Food Insecurity (8/20/2023)    Hunger Vital Sign    • Worried About Running Out of Food in the Last Year: Never true    • Ran Out of Food in the Last Year: Never true   Transportation Needs: No Transportation Needs (8/20/2023)    PRAPARE - Transportation    • Lack of Transportation (Medical): No    • Lack of Transportation (Non-Medical):  No   Physical Activity: Not on file   Stress: Not on file   Social Connections: Not on file   Intimate Partner Violence: Not on file   Housing Stability: Low Risk  (8/20/2023)    Housing Stability Vital Sign    • Unable to Pay for Housing in the Last Year: No    • Number of Places Lived in the Last Year: 1    • Unstable Housing in the Last Year: No       Family History   Problem Relation Age of Onset   • Hypertension Mother    • Bone cancer Father    • Diabetes type II Sister    • Diabetes type II Sister    • Esophageal cancer Brother        Physical Exam:    Vitals: /50 (BP Location: Left arm, Patient Position: Sitting, Cuff Size: Standard)   Pulse 60   Ht 6' (1.829 m)   Wt 95.9 kg (211 lb 8 oz)   SpO2 95%   BMI 28.68 kg/m²     Wt Readings from Last 3 Encounters:   08/26/23 94.4 kg (208 lb 3.6 oz)   08/19/23 92.7 kg (204 lb 5.9 oz)   08/14/23 95 kg (209 lb 7 oz)       GEN: Priscilla Caro appears well, alert and oriented x 3, pleasant and cooperative   HEENT: pupils equal, round, and reactive to light; extraocular muscles intact  NECK: supple, no carotid bruits   HEART: regular rhythm, normal S1 and S2, no murmurs, clicks, gallops or rubs, JVP is  flat  LUNGS: clear to auscultation bilaterally; no wheezes, rales, or rhonchi   ABDOMEN: normal bowel sounds, soft, no tenderness, no distention  EXTREMITIES: peripheral pulses normal; no clubbing, cyanosis, or edema  NEURO: no focal findings   SKIN: normal without suspicious lesions on exposed skin    Labs & Results:    Chemistry        Component Value Date/Time    K 4.4 08/26/2023 0651    K 5.2 07/10/2023 1146     (H) 08/26/2023 0651     (H) 07/10/2023 1146    CO2 23 08/26/2023 0651    CO2 17 (L) 07/10/2023 1146    BUN 35 (H) 08/26/2023 0651    BUN 35 (H) 07/10/2023 1146    CREATININE 1.24 08/26/2023 0651        Component Value Date/Time    CALCIUM 8.4 08/26/2023 0651    ALKPHOS 95 08/16/2023 0442    AST 36 08/16/2023 0442    AST 40 05/15/2023 1143    ALT 36 08/16/2023 0442    ALT 37 05/15/2023 1143        Lab Results   Component Value Date    WBC 8.28 08/26/2023    HGB 9.9 (L) 08/26/2023    HCT 35.2 (L) 08/26/2023    MCV 78 (L) 08/26/2023     08/26/2023     Lab Results   Component Value Date    BNP 1,278 (H) 08/15/2023      Lab Results   Component Value Date    LDLCALC 104 (H) 01/06/2023     Lab Results   Component Value Date    JET1VTDZCPUY 3.267 08/20/2023    TSH 3.720 01/06/2023       EKG personally reviewed by NADIA Lopez. No results found for this visit on 08/30/23. Counseling / Coordination of Care  Total floor / unit time spent today 40 minutes. Greater than 50% of total time was spent with the patient and / or family counseling and / or coordination of care. A description of the counseling / coordination of care: 20. Thank you for the opportunity to participate in the care of this patient.     Nestor Fagan Grant Door

## 2023-08-30 ENCOUNTER — PATIENT OUTREACH (OUTPATIENT)
Dept: HEMATOLOGY ONCOLOGY | Facility: CLINIC | Age: 78
End: 2023-08-30

## 2023-08-30 ENCOUNTER — OFFICE VISIT (OUTPATIENT)
Dept: CARDIOLOGY CLINIC | Facility: CLINIC | Age: 78
End: 2023-08-30
Payer: COMMERCIAL

## 2023-08-30 ENCOUNTER — TELEPHONE (OUTPATIENT)
Dept: ENDOCRINOLOGY | Facility: CLINIC | Age: 78
End: 2023-08-30

## 2023-08-30 VITALS
OXYGEN SATURATION: 95 % | HEART RATE: 60 BPM | HEIGHT: 72 IN | BODY MASS INDEX: 28.65 KG/M2 | WEIGHT: 211.5 LBS | SYSTOLIC BLOOD PRESSURE: 126 MMHG | DIASTOLIC BLOOD PRESSURE: 50 MMHG

## 2023-08-30 DIAGNOSIS — J96.21 ACUTE ON CHRONIC RESPIRATORY FAILURE WITH HYPOXIA (HCC): ICD-10-CM

## 2023-08-30 DIAGNOSIS — Z79.4 TYPE 2 DIABETES MELLITUS WITH DIABETIC NEUROPATHY, WITH LONG-TERM CURRENT USE OF INSULIN (HCC): ICD-10-CM

## 2023-08-30 DIAGNOSIS — E11.40 TYPE 2 DIABETES MELLITUS WITH DIABETIC NEUROPATHY, WITH LONG-TERM CURRENT USE OF INSULIN (HCC): ICD-10-CM

## 2023-08-30 DIAGNOSIS — I50.33 ACUTE ON CHRONIC DIASTOLIC HF (HEART FAILURE) (HCC): ICD-10-CM

## 2023-08-30 DIAGNOSIS — I25.10 CAD (CORONARY ARTERY DISEASE): ICD-10-CM

## 2023-08-30 PROCEDURE — 99215 OFFICE O/P EST HI 40 MIN: CPT | Performed by: NURSE PRACTITIONER

## 2023-08-30 RX ORDER — FUROSEMIDE 40 MG/1
40 TABLET ORAL DAILY
Qty: 30 TABLET | Refills: 6 | Status: SHIPPED | OUTPATIENT
Start: 2023-08-30

## 2023-08-30 RX ORDER — ATORVASTATIN CALCIUM 40 MG/1
40 TABLET, FILM COATED ORAL
Qty: 30 TABLET | Refills: 11 | Status: SHIPPED | OUTPATIENT
Start: 2023-08-30

## 2023-08-30 RX ORDER — METOPROLOL SUCCINATE 50 MG/1
25 TABLET, EXTENDED RELEASE ORAL DAILY
Qty: 30 TABLET | Refills: 6 | Status: SHIPPED | OUTPATIENT
Start: 2023-08-30

## 2023-08-30 RX ORDER — CLOPIDOGREL BISULFATE 75 MG/1
75 TABLET ORAL DAILY
Qty: 30 TABLET | Refills: 11 | Status: SHIPPED | OUTPATIENT
Start: 2023-08-30

## 2023-08-30 NOTE — PATIENT INSTRUCTIONS
Weigh yourself daily  If you gain 3 lbs in one day or 5 lbs in one week, please call the office at [phone] and ask for a nurse or the heart failure nurse  Keep your sodium intake to <2 grams, (2000 mg) per day, and fluids <2 Liters (2000 ml) per day. This is around 6-7, 8 oz glasses of fluid per day    Get a scale and start weighing yourself  Start cardiac rehab when ready.

## 2023-08-30 NOTE — TELEPHONE ENCOUNTER
PT and his Wife Jose J Osborne in [phone] in regards to the Levemir Flex Pen she stated he only picked up 1 pen advise the scrip info was 100 U days supply 34 quantity 3 ml was they had to cancel his last appt and has one for 9-13 she will call her pharmacy to double check

## 2023-08-30 NOTE — PROGRESS NOTES
Phone outreach to the patient, I introduced myself and explained my service to him. I went over his upcoming appointments and the patient is aware of them. The patient reports understanding his diagnosis and was "calm" when speaking with him. Pt denies any pain at present. He reports eating well at 3 meals daily with snacks as normal with a good appetite. He denies N/V or diarrhea at present. We discussed his upcoming appointment with IR for mapping angiogram and Radiation Oncology for dosing. Pt reports living at home with his wife and is a good support for him. He denies any barriers getting to or from any of his appointment and states she will drive him. He denies any physical barriers at present. No referrals were placed at this time based off my interaction with him. We completed a General Assessment  together, I provided my contact information and [facility] and instructed him to please call if he has any questions or concerns. I thanked him for his time.

## 2023-08-31 RX ORDER — INSULIN DETEMIR 100 [IU]/ML
INJECTION, SOLUTION SUBCUTANEOUS
Qty: 30 ML | Refills: 0 | Status: SHIPPED | OUTPATIENT
Start: 2023-08-31

## 2023-09-01 ENCOUNTER — PATIENT OUTREACH (OUTPATIENT)
Dept: HEMATOLOGY ONCOLOGY | Facility: CLINIC | Age: 78
End: 2023-09-01

## 2023-09-01 ENCOUNTER — HOSPITAL ENCOUNTER (OUTPATIENT)
Dept: INTERVENTIONAL RADIOLOGY/VASCULAR | Facility: HOSPITAL | Age: 78
Discharge: HOME | End: 2023-09-01
Attending: RADIOLOGY
Payer: COMMERCIAL

## 2023-09-01 ENCOUNTER — HOSPITAL ENCOUNTER (OUTPATIENT)
Dept: CT IMAGING | Facility: HOSPITAL | Age: 78
Discharge: HOME | End: 2023-09-01
Payer: COMMERCIAL

## 2023-09-01 ENCOUNTER — HOSPITAL ENCOUNTER (OUTPATIENT)
Dept: NUCLEAR MEDICINE | Facility: HOSPITAL | Age: 78
End: 2023-09-01
Attending: RADIOLOGY
Payer: COMMERCIAL

## 2023-09-01 VITALS
TEMPERATURE: 97.3 F | SYSTOLIC BLOOD PRESSURE: 161 MMHG | RESPIRATION RATE: 16 BRPM | HEART RATE: 61 BPM | OXYGEN SATURATION: 97 % | DIASTOLIC BLOOD PRESSURE: 69 MMHG

## 2023-09-01 DIAGNOSIS — R16.0 LIVER MASSES: ICD-10-CM

## 2023-09-01 DIAGNOSIS — R16.0 LIVER MASSES: Primary | ICD-10-CM

## 2023-09-01 LAB
ALBUMIN SERPL BCP-MCNC: 4.1 G/DL (ref 3.5–5)
ALP SERPL-CCNC: 99 U/L (ref 34–104)
ALT SERPL W P-5'-P-CCNC: 45 U/L (ref 7–52)
ANION GAP SERPL CALCULATED.3IONS-SCNC: 10 MMOL/L
AST SERPL W P-5'-P-CCNC: 55 U/L (ref 13–39)
BILIRUB SERPL-MCNC: 0.37 MG/DL (ref 0.2–1)
BUN SERPL-MCNC: 43 MG/DL (ref 5–25)
CALCIUM SERPL-MCNC: 9.4 MG/DL (ref 8.4–10.2)
CHLORIDE SERPL-SCNC: 107 MMOL/L (ref 96–108)
CO2 SERPL-SCNC: 24 MMOL/L (ref 21–32)
CREAT SERPL-MCNC: 1.58 MG/DL (ref 0.6–1.3)
GFR SERPL CREATININE-BSD FRML MDRD: 41 ML/MIN/1.73SQ M
GLUCOSE P FAST SERPL-MCNC: 91 MG/DL (ref 65–99)
GLUCOSE SERPL-MCNC: 91 MG/DL (ref 65–140)
HCT VFR BLD AUTO: 36.4 % (ref 36.5–49.3)
HGB BLD-MCNC: 10.5 G/DL (ref 12–17)
INR PPP: 0.99 (ref 0.84–1.19)
MCH RBC QN AUTO: 23.2 PG (ref 26.8–34.3)
MCHC RBC AUTO-ENTMCNC: 28.8 G/DL (ref 31.4–37.4)
MCV RBC AUTO: 81 FL (ref 82–98)
PLATELET # BLD AUTO: 247 THOUSANDS/UL (ref 149–390)
POTASSIUM SERPL-SCNC: 4.3 MMOL/L (ref 3.5–5.3)
PROT SERPL-MCNC: 7.3 G/DL (ref 6.4–8.4)
PROTHROMBIN TIME: 13.8 SECONDS (ref 11.6–14.5)
RBC # BLD AUTO: 4.52 MILLION/UL (ref 3.88–5.62)
SODIUM SERPL-SCNC: 141 MMOL/L (ref 135–147)
WBC # BLD AUTO: 10.14 THOUSAND/UL (ref 4.31–10.16)

## 2023-09-01 PROCEDURE — C1769 GUIDE WIRE: HCPCS

## 2023-09-01 PROCEDURE — 76937 US GUIDE VASCULAR ACCESS: CPT

## 2023-09-01 PROCEDURE — C1894 INTRO/SHEATH, NON-LASER: HCPCS

## 2023-09-01 PROCEDURE — 36200 PLACE CATHETER IN AORTA: CPT

## 2023-09-01 PROCEDURE — 75726 ARTERY X-RAYS ABDOMEN: CPT

## 2023-09-01 PROCEDURE — G1004 CDSM NDSC: HCPCS

## 2023-09-01 PROCEDURE — 80053 COMPREHEN METABOLIC PANEL: CPT | Performed by: RADIOLOGY

## 2023-09-01 PROCEDURE — 99152 MOD SED SAME PHYS/QHP 5/>YRS: CPT

## 2023-09-01 PROCEDURE — 36247 INS CATH ABD/L-EXT ART 3RD: CPT

## 2023-09-01 PROCEDURE — 78830 RP LOCLZJ TUM SPECT W/CT 1: CPT

## 2023-09-01 PROCEDURE — C1760 CLOSURE DEV, VASC: HCPCS

## 2023-09-01 PROCEDURE — 85610 PROTHROMBIN TIME: CPT | Performed by: RADIOLOGY

## 2023-09-01 PROCEDURE — C1887 CATHETER, GUIDING: HCPCS

## 2023-09-01 PROCEDURE — 85027 COMPLETE CBC AUTOMATED: CPT | Performed by: RADIOLOGY

## 2023-09-01 PROCEDURE — 71250 CT THORAX DX C-: CPT

## 2023-09-01 PROCEDURE — 74176 CT ABD & PELVIS W/O CONTRAST: CPT

## 2023-09-01 PROCEDURE — 99153 MOD SED SAME PHYS/QHP EA: CPT

## 2023-09-01 PROCEDURE — A9540 TC99M MAA: HCPCS

## 2023-09-01 PROCEDURE — 82948 REAGENT STRIP/BLOOD GLUCOSE: CPT

## 2023-09-01 RX ORDER — FENTANYL CITRATE 50 UG/ML
INJECTION, SOLUTION INTRAMUSCULAR; INTRAVENOUS AS NEEDED
Status: COMPLETED | OUTPATIENT
Start: 2023-09-01 | End: 2023-09-01

## 2023-09-01 RX ORDER — MIDAZOLAM HYDROCHLORIDE 2 MG/2ML
INJECTION, SOLUTION INTRAMUSCULAR; INTRAVENOUS AS NEEDED
Status: COMPLETED | OUTPATIENT
Start: 2023-09-01 | End: 2023-09-01

## 2023-09-01 RX ORDER — SODIUM CHLORIDE 9 MG/ML
125 INJECTION, SOLUTION INTRAVENOUS CONTINUOUS
Status: DISCONTINUED | OUTPATIENT
Start: 2023-09-01 | End: 2023-09-02 | Stop reason: HOSPADM

## 2023-09-01 RX ORDER — LIDOCAINE HYDROCHLORIDE 10 MG/ML
INJECTION, SOLUTION EPIDURAL; INFILTRATION; INTRACAUDAL; PERINEURAL AS NEEDED
Status: COMPLETED | OUTPATIENT
Start: 2023-09-01 | End: 2023-09-01

## 2023-09-01 RX ADMIN — FENTANYL CITRATE 100 MCG: 50 INJECTION, SOLUTION INTRAMUSCULAR; INTRAVENOUS at 09:03

## 2023-09-01 RX ADMIN — MIDAZOLAM 2 MG: 1 INJECTION INTRAMUSCULAR; INTRAVENOUS at 09:03

## 2023-09-01 RX ADMIN — IOHEXOL 58 ML: 350 INJECTION, SOLUTION INTRAVENOUS at 11:05

## 2023-09-01 RX ADMIN — LIDOCAINE HYDROCHLORIDE 8 ML: 10 INJECTION, SOLUTION EPIDURAL; INFILTRATION; INTRACAUDAL; PERINEURAL at 09:06

## 2023-09-01 NOTE — BRIEF OP NOTE (RAD/CATH)
IR Y-90 PRE-ANGIO/EMBO W/ LUNG SCAN  Procedure Note    PATIENT NAME: Luis Perry  : 1945  MRN: [id number]     Pre-op Diagnosis:   1. Liver masses      Post-op Diagnosis:   1. Liver masses        Surgeon:   Juan Frias DO  Assistants:     No qualified resident was available.     Estimated Blood Loss: None  Findings:   R CFA 5F sheath access, closed with Vascade    Y90 mapping performed, plan for split dose whole liver    Specimens: none    Complications:  none    Anesthesia: conscious sedation and local    Juan Frias DO     Date: 2023  Time: 10:25 AM

## 2023-09-01 NOTE — DISCHARGE INSTRUCTIONS
ARTERIOGRAM    WHAT YOU SHOULD KNOW:   An angiogram is a procedure to look at arteries in your body. Arteries are the blood vessels that carry blood from your heart to your body. AFTER YOU LEAVE:     Self-care:   Limit activity: Rest for the remainder of the day of your procedure. Have some one with you until the next morning. Keep your arm or leg straight as much as possible. Rest as much as possible, sitting lying or reclining. Walk only to go to the bathroom, to bed or to eat. If the angiogram catheter was put in your leg, use the stairs as little as possible. No driving for 64-94 hours. No heavy lifting, >10 lbs. Or strenuous activity for 48 hours. Keep your wound clean and dry. Remove band aid/ dressing tomorrow. You may shower 24 hours after your procedure. Shower and wash groin area or wrist area gently with soap and water: beginning tomorrow. Rinse and pat Dry. Apply new water seal band aid. Repeat this process for 5 days. If there is any drainage from the puncture site, you should put on a clean bandage. No Powders, creams, lotions or antibiotic ointments for 5 days. No tub baths, hot tubs or swimming for 5 days. Watch for bleeding and bruising: It is normal to have a bruise and soreness where the angiogram catheter went in. Medication: If your angiogram was performed to treat blockages in your leg arteries, it is strongly recommended that you take both an antiplatelet medication (like aspirin or Plavix) to prevent clotting AND a statin drug (like Lipitor or Crestor), even if you have normal cholesterol. If these drugs are not ordered for you please contact either your Vascular Surgery office or the Interventional Radiology Dept during normal daytime working hours. See Interventional Radiology telephone numbers below.   You Should Have Follow up with the vascular surgeon   call [phone] with questions  Diet:   You may resume your regular diet, Sips of flat soda will help with mild nausea. Drink more liquids than usual for the next 24 hours        IMMEDIATELY Contact Interventional Radiology at [phone] if any of the following occur: If your bruise gets larger or if you notice any active bleeding. APPLY DIRECT PRESSURE TO THE BLEEDING SITE. If you notice increased swelling or have increased pain at the puncture site   If you have any numbness or pain in the extremity of the puncture site   If that extremity seems cold or pale. You have fever greater than 101  Persistent nausea or vomitting    Follow up with your primary healthcare provider  as directed: Write down your questions so you remember to ask them during your visits.

## 2023-09-01 NOTE — SEDATION DOCUMENTATION
Right groin sheath removed and hemostasis achieved with vascade and 10mins of digital pressure. Patient tolerated procedure well and stable for transfer to PACU. Report and care assumed by primary RN.

## 2023-09-01 NOTE — H&P
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 male. History of Present Illness  69 y M w/ multifocal HCC / PV involvement. Recent PCI, feels ok, no CP / SOB. Review of Systems   All other systems reviewed and are negative. 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, given PRBC/Venofer for anemia while in house   • Hyperlipidemia    • Hypertension    • LAFB (left anterior fascicular block)    • Liver mass 08/2023    suspected HCC, needs radioembolization   • RBBB         Past Surgical History:   Procedure Laterality Date   • APPENDECTOMY     • BOWEL RESECTION  2014   • CARDIAC CATHETERIZATION     • CARDIAC CATHETERIZATION N/A 8/25/2023    Procedure: Cardiac pci;  Surgeon: Debra Ribera DO;  Location: [facility]; Service: Cardiology   • CARDIAC CATHETERIZATION N/A 8/21/2023    Procedure: Cardiac catheterization;  Surgeon: Debra Ribera DO;  Location: [facility]; Service: Cardiology   • CARDIAC CATHETERIZATION N/A 8/21/2023    Procedure: Cardiac Coronary Angiogram;  Surgeon: Debra Ribera DO;  Location: [facility];   Service: Cardiology   • CATARACT EXTRACTION Bilateral    • EGD          Social History     Tobacco Use   Smoking Status Former   • Packs/day: 1.00   • Years: 30.00   • Total pack years: 30.00   • Types: Cigarettes   • Start date:    • Quit date: 12   • Years since quittin.6   Smokeless Tobacco Never        Social History     Substance and Sexual Activity   Alcohol Use Yes    Comment: Socially        Social History     Substance and Sexual Activity   Drug Use Never        No Known Allergies    Current Outpatient Medications   Medication Sig Dispense Refill   • acetaminophen (TYLENOL) 650 mg CR tablet Take 650 mg by mouth every 8 (eight) hours as needed for mild pain     • atorvastatin (LIPITOR) 40 mg tablet Take 1 tablet (40 mg total) by mouth daily after dinner 30 tablet 11   • clopidogrel (PLAVIX) 75 mg tablet Take 1 tablet (75 mg total) by mouth daily 30 tablet 11   • Eliquis 5 MG take 1 tablet by mouth twice a day 60 tablet 5   • ferrous sulfate 325 (65 Fe) mg tablet Take 1 tablet (325 mg total) by mouth daily with breakfast 30 tablet 0   • furosemide (LASIX) 40 mg tablet Take 1 tablet (40 mg total) by mouth daily 30 tablet 6   • insulin aspart (NovoLOG FlexPen) 100 UNIT/ML injection pen Inject 40 Units under the skin 3 (three) times a day with meals 28 u breakfast time, 38 u lunchtime, 38 u dinnertime     • Levemir FlexPen 100 units/mL injection pen INJECT 75 UNITS UNDER THE SKIN DAILY AT BEDTIME 30 mL 0   • metoprolol succinate (TOPROL-XL) 50 mg 24 hr tablet Take 0.5 tablets (25 mg total) by mouth daily 30 tablet 6   • Multiple Vitamin (MULTIVITAMIN ADULT PO) Take 1 tablet by mouth daily     • pantoprazole (PROTONIX) 40 mg tablet Take 1 tablet (40 mg total) by mouth 2 (two) times a day 30 tablet 0   • Potassium Citrate ER 15 MEQ (1620 MG) TBCR Take 1 tablet by mouth 2 (two) times a day     • pregabalin (LYRICA) 100 mg capsule take 1 capsule by mouth three times a day 90 capsule 0   • saxagliptin (Onglyza) 5 MG tablet Take 5 mg by mouth daily with dinner     • sertraline (ZOLOFT) 25 mg tablet Take 25 mg by mouth daily     • SUPER B COMPLEX/C PO Take 1 tablet by mouth daily     • tamsulosin (FLOMAX) 0.4 mg Take 0.4 mg by mouth daily with dinner     • traZODone (DESYREL) 100 mg tablet take 2 tablets by mouth at bedtime 180 tablet 0   • umeclidinium-vilanterol 62.5-25 mcg/actuation inhaler Inhale 1 puff daily 60 blister 5   • Continuous Blood Gluc Sensor (Dexcom G6 Sensor) MISC Use daily as directed for CGM - Change every 10 days 9 each 3   • Continuous Blood Gluc Transmit (Dexcom G6 Transmitter) MISC Use daily as directed for CGM - Change every 3 months 1 each 3   • dulaglutide (Trulicity) 3.32 OB/2.2IS injection Inject 0.5 mL (0.75 mg total) under the skin once a week 2 mL 3   • Insulin Pen Needle (Pen Needles) 32G X 4 MM MISC Use 4 (four) times a day 400 each 3   • mometasone (ELOCON) 0.1 % cream Apply topically daily for 7 days 15 g 0     Current Facility-Administered Medications   Medication Dose Route Frequency Provider Last Rate Last Admin   • sodium chloride 0.9 % infusion  125 mL/hr Intravenous Continuous Neo Silver DO              Objective:    Vitals:    09/01/23 0749   BP: (!) 172/74   Pulse: 61   Resp: 18   Temp: (!) 96.9 °F (36.1 °C)   TempSrc: Temporal   SpO2: 95%        Physical Exam  HENT:      Head: Normocephalic. Eyes:      Pupils: Pupils are equal, round, and reactive to light. Cardiovascular:      Rate and Rhythm: Normal rate. Pulmonary:      Effort: Pulmonary effort is normal.   Abdominal:      General: Abdomen is flat. Musculoskeletal:         General: Normal range of motion. Skin:     General: Skin is warm. Neurological:      Mental Status: He is alert and oriented to person, place, and time.            Lab Results   Component Value Date    BNP 1,278 (H) 08/15/2023      Lab Results   Component Value Date    WBC 10.14 09/01/2023    HGB 10.5 (L) 09/01/2023    HCT 36.4 (L) 09/01/2023    MCV 81 (L) 09/01/2023     09/01/2023     No results found for: "INR", "PROTIME"  No results found for: "PTT"      I have personally reviewed pertinent imaging and laboratory results. Code Status: Prior  Advance Directive and Living Will:      Power of :    POLST:      This text is generated with voice recognition software. There may be translation, syntax,  or grammatical errors. If you have any questions, please contact the dictating provider.

## 2023-09-01 NOTE — PROGRESS NOTES
Phone call received from patient's wife Alvino Geronimo asking if she can have the number to [facility] to return their phone call to establish physical therapy. I provided their number and she thanked me. Nothing further needed at this time.

## 2023-09-01 NOTE — H&P (VIEW-ONLY)
Interventional Radiology  History and Physical 9/1/2023     Selvin Mcmahon   1945   [id number]    Assessment/Plan:  Multifocal HCC, here for Y90 mapping    Problem List Items Addressed This Visit    None  Visit Diagnoses     Liver masses        Relevant Orders    IR Y-90 PRE-ANGIO/EMBO W/ LUNG SCAN             Subjective:     Patient ID: Selvin Mcmahon is a 69 y.o. male. History of Present Illness  69 y M w/ multifocal HCC / PV involvement. Recent PCI, feels ok, no CP / SOB. Review of Systems   All other systems reviewed and are negative. 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, given PRBC/Venofer for anemia while in house   • Hyperlipidemia    • Hypertension    • LAFB (left anterior fascicular block)    • Liver mass 08/2023    suspected HCC, needs radioembolization   • RBBB         Past Surgical History:   Procedure Laterality Date   • APPENDECTOMY     • BOWEL RESECTION  2014   • CARDIAC CATHETERIZATION     • CARDIAC CATHETERIZATION N/A 8/25/2023    Procedure: Cardiac pci;  Surgeon: Ifeoma Bejarano DO;  [facility]; Service: Cardiology   • CARDIAC CATHETERIZATION N/A 8/21/2023    Procedure: Cardiac catheterization;  Surgeon: Ifeoma Bejarano DO;  [facility]; Service: Cardiology   • CARDIAC CATHETERIZATION N/A 8/21/2023    Procedure: Cardiac Coronary Angiogram;  Surgeon: Ifeoma Bejarano DO;  [facility];   Service: Cardiology   • CATARACT EXTRACTION Bilateral    • EGD          Social History     Tobacco Use   Smoking Status Former   • Packs/day: 1.00   • Years: 30.00   • Total pack years: 30.00   • Types: Cigarettes   • Start date:    • Quit date: 12   • Years since quittin.6   Smokeless Tobacco Never        Social History     Substance and Sexual Activity   Alcohol Use Yes    Comment: Socially        Social History     Substance and Sexual Activity   Drug Use Never        No Known Allergies    Current Outpatient Medications   Medication Sig Dispense Refill   • acetaminophen (TYLENOL) 650 mg CR tablet Take 650 mg by mouth every 8 (eight) hours as needed for mild pain     • atorvastatin (LIPITOR) 40 mg tablet Take 1 tablet (40 mg total) by mouth daily after dinner 30 tablet 11   • clopidogrel (PLAVIX) 75 mg tablet Take 1 tablet (75 mg total) by mouth daily 30 tablet 11   • Eliquis 5 MG take 1 tablet by mouth twice a day 60 tablet 5   • ferrous sulfate 325 (65 Fe) mg tablet Take 1 tablet (325 mg total) by mouth daily with breakfast 30 tablet 0   • furosemide (LASIX) 40 mg tablet Take 1 tablet (40 mg total) by mouth daily 30 tablet 6   • insulin aspart (NovoLOG FlexPen) 100 UNIT/ML injection pen Inject 40 Units under the skin 3 (three) times a day with meals 28 u breakfast time, 38 u lunchtime, 38 u dinnertime     • Levemir FlexPen 100 units/mL injection pen INJECT 75 UNITS UNDER THE SKIN DAILY AT BEDTIME 30 mL 0   • metoprolol succinate (TOPROL-XL) 50 mg 24 hr tablet Take 0.5 tablets (25 mg total) by mouth daily 30 tablet 6   • Multiple Vitamin (MULTIVITAMIN ADULT PO) Take 1 tablet by mouth daily     • pantoprazole (PROTONIX) 40 mg tablet Take 1 tablet (40 mg total) by mouth 2 (two) times a day 30 tablet 0   • Potassium Citrate ER 15 MEQ (1620 MG) TBCR Take 1 tablet by mouth 2 (two) times a day     • pregabalin (LYRICA) 100 mg capsule take 1 capsule by mouth three times a day 90 capsule 0   • saxagliptin (Onglyza) 5 MG tablet Take 5 mg by mouth daily with dinner     • sertraline (ZOLOFT) 25 mg tablet Take 25 mg by mouth daily     • SUPER B COMPLEX/C PO Take 1 tablet by mouth daily     • tamsulosin (FLOMAX) 0.4 mg Take 0.4 mg by mouth daily with dinner     • traZODone (DESYREL) 100 mg tablet take 2 tablets by mouth at bedtime 180 tablet 0   • umeclidinium-vilanterol 62.5-25 mcg/actuation inhaler Inhale 1 puff daily 60 blister 5   • Continuous Blood Gluc Sensor (Dexcom G6 Sensor) MISC Use daily as directed for CGM - Change every 10 days 9 each 3   • Continuous Blood Gluc Transmit (Dexcom G6 Transmitter) MISC Use daily as directed for CGM - Change every 3 months 1 each 3   • dulaglutide (Trulicity) 8.73 AQ/9.6GZ injection Inject 0.5 mL (0.75 mg total) under the skin once a week 2 mL 3   • Insulin Pen Needle (Pen Needles) 32G X 4 MM MISC Use 4 (four) times a day 400 each 3   • mometasone (ELOCON) 0.1 % cream Apply topically daily for 7 days 15 g 0     Current Facility-Administered Medications   Medication Dose Route Frequency Provider Last Rate Last Admin   • sodium chloride 0.9 % infusion  125 mL/hr Intravenous Continuous Ed Schulz DO              Objective:    Vitals:    09/01/23 0749   BP: (!) 172/74   Pulse: 61   Resp: 18   Temp: (!) 96.9 °F (36.1 °C)   TempSrc: Temporal   SpO2: 95%        Physical Exam  HENT:      Head: Normocephalic. Eyes:      Pupils: Pupils are equal, round, and reactive to light. Cardiovascular:      Rate and Rhythm: Normal rate. Pulmonary:      Effort: Pulmonary effort is normal.   Abdominal:      General: Abdomen is flat. Musculoskeletal:         General: Normal range of motion. Skin:     General: Skin is warm. Neurological:      Mental Status: He is alert and oriented to person, place, and time.            Lab Results   Component Value Date    BNP 1,278 (H) 08/15/2023      Lab Results   Component Value Date    WBC 10.14 09/01/2023    HGB 10.5 (L) 09/01/2023    HCT 36.4 (L) 09/01/2023    MCV 81 (L) 09/01/2023     09/01/2023     No results found for: "INR", "PROTIME"  No results found for: "PTT"      I have personally reviewed pertinent imaging and laboratory results. Code Status: Prior  Advance Directive and Living Will:      Power of :    POLST:      This text is generated with voice recognition software. There may be translation, syntax,  or grammatical errors. If you have any questions, please contact the dictating provider.

## 2023-09-04 LAB — GLUCOSE SERPL-MCNC: 96 MG/DL (ref 65–140)

## 2023-09-05 ENCOUNTER — TELEPHONE (OUTPATIENT)
Dept: RADIOLOGY | Facility: HOSPITAL | Age: 78
End: 2023-09-05

## 2023-09-05 ENCOUNTER — TELEPHONE (OUTPATIENT)
Dept: CARDIOLOGY CLINIC | Facility: CLINIC | Age: 78
End: 2023-09-05

## 2023-09-05 ENCOUNTER — CLINICAL SUPPORT (OUTPATIENT)
Dept: RADIATION ONCOLOGY | Facility: CLINIC | Age: 78
End: 2023-09-05
Attending: RADIOLOGY
Payer: COMMERCIAL

## 2023-09-05 VITALS
SYSTOLIC BLOOD PRESSURE: 148 MMHG | DIASTOLIC BLOOD PRESSURE: 64 MMHG | HEIGHT: 72 IN | OXYGEN SATURATION: 93 % | RESPIRATION RATE: 20 BRPM | WEIGHT: 214.29 LBS | TEMPERATURE: 97 F | HEART RATE: 72 BPM | BODY MASS INDEX: 29.02 KG/M2

## 2023-09-05 DIAGNOSIS — C22.0 HEPATOCELLULAR CARCINOMA (HCC): Primary | ICD-10-CM

## 2023-09-05 DIAGNOSIS — R16.0 LIVER MASSES: Primary | ICD-10-CM

## 2023-09-05 PROCEDURE — G0463 HOSPITAL OUTPT CLINIC VISIT: HCPCS | Performed by: RADIOLOGY

## 2023-09-05 PROCEDURE — 99205 OFFICE O/P NEW HI 60 MIN: CPT | Performed by: RADIOLOGY

## 2023-09-05 PROCEDURE — 99211 OFF/OP EST MAY X REQ PHY/QHP: CPT | Performed by: RADIOLOGY

## 2023-09-05 RX ORDER — METHYLPREDNISOLONE 4 MG/1
TABLET ORAL
Qty: 1 EACH | Refills: 0 | Status: SHIPPED | OUTPATIENT
Start: 2023-09-05 | End: 2023-09-13

## 2023-09-05 RX ORDER — SODIUM CHLORIDE 9 MG/ML
75 INJECTION, SOLUTION INTRAVENOUS CONTINUOUS
Status: CANCELLED | OUTPATIENT
Start: 2023-09-05

## 2023-09-05 NOTE — PROGRESS NOTES
Georgina Arriola 1945 is a 69 y.o. male referred to radiation oncology by Dr. Elisa Matute  to discuss radiation for newly diagnosed liver mass. History of having increased SOB over the last 6 months. Was seen in ED in May 2023 d/t abnormal CXR had CTA PE study that was negative for PE but did reveal  incidental finding of hepatic lobe abnormality and advised to follow up with his PCP. He was seen by his PCP: He will need further imaging. Has had multiple imaging studies and will defer for a couple of months due to need for contrast, d/t elevated bun/cr. Meanwhile he was being treated by pulmonary team with medication adjustments for his history of smoking and emphysema. 5/5/23 CTA ED Chest PE study:    No pulmonary embolus. 2 ill-defined low-attenuation lesions in the right hepatic lobe, one partially imaged. While these could be areas of fatty infiltration, recommend further evaluation with nonemergent abdomen CT to exclude tumor. Emphysema with no acute disease. 5/10/23- PFT completed  Results:  FEV1/FVC Ratio: 82 % (110% predicted)  Forced Vital Capacity: 2.84 L    66 % predicted  FEV1: 2.34 L     73 % predicted  After administration of bronchodilator FEV1: 2.43 (+3%)  Lung volumes by body plethysmography: Total Lung Capacity 61 % predicted Residual volume 58 % predicted  RV/TLC ratio 92%  DLCO corrected for patients hemoglobin level: 30 %  Interpretation:  • Moderate restrictive airflow defect  • No response to the administration to bronchodilator per ATS Standards  • Decreased lung volumes with decreased residual volume indicative of restriction  • Very severe diffusion defect. Given history of is less than 90% of capacity, this DLCO may underestimate actual value.   • Restricted flow volume loops     Component      Latest Ref Rng 5/5/2023 5/15/2023   Glucose, Random      70 - 99 mg/dL 287 (H)  265 (H)    BUN      8 - 27 mg/dL 40 (H)  39 (H)    Creatinine      0.76 - 1.27 mg/dL 1.63 (H)  1.56 (H) eGFR      >59 mL/min/1.73 40  45 (L)    SL AMB BUN/CREATININE RATIO      10 - 24   25 (H)    Sodium      134 - 144 mmol/L 139  141    Potassium      3.5 - 5.2 mmol/L 5.1  5.3 (H)    Chloride      96 - 106 mmol/L 106  105    CO2      20 - 29 mmol/L 24  21    CALCIUM      8.6 - 10.2 mg/dL  8.9    Total Protein      6.0 - 8.5 g/dL 7.7  6.4    Albumin      3.7 - 4.7 g/dL 4.0  3.9    Globulin, Total      1.5 - 4.5 g/dL  2.5    Albumin/Globulin Ratio      1.2 - 2.2   1.6    TOTAL BILIRUBIN      0.0 - 1.2 mg/dL 0.55  0.4    ALKALINE PHOSPHATASE ISOENZYMES      44 - 121 IU/L  88    AST      0 - 40 IU/L 44 (H)  40    ALT      0 - 44 IU/L 38  37        6/14/23 CT Abdomen w/wo contrast:   1. Multiple indeterminate hepatic lesions, concerning for malignancy. Dominant 3.8 cm right hepatic lobe segment 7 lesion abutting the intrahepatic IVC with slight mass effect but no vascular invasion. Furthermore, there is suspicion for potential tumor   thrombosis involving the left portal and anterior branch right portal veins which may be indicative of occult infiltrating hepatic mass. Further evaluation with MRI abdomen with IV contrast (Eovist) is recommended now for more definitive   characterization. Correlation with alpha-fetoprotein level may be helpful. 2. Low attenuation density seen along the outer margin of the right colon and inferior right paracolic gutter (such as series 302/). Although this measures near water density and may represent small volume ascites, mucinous serosal tumor implant on   the colon cannot be entirely excluded. 3. Cholelithiasis. 4. Bilateral renal cysts and small nonobstructing left renal calculus. 7/6/23 MRI Liver  w/wo contrast:   Numerous hepatic lesions, unchanged from the recent CT, suspicious for metastases. Likely enhancing thrombus throughout the anterior right and diffusely throughout the left portal vein.      7/26/23 Surgical Oncology Consult Dr. Karely Hernandez:  70 yo male with multiple liver masses. Likely malignant, appears to have tumor thrombus. AFP level ordered. Liver appears cirrhotic. There was discussion that this could be HHC. If this is the case the recommendation would be liver directed therapy if possible. If this is primary cholangiocarcinoma, chemotherapy would be the mainstay of his treatment. If metastatic disease treatment would be based on the tissue type. IR biopsy planned for 8/9. He should have a colonoscopy since he has not had one for at least 5 years after is surgery for diverticulitis. He will be seen back in the office after his biopsy results return. 7/28/23 Surgical Oncology Dr. Wise Mart: Telephone call to patient: AFP over 18,000. No need for IR biopsy. Recommend proceeding with Y-90 treatment given his disease burden in the liver. IR will reach out to you, and consult with radiation oncology. 8/2/23 IR Virtual  Consult: Dr. Garcia Los: Liver mass with elevated AFP greater than 18,000. Imaging work up was concerning for metastatic process. However, with the elevated AFP this if felt these lesions represent [address] Will forego the liver biopsy. Plan for patient to have his colonoscopy. Proceed with Y-90 split dose whole liver treatment. Component      Latest Ref Rng 7/27/2023   AFP-TUMOR MARKER      0.0 - 8.4 ng/mL 18,200.0 (H)       8/7/23- 8/14/23 Admission:  [facility] d/t fall with hypoglycemia. Trauma x-ray series cleared. Hypoglycemia resolved. Hgb 6.4 on admission. Transfused, H/H trended. Found to have CHF with Pulmonary edema superimposed on emphysema. Small right pleural effusion on CXR. Elevated troponin. Cardiology and GI consulted. EGD/Colonoscopy on 8/10/23.    8/7/23 CT C-spine wo contrast  IMPRESSION:  No cervical spine fracture or traumatic malalignment. CT head wo contrast  IMPRESSION:  No acute intracranial process. No skull fracture. Chronic microangiopathy.      8/10/23 Biopsy Colonoscopy  Final Diagnosis  A. Large Intestine, Hepatic Flexure, polyp:  Tubular adenoma; negative for high grade dysplasia and malignancy  B. Large Intestine, Left/Descending Colon, polyp:  Tubular adenoma; negative for high grade dysplasia and malignancy    8/15/23-8/20/23 Readmitted to [facility]: chest pain with previous elevated troponin. Initially admitted for right side pleural effusion and CHF. 8/18 Nuclear stress test with no ischemic ECG changes, however does have a new reduced EF to 36% as well as large fixed defect of the inferior and inferolateral walls with associated wall motion abnormality suspicious for RCA territory infarct. Plan to transfer for cardiac cath. 8/17/23 Xray chest  IMPRESSION:  No active pulmonary disease. 8/20/23-8/26/23 Transferred to [facility]: from [facility]. He had a Cardiac catheterization done on 8/21/23 and PCI and stent placement to LAD first ramus and left circumflex. on 8/25/23.     Component      Latest Ref Rng 8/14/2023 8/15/2023 8/16/2023   Glucose, Random      65 - 140 mg/dL 159 (H)  164 (H)  121    BUN      5 - 25 mg/dL 39 (H)  44 (H)  42 (H)    Creatinine      0.60 - 1.30 mg/dL 1.52 (H)  1.69 (H)  1.69 (H)    eGFR      ml/min/1.73sq m 43  38  38    Sodium      135 - 147 mmol/L 141  143  141    Potassium      3.5 - 5.3 mmol/L 4.1  4.6  4.7    Chloride      96 - 108 mmol/L 103  105  101    CO2      21 - 32 mmol/L 29  31  32    Total Protein      6.4 - 8.4 g/dL  7.1  6.9    Albumin      3.5 - 5.0 g/dL  4.0  3.9    TOTAL BILIRUBIN      0.20 - 1.00 mg/dL  0.54  0.72    AST      13 - 39 U/L  34  36    ALT      7 - 52 U/L  38  36    Anion Gap      mmol/L 9  7  8    Calcium      8.4 - 10.2 mg/dL 9.3  9.5  9.5    Alkaline Phosphatase      34 - 104 U/L  97  95        Component      Latest Ref Rng 8/25/2023 8/26/2023   Glucose, Random      65 - 140 mg/dL 104  116    BUN      5 - 25 mg/dL 42 (H)  35 (H)    Creatinine      0.60 - 1.30 mg/dL 1.38 (H)  1.24    eGFR      ml/min/1.73sq m 48  55 9/1/23 Y-90 mapping  9/8/23 Y-90 treatment      Up coming:   Ordered needs to be scheduled: Chest CT High resolution not completed d/t admission and MRI abdomen w wo contrast            Oncology History   Hepatocellular carcinoma ([address])    Initial Diagnosis    Hepatocellular carcinoma ([address])         Review of Systems:  Review of Systems   Constitutional: Positive for fatigue. Negative for appetite change, chills, fever and unexpected weight change. HENT: Positive for rhinorrhea. Eyes: Negative. Uses reading glasses   Respiratory: Positive for shortness of breath (with activity). Negative for cough, chest tightness and wheezing. Has daily inhaler   Cardiovascular: Negative. Negative for chest pain. Gastrointestinal: Positive for abdominal distention (sometime after dinner). Negative for abdominal pain, anal bleeding, blood in stool, constipation, diarrhea, nausea and vomiting. Endocrine: Negative. Negative for cold intolerance and heat intolerance. Genitourinary: Negative for decreased urine volume, difficulty urinating, dysuria, frequency, hematuria and urgency. Nocturia x1   Musculoskeletal: Positive for arthralgias (bilateral knee pain) and gait problem (uses cane ). Skin: Negative. Allergic/Immunologic: Negative for environmental allergies. Neurological: Positive for dizziness ("once in a while"), light-headedness, numbness (neuropathy in bilateral feet and hands) and headaches ("once in a while"). Hematological: Bruises/bleeds easily. Psychiatric/Behavioral: Negative for sleep disturbance.        Clinical Trial: no        Pain assessment: 0    PFT completed    Prior Radiation No    Teaching [employer] pamphlet and handout given and discussed    MST completed    Implantable Devices (Port, pacemaker, pain stimulator) Cardiac stents    Hip Replacement No    Health Maintenance   Topic Date Due   • Hepatitis C Screening  Never done   • Pneumococcal Vaccine: 65+ Years (1 - PCV) 03/27/1951   • BMI: Followup Plan  Never done   • COVID-19 Vaccine (5 - Pfizer series) 11/30/2022   • HEMOGLOBIN A1C  07/06/2023   • Kidney Health Evaluation: Albumin/Creatinine Ratio  08/26/2023   • Fall Risk  09/01/2023   • Medicare Annual Wellness Visit (AWV)  09/01/2023   • Influenza Vaccine (1) 09/01/2023   • Diabetic Foot Exam  11/02/2023   • BMI: Adult  08/30/2024   • Kidney Health Evaluation: GFR  09/01/2024   • Depression Screening  09/05/2024   • DM Eye Exam  05/02/2025   • HIB Vaccine  Aged Out   • IPV Vaccine  Aged Out   • Hepatitis A Vaccine  Aged Out   • Meningococcal ACWY Vaccine  Aged Out   • HPV Vaccine  Aged Out   • Colorectal Cancer Screening  Discontinued       Past Medical History:   Diagnosis Date   • Atrial fibrillation ([address])     Eliquis   • AVB (atrioventricular block)     1st degree   • CAD (coronary artery disease)    • CKD (chronic kidney disease), stage III ([address])     baseline Cr 1.2-1.6   • Diabetes mellitus ([address])     type 2, insulin dependent   • Diverticulosis    • Emphysema lung ([address])    • Former tobacco use    • History of asbestos exposure    • History of DVT (deep vein thrombosis)     RLE, tx w/ AC   • History of GI bleed 08/2023    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, given PRBC/Venofer for anemia while in house   • Hyperlipidemia    • Hypertension    • LAFB (left anterior fascicular block)    • Liver cancer ([address])    • Liver mass 08/2023    suspected HCC, needs radioembolization   • RBBB        Past Surgical History:   Procedure Laterality Date   • APPENDECTOMY     • BOWEL RESECTION  2014   • CARDIAC CATHETERIZATION     • CARDIAC CATHETERIZATION N/A 8/25/2023    Procedure: Cardiac pci;  Surgeon: Senait Tran DO;  [facility]; Service: Cardiology   • CARDIAC CATHETERIZATION N/A 8/21/2023    Procedure: Cardiac catheterization;  Surgeon: Senait Tran DO;  [facility];   Service: Cardiology   • CARDIAC CATHETERIZATION N/A 2023    Procedure: Cardiac Coronary Angiogram;  Surgeon: Jared Ortiz DO;  [facility];   Service: Cardiology   • CATARACT EXTRACTION Bilateral    • EGD         Family History   Problem Relation Age of Onset   • Hypertension Mother    • Bone cancer Father    • Diabetes type II Sister    • Diabetes type II Sister    • Esophageal cancer Brother        Social History     Tobacco Use   • Smoking status: Former     Packs/day: 1.00     Years: 30.00     Total pack years: 30.00     Types: Cigarettes     Start date:      Quit date:      Years since quittin.6   • Smokeless tobacco: Never   Vaping Use   • Vaping Use: Never used   Substance Use Topics   • Alcohol use: Yes     Comment: Socially   • Drug use: Never          Current Outpatient Medications:   •  acetaminophen (TYLENOL) 650 mg CR tablet, Take 650 mg by mouth every 8 (eight) hours as needed for mild pain, Disp: , Rfl:   •  atorvastatin (LIPITOR) 40 mg tablet, Take 1 tablet (40 mg total) by mouth daily after dinner, Disp: 30 tablet, Rfl: 11  •  clopidogrel (PLAVIX) 75 mg tablet, Take 1 tablet (75 mg total) by mouth daily, Disp: 30 tablet, Rfl: 11  •  Continuous Blood Gluc Sensor (Dexcom G6 Sensor) MISC, Use daily as directed for CGM - Change every 10 days, Disp: 9 each, Rfl: 3  •  Continuous Blood Gluc Transmit (Dexcom G6 Transmitter) MISC, Use daily as directed for CGM - Change every 3 months, Disp: 1 each, Rfl: 3  •  dulaglutide (Trulicity) 7.26 XL/6.7YM injection, Inject 0.5 mL (0.75 mg total) under the skin once a week, Disp: 2 mL, Rfl: 3  •  Eliquis 5 MG, take 1 tablet by mouth twice a day, Disp: 60 tablet, Rfl: 5  •  ferrous sulfate 325 (65 Fe) mg tablet, Take 1 tablet (325 mg total) by mouth daily with breakfast, Disp: 30 tablet, Rfl: 0  •  furosemide (LASIX) 40 mg tablet, Take 1 tablet (40 mg total) by mouth daily, Disp: 30 tablet, Rfl: 6  •  insulin aspart (NovoLOG FlexPen) 100 UNIT/ML injection pen, Inject 40 Units under the skin 3 (three) times a day with meals 28 u breakfast time, 38 u lunchtime, 38 u dinnertime (Patient taking differently: Inject 40 Units under the skin 3 (three) times a day with meals 40 units for breakfast and lunch; 35 units with dinner), Disp: , Rfl:   •  Insulin Pen Needle (Pen Needles) 32G X 4 MM MISC, Use 4 (four) times a day, Disp: 400 each, Rfl: 3  •  Levemir FlexPen 100 units/mL injection pen, INJECT 75 UNITS UNDER THE SKIN DAILY AT BEDTIME, Disp: 30 mL, Rfl: 0  •  metoprolol succinate (TOPROL-XL) 50 mg 24 hr tablet, Take 0.5 tablets (25 mg total) by mouth daily, Disp: 30 tablet, Rfl: 6  •  Multiple Vitamin (MULTIVITAMIN ADULT PO), Take 1 tablet by mouth daily, Disp: , Rfl:   •  pantoprazole (PROTONIX) 40 mg tablet, Take 1 tablet (40 mg total) by mouth 2 (two) times a day (Patient taking differently: Take 40 mg by mouth daily), Disp: 30 tablet, Rfl: 0  •  pregabalin (LYRICA) 100 mg capsule, take 1 capsule by mouth three times a day, Disp: 90 capsule, Rfl: 0  •  saxagliptin (Onglyza) 5 MG tablet, Take 5 mg by mouth daily with dinner, Disp: , Rfl:   •  sertraline (ZOLOFT) 25 mg tablet, Take 25 mg by mouth daily, Disp: , Rfl:   •  SUPER B COMPLEX/C PO, Take 1 tablet by mouth daily, Disp: , Rfl:   •  tamsulosin (FLOMAX) 0.4 mg, Take 0.4 mg by mouth daily with dinner, Disp: , Rfl:   •  traZODone (DESYREL) 100 mg tablet, take 2 tablets by mouth at bedtime, Disp: 180 tablet, Rfl: 0  •  umeclidinium-vilanterol 62.5-25 mcg/actuation inhaler, Inhale 1 puff daily, Disp: 60 blister, Rfl: 5  •  mometasone (ELOCON) 0.1 % cream, Apply topically daily for 7 days (Patient not taking: Reported on 9/5/2023), Disp: 15 g, Rfl: 0  •  Potassium Citrate ER 15 MEQ (1620 MG) TBCR, Take 1 tablet by mouth 2 (two) times a day, Disp: , Rfl:     No Known Allergies     Vitals:    09/05/23 0827   BP: 148/64   BP Location: Left arm   Patient Position: Sitting   Cuff Size: Standard   Pulse: 72   Resp: 20 Temp: (!) 97 °F (36.1 °C)   TempSrc: Temporal   SpO2: 93%   Weight: 97.2 kg (214 lb 4.6 oz)   Height: 6' (1.829 m)       Pain Score: 0-No pain

## 2023-09-05 NOTE — PROGRESS NOTES
Consultation - Radiation Oncology      QGE:[id number] : 1945  Encounter: [id number]  Patient Information: Claire Cardona      CHIEF COMPLAINT  Chief Complaint   Patient presents with   • Consult     Radiation Oncology     Cancer Staging   Hepatocellular carcinoma [facility])  Staging form: Liver (Excluding Intrahepatic Bile Ducts), AJCC 8th Edition  - Clinical stage from 2023: Stage II (cT2, cN0, cM0) - Signed by María Oro MD on 2023  Histopathologic type: Hepatocellular carcinoma, NOS           History of Present Illness   Claire Cardona is a 69y.o. year old male who presents to radiation oncology by Dr. Sonia Lugo  to discuss radiation for newly diagnosed hepatocellular carcinoma of the liver. He was initially scheduled for consultation here 2023 but this had to be canceled due to him being admitted to the hospital.    History of having increased SOB over the last 6 months. He was seen in ED in May 2023 d/t abnormal CXR had CTA PE study that was negative for PE but did reveal  incidental finding of hepatic lobe abnormality and advised to follow up with his PCP. He was seen by his PCP: He will need further imaging[facility] imaging studies and will defer for a couple of months due to need for contrast, d/t elevated bun/cr. Meanwhile he was being treated by pulmonary team with medication adjustments for his history of smoking and emphysema.        23 CTA ED Chest PE study:    No pulmonary embolus.   2 ill-defined low-attenuation lesions in the right hepatic lobe, one partially imaged.  While these could be areas of fatty infiltration, recommend further evaluation with nonemergent abdomen CT to exclude tumor.   Emphysema with no acute disease.     5/10/23- PFT completed  Results:  FEV1/FVC Ratio: 82 % (110% predicted)  Forced Vital Capacity: 2.84 L    66 % predicted  FEV1: 2.34 L     73 % predicted  After administration of bronchodilator FEV1: 2.43 (+3%)  Lung volumes by body plethysmography: Total Lung Capacity 61 % predicted Residual volume 58 % predicted  RV/TLC ratio 92%  DLCO corrected for patients hemoglobin level: 30 %  Interpretation:  • Moderate restrictive airflow defect  • No response to the administration to bronchodilator per ATS Standards  • Decreased lung volumes with decreased residual volume indicative of restriction  • Very severe diffusion defect.  Given history of is less than 90% of capacity, this DLCO may underestimate actual value. • Restricted flow volume loops     Component      Latest Ref Rng 5/5/2023 5/15/2023   Glucose, Random      70 - 99 mg/dL 287 (H)  265 (H)    BUN      8 - 27 mg/dL 40 (H)  39 (H)    Creatinine      0.76 - 1.27 mg/dL 1.63 (H)  1.56 (H)    eGFR      >59 mL/min/1.73 40  45 (L)    SL AMB BUN/CREATININE RATIO      10 - 24    25 (H)    Sodium      134 - 144 mmol/L 139  141    Potassium      3.5 - 5.2 mmol/L 5.1  5.3 (H)    Chloride      96 - 106 mmol/L 106  105    CO2      20 - 29 mmol/L 24  21    CALCIUM      8.6 - 10.2 mg/dL   8.9    Total Protein      6.0 - 8.5 g/dL 7.7  6.4    Albumin      3.7 - 4.7 g/dL 4.0  3.9    Globulin, Total      1.5 - 4.5 g/dL   2.5    Albumin/Globulin Ratio      1.2 - 2.2    1.6    TOTAL BILIRUBIN      0.0 - 1.2 mg/dL 0.55  0.4    ALKALINE PHOSPHATASE ISOENZYMES      44 - 121 IU/L   88    AST      0 - 40 IU/L 44 (H)  40    ALT      0 - 44 IU/L 38  37          6/14/23 CT Abdomen w/wo contrast:   1. Multiple indeterminate hepatic lesions, concerning for malignancy. Dominant 3.8 cm right hepatic lobe segment 7 lesion abutting the intrahepatic IVC with slight mass effect but no vascular invasion. Furthermore, there is suspicion for potential tumor   thrombosis involving the left portal and anterior branch right portal veins which may be indicative of occult infiltrating hepatic mass. Further evaluation with MRI abdomen with IV contrast (Eovist) is recommended now for more definitive   characterization. Correlation with alpha-fetoprotein level may be helpful.   2. Low attenuation density seen along the outer margin of the right colon and inferior right paracolic gutter (such as series 302/). Although this measures near water density and may represent small volume ascites, mucinous serosal tumor implant on   the colon cannot be entirely excluded.   3. Cholelithiasis.   4. Bilateral renal cysts and small nonobstructing left renal calculus.     7/6/23 MRI Liver  w/wo contrast:   Numerous hepatic lesions, unchanged from the recent CT, suspicious for metastases.   Likely enhancing thrombus throughout the anterior right and diffusely throughout the left portal vein.     7/26/23 Surgical Oncology Consult Dr. Mitra Arguelles:  68 yo male with multiple liver masses. Likely malignant, appears to have tumor thrombus. AFP level ordered. Liver appears cirrhotic. There was discussion that this could be HHC. If this is the case the recommendation would be liver directed therapy if possible. If this is primary cholangiocarcinoma, chemotherapy would be the mainstay of his treatment. If metastatic disease treatment would be based on the tissue type. IR biopsy planned for 8/9. He should have a colonoscopy since he has not had one for at least 5 years after is surgery for diverticulitis. He will be seen back in the office after his biopsy results return.       7/28/23 Surgical Oncology Dr. Dolly Izquierdo: Telephone call to patient: AFP over 18,000. No need for IR biopsy. Recommend proceeding with Y-90 treatment given his disease burden in the liver. IR will reach out to you, and consult with radiation oncology.       8/2/23 IR Virtual  Consult: Dr. Miquel Majano: Liver mass with elevated AFP greater than 18,000. Imaging work up was concerning for metastatic process. However, with the elevated AFP this if felt these lesions represent [address] Will forego the liver biopsy. Plan for patient to have his colonoscopy.   Proceed with Y-90 split dose whole liver treatment.      Component      Latest Ref Rng 7/27/2023   AFP-TUMOR MARKER      0.0 - 8.4 ng/mL 18,200.0 (H)       8/7/23- 8/14/23 Admission:  [facility] d/t fall with hypoglycemia. Trauma x-ray series cleared. Hypoglycemia resolved. Hgb 6.4 on admission. Transfused, H/H trended. Found to have CHF with Pulmonary edema superimposed on emphysema. Small right pleural effusion on CXR. Elevated troponin. Cardiology and GI consulted. EGD/Colonoscopy on 8/10/23.    8/7/23 CT C-spine wo contrast  IMPRESSION:  No cervical spine fracture or traumatic malalignment. CT head wo contrast  IMPRESSION:  No acute intracranial process. No skull fracture. Chronic microangiopathy.     8/10/23 Biopsy Colonoscopy -performed due to having anemia. He has had several colonoscopies since the age of 49 with benign polyps removed in the past.  Final Diagnosis  A. Large Intestine, Hepatic Flexure, polyp:  Tubular adenoma; negative for high grade dysplasia and malignancy  B. Large Intestine, Left/Descending Colon, polyp:  Tubular adenoma; negative for high grade dysplasia and malignancy     8/15/23-8/20/23 Readmitted to [facility]: chest pain with previous elevated troponin. Initially admitted for right side pleural effusion and CHF. 8/18 Nuclear stress test with no ischemic ECG changes, however does have a new reduced EF to 36% as well as large fixed defect of the inferior and inferolateral walls with associated wall motion abnormality suspicious for RCA territory infarct. Plan to transfer for cardiac cath. 8/17/23 Xray chest  IMPRESSION:  No active pulmonary disease. 8/20/23-8/26/23 Transferred to [facility]: from [facility]. He had a Cardiac catheterization done on 8/21/23 and PCI and stent placement to LAD first ramus and left circumflex.   on 8/25/23.     Component      Latest Ref Rng 8/14/2023 8/15/2023 8/16/2023   Glucose, Random      65 - 140 mg/dL 159 (H)  164 (H)  121    BUN      5 - 25 mg/dL 39 (H)  44 (H)  42 (H)    Creatinine      0.60 - 1.30 mg/dL 1.52 (H)  1.69 (H)  1.69 (H)    eGFR      ml/min/1.73sq m 43  38  38    Sodium      135 - 147 mmol/L 141  143  141    Potassium      3.5 - 5.3 mmol/L 4.1  4.6  4.7    Chloride      96 - 108 mmol/L 103  105  101    CO2      21 - 32 mmol/L 29  31  32    Total Protein      6.4 - 8.4 g/dL   7.1  6.9    Albumin      3.5 - 5.0 g/dL   4.0  3.9    TOTAL BILIRUBIN      0.20 - 1.00 mg/dL   0.54  0.72    AST      13 - 39 U/L   34  36    ALT      7 - 52 U/L   38  36    Anion Gap      mmol/L 9  7  8    Calcium      8.4 - 10.2 mg/dL 9.3  9.5  9.5    Alkaline Phosphatase      34 - 104 U/L   97  95        Component      Latest Ref Rng 8/25/2023 8/26/2023   Glucose, Random      65 - 140 mg/dL 104  116    BUN      5 - 25 mg/dL 42 (H)  35 (H)    Creatinine      0.60 - 1.30 mg/dL 1.38 (H)  1.24    eGFR      ml/min/1.73sq m 48  55       9/1/23 Y-90 mapping with Dr. Windy Morris    Patient seen for consultation today with his wife. He lives with his wife and 2 dogs. He reports no prior history of heart disease until recent diagnosis and admission for congestive heart failure outlined above. This is the first time he had cardiac stents placed and he had 3 stents placed last month on August 21, 2023. He denies any previous myocardial infarctions. He will be starting on cardiac rehabilitation. He denies any previous history of cancer including no skin cancers. He denies any previous radiation therapy and no chemotherapy. He is having no abdominal pains nor bloating. He has no nausea and no vomiting. He reports normal bowel movements. He has been walking with a quad cane for several years due to having bilateral knee arthritis. He states he needs knee replacement surgery but this has not been performed. He smoked tobacco in the past but quit over 30 years ago in 1992. He drinks alcohol only on social occasions. He has 1 child who is a 80-year-old son who is healthy.   He is a retired  that worked on the [email or web address]. His wife continues to work as a third-party  for a financial institution called [employer]. She works from home. He has a prior history of a DVT and has been on Eliquis since  with no further blood clots. He knows to stop his Eliquis on Wednesday 2 days prior to his Y-90 procedure. He will remain on Plavix.     Upcomin23 Y-90 treatment with Dr. Christina Madera     Ordered needs to be scheduled: Chest CT High resolution not completed d/t admission and MRI abdomen w wo contrast    Historical Information   Oncology History   Hepatocellular carcinoma [facility])    Initial Diagnosis    Hepatocellular carcinoma ([address])     2023 -  Cancer Staged    Staging form: Liver (Excluding Intrahepatic Bile Ducts), AJCC 8th Edition  - Clinical stage from 2023: Stage II (cT2, cN0, cM0) - Signed by Mohsen Rodríguez MD on 2023  Histopathologic type: Hepatocellular carcinoma, NOS         Past Medical History:   Diagnosis Date   • Atrial fibrillation (HCC)     Eliquis   • AVB (atrioventricular block)     1st degree   • CAD (coronary artery disease)    • CKD (chronic kidney disease), stage III (HCC)     baseline Cr 1.2-1.6   • Diabetes mellitus ([address])     type 2, insulin dependent   • Diverticulosis    • Emphysema lung ([address])    • Former tobacco use    • History of asbestos exposure    • History of DVT (deep vein thrombosis)     RLE, tx w/ AC   • History of GI bleed 2023    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, given PRBC/Venofer for anemia while in house   • Hyperlipidemia    • Hypertension    • LAFB (left anterior fascicular block)    • Liver cancer ([address])    • Liver mass 2023    suspected HCC, needs radioembolization   • RBBB      Past Surgical History:   Procedure Laterality Date   • APPENDECTOMY     • BOWEL RESECTION     • CARDIAC CATHETERIZATION     • CARDIAC CATHETERIZATION N/A 2023    Procedure: Cardiac pci;  Surgeon: Anastasia Herr DO;  Location: [facility]; Service: Cardiology   • CARDIAC CATHETERIZATION N/A 2023    Procedure: Cardiac catheterization;  Surgeon: Anastasia Herr DO;  Location: [facility]; Service: Cardiology   • CARDIAC CATHETERIZATION N/A 2023    Procedure: Cardiac Coronary Angiogram;  Surgeon: Anastasia Herr DO;  Location: [facility];   Service: Cardiology   • CATARACT EXTRACTION Bilateral    • EGD         Family History   Problem Relation Age of Onset   • Hypertension Mother    • Bone cancer Father    • Diabetes type II Sister    • Diabetes type II Sister    • Esophageal cancer Brother        Social History   Social History     Substance and Sexual Activity   Alcohol Use Yes    Comment: Socially     Social History     Substance and Sexual Activity   Drug Use Never     Social History     Tobacco Use   Smoking Status Former   • Packs/day: 1.00   • Years: 30.00   • Total pack years: 30.00   • Types: Cigarettes   • Start date:    • Quit date:    • Years since quittin.6   Smokeless Tobacco Never       Meds/Allergies     Current Outpatient Medications:   •  acetaminophen (TYLENOL) 650 mg CR tablet, Take 650 mg by mouth every 8 (eight) hours as needed for mild pain, Disp: , Rfl:   •  atorvastatin (LIPITOR) 40 mg tablet, Take 1 tablet (40 mg total) by mouth daily after dinner, Disp: 30 tablet, Rfl: 11  •  clopidogrel (PLAVIX) 75 mg tablet, Take 1 tablet (75 mg total) by mouth daily, Disp: 30 tablet, Rfl: 11  •  Continuous Blood Gluc Sensor (Dexcom G6 Sensor) MISC, Use daily as directed for CGM - Change every 10 days, Disp: 9 each, Rfl: 3  •  Continuous Blood Gluc Transmit (Dexcom G6 Transmitter) MISC, Use daily as directed for CGM - Change every 3 months, Disp: 1 each, Rfl: 3  •  dulaglutide (Trulicity) 9.20 GF/3.1IC injection, Inject 0.5 mL (0.75 mg total) under the skin once a week, Disp: 2 mL, Rfl: 3  •  Eliquis 5 MG, take 1 tablet by mouth twice a day, Disp: 60 tablet, Rfl: 5  •  ferrous sulfate 325 (65 Fe) mg tablet, Take 1 tablet (325 mg total) by mouth daily with breakfast, Disp: 30 tablet, Rfl: 0  •  furosemide (LASIX) 40 mg tablet, Take 1 tablet (40 mg total) by mouth daily, Disp: 30 tablet, Rfl: 6  •  insulin aspart (NovoLOG FlexPen) 100 UNIT/ML injection pen, Inject 40 Units under the skin 3 (three) times a day with meals 28 u breakfast time, 38 u lunchtime, 38 u dinnertime (Patient taking differently: Inject 40 Units under the skin 3 (three) times a day with meals 40 units for breakfast and lunch; 35 units with dinner), Disp: , Rfl:   •  Insulin Pen Needle (Pen Needles) 32G X 4 MM MISC, Use 4 (four) times a day, Disp: 400 each, Rfl: 3  •  Levemir FlexPen 100 units/mL injection pen, INJECT 75 UNITS UNDER THE SKIN DAILY AT BEDTIME, Disp: 30 mL, Rfl: 0  •  metoprolol succinate (TOPROL-XL) 50 mg 24 hr tablet, Take 0.5 tablets (25 mg total) by mouth daily, Disp: 30 tablet, Rfl: 6  •  Multiple Vitamin (MULTIVITAMIN ADULT PO), Take 1 tablet by mouth daily, Disp: , Rfl:   •  pantoprazole (PROTONIX) 40 mg tablet, Take 1 tablet (40 mg total) by mouth 2 (two) times a day (Patient taking differently: Take 40 mg by mouth daily), Disp: 30 tablet, Rfl: 0  •  pregabalin (LYRICA) 100 mg capsule, take 1 capsule by mouth three times a day, Disp: 90 capsule, Rfl: 0  •  saxagliptin (Onglyza) 5 MG tablet, Take 5 mg by mouth daily with dinner, Disp: , Rfl:   •  sertraline (ZOLOFT) 25 mg tablet, Take 25 mg by mouth daily, Disp: , Rfl:   •  SUPER B COMPLEX/C PO, Take 1 tablet by mouth daily, Disp: , Rfl:   •  tamsulosin (FLOMAX) 0.4 mg, Take 0.4 mg by mouth daily with dinner, Disp: , Rfl:   •  traZODone (DESYREL) 100 mg tablet, take 2 tablets by mouth at bedtime, Disp: 180 tablet, Rfl: 0  •  umeclidinium-vilanterol 62.5-25 mcg/actuation inhaler, Inhale 1 puff daily, Disp: 60 blister, Rfl: 5  •  methylPREDNISolone 4 MG tablet therapy pack, Use as directed on package, start night of procedure, Disp: 1 each, Rfl: 0  •  mometasone (ELOCON) 0.1 % cream, Apply topically daily for 7 days (Patient not taking: Reported on 9/5/2023), Disp: 15 g, Rfl: 0  •  Potassium Citrate ER 15 MEQ (1620 MG) TBCR, Take 1 tablet by mouth 2 (two) times a day, Disp: , Rfl:   No Known Allergies    Review of Systems  Constitutional: Positive for fatigue. Negative for appetite change, chills, fever and unexpected weight change. HENT: Positive for rhinorrhea. Eyes: Negative. Uses reading glasses   Respiratory: Positive for shortness of breath (with activity). Negative for cough, chest tightness and wheezing. Has daily inhaler   Cardiovascular: Negative. Negative for chest pain. Gastrointestinal: Positive for abdominal distention (sometime after dinner). Negative for abdominal pain, anal bleeding, blood in stool, constipation, diarrhea, nausea and vomiting. Endocrine: Negative. Negative for cold intolerance and heat intolerance. Genitourinary: Negative for decreased urine volume, difficulty urinating, dysuria, frequency, hematuria and urgency. Nocturia x1   Musculoskeletal: Positive for arthralgias (bilateral knee pain) and gait problem (uses cane ). Skin: Negative. Allergic/Immunologic: Negative for environmental allergies. Neurological: Positive for dizziness ("once in a while"), light-headedness, numbness (neuropathy in bilateral feet and hands) and headaches ("once in a while"). Hematological: Bruises/bleeds easily.    Psychiatric/Behavioral: Negative for sleep disturbance.      Clinical Trial: no    OBJECTIVE:   /64 (BP Location: Left arm, Patient Position: Sitting, Cuff Size: Standard)   Pulse 72   Temp (!) 97 °F (36.1 °C) (Temporal)   Resp 20   Ht 6' (1.829 m)   Wt 97.2 kg (214 lb 4.6 oz)   SpO2 93%   BMI 29.06 kg/m²   Pain Assessment:  0  Performance Status: ECOG/Zubrod/WHO: 2 - Symptomatic, <50% confined to bed    Physical Exam  Vitals and nursing note reviewed. Constitutional:       General: He is not in acute distress. Appearance: He is well-developed. He is not diaphoretic. HENT:      Head: Normocephalic and atraumatic. Mouth/Throat:      Pharynx: No oropharyngeal exudate. Eyes:      General: No scleral icterus. Conjunctiva/sclera: Conjunctivae normal.      Pupils: Pupils are equal, round, and reactive to light. Neck:      Thyroid: No thyromegaly. Trachea: No tracheal deviation. Cardiovascular:      Rate and Rhythm: Normal rate and regular rhythm. Heart sounds: Normal heart sounds. Pulmonary:      Effort: Pulmonary effort is normal. No respiratory distress. Breath sounds: Normal breath sounds. No stridor. No wheezing, rhonchi or rales. Chest:      Chest wall: No tenderness. Abdominal:      General: Bowel sounds are normal. There is no distension. Palpations: Abdomen is soft. There is no mass. Tenderness: There is no abdominal tenderness. There is no guarding or rebound. Hernia: No hernia is present. Musculoskeletal:         General: No swelling or tenderness. Normal range of motion. Cervical back: Normal range of motion and neck supple. Right lower leg: No edema. Left lower leg: No edema. Comments: He is walking with a quad cane. Lymphadenopathy:      Cervical: No cervical adenopathy. Upper Body:      Right upper body: No supraclavicular adenopathy. Left upper body: No supraclavicular adenopathy. Lower Body: No right inguinal adenopathy. No left inguinal adenopathy. Skin:     General: Skin is warm and dry. Coloration: Skin is not jaundiced or pale. Findings: No erythema or rash. Neurological:      General: No focal deficit present. Mental Status: He is alert and oriented to person, place, and time. Cranial Nerves: No cranial nerve deficit.       Coordination: Coordination normal.   Psychiatric:         Mood and Affect: Mood normal.         Behavior: Behavior normal.         Thought Content: Thought content normal.         Judgment: Judgment normal.       RESULTS  Lab Results    Chemistry        Component Value Date/Time    K 4.3 09/01/2023 0805    K 5.2 07/10/2023 1146     09/01/2023 0805     (H) 07/10/2023 1146    CO2 24 09/01/2023 0805    CO2 17 (L) 07/10/2023 1146    BUN 43 (H) 09/01/2023 0805    BUN 35 (H) 07/10/2023 1146    CREATININE 1.58 (H) 09/01/2023 0805        Component Value Date/Time    CALCIUM 9.4 09/01/2023 0805    ALKPHOS 99 09/01/2023 0805    AST 55 (H) 09/01/2023 0805    AST 40 05/15/2023 1143    ALT 45 09/01/2023 0805    ALT 37 05/15/2023 1143            Lab Results   Component Value Date    WBC 10.14 09/01/2023    HGB 10.5 (L) 09/01/2023    HCT 36.4 (L) 09/01/2023    MCV 81 (L) 09/01/2023     09/01/2023     Imaging Studies  Cardiac catheterization    Result Date: 8/25/2023  Narrative: •  Multivessel PCI with CALLIE placement circumflex, ramus/high diagonal branch and proximal LAD     Stress strip    Result Date: 8/22/2023  Narrative: Confirmed by UPPER BUCKS, INBASKET [phone]),  Carli Jeter ([id number]) on 1/00/1875 10:24:20 AM    Cardiac catheterization    Result Date: 8/21/2023  Narrative: •  3V CAD in nondominant RCA system     NM myocardial perfusion spect (rx stress and/or rest)    Result Date: 8/18/2023  Narrative: •  Stress ECG: A pharmacological stress test was performed using regadenoson. The patient reached the end of the protocol. The patient reported nausea during the stress test. The patient also reports chest pain during recovery. All symptoms ended during recovery. •  Stress ECG: No ST deviation is noted. The stress ECG is negative for ischemia after pharmacologic vasodilation. •  Stress Function: Left ventricular function post-stress is abnormal. Global function is moderately reduced. There was a single regional abnormality during stress.  Stress ejection fraction is 36 %. There is a defect in the basal to mid inferior and inferolateral location(s). The defect has moderately reduced function. •  Perfusion: There is a left ventricular perfusion defect that is medium to large in size with moderate reduction in uptake present in the basal to mid inferior and inferolateral location(s) that is fixed. •  Stress Combined Conclusion: The ECG and SPECT imaging portions of the stress study are concordant with no evidence of stress induced myocardial ischemia. Left ventricular perfusion is abnormal. There is a probable infarct. Abnormal NM SPECT MPI. No ECG or SPECT evidence of ischemia with pharmacologic vasodilator stress. There is a large fixed defect of the inferior and inferolateral walls with associated wall motion abnormality suspicious for RCA territory infarct. Diaphragmatic attenuation and subdiaphragmatic activity both confound this assessment. Globally reduced LV systolic function by gated SPECT with aforementioned regional abnormality. XR chest pa & lateral    Result Date: 8/18/2023  Narrative: CHEST INDICATION:   Shortness of breath. COMPARISON: Chest x-ray 8/15/2023 EXAM PERFORMED/VIEWS:  XR CHEST PA & LATERAL FINDINGS: Heart is mildly enlarged, stable. The lungs are clear. No pneumothorax or pleural effusion. Osseous structures appear within normal limits for patient age. Impression: No active pulmonary disease. Workstation performed: [id number]     Echo follow up/limited w/ contrast if indicated    Result Date: 8/16/2023  Narrative: •  Left Ventricle: Left ventricular cavity size is normal. Wall thickness is normal. The left ventricular ejection fraction is 50-55% by visual estimation. . Systolic function is low normal. •  The following segments are hypokinetic: basal inferior and mid inferior.  •  All other segments are normal. •  Right Ventricle: Right ventricular cavity size is normal. Systolic function is normal.     XR chest pa & lateral    Result Date: 8/15/2023  Narrative: CHEST INDICATION:   Chest pain, dizziness and shortness of breath. COMPARISON: Chest radiograph dated August 10, 2023. EXAM PERFORMED/VIEWS:  XR CHEST PA & LATERAL Images: 3. FINDINGS: Cardiomediastinal silhouette appears unremarkable. Prominent interstitial markings likely secondary to poor inspiratory effort and posture of patient. Lungs otherwise clear. Slight blunting of the right costophrenic angle. No pneumothorax. No acute osseous abnormality. Impression: Minimal right-sided pleural effusion. Emphysematous changes. No focal consolidative airspace opacity to suggest pneumonia. Resident: Chris Bejarano, the attending radiologist, have reviewed the images and agree with the final report above. Workstation performed: [id number]     XR chest portable    Result Date: 8/11/2023  Narrative: CHEST INDICATION:   tachycardia post op. COMPARISON: Chest radiograph August 8, 2020. EXAM PERFORMED/VIEWS:  XR CHEST PORTABLE FINDINGS: Cardiomediastinal silhouette appears unremarkable. No focal consolidation, pneumothorax or pleural effusion. Osseous structures appear within normal limits for patient age. Impression: No focal consolidation, pleural effusion, or pneumothorax. Workstation performed: [id number]     Colonoscopy    Addendum Date: 8/10/2023 Addendum:   [address] [facility] Endoscopy [address] [facility],Third Floor [address] [phone] DATE OF SERVICE: 8/10/23 PHYSICIAN(S): Attending: Caryle Bridge, MD Fellow: No Staff Documented INDICATION: Anemia, unspecified type POST-OP DIAGNOSIS: See the impression below. HISTORY: Prior colonoscopy: 6 years ago. BOWEL PREPARATION: Golytely/Colyte/Trilyte PREPROCEDURE: Informed consent was obtained for the procedure, including sedation. Risks including but not limited to bleeding, infection, perforation, adverse drug reaction and aspiration were explained in detail.  Also explained about less than 100% sensitivity with the exam and other alternatives. The patient was placed in the left lateral decubitus position. Procedure: Colonoscopy DETAILS OF PROCEDURE: Patient was taken to the procedure room where a time out was performed to confirm correct patient and correct procedure. The patient underwent monitored anesthesia care, which was administered by an anesthesia professional. The patient's blood pressure, heart rate, level of consciousness, oxygen, respirations and ECG were monitored throughout the procedure. A digital rectal exam was performed. A perianal exam was performed. The scope was introduced through the anus and advanced to the cecum. Retroflexion was performed in the cecum and rectum. The quality of bowel preparation was evaluated using the [facility] Bowel Preparation Scale with scores of: right colon = 2, transverse colon = 2, left colon = 2. The total BBPS score was 6. Bowel prep was adequate. The patient experienced no blood loss. The procedure was not difficult. The patient tolerated the procedure well. There were no apparent adverse events.  ANESTHESIA INFORMATION: ASA: IV Anesthesia Type: IV Sedation with Anesthesia MEDICATIONS: No administrations occurring from 0945 to 1034 on 08/10/23 FINDINGS: One sessile, benign-appearing polyp measuring 5-9 mm in the hepatic flexure; no bleeding was identified; completely removed en bloc by cold snare and retrieved specimen One sessile, benign-appearing polyp measuring 5-9 mm in the descending colon; bleeding occurred after intervention; completely removed en bloc by cold snare and retrieved specimen; placed 1 clip successfully; hemostasis achieved Internal small hemorrhoids; no bleeding was identified Healthy end-to-side colocolonic anastomosis in the distal descending colon 20 cm from the anal verge; no bleeding was identified EVENTS: Procedure Events Event Event Time ENDO SCOPE OUT TIME 8/10/2023 10:04 AM ENDO CECUM REACHED 8/10/2023 10:11 AM ENDO SCOPE OUT TIME 8/10/2023 10:23 AM SPECIMENS: ID Type Source Tests Collected by Time Destination 1 : polyp Tissue Large Intestine, Hepatic Flexure TISSUE EXAM Julia Calhoun MD 8/10/2023 10:17 AM  2 : polyp Tissue Large Intestine, Left/Descending Colon TISSUE EXAM Julia Calhoun MD 8/10/2023 10:18 AM  EQUIPMENT: Colonoscope -[id number] IMPRESSION: Subcentimeter polyp in the hepatic flexure was removed with cold snare One polyp measuring 5-9 mm in the descending colon; bleeding occurred after intervention; removed by cold snare; placed 1 clip successfully; hemostasis achieved Small hemorrhoids Healthy end-to-side colocolonic anastomosis in the distal descending colon 20 cm from the anal verge 2 benign appearing polyps removed as noted above, no evidence of primary malignancy seen RECOMMENDATION:  Repeat screening colonoscopy in 10 years  No further screening colonoscopies necessary Resume usual diet Follow H&H and transfuse as needed If signs of recurrent bleeding consider outpatient small bowel capsule If Eliquis is to be restarted and no further procedures, can restart med tomorrow IR follow-up for planned biopsy of hepatic lesion  Julia Calhoun MD     Addendum Date: 8/10/2023 Addendum:   [address] [facility] Endoscopy [address] [address] [phone] DATE OF SERVICE: 8/10/23 PHYSICIAN(S): Attending: Julia Calhoun MD Fellow: No Staff Documented INDICATION: Anemia, unspecified type POST-OP DIAGNOSIS: See the impression below. HISTORY: Prior colonoscopy: 6 years ago. BOWEL PREPARATION: Golytely/Colyte/Trilyte PREPROCEDURE: Informed consent was obtained for the procedure, including sedation. Risks including but not limited to bleeding, infection, perforation, adverse drug reaction and aspiration were explained in detail. Also explained about less than 100% sensitivity with the exam and other alternatives. The patient was placed in the left lateral decubitus position.  Procedure: Colonoscopy DETAILS OF PROCEDURE: Patient was taken to the procedure room where a time out was performed to confirm correct patient and correct procedure. The patient underwent monitored anesthesia care, which was administered by an anesthesia professional. The patient's blood pressure, heart rate, level of consciousness, oxygen, respirations and ECG were monitored throughout the procedure. A digital rectal exam was performed. A perianal exam was performed. The scope was introduced through the anus and advanced to the cecum. Retroflexion was performed in the cecum and rectum. The quality of bowel preparation was evaluated using the [facility] Bowel Preparation Scale with scores of: right colon = 2, transverse colon = 2, left colon = 2. The total BBPS score was 6. Bowel prep was adequate. The patient experienced no blood loss. The procedure was not difficult. The patient tolerated the procedure well. There were no apparent adverse events.  ANESTHESIA INFORMATION: ASA: IV Anesthesia Type: IV Sedation with Anesthesia MEDICATIONS: No administrations occurring from 0945 to 1034 on 08/10/23 FINDINGS: One sessile, benign-appearing polyp measuring 5-9 mm in the hepatic flexure; no bleeding was identified; completely removed en bloc by cold snare and retrieved specimen One sessile, benign-appearing polyp measuring 5-9 mm in the descending colon; bleeding occurred after intervention; completely removed en bloc by cold snare and retrieved specimen; placed 1 clip successfully; hemostasis achieved Internal small hemorrhoids; no bleeding was identified Healthy end-to-side colocolonic anastomosis in the distal descending colon 20 cm from the anal verge; no bleeding was identified EVENTS: Procedure Events Event Event Time ENDO SCOPE OUT TIME 8/10/2023 10:04 AM ENDO CECUM REACHED 8/10/2023 10:11 AM ENDO SCOPE OUT TIME 8/10/2023 10:23 AM SPECIMENS: ID Type Source Tests Collected by Time Destination 1 : polyp Tissue Large Intestine, Hepatic Flexure TISSUE EXAM eBverly Cleary Paola Gaming MD 8/10/2023 10:17 AM  2 : polyp Tissue Large Intestine, Left/Descending Colon TISSUE EXAM Hilda Moses MD 8/10/2023 10:18 AM  EQUIPMENT: Colonoscope -[id number] IMPRESSION: Subcentimeter polyp in the hepatic flexure was removed with cold snare One polyp measuring 5-9 mm in the descending colon; bleeding occurred after intervention; removed by cold snare; placed 1 clip successfully; hemostasis achieved Small hemorrhoids Healthy end-to-side colocolonic anastomosis in the distal descending colon 20 cm from the anal verge 2 benign appearing polyps removed as noted above, no evidence of primary malignancy seen RECOMMENDATION:  Repeat screening colonoscopy in 10 years  No further screening colonoscopies necessary Resume usual diet Follow H&H and transfuse as needed If signs of recurrent bleeding consider outpatient small bowel capsule IR follow-up for planned biopsy of hepatic lesion  Hilda Msoes MD     Result Date: 8/10/2023  Narrative: Table formatting from the original result was not included. [address] [facility] Endoscopy [address] [facility],Third Floor [address] [phone] DATE OF SERVICE: 8/10/23 PHYSICIAN(S): Attending: Hilda Moses MD Fellow: No Staff Documented INDICATION: Anemia, unspecified type POST-OP DIAGNOSIS: See the impression below. HISTORY: Prior colonoscopy: 6 years ago. BOWEL PREPARATION: Golytely/Colyte/Trilyte PREPROCEDURE: Informed consent was obtained for the procedure, including sedation. Risks including but not limited to bleeding, infection, perforation, adverse drug reaction and aspiration were explained in detail. Also explained about less than 100% sensitivity with the exam and other alternatives. The patient was placed in the left lateral decubitus position. Procedure: Colonoscopy DETAILS OF PROCEDURE: Patient was taken to the procedure room where a time out was performed to confirm correct patient and correct procedure.  The patient underwent monitored anesthesia care, which was administered by an anesthesia professional. The patient's blood pressure, heart rate, level of consciousness, oxygen, respirations and ECG were monitored throughout the procedure. A digital rectal exam was performed. A perianal exam was performed. The scope was introduced through the anus and advanced to the cecum. Retroflexion was performed in the cecum and rectum. The quality of bowel preparation was evaluated using the [facility] Bowel Preparation Scale with scores of: right colon = 2, transverse colon = 2, left colon = 2. The total BBPS score was 6. Bowel prep was adequate. The patient experienced no blood loss. The procedure was not difficult. The patient tolerated the procedure well. There were no apparent adverse events.  ANESTHESIA INFORMATION: ASA: IV Anesthesia Type: IV Sedation with Anesthesia MEDICATIONS: No administrations occurring from 0945 to 1034 on 08/10/23 FINDINGS: One sessile, benign-appearing polyp measuring 5-9 mm in the hepatic flexure; no bleeding was identified; completely removed en bloc by cold snare and retrieved specimen One sessile, benign-appearing polyp measuring 5-9 mm in the descending colon; bleeding occurred after intervention; completely removed en bloc by cold snare and retrieved specimen; placed 1 clip successfully; hemostasis achieved Internal small hemorrhoids; no bleeding was identified Healthy end-to-side colocolonic anastomosis in the distal descending colon 20 cm from the anal verge; no bleeding was identified EVENTS: Procedure Events Event Event Time ENDO SCOPE OUT TIME 8/10/2023 10:04 AM ENDO CECUM REACHED 8/10/2023 10:11 AM ENDO SCOPE OUT TIME 8/10/2023 10:23 AM SPECIMENS: ID Type Source Tests Collected by Time Destination 1 : polyp Tissue Large Intestine, Hepatic Flexure TISSUE EXAM Yisel Muniz MD 8/10/2023 10:17 AM  2 : polyp Tissue Large Intestine, Left/Descending Colon TISSUE EXAM Yisel Muniz MD 8/10/2023 10:18 AM  EQUIPMENT: Colonoscope -[id number]     Impression: Subcentimeter polyp in the hepatic flexure was removed with cold snare One polyp measuring 5-9 mm in the descending colon; bleeding occurred after intervention; removed by cold snare; placed 1 clip successfully; hemostasis achieved Small hemorrhoids Healthy end-to-side colocolonic anastomosis in the distal descending colon 20 cm from the anal verge 2 benign appearing polyps removed as noted above, no evidence of primary malignancy seen RECOMMENDATION:  Repeat screening colonoscopy in 10 years  No further screening colonoscopies necessary  Age greater than 72 Overall health  Resume usual diet Follow H&H and transfuse as needed If signs of recurrent bleeding consider outpatient small bowel capsule IR follow-up for planned biopsy of hepatic lesion  Christine Clark MD     EGD    Addendum Date: 8/10/2023 Addendum:   [address] [facility] Endoscopy [address] [facility] [address] [address] [phone] DATE OF SERVICE: 8/10/23 PHYSICIAN(S): Attending: Christine Clark MD Fellow: No Staff Documented INDICATION: Anemia, unspecified type POST-OP DIAGNOSIS: See the impression below. PREPROCEDURE: Informed consent was obtained for the procedure, including sedation. Risks of perforation, hemorrhage, adverse drug reaction and aspiration were discussed. The patient was placed in the left lateral decubitus position. Patient was explained about the risks and benefits of the procedure. Risks including but not limited to bleeding, infection, and perforation were explained in detail. Also explained about less than 100% sensitivity with the exam and other alternatives. PROCEDURE: EGD DETAILS OF PROCEDURE: Patient was taken to the procedure room where a time out was performed to confirm correct patient and correct procedure.  The patient underwent monitored anesthesia care, which was administered by an anesthesia professional. The patient's blood pressure, heart rate, level of consciousness, respirations and oxygen were monitored throughout the procedure. The scope was introduced through the mouth and advanced to the proximal part of the jejunum. Retroflexion was performed in the fundus. The patient experienced no blood loss. The procedure was not difficult. The patient tolerated the procedure well. There were no apparent adverse events. ANESTHESIA INFORMATION: ASA: IV Anesthesia Type: IV Sedation with Anesthesia MEDICATIONS: No administrations occurring from 0945 to 1031 on 08/10/23 FINDINGS: 2 small angioectasias in the fundus of the stomach and body of the stomach; bleeding was observed; placed 2 clips successfully; hemostasis achieved. 2 small gastric angioectasias, 1 in the fundus with oozing prior to clipping. Good hemostasis after clipping of both lesions. Single small angioectasia in the 3rd part of the duodenum; no bleeding was identified; placed 1 clip successfully The esophagus appeared normal. Z-line is 39 cm from the incisors. SPECIMENS: ID Type Source Tests Collected by Time Destination 1 : polyp Tissue Large Intestine, Hepatic Flexure TISSUE EXAM Kristen Stephens MD 8/10/2023 10:17 AM  2 : polyp Tissue Large Intestine, Left/Descending Colon TISSUE EXAM Kristen Stephens MD 8/10/2023 10:18 AM  IMPRESSION: 2 small angioectasias in the fundus of the stomach and body of the stomach; bleeding was observed; placed 2 clips successfully; hemostasis achieved Single small angioectasia in the 3rd part of the duodenum; placed 1 clip successfully The esophagus appeared normal. No evidence of primary malignancy RECOMMENDATION: Okay to resume usual diet Observe for any clinical signs of rebleeding Follow H&H and transfuse as needed If signs of rebleeding, recommend outpatient capsule endoscopy. Kristen Stephens MD     Result Date: 8/10/2023  Narrative: Table formatting from the original result was not included.  [facility] [facility] Endoscopy [address] [facility] [place] [address]: 8/10/23 PHYSICIAN(S): Attending: Rodolfo Dc MD Fellow: No Staff Documented INDICATION: Anemia, unspecified type POST-OP DIAGNOSIS: See the impression below. PREPROCEDURE: Informed consent was obtained for the procedure, including sedation. Risks of perforation, hemorrhage, adverse drug reaction and aspiration were discussed. The patient was placed in the left lateral decubitus position. Patient was explained about the risks and benefits of the procedure. Risks including but not limited to bleeding, infection, and perforation were explained in detail. Also explained about less than 100% sensitivity with the exam and other alternatives. PROCEDURE: EGD DETAILS OF PROCEDURE: Patient was taken to the procedure room where a time out was performed to confirm correct patient and correct procedure. The patient underwent monitored anesthesia care, which was administered by an anesthesia professional. The patient's blood pressure, heart rate, level of consciousness, respirations and oxygen were monitored throughout the procedure. The scope was introduced through the mouth and advanced to the proximal part of the jejunum. Retroflexion was performed in the fundus. The patient experienced no blood loss. The procedure was not difficult. The patient tolerated the procedure well. There were no apparent adverse events. ANESTHESIA INFORMATION: ASA: IV Anesthesia Type: IV Sedation with Anesthesia MEDICATIONS: No administrations occurring from 0945 to 1031 on 08/10/23 FINDINGS: 2 small angioectasias in the fundus of the stomach and body of the stomach; bleeding was observed; placed 2 clips successfully; hemostasis achieved. 2 small gastric angioectasias, 1 in the fundus with oozing prior to clipping. Good hemostasis after clipping of both lesions.  Single small angioectasia in the 3rd part of the duodenum; no bleeding was identified; placed 1 clip successfully The esophagus appeared normal. Z-line is 39 cm from the incisors. SPECIMENS: ID Type Source Tests Collected by Time Destination 1 : polyp Tissue Large Intestine, Hepatic Flexure TISSUE EXAM Faizan Ontiveros MD 8/10/2023 10:17 AM  2 : polyp Tissue Large Intestine, Left/Descending Colon TISSUE EXAM Faizan Ontiveros MD 8/10/2023 10:18 AM      Impression: 2 small angioectasias in the fundus of the stomach and body of the stomach; bleeding was observed; placed 2 clips successfully; hemostasis achieved Single small angioectasia in the 3rd part of the duodenum; placed 1 clip successfully The esophagus appeared normal. RECOMMENDATION: Okay to resume usual diet Observe for any clinical signs of rebleeding Follow H&H and transfuse as needed If signs of rebleeding, recommend outpatient capsule endoscopy. Faizan Ontiveros MD     XR chest portable    Result Date: 8/8/2023  Narrative: CHEST INDICATION:   Hypoxia. COMPARISON: Portable chest from August 7, 2023. CTA of the chest from May 5, 2023. EXAM PERFORMED/VIEWS:  XR CHEST PORTABLE FINDINGS: Heart enlarged. Pulmonary vessels mildly distended. Reticular opacities throughout both lungs, developing since 8/7/2023, superimposed on emphysematous changes, especially in the upper lobes. Small right pleural effusion. No evidence of pneumothorax. Osseous structures appear within normal limits for patient age. Impression: CHF and pulmonary edema, superimposed on emphysema. Small right pleural effusion. Workstation performed: [id number]     Echo follow up/limited w/ contrast if indicated    Result Date: 8/8/2023  Narrative: •  Left Ventricle: Left ventricular cavity size is normal. Wall thickness is mildly increased. The left ventricular ejection fraction is 45%. Systolic function is mildly reduced. There is mild global hypokinesis. Diastolic function is mildly abnormal, consistent with grade I (abnormal) relaxation.  •  Right Ventricle: Right ventricular cavity size is normal. Systolic function is normal. • Aortic Valve: There is trace regurgitation. •  Mitral Valve: There is mild regurgitation. •  Tricuspid Valve: There is mild regurgitation. TRAUMA - CT spine cervical wo contrast    Result Date: 8/7/2023  Narrative: CT CERVICAL SPINE - WITHOUT CONTRAST INDICATION:   TRAUMA. COMPARISON:  None. TECHNIQUE:  CT examination of the cervical spine was performed without intravenous contrast.  Contiguous axial images were obtained. Multiplanar 2D reformatted images were created from the source data. Radiation dose length product (DLP) for this visit:  390 mGy-cm . This examination, like all CT scans performed in the [facility], was performed utilizing techniques to minimize radiation dose exposure, including the use of iterative reconstruction and automated exposure control. IMAGE QUALITY:  Diagnostic. FINDINGS: ALIGNMENT:  Normal alignment of the cervical spine. No subluxation. VERTEBRAE:  No fracture. DEGENERATIVE CHANGES: Mild multilevel cervical degenerative changes are noted without critical central canal stenosis. PREVERTEBRAL AND PARASPINAL SOFT TISSUES: No prevertebral soft tissue swelling. Bilateral carotid artery calcification. Focal calcification of the nuchal ligament at the level of C4-5. THORACIC INLET: COPD. Impression: No cervical spine fracture or traumatic malalignment. Workstation performed: [id number]     TRAUMA - CT head wo contrast    Result Date: 8/7/2023  Narrative: CT BRAIN - WITHOUT CONTRAST INDICATION:   TRAUMA. COMPARISON:  None. TECHNIQUE:  CT examination of the brain was performed. Multiplanar 2D reformatted images were created from the source data. Radiation dose length product (DLP) for this visit:  932 mGy-cm . This examination, like all CT scans performed in the [facility], was performed utilizing techniques to minimize radiation dose exposure, including the use of iterative reconstruction and automated exposure control.  IMAGE QUALITY: Diagnostic. FINDINGS: PARENCHYMA:  No intracranial mass, mass effect or midline shift. No CT signs of acute infarction. No acute parenchymal hemorrhage. Periventricular, centrum semiovale, and subcortical white matter hypodensity is a nonspecific finding likely representing chronic microangiopathy. Calcification bilateral cavernous internal carotid arteries. VENTRICLES AND EXTRA-AXIAL SPACES: No acute hydrocephalus. Mild prominence of CSF spaces diffusely attributed to generalized volume loss. VISUALIZED ORBITS: Changes of bilateral lens replacements noted. PARANASAL SINUSES: Trace bifrontal and right maxillary sinus mucosal thickening. CALVARIUM AND EXTRACRANIAL SOFT TISSUES:  Normal.     Impression: No acute intracranial process. No skull fracture. Chronic microangiopathy. Workstation performed: [id number]     XR Trauma chest portable    Result Date: 8/7/2023  Narrative: CHEST INDICATION:   TRAUMA. COMPARISON: 5/5/2023 EXAM PERFORMED/VIEWS:  XR CHEST PORTABLE FINDINGS: Study limited by portable technique and large body habitus. Lungs are clear. No effusion or pneumothorax. Heart, mediastinal and hilar structures are within normal limits. No acute osseous or soft tissue pathology. Findings agree with the ED preliminary interpretation. Impression: No acute cardiopulmonary findings. Workstation performed: [id number]     Pathology: See MRI and AFP results    ASSESSMENT  1. Hepatocellular carcinoma ([address])          Cancer Staging   Hepatocellular carcinoma ([address])  Staging form: Liver (Excluding Intrahepatic Bile Ducts), AJCC 8th Edition  - Clinical stage from 9/5/2023: Stage II (cT2, cN0, cM0) - Signed by Brenda Gonzalez MD on 9/5/2023  Histopathologic type: Hepatocellular carcinoma, NOS      PLAN/DISCUSSION  No orders of the defined types were placed in this encounter.         Amelia Maynard is a 69y.o. year old male with multifocal hepatocellular carcinoma that was recently diagnosed with multiple lesions seen on CT of the abdomen and pelvis along with a dominant right 3.8 cm hepatic lobe segment 7 lesion and smaller lesions throughout the right and left hepatic lobes. MRI of the liver confirmed multiple hepatic lesions that were suspicious for metastasis and there was a probable enhancing thrombus throughout the right anterior and diffusely throughout the left portal vein. He saw Dr. Nikole Caba on July 26, 2023 who recommended liver directed therapy as he is not a candidate for surgery. AFP was ordered and he had AFP tumor marker July 27, 2023 that was 18,200. Based on the elevated AFP as well as the multiple lesions seen on CAT scan and MRI, he has hepatocellular carcinoma. He had been scheduled for IR biopsy but this was canceled because this is not necessary. This was discussed and explained today with the patient and his wife. He saw Dr. Raine Monique for consultation from IR on August 2, 2023. Recommendations were made for liver directed therapy with Y90. He is seen for consultation today. His blood work with liver function studies as well as bilirubin are all normal.  We recommend radiation therapy to the entire liver using SIR-Spheres to both the right and left hepatic lobes with a split dose on the same day. He had his mapping study on September 1, 2023 and is now scheduled for SIR-Spheres treatment on September 8, 2023 with Dr. Raine Monique. We discussed the procedure SIR-Spheres including the acute side effects and the potential chronic complications with the patient and his wife. He does consent to proceed with the SIR-Spheres treatment on September 8, 2023. Jitendra Romero MD  1/6/1804,1:37 AM      Portions of the record may have been created with voice recognition software. Occasional wrong word or "sound a like" substitutions may have occurred due to the inherent limitations of voice recognition software.   Read the chart carefully and recognize, using context, where substitutions have occurred.

## 2023-09-05 NOTE — PRE-PROCEDURE INSTRUCTIONS
Pre-procedure Instructions for Interventional Radiology  [address] [address] [place]  [phone]    You are scheduled for a/an Y-90 Radioembolization. On Friday 9/8/23. Your tentative arrival time is 0715. Short stay will notify you the day before your procedure with the exact arrival time and the location to arrive. To prepare for your procedure:  1. Please arrange for someone to drive you home after the procedure and stay with you until the next morning if you are instructed to do so. This is typically for patients receiving some type of sedative or anesthetic for the procedure. 2. DO NOT EAT OR DRINK ANYTHING after midnight on the evening before your procedure including candy & gum.  3. ONLY SIPS OF WATER with your medications are allowed on the morning of your procedure. 4. TAKE ALL OF YOUR REGULAR MEDICATIONS THE MORNING OF YOUR PROCEDURE with sips of water! We may call you to stop some of your blood sugar, blood pressure and blood thinning medications depending on the procedure. Please take all of these medications unless we instruct you to stop them. 5. If you have an allergy to x-ray dye, please contact Interventional Radiology for an x-ray dye preparation which usually consists of an oral steroid and Benadryl. The day of your procedure:  1. Bring a list of the medications you take at home. 2. Bring medications you take for breathing problems (such as inhalers), medications for chest pain, or both. 3. Bring a case for your glasses or contacts. 4. Bring your insurance card and a form of photo ID.  5. Please leave all valuables such as credit cards and jewelry at home. 6. Report to the registration desk in the main lobby at the [address] [place] [address], Entrance B. Ask to be directed to [facility]. 7. While your procedure is being performed, your family may wait in the Radiology Waiting Room on the 1st floor in Radiology. if they need to leave, they may provide a number to be called following the procedure. 8. Be prepared to stay overnight just in case. Sometimes procedures will indicate the need for further observation or treatment. 9. If you are scheduled for a follow-up visit with the Interventional Radiologist after your procedure, you will be called with a date and time. Special Instructions (Medications to stop taking before your procedure etc.):  Eliquis LD AM 9/5/23 and restart 9/9/23;Lasix and NovoLog Insulin hold AM 9/823. Continue Protonix and start Medral Dose Pack on arrival home 9/8/23. Above reviewed with his wife Richie Hall.

## 2023-09-05 NOTE — TELEPHONE ENCOUNTER
Spoke with wife. Pt is doing really good. Wt is 214 lbs , which is up from last appt. Wife states it is the sweets he is eating. Educated on when to call the office and advised to keep an eye on that wt. We would like to keep the pt out of the hospital. Verbally understood.

## 2023-09-06 ENCOUNTER — CLINICAL SUPPORT (OUTPATIENT)
Dept: CARDIAC REHAB | Facility: HOSPITAL | Age: 78
End: 2023-09-06
Payer: COMMERCIAL

## 2023-09-06 ENCOUNTER — OFFICE VISIT (OUTPATIENT)
Dept: FAMILY MEDICINE CLINIC | Facility: HOSPITAL | Age: 78
End: 2023-09-06
Payer: COMMERCIAL

## 2023-09-06 VITALS
WEIGHT: 215.8 LBS | BODY MASS INDEX: 29.23 KG/M2 | HEART RATE: 85 BPM | HEIGHT: 72 IN | SYSTOLIC BLOOD PRESSURE: 130 MMHG | TEMPERATURE: 98 F | DIASTOLIC BLOOD PRESSURE: 54 MMHG

## 2023-09-06 DIAGNOSIS — C22.0 HEPATOCELLULAR CARCINOMA (HCC): ICD-10-CM

## 2023-09-06 DIAGNOSIS — Z79.4 TYPE 2 DIABETES MELLITUS WITH DIABETIC NEUROPATHY, WITH LONG-TERM CURRENT USE OF INSULIN (HCC): ICD-10-CM

## 2023-09-06 DIAGNOSIS — Z95.5 STATUS POST INSERTION OF DRUG ELUTING CORONARY ARTERY STENT: ICD-10-CM

## 2023-09-06 DIAGNOSIS — J31.0 RHINITIS, UNSPECIFIED TYPE: ICD-10-CM

## 2023-09-06 DIAGNOSIS — Z95.5 STATUS POST INSERTION OF DRUG ELUTING CORONARY ARTERY STENT: Primary | ICD-10-CM

## 2023-09-06 DIAGNOSIS — G47.00 INSOMNIA, UNSPECIFIED TYPE: ICD-10-CM

## 2023-09-06 DIAGNOSIS — I48.91 ATRIAL FIBRILLATION, UNSPECIFIED TYPE (HCC): ICD-10-CM

## 2023-09-06 DIAGNOSIS — J43.8 OTHER EMPHYSEMA (HCC): ICD-10-CM

## 2023-09-06 DIAGNOSIS — N18.30 STAGE 3 CHRONIC KIDNEY DISEASE, UNSPECIFIED WHETHER STAGE 3A OR 3B CKD (HCC): ICD-10-CM

## 2023-09-06 DIAGNOSIS — E11.40 TYPE 2 DIABETES MELLITUS WITH DIABETIC NEUROPATHY, WITH LONG-TERM CURRENT USE OF INSULIN (HCC): ICD-10-CM

## 2023-09-06 DIAGNOSIS — I25.10 CORONARY ARTERY DISEASE INVOLVING NATIVE HEART WITHOUT ANGINA PECTORIS, UNSPECIFIED VESSEL OR LESION TYPE: Primary | ICD-10-CM

## 2023-09-06 PROCEDURE — 99496 TRANSJ CARE MGMT HIGH F2F 7D: CPT | Performed by: FAMILY MEDICINE

## 2023-09-06 PROCEDURE — 93797 PHYS/QHP OP CAR RHAB WO ECG: CPT

## 2023-09-06 RX ORDER — AZELASTINE 1 MG/ML
1 SPRAY, METERED NASAL 2 TIMES DAILY
Qty: 1 ML | Refills: 0 | Status: SHIPPED | OUTPATIENT
Start: 2023-09-06

## 2023-09-06 RX ORDER — TRAZODONE HYDROCHLORIDE 100 MG/1
TABLET ORAL
Qty: 180 TABLET | Refills: 0 | Status: SHIPPED | OUTPATIENT
Start: 2023-09-06

## 2023-09-06 NOTE — PROGRESS NOTES
Cardiac Rehabilitation Plan of Care   Initial Care Plan          Today's date: 2023   # of Exercise Sessions Completed: 1-evaluation  Patient name: Miguelito Chaparro      : 1945  Age: 69 y.o. MRN: [id number]  Referring Physician: Dr. Lis Slade  Cardiologist: Dr. Lis Slade  Provider: Amanda Campbell  Clinician: Latisha Lao MS, CEP      Dx:   Encounter Diagnosis   Name Primary? • Status post insertion of drug eluting coronary artery stent      Date of onset: 2023      SUMMARY OF PROGRESS:  Mr. Samaria Jacobo is here today for his cardiac rehabilitation evaluation after recent hospitalization for CHF and stenting procedure. He reports he is feeling well overall since hospitalization. He does notice weakness, especially in his legs. He is using cane for stability when walking. He is 100% compliant with his medications. His goals for his rehab program are to increase overall strength and endurance with regular exercise, improve balance by increasing leg strength, and increase walking distance and speed. He feels he is doing well following a low fat, low salt diet. He is also IDDM and has dexcom . He feels DM is well controlled most of the time. Will plan to check blood glucose pre and post exercise sessions. He denies feelings of depression (PHQ-9=2) or anxiety (JANES-7=0). He reports he has excellent support from his wife and family at this time. Will no need to reassess PHQ-9 at 30 days due to no concerns with depression or anxiety. He is not currently allowed to drive and is awaiting clearance to resume more independence. He completed 6 min walk test for assessment today. He walked a total of 600 ft, needing to stop to rest for one minute due to lightheadedness. BP was stable 122/70 at rest and 132/78 with exercise. HR was 78 at rest and increased to 99 bpm with exercise. He showed normal hemodynamic response to exercise and tolerated well overall.  Will plan to increase exercise times and intensities as tolerated over next 30 days of rehab. He is in agreement to attend cardiac rehab sessions 2x/week for 12 weeks, or up to 36 sessions. He will plan to participate in weekly education sessions on cardiac risk factor management. He will plan to start his sessions on 2023.       Medication compliance: Yes   Comments: Pt reports to be compliant with medications  Fall Risk: Moderate   Comments: Patient uses walking assist device (walker/cane/rollator)    EKG Interpretation: NSR      EXERCISE ASSESSMENT and PLAN    Exercise Prescription:      Frequency: 2 days/week   Supplement with home exercise 2+ days/wk as tolerated       Minutes: 30-50         METS: 2.0-3.5            HR: 30 beats above resting   RPE: 4-6         Modalities: UBE, Lifecycle, NuStep, Recumbent bike and Room walking      30 Day Goals for Exercise Progression:    Frequency: 2 days/week of cardiac rehab       Supplement with home exercise 2+ days/wk as tolerated    Minutes: 30-40                              >150 mins/wk of moderate intensity exercise   METS: 2.0-2.5   HR: 30 beats above resting    RPE: 4-6   Modalities: UBE, NuStep, Recumbent bike and Room walking    Strength trainin-3 days / week  12-15 repetitions  1-2 sets per modality   Will be added following 2-3 weeks of monitored exercise sessions   Modalities: Pull Downs, Lateral Raise, Arm Extension, Arm Curl, Sit to [employer] Exercise: none    Goals: 10% improvement in functional capacity - based on max METs achieved in fitness assessment, improved DASI score by 10%, Increase in exercise capacity by 40% - based on peak METs tolerated in cardiac rehab exercise session, Exercise 5 days/wk, >150mins/wk of moderate intensity exercise, Resume ADLs with increased strength, Attend Rehab regularly, Decrease sitting time and Start a walking program    Progression Toward Goals:  Reviewed Pt goals and determined plan of care    Education: benefit of exercise for CAD risk factors, home exercise guidelines, AHA guidelines to achieve >150 mins/wk of moderate exercise and RPE scale   Plan:education on home exercise guidelines, home exercise 30+ mins 2 days opposite CR and Education class: Risk Factors for Heart Disease  Readiness to change: Preparation:  (Getting ready to change)       NUTRITION ASSESSMENT AND PLAN    Weight control:    Starting weight: 216.2 lb   Current weight:       Diabetes: T2D, on Insulin   A1c: 7.6    last measured: 1/6/2023    Lipid management: Discussed diet and lipid management and Last lipid profile 1/6/2023  Chol 177    HDL 33      Goals:LDL <100, HDL >40, TRG <150 and CHOL <200    Measurable goals were based Rate Your Plate Dietary Self-Assessment. These are the areas in which the patient could score higher on the assessment. Goals include recommendations for a heart healthy diet based on American Heart Association.     Progression Toward Goals: Reviewed Pt goals and determined plan of care    Education: heart healthy eating  low sodium diet  hydration  nutrition for  lipid management  Plan: Education class: Reading Food Labels and Education Class: Heart Healthy Eating  Readiness to change: Action:  (Changing behavior)      PSYCHOSOCIAL ASSESSMENT AND PLAN    Emotional:  Depression assessment:  PHQ-9 = 2  1-4 = Minimal Depression            Anxiety measure:  JANES-7 = 1  0-4  = Not anxious  Self-reported stress level:  4  Social support: Excellent and Patient reports excellent emotional/social support from wife and family    Goals:  Physical Fitness in [place] Score < 3, Daily Activity in [place] Score < 3, Pain in [place] Score < 3, Overall Health in [place] Score < 3 and increased energy    Progression Toward Goals: Reviewed Pt goals and determined plan of care    Education: signs/sxs of depression, benefits of a positive support system, stress management techniques and depression and CAD  Plan: Class: Stress and Your Health and Class: Relaxation  Readiness to change: Action:  (Changing behavior)      OTHER CORE COMPONENTS     Tobacco:   Social History     Tobacco Use   Smoking Status Former   • Packs/day: 1.00   • Years: 30.00   • Total pack years: 30.00   • Types: Cigarettes   • Start date:    • Quit date: 12   • Years since quittin.7   Smokeless Tobacco Never       Tobacco Use Intervention:   N/A:  Patient is a non-smoker     Anginal Symptoms:  None   NTG use: No prescription    Blood pressure:    Restin/70   Exercise: 132/78    Goals: consistent BP < 130/80, reduced dietary sodium <2300mg, moderate intensity exercise >150 mins/wk and medication compliance    Progression Toward Goals: Reviewed Pt goals and determined plan of care    Education:  understanding high blood pressure and it's relationship to CAD and low sodium diet and HTN  Plan: Class: Understanding Heart Disease and Class: Common Heart Medications  Readiness to change: Action:  (Changing behavior)

## 2023-09-06 NOTE — PROGRESS NOTES
Assessment & Plan     1. Coronary artery disease involving native heart without angina pectoris, unspecified vessel or lesion type    2. Type 2 diabetes mellitus with diabetic neuropathy, with long-term current use of insulin ([address])    3. Atrial fibrillation, unspecified type ([address])    4. Other emphysema ([address])    5. Status post insertion of drug eluting coronary artery stent    6. Hepatocellular carcinoma ([address])    7. Rhinitis, unspecified type  -     azelastine (ASTELIN) 0.1 % nasal spray; 1 spray into each nostril 2 (two) times a day Use in each nostril as directed    8. Stage 3 chronic kidney disease, unspecified whether stage 3a or 3b CKD ([address])  Comments:  stable. had a rise in creatinine. monitor. fu with nephrology. 1. CAD. Doing well. S/p stenting. Continue on statin and plavix. No current sytmpoms. Regular fu with Cardiology. 2. Afib. eliquis on hold due to upcoming radiation. On rate control. 3. DM. Stable. Working with Endo. No episodes of hypoglycemia. 4. Copd/emphysema. Stable. No exacerbation. 5. [address] Upcoming radiation. Fu with raidation oncology, surgical oncology,and medical oncology. 6. Mild rhinitis. Vasomotor vs viral etiology. Trial of astelin. Subjective     Transitional Care Management Review:   Filemon Chu is a 69 y.o. male here for TCM follow up. During the TCM phone call patient stated:  TCM Call     Date and time call was made  8/28/2023 10:00 AM    Hospital care reviewed  Records reviewed    Patient was hospitialized at  [address] [place] [address]    Date of Admission  08/20/23    Date of discharge  08/26/23    Diagnosis  Acute on chronic diastolic HF (heart failure)    Disposition  Home    Were the patients medications reviewed and updated  No    Current Symptoms  None      TCM Call     Post hospital issues  None    Should patient be enrolled in anticoag monitoring? No    Scheduled for follow up?   Yes    Did you obtain your prescribed medications Yes    Do you need help managing your prescriptions or medications  No    Is transportation to your appointment needed  No    I have advised the patient to call PCP with any new or worsening symptoms  Florecita Solorio MA        Patient is seen for TCM. Known HCC, emphysema, htn, dm, hld. Presented to ER with sob/chest pain. Had stress testing that was abnormal recently. He underwent cardiac cath and ended up with stenting after Ct surgery advising against OHS at the time. He has done well. Feeling well with no sob, no chest pain. Reviewed cardiology consultations, labs, imaging. Reviewed hospital dc summary. Review of Systems   Constitutional: Negative. Negative for activity change, appetite change, chills and diaphoresis. HENT: Negative for congestion and dental problem. Respiratory: Negative. Negative for apnea, chest tightness, shortness of breath and wheezing. Cardiovascular: Negative. Negative for chest pain, palpitations and leg swelling. Gastrointestinal: Negative. Negative for abdominal distention, abdominal pain, constipation, diarrhea and nausea. Genitourinary: Negative. Negative for difficulty urinating, dysuria and frequency. Objective     /54   Pulse 85   Temp 98 °F (36.7 °C)   Ht 6' (1.829 m)   Wt 97.9 kg (215 lb 12.8 oz)   BMI 29.27 kg/m²      Physical Exam  Vitals and nursing note reviewed. Constitutional:       General: He is not in acute distress. Appearance: He is well-developed. He is not ill-appearing. HENT:      Head: Normocephalic and atraumatic. Right Ear: Tympanic membrane, ear canal and external ear normal.      Left Ear: Tympanic membrane, ear canal and external ear normal.      Mouth/Throat:      Mouth: Mucous membranes are moist.      Pharynx: Oropharynx is clear. Eyes:      Extraocular Movements: Extraocular movements intact.       Conjunctiva/sclera: Conjunctivae normal.      Pupils: Pupils are equal, round, and reactive to light. Cardiovascular:      Rate and Rhythm: Normal rate and regular rhythm. Heart sounds: Normal heart sounds. No murmur heard. Pulmonary:      Effort: Pulmonary effort is normal.      Breath sounds: Normal breath sounds. Abdominal:      General: Bowel sounds are normal. There is no distension. Palpations: Abdomen is soft. There is no mass. Tenderness: There is no abdominal tenderness. There is no guarding. Hernia: No hernia is present. Genitourinary:     Penis: Normal.    Musculoskeletal:         General: Normal range of motion. Cervical back: Normal range of motion and neck supple. Skin:     General: Skin is warm and dry. Capillary Refill: Capillary refill takes less than 2 seconds. Neurological:      General: No focal deficit present. Mental Status: He is alert and oriented to person, place, and time.    Psychiatric:         Mood and Affect: Mood normal.         Behavior: Behavior normal.       Medications have been reviewed by provider in current encounter    Addi Zhao MD

## 2023-09-06 NOTE — PROGRESS NOTES
CARDIAC REHAB ASSESSMENT    Today's date: 2023  Patient name: Galina Jarrett     : 1945       MRN: [id number]  PCP: Maria Alejandra Valentine MD  Referring Physician: Dr. Linda Epperson  Cardiologist: Dr. Linda Epperson  Surgeon: N/A  Dx:   Encounter Diagnosis   Name Primary?    • Status post insertion of drug eluting coronary artery stent        Date of onset: 2023  Cultural needs: n/a    Weight    Wt Readings from Last 1 Encounters:   23 97.9 kg (215 lb 12.8 oz)      Height:   Ht Readings from Last 1 Encounters:   23 6' (1.829 m)     Medical History:   Past Medical History:   Diagnosis Date   • Atrial fibrillation (HCC)     Eliquis   • AVB (atrioventricular block)     1st degree   • CAD (coronary artery disease)    • CKD (chronic kidney disease), stage III (HCC)     baseline Cr 1.2-1.6   • Diabetes mellitus (HCC)     type 2, insulin dependent   • Diverticulosis    • Emphysema lung (HCC)    • Former tobacco use    • History of asbestos exposure    • History of DVT (deep vein thrombosis)     RLE, tx w/ AC   • History of GI bleed 2023    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, given PRBC/Venofer for anemia while in house   • Hyperlipidemia    • Hypertension    • LAFB (left anterior fascicular block)    • Liver cancer ([address])    • Liver mass 2023    suspected HCC, needs radioembolization   • RBBB          Physical Limitations: B/L knee pain, B/L foot and hand neuropathy    Fall Risk: Moderate   Comments: Patient uses walking assist device (walker/cane/rollator) and Reports a fall in the past 6 months    Anginal Equivalent: None/denies angina   NTG use: No prescription    Risk Factors   Cholesterol: Yes  Smoking: Former user  HTN: Yes  DM: Type 2   insulin  Obesity: Yes   Inactivity: Yes  Stress:  perceived  stress: 4/10   Stressors:minimal stressors   Goals for Stress Management:Practice Relaxation Techniques, Exercise, Keep a positive mindset, Enjoy a hobby and Spend time with family    Family History:  Family History   Problem Relation Age of Onset   • Hypertension Mother    • Bone cancer Father    • Diabetes type II Sister    • Diabetes type II Sister    • Esophageal cancer Brother        Allergies: Patient has no known allergies.   ETOH:   Social History     Substance and Sexual Activity   Alcohol Use Yes    Comment: Socially         Current Medications:   Current Outpatient Medications   Medication Sig Dispense Refill   • acetaminophen (TYLENOL) 650 mg CR tablet Take 650 mg by mouth every 8 (eight) hours as needed for mild pain     • atorvastatin (LIPITOR) 40 mg tablet Take 1 tablet (40 mg total) by mouth daily after dinner 30 tablet 11   • azelastine (ASTELIN) 0.1 % nasal spray 1 spray into each nostril 2 (two) times a day Use in each nostril as directed 1 mL 0   • clopidogrel (PLAVIX) 75 mg tablet Take 1 tablet (75 mg total) by mouth daily 30 tablet 11   • Continuous Blood Gluc Sensor (Dexcom G6 Sensor) MISC Use daily as directed for CGM - Change every 10 days 9 each 3   • Continuous Blood Gluc Transmit (Dexcom G6 Transmitter) MISC Use daily as directed for CGM - Change every 3 months 1 each 3   • dulaglutide (Trulicity) 3.08 AS/1.5TA injection Inject 0.5 mL (0.75 mg total) under the skin once a week 2 mL 3   • Eliquis 5 MG take 1 tablet by mouth twice a day 60 tablet 5   • ferrous sulfate 325 (65 Fe) mg tablet Take 1 tablet (325 mg total) by mouth daily with breakfast 30 tablet 0   • furosemide (LASIX) 40 mg tablet Take 1 tablet (40 mg total) by mouth daily 30 tablet 6   • insulin aspart (NovoLOG FlexPen) 100 UNIT/ML injection pen Inject 40 Units under the skin 3 (three) times a day with meals 28 u breakfast time, 38 u lunchtime, 38 u dinnertime (Patient taking differently: Inject 40 Units under the skin 3 (three) times a day with meals 40 units for breakfast and lunch; 35 units with dinner)     • Insulin Pen Needle (Pen Needles) 32G X 4 MM MISC Use 4 (four) times a day 400 each 3   • Levemir FlexPen 100 units/mL injection pen INJECT 75 UNITS UNDER THE SKIN DAILY AT BEDTIME 30 mL 0   • methylPREDNISolone 4 MG tablet therapy pack Use as directed on package, start night of procedure (Patient not taking: Reported on 9/6/2023) 1 each 0   • metoprolol succinate (TOPROL-XL) 50 mg 24 hr tablet Take 0.5 tablets (25 mg total) by mouth daily 30 tablet 6   • mometasone (ELOCON) 0.1 % cream Apply topically daily for 7 days 15 g 0   • Multiple Vitamin (MULTIVITAMIN ADULT PO) Take 1 tablet by mouth daily     • pantoprazole (PROTONIX) 40 mg tablet Take 1 tablet (40 mg total) by mouth 2 (two) times a day (Patient taking differently: Take 40 mg by mouth daily) 30 tablet 0   • Potassium Citrate ER 15 MEQ (1620 MG) TBCR Take 1 tablet by mouth 2 (two) times a day     • pregabalin (LYRICA) 100 mg capsule take 1 capsule by mouth three times a day 90 capsule 0   • saxagliptin (Onglyza) 5 MG tablet Take 5 mg by mouth daily with dinner     • sertraline (ZOLOFT) 25 mg tablet Take 25 mg by mouth daily     • SUPER B COMPLEX/C PO Take 1 tablet by mouth daily     • tamsulosin (FLOMAX) 0.4 mg Take 0.4 mg by mouth daily with dinner     • traZODone (DESYREL) 100 mg tablet take 2 tablets by mouth at bedtime 180 tablet 0   • umeclidinium-vilanterol 62.5-25 mcg/actuation inhaler Inhale 1 puff daily 60 blister 5     No current facility-administered medications for this visit. Functional Status Prior to Diagnosis for Treatment   Occupation: retired  Recreation: none  ADL’s: Capable of performing light to moderate ADLs  [place]: No limitations  Exercise: no regular aerobic exercise  Other: n/a    Current Functional Status  Occupation: retired  Recreation: none  ADL’s: Capable of performing light to moderate ADLs  [place]: No limitations  Exercise: no regular aerobic exercise  Other: n/a    Patient Specific Goals:   Increase overall strength and endurance with regular exercise, improve balance by increasing leg strength, increase walking distance and speed    Short Term Program Goals: dietary modifications increased strength improved energy/stamina with ADLs exercise 120-150 mins/wk    Long Term Goals: intial claudication time extended  increased maximal walking duration  increased intial training workload  Improved [place] Activity Status score  Improved functional capacity based on initial fitness assessment  improved exercise tolerance  Improved Quality of Life - [facility] score reduced  improved Rate Your Plate Score    Ability to reach goals/rehabilitation potential:  Very Good     Projected return to function: 12 weeks  Objective tests: sub-max TM ETT      Nutritional   Reviewed details of Rate your Plate. Discussed key elements of heart healthy eating. Reviewed patient goals for dietary modifications and their clinical implications. Reviewed most recent lipid profile.      Goals for dietary modification based on Rate Your Plate Dietary Assessment:  choose lean cuts of meat  poultry without the skin  low fat ground meat and poultry  eliminate processed meats  reduce portions of meat to 3 oz  increase fish intake  more meatless meals  low fat dairy   reduced fat cheese  increase whole grains  increase fruits and vegetables  eliminate butter  low sodium  improved snack choices  more nuts/seeds  reduce sweets/frozen desserts  heathier choices while dining out      Emotional/Social  Patient reports they are coping well with good social support and denies depression or anxiety  Reports sufficient emotional support from wife and family    Marital status:     Domestic Violence Screening: No    Comments: n/a

## 2023-09-08 ENCOUNTER — APPOINTMENT (OUTPATIENT)
Dept: RADIATION ONCOLOGY | Facility: HOSPITAL | Age: 78
End: 2023-09-08
Payer: COMMERCIAL

## 2023-09-08 ENCOUNTER — HOSPITAL ENCOUNTER (OUTPATIENT)
Dept: RADIOLOGY | Facility: HOSPITAL | Age: 78
Discharge: HOME | End: 2023-09-08
Attending: RADIOLOGY
Payer: COMMERCIAL

## 2023-09-08 VITALS
WEIGHT: 211 LBS | OXYGEN SATURATION: 95 % | HEART RATE: 61 BPM | HEIGHT: 72 IN | RESPIRATION RATE: 18 BRPM | DIASTOLIC BLOOD PRESSURE: 74 MMHG | BODY MASS INDEX: 28.58 KG/M2 | SYSTOLIC BLOOD PRESSURE: 174 MMHG | TEMPERATURE: 96.9 F

## 2023-09-08 DIAGNOSIS — R16.0 LIVER MASSES: ICD-10-CM

## 2023-09-08 LAB
HCT VFR BLD AUTO: 33.6 % (ref 36.5–49.3)
HGB BLD-MCNC: 10.1 G/DL (ref 12–17)
INR PPP: 1.06 (ref 0.84–1.19)
MCH RBC QN AUTO: 24.9 PG (ref 26.8–34.3)
MCHC RBC AUTO-ENTMCNC: 30.1 G/DL (ref 31.4–37.4)
MCV RBC AUTO: 83 FL (ref 82–98)
PLATELET # BLD AUTO: 222 THOUSANDS/UL (ref 149–390)
PROTHROMBIN TIME: 14 SECONDS (ref 11.6–14.5)
RBC # BLD AUTO: 4.06 MILLION/UL (ref 3.88–5.62)
WBC # BLD AUTO: 7.94 THOUSAND/UL (ref 4.31–10.16)

## 2023-09-08 PROCEDURE — 36248 INS CATH ABD/L-EXT ART ADDL: CPT | Performed by: RADIOLOGY

## 2023-09-08 PROCEDURE — 36248 INS CATH ABD/L-EXT ART ADDL: CPT

## 2023-09-08 PROCEDURE — C1760 CLOSURE DEV, VASC: HCPCS

## 2023-09-08 PROCEDURE — 77300 RADIATION THERAPY DOSE PLAN: CPT | Performed by: RADIOLOGY

## 2023-09-08 PROCEDURE — 79445 NUCLEAR RX INTRA-ARTERIAL: CPT | Performed by: RADIOLOGY

## 2023-09-08 PROCEDURE — 78830 RP LOCLZJ TUM SPECT W/CT 1: CPT

## 2023-09-08 PROCEDURE — G1004 CDSM NDSC: HCPCS

## 2023-09-08 PROCEDURE — C1769 GUIDE WIRE: HCPCS

## 2023-09-08 PROCEDURE — 37243 VASC EMBOLIZE/OCCLUDE ORGAN: CPT | Performed by: RADIOLOGY

## 2023-09-08 PROCEDURE — 77470 SPECIAL RADIATION TREATMENT: CPT | Performed by: RADIOLOGY

## 2023-09-08 PROCEDURE — 85027 COMPLETE CBC AUTOMATED: CPT | Performed by: RADIOLOGY

## 2023-09-08 PROCEDURE — 77370 RADIATION PHYSICS CONSULT: CPT | Performed by: RADIOLOGY

## 2023-09-08 PROCEDURE — 37243 VASC EMBOLIZE/OCCLUDE ORGAN: CPT

## 2023-09-08 PROCEDURE — 99152 MOD SED SAME PHYS/QHP 5/>YRS: CPT | Performed by: RADIOLOGY

## 2023-09-08 PROCEDURE — 77263 THER RADIOLOGY TX PLNG CPLX: CPT | Performed by: RADIOLOGY

## 2023-09-08 PROCEDURE — 99153 MOD SED SAME PHYS/QHP EA: CPT

## 2023-09-08 PROCEDURE — 76937 US GUIDE VASCULAR ACCESS: CPT

## 2023-09-08 PROCEDURE — C1887 CATHETER, GUIDING: HCPCS

## 2023-09-08 PROCEDURE — C1894 INTRO/SHEATH, NON-LASER: HCPCS

## 2023-09-08 PROCEDURE — 36247 INS CATH ABD/L-EXT ART 3RD: CPT | Performed by: RADIOLOGY

## 2023-09-08 PROCEDURE — 85610 PROTHROMBIN TIME: CPT | Performed by: RADIOLOGY

## 2023-09-08 PROCEDURE — 76937 US GUIDE VASCULAR ACCESS: CPT | Performed by: RADIOLOGY

## 2023-09-08 PROCEDURE — 36246 INS CATH ABD/L-EXT ART 2ND: CPT

## 2023-09-08 PROCEDURE — 99152 MOD SED SAME PHYS/QHP 5/>YRS: CPT

## 2023-09-08 PROCEDURE — 36247 INS CATH ABD/L-EXT ART 3RD: CPT

## 2023-09-08 RX ORDER — IODIXANOL 320 MG/ML
50 INJECTION, SOLUTION INTRAVASCULAR
Status: COMPLETED | OUTPATIENT
Start: 2023-09-08 | End: 2023-09-08

## 2023-09-08 RX ORDER — CEFAZOLIN SODIUM 2 G/50ML
2000 SOLUTION INTRAVENOUS ONCE
Status: COMPLETED | OUTPATIENT
Start: 2023-09-08 | End: 2023-09-08

## 2023-09-08 RX ORDER — METRONIDAZOLE 500 MG/100ML
500 INJECTION, SOLUTION INTRAVENOUS ONCE
Status: COMPLETED | OUTPATIENT
Start: 2023-09-08 | End: 2023-09-08

## 2023-09-08 RX ORDER — SODIUM CHLORIDE 9 MG/ML
75 INJECTION, SOLUTION INTRAVENOUS CONTINUOUS
Status: DISCONTINUED | OUTPATIENT
Start: 2023-09-08 | End: 2023-09-09 | Stop reason: HOSPADM

## 2023-09-08 RX ORDER — MIDAZOLAM HYDROCHLORIDE 2 MG/2ML
INJECTION, SOLUTION INTRAMUSCULAR; INTRAVENOUS AS NEEDED
Status: COMPLETED | OUTPATIENT
Start: 2023-09-08 | End: 2023-09-08

## 2023-09-08 RX ORDER — FENTANYL CITRATE 50 UG/ML
INJECTION, SOLUTION INTRAMUSCULAR; INTRAVENOUS AS NEEDED
Status: COMPLETED | OUTPATIENT
Start: 2023-09-08 | End: 2023-09-08

## 2023-09-08 RX ORDER — LIDOCAINE HYDROCHLORIDE 10 MG/ML
INJECTION, SOLUTION EPIDURAL; INFILTRATION; INTRACAUDAL; PERINEURAL AS NEEDED
Status: COMPLETED | OUTPATIENT
Start: 2023-09-08 | End: 2023-09-08

## 2023-09-08 RX ADMIN — FENTANYL CITRATE 50 MCG: 50 INJECTION, SOLUTION INTRAMUSCULAR; INTRAVENOUS at 08:44

## 2023-09-08 RX ADMIN — IODIXANOL 36 ML: 320 INJECTION, SOLUTION INTRAVASCULAR at 10:48

## 2023-09-08 RX ADMIN — FENTANYL CITRATE 50 MCG: 50 INJECTION, SOLUTION INTRAMUSCULAR; INTRAVENOUS at 09:03

## 2023-09-08 RX ADMIN — MIDAZOLAM 1 MG: 1 INJECTION INTRAMUSCULAR; INTRAVENOUS at 08:44

## 2023-09-08 RX ADMIN — METRONIDAZOLE 500 MG: 500 INJECTION, SOLUTION INTRAVENOUS at 08:39

## 2023-09-08 RX ADMIN — MIDAZOLAM 1 MG: 1 INJECTION INTRAMUSCULAR; INTRAVENOUS at 09:02

## 2023-09-08 RX ADMIN — FENTANYL CITRATE 50 MCG: 50 INJECTION, SOLUTION INTRAMUSCULAR; INTRAVENOUS at 09:53

## 2023-09-08 RX ADMIN — SODIUM CHLORIDE 75 ML/HR: 0.9 INJECTION, SOLUTION INTRAVENOUS at 07:51

## 2023-09-08 RX ADMIN — CEFAZOLIN SODIUM 2000 MG: 2 SOLUTION INTRAVENOUS at 08:51

## 2023-09-08 RX ADMIN — LIDOCAINE HYDROCHLORIDE 10 ML: 10 INJECTION, SOLUTION EPIDURAL; INFILTRATION; INTRACAUDAL; PERINEURAL at 09:07

## 2023-09-08 NOTE — SEDATION DOCUMENTATION
Y90 implant performed by Dr. Adi Prince without complications. Right CFA access obtained, closed with a vascade and c/d/d applied. Procedure tolerated well by patient, VSS. IR Procedure Bedrest Start Time is 1025.

## 2023-09-08 NOTE — DISCHARGE INSTRUCTIONS
SIRS IMPLANT DISCHARGE INSTRUCTIONS    WHAT YOU SHOULD KNOW:  For the next 72 hours after your SIRS implant NO pregnant visitors or family. No physical contact with others for more than two hours. Sleep alone in bed. No children or pets sitting on your lap. Keep a distance of three feet between yourself and others. AFTER YOU LEAVE:     Self-care:   Limit activity: Rest for the remainder of the day of your procedure. Have some one with you until the next morning. Keep your arm or leg straight as much as possible. Rest as much as possible, sitting lying or reclining. Walk only to go to the bathroom, to bed or to eat. If the angiogram catheter was put in your leg, use the stairs as little as possible. No driving. Keep your wound clean and dry. Remove band aid/ dressing tomorrow. You may shower 24 hours after your procedure. Shower and wash groin area or wrist area gently with soap and water: beginning tomorrow. Rinse and pat Dry. Apply new water seal band aid. Repeat this process for 5 days. If there is any drainage from the puncture site, you should put on a clean bandage. No Powders, creams, lotions or antibiotic ointments for 5 days. No tub baths, hot tubs or swimming for 5 days. Watch for bleeding and bruising: It is normal to have a bruise and soreness where the angiogram catheter went in. Medication Continue  to take Prilosec 20 mg daily for a total of  30 days after the initial mapping procedure. Start taking the  Medrol as directed. Diet: You may resume your regular diet. Small sips of flat soda will help with mild nausea. Drink more liquids than usual for the next 24 hours      IMMEDIATELY Contact Interventional Radiology at [phone] Aj PATIENTS: Contact Interventional Radiology at [phone]) Virgie Thompson PATIENTS: Contact Interventional Radiology at [phone]) if any of the following occur: If your bruise gets larger or if you notice any active bleeding.  APPLY DIRECT PRESSURE TO THE BLEEDING SITE. If you notice increased swelling or have increased pain at the puncture site   If you have any numbness or pain in the extremity of the puncture site   If that extremity seems cold or pale. You have fever greater than 101  Persistent nausea or vomiting. Follow up with your primary healthcare provider  as directed: Write down your questions so you remember to ask them during your visits.

## 2023-09-08 NOTE — INTERVAL H&P NOTE
H&P reviewed. After examining the patient, I find no changed to the H&P since it had been written. BP (!) 181/77 (BP Location: Right arm)   Pulse 65   Temp 97.6 °F (36.4 °C) (Oral)   Resp 16   Ht 6' (1.829 m)   Wt 95.7 kg (211 lb)   SpO2 93%   BMI 28.62 kg/m²     Patient re-evaluated.  Accept as history and physical.    Holden Moore, DO/September 8, 2023/8:44 AM

## 2023-09-08 NOTE — BRIEF OP NOTE (RAD/CATH)
IR Y-90 RADIOEMBOLIZATION  Procedure Note    PATIENT NAME: Claire Cardona  : 1945  MRN: [id number]     Pre-op Diagnosis:   1. Liver masses      Post-op Diagnosis:   1. Liver masses        Surgeon:   Valentina Emmanuel DO  Assistants:     No qualified resident was available.     Estimated Blood Loss: None  Findings:   · R CFA 5F sheath access, closed with Vascade  · Whole liver split dose Y90 Sirspheres    Specimens: none    Complications:  none    Anesthesia: conscious sedation and local    Valentina Emmanuel DO     Date: 2023  Time: 10:23 AM

## 2023-09-10 DIAGNOSIS — E11.40 TYPE 2 DIABETES MELLITUS WITH DIABETIC NEUROPATHY, WITH LONG-TERM CURRENT USE OF INSULIN (HCC): ICD-10-CM

## 2023-09-10 DIAGNOSIS — Z79.4 TYPE 2 DIABETES MELLITUS WITH DIABETIC NEUROPATHY, WITH LONG-TERM CURRENT USE OF INSULIN (HCC): ICD-10-CM

## 2023-09-11 RX ORDER — PREGABALIN 100 MG/1
CAPSULE ORAL
Qty: 90 CAPSULE | Refills: 2 | Status: SHIPPED | OUTPATIENT
Start: 2023-09-11

## 2023-09-12 ENCOUNTER — CLINICAL SUPPORT (OUTPATIENT)
Dept: CARDIAC REHAB | Facility: HOSPITAL | Age: 78
End: 2023-09-12
Payer: COMMERCIAL

## 2023-09-12 ENCOUNTER — APPOINTMENT (OUTPATIENT)
Dept: CARDIAC REHAB | Facility: HOSPITAL | Age: 78
End: 2023-09-12
Payer: COMMERCIAL

## 2023-09-12 DIAGNOSIS — Z95.5 STATUS POST INSERTION OF DRUG ELUTING CORONARY ARTERY STENT: Primary | ICD-10-CM

## 2023-09-12 PROCEDURE — 93798 PHYS/QHP OP CAR RHAB W/ECG: CPT

## 2023-09-13 ENCOUNTER — TELEPHONE (OUTPATIENT)
Dept: CARDIOLOGY CLINIC | Facility: CLINIC | Age: 78
End: 2023-09-13

## 2023-09-13 ENCOUNTER — OFFICE VISIT (OUTPATIENT)
Dept: ENDOCRINOLOGY | Facility: CLINIC | Age: 78
End: 2023-09-13
Payer: COMMERCIAL

## 2023-09-13 VITALS
BODY MASS INDEX: 28.99 KG/M2 | DIASTOLIC BLOOD PRESSURE: 60 MMHG | SYSTOLIC BLOOD PRESSURE: 130 MMHG | WEIGHT: 214 LBS | HEIGHT: 72 IN | HEART RATE: 67 BPM

## 2023-09-13 DIAGNOSIS — E11.40 TYPE 2 DIABETES MELLITUS WITH DIABETIC NEUROPATHY, WITH LONG-TERM CURRENT USE OF INSULIN (HCC): Primary | ICD-10-CM

## 2023-09-13 DIAGNOSIS — E11.49 OTHER DIABETIC NEUROLOGICAL COMPLICATION ASSOCIATED WITH TYPE 2 DIABETES MELLITUS (HCC): ICD-10-CM

## 2023-09-13 DIAGNOSIS — Z79.4 TYPE 2 DIABETES MELLITUS WITH DIABETIC NEUROPATHY, WITH LONG-TERM CURRENT USE OF INSULIN (HCC): Primary | ICD-10-CM

## 2023-09-13 LAB — SL AMB POCT HEMOGLOBIN AIC: 6.5 (ref ?–6.5)

## 2023-09-13 PROCEDURE — 83036 HEMOGLOBIN GLYCOSYLATED A1C: CPT | Performed by: INTERNAL MEDICINE

## 2023-09-13 PROCEDURE — 99214 OFFICE O/P EST MOD 30 MIN: CPT | Performed by: INTERNAL MEDICINE

## 2023-09-13 RX ORDER — BLOOD-GLUCOSE METER
EACH MISCELLANEOUS 4 TIMES DAILY
Qty: 1 KIT | Refills: 0 | Status: SHIPPED | OUTPATIENT
Start: 2023-09-13

## 2023-09-13 RX ORDER — PROCHLORPERAZINE 25 MG/1
SUPPOSITORY RECTAL
Qty: 1 EACH | Refills: 0 | Status: SHIPPED | OUTPATIENT
Start: 2023-09-13

## 2023-09-13 RX ORDER — PROCHLORPERAZINE 25 MG/1
SUPPOSITORY RECTAL
Qty: 9 EACH | Refills: 3 | Status: SHIPPED | OUTPATIENT
Start: 2023-09-13

## 2023-09-13 RX ORDER — BLOOD SUGAR DIAGNOSTIC
STRIP MISCELLANEOUS
Qty: 100 STRIP | Refills: 2 | Status: SHIPPED | OUTPATIENT
Start: 2023-09-13

## 2023-09-13 RX ORDER — INSULIN DETEMIR 100 [IU]/ML
65 INJECTION, SOLUTION SUBCUTANEOUS
Qty: 30 ML | Refills: 0 | Status: SHIPPED | OUTPATIENT
Start: 2023-09-13

## 2023-09-13 NOTE — TELEPHONE ENCOUNTER
Spoke with pt , wt is 214 lbs today. He denies any CHF symptoms presently. He is aware when to call the office. He is aware of appt on 9/15/23 in [address] office.

## 2023-09-13 NOTE — PATIENT INSTRUCTIONS
Your insulin dose has been changed today- decrease levemir to 65 units daily at bedtime  Continue on current Novolog 40 units with breakfast and lunch, 35 units with dinner  You have been started on a new medication- Jardiance 10 mg daily for your diabetes, also has cardiac and kidney benefits  Discontinue Onglyza    Refill prescription for dexcom and glucose meter sent to your pharmacy  Plan to send in dexcom report download to the office in 3weeks  Office follow up in 3 months

## 2023-09-13 NOTE — PROGRESS NOTES
Progress Note      Chief complaint: diabetes follow up     Referring Provider  No referring provider defined for this encounter. History of Present Illness:   Amelia Maynard is a 69 y.o. male with a history of type 2 diabetes seen at follow up. Last office visit was in 03/2023. Presents to the office accompanied by his wife. Since last visit, wife reports at least 3 episodes of hypoglycemia with sugars running in the 40s usually overnight when patient is asleep (dexcom alarm goes off). Last episode of hypoglycemia occurred the night prior to recent hospital admission where he was found to have anemia requiring blood transfusion and CAD s/p stents. He is currently undergoing cardiac rehab. During hospitalization, he lost his dexcom sensor with no extra supplies. Does not have a glucose meter at home. Was also started on a short course of steroid, last dose was today. Current regimen insulin Levemir 75 units daily at bedtime, Novolog 40 units with breakfast and lunch, 35 units with dinner,  Trulicity 2.46 mg weekly, Onglyza 5 mg daily. further diabetic ROS: Ongoing polyuria (patient is on diuretics), denies polydipsia or blurry vision. Diabetes complications: CKD, diabetic neuropathy, CAD status post stents. Denies CVA/TIA, retinopathy    Diet: 3 meals per day    Opthamology: Follows with his ophthalmologist every year, has an upcoming appointment. Podiatry: UTD with podiatry visit    Has hyperlipidemia: followed by PCP; on Atorvastatin - tolerating well, no myalgias.     Thyroid disorders: None  History of pancreatitis: None    Patient Active Problem List   Diagnosis   • Stage 3 chronic kidney disease, unspecified whether stage 3a or 3b CKD ([address])   • Peripheral polyneuropathy   • Type 2 diabetes mellitus with neurologic complication, with long-term current use of insulin (HCC)   • Atrial fibrillation, unspecified type ([address])   • Liver mass   • H/O asbestos exposure   • History of tobacco abuse • Restrictive lung disease   • Other emphysema ([address])   • Syncope   • Anemia   • Acute on chronic diastolic HF (heart failure) (HCC)   • History of DVT (deep vein thrombosis)   • CAD (coronary artery disease)   • Hepatic flexure mass   • Hepatocellular carcinoma (HCC)   • Coronary artery disease involving native coronary artery   • Status post insertion of drug eluting coronary artery stent      Past Medical History:   Diagnosis Date   • Atrial fibrillation (HCC)     Eliquis   • AVB (atrioventricular block)     1st degree   • CAD (coronary artery disease)    • CKD (chronic kidney disease), stage III (HCC)     baseline Cr 1.2-1.6   • Diabetes mellitus (HCC)     type 2, insulin dependent   • Diverticulosis    • Emphysema lung (HCC)    • Former tobacco use    • History of asbestos exposure    • History of DVT (deep vein thrombosis)     RLE, tx w/ AC   • History of GI bleed 08/2023    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, given PRBC/Venofer for anemia while in house   • Hyperlipidemia    • Hypertension    • LAFB (left anterior fascicular block)    • Liver cancer ([address])    • Liver mass 08/2023    suspected HCC, needs radioembolization   • RBBB       Past Surgical History:   Procedure Laterality Date   • APPENDECTOMY     • BOWEL RESECTION  2014   • CARDIAC CATHETERIZATION     • CARDIAC CATHETERIZATION N/A 8/25/2023    Procedure: Cardiac pci;  Surgeon: Taty Cole DO;  Location: [facility]; Service: Cardiology   • CARDIAC CATHETERIZATION N/A 8/21/2023    Procedure: Cardiac catheterization;  Surgeon: Taty Cole DO;  Location: [facility]; Service: Cardiology   • CARDIAC CATHETERIZATION N/A 8/21/2023    Procedure: Cardiac Coronary Angiogram;  Surgeon: Taty Cole DO;  Location: [facility];   Service: Cardiology   • CATARACT EXTRACTION Bilateral    • EGD     • IR Y-90 PRE-ANGIO/EMBO W/ LUNG SCAN  9/1/2023   • IR Y-90 RADIOEMBOLIZATION  9/8/2023      Family History   Problem Relation Age of Onset   • Hypertension Mother    • Bone cancer Father    • Diabetes type II Sister    • Diabetes type II Sister    • Esophageal cancer Brother      Social History     Tobacco Use   • Smoking status: Former     Packs/day: 1.00     Years: 30.00     Total pack years: 30.00     Types: Cigarettes     Start date:      Quit date:      Years since quittin.7   • Smokeless tobacco: Never   Substance Use Topics   • Alcohol use: Yes     Comment: Socially     No Known Allergies      Current Outpatient Medications:   •  acetaminophen (TYLENOL) 650 mg CR tablet, Take 650 mg by mouth every 8 (eight) hours as needed for mild pain, Disp: , Rfl:   •  atorvastatin (LIPITOR) 40 mg tablet, Take 1 tablet (40 mg total) by mouth daily after dinner, Disp: 30 tablet, Rfl: 11  •  azelastine (ASTELIN) 0.1 % nasal spray, 1 spray into each nostril 2 (two) times a day Use in each nostril as directed, Disp: 1 mL, Rfl: 0  •  Blood Glucose Monitoring Suppl (OneTouch Verio) w/Device KIT, Use 4 (four) times a day, Disp: 1 kit, Rfl: 0  •  clopidogrel (PLAVIX) 75 mg tablet, Take 1 tablet (75 mg total) by mouth daily, Disp: 30 tablet, Rfl: 11  •  Continuous Blood Gluc  (Dexcom G6 ) NOHEMY, Use with dexcom sensor, Disp: 1 each, Rfl: 0  •  Continuous Blood Gluc Sensor (Dexcom G6 Sensor) MISC, Use daily as directed for CGM - Change every 10 days, Disp: 9 each, Rfl: 3  •  Continuous Blood Gluc Transmit (Dexcom G6 Transmitter) MISC, Use daily as directed for CGM - Change every 3 months, Disp: 1 each, Rfl: 3  •  dulaglutide (Trulicity) 6.44 VL/2.9GF injection, Inject 0.5 mL (0.75 mg total) under the skin once a week, Disp: 2 mL, Rfl: 3  •  Eliquis 5 MG, take 1 tablet by mouth twice a day, Disp: 60 tablet, Rfl: 5  •  Empagliflozin (Jardiance) 10 MG TABS tablet, Take 1 tablet (10 mg total) by mouth every morning, Disp: 90 tablet, Rfl: 0  •  ferrous sulfate 325 (65 Fe) mg tablet, Take 1 tablet (325 mg total) by mouth daily with breakfast, Disp: 30 tablet, Rfl: 0  •  furosemide (LASIX) 40 mg tablet, Take 1 tablet (40 mg total) by mouth daily, Disp: 30 tablet, Rfl: 6  •  glucose blood (OneTouch Verio) test strip, Use as instructed, Disp: 100 strip, Rfl: 2  •  insulin aspart (NovoLOG FlexPen) 100 UNIT/ML injection pen, Inject 40 Units under the skin 3 (three) times a day with meals 28 u breakfast time, 38 u lunchtime, 38 u dinnertime (Patient taking differently: Inject 40 Units under the skin 3 (three) times a day with meals 40 units for breakfast and lunch; 35 units with dinner), Disp: , Rfl:   •  insulin detemir (Levemir FlexPen) 100 Units/mL injection pen, Inject 65 Units under the skin daily at bedtime, Disp: 30 mL, Rfl: 0  •  Insulin Pen Needle (Pen Needles) 32G X 4 MM MISC, Use 4 (four) times a day, Disp: 400 each, Rfl: 3  •  metoprolol succinate (TOPROL-XL) 50 mg 24 hr tablet, Take 0.5 tablets (25 mg total) by mouth daily, Disp: 30 tablet, Rfl: 6  •  mometasone (ELOCON) 0.1 % cream, Apply topically daily for 7 days, Disp: 15 g, Rfl: 0  •  Multiple Vitamin (MULTIVITAMIN ADULT PO), Take 1 tablet by mouth daily, Disp: , Rfl:   •  pantoprazole (PROTONIX) 40 mg tablet, Take 1 tablet (40 mg total) by mouth 2 (two) times a day (Patient taking differently: Take 40 mg by mouth daily), Disp: 30 tablet, Rfl: 0  •  Potassium Citrate ER 15 MEQ (1620 MG) TBCR, Take 1 tablet by mouth 2 (two) times a day, Disp: , Rfl:   •  pregabalin (LYRICA) 100 mg capsule, take 1 capsule by mouth three times a day, Disp: 90 capsule, Rfl: 2  •  sertraline (ZOLOFT) 25 mg tablet, Take 25 mg by mouth daily, Disp: , Rfl:   •  SUPER B COMPLEX/C PO, Take 1 tablet by mouth daily, Disp: , Rfl:   •  tamsulosin (FLOMAX) 0.4 mg, Take 0.4 mg by mouth daily with dinner, Disp: , Rfl:   •  traZODone (DESYREL) 100 mg tablet, take 2 tablets by mouth at bedtime, Disp: 180 tablet, Rfl: 0  •  umeclidinium-vilanterol 62.5-25 mcg/actuation inhaler, Inhale 1 puff daily, Disp: 60 blister, Rfl: 5      ROS  Constitutional: Negative for appetite change. Negative for activity change, fatigue and unexpected weight change. Respiratory: Negative for shortness of breath, wheezing, cough. Cardiovascular: Negative for chest pain and palpitations. Gastrointestinal: Negative for abdominal pain, nausea and vomiting. Negative for diarrhea or constipation. Musculoskeletal: Negative for arthralgias. Neurological: Negative for dizziness, light-headedness and headaches. All other ROS reviewed and negative    Physical Exam:  Body mass index is 29.02 kg/m².   /60   Pulse 67   Ht 6' (1.829 m)   Wt 97.1 kg (214 lb)   BMI 29.02 kg/m²    Wt Readings from Last 3 Encounters:   09/13/23 97.1 kg (214 lb)   09/08/23 95.7 kg (211 lb)   09/06/23 97.9 kg (215 lb 12.8 oz)       GEN: Well appearing, not in acute distress  Eyes: no stare or proptosis, nl lids and conjunctiva, EOMI  Neck: trachea midline, thyroid NT to palpation, nl in size, no nodules or neck masses noted, no cervical LAD  CV; heart reg rate s1s2 nl  Resp: CTAB, good effort  MS: no c/c in digits, moves all 4 ext, nl muscle bulk, gait nl  Skin: warm and dry, no palmar erythema  Ext:  no edema bilaterally  Psych: nl mood and affect, no gross lapses in memory    Labs:   Component Ref Range & Units 9/13/23  8:24 AM 1/6/23  9:45 AM 8/26/22  8:46 AM   Hemoglobin A1C 6.5 6.5  7.6 High  R, CM  >15.5 High  R, CM          Lab Results   Component Value Date    CREATININE 1.58 (H) 09/01/2023    CREATININE 1.24 08/26/2023    CREATININE 1.38 (H) 08/25/2023    BUN 43 (H) 09/01/2023    K 4.3 09/01/2023     09/01/2023    CO2 24 09/01/2023     eGFR   Date Value Ref Range Status   09/01/2023 41 ml/min/1.73sq m Final     No components found for: "MALBCRER"    Lab Results   Component Value Date    HDL 33 (L) 01/06/2023    TRIG 231 (H) 01/06/2023       Lab Results   Component Value Date    ALT 45 09/01/2023    AST 55 (H) 09/01/2023 ALKPHOS 99 09/01/2023       Lab Results   Component Value Date    TSH 3.720 01/06/2023       Impression:  1. Type 2 diabetes mellitus with diabetic neuropathy, with long-term current use of insulin ([address])    2. Other diabetic neurological complication associated with type 2 diabetes mellitus ([address])           Plan:    Diagnoses and all orders for this visit:    Type 2 diabetes mellitus with diabetic neuropathy, with long-term current use of insulin ([facility])  -     POCT hemoglobin A1c  -     Empagliflozin (Jardiance) 10 MG TABS tablet; Take 1 tablet (10 mg total) by mouth every morning  -     insulin detemir (Levemir FlexPen) 100 Units/mL injection pen; Inject 65 Units under the skin daily at bedtime  -     Continuous Blood Gluc  (Dexcom G6 ) NOHEMY; Use with dexcom sensor  -     Albumin / creatinine urine ratio; Future  -     Basic metabolic panel Lab Collect; Future  -     HEMOGLOBIN A1C W/ EAG ESTIMATION Lab Collect; Future  -     Blood Glucose Monitoring Suppl (OneTouch Verio) w/Device KIT; Use 4 (four) times a day  -     glucose blood (OneTouch Verio) test strip; Use as instructed  -     Lancets  -     Albumin / creatinine urine ratio  -     Basic metabolic panel Lab Collect  -     HEMOGLOBIN A1C W/ EAG ESTIMATION Lab Collect    Other diabetic neurological complication associated with type 2 diabetes mellitus ([facility])  -     Continuous Blood Gluc Sensor (Dexcom G6 Sensor) MISC; Use daily as directed for CGM - Change every 10 days      1. Type 2 diabetes mellitus with diabetic neuropathy, with long-term current use of insulin ([facility])  - POCT hemoglobin A1c  - Empagliflozin (Jardiance) 10 MG TABS tablet; Take 1 tablet (10 mg total) by mouth every morning  Dispense: 90 tablet; Refill: 0  - insulin detemir (Levemir FlexPen) 100 Units/mL injection pen;  Inject 65 Units under the skin daily at bedtime  Dispense: 30 mL; Refill: 0  - Continuous Blood Gluc  (Dexcom G6 ) NOHEMY; Use with dexcom sensor Dispense: 1 each; Refill: 0  - Albumin / creatinine urine ratio; Future  - Basic metabolic panel Lab Collect; Future  - HEMOGLOBIN A1C W/ EAG ESTIMATION Lab Collect; Future  - Blood Glucose Monitoring Suppl (OneTouch Verio) w/Device KIT; Use 4 (four) times a day  Dispense: 1 kit; Refill: 0  - glucose blood (OneTouch Verio) test strip; Use as instructed  Dispense: 100 strip; Refill: 2  - Lancets  - Albumin / creatinine urine ratio  - Basic metabolic panel Lab Collect  - HEMOGLOBIN A1C W/ EAG ESTIMATION Lab Collect    2. Other diabetic neurological complication associated with type 2 diabetes mellitus (HCC)  - Continuous Blood Gluc Sensor (Dexcom G6 Sensor) MISC; Use daily as directed for CGM - Change every 10 days  Dispense: 9 each; Refill: 3    #T2DM on long term insulin with improving glycemic control based on reported glucose checks at home. A1c today unreliable given recent blood transfusion. Given reported hypoglycemia, will decrease Levemir to 65 units daily at bedtime. Continue on current dose of NovoLog and Trulicity. Discontinue Onglyza. Start on Jardiance 10 mg daily for glycemic control, with cardiovascular and renoprotective benefits. Reordered Dexcom G6 and glucometer, prescriptions sent to pharmacy. Send in Dexcom report download in 3 weeks for review. Check A1c, microalbumin creatinine ratio prior to next visit. Office follow-up in 3 months. Discussed with the patient and all questioned fully answered. He will call me if any problems arise.

## 2023-09-15 ENCOUNTER — OFFICE VISIT (OUTPATIENT)
Dept: CARDIOLOGY CLINIC | Facility: CLINIC | Age: 78
End: 2023-09-15
Payer: COMMERCIAL

## 2023-09-15 VITALS
HEART RATE: 76 BPM | DIASTOLIC BLOOD PRESSURE: 54 MMHG | HEIGHT: 72 IN | SYSTOLIC BLOOD PRESSURE: 126 MMHG | WEIGHT: 213 LBS | BODY MASS INDEX: 28.85 KG/M2

## 2023-09-15 DIAGNOSIS — D64.9 ANEMIA, UNSPECIFIED TYPE: Primary | ICD-10-CM

## 2023-09-15 PROCEDURE — 99214 OFFICE O/P EST MOD 30 MIN: CPT | Performed by: PHYSICIAN ASSISTANT

## 2023-09-15 NOTE — PROGRESS NOTES
Cardiology Office Follow Up  Vaibhav Munoz  1945  [id number]        ASSESSMENT:  1. Multivessel CAD  2. Paroxysmal atrial fibrillation  3. Chronic HFrEF, EF 50-55%  4. Anemia related to GI bleed  5. CKD stage III  6. Type II DM  7. Hx of DVT  8. Hepatocellular carcinoma     PLAN:  Continue dual therapy with Eliquis/Plavix; denies any bleeding issues  Continue Toprol-XL, Jardiance  Not on ACE/ARB/ARNI secondary to CKD stage III  Cardiac rehab strongly recommended to help his fatigability  Check CBC given fatigue with hx of anemia  RTO in 3 months or sooner if needed.     Interval History/ HPI:   Vaibhav Munoz is a 71-year-old male with PMH as above who presents for hospital follow-up visit. Patient known to cardiologist Dr. Wilian Lynn, [facility] 6/22/2023.      Patient was admitted to [address] [place] [address] twice in August. First hospitalization presented on 8/8/2023 with syncopal episode while at home. EMS called for transport to the ED. Was found tachypneic and hypoxic on arrival CBC which showed hemoglobin of 6.5. Received 2 units packed RBCs. He underwent EGD which identified 2 small areas of bleeding in the fundus and body of the stomach with 2 clips placed. Globin stabilized around 8.1. CXR showed pulmonary edema consistent with congestive heart failure and started on IV diuretics with good response. TTE showed mildly reduced EF 45% and elevated troponins with max value of 5115 which was attributed to his acute anemia/volume overload. He was discharged 8/14/2023 with Toprol-XL and Lasix 40 mg daily. He presented to [address] [place] [address] on 8/15/2023. Patient reports he awoke with remittent, dull chest discomfort, shortness of breath and lightheadedness. Home health nurse contacted cardiology and was advised to come to the ED for evaluation.   He underwent eventual NST on 8/18/2023 with no ischemic EKG changes but EF 36% as well as large fixed defect of the inferior and inferolateral walls with associated wall motion abnormality suspicious for RCA territory infarct. He was planned for transfer to [facility] for cardiac catheterization to which he underwent on 8/21/2023 showed 75% mid LAD lesion which was IFR positive, 90% proximal to mid circumflex lesion, 90% mid RCA lesion. Patient was recommended for CTS evaluation but ultimately was turned down for CABG given his oncological status. Was then opted for staged PCI with nephrology consult for renal optimization. Underwent successful PCI to LAD and LCx. Post cath was placed on dual therapy with Plavix and Eliquis. Eventually discharged on 8/26/2023 with cardiology follow-up. Since discharge, patient reports he is doing reasonably well. He does have bouts of fatigue which have been present since his recent discharge but denies any further chest pain, discomfort, palpitations, shortness of breath, thyroid, PND lower extremity edema. He denies any sleep disturbances. He uses a cane at baseline but able to get around his home without issues.        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, given PRBC/Venofer for anemia while in house   • Hyperlipidemia     • Hypertension     • LAFB (left anterior fascicular block)     • Liver cancer ([address])     • Liver mass 08/2023     suspected HCC, needs radioembolization   • RBBB        Social History            Socioeconomic History   • Marital status: /Civil Union       Spouse name: Not on file • Number of children: Not on file   • Years of education: Not on file   • Highest education level: Not on file   Occupational History   • Not on file   Tobacco Use   • Smoking status: Former       Packs/day: 1.00       Years: 30.00       Total pack years: 30.00       Types: Cigarettes       Start date: 57       Quit date: 12       Years since quittin.7   • Smokeless tobacco: Never   Vaping Use   • Vaping Use: Never used   Substance and Sexual Activity   • Alcohol use: Yes       Comment: Socially   • Drug use: Never   • Sexual activity: Not on file   Other Topics Concern   • Not on file   Social History Narrative   • Not on file      Social Determinants of Health           Financial Resource Strain: Medium Risk (2022)     Overall Financial Resource Strain (CARDIA)     • Difficulty of Paying Living Expenses: Somewhat hard   Food Insecurity: No Food Insecurity (2023)     Hunger Vital Sign     • Worried About Running Out of Food in the Last Year: Never true     • Ran Out of Food in the Last Year: Never true   Transportation Needs: No Transportation Needs (2023)     PRAPARE - Transportation     • Lack of Transportation (Medical): No     • Lack of Transportation (Non-Medical):  No   Physical Activity: Not on file   Stress: Not on file   Social Connections: Not on file   Intimate Partner Violence: Not on file   Housing Stability: Low Risk  (2023)     Housing Stability Vital Sign     • Unable to Pay for Housing in the Last Year: No     • Number of Places Lived in the Last Year: 1     • Unstable Housing in the Last Year: No            Family History   Problem Relation Age of Onset   • Hypertension Mother     • Bone cancer Father     • Diabetes type II Sister     • Diabetes type II Sister     • Esophageal cancer Brother              Past Surgical History:   Procedure Laterality Date   • APPENDECTOMY       • BOWEL RESECTION      • CARDIAC CATHETERIZATION       • CARDIAC CATHETERIZATION N/A 8/25/2023     Procedure: Cardiac pci;  Surgeon: Radha Wolff DO;  Location: [facility]; Service: Cardiology   • CARDIAC CATHETERIZATION N/A 8/21/2023     Procedure: Cardiac catheterization;  Surgeon: Radha Wolff DO;  Location: [facility]; Service: Cardiology   • CARDIAC CATHETERIZATION N/A 8/21/2023     Procedure: Cardiac Coronary Angiogram;  Surgeon: Radha Wolff DO;  Location: [facility];   Service: Cardiology   • CATARACT EXTRACTION Bilateral     • EGD       • IR Y-90 PRE-ANGIO/EMBO W/ LUNG SCAN   9/1/2023   • IR Y-90 RADIOEMBOLIZATION   9/8/2023         Current Outpatient Medications:   •  acetaminophen (TYLENOL) 650 mg CR tablet, Take 650 mg by mouth every 8 (eight) hours as needed for mild pain, Disp: , Rfl:   •  atorvastatin (LIPITOR) 40 mg tablet, Take 1 tablet (40 mg total) by mouth daily after dinner, Disp: 30 tablet, Rfl: 11  •  azelastine (ASTELIN) 0.1 % nasal spray, 1 spray into each nostril 2 (two) times a day Use in each nostril as directed, Disp: 1 mL, Rfl: 0  •  Blood Glucose Monitoring Suppl (OneTouch Verio) w/Device KIT, Use 4 (four) times a day, Disp: 1 kit, Rfl: 0  •  clopidogrel (PLAVIX) 75 mg tablet, Take 1 tablet (75 mg total) by mouth daily, Disp: 30 tablet, Rfl: 11  •  Continuous Blood Gluc  (Dexcom G6 ) NOHEMY, Use with dexcom sensor, Disp: 1 each, Rfl: 0  •  Continuous Blood Gluc Sensor (Dexcom G6 Sensor) MISC, Use daily as directed for CGM - Change every 10 days, Disp: 9 each, Rfl: 3  •  Continuous Blood Gluc Transmit (Dexcom G6 Transmitter) MISC, Use daily as directed for CGM - Change every 3 months, Disp: 1 each, Rfl: 3  •  dulaglutide (Trulicity) 4.90 WQ/2.6ZN injection, Inject 0.5 mL (0.75 mg total) under the skin once a week, Disp: 2 mL, Rfl: 3  •  Eliquis 5 MG, take 1 tablet by mouth twice a day, Disp: 60 tablet, Rfl: 5  •  Empagliflozin (Jardiance) 10 MG TABS tablet, Take 1 tablet (10 mg total) by mouth every morning, Disp: 90 tablet, Rfl: 0  •  ferrous sulfate 325 (65 Fe) mg tablet, Take 1 tablet (325 mg total) by mouth daily with breakfast, Disp: 30 tablet, Rfl: 0  •  furosemide (LASIX) 40 mg tablet, Take 1 tablet (40 mg total) by mouth daily, Disp: 30 tablet, Rfl: 6  •  glucose blood (OneTouch Verio) test strip, Use as instructed, Disp: 100 strip, Rfl: 2  •  insulin aspart (NovoLOG FlexPen) 100 UNIT/ML injection pen, Inject 40 Units under the skin 3 (three) times a day with meals 28 u breakfast time, 38 u lunchtime, 38 u dinnertime (Patient taking differently: Inject 40 Units under the skin 3 (three) times a day with meals 40 units for breakfast and lunch; 35 units with dinner), Disp: , Rfl:   •  insulin detemir (Levemir FlexPen) 100 Units/mL injection pen, Inject 65 Units under the skin daily at bedtime, Disp: 30 mL, Rfl: 0  •  Insulin Pen Needle (Pen Needles) 32G X 4 MM MISC, Use 4 (four) times a day, Disp: 400 each, Rfl: 3  •  metoprolol succinate (TOPROL-XL) 50 mg 24 hr tablet, Take 0.5 tablets (25 mg total) by mouth daily, Disp: 30 tablet, Rfl: 6  •  Multiple Vitamin (MULTIVITAMIN ADULT PO), Take 1 tablet by mouth daily, Disp: , Rfl:   •  pantoprazole (PROTONIX) 40 mg tablet, Take 1 tablet (40 mg total) by mouth 2 (two) times a day (Patient taking differently: Take 40 mg by mouth daily), Disp: 30 tablet, Rfl: 0  •  Potassium Citrate ER 15 MEQ (1620 MG) TBCR, Take 1 tablet by mouth 2 (two) times a day, Disp: , Rfl:   •  pregabalin (LYRICA) 100 mg capsule, take 1 capsule by mouth three times a day, Disp: 90 capsule, Rfl: 2  •  sertraline (ZOLOFT) 25 mg tablet, Take 25 mg by mouth daily, Disp: , Rfl:   •  SUPER B COMPLEX/C PO, Take 1 tablet by mouth daily, Disp: , Rfl:   •  tamsulosin (FLOMAX) 0.4 mg, Take 0.4 mg by mouth daily with dinner, Disp: , Rfl:   •  traZODone (DESYREL) 100 mg tablet, take 2 tablets by mouth at bedtime, Disp: 180 tablet, Rfl: 0  •  umeclidinium-vilanterol 62.5-25 mcg/actuation inhaler, Inhale 1 puff daily, Disp: 60 blister, Rfl: 5  •  mometasone (ELOCON) 0.1 % cream, Apply topically daily for 7 days, Disp: 15 g, Rfl: 0        Review of Systems:  Review of Systems   Constitutional: Positive for fatigue. Negative for appetite change, chills, diaphoresis and fever. Respiratory: Negative for cough, chest tightness and shortness of breath. Cardiovascular: Negative for chest pain, palpitations and leg swelling. Gastrointestinal: Negative for diarrhea, nausea and vomiting. Endocrine: Negative for cold intolerance and heat intolerance. Genitourinary: Negative for difficulty urinating, dysuria and enuresis. Musculoskeletal: Negative for arthralgias, back pain and gait problem. Allergic/Immunologic: Negative for environmental allergies and food allergies. Neurological: Negative for dizziness, facial asymmetry and headaches. Hematological: Negative for adenopathy. Does not bruise/bleed easily. Psychiatric/Behavioral: Negative for agitation, behavioral problems and confusion.         Physical Exam:  Physical Exam  Constitutional:       Appearance: He is well-developed. HENT:      Right Ear: External ear normal.      Left Ear: External ear normal.   Eyes:      Pupils: Pupils are equal, round, and reactive to light. Cardiovascular:      Rate and Rhythm: Normal rate and regular rhythm. Heart sounds: Normal heart sounds. No murmur heard. No friction rub. No gallop. Pulmonary:      Effort: Pulmonary effort is normal.      Breath sounds: Normal breath sounds. Abdominal:      Palpations: Abdomen is soft. Musculoskeletal:         General: Normal range of motion. Cervical back: Normal range of motion. Skin:     General: Skin is warm and dry. Neurological:      Mental Status: He is alert and oriented to person, place, and time. Deep Tendon Reflexes: Reflexes are normal and symmetric. Psychiatric:         Behavior: Behavior normal.         Thought Content:  Thought content normal.         Judgment: Judgment normal.            This note was completed in part utilizing M-Modal Fluency Direct Software. Grammatical errors, random word insertions, spelling mistakes, and incomplete sentences can be an occasional consequence of this system secondary to software limitations, ambient noise, and hardware issues. If you have any questions or concerns about the content, text, or information contained within the body of this dictation, please contact the provider for clarification.

## 2023-09-19 ENCOUNTER — APPOINTMENT (OUTPATIENT)
Dept: CARDIAC REHAB | Facility: HOSPITAL | Age: 78
End: 2023-09-19
Payer: COMMERCIAL

## 2023-09-19 ENCOUNTER — CLINICAL SUPPORT (OUTPATIENT)
Dept: CARDIAC REHAB | Facility: HOSPITAL | Age: 78
End: 2023-09-19
Payer: COMMERCIAL

## 2023-09-19 DIAGNOSIS — Z95.5 STATUS POST INSERTION OF DRUG ELUTING CORONARY ARTERY STENT: Primary | ICD-10-CM

## 2023-09-19 PROCEDURE — 93798 PHYS/QHP OP CAR RHAB W/ECG: CPT

## 2023-09-20 ENCOUNTER — TELEPHONE (OUTPATIENT)
Dept: CARDIOLOGY CLINIC | Facility: CLINIC | Age: 78
End: 2023-09-20

## 2023-09-20 ENCOUNTER — TELEPHONE (OUTPATIENT)
Dept: FAMILY MEDICINE CLINIC | Facility: HOSPITAL | Age: 78
End: 2023-09-20

## 2023-09-20 NOTE — TELEPHONE ENCOUNTER
Spoke with pt and he is doing well. Wt is stable at 206 lbs. Denies any issues or symptoms of CHF. Next appt is not until Dec 2023. Verbally understood.

## 2023-09-20 NOTE — TELEPHONE ENCOUNTER
Please call and verify. He is asking for cymbalta which appears to have been stopped at one point. He appears to be on sertraline , a different antidepressant, that is on his list.     Normally it is one or the other.

## 2023-09-21 ENCOUNTER — CLINICAL SUPPORT (OUTPATIENT)
Dept: CARDIAC REHAB | Facility: HOSPITAL | Age: 78
End: 2023-09-21
Payer: COMMERCIAL

## 2023-09-21 ENCOUNTER — APPOINTMENT (OUTPATIENT)
Dept: CARDIAC REHAB | Facility: HOSPITAL | Age: 78
End: 2023-09-21
Payer: COMMERCIAL

## 2023-09-21 DIAGNOSIS — Z95.5 STATUS POST INSERTION OF DRUG ELUTING CORONARY ARTERY STENT: Primary | ICD-10-CM

## 2023-09-21 PROCEDURE — 93798 PHYS/QHP OP CAR RHAB W/ECG: CPT

## 2023-09-21 NOTE — TELEPHONE ENCOUNTER
Pt is taking the sertraline, he was unsure of the duloxetine since it was empty and there were no refills left on it. Pt does not need this refilled, he will continue to take the sertraline as prescribed.

## 2023-09-22 ENCOUNTER — APPOINTMENT (OUTPATIENT)
Dept: CARDIAC REHAB | Facility: HOSPITAL | Age: 78
End: 2023-09-22
Payer: COMMERCIAL

## 2023-09-24 DIAGNOSIS — E11.65 TYPE 2 DIABETES MELLITUS WITH HYPERGLYCEMIA, WITH LONG-TERM CURRENT USE OF INSULIN (HCC): ICD-10-CM

## 2023-09-24 DIAGNOSIS — I25.10 CAD (CORONARY ARTERY DISEASE): ICD-10-CM

## 2023-09-24 DIAGNOSIS — Z79.4 TYPE 2 DIABETES MELLITUS WITH HYPERGLYCEMIA, WITH LONG-TERM CURRENT USE OF INSULIN (HCC): ICD-10-CM

## 2023-09-25 RX ORDER — INSULIN ASPART 100 [IU]/ML
INJECTION, SOLUTION INTRAVENOUS; SUBCUTANEOUS
Qty: 36 ML | Refills: 2 | Status: SHIPPED | OUTPATIENT
Start: 2023-09-25

## 2023-09-26 ENCOUNTER — APPOINTMENT (OUTPATIENT)
Dept: CARDIAC REHAB | Facility: HOSPITAL | Age: 78
End: 2023-09-26
Payer: COMMERCIAL

## 2023-09-26 ENCOUNTER — TELEPHONE (OUTPATIENT)
Dept: CARDIOLOGY CLINIC | Facility: CLINIC | Age: 78
End: 2023-09-26

## 2023-09-27 RX ORDER — ATORVASTATIN CALCIUM 40 MG/1
40 TABLET, FILM COATED ORAL
Qty: 30 TABLET | Refills: 0 | Status: SHIPPED | OUTPATIENT
Start: 2023-09-27

## 2023-09-28 ENCOUNTER — APPOINTMENT (OUTPATIENT)
Dept: CARDIAC REHAB | Facility: HOSPITAL | Age: 78
End: 2023-09-28
Payer: COMMERCIAL

## 2023-09-29 ENCOUNTER — APPOINTMENT (OUTPATIENT)
Dept: CARDIAC REHAB | Facility: HOSPITAL | Age: 78
End: 2023-09-29
Payer: COMMERCIAL

## 2023-10-03 ENCOUNTER — CLINICAL SUPPORT (OUTPATIENT)
Dept: CARDIAC REHAB | Facility: HOSPITAL | Age: 78
End: 2023-10-03
Payer: COMMERCIAL

## 2023-10-03 DIAGNOSIS — Z95.5 STATUS POST INSERTION OF DRUG ELUTING CORONARY ARTERY STENT: Primary | ICD-10-CM

## 2023-10-03 PROCEDURE — 93798 PHYS/QHP OP CAR RHAB W/ECG: CPT

## 2023-10-03 NOTE — PROGRESS NOTES
Cardiac Rehabilitation Plan of Care   30 Day Reassessment          Today's date: 10/3/2023   # of Exercise Sessions Completed: 5  Patient name: Thierry Matthews      : 1945  Age: 69 y.o. MRN: [id number]  Referring Physician: Dr. Yong Jeter  Cardiologist: Dr. Yong Jeter  Provider: Deanna Talbert  Clinician: Malathi Gonzalez MS, CEP      Dx:   Encounter Diagnosis   Name Primary? • Status post insertion of drug eluting coronary artery stent Yes     Date of onset: 2023      SUMMARY OF PROGRESS:  Mr. Nicole Lindquist has started his cardiac rehabilitation program after recent hospitalization for CHF and stenting procedure. He reports he is feeling well overall since hospitalization. He does notice weakness, especially in his legs. He is using cane for stability when walking. He reports he had a fall last week at home. He is 100% compliant with his medications. He hs returned to rehab this week after recovering from fall. He has attended 5 sessions of his rehab program over the past 30 days. He is currently completing 25-30 min of aerobic exercise at 2.1 METs. He is gradually increasing exercise times as he is able to tolerated. He fatigues easily with exercise. Will continue to progress exercise times and intensities over next 30 days of rehab. His goals for his rehab program are to increase overall strength and endurance with regular exercise, improve balance by increasing leg strength, and increase walking distance and speed. He reports he is making slow progress at 30 days, but is doing his best to get better. He feels he is doing well following a low fat, low salt diet. He is also IDDM and has dexcom . He feels DM is well controlled most of the time. Will plan to check blood glucose pre and post exercise sessions. He denies feelings of depression (PHQ-9=2) or anxiety (JANES-7=0). He reports he has excellent support from his wife and family at this time.  Will no need to reassess PHQ-9 at 30 days due to no concerns with depression or anxiety. He is not currently allowed to drive and is awaiting clearance to resume more independence. He is in agreement to continue to attend cardiac rehab sessions 2x/week for 12 weeks, or up to 36 sessions. He will plan to participate in weekly education sessions on cardiac risk factor management.        Medication compliance: Yes   Comments: Pt reports to be compliant with medications  Fall Risk: Moderate   Comments: Patient uses walking assist device (walker/cane/rollator)    EKG Interpretation: NSR      EXERCISE ASSESSMENT and PLAN    Exercise Prescription:      Frequency: 2 days/week   Supplement with home exercise 2+ days/wk as tolerated       Minutes: 30-50         METS: 2.0-3.5            HR: 30 beats above resting   RPE: 4-6         Modalities: UBE, Lifecycle, NuStep, Recumbent bike and Room walking      30 Day Goals for Exercise Progression:    Frequency: 2 days/week of cardiac rehab       Supplement with home exercise 2+ days/wk as tolerated    Minutes: 30-40                              >150 mins/wk of moderate intensity exercise   METS: 2.0-2.5   HR: 30 beats above resting    RPE: 4-6   Modalities: UBE, NuStep, Recumbent bike and Room walking    Strength trainin-3 days / week  12-15 repetitions  1-2 sets per modality   Will be added following 2-3 weeks of monitored exercise sessions   Modalities: Pull Downs, Lateral Raise, Arm Extension, Arm Curl, Sit to [employer] Exercise: none    Goals: 10% improvement in functional capacity - based on max METs achieved in fitness assessment, improved DASI score by 10%, Increase in exercise capacity by 40% - based on peak METs tolerated in cardiac rehab exercise session, Exercise 5 days/wk, >150mins/wk of moderate intensity exercise, Resume ADLs with increased strength, Attend Rehab regularly, Decrease sitting time and Start a walking program    Progression Toward Goals:  Pt is progressing and showing improvement  toward the following goals:  gradually increasing exercise times as he tolerates, has missed several sessions due to not feeling well and having recent fall, will continue to progress over next 30 days. Education: benefit of exercise for CAD risk factors, home exercise guidelines, AHA guidelines to achieve >150 mins/wk of moderate exercise and RPE scale   Plan:education on home exercise guidelines, home exercise 30+ mins 2 days opposite CR and Education class: Risk Factors for Heart Disease  Readiness to change: Preparation:  (Getting ready to change)       NUTRITION ASSESSMENT AND PLAN    Weight control:    Starting weight: 216.2 lb   Current weight: 216 lb    Diabetes: T2D, on Insulin   A1c: 7.6    last measured: 1/6/2023    Lipid management: Discussed diet and lipid management and Last lipid profile 1/6/2023  Chol 177    HDL 33      Goals:LDL <100, HDL >40, TRG <150 and CHOL <200    Measurable goals were based Rate Your Plate Dietary Self-Assessment. These are the areas in which the patient could score higher on the assessment. Goals include recommendations for a heart healthy diet based on American Heart Association. Progression Toward Goals: Pt is progressing and showing improvement  toward the following goals:  reports trying to make healthier choices eating and decreasing fat and salt intake.         Education: heart healthy eating  low sodium diet  hydration  nutrition for  lipid management  Plan: Education class: Reading Food Labels and Education Class: Heart Healthy Eating  Readiness to change: Action:  (Changing behavior)      PSYCHOSOCIAL ASSESSMENT AND PLAN    Emotional:  Depression assessment:  PHQ-9 = 2  1-4 = Minimal Depression            Anxiety measure:  JANES-7 = 1  0-4  = Not anxious  Self-reported stress level:  4  Social support: Excellent and Patient reports excellent emotional/social support from wife and family    Goals:  Physical Fitness in [facility] Score < 3, Daily Activity in [place] Score < 3, Pain in [place] Score < 3, Overall Health in [place] Score < 3 and increased energy    Progression Toward Goals: Pt is progressing and showing improvement  toward the following goals:  reprots no concerns with depression or anxiety at this time. Has great family support. Education: signs/sxs of depression, benefits of a positive support system, stress management techniques and depression and CAD  Plan: Class: Stress and Your Health and Class: Relaxation  Readiness to change: Action:  (Changing behavior)      OTHER CORE COMPONENTS     Tobacco:   Social History     Tobacco Use   Smoking Status Former   • Packs/day: 1.00   • Years: 30.00   • Total pack years: 30.00   • Types: Cigarettes   • Start date:    • Quit date: 12   • Years since quittin.7   Smokeless Tobacco Never       Tobacco Use Intervention:   N/A:  Patient is a non-smoker     Anginal Symptoms:  None   NTG use: No prescription    Blood pressure:    Restin//70   Exercise: 132//82    Goals: consistent BP < 130/80, reduced dietary sodium <2300mg, moderate intensity exercise >150 mins/wk and medication compliance    Progression Toward Goals: Pt is progressing and showing improvement  toward the following goals:  BP is normally within normal resting limits, has normal hemodynamic response to exercise, decreasing salt intake and increasing exercise minutes weekly to help manage BP.       Education:  understanding high blood pressure and it's relationship to CAD and low sodium diet and HTN  Plan: Class: Understanding Heart Disease and Class: Common Heart Medications  Readiness to change: Action:  (Changing behavior)

## 2023-10-04 DIAGNOSIS — E11.49 TYPE 2 DIABETES MELLITUS WITH OTHER NEUROLOGIC COMPLICATION, WITH LONG-TERM CURRENT USE OF INSULIN (HCC): ICD-10-CM

## 2023-10-04 DIAGNOSIS — Z79.4 TYPE 2 DIABETES MELLITUS WITH OTHER NEUROLOGIC COMPLICATION, WITH LONG-TERM CURRENT USE OF INSULIN (HCC): ICD-10-CM

## 2023-10-05 ENCOUNTER — CLINICAL SUPPORT (OUTPATIENT)
Dept: CARDIAC REHAB | Facility: HOSPITAL | Age: 78
End: 2023-10-05
Payer: COMMERCIAL

## 2023-10-05 ENCOUNTER — TELEPHONE (OUTPATIENT)
Dept: ENDOCRINOLOGY | Facility: CLINIC | Age: 78
End: 2023-10-05

## 2023-10-05 DIAGNOSIS — E11.49 OTHER DIABETIC NEUROLOGICAL COMPLICATION ASSOCIATED WITH TYPE 2 DIABETES MELLITUS (HCC): ICD-10-CM

## 2023-10-05 DIAGNOSIS — Z95.5 STATUS POST INSERTION OF DRUG ELUTING CORONARY ARTERY STENT: Primary | ICD-10-CM

## 2023-10-05 PROCEDURE — 93798 PHYS/QHP OP CAR RHAB W/ECG: CPT

## 2023-10-05 RX ORDER — PEN NEEDLE, DIABETIC 32GX 5/32"
NEEDLE, DISPOSABLE MISCELLANEOUS
Qty: 1000 EACH | Refills: 1 | Status: SHIPPED | OUTPATIENT
Start: 2023-10-05

## 2023-10-05 NOTE — TELEPHONE ENCOUNTER
It looks like we may not have an updated dexcom data. If we can ask the patient for updated glucose data, that would be helpful to assess how things are going after his appointment and changes made.

## 2023-10-05 NOTE — PROGRESS NOTES
Exercise session details     See attached rhythm strips - (page 2) - Patient resting and going in and out of afib. Goes back to the Sinus with exertion but still has small bursts of Afib as well. Asymptomatic. Patient complaint with medications.  Just BEREKET

## 2023-10-06 DIAGNOSIS — E11.49 OTHER DIABETIC NEUROLOGICAL COMPLICATION ASSOCIATED WITH TYPE 2 DIABETES MELLITUS (HCC): ICD-10-CM

## 2023-10-06 RX ORDER — PROCHLORPERAZINE 25 MG/1
SUPPOSITORY RECTAL
Qty: 1 EACH | Refills: 3 | Status: SHIPPED | OUTPATIENT
Start: 2023-10-06

## 2023-10-10 ENCOUNTER — CLINICAL SUPPORT (OUTPATIENT)
Dept: CARDIAC REHAB | Facility: HOSPITAL | Age: 78
End: 2023-10-10
Payer: COMMERCIAL

## 2023-10-10 DIAGNOSIS — Z95.5 STATUS POST INSERTION OF DRUG ELUTING CORONARY ARTERY STENT: Primary | ICD-10-CM

## 2023-10-10 PROCEDURE — 93798 PHYS/QHP OP CAR RHAB W/ECG: CPT

## 2023-10-12 ENCOUNTER — CLINICAL SUPPORT (OUTPATIENT)
Dept: CARDIAC REHAB | Facility: HOSPITAL | Age: 78
End: 2023-10-12
Payer: COMMERCIAL

## 2023-10-12 DIAGNOSIS — Z95.5 STATUS POST INSERTION OF DRUG ELUTING CORONARY ARTERY STENT: Primary | ICD-10-CM

## 2023-10-12 PROCEDURE — 93798 PHYS/QHP OP CAR RHAB W/ECG: CPT

## 2023-10-13 ENCOUNTER — OFFICE VISIT (OUTPATIENT)
Dept: FAMILY MEDICINE CLINIC | Facility: HOSPITAL | Age: 78
End: 2023-10-13
Payer: COMMERCIAL

## 2023-10-13 VITALS
HEART RATE: 84 BPM | WEIGHT: 214.8 LBS | BODY MASS INDEX: 29.09 KG/M2 | TEMPERATURE: 98 F | HEIGHT: 72 IN | SYSTOLIC BLOOD PRESSURE: 150 MMHG | DIASTOLIC BLOOD PRESSURE: 78 MMHG

## 2023-10-13 DIAGNOSIS — Z23 ENCOUNTER FOR IMMUNIZATION: ICD-10-CM

## 2023-10-13 DIAGNOSIS — I50.33 ACUTE ON CHRONIC DIASTOLIC HF (HEART FAILURE) (HCC): ICD-10-CM

## 2023-10-13 DIAGNOSIS — J96.21 ACUTE ON CHRONIC RESPIRATORY FAILURE WITH HYPOXIA (HCC): ICD-10-CM

## 2023-10-13 DIAGNOSIS — D64.9 ACUTE ANEMIA: ICD-10-CM

## 2023-10-13 DIAGNOSIS — E11.49 OTHER DIABETIC NEUROLOGICAL COMPLICATION ASSOCIATED WITH TYPE 2 DIABETES MELLITUS (HCC): ICD-10-CM

## 2023-10-13 DIAGNOSIS — I25.10 CAD (CORONARY ARTERY DISEASE): ICD-10-CM

## 2023-10-13 DIAGNOSIS — E11.42 TYPE 2 DIABETES MELLITUS WITH DIABETIC POLYNEUROPATHY, WITH LONG-TERM CURRENT USE OF INSULIN (HCC): ICD-10-CM

## 2023-10-13 DIAGNOSIS — Z79.4 TYPE 2 DIABETES MELLITUS WITH DIABETIC POLYNEUROPATHY, WITH LONG-TERM CURRENT USE OF INSULIN (HCC): ICD-10-CM

## 2023-10-13 DIAGNOSIS — Z00.00 MEDICARE ANNUAL WELLNESS VISIT, SUBSEQUENT: Primary | ICD-10-CM

## 2023-10-13 PROCEDURE — G0439 PPPS, SUBSEQ VISIT: HCPCS | Performed by: FAMILY MEDICINE

## 2023-10-13 PROCEDURE — G0009 ADMIN PNEUMOCOCCAL VACCINE: HCPCS

## 2023-10-13 PROCEDURE — 90677 PCV20 VACCINE IM: CPT

## 2023-10-13 PROCEDURE — 99214 OFFICE O/P EST MOD 30 MIN: CPT | Performed by: FAMILY MEDICINE

## 2023-10-13 RX ORDER — FUROSEMIDE 40 MG/1
40 TABLET ORAL DAILY
Qty: 30 TABLET | Refills: 5 | Status: SHIPPED | OUTPATIENT
Start: 2023-10-13 | End: 2024-04-10

## 2023-10-13 RX ORDER — PROCHLORPERAZINE 25 MG/1
SUPPOSITORY RECTAL
Qty: 1 EACH | Refills: 3 | Status: SHIPPED | OUTPATIENT
Start: 2023-10-13

## 2023-10-13 RX ORDER — CLOPIDOGREL BISULFATE 75 MG/1
75 TABLET ORAL DAILY
Qty: 30 TABLET | Refills: 11 | Status: SHIPPED | OUTPATIENT
Start: 2023-10-13

## 2023-10-13 RX ORDER — FERROUS SULFATE 325(65) MG
325 TABLET ORAL
Qty: 30 TABLET | Refills: 5 | Status: SHIPPED | OUTPATIENT
Start: 2023-10-13 | End: 2024-04-10

## 2023-10-13 RX ORDER — PROCHLORPERAZINE 25 MG/1
SUPPOSITORY RECTAL
Qty: 9 EACH | Refills: 3 | Status: SHIPPED | OUTPATIENT
Start: 2023-10-13

## 2023-10-13 NOTE — PATIENT INSTRUCTIONS
Medicare Preventive Visit Patient Instructions  Thank you for completing your Welcome to Medicare Visit or Medicare Annual Wellness Visit today. Your next wellness visit will be due in one year (10/13/2024). The screening/preventive services that you may require over the next 5-10 years are detailed below. Some tests may not apply to you based off risk factors and/or age. Screening tests ordered at today's visit but not completed yet may show as past due. Also, please note that scanned in results may not display below. Preventive Screenings:  Service Recommendations Previous Testing/Comments   Colorectal Cancer Screening  Colonoscopy    Fecal Occult Blood Test (FOBT)/Fecal Immunochemical Test (FIT)  Fecal DNA/Cologuard Test  Flexible Sigmoidoscopy Age: 43-73 years old   Colonoscopy: every 10 years (May be performed more frequently if at higher risk)  OR  FOBT/FIT: every 1 year  OR  Cologuard: every 3 years  OR  Sigmoidoscopy: every 5 years  Screening may be recommended earlier than age 39 if at higher risk for colorectal cancer. Also, an individualized decision between you and your healthcare provider will decide whether screening between the ages of 77-80 would be appropriate. Colonoscopy: 08/10/2023  FOBT/FIT: Not on file  Cologuard: Not on file  Sigmoidoscopy: Not on file          Prostate Cancer Screening Individualized decision between patient and health care provider in men between ages of 53-66   Medicare will cover every 12 months beginning on the day after your 50th birthday PSA: No results in last 5 years           Hepatitis C Screening Once for adults born between [birth year] and 1965  More frequently in patients at high risk for Hepatitis C Hep C Antibody: Not on file        Diabetes Screening 1-2 times per year if you're at risk for diabetes or have pre-diabetes Fasting glucose: 91 mg/dL (9/1/2023)  A1C: 6.5 (9/13/2023)      Cholesterol Screening Once every 5 years if you don't have a lipid disorder.  May order more often based on risk factors. Lipid panel: 01/06/2023         Other Preventive Screenings Covered by Medicare:  Abdominal Aortic Aneurysm (AAA) Screening: covered once if your at risk. You're considered to be at risk if you have a family history of AAA or a male between the age of 70-76 who smoking at least 100 cigarettes in your lifetime. Lung Cancer Screening: covers low dose CT scan once per year if you meet all of the following conditions: (1) Age 48-67; (2) No signs or symptoms of lung cancer; (3) Current smoker or have quit smoking within the last 15 years; (4) You have a tobacco smoking history of at least 20 pack years (packs per day x number of years you smoked); (5) You get a written order from a healthcare provider. Glaucoma Screening: covered annually if you're considered high risk: (1) You have diabetes OR (2) Family history of glaucoma OR (3)  aged 48 and older OR (3)  American aged 72 and older  Osteoporosis Screening: covered every 2 years if you meet one of the following conditions: (1) Have a vertebral abnormality; (2) On glucocorticoid therapy for more than 3 months; (3) Have primary hyperparathyroidism; (4) On osteoporosis medications and need to assess response to drug therapy. HIV Screening: covered annually if you're between the age of 14-79. Also covered annually if you are younger than 13 and older than 72 with risk factors for HIV infection. For pregnant patients, it is covered up to 3 times per pregnancy.     Immunizations:  Immunization Recommendations   Influenza Vaccine Annual influenza vaccination during flu season is recommended for all persons aged >= 6 months who do not have contraindications   Pneumococcal Vaccine   * Pneumococcal conjugate vaccine = PCV13 (Prevnar 13), PCV15 (Vaxneuvance), PCV20 (Prevnar 20)  * Pneumococcal polysaccharide vaccine = PPSV23 (Pneumovax) Adults 74-95 yo with certain risk factors or if 69+ yo  If never received any pneumonia vaccine: recommend Prevnar 21 (PCV20)  Give PCV20 if previously received 1 dose of PCV13 or PPSV23   Hepatitis B Vaccine 3 dose series if at intermediate or high risk (ex: diabetes, end stage renal disease, liver disease)   Respiratory syncytial virus (RSV) Vaccine - COVERED BY MEDICARE PART D  * RSVPreF3 (Arexvy) CDC recommends that adults 61years of age and older may receive a single dose of RSV vaccine using shared clinical decision-making (SCDM)   Tetanus (Td) Vaccine - COST NOT COVERED BY MEDICARE PART B Following completion of primary series, a booster dose should be given every 10 years to maintain immunity against tetanus. Td may also be given as tetanus wound prophylaxis. Tdap Vaccine - COST NOT COVERED BY MEDICARE PART B Recommended at least once for all adults. For pregnant patients, recommended with each pregnancy. Shingles Vaccine (Shingrix) - COST NOT COVERED BY MEDICARE PART B  2 shot series recommended in those 19 years and older who have or will have weakened immune systems or those 50 years and older     Health Maintenance Due:      Topic Date Due   • Hepatitis C Screening  Never done   • Colorectal Cancer Screening  Discontinued     Immunizations Due:      Topic Date Due   • Pneumococcal Vaccine: 65+ Years (1 - PCV) 03/27/1951   • COVID-19 Vaccine (5 - Pfizer series) 11/30/2022     Advance Directives   What are advance directives? Advance directives are legal documents that state your wishes and plans for medical care. These plans are made ahead of time in case you lose your ability to make decisions for yourself. Advance directives can apply to any medical decision, such as the treatments you want, and if you want to donate organs. What are the types of advance directives? There are many types of advance directives, and each state has rules about how to use them. You may choose a combination of any of the following:  Living will:   This is a written record of the treatment you want. You can also choose which treatments you do not want, which to limit, and which to stop at a certain time. This includes surgery, medicine, IV fluid, and tube feedings. Durable power of  for healthcare [facility]): This is a written record that states who you want to make healthcare choices for you when you are unable to make them for yourself. This person, called a proxy, is usually a family member or a friend. You may choose more than 1 proxy. Do not resuscitate (DNR) order:  A DNR order is used in case your heart stops beating or you stop breathing. It is a request not to have certain forms of treatment, such as CPR. A DNR order may be included in other types of advance directives. Medical directive: This covers the care that you want if you are in a coma, near death, or unable to make decisions for yourself. You can list the treatments you want for each condition. Treatment may include pain medicine, surgery, blood transfusions, dialysis, IV or tube feedings, and a ventilator (breathing machine). Values history: This document has questions about your views, beliefs, and how you feel and think about life. This information can help others choose the care that you would choose. Why are advance directives important? An advance directive helps you control your care. Although spoken wishes may be used, it is better to have your wishes written down. Spoken wishes can be misunderstood, or not followed. Treatments may be given even if you do not want them. An advance directive may make it easier for your family to make difficult choices about your care. Weight Management   Why it is important to manage your weight:  Being overweight increases your risk of health conditions such as heart disease, high blood pressure, type 2 diabetes, and certain types of cancer. It can also increase your risk for osteoarthritis, sleep apnea, and other respiratory problems. Aim for a slow, steady weight loss.  Even a small amount of weight loss can lower your risk of health problems. How to lose weight safely:  A safe and healthy way to lose weight is to eat fewer calories and get regular exercise. You can lose up about 1 pound a week by decreasing the number of calories you eat by 500 calories each day. Healthy meal plan for weight management:  A healthy meal plan includes a variety of foods, contains fewer calories, and helps you stay healthy. A healthy meal plan includes the following:  Eat whole-grain foods more often. A healthy meal plan should contain fiber. Fiber is the part of grains, fruits, and vegetables that is not broken down by your body. Whole-grain foods are healthy and provide extra fiber in your diet. Some examples of whole-grain foods are whole-wheat breads and pastas, oatmeal, brown rice, and bulgur. Eat a variety of vegetables every day. Include dark, leafy greens such as spinach, kale, jewels greens, and mustard greens. Eat yellow and orange vegetables such as carrots, sweet potatoes, and winter squash. Eat a variety of fruits every day. Choose fresh or canned fruit (canned in its own juice or light syrup) instead of juice. Fruit juice has very little or no fiber. Eat low-fat dairy foods. Drink fat-free (skim) milk or 1% milk. Eat fat-free yogurt and low-fat cottage cheese. Try low-fat cheeses such as mozzarella and other reduced-fat cheeses. Choose meat and other protein foods that are low in fat. Choose beans or other legumes such as split peas or lentils. Choose fish, skinless poultry (chicken or turkey), or lean cuts of red meat (beef or pork). Before you cook meat or poultry, cut off any visible fat. Use less fat and oil. Try baking foods instead of frying them. Add less fat, such as margarine, sour cream, regular salad dressing and mayonnaise to foods. Eat fewer high-fat foods. Some examples of high-fat foods include french fries, doughnuts, ice cream, and cakes. Eat fewer sweets. Limit foods and drinks that are high in sugar. This includes candy, cookies, regular soda, and sweetened drinks. Exercise:  Exercise at least 30 minutes per day on most days of the week. Some examples of exercise include walking, biking, dancing, and swimming. You can also fit in more physical activity by taking the stairs instead of the elevator or parking farther away from stores. Ask your healthcare provider about the best exercise plan for you. © Copyright [address] 2018 Information is for End User's use only and may not be sold, redistributed or otherwise used for commercial purposes.  All illustrations and images included in CareNotes® are the copyrighted property of [employer] or  Daniel

## 2023-10-13 NOTE — PROGRESS NOTES
Assessment and Plan:     Problem List Items Addressed This Visit        Endocrine    Type 2 diabetes mellitus with neurologic complication, with long-term current use of insulin ([address])    Relevant Orders    Ambulatory Referral to Podiatry    Albumin / creatinine urine ratio       Cardiovascular and Mediastinum    Acute on chronic diastolic HF (heart failure) (HCC)    Relevant Medications    furosemide (LASIX) 40 mg tablet    clopidogrel (PLAVIX) 75 mg tablet    CAD (coronary artery disease)    Relevant Medications    clopidogrel (PLAVIX) 75 mg tablet   Other Visit Diagnoses     Medicare annual wellness visit, subsequent    -  Primary    Encounter for immunization        Relevant Orders    Pneumococcal Conjugate Vaccine 20-valent (Pcv20) (Completed)    Other diabetic neurological complication associated with type 2 diabetes mellitus (HCC)        Relevant Medications    Continuous Blood Gluc Sensor (Dexcom G6 Sensor) MISC    Continuous Blood Gluc Transmit (Dexcom G6 Transmitter) MISC    Acute anemia        Relevant Medications    ferrous sulfate 325 (65 Fe) mg tablet    Acute on chronic respiratory failure with hypoxia (HCC)        Relevant Medications    furosemide (LASIX) 40 mg tablet      Fransico Morales is doing relatively well. He has ongoing fu with oncology and cardiology for liver ca and cad. No new concerns. Diabetes. Stable. Also following with Endo. No med changes made. Refills sent. Depression Screening and Follow-up Plan: Patient was screened for depression during today's encounter. They screened negative with a PHQ-2 score of 0. Preventive health issues were discussed with patient, and age appropriate screening tests were ordered as noted in patient's After Visit Summary. Personalized health advice and appropriate referrals for health education or preventive services given if needed, as noted in patient's After Visit Summary.      History of Present Illness:     Patient presents for a Medicare Wellness Visit    Ngozi Coat is here for fu of chronic conditions. Working with Oncology regareding liver Ca. Has been tolerating treatments well. Has been going to cardiac rehab and doing well. Ongoing fu with cASrdiology. Diabetes. Stable. No issues with hypoglycemia. Doing well with regimen. No new concerns today. Patient Care Team:  J Luis Daley MD as PCP - General (Family Medicine)  Eleni Carrillo DO as PCP - Endocrinology (Endocrinology)  Debra Oakley MD (Surgical Oncology)  Kajal Nguyen MD (Radiation Oncology)  Michelle Ambrosio, JACKIE as Nurse Navigator (Oncology)  Evaristo Dennis as Care Coordinator (Oncology)  Bailey Mccormick RN as Registered Nurse (Cardiology)  Willard Pederson MD as Consulting Physician (Radiation Oncology)     Review of Systems:     Review of Systems   Constitutional: Negative. Negative for activity change, appetite change, chills and diaphoresis. HENT:  Negative for congestion and dental problem. Respiratory: Negative. Negative for apnea, chest tightness, shortness of breath and wheezing. Cardiovascular: Negative. Negative for chest pain, palpitations and leg swelling. Gastrointestinal: Negative. Negative for abdominal distention, abdominal pain, constipation, diarrhea and nausea. Genitourinary: Negative. Negative for difficulty urinating, dysuria and frequency.         Problem List:     Patient Active Problem List   Diagnosis   • Stage 3 chronic kidney disease, unspecified whether stage 3a or 3b CKD (HCC)   • Peripheral polyneuropathy   • Type 2 diabetes mellitus with neurologic complication, with long-term current use of insulin (HCC)   • Atrial fibrillation, unspecified type ([address])   • Liver mass   • H/O asbestos exposure   • History of tobacco abuse   • Restrictive lung disease   • Other emphysema (HCC)   • Syncope   • Anemia   • Acute on chronic diastolic HF (heart failure) (HCC)   • History of DVT (deep vein thrombosis) • CAD (coronary artery disease)   • Hepatic flexure mass   • Hepatocellular carcinoma (HCC)   • Coronary artery disease involving native coronary artery   • Status post insertion of drug eluting coronary artery stent      Past Medical and Surgical History:     Past Medical History:   Diagnosis Date   • Atrial fibrillation (HCC)     Eliquis   • AVB (atrioventricular block)     1st degree   • CAD (coronary artery disease)    • CKD (chronic kidney disease), stage III (HCC)     baseline Cr 1.2-1.6   • Diabetes mellitus (HCC)     type 2, insulin dependent   • Diverticulosis    • Emphysema lung (HCC)    • Former tobacco use    • History of asbestos exposure    • History of DVT (deep vein thrombosis)     RLE, tx w/ AC   • History of GI bleed 08/2023    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, given PRBC/Venofer for anemia while in house   • Hyperlipidemia    • Hypertension    • LAFB (left anterior fascicular block)    • Liver cancer ([address])    • Liver mass 08/2023    suspected HCC, needs radioembolization   • RBBB      Past Surgical History:   Procedure Laterality Date   • APPENDECTOMY     • BOWEL RESECTION  2014   • CARDIAC CATHETERIZATION     • CARDIAC CATHETERIZATION N/A 8/25/2023    Procedure: Cardiac pci;  Surgeon: Chano Jensen DO;  Location: [facility]; Service: Cardiology   • CARDIAC CATHETERIZATION N/A 8/21/2023    Procedure: Cardiac catheterization;  Surgeon: Chano Jensen DO;  Location: [facility]; Service: Cardiology   • CARDIAC CATHETERIZATION N/A 8/21/2023    Procedure: Cardiac Coronary Angiogram;  Surgeon: Chano Jensen DO;  Location: [facility];   Service: Cardiology   • CATARACT EXTRACTION Bilateral    • EGD     • IR Y-90 PRE-ANGIO/EMBO W/ LUNG SCAN  9/1/2023   • IR Y-90 RADIOEMBOLIZATION  9/8/2023      Family History:     Family History   Problem Relation Age of Onset   • Hypertension Mother    • Bone cancer Father    • Diabetes type II Sister    • Diabetes type II Sister    • Esophageal cancer Brother       Social History:     Social History     Socioeconomic History   • Marital status: /Civil Union     Spouse name: None   • Number of children: None   • Years of education: None   • Highest education level: None   Occupational History   • None   Tobacco Use   • Smoking status: Former     Packs/day: 1.00     Years: 30.00     Total pack years: 30.00     Types: Cigarettes     Start date:      Quit date:      Years since quittin.8   • Smokeless tobacco: Never   Vaping Use   • Vaping Use: Never used   Substance and Sexual Activity   • Alcohol use: Yes     Comment: Socially   • Drug use: Never   • Sexual activity: None   Other Topics Concern   • None   Social History Narrative   • None     Social Determinants of Health     Financial Resource Strain: Low Risk  (10/13/2023)    Overall Financial Resource Strain (CARDIA)    • Difficulty of Paying Living Expenses: Not hard at all   Food Insecurity: No Food Insecurity (2023)    Hunger Vital Sign    • Worried About Running Out of Food in the Last Year: Never true    • Ran Out of Food in the Last Year: Never true   Transportation Needs: No Transportation Needs (10/13/2023)    PRAPARE - Transportation    • Lack of Transportation (Medical): No    • Lack of Transportation (Non-Medical):  No   Physical Activity: Not on file   Stress: Not on file   Social Connections: Not on file   Intimate Partner Violence: Not on file   Housing Stability: Low Risk  (2023)    Housing Stability Vital Sign    • Unable to Pay for Housing in the Last Year: No    • Number of Places Lived in the Last Year: 1    • Unstable Housing in the Last Year: No      Medications and Allergies:     Current Outpatient Medications   Medication Sig Dispense Refill   • acetaminophen (TYLENOL) 650 mg CR tablet Take 650 mg by mouth every 8 (eight) hours as needed for mild pain     • atorvastatin (LIPITOR) 40 mg tablet Take 1 tablet (40 mg total) by mouth daily after dinner 30 tablet 0   • azelastine (ASTELIN) 0.1 % nasal spray 1 spray into each nostril 2 (two) times a day Use in each nostril as directed 1 mL 0   • clopidogrel (PLAVIX) 75 mg tablet Take 1 tablet (75 mg total) by mouth daily 30 tablet 11   • Continuous Blood Gluc  (Dexcom G6 ) NOHEMY Use with dexcom sensor 1 each 0   • Continuous Blood Gluc Sensor (Dexcom G6 Sensor) MISC Use daily as directed for CGM - Change every 10 days 9 each 3   • Continuous Blood Gluc Transmit (Dexcom G6 Transmitter) MISC Use daily as directed for CGM - Change every 3 months 1 each 3   • Continuous Blood Gluc Transmit (Dexcom G6 Transmitter) MISC Use daily as directed for CGM - Change every 3 months 1 each 3   • dulaglutide (Trulicity) 3.01 RT/1.0HQ injection Inject 0.5 mL (0.75 mg total) under the skin once a week 2 mL 3   • Eliquis 5 MG take 1 tablet by mouth twice a day 60 tablet 5   • Empagliflozin (Jardiance) 10 MG TABS tablet Take 1 tablet (10 mg total) by mouth every morning 90 tablet 0   • ferrous sulfate 325 (65 Fe) mg tablet Take 1 tablet (325 mg total) by mouth daily with breakfast 30 tablet 5   • furosemide (LASIX) 40 mg tablet Take 1 tablet (40 mg total) by mouth daily 30 tablet 5   • insulin detemir (Levemir FlexPen) 100 Units/mL injection pen Inject 65 Units under the skin daily at bedtime 30 mL 0   • Insulin Pen Needle (Droplet Pen Needles) 32G X 4 MM MISC use 1 PEN NEEDLE to inject MEDICATION subcutaneously four times a day 1000 each 1   • metoprolol succinate (TOPROL-XL) 50 mg 24 hr tablet Take 0.5 tablets (25 mg total) by mouth daily 30 tablet 6   • mometasone (ELOCON) 0.1 % cream Apply topically daily for 7 days 15 g 0   • Multiple Vitamin (MULTIVITAMIN ADULT PO) Take 1 tablet by mouth daily     • NovoLOG FlexPen 100 units/mL injection pen INJECT 40 UNITS UNDER THE SKIN 3 TIMES DAILY WITH MEALS 36 mL 2   • pantoprazole (PROTONIX) 40 mg tablet Take 1 tablet (40 mg total) by mouth 2 (two) times a day (Patient taking differently: Take 40 mg by mouth daily) 30 tablet 0   • Potassium Citrate ER 15 MEQ (1620 MG) TBCR Take 1 tablet by mouth 2 (two) times a day     • pregabalin (LYRICA) 100 mg capsule take 1 capsule by mouth three times a day 90 capsule 2   • sertraline (ZOLOFT) 25 mg tablet Take 25 mg by mouth daily     • SUPER B COMPLEX/C PO Take 1 tablet by mouth daily     • tamsulosin (FLOMAX) 0.4 mg Take 0.4 mg by mouth daily with dinner     • traZODone (DESYREL) 100 mg tablet take 2 tablets by mouth at bedtime 180 tablet 0   • umeclidinium-vilanterol 62.5-25 mcg/actuation inhaler Inhale 1 puff daily 60 blister 5   • Blood Glucose Monitoring Suppl (OneTouch Verio) w/Device KIT Use 4 (four) times a day (Patient not taking: Reported on 10/13/2023) 1 kit 0   • glucose blood (OneTouch Verio) test strip Use as instructed (Patient not taking: Reported on 10/13/2023) 100 strip 2     No current facility-administered medications for this visit. No Known Allergies   Immunizations:     Immunization History   Administered Date(s) Administered   • COVID-19 Pfizer vac (Dane-sucrose, gray cap) 12 yr+ IM 02/20/2021, 03/13/2021, 10/02/2021, 10/05/2022   • INFLUENZA 10/22/2022, 10/11/2023   • Pneumococcal 20-valent Conjugate (Historical) 10/22/2022   • Pneumococcal Conjugate Vaccine 20-valent (Pcv20), Polysace 10/13/2023      Health Maintenance:         Topic Date Due   • Hepatitis C Screening  Never done   • Colorectal Cancer Screening  Discontinued         Topic Date Due   • COVID-19 Vaccine (5 - Pfizer series) 11/30/2022      Medicare Screening Tests and Risk Assessments:     Michelle Julio is here for his Subsequent Wellness visit. Health Risk Assessment:   Patient rates overall health as good. Patient feels that their physical health rating is slightly worse. Patient is satisfied with their life. Eyesight was rated as same. Hearing was rated as same.  Patient feels that their emotional and mental health rating is same. Patients states they are never, rarely angry. Patient states they are sometimes unusually tired/fatigued. Pain experienced in the last 7 days has been some. Patient's pain rating has been 5/10. Patient states that he has experienced no weight loss or gain in last 6 months. Depression Screening:   PHQ-2 Score: 0      Fall Risk Screening: In the past year, patient has experienced: history of falling in past year    Number of falls: 2 or more  Injured during fall?: Yes      Home Safety:  Patient does not have trouble with stairs inside or outside of their home. Patient has working smoke alarms and has working carbon monoxide detector. Home safety hazards include: none. Nutrition:   Current diet is Diabetic. Medications:   Patient is currently taking over-the-counter supplements. OTC medications include: see medication list. Patient is able to manage medications. Activities of Daily Living (ADLs)/Instrumental Activities of Daily Living (IADLs):   Walk and transfer into and out of bed and chair?: Yes  Dress and groom yourself?: Yes    Bathe or shower yourself?: Yes    Feed yourself? Yes  Do your laundry/housekeeping?: Yes  Manage your money, pay your bills and track your expenses?: Yes  Make your own meals?: Yes    Do your own shopping?: Yes    Previous Hospitalizations:   Any hospitalizations or ED visits within the last 12 months?: Yes    How many hospitalizations have you had in the last year?: 1-2    Advance Care Planning:   Living will: Yes    Durable POA for healthcare:  Yes    Advanced directive: Yes      Cognitive Screening:   Provider or family/friend/caregiver concerned regarding cognition?: No    PREVENTIVE SCREENINGS      Cardiovascular Screening:    General: Screening Not Indicated and History Lipid Disorder      Diabetes Screening:     General: Screening Not Indicated and History Diabetes      Colorectal Cancer Screening:     General: Screening Current      Prostate Cancer Screening:    General: Screening Current      Osteoporosis Screening:    General: Screening Not Indicated      Abdominal Aortic Aneurysm (AAA) Screening:    Risk factors include: tobacco use        General: Screening Not Indicated      Lung Cancer Screening:     General: Screening Not Indicated      Hepatitis C Screening:    General: Screening Not Indicated    Screening, Brief Intervention, and Referral to Treatment (SBIRT)    Screening  Typical number of drinks in a day: 0  Typical number of drinks in a week: 0  Interpretation: Low risk drinking behavior. AUDIT-C Screenin) How often did you have a drink containing alcohol in the past year? monthly or less  2) How many drinks did you have on a typical day when you were drinking in the past year? 1 to 2  3) How often did you have 6 or more drinks on one occasion in the past year? never    AUDIT-C Score: 1  Interpretation: Score 0-3 (male): Negative screen for alcohol misuse    Single Item Drug Screening:  How often have you used an illegal drug (including marijuana) or a prescription medication for non-medical reasons in the past year? never    Single Item Drug Screen Score: 0  Interpretation: Negative screen for possible drug use disorder    Vision Screening    Right eye Left eye Both eyes   Without correction 20/40 20/50 20/40   With correction           Physical Exam:     /78   Pulse 84   Temp 98 °F (36.7 °C)   Ht 6' (1.829 m)   Wt 97.4 kg (214 lb 12.8 oz)   BMI 29.13 kg/m²     Physical Exam  Vitals and nursing note reviewed. Constitutional:       General: He is not in acute distress. Appearance: He is well-developed. He is not ill-appearing. HENT:      Head: Normocephalic and atraumatic. Right Ear: Tympanic membrane, ear canal and external ear normal.      Left Ear: Tympanic membrane, ear canal and external ear normal.      Mouth/Throat:      Mouth: Mucous membranes are moist.      Pharynx: Oropharynx is clear.    Eyes: Extraocular Movements: Extraocular movements intact. Conjunctiva/sclera: Conjunctivae normal.      Pupils: Pupils are equal, round, and reactive to light. Cardiovascular:      Rate and Rhythm: Normal rate and regular rhythm. Pulses: no weak pulses          Dorsalis pedis pulses are 2+ on the right side and 2+ on the left side. Posterior tibial pulses are 2+ on the right side and 2+ on the left side. Heart sounds: Normal heart sounds. No murmur heard. Pulmonary:      Effort: Pulmonary effort is normal.      Breath sounds: Normal breath sounds. Abdominal:      General: Bowel sounds are normal. There is no distension. Palpations: Abdomen is soft. There is no mass. Tenderness: There is no abdominal tenderness. There is no guarding. Hernia: No hernia is present. Genitourinary:     Penis: Normal.    Musculoskeletal:         General: Normal range of motion. Cervical back: Normal range of motion and neck supple. Feet:      Right foot:      Skin integrity: Callus present. No ulcer, skin breakdown, erythema, warmth or dry skin. Left foot:      Skin integrity: Callus present. No ulcer, skin breakdown, erythema, warmth or dry skin. Skin:     General: Skin is warm and dry. Capillary Refill: Capillary refill takes less than 2 seconds. Neurological:      General: No focal deficit present. Mental Status: He is alert and oriented to person, place, and time. Psychiatric:         Mood and Affect: Mood normal.         Behavior: Behavior normal.        Diabetic Foot Exam    Patient's shoes and socks removed. Right Foot/Ankle   Right Foot Inspection  Skin Exam: skin normal, skin intact, callus and callus. No dry skin, no warmth, no erythema, no maceration, no abnormal color, no pre-ulcer and no ulcer. Toe Exam: ROM and strength within normal limits.      Sensory   Vibration: intact  Proprioception: intact  Monofilament testing: intact    Vascular  Capillary refills: < 3 seconds  The right DP pulse is 2+. The right PT pulse is 2+. Left Foot/Ankle  Left Foot Inspection  Skin Exam: skin normal, skin intact and callus. No dry skin, no warmth, no erythema, no maceration, normal color, no pre-ulcer and no ulcer. Toe Exam: ROM and strength within normal limits. Sensory   Vibration: intact  Proprioception: intact  Monofilament testing: intact    Vascular  Capillary refills: < 3 seconds  The left DP pulse is 2+. The left PT pulse is 2+.      Assign Risk Category  No deformity present  Loss of protective sensation  No weak pulses  Risk: 1    Ernesto Griffith MD

## 2023-10-14 LAB
ALBUMIN/CREAT UR: 221 MG/G CREAT (ref 0–29)
CREAT UR-MCNC: 91.3 MG/DL
MICROALBUMIN UR-MCNC: 201.7 UG/ML

## 2023-10-17 ENCOUNTER — CLINICAL SUPPORT (OUTPATIENT)
Dept: CARDIAC REHAB | Facility: HOSPITAL | Age: 78
End: 2023-10-17
Payer: COMMERCIAL

## 2023-10-17 DIAGNOSIS — Z95.5 STATUS POST INSERTION OF DRUG ELUTING CORONARY ARTERY STENT: Primary | ICD-10-CM

## 2023-10-17 DIAGNOSIS — Z79.4 TYPE 2 DIABETES MELLITUS WITH OTHER NEUROLOGIC COMPLICATION, WITH LONG-TERM CURRENT USE OF INSULIN (HCC): ICD-10-CM

## 2023-10-17 DIAGNOSIS — E11.49 TYPE 2 DIABETES MELLITUS WITH OTHER NEUROLOGIC COMPLICATION, WITH LONG-TERM CURRENT USE OF INSULIN (HCC): ICD-10-CM

## 2023-10-17 PROCEDURE — 93798 PHYS/QHP OP CAR RHAB W/ECG: CPT

## 2023-10-17 RX ORDER — DULAGLUTIDE 0.75 MG/.5ML
INJECTION, SOLUTION SUBCUTANEOUS
Qty: 2 ML | Refills: 3 | Status: SHIPPED | OUTPATIENT
Start: 2023-10-17

## 2023-10-19 ENCOUNTER — CLINICAL SUPPORT (OUTPATIENT)
Dept: CARDIAC REHAB | Facility: HOSPITAL | Age: 78
End: 2023-10-19
Payer: COMMERCIAL

## 2023-10-19 DIAGNOSIS — Z95.5 STATUS POST INSERTION OF DRUG ELUTING CORONARY ARTERY STENT: Primary | ICD-10-CM

## 2023-10-19 PROCEDURE — 93798 PHYS/QHP OP CAR RHAB W/ECG: CPT

## 2023-10-21 DIAGNOSIS — I25.10 CAD (CORONARY ARTERY DISEASE): ICD-10-CM

## 2023-10-23 RX ORDER — ATORVASTATIN CALCIUM 40 MG/1
TABLET, FILM COATED ORAL
Qty: 30 TABLET | Refills: 0 | Status: SHIPPED | OUTPATIENT
Start: 2023-10-23

## 2023-10-24 ENCOUNTER — CLINICAL SUPPORT (OUTPATIENT)
Dept: CARDIAC REHAB | Facility: HOSPITAL | Age: 78
End: 2023-10-24
Payer: COMMERCIAL

## 2023-10-24 DIAGNOSIS — Z95.5 STATUS POST INSERTION OF DRUG ELUTING CORONARY ARTERY STENT: Primary | ICD-10-CM

## 2023-10-24 PROCEDURE — 93798 PHYS/QHP OP CAR RHAB W/ECG: CPT

## 2023-10-31 ENCOUNTER — APPOINTMENT (OUTPATIENT)
Dept: CARDIAC REHAB | Facility: HOSPITAL | Age: 78
End: 2023-10-31
Payer: COMMERCIAL

## 2023-11-02 LAB
ALBUMIN SERPL-MCNC: 3.9 G/DL (ref 3.8–4.8)
ALBUMIN/CREAT UR: 73 MG/G CREAT (ref 0–29)
ALBUMIN/GLOB SERPL: 1.1 {RATIO} (ref 1.2–2.2)
ALP SERPL-CCNC: 400 IU/L (ref 44–121)
ALT SERPL-CCNC: 69 IU/L (ref 0–44)
AST SERPL-CCNC: 97 IU/L (ref 0–40)
BASOPHILS # BLD AUTO: 0 X10E3/UL (ref 0–0.2)
BASOPHILS NFR BLD AUTO: 1 %
BILIRUB SERPL-MCNC: 0.6 MG/DL (ref 0–1.2)
BUN SERPL-MCNC: 44 MG/DL (ref 8–27)
BUN SERPL-MCNC: 45 MG/DL (ref 8–27)
BUN/CREAT SERPL: 26 (ref 10–24)
BUN/CREAT SERPL: 27 (ref 10–24)
CALCIUM SERPL-MCNC: 9.5 MG/DL (ref 8.6–10.2)
CALCIUM SERPL-MCNC: 9.9 MG/DL (ref 8.6–10.2)
CHLORIDE SERPL-SCNC: 107 MMOL/L (ref 96–106)
CHLORIDE SERPL-SCNC: 107 MMOL/L (ref 96–106)
CHOLEST SERPL-MCNC: 190 MG/DL (ref 100–199)
CHOLEST/HDLC SERPL: 5.1 RATIO (ref 0–5)
CO2 SERPL-SCNC: 19 MMOL/L (ref 20–29)
CO2 SERPL-SCNC: 20 MMOL/L (ref 20–29)
CREAT SERPL-MCNC: 1.64 MG/DL (ref 0.76–1.27)
CREAT SERPL-MCNC: 1.7 MG/DL (ref 0.76–1.27)
CREAT UR-MCNC: 21.5 MG/DL
EGFR: 41 ML/MIN/1.73
EGFR: 43 ML/MIN/1.73
EOSINOPHIL # BLD AUTO: 0.3 X10E3/UL (ref 0–0.4)
EOSINOPHIL NFR BLD AUTO: 4 %
ERYTHROCYTE [DISTWIDTH] IN BLOOD BY AUTOMATED COUNT: 18.5 % (ref 11.6–15.4)
EST. AVERAGE GLUCOSE BLD GHB EST-MCNC: 183 MG/DL
EST. AVERAGE GLUCOSE BLD GHB EST-MCNC: 186 MG/DL
GLOBULIN SER-MCNC: 3.6 G/DL (ref 1.5–4.5)
GLUCOSE SERPL-MCNC: 356 MG/DL (ref 70–99)
GLUCOSE SERPL-MCNC: 356 MG/DL (ref 70–99)
HBA1C MFR BLD: 8 % (ref 4.8–5.6)
HBA1C MFR BLD: 8.1 % (ref 4.8–5.6)
HCT VFR BLD AUTO: 46 % (ref 37.5–51)
HDLC SERPL-MCNC: 37 MG/DL
HGB BLD-MCNC: 14 G/DL (ref 13–17.7)
IMM GRANULOCYTES # BLD: 0 X10E3/UL (ref 0–0.1)
IMM GRANULOCYTES NFR BLD: 0 %
LDLC SERPL CALC-MCNC: 98 MG/DL (ref 0–99)
LDLC SERPL DIRECT ASSAY-MCNC: 93 MG/DL (ref 0–99)
LYMPHOCYTES # BLD AUTO: 0.6 X10E3/UL (ref 0.7–3.1)
LYMPHOCYTES NFR BLD AUTO: 9 %
MCH RBC QN AUTO: 26.8 PG (ref 26.6–33)
MCHC RBC AUTO-ENTMCNC: 30.4 G/DL (ref 31.5–35.7)
MCV RBC AUTO: 88 FL (ref 79–97)
MICROALBUMIN UR-MCNC: 15.7 UG/ML
MONOCYTES # BLD AUTO: 1 X10E3/UL (ref 0.1–0.9)
MONOCYTES NFR BLD AUTO: 14 %
NEUTROPHILS # BLD AUTO: 4.8 X10E3/UL (ref 1.4–7)
NEUTROPHILS NFR BLD AUTO: 72 %
PLATELET # BLD AUTO: 204 X10E3/UL (ref 150–450)
POTASSIUM SERPL-SCNC: 4.7 MMOL/L (ref 3.5–5.2)
POTASSIUM SERPL-SCNC: 4.7 MMOL/L (ref 3.5–5.2)
PROT SERPL-MCNC: 7.5 G/DL (ref 6–8.5)
RBC # BLD AUTO: 5.22 X10E6/UL (ref 4.14–5.8)
SL AMB VLDL CHOLESTEROL CALC: 55 MG/DL (ref 5–40)
SODIUM SERPL-SCNC: 144 MMOL/L (ref 134–144)
SODIUM SERPL-SCNC: 146 MMOL/L (ref 134–144)
TRIGL SERPL-MCNC: 327 MG/DL (ref 0–149)
WBC # BLD AUTO: 6.7 X10E3/UL (ref 3.4–10.8)

## 2023-11-02 NOTE — PROGRESS NOTES
Cardiac Rehabilitation Plan of Care   30 Day Reassessment          Today's date: 2023   # of Exercise Sessions Completed: 11  Patient name: Adrian Fabian      : 1945  Age: 69 y.o. MRN: [id number]  Referring Physician: Dr. Rodney Angel  Cardiologist: Dr. Rodney Angel  Provider: Saji nobles  Clinician: Kaya Gale MS, CEP      Dx:   Encounter Diagnosis   Name Primary? Status post insertion of drug eluting coronary artery stent Yes     Date of onset: 2023      SUMMARY OF PROGRESS:  Mr. Linnea Cruz has started his cardiac rehabilitation program after recent hospitalization for CHF and stenting procedure. He does notice weakness, especially in his legs. He is using cane for stability when walking. He is 100% compliant with his medications. He has attended 11 sessions of his rehab program over the past 60 days, only 6 over the past 30 days due to many days not feeling well. He is currently completing 25-30 min of aerobic exercise at 2.1 METs. He is gradually increasing exercise times as he is able to tolerated. He fatigues easily with exercise. Will continue to progress exercise times and intensities over next 30 days of rehab. His goals for his rehab program are to increase overall strength and endurance with regular exercise, improve balance by increasing leg strength, and increase walking distance and speed. He is making slow progress at 60 days, but is doing his best to get better. He feels he is doing well following a low fat, low salt diet. He is also IDDM and has dexcom . He feels DM is well controlled most of the time. Will plan to check blood glucose pre and post exercise sessions. He denies feelings of depression (PHQ-9=2) or anxiety (JANES-7=0). He reports he has excellent support from his wife and family at this time. Will not need to reassess PHQ-9 at 30 days due to no concerns with depression or anxiety. He is not currently allowed to drive and is awaiting clearance to resume more independence. He is in agreement to continue to attend cardiac rehab sessions 2x/week for 12 weeks, or up to 36 sessions. He will plan to participate in weekly education sessions on cardiac risk factor management.        Medication compliance: Yes   Comments: Pt reports to be compliant with medications  Fall Risk: Moderate   Comments: Patient uses walking assist device (walker/cane/rollator)    EKG Interpretation: NSR      EXERCISE ASSESSMENT and PLAN    Exercise Prescription:      Frequency: 2 days/week   Supplement with home exercise 2+ days/wk as tolerated       Minutes: 30-50         METS: 2.0-3.5            HR: 30 beats above resting   RPE: 4-6         Modalities: UBE, Lifecycle, NuStep, Recumbent bike and Room walking      30 Day Goals for Exercise Progression:    Frequency: 2 days/week of cardiac rehab       Supplement with home exercise 2+ days/wk as tolerated    Minutes: 30-40                              >150 mins/wk of moderate intensity exercise   METS: 2.0-2.5   HR: 30 beats above resting    RPE: 4-6   Modalities: UBE, NuStep, Recumbent bike and Room walking    Strength trainin-3 days / week  12-15 repetitions  1-2 sets per modality   Will be added following 2-3 weeks of monitored exercise sessions   Modalities: Pull Downs, Lateral Raise, Arm Extension, Arm Curl, Sit to [employer] Exercise: none    Goals: 10% improvement in functional capacity - based on max METs achieved in fitness assessment, improved DASI score by 10%, Increase in exercise capacity by 40% - based on peak METs tolerated in cardiac rehab exercise session, Exercise 5 days/wk, >150mins/wk of moderate intensity exercise, Resume ADLs with increased strength, Attend Rehab regularly, Decrease sitting time and Start a walking program    Progression Toward Goals:  Pt is progressing and showing improvement  toward the following goals:  gradually increasing exercise times as he tolerates, has missed several sessions due to not feeling well and having recent fall, will continue to progress over next 30 days. Education: benefit of exercise for CAD risk factors, home exercise guidelines, AHA guidelines to achieve >150 mins/wk of moderate exercise and RPE scale   Plan:education on home exercise guidelines, home exercise 30+ mins 2 days opposite CR and Education class: Risk Factors for Heart Disease  Readiness to change: Preparation:  (Getting ready to change)       NUTRITION ASSESSMENT AND PLAN    Weight control:    Starting weight: 216.2 lb   Current weight: 216 lb    Diabetes: T2D, on Insulin   A1c: 7.6    last measured: 1/6/2023    Lipid management: Discussed diet and lipid management and Last lipid profile 1/6/2023  Chol 177    HDL 33      Goals:LDL <100, HDL >40, TRG <150 and CHOL <200    Measurable goals were based Rate Your Plate Dietary Self-Assessment. These are the areas in which the patient could score higher on the assessment. Goals include recommendations for a heart healthy diet based on American Heart Association. Progression Toward Goals: Pt is progressing and showing improvement  toward the following goals:  reports trying to make healthier choices eating and decreasing fat and salt intake.         Education: heart healthy eating  low sodium diet  hydration  nutrition for  lipid management  Plan: Education class: Reading Food Labels and Education Class: Heart Healthy Eating  Readiness to change: Action:  (Changing behavior)      PSYCHOSOCIAL ASSESSMENT AND PLAN    Emotional:  Depression assessment:  PHQ-9 = 2  1-4 = Minimal Depression            Anxiety measure:  JANES-7 = 1  0-4  = Not anxious  Self-reported stress level:  4  Social support: Excellent and Patient reports excellent emotional/social support from wife and family    Goals:  Physical Fitness in [place] Score < 3, Daily Activity in [place] Score < 3, Pain in [place] Score < 3, Overall Health in [place] Score < 3 and increased energy    Progression Toward Goals: Pt is progressing and showing improvement  toward the following goals:  reprots no concerns with depression or anxiety at this time. Has great family support. Education: signs/sxs of depression, benefits of a positive support system, stress management techniques and depression and CAD  Plan: Class: Stress and Your Health and Class: Relaxation  Readiness to change: Action:  (Changing behavior)      OTHER CORE COMPONENTS     Tobacco:   Social History     Tobacco Use   Smoking Status Former    Packs/day: 1.00    Years: 30.00    Total pack years: 30.00    Types: Cigarettes    Start date:     Quit date:     Years since quittin.8   Smokeless Tobacco Never       Tobacco Use Intervention:   N/A:  Patient is a non-smoker     Anginal Symptoms:  None   NTG use: No prescription    Blood pressure:    Restin//70   Exercise: 132//82    Goals: consistent BP < 130/80, reduced dietary sodium <2300mg, moderate intensity exercise >150 mins/wk and medication compliance    Progression Toward Goals: Pt is progressing and showing improvement  toward the following goals:  BP is normally within normal resting limits, has normal hemodynamic response to exercise, decreasing salt intake and increasing exercise minutes weekly to help manage BP.       Education:  understanding high blood pressure and it's relationship to CAD and low sodium diet and HTN  Plan: Class: Understanding Heart Disease and Class: Common Heart Medications  Readiness to change: Action:  (Changing behavior)

## 2023-11-03 ENCOUNTER — HOSPITAL ENCOUNTER (EMERGENCY)
Facility: HOSPITAL | Age: 78
Discharge: HOME | End: 2023-11-03
Attending: EMERGENCY MEDICINE
Payer: COMMERCIAL

## 2023-11-03 ENCOUNTER — APPOINTMENT (OUTPATIENT)
Dept: RADIOLOGY | Facility: HOSPITAL | Age: 78
End: 2023-11-03
Payer: COMMERCIAL

## 2023-11-03 VITALS
HEART RATE: 87 BPM | OXYGEN SATURATION: 96 % | SYSTOLIC BLOOD PRESSURE: 143 MMHG | RESPIRATION RATE: 18 BRPM | DIASTOLIC BLOOD PRESSURE: 93 MMHG | TEMPERATURE: 98 F

## 2023-11-03 DIAGNOSIS — I25.10 CAD (CORONARY ARTERY DISEASE): ICD-10-CM

## 2023-11-03 DIAGNOSIS — R04.0 RIGHT-SIDED EPISTAXIS: Primary | ICD-10-CM

## 2023-11-03 LAB
APTT PPP: 35 SECONDS (ref 23–37)
BASOPHILS # BLD AUTO: 0.04 THOUSANDS/ÂΜL (ref 0–0.1)
BASOPHILS NFR BLD AUTO: 1 % (ref 0–1)
EOSINOPHIL # BLD AUTO: 0.21 THOUSAND/ÂΜL (ref 0–0.61)
EOSINOPHIL NFR BLD AUTO: 3 % (ref 0–6)
ERYTHROCYTE [DISTWIDTH] IN BLOOD BY AUTOMATED COUNT: 19.9 % (ref 11.6–15.1)
HCT VFR BLD AUTO: 43.6 % (ref 36.5–49.3)
HGB BLD-MCNC: 13.2 G/DL (ref 12–17)
IMM GRANULOCYTES # BLD AUTO: 0.03 THOUSAND/UL (ref 0–0.2)
IMM GRANULOCYTES NFR BLD AUTO: 1 % (ref 0–2)
INR PPP: 1.59 (ref 0.84–1.19)
LYMPHOCYTES # BLD AUTO: 0.7 THOUSANDS/ÂΜL (ref 0.6–4.47)
LYMPHOCYTES NFR BLD AUTO: 11 % (ref 14–44)
MCH RBC QN AUTO: 26.6 PG (ref 26.8–34.3)
MCHC RBC AUTO-ENTMCNC: 30.3 G/DL (ref 31.4–37.4)
MCV RBC AUTO: 88 FL (ref 82–98)
MONOCYTES # BLD AUTO: 0.96 THOUSAND/ÂΜL (ref 0.17–1.22)
MONOCYTES NFR BLD AUTO: 15 % (ref 4–12)
NEUTROPHILS # BLD AUTO: 4.48 THOUSANDS/ÂΜL (ref 1.85–7.62)
NEUTS SEG NFR BLD AUTO: 69 % (ref 43–75)
NRBC BLD AUTO-RTO: 0 /100 WBCS
PLATELET # BLD AUTO: 178 THOUSANDS/UL (ref 149–390)
PROTHROMBIN TIME: 19.4 SECONDS (ref 11.6–14.5)
RBC # BLD AUTO: 4.96 MILLION/UL (ref 3.88–5.62)
WBC # BLD AUTO: 6.42 THOUSAND/UL (ref 4.31–10.16)

## 2023-11-03 PROCEDURE — 99284 EMERGENCY DEPT VISIT MOD MDM: CPT | Performed by: PHYSICIAN ASSISTANT

## 2023-11-03 PROCEDURE — 85610 PROTHROMBIN TIME: CPT | Performed by: PHYSICIAN ASSISTANT

## 2023-11-03 PROCEDURE — 36415 COLL VENOUS BLD VENIPUNCTURE: CPT | Performed by: PHYSICIAN ASSISTANT

## 2023-11-03 PROCEDURE — 99283 EMERGENCY DEPT VISIT LOW MDM: CPT

## 2023-11-03 PROCEDURE — 85730 THROMBOPLASTIN TIME PARTIAL: CPT | Performed by: PHYSICIAN ASSISTANT

## 2023-11-03 PROCEDURE — 85025 COMPLETE CBC W/AUTO DIFF WBC: CPT | Performed by: PHYSICIAN ASSISTANT

## 2023-11-03 PROCEDURE — 71046 X-RAY EXAM CHEST 2 VIEWS: CPT

## 2023-11-03 NOTE — ED NOTES
Pt ambulatory to restroom. Steady, independent gait with cane. Denies dizziness. No epistaxis at this time.       Richard Connolly RN  11/03/23 6142

## 2023-11-03 NOTE — ED PROVIDER NOTES
History  Chief Complaint   Patient presents with    Nose Bleed     Pt reports a nose bleed that started at 1030 am. On thinners. Also reports coughing up bright red blood  3-4 times. Denies chest pain      Patient is a 68 y/o M with h/o afib on eliquis, HTN that presents to the ED with right epistaxis that started at 10:30AM.  The bleeding stopped about an hour ago while in the waiting room. Patient states the blood was dripping down the back of his throat and he was coughing up blood. No fevers, chills. Patient does get frequent nosebleeds, but never this long. He denies dizziness/lightheadedness. No sinus pain. No recent illness. History provided by:  Patient  Nose Bleed  Associated symptoms: no congestion, no cough, no dizziness, no fever and no headaches        Prior to Admission Medications   Prescriptions Last Dose Informant Patient Reported? Taking?    Blood Glucose Monitoring Suppl (OneTouch Verio) w/Device KIT  Self No No   Sig: Use 4 (four) times a day   Patient not taking: Reported on 10/13/2023   Continuous Blood Gluc  (Dexcom G6 ) NOHEMY  Self No No   Sig: Use with dexcom sensor   Continuous Blood Gluc Sensor (Dexcom G6 Sensor) MISC   No No   Sig: Use daily as directed for CGM - Change every 10 days   Continuous Blood Gluc Transmit (Dexcom G6 Transmitter) MISC   No No   Sig: Use daily as directed for CGM - Change every 3 months   Continuous Blood Gluc Transmit (Dexcom G6 Transmitter) MISC   No No   Sig: Use daily as directed for CGM - Change every 3 months   Eliquis 5 MG  Self No No   Sig: take 1 tablet by mouth twice a day   Empagliflozin (Jardiance) 10 MG TABS tablet  Self No No   Sig: Take 1 tablet (10 mg total) by mouth every morning   Insulin Pen Needle (Droplet Pen Needles) 32G X 4 MM MISC   No No   Sig: use 1 PEN NEEDLE to inject MEDICATION subcutaneously four times a day   Multiple Vitamin (MULTIVITAMIN ADULT PO)  Self Yes No   Sig: Take 1 tablet by mouth daily   NovoLOG FlexPen 100 units/mL injection pen   No No   Sig: INJECT 40 UNITS UNDER THE SKIN 3 TIMES DAILY WITH MEALS   Potassium Citrate ER 15 MEQ (1620 MG) TBCR  Self No No   Sig: Take 1 tablet by mouth 2 (two) times a day   SUPER B COMPLEX/C PO  Self Yes No   Sig: Take 1 tablet by mouth daily   Trulicity 3.90 EI/5.8YV injection   No No   Sig: inject 0.5 milliliters ( 0.75 milligrams ) subcutaneously every week IN THE ABDOMEN THIGHS OR OUTER AREA OF UPPER ARM ROTATE INJECTION SITES   acetaminophen (TYLENOL) 650 mg CR tablet  Self Yes No   Sig: Take 650 mg by mouth every 8 (eight) hours as needed for mild pain   atorvastatin (LIPITOR) 40 mg tablet   No No   Sig: take 1 tablet by mouth once daily after dinner   azelastine (ASTELIN) 0.1 % nasal spray  Self No No   Si spray into each nostril 2 (two) times a day Use in each nostril as directed   clopidogrel (PLAVIX) 75 mg tablet   No No   Sig: Take 1 tablet (75 mg total) by mouth daily   ferrous sulfate 325 (65 Fe) mg tablet   No No   Sig: Take 1 tablet (325 mg total) by mouth daily with breakfast   furosemide (LASIX) 40 mg tablet   No No   Sig: Take 1 tablet (40 mg total) by mouth daily   glucose blood (OneTouch Verio) test strip  Self No No   Sig: Use as instructed   Patient not taking: Reported on 10/13/2023   insulin detemir (Levemir FlexPen) 100 Units/mL injection pen  Self No No   Sig: Inject 65 Units under the skin daily at bedtime   metoprolol succinate (TOPROL-XL) 50 mg 24 hr tablet  Self No No   Sig: Take 0.5 tablets (25 mg total) by mouth daily   mometasone (ELOCON) 0.1 % cream  Self No No   Sig: Apply topically daily for 7 days   pantoprazole (PROTONIX) 40 mg tablet  Self No No   Sig: Take 1 tablet (40 mg total) by mouth 2 (two) times a day   Patient taking differently: Take 40 mg by mouth daily   pregabalin (LYRICA) 100 mg capsule  Self No No   Sig: take 1 capsule by mouth three times a day   sertraline (ZOLOFT) 25 mg tablet  Self Yes No   Sig: Take 25 mg by mouth daily   tamsulosin (FLOMAX) 0.4 mg  Self Yes No   Sig: Take 0.4 mg by mouth daily with dinner   traZODone (DESYREL) 100 mg tablet  Self No No   Sig: take 2 tablets by mouth at bedtime   umeclidinium-vilanterol 62.5-25 mcg/actuation inhaler  Self No No   Sig: Inhale 1 puff daily      Facility-Administered Medications: None       Past Medical History:   Diagnosis Date    Atrial fibrillation (HCC)     Eliquis    AVB (atrioventricular block)     1st degree    CAD (coronary artery disease)     CKD (chronic kidney disease), stage III (HCC)     baseline Cr 1.2-1.6    Diabetes mellitus ([address])     type 2, insulin dependent    Diverticulosis     Emphysema lung (HCC)     Former tobacco use     History of asbestos exposure     History of DVT (deep vein thrombosis)     RLE, tx w/ AC    History of GI bleed 08/2023    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, given PRBC/Venofer for anemia while in house    Hyperlipidemia     Hypertension     LAFB (left anterior fascicular block)     Liver cancer ([address] [facility])     Liver mass 08/2023    suspected HCC, needs radioembolization    RBBB        Past Surgical History:   Procedure Laterality Date    APPENDECTOMY      BOWEL RESECTION  2014    CARDIAC CATHETERIZATION      CARDIAC CATHETERIZATION N/A 8/25/2023    Procedure: Cardiac pci;  Surgeon: Rodrigo Bryant DO;  Location: [facility]; Service: Cardiology    CARDIAC CATHETERIZATION N/A 8/21/2023    Procedure: Cardiac catheterization;  Surgeon: Rodrigo Bryant DO;  Location: [facility]; Service: Cardiology    CARDIAC CATHETERIZATION N/A 8/21/2023    Procedure: Cardiac Coronary Angiogram;  Surgeon: Rodrigo Bryant DO;  Location: [facility];   Service: Cardiology    CATARACT EXTRACTION Bilateral     EGD      IR Y-90 PRE-ANGIO/EMBO W/ LUNG SCAN  9/1/2023    IR Y-90 RADIOEMBOLIZATION  9/8/2023       Family History   Problem Relation Age of Onset    Hypertension Mother     Bone cancer Father     Diabetes type II Sister     Diabetes type II Sister     Esophageal cancer Brother      I have reviewed and agree with the history as documented. E-Cigarette/Vaping    E-Cigarette Use Never User      E-Cigarette/Vaping Substances    Nicotine No     THC No     CBD No     Flavoring No      Social History     Tobacco Use    Smoking status: Former     Packs/day: 1.00     Years: 30.00     Total pack years: 30.00     Types: Cigarettes     Start date:      Quit date:      Years since quittin.8    Smokeless tobacco: Never   Vaping Use    Vaping Use: Never used   Substance Use Topics    Alcohol use: Yes     Comment: Socially    Drug use: Never       Review of Systems   Constitutional:  Negative for chills and fever. HENT:  Positive for nosebleeds. Negative for congestion. Respiratory:  Negative for cough and shortness of breath. Gastrointestinal:  Negative for abdominal pain, diarrhea, nausea and vomiting. Skin:  Negative for color change, pallor and rash. Neurological:  Negative for dizziness, weakness, light-headedness, numbness and headaches. Psychiatric/Behavioral:  Negative for confusion. All other systems reviewed and are negative. Physical Exam  Physical Exam  Vitals and nursing note reviewed. Constitutional:       General: He is not in acute distress. Appearance: Normal appearance. He is well-developed, well-groomed and normal weight. He is not ill-appearing or diaphoretic. HENT:      Head: Normocephalic and atraumatic. Right Ear: Hearing and tympanic membrane normal.      Left Ear: Hearing and tympanic membrane normal.      Nose:      Right Nostril: Epistaxis present. No foreign body. Left Nostril: No foreign body or epistaxis. Comments: Dried blood right nare. No active bleeding. Mouth/Throat:      Mouth: Mucous membranes are moist.      Pharynx: Oropharynx is clear. Comments: No blood in pharynx.    Eyes:      Conjunctiva/sclera: Conjunctivae normal. Pupils: Pupils are equal.   Cardiovascular:      Rate and Rhythm: Normal rate and regular rhythm. Pulmonary:      Effort: Pulmonary effort is normal.   Musculoskeletal:         General: Normal range of motion. Cervical back: Normal range of motion and neck supple. Skin:     General: Skin is warm and dry. Coloration: Skin is not jaundiced or pale. Findings: No rash. Neurological:      General: No focal deficit present. Mental Status: He is alert and oriented to person, place, and time. Psychiatric:         Mood and Affect: Mood normal.         Behavior: Behavior is cooperative.          Vital Signs  ED Triage Vitals [11/03/23 1231]   Temperature Pulse Respirations Blood Pressure SpO2   98 °F (36.7 °C) 87 18 143/93 96 %      Temp Source Heart Rate Source Patient Position - Orthostatic VS BP Location FiO2 (%)   Temporal Monitor Sitting Left arm --      Pain Score       --           Vitals:    11/03/23 1231   BP: 143/93   Pulse: 87   Patient Position - Orthostatic VS: Sitting         Visual Acuity      ED Medications  Medications - No data to display    Diagnostic Studies  Results Reviewed       Procedure Component Value Units Date/Time    Protime-INR [[id number]]  (Abnormal) Collected: 11/03/23 1409    Lab Status: Final result Specimen: Blood from Arm, Right Updated: 11/03/23 1433     Protime 19.4 seconds      INR 1.59    APTT [[id number]]  (Normal) Collected: 11/03/23 1409    Lab Status: Final result Specimen: Blood from Arm, Right Updated: 11/03/23 1433     PTT 35 seconds     CBC and differential [[id number]]  (Abnormal) Collected: 11/03/23 1409    Lab Status: Final result Specimen: Blood from Arm, Right Updated: 11/03/23 1418     WBC 6.42 Thousand/uL      RBC 4.96 Million/uL      Hemoglobin 13.2 g/dL      Hematocrit 43.6 %      MCV 88 fL      MCH 26.6 pg      MCHC 30.3 g/dL      RDW 19.9 %      Platelets 124 Thousands/uL      nRBC 0 /100 WBCs      Neutrophils Relative 69 %      Immat GRANS % 1 %      Lymphocytes Relative 11 %      Monocytes Relative 15 %      Eosinophils Relative 3 %      Basophils Relative 1 %      Neutrophils Absolute 4.48 Thousands/µL      Immature Grans Absolute 0.03 Thousand/uL      Lymphocytes Absolute 0.70 Thousands/µL      Monocytes Absolute 0.96 Thousand/µL      Eosinophils Absolute 0.21 Thousand/µL      Basophils Absolute 0.04 Thousands/µL                    XR chest 2 views   Final Result by Medardo Saldana MD (11/03 1339)      No acute cardiopulmonary disease. Workstation performed: [id number]                    Procedures  Procedures         ED Course                               SBIRT 20yo+      Flowsheet Row Most Recent Value   Initial Alcohol Screen: US AUDIT-C     1. How often do you have a drink containing alcohol? 0 Filed at: 11/03/2023 1232   2. How many drinks containing alcohol do you have on a typical day you are drinking? 0 Filed at: 11/03/2023 1232   3a. Male UNDER 65: How often do you have five or more drinks on one occasion? 0 Filed at: 11/03/2023 1232   3b. FEMALE Any Age, or MALE 65+: How often do you have 4 or more drinks on one occassion? 0 Filed at: 11/03/2023 1232   Audit-C Score 0 Filed at: 11/03/2023 1232   [place]: How many times in the past year have you. .. Used an illegal drug or used a prescription medication for non-medical reasons? Never Filed at: 11/03/2023 1232                      Medical Decision Making  Patient with right sided epistaxis, resolved prior to exam.  Will check labs to r/o anemia or thrombocytopenia. Advised vaseline/bacitracin to nares and f/u with ENT if nosebleeds continue. Return precautions given. Amount and/or Complexity of Data Reviewed  Labs: ordered. Radiology: ordered.              Disposition  Final diagnoses:   Right-sided epistaxis     Time reflects when diagnosis was documented in both MDM as applicable and the Disposition within this note       Time User Action Codes Description Comment 11/3/2023  2:35 PM Darlys Needle Add [R04.0] Right-sided epistaxis           ED Disposition       ED Disposition   Discharge    Condition   Stable    Date/Time   Fri Nov 3, 2023 [address] discharge to home/self care. Follow-up Information       Follow up With Specialties Details Why Contact Info Additional Information    [facility] Schedule an appointment as soon as possible for a visit  For recheck [id number] [address] [address]  [facility], [address] [facility], [address], [place], [address]            Discharge Medication List as of 11/3/2023  2:38 PM        CONTINUE these medications which have NOT CHANGED    Details   acetaminophen (TYLENOL) 650 mg CR tablet Take 650 mg by mouth every 8 (eight) hours as needed for mild pain, Historical Med      atorvastatin (LIPITOR) 40 mg tablet take 1 tablet by mouth once daily after dinner, Normal      azelastine (ASTELIN) 0.1 % nasal spray 1 spray into each nostril 2 (two) times a day Use in each nostril as directed, Starting Wed 9/6/2023, Normal      Blood Glucose Monitoring Suppl (OneTouch Verio) w/Device KIT Use 4 (four) times a day, Starting Wed 9/13/2023, Normal      clopidogrel (PLAVIX) 75 mg tablet Take 1 tablet (75 mg total) by mouth daily, Starting Fri 10/13/2023, Normal      Continuous Blood Gluc  (Dexcom G6 ) NOHEMY Use with dexcom sensor, Normal      Continuous Blood Gluc Sensor (Dexcom G6 Sensor) MISC Use daily as directed for CGM - Change every 10 days, Normal      !! Continuous Blood Gluc Transmit (Dexcom G6 Transmitter) MISC Use daily as directed for CGM - Change every 3 months, Normal      !!  Continuous Blood Gluc Transmit (Dexcom G6 Transmitter) MISC Use daily as directed for CGM - Change every 3 months, Normal      Eliquis 5 MG take 1 tablet by mouth twice a day, Normal      Empagliflozin (Jardiance) 10 MG TABS tablet Take 1 tablet (10 mg total) by mouth every morning, Starting Wed 9/13/2023, Normal      ferrous sulfate 325 (65 Fe) mg tablet Take 1 tablet (325 mg total) by mouth daily with breakfast, Starting Fri 10/13/2023, Until Wed 4/10/2024, Normal      furosemide (LASIX) 40 mg tablet Take 1 tablet (40 mg total) by mouth daily, Starting Fri 10/13/2023, Until Wed 4/10/2024, Normal      glucose blood (OneTouch Verio) test strip Use as instructed, Normal      insulin detemir (Levemir FlexPen) 100 Units/mL injection pen Inject 65 Units under the skin daily at bedtime, Starting Wed 9/13/2023, Normal      Insulin Pen Needle (Droplet Pen Needles) 32G X 4 MM MISC use 1 PEN NEEDLE to inject MEDICATION subcutaneously four times a day, Normal      metoprolol succinate (TOPROL-XL) 50 mg 24 hr tablet Take 0.5 tablets (25 mg total) by mouth daily, Starting Wed 8/30/2023, Normal      mometasone (ELOCON) 0.1 % cream Apply topically daily for 7 days, Starting Mon 6/5/2023, Until Fri 10/13/2023, Normal      Multiple Vitamin (MULTIVITAMIN ADULT PO) Take 1 tablet by mouth daily, Historical Med      NovoLOG FlexPen 100 units/mL injection pen INJECT 40 UNITS UNDER THE SKIN 3 TIMES DAILY WITH MEALS, Normal      pantoprazole (PROTONIX) 40 mg tablet Take 1 tablet (40 mg total) by mouth 2 (two) times a day, Starting Mon 8/14/2023, Normal      Potassium Citrate ER 15 MEQ (1620 MG) TBCR Take 1 tablet by mouth 2 (two) times a day, Starting Sat 8/26/2023, No Print      pregabalin (LYRICA) 100 mg capsule take 1 capsule by mouth three times a day, Normal      sertraline (ZOLOFT) 25 mg tablet Take 25 mg by mouth daily, Historical Med      SUPER B COMPLEX/C PO Take 1 tablet by mouth daily, Historical Med      tamsulosin (FLOMAX) 0.4 mg Take 0.4 mg by mouth daily with dinner, Historical Med      traZODone (DESYREL) 100 mg tablet take 2 tablets by mouth at bedtime, Normal      Trulicity 1.78 IT/7.1YL injection inject 0.5 milliliters ( 0.75 milligrams ) subcutaneously every week IN THE ABDOMEN THIGHS OR OUTER AREA OF UPPER ARM ROTATE INJECTION SITES, Normal      umeclidinium-vilanterol 62.5-25 mcg/actuation inhaler Inhale 1 puff daily, Starting Wed 5/10/2023, Normal       !! - Potential duplicate medications found. Please discuss with provider. No discharge procedures on file.     PDMP Review         Value Time User    PDMP Reviewed  Yes 9/11/2023  8:12 AM Bailey Fountain MD            ED Provider  Electronically Signed by             Francisco J Garcia PA-C  11/03/23 [id number]

## 2023-11-03 NOTE — DISCHARGE INSTRUCTIONS
Apply vaseline and bacitracin to nose daily. Follow up with ENT if nosebleeds continues. IF bleeding starts, hold pressure to nose for 10 minutes, if this does not help, return to ER.

## 2023-11-06 ENCOUNTER — VBI (OUTPATIENT)
Dept: FAMILY MEDICINE CLINIC | Facility: HOSPITAL | Age: 78
End: 2023-11-06

## 2023-11-06 DIAGNOSIS — I48.91 ATRIAL FIBRILLATION, UNSPECIFIED TYPE (HCC): ICD-10-CM

## 2023-11-06 RX ORDER — ATORVASTATIN CALCIUM 40 MG/1
TABLET, FILM COATED ORAL
Qty: 30 TABLET | Refills: 0 | Status: SHIPPED | OUTPATIENT
Start: 2023-11-06

## 2023-11-06 NOTE — TELEPHONE ENCOUNTER
11/06/23 10:26 AM    Patient contacted post ED visit, [facility] department spoke with patient/caregiver and outreach was successful. Thank you.   Zahra Lee MA  [facility]

## 2023-11-09 RX ORDER — APIXABAN 5 MG/1
TABLET, FILM COATED ORAL
Qty: 60 TABLET | Refills: 5 | Status: SHIPPED | OUTPATIENT
Start: 2023-11-09

## 2023-11-28 DIAGNOSIS — E11.40 TYPE 2 DIABETES MELLITUS WITH DIABETIC NEUROPATHY, WITH LONG-TERM CURRENT USE OF INSULIN (HCC): ICD-10-CM

## 2023-11-28 DIAGNOSIS — Z79.4 TYPE 2 DIABETES MELLITUS WITH DIABETIC NEUROPATHY, WITH LONG-TERM CURRENT USE OF INSULIN (HCC): ICD-10-CM

## 2023-11-28 RX ORDER — EMPAGLIFLOZIN 10 MG/1
10 TABLET, FILM COATED ORAL EVERY MORNING
Qty: 90 TABLET | Refills: 0 | Status: SHIPPED | OUTPATIENT
Start: 2023-11-28

## 2023-12-01 ENCOUNTER — TELEPHONE (OUTPATIENT)
Dept: ADMINISTRATIVE | Facility: OTHER | Age: 78
End: 2023-12-01

## 2023-12-01 DIAGNOSIS — E11.40 TYPE 2 DIABETES MELLITUS WITH DIABETIC NEUROPATHY, WITH LONG-TERM CURRENT USE OF INSULIN (HCC): ICD-10-CM

## 2023-12-01 DIAGNOSIS — Z79.4 TYPE 2 DIABETES MELLITUS WITH DIABETIC NEUROPATHY, WITH LONG-TERM CURRENT USE OF INSULIN (HCC): ICD-10-CM

## 2023-12-01 RX ORDER — INSULIN DETEMIR 100 [IU]/ML
INJECTION, SOLUTION SUBCUTANEOUS
Qty: 30 ML | Refills: 0 | Status: SHIPPED | OUTPATIENT
Start: 2023-12-01

## 2023-12-01 NOTE — TELEPHONE ENCOUNTER
12/01/23 1:22 PM    Patient contacted (spoke with patient) to bring Advance Directive, POLST, or Living Will document to next scheduled pcp visit. Thank you.   Kim Hernandez [facility]

## 2023-12-05 ENCOUNTER — OFFICE VISIT (OUTPATIENT)
Dept: NEPHROLOGY | Facility: CLINIC | Age: 78
End: 2023-12-05
Payer: COMMERCIAL

## 2023-12-05 VITALS
BODY MASS INDEX: 28.85 KG/M2 | WEIGHT: 213 LBS | HEIGHT: 72 IN | SYSTOLIC BLOOD PRESSURE: 144 MMHG | DIASTOLIC BLOOD PRESSURE: 72 MMHG

## 2023-12-05 DIAGNOSIS — N18.30 STAGE 3 CHRONIC KIDNEY DISEASE, UNSPECIFIED WHETHER STAGE 3A OR 3B CKD (HCC): ICD-10-CM

## 2023-12-05 DIAGNOSIS — I50.33 ACUTE ON CHRONIC DIASTOLIC HF (HEART FAILURE) (HCC): ICD-10-CM

## 2023-12-05 DIAGNOSIS — R60.1 GENERALIZED EDEMA: Primary | ICD-10-CM

## 2023-12-05 DIAGNOSIS — I25.10 CORONARY ARTERY DISEASE INVOLVING NATIVE HEART WITHOUT ANGINA PECTORIS, UNSPECIFIED VESSEL OR LESION TYPE: ICD-10-CM

## 2023-12-05 DIAGNOSIS — E11.42 TYPE 2 DIABETES MELLITUS WITH DIABETIC POLYNEUROPATHY, WITH LONG-TERM CURRENT USE OF INSULIN (HCC): ICD-10-CM

## 2023-12-05 DIAGNOSIS — Z79.4 TYPE 2 DIABETES MELLITUS WITH DIABETIC POLYNEUROPATHY, WITH LONG-TERM CURRENT USE OF INSULIN (HCC): ICD-10-CM

## 2023-12-05 DIAGNOSIS — I48.91 ATRIAL FIBRILLATION, UNSPECIFIED TYPE (HCC): ICD-10-CM

## 2023-12-05 PROCEDURE — 99214 OFFICE O/P EST MOD 30 MIN: CPT | Performed by: INTERNAL MEDICINE

## 2023-12-05 RX ORDER — FUROSEMIDE 40 MG/1
40 TABLET ORAL EVERY OTHER DAY
Qty: 90 TABLET | Refills: 3 | Status: SHIPPED | OUTPATIENT
Start: 2023-12-05

## 2023-12-05 NOTE — PATIENT INSTRUCTIONS
Chronic Kidney Disease   WHAT YOU NEED TO KNOW:   Chronic kidney disease (CKD) is the gradual and permanent loss of kidney function. It is also called chronic kidney failure, or chronic renal insufficiency. Normally, the kidneys remove fluid, chemicals, and waste from your blood. These wastes are turned into urine by your kidneys. CKD may worsen over time and lead to kidney failure. Your CKD team will help you and your family plan for your care at home. The team will help you create goals and find ways to meet your goals. Your care plan may change over time as your needs change. DISCHARGE INSTRUCTIONS:   Call your local emergency number (911 in the [address]) if:   You have a seizure. You have shortness of breath. Return to the emergency department if:   You are confused and very drowsy. Call your doctor or nephrologist if:   You suddenly gain or lose more weight than your healthcare provider has told you is okay. You have itchy skin or a rash. You urinate more or less than you normally do. You have blood in your urine. You have nausea and are vomiting. You have fatigue or muscle weakness. You have hiccups that will not stop. You have questions or concerns about your condition or care. Medicines:   Medicines  may be given to decrease blood pressure and get rid of extra fluid. You may also receive medicine to manage health conditions that may occur with CKD, such as anemia, diabetes, and heart disease. Take your medicine as directed. Contact your healthcare provider if you think your medicine is not helping or if you have side effects. Tell your provider if you are allergic to any medicine. Keep a list of the medicines, vitamins, and herbs you take. Include the amounts, and when and why you take them. Bring the list or the pill bottles to follow-up visits. Carry your medicine list with you in case of an emergency. What you can do to manage CKD:   Management may include making some lifestyle changes. Tell your healthcare provider if you have any concerns about being able to make changes. He or she can help you find solutions, including working with specialists. Ask for help creating a plan to break large goals into smaller steps. Your plan may include any of the following:  Manage other health conditions. Your healthcare provider will work with you to make a care plan that meets your needs. You will be checked regularly for heart disease or other conditions that can make CKD worse, such as diabetes. Your blood pressure will be closely monitored. You will also get a target blood pressure and help making a plan to reach your target. This may include taking your blood pressure at home. Maintain a healthy weight. Your weight and body mass index (BMI) will be checked regularly. BMI helps find if your weight is healthy for your height. Your healthcare provider will use other tests to check your muscle and protein levels. Extra weight can strain your kidneys. A low weight or low muscle mass can make you feel more tired. You may have trouble doing your daily activities. Ask your provider what a healthy weight is for you. He or she can help you create a plan to lose or gain weight safely, if needed. The plan may include keeping a food diary. This is a list of foods and liquids you have each day. Your provider will use the diary to help you make changes, if needed. Changes are based on your health and any other conditions you have, such as diabetes. Create an exercise plan. Regular exercise can help you manage CKD, high blood pressure, and diabetes. Exercise also helps control weight. Your provider can help you create exercise goals and a plan to reach those goals. For example, your goal may be to exercise for 30 minutes in a day. Your plan can include breaking exercise into 10 minute sessions, 3 times during the day. Create a healthy eating plan.   Your provider may tell you to eat food low in potassium, phosphorus, or protein. Your provider may also recommend vitamin or mineral supplements. Do not take any supplements without talking to your provider. A dietitian can help you plan meals if needed. Ask how much liquid to drink each day and which liquids are best for you. Limit sodium (salt) as directed. You may need to limit sodium to less than 2,300 milligrams (mg) each day. Ask your dietitian or healthcare provider how much sodium you can have each day. The amount depends on your stage of kidney disease. Table salt, canned foods, soups, salted snacks, and processed meats, like deli meats and sausage, are high in sodium. Your provider or a dietitian can show you how to read food labels for sodium. Limit alcohol as directed. Alcohol can cause fluid retention and can affect your kidneys. Ask how much alcohol is safe for you. A drink of alcohol is 12 ounces of beer, 5 ounces of wine, or 1½ ounces of liquor. Do not smoke. Nicotine and other chemicals in cigarettes and cigars can cause kidney damage. Ask your provider for information if you currently smoke and need help to quit. E-cigarettes or smokeless tobacco still contain nicotine. Talk to your provider before you use these products. Ask about over-the-counter medicines. Medicines such as NSAIDs and laxatives may harm your kidneys. Some cough and cold medicines can raise your blood pressure. Always ask if a medicine is safe before you take it. Ask about vaccines you may need. CKD can increase your risk for infections such as pneumonia, influenza, and hepatitis. Vaccines lower your risk for infection. Your healthcare provider will tell you which vaccines you need and when to get them. Follow up with your doctor or nephrologist as directed: You will need to return for tests to monitor your kidney and nerve function, and your parathyroid hormone level.  Your medicines may be changed, based on certain test results. Write down your questions so you remember to ask them during your visits. © Copyright [employer] 2023 Information is for End User's use only and may not be sold, redistributed or otherwise used for commercial purposes. The above information is an  only. It is not intended as medical advice for individual conditions or treatments. Talk to your doctor, nurse or pharmacist before following any medical regimen to see if it is safe and effective for you.

## 2023-12-06 ENCOUNTER — OFFICE VISIT (OUTPATIENT)
Dept: FAMILY MEDICINE CLINIC | Facility: HOSPITAL | Age: 78
End: 2023-12-06
Payer: COMMERCIAL

## 2023-12-06 VITALS
SYSTOLIC BLOOD PRESSURE: 130 MMHG | HEIGHT: 72 IN | TEMPERATURE: 97.5 F | BODY MASS INDEX: 29.01 KG/M2 | DIASTOLIC BLOOD PRESSURE: 68 MMHG | WEIGHT: 214.2 LBS | HEART RATE: 67 BPM

## 2023-12-06 DIAGNOSIS — R04.0 BLEEDING FROM THE NOSE: ICD-10-CM

## 2023-12-06 DIAGNOSIS — C22.0 HEPATOCELLULAR CARCINOMA (HCC): ICD-10-CM

## 2023-12-06 DIAGNOSIS — N18.30 STAGE 3 CHRONIC KIDNEY DISEASE, UNSPECIFIED WHETHER STAGE 3A OR 3B CKD (HCC): ICD-10-CM

## 2023-12-06 DIAGNOSIS — I25.10 CORONARY ARTERY DISEASE INVOLVING NATIVE HEART WITHOUT ANGINA PECTORIS, UNSPECIFIED VESSEL OR LESION TYPE: Primary | ICD-10-CM

## 2023-12-06 DIAGNOSIS — G47.00 INSOMNIA, UNSPECIFIED TYPE: ICD-10-CM

## 2023-12-06 DIAGNOSIS — I48.91 ATRIAL FIBRILLATION, UNSPECIFIED TYPE (HCC): ICD-10-CM

## 2023-12-06 DIAGNOSIS — Z79.4 TYPE 2 DIABETES MELLITUS WITH DIABETIC POLYNEUROPATHY, WITH LONG-TERM CURRENT USE OF INSULIN (HCC): ICD-10-CM

## 2023-12-06 DIAGNOSIS — E11.42 TYPE 2 DIABETES MELLITUS WITH DIABETIC POLYNEUROPATHY, WITH LONG-TERM CURRENT USE OF INSULIN (HCC): ICD-10-CM

## 2023-12-06 DIAGNOSIS — J43.8 OTHER EMPHYSEMA (HCC): ICD-10-CM

## 2023-12-06 PROBLEM — R55 SYNCOPE: Status: RESOLVED | Noted: 2023-08-07 | Resolved: 2023-12-06

## 2023-12-06 PROCEDURE — 99214 OFFICE O/P EST MOD 30 MIN: CPT | Performed by: FAMILY MEDICINE

## 2023-12-06 RX ORDER — TRAZODONE HYDROCHLORIDE 100 MG/1
TABLET ORAL
Qty: 180 TABLET | Refills: 0 | Status: SHIPPED | OUTPATIENT
Start: 2023-12-06

## 2023-12-06 NOTE — PROGRESS NOTES
Name: David Gordon      : 1945      MRN: [id number]  Encounter Provider: Giovani Mena MD  Encounter Date: 2023   Encounter department: [address]     1. Coronary artery disease involving native heart without angina pectoris, unspecified vessel or lesion type  -no current sytmpoms. - daily weights   - low salt , heart helathy diet.   -   2. Atrial fibrillation, unspecified type ([address])  -stable. On AC and rate control.   -continue fu with Cardiology. 3. Type 2 diabetes mellitus with diabetic polyneuropathy, with long-term current use of insulin ([address])    -working w Endo. -DM friendly diet. -monitor for hypoglycemia. 4. Other emphysema ([address])    -stable. - continue with anoro.   -ongoing fu with pulmo  5. Stage 3 chronic kidney disease, unspecified whether stage 3a- or 3b CKD ([facility]CC)    -stable. Avoid nsaids.   -continue with every other day lasix. On jardiance. 6. Hepatocellular carcinoma ([address])    - fu with Surgical oncology upcoming.   - MRI to be completed tomorrow. Seb Garcia is here for fu of chronic conditions. Overall feeling okay. Eating well. Wife has him on a low salt diabetic friendly diet. He is working with Endo regarding DM control. More recently was seen by nephrology for CKD. Lasix was adjusted to every other day as he really was not taking his lasix due to increase urination. He has been doing well with every other day dosing. No sob, no leg swelling, no orthopnea. Liver ca. Working w surgical oncology. Has mri upcoming with apt fu with dr. Florencio Means next week. Review of Systems   Constitutional: Negative. Negative for activity change, appetite change, chills and diaphoresis. HENT:  Negative for congestion and dental problem. Respiratory: Negative. Negative for apnea, chest tightness, shortness of breath and wheezing. Cardiovascular: Negative.   Negative for chest pain, palpitations and leg swelling. Gastrointestinal: Negative. Negative for abdominal distention, abdominal pain, constipation, diarrhea and nausea. Genitourinary: Negative. Negative for difficulty urinating, dysuria and frequency.        Current Outpatient Medications on File Prior to Visit   Medication Sig   • acetaminophen (TYLENOL) 650 mg CR tablet Take 650 mg by mouth every 8 (eight) hours as needed for mild pain   • atorvastatin (LIPITOR) 40 mg tablet take 1 tablet by mouth once daily after dinner   • azelastine (ASTELIN) 0.1 % nasal spray 1 spray into each nostril 2 (two) times a day Use in each nostril as directed   • clopidogrel (PLAVIX) 75 mg tablet Take 1 tablet (75 mg total) by mouth daily   • Continuous Blood Gluc  (Dexcom G6 ) NOHEMY Use with dexcom sensor   • Continuous Blood Gluc Sensor (Dexcom G6 Sensor) MISC Use daily as directed for CGM - Change every 10 days   • Continuous Blood Gluc Transmit (Dexcom G6 Transmitter) MISC Use daily as directed for CGM - Change every 3 months   • Continuous Blood Gluc Transmit (Dexcom G6 Transmitter) MISC Use daily as directed for CGM - Change every 3 months   • Eliquis 5 MG take 1 tablet by mouth twice a day   • ferrous sulfate 325 (65 Fe) mg tablet Take 1 tablet (325 mg total) by mouth daily with breakfast   • furosemide (LASIX) 40 mg tablet Take 1 tablet (40 mg total) by mouth every other day   • insulin detemir (Levemir FlexPen) 100 Units/mL injection pen 65 units daily   • Insulin Pen Needle (Droplet Pen Needles) 32G X 4 MM MISC use 1 PEN NEEDLE to inject MEDICATION subcutaneously four times a day   • Jardiance 10 MG TABS tablet take 1 tablet by mouth daily every morning   • metoprolol succinate (TOPROL-XL) 50 mg 24 hr tablet Take 0.5 tablets (25 mg total) by mouth daily   • mometasone (ELOCON) 0.1 % cream Apply topically daily for 7 days   • Multiple Vitamin (MULTIVITAMIN ADULT PO) Take 1 tablet by mouth daily   • NovoLOG FlexPen 100 units/mL injection pen INJECT 40 UNITS UNDER THE SKIN 3 TIMES DAILY WITH MEALS   • pantoprazole (PROTONIX) 40 mg tablet Take 1 tablet (40 mg total) by mouth 2 (two) times a day (Patient taking differently: Take 40 mg by mouth daily)   • Potassium Citrate ER 15 MEQ (1620 MG) TBCR Take 1 tablet by mouth 2 (two) times a day   • pregabalin (LYRICA) 100 mg capsule take 1 capsule by mouth three times a day   • sertraline (ZOLOFT) 25 mg tablet Take 25 mg by mouth daily   • SUPER B COMPLEX/C PO Take 1 tablet by mouth daily   • tamsulosin (FLOMAX) 0.4 mg Take 0.4 mg by mouth daily with dinner   • Trulicity 6.51 DL/2.5BF injection inject 0.5 milliliters ( 0.75 milligrams ) subcutaneously every week IN THE ABDOMEN THIGHS OR OUTER AREA OF UPPER ARM ROTATE INJECTION SITES   • umeclidinium-vilanterol 62.5-25 mcg/actuation inhaler Inhale 1 puff daily   • Blood Glucose Monitoring Suppl (OneTouch Verio) w/Device KIT Use 4 (four) times a day (Patient not taking: Reported on 12/6/2023)   • glucose blood (OneTouch Verio) test strip Use as instructed (Patient not taking: Reported on 10/13/2023)   • [DISCONTINUED] traZODone (DESYREL) 100 mg tablet take 2 tablets by mouth at bedtime       Objective     /68   Pulse 67   Temp 97.5 °F (36.4 °C)   Ht 6' (1.829 m)   Wt 97.2 kg (214 lb 3.2 oz)   BMI 29.05 kg/m²     Physical Exam  Constitutional:       General: He is not in acute distress. Appearance: Normal appearance. He is well-developed. HENT:      Head: Normocephalic and atraumatic. Right Ear: External ear normal.      Left Ear: External ear normal.      Nose: Nose normal. No congestion or rhinorrhea. Mouth/Throat:      Mouth: Mucous membranes are moist.   Eyes:      Conjunctiva/sclera: Conjunctivae normal.      Pupils: Pupils are equal, round, and reactive to light. Cardiovascular:      Rate and Rhythm: Normal rate and regular rhythm. Heart sounds: Normal heart sounds. No murmur heard.      No friction rub. No gallop. Pulmonary:      Effort: Pulmonary effort is normal. No respiratory distress. Breath sounds: Normal breath sounds. No wheezing or rales. Chest:      Chest wall: No tenderness. Abdominal:      General: Bowel sounds are normal. There is no distension. Palpations: Abdomen is soft. There is no mass. Tenderness: There is no abdominal tenderness. There is no guarding or rebound. Musculoskeletal:         General: Normal range of motion. Cervical back: Normal range of motion and neck supple. Skin:     General: Skin is warm. Neurological:      Mental Status: He is alert and oriented to person, place, and time. Psychiatric:         Mood and Affect: Mood normal.         Behavior: Behavior normal.         Thought Content:  Thought content normal.         Judgment: Judgment normal.       Anastasia Hartman MD

## 2023-12-06 NOTE — PROGRESS NOTES
OFFICE FOLLOW UP - Nephrology   Claire Cardona 69 y.o. male MRN: [id number]    Encounter: [id number]        ASSESSMENT & PLAN    Diagnoses and all orders for this visit:    69-year-old male with extensive cardiac history had 2 hospitalizations in August, for syncope and anemia secondary to GI bleed and subsequently also had acute on chronic diastolic heart failure this is all complicated by chronic kidney disease    Stage 3 chronic kidney disease, unspecified whether stage 3a or 3b CKD ([address])  -His GFR is around 40 to 43 mL/min  -LFTs are elevated  -Previous albumin to creatinine ratio 73  -CT of the chest abdomen pelvis on September 1 shows 1 or more simple renal cysts and a nonobstructing 4 mm lower pole with no hydronephrosis  -Blood pressures are adequate for renal perfusion  -Medications reviewed and are acceptable  -On potassium citrate 15 mill equivalents 2 times daily for kidney stones    Generalized edema  -Lower extremity edema currently being treated with furosemide he did stop this because he was "peeing too much "  -We discussed that he has some lower extremity edema and has had a history of congestive heart failure he has agreed to restart it every other day  -Uptitrate furosemide  -Continue Jardiance    Type 2 diabetes mellitus with diabetic polyneuropathy, with long-term current use of insulin ([address])  -Continue cardiovascular risk reduction  -Continue Jardiance 10 mg every morning    Acute on chronic diastolic HF (heart failure) ([address])  -Discontinued cardiac rehab on his own  -Continue to monitor  -Beta-blockade low-sodium diet diuretics, Jardiance    Atrial fibrillation, unspecified type ([address])  -Remains on anticoagulation  -Remains on metoprolol  -Globin remained stable    Coronary artery disease involving native heart without angina pectoris, unspecified vessel or lesion type  -Following up with cardiology      DISCUSSION:    It was nice meeting [facility] today his blood work was from November 1 so we will repeat it now. Depending on this we will continue active surveillance and monitoring his volume status. He was in agreement with this plan and had no further questions    HPI: Miguelito Chaparro is a 69 y.o. male who is here for Follow-up and Chronic Kidney Disease    60-year-old male overall with no acute complaints, now off his diuretic because he states he was peeing too much, his weight and shortness of breath are stable. He does have lower extremity edema. His blood work was from November 1. Blood pressures have been stable. Has not been hospitalized since August.      ROS:   Review of Systems   Constitutional: Negative. HENT: Negative. Eyes: Negative. Respiratory: Negative. Cardiovascular: Negative. Gastrointestinal: Negative. Endocrine: Negative. Genitourinary: Negative. Musculoskeletal: Negative. Allergic/Immunologic: Negative. Neurological: Negative. Hematological: Negative. Psychiatric/Behavioral: Negative. All other systems reviewed and are negative. Allergies: Patient has no known allergies.     Medications:   Current Outpatient Medications:     acetaminophen (TYLENOL) 650 mg CR tablet, Take 650 mg by mouth every 8 (eight) hours as needed for mild pain, Disp: , Rfl:     atorvastatin (LIPITOR) 40 mg tablet, take 1 tablet by mouth once daily after dinner, Disp: 30 tablet, Rfl: 0    azelastine (ASTELIN) 0.1 % nasal spray, 1 spray into each nostril 2 (two) times a day Use in each nostril as directed, Disp: 1 mL, Rfl: 0    clopidogrel (PLAVIX) 75 mg tablet, Take 1 tablet (75 mg total) by mouth daily, Disp: 30 tablet, Rfl: 11    Continuous Blood Gluc  (Dexcom G6 ) NOHEMY, Use with dexcom sensor, Disp: 1 each, Rfl: 0    Continuous Blood Gluc Sensor (Dexcom G6 Sensor) MISC, Use daily as directed for CGM - Change every 10 days, Disp: 9 each, Rfl: 3    Continuous Blood Gluc Transmit (Dexcom G6 Transmitter) MISC, Use daily as directed for CGM - Change every 3 months, Disp: 1 each, Rfl: 3    Continuous Blood Gluc Transmit (Dexcom G6 Transmitter) MISC, Use daily as directed for CGM - Change every 3 months, Disp: 1 each, Rfl: 3    Eliquis 5 MG, take 1 tablet by mouth twice a day, Disp: 60 tablet, Rfl: 5    ferrous sulfate 325 (65 Fe) mg tablet, Take 1 tablet (325 mg total) by mouth daily with breakfast, Disp: 30 tablet, Rfl: 5    furosemide (LASIX) 40 mg tablet, Take 1 tablet (40 mg total) by mouth every other day, Disp: 90 tablet, Rfl: 3    insulin detemir (Levemir FlexPen) 100 Units/mL injection pen, 65 units daily, Disp: 30 mL, Rfl: 0    Insulin Pen Needle (Droplet Pen Needles) 32G X 4 MM MISC, use 1 PEN NEEDLE to inject MEDICATION subcutaneously four times a day, Disp: 1000 each, Rfl: 1    Jardiance 10 MG TABS tablet, take 1 tablet by mouth daily every morning, Disp: 90 tablet, Rfl: 0    metoprolol succinate (TOPROL-XL) 50 mg 24 hr tablet, Take 0.5 tablets (25 mg total) by mouth daily, Disp: 30 tablet, Rfl: 6    Multiple Vitamin (MULTIVITAMIN ADULT PO), Take 1 tablet by mouth daily, Disp: , Rfl:     NovoLOG FlexPen 100 units/mL injection pen, INJECT 40 UNITS UNDER THE SKIN 3 TIMES DAILY WITH MEALS, Disp: 36 mL, Rfl: 2    pantoprazole (PROTONIX) 40 mg tablet, Take 1 tablet (40 mg total) by mouth 2 (two) times a day (Patient taking differently: Take 40 mg by mouth daily), Disp: 30 tablet, Rfl: 0    Potassium Citrate ER 15 MEQ (1620 MG) TBCR, Take 1 tablet by mouth 2 (two) times a day, Disp: , Rfl:     pregabalin (LYRICA) 100 mg capsule, take 1 capsule by mouth three times a day, Disp: 90 capsule, Rfl: 2    sertraline (ZOLOFT) 25 mg tablet, Take 25 mg by mouth daily, Disp: , Rfl:     SUPER B COMPLEX/C PO, Take 1 tablet by mouth daily, Disp: , Rfl:     tamsulosin (FLOMAX) 0.4 mg, Take 0.4 mg by mouth daily with dinner, Disp: , Rfl:     Trulicity 1.43 MO/6.5SZ injection, inject 0.5 milliliters ( 0.75 milligrams ) subcutaneously every week IN THE ABDOMEN THIGHS OR OUTER AREA OF UPPER ARM ROTATE INJECTION SITES, Disp: 2 mL, Rfl: 3    umeclidinium-vilanterol 62.5-25 mcg/actuation inhaler, Inhale 1 puff daily, Disp: 60 blister, Rfl: 5    Blood Glucose Monitoring Suppl (OneTouch Verio) w/Device KIT, Use 4 (four) times a day (Patient not taking: Reported on 10/13/2023), Disp: 1 kit, Rfl: 0    glucose blood (OneTouch Verio) test strip, Use as instructed (Patient not taking: Reported on 10/13/2023), Disp: 100 strip, Rfl: 2    mometasone (ELOCON) 0.1 % cream, Apply topically daily for 7 days, Disp: 15 g, Rfl: 0    traZODone (DESYREL) 100 mg tablet, take 2 tablets by mouth at bedtime, Disp: 180 tablet, Rfl: 0    Past Medical History:   Diagnosis Date    Atrial fibrillation (HCC)     Eliquis    AVB (atrioventricular block)     1st degree    CAD (coronary artery disease)     CKD (chronic kidney disease), stage III (HCC)     baseline Cr 1.2-1.6    Diabetes mellitus ([address])     type 2, insulin dependent    Diverticulosis     Emphysema lung ([address])     Former tobacco use     History of asbestos exposure     History of DVT (deep vein thrombosis)     RLE, tx w/ AC    History of GI bleed 08/2023    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, given PRBC/Venofer for anemia while in house    Hyperlipidemia     Hypertension     LAFB (left anterior fascicular block)     Liver cancer ([address])     Liver mass 08/2023    suspected HCC, needs radioembolization    RBBB      Past Surgical History:   Procedure Laterality Date    APPENDECTOMY      BOWEL RESECTION  2014    CARDIAC CATHETERIZATION      CARDIAC CATHETERIZATION N/A 8/25/2023    Procedure: Cardiac pci;  Surgeon: David Vail DO;  [facility]; Service: Cardiology    CARDIAC CATHETERIZATION N/A 8/21/2023    Procedure: Cardiac catheterization;  Surgeon: David Vail DO;  [facility];   Service: Cardiology    CARDIAC CATHETERIZATION N/A 8/21/2023    Procedure: Cardiac Coronary Angiogram;  Surgeon: Bob Salcido DO Tami;  Location:  CARDIAC CATH LAB; Service: Cardiology    CATARACT EXTRACTION Bilateral     EGD      IR Y-90 PRE-ANGIO/EMBO W/ LUNG SCAN  9/1/2023    IR Y-90 RADIOEMBOLIZATION  9/8/2023     Family History   Problem Relation Age of Onset    Hypertension Mother     Bone cancer Father     Diabetes type II Sister     Diabetes type II Sister     Esophageal cancer Brother       reports that he quit smoking about 31 years ago. His smoking use included cigarettes. He started smoking about 59 years ago. He has a 30.00 pack-year smoking history. He has never used smokeless tobacco. He reports current alcohol use. He reports that he does not use drugs. OBJECTIVE:    Vitals:    12/05/23 1607   BP: 144/72   BP Location: Left arm   Patient Position: Sitting   Cuff Size: Adult   Weight: 96.6 kg (213 lb)   Height: 6' (1.829 m)        Body mass index is 28.89 kg/m². [unfilled]     Weight (last 2 days)       Date/Time Weight    12/05/23 1607 96.6 (213)               Physical exam:  Physical Exam  Vitals reviewed. Constitutional:       Appearance: Normal appearance. He is normal weight. HENT:      Head: Normocephalic and atraumatic. Right Ear: External ear normal.      Left Ear: External ear normal.      Nose: Nose normal.      Mouth/Throat:      Mouth: Mucous membranes are dry. Pharynx: Oropharynx is clear. Eyes:      Extraocular Movements: Extraocular movements intact. Conjunctiva/sclera: Conjunctivae normal.   Cardiovascular:      Rate and Rhythm: Regular rhythm. Tachycardia present. Pulses: Normal pulses. Heart sounds: Normal heart sounds. Pulmonary:      Effort: Pulmonary effort is normal.      Breath sounds: Normal breath sounds. Abdominal:      General: Abdomen is flat. Bowel sounds are normal.      Palpations: Abdomen is soft. Musculoskeletal:         General: Normal range of motion. Skin:     General: Skin is warm and dry.    Neurological:      General: No focal deficit present. Mental Status: He is alert and oriented to person, place, and time. Mental status is at baseline. Psychiatric:         Mood and Affect: Mood normal.         Behavior: Behavior normal.         Thought Content: Thought content normal.         Judgment: Judgment normal.         Data Review:    Results for orders placed or performed during the hospital encounter of 11/03/23   CBC and differential   Result Value Ref Range    WBC 6.42 4.31 - 10.16 Thousand/uL    RBC 4.96 3.88 - 5.62 Million/uL    Hemoglobin 13.2 12.0 - 17.0 g/dL    Hematocrit 43.6 36.5 - 49.3 %    MCV 88 82 - 98 fL    MCH 26.6 (L) 26.8 - 34.3 pg    MCHC 30.3 (L) 31.4 - 37.4 g/dL    RDW 19.9 (H) 11.6 - 15.1 %    Platelets 119 722 - 961 Thousands/uL    nRBC 0 /100 WBCs    Neutrophils Relative 69 43 - 75 %    Immat GRANS % 1 0 - 2 %    Lymphocytes Relative 11 (L) 14 - 44 %    Monocytes Relative 15 (H) 4 - 12 %    Eosinophils Relative 3 0 - 6 %    Basophils Relative 1 0 - 1 %    Neutrophils Absolute 4.48 1.85 - 7.62 Thousands/µL    Immature Grans Absolute 0.03 0.00 - 0.20 Thousand/uL    Lymphocytes Absolute 0.70 0.60 - 4.47 Thousands/µL    Monocytes Absolute 0.96 0.17 - 1.22 Thousand/µL    Eosinophils Absolute 0.21 0.00 - 0.61 Thousand/µL    Basophils Absolute 0.04 0.00 - 0.10 Thousands/µL   Protime-INR   Result Value Ref Range    Protime 19.4 (H) 11.6 - 14.5 seconds    INR 1.59 (H) 0.84 - 1.19   APTT   Result Value Ref Range    PTT 35 23 - 37 seconds             Portions of the record may have been created with voice recognition software. Occasional wrong word or "sound a like" substitutions may have occurred due to the inherent limitations of voice recognition software. Read the chart carefully and recognize, using context, where substitutions have occurred. If you have any questions, please contact the dictating provider.

## 2023-12-07 ENCOUNTER — HOSPITAL ENCOUNTER (OUTPATIENT)
Dept: MRI IMAGING | Facility: HOSPITAL | Age: 78
End: 2023-12-07
Attending: SURGERY
Payer: COMMERCIAL

## 2023-12-07 DIAGNOSIS — C22.0 HEPATOCELLULAR CARCINOMA (HCC): ICD-10-CM

## 2023-12-07 PROCEDURE — G1004 CDSM NDSC: HCPCS

## 2023-12-07 PROCEDURE — 74183 MRI ABD W/O CNTR FLWD CNTR: CPT

## 2023-12-07 PROCEDURE — A9581 GADOXETATE DISODIUM INJ: HCPCS | Performed by: SURGERY

## 2023-12-07 RX ADMIN — GADOXETATE DISODIUM 9 ML: 181.43 INJECTION, SOLUTION INTRAVENOUS at 18:50

## 2023-12-13 ENCOUNTER — OFFICE VISIT (OUTPATIENT)
Dept: GASTROENTEROLOGY | Facility: CLINIC | Age: 78
End: 2023-12-13
Payer: COMMERCIAL

## 2023-12-13 VITALS
HEIGHT: 72 IN | DIASTOLIC BLOOD PRESSURE: 72 MMHG | WEIGHT: 212.2 LBS | SYSTOLIC BLOOD PRESSURE: 166 MMHG | BODY MASS INDEX: 28.74 KG/M2

## 2023-12-13 DIAGNOSIS — R16.0 LIVER MASSES: ICD-10-CM

## 2023-12-13 DIAGNOSIS — D64.9 ANEMIA, UNSPECIFIED TYPE: ICD-10-CM

## 2023-12-13 DIAGNOSIS — I48.91 ATRIAL FIBRILLATION, UNSPECIFIED TYPE (HCC): ICD-10-CM

## 2023-12-13 DIAGNOSIS — C22.0 HEPATOCELLULAR CARCINOMA (HCC): Primary | ICD-10-CM

## 2023-12-13 PROCEDURE — 99214 OFFICE O/P EST MOD 30 MIN: CPT | Performed by: INTERNAL MEDICINE

## 2023-12-13 NOTE — PROGRESS NOTES
[facility] Jamie Canales [facility] - Outpatient Follow-up Note  Robert White 69 y.o. male MRN: [id number]  Encounter: [id number]    ASSESSMENT AND PLAN:      1. Anemia, unspecified type  Patient which is probably multifactorial including anemia of chronic disease related to his cancer as well as iron deficiency related to occult GI blood loss exacerbated by the Eliquis. EGD with multiple AVMs. His last CBC in November was normal  -Continue iron  -Continue pantoprazole  -If has recurrent anemia may need to give IV iron or reevaluate the pros/cons of continuing the Eliquis    2. Liver masses  3. Hepatocellular carcinoma ([address])  Diagnosed in August. S/P chemoembolization. Had MRI earlier this week  -Follow-up with Dr. Jb Iqbal as scheduled    4. Atrial fibrillation, unspecified type ([address])  Chronically on Eliquis      Followup Appointment: 6 months  ______________________________________________________________________    Chief Complaint   Patient presents with    Hospital follow up GI bleed     HPI: The patient is seen back in the office today following recent hospitalization. He was evaluated secondary to anemia. He was on Eliquis at that time. An EGD revealed AVMs in the stomach and the duodenum that were treated. Colonoscopy showed a previous anastomosis and several small benign polyps. He was treated with iron. As part of the imaging his workup he was found to have what appeared to be cirrhosis and liver masses. A biopsy was entertained but when the alpha-fetoprotein came back at 18,000 oncology felt this confirmed hepatocellular carcinoma. He was not a surgical candidate and received his first treatment of radioembolization in September. He to follow-up MRI earlier this week and is scheduled to see Dr. Jb Iqbal in the office tomorrow. From a GI standpoint he is doing well. He denies any heartburn or indigestion. Denies any dysphagia, odynophagia, early satiety. Denies any abdominal pain.   He is moving his bowels a little bit on the constipated side with the iron. He denies any rectal bleeding or melena. His weight is relatively stable. 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, given PRBC/Venofer for anemia while in house    Hyperlipidemia     Hypertension     LAFB (left anterior fascicular block)     Liver cancer ([address])     Liver mass 08/2023    suspected HCC, needs radioembolization    RBBB     Syncope 08/07/2023     Past Surgical History:   Procedure Laterality Date    APPENDECTOMY      BOWEL RESECTION  2014    CARDIAC CATHETERIZATION      CARDIAC CATHETERIZATION N/A 08/25/2023    Procedure: Cardiac pci;  Surgeon: Geo Zamora DO;  [facility]; Service: Cardiology    CARDIAC CATHETERIZATION N/A 08/21/2023    Procedure: Cardiac catheterization;  Surgeon: Geo Zamora DO;  [facility]; Service: Cardiology    CARDIAC CATHETERIZATION N/A 08/21/2023    Procedure: Cardiac Coronary Angiogram;  Surgeon: Geo Zamora DO;  [facility];   Service: Cardiology    CATARACT EXTRACTION Bilateral     COLONOSCOPY      EGD      IR Y-90 PRE-ANGIO/EMBO W/ LUNG SCAN  09/01/2023    IR Y-90 RADIOEMBOLIZATION  09/08/2023     Social History     Substance and Sexual Activity   Alcohol Use Yes    Comment: Socially     Social History     Substance and Sexual Activity   Drug Use Never     Social History     Tobacco Use   Smoking Status Former    Current packs/day: 0.00    Average packs/day: 1 pack/day for 30.0 years (30.0 ttl pk-yrs)    Types: Cigarettes    Start date: 57    Quit date: 13    Years since quittin.9   Smokeless Tobacco Never     Family History   Problem Relation Age of Onset    Hypertension Mother     Bone cancer Father     Diabetes type II Sister     Diabetes type II Sister     Esophageal cancer Brother     Colon cancer Neg Hx          Current Outpatient Medications:     acetaminophen (TYLENOL) 650 mg CR tablet    atorvastatin (LIPITOR) 40 mg tablet    azelastine (ASTELIN) 0.1 % nasal spray    clopidogrel (PLAVIX) 75 mg tablet    Continuous Blood Gluc  (Dexcom G6 ) NOHEMY    Eliquis 5 MG    ferrous sulfate 325 (65 Fe) mg tablet    furosemide (LASIX) 40 mg tablet    insulin detemir (Levemir FlexPen) 100 Units/mL injection pen    Insulin Pen Needle (Droplet Pen Needles) 32G X 4 MM MISC    Jardiance 10 MG TABS tablet    metoprolol succinate (TOPROL-XL) 50 mg 24 hr tablet    Multiple Vitamin (MULTIVITAMIN ADULT PO)    NovoLOG FlexPen 100 units/mL injection pen    pantoprazole (PROTONIX) 40 mg tablet    Potassium Citrate ER 15 MEQ (1620 MG) TBCR    pregabalin (LYRICA) 100 mg capsule    sertraline (ZOLOFT) 25 mg tablet    SUPER B COMPLEX/C PO    tamsulosin (FLOMAX) 0.4 mg    traZODone (DESYREL) 100 mg tablet    Trulicity 8.07 YJ/5.8QV injection    umeclidinium-vilanterol 62.5-25 mcg/actuation inhaler    Blood Glucose Monitoring Suppl (OneTouch Verio) w/Device KIT    Continuous Blood Gluc Sensor (Dexcom G6 Sensor) MISC    Continuous Blood Gluc Transmit (Dexcom G6 Transmitter) MISC    Continuous Blood Gluc Transmit (Dexcom G6 Transmitter) MISC    glucose blood (OneTouch Verio) test strip    mometasone (ELOCON) 0.1 % cream  No Known Allergies  Reviewed medications and allergies and updated as indicated    PHYSICAL EXAM:    Blood pressure 166/72, height 6' (1.829 m), weight 96.3 kg (212 lb 3.2 oz).  Body mass index is 28.78 kg/m². General Appearance: NAD, cooperative, alert  Eyes: Anicteric, PERRLA, EOMI  ENT:  Normocephalic, atraumatic, normal mucosa. Neck:  Supple, symmetrical, trachea midline  Resp:  Clear to auscultation bilaterally; no rales, rhonchi or wheezing; respirations unlabored   CV:  S1 S2, Regular rate and rhythm; no murmur, rub, or gallop. GI:  Soft, non-tender, non-distended; normal bowel sounds; no masses, no organomegaly   Rectal: Deferred  Musculoskeletal: No cyanosis, clubbing or edema. Normal ROM. Skin:  No jaundice, rashes, or lesions   Heme/Lymph: No palpable cervical lymphadenopathy  Psych: Normal affect, good eye contact  Neuro: No gross deficits, AAOx3    Lab Results:   Lab Results   Component Value Date    WBC 6.42 11/03/2023    HGB 13.2 11/03/2023    HCT 43.6 11/03/2023    MCV 88 11/03/2023     11/03/2023     Lab Results   Component Value Date    K 4.7 11/01/2023     (H) 11/01/2023    CO2 20 11/01/2023    BUN 45 (H) 11/01/2023    CREATININE 1.70 (H) 11/01/2023    GLUF 91 09/01/2023    CALCIUM 9.4 09/01/2023    AST 97 (H) 11/01/2023    ALT 69 (H) 11/01/2023    ALKPHOS 99 09/01/2023    EGFR 41 (L) 11/01/2023     Lab Results   Component Value Date    IRON 14 (L) 08/07/2023    TIBC 462 (H) 08/07/2023    FERRITIN 10 (L) 08/07/2023       Radiology Results:   No results found.

## 2023-12-14 ENCOUNTER — OFFICE VISIT (OUTPATIENT)
Dept: SURGICAL ONCOLOGY | Facility: CLINIC | Age: 78
End: 2023-12-14
Payer: COMMERCIAL

## 2023-12-14 VITALS
BODY MASS INDEX: 27.44 KG/M2 | TEMPERATURE: 97.3 F | HEIGHT: 74 IN | HEART RATE: 86 BPM | OXYGEN SATURATION: 94 % | DIASTOLIC BLOOD PRESSURE: 60 MMHG | WEIGHT: 213.8 LBS | SYSTOLIC BLOOD PRESSURE: 130 MMHG

## 2023-12-14 DIAGNOSIS — C22.0 HEPATOCELLULAR CARCINOMA (HCC): Primary | ICD-10-CM

## 2023-12-14 PROCEDURE — 99215 OFFICE O/P EST HI 40 MIN: CPT | Performed by: SURGERY

## 2023-12-14 NOTE — PROGRESS NOTES
Surgical Oncology Follow Up        S.  [facility] [place]  [address]  [facility] [address] [phone]    Qian Gentile  1945  [id number]  [id number] [facility]  [address]  [facility] [id number] [phone]    Diagnoses and all orders for this visit:    Hepatocellular carcinoma ([address])  -     MRI abdomen w wo contrast; Future  -     BUN; Future  -     Creatinine, serum; Future  -     AFP tumor marker; Future  -     BUN  -     Creatinine, serum  -     AFP tumor marker  -     AFP tumor marker; Future  -     AFP tumor marker        Chief Complaint   Patient presents with    Follow-up       Return in about 3 months (around 3/14/2024) for Office Visit, Imaging - See orders, Labs - See Treatment Plan. Oncology History   Hepatocellular carcinoma ([address])    Initial Diagnosis    Hepatocellular carcinoma ([address])     2023 -  Cancer Staged    Staging form: Liver (Excluding Intrahepatic Bile Ducts), AJCC 8th Edition  - Clinical stage from 2023: Stage II (cT2, cN0, cM0) - Signed by Augusto Asher MD on 2023  Histopathologic type: Hepatocellular carcinoma, NOS           [address]  Treatment history:   Current treatment: Observation  Disease status: Stable    History of Present Illness: Patient returns in follow-up of his [address] He is doing relatively well. He denies any abdominal pain, nausea or vomiting. His appetite is good. No unintentional weight loss. MRI from 2023 shows a similar lesion in segment 7 measuring 3.9 x 4 cm. Segment 6 lesion is 2.1 x 2.1 cm. The other smaller lesions are not seen well. The tumor thrombus appears stable. I personally reviewed the films.     Review of Systems  Complete ROS Surg Onc:   Complete ROS Surg Onc:   Constitutional: The patient denies new or recent history of general fatigue, no recent weight loss, no change in appetite. Eyes: No complaints of visual problems, no scleral icterus. ENT: no complaints of ear pain, no hoarseness, no difficulty swallowing,  no tinnitus and no new masses in head, oral cavity, or neck. Cardiovascular: No complaints of chest pain, no palpitations, no ankle edema. Respiratory: No complaints of shortness of breath, no cough. Gastrointestinal: No complaints of jaundice, no bloody stools, no pale stools. Genitourinary: No complaints of dysuria, no hematuria, no nocturia, no frequent urination, no urethral discharge. Musculoskeletal: No complaints of weakness, paralysis, joint stiffness or arthralgias. Integumentary: No complaints of rash, no new lesions. Neurological: No complaints of convulsions, no seizures, no dizziness. Hematologic/Lymphatic: No complaints of easy bruising. Endocrine:  No hot or cold intolerance. No polydipsia, polyphagia, or polyuria. Allergy/immunology:  No environmental allergies. No food allergies. Not immunocompromised. Skin:  No pallor or rash. No wound.         Patient Active Problem List   Diagnosis    Stage 3 chronic kidney disease, unspecified whether stage 3a or 3b CKD ([address])    Peripheral polyneuropathy    Type 2 diabetes mellitus with neurologic complication, with long-term current use of insulin (HCC)    Atrial fibrillation, unspecified type (HCC)    Liver mass    H/O asbestos exposure    History of tobacco abuse    Restrictive lung disease    Other emphysema (HCC)    Anemia    Acute on chronic diastolic HF (heart failure) (HCC)    History of DVT (deep vein thrombosis)    CAD (coronary artery disease)    Hepatic flexure mass    Hepatocellular carcinoma (HCC)    Coronary artery disease involving native coronary artery    Status post insertion of drug eluting coronary artery stent     Past Medical History:   Diagnosis Date    Atrial fibrillation (HCC)     Eliquis    AVB (atrioventricular block)     1st degree CAD (coronary artery disease)     CKD (chronic kidney disease), stage III (HCC)     baseline Cr 1.2-1.6    Colon polyp     Diabetes mellitus ([address])     type 2, insulin dependent    Diverticulosis     Emphysema lung ([address])     Former tobacco use     History of asbestos exposure     History of DVT (deep vein thrombosis)     RLE, tx w/ AC    History of GI bleed 08/2023    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, given PRBC/Venofer for anemia while in house    Hyperlipidemia     Hypertension     LAFB (left anterior fascicular block)     Liver cancer ([address])     Liver mass 08/2023    suspected HCC, needs radioembolization    RBBB     Syncope 08/07/2023     Past Surgical History:   Procedure Laterality Date    APPENDECTOMY      BOWEL RESECTION  2014    CARDIAC CATHETERIZATION      CARDIAC CATHETERIZATION N/A 08/25/2023    Procedure: Cardiac pci;  Surgeon: Pat Winston DO;  Location: [facility]; Service: Cardiology    CARDIAC CATHETERIZATION N/A 08/21/2023    Procedure: Cardiac catheterization;  Surgeon: Pat Winston DO;  Location: [facility]; Service: Cardiology    CARDIAC CATHETERIZATION N/A 08/21/2023    Procedure: Cardiac Coronary Angiogram;  Surgeon: Pat Winston DO;  Location: [facility];   Service: Cardiology    CATARACT EXTRACTION Bilateral     COLONOSCOPY      EGD      IR Y-90 PRE-ANGIO/EMBO W/ LUNG SCAN  09/01/2023    IR Y-90 RADIOEMBOLIZATION  09/08/2023     Family History   Problem Relation Age of Onset    Hypertension Mother     Bone cancer Father     Diabetes type II Sister     Diabetes type II Sister     Esophageal cancer Brother     Colon cancer Neg Hx      Social History     Socioeconomic History    Marital status: /Civil Union     Spouse name: Not on file    Number of children: Not on file    Years of education: Not on file    Highest education level: Not on file   Occupational History    Not on file   Tobacco Use    Smoking status: Former Current packs/day: 0.00     Average packs/day: 1 pack/day for 30.0 years (30.0 ttl pk-yrs)     Types: Cigarettes     Start date: 57     Quit date: 12     Years since quittin.9    Smokeless tobacco: Never   Vaping Use    Vaping status: Never Used   Substance and Sexual Activity    Alcohol use: Yes     Comment: Socially    Drug use: Never    Sexual activity: Not on file   Other Topics Concern    Not on file   Social History Narrative    Not on file     Social Determinants of Health     Financial Resource Strain: Low Risk  (10/13/2023)    Overall Financial Resource Strain (CARDIA)     Difficulty of Paying Living Expenses: Not hard at all   Food Insecurity: No Food Insecurity (2023)    Hunger Vital Sign     Worried About Running Out of Food in the Last Year: Never true     Ran Out of Food in the Last Year: Never true   Transportation Needs: No Transportation Needs (10/13/2023)    PRAPARE - Transportation     Lack of Transportation (Medical): No     Lack of Transportation (Non-Medical):  No   Physical Activity: Not on file   Stress: Not on file   Social Connections: Not on file   Intimate Partner Violence: Not on file   Housing Stability: Low Risk  (2023)    Housing Stability Vital Sign     Unable to Pay for Housing in the Last Year: No     Number of Places Lived in the Last Year: 1     Unstable Housing in the Last Year: No       Current Outpatient Medications:     acetaminophen (TYLENOL) 650 mg CR tablet, Take 650 mg by mouth every 8 (eight) hours as needed for mild pain, Disp: , Rfl:     atorvastatin (LIPITOR) 40 mg tablet, take 1 tablet by mouth once daily after dinner, Disp: 30 tablet, Rfl: 0    azelastine (ASTELIN) 0.1 % nasal spray, 1 spray into each nostril 2 (two) times a day Use in each nostril as directed, Disp: 1 mL, Rfl: 0    clopidogrel (PLAVIX) 75 mg tablet, Take 1 tablet (75 mg total) by mouth daily, Disp: 30 tablet, Rfl: 11    Continuous Blood Gluc  (Dexcom G6 ) NOHEMY Use with dexcom sensor, Disp: 1 each, Rfl: 0    Eliquis 5 MG, take 1 tablet by mouth twice a day, Disp: 60 tablet, Rfl: 5    ferrous sulfate 325 (65 Fe) mg tablet, Take 1 tablet (325 mg total) by mouth daily with breakfast, Disp: 30 tablet, Rfl: 5    furosemide (LASIX) 40 mg tablet, Take 1 tablet (40 mg total) by mouth every other day, Disp: 90 tablet, Rfl: 3    insulin detemir (Levemir FlexPen) 100 Units/mL injection pen, 65 units daily, Disp: 30 mL, Rfl: 0    Insulin Pen Needle (Droplet Pen Needles) 32G X 4 MM MISC, use 1 PEN NEEDLE to inject MEDICATION subcutaneously four times a day, Disp: 1000 each, Rfl: 1    Jardiance 10 MG TABS tablet, take 1 tablet by mouth daily every morning, Disp: 90 tablet, Rfl: 0    metoprolol succinate (TOPROL-XL) 50 mg 24 hr tablet, Take 0.5 tablets (25 mg total) by mouth daily, Disp: 30 tablet, Rfl: 6    Multiple Vitamin (MULTIVITAMIN ADULT PO), Take 1 tablet by mouth daily, Disp: , Rfl:     NovoLOG FlexPen 100 units/mL injection pen, INJECT 40 UNITS UNDER THE SKIN 3 TIMES DAILY WITH MEALS, Disp: 36 mL, Rfl: 2    pantoprazole (PROTONIX) 40 mg tablet, Take 1 tablet (40 mg total) by mouth 2 (two) times a day (Patient taking differently: Take 40 mg by mouth daily), Disp: 30 tablet, Rfl: 0    Potassium Citrate ER 15 MEQ (1620 MG) TBCR, Take 1 tablet by mouth 2 (two) times a day, Disp: , Rfl:     pregabalin (LYRICA) 100 mg capsule, take 1 capsule by mouth three times a day, Disp: 90 capsule, Rfl: 2    sertraline (ZOLOFT) 25 mg tablet, Take 25 mg by mouth daily, Disp: , Rfl:     SUPER B COMPLEX/C PO, Take 1 tablet by mouth daily, Disp: , Rfl:     tamsulosin (FLOMAX) 0.4 mg, Take 0.4 mg by mouth daily with dinner, Disp: , Rfl:     traZODone (DESYREL) 100 mg tablet, take 2 tablets by mouth at bedtime, Disp: 180 tablet, Rfl: 0    Trulicity 9.84 IO/2.4MW injection, inject 0.5 milliliters ( 0.75 milligrams ) subcutaneously every week IN THE ABDOMEN THIGHS OR OUTER AREA OF UPPER ARM ROTATE INJECTION SITES, Disp: 2 mL, Rfl: 3    umeclidinium-vilanterol 62.5-25 mcg/actuation inhaler, Inhale 1 puff daily, Disp: 60 blister, Rfl: 5    Blood Glucose Monitoring Suppl (OneTouch Verio) w/Device KIT, Use 4 (four) times a day (Patient not taking: Reported on 12/6/2023), Disp: 1 kit, Rfl: 0    Continuous Blood Gluc Sensor (Dexcom G6 Sensor) MISC, Use daily as directed for CGM - Change every 10 days (Patient not taking: Reported on 12/13/2023), Disp: 9 each, Rfl: 3    Continuous Blood Gluc Transmit (Dexcom G6 Transmitter) MISC, Use daily as directed for CGM - Change every 3 months (Patient not taking: Reported on 12/13/2023), Disp: 1 each, Rfl: 3    Continuous Blood Gluc Transmit (Dexcom G6 Transmitter) MISC, Use daily as directed for CGM - Change every 3 months (Patient not taking: Reported on 12/13/2023), Disp: 1 each, Rfl: 3    glucose blood (OneTouch Verio) test strip, Use as instructed (Patient not taking: Reported on 10/13/2023), Disp: 100 strip, Rfl: 2    mometasone (ELOCON) 0.1 % cream, Apply topically daily for 7 days, Disp: 15 g, Rfl: 0  No Known Allergies  Vitals:    12/14/23 1444   BP: 130/60   Pulse: 86   Temp: (!) 97.3 °F (36.3 °C)   SpO2: 94%       Physical Exam  Constitutional: General appearance: The Patient is well-developed and well-nourished who appears the stated age in no acute distress. Patient is pleasant and talkative. HEENT:  Normocephalic. Sclerae are anicteric. Mucous membranes are moist. Neck is supple without adenopathy. No JVD. Chest: The lungs are clear to auscultation. Cardiac: Heart is regular rate. Abdomen: Abdomen is soft, non-tender, non-distended and without masses. Extremities: There is no clubbing or cyanosis. There is no edema. Symmetric. Neuro: Grossly nonfocal. Gait is normal with a cane. Lymphatic: No evidence of cervical adenopathy bilaterally. No evidence of axillary adenopathy bilaterally. Skin: Warm, anicteric.     Psych:  Patient is pleasant and talkative. Breasts:        Pathology:  [unfilled]    Labs:      Imaging  MRI abdomen w wo contrast    Result Date: 12/13/2023  Narrative: MRI - ABDOMEN - WITH AND WITHOUT CONTRAST INDICATION: 69 years / Male  C22.0: Liver cell carcinoma. A biopsy was entertained but when the alpha-fetoprotein came back at 18,000 oncology felt this confirmed hepatocellular carcinoma. COMPARISON: CTA chest abdomen pelvis 9/1/2023. MR abdomen 7/6/2023 TECHNIQUE:  Multiplanar/multisequence MRI of the abdomen was performed before and after administration of contrast. Imaging performed on 1.5T MRI IV Contrast:  9 mL of gadoxetate disodium SOLN FINDINGS: Limited study due to extensive motion artifact. LOWER CHEST:   Unremarkable. LIVER: Similar size of the lesion in segment 7 abutting the IVC measuring 3.9 x 4.0 cm (series 5 image 10) compared to prior MR 7/6/2023 when measured similarly. Again this lesion is heterogeneous and predominantly hypoenhancing to surrounding hepatic parenchyma on all phases similar to prior. Similar size of the lesion in segment 6 measuring 2.1 x 2.1 cm (series 8 image 83). There is persistent arterial phase hyperenhancement (series 10,008 image 84). Cannot evaluate for residual tumor due to short time interval since recent Y90 embolization. The other previously seen liver smaller lesions are not well evaluated due to extensive motion artifact. Susceptibility effect also degrades evaluation of the left hepatic lobe. Previously noted tumor thrombus throughout the left portal vein and anterior right portal vein appears grossly similar to prior accounting for study limitations. BILE DUCTS: No intrahepatic or extrahepatic bile duct dilation. GALLBLADDER: Cholelithiasis without wall thickening. PANCREAS: Again there appears to be small cystic lesions in the pancreatic head/neck region measuring 0.9 cm and 0.7 cm (series 6 image 8), stable from prior MR 7/6/2023. Evaluation is limited without 3D MRCP. No main pancreatic ductal dilation. ADRENAL GLANDS:  Unremarkable. SPLEEN:  Normal. KIDNEYS/PROXIMAL URETERS: No hydroureteronephrosis. No suspicious renal mass. Multiple bilateral simple renal cysts. BOWEL:   No dilated loops of bowel. PERITONEUM/RETROPERITONEUM: Small volume ascites increased from prior CT 9/1/2023. LYMPH NODES: No abdominal lymphadenopathy. VASCULAR STRUCTURES:  No aneurysm. ABDOMINAL WALL:  Unremarkable. OSSEOUS STRUCTURES:  No suspicious osseous lesion. Impression: Markedly limited study due to extensive motion artifact. 1. Similar appearance of previously seen heterogeneous hypoenhancing segment 7 lesion measuring 4.0 cm. 2. Similar size of the lesion in segment 6 measuring 2.1 cm with persistent arterial phase enhancement. Cannot evaluate for residual tumor due to short time interval since recent Y90 embolization. 3. The other previously seen smaller lesions are not well evaluated due to extensive motion artifact and can be reassessed on follow-up. 4. Previously noted tumor thrombus throughout the left portal vein and anterior right portal vein appears grossly similar to prior. 5. Small volume ascites increased from prior CT 9/1/2023. Please note that follow-ups should be performed with extracellular contrast agent [employer]). Workstation performed: [id number]     I personally reviewed and interpreted the above laboratory and imaging data. Discussion/Summary: 56-year-old male with multifocal HCC associated with tumor thrombus. He was treated with Y90 given his markedly elevated AFP level. His current MRI looks stable. I have reviewed the film with interventional radiology and we feel these are likely stable and posttreatment related changes. I would recommend observation. I will obtain an AFP level now, just to see if this has come down from his pretreatment value. I will plan on repeating his MRI with AFP level in 3 months.   I also discussed potentially having him see medical oncology to discuss systemic therapy, but he wishes to hold off. I will see him again in 3 months with his MRI and AFP level. He is agreeable to this. All his questions were answered.

## 2023-12-14 NOTE — LETTER
2023     Maria Alejandra Valentine MD  [place] [address]    Patient: Galina Jarrett   YOB: 1945   Date of Visit: 2023       Dear Dr. Yehuda Zhu: Thank you for referring Galina Jarrett to me for evaluation. Below are my notes for this consultation. If you have questions, please do not hesitate to call me. I look forward to following your patient along with you. Sincerely,        Eyal Silva MD        CC: DO Eyal Baez MD  2023  3:07 PM  Incomplete               Surgical Oncology Follow Up       [address] [facility] [address] [address] [phone]    Galina Jarrett  1945  [id number]  [address] [facility] CANC[facility] [address] [place] [address] Road [address] [phone]    Diagnoses and all orders for this visit:    Hepatocellular carcinoma ([address] )  -     MRI abdomen w wo contrast; Future  -     BUN; Future  -     Creatinine, serum; Future  -     AFP tumor marker; Future  -     BUN  -     Creatinine, serum  -     AFP tumor marker  -     AFP tumor marker; Future  -     AFP tumor marker        Chief Complaint   Patient presents with   • Follow-up       Return in about 3 months (around 3/14/2024) for Office Visit, Imaging - See orders, Labs - See Treatment Plan.       Oncology History   Hepatocellular carcinoma [facility])    Initial Diagnosis    Hepatocellular carcinoma ([address] )     2023 -  Cancer Staged    Staging form: Liver (Excluding Intrahepatic Bile Ducts), AJCC 8th Edition  - Clinical stage from 2023: Stage II (cT2, cN0, cM0) - Signed by Bryant Wilkinson MD on 2023  Histopathologic type: Hepatocellular carcinoma, NOS           [address]   Treatment history:   Current treatment: Observation  Disease status: Stable    History of Present Illness: Patient returns in follow-up of his [address] [facility]. He is doing relatively well. He denies any abdominal pain, nausea or vomiting. His appetite is good. No unintentional weight loss. MRI from December 7, 2023 shows a similar lesion in segment 7 measuring 3.9 x 4 cm. Segment 6 lesion is 2.1 x 2.1 cm. The other smaller lesions are not seen well. The tumor thrombus appears stable. I personally reviewed the films. Review of Systems  Complete ROS Surg Onc:   Complete ROS Surg Onc:   Constitutional: The patient denies new or recent history of general fatigue, no recent weight loss, no change in appetite. Eyes: No complaints of visual problems, no scleral icterus. ENT: no complaints of ear pain, no hoarseness, no difficulty swallowing,  no tinnitus and no new masses in head, oral cavity, or neck. Cardiovascular: No complaints of chest pain, no palpitations, no ankle edema. Respiratory: No complaints of shortness of breath, no cough. Gastrointestinal: No complaints of jaundice, no bloody stools, no pale stools. Genitourinary: No complaints of dysuria, no hematuria, no nocturia, no frequent urination, no urethral discharge. Musculoskeletal: No complaints of weakness, paralysis, joint stiffness or arthralgias. Integumentary: No complaints of rash, no new lesions. Neurological: No complaints of convulsions, no seizures, no dizziness. Hematologic/Lymphatic: No complaints of easy bruising. Endocrine:  No hot or cold intolerance. No polydipsia, polyphagia, or polyuria. Allergy/immunology:  No environmental allergies. No food allergies. Not immunocompromised. Skin:  No pallor or rash. No wound.         Patient Active Problem List   Diagnosis   • Stage 3 chronic kidney disease, unspecified whether stage 3a or 3b CKD ([address] [facility])   • Peripheral polyneuropathy   • Type 2 diabetes mellitus with neurologic complication, with long-term current use of insulin (HCC)   • Atrial fibrillation, unspecified type [facility])   • Liver mass   • H/O asbestos exposure   • History of tobacco abuse   • Restrictive lung disease   • Other emphysema (HCC)   • Anemia   • Acute on chronic diastolic HF (heart failure) (HCC)   • History of DVT (deep vein thrombosis)   • CAD (coronary artery disease)   • Hepatic flexure mass   • Hepatocellular carcinoma (HCC)   • Coronary artery disease involving native coronary artery   • Status post insertion of drug eluting coronary artery stent     Past Medical History:   Diagnosis Date   • Atrial fibrillation (HCC)     Eliquis   • AVB (atrioventricular block)     1st degree   • CAD (coronary artery disease)    • CKD (chronic kidney disease), stage III (HCC)     baseline Cr 1.2-1.6   • Colon polyp    • Diabetes mellitus (HCC)     type 2, insulin dependent   • Diverticulosis    • Emphysema lung (HCC)    • Former tobacco use    • History of asbestos exposure    • History of DVT (deep vein thrombosis)     RLE, tx w/ AC   • History of GI bleed 08/2023    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, given PRBC/Venofer for anemia while in house   • Hyperlipidemia    • Hypertension    • LAFB (left anterior fascicular block)    • Liver cancer ([address])    • Liver mass 08/2023    suspected HCC, needs radioembolization   • RBBB    • Syncope 08/07/2023     Past Surgical History:   Procedure Laterality Date   • APPENDECTOMY     • BOWEL RESECTION  2014   • CARDIAC CATHETERIZATION     • CARDIAC CATHETERIZATION N/A 08/25/2023    Procedure: Cardiac pci;  Surgeon: Charly Skelton DO;  Location: [facility]; Service: Cardiology   • CARDIAC CATHETERIZATION N/A 08/21/2023    Procedure: Cardiac catheterization;  Surgeon: Charly Skelton DO;  [facility]; Service: Cardiology   • CARDIAC CATHETERIZATION N/A 08/21/2023    Procedure: Cardiac Coronary Angiogram;  Surgeon: Charly Skelton DO;  Location: [facility];   Service: Cardiology   • CATARACT EXTRACTION Bilateral    • COLONOSCOPY     • EGD • IR Y-90 PRE-ANGIO/EMBO W/ LUNG SCAN  2023   • IR Y-90 RADIOEMBOLIZATION  2023     Family History   Problem Relation Age of Onset   • Hypertension Mother    • Bone cancer Father    • Diabetes type II Sister    • Diabetes type II Sister    • Esophageal cancer Brother    • Colon cancer Neg Hx      Social History     Socioeconomic History   • Marital status: /Civil Union     Spouse name: Not on file   • Number of children: Not on file   • Years of education: Not on file   • Highest education level: Not on file   Occupational History   • Not on file   Tobacco Use   • Smoking status: Former     Current packs/day: 0.00     Average packs/day: 1 pack/day for 30.0 years (30.0 ttl pk-yrs)     Types: Cigarettes     Start date: 57     Quit date: 13     Years since quittin.9   • Smokeless tobacco: Never   Vaping Use   • Vaping status: Never Used   Substance and Sexual Activity   • Alcohol use: Yes     Comment: Socially   • Drug use: Never   • Sexual activity: Not on file   Other Topics Concern   • Not on file   Social History Narrative   • Not on file     Social Determinants of Health     Financial Resource Strain: Low Risk  (10/13/2023)    Overall Financial Resource Strain (CARDIA)    • Difficulty of Paying Living Expenses: Not hard at all   Food Insecurity: No Food Insecurity (2023)    Hunger Vital Sign    • Worried About Running Out of Food in the Last Year: Never true    • Ran Out of Food in the Last Year: Never true   Transportation Needs: No Transportation Needs (10/13/2023)    PRAPARE - Transportation    • Lack of Transportation (Medical): No    • Lack of Transportation (Non-Medical):  No   Physical Activity: Not on file   Stress: Not on file   Social Connections: Not on file   Intimate Partner Violence: Not on file   Housing Stability: Low Risk  (2023)    Housing Stability Vital Sign    • Unable to Pay for Housing in the Last Year: No    • Number of Places Lived in the Last Year: 1    • Unstable Housing in the Last Year: No       Current Outpatient Medications:   •  acetaminophen (TYLENOL) 650 mg CR tablet, Take 650 mg by mouth every 8 (eight) hours as needed for mild pain, Disp: , Rfl:   •  atorvastatin (LIPITOR) 40 mg tablet, take 1 tablet by mouth once daily after dinner, Disp: 30 tablet, Rfl: 0  •  azelastine (ASTELIN) 0.1 % nasal spray, 1 spray into each nostril 2 (two) times a day Use in each nostril as directed, Disp: 1 mL, Rfl: 0  •  clopidogrel (PLAVIX) 75 mg tablet, Take 1 tablet (75 mg total) by mouth daily, Disp: 30 tablet, Rfl: 11  •  Continuous Blood Gluc  (Dexcom G6 ) NOHEMY, Use with dexcom sensor, Disp: 1 each, Rfl: 0  •  Eliquis 5 MG, take 1 tablet by mouth twice a day, Disp: 60 tablet, Rfl: 5  •  ferrous sulfate 325 (65 Fe) mg tablet, Take 1 tablet (325 mg total) by mouth daily with breakfast, Disp: 30 tablet, Rfl: 5  •  furosemide (LASIX) 40 mg tablet, Take 1 tablet (40 mg total) by mouth every other day, Disp: 90 tablet, Rfl: 3  •  insulin detemir (Levemir FlexPen) 100 Units/mL injection pen, 65 units daily, Disp: 30 mL, Rfl: 0  •  Insulin Pen Needle (Droplet Pen Needles) 32G X 4 MM MISC, use 1 PEN NEEDLE to inject MEDICATION subcutaneously four times a day, Disp: 1000 each, Rfl: 1  •  Jardiance 10 MG TABS tablet, take 1 tablet by mouth daily every morning, Disp: 90 tablet, Rfl: 0  •  metoprolol succinate (TOPROL-XL) 50 mg 24 hr tablet, Take 0.5 tablets (25 mg total) by mouth daily, Disp: 30 tablet, Rfl: 6  •  Multiple Vitamin (MULTIVITAMIN ADULT PO), Take 1 tablet by mouth daily, Disp: , Rfl:   •  NovoLOG FlexPen 100 units/mL injection pen, INJECT 40 UNITS UNDER THE SKIN 3 TIMES DAILY WITH MEALS, Disp: 36 mL, Rfl: 2  •  pantoprazole (PROTONIX) 40 mg tablet, Take 1 tablet (40 mg total) by mouth 2 (two) times a day (Patient taking differently: Take 40 mg by mouth daily), Disp: 30 tablet, Rfl: 0  •  Potassium Citrate ER 15 MEQ (1620 MG) TBCR, Take 1 tablet by mouth 2 (two) times a day, Disp: , Rfl:   •  pregabalin (LYRICA) 100 mg capsule, take 1 capsule by mouth three times a day, Disp: 90 capsule, Rfl: 2  •  sertraline (ZOLOFT) 25 mg tablet, Take 25 mg by mouth daily, Disp: , Rfl:   •  SUPER B COMPLEX/C PO, Take 1 tablet by mouth daily, Disp: , Rfl:   •  tamsulosin (FLOMAX) 0.4 mg, Take 0.4 mg by mouth daily with dinner, Disp: , Rfl:   •  traZODone (DESYREL) 100 mg tablet, take 2 tablets by mouth at bedtime, Disp: 180 tablet, Rfl: 0  •  Trulicity 7.54 DL/7.8IJ injection, inject 0.5 milliliters ( 0.75 milligrams ) subcutaneously every week IN THE ABDOMEN THIGHS OR OUTER AREA OF UPPER ARM ROTATE INJECTION SITES, Disp: 2 mL, Rfl: 3  •  umeclidinium-vilanterol 62.5-25 mcg/actuation inhaler, Inhale 1 puff daily, Disp: 60 blister, Rfl: 5  •  Blood Glucose Monitoring Suppl (OneTouch Verio) w/Device KIT, Use 4 (four) times a day (Patient not taking: Reported on 12/6/2023), Disp: 1 kit, Rfl: 0  •  Continuous Blood Gluc Sensor (Dexcom G6 Sensor) MISC, Use daily as directed for CGM - Change every 10 days (Patient not taking: Reported on 12/13/2023), Disp: 9 each, Rfl: 3  •  Continuous Blood Gluc Transmit (Dexcom G6 Transmitter) MISC, Use daily as directed for CGM - Change every 3 months (Patient not taking: Reported on 12/13/2023), Disp: 1 each, Rfl: 3  •  Continuous Blood Gluc Transmit (Dexcom G6 Transmitter) MISC, Use daily as directed for CGM - Change every 3 months (Patient not taking: Reported on 12/13/2023), Disp: 1 each, Rfl: 3  •  glucose blood (OneTouch Verio) test strip, Use as instructed (Patient not taking: Reported on 10/13/2023), Disp: 100 strip, Rfl: 2  •  mometasone (ELOCON) 0.1 % cream, Apply topically daily for 7 days, Disp: 15 g, Rfl: 0  No Known Allergies  Vitals:    12/14/23 1444   BP: 130/60   Pulse: 86   Temp: (!) 97.3 °F (36.3 °C)   SpO2: 94%       Physical Exam  Constitutional: General appearance:  The Patient is well-developed and well-nourished who appears the stated age in no acute distress. Patient is pleasant and talkative. HEENT:  Normocephalic. Sclerae are anicteric. Mucous membranes are moist. Neck is supple without adenopathy. No JVD. Chest: The lungs are clear to auscultation. Cardiac: Heart is regular rate. Abdomen: Abdomen is soft, non-tender, non-distended and without masses. Extremities: There is no clubbing or cyanosis. There is no edema. Symmetric. Neuro: Grossly nonfocal. Gait is normal with a cane. Lymphatic: No evidence of cervical adenopathy bilaterally. No evidence of axillary adenopathy bilaterally. Skin: Warm, anicteric. Psych:  Patient is pleasant and talkative. Breasts:        Pathology:  [unfilled]    Labs:      Imaging  MRI abdomen w wo contrast    Result Date: 12/13/2023  Narrative: MRI - ABDOMEN - WITH AND WITHOUT CONTRAST INDICATION: 69 years / Male  C22.0: Liver cell carcinoma. A biopsy was entertained but when the alpha-fetoprotein came back at 18,000 oncology felt this confirmed hepatocellular carcinoma. COMPARISON: CTA chest abdomen pelvis 9/1/2023. MR abdomen 7/6/2023 TECHNIQUE:  Multiplanar/multisequence MRI of the abdomen was performed before and after administration of contrast. Imaging performed on 1.5T MRI IV Contrast:  9 mL of gadoxetate disodium SOLN FINDINGS: Limited study due to extensive motion artifact. LOWER CHEST:   Unremarkable. LIVER: Similar size of the lesion in segment 7 abutting the IVC measuring 3.9 x 4.0 cm (series 5 image 10) compared to prior MR 7/6/2023 when measured similarly. Again this lesion is heterogeneous and predominantly hypoenhancing to surrounding hepatic parenchyma on all phases similar to prior. Similar size of the lesion in segment 6 measuring 2.1 x 2.1 cm (series 8 image 83). There is persistent arterial phase hyperenhancement (series 10,008 image 84). Cannot evaluate for residual tumor due to short time interval since recent Y90 embolization.  The other previously seen liver smaller lesions are not well evaluated due to extensive motion artifact. Susceptibility effect also degrades evaluation of the left hepatic lobe. Previously noted tumor thrombus throughout the left portal vein and anterior right portal vein appears grossly similar to prior accounting for study limitations. BILE DUCTS: No intrahepatic or extrahepatic bile duct dilation. GALLBLADDER: Cholelithiasis without wall thickening. PANCREAS: Again there appears to be small cystic lesions in the pancreatic head/neck region measuring 0.9 cm and 0.7 cm (series 6 image 8), stable from prior MR 7/6/2023. Evaluation is limited without 3D MRCP. No main pancreatic ductal dilation. ADRENAL GLANDS:  Unremarkable. SPLEEN:  Normal. KIDNEYS/PROXIMAL URETERS: No hydroureteronephrosis. No suspicious renal mass. Multiple bilateral simple renal cysts. BOWEL:   No dilated loops of bowel. PERITONEUM/RETROPERITONEUM: Small volume ascites increased from prior CT 9/1/2023. LYMPH NODES: No abdominal lymphadenopathy. VASCULAR STRUCTURES:  No aneurysm. ABDOMINAL WALL:  Unremarkable. OSSEOUS STRUCTURES:  No suspicious osseous lesion. Impression: Markedly limited study due to extensive motion artifact. 1. Similar appearance of previously seen heterogeneous hypoenhancing segment 7 lesion measuring 4.0 cm. 2. Similar size of the lesion in segment 6 measuring 2.1 cm with persistent arterial phase enhancement. Cannot evaluate for residual tumor due to short time interval since recent Y90 embolization. 3. The other previously seen smaller lesions are not well evaluated due to extensive motion artifact and can be reassessed on follow-up. 4. Previously noted tumor thrombus throughout the left portal vein and anterior right portal vein appears grossly similar to prior. 5. Small volume ascites increased from prior CT 9/1/2023. Please note that follow-ups should be performed with extracellular contrast agent [employer]).  Workstation performed: [id number]     I personally reviewed and interpreted the above laboratory and imaging data. Discussion/Summary: 76-year-old male with multifocal HCC associated with tumor thrombus. He was treated with Y90 given his markedly elevated AFP level. His current MRI looks stable. I have reviewed the film with interventional radiology and we feel these are likely stable and posttreatment related changes. I would recommend observation. I will obtain an AFP level now, just to see if this has come down from his pretreatment value. I will plan on repeating his MRI with AFP level in 3 months. I also discussed potentially having him see medical oncology to discuss systemic therapy, but he wishes to hold off. I will see him again in 3 months with his MRI and AFP level. He is agreeable to this. All his questions were answered.

## 2023-12-15 DIAGNOSIS — I25.10 CAD (CORONARY ARTERY DISEASE): ICD-10-CM

## 2023-12-15 RX ORDER — ATORVASTATIN CALCIUM 40 MG/1
TABLET, FILM COATED ORAL
Qty: 30 TABLET | Refills: 0 | Status: SHIPPED | OUTPATIENT
Start: 2023-12-15

## 2023-12-20 ENCOUNTER — OFFICE VISIT (OUTPATIENT)
Dept: PODIATRY | Facility: CLINIC | Age: 78
End: 2023-12-20
Payer: COMMERCIAL

## 2023-12-20 VITALS
BODY MASS INDEX: 28.23 KG/M2 | WEIGHT: 213 LBS | SYSTOLIC BLOOD PRESSURE: 166 MMHG | DIASTOLIC BLOOD PRESSURE: 78 MMHG | HEART RATE: 68 BPM | HEIGHT: 73 IN

## 2023-12-20 DIAGNOSIS — M20.41 HAMMER TOES OF BOTH FEET: ICD-10-CM

## 2023-12-20 DIAGNOSIS — Z79.4 TYPE 2 DIABETES MELLITUS WITH DIABETIC POLYNEUROPATHY, WITH LONG-TERM CURRENT USE OF INSULIN (HCC): Primary | ICD-10-CM

## 2023-12-20 DIAGNOSIS — B35.1 ONYCHOMYCOSIS: ICD-10-CM

## 2023-12-20 DIAGNOSIS — M20.42 HAMMER TOES OF BOTH FEET: ICD-10-CM

## 2023-12-20 DIAGNOSIS — E11.42 TYPE 2 DIABETES MELLITUS WITH DIABETIC POLYNEUROPATHY, WITH LONG-TERM CURRENT USE OF INSULIN (HCC): Primary | ICD-10-CM

## 2023-12-20 PROCEDURE — 99203 OFFICE O/P NEW LOW 30 MIN: CPT | Performed by: PODIATRIST

## 2023-12-20 PROCEDURE — 11721 DEBRIDE NAIL 6 OR MORE: CPT | Performed by: PODIATRIST

## 2023-12-20 NOTE — PROGRESS NOTES
PATIENT:  Derek Walter    1945    ASSESSMENT:     1. Type 2 diabetes mellitus with diabetic polyneuropathy, with long-term current use of insulin ([facility])  Ambulatory Referral to Podiatry      2. Hammer toes of both feet        3. Onychomycosis  Debridement            PLAN:  1.  Reviewed medical records.  Reviewed the note from PCP.  Patient was counseled on the condition and diagnosis.    2.  Educated disease prevention and risks related to diabetes.    3.  Educated proper daily foot care and exam.  Instructed proper skin care / protection and footwear.  Instructed to identify any signs of infection and related foot problem.    4.  The recent blood work was reviewed / discussed and the last HbA1c was 8.0.  Discussed proper blood glucose control with diet and exercise.    5.  Recommended periodic foot care concerning class findings.  6. The patient will return in 10 weeks.    Imaging: I have personally reviewed pertinent films in PACS  Labs, pathology, and Other Studies: I have personally reviewed pertinent reports.        Procedure: All mycotic toenails were reduced and debrided in length, width, and girth using a nail nipper and dremel. Patient tolerated procedure(s) well without complications.        Subjective:          HPI  The patient was referred to my office for diabetic foot evaluation.  The patient has diabetes for many years.  The blood glucose is not in good control.  The patient denied any history of diabetic foot ulcer or related foot infection.   He has neuropathy with numbness, paresthesia, and cold sensation in his feet.  The patient denied any acute pedal disorder or injury.  Denied symptoms of arterial claudication in legs.  He has thick nails and tends to cut his skin because he does not have a good sensation.    The following portions of the patient's history were reviewed and updated as appropriate: allergies, current medications, past family history, past medical history, past social  history, past surgical history and problem list.  All pertinent labs and images were reviewed.    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, given PRBC/Venofer for anemia while in house    Hyperlipidemia     Hypertension     LAFB (left anterior fascicular block)     Liver cancer (HCC)     Liver mass 08/2023    suspected HCC, needs radioembolization    RBBB     Syncope 08/07/2023       Past Surgical History  Past Surgical History:   Procedure Laterality Date    APPENDECTOMY      BOWEL RESECTION  2014    CARDIAC CATHETERIZATION      CARDIAC CATHETERIZATION N/A 08/25/2023    Procedure: Cardiac pci;  Surgeon: Dom Garrett DO;  Location: [facility];  Service: Cardiology    CARDIAC CATHETERIZATION N/A 08/21/2023    Procedure: Cardiac catheterization;  Surgeon: Dom Garrett DO;  Location: [facility];  Service: Cardiology    CARDIAC CATHETERIZATION N/A 08/21/2023    Procedure: Cardiac Coronary Angiogram;  Surgeon: Dom Garrett DO;  Location: [facility];  Service: Cardiology    CATARACT EXTRACTION Bilateral     COLONOSCOPY      EGD      IR Y-90 PRE-ANGIO/EMBO W/ LUNG SCAN  09/01/2023    IR Y-90 RADIOEMBOLIZATION  09/08/2023        Allergies:  Patient has no known allergies.    Medications:  Current Outpatient Medications   Medication Sig Dispense Refill    acetaminophen (TYLENOL) 650 mg CR tablet Take 650 mg by mouth every 8 (eight) hours as needed for  mild pain      atorvastatin (LIPITOR) 40 mg tablet take 1 tablet by mouth once daily after dinner 30 tablet 0    azelastine (ASTELIN) 0.1 % nasal spray 1 spray into each nostril 2 (two) times a day Use in each nostril as directed 1 mL 0    clopidogrel (PLAVIX) 75 mg tablet Take 1 tablet (75 mg total) by mouth daily 30 tablet 11    Continuous Blood Gluc  (Dexcom G6 ) NOHEMY Use with dexcom sensor 1 each 0    Eliquis 5 MG take 1 tablet by mouth twice a day 60 tablet 5    ferrous sulfate 325 (65 Fe) mg tablet Take 1 tablet (325 mg total) by mouth daily with breakfast 30 tablet 5    furosemide (LASIX) 40 mg tablet Take 1 tablet (40 mg total) by mouth every other day 90 tablet 3    insulin detemir (Levemir FlexPen) 100 Units/mL injection pen 65 units daily 30 mL 0    Insulin Pen Needle (Droplet Pen Needles) 32G X 4 MM MISC use 1 PEN NEEDLE to inject MEDICATION subcutaneously four times a day 1000 each 1    Jardiance 10 MG TABS tablet take 1 tablet by mouth daily every morning 90 tablet 0    metoprolol succinate (TOPROL-XL) 50 mg 24 hr tablet Take 0.5 tablets (25 mg total) by mouth daily 30 tablet 6    Multiple Vitamin (MULTIVITAMIN ADULT PO) Take 1 tablet by mouth daily      NovoLOG FlexPen 100 units/mL injection pen INJECT 40 UNITS UNDER THE SKIN 3 TIMES DAILY WITH MEALS 36 mL 2    pantoprazole (PROTONIX) 40 mg tablet Take 1 tablet (40 mg total) by mouth 2 (two) times a day (Patient taking differently: Take 40 mg by mouth daily) 30 tablet 0    Potassium Citrate ER 15 MEQ (1620 MG) TBCR Take 1 tablet by mouth 2 (two) times a day      pregabalin (LYRICA) 100 mg capsule take 1 capsule by mouth three times a day 90 capsule 2    sertraline (ZOLOFT) 25 mg tablet Take 25 mg by mouth daily      SUPER B COMPLEX/C PO Take 1 tablet by mouth daily      tamsulosin (FLOMAX) 0.4 mg Take 0.4 mg by mouth daily with dinner      traZODone (DESYREL) 100 mg tablet take 2 tablets by mouth at bedtime 180 tablet 0    Trulicity  0.75 MG/0.5ML injection inject 0.5 milliliters ( 0.75 milligrams ) subcutaneously every week IN THE ABDOMEN THIGHS OR OUTER AREA OF UPPER ARM ROTATE INJECTION SITES 2 mL 3    umeclidinium-vilanterol 62.5-25 mcg/actuation inhaler Inhale 1 puff daily 60 blister 5    Blood Glucose Monitoring Suppl (OneTouch Verio) w/Device KIT Use 4 (four) times a day (Patient not taking: Reported on 2023) 1 kit 0    Continuous Blood Gluc Sensor (Dexcom G6 Sensor) MISC Use daily as directed for CGM - Change every 10 days (Patient not taking: Reported on 2023) 9 each 3    Continuous Blood Gluc Transmit (Dexcom G6 Transmitter) MISC Use daily as directed for CGM - Change every 3 months (Patient not taking: Reported on 2023) 1 each 3    Continuous Blood Gluc Transmit (Dexcom G6 Transmitter) MISC Use daily as directed for CGM - Change every 3 months (Patient not taking: Reported on 2023) 1 each 3    glucose blood (OneTouch Verio) test strip Use as instructed (Patient not taking: Reported on 10/13/2023) 100 strip 2    mometasone (ELOCON) 0.1 % cream Apply topically daily for 7 days 15 g 0     No current facility-administered medications for this visit.       Social History:  Social History     Socioeconomic History    Marital status: /Civil Union     Spouse name: None    Number of children: None    Years of education: None    Highest education level: None   Occupational History    None   Tobacco Use    Smoking status: Former     Current packs/day: 0.00     Average packs/day: 1 pack/day for 30.0 years (30.0 ttl pk-yrs)     Types: Cigarettes     Start date:      Quit date:      Years since quittin.9    Smokeless tobacco: Never   Vaping Use    Vaping status: Never Used   Substance and Sexual Activity    Alcohol use: Yes     Comment: Socially    Drug use: Never    Sexual activity: None   Other Topics Concern    None   Social History Narrative    None     Social Determinants of Health     Financial  "Resource Strain: Low Risk  (10/13/2023)    Overall Financial Resource Strain (CARDIA)     Difficulty of Paying Living Expenses: Not hard at all   Food Insecurity: No Food Insecurity (8/20/2023)    Hunger Vital Sign     Worried About Running Out of Food in the Last Year: Never true     Ran Out of Food in the Last Year: Never true   Transportation Needs: No Transportation Needs (10/13/2023)    PRAPARE - Transportation     Lack of Transportation (Medical): No     Lack of Transportation (Non-Medical): No   Physical Activity: Not on file   Stress: Not on file   Social Connections: Not on file   Intimate Partner Violence: Not on file   Housing Stability: Low Risk  (8/20/2023)    Housing Stability Vital Sign     Unable to Pay for Housing in the Last Year: No     Number of Places Lived in the Last Year: 1     Unstable Housing in the Last Year: No        Review of Systems   Constitutional:  Negative for chills and fever.   Respiratory:  Negative for cough and shortness of breath.    Cardiovascular:  Negative for chest pain and leg swelling.   Gastrointestinal:  Negative for nausea and vomiting.   Musculoskeletal:  Positive for arthralgias.   Skin:  Negative for wound.   Neurological:  Positive for numbness.   Hematological: Negative.    Psychiatric/Behavioral:  Negative for behavioral problems and confusion.          Objective:      /78   Pulse 68   Ht 6' 1\" (1.854 m)   Wt 96.6 kg (213 lb)   BMI 28.10 kg/m²          Physical Exam  Vitals reviewed.   Constitutional:       General: He is not in acute distress.     Appearance: He is not toxic-appearing or diaphoretic.   HENT:      Head: Normocephalic and atraumatic.   Eyes:      Extraocular Movements: Extraocular movements intact.   Cardiovascular:      Rate and Rhythm: Normal rate and regular rhythm.      Pulses: no weak pulses          Dorsalis pedis pulses are 1+ on the right side and 1+ on the left side.        Posterior tibial pulses are 1+ on the right side and " 1+ on the left side.   Pulmonary:      Effort: Pulmonary effort is normal. No respiratory distress.   Musculoskeletal:         General: Deformity present. No signs of injury.      Cervical back: Normal range of motion and neck supple.      Right lower leg: Edema present.      Left lower leg: Edema present.      Right foot: No Charcot foot or foot drop.      Left foot: No Charcot foot or foot drop.      Comments: Hammertoes noted.   Feet:      Right foot:      Protective Sensation: 10 sites tested.  6 sites sensed.      Skin integrity: Dry skin present. No ulcer.      Left foot:      Protective Sensation: 10 sites tested.  7 sites sensed.      Skin integrity: Dry skin present. No ulcer.   Skin:     General: Skin is warm.      Capillary Refill: Capillary refill takes less than 2 seconds.      Coloration: Skin is not cyanotic or mottled.      Findings: No abscess or wound.      Nails: There is no clubbing.      Comments: Thick toenails with discoloration and onycholysis X 6.  He has class findings with lack of pedal hairs, brittle nails, and skin atrophy.   Neurological:      General: No focal deficit present.      Mental Status: He is alert and oriented to person, place, and time.      Cranial Nerves: No cranial nerve deficit.      Sensory: Sensory deficit present.      Motor: No weakness.      Coordination: Coordination normal.   Psychiatric:         Mood and Affect: Mood normal.         Behavior: Behavior normal.         Thought Content: Thought content normal.         Judgment: Judgment normal.         Diabetic Foot Exam    Patient's shoes and socks removed.    Right Foot/Ankle   Right Foot Inspection  Skin Exam: skin intact and dry skin. No pre-ulcer and no ulcer.     Toe Exam: right toe deformity. No swelling, no tenderness and erythema    Sensory   Vibration: absent  Proprioception: diminished  Monofilament testing: diminished    Vascular  Capillary refills: < 3 seconds  The right DP pulse is 1+. The right PT  pulse is 1+.     Left Foot/Ankle  Left Foot Inspection  Skin Exam: skin intact and dry skin. No pre-ulcer and no ulcer.     Toe Exam: left toe deformity. No swelling, no tenderness and no erythema.     Sensory   Vibration: absent  Proprioception: diminished  Monofilament testing: diminished    Vascular  Capillary refills: < 3 seconds  The left DP pulse is 1+. The left PT pulse is 1+.     Assign Risk Category  Deformity present  Loss of protective sensation  No weak pulses  Risk: 2

## 2023-12-20 NOTE — LETTER
December 20, 2023     Ernesto Griffith MD  [address]  [address]  [facility] [address]    Patient: Derek Walter   YOB: 1945   Date of Visit: 12/20/2023       Dear Dr. Griffith:    Thank you for referring Derek Walter to me for evaluation. Below are my notes for this consultation.    If you have questions, please do not hesitate to call me. I look forward to following your patient along with you.         Sincerely,        Leeroy Mendes DPM        CC: No Recipients    Leeroy Mendes DPM  12/20/2023  6:27 PM  Sign when Signing Visit        PATIENT:  Derek Walter    1945    ASSESSMENT:     1. Type 2 diabetes mellitus with diabetic polyneuropathy, with long-term current use of insulin ([facility])  Ambulatory Referral to Podiatry      2. Hammer toes of both feet        3. Onychomycosis  Debridement            PLAN:  1.  Reviewed medical records.  Reviewed the note from PCP.  Patient was counseled on the condition and diagnosis.    2.  Educated disease prevention and risks related to diabetes.    3.  Educated proper daily foot care and exam.  Instructed proper skin care / protection and footwear.  Instructed to identify any signs of infection and related foot problem.    4.  The recent blood work was reviewed / discussed and the last HbA1c was 8.0.  Discussed proper blood glucose control with diet and exercise.    5.  Recommended periodic foot care concerning class findings.  6. The patient will return in 10 weeks.    Imaging: I have personally reviewed pertinent films in PACS  Labs, pathology, and Other Studies: I have personally reviewed pertinent reports.        Procedure: All mycotic toenails were reduced and debrided in length, width, and girth using a nail nipper and dremel. Patient tolerated procedure(s) well without complications.        Subjective:          HPI  The patient was referred to my office for diabetic foot evaluation.  The patient has diabetes for many years.  The blood glucose is not in good  control.  The patient denied any history of diabetic foot ulcer or related foot infection.   He has neuropathy with numbness, paresthesia, and cold sensation in his feet.  The patient denied any acute pedal disorder or injury.  Denied symptoms of arterial claudication in legs.  He has thick nails and tends to cut his skin because he does not have a good sensation.    The following portions of the patient's history were reviewed and updated as appropriate: allergies, current medications, past family history, past medical history, past social history, past surgical history and problem list.  All pertinent labs and images were reviewed.    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, given PRBC/Venofer for anemia while in house   • Hyperlipidemia    • Hypertension    • LAFB (left anterior fascicular block)    • Liver cancer (HCC)    • Liver mass 08/2023    suspected HCC, needs radioembolization   • RBBB    • Syncope 08/07/2023       Past Surgical History  Past Surgical History:   Procedure Laterality Date   • APPENDECTOMY     • BOWEL RESECTION  2014   • CARDIAC CATHETERIZATION     • CARDIAC CATHETERIZATION N/A 08/25/2023    Procedure: Cardiac pci;  Surgeon: Dom Garrett DO;  Location: [facility];  Service: Cardiology   • CARDIAC CATHETERIZATION N/A 08/21/2023    Procedure: Cardiac catheterization;  Surgeon: Dom Garrett DO;  Location: [facility];  Service:  Cardiology   • CARDIAC CATHETERIZATION N/A 08/21/2023    Procedure: Cardiac Coronary Angiogram;  Surgeon: Dom Garrett DO;  Location: [facility];  Service: Cardiology   • CATARACT EXTRACTION Bilateral    • COLONOSCOPY     • EGD     • IR Y-90 PRE-ANGIO/EMBO W/ LUNG SCAN  09/01/2023   • IR Y-90 RADIOEMBOLIZATION  09/08/2023        Allergies:  Patient has no known allergies.    Medications:  Current Outpatient Medications   Medication Sig Dispense Refill   • acetaminophen (TYLENOL) 650 mg CR tablet Take 650 mg by mouth every 8 (eight) hours as needed for mild pain     • atorvastatin (LIPITOR) 40 mg tablet take 1 tablet by mouth once daily after dinner 30 tablet 0   • azelastine (ASTELIN) 0.1 % nasal spray 1 spray into each nostril 2 (two) times a day Use in each nostril as directed 1 mL 0   • clopidogrel (PLAVIX) 75 mg tablet Take 1 tablet (75 mg total) by mouth daily 30 tablet 11   • Continuous Blood Gluc  (Dexcom G6 ) NOHEMY Use with dexcom sensor 1 each 0   • Eliquis 5 MG take 1 tablet by mouth twice a day 60 tablet 5   • ferrous sulfate 325 (65 Fe) mg tablet Take 1 tablet (325 mg total) by mouth daily with breakfast 30 tablet 5   • furosemide (LASIX) 40 mg tablet Take 1 tablet (40 mg total) by mouth every other day 90 tablet 3   • insulin detemir (Levemir FlexPen) 100 Units/mL injection pen 65 units daily 30 mL 0   • Insulin Pen Needle (Droplet Pen Needles) 32G X 4 MM MISC use 1 PEN NEEDLE to inject MEDICATION subcutaneously four times a day 1000 each 1   • Jardiance 10 MG TABS tablet take 1 tablet by mouth daily every morning 90 tablet 0   • metoprolol succinate (TOPROL-XL) 50 mg 24 hr tablet Take 0.5 tablets (25 mg total) by mouth daily 30 tablet 6   • Multiple Vitamin (MULTIVITAMIN ADULT PO) Take 1 tablet by mouth daily     • NovoLOG FlexPen 100 units/mL injection pen INJECT 40 UNITS UNDER THE SKIN 3 TIMES DAILY WITH MEALS 36 mL 2   • pantoprazole (PROTONIX) 40 mg tablet Take 1  tablet (40 mg total) by mouth 2 (two) times a day (Patient taking differently: Take 40 mg by mouth daily) 30 tablet 0   • Potassium Citrate ER 15 MEQ (1620 MG) TBCR Take 1 tablet by mouth 2 (two) times a day     • pregabalin (LYRICA) 100 mg capsule take 1 capsule by mouth three times a day 90 capsule 2   • sertraline (ZOLOFT) 25 mg tablet Take 25 mg by mouth daily     • SUPER B COMPLEX/C PO Take 1 tablet by mouth daily     • tamsulosin (FLOMAX) 0.4 mg Take 0.4 mg by mouth daily with dinner     • traZODone (DESYREL) 100 mg tablet take 2 tablets by mouth at bedtime 180 tablet 0   • Trulicity 0.75 MG/0.5ML injection inject 0.5 milliliters ( 0.75 milligrams ) subcutaneously every week IN THE ABDOMEN THIGHS OR OUTER AREA OF UPPER ARM ROTATE INJECTION SITES 2 mL 3   • umeclidinium-vilanterol 62.5-25 mcg/actuation inhaler Inhale 1 puff daily 60 blister 5   • Blood Glucose Monitoring Suppl (OneTouch Verio) w/Device KIT Use 4 (four) times a day (Patient not taking: Reported on 12/6/2023) 1 kit 0   • Continuous Blood Gluc Sensor (Dexcom G6 Sensor) MISC Use daily as directed for CGM - Change every 10 days (Patient not taking: Reported on 12/13/2023) 9 each 3   • Continuous Blood Gluc Transmit (Dexcom G6 Transmitter) MISC Use daily as directed for CGM - Change every 3 months (Patient not taking: Reported on 12/13/2023) 1 each 3   • Continuous Blood Gluc Transmit (Dexcom G6 Transmitter) MISC Use daily as directed for CGM - Change every 3 months (Patient not taking: Reported on 12/13/2023) 1 each 3   • glucose blood (OneTouch Verio) test strip Use as instructed (Patient not taking: Reported on 10/13/2023) 100 strip 2   • mometasone (ELOCON) 0.1 % cream Apply topically daily for 7 days 15 g 0     No current facility-administered medications for this visit.       Social History:  Social History     Socioeconomic History   • Marital status: /Civil Union     Spouse name: None   • Number of children: None   • Years of  education: None   • Highest education level: None   Occupational History   • None   Tobacco Use   • Smoking status: Former     Current packs/day: 0.00     Average packs/day: 1 pack/day for 30.0 years (30.0 ttl pk-yrs)     Types: Cigarettes     Start date:      Quit date:      Years since quittin.9   • Smokeless tobacco: Never   Vaping Use   • Vaping status: Never Used   Substance and Sexual Activity   • Alcohol use: Yes     Comment: Socially   • Drug use: Never   • Sexual activity: None   Other Topics Concern   • None   Social History Narrative   • None     Social Determinants of Health     Financial Resource Strain: Low Risk  (10/13/2023)    Overall Financial Resource Strain (CARDIA)    • Difficulty of Paying Living Expenses: Not hard at all   Food Insecurity: No Food Insecurity (2023)    Hunger Vital Sign    • Worried About Running Out of Food in the Last Year: Never true    • Ran Out of Food in the Last Year: Never true   Transportation Needs: No Transportation Needs (10/13/2023)    PRAPARE - Transportation    • Lack of Transportation (Medical): No    • Lack of Transportation (Non-Medical): No   Physical Activity: Not on file   Stress: Not on file   Social Connections: Not on file   Intimate Partner Violence: Not on file   Housing Stability: Low Risk  (2023)    Housing Stability Vital Sign    • Unable to Pay for Housing in the Last Year: No    • Number of Places Lived in the Last Year: 1    • Unstable Housing in the Last Year: No        Review of Systems   Constitutional:  Negative for chills and fever.   Respiratory:  Negative for cough and shortness of breath.    Cardiovascular:  Negative for chest pain and leg swelling.   Gastrointestinal:  Negative for nausea and vomiting.   Musculoskeletal:  Positive for arthralgias.   Skin:  Negative for wound.   Neurological:  Positive for numbness.   Hematological: Negative.    Psychiatric/Behavioral:  Negative for behavioral problems and confusion.  "         Objective:      /78   Pulse 68   Ht 6' 1\" (1.854 m)   Wt 96.6 kg (213 lb)   BMI 28.10 kg/m²          Physical Exam  Vitals reviewed.   Constitutional:       General: He is not in acute distress.     Appearance: He is not toxic-appearing or diaphoretic.   HENT:      Head: Normocephalic and atraumatic.   Eyes:      Extraocular Movements: Extraocular movements intact.   Cardiovascular:      Rate and Rhythm: Normal rate and regular rhythm.      Pulses: no weak pulses          Dorsalis pedis pulses are 1+ on the right side and 1+ on the left side.        Posterior tibial pulses are 1+ on the right side and 1+ on the left side.   Pulmonary:      Effort: Pulmonary effort is normal. No respiratory distress.   Musculoskeletal:         General: Deformity present. No signs of injury.      Cervical back: Normal range of motion and neck supple.      Right lower leg: Edema present.      Left lower leg: Edema present.      Right foot: No Charcot foot or foot drop.      Left foot: No Charcot foot or foot drop.      Comments: Hammertoes noted.   Feet:      Right foot:      Protective Sensation: 10 sites tested.  6 sites sensed.      Skin integrity: Dry skin present. No ulcer.      Left foot:      Protective Sensation: 10 sites tested.  7 sites sensed.      Skin integrity: Dry skin present. No ulcer.   Skin:     General: Skin is warm.      Capillary Refill: Capillary refill takes less than 2 seconds.      Coloration: Skin is not cyanotic or mottled.      Findings: No abscess or wound.      Nails: There is no clubbing.      Comments: Thick toenails with discoloration and onycholysis X 6.  He has class findings with lack of pedal hairs, brittle nails, and skin atrophy.   Neurological:      General: No focal deficit present.      Mental Status: He is alert and oriented to person, place, and time.      Cranial Nerves: No cranial nerve deficit.      Sensory: Sensory deficit present.      Motor: No weakness.      " Coordination: Coordination normal.   Psychiatric:         Mood and Affect: Mood normal.         Behavior: Behavior normal.         Thought Content: Thought content normal.         Judgment: Judgment normal.         Diabetic Foot Exam    Patient's shoes and socks removed.    Right Foot/Ankle   Right Foot Inspection  Skin Exam: skin intact and dry skin. No pre-ulcer and no ulcer.     Toe Exam: right toe deformity. No swelling, no tenderness and erythema    Sensory   Vibration: absent  Proprioception: diminished  Monofilament testing: diminished    Vascular  Capillary refills: < 3 seconds  The right DP pulse is 1+. The right PT pulse is 1+.     Left Foot/Ankle  Left Foot Inspection  Skin Exam: skin intact and dry skin. No pre-ulcer and no ulcer.     Toe Exam: left toe deformity. No swelling, no tenderness and no erythema.     Sensory   Vibration: absent  Proprioception: diminished  Monofilament testing: diminished    Vascular  Capillary refills: < 3 seconds  The left DP pulse is 1+. The left PT pulse is 1+.     Assign Risk Category  Deformity present  Loss of protective sensation  No weak pulses  Risk: 2

## 2023-12-26 DIAGNOSIS — J44.1 CHRONIC OBSTRUCTIVE PULMONARY DISEASE WITH ACUTE EXACERBATION (HCC): ICD-10-CM

## 2023-12-26 RX ORDER — UMECLIDINIUM BROMIDE AND VILANTEROL TRIFENATATE 62.5; 25 UG/1; UG/1
POWDER RESPIRATORY (INHALATION)
Qty: 60 BLISTER | Refills: 5 | Status: SHIPPED | OUTPATIENT
Start: 2023-12-26

## 2023-12-29 ENCOUNTER — TELEPHONE (OUTPATIENT)
Dept: FAMILY MEDICINE CLINIC | Facility: HOSPITAL | Age: 78
End: 2023-12-29

## 2023-12-29 NOTE — TELEPHONE ENCOUNTER
VM-----pt and spouse are covid positive. Spouse states pt is presenting with weakness and cough and would like to speak to someone about what she can give to him. Please advise

## 2024-01-01 ENCOUNTER — HOME CARE VISIT (OUTPATIENT)
Dept: HOME HEALTH SERVICES | Facility: HOME HEALTHCARE | Age: 79
End: 2024-01-01
Payer: MEDICARE

## 2024-01-01 ENCOUNTER — HOME CARE VISIT (OUTPATIENT)
Dept: HOME HOSPICE | Facility: HOSPICE | Age: 79
End: 2024-01-01
Payer: MEDICARE

## 2024-01-01 ENCOUNTER — HOME CARE VISIT (OUTPATIENT)
Dept: HOME HEALTH SERVICES | Facility: HOME HEALTHCARE | Age: 79
End: 2024-01-01
Payer: COMMERCIAL

## 2024-01-01 ENCOUNTER — TELEPHONE (OUTPATIENT)
Dept: HEMATOLOGY ONCOLOGY | Facility: CLINIC | Age: 79
End: 2024-01-01

## 2024-01-01 ENCOUNTER — HOSPITAL ENCOUNTER (OUTPATIENT)
Dept: INFUSION CENTER | Facility: HOSPITAL | Age: 79
Discharge: HOME | End: 2024-04-15
Attending: INTERNAL MEDICINE

## 2024-01-01 ENCOUNTER — HOSPICE ADMISSION (OUTPATIENT)
Dept: HOME HOSPICE | Facility: HOSPICE | Age: 79
End: 2024-01-01
Payer: MEDICARE

## 2024-01-01 ENCOUNTER — TELEPHONE (OUTPATIENT)
Age: 79
End: 2024-01-01

## 2024-01-01 VITALS
DIASTOLIC BLOOD PRESSURE: 62 MMHG | HEART RATE: 73 BPM | OXYGEN SATURATION: 90 % | SYSTOLIC BLOOD PRESSURE: 120 MMHG | BODY MASS INDEX: 23.3 KG/M2 | WEIGHT: 172 LBS | TEMPERATURE: 98.2 F | RESPIRATION RATE: 20 BRPM | HEIGHT: 72 IN

## 2024-01-01 VITALS — DIASTOLIC BLOOD PRESSURE: 78 MMHG | SYSTOLIC BLOOD PRESSURE: 132 MMHG | HEART RATE: 82 BPM | OXYGEN SATURATION: 93 %

## 2024-01-01 DIAGNOSIS — C22.0 HEPATOCELLULAR CARCINOMA (HCC): ICD-10-CM

## 2024-01-01 DIAGNOSIS — C22.0 HEPATOCELLULAR CARCINOMA (HCC): Primary | ICD-10-CM

## 2024-01-01 DIAGNOSIS — Z51.5 HOSPICE CARE PATIENT: Primary | ICD-10-CM

## 2024-01-01 PROCEDURE — 10330064 UNDERPAD, REUSE 36X36 1DZ     BECKCL

## 2024-01-01 PROCEDURE — G0299 HHS/HOSPICE OF RN EA 15 MIN: HCPCS

## 2024-01-01 PROCEDURE — G0156 HHCP-SVS OF AIDE,EA 15 MIN: HCPCS

## 2024-01-01 PROCEDURE — 10330064 WIPE, SKIN GEL PROT DRSNG (50/BX)

## 2024-01-01 PROCEDURE — 10330064 BAG, URINE DRN (20/CS)        BARD

## 2024-01-01 PROCEDURE — 10330057 MEDICATION, GENERAL

## 2024-01-01 PROCEDURE — 10330064 CATHETER, FREEDOM EXTERNAL CONDOM RED ME

## 2024-01-01 PROCEDURE — 10330064 CATH SECURE, STATLOCK FOLEY SWIVEL SIL P

## 2024-01-01 PROCEDURE — G0155 HHCP-SVS OF CSW,EA 15 MIN: HCPCS

## 2024-01-01 PROCEDURE — G0151 HHCP-SERV OF PT,EA 15 MIN: HCPCS

## 2024-01-01 PROCEDURE — 10330087 HSPC SERVICE INTENSITY ADD-ON

## 2024-01-01 PROCEDURE — 10330064 CLEANSER, WND SEA-CLEANS 6OZ  COLPLT

## 2024-01-01 PROCEDURE — 10330064 BRIEF, WINGS CHOICE XLG (15/BG4BG/CS) KE

## 2024-01-02 DIAGNOSIS — E11.40 TYPE 2 DIABETES MELLITUS WITH DIABETIC NEUROPATHY, WITH LONG-TERM CURRENT USE OF INSULIN (HCC): ICD-10-CM

## 2024-01-02 DIAGNOSIS — Z79.4 TYPE 2 DIABETES MELLITUS WITH DIABETIC NEUROPATHY, WITH LONG-TERM CURRENT USE OF INSULIN (HCC): ICD-10-CM

## 2024-01-02 NOTE — TELEPHONE ENCOUNTER
Spoke to pt, tested positive Thursday night, symptoms include fatigue/sleepiness, achy, cough, runny nose, as of now symptoms have cleared back.  Reports he will let is run it's course, will call if anything changes.

## 2024-01-03 RX ORDER — PREGABALIN 100 MG/1
CAPSULE ORAL
Qty: 90 CAPSULE | Refills: 0 | Status: SHIPPED | OUTPATIENT
Start: 2024-01-03

## 2024-01-11 DIAGNOSIS — I25.10 CAD (CORONARY ARTERY DISEASE): ICD-10-CM

## 2024-01-11 RX ORDER — ATORVASTATIN CALCIUM 40 MG/1
TABLET, FILM COATED ORAL
Qty: 30 TABLET | Refills: 0 | Status: SHIPPED | OUTPATIENT
Start: 2024-01-11

## 2024-01-19 ENCOUNTER — TELEMEDICINE (OUTPATIENT)
Dept: CARDIOLOGY CLINIC | Facility: CLINIC | Age: 79
End: 2024-01-19
Payer: COMMERCIAL

## 2024-01-19 DIAGNOSIS — Z95.5 HISTORY OF CORONARY ANGIOPLASTY WITH INSERTION OF STENT: ICD-10-CM

## 2024-01-19 DIAGNOSIS — E78.2 MIXED HYPERLIPIDEMIA: ICD-10-CM

## 2024-01-19 DIAGNOSIS — I48.91 ATRIAL FIBRILLATION, UNSPECIFIED TYPE (HCC): ICD-10-CM

## 2024-01-19 DIAGNOSIS — I25.5 ISCHEMIC CARDIOMYOPATHY: ICD-10-CM

## 2024-01-19 DIAGNOSIS — I50.22 CHRONIC SYSTOLIC HEART FAILURE (HCC): ICD-10-CM

## 2024-01-19 DIAGNOSIS — I10 PRIMARY HYPERTENSION: ICD-10-CM

## 2024-01-19 DIAGNOSIS — I25.10 CORONARY ARTERY DISEASE INVOLVING NATIVE CORONARY ARTERY OF NATIVE HEART WITHOUT ANGINA PECTORIS: Primary | ICD-10-CM

## 2024-01-19 PROCEDURE — 99422 OL DIG E/M SVC 11-20 MIN: CPT | Performed by: INTERNAL MEDICINE

## 2024-01-19 NOTE — PATIENT INSTRUCTIONS
You were evaluated today via Telemedicine for follow up of coronary artery disease and systolic heart failure.     Please continue your current cardiac medications as prescribed.    Thank you for choosing [facility].    Please call our office or use [facility] with any questions.

## 2024-01-19 NOTE — PROGRESS NOTES
[facility]    Name:Derek Walter   DOS: 1/19/2024     Chief Complaint:   Chief Complaint   Patient presents with    Follow-up       Telemedicine consent    Patient: Derek Walter  Provider: Elgin Almaraz MD  Provider located at [facility] [address] [place]  [address] [address]  [phone]    The patient was identified by name and date of birth. Derek Walter was informed that this is a telemedicine visit and that the visit is being conducted through Telephone.  My office door was closed. No one else was in the room.  He acknowledged consent and understanding of privacy and security of the video platform. The patient has agreed to participate and understands they can discontinue the visit at any time.    Patient is aware this is a billable service.     I spent 13 minutes with the patient during this visit, in addition to 5 minutes spent reviewing pertinent prior notes, imaging reports, diagnostic lab testing, and independent interpretation of his most recent nuclear stress test available in the chart. .      HISTORY OF PRESENT ILLNESS:      HPI:  Derek Walter is a 78 y.o. male. He  has a past medical history of Atrial fibrillation (HCC), AVB (atrioventricular block), CAD (coronary artery disease), CKD (chronic kidney disease), stage III (HCC), Colon polyp, Diabetes mellitus (HCC), Diverticulosis, Emphysema lung (HCC), Former tobacco use, History of asbestos exposure, History of DVT (deep vein thrombosis), History of GI bleed (08/2023), Hyperlipidemia, Hypertension, LAFB (left anterior fascicular block), Liver cancer (HCC), Liver mass (08/2023), RBBB, and Syncope (08/07/2023).    He is being evaluated in follow-up.  Was last seen physically in our office on 9/15/2023 by my colleague Luis Campos PA-C.  Per his office visit note at that time:  Patient was admitted to [facility] twice in August. First hospitalization presented  on 8/8/2023 with syncopal episode while at home. EMS called for transport to the ED. Was found tachypneic and hypoxic on arrival CBC which showed hemoglobin of 6.5. Received 2 units packed RBCs. He underwent EGD which identified 2 small areas of bleeding in the fundus and body of the stomach with 2 clips placed.  Globin stabilized around 8.1.  CXR showed pulmonary edema consistent with congestive heart failure and started on IV diuretics with good response. TTE showed mildly reduced EF 45% and elevated troponins with max value of 5115 which was attributed to his acute anemia/volume overload. He was discharged 8/14/2023 with Toprol-XL and Lasix 40 mg daily.     He presented to [facility] on 8/15/2023.  Patient reports he awoke with remittent, dull chest discomfort, shortness of breath and lightheadedness.  Home health nurse contacted cardiology and was advised to come to the ED for evaluation.  He underwent eventual NST on 8/18/2023 with no ischemic EKG changes but EF 36% as well as large fixed defect of the inferior and inferolateral walls with associated wall motion abnormality suspicious for RCA territory infarct.  He was planned for transfer to [facility] for cardiac catheterization to which he underwent on 8/21/2023 showed 75% mid LAD lesion which was IFR positive, 90% proximal to mid circumflex lesion, 90% mid RCA lesion.  Patient was recommended for CTS evaluation but ultimately was turned down for CABG given his oncological status. Was then opted for staged PCI with nephrology consult for renal optimization. Underwent successful PCI to LAD and LCx. Post cath was placed on dual therapy with Plavix and Eliquis.     Today, he is being evaluated via telemedicine visit due to document poor weather conditions.  He reports no active cardiopulmonary complaints, and specifically denies chest pain, shortness of breath, diaphoresis, dizziness.  He has no palpitations or edema.  He has had no syncopal episodes since  his cardiac catheterization.  He reports full compliance with all medications including Plavix and Eliquis.  Also continues to take Toprol-XL and Jardiance with full compliance.  He is maintained on Lasix 40 mg once daily, no reported issues with swelling.    He wears a continuous glucose monitor, Dexcom G6.  States that his average glucose reading on his monitor is 1 .       ROS    ROS: Pertinent positives and negatives as described in History of Present Illness. Remainder of a 14 point review of systems was negative.     No Known Allergies     Current Outpatient Medications on File Prior to Visit   Medication Sig Dispense Refill    acetaminophen (TYLENOL) 650 mg CR tablet Take 650 mg by mouth every 8 (eight) hours as needed for mild pain      Anoro Ellipta 62.5-25 MCG/ACT inhaler inhale 1 puff by mouth and INTO THE LUNGS once daily 60 blister 5    atorvastatin (LIPITOR) 40 mg tablet take 1 tablet by mouth once daily after dinner 30 tablet 0    azelastine (ASTELIN) 0.1 % nasal spray 1 spray into each nostril 2 (two) times a day Use in each nostril as directed 1 mL 0    Blood Glucose Monitoring Suppl (OneTouch Verio) w/Device KIT Use 4 (four) times a day (Patient not taking: Reported on 12/6/2023) 1 kit 0    clopidogrel (PLAVIX) 75 mg tablet Take 1 tablet (75 mg total) by mouth daily 30 tablet 11    Continuous Blood Gluc  (Dexcom G6 ) NOHEMY Use with dexcom sensor 1 each 0    Continuous Blood Gluc Sensor (Dexcom G6 Sensor) MISC Use daily as directed for CGM - Change every 10 days (Patient not taking: Reported on 12/13/2023) 9 each 3    Continuous Blood Gluc Transmit (Dexcom G6 Transmitter) MISC Use daily as directed for CGM - Change every 3 months (Patient not taking: Reported on 12/13/2023) 1 each 3    Continuous Blood Gluc Transmit (Dexcom G6 Transmitter) MISC Use daily as directed for CGM - Change every 3 months (Patient not taking: Reported on 12/13/2023) 1 each 3    Eliquis 5 MG take 1  tablet by mouth twice a day 60 tablet 5    ferrous sulfate 325 (65 Fe) mg tablet Take 1 tablet (325 mg total) by mouth daily with breakfast 30 tablet 5    furosemide (LASIX) 40 mg tablet Take 1 tablet (40 mg total) by mouth every other day 90 tablet 3    glucose blood (OneTouch Verio) test strip Use as instructed (Patient not taking: Reported on 10/13/2023) 100 strip 2    insulin detemir (Levemir FlexPen) 100 Units/mL injection pen 65 units daily 30 mL 0    Insulin Pen Needle (Droplet Pen Needles) 32G X 4 MM MISC use 1 PEN NEEDLE to inject MEDICATION subcutaneously four times a day 1000 each 1    Jardiance 10 MG TABS tablet take 1 tablet by mouth daily every morning 90 tablet 0    metoprolol succinate (TOPROL-XL) 50 mg 24 hr tablet Take 0.5 tablets (25 mg total) by mouth daily 30 tablet 6    mometasone (ELOCON) 0.1 % cream Apply topically daily for 7 days 15 g 0    Multiple Vitamin (MULTIVITAMIN ADULT PO) Take 1 tablet by mouth daily      NovoLOG FlexPen 100 units/mL injection pen INJECT 40 UNITS UNDER THE SKIN 3 TIMES DAILY WITH MEALS 36 mL 2    pantoprazole (PROTONIX) 40 mg tablet Take 1 tablet (40 mg total) by mouth 2 (two) times a day (Patient taking differently: Take 40 mg by mouth daily) 30 tablet 0    Potassium Citrate ER 15 MEQ (1620 MG) TBCR Take 1 tablet by mouth 2 (two) times a day      pregabalin (LYRICA) 100 mg capsule take 1 capsule by mouth three times a day 90 capsule 0    sertraline (ZOLOFT) 25 mg tablet Take 25 mg by mouth daily      SUPER B COMPLEX/C PO Take 1 tablet by mouth daily      tamsulosin (FLOMAX) 0.4 mg Take 0.4 mg by mouth daily with dinner      traZODone (DESYREL) 100 mg tablet take 2 tablets by mouth at bedtime 180 tablet 0    Trulicity 0.75 MG/0.5ML injection inject 0.5 milliliters ( 0.75 milligrams ) subcutaneously every week IN THE ABDOMEN THIGHS OR OUTER AREA OF UPPER ARM ROTATE INJECTION SITES 2 mL 3     No current facility-administered medications on file prior to visit.        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, given PRBC/Venofer for anemia while in house    Hyperlipidemia     Hypertension     LAFB (left anterior fascicular block)     Liver cancer (HCC)     Liver mass 08/2023    suspected HCC, needs radioembolization    RBBB     Syncope 08/07/2023       Past Surgical History:   Procedure Laterality Date    APPENDECTOMY      BOWEL RESECTION  2014    CARDIAC CATHETERIZATION      CARDIAC CATHETERIZATION N/A 08/25/2023    Procedure: Cardiac pci;  Surgeon: Dom Garrett DO;  Location: [facility];  Service: Cardiology    CARDIAC CATHETERIZATION N/A 08/21/2023    Procedure: Cardiac catheterization;  Surgeon: Dom Garrett DO;  Location: [facility];  Service: Cardiology    CARDIAC CATHETERIZATION N/A 08/21/2023    Procedure: Cardiac Coronary Angiogram;  Surgeon: Dom Garrett DO;  Location: [facility];  Service: Cardiology    CATARACT EXTRACTION Bilateral     COLONOSCOPY      EGD      IR Y-90 PRE-ANGIO/EMBO W/ LUNG SCAN  09/01/2023    IR Y-90 RADIOEMBOLIZATION  09/08/2023       Family History   Problem Relation Age of Onset    Hypertension Mother     Bone cancer Father     Diabetes type II Sister     Diabetes type II Sister     Esophageal cancer Brother     Colon cancer Neg Hx        Social History     Socioeconomic History    Marital status: /Civil Union     Spouse name: Not on file    Number of children: Not on file    Years of  education: Not on file    Highest education level: Not on file   Occupational History    Not on file   Tobacco Use    Smoking status: Former     Current packs/day: 0.00     Average packs/day: 1 pack/day for 30.0 years (30.0 ttl pk-yrs)     Types: Cigarettes     Start date:      Quit date:      Years since quittin.0    Smokeless tobacco: Never   Vaping Use    Vaping status: Never Used   Substance and Sexual Activity    Alcohol use: Yes     Comment: Socially    Drug use: Never    Sexual activity: Not on file   Other Topics Concern    Not on file   Social History Narrative    Not on file     Social Determinants of Health     Financial Resource Strain: Low Risk  (10/13/2023)    Overall Financial Resource Strain (CARDIA)     Difficulty of Paying Living Expenses: Not hard at all   Food Insecurity: No Food Insecurity (2023)    Hunger Vital Sign     Worried About Running Out of Food in the Last Year: Never true     Ran Out of Food in the Last Year: Never true   Transportation Needs: No Transportation Needs (10/13/2023)    PRAPARE - Transportation     Lack of Transportation (Medical): No     Lack of Transportation (Non-Medical): No   Physical Activity: Not on file   Stress: Not on file   Social Connections: Not on file   Intimate Partner Violence: Not on file   Housing Stability: Low Risk  (2023)    Housing Stability Vital Sign     Unable to Pay for Housing in the Last Year: No     Number of Places Lived in the Last Year: 1     Unstable Housing in the Last Year: No       OBJECTIVE:    There were no vitals taken for this visit.     BP Readings from Last 3 Encounters:   23 166/78   23 130/60   23 166/72       Wt Readings from Last 3 Encounters:   23 96.6 kg (213 lb)   23 97 kg (213 lb 12.8 oz)   23 96.3 kg (212 lb 3.2 oz)       No exam due to telemedicine visit.   Physical Exam                                                      LABS:  Lab Results   Component Value  Date    BUN 45 (H) 11/01/2023    CREATININE 1.70 (H) 11/01/2023    CALCIUM 9.4 09/01/2023    K 4.7 11/01/2023    CO2 20 11/01/2023     (H) 11/01/2023    ALKPHOS 99 09/01/2023    AST 97 (H) 11/01/2023    ALT 69 (H) 11/01/2023        Lab Results   Component Value Date    WBC 6.42 11/03/2023    HGB 13.2 11/03/2023    HCT 43.6 11/03/2023    MCV 88 11/03/2023     11/03/2023       Lab Results   Component Value Date    HDL 37 (L) 11/01/2023    LDLCALC 98 11/01/2023    TRIG 327 (H) 11/01/2023       Lab Results   Component Value Date    HGBA1C 8.0 (H) 11/01/2023       Lab Results   Component Value Date    TSH 3.720 01/06/2023     ASSESSMENT/PLAN:  Diagnoses and all orders for this visit:    Coronary artery disease involving native coronary artery of native heart without angina pectoris    History of coronary angioplasty with insertion of stent    Atrial fibrillation, unspecified type (HCC)    Ischemic cardiomyopathy    Chronic systolic heart failure (HCC)    Hypertension    Hyperlipidemia      78-year-old male evaluated via telemedicine consult services for follow-up.  He has a history of coronary artery disease status post placement of drug-eluting stent to the LAD and left circumflex after being a surgical turndown due to high surgical risk.  Was initially referred for CABG, deemed not an acceptable candidate.  He is appropriately maintained on Plavix in addition to Eliquis as noted above.  He has no active cardiopulmonary complaints, has had no recurrent syncopal episodes and has had no recurrent issues with bleeding.  I recommended that he continue taking Plavix and Eliquis as noted.  He is on GDMT for ischemic cardiomyopathy and chronic heart failure with reduced ejection fraction of 36% by SPECT.  GDMT consists of Toprol-XL and Jardiance only at this time.  ARNI and MRA therapy has been withheld thus far due to renal dysfunction.    I recommended he continue to follow a diet low in salt.  Monitor daily  "weights, maintain a log.  Patient also advised to monitor his blood pressure on a daily basis.  He will continue on atorvastatin 40 mg once daily, most recent lipids not yet at target with an LDL presently greater than 70 although we will attempt to first modify this via diet.  If no improvement on the lipid panel in 3 months, we will then dose adjust his atorvastatin to 80 mg once daily.    We will see him in office in 6 months, may be seen sooner as needed.        Elgin Almaraz MD      Portions of the record may have been created with voice recognition software. Occasional wrong word or \"sound alike\" substitutions may have occurred due to the inherent limitations of voice recognition software. Please review the chart carefully and recognize, using context, where substitutions/typographical errors may have occurred.     "

## 2024-01-23 ENCOUNTER — ANESTHESIA (INPATIENT)
Dept: GASTROENTEROLOGY | Facility: HOSPITAL | Age: 79
End: 2024-01-23
Payer: COMMERCIAL

## 2024-01-23 ENCOUNTER — APPOINTMENT (INPATIENT)
Dept: RADIOLOGY | Facility: HOSPITAL | Age: 79
DRG: 377 | End: 2024-01-23
Payer: COMMERCIAL

## 2024-01-23 ENCOUNTER — APPOINTMENT (INPATIENT)
Dept: GASTROENTEROLOGY | Facility: HOSPITAL | Age: 79
DRG: 377 | End: 2024-01-23
Payer: COMMERCIAL

## 2024-01-23 ENCOUNTER — APPOINTMENT (INPATIENT)
Dept: CT IMAGING | Facility: HOSPITAL | Age: 79
DRG: 377 | End: 2024-01-23
Payer: COMMERCIAL

## 2024-01-23 ENCOUNTER — APPOINTMENT (EMERGENCY)
Dept: CT IMAGING | Facility: HOSPITAL | Age: 79
DRG: 377 | End: 2024-01-23
Payer: COMMERCIAL

## 2024-01-23 ENCOUNTER — HOSPITAL ENCOUNTER (INPATIENT)
Facility: HOSPITAL | Age: 79
LOS: 14 days | Discharge: NURSING, REHAB OR LONG-TERM CARE | DRG: 377 | End: 2024-02-06
Attending: EMERGENCY MEDICINE | Admitting: INTERNAL MEDICINE
Payer: COMMERCIAL

## 2024-01-23 ENCOUNTER — ANESTHESIA EVENT (INPATIENT)
Dept: GASTROENTEROLOGY | Facility: HOSPITAL | Age: 79
End: 2024-01-23
Payer: COMMERCIAL

## 2024-01-23 DIAGNOSIS — I25.10 CORONARY ARTERY DISEASE INVOLVING NATIVE CORONARY ARTERY OF NATIVE HEART WITHOUT ANGINA PECTORIS: ICD-10-CM

## 2024-01-23 DIAGNOSIS — Z79.4 TYPE 2 DIABETES MELLITUS WITH DIABETIC NEUROPATHY, WITH LONG-TERM CURRENT USE OF INSULIN (HCC): ICD-10-CM

## 2024-01-23 DIAGNOSIS — D62 ACUTE BLOOD LOSS ANEMIA: ICD-10-CM

## 2024-01-23 DIAGNOSIS — I48.91 ATRIAL FIBRILLATION, UNSPECIFIED TYPE (HCC): ICD-10-CM

## 2024-01-23 DIAGNOSIS — G93.40 ENCEPHALOPATHY: ICD-10-CM

## 2024-01-23 DIAGNOSIS — E11.40 TYPE 2 DIABETES MELLITUS WITH DIABETIC NEUROPATHY, WITH LONG-TERM CURRENT USE OF INSULIN (HCC): ICD-10-CM

## 2024-01-23 DIAGNOSIS — R07.9 CHEST PAIN: ICD-10-CM

## 2024-01-23 DIAGNOSIS — D64.9 ANEMIA: Primary | ICD-10-CM

## 2024-01-23 DIAGNOSIS — K92.2 ACUTE UPPER GI BLEEDING: ICD-10-CM

## 2024-01-23 DIAGNOSIS — R29.90 STROKE-LIKE SYMPTOM: ICD-10-CM

## 2024-01-23 PROBLEM — N17.9 AKI (ACUTE KIDNEY INJURY) (HCC): Status: ACTIVE | Noted: 2024-01-23

## 2024-01-23 LAB
2HR DELTA HS TROPONIN: 3 NG/L
ABO GROUP BLD: NORMAL
ALBUMIN SERPL BCP-MCNC: 2.8 G/DL (ref 3.5–5)
ALBUMIN SERPL BCP-MCNC: 3.1 G/DL (ref 3.5–5)
ALBUMIN SERPL BCP-MCNC: 3.3 G/DL (ref 3.5–5)
ALP SERPL-CCNC: 167 U/L (ref 34–104)
ALP SERPL-CCNC: 212 U/L (ref 34–104)
ALP SERPL-CCNC: 269 U/L (ref 34–104)
ALT SERPL W P-5'-P-CCNC: 50 U/L (ref 7–52)
ALT SERPL W P-5'-P-CCNC: 59 U/L (ref 7–52)
ALT SERPL W P-5'-P-CCNC: 69 U/L (ref 7–52)
ANION GAP SERPL CALCULATED.3IONS-SCNC: 10 MMOL/L
ANION GAP SERPL CALCULATED.3IONS-SCNC: 10 MMOL/L
ANION GAP SERPL CALCULATED.3IONS-SCNC: 13 MMOL/L
ANION GAP SERPL CALCULATED.3IONS-SCNC: 13 MMOL/L
ANION GAP SERPL CALCULATED.3IONS-SCNC: 16 MMOL/L
APTT PPP: 34 SECONDS (ref 23–37)
APTT PPP: 35 SECONDS (ref 23–37)
AST SERPL W P-5'-P-CCNC: 101 U/L (ref 13–39)
AST SERPL W P-5'-P-CCNC: 66 U/L (ref 13–39)
AST SERPL W P-5'-P-CCNC: 83 U/L (ref 13–39)
ATRIAL RATE: 102 BPM
ATRIAL RATE: 113 BPM
ATRIAL RATE: 83 BPM
ATRIAL RATE: 90 BPM
BACTERIA UR QL AUTO: NORMAL /HPF
BASE EX.OXY STD BLDV CALC-SCNC: 86 % (ref 60–80)
BASE EX.OXY STD BLDV CALC-SCNC: 91.7 % (ref 60–80)
BASE EXCESS BLDA CALC-SCNC: -13 MMOL/L (ref -2–3)
BASE EXCESS BLDV CALC-SCNC: -2.4 MMOL/L
BASE EXCESS BLDV CALC-SCNC: -5.7 MMOL/L
BASOPHILS # BLD AUTO: 0.02 THOUSANDS/ÂΜL (ref 0–0.1)
BASOPHILS # BLD AUTO: 0.04 THOUSANDS/ÂΜL (ref 0–0.1)
BASOPHILS NFR BLD AUTO: 0 % (ref 0–1)
BASOPHILS NFR BLD AUTO: 0 % (ref 0–1)
BILIRUB SERPL-MCNC: 0.89 MG/DL (ref 0.2–1)
BILIRUB SERPL-MCNC: 0.93 MG/DL (ref 0.2–1)
BILIRUB SERPL-MCNC: 1.04 MG/DL (ref 0.2–1)
BILIRUB UR QL STRIP: NEGATIVE
BLD GP AB SCN SERPL QL: NEGATIVE
BUN SERPL-MCNC: 101 MG/DL (ref 5–25)
BUN SERPL-MCNC: 102 MG/DL (ref 5–25)
BUN SERPL-MCNC: 71 MG/DL (ref 5–25)
BUN SERPL-MCNC: 89 MG/DL (ref 5–25)
BUN SERPL-MCNC: 97 MG/DL (ref 5–25)
CA-I BLD-SCNC: 1.15 MMOL/L (ref 1.12–1.32)
CALCIUM ALBUM COR SERPL-MCNC: 8.8 MG/DL (ref 8.3–10.1)
CALCIUM ALBUM COR SERPL-MCNC: 8.9 MG/DL (ref 8.3–10.1)
CALCIUM ALBUM COR SERPL-MCNC: 9.1 MG/DL (ref 8.3–10.1)
CALCIUM SERPL-MCNC: 7.8 MG/DL (ref 8.4–10.2)
CALCIUM SERPL-MCNC: 8 MG/DL (ref 8.4–10.2)
CALCIUM SERPL-MCNC: 8.2 MG/DL (ref 8.4–10.2)
CALCIUM SERPL-MCNC: 8.3 MG/DL (ref 8.4–10.2)
CALCIUM SERPL-MCNC: 8.4 MG/DL (ref 8.4–10.2)
CARDIAC TROPONIN I PNL SERPL HS: 24 NG/L
CARDIAC TROPONIN I PNL SERPL HS: 27 NG/L
CARDIAC TROPONIN I PNL SERPL HS: 40 NG/L
CHLORIDE SERPL-SCNC: 106 MMOL/L (ref 96–108)
CHLORIDE SERPL-SCNC: 109 MMOL/L (ref 96–108)
CHLORIDE SERPL-SCNC: 111 MMOL/L (ref 96–108)
CHLORIDE SERPL-SCNC: 112 MMOL/L (ref 96–108)
CHLORIDE SERPL-SCNC: 114 MMOL/L (ref 96–108)
CLARITY UR: CLEAR
CO2 SERPL-SCNC: 17 MMOL/L (ref 21–32)
CO2 SERPL-SCNC: 21 MMOL/L (ref 21–32)
CO2 SERPL-SCNC: 23 MMOL/L (ref 21–32)
COLOR UR: YELLOW
CREAT SERPL-MCNC: 1.63 MG/DL (ref 0.6–1.3)
CREAT SERPL-MCNC: 1.68 MG/DL (ref 0.6–1.3)
CREAT SERPL-MCNC: 1.73 MG/DL (ref 0.6–1.3)
CREAT SERPL-MCNC: 1.74 MG/DL (ref 0.6–1.3)
CREAT SERPL-MCNC: 1.74 MG/DL (ref 0.6–1.3)
EOSINOPHIL # BLD AUTO: 0 THOUSAND/ÂΜL (ref 0–0.61)
EOSINOPHIL # BLD AUTO: 0.04 THOUSAND/ÂΜL (ref 0–0.61)
EOSINOPHIL NFR BLD AUTO: 0 % (ref 0–6)
EOSINOPHIL NFR BLD AUTO: 0 % (ref 0–6)
ERYTHROCYTE [DISTWIDTH] IN BLOOD BY AUTOMATED COUNT: 17.6 % (ref 11.6–15.1)
ERYTHROCYTE [DISTWIDTH] IN BLOOD BY AUTOMATED COUNT: 18.2 % (ref 11.6–15.1)
FIO2 GAS DIL.REBREATH: 21 L
FLUAV RNA RESP QL NAA+PROBE: NEGATIVE
FLUBV RNA RESP QL NAA+PROBE: NEGATIVE
GFR SERPL CREATININE-BSD FRML MDRD: 36 ML/MIN/1.73SQ M
GFR SERPL CREATININE-BSD FRML MDRD: 36 ML/MIN/1.73SQ M
GFR SERPL CREATININE-BSD FRML MDRD: 37 ML/MIN/1.73SQ M
GFR SERPL CREATININE-BSD FRML MDRD: 38 ML/MIN/1.73SQ M
GFR SERPL CREATININE-BSD FRML MDRD: 40 ML/MIN/1.73SQ M
GLUCOSE SERPL-MCNC: 110 MG/DL (ref 65–140)
GLUCOSE SERPL-MCNC: 184 MG/DL (ref 65–140)
GLUCOSE SERPL-MCNC: 201 MG/DL (ref 65–140)
GLUCOSE SERPL-MCNC: 211 MG/DL (ref 65–140)
GLUCOSE SERPL-MCNC: 219 MG/DL (ref 65–140)
GLUCOSE SERPL-MCNC: 243 MG/DL (ref 65–140)
GLUCOSE SERPL-MCNC: 253 MG/DL (ref 65–140)
GLUCOSE SERPL-MCNC: 256 MG/DL (ref 65–140)
GLUCOSE SERPL-MCNC: 264 MG/DL (ref 65–140)
GLUCOSE SERPL-MCNC: 270 MG/DL (ref 65–140)
GLUCOSE SERPL-MCNC: 276 MG/DL (ref 65–140)
GLUCOSE SERPL-MCNC: 284 MG/DL (ref 65–140)
GLUCOSE SERPL-MCNC: 285 MG/DL (ref 65–140)
GLUCOSE SERPL-MCNC: 290 MG/DL (ref 65–140)
GLUCOSE SERPL-MCNC: 293 MG/DL (ref 65–140)
GLUCOSE SERPL-MCNC: 310 MG/DL (ref 65–140)
GLUCOSE SERPL-MCNC: 398 MG/DL (ref 65–140)
GLUCOSE UR STRIP-MCNC: ABNORMAL MG/DL
HCO3 BLDA-SCNC: 12.1 MMOL/L (ref 22–28)
HCO3 BLDV-SCNC: 21 MMOL/L (ref 24–30)
HCO3 BLDV-SCNC: 22.6 MMOL/L (ref 24–30)
HCT VFR BLD AUTO: 23.9 % (ref 36.5–49.3)
HCT VFR BLD AUTO: 24.7 % (ref 36.5–49.3)
HCT VFR BLD AUTO: 24.7 % (ref 36.5–49.3)
HCT VFR BLD AUTO: 25 % (ref 36.5–49.3)
HCT VFR BLD AUTO: 25.4 % (ref 36.5–49.3)
HCT VFR BLD AUTO: 27.9 % (ref 36.5–49.3)
HCT VFR BLD CALC: 22 % (ref 36.5–49.3)
HGB BLD-MCNC: 7.2 G/DL (ref 12–17)
HGB BLD-MCNC: 7.3 G/DL (ref 12–17)
HGB BLD-MCNC: 7.8 G/DL (ref 12–17)
HGB BLD-MCNC: 7.8 G/DL (ref 12–17)
HGB BLD-MCNC: 8 G/DL (ref 12–17)
HGB BLD-MCNC: 8.4 G/DL (ref 12–17)
HGB BLDA-MCNC: 7.5 G/DL (ref 12–17)
HGB UR QL STRIP.AUTO: NEGATIVE
HOROWITZ INDEX BLDA+IHG-RTO: 100 MM[HG]
IMM GRANULOCYTES # BLD AUTO: 0.07 THOUSAND/UL (ref 0–0.2)
IMM GRANULOCYTES # BLD AUTO: 0.12 THOUSAND/UL (ref 0–0.2)
IMM GRANULOCYTES NFR BLD AUTO: 1 % (ref 0–2)
IMM GRANULOCYTES NFR BLD AUTO: 1 % (ref 0–2)
INR PPP: 1.31 (ref 0.84–1.19)
INR PPP: 1.62 (ref 0.84–1.19)
KETONES UR STRIP-MCNC: ABNORMAL MG/DL
LACTATE SERPL-SCNC: 10.3 MMOL/L (ref 0.5–2)
LACTATE SERPL-SCNC: 12.3 MMOL/L (ref 0.5–2)
LACTATE SERPL-SCNC: 2.3 MMOL/L (ref 0.5–2)
LACTATE SERPL-SCNC: 4.1 MMOL/L (ref 0.5–2)
LEUKOCYTE ESTERASE UR QL STRIP: NEGATIVE
LIPASE SERPL-CCNC: 39 U/L (ref 11–82)
LYMPHOCYTES # BLD AUTO: 0.69 THOUSANDS/ÂΜL (ref 0.6–4.47)
LYMPHOCYTES # BLD AUTO: 1.48 THOUSANDS/ÂΜL (ref 0.6–4.47)
LYMPHOCYTES NFR BLD AUTO: 12 % (ref 14–44)
LYMPHOCYTES NFR BLD AUTO: 8 % (ref 14–44)
MAGNESIUM SERPL-MCNC: 2.1 MG/DL (ref 1.9–2.7)
MCH RBC QN AUTO: 29.9 PG (ref 26.8–34.3)
MCH RBC QN AUTO: 30 PG (ref 26.8–34.3)
MCHC RBC AUTO-ENTMCNC: 30.1 G/DL (ref 31.4–37.4)
MCHC RBC AUTO-ENTMCNC: 30.7 G/DL (ref 31.4–37.4)
MCV RBC AUTO: 98 FL (ref 82–98)
MCV RBC AUTO: 99 FL (ref 82–98)
MONOCYTES # BLD AUTO: 0.61 THOUSAND/ÂΜL (ref 0.17–1.22)
MONOCYTES # BLD AUTO: 1.72 THOUSAND/ÂΜL (ref 0.17–1.22)
MONOCYTES NFR BLD AUTO: 14 % (ref 4–12)
MONOCYTES NFR BLD AUTO: 7 % (ref 4–12)
NEUTROPHILS # BLD AUTO: 7.68 THOUSANDS/ÂΜL (ref 1.85–7.62)
NEUTROPHILS # BLD AUTO: 8.9 THOUSANDS/ÂΜL (ref 1.85–7.62)
NEUTS SEG NFR BLD AUTO: 73 % (ref 43–75)
NEUTS SEG NFR BLD AUTO: 84 % (ref 43–75)
NITRITE UR QL STRIP: NEGATIVE
NON-SQ EPI CELLS URNS QL MICRO: NORMAL /HPF
NRBC BLD AUTO-RTO: 0 /100 WBCS
NRBC BLD AUTO-RTO: 0 /100 WBCS
O2 CT BLDV-SCNC: 10.7 ML/DL
O2 CT BLDV-SCNC: 11.8 ML/DL
PCO2 BLD: 13 MMOL/L (ref 21–32)
PCO2 BLD: 23.6 MM HG (ref 36–44)
PCO2 BLDV: 39.4 MM HG (ref 42–50)
PCO2 BLDV: 46.8 MM HG (ref 42–50)
PH BLD: 7.32 [PH] (ref 7.35–7.45)
PH BLDV: 7.27 [PH] (ref 7.3–7.4)
PH BLDV: 7.38 [PH] (ref 7.3–7.4)
PH UR STRIP.AUTO: 5 [PH]
PHOSPHATE SERPL-MCNC: 2.2 MG/DL (ref 2.3–4.1)
PLATELET # BLD AUTO: 176 THOUSANDS/UL (ref 149–390)
PLATELET # BLD AUTO: 242 THOUSANDS/UL (ref 149–390)
PMV BLD AUTO: 12.2 FL (ref 8.9–12.7)
PMV BLD AUTO: 12.4 FL (ref 8.9–12.7)
PO2 BLD: 20 MM HG (ref 75–129)
PO2 BLDV: 56.1 MM HG (ref 35–45)
PO2 BLDV: 77.5 MM HG (ref 35–45)
POTASSIUM BLD-SCNC: 4.7 MMOL/L (ref 3.5–5.3)
POTASSIUM SERPL-SCNC: 4.9 MMOL/L (ref 3.5–5.3)
POTASSIUM SERPL-SCNC: 5.1 MMOL/L (ref 3.5–5.3)
POTASSIUM SERPL-SCNC: 5.5 MMOL/L (ref 3.5–5.3)
POTASSIUM SERPL-SCNC: 5.8 MMOL/L (ref 3.5–5.3)
POTASSIUM SERPL-SCNC: 6 MMOL/L (ref 3.5–5.3)
PROT SERPL-MCNC: 5.2 G/DL (ref 6.4–8.4)
PROT SERPL-MCNC: 5.3 G/DL (ref 6.4–8.4)
PROT SERPL-MCNC: 6.1 G/DL (ref 6.4–8.4)
PROT UR STRIP-MCNC: ABNORMAL MG/DL
PROTHROMBIN TIME: 16.8 SECONDS (ref 11.6–14.5)
PROTHROMBIN TIME: 19.7 SECONDS (ref 11.6–14.5)
QRS AXIS: -59 DEGREES
QRS AXIS: -63 DEGREES
QRS AXIS: -66 DEGREES
QRS AXIS: -69 DEGREES
QRSD INTERVAL: 110 MS
QRSD INTERVAL: 116 MS
QRSD INTERVAL: 120 MS
QRSD INTERVAL: 124 MS
QT INTERVAL: 350 MS
QT INTERVAL: 374 MS
QT INTERVAL: 406 MS
QT INTERVAL: 414 MS
QTC INTERVAL: 439 MS
QTC INTERVAL: 474 MS
QTC INTERVAL: 485 MS
QTC INTERVAL: 500 MS
RBC # BLD AUTO: 2.6 MILLION/UL (ref 3.88–5.62)
RBC # BLD AUTO: 2.81 MILLION/UL (ref 3.88–5.62)
RBC #/AREA URNS AUTO: NORMAL /HPF
RH BLD: POSITIVE
RSV RNA RESP QL NAA+PROBE: NEGATIVE
SAO2 % BLD FROM PO2: 29 % (ref 60–85)
SARS-COV-2 RNA RESP QL NAA+PROBE: NEGATIVE
SODIUM BLD-SCNC: 146 MMOL/L (ref 136–145)
SODIUM SERPL-SCNC: 142 MMOL/L (ref 135–147)
SODIUM SERPL-SCNC: 142 MMOL/L (ref 135–147)
SODIUM SERPL-SCNC: 144 MMOL/L (ref 135–147)
SODIUM SERPL-SCNC: 146 MMOL/L (ref 135–147)
SODIUM SERPL-SCNC: 147 MMOL/L (ref 135–147)
SP GR UR STRIP.AUTO: 1.01 (ref 1–1.03)
SPECIMEN EXPIRATION DATE: NORMAL
SPECIMEN SOURCE: ABNORMAL
T WAVE AXIS: -16 DEGREES
T WAVE AXIS: 10 DEGREES
T WAVE AXIS: 3 DEGREES
T WAVE AXIS: 37 DEGREES
UROBILINOGEN UR STRIP-ACNC: <2 MG/DL
VENT AC: 16
VENT- AC: AC
VENTRICULAR RATE: 86 BPM
VENTRICULAR RATE: 88 BPM
VENTRICULAR RATE: 95 BPM
VENTRICULAR RATE: 97 BPM
VT SETTING VENT: 500 ML
WBC # BLD AUTO: 12.3 THOUSAND/UL (ref 4.31–10.16)
WBC # BLD AUTO: 9.07 THOUSAND/UL (ref 4.31–10.16)
WBC #/AREA URNS AUTO: NORMAL /HPF

## 2024-01-23 PROCEDURE — 85018 HEMOGLOBIN: CPT | Performed by: EMERGENCY MEDICINE

## 2024-01-23 PROCEDURE — 80053 COMPREHEN METABOLIC PANEL: CPT | Performed by: PHYSICIAN ASSISTANT

## 2024-01-23 PROCEDURE — 82803 BLOOD GASES ANY COMBINATION: CPT

## 2024-01-23 PROCEDURE — 85014 HEMATOCRIT: CPT | Performed by: EMERGENCY MEDICINE

## 2024-01-23 PROCEDURE — 94640 AIRWAY INHALATION TREATMENT: CPT

## 2024-01-23 PROCEDURE — 85018 HEMOGLOBIN: CPT

## 2024-01-23 PROCEDURE — 86923 COMPATIBILITY TEST ELECTRIC: CPT

## 2024-01-23 PROCEDURE — 43255 EGD CONTROL BLEEDING ANY: CPT | Performed by: INTERNAL MEDICINE

## 2024-01-23 PROCEDURE — 83605 ASSAY OF LACTIC ACID: CPT

## 2024-01-23 PROCEDURE — 85025 COMPLETE CBC W/AUTO DIFF WBC: CPT | Performed by: PHYSICIAN ASSISTANT

## 2024-01-23 PROCEDURE — P9016 RBC LEUKOCYTES REDUCED: HCPCS

## 2024-01-23 PROCEDURE — 96361 HYDRATE IV INFUSION ADD-ON: CPT

## 2024-01-23 PROCEDURE — 71045 X-RAY EXAM CHEST 1 VIEW: CPT

## 2024-01-23 PROCEDURE — G1004 CDSM NDSC: HCPCS

## 2024-01-23 PROCEDURE — 83735 ASSAY OF MAGNESIUM: CPT | Performed by: PHYSICIAN ASSISTANT

## 2024-01-23 PROCEDURE — 99223 1ST HOSP IP/OBS HIGH 75: CPT | Performed by: PHYSICIAN ASSISTANT

## 2024-01-23 PROCEDURE — 0241U HB NFCT DS VIR RESP RNA 4 TRGT: CPT | Performed by: EMERGENCY MEDICINE

## 2024-01-23 PROCEDURE — 02HV33Z INSERTION OF INFUSION DEVICE INTO SUPERIOR VENA CAVA, PERCUTANEOUS APPROACH: ICD-10-PCS | Performed by: INTERNAL MEDICINE

## 2024-01-23 PROCEDURE — 99291 CRITICAL CARE FIRST HOUR: CPT | Performed by: INTERNAL MEDICINE

## 2024-01-23 PROCEDURE — 84295 ASSAY OF SERUM SODIUM: CPT

## 2024-01-23 PROCEDURE — 96375 TX/PRO/DX INJ NEW DRUG ADDON: CPT

## 2024-01-23 PROCEDURE — 84100 ASSAY OF PHOSPHORUS: CPT | Performed by: PHYSICIAN ASSISTANT

## 2024-01-23 PROCEDURE — 99223 1ST HOSP IP/OBS HIGH 75: CPT | Performed by: INTERNAL MEDICINE

## 2024-01-23 PROCEDURE — 0W3P8ZZ CONTROL BLEEDING IN GASTROINTESTINAL TRACT, VIA NATURAL OR ARTIFICIAL OPENING ENDOSCOPIC: ICD-10-PCS | Performed by: INTERNAL MEDICINE

## 2024-01-23 PROCEDURE — 86850 RBC ANTIBODY SCREEN: CPT | Performed by: EMERGENCY MEDICINE

## 2024-01-23 PROCEDURE — 85730 THROMBOPLASTIN TIME PARTIAL: CPT | Performed by: PHYSICIAN ASSISTANT

## 2024-01-23 PROCEDURE — 82948 REAGENT STRIP/BLOOD GLUCOSE: CPT

## 2024-01-23 PROCEDURE — 31500 INSERT EMERGENCY AIRWAY: CPT

## 2024-01-23 PROCEDURE — 83690 ASSAY OF LIPASE: CPT | Performed by: EMERGENCY MEDICINE

## 2024-01-23 PROCEDURE — 70496 CT ANGIOGRAPHY HEAD: CPT

## 2024-01-23 PROCEDURE — C9113 INJ PANTOPRAZOLE SODIUM, VIA: HCPCS | Performed by: EMERGENCY MEDICINE

## 2024-01-23 PROCEDURE — 70498 CT ANGIOGRAPHY NECK: CPT

## 2024-01-23 PROCEDURE — 84484 ASSAY OF TROPONIN QUANT: CPT

## 2024-01-23 PROCEDURE — 99291 CRITICAL CARE FIRST HOUR: CPT | Performed by: EMERGENCY MEDICINE

## 2024-01-23 PROCEDURE — 80053 COMPREHEN METABOLIC PANEL: CPT | Performed by: EMERGENCY MEDICINE

## 2024-01-23 PROCEDURE — 99223 1ST HOSP IP/OBS HIGH 75: CPT | Performed by: STUDENT IN AN ORGANIZED HEALTH CARE EDUCATION/TRAINING PROGRAM

## 2024-01-23 PROCEDURE — 94002 VENT MGMT INPAT INIT DAY: CPT

## 2024-01-23 PROCEDURE — 99285 EMERGENCY DEPT VISIT HI MDM: CPT

## 2024-01-23 PROCEDURE — 83605 ASSAY OF LACTIC ACID: CPT | Performed by: PHYSICIAN ASSISTANT

## 2024-01-23 PROCEDURE — 85730 THROMBOPLASTIN TIME PARTIAL: CPT | Performed by: EMERGENCY MEDICINE

## 2024-01-23 PROCEDURE — 81001 URINALYSIS AUTO W/SCOPE: CPT | Performed by: INTERNAL MEDICINE

## 2024-01-23 PROCEDURE — 84132 ASSAY OF SERUM POTASSIUM: CPT

## 2024-01-23 PROCEDURE — NC001 PR NO CHARGE: Performed by: PHYSICIAN ASSISTANT

## 2024-01-23 PROCEDURE — 85014 HEMATOCRIT: CPT

## 2024-01-23 PROCEDURE — 85610 PROTHROMBIN TIME: CPT | Performed by: EMERGENCY MEDICINE

## 2024-01-23 PROCEDURE — 85025 COMPLETE CBC W/AUTO DIFF WBC: CPT | Performed by: EMERGENCY MEDICINE

## 2024-01-23 PROCEDURE — 82330 ASSAY OF CALCIUM: CPT

## 2024-01-23 PROCEDURE — 0BH17EZ INSERTION OF ENDOTRACHEAL AIRWAY INTO TRACHEA, VIA NATURAL OR ARTIFICIAL OPENING: ICD-10-PCS

## 2024-01-23 PROCEDURE — 30233N1 TRANSFUSION OF NONAUTOLOGOUS RED BLOOD CELLS INTO PERIPHERAL VEIN, PERCUTANEOUS APPROACH: ICD-10-PCS | Performed by: PHYSICIAN ASSISTANT

## 2024-01-23 PROCEDURE — 93010 ELECTROCARDIOGRAM REPORT: CPT | Performed by: INTERNAL MEDICINE

## 2024-01-23 PROCEDURE — 5A1945Z RESPIRATORY VENTILATION, 24-96 CONSECUTIVE HOURS: ICD-10-PCS

## 2024-01-23 PROCEDURE — 82805 BLOOD GASES W/O2 SATURATION: CPT | Performed by: PHYSICIAN ASSISTANT

## 2024-01-23 PROCEDURE — 84484 ASSAY OF TROPONIN QUANT: CPT | Performed by: EMERGENCY MEDICINE

## 2024-01-23 PROCEDURE — 80053 COMPREHEN METABOLIC PANEL: CPT

## 2024-01-23 PROCEDURE — 85610 PROTHROMBIN TIME: CPT | Performed by: PHYSICIAN ASSISTANT

## 2024-01-23 PROCEDURE — NC001 PR NO CHARGE

## 2024-01-23 PROCEDURE — 94150 VITAL CAPACITY TEST: CPT

## 2024-01-23 PROCEDURE — 94644 CONT INHLJ TX 1ST HOUR: CPT

## 2024-01-23 PROCEDURE — 96374 THER/PROPH/DIAG INJ IV PUSH: CPT

## 2024-01-23 PROCEDURE — 86900 BLOOD TYPING SEROLOGIC ABO: CPT | Performed by: EMERGENCY MEDICINE

## 2024-01-23 PROCEDURE — 82947 ASSAY GLUCOSE BLOOD QUANT: CPT

## 2024-01-23 PROCEDURE — 99292 CRITICAL CARE ADDL 30 MIN: CPT | Performed by: INTERNAL MEDICINE

## 2024-01-23 PROCEDURE — 3E0G8GC INTRODUCTION OF OTHER THERAPEUTIC SUBSTANCE INTO UPPER GI, VIA NATURAL OR ARTIFICIAL OPENING ENDOSCOPIC: ICD-10-PCS | Performed by: INTERNAL MEDICINE

## 2024-01-23 PROCEDURE — 94760 N-INVAS EAR/PLS OXIMETRY 1: CPT

## 2024-01-23 PROCEDURE — 96376 TX/PRO/DX INJ SAME DRUG ADON: CPT

## 2024-01-23 PROCEDURE — 93005 ELECTROCARDIOGRAM TRACING: CPT

## 2024-01-23 PROCEDURE — 86901 BLOOD TYPING SEROLOGIC RH(D): CPT | Performed by: EMERGENCY MEDICINE

## 2024-01-23 PROCEDURE — C9113 INJ PANTOPRAZOLE SODIUM, VIA: HCPCS | Performed by: PHYSICIAN ASSISTANT

## 2024-01-23 PROCEDURE — 36415 COLL VENOUS BLD VENIPUNCTURE: CPT | Performed by: EMERGENCY MEDICINE

## 2024-01-23 PROCEDURE — 94664 DEMO&/EVAL PT USE INHALER: CPT

## 2024-01-23 PROCEDURE — 82805 BLOOD GASES W/O2 SATURATION: CPT

## 2024-01-23 PROCEDURE — 74177 CT ABD & PELVIS W/CONTRAST: CPT

## 2024-01-23 PROCEDURE — 82140 ASSAY OF AMMONIA: CPT | Performed by: PHYSICIAN ASSISTANT

## 2024-01-23 PROCEDURE — 80048 BASIC METABOLIC PNL TOTAL CA: CPT

## 2024-01-23 RX ORDER — FENTANYL CITRATE 50 UG/ML
INJECTION, SOLUTION INTRAMUSCULAR; INTRAVENOUS
Status: DISCONTINUED
Start: 2024-01-23 | End: 2024-01-23 | Stop reason: WASHOUT

## 2024-01-23 RX ORDER — CHLORHEXIDINE GLUCONATE ORAL RINSE 1.2 MG/ML
15 SOLUTION DENTAL EVERY 12 HOURS SCHEDULED
Status: DISCONTINUED | OUTPATIENT
Start: 2024-01-23 | End: 2024-02-06 | Stop reason: HOSPADM

## 2024-01-23 RX ORDER — ALBUMIN, HUMAN INJ 5% 5 %
12.5 SOLUTION INTRAVENOUS ONCE
Status: COMPLETED | OUTPATIENT
Start: 2024-01-23 | End: 2024-01-23

## 2024-01-23 RX ORDER — LEVALBUTEROL INHALATION SOLUTION 0.63 MG/3ML
0.63 SOLUTION RESPIRATORY (INHALATION)
Status: DISCONTINUED | OUTPATIENT
Start: 2024-01-23 | End: 2024-01-27

## 2024-01-23 RX ORDER — PANTOPRAZOLE SODIUM 40 MG/10ML
40 INJECTION, POWDER, LYOPHILIZED, FOR SOLUTION INTRAVENOUS ONCE
Status: COMPLETED | OUTPATIENT
Start: 2024-01-23 | End: 2024-01-23

## 2024-01-23 RX ORDER — ONDANSETRON 2 MG/ML
4 INJECTION INTRAMUSCULAR; INTRAVENOUS EVERY 6 HOURS PRN
Status: DISCONTINUED | OUTPATIENT
Start: 2024-01-23 | End: 2024-01-23

## 2024-01-23 RX ORDER — ALBUMIN (HUMAN) 12.5 G/50ML
25 SOLUTION INTRAVENOUS ONCE
Status: COMPLETED | OUTPATIENT
Start: 2024-01-23 | End: 2024-01-23

## 2024-01-23 RX ORDER — FENTANYL CITRATE 50 UG/ML
50 INJECTION, SOLUTION INTRAMUSCULAR; INTRAVENOUS
Status: DISCONTINUED | OUTPATIENT
Start: 2024-01-23 | End: 2024-01-26

## 2024-01-23 RX ORDER — AZELASTINE 1 MG/ML
1 SPRAY, METERED NASAL 2 TIMES DAILY
Status: DISCONTINUED | OUTPATIENT
Start: 2024-01-23 | End: 2024-02-06 | Stop reason: HOSPADM

## 2024-01-23 RX ORDER — ONDANSETRON 2 MG/ML
4 INJECTION INTRAMUSCULAR; INTRAVENOUS ONCE
Status: COMPLETED | OUTPATIENT
Start: 2024-01-23 | End: 2024-01-23

## 2024-01-23 RX ORDER — SODIUM CHLORIDE FOR INHALATION 0.9 %
3 VIAL, NEBULIZER (ML) INHALATION ONCE
Status: COMPLETED | OUTPATIENT
Start: 2024-01-23 | End: 2024-01-23

## 2024-01-23 RX ORDER — ETOMIDATE 2 MG/ML
20 INJECTION INTRAVENOUS ONCE
Status: COMPLETED | OUTPATIENT
Start: 2024-01-23 | End: 2024-01-23

## 2024-01-23 RX ORDER — SODIUM CHLORIDE, SODIUM GLUCONATE, SODIUM ACETATE, POTASSIUM CHLORIDE, MAGNESIUM CHLORIDE, SODIUM PHOSPHATE, DIBASIC, AND POTASSIUM PHOSPHATE .53; .5; .37; .037; .03; .012; .00082 G/100ML; G/100ML; G/100ML; G/100ML; G/100ML; G/100ML; G/100ML
75 INJECTION, SOLUTION INTRAVENOUS CONTINUOUS
Status: DISCONTINUED | OUTPATIENT
Start: 2024-01-23 | End: 2024-01-24

## 2024-01-23 RX ORDER — SODIUM CHLORIDE, SODIUM GLUCONATE, SODIUM ACETATE, POTASSIUM CHLORIDE, MAGNESIUM CHLORIDE, SODIUM PHOSPHATE, DIBASIC, AND POTASSIUM PHOSPHATE .53; .5; .37; .037; .03; .012; .00082 G/100ML; G/100ML; G/100ML; G/100ML; G/100ML; G/100ML; G/100ML
500 INJECTION, SOLUTION INTRAVENOUS ONCE
Status: COMPLETED | OUTPATIENT
Start: 2024-01-23 | End: 2024-01-23

## 2024-01-23 RX ORDER — DEXTROSE MONOHYDRATE 25 G/50ML
50 INJECTION, SOLUTION INTRAVENOUS ONCE
Status: COMPLETED | OUTPATIENT
Start: 2024-01-23 | End: 2024-01-23

## 2024-01-23 RX ORDER — ACETAMINOPHEN 325 MG/1
650 TABLET ORAL EVERY 6 HOURS PRN
Status: DISCONTINUED | OUTPATIENT
Start: 2024-01-23 | End: 2024-02-06 | Stop reason: HOSPADM

## 2024-01-23 RX ORDER — VECURONIUM BROMIDE 1 MG/ML
10 INJECTION, POWDER, LYOPHILIZED, FOR SOLUTION INTRAVENOUS ONCE
Status: COMPLETED | OUTPATIENT
Start: 2024-01-23 | End: 2024-01-23

## 2024-01-23 RX ORDER — METOCLOPRAMIDE HYDROCHLORIDE 5 MG/ML
10 INJECTION INTRAMUSCULAR; INTRAVENOUS ONCE
Status: COMPLETED | OUTPATIENT
Start: 2024-01-23 | End: 2024-01-23

## 2024-01-23 RX ORDER — MIDAZOLAM HYDROCHLORIDE 2 MG/2ML
1 INJECTION, SOLUTION INTRAMUSCULAR; INTRAVENOUS ONCE
Status: DISCONTINUED | OUTPATIENT
Start: 2024-01-23 | End: 2024-01-23

## 2024-01-23 RX ORDER — CALCIUM GLUCONATE 20 MG/ML
1 INJECTION, SOLUTION INTRAVENOUS ONCE
Status: COMPLETED | OUTPATIENT
Start: 2024-01-23 | End: 2024-01-23

## 2024-01-23 RX ORDER — PROPOFOL 10 MG/ML
5-50 INJECTION, EMULSION INTRAVENOUS
Status: DISCONTINUED | OUTPATIENT
Start: 2024-01-23 | End: 2024-01-26

## 2024-01-23 RX ADMIN — IOHEXOL 100 ML: 350 INJECTION, SOLUTION INTRAVENOUS at 01:13

## 2024-01-23 RX ADMIN — SODIUM CHLORIDE 8 MG/HR: 9 INJECTION, SOLUTION INTRAVENOUS at 04:39

## 2024-01-23 RX ADMIN — INSULIN HUMAN 10 UNITS: 100 INJECTION, SOLUTION PARENTERAL at 11:45

## 2024-01-23 RX ADMIN — SODIUM PHOSPHATE, MONOBASIC, MONOHYDRATE AND SODIUM PHOSPHATE, DIBASIC, ANHYDROUS 12 MMOL: 142; 276 INJECTION, SOLUTION INTRAVENOUS at 11:49

## 2024-01-23 RX ADMIN — IPRATROPIUM BROMIDE 0.5 MG: 0.5 SOLUTION RESPIRATORY (INHALATION) at 19:43

## 2024-01-23 RX ADMIN — NOREPINEPHRINE BITARTRATE 1 MCG/MIN: 1 SOLUTION INTRAVENOUS at 13:12

## 2024-01-23 RX ADMIN — SODIUM CHLORIDE 10 UNITS/HR: 9 INJECTION, SOLUTION INTRAVENOUS at 14:10

## 2024-01-23 RX ADMIN — DEXTROSE MONOHYDRATE 50 ML: 25 INJECTION, SOLUTION INTRAVENOUS at 11:47

## 2024-01-23 RX ADMIN — METOCLOPRAMIDE 10 MG: 5 INJECTION, SOLUTION INTRAMUSCULAR; INTRAVENOUS at 11:03

## 2024-01-23 RX ADMIN — ALBUTEROL SULFATE 10 MG: 2.5 SOLUTION RESPIRATORY (INHALATION) at 08:53

## 2024-01-23 RX ADMIN — Medication 2000 UNITS: at 03:33

## 2024-01-23 RX ADMIN — IOHEXOL 85 ML: 350 INJECTION, SOLUTION INTRAVENOUS at 07:01

## 2024-01-23 RX ADMIN — SODIUM CHLORIDE, SODIUM GLUCONATE, SODIUM ACETATE, POTASSIUM CHLORIDE, MAGNESIUM CHLORIDE, SODIUM PHOSPHATE, DIBASIC, AND POTASSIUM PHOSPHATE 75 ML/HR: .53; .5; .37; .037; .03; .012; .00082 INJECTION, SOLUTION INTRAVENOUS at 17:53

## 2024-01-23 RX ADMIN — CHLORHEXIDINE GLUCONATE 15 ML: 1.2 SOLUTION ORAL at 22:00

## 2024-01-23 RX ADMIN — DESMOPRESSIN ACETATE 28 MCG: 40 INJECTION, SOLUTION INTRAVENOUS; SUBCUTANEOUS at 04:03

## 2024-01-23 RX ADMIN — AZELASTINE 1 SPRAY: 1 SPRAY, METERED NASAL at 17:57

## 2024-01-23 RX ADMIN — SODIUM CHLORIDE, SODIUM GLUCONATE, SODIUM ACETATE, POTASSIUM CHLORIDE, MAGNESIUM CHLORIDE, SODIUM PHOSPHATE, DIBASIC, AND POTASSIUM PHOSPHATE 500 ML: .53; .5; .37; .037; .03; .012; .00082 INJECTION, SOLUTION INTRAVENOUS at 11:03

## 2024-01-23 RX ADMIN — CALCIUM GLUCONATE 1 G: 20 INJECTION, SOLUTION INTRAVENOUS at 08:17

## 2024-01-23 RX ADMIN — PROPOFOL 25 MCG/KG/MIN: 10 INJECTION, EMULSION INTRAVENOUS at 19:31

## 2024-01-23 RX ADMIN — TRIMETHOBENZAMIDE HYDROCHLORIDE 200 MG: 100 INJECTION INTRAMUSCULAR at 07:17

## 2024-01-23 RX ADMIN — NOREPINEPHRINE BITARTRATE 1 MCG/MIN: 1 INJECTION, SOLUTION, CONCENTRATE INTRAVENOUS at 08:35

## 2024-01-23 RX ADMIN — SODIUM CHLORIDE 1000 ML: 0.9 INJECTION, SOLUTION INTRAVENOUS at 01:14

## 2024-01-23 RX ADMIN — Medication 3 ML: at 08:53

## 2024-01-23 RX ADMIN — SODIUM CHLORIDE, SODIUM GLUCONATE, SODIUM ACETATE, POTASSIUM CHLORIDE, MAGNESIUM CHLORIDE, SODIUM PHOSPHATE, DIBASIC, AND POTASSIUM PHOSPHATE 75 ML/HR: .53; .5; .37; .037; .03; .012; .00082 INJECTION, SOLUTION INTRAVENOUS at 11:03

## 2024-01-23 RX ADMIN — PHENYLEPHRINE HYDROCHLORIDE 25 MCG/MIN: 10 INJECTION INTRAVENOUS at 09:41

## 2024-01-23 RX ADMIN — SODIUM CHLORIDE 5 UNITS/HR: 9 INJECTION, SOLUTION INTRAVENOUS at 03:46

## 2024-01-23 RX ADMIN — ONDANSETRON 4 MG: 2 INJECTION INTRAMUSCULAR; INTRAVENOUS at 02:30

## 2024-01-23 RX ADMIN — PROPOFOL 15 MCG/KG/MIN: 10 INJECTION, EMULSION INTRAVENOUS at 15:00

## 2024-01-23 RX ADMIN — SODIUM CHLORIDE 1000 ML: 0.9 INJECTION, SOLUTION INTRAVENOUS at 02:46

## 2024-01-23 RX ADMIN — SODIUM CHLORIDE 8 MG/HR: 9 INJECTION, SOLUTION INTRAVENOUS at 13:05

## 2024-01-23 RX ADMIN — LEVALBUTEROL HYDROCHLORIDE 0.63 MG: 0.63 SOLUTION RESPIRATORY (INHALATION) at 19:43

## 2024-01-23 RX ADMIN — AZELASTINE 1 SPRAY: 1 SPRAY, METERED NASAL at 10:09

## 2024-01-23 RX ADMIN — ALBUMIN (HUMAN) 12.5 G: 12.5 INJECTION, SOLUTION INTRAVENOUS at 10:06

## 2024-01-23 RX ADMIN — VECURONIUM BROMIDE 10 MG: 1 INJECTION, POWDER, LYOPHILIZED, FOR SOLUTION INTRAVENOUS at 14:14

## 2024-01-23 RX ADMIN — ETOMIDATE 20 MG: 2 INJECTION INTRAVENOUS at 14:13

## 2024-01-23 RX ADMIN — ONDANSETRON 4 MG: 2 INJECTION INTRAMUSCULAR; INTRAVENOUS at 00:58

## 2024-01-23 RX ADMIN — FENTANYL CITRATE 50 MCG: 50 INJECTION INTRAMUSCULAR; INTRAVENOUS at 23:55

## 2024-01-23 RX ADMIN — PANTOPRAZOLE SODIUM 40 MG: 40 INJECTION, POWDER, FOR SOLUTION INTRAVENOUS at 01:21

## 2024-01-23 RX ADMIN — PANTOPRAZOLE SODIUM 40 MG: 40 INJECTION, POWDER, FOR SOLUTION INTRAVENOUS at 00:58

## 2024-01-23 RX ADMIN — ALBUMIN (HUMAN) 25 G: 0.25 INJECTION, SOLUTION INTRAVENOUS at 08:08

## 2024-01-23 NOTE — ED PROVIDER NOTES
History  Chief Complaint   Patient presents with   • Abdominal Pain     Pt via EMS from home with c/o generalized weakness, nausea, and vomiting since the morning. Reports lower abd pain. Reports hx of anemia.     78-year-old male on Eliquis with generalized weakness Fuhs abdominal pain, episode of vomiting which significant other describes as dark with clotted material.  Does have history of upper GI bleeds requiring acute GI intervention.  Initially denied any melena however on examination has significant amount of melena on rectal exam, Hemoccult positive.  No chest pain, no shortness of breath, does feel lightheaded          Prior to Admission Medications   Prescriptions Last Dose Informant Patient Reported? Taking?   Anoro Ellipta 62.5-25 MCG/ACT inhaler   No No   Sig: inhale 1 puff by mouth and INTO THE LUNGS once daily   Blood Glucose Monitoring Suppl (OneTouch Verio) w/Device KIT  Self No No   Sig: Use 4 (four) times a day   Patient not taking: Reported on 12/6/2023   Continuous Blood Gluc  (Dexcom G6 ) NOHEMY  Self No No   Sig: Use with dexcom sensor   Continuous Blood Gluc Sensor (Dexcom G6 Sensor) MISC   No No   Sig: Use daily as directed for CGM - Change every 10 days   Patient not taking: Reported on 12/13/2023   Continuous Blood Gluc Transmit (Dexcom G6 Transmitter) MISC   No No   Sig: Use daily as directed for CGM - Change every 3 months   Patient not taking: Reported on 12/13/2023   Continuous Blood Gluc Transmit (Dexcom G6 Transmitter) MISC   No No   Sig: Use daily as directed for CGM - Change every 3 months   Patient not taking: Reported on 12/13/2023   Eliquis 5 MG   No No   Sig: take 1 tablet by mouth twice a day   Insulin Pen Needle (Droplet Pen Needles) 32G X 4 MM MISC   No No   Sig: use 1 PEN NEEDLE to inject MEDICATION subcutaneously four times a day   Jardiance 10 MG TABS tablet   No No   Sig: take 1 tablet by mouth daily every morning   Multiple Vitamin (MULTIVITAMIN ADULT PO)   Self Yes No   Sig: Take 1 tablet by mouth daily   NovoLOG FlexPen 100 units/mL injection pen   No No   Sig: INJECT 40 UNITS UNDER THE SKIN 3 TIMES DAILY WITH MEALS   Potassium Citrate ER 15 MEQ (1620 MG) TBCR  Self No No   Sig: Take 1 tablet by mouth 2 (two) times a day   SUPER B COMPLEX/C PO  Self Yes No   Sig: Take 1 tablet by mouth daily   Trulicity 0.75 MG/0.5ML injection   No No   Sig: inject 0.5 milliliters ( 0.75 milligrams ) subcutaneously every week IN THE ABDOMEN THIGHS OR OUTER AREA OF UPPER ARM ROTATE INJECTION SITES   acetaminophen (TYLENOL) 650 mg CR tablet  Self Yes No   Sig: Take 650 mg by mouth every 8 (eight) hours as needed for mild pain   atorvastatin (LIPITOR) 40 mg tablet   No No   Sig: take 1 tablet by mouth once daily after dinner   azelastine (ASTELIN) 0.1 % nasal spray  Self No No   Si spray into each nostril 2 (two) times a day Use in each nostril as directed   clopidogrel (PLAVIX) 75 mg tablet   No No   Sig: Take 1 tablet (75 mg total) by mouth daily   ferrous sulfate 325 (65 Fe) mg tablet   No No   Sig: Take 1 tablet (325 mg total) by mouth daily with breakfast   furosemide (LASIX) 40 mg tablet   No No   Sig: Take 1 tablet (40 mg total) by mouth every other day   glucose blood (OneTouch Verio) test strip  Self No No   Sig: Use as instructed   Patient not taking: Reported on 10/13/2023   insulin detemir (Levemir FlexPen) 100 Units/mL injection pen   No No   Si units daily   metoprolol succinate (TOPROL-XL) 50 mg 24 hr tablet  Self No No   Sig: Take 0.5 tablets (25 mg total) by mouth daily   mometasone (ELOCON) 0.1 % cream  Self No No   Sig: Apply topically daily for 7 days   pantoprazole (PROTONIX) 40 mg tablet  Self No No   Sig: Take 1 tablet (40 mg total) by mouth 2 (two) times a day   Patient taking differently: Take 40 mg by mouth daily   pregabalin (LYRICA) 100 mg capsule   No No   Sig: take 1 capsule by mouth three times a day   sertraline (ZOLOFT) 25 mg tablet  Self Yes  No   Sig: Take 25 mg by mouth daily   tamsulosin (FLOMAX) 0.4 mg  Self Yes No   Sig: Take 0.4 mg by mouth daily with dinner   traZODone (DESYREL) 100 mg tablet   No No   Sig: take 2 tablets by mouth at bedtime      Facility-Administered Medications: None       Past Medical History:   Diagnosis Date   • Anemia    • Atrial fibrillation (HCC)     Eliquis   • AVB (atrioventricular block)     1st degree   • CAD (coronary artery disease)    • CKD (chronic kidney disease), stage III (HCC)     baseline Cr 1.2-1.6   • Colon polyp    • Diabetes mellitus (HCC)     type 2, insulin dependent   • Diverticulosis    • Emphysema lung (HCC)    • Former tobacco use    • History of asbestos exposure    • History of DVT (deep vein thrombosis)     RLE, tx w/ AC   • History of GI bleed 08/2023    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, given PRBC/Venofer for anemia while in house   • Hyperlipidemia    • Hypertension    • LAFB (left anterior fascicular block)    • Liver cancer (HCC)    • Liver mass 08/2023    suspected HCC, needs radioembolization   • RBBB    • Syncope 08/07/2023       Past Surgical History:   Procedure Laterality Date   • APPENDECTOMY     • BOWEL RESECTION  2014   • CARDIAC CATHETERIZATION     • CARDIAC CATHETERIZATION N/A 08/25/2023    Procedure: Cardiac pci;  Surgeon: Dom Garrett DO;  Location: [facility];  Service: Cardiology   • CARDIAC CATHETERIZATION N/A 08/21/2023    Procedure: Cardiac catheterization;  Surgeon: Dom Garrett DO;  Location: [facility];  Service: Cardiology   • CARDIAC CATHETERIZATION N/A 08/21/2023    Procedure: Cardiac Coronary Angiogram;  Surgeon: Dom Garrett DO;  Location: [facility];  Service: Cardiology   • CATARACT EXTRACTION Bilateral    • COLONOSCOPY     • EGD     • IR Y-90 PRE-ANGIO/EMBO W/ LUNG SCAN  09/01/2023   • IR Y-90 RADIOEMBOLIZATION  09/08/2023       Family History   Problem Relation Age of Onset   • Hypertension Mother     • Bone cancer Father    • Diabetes type II Sister    • Diabetes type II Sister    • Esophageal cancer Brother    • Colon cancer Neg Hx      I have reviewed and agree with the history as documented.    E-Cigarette/Vaping   • E-Cigarette Use Never User      E-Cigarette/Vaping Substances   • Nicotine No    • THC No    • CBD No    • Flavoring No      Social History     Tobacco Use   • Smoking status: Former     Current packs/day: 0.00     Average packs/day: 1 pack/day for 30.0 years (30.0 ttl pk-yrs)     Types: Cigarettes     Start date:      Quit date:      Years since quittin.0   • Smokeless tobacco: Never   Vaping Use   • Vaping status: Never Used   Substance Use Topics   • Alcohol use: Yes     Comment: Socially   • Drug use: Never       Review of Systems   Constitutional:  Negative for appetite change, chills and fever.   HENT:  Negative for rhinorrhea and sore throat.    Eyes:  Negative for photophobia and visual disturbance.   Respiratory:  Negative for cough and shortness of breath.    Cardiovascular:  Negative for chest pain and palpitations.   Gastrointestinal:  Positive for abdominal pain, nausea and vomiting. Negative for diarrhea.   Genitourinary:  Negative for dysuria, frequency and urgency.   Skin:  Negative for rash.   Neurological:  Positive for light-headedness. Negative for dizziness and weakness.   All other systems reviewed and are negative.      Physical Exam  Physical Exam  Vitals and nursing note reviewed.   Constitutional:       Appearance: He is well-developed.   HENT:      Head: Normocephalic and atraumatic.      Right Ear: External ear normal.      Left Ear: External ear normal.   Eyes:      Conjunctiva/sclera: Conjunctivae normal.      Pupils: Pupils are equal, round, and reactive to light.   Neck:      Vascular: No JVD.      Trachea: No tracheal deviation.   Cardiovascular:      Rate and Rhythm: Normal rate and regular rhythm.      Heart sounds: Normal heart sounds. No murmur  heard.     No friction rub. No gallop.   Pulmonary:      Effort: Pulmonary effort is normal. No respiratory distress.      Breath sounds: No stridor. No wheezing or rales.   Abdominal:      General: There is no distension.      Palpations: Abdomen is soft. There is no mass.      Tenderness: There is abdominal tenderness. There is no guarding or rebound.   Genitourinary:     Rectum: Guaiac result positive.      Comments: Black stool noted in rectal vault  Musculoskeletal:         General: Normal range of motion.      Cervical back: Normal range of motion and neck supple.   Skin:     General: Skin is warm and dry.      Coloration: Skin is not pale.      Findings: No erythema or rash.   Neurological:      Mental Status: He is alert and oriented to person, place, and time.      Cranial Nerves: No cranial nerve deficit.         Vital Signs  ED Triage Vitals   Temperature Pulse Respirations Blood Pressure SpO2   01/23/24 0018 01/23/24 0015 01/23/24 0015 01/23/24 0015 01/23/24 0015   97.5 °F (36.4 °C) 101 22 117/53 96 %      Temp Source Heart Rate Source Patient Position - Orthostatic VS BP Location FiO2 (%)   01/23/24 0018 01/23/24 0015 01/23/24 0015 01/23/24 0015 --   Temporal Monitor Sitting Left arm       Pain Score       01/23/24 0018       6           Vitals:    01/23/24 0018 01/23/24 0045 01/23/24 0100 01/23/24 0115   BP: 117/53 (!) 111/46 104/52 112/53   Pulse: 102 94 92 96   Patient Position - Orthostatic VS:  Sitting Sitting Sitting         Visual Acuity      ED Medications  Medications   sodium chloride 0.9 % bolus 1,000 mL (1,000 mL Intravenous New Bag 1/23/24 0114)   pantoprazole (PROTONIX) injection 40 mg (has no administration in time range)   ondansetron (ZOFRAN) injection 4 mg (4 mg Intravenous Given 1/23/24 0058)   pantoprazole (PROTONIX) injection 40 mg (40 mg Intravenous Given 1/23/24 0058)   iohexol (OMNIPAQUE) 350 MG/ML injection (MULTI-DOSE) 100 mL (100 mL Intravenous Given 1/23/24 0113)        Diagnostic Studies  Results Reviewed       Procedure Component Value Units Date/Time    FLU/RSV/COVID - if FLU/RSV clinically relevant [[id number]] Collected: 01/23/24 0118    Lab Status: No result Specimen: Nares from Nose     Protime-INR [[id number]]  (Abnormal) Collected: 01/23/24 0021    Lab Status: Final result Specimen: Blood from Arm, Right Updated: 01/23/24 0100     Protime 19.7 seconds      INR 1.62    APTT [[id number]]  (Normal) Collected: 01/23/24 0021    Lab Status: Final result Specimen: Blood from Arm, Right Updated: 01/23/24 0100     PTT 35 seconds     HS Troponin 0hr (reflex protocol) [[id number]]  (Normal) Collected: 01/23/24 0021    Lab Status: Final result Specimen: Blood from Arm, Right Updated: 01/23/24 0053     hs TnI 0hr 24 ng/L     HS Troponin I 2hr [[id number]]     Lab Status: No result Specimen: Blood     Comprehensive metabolic panel [[id number]]  (Abnormal) Collected: 01/23/24 0021    Lab Status: Final result Specimen: Blood from Arm, Right Updated: 01/23/24 0045     Sodium 142 mmol/L      Potassium 5.5 mmol/L      Chloride 106 mmol/L      CO2 23 mmol/L      ANION GAP 13 mmol/L      BUN 71 mg/dL      Creatinine 1.63 mg/dL      Glucose 276 mg/dL      Calcium 8.4 mg/dL      Corrected Calcium 9.1 mg/dL       U/L      ALT 69 U/L      Alkaline Phosphatase 269 U/L      Total Protein 6.1 g/dL      Albumin 3.1 g/dL      Total Bilirubin 0.93 mg/dL      eGFR 40 ml/min/1.73sq m     Narrative:      National Kid[employer] guidelines for Chronic Kidney Disease (CKD):   •  Stage 1 with normal or high GFR (GFR > 90 mL/min/1.73 square meters)  •  Stage 2 Mild CKD (GFR = 60-89 mL/min/1.73 square meters)  •  Stage 3A Moderate CKD (GFR = 45-59 mL/min/1.73 square meters)  •  Stage 3B Moderate CKD (GFR = 30-44 mL/min/1.73 square meters)  •  Stage 4 Severe CKD (GFR = 15-29 mL/min/1.73 square meters)  •  Stage 5 End Stage CKD (GFR <15 mL/min/1.73 square meters)  Note: GFR calculation is  accurate only with a steady state creatinine    Lipase [[id number]]  (Normal) Collected: 01/23/24 0021    Lab Status: Final result Specimen: Blood from Arm, Right Updated: 01/23/24 0045     Lipase 39 u/L     Fingerstick Glucose (POCT) [[id number]]  (Abnormal) Collected: 01/23/24 0040    Lab Status: Final result Updated: 01/23/24 0044     POC Glucose 211 mg/dl     CBC and differential [[id number]]  (Abnormal) Collected: 01/23/24 0021    Lab Status: Final result Specimen: Blood from Arm, Right Updated: 01/23/24 0030     WBC 12.30 Thousand/uL      RBC 2.81 Million/uL      Hemoglobin 8.4 g/dL      Hematocrit 27.9 %      MCV 99 fL      MCH 29.9 pg      MCHC 30.1 g/dL      RDW 17.6 %      MPV 12.2 fL      Platelets 242 Thousands/uL      nRBC 0 /100 WBCs      Neutrophils Relative 73 %      Immat GRANS % 1 %      Lymphocytes Relative 12 %      Monocytes Relative 14 %      Eosinophils Relative 0 %      Basophils Relative 0 %      Neutrophils Absolute 8.90 Thousands/µL      Immature Grans Absolute 0.12 Thousand/uL      Lymphocytes Absolute 1.48 Thousands/µL      Monocytes Absolute 1.72 Thousand/µL      Eosinophils Absolute 0.04 Thousand/µL      Basophils Absolute 0.04 Thousands/µL     UA w Reflex to Microscopic w Reflex to Culture [[id number]]     Lab Status: No result Specimen: Urine                    CT abdomen pelvis with contrast    (Results Pending)              Procedures  CriticalCare Time    Date/Time: 1/23/2024 1:19 AM    Performed by: April Camejo DO  Authorized by: April Camejo DO    Critical care provider statement:     Critical care time (minutes):  45    Critical care time was exclusive of:  Separately billable procedures and treating other patients and teaching time    Critical care was necessary to treat or prevent imminent or life-threatening deterioration of the following conditions: UGI bleed, anemia, hypotension.    Critical care was time spent personally by me on the following activities:  Blood draw for  specimens, obtaining history from patient or surrogate, development of treatment plan with patient or surrogate, evaluation of patient's response to treatment, examination of patient, ordering and performing treatments and interventions, ordering and review of laboratory studies, ordering and review of radiographic studies, re-evaluation of patient's condition and review of old charts           ED Course  ED Course as of 01/23/24 0424   Tue Jan 23, 2024   0017 Medical record reviewed most recently seen by oncology 12/14/2023 for follow-up of hepatocellular carcinoma, on imaging appears tumor is currently stable otherwise no current medication changes   0039 Hemoglobin(!): 8.4  Significantly decreased from previous, 13.2 , 2 months ago   0047 Creatinine(!): 1.63  baseline   0047 Repeat EKG unchanged   0110 Patient on Eliquis for history of A-fib and DVTs takes it 5 mg BID, last dose this morning did not take the evening dose, will hold off on reversing at this point    0210 Patient with likely acute GI bleeding will admit for further care                                             Medical Decision Making  78-year-old male with generalized abdominal pain, melena on examination, will obtain lab work to evaluate for anemia, electrolyte abnormalities, EKG to evaluate for arrhythmia, troponin to evaluate for endorgan damage, coags given patient's coagulopathy due to Eliquis  And does agree to receiving blood transfusions as he has had them in the past.  Initially hypotensive prehospital however blood pressure improved with fluids will have blood available for infusion    Amount and/or Complexity of Data Reviewed  Labs: ordered. Decision-making details documented in ED Course.  Radiology: ordered.    Risk  Prescription drug management.             Disposition  Final diagnoses:   Anemia   Acute upper GI bleeding   Acute blood loss anemia     Time reflects when diagnosis was documented in both MDM as applicable and the  Disposition within this note       Time User Action Codes Description Comment    1/23/2024  1:00 AM April Camejo Add [K92.2] GI bleeding     1/23/2024  1:00 AM April Camejo Add [D64.9] Anemia     1/23/2024  1:00 AM Arpil Camejo Modify [D64.9] Anemia     1/23/2024  1:00 AM April Camejo Remove [K92.2] GI bleeding     1/23/2024  1:00 AM April Camejo Add [K92.2] Acute upper GI bleeding     1/23/2024  1:09 AM April Camejo Add [D62] Acute blood loss anemia           ED Disposition       None          Follow-up Information    None         Patient's Medications   Discharge Prescriptions    No medications on file       No discharge procedures on file.    PDMP Review         Value Time User    PDMP Reviewed  Yes 1/3/2024  6:29 PM Ernesto Griffith MD            ED Provider  Electronically Signed by             April Camejo DO  01/23/24 0424

## 2024-01-23 NOTE — ASSESSMENT & PLAN NOTE
Derek Walter is a 78 year old man with history of GI bleeds with multiple AVMs, anemia, CHF, hepatocellular carcinoma stage II s/p Y90 treatment, CAD on Plavix, CKD, DM, history of DVT, HLD, HTN, RBBB, anemia and A-fib on Eliquis who originally presented to the ED on 1/23 for increasing weakness, abdominal pain and feeling generally unwell.  Patient also was found to have melanotic stools and had an episode of bloody emesis x 1. He is s/p EGD with GI and correction of GI bleeding.    Neurology has been following in light of mental status, but was initially involved due to concern for acute stroke. MRI brain was obtained with no signs of stroke. Lower suspicion for stroke/TIA. Chronic left facial weakness, at baseline.   Today, he is more awake and sustaining wakefulness more. Following some commands, which is an improvement from yesterday. Family also feels he is improving.     Workup:  - Labs remarkable for leukocytosis (9-->17-->11), anemia, hyperkalemia (improved).    - CT A/P with contrast:  Nonspecific strandy hyperdensities within the gastric body possibly reflective of hemorrhage in this patient with history? Gastric AVMs per chart review in [facility]. GI consultation advised.  Similar appearance of known segment 6 and 7 hepatic masses compatible with multifocal HCC compared to the previous MRI accounting for differences in modality. Somewhat worsened heterogeneous/mottled appearance of the hepatic parenchyma in the anterior right and left hepatic lobes which may reflect disease progression and/or treatment related changes. 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 Continued follow-up per oncology recommended.  Cholelithiasis.  Similar small volume abdominopelvic ascites.  - CTH  negative for acute intracranial abnormality.    - CTA head/neck revealed 50 to 66% stenosis of proximal left ICA secondary to atheromatous and atherosclerotic disease.    - Echo: EF 75%, bilateral  atria appearing normal in size but not well visualized. Limited study overall due to poor visualization  - MRI brain wo negative for acute intracranial abnormalities. Findings suggestive of chronic microangiopathy.  - Ammonia 135->108->61  - A1C 6.6    Neuroimaging negative for stroke. Suspect TME in the setting of hyperammonemia, elevated BUN and acute GI bleed/acute illness - would expect gradual improvement as metabolic derangements continue to improve with some lag between labs and clinical presentation. No further inpatient neurologic recommendations at this time.     Plan:  - From a neurologic perspective, okay for anticoagulation when safe from a GI standpoint.   - Restart home Plavix when safe from GI standpoint.   - Restart home statin when able  - BP goal per primary  - Delirium precautions  - Vascular surgery follow up for asymptomatic left ICA stenosis as noted on CTA H/N  - Euglycemic, normothermic goal  - Continue telemetry  - PT/OT/ST  - Frequent neuro checks. Continue to monitor and notify neurology with any changes.  - STAT CT head for any acute change in neuro exam  - Medical management and supportive care, including correction of any metabolic or infectious disturbances, as per primary team and GI.    Will follow as needed. Please reach out with questions in the interim.

## 2024-01-23 NOTE — ASSESSMENT & PLAN NOTE
Patient is a 78-year-old male who has been seeing [facility] GI last seen 12/13/2023 patient has a history of anemia is multifactorial chronic due to cancer with iron deficiency and occult GI blood loss on Eliquis review of an EGD with multiple AVMs.  Patient came in to the ER from home due to generalized weakness nausea vomiting since this morning reports lower abdominal pain has a history of anemia is on Eliquis and Plavix episodes of vomiting which were described by the significant other is dark of clotted material, and also had melanotic stool occult positive.  Patient felt significant weakness and was unable to walk felt like too weak to walk.  Patient has a history of an upper GI bleed with multiple AVMs, also has a history of the patient intubation with tracheostomy approximately rehab time of 1 year.   Patient's baseline globin appears to be 8-10, except 1 value on 11/23 of 13.   Patient's hemoglobin was 8.3 received 1 L of fluid and his hemoglobin was 7.3  Ordered 2 units of RBCs with hemoglobins in between it   Significant nausea and abdominal, received Zofran 8 mg IV with no relief started Tigan due to prolonged QT  GI was called by the ER and is aware of the patient   Protonix 80 mg IV given by the ER  Start Protonix drip  Patient is on Eliquis and Plavix-100% sure but she thinks he did not take the Plavix or Eliquis on 1/22  GI wanted Eliquis reversed -ordered DDAVP 0.3 mg/kg and Kcentra  CT scan with contrast showed nonspecific hypodensity within the gastric body possibly reflective of hemorrhage in this patient with the history of gastric AVMs, known liver cancer, cholelithiasis, small volume abdominal pelvic ascites

## 2024-01-23 NOTE — CONSULTS
Consultation - Neurology   Derek Walter 78 y.o. male MRN: [id number]  Unit/Bed#: -01 SDU Encounter: [id number]      Assessment/Plan     Stroke-like symptom  Assessment & Plan  78 year old male with history of GI bleeds with multiple AVMs, anemia, CHF, hepatocellular carcinoma stage II s/p Y90 treatment, CAD on Plavix, CKD, DM, history of DVT, HLD, HTN, RBBB, anemia and A-fib on Eliquis who originally presented to the ED on 1/23 for increasing weakness, abdominal pain and feeling generally unwell.  Patient also was found to have melanotic stools and had an episode of bloody emesis x 1.    BP on arrival 117/53. Initial workup was concerning for acute upper GIB. GI was consulted and patient received 2 units of RBCs, Kcentra and DDAVP out of concern for acute upper GI bleeding.  He was then admitted to stepdown for further monitoring.      Stroke alert initiated the morning of 1/23 for for left-sided facial droop. Exam was notable for left-sided weakness, LLE drift and mild dysarthria.  NIH 2-3.  Unknown last known well.  Neuroimaging unremarkable for acute intracranial abnormality.  IV thrombolytics were deferred due to unknown last known well and active GI bleeding requiring recent transfusions.  It was recommended to trial IV pressors for BP management out of concern for hypoperfusion (systolic BP noted to drop as low as 84).    Workup:  - Labs remarkable for leukocytosis (12.30), acute on chronic anemia, hyperkalemia (5.5).    - CT A/P with contrast:  Nonspecific strandy hyperdensities within the gastric body possibly reflective of hemorrhage in this patient with history? Gastric AVMs per chart review in [facility]. GI consultation advised.  Similar appearance of known segment 6 and 7 hepatic masses compatible with multifocal HCC compared to the previous MRI accounting for differences in modality. Somewhat worsened heterogeneous/mottled appearance of the hepatic parenchyma in the anterior right and left hepatic lobes  which may reflect disease progression and/or treatment related changes. 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 Continued follow-up per oncology recommended.  Cholelithiasis.  Similar small volume abdominopelvic ascites.  - CTH  negative for acute intracranial abnormality.    - CTA head/neck revealed 50 to 66% stenosis of proximal left ICA secondary to atheromatous and atherosclerotic disease.      Concern for acute ischemia/TIA vs cerebral hypoperfusion in the setting of GIB. Plan detailed below    Plan:  - Stroke pathway  - s/p DDAVP and Kcentra for GIB. Hold AC/AP at this time until cleared by GI  - Continue with Atorvastatin 40 mg daily (home medication)  - Permissive HTN, recommend systolic > 140. Labetalol if SBP >200   - Recommend MRI brain wo, echo, lipid panel and hemoglobin A1c  - Euglycemic, normothermic goal  - Continue telemetry  - PT/OT/ST  - Secondary risk factor modification.   - Stroke education  - Frequent neuro checks. Continue to monitor and notify neurology with any changes.  - STAT CT head for any acute change in neuro exam  - Medical management and supportive care per primary team. Correction of any metabolic or infectious disturbances.     Plan discussed with Attending Neurologist, please see attestation for further input/recommendations.           Recommendations for outpatient neurological follow up have yet to be determined.    History of Present Illness     Reason for Consult / Principal Problem: Strokelike symptoms  Hx and PE limited by: patient encephalopathic appearing, history obtained from chart review    HPI: Derek Walter is a 78 y.o.  male with history of GI bleeds with multiple AVMs, anemia, CHF, hepatocellular carcinoma stage II s/p Y90 treatment, CAD on Plavix, CKD, DM, history of DVT, HLD, HTN, RBBB, anemia and A-fib on Eliquis who originally presented to the ED on 1/23 for increasing weakness, abdominal pain and feeling generally unwell.   Patient also was found to have melanotic stools and had an episode of bloody emesis x 1.    BP on arrival 117/53.  Labs remarkable for leukocytosis (12.30), acute on chronic anemia, hyperkalemia (5.5).  CT A/P with contrast revealed possible active bleeding within the gastric fundus.  GI was consulted and patient received 2 units of RBCs, Kcentra and DDAVP out of concern for acute upper GI bleeding.  He was then admitted to stepdown for further monitoring.    Stroke alert initiated the morning of 1/23 for left-sided facial droop.  Droop was noted on arrival to ICU at 3 AM (bedside RN reported ED could not confirm if droop was present, wife was unsure if droop was new).  Exam was notable for left-sided weakness, LLE drift and mild dysarthria.  NIH 2-3.  Unknown last known well.  CT head negative for acute intracranial abnormality.  CTA head/neck revealed 50 to 66% stenosis of proximal left ICA secondary to atheromatous and atherosclerotic disease.  IV thrombolytics were deferred due to unknown last known well and active GI bleeding requiring recent transfusions.  It was recommended to trial IV pressors for BP management out of concern for hypoperfusion (systolic BP noted to drop as low as 84).    Inpatient consult to Neurology  Consult performed by: NADIA Fabian  Consult ordered by: NADIA Diamond          Review of Systems   Unable to perform ROS: Mental status change   Patient encephalopathic appearing    Historical Information   Past Medical History:   Diagnosis Date    Anemia     Atrial fibrillation (HCC)     Eliquis    AVB (atrioventricular block)     1st degree    CAD (coronary artery disease)     CKD (chronic kidney disease), stage III (HCC)     baseline Cr 1.2-1.6    Colon polyp     Diabetes mellitus (HCC)     type 2, insulin dependent    Diverticulosis     Emphysema lung (HCC)     Former tobacco use     History of asbestos exposure     History of DVT (deep vein thrombosis)     RLE, tx w/  AC    History of GI bleed 2023    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, given PRBC/Venofer for anemia while in house    Hyperlipidemia     Hypertension     LAFB (left anterior fascicular block)     Liver cancer (HCC)     Liver mass 2023    suspected HCC, needs radioembolization    RBBB     Syncope 2023     Past Surgical History:   Procedure Laterality Date    APPENDECTOMY      BOWEL RESECTION      CARDIAC CATHETERIZATION      CARDIAC CATHETERIZATION N/A 2023    Procedure: Cardiac pci;  Surgeon: Dom Garrett DO;  Location: [facility];  Service: Cardiology    CARDIAC CATHETERIZATION N/A 2023    Procedure: Cardiac catheterization;  Surgeon: Dom Garrett DO;  Location: [facility];  Service: Cardiology    CARDIAC CATHETERIZATION N/A 2023    Procedure: Cardiac Coronary Angiogram;  Surgeon: Dom Garrett DO;  Location: [facility];  Service: Cardiology    CATARACT EXTRACTION Bilateral     COLONOSCOPY      EGD      IR Y-90 PRE-ANGIO/EMBO W/ LUNG SCAN  2023    IR Y-90 RADIOEMBOLIZATION  2023     Social History   Social History     Substance and Sexual Activity   Alcohol Use Yes    Comment: Socially     Social History     Substance and Sexual Activity   Drug Use Never     E-Cigarette/Vaping    E-Cigarette Use Never User      E-Cigarette/Vaping Substances    Nicotine No     THC No     CBD No     Flavoring No      Social History     Tobacco Use   Smoking Status Former    Current packs/day: 0.00    Average packs/day: 1 pack/day for 30.0 years (30.0 ttl pk-yrs)    Types: Cigarettes    Start date:     Quit date:     Years since quittin.0   Smokeless Tobacco Never     Family History:   Family History   Problem Relation Age of Onset    Hypertension Mother     Bone cancer Father     Diabetes type II Sister     Diabetes type II Sister     Esophageal cancer Brother     Colon cancer Neg Hx        Review of previous  medical records was completed.     Meds/Allergies   all current active meds have been reviewed, current meds:   Current Facility-Administered Medications   Medication Dose Route Frequency    acetaminophen (TYLENOL) tablet 650 mg  650 mg Oral Q6H PRN    azelastine (ASTELIN) 0.1 % nasal spray 1 spray  1 spray Each Nare BID    insulin regular (HumuLIN R,NovoLIN R) 1 Units/mL in sodium chloride 0.9 % 100 mL infusion  0.3-21 Units/hr Intravenous Titrated    metoclopramide (REGLAN) injection 10 mg  10 mg Intravenous Once    pantoprazole (PROTONIX) 80 mg in sodium chloride 0.9 % 100 mL infusion  8 mg/hr Intravenous Continuous    phenylephrine (DANA-SYNEPHRINE) 50 mg (STANDARD CONCENTRATION) in sodium chloride 0.9% 250 mL   mcg/min Intravenous Titrated    trimethobenzamide (TIGAN) IM injection 200 mg  200 mg Intramuscular Q12H PRN   , and PTA meds:   Prior to Admission Medications   Prescriptions Last Dose Informant Patient Reported? Taking?   Anoro Ellipta 62.5-25 MCG/ACT inhaler Past Week  No Yes   Sig: inhale 1 puff by mouth and INTO THE LUNGS once daily   Blood Glucose Monitoring Suppl (OneTouch Verio) w/Device KIT Past Week Self No Yes   Sig: Use 4 (four) times a day   Continuous Blood Gluc  (Dexcom G6 ) NOHEMY Past Week Self No Yes   Sig: Use with dexcom sensor   Continuous Blood Gluc Sensor (Dexcom G6 Sensor) MISC   No No   Sig: Use daily as directed for CGM - Change every 10 days   Patient not taking: Reported on 12/13/2023   Continuous Blood Gluc Transmit (Dexcom G6 Transmitter) MISC   No No   Sig: Use daily as directed for CGM - Change every 3 months   Patient not taking: Reported on 12/13/2023   Continuous Blood Gluc Transmit (Dexcom G6 Transmitter) MISC   No No   Sig: Use daily as directed for CGM - Change every 3 months   Patient not taking: Reported on 12/13/2023   Eliquis 5 MG Past Week  No Yes   Sig: take 1 tablet by mouth twice a day   Insulin Pen Needle (Droplet Pen Needles) 32G X 4 MM  MISC Past Week  No Yes   Sig: use 1 PEN NEEDLE to inject MEDICATION subcutaneously four times a day   Jardiance 10 MG TABS tablet Past Week  No Yes   Sig: take 1 tablet by mouth daily every morning   Multiple Vitamin (MULTIVITAMIN ADULT PO) Past Week Self Yes Yes   Sig: Take 1 tablet by mouth daily   NovoLOG FlexPen 100 units/mL injection pen Past Week  No Yes   Sig: INJECT 40 UNITS UNDER THE SKIN 3 TIMES DAILY WITH MEALS   Potassium Citrate ER 15 MEQ (1620 MG) TBCR Past Week Self No Yes   Sig: Take 1 tablet by mouth 2 (two) times a day   SUPER B COMPLEX/C PO Past Week Self Yes Yes   Sig: Take 1 tablet by mouth daily   Trulicity 0.75 MG/0.5ML injection Past Week  No Yes   Sig: inject 0.5 milliliters ( 0.75 milligrams ) subcutaneously every week IN THE ABDOMEN THIGHS OR OUTER AREA OF UPPER ARM ROTATE INJECTION SITES   acetaminophen (TYLENOL) 650 mg CR tablet Past Week Self Yes Yes   Sig: Take 650 mg by mouth every 8 (eight) hours as needed for mild pain   atorvastatin (LIPITOR) 40 mg tablet Past Week  No Yes   Sig: take 1 tablet by mouth once daily after dinner   azelastine (ASTELIN) 0.1 % nasal spray Past Week Self No Yes   Si spray into each nostril 2 (two) times a day Use in each nostril as directed   clopidogrel (PLAVIX) 75 mg tablet Past Week  No Yes   Sig: Take 1 tablet (75 mg total) by mouth daily   ferrous sulfate 325 (65 Fe) mg tablet Past Week  No Yes   Sig: Take 1 tablet (325 mg total) by mouth daily with breakfast   furosemide (LASIX) 40 mg tablet Past Week  No Yes   Sig: Take 1 tablet (40 mg total) by mouth every other day   glucose blood (OneTouch Verio) test strip  Self No No   Sig: Use as instructed   Patient not taking: Reported on 10/13/2023   insulin detemir (Levemir FlexPen) 100 Units/mL injection pen Past Week  No Yes   Si units daily   metoprolol succinate (TOPROL-XL) 50 mg 24 hr tablet Past Week Self No Yes   Sig: Take 0.5 tablets (25 mg total) by mouth daily   mometasone (ELOCON) 0.1  % cream  Self No No   Sig: Apply topically daily for 7 days   pantoprazole (PROTONIX) 40 mg tablet  Self No No   Sig: Take 1 tablet (40 mg total) by mouth 2 (two) times a day   Patient taking differently: Take 40 mg by mouth daily   pregabalin (LYRICA) 100 mg capsule Past Week  No Yes   Sig: take 1 capsule by mouth three times a day   sertraline (ZOLOFT) 25 mg tablet Past Week Self Yes Yes   Sig: Take 25 mg by mouth daily   tamsulosin (FLOMAX) 0.4 mg Past Week Self Yes Yes   Sig: Take 0.4 mg by mouth daily with dinner   traZODone (DESYREL) 100 mg tablet Past Week  No Yes   Sig: take 2 tablets by mouth at bedtime      Facility-Administered Medications: None       No Known Allergies    Objective   Vitals:Blood pressure 119/56, pulse 99, temperature 97.8 °F (36.6 °C), temperature source Oral, resp. rate (!) 25, height 6' (1.829 m), weight 94.1 kg (207 lb 7.3 oz), SpO2 97%.,Body mass index is 28.14 kg/m².    Intake/Output Summary (Last 24 hours) at 1/23/2024 1028  Last data filed at 1/23/2024 0511  Gross per 24 hour   Intake 1286.17 ml   Output 275 ml   Net 1011.17 ml       Invasive Devices:   Invasive Devices       Peripheral Intravenous Line  Duration             Peripheral IV 01/23/24 Distal;Dorsal (posterior);Right Forearm <1 day    Peripheral IV 01/23/24 Dorsal (posterior);Left Forearm <1 day    Peripheral IV 01/23/24 Right Antecubital <1 day              Drain  Duration             NG/OG/Enteral Tube Nasogastric 12 Fr Right nare <1 day                  Performed by Attending Neurologist, AP present at bedside acting as scribe  Physical Exam  Vitals reviewed.   Constitutional:       Comments: Encephalopathic appearing, attempting to take off clothes and bed sheets. Does not appear to be in acute distress. Alert.    HENT:      Head: Normocephalic and atraumatic.      Right Ear: External ear normal.      Left Ear: External ear normal.      Nose: Nose normal.      Comments: NG tube in place     Mouth/Throat:       Mouth: Mucous membranes are moist.   Eyes:      General:         Right eye: No discharge.         Left eye: No discharge.      Extraocular Movements: Extraocular movements intact and EOM normal.      Conjunctiva/sclera: Conjunctivae normal.      Pupils: Pupils are equal, round, and reactive to light.   Cardiovascular:      Rate and Rhythm: Tachycardia present.   Pulmonary:      Effort: Pulmonary effort is normal. No respiratory distress.      Comments: Face mask in place  Musculoskeletal:         General: Normal range of motion.      Right lower leg: No edema.      Left lower leg: No edema.   Skin:     General: Skin is warm and dry.      Coloration: Skin is not jaundiced.   Neurological:      Comments: See below       Neurologic Exam     Mental Status     Patient is alert, encephalopathic appearing. Oriented to person, place. Stated the year was 1944 multiple times. Speech is clear without obvious aphasia. Decreased attention/concentration. Able to follow simple commands with constant encouragement/coaching.     Cranial Nerves     CN III, IV, VI   Pupils are equal, round, and reactive to light.  Extraocular motions are normal.   Right pupil: Size: 2 mm. Reactivity: sluggish.   Left pupil: Size: 2 mm. Reactivity: sluggish.   Diplopia: none  Conjugate gaze: present    CN VIII   CN VIII normal.     CN IX, X   CN IX normal.     CN XII   CN XII normal.     Mild flattening of L NFL, however face symmetric with movement.      Motor Exam   Muscle bulk: normal  Right arm pronator drift: absent  Left arm pronator drift: absent  Able to lift all extremities antigravity and maintain resistance. No asymmetry noted in terms of strength     Gait, Coordination, and Reflexes     Gait  Gait: (deferred for safety)    Tremor   Resting tremor: absent  Does not participate in coordination testing        Lab Results: I have personally reviewed pertinent reports.  , CBC:   Results from last 7 days   Lab Units 01/23/24  1005 01/23/24  0614  01/23/24  0246 01/23/24  0021   WBC Thousand/uL  --  9.07  --  12.30*   RBC Million/uL  --  2.60*  --  2.81*   HEMOGLOBIN g/dL 7.2* 7.8* 7.3* 8.4*   HEMATOCRIT % 23.9* 25.4* 24.7* 27.9*   MCV fL  --  98  --  99*   PLATELETS Thousands/uL  --  176  --  242   , BMP/CMP:   Results from last 7 days   Lab Units 01/23/24  0614 01/23/24  0021   SODIUM mmol/L 142 142   POTASSIUM mmol/L 6.0* 5.5*   CHLORIDE mmol/L 109* 106   CO2 mmol/L 23 23   BUN mg/dL 89* 71*   CREATININE mg/dL 1.73* 1.63*   CALCIUM mg/dL 7.8* 8.4   AST U/L 83* 101*   ALT U/L 59* 69*   ALK PHOS U/L 212* 269*   EGFR ml/min/1.73sq m 37 40    Coagulation:   Results from last 7 days   Lab Units 01/23/24  0614   INR  1.31*     Imaging Studies: I have personally reviewed pertinent reports.   and I have personally reviewed pertinent films in PACS CT head, CTA head/neck  EKG, Pathology, and Other Studies: I have personally reviewed pertinent reports.   and I have personally reviewed pertinent films in PACS  VTE Prophylaxis: Reason for no pharmacologic prophylaxis GI bleed, SCDs ordered per primary team    Code Status: Level 1 - Full Code

## 2024-01-23 NOTE — CONSULTS
Consultation - [facility]     Derek Walter 78 y.o. male MRN: [id number]  Unit/Bed#: -01 SDU Encounter: [id number]    Inpatient consult to gastroenterology  Consult performed by: Chrissy Tan PA-C  Consult ordered by: NADIA Diamond          ASSESSMENT and PLAN  Derek Walter is a 78 y.o. year old male with a past medical history of CHF with EF 35%, CAD S/P PCI 8/2023 on plavix, Afib/SVT on Eliquis, stage II HCC S/P Y90 last treatment December 2023, CKD stage III, hypertension, hyperlipidemia, DM, diverticulitis with bowel resection in the past, presented to the ER for this melena coffee ground emesis and weakness.  Patient known to our service seen August 2023 for GI bleed required 2 units at that time endoscopy showed gastric and small bowel AVMs with active bleeding treated with clipping.  Colonoscopy at that time with BBPS of 6 showed 2 colonic adenomas, internal hemorrhoids and healthy anastomosis no repeat colonoscopy is recommended given age. Complains of upper abdominal discomfort.  Also complains of breakthrough reflux on PPI 40 mg twice daily.  Patient unsure if he took his dose of Eliquis and Plavix yesterday. This morning had weakness and left facial droop, CT negative for acute stroke.  Being eval by neurology, MRI ordered.    This admission had 2 dips in his blood pressure to 84/53.  Given strokelike symptoms advised to maintain BP over 140, currently on Phenylephrine.  Baseline hemoglobin 10,  hemoglobin 7.3 S/P 1 unit repeat hemoglobin 7.8.  Another unit PRBC's ordered.  He did receive DDAVP and Kcentra to reverse the Eliquis.  INR today 1.3. Also hyperkalemic with acute on chronic kidney injury.      CT A/P with contrast shows nonspecific strandy hyperdensities within the gastric body possibly reflective of hemorrhage in this patient with history of gastric AVMs .  Suspect patient has recurrent upper GI bleed we will plan for endoscopy to further evaluate for  esophagogastroduodenitis,  peptic ulcer disease, AVM, francisca wise tear etc with neurology clearance.    1.  Upper GI bleed with coffee-ground emesis and melena  2.  Strokelike symptoms  3.  Normocytic anemia  4.  Atrial fibrillation on Eliquis  5.  CAD on Plavix  6.  Upper abdominal pain  7.  Reflux   -Treat supportively, n.p.o., IV fluids, antiemetics, pain control   -Agree with PPI drip   -Monitor hemoglobin ,transfuse as needed  -Plavix on hold, last dose possibly 1/21, Eliquis on hold and reversed with DDAVP and Kcentra, INR today 1.3   -Plan on endoscopy to eval for upper GI bleed if cleared from neurology standpoint   -Will give Reglan 10 mg 1 hour prior to endoscopy given active vomiting   -Defer to neurology regarding eval for strokelike symptoms await MRI    8.  Hyperkalemia  9.  Acute on chronic kidney injury   -Defer to the primary team regarding monitoring correcting electrolyte abnormalities and NA    10.  HCC status post chemoembolization with elevated LFTs   -Outpatient follow-up with oncology  -no evidence of cirrhosis on last imaging       Case Discussed with Dr. Jazmine Dos Santos who agrees with the above dictation and plan.      Chief Complaint   Patient presents with    Abdominal Pain     Pt via EMS from home with c/o generalized weakness, nausea, and vomiting since the morning. Reports lower abd pain. Reports hx of anemia.       Physician Requesting Consult: Maulik Horne MD    LIDIA Walter is a 78 y.o. year old male with a past medical history of CHF with EF 35%, CAD S/P PCI 8/2023 on plavix, Afib/SVT on Eliquis, stage II HCC S/P Y90 last treatment December 2023, CKD stage III, hypertension, hyperlipidemia, DM, diverticulitis with bowel resection in the past, presented to the ER for this melena, coffee ground emesis and weakness.  Patient known to our service seen August 2023 for GI bleed required 2 units at that time endoscopy showed gastric and small bowel AVMs with active bleeding treated  with clipping.  Colonoscopy at that time with BBPS of 6 showed 2 colonic adenomas, internal hemorrhoids and healthy anastomosis no repeat colonoscopy is recommended given age.  Started on oral iron.  Hemoglobin had improved to 10.  He had one hemoglobin of 13 as an outpatient.      Patient states yesterday he began having coffee-ground emesis denies hematemesis, last episode was at about 8:45 AM.  Also with melena starting yesterday, denies hematochezia.  Complains of upper abdominal discomfort.  Also complains of breakthrough reflux on PPI 40 mg twice daily.  Patient unsure if he took his dose of Eliquis and Plavix yesterday. NO NSAID use. Notes poor appetite but denies weight loss.  This morning had weakness and left facial droop, CT negative for acute stroke.  Being eval by neurology, MRI ordered.    This admission had 2 dips in his blood pressure to 84/53.  Given strokelike symptoms advised to maintain BP over 140, currently on Phenylephrine.  Baseline hemoglobin 10,  hemoglobin 7.3 S/P 1 unit repeat hemoglobin 7.8.  Another unit PRBC's ordered.  He did receive DDAVP and Kcentra to reverse the Eliquis.  INR today 1.3. Also hyperkalemic with acute on chronic kidney injury.  LFTs elevated but improving.    CT A/P with contrast shows nonspecific strandy hyperdensities within the gastric body possibly reflective of hemorrhage in this patient with history? Gastric AVMs per chart review in [facility]. Similar appearance of known segment 6 and 7 hepatic masses compatible with multifocal HCC compared to the previous MRI accounting for differences in modality. Somewhat worsened heterogeneous/mottled appearance of the hepatic parenchyma in the anterior right and left hepatic lobes which may reflect disease progression and/or treatment related changes. 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 Cholelithiasis without acute cholecystitis or biliary dilation, Similar small volume  abdominopelvic ascites.  Small cystic lesions in the pancreatic head and neck better visualized on MRI no PD dilation       ROS:   Constitutional: denies fatigue, fever.  HEENT: denies visual disturbance, postnasal drip, sore throat.  Respiratory: denies cough, shortness of breath.  Cardiovascular: denies chest pain, leg swelling.  Gastrointestinal: as noted above in HPI.  : denies difficulty urinating, dysuria.  Musculoskeletal: denies arthralgias, back pain.  Neurological: denies dizziness, syncope. +weakness  Psychiatric: denies confusion, anxiety.    Historical Information   Past Medical History:   Diagnosis Date    Anemia     Atrial fibrillation (HCC)     Eliquis    AVB (atrioventricular block)     1st degree    CAD (coronary artery disease)     CKD (chronic kidney disease), stage III (HCC)     baseline Cr 1.2-1.6    Colon polyp     Diabetes mellitus (HCC)     type 2, insulin dependent    Diverticulosis     Emphysema lung (HCC)     Former tobacco use     History of asbestos exposure     History of DVT (deep vein thrombosis)     RLE, tx w/ AC    History of GI bleed 08/2023    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, given PRBC/Venofer for anemia while in house    Hyperlipidemia     Hypertension     LAFB (left anterior fascicular block)     Liver cancer (HCC)     Liver mass 08/2023    suspected HCC, needs radioembolization    RBBB     Syncope 08/07/2023     Past Surgical History:   Procedure Laterality Date    APPENDECTOMY      BOWEL RESECTION  2014    CARDIAC CATHETERIZATION      CARDIAC CATHETERIZATION N/A 08/25/2023    Procedure: Cardiac pci;  Surgeon: Dom Garrett DO;  Location: [facility];  Service: Cardiology    CARDIAC CATHETERIZATION N/A 08/21/2023    Procedure: Cardiac catheterization;  Surgeon: Dom Garrett DO;  Location: [facility];  Service: Cardiology    CARDIAC CATHETERIZATION N/A 08/21/2023    Procedure: Cardiac Coronary Angiogram;  Surgeon: Dom  DO Tami;  Location:  CARDIAC CATH LAB;  Service: Cardiology    CATARACT EXTRACTION Bilateral     COLONOSCOPY      EGD      IR Y-90 PRE-ANGIO/EMBO W/ LUNG SCAN  2023    IR Y-90 RADIOEMBOLIZATION  2023     Social History   Social History     Substance and Sexual Activity   Alcohol Use Yes    Comment: Socially     Social History     Substance and Sexual Activity   Drug Use Never     Social History     Tobacco Use   Smoking Status Former    Current packs/day: 0.00    Average packs/day: 1 pack/day for 30.0 years (30.0 ttl pk-yrs)    Types: Cigarettes    Start date:     Quit date:     Years since quittin.0   Smokeless Tobacco Never     Family History   Problem Relation Age of Onset    Hypertension Mother     Bone cancer Father     Diabetes type II Sister     Diabetes type II Sister     Esophageal cancer Brother     Colon cancer Neg Hx        Meds/Allergies     Current Facility-Administered Medications   Medication Dose Route Frequency    acetaminophen (TYLENOL) tablet 650 mg  650 mg Oral Q6H PRN    albumin human (FLEXBUMIN) 5 % injection 12.5 g  12.5 g Intravenous Once    azelastine (ASTELIN) 0.1 % nasal spray 1 spray  1 spray Each Nare BID    insulin regular (HumuLIN R,NovoLIN R) 1 Units/mL in sodium chloride 0.9 % 100 mL infusion  0.3-21 Units/hr Intravenous Titrated    pantoprazole (PROTONIX) 80 mg in sodium chloride 0.9 % 100 mL infusion  8 mg/hr Intravenous Continuous    phenylephrine (DANA-SYNEPHRINE) 50 mg (STANDARD CONCENTRATION) in sodium chloride 0.9% 250 mL   mcg/min Intravenous Titrated    trimethobenzamide (TIGAN) IM injection 200 mg  200 mg Intramuscular Q12H PRN     Medications Prior to Admission   Medication    acetaminophen (TYLENOL) 650 mg CR tablet    Anoro Ellipta 62.5-25 MCG/ACT inhaler    atorvastatin (LIPITOR) 40 mg tablet    azelastine (ASTELIN) 0.1 % nasal spray    Blood Glucose Monitoring Suppl (OneTouch Verio) w/Device KIT    clopidogrel (PLAVIX) 75 mg tablet     Continuous Blood Gluc  (Dexcom G6 ) NOHEMY    Eliquis 5 MG    ferrous sulfate 325 (65 Fe) mg tablet    furosemide (LASIX) 40 mg tablet    insulin detemir (Levemir FlexPen) 100 Units/mL injection pen    Insulin Pen Needle (Droplet Pen Needles) 32G X 4 MM MISC    Jardiance 10 MG TABS tablet    metoprolol succinate (TOPROL-XL) 50 mg 24 hr tablet    Multiple Vitamin (MULTIVITAMIN ADULT PO)    NovoLOG FlexPen 100 units/mL injection pen    Potassium Citrate ER 15 MEQ (1620 MG) TBCR    pregabalin (LYRICA) 100 mg capsule    sertraline (ZOLOFT) 25 mg tablet    SUPER B COMPLEX/C PO    tamsulosin (FLOMAX) 0.4 mg    traZODone (DESYREL) 100 mg tablet    Trulicity 0.75 MG/0.5ML injection    Continuous Blood Gluc Sensor (Dexcom G6 Sensor) MISC    Continuous Blood Gluc Transmit (Dexcom G6 Transmitter) MISC    Continuous Blood Gluc Transmit (Dexcom G6 Transmitter) MISC    glucose blood (OneTouch Verio) test strip    mometasone (ELOCON) 0.1 % cream    pantoprazole (PROTONIX) 40 mg tablet       No Known Allergies    PHYSICAL EXAM:    Constitutional: Well-developed, no acute distress, chronically ill appearing  HEENT: normocephalic, mucous membranes moist. pale  Neck: Supple  Skin: warm and dry  Respiratory: Lungs are clear to auscultation B/L.  Cardiovascular: Heart is regular rate and rhythm.  Gastrointestinal: Soft, upper abd tenderness, nondistended with normal active bowel sounds.  No masses, guarding, rebound.   Rectal Exam: Deferred.  Extremities: No edema.  Neurologic: Nonfocal. A & O ×3.   Psychiatric: Normal affect.      Lab Results   Component Value Date    CALCIUM 7.8 (L) 01/23/2024    K 6.0 (H) 01/23/2024    CO2 23 01/23/2024     (H) 01/23/2024    BUN 89 (H) 01/23/2024    CREATININE 1.73 (H) 01/23/2024    CREATININE 1.63 (H) 01/23/2024    CREATININE 1.70 (H) 11/01/2023     Lab Results   Component Value Date    WBC 9.07 01/23/2024    WBC 12.30 (H) 01/23/2024    WBC 6.42 11/03/2023    HGB 7.8 (L)  "01/23/2024    HGB 7.3 (L) 01/23/2024    HGB 8.4 (L) 01/23/2024    MCV 98 01/23/2024     01/23/2024     01/23/2024     11/03/2023     Lab Results   Component Value Date    ALT 59 (H) 01/23/2024    ALT 69 (H) 01/23/2024    ALT 69 (H) 11/01/2023    AST 83 (H) 01/23/2024     (H) 01/23/2024    AST 97 (H) 11/01/2023    ALKPHOS 212 (H) 01/23/2024    ALKPHOS 269 (H) 01/23/2024    ALKPHOS 99 09/01/2023    TBILI 0.89 01/23/2024    TBILI 0.93 01/23/2024    TBILI 0.6 11/01/2023     No results found for: \"AMYLASE\"  Lab Results   Component Value Date    LIPASE 39 01/23/2024     Lab Results   Component Value Date    IRON 14 (L) 08/07/2023    TIBC 462 (H) 08/07/2023    FERRITIN 10 (L) 08/07/2023     Lab Results   Component Value Date    INR 1.31 (H) 01/23/2024    INR 1.62 (H) 01/23/2024    INR 1.59 (H) 11/03/2023       CT stroke alert brain    Result Date: 1/23/2024  Narrative: CT BRAIN - STROKE ALERT PROTOCOL INDICATION:   Neuro deficit, acute, stroke suspected Stroke Alert. COMPARISON: CT head 8/7/2023 TECHNIQUE:  CT examination of the brain was performed.  In addition to axial images, coronal reformatted images were created and submitted for interpretation. Radiation dose length product (DLP) for this visit:  1112.37 mGy-cm .  This examination, like all CT scans performed in the [facility], was performed utilizing techniques to minimize radiation dose exposure, including the use of iterative reconstruction and automated exposure control. IMAGE QUALITY:  Diagnostic. FINDINGS: PARENCHYMA:  No intracranial mass, mass effect or midline shift. No CT signs of acute infarction.  No acute parenchymal hemorrhage. Periventricular, centrum semiovale, and subcortical white matter hypodensity is a nonspecific finding likely representing chronic microangiopathy. Calcification of the cavernous segments of bilateral internal carotid arteries. VENTRICLES AND EXTRA-AXIAL SPACES: No acute hydrocephalus. "  Mild prominence of CSF spaces diffusely attributed to generalized volume loss. VISUALIZED ORBITS: Changes of bilateral lens replacements noted. PARANASAL SINUSES: Trace mucosal thickening in the right maxillary sinus. CALVARIUM AND EXTRACRANIAL SOFT TISSUES:   Normal.     Impression: No evidence of acute intracranial process. Chronic microangiopathy. Findings were directly discussed with Az Interiano at 07:40. Workstation performed: [id number]     CTA stroke alert (head/neck)    Result Date: 1/23/2024  Narrative: CTA NECK AND BRAIN WITH CONTRAST INDICATION: Neuro deficit, acute, stroke suspected Stroke Alert COMPARISON:   CT head 1/23/2024 TECHNIQUE:   Post contrast imaging was performed after administration of iodinated contrast through the neck and brain. Post contrast axial 0.625 mm images timed to opacify the arterial system. 3D rendering was performed on an independent workstation.   MIP reconstructions performed. Coronal reconstructions were performed of the noncontrast portion of the brain. Radiation dose length product (DLP) for this visit:  1624 mGy-cm .  This examination, like all CT scans performed in the [facility], was performed utilizing techniques to minimize radiation dose exposure, including the use of iterative reconstruction and automated exposure control. IV Contrast:  85 mL of iohexol (OMNIPAQUE) 350 IMAGE QUALITY:   Diagnostic FINDINGS: CERVICAL VASCULATURE AORTIC ARCH AND GREAT VESSELS: Mild atherosclerotic disease of the arch, proximal great vessels and visualized subclavian vessels.  No significant stenosis. RIGHT VERTEBRAL ARTERY CERVICAL SEGMENT:  Normal origin. The vessel is normal in caliber throughout the neck. LEFT VERTEBRAL ARTERY CERVICAL SEGMENT:  Normal origin. Minimal segmental atherosclerotic disease. The vessel is normal in caliber throughout the neck. RIGHT EXTRACRANIAL CAROTID SEGMENT: Mild atherosclerotic disease of the proximal and distal common carotid  artery and proximal cervical internal carotid artery without significant stenosis compared to the more distal ICA. LEFT EXTRACRANIAL CAROTID SEGMENT: Mild atherosclerotic disease of the proximal common carotid artery. Moderate atheromatous and mild atherosclerotic disease of the proximal cervical internal carotid artery. Approximately 66% stenosis of the proximal left ICA just distal to the origin over a length of 8 mm. NASCET criteria was used to determine the degree of internal carotid artery diameter stenosis. INTRACRANIAL VASCULATURE INTERNAL CAROTID ARTERIES: Mild atherosclerotic disease of the cavernous segments of bilateral internal carotid arteries. Otherwise, normal enhancement of the intracranial portions of the internal carotid arteries.  Normal ophthalmic artery origins.  Normal ICA terminus. ANTERIOR CIRCULATION:  Symmetric A1 segments and anterior cerebral arteries with normal enhancement.  Normal anterior communicating artery. MIDDLE CEREBRAL ARTERY CIRCULATION:  M1 segment and middle cerebral artery branches demonstrate normal enhancement bilaterally. DISTAL VERTEBRAL ARTERIES:  Normal distal vertebral arteries.  Posterior inferior cerebellar artery origins are normal. Normal vertebral basilar junction. BASILAR ARTERY:  Basilar artery is normal in caliber.  Normal superior cerebellar arteries. POSTERIOR CEREBRAL ARTERIES: Both posterior cerebral arteries arises from the basilar tip.  Both arteries demonstrate normal enhancement.   Normal posterior communicating arteries. VENOUS STRUCTURES:  Normal. NON VASCULAR ANATOMY BONY STRUCTURES: No acute osseous abnormality. Mild degenerative changes of the cervical spine. Normal variant pneumatization of the left posterior clinoid process of the sella turcica and proximal clivus. SOFT TISSUES OF THE NECK:  Normal. 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     Impression: CTA head: No  arterial occlusion or significant stenosis. CTA neck: No arterial occlusion or significant stenosis. 50 to 66% stenosis of the proximal left ICA secondary to atheromatous and arthrosclerotic disease. Findings were directly discussed with Az Interiano at 07:40. Workstation performed: [id number]     CT abdomen pelvis with contrast    Result Date: 1/23/2024  Narrative: CT ABDOMEN AND PELVIS WITH IV CONTRAST INDICATION: Abdominal pain, acute, nonlocalized abdominal pain. COMPARISON: MRI exam dated 12/7/2023 and CT examination dated 9/1/2023. TECHNIQUE: CT examination of the abdomen and pelvis was performed. Multiplanar 2D reformatted images were created from the source data. This examination, like all CT scans performed in the [facility], was performed utilizing techniques to minimize radiation dose exposure, including the use of iterative reconstruction and automated exposure control. Radiation dose length product (DLP) for this visit: 656.98 mGy-cm IV Contrast: 100 mL of iohexol (OMNIPAQUE) Enteric Contrast: Not administered. FINDINGS: ABDOMEN LOWER CHEST: No clinically significant abnormality in the visualized lower chest. LIVER/BILIARY TREE: Similar hepatomegaly. Reidentified segment 6 and 7 hepatic lesions with associated tumor thrombus in the left and anterior right portal veins grossly similar in appearance compared to prior study accounting for differences in modality. There is heterogeneous/mottled appearance of the anterior right and left hepatic lobe which appears slightly worsened compared to previous study. No biliary ductal dilatation. GALLBLADDER: Cholelithiasis without findings of acute cholecystitis. SPLEEN: Unremarkable. PANCREAS: No CT evidence for suspicious mass or main duct dilatation. Small cystic lesions at the pancreatic head and neck region better visualized on the previous MRI examination. ADRENAL GLANDS: Unremarkable. KIDNEYS/URETERS: Simple renal cyst(s). Otherwise  unremarkable kidneys. No hydronephrosis. STOMACH AND BOWEL: The stomach is distended with fluid and with few surgical clips noted, likely related to treatment of gastric AVMs prior chart review in [facility]. There are multiple additional strandy hyperdensities seen within the gastric body, nonspecific. No small bowel obstruction or focal pericolonic inflammatory changes seen. APPENDIX: No findings to suggest appendicitis. ABDOMINOPELVIC CAVITY: Small volume abdominopelvic ascites noted more pronounced in the right paracolic gutter region. No pneumoperitoneum. No lymphadenopathy. VESSELS: Atherosclerosis without abdominal aortic aneurysm. PELVIS REPRODUCTIVE ORGANS: Similar prostatomegaly. URINARY BLADDER: Unremarkable. ABDOMINAL WALL/INGUINAL REGIONS: Similar skin thickening/subcutaneous stranding anterior abdominal wall. BONES: No acute fracture or suspicious osseous lesion.     Impression: Nonspecific strandy hyperdensities within the gastric body possibly reflective of hemorrhage in this patient with history? Gastric AVMs per chart review in [facility]. GI consultation advised. Similar appearance of known segment 6 and 7 hepatic masses compatible with multifocal HCC compared to the previous MRI accounting for differences in modality. Somewhat worsened heterogeneous/mottled appearance of the hepatic parenchyma in the anterior right and left hepatic lobes which may reflect disease progression and/or treatment related changes. 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 Continued follow-up per oncology recommended. Cholelithiasis. Similar small volume abdominopelvic ascites. Above findings discussed with Dr. Jameson at 2:10 a.m. on 1/23/2024. Workstation performed: [id number]       Imaging Studies: I have personally reviewed pertinent reports.      Pathology, and Other Studies: I have personally reviewed pertinent reports.      Patient expressed understanding and had all questions and  concerns addressed.    Chrissy Tan PA-C  01/23/24   9:39 AM     Counseling / Coordination of Care  Total floor / unit time spent today 45 minutes.     This chart was completed in part utilizing [employer] speech voice recognition software. Random word insertions, pronoun errors, and incomplete sentences are an occasional consequence of this system due to software limitations, and ambient noise. Any questions or concerns about the content, text, or information contained within the body of this dictation should be directly addressed to the provider for clarification.

## 2024-01-23 NOTE — SEPSIS NOTE
Sepsis Note   Derek Walter 78 y.o. male MRN: [id number]  Unit/Bed#: -01 Encounter: [id number]    Tachycardia, tachypnea and lactic acidosis in the setting of AGMA and acute blood loss anemia 2/2 GI bleed. No evidence of acute infection.    Initial Sepsis Screening       Row Name 01/23/24 1974                Is the patient's history suggestive of a new or worsening infection? No  -KF                  User Key  (r) = Recorded By, (t) = Taken By, (c) = Cosigned By      Initials Name Provider Type    KF NADIA Diamond Nurse Practitioner                        Body mass index is 28.14 kg/m².  Wt Readings from Last 1 Encounters:   01/23/24 94.1 kg (207 lb 7.3 oz)     IBW (Ideal Body Weight): 77.6 kg    Ideal body weight: 77.6 kg (171 lb 1.2 oz)  Adjusted ideal body weight: 84.2 kg (185 lb 10 oz)

## 2024-01-23 NOTE — RESPIRATORY THERAPY NOTE
RT Protocol Note  Derek Walter 78 y.o. male MRN: [id number]  Unit/Bed#: -01 Encounter: [id number]    Assessment    Principal Problem:    Acute upper GI bleeding  Active Problems:    Stage 3 chronic kidney disease, unspecified whether stage 3a or 3b CKD (HCC)    Type 2 diabetes mellitus with neurologic complication, with long-term current use of insulin (HCC)    Atrial fibrillation, unspecified type (HCC)    History of DVT (deep vein thrombosis)    CAD (coronary artery disease)    Hepatocellular carcinoma (HCC)    Chronic systolic heart failure (HCC)    Acute blood loss anemia    Stroke-like symptom      Home Pulmonary Medications:       Past Medical History:   Diagnosis Date    Anemia     Atrial fibrillation (HCC)     Eliquis    AVB (atrioventricular block)     1st degree    CAD (coronary artery disease)     CKD (chronic kidney disease), stage III (HCC)     baseline Cr 1.2-1.6    Colon polyp     Diabetes mellitus (HCC)     type 2, insulin dependent    Diverticulosis     Emphysema lung (HCC)     Former tobacco use     History of asbestos exposure     History of DVT (deep vein thrombosis)     RLE, tx w/ AC    History of GI bleed 2023    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, given PRBC/Venofer for anemia while in house    Hyperlipidemia     Hypertension     LAFB (left anterior fascicular block)     Liver cancer (HCC)     Liver mass 2023    suspected HCC, needs radioembolization    RBBB     Syncope 2023     Social History     Socioeconomic History    Marital status: /Civil Union     Spouse name: None    Number of children: None    Years of education: None    Highest education level: None   Occupational History    None   Tobacco Use    Smoking status: Former     Current packs/day: 0.00     Average packs/day: 1 pack/day for 30.0 years (30.0 ttl pk-yrs)     Types: Cigarettes     Start date:      Quit date:      Years since quittin.0    Smokeless tobacco: Never   Vaping Use     Vaping status: Never Used   Substance and Sexual Activity    Alcohol use: Yes     Comment: Socially    Drug use: Never    Sexual activity: None   Other Topics Concern    None   Social History Narrative    None     Social Determinants of Health     Financial Resource Strain: Low Risk  (10/13/2023)    Overall Financial Resource Strain (CARDIA)     Difficulty of Paying Living Expenses: Not hard at all   Food Insecurity: No Food Insecurity (8/20/2023)    Hunger Vital Sign     Worried About Running Out of Food in the Last Year: Never true     Ran Out of Food in the Last Year: Never true   Transportation Needs: No Transportation Needs (10/13/2023)    PRAPARE - Transportation     Lack of Transportation (Medical): No     Lack of Transportation (Non-Medical): No   Physical Activity: Not on file   Stress: Not on file   Social Connections: Not on file   Intimate Partner Violence: Not on file   Housing Stability: Low Risk  (8/20/2023)    Housing Stability Vital Sign     Unable to Pay for Housing in the Last Year: No     Number of Places Lived in the Last Year: 1     Unstable Housing in the Last Year: No       Subjective         Objective    Physical Exam:   Assessment Type: Assess only  General Appearance: Sedated  Respiratory Pattern: Assisted  Chest Assessment: Chest expansion symmetrical  Bilateral Breath Sounds: Diminished    Vitals:  Blood pressure 110/55, pulse 90, temperature 97.8 °F (36.6 °C), temperature source Oral, resp. rate 22, height 6' (1.829 m), weight 94.1 kg (207 lb 7.3 oz), SpO2 100%.          Imaging and other studies: I have personally reviewed pertinent reports.            Plan    Respiratory Plan: Moderate/Severe Distress pathway, Vent/NIV/HFNC

## 2024-01-23 NOTE — ASSESSMENT & PLAN NOTE
Hemoglobin 8.3-7.3  point  Receive 1UPRBC  Hgb every 6 hours   History of iron deficiency check iron panel  Start iron p.o. when able

## 2024-01-23 NOTE — ASSESSMENT & PLAN NOTE
Lab Results   Component Value Date    HGBA1C 8.0 (H) 11/01/2023       Recent Labs     01/23/24  0040 01/23/24  0315 01/23/24  0614   POCGLU 211* 290* 310*         Blood Sugar Average: Last 72 hrs:  (P) 270.3192224683382328    Patient has a Dexcom, on eulogy and Jardiance, Lantus and insulin  Started patient on an insulin drip to get better control of blood glucose  Restart home regimen when able

## 2024-01-23 NOTE — PLAN OF CARE
Problem: Potential for Falls  Goal: Patient will remain free of falls  Description: INTERVENTIONS:  - Educate patient/family on patient safety including physical limitations  - Instruct patient to call for assistance with activity   - Consult OT/PT to assist with strengthening/mobility   - Keep Call bell within reach  - Keep bed low and locked with side rails adjusted as appropriate  - Keep care items and personal belongings within reach  - Initiate and maintain comfort rounds  - Make Fall Risk Sign visible to staff  - Offer Toileting every 2 Hours, in advance of need  - Initiate/Maintain bed alarm  - Obtain necessary fall risk management equipment:   - Apply yellow socks and bracelet for high fall risk patients  - Consider moving patient to room near nurses station  Outcome: Progressing     Problem: PAIN - ADULT  Goal: Verbalizes/displays adequate comfort level or baseline comfort level  Description: Interventions:  - Encourage patient to monitor pain and request assistance  - Assess pain using appropriate pain scale  - Administer analgesics based on type and severity of pain and evaluate response  - Implement non-pharmacological measures as appropriate and evaluate response  - Consider cultural and social influences on pain and pain management  - Notify physician/advanced practitioner if interventions unsuccessful or patient reports new pain  Outcome: Progressing     Problem: Knowledge Deficit  Goal: Patient/family/caregiver demonstrates understanding of disease process, treatment plan, medications, and discharge instructions  Description: Complete learning assessment and assess knowledge base.  Interventions:  - Provide teaching at level of understanding  - Provide teaching via preferred learning methods  Outcome: Progressing     Problem: GASTROINTESTINAL - ADULT  Goal: Minimal or absence of nausea and/or vomiting  Description: INTERVENTIONS:  - Administer IV fluids if ordered to ensure adequate hydration  -  Maintain NPO status until nausea and vomiting are resolved  - Nasogastric tube if ordered  - Administer ordered antiemetic medications as needed  - Provide nonpharmacologic comfort measures as appropriate  - Advance diet as tolerated, if ordered  - Consider nutrition services referral to assist patient with adequate nutrition and appropriate food choices  Outcome: Progressing  Goal: Maintains or returns to baseline bowel function  Description: INTERVENTIONS:  - Assess bowel function  - Encourage oral fluids to ensure adequate hydration  - Administer IV fluids if ordered to ensure adequate hydration  - Administer ordered medications as needed  - Encourage mobilization and activity  - Consider nutritional services referral to assist patient with adequate nutrition and appropriate food choices  Outcome: Progressing  Goal: Maintains adequate nutritional intake  Description: INTERVENTIONS:  - Monitor percentage of each meal consumed  - Identify factors contributing to decreased intake, treat as appropriate  - Assist with meals as needed  - Monitor I&O, weight, and lab values if indicated  - Obtain nutrition services referral as needed  Outcome: Progressing  Goal: Establish and maintain optimal ostomy function  Description: INTERVENTIONS:  - Assess bowel function  - Encourage oral fluids to ensure adequate hydration  - Administer IV fluids if ordered to ensure adequate hydration   - Administer ordered medications as needed  - Encourage mobilization and activity  - Nutrition services referral to assist patient with appropriate food choices  - Assess stoma site  - Consider wound care consult   Outcome: Progressing  Goal: Oral mucous membranes remain intact  Description: INTERVENTIONS  - Assess oral mucosa and hygiene practices  - Implement preventative oral hygiene regimen  - Implement oral medicated treatments as ordered  - Initiate Nutrition services referral as needed  Outcome: Progressing     Problem: METABOLIC, FLUID  AND ELECTROLYTES - ADULT  Goal: Electrolytes maintained within normal limits  Description: INTERVENTIONS:  - Monitor labs and assess patient for signs and symptoms of electrolyte imbalances  - Administer electrolyte replacement as ordered  - Monitor response to electrolyte replacements, including repeat lab results as appropriate  - Instruct patient on fluid and nutrition as appropriate  Outcome: Progressing  Goal: Fluid balance maintained  Description: INTERVENTIONS:  - Monitor labs   - Monitor I/O and WT  - Instruct patient on fluid and nutrition as appropriate  - Assess for signs & symptoms of volume excess or deficit  Outcome: Progressing  Goal: Glucose maintained within target range  Description: INTERVENTIONS:  - Monitor Blood Glucose as ordered  - Assess for signs and symptoms of hyperglycemia and hypoglycemia  - Administer ordered medications to maintain glucose within target range  - Assess nutritional intake and initiate nutrition service referral as needed  Outcome: Progressing     Problem: HEMATOLOGIC - ADULT  Goal: Maintains hematologic stability  Description: INTERVENTIONS  - Assess for signs and symptoms of bleeding or hemorrhage  - Monitor labs  - Administer supportive blood products/factors as ordered and appropriate  Outcome: Progressing

## 2024-01-23 NOTE — ASSESSMENT & PLAN NOTE
Patient is a 78-year-old male who has been seeing [facility] last seen 12/13/2023 patient has a history of anemia is multifactorial chronic due to cancer with iron deficiency and occult GI blood loss on Eliquis review of an EGD with multiple AVMs.  Patient came in to the ER from home due to generalized weakness nausea vomiting since this morning reports lower abdominal pain has a history of anemia is on Eliquis and Plavix episodes of vomiting which were described by the significant other is dark of clotted material, and also had melanotic stool occult positive.  Patient felt significant weakness and was unable to walk felt like too weak to walk.  Patient has a history of an upper GI bleed with multiple AVMs, also has a history of the patient intubation with tracheostomy approximately rehab time of 1 year.   Patient's baseline globin appears to be 8-10, except 1 value on 11/23 of 13.   Patient's hemoglobin was 8.3 received 1 L of fluid and his hemoglobin was 7.3  Ordered 2 units of RBCs with hemoglobins in between it   Significant nausea and abdominal, received Zofran 8 mg IV with no relief started Tigan due to prolonged QT  GI was called by the ER and is aware of the patient   Protonix 80 mg IV given by the ER  Start Protonix drip  Patient is on Eliquis and Plavix-100% sure but she thinks he did not take the Plavix or Eliquis on 1/22  GI wanted Eliquis reversed -ordered DDAVP 0.3 mg/kg and Kcentra  CT scan with contrast showed nonspecific hypodensity within the gastric body possibly reflective of hemorrhage in this patient with the history of gastric AVMs, known liver cancer, cholelithiasis, small volume abdominal pelvic ascites

## 2024-01-23 NOTE — ASSESSMENT & PLAN NOTE
Hemoglobin 8.3-7.3  point  2 units of RBCs with hemoglobin between the unit  Hgb every 6 hours   History of iron deficiency check iron panel  Start iron p.o. when able

## 2024-01-23 NOTE — ASSESSMENT & PLAN NOTE
Lab Results   Component Value Date    EGFR 37 01/23/2024    EGFR 40 01/23/2024    EGFR 41 (L) 11/01/2023    CREATININE 1.73 (H) 01/23/2024    CREATININE 1.63 (H) 01/23/2024    CREATININE 1.70 (H) 11/01/2023     Patent 1.63 is approximately baseline  Monitor I's and O's  Hold for toxic agents  Hold Lasix at this time  Patient will need to be restarted on Lasix as soon as possible

## 2024-01-23 NOTE — PROCEDURES
Intubation    Date/Time: 1/23/2024 2:31 PM    Performed by: NADIA Diamond  Authorized by: NADIA Diamond    Patient location:  Bedside  Other Assisting Provider: No    Consent:     Consent obtained:  Emergent situation  Universal protocol:     Procedure explained and questions answered to patient or proxy's satisfaction: no      Relevant documents present and verified: yes      Test results available and properly labeled: yes      Radiology Images displayed and confirmed.  If images not available, report reviewed: yes      Required blood products, implants, devices, and special equipment available: yes      Site/side marked: yes      Immediately prior to procedure, a time out was called: yes      Patient identity confirmed:  Arm band and hospital-assigned identification number  Pre-procedure details:     Patient status:  Unresponsive    Mallampati score:  2    Pretreatment medications:  Etomidate    Paralytics:  Vecuronium  Indications:     Indications for intubation: airway protection    Procedure details:     Preoxygenation:  Bag valve mask    CPR in progress: no      Intubation method:  Oral    Oral intubation technique:  Direct (Chen)    Laryngoscope blade:  Mac 4    Tube size (mm):  8.0    Tube type:  Hi-lo    Number of attempts:  1    Cricoid pressure: no      Tube visualized through cords: yes    Placement assessment:     ETT to lip:  27    Tube secured with:  ETT solorzano    Breath sounds:  Equal    Placement verification: chest rise, condensation, colorimetric ETCO2 device, CXR verification, direct visualization and equal breath sounds      CXR findings:  ETT in proper place    Ventilator settings:  16/500/100%/6  Post-procedure details:     Patient tolerance of procedure:  Tolerated well, no immediate complications

## 2024-01-23 NOTE — ANESTHESIA PROCEDURE NOTES
Central Line Insertion    Performed by: Autumn Jasmine MD  Authorized by: Autumn Jasmine MD    Date/Time: 1/23/2024 2:43 PM  Catheter Type:  triple lumen  Consent: The procedure was performed in an emergent situation.  Risks and benefits: risks, benefits and alternatives were discussed (defer to critical care team)  Patient identity confirmed: arm band  Indications: vascular access and central pressure monitoring  Catheter size: 7 Fr  Patient position: Trendelenburg  Anesthesia method: propfol gtt on board.    Sedation:  Patient sedated: propofol gtt on board.    Assessment: blood return through all ports and free fluid flow  Preparation: skin prepped with ChloraPrep  Skin prep agent dried: skin prep agent completely dried prior to procedure  Sterile barriers: all five maximum sterile barriers used - cap, mask, sterile gown, sterile gloves, and large sterile sheet  Hand hygiene: hand hygiene performed prior to central venous catheter insertion  sterile gel and probe cover used in ultrasound-guided central venous catheter insertionultrasound permanent image saved  Pre-procedure: landmarks identified  Number of attempts: 1  Successful placement: yes  Post-procedure: dressing applied and line sutured  Patient tolerance: patient tolerated the procedure well with no immediate complications  Comments: Ultrasound guidance  Ultrasound pic in MEDIA section of Epic  Placed emergently with decision of critical care team

## 2024-01-23 NOTE — QUICK NOTE
Called by  regarding stroke alert at 6:37 AM, neuro response was immediate.    78year old man presenting with upper GI bleed, with hematemesis, was noted to have facial asymmetry and drift of the ipsilateral leg..  - NIHSS 2-3  - LKW unknown      CTH & CTA were unremarkable for any acute pathology, LVO, or other IR target.     Asked ED to discuss risks, benefits, and alternatives of/to IV thrombolysis.  IV thrombolysis was not given due to unknown last known well, active GI bleeding requiring recent transfusion and no clearly disabling symptoms at this time.      Recommend continued rate control.  - Recommend a trial of IV pressors to drive systolics ideally above 140.  Will tolerate effects of 1 agent for now, if symptoms do not change or improve after several hours of improved blood pressure, then okay to back off of pressors as patient symptoms are less likely to be perfusion dependent.  - Full neurology consult to follow

## 2024-01-23 NOTE — ANESTHESIA PREPROCEDURE EVALUATION
Procedure:  EGD    Relevant Problems   CARDIO   (+) Atrial fibrillation, unspecified type (HCC)   (+) CAD (coronary artery disease)   (+) Coronary artery disease involving native coronary artery      ENDO   (+) Type 2 diabetes mellitus with neurologic complication, with long-term current use of insulin (HCC)      GI/HEPATIC   (+) Acute upper GI bleeding   (+) Hepatocellular carcinoma (HCC)      /RENAL   (+) NA (acute kidney injury) (HCC)   (+) Stage 3 chronic kidney disease, unspecified whether stage 3a or 3b CKD (HCC)      HEMATOLOGY   (+) Acute blood loss anemia   (+) Anemia      PULMONARY   (+) Other emphysema (HCC)             Anesthesia Plan  ASA Score- 5 Emergent    Anesthesia Type- general with ASA Monitors.         Additional Monitors:     Airway Plan:     Comment: Plan for EGD at bedside, given patient is unstable to come down to GI suite and to the OR. Increasing pressor requirements. Upon arrival to ICU, patient was intubated by critical care team for respiratory failure and transferred to another ICU bed. Pt started on propofol gtt with critical care team for sedation. Given hemodynamic instability, discussion with critical care team to place central line. Please see procedure note. After central line placement, discussion with GI team and critical care team. Given patient is already intubated and now sedated, critical care team is comfortable managing sedation for EGD procedure. .       Plan Factors-    Induction-     Postoperative Plan-     Informed Consent-

## 2024-01-23 NOTE — ASSESSMENT & PLAN NOTE
Lab Results   Component Value Date    HGBA1C 8.0 (H) 11/01/2023       Recent Labs     01/23/24  0040 01/23/24  0315   POCGLU 211* 290*       Blood Sugar Average: Last 72 hrs:  (P) 250.5    Patient has a Dexcom, on eulogy and Jardiance, Lantus and insulin  Started patient on an insulin drip to get better control of blood glucose  Restart home regimen when able

## 2024-01-23 NOTE — ASSESSMENT & PLAN NOTE
0630A RN called provider to bedside to evaluate patient for left facial droop. NIHHS 4 (left facial droop, LUE weakness +4/5, LLE weakness +4/5, LLE drift and mild dysarthria). RN states left facial droop was noticed on arrival to ICU at 0300A. RN states ED could not confirm if droop was present in ED and wife unsure if droop was new. Stroke alert called.   CTH - no acute stroke or hemorrhage  CTA H/N - no large vessel occulusion. No arterial occulusion. 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    Plan:  Patient discussed with Dr. Interiano (Neuro)   TNK not given due to unknown last known well and active GI bleed.  Goal SBP >140  Serial Neuro checks  Hold aspirin and plavix in setting of GI bleed  Hold statin while NPO  ECHO

## 2024-01-23 NOTE — H&P
[facility]  H&P  Name: Derek Walter 78 y.o. male I MRN: [id number]  Unit/Bed#: -01 SDU I Date of Admission: 1/23/2024   Date of Service: 1/23/2024 I Hospital Day: 0      Assessment/Plan   * Acute upper GI bleeding  Assessment & Plan  Patient is a 78-year-old male who has been seeing Replaced by [facility] last seen 12/13/2023 patient has a history of anemia is multifactorial chronic due to cancer with iron deficiency and occult GI blood loss on Eliquis review of an EGD with multiple AVMs.  Patient came in to the ER from home due to generalized weakness nausea vomiting since this morning reports lower abdominal pain has a history of anemia is on Eliquis and Plavix episodes of vomiting which were described by the significant other is dark of clotted material, and also had melanotic stool occult positive.  Patient felt significant weakness and was unable to walk felt like too weak to walk.  Patient has a history of an upper GI bleed with multiple AVMs, also has a history of the patient intubation with tracheostomy approximately rehab time of 1 year.   Patient's baseline globin appears to be 8-10, except 1 value on 11/23 of 13.   Patient's hemoglobin was 8.3 received 1 L of fluid and his hemoglobin was 7.3  Ordered 2 units of RBCs with hemoglobins in between it   Significant nausea and abdominal, received Zofran 8 mg IV with no relief started Tigan due to prolonged QT  GI was called by the ER and is aware of the patient   Protonix 80 mg IV given by the ER  Start Protonix drip  Patient is on Eliquis and Plavix-100% sure but she thinks he did not take the Plavix or Eliquis on 1/22  GI wanted Eliquis reversed -ordered DDAVP 0.3 mg/kg and Kcentra  CT scan with contrast showed nonspecific hypodensity within the gastric body possibly reflective of hemorrhage in this patient with the history of gastric AVMs, known liver cancer, cholelithiasis, small volume abdominal pelvic ascites    Acute blood loss  anemia  Assessment & Plan  Hemoglobin 8.3-7.3  point  2 units of RBCs with hemoglobin between the unit  Hgb every 6 hours   History of iron deficiency check iron panel  Start iron p.o. when able    Chronic systolic heart failure (HCC)  Assessment & Plan  Wt Readings from Last 3 Encounters:   01/23/24 93.4 kg (205 lb 14.6 oz)   12/20/23 96.6 kg (213 lb)   12/14/23 97 kg (213 lb 12.8 oz)   Patient is 78-year-old male with history of CAD post PCI in August not deemed a candidate for surgical intervention for surgical rest Naomi heart failure with reduced ejection fraction of 36% is on Toprol and Jardiance  Started when able  Continue Plavix when able              Hepatocellular carcinoma (HCC)  Assessment & Plan  Patient has stage II liver cancer hepatocellular carcinoma  Has a tumor thrombus which appears stable seen again on CT scan today  Status post Y 90 treatment   Follows with Dr. Gong    CAD (coronary artery disease)  Assessment & Plan  Patient has a history of coronary artery disease had 8/2023 a PCI status post cardiac catheterization drug-eluting stents x 3 in the LAD.  Ramus and left circumflex  Continue Lipitor, Plavix and metoprolol when able    History of DVT (deep vein thrombosis)  Assessment & Plan  Patient is on Eliquis is on hold in the setting of GI bleed  And was reversed with Kcentra    Atrial fibrillation, unspecified type (HCC)  Assessment & Plan  Stable rate at 80-93  Maintained on Eliquis and Plavix and metoprolol  Hold all 3    Type 2 diabetes mellitus with neurologic complication, with long-term current use of insulin (HCC)  Assessment & Plan  Lab Results   Component Value Date    HGBA1C 8.0 (H) 11/01/2023       Recent Labs     01/23/24  0040 01/23/24  0315   POCGLU 211* 290*       Blood Sugar Average: Last 72 hrs:  (P) 250.5    Patient has a Dexcom, on eulogy and Jardiance, Lantus and insulin  Started patient on an insulin drip to get better control of blood glucose  Restart home regimen  when able    Stage 3 chronic kidney disease, unspecified whether stage 3a or 3b CKD (HCC)  Assessment & Plan  Lab Results   Component Value Date    EGFR 40 01/23/2024    EGFR 41 (L) 11/01/2023    EGFR 43 (L) 11/01/2023    CREATININE 1.63 (H) 01/23/2024    CREATININE 1.70 (H) 11/01/2023    CREATININE 1.64 (H) 11/01/2023     Patent 1.63 is approximately baseline  Monitor I's and O's  Hold for toxic agents  Hold Lasix at this time  Patient will need to be restarted on Lasix as soon as possible           History of Present Illness     HPI: Derek Walter is a 78 y.o. with a medical history of chronic blood loss anemia, afibrillation on Eliquis, CAD, CKD stage III, diabetes, history of DVT, hyperlipidemia and hypertension, bowel resection, the catheterizations, ischemic cardiomyopathy EF of 35% who comes to the ER with increasing weakness and generally not feeling well globin at 8.3, with melanotic stools and vomiting blood x1.  CT scan shows possible active bleeding within the gastric fundus.   Patient blood pressure was stable 1 low pressure of 90/50 covered to 112 250, patient is receiving 2 units of RBCs, Kcentra and DDAVP  Patient is admitted as a stepdown to 2 to slim    History obtained from chart review and the patient.  Review of Systems   Constitutional:  Positive for appetite change and fatigue.   Gastrointestinal:  Positive for blood in stool, nausea and vomiting.   Genitourinary:  Positive for decreased urine volume.   Neurological:  Positive for dizziness and weakness.     Disposition: Stepdown Level 2  Historical Information   Past Medical History:  No date: Anemia  No date: Atrial fibrillation (HCC)      Comment:  Eliquis  No date: AVB (atrioventricular block)      Comment:  1st degree  No date: CAD (coronary artery disease)  No date: CKD (chronic kidney disease), stage III (HCC)      Comment:  baseline Cr 1.2-1.6  No date: Colon polyp  No date: Diabetes mellitus (HCC)      Comment:  type 2, insulin  dependent  No date: Diverticulosis  No date: Emphysema lung (HCC)  No date: Former tobacco use  No date: History of asbestos exposure  No date: History of DVT (deep vein thrombosis)      Comment:  RLE, tx w/ AC  08/2023: History of GI bleed      Comment:  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, given                PRBC/Venofer for anemia while in house  No date: Hyperlipidemia  No date: Hypertension  No date: LAFB (left anterior fascicular block)  No date: Liver cancer (HCC)  08/2023: Liver mass      Comment:  suspected HCC, needs radioembolization  No date: RBBB  08/07/2023: Syncope Past Surgical History:  No date: APPENDECTOMY  2014: BOWEL RESECTION  No date: CARDIAC CATHETERIZATION  08/25/2023: CARDIAC CATHETERIZATION; N/A      Comment:  Procedure: Cardiac pci;  Surgeon: Dom Garrett DO;  Location: [facility];  Service: Cardiology  08/21/2023: CARDIAC CATHETERIZATION; N/A      Comment:  Procedure: Cardiac catheterization;  Surgeon:                Dom Garrett DO;  Location: [facility];               Service: Cardiology  08/21/2023: CARDIAC CATHETERIZATION; N/A      Comment:  Procedure: Cardiac Coronary Angiogram;  Surgeon:                Dom Garrett DO;  Location: [facility];               Service: Cardiology  No date: CATARACT EXTRACTION; Bilateral  No date: COLONOSCOPY  No date: EGD  09/01/2023: IR Y-90 PRE-ANGIO/EMBO W/ LUNG SCAN  09/08/2023: IR Y-90 RADIOEMBOLIZATION   Current Outpatient Medications   Medication Instructions    acetaminophen (TYLENOL) 650 mg, Oral, Every 8 hours PRN    Anoro Ellipta 62.5-25 MCG/ACT inhaler inhale 1 puff by mouth and INTO THE LUNGS once daily    atorvastatin (LIPITOR) 40 mg tablet take 1 tablet by mouth once daily after dinner    azelastine (ASTELIN) 0.1 % nasal spray 1 spray, Nasal, 2 times daily, Use in each nostril as directed    Blood Glucose Monitoring Suppl (OneTouch Verio) w/Device KIT Does  not apply, 4 times daily    clopidogrel (PLAVIX) 75 mg, Oral, Daily    Continuous Blood Gluc  (Dexcom G6 ) NOHEMY Use with dexcom sensor    Continuous Blood Gluc Sensor (Dexcom G6 Sensor) MISC Use daily as directed for CGM - Change every 10 days    Continuous Blood Gluc Transmit (Dexcom G6 Transmitter) MISC Use daily as directed for CGM - Change every 3 months    Continuous Blood Gluc Transmit (Dexcom G6 Transmitter) MISC Use daily as directed for CGM - Change every 3 months    Eliquis 5 MG take 1 tablet by mouth twice a day    ferrous sulfate 325 mg, Oral, Daily with breakfast    furosemide (LASIX) 40 mg, Oral, Every other day    glucose blood (OneTouch Verio) test strip Use as instructed    insulin detemir (Levemir FlexPen) 100 Units/mL injection pen 65 units daily    Insulin Pen Needle (Droplet Pen Needles) 32G X 4 MM MISC use 1 PEN NEEDLE to inject MEDICATION subcutaneously four times a day    Jardiance 10 mg, Oral, Every morning    metoprolol succinate (TOPROL-XL) 25 mg, Oral, Daily    mometasone (ELOCON) 0.1 % cream Topical, Daily    Multiple Vitamin (MULTIVITAMIN ADULT PO) 1 tablet, Oral, Daily    NovoLOG FlexPen 100 units/mL injection pen INJECT 40 UNITS UNDER THE SKIN 3 TIMES DAILY WITH MEALS    pantoprazole (PROTONIX) 40 mg, Oral, 2 times daily    Potassium Citrate ER 15 MEQ (1620 MG) TBCR 1 tablet, Oral, 2 times daily    pregabalin (LYRICA) 100 mg capsule take 1 capsule by mouth three times a day    sertraline (ZOLOFT) 25 mg, Oral, Daily    SUPER B COMPLEX/C PO 1 tablet, Oral, Daily    tamsulosin (FLOMAX) 0.4 mg, Oral, Daily with dinner    traZODone (DESYREL) 100 mg tablet take 2 tablets by mouth at bedtime    Trulicity 0.75 MG/0.5ML injection inject 0.5 milliliters ( 0.75 milligrams ) subcutaneously every week IN THE ABDOMEN THIGHS OR OUTER AREA OF UPPER ARM ROTATE INJECTION SITES    No Known Allergies   Social History     Tobacco Use    Smoking status: Former     Current packs/day: 0.00      Average packs/day: 1 pack/day for 30.0 years (30.0 ttl pk-yrs)     Types: Cigarettes     Start date:      Quit date:      Years since quittin.0    Smokeless tobacco: Never   Vaping Use    Vaping status: Never Used   Substance Use Topics    Alcohol use: Yes     Comment: Socially    Drug use: Never    Family History   Problem Relation Age of Onset    Hypertension Mother     Bone cancer Father     Diabetes type II Sister     Diabetes type II Sister     Esophageal cancer Brother     Colon cancer Neg Hx           Objective                            Vitals I/O      Most Recent Min/Max in 24hrs   Temp 97.6 °F (36.4 °C) Temp  Min: 97.4 °F (36.3 °C)  Max: 97.6 °F (36.4 °C)   Pulse 90 Pulse  Min: 88  Max: 102   Resp 21 Resp  Min: 20  Max: 23   /54 BP  Min: 99/46  Max: 122/53   O2 Sat 97 % SpO2  Min: 94 %  Max: 99 %      Intake/Output Summary (Last 24 hours) at 2024 0400  Last data filed at 2024 0355  Gross per 24 hour   Intake 1000 ml   Output 275 ml   Net 725 ml       Diet NPO; Sips with meds    Invasive Monitoring           Physical Exam   Physical Exam  Vitals and nursing note reviewed.   Eyes:      Extraocular Movements: Extraocular movements intact.      Pupils: Pupils are equal, round, and reactive to light.   Skin:     General: Skin is warm, dry and not mottled extremities.      Capillary Refill: Capillary refill takes less than 2 seconds.      Coloration: Skin is pale.   HENT:      Head: Normocephalic and atraumatic.   Cardiovascular:      Rate and Rhythm: Normal rate and regular rhythm.      Pulses: Normal pulses.      Heart sounds: Normal heart sounds.   Musculoskeletal:         General: Normal range of motion.      Cervical back: Full passive range of motion without pain, normal range of motion and neck supple.   Abdominal: General: Bowel sounds are normal.      Palpations: Abdomen is soft.   Constitutional:       Appearance: He is well-developed, normal weight and well-nourished. He  is ill-appearing.      Interventions: Nasal cannula in place.   Pulmonary:      Effort: Pulmonary effort is normal.      Breath sounds: Normal breath sounds. No wheezing or rhonchi.   Psychiatric:         Behavior: Behavior is cooperative.   Neurological:      General: No focal deficit present.      Mental Status: He is alert, easily aroused and oriented to person, place and time. Mental status is at baseline.      Sensory: Sensation is intact.      Motor: gross motor function is at baseline for patient. Strength full and intact in all extremities.            Diagnostic Studies      EKG: A fib  Imaging:  I have personally reviewed pertinent reports.       Medications:  Scheduled PRN   azelastine, 1 spray, BID  desmopressin, 0.3 mcg/kg, Once      acetaminophen, 650 mg, Q6H PRN  trimethobenzamide, 200 mg, Q12H PRN       Continuous    insulin regular (HumuLIN R,NovoLIN R) 1 Units/mL in sodium chloride 0.9 % 100 mL infusion, 0.3-21 Units/hr, Last Rate: 5 Units/hr (01/23/24 0346)         Labs:    CBC    Recent Labs     01/23/24  0021 01/23/24  0246   WBC 12.30*  --    HGB 8.4* 7.3*   HCT 27.9* 24.7*     --      BMP    Recent Labs     01/23/24  0021   SODIUM 142   K 5.5*      CO2 23   AGAP 13   BUN 71*   CREATININE 1.63*   CALCIUM 8.4       Coags    Recent Labs     01/23/24  0021   INR 1.62*   PTT 35        Additional Electrolytes  No recent results       Blood Gas    No recent results  No recent results LFTs  Recent Labs     01/23/24  0021   ALT 69*   *   ALKPHOS 269*   ALB 3.1*   TBILI 0.93       Infectious  No recent results  Glucose  Recent Labs     01/23/24  0021   GLUC 276*             Non-Critical Care Time Statement: I have spent a total time of 45 minutes in caring for this patient including Diagnostic results, Risks and benefits of tx options, Instructions for management, Patient and family education, Importance of tx compliance, Impressions, Documenting in the medical record, Reviewing /  ordering tests, medicine, procedures  , Obtaining or reviewing history  , and Communicating with other healthcare professionals .   Anticipated Length of Stay is > 2 midnights  Tino Burch PA-C

## 2024-01-23 NOTE — OCCUPATIONAL THERAPY NOTE
Occupational Therapy Cancellation    Patient Name: Derek Walter  Today's Date: 1/23/2024 01/23/24 1044   OT Last Visit   OT Visit Date 01/23/24   Note Type   Note type Cancelled Session   Cancel Reasons Medical status   Additional Comments OT orders received and chart reviewed. Per ICU rounds, pt not medically appropraite for therapy at this time. Will continue to follow for evaluation as appropriate.     Jennifer Cintron MS, OTR/L

## 2024-01-23 NOTE — CONSULTS
formerly [facility]  Consult  Name: Derek Walter 78 y.o. male I MRN: [id number]  Unit/Bed#: -01 SDU I Date of Admission: 1/23/2024   Date of Service: 1/23/2024 I Hospital Day: 0    Consults    Assessment/Plan   * Acute upper GI bleeding  Assessment & Plan  Patient is a 78-year-old male who has been seeing [facility] last seen 12/13/2023 patient has a history of anemia is multifactorial chronic due to cancer with iron deficiency and occult GI blood loss on Eliquis review of an EGD with multiple AVMs.  Patient came in to the ER from home due to generalized weakness nausea vomiting since this morning reports lower abdominal pain has a history of anemia is on Eliquis and Plavix episodes of vomiting which were described by the significant other is dark of clotted material, and also had melanotic stool occult positive.  Patient felt significant weakness and was unable to walk felt like too weak to walk.  Patient has a history of an upper GI bleed with multiple AVMs, also has a history of the patient intubation with tracheostomy approximately rehab time of 1 year.   Patient's baseline globin appears to be 8-10, except 1 value on 11/23 of 13.   Patient's hemoglobin was 8.3 received 1 L of fluid and his hemoglobin was 7.3  Ordered 2 units of RBCs with hemoglobins in between it   Significant nausea and abdominal, received Zofran 8 mg IV with no relief started Tigan due to prolonged QT  GI was called by the ER and is aware of the patient   Protonix 80 mg IV given by the ER  Start Protonix drip  Patient is on Eliquis and Plavix-100% sure but she thinks he did not take the Plavix or Eliquis on 1/22  GI wanted Eliquis reversed -ordered DDAVP 0.3 mg/kg and Kcentra  CT scan with contrast showed nonspecific hypodensity within the gastric body possibly reflective of hemorrhage in this patient with the history of gastric AVMs, known liver cancer, cholelithiasis, small volume abdominal pelvic  ascites    Acute blood loss anemia  Assessment & Plan  Hemoglobin 8.3-7.3  point  Receive 1UPRBC  Hgb every 6 hours   History of iron deficiency check iron panel  Start iron p.o. when able    Stroke-like symptom  Assessment & Plan  0630A RN called provider to bedside to evaluate patient for left facial droop. NIHHS 4 (left facial droop, LUE weakness +4/5, LLE weakness +4/5, LLE drift and mild dysarthria). RN states left facial droop was noticed on arrival to ICU at 0300A. RN states ED could not confirm if droop was present in ED and wife unsure if droop was new. Stroke alert called.   CTH - no acute stroke or hemorrhage  CTA H/N - no large vessel occulusion. No arterial occulusion. 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    Plan:  Patient discussed with Dr. Interiano (Neuro)   TNK not given due to unknown last known well and active GI bleed.  Goal SBP >140  Serial Neuro checks  Hold aspirin and plavix in setting of GI bleed  Hold statin while NPO  ECHO    Chronic systolic heart failure (HCC)  Assessment & Plan  Wt Readings from Last 3 Encounters:   01/23/24 93.4 kg (205 lb 14.6 oz)   12/20/23 96.6 kg (213 lb)   12/14/23 97 kg (213 lb 12.8 oz)   Patient is 78-year-old male with history of CAD post PCI in August not deemed a candidate for surgical intervention for surgical rest Naomi heart failure with reduced ejection fraction of 36% is on Toprol and Jardiance  Started when able  Continue Plavix when able              Hepatocellular carcinoma (HCC)  Assessment & Plan  Patient has stage II liver cancer hepatocellular carcinoma  Has a tumor thrombus which appears stable seen again on CT scan today  Status post Y 90 treatment   Follows with Dr. Gong    CAD (coronary artery disease)  Assessment & Plan  Patient has a history of coronary artery disease had 8/2023 a PCI status post cardiac catheterization drug-eluting stents x 3 in the LAD.  Ramus and left circumflex  Continue Lipitor, Plavix and metoprolol when able    History  of DVT (deep vein thrombosis)  Assessment & Plan  Patient is on Eliquis is on hold in the setting of GI bleed  And was reversed with Kcentra    Atrial fibrillation, unspecified type (HCC)  Assessment & Plan  Stable rate at 80-93  Maintained on Eliquis and Plavix and metoprolol  Hold all 3    Type 2 diabetes mellitus with neurologic complication, with long-term current use of insulin (HCC)  Assessment & Plan  Lab Results   Component Value Date    HGBA1C 8.0 (H) 11/01/2023       Recent Labs     01/23/24  0040 01/23/24  0315 01/23/24  0614   POCGLU 211* 290* 310*         Blood Sugar Average: Last 72 hrs:  (P) [id number]    Patient has a Dexcom, on eulogy and Jardiance, Lantus and insulin  Started patient on an insulin drip to get better control of blood glucose  Restart home regimen when able    Stage 3 chronic kidney disease, unspecified whether stage 3a or 3b CKD ([facility])  Assessment & Plan  Lab Results   Component Value Date    EGFR 37 01/23/2024    EGFR 40 01/23/2024    EGFR 41 (L) 11/01/2023    CREATININE 1.73 (H) 01/23/2024    CREATININE 1.63 (H) 01/23/2024    CREATININE 1.70 (H) 11/01/2023     Patent 1.63 is approximately baseline  Monitor I's and O's  Hold for toxic agents  Hold Lasix at this time  Patient will need to be restarted on Lasix as soon as possible           History of Present Illness     HPI: Derek Walter is a 78 y.o. M with PMH of chronic blood loss anemia, afibrillation on Eliquis, CAD, CKD stage III, diabetes, history of DVT, hyperlipidemia and hypertension, bowel resection, the catheterizations, ischemic cardiomyopathy EF of 35%. He was admitted overnight for acute blood loss anemia due to GI bleed. He received 1U PRBC, Kcentra and DDAVP (eliquis and plavix).    At 0630A RN called me to the bedside to evaluate patient for left facial droop. On my physical assessment patient had NIHHS 4 (left facial droop, LUE weakness +4/5, LLE weakness +4/5, LLE drift and mild dysarthria). RN states left  facial droop was noticed on arrival to ICU at 0300A. RN states ED could not confirm if droop was present for them and wife unsure if droop was new. Stroke alert called.       Per Radiology:  CTH - no acute stroke or hemorrhage  CTA H/N - no large vessel occulusion. No arterial occulusion. 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     Wife called but no answer. Voice message left for call back to update and verify timeline of above neuro deficits.     Admit to CC for vasopressors to support Neurology's goal SBP >140.     History obtained from chart review and the patient.  Review of Systems   Constitutional:  Positive for fatigue.   HENT: Negative.     Eyes: Negative.    Respiratory: Negative.     Cardiovascular: Negative.    Gastrointestinal:  Positive for abdominal pain, blood in stool, nausea and vomiting. Negative for diarrhea.   Endocrine: Negative.    Genitourinary: Negative.    Musculoskeletal: Negative.    Skin:  Positive for pallor.   Allergic/Immunologic: Negative.    Neurological:  Positive for speech difficulty and weakness. Negative for numbness and headaches.   Hematological: Negative.    Psychiatric/Behavioral: Negative.       Disposition: Critical care   Historical Information   Past Medical History:  No date: Anemia  No date: Atrial fibrillation (HCC)      Comment:  Eliquis  No date: AVB (atrioventricular block)      Comment:  1st degree  No date: CAD (coronary artery disease)  No date: CKD (chronic kidney disease), stage III ([facility])      Comment:  baseline Cr 1.2-1.6  No date: Colon polyp  No date: Diabetes mellitus ([facility])      Comment:  type 2, insulin dependent  No date: Diverticulosis  No date: Emphysema lung ([facility])  No date: Former tobacco use  No date: History of asbestos exposure  No date: History of DVT (deep vein thrombosis)      Comment:  ulices MARTINEZ w/ AC  08/2023: History of GI bleed      Comment:  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, given                PRBC/Venofer for anemia while in  house  No date: Hyperlipidemia  No date: Hypertension  No date: LAFB (left anterior fascicular block)  No date: Liver cancer (HCC)  08/2023: Liver mass      Comment:  suspected HCC, needs radioembolization  No date: RBBB  08/07/2023: Syncope Past Surgical History:  No date: APPENDECTOMY  2014: BOWEL RESECTION  No date: CARDIAC CATHETERIZATION  08/25/2023: CARDIAC CATHETERIZATION; N/A      Comment:  Procedure: Cardiac pci;  Surgeon: Dom Garrett DO;  [facility];  Service: Cardiology  08/21/2023: CARDIAC CATHETERIZATION; N/A      Comment:  Procedure: Cardiac catheterization;  Surgeon:                Dom Garrett DO;  [facility];               Service: Cardiology  08/21/2023: CARDIAC CATHETERIZATION; N/A      Comment:  Procedure: Cardiac Coronary Angiogram;  Surgeon:                Dom Garrett DO;  [facility]: [facility];               Service: Cardiology  No date: CATARACT EXTRACTION; Bilateral  No date: COLONOSCOPY  No date: EGD  09/01/2023: IR Y-90 PRE-ANGIO/EMBO W/ LUNG SCAN  09/08/2023: IR Y-90 RADIOEMBOLIZATION   Current Outpatient Medications   Medication Instructions    acetaminophen (TYLENOL) 650 mg, Oral, Every 8 hours PRN    Anoro Ellipta 62.5-25 MCG/ACT inhaler inhale 1 puff by mouth and INTO THE LUNGS once daily    atorvastatin (LIPITOR) 40 mg tablet take 1 tablet by mouth once daily after dinner    azelastine (ASTELIN) 0.1 % nasal spray 1 spray, Nasal, 2 times daily, Use in each nostril as directed    Blood Glucose Monitoring Suppl (OneTouch Verio) w/Device KIT Does not apply, 4 times daily    clopidogrel (PLAVIX) 75 mg, Oral, Daily    Continuous Blood Gluc  (Dexcom G6 ) NOHEMY Use with dexcom sensor    Continuous Blood Gluc Sensor (Dexcom G6 Sensor) MISC Use daily as directed for CGM - Change every 10 days    Continuous Blood Gluc Transmit (Dexcom G6 Transmitter) MISC Use daily as directed for CGM -  Change every 3 months    Continuous Blood Gluc Transmit (Dexcom G6 Transmitter) MISC Use daily as directed for CGM - Change every 3 months    Eliquis 5 MG take 1 tablet by mouth twice a day    ferrous sulfate 325 mg, Oral, Daily with breakfast    furosemide (LASIX) 40 mg, Oral, Every other day    glucose blood (OneTouch Verio) test strip Use as instructed    insulin detemir (Levemir FlexPen) 100 Units/mL injection pen 65 units daily    Insulin Pen Needle (Droplet Pen Needles) 32G X 4 MM MISC use 1 PEN NEEDLE to inject MEDICATION subcutaneously four times a day    Jardiance 10 mg, Oral, Every morning    metoprolol succinate (TOPROL-XL) 25 mg, Oral, Daily    mometasone (ELOCON) 0.1 % cream Topical, Daily    Multiple Vitamin (MULTIVITAMIN ADULT PO) 1 tablet, Oral, Daily    NovoLOG FlexPen 100 units/mL injection pen INJECT 40 UNITS UNDER THE SKIN 3 TIMES DAILY WITH MEALS    pantoprazole (PROTONIX) 40 mg, Oral, 2 times daily    Potassium Citrate ER 15 MEQ (1620 MG) TBCR 1 tablet, Oral, 2 times daily    pregabalin (LYRICA) 100 mg capsule take 1 capsule by mouth three times a day    sertraline (ZOLOFT) 25 mg, Oral, Daily    SUPER B COMPLEX/C PO 1 tablet, Oral, Daily    tamsulosin (FLOMAX) 0.4 mg, Oral, Daily with dinner    traZODone (DESYREL) 100 mg tablet take 2 tablets by mouth at bedtime    Trulicity 0.75 MG/0.5ML injection inject 0.5 milliliters ( 0.75 milligrams ) subcutaneously every week IN THE ABDOMEN THIGHS OR OUTER AREA OF UPPER ARM ROTATE INJECTION SITES    No Known Allergies   Social History     Tobacco Use    Smoking status: Former     Current packs/day: 0.00     Average packs/day: 1 pack/day for 30.0 years (30.0 ttl pk-yrs)     Types: Cigarettes     Start date:      Quit date:      Years since quittin.0    Smokeless tobacco: Never   Vaping Use    Vaping status: Never Used   Substance Use Topics    Alcohol use: Yes     Comment: Socially    Drug use: Never    Family History   Problem Relation Age  of Onset    Hypertension Mother     Bone cancer Father     Diabetes type II Sister     Diabetes type II Sister     Esophageal cancer Brother     Colon cancer Neg Hx         Objective                            Vitals I/O      Most Recent Min/Max in 24hrs   Temp 97.5 °F (36.4 °C) Temp  Min: 97.4 °F (36.3 °C)  Max: 97.6 °F (36.4 °C)   Pulse 98 Pulse  Min: 87  Max: 108   Resp (!) 34 Resp  Min: 20  Max: 34   /53 BP  Min: 84/53  Max: 124/90   O2 Sat 90 % SpO2  Min: 90 %  Max: 99 %      Intake/Output Summary (Last 24 hours) at 1/23/2024 0750  Last data filed at 1/23/2024 0511  Gross per 24 hour   Intake 1286.17 ml   Output 275 ml   Net 1011.17 ml       Diet NPO; Sips with meds    Invasive Monitoring           Physical Exam   Physical Exam  Vitals and nursing note reviewed.   Eyes:      Extraocular Movements: Extraocular movements intact.      Pupils: Pupils are equal, round, and reactive to light.      Comments: BL pupils +1   Skin:     General: Skin is warm.      Capillary Refill: Capillary refill takes less than 2 seconds.      Coloration: Skin is pale.   HENT:      Head: Normocephalic and atraumatic.      Mouth/Throat:      Mouth: Mucous membranes are moist.   Cardiovascular:      Rate and Rhythm: Normal rate. Rhythm irregular.      Pulses: Normal pulses.   Musculoskeletal:      Right lower leg: No edema.      Left lower leg: No edema.   Abdominal:      Palpations: Abdomen is soft.      Tenderness: There is abdominal tenderness.   Constitutional:       Appearance: He is well-developed and well-nourished. He is ill-appearing.   Pulmonary:      Effort: Pulmonary effort is normal.   Neurological:      Mental Status: He is alert and oriented to person, place and time.      Comments: Left facial droop, dysarthria, LUE/LLE +4/5 strength, LLE drift            Diagnostic Studies      EKG: AFIB 90s  Imaging:   CT abdomen pelvis with contrast   Final Result by Dave Hanson MD (01/23 0216)      Nonspecific strandy  hyperdensities within the gastric body possibly reflective of hemorrhage in this patient with history? Gastric AVMs per chart review in [facility]. GI consultation advised.      Similar appearance of known segment 6 and 7 hepatic masses compatible with multifocal HCC compared to the previous MRI accounting for differences in modality. Somewhat worsened heterogeneous/mottled appearance of the hepatic parenchyma in the anterior    right and left hepatic lobes which may reflect disease progression and/or treatment related changes. 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 Continued follow-up per oncology recommended.      Cholelithiasis.      Similar small volume abdominopelvic ascites.      Above findings discussed with Dr. Jameson at 2:10 a.m. on 1/23/2024.      Workstation performed: [id number]         CT stroke alert brain    (Results Pending)   CTA stroke alert (head/neck)    (Results Pending)     I have personally reviewed pertinent reports.   and I have personally reviewed pertinent films in PACS     Medications:  Scheduled PRN   Albumin 25%, 25 g, Once  albuterol, 10 mg, Once  azelastine, 1 spray, BID  calcium gluconate, 1 g, Once  sodium chloride, 3 mL, Once      acetaminophen, 650 mg, Q6H PRN  trimethobenzamide, 200 mg, Q12H PRN       Continuous    insulin regular (HumuLIN R,NovoLIN R) 1 Units/mL in sodium chloride 0.9 % 100 mL infusion, 0.3-21 Units/hr, Last Rate: 6 Units/hr (01/23/24 0614)  pantoprazole (PROTONIX) 80 mg in sodium chloride 0.9 % 100 mL infusion, 8 mg/hr, Last Rate: 8 mg/hr (01/23/24 0439)         Labs:    CBC    Recent Labs     01/23/24  0021 01/23/24  0246 01/23/24  0614   WBC 12.30*  --  9.07   HGB 8.4* 7.3* 7.8*   HCT 27.9* 24.7* 25.4*     --  176     BMP    Recent Labs     01/23/24  0021 01/23/24 0614   SODIUM 142 142   K 5.5* 6.0*    109*   CO2 23 23   AGAP 13 10   BUN 71* 89*   CREATININE 1.63* 1.73*   CALCIUM 8.4 7.8*       Coags    Recent  Labs     01/23/24  0021 01/23/24  0614   INR 1.62* 1.31*   PTT 35 34        Additional Electrolytes  Recent Labs     01/23/24  0614   MG 2.1   PHOS 2.2*          Blood Gas    No recent results  No recent results LFTs  Recent Labs     01/23/24  0021 01/23/24  0614   ALT 69* 59*   * 83*   ALKPHOS 269* 212*   ALB 3.1* 2.8*   TBILI 0.93 0.89       Infectious  No recent results  Glucose  Recent Labs     01/23/24  0021 01/23/24  0614   GLUC 276* 284*             Critical Care Time Statement: Upon my evaluation, this patient had a high probability of imminent or life-threatening deterioration due to acute blood loss due to GI bleed and stroke like symtpoms, which required my direct attention, intervention, and personal management.  I spent a total of 30 minutes directly providing critical care services, including interpretation of complex medical databases, evaluating for the presence of life-threatening injuries or illnesses, management of organ system failure(s) , complex medical decision making (to support/prevent further life-threatening deterioration)., and interpretation of hemodynamic data. This time is exclusive of procedures, teaching, family meetings, and any prior time recorded by providers other than myself.    NADIA Diamond

## 2024-01-23 NOTE — ASSESSMENT & PLAN NOTE
Patient has a history of coronary artery disease had 8/2023 a PCI status post cardiac catheterization drug-eluting stents x 3 in the LAD.  Ramus and left circumflex  Continue Lipitor, Plavix and metoprolol when able

## 2024-01-23 NOTE — PHYSICAL THERAPY NOTE
Physical Therapy Screen    Patient Name: Derek Walter    Today's Date: 1/23/2024     Problem List  Principal Problem:    Acute upper GI bleeding  Active Problems:    Stage 3 chronic kidney disease, unspecified whether stage 3a or 3b CKD (HCC)    Type 2 diabetes mellitus with neurologic complication, with long-term current use of insulin (HCC)    Atrial fibrillation, unspecified type (HCC)    History of DVT (deep vein thrombosis)    CAD (coronary artery disease)    Hepatocellular carcinoma (HCC)    Chronic systolic heart failure (HCC)    Acute blood loss anemia    Stroke-like symptom       Past Medical History  Past Medical History:   Diagnosis Date    Anemia     Atrial fibrillation (HCC)     Eliquis    AVB (atrioventricular block)     1st degree    CAD (coronary artery disease)     CKD (chronic kidney disease), stage III (HCC)     baseline Cr 1.2-1.6    Colon polyp     Diabetes mellitus (HCC)     type 2, insulin dependent    Diverticulosis     Emphysema lung (HCC)     Former tobacco use     History of asbestos exposure     History of DVT (deep vein thrombosis)     RLE, tx w/ AC    History of GI bleed 08/2023    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, given PRBC/Venofer for anemia while in house    Hyperlipidemia     Hypertension     LAFB (left anterior fascicular block)     Liver cancer (HCC)     Liver mass 08/2023    suspected HCC, needs radioembolization    RBBB     Syncope 08/07/2023        Past Surgical History  Past Surgical History:   Procedure Laterality Date    APPENDECTOMY      BOWEL RESECTION  2014    CARDIAC CATHETERIZATION      CARDIAC CATHETERIZATION N/A 08/25/2023    Procedure: Cardiac pci;  Surgeon: Dom Garrett DO;  [facility];  Service: Cardiology    CARDIAC CATHETERIZATION N/A 08/21/2023    Procedure: Cardiac catheterization;  Surgeon: Dom Garrett DO;  [facility];  Service: Cardiology     CARDIAC CATHETERIZATION N/A 08/21/2023    Procedure: Cardiac Coronary Angiogram;  Surgeon: Dom Garrett DO;  [facility]: [facility];  Service: Cardiology    CATARACT EXTRACTION Bilateral     COLONOSCOPY      EGD      IR Y-90 PRE-ANGIO/EMBO W/ LUNG SCAN  09/01/2023    IR Y-90 RADIOEMBOLIZATION  09/08/2023 01/23/24 1047   PT Last Visit   PT Visit Date 01/23/24   Note Type   Note type Cancelled Session   Cancel Reasons Medical status   Additional Comments Chart review completed. Discussed patient at ICU rounds. Patient is scheduled for an EGD today. He is not medically appropriate for P.T. eval. Will continue to follow.     Marlene Bernabe

## 2024-01-23 NOTE — BRIEF OP NOTE (RAD/CATH)
EGD  IMPRESSION:  3 large angioectasias in the fundus of the stomach and body of the stomach; bleeding was observed; placed clips; hemostasis achieved; induced coagulation and hemostasis achieved with argon plasma coagulation  The esophagus and duodenum appeared normal.      RECOMMENDATION:  Continue to follow H&H and transfuse as needed  Continue PPI  Hold anticoagulation  If continued bleeding is evident clinically consider repeat CT scan with abdominal bleeding protocol, consider repeat EGD, consider ENT evaluation given history of epistaxis

## 2024-01-23 NOTE — ASSESSMENT & PLAN NOTE
Wt Readings from Last 3 Encounters:   01/23/24 93.4 kg (205 lb 14.6 oz)   12/20/23 96.6 kg (213 lb)   12/14/23 97 kg (213 lb 12.8 oz)   Patient is 78-year-old male with history of CAD post PCI in August not deemed a candidate for surgical intervention for surgical rest Naomi heart failure with reduced ejection fraction of 36% is on Toprol and Jardiance  Started when able  Continue Plavix when able

## 2024-01-23 NOTE — ASSESSMENT & PLAN NOTE
Lab Results   Component Value Date    EGFR 40 01/23/2024    EGFR 41 (L) 11/01/2023    EGFR 43 (L) 11/01/2023    CREATININE 1.63 (H) 01/23/2024    CREATININE 1.70 (H) 11/01/2023    CREATININE 1.64 (H) 11/01/2023     Patent 1.63 is approximately baseline  Monitor I's and O's  Hold for toxic agents  Hold Lasix at this time  Patient will need to be restarted on Lasix as soon as possible

## 2024-01-23 NOTE — PROGRESS NOTES
[facility]  Interval Progress Note: Critical Care  Name: Derek Walter I  MRN: [id number]  Unit/Bed#: -01 SDU I Date of Admission: 1/23/2024   Date of Service: 1/23/2024 I Hospital Day: 0    Interval Events:  New AGMA seen on BMP. ABG (mixed sample) pH 7.3/pCO2 23.6/pO2 20/HCO3 12.1/BE-13. LA 12.3.   Continue IVF resuscitation.   Bedside EGD pending.   Will obtain SvO2 from central line.  Trend LA  Repeat ABG    Unable to maintain neurology's SBP >140 goal despite IVF, albumin, 1UPRBC and phenylephrine gtt. Concerned that new underlying hypotension, elevated LA and AGMA (CO2 17/AG 16) could be due to a component of cardiogenic shock in the setting ischemic cardiomyopathy (EF 35%) and phenylephrine gtt. Contacted Neurology regarding cardiogenic shock concern and informed them that initial stroke symptoms (strength and chronic facial droop per wife) have improved despite lower SBPs. Neurology agreed to discontinue SBP >140 goal.   Phenylephrine changed to levophed gtt with goal MAP >65.     Since the stroke alert this AM patient's mental status rapidly decline. Patient has become increasingly confused, agitated and increasingly lethargic. Ultimately, patient intubated for worsening encephalopathy with GCS 5.     Pertinent New Data:   Vitals:    01/23/24 1529 01/23/24 1530 01/23/24 1531 01/23/24 1532   BP:  115/56     BP Location:       Pulse: 92 92 92 92   Resp: 21 22 (!) 23 (!) 24   Temp:       TempSrc:       SpO2: 100% 100% 100% 100%   Weight:       Height:          Billing Level:  During this visit, Critical care services were medically necessary as this patient had a high probability of imminent or life-threatening deterioration due to acute encephalopathy, shock, acute respiratory failure, acute blood loss anemia, GI bleed, and acidosis , required my direct attention, intervention, and personal management to implement the following: arterial puncture, directing of titration of  vasoactive drugs, transfusion of blood products , fluid management, bedside management, test review, records review, case discussed with consultants , direct patient care, and documentation of findings.    I have personally provided 30 minutes on 01/23/24 of critical care time, exclusive of procedures, teaching, family meetings, and any prior time recorded by providers other than myself.    SIGNATURE: NADIA Diamond

## 2024-01-23 NOTE — ASSESSMENT & PLAN NOTE
Patient has stage II liver cancer hepatocellular carcinoma  Has a tumor thrombus which appears stable seen again on CT scan today  Status post Y 90 treatment   Follows with Dr. Gong

## 2024-01-24 ENCOUNTER — APPOINTMENT (INPATIENT)
Dept: RADIOLOGY | Facility: HOSPITAL | Age: 79
DRG: 377 | End: 2024-01-24
Payer: COMMERCIAL

## 2024-01-24 ENCOUNTER — TELEPHONE (OUTPATIENT)
Dept: SURGICAL ONCOLOGY | Facility: CLINIC | Age: 79
End: 2024-01-24

## 2024-01-24 ENCOUNTER — APPOINTMENT (INPATIENT)
Dept: NON INVASIVE DIAGNOSTICS | Facility: HOSPITAL | Age: 79
DRG: 377 | End: 2024-01-24
Payer: COMMERCIAL

## 2024-01-24 ENCOUNTER — APPOINTMENT (INPATIENT)
Dept: MRI IMAGING | Facility: HOSPITAL | Age: 79
DRG: 377 | End: 2024-01-24
Payer: COMMERCIAL

## 2024-01-24 PROBLEM — K76.82 HEPATIC ENCEPHALOPATHY (HCC): Status: ACTIVE | Noted: 2024-01-24

## 2024-01-24 LAB
ABO GROUP BLD BPU: NORMAL
ABO GROUP BLD BPU: NORMAL
ALBUMIN SERPL BCP-MCNC: 2.9 G/DL (ref 3.5–5)
ALP SERPL-CCNC: 140 U/L (ref 34–104)
ALT SERPL W P-5'-P-CCNC: 50 U/L (ref 7–52)
AMMONIA PLAS-SCNC: 108 UMOL/L (ref 18–72)
AMMONIA PLAS-SCNC: 135 UMOL/L (ref 18–72)
ANION GAP SERPL CALCULATED.3IONS-SCNC: 10 MMOL/L
ANION GAP SERPL CALCULATED.3IONS-SCNC: 10 MMOL/L
ANION GAP SERPL CALCULATED.3IONS-SCNC: 9 MMOL/L
AORTIC ROOT: 3.3 CM
APICAL FOUR CHAMBER EJECTION FRACTION: 77 %
ASCENDING AORTA: 3.4 CM
AST SERPL W P-5'-P-CCNC: 69 U/L (ref 13–39)
BASE EX.OXY STD BLDV CALC-SCNC: 81.4 % (ref 60–80)
BASE EXCESS BLDV CALC-SCNC: -1.2 MMOL/L
BASOPHILS # BLD AUTO: 0.05 THOUSANDS/ÂΜL (ref 0–0.1)
BASOPHILS NFR BLD AUTO: 0 % (ref 0–1)
BILIRUB SERPL-MCNC: 0.97 MG/DL (ref 0.2–1)
BPU ID: NORMAL
BPU ID: NORMAL
BSA FOR ECHO PROCEDURE: 2.13 M2
BUN SERPL-MCNC: 90 MG/DL (ref 5–25)
BUN SERPL-MCNC: 92 MG/DL (ref 5–25)
BUN SERPL-MCNC: 96 MG/DL (ref 5–25)
CA-I BLD-SCNC: 1.03 MMOL/L (ref 1.12–1.32)
CALCIUM ALBUM COR SERPL-MCNC: 8.8 MG/DL (ref 8.3–10.1)
CALCIUM SERPL-MCNC: 7.9 MG/DL (ref 8.4–10.2)
CALCIUM SERPL-MCNC: 8.2 MG/DL (ref 8.4–10.2)
CALCIUM SERPL-MCNC: 8.6 MG/DL (ref 8.4–10.2)
CHLORIDE SERPL-SCNC: 115 MMOL/L (ref 96–108)
CHLORIDE SERPL-SCNC: 116 MMOL/L (ref 96–108)
CHLORIDE SERPL-SCNC: 116 MMOL/L (ref 96–108)
CHOLEST SERPL-MCNC: 100 MG/DL
CO2 SERPL-SCNC: 22 MMOL/L (ref 21–32)
CO2 SERPL-SCNC: 23 MMOL/L (ref 21–32)
CO2 SERPL-SCNC: 24 MMOL/L (ref 21–32)
CREAT SERPL-MCNC: 1.82 MG/DL (ref 0.6–1.3)
CREAT SERPL-MCNC: 1.84 MG/DL (ref 0.6–1.3)
CREAT SERPL-MCNC: 1.88 MG/DL (ref 0.6–1.3)
CROSSMATCH: NORMAL
CROSSMATCH: NORMAL
DOP CALC LVOT AREA: 3.14 CM2
DOP CALC LVOT DIAMETER: 2 CM
DOP CALC MV VTI: 28.74 CM
E WAVE DECELERATION TIME: 118 MS
EOSINOPHIL # BLD AUTO: 0.05 THOUSAND/ÂΜL (ref 0–0.61)
EOSINOPHIL NFR BLD AUTO: 0 % (ref 0–6)
ERYTHROCYTE [DISTWIDTH] IN BLOOD BY AUTOMATED COUNT: 18 % (ref 11.6–15.1)
EST. AVERAGE GLUCOSE BLD GHB EST-MCNC: 143 MG/DL
FRACTIONAL SHORTENING: 34 (ref 28–44)
GFR SERPL CREATININE-BSD FRML MDRD: 33 ML/MIN/1.73SQ M
GFR SERPL CREATININE-BSD FRML MDRD: 34 ML/MIN/1.73SQ M
GFR SERPL CREATININE-BSD FRML MDRD: 35 ML/MIN/1.73SQ M
GLUCOSE SERPL-MCNC: 122 MG/DL (ref 65–140)
GLUCOSE SERPL-MCNC: 126 MG/DL (ref 65–140)
GLUCOSE SERPL-MCNC: 127 MG/DL (ref 65–140)
GLUCOSE SERPL-MCNC: 127 MG/DL (ref 65–140)
GLUCOSE SERPL-MCNC: 135 MG/DL (ref 65–140)
GLUCOSE SERPL-MCNC: 139 MG/DL (ref 65–140)
GLUCOSE SERPL-MCNC: 142 MG/DL (ref 65–140)
GLUCOSE SERPL-MCNC: 144 MG/DL (ref 65–140)
GLUCOSE SERPL-MCNC: 146 MG/DL (ref 65–140)
GLUCOSE SERPL-MCNC: 148 MG/DL (ref 65–140)
GLUCOSE SERPL-MCNC: 149 MG/DL (ref 65–140)
GLUCOSE SERPL-MCNC: 157 MG/DL (ref 65–140)
GLUCOSE SERPL-MCNC: 178 MG/DL (ref 65–140)
GLUCOSE SERPL-MCNC: 191 MG/DL (ref 65–140)
GLUCOSE SERPL-MCNC: 194 MG/DL (ref 65–140)
HBA1C MFR BLD: 6.6 %
HCO3 BLDV-SCNC: 22.6 MMOL/L (ref 24–30)
HCT VFR BLD AUTO: 22.3 % (ref 36.5–49.3)
HCT VFR BLD AUTO: 23 % (ref 36.5–49.3)
HCT VFR BLD AUTO: 23.4 % (ref 36.5–49.3)
HCT VFR BLD AUTO: 24.3 % (ref 36.5–49.3)
HDLC SERPL-MCNC: 16 MG/DL
HGB BLD-MCNC: 7 G/DL (ref 12–17)
HGB BLD-MCNC: 7 G/DL (ref 12–17)
HGB BLD-MCNC: 7.1 G/DL (ref 12–17)
HGB BLD-MCNC: 7.3 G/DL (ref 12–17)
HGB BLD-MCNC: 7.5 G/DL (ref 12–17)
HGB BLD-MCNC: 7.8 G/DL (ref 12–17)
HOROWITZ INDEX BLDA+IHG-RTO: 40 MM[HG]
IMM GRANULOCYTES # BLD AUTO: 0.14 THOUSAND/UL (ref 0–0.2)
IMM GRANULOCYTES NFR BLD AUTO: 1 % (ref 0–2)
INR PPP: 1.33 (ref 0.84–1.19)
INTERVENTRICULAR SEPTUM IN DIASTOLE (PARASTERNAL SHORT AXIS VIEW): 1.1 CM
INTERVENTRICULAR SEPTUM: 1.1 CM (ref 0.6–1.1)
LAAS-AP2: 17.6 CM2
LAAS-AP4: 15.1 CM2
LACTATE SERPL-SCNC: 1.6 MMOL/L (ref 0.5–2)
LDLC SERPL CALC-MCNC: 42 MG/DL (ref 0–100)
LEFT ATRIUM AREA SYSTOLE SINGLE PLANE A4C: 15.9 CM2
LEFT ATRIUM SIZE: 3.9 CM
LEFT ATRIUM VOLUME (MOD BIPLANE): 41 ML
LEFT ATRIUM VOLUME INDEX (MOD BIPLANE): 19.2 ML/M2
LEFT INTERNAL DIMENSION IN SYSTOLE: 2.9 CM (ref 2.1–4)
LEFT VENTRICULAR INTERNAL DIMENSION IN DIASTOLE: 4.4 CM (ref 3.5–6)
LEFT VENTRICULAR POSTERIOR WALL IN END DIASTOLE: 1.2 CM
LEFT VENTRICULAR STROKE VOLUME: 55 ML
LVSV (TEICH): 55 ML
LYMPHOCYTES # BLD AUTO: 1.2 THOUSANDS/ÂΜL (ref 0.6–4.47)
LYMPHOCYTES NFR BLD AUTO: 7 % (ref 14–44)
MAGNESIUM SERPL-MCNC: 2.2 MG/DL (ref 1.9–2.7)
MCH RBC QN AUTO: 30.6 PG (ref 26.8–34.3)
MCHC RBC AUTO-ENTMCNC: 32.1 G/DL (ref 31.4–37.4)
MCV RBC AUTO: 96 FL (ref 82–98)
MONOCYTES # BLD AUTO: 1.87 THOUSAND/ÂΜL (ref 0.17–1.22)
MONOCYTES NFR BLD AUTO: 11 % (ref 4–12)
MV E'TISSUE VEL-SEP: 16 CM/S
MV MEAN GRADIENT: 6 MMHG
MV PEAK E VEL: 138 CM/S
MV PEAK GRADIENT: 12 MMHG
MV STENOSIS PRESSURE HALF TIME: 34 MS
MV VALVE AREA P 1/2 METHOD: 6.47
NEUTROPHILS # BLD AUTO: 13.79 THOUSANDS/ÂΜL (ref 1.85–7.62)
NEUTS SEG NFR BLD AUTO: 81 % (ref 43–75)
NONHDLC SERPL-MCNC: 84 MG/DL
NRBC BLD AUTO-RTO: 0 /100 WBCS
O2 CT BLDV-SCNC: 9.9 ML/DL
PCO2 BLDV: 33.9 MM HG (ref 42–50)
PH BLDV: 7.44 [PH] (ref 7.3–7.4)
PHOSPHATE SERPL-MCNC: 3.6 MG/DL (ref 2.3–4.1)
PLATELET # BLD AUTO: 188 THOUSANDS/UL (ref 149–390)
PMV BLD AUTO: 12.3 FL (ref 8.9–12.7)
PO2 BLDV: 46.5 MM HG (ref 35–45)
POTASSIUM SERPL-SCNC: 4 MMOL/L (ref 3.5–5.3)
POTASSIUM SERPL-SCNC: 4.3 MMOL/L (ref 3.5–5.3)
POTASSIUM SERPL-SCNC: 4.4 MMOL/L (ref 3.5–5.3)
PROT SERPL-MCNC: 5.3 G/DL (ref 6.4–8.4)
PROTHROMBIN TIME: 16.9 SECONDS (ref 11.6–14.5)
RA PRESSURE ESTIMATED: 8 MMHG
RBC # BLD AUTO: 2.45 MILLION/UL (ref 3.88–5.62)
RIGHT ATRIUM AREA SYSTOLE A4C: 14.9 CM2
RIGHT VENTRICLE ID DIMENSION: 4.8 CM
SL CV LEFT ATRIUM LENGTH A2C: 5.3 CM
SL CV LV EF: 75
SL CV PED ECHO LEFT VENTRICLE DIASTOLIC VOLUME (MOD BIPLANE) 2D: 88 ML
SL CV PED ECHO LEFT VENTRICLE SYSTOLIC VOLUME (MOD BIPLANE) 2D: 33 ML
SODIUM SERPL-SCNC: 148 MMOL/L (ref 135–147)
SODIUM SERPL-SCNC: 148 MMOL/L (ref 135–147)
SODIUM SERPL-SCNC: 149 MMOL/L (ref 135–147)
TRICUSPID ANNULAR PLANE SYSTOLIC EXCURSION: 2.7 CM
TRIGL SERPL-MCNC: 211 MG/DL
TRIGL SERPL-MCNC: 211 MG/DL
UNIT DISPENSE STATUS: NORMAL
UNIT DISPENSE STATUS: NORMAL
UNIT PRODUCT CODE: NORMAL
UNIT PRODUCT CODE: NORMAL
UNIT PRODUCT VOLUME: 350 ML
UNIT PRODUCT VOLUME: 350 ML
UNIT RH: NORMAL
UNIT RH: NORMAL
VENT AC: 16
VENT- AC: AC
VT SETTING VENT: 500 ML
WBC # BLD AUTO: 17.1 THOUSAND/UL (ref 4.31–10.16)

## 2024-01-24 PROCEDURE — 83036 HEMOGLOBIN GLYCOSYLATED A1C: CPT | Performed by: PHYSICIAN ASSISTANT

## 2024-01-24 PROCEDURE — C9113 INJ PANTOPRAZOLE SODIUM, VIA: HCPCS

## 2024-01-24 PROCEDURE — 94664 DEMO&/EVAL PT USE INHALER: CPT

## 2024-01-24 PROCEDURE — C8929 TTE W OR WO FOL WCON,DOPPLER: HCPCS

## 2024-01-24 PROCEDURE — 70551 MRI BRAIN STEM W/O DYE: CPT

## 2024-01-24 PROCEDURE — 71045 X-RAY EXAM CHEST 1 VIEW: CPT

## 2024-01-24 PROCEDURE — 80053 COMPREHEN METABOLIC PANEL: CPT

## 2024-01-24 PROCEDURE — 93306 TTE W/DOPPLER COMPLETE: CPT | Performed by: INTERNAL MEDICINE

## 2024-01-24 PROCEDURE — 85018 HEMOGLOBIN: CPT

## 2024-01-24 PROCEDURE — 94760 N-INVAS EAR/PLS OXIMETRY 1: CPT

## 2024-01-24 PROCEDURE — 82140 ASSAY OF AMMONIA: CPT | Performed by: PHYSICIAN ASSISTANT

## 2024-01-24 PROCEDURE — 82948 REAGENT STRIP/BLOOD GLUCOSE: CPT

## 2024-01-24 PROCEDURE — 85610 PROTHROMBIN TIME: CPT

## 2024-01-24 PROCEDURE — 99232 SBSQ HOSP IP/OBS MODERATE 35: CPT | Performed by: INTERNAL MEDICINE

## 2024-01-24 PROCEDURE — 80048 BASIC METABOLIC PNL TOTAL CA: CPT

## 2024-01-24 PROCEDURE — 85014 HEMATOCRIT: CPT

## 2024-01-24 PROCEDURE — 94640 AIRWAY INHALATION TREATMENT: CPT

## 2024-01-24 PROCEDURE — 84100 ASSAY OF PHOSPHORUS: CPT

## 2024-01-24 PROCEDURE — 82330 ASSAY OF CALCIUM: CPT

## 2024-01-24 PROCEDURE — 99291 CRITICAL CARE FIRST HOUR: CPT | Performed by: INTERNAL MEDICINE

## 2024-01-24 PROCEDURE — 94150 VITAL CAPACITY TEST: CPT

## 2024-01-24 PROCEDURE — 94003 VENT MGMT INPAT SUBQ DAY: CPT

## 2024-01-24 PROCEDURE — 83735 ASSAY OF MAGNESIUM: CPT

## 2024-01-24 PROCEDURE — 85025 COMPLETE CBC W/AUTO DIFF WBC: CPT

## 2024-01-24 PROCEDURE — 99233 SBSQ HOSP IP/OBS HIGH 50: CPT | Performed by: STUDENT IN AN ORGANIZED HEALTH CARE EDUCATION/TRAINING PROGRAM

## 2024-01-24 PROCEDURE — 80061 LIPID PANEL: CPT | Performed by: PHYSICIAN ASSISTANT

## 2024-01-24 PROCEDURE — 84478 ASSAY OF TRIGLYCERIDES: CPT | Performed by: PHYSICIAN ASSISTANT

## 2024-01-24 RX ORDER — LACTULOSE 10 G/15ML
20 SOLUTION ORAL 3 TIMES DAILY
Status: DISCONTINUED | OUTPATIENT
Start: 2024-01-24 | End: 2024-01-26

## 2024-01-24 RX ORDER — PANTOPRAZOLE SODIUM 40 MG/10ML
40 INJECTION, POWDER, LYOPHILIZED, FOR SOLUTION INTRAVENOUS EVERY 12 HOURS SCHEDULED
Status: DISCONTINUED | OUTPATIENT
Start: 2024-01-24 | End: 2024-02-06 | Stop reason: HOSPADM

## 2024-01-24 RX ORDER — LACTULOSE 10 G/15ML
20 SOLUTION ORAL ONCE
Status: COMPLETED | OUTPATIENT
Start: 2024-01-24 | End: 2024-01-24

## 2024-01-24 RX ORDER — CALCIUM GLUCONATE 20 MG/ML
2 INJECTION, SOLUTION INTRAVENOUS ONCE
Status: COMPLETED | OUTPATIENT
Start: 2024-01-24 | End: 2024-01-24

## 2024-01-24 RX ORDER — DEXTROSE MONOHYDRATE 50 MG/ML
50 INJECTION, SOLUTION INTRAVENOUS CONTINUOUS
Status: DISCONTINUED | OUTPATIENT
Start: 2024-01-24 | End: 2024-01-24

## 2024-01-24 RX ADMIN — PERFLUTREN 0.4 ML/MIN: 6.52 INJECTION, SUSPENSION INTRAVENOUS at 10:46

## 2024-01-24 RX ADMIN — SODIUM CHLORIDE 8 MG/HR: 9 INJECTION, SOLUTION INTRAVENOUS at 00:00

## 2024-01-24 RX ADMIN — AZELASTINE 1 SPRAY: 1 SPRAY, METERED NASAL at 17:31

## 2024-01-24 RX ADMIN — LACTULOSE 20 G: 20 SOLUTION ORAL at 21:00

## 2024-01-24 RX ADMIN — SODIUM CHLORIDE 3 UNITS/HR: 9 INJECTION, SOLUTION INTRAVENOUS at 03:17

## 2024-01-24 RX ADMIN — PROPOFOL 25 MCG/KG/MIN: 10 INJECTION, EMULSION INTRAVENOUS at 07:11

## 2024-01-24 RX ADMIN — SODIUM CHLORIDE 8 MG/HR: 9 INJECTION, SOLUTION INTRAVENOUS at 10:01

## 2024-01-24 RX ADMIN — LACTULOSE 20 G: 20 SOLUTION ORAL at 17:31

## 2024-01-24 RX ADMIN — PANTOPRAZOLE SODIUM 40 MG: 40 INJECTION, POWDER, FOR SOLUTION INTRAVENOUS at 20:55

## 2024-01-24 RX ADMIN — CHLORHEXIDINE GLUCONATE 15 ML: 1.2 SOLUTION ORAL at 20:55

## 2024-01-24 RX ADMIN — DEXTROSE 50 ML/HR: 5 SOLUTION INTRAVENOUS at 05:54

## 2024-01-24 RX ADMIN — FENTANYL CITRATE 50 MCG: 50 INJECTION INTRAMUSCULAR; INTRAVENOUS at 02:10

## 2024-01-24 RX ADMIN — FENTANYL CITRATE 50 MCG: 50 INJECTION INTRAMUSCULAR; INTRAVENOUS at 12:20

## 2024-01-24 RX ADMIN — LEVALBUTEROL HYDROCHLORIDE 0.63 MG: 0.63 SOLUTION RESPIRATORY (INHALATION) at 19:30

## 2024-01-24 RX ADMIN — CALCIUM GLUCONATE 2 G: 20 INJECTION, SOLUTION INTRAVENOUS at 05:33

## 2024-01-24 RX ADMIN — RIFAXIMIN 550 MG: 550 TABLET ORAL at 11:05

## 2024-01-24 RX ADMIN — PANTOPRAZOLE SODIUM 40 MG: 40 INJECTION, POWDER, FOR SOLUTION INTRAVENOUS at 11:05

## 2024-01-24 RX ADMIN — SODIUM CHLORIDE 6 UNITS/HR: 9 INJECTION, SOLUTION INTRAVENOUS at 22:15

## 2024-01-24 RX ADMIN — SODIUM CHLORIDE, SODIUM GLUCONATE, SODIUM ACETATE, POTASSIUM CHLORIDE, MAGNESIUM CHLORIDE, SODIUM PHOSPHATE, DIBASIC, AND POTASSIUM PHOSPHATE 75 ML/HR: .53; .5; .37; .037; .03; .012; .00082 INJECTION, SOLUTION INTRAVENOUS at 05:40

## 2024-01-24 RX ADMIN — IPRATROPIUM BROMIDE 0.5 MG: 0.5 SOLUTION RESPIRATORY (INHALATION) at 07:18

## 2024-01-24 RX ADMIN — IPRATROPIUM BROMIDE 0.5 MG: 0.5 SOLUTION RESPIRATORY (INHALATION) at 19:30

## 2024-01-24 RX ADMIN — AZELASTINE 1 SPRAY: 1 SPRAY, METERED NASAL at 08:49

## 2024-01-24 RX ADMIN — LEVALBUTEROL HYDROCHLORIDE 0.63 MG: 0.63 SOLUTION RESPIRATORY (INHALATION) at 07:18

## 2024-01-24 RX ADMIN — LEVALBUTEROL HYDROCHLORIDE 0.63 MG: 0.63 SOLUTION RESPIRATORY (INHALATION) at 13:39

## 2024-01-24 RX ADMIN — LEVALBUTEROL HYDROCHLORIDE 0.63 MG: 0.63 SOLUTION RESPIRATORY (INHALATION) at 01:31

## 2024-01-24 RX ADMIN — RIFAXIMIN 550 MG: 550 TABLET ORAL at 20:55

## 2024-01-24 RX ADMIN — LACTULOSE 20 G: 20 SOLUTION ORAL at 08:48

## 2024-01-24 RX ADMIN — IPRATROPIUM BROMIDE 0.5 MG: 0.5 SOLUTION RESPIRATORY (INHALATION) at 01:31

## 2024-01-24 RX ADMIN — PROPOFOL 25 MCG/KG/MIN: 10 INJECTION, EMULSION INTRAVENOUS at 02:39

## 2024-01-24 RX ADMIN — FENTANYL CITRATE 50 MCG: 50 INJECTION INTRAMUSCULAR; INTRAVENOUS at 05:22

## 2024-01-24 RX ADMIN — CHLORHEXIDINE GLUCONATE 15 ML: 1.2 SOLUTION ORAL at 08:48

## 2024-01-24 RX ADMIN — IPRATROPIUM BROMIDE 0.5 MG: 0.5 SOLUTION RESPIRATORY (INHALATION) at 13:39

## 2024-01-24 NOTE — ASSESSMENT & PLAN NOTE
Hemoglobin 8.3-7.3  point  Receive 2 UPRBC hgb is stable - S/P EGD   Hgb every 6 hours   History of iron deficiency check iron panel  Start iron p.o. when able

## 2024-01-24 NOTE — ASSESSMENT & PLAN NOTE
Wt Readings from Last 3 Encounters:   01/23/24 94.1 kg (207 lb 7.3 oz)   12/20/23 96.6 kg (213 lb)   12/14/23 97 kg (213 lb 12.8 oz)   Patient is 78-year-old male with history of CAD post PCI in August not deemed a candidate for surgical intervention, systolic heart failure with reduced ejection fraction of 36% is on Toprol and Jardiance  Started when able  Continue Plavix when able

## 2024-01-24 NOTE — PROGRESS NOTES
[facility]  Progress Note  Name: Derek Walter I  MRN: [id number]  Unit/Bed#: -01 I Date of Admission: 1/23/2024   Date of Service: 1/24/2024 I Hospital Day: 1    Assessment/Plan   * Acute upper GI bleeding  Assessment & Plan  Patient is a 78-year-old male who has been seeing [facility] last seen 12/13/2023 patient has a history of anemia is multifactorial chronic due to cancer with iron deficiency and occult GI blood loss on Eliquis review of an EGD with multiple AVMs.  Patient came in to the ER from home due to generalized weakness nausea vomiting since this morning reports lower abdominal pain has a history of anemia is on Eliquis and Plavix episodes of vomiting which were described by the significant other is dark of clotted material, and also had melanotic stool occult positive.  Patient felt significant weakness and was unable to walk felt like too weak to walk.  Patient has a history of an upper GI bleed with multiple AVMs, also has a history of the patient intubation with tracheostomy approximately rehab time of 1 year.   Patient's baseline globin appears to be 8-10, except 1 value on 11/23 of 13.   Patient's hemoglobin was 8.3 received 1 L of fluid and his hemoglobin was 7.3  Ordered 2 units of RBCs with hemoglobins in between it   Significant nausea and abdominal, received Zofran 8 mg IV with no relief started Tigan due to prolonged QT  GI was called by the ER and is aware of the patient   Protonix 80 mg IV given by the ER  Start Protonix drip  Patient is on Eliquis and Plavix-100% sure but she thinks he did not take the Plavix or Eliquis on 1/22  GI wanted Eliquis reversed -ordered DDAVP 0.3 mg/kg and Kcentra  CT scan with contrast showed nonspecific hypodensity within the gastric body possibly reflective of hemorrhage in this patient with the history of gastric AVMs, known liver cancer, cholelithiasis, small volume abdominal pelvic ascites  S/P EGD - 3 large  angioectasias in the fundus of the stomach and body of the stomach; bleeding was observed; placed clips; hemostasis achieved; induced coagulation and hemostasis achieved with argon plasma coagulation The esophagus and duodenum appeared normal.  Continue hgb q 6 hour checks   Pt remains NPO - when able to take PO change retention enemas to PO lactulose     Acute blood loss anemia  Assessment & Plan  Hemoglobin 8.3-7.3  point  Receive 2 UPRBC hgb is stable - S/P EGD   Hgb every 6 hours   History of iron deficiency check iron panel  Start iron p.o. when able    Stroke-like symptom  Assessment & Plan  0630A RN called provider to bedside to evaluate patient for left facial droop. NIHHS 4 (left facial droop, LUE weakness +4/5, LLE weakness +4/5, LLE drift and mild dysarthria). RN states left facial droop was noticed on arrival to ICU at 0300A. RN states ED could not confirm if droop was present in ED and wife unsure if droop was new. Stroke alert called.   CTH - no acute stroke or hemorrhage  CTA H/N - no large vessel occulusion. No arterial occulusion. 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    Plan:  Patient discussed with Dr. Interiano (Neuro)   TNK not given due to unknown last known well and active GI bleed.  Goal SBP >140  Serial Neuro checks  Hold aspirin and plavix in setting of GI bleed  Hold statin while NPO - counter indicated in heaptic failure   ECHO  Was likely related due to attic encephalopathy with ammonia level of 130    Hepatic encephalopathy (HCC)  Assessment & Plan  patient is 78-year-old male with a history of liver cancer and radiation therapy who was admitted for an upper GI bleed with hypotension and later strokelike symptoms went unresponsive and was intubated, appears to be as cause from the liver with an ammonia level of 130  Due to recent EGD start with retention enemas 200 g by rectum q 6 hours  Input from GI is greatly appreciated  Recheck ammonia in the a.m.  Patient should have 2-3 BMs a  day  Most likely source of elevated ammonia level is liver history of liver cancer    Chronic systolic heart failure (HCC)  Assessment & Plan  Wt Readings from Last 3 Encounters:   01/23/24 94.1 kg (207 lb 7.3 oz)   12/20/23 96.6 kg (213 lb)   12/14/23 97 kg (213 lb 12.8 oz)   Patient is 78-year-old male with history of CAD post PCI in August not deemed a candidate for surgical intervention, systolic heart failure with reduced ejection fraction of 36% is on Toprol and Jardiance  Started when able  Continue Plavix when able              Hepatocellular carcinoma (HCC)  Assessment & Plan  Patient has stage II liver cancer hepatocellular carcinoma  Has a tumor thrombus which appears stable seen again on CT scan today  Status post Y 90 treatment   Follows with Dr. Gong    CAD (coronary artery disease)  Assessment & Plan  Patient has a history of coronary artery disease had 8/2023 a PCI status post cardiac catheterization drug-eluting stents x 3 in the LAD.  Ramus and left circumflex  Continue  Plavix and metoprolol when able  Lipitor with hepatic encephalopathy    History of DVT (deep vein thrombosis)  Assessment & Plan  Patient is on Eliquis is on hold in the setting of GI bleed  And was reversed with Kcentra    Atrial fibrillation, unspecified type (HCC)  Assessment & Plan  Stable rate at 80-93  Maintained on Eliquis and Plavix and metoprolol  Hold all 3  Restart Toprol when able  Hold Eliquis and Plavix until okay with GI    Type 2 diabetes mellitus with neurologic complication, with long-term current use of insulin (HCC)  Assessment & Plan  Lab Results   Component Value Date    HGBA1C 8.0 (H) 11/01/2023       Recent Labs     01/23/24  1751 01/23/24 2005 01/23/24  2210 01/24/24  0000   POCGLU 219* 201* 110 126         Blood Sugar Average: Last 72 hrs:  (P) 245.7721118482334086    Patient has a Dexcom, on eulogy and Jardiance, Lantus and insulin  Started patient on an insulin drip to get better control of blood  glucose  Restart home regimen when able    Stage 3 chronic kidney disease, unspecified whether stage 3a or 3b CKD (HCC)  Assessment & Plan  Lab Results   Component Value Date    EGFR 36 01/23/2024    EGFR 38 01/23/2024    EGFR 36 01/23/2024    CREATININE 1.74 (H) 01/23/2024    CREATININE 1.68 (H) 01/23/2024    CREATININE 1.74 (H) 01/23/2024     Patent 1.63 is approximately baseline  Monitor I's and O's  Hold for toxic agents  Hold Lasix at this time  Patient will need to be restarted on Lasix as soon as possible             Disposition: Critical care    ICU Core Measures     Vented Patient  VAP Bundle  VAP bundle ordered     A: Assess, Prevent, and Manage Pain Has pain been assessed? Yes  Need for changes to pain regimen? No   B: Both Spontaneous Awakening Trials (SATs) and Spontaneous Breathing Trials (SBTs) Plan to perform spontaneous awakening trial today? Yes   Plan to perform spontaneous breathing trial today? Yes   Obvious barriers to extubation? No   C: Choice of Sedation RASS Goal: 0 Alert and Calm  Need for changes to sedation or analgesia regimen? NA   D: Delirium CAM-ICU: Unable to perform secondary to Acute cognitive dysfunction   E: Early Mobility  Plan for early mobility? Yes   F: Family Engagement Plan for family engagement today? Yes       Review of Invasive Devices:      Central access plan: Will obtain peripheral access and discontinue prior to transfer      Prophylaxis:  VTE VTE covered by:    None       Stress Ulcer  covered bypantoprazole (PROTONIX) 40 mg tablet [[id number]] (Long-Term Med), pantoprazole (PROTONIX) 80 mg in sodium chloride 0.9 % 100 mL infusion [[id number]]         Significant 24hr Events     24hr events:     Patient with new diagnosis of hepatic encephalopathy with an ammonia level of 130 started on lactulose enema till GI clears p.o. to recent clip placement during EGD - enemas are unable to perform due to rectal tube insertion pt has stool also had 3 bm today there is red blood  coming from the rectum.   Received 2 units of RBCs yesterday  Liver enzymes still normal bilirubin mildly elevated and alk phosphatase mildly elevated  Patient has a history of heart cellular carcinoma stage II that is postradiation treatment       Subjective   Review of Systems   Unable to perform ROS: Intubated      Objective              Patient is intubated and sedated                  Vitals I/O      Most Recent Min/Max in 24hrs   Temp 99.4 °F (37.4 °C) Temp  Min: 97.4 °F (36.3 °C)  Max: 99.4 °F (37.4 °C)   Pulse (!) 114 Pulse  Min: 86  Max: 120   Resp (!) 23 Resp  Min: 17  Max: 44   /61 BP  Min: 84/53  Max: 153/63   O2 Sat 98 % SpO2  Min: 76 %  Max: 100 %      Intake/Output Summary (Last 24 hours) at 1/24/2024 0455  Last data filed at 1/24/2024 0400  Gross per 24 hour   Intake 3607.26 ml   Output 1207 ml   Net 2400.26 ml       Diet NPO    Invasive Monitoring           Physical Exam   Physical Exam  Vitals and nursing note reviewed.   Eyes:      Extraocular Movements: Extraocular movements intact.      Pupils: Pupils are equal, round, and reactive to light.   Skin:     General: Skin is warm, dry and not mottled extremities.      Capillary Refill: Capillary refill takes less than 2 seconds.      Coloration: Skin is pale.   HENT:      Head: Normocephalic and atraumatic.   Cardiovascular:      Rate and Rhythm: Regular rhythm. Tachycardia present.      Pulses: Normal pulses.      Heart sounds: Normal heart sounds.   Musculoskeletal:         General: Normal range of motion.      Cervical back: Full passive range of motion without pain, normal range of motion and neck supple.      Right lower leg: Trace Edema present.      Left lower leg: Trace Edema present.   Abdominal: General: Bowel sounds are normal. There is distension.     Palpations: Abdomen is soft.   Constitutional:       Appearance: He is well-developed, overweight and well-nourished. He is ill-appearing and toxic-appearing.      Interventions: He is  sedated and intubated.   Pulmonary:      Effort: Pulmonary effort is normal. He is intubated.      Breath sounds: Normal breath sounds. No wheezing or rhonchi.   Neurological:      Mental Status: He is unresponsive.      GCS: GCS eye subscore is 1. GCS verbal subscore is 1. GCS motor subscore is 1.   Genitourinary/Anorectal:  external catheter present.          Diagnostic Studies      EKG: a fib  Imaging:  I have personally reviewed pertinent reports.       Medications:  Scheduled PRN   azelastine, 1 spray, BID  chlorhexidine, 15 mL, Q12H RANDALL  ipratropium, 0.5 mg, Q6H  lactulose, 200 g, Q6H  levalbuterol, 0.63 mg, Q6H      acetaminophen, 650 mg, Q6H PRN  fentanyl citrate (PF), 50 mcg, Q1H PRN  trimethobenzamide, 200 mg, Q12H PRN       Continuous    insulin regular (HumuLIN R,NovoLIN R) 1 Units/mL in sodium chloride 0.9 % 100 mL infusion, 0.3-21 Units/hr, Last Rate: 6 Units/hr (01/24/24 0423)  multi-electrolyte, 75 mL/hr, Last Rate: 75 mL/hr (01/23/24 1753)  pantoprazole (PROTONIX) 80 mg in sodium chloride 0.9 % 100 mL infusion, 8 mg/hr, Last Rate: 8 mg/hr (01/24/24 0000)  propofol, 5-50 mcg/kg/min, Last Rate: 25 mcg/kg/min (01/24/24 0239)         Labs:    CBC    Recent Labs     01/23/24  0021 01/23/24  0246 01/23/24  0614 01/23/24  1005 01/23/24  1952 01/23/24  2341   WBC 12.30*  --  9.07  --   --   --    HGB 8.4*   < > 7.8*   < > 7.8* 7.8*   HCT 27.9*   < > 25.4*   < > 24.7* 24.3*     --  176  --   --   --     < > = values in this interval not displayed.     BMP    Recent Labs     01/23/24  1608 01/23/24 1952   SODIUM 146 147   K 5.1 4.9   * 114*   CO2 21 23   AGAP 13 10   * 102*   CREATININE 1.68* 1.74*   CALCIUM 8.3* 8.2*       Coags    Recent Labs     01/23/24  0021 01/23/24  0614   INR 1.62* 1.31*   PTT 35 34        Additional Electrolytes  Recent Labs     01/23/24  0614 01/23/24  1331   MG 2.1  --    PHOS 2.2*  --    CAIONIZED  --  1.15          Blood Gas    No recent results  Recent Labs      01/23/24  2354   PHVEN 7.441*   OHA6IGS 33.9*   PO2VEN 46.5*   SNK9YHC 22.6*   BEVEN -1.2   U3MLKCG 81.4*    LFTs  Recent Labs     01/23/24  0614 01/23/24  1608   ALT 59* 50   AST 83* 66*   ALKPHOS 212* 167*   ALB 2.8* 3.3*   TBILI 0.89 1.04*       Infectious  No recent results  Glucose  Recent Labs     01/23/24  0614 01/23/24  1005 01/23/24  1608 01/23/24  1952   GLUC 284* 270* 264* 184*               Non-Critical Care Time Statement: I have spent a total time of 30 minutes in caring for this patient including Diagnostic results, Instructions for management, Impressions, Documenting in the medical record, Reviewing / ordering tests, medicine, procedures  , and Obtaining or reviewing history  .     Tino Burch PA-C

## 2024-01-24 NOTE — PROGRESS NOTES
Progress Note - Neurology   Derek Walter 78 y.o. male [id number]  Unit/Bed#: /-01    Assessment/Plan:  Stroke-like symptom  Assessment & Plan  78 year old male with history of GI bleeds with multiple AVMs, anemia, CHF, hepatocellular carcinoma stage II s/p Y90 treatment, CAD on Plavix, CKD, DM, history of DVT, HLD, HTN, RBBB, anemia and A-fib on Eliquis who originally presented to the ED on 1/23 for increasing weakness, abdominal pain and feeling generally unwell.  Patient also was found to have melanotic stools and had an episode of bloody emesis x 1.    BP on arrival 117/53. Initial workup was concerning for acute upper GIB. GI was consulted and patient received 2 units of RBCs, Kcentra and DDAVP out of concern for acute upper GI bleeding.  He was then admitted to stepdown for further monitoring.      Stroke alert initiated the morning of 1/23 for for left-sided facial droop. Exam was notable for left-sided weakness, LLE drift and mild dysarthria.  NIH 2-3.  Unknown last known well.  Neuroimaging unremarkable for acute intracranial abnormality.  IV thrombolytics were deferred due to unknown last known well and active GI bleeding requiring recent transfusions.  It was recommended to trial IV pressors for BP management out of concern for hypoperfusion (systolic BP noted to drop as low as 84); however unable to maintain SBP >140 despite IVF, albumin, 1UPRBC and phenylephrine gtt. As initial stroke like symptoms had improved, and as there was concern for cardiogenic shock in the setting ischemic cardiomyopathy (prior EF 35%) and phenylephrine gtt, systolic goal lowered with goal MAP >65.     Subsequently, patient's mental status was noted to rapidly decline after the stroke alert with patient becoming increasingly confused, agitated and increasingly lethargic. Ultimately, patient required intubation for worsening encephalopathy and airway protection.    1/24 neuro exam (no sedation), patient stuporous with  no command following and no spontaneous movement. Corneals, VOR and cough intact. L facial asymmetry, chronic as per wife. Symmetrically responds to noxious stimuli in extremities x 4 (BLEs > BUEs).     Workup:  - Labs remarkable for leukocytosis (17K up from 9K), acute on chronic anemia, hyperkalemia (5.5).    - CT A/P with contrast:  Nonspecific strandy hyperdensities within the gastric body possibly reflective of hemorrhage in this patient with history? Gastric AVMs per chart review in [facility]. GI consultation advised.  Similar appearance of known segment 6 and 7 hepatic masses compatible with multifocal HCC compared to the previous MRI accounting for differences in modality. Somewhat worsened heterogeneous/mottled appearance of the hepatic parenchyma in the anterior right and left hepatic lobes which may reflect disease progression and/or treatment related changes. 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 Continued follow-up per oncology recommended.  Cholelithiasis.  Similar small volume abdominopelvic ascites.  - CTH  negative for acute intracranial abnormality.    - CTA head/neck revealed 50 to 66% stenosis of proximal left ICA secondary to atheromatous and atherosclerotic disease.      Etiology unclear at this time with concern for acute ischemia/TIA vs cerebral hypoperfusion in the setting of GI Bleed. Plan detailed below.    Plan:  - Stroke pathway  - s/p DDAVP and Kcentra for GIB.   - Hold AC/AP in the setting of GI bleed  - Continue with Atorvastatin 40 mg daily (home medication)  - Permissive HTN, okay with goal of MAPs >65  - MRI brain wo  - Echo pending  - Lipid panel and hemoglobin A1c pending  - Euglycemic, normothermic goal  - Continue telemetry  - PT/OT/ST  - Secondary risk factor modification.   - Stroke education  - Frequent neuro checks. Continue to monitor and notify neurology with any changes.  - STAT CT head for any acute change in neuro exam  - Medical  management and supportive care, including correction of any metabolic or infectious disturbances, as per primary team and GI .     Plan discussed with Attending Neurologist, please see attestation for further input/recommendations.         Recommendations for outpatient neurological follow up have yet to be determined.    Subjective:   Patient intubated. Per RN patient spontaneously moves all extremities symmetrically, positive cough but diminished gag.  WBC 17K, up from 9K  Hgb 7.5 today  Ammonia 108, down from 135  BUN/Cr 96/1.82    Past Medical History:   Diagnosis Date    Anemia     Atrial fibrillation (HCC)     Eliquis    AVB (atrioventricular block)     1st degree    CAD (coronary artery disease)     CKD (chronic kidney disease), stage III (HCC)     baseline Cr 1.2-1.6    Colon polyp     Diabetes mellitus (HCC)     type 2, insulin dependent    Diverticulosis     Emphysema lung (HCC)     Former tobacco use     History of asbestos exposure     History of DVT (deep vein thrombosis)     RLE, tx w/ AC    History of GI bleed 08/2023    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, given PRBC/Venofer for anemia while in house    Hyperlipidemia     Hypertension     LAFB (left anterior fascicular block)     Liver cancer (HCC)     Liver mass 08/2023    suspected HCC, needs radioembolization    RBBB     Syncope 08/07/2023     Past Surgical History:   Procedure Laterality Date    APPENDECTOMY      BOWEL RESECTION  2014    CARDIAC CATHETERIZATION      CARDIAC CATHETERIZATION N/A 08/25/2023    Procedure: Cardiac pci;  Surgeon: Dom Garrett DO;  Location: [facility];  Service: Cardiology    CARDIAC CATHETERIZATION N/A 08/21/2023    Procedure: Cardiac catheterization;  Surgeon: Dom Garrett DO;  Location: [facility];  Service: Cardiology    CARDIAC CATHETERIZATION N/A 08/21/2023    Procedure: Cardiac Coronary Angiogram;  Surgeon: Dom Garrett DO;  Location: [facility];   Service: Cardiology    CATARACT EXTRACTION Bilateral     COLONOSCOPY      EGD      IR Y-90 PRE-ANGIO/EMBO W/ LUNG SCAN  2023    IR Y-90 RADIOEMBOLIZATION  2023     Family History   Problem Relation Age of Onset    Hypertension Mother     Bone cancer Father     Diabetes type II Sister     Diabetes type II Sister     Esophageal cancer Brother     Colon cancer Neg Hx      Social History     Socioeconomic History    Marital status: /Civil Union     Spouse name: None    Number of children: None    Years of education: None    Highest education level: None   Occupational History    None   Tobacco Use    Smoking status: Former     Current packs/day: 0.00     Average packs/day: 1 pack/day for 30.0 years (30.0 ttl pk-yrs)     Types: Cigarettes     Start date:      Quit date:      Years since quittin.0    Smokeless tobacco: Never   Vaping Use    Vaping status: Never Used   Substance and Sexual Activity    Alcohol use: Yes     Comment: Socially    Drug use: Never    Sexual activity: None   Other Topics Concern    None   Social History Narrative    None     Social Determinants of Health     Financial Resource Strain: Low Risk  (10/13/2023)    Overall Financial Resource Strain (CARDIA)     Difficulty of Paying Living Expenses: Not hard at all   Food Insecurity: No Food Insecurity (2023)    Hunger Vital Sign     Worried About Running Out of Food in the Last Year: Never true     Ran Out of Food in the Last Year: Never true   Transportation Needs: No Transportation Needs (10/13/2023)    PRAPARE - Transportation     Lack of Transportation (Medical): No     Lack of Transportation (Non-Medical): No   Physical Activity: Not on file   Stress: Not on file   Social Connections: Not on file   Intimate Partner Violence: Not on file   Housing Stability: Low Risk  (2023)    Housing Stability Vital Sign     Unable to Pay for Housing in the Last Year: No     Number of Places Lived in the Last Year: 1      Unstable Housing in the Last Year: No       Medications: Reviewed in detail by me.    ROS: Review of Systems   Unable to perform ROS: Intubated        Vitals: /63 (BP Location: Left arm)   Pulse (!) 107   Temp 99.7 °F (37.6 °C) (Axillary)   Resp 20   Ht 6' (1.829 m)   Wt 91.5 kg (201 lb 11.5 oz)   SpO2 99%   BMI 27.36 kg/m²     Physical Exam:   Physical Exam  Constitutional:       Comments: Supine in bed, soft wrist restraints in place.   Cardiovascular:      Rate and Rhythm: Regular rhythm. Tachycardia present.   Pulmonary:      Effort: No respiratory distress.      Comments: Intubated   Skin:     General: Skin is warm and dry.       Neurologic Exam     Mental Status   Stuporous. No eye opening. No command following. No attempts at communication. Trace generalized response to noxious trapezius stimulation.     Cranial Nerves     CN III, IV, VI   Right pupil: Size: 3 mm. Shape: regular. Reactivity: brisk.   Left pupil: Size: 3 mm. Shape: regular. Reactivity: brisk.   Conjugate gaze: present  Vestibulo-ocular reflex: present    CN V   Right corneal reflex: normal  Left corneal reflex: normal  L facial weakness with L ptosis (chronic left lower facial weakness per wife)  Unable to illicit gag, but cough reflex intact.     Motor Exam   Muscle bulk: normal  No active unprovoked/spontaneous movement observed during neuro exam, aside from subtle head rotation.      Sensory Exam   BUEs: Trace generalized symmetric response to noxious nailbed pressure  BLEs: Localizes to noxious stim with bilateral toe nail bed pressure symmetrically     Gait, Coordination, and Reflexes     Reflexes   Right ankle clonus: absent  Left ankle clonus: absent      Labs: Reviewed in detail by me.     Imaging: I have personally reviewed pertinent imaging and PACS reports.     VTE Prophylaxis: Sequential compression device (Venodyne)     Total time spent today 20 minutes. Greater than 50% of total time was spent with the patient and /  or family counseling and / or coordination of care. A description of the counseling / coordination of care: reviewing chart, evaluating patient, speaking with critical care providers and nursing staff about updates in patient's care, neuro exam and overall plan of care.

## 2024-01-24 NOTE — ASSESSMENT & PLAN NOTE
Lab Results   Component Value Date    HGBA1C 8.0 (H) 11/01/2023       Recent Labs     01/23/24  1751 01/23/24 2005 01/23/24  2210 01/24/24  0000   POCGLU 219* 201* 110 126         Blood Sugar Average: Last 72 hrs:  (P) [id number]    Patient has a Dexcom, on eulogy and Jardiance, Lantus and insulin  Started patient on an insulin drip to get better control of blood glucose  Restart home regimen when able

## 2024-01-24 NOTE — ASSESSMENT & PLAN NOTE
0630A RN called provider to bedside to evaluate patient for left facial droop. NIHHS 4 (left facial droop, LUE weakness +4/5, LLE weakness +4/5, LLE drift and mild dysarthria). RN states left facial droop was noticed on arrival to ICU at 0300A. RN states ED could not confirm if droop was present in ED and wife unsure if droop was new. Stroke alert called.   CTH - no acute stroke or hemorrhage  CTA H/N - no large vessel occulusion. No arterial occulusion. 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    Plan:  Patient discussed with Dr. Interiano (Neuro)   TNK not given due to unknown last known well and active GI bleed.  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

## 2024-01-24 NOTE — TELEPHONE ENCOUNTER
Wife called in to let us know he was in the ICU with a GI bleed.  This has been clipped.  I did review his CT.  His LFTs are also elevated.  It is unclear if there is progression of disease or treatment related effect based on the CT.  I did tell her to let us know when he is out of the hospital.  At that time we will repeat his LFTs.  If these do not trend back down, we will obtain the MRI sooner than the scheduled MRI in March.  All of her questions were answered.

## 2024-01-24 NOTE — UTILIZATION REVIEW
Initial Clinical Review    Admission: Date/Time/Statement:   Admission Orders (From admission, onward)       Ordered        01/23/24 0231  INPATIENT ADMISSION  Once                          Orders Placed This Encounter   Procedures    INPATIENT ADMISSION     Standing Status:   Standing     Number of Occurrences:   1     Order Specific Question:   Level of Care     Answer:   Level 2 Stepdown / HOT [14]     Order Specific Question:   Estimated length of stay     Answer:   More than 2 Midnights     Order Specific Question:   Certification     Answer:   I certify that inpatient services are medically necessary for this patient for a duration of greater than two midnights. See H&P and MD Progress Notes for additional information about the patient's course of treatment.     ED Arrival Information       Expected   1/23/2024     Arrival   1/23/2024 00:14    Acuity   Urgent              Means of arrival   Ambulance    Escorted by   [facility]   Critical Care/ICU    Admission type   Emergency              Arrival complaint   General Weakness             Chief Complaint   Patient presents with    Abdominal Pain     Pt via EMS from home with c/o generalized weakness, nausea, and vomiting since the morning. Reports lower abd pain. Reports hx of anemia.       Initial Presentation: 78 y.o. male to ED via EMS from home   Present to ED with generalized weakness nausea vomiting since this morning reports lower abdominal pain has a history of anemia is on Eliquis and Plavix episodes of vomiting which were described by the significant other is dark of clotted material, and also had melanotic stool occult positive.  Patient felt significant weakness and was unable to walk felt like too weak to walk.    Patient with melanotic stools and vomiting blood x1.   PMHX: Chronic blood loss anemia, afibrillation on Eliquis, CAD, CKD stage III, diabetes, history of DVT, hyperlipidemia and hypertension, bowel resection, the  "catheterizations, ischemic cardiomyopathy EF of 35%    Admitted to ICU with DX: Acute upper GI bleeding   on exam: hypotension; tachycardia; tachypnea; Heme 8.3  CT scan shows possible active bleeding within the gastric fundus.    QUICK NOTE: stroke alert called - mental status rapidly decline. Patient has become increasingly confused, agitated and increasingly lethargic. Ultimately, patient intubated for worsening encephalopathy with GCS 5.    PLAN: rec'd 2 units PRBC's; rec'd DDAVP PB x1; rec'd prothrombin x1; start insulin gtt; start protonix gtt; cont ivf; cont DuoNebs; rec'd reglan iv x1; rec'd ivf bolus 500 x1; rec'd albumin iv x2; rec'd Ca Gluc Iv x1; monitor labs; Accuchecks with ssic; Cardiopulmonary monitoring; neuro checks; start levophed gtt; INTUBATED; GI consulted      GI CONSULT   Presently patient is confused and states he is uncomfortable but all over\" due to concerns of his of CVA, pressors being utilized to maintain higher blood pressure.  Further melena reported since earlier this a.m., grounds and blood in NG aspirate.  Abdomen is protuberant but nontender.  Last hemoglobin 7.2.  CT scan suspicious for streaks in gastric lumen consistent with hemorrhage.  No other focal areas of bleeding reported.  Source of GI bleeding would most likely be vascular ectasia/AVMs given patient's history of these in the recent past.  With acute drop in hemoglobin or aggressive upper GI bleeding sources such as peptic ulcer or Dieulafoy abnormality should also be considered.  In absence of portal hypertension on CT scan or from previous endoscopy variceal bleed unlikely.  Plan: monitor H/H; NPO; cont PPI infusion; Needs URGENT EGD      Date: 1/24/24      Day 2  s/p endoscopy with placement of 3 clips in three areas of angioectasis. Acute blood loss anemia- upper GI bleed. S/p transfusion of 2 units.  Toxic metabolic encephalopathy. Patient with new diagnosis of hepatic encephalopathy with an ammonia level of 130 " started on lactulose enema till GI clears p.o. to recent clip placement during EGD - enemas are unable to perform due to rectal tube insertion pt has stool also had 3 bm today there is red blood coming from the rectum. Liver enzymes still normal bilirubin mildly elevated and alk phosphatase mildly elevated   Plan: continue lactulose, add rifaximin, monitor off sedation, plan for MRI ; cont insulin gtt; cont protonix gtt; cont ivf; cont DuoNebs; rec'd Ca Gluc Iv x1; monitor labs; Accuchecks with ssic; Cardiopulmonary monitoring; neuro checks; start levophed gtt; INTUBATED; f/u echo; f/u repeat CXR; NPO      Date: 1/25/24    Day 3: Has surpassed a 2nd midnight with active treatments and services, which include Vented; rec'd Ca Gluc iv x2; cont iv protonix; cont ivf; cont DuoNebs.      ED Triage Vitals   Temperature Pulse Respirations Blood Pressure SpO2   01/23/24 0018 01/23/24 0015 01/23/24 0015 01/23/24 0015 01/23/24 0015   97.5 °F (36.4 °C) 101 22 117/53 96 %      Temp Source Heart Rate Source Patient Position - Orthostatic VS BP Location FiO2 (%)   01/23/24 0018 01/23/24 0015 01/23/24 0015 01/23/24 0015 01/23/24 1421   Temporal Monitor Sitting Left arm 100      Pain Score       01/23/24 0018       6          Wt Readings from Last 1 Encounters:   01/25/24 95.5 kg (210 lb 8.6 oz)     Additional Vital Signs:   Date/Time Temp Pulse Resp BP MAP (mmHg) SpO2 FiO2 (%) O2 Device Patient Position - Orthostatic VS   01/25/24 0717 -- -- -- -- -- 99 % 40 Ventilator --   01/25/24 0600 97.7 °F (36.5 °C) 99 16 138/60 87 100 % -- -- --   01/25/24 0500 98.8 °F (37.1 °C) 103 21 153/67 97 98 % -- -- --   01/25/24 0400 99.1 °F (37.3 °C) 91 19 147/65 93 100 % -- -- --   01/25/24 0315 99 °F (37.2 °C) 106 Abnormal  20 164/74 107 99 % -- Ventilator Lying   01/25/24 0311 -- -- -- -- -- 100 % 40 Ventilator --   01/25/24 0300 99 °F (37.2 °C) 106 Abnormal  21 156/97 122 99 % -- -- --   01/25/24 0200 -- 111 Abnormal  21 161/73 105 99 % -- --  --   01/25/24 0152 98.8 °F (37.1 °C) 104 16 145/65 93 99 % -- Ventilator Lying   01/25/24 0145 99 °F (37.2 °C) 108 Abnormal  18 140/64 -- 99 % -- -- --   01/25/24 0137 99 °F (37.2 °C) 109 Abnormal  16 157/65 94 99 % -- Ventilator Lying   01/25/24 0103 -- -- -- -- -- 99 % 40 Ventilator --   01/25/24 0100 -- 104 17 158/66 95 98 % -- -- --   01/25/24 0000 99.1 °F (37.3 °C) 101 15 151/64 92 98 % 40 Ventilator Lying     Date/Time Temp Pulse Resp BP MAP (mmHg) SpO2 FiO2 (%) O2 Device Patient Position - Orthostatic VS   01/24/24 1106 -- -- -- -- -- 99 % 40 Ventilator --   01/24/24 0940 -- 104 -- 121/56 -- -- -- -- --   01/24/24 0820 -- -- -- -- -- 99 % 40 Ventilator --   01/24/24 0800 99.7 °F (37.6 °C) 107 Abnormal  20 134/63 90 98 % -- Ventilator Lying   01/24/24 0718 -- -- -- -- -- 99 % 40 Ventilator --   01/24/24 0600 -- 104 20 121/56 81 98 % -- -- --   01/24/24 0541 -- 105 18 109/52 75 98 % -- -- --   01/24/24 0500 -- 114 Abnormal  23 Abnormal  147/63 90 97 % -- -- --   01/24/24 0400 -- 114 Abnormal  23 Abnormal  141/61 88 98 % -- Ventilator Lying   01/24/24 0300 -- 108 Abnormal  19 109/53 76 98 % -- -- --   01/24/24 0200 99.4 °F (37.4 °C) 119 Abnormal  24 Abnormal  153/63 91 99 % -- -- --   01/24/24 0100 -- 117 Abnormal  23 Abnormal  135/63 91 98 % -- -- --   01/24/24 0000 99.4 °F (37.4 °C) 119 Abnormal  18 135/62 89 98 % -- Ventilator Lying   01/23/24 2355 -- -- 18 -- -- -- -- -- --   01/23/24 2300 -- 120 Abnormal  18 130/61 88 99 % -- -- --   01/23/24 2200 -- 119 Abnormal  22 117/58 84 99 % -- -- --   01/23/24 2100 -- 117 Abnormal  23 Abnormal  133/62 89 100 % -- -- --   01/23/24 2000 -- 110 Abnormal  20 110/52 75 100 % 60 Ventilator Lying   01/23/24 1900 99 °F (37.2 °C) 112 Abnormal  19 115/55 79 100 % -- -- --   01/23/24 1845 -- 112 Abnormal  18 114/53 76 100 % -- -- --   01/23/24 1830 -- 113 Abnormal  19 114/55 79 100 % -- -- --   01/23/24 1815 -- 114 Abnormal  20 110/53 76 100 % -- -- --   01/23/24 1800 -- 92 27  Abnormal  111/53 76 100 % -- -- --   01/23/24 1615 -- 95 21 122/56 81 100 % -- -- --   01/23/24 1600 98.3 °F (36.8 °C) 93 17 114/52 75 100 % -- Ventilator Lying   01/23/24 1545 -- 94 21 114/56 81 100 % -- -- --   01/23/24 1532 -- 92 24 Abnormal  -- -- 100 % -- -- --   01/23/24 1531 -- 92 23 Abnormal  -- -- 100 % -- -- --   01/23/24 1530 -- 92 22 115/56 80 100 % -- -- --   01/23/24 1529 -- 92 21 -- -- 100 % -- -- --   01/23/24 1528 -- 88 23 Abnormal  -- -- 100 % -- -- --   01/23/24 1527 -- 90 23 Abnormal  -- -- 100 % -- -- --   01/23/24 1526 -- 92 27 Abnormal  -- -- 100 % -- -- --   01/23/24 1525 98.1 °F (36.7 °C) 89 23 Abnormal  117/56 -- 100 % -- -- --   01/23/24 1520 -- 90 22 119/54 -- 100 % -- -- --   01/23/24 1519 -- 92 21 -- -- 100 % -- -- --   01/23/24 1517 -- 89 23 Abnormal  -- -- 100 % -- -- --   01/23/24 1516 -- 87 23 Abnormal  -- -- 100 % -- -- --   01/23/24 1515 -- 89 20 132/59 85 100 % -- -- --   01/23/24 1510 -- 92 25 Abnormal  140/65 -- 100 % -- -- --   01/23/24 1509 -- 93 22 -- -- 100 % -- -- --   01/23/24 1508 -- 93 23 Abnormal  -- -- 100 % -- -- --   01/23/24 1421 -- -- -- -- -- 99 % 100 Ventilator --   01/23/24 1345 -- 96 29 Abnormal  110/55 79 96 % -- -- --   01/23/24 1330 -- 99 26 Abnormal  122/57 82 95 % -- -- --   01/23/24 1315 -- 102 30 Abnormal  114/56 80 98 % -- -- --   01/23/24 1300 -- 101 26 Abnormal  122/57 82 96 % -- -- --   01/23/24 1245 -- 96 30 Abnormal  115/54 78 94 % -- -- --   01/23/24 1230 -- 99 42 Abnormal  112/57 77 95 % -- -- --   01/23/24 1220 97.8 °F (36.6 °C) 107 Abnormal  29 Abnormal  -- -- 99 % -- -- --   01/23/24 1215 -- 102 33 Abnormal  117/55 78 96 % -- -- --   01/23/24 1207 97.5 °F (36.4 °C) 98 44 Abnormal  110/52 -- 95 % -- -- --   01/23/24 1130 -- 115 Abnormal  35 Abnormal  128/58 83 76 % Abnormal  -- -- --   01/23/24 1115 -- 104 38 Abnormal  120/56 81 90 % -- -- --   01/23/24 1100 -- 110 Abnormal  37 Abnormal  152/111 Abnormal  121 98 % -- -- --   01/23/24 1045 --  111 Abnormal  32 Abnormal  128/89 99 84 % Abnormal  -- -- --   24 1015 97.8 °F (36.6 °C) 99 25 Abnormal  119/56 80 97 % -- -- --   24 1000 -- 104 26 Abnormal  132/103 Abnormal  115 97 % -- -- --   24 0945 -- 106 Abnormal  24 Abnormal  121/54 78 100 % -- -- --   24 0930 -- 108 Abnormal  24 Abnormal  138/63 90 100 % -- -- --   24 0915 -- 113 Abnormal  26 Abnormal  151/62 89 100 % -- -- --   24 0730 97.5 °F (36.4 °C) 98 34 Abnormal  110/53 76 90 % -- -- --   24 0715 -- 91 30 Abnormal  118/53 76 93 % -- -- --   24 0636 -- 108 Abnormal  31 Abnormal  124/90 104 93 % -- None (Room air) --   24 0600 -- 87 21 84/53 Abnormal  62 Abnormal  96 % -- -- --   24 0511 97.4 °F (36.3 °C) Abnormal  92 20 109/51 73 96 % -- -- --   24 0400 97.5 °F (36.4 °C) 93 24 Abnormal  103/53 -- 96 % -- -- --   24 0337 97.6 °F (36.4 °C) 90 21 112/54 -- -- -- -- --   24 0300 97.4 °F (36.3 °C) Abnormal  91 23 Abnormal  117/53 76 97 % -- -- --   24 0230 -- 88 22 99/46 Abnormal  67 98 % -- None (Room air) Sitting   24 0045 -- 94 22 111/46 Abnormal  67 99 % -- None (Room air) Sitting   24 0018 97.5 °F (36.4 °C) 102 22 117/53 -- 96 % -- None (Room air) --   24 0015 -- 101 22 117/53 77 96 % -- None (Room air) Sitting       EK/23/24 00:17   Atrial fibrillation  Incomplete right bundle branch block  Left anterior fascicular block  Abnormal ECG    24 00:42   Atrial fibrillation  Incomplete right bundle branch block  Left anterior fascicular block  Possible Lateral infarct , age undetermined  Abnormal ECG    24 03:28   Atrial fibrillation  Right bundle branch block  Left anterior fascicular block   Bifascicular block   Possible Lateral infarct (cited on or before 2024)  Abnormal ECG  When compared with ECG of 2024 00:42, (unconfirmed)  QT has lengthened    24 07:17   Atrial fibrillation  Right bundle branch block  Left  anterior fascicular block   Bifascicular block   Possible Lateral infarct (cited on or before 23-JAN-2024)  Abnormal ECG    Pertinent Labs/Diagnostic Test Results:   MRI brain wo contrast   Final Result by Ralph Quintana DO (01/24 1322)      White matter changes suggestive of chronic microangiopathy.  No acute intracranial pathology.      Workstation performed: [id number]         XR chest portable ICU   Final Result by Dom Nagel MD (01/24 1205)      Clear lungs. Stable line and tube positioning                  Workstation performed: [id number]         XR chest portable   Final Result by Dom Nagel MD (01/23 1704)      Right IJ line projects over the superior vena cava and appears grossly satisfactory. No pneumothorax seen.      Endotracheal tube appears satisfactory.      Endogastric tube should probably be advanced farther into the stomach.      Small right effusion and relatively diffuse interstitial infiltrate in the right lung      The study was marked in EPIC for immediate notification.                  Workstation performed: [id number]         XR chest portable ICU   Final Result by Griffin Banuelos MD (01/23 1435)      Tip of endotracheal tube 2 cm above the george.      Small amount of right pleural fluid or thickening.                  Workstation performed: [id number]         XR chest portable   Final Result by Daryl Burnett MD (01/23 1015)   Right lung and left basilar subsegmental atelectasis. No consolidation or pulmonary edema.            Workstation performed: [id number]         CT stroke alert brain   Final Result by Tuan Xie MD (01/23 0752)      No evidence of acute intracranial process.      Chronic microangiopathy.      Findings were directly discussed with Az Interiano at 07:40.               Workstation performed: [id number]         CTA stroke alert (head/neck)   Final Result by Tuan Xie MD (01/23 5692)       CTA head:      No arterial occlusion or significant stenosis.      CTA neck:      No arterial occlusion or significant stenosis.      50 to 66% stenosis of the proximal left ICA secondary to atheromatous and arthrosclerotic disease.               Findings were directly discussed with Az Interiano at 07:40.                           Workstation performed: [id number]         CT abdomen pelvis with contrast   Final Result by Dave Hnason MD (01/23 0216)      Nonspecific strandy hyperdensities within the gastric body possibly reflective of hemorrhage in this patient with history? Gastric AVMs per chart review in [facility]. GI consultation advised.      Similar appearance of known segment 6 and 7 hepatic masses compatible with multifocal HCC compared to the previous MRI accounting for differences in modality. Somewhat worsened heterogeneous/mottled appearance of the hepatic parenchyma in the anterior    right and left hepatic lobes which may reflect disease progression and/or treatment related changes. 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 Continued follow-up per oncology recommended.      Cholelithiasis.      Similar small volume abdominopelvic ascites.      Above findings discussed with Dr. Jameson at 2:10 a.m. on 1/23/2024.      Workstation performed: [id number]           Results from last 7 days   Lab Units 01/23/24  0118   SARS-COV-2  Negative     Results from last 7 days   Lab Units 01/25/24  0427 01/25/24  0015 01/24/24 2012 01/24/24  1734 01/24/24  1329 01/24/24  0819 01/24/24  0504 01/23/24  1005 01/23/24  0614   WBC Thousand/uL 11.71*  --   --   --   --   --  17.10*  --  9.07   HEMOGLOBIN g/dL 8.3* 6.9* 7.0* 7.0* 7.3*   < > 7.5*   < > 7.8*   I STAT HEMOGLOBIN   --   --   --   --   --   --   --    < >  --    HEMATOCRIT % 26.7* 22.0* 22.3* 22.3* 23.0*   < > 23.4*   < > 25.4*   HEMATOCRIT, ISTAT   --   --   --   --   --   --   --    < >  --    PLATELETS Thousands/uL  148*  --   --   --   --   --  188  --  176   NEUTROS ABS Thousands/µL 9.13*  --   --   --   --   --  13.79*  --  7.68*    < > = values in this interval not displayed.        Results from last 7 days   Lab Units 01/25/24 0427 01/25/24  0015 01/24/24  1734 01/24/24  1329 01/24/24  0504 01/23/24  1608 01/23/24  1331 01/23/24  1005 01/23/24  0614   SODIUM mmol/L 149* 150* 149* 148* 148*   < >  --    < > 142   POTASSIUM mmol/L 3.8 3.9 4.0 4.4 4.3   < >  --    < > 6.0*   CHLORIDE mmol/L 119* 119* 116* 116* 115*   < >  --    < > 109*   CO2 mmol/L 21 22 23 22 24   < >  --    < > 23   CO2, I-STAT mmol/L  --   --   --   --   --   --  13*  --   --    ANION GAP mmol/L 9 9 10 10 9   < >  --    < > 10   BUN mg/dL 78* 84* 92* 90* 96*   < >  --    < > 89*   CREATININE mg/dL 1.73* 1.81* 1.84* 1.88* 1.82*   < >  --    < > 1.73*   EGFR ml/min/1.73sq m 37 35 34 33 35   < >  --    < > 37   CALCIUM mg/dL 8.3* 8.2* 8.6 8.2* 7.9*   < >  --    < > 7.8*   CALCIUM, IONIZED mmol/L 1.04*  --   --   --  1.03*  --   --   --   --    CALCIUM, IONIZED, ISTAT mmol/L  --   --   --   --   --   --  1.15  --   --    MAGNESIUM mg/dL  --   --   --   --  2.2  --   --   --  2.1   PHOSPHORUS mg/dL  --   --   --   --  3.6  --   --   --  2.2*    < > = values in this interval not displayed.     Results from last 7 days   Lab Units 01/25/24 0427 01/24/24  0504 01/23/24  2354 01/23/24  1608 01/23/24  0614 01/23/24  0021   AST U/L 76* 69*  --  66* 83* 101*   ALT U/L 54* 50  --  50 59* 69*   ALK PHOS U/L 152* 140*  --  167* 212* 269*   TOTAL PROTEIN g/dL 5.4* 5.3*  --  5.2* 5.3* 6.1*   ALBUMIN g/dL 2.9* 2.9*  --  3.3* 2.8* 3.1*   TOTAL BILIRUBIN mg/dL 0.99 0.97  --  1.04* 0.89 0.93   BILIRUBIN DIRECT mg/dL 0.44*  --   --   --   --   --    AMMONIA umol/L 61 108* 135*  --   --   --      Results from last 7 days   Lab Units 01/25/24  0810 01/25/24  0559 01/25/24  0408 01/25/24  0215 01/25/24  0007 01/24/24  2224 01/24/24 2010 01/24/24  1741 01/24/24  1542  01/24/24  1323 01/24/24  1201 01/24/24  0951   POC GLUCOSE mg/dl 193* 171* 134 146* 119 127 148* 142* 178* 191* 149* 157*     Results from last 7 days   Lab Units 01/25/24  0427 01/25/24  0015 01/24/24  1734 01/24/24  1329 01/24/24  0504 01/23/24 1952 01/23/24  1608 01/23/24  1005 01/23/24  0614 01/23/24  0021   GLUCOSE RANDOM mg/dL 142* 116 135 194* 127 184* 264* 270* 284* 276*        Results from last 7 days   Lab Units 01/23/24  2354 01/23/24 1952 01/23/24  1608   PH ANA  7.441* 7.376 7.270*   PCO2 ANA mm Hg 33.9* 39.4* 46.8   PO2 ANA mm Hg 46.5* 56.1* 77.5*   HCO3 ANA mmol/L 22.6* 22.6* 21.0*   BASE EXC ANA mmol/L -1.2 -2.4 -5.7   O2 CONTENT ANA ml/dL 9.9 10.7 11.8   O2 HGB, VENOUS % 81.4* 86.0* 91.7*     Results from last 7 days   Lab Units 01/23/24  1331   I STAT BASE EXC mmol/L -13*   I STAT O2 SAT % 29*   ISTAT PH ART  7.317*   I STAT ART PCO2 mm HG 23.6*   I STAT ART PO2 mm HG 20.0*   I STAT ART HCO3 mmol/L 12.1*        Results from last 7 days   Lab Units 01/23/24  0802 01/23/24  0235 01/23/24  0021   HS TNI 0HR ng/L 40  --  24   HS TNI 2HR ng/L  --  27  --    HSTNI D2 ng/L  --  3  --         Results from last 7 days   Lab Units 01/24/24  0504 01/23/24  0614 01/23/24  0021   PROTIME seconds 16.9* 16.8* 19.7*   INR  1.33* 1.31* 1.62*   PTT seconds  --  34 35        Results from last 7 days   Lab Units 01/23/24  2341 01/23/24 1952 01/23/24  1608 01/23/24  1346 01/23/24  1303   LACTIC ACID mmol/L 1.6 2.3* 4.1* 10.3* 12.3*        Results from last 7 days   Lab Units 01/25/24  0549 01/24/24  0549   UNIT PRODUCT CODE  [id number]  [id number]   UNIT NUMBER  [id number]  [id number]   UNITABO  A A  A   UNITRH  POS POS  POS   CROSSMATCH  Compatible Compatible  Compatible   UNIT DISPENSE STATUS  Presumed Trans Presumed Trans  Presumed Trans   UNIT PRODUCT VOL ml 350 350  350        Results from last 7 days   Lab Units 01/23/24  0021   LIPASE u/L 39        Results from last 7  days   Lab Units 01/23/24  0944   CLARITY UA  Clear   COLOR UA  Yellow   SPEC GRAV UA  1.010   PH UA  5.0   GLUCOSE UA mg/dl 1000 (1%)*   KETONES UA mg/dl 10 (1+)*   BLOOD UA  Negative   PROTEIN UA mg/dl Trace*   NITRITE UA  Negative   BILIRUBIN UA  Negative   UROBILINOGEN UA (BE) mg/dl <2.0   LEUKOCYTES UA  Negative   WBC UA /hpf None Seen   RBC UA /hpf None Seen   BACTERIA UA /hpf None Seen   EPITHELIAL CELLS WET PREP /hpf Occasional     Results from last 7 days   Lab Units 01/23/24  0118   INFLUENZA A PCR  Negative   INFLUENZA B PCR  Negative   RSV PCR  Negative       ED Treatment:   Medication Administration from 01/23/2024 0014 to 01/23/2024 0256         Date/Time Order Dose Route Action     01/23/2024 0114 EST sodium chloride 0.9 % bolus 1,000 mL 1,000 mL Intravenous New Bag     01/23/2024 0058 EST ondansetron (ZOFRAN) injection 4 mg 4 mg Intravenous Given     01/23/2024 0058 EST pantoprazole (PROTONIX) injection 40 mg 40 mg Intravenous Given     01/23/2024 0121 EST pantoprazole (PROTONIX) injection 40 mg 40 mg Intravenous Given     01/23/2024 0113 EST iohexol (OMNIPAQUE) 350 MG/ML injection (MULTI-DOSE) 100 mL 100 mL Intravenous Given     01/23/2024 0230 EST ondansetron (ZOFRAN) injection 4 mg 4 mg Intravenous Given     01/23/2024 0246 EST sodium chloride 0.9 % bolus 1,000 mL 1,000 mL Intravenous New Bag            Present on Admission:   Acute blood loss anemia   Acute upper GI bleeding   Atrial fibrillation, unspecified type (HCC)   CAD (coronary artery disease)   Chronic systolic heart failure (HCC)   Hepatocellular carcinoma (HCC)   Stage 3 chronic kidney disease, unspecified whether stage 3a or 3b CKD (HCC)      Admitting Diagnosis: Acute blood loss anemia [D62]  Acute upper GI bleeding [K92.2]  Abdominal pain [R10.9]  Anemia [D64.9]    Age/Sex: 78 y.o. male    Admission Orders: SCDs; Cardiopulmonary monitoring; I/O; Daily wts; neuro checks; NPO    Scheduled Medications:  azelastine, 1 spray, Each Nare,  BID  chlorhexidine, 15 mL, Mouth/Throat, Q12H RANDALL  ipratropium, 0.5 mg, Nebulization, Q6H  levalbuterol, 0.63 mg, Nebulization, Q6H  pantoprazole, 40 mg, Intravenous, Q12H RANDALL  rifaximin, 550 mg, Oral, Q12H RANDALL    albumin human (FLEXBUMIN) 25 % injection 25 g  Dose: 25 g  Freq: Once Route: IV  Last Dose: Stopped (01/23/24 1038)  Start: 01/23/24 0745 End: 01/23/24 1038     albumin human (FLEXBUMIN) 5 % injection 12.5 g  Dose: 12.5 g  Freq: Once Route: IV  Last Dose: Stopped (01/23/24 1038)  Start: 01/23/24 0915 End: 01/23/24 103     calcium gluconate 1 g in sodium chloride 0.9% 50 mL (premix)  Dose: 1 g  Freq: Once Route: IV  Last Dose: Stopped (01/23/24 1038)  Start: 01/23/24 0745 End: 01/23/24 1038     etomidate (AMIDATE) 2 mg/mL injection 20 mg  Dose: 20 mg  Freq: Once Route: IV  Start: 01/23/24 1415 End: 01/23/24 1413     desmopressin (DDAVP) 28 mcg in sodium chloride 0.9 % 50 mL IVPB  Dose: 0.3 mcg/kg  Weight Dosing Info: 93.4 kg  Freq: Once Route: IV  Last Dose: Stopped (01/23/24 0500)  Start: 01/23/24 0345 End: 01/23/24 0500     prothrombin complex concentrate (human) (Kcentra) 2,000 Units  Dose: 2,000 Units  Freq: Once Route: IV  Start: 01/23/24 0330 End: 01/23/24 0333     multi-electrolyte (ISOLYTE-S PH 7.4) bolus 500 mL  Dose: 500 mL  Freq: Once Route: IV  Last Dose: Stopped (01/23/24 1220)  Start: 01/23/24 1045 End: 01/23/24 1220       metoclopramide (REGLAN) injection 10 mg  Dose: 10 mg  Freq: Once Route: IV  Indications of Use: NAUSEA AND VOMITING  Start: 01/23/24 1030 End: 01/23/24 1103       calcium gluconate 2 g in sodium chloride 0.9% 100 mL (premix)  Dose: 2 g  Freq: Once Route: IV  Last Dose: Stopped (01/24/24 0604)  Start: 01/24/24 0545 End: 01/24/24 0604     calcium gluconate 1 g in sodium chloride 0.9% 50 mL (premix)  Dose: 1 g  Freq: Once Route: IV  Last Dose: Stopped (01/25/24 0220)  Start: 01/25/24 0115 End: 01/25/24 0220     calcium gluconate 2 g in sodium chloride 0.9% 100 mL  (premix)  Dose: 2 g  Freq: Once Route: IV  Last Dose: 2 g (01/25/24 0554)  Start: 01/25/24 0500 End: 01/25/24 0654     Continuous IV Infusions:  insulin regular (HumuLIN R,NovoLIN R) 1 Units/mL in sodium chloride 0.9 % 100 mL infusion, 0.3-21 Units/hr, Intravenous, Titrated  propofol, 5-50 mcg/kg/min, Intravenous, Titrated  multi-electrolyte (PLASMALYTE-A/ISOLYTE-S PH 7.4) IV solution Rate: 75 mL/hr     norepinephrine (LEVOPHED) 4 mg (STANDARD CONCENTRATION) IV in sodium chloride 0.9% 250 mL  Rate: 3.8-112.5 mL/hr Dose: 1-30 mcg/min  Freq: Titrated Route: IV  Last Dose: Stopped (01/23/24 1441)  Start: 01/23/24 1315 End: 01/23/24 1716     phenylephrine (DANA-SYNEPHRINE) 50 mg (STANDARD CONCENTRATION) in sodium chloride 0.9% 250 mL  Rate: 7.5-54 mL/hr Dose:  mcg/min  Freq: Titrated Route: IV  Last Dose: Stopped (01/23/24 1312)  Start: 01/23/24 0915 End: 01/23/24 1307       PRN Meds:  acetaminophen, 650 mg, Oral, Q6H PRN  fentanyl citrate (PF), 50 mcg, Intravenous, Q1H PRN  (1/23 rec'd x1)  (1/24 rec'd x2)   trimethobenzamide, 200 mg, Intramuscular, Q12H PRN  (1/23 rec'd x1)          IP CONSULT TO NEUROLOGY  IP CONSULT TO GASTROENTEROLOGY  IP CONSULT TO CASE MANAGEMENT    Network Utilization Review Department  ATTENTION: Please call with any questions or concerns to [phone] and carefully listen to the prompts so that you are directed to the right person. All voicemails are confidential.   For Discharge needs, contact Care Management DC Support Team at [phone] opt. 2  Send all requests for admission clinical reviews, approved or denied determinations and any other requests to dedicated fax number below belonging to the campus where the patient is receiving treatment. List of dedicated fax numbers for the Facilities:  FACILITY NAME UR FAX NUMBER   ADMISSION DENIALS (Administrative/Medical Necessity) [phone]   DISCHARGE SUPPORT TEAM (NETWORK) [phone]   PARENT CHILD HEALTH  (Maternity/NICU/Pediatrics) [phone]   [facility] [phone]   [facility] [phone]   [facility] [phone]   [facility] [phone]   [facility] [phone]   [facility] [phone]   [facility] [phone]   [facility] [phone]   [facility] [phone]   [facility] [phone]   [facility] [phone]

## 2024-01-24 NOTE — ASSESSMENT & PLAN NOTE
Patient is a 78-year-old male who has been seeing [facility] GI last seen 12/13/2023 patient has a history of anemia is multifactorial chronic due to cancer with iron deficiency and occult GI blood loss on Eliquis review of an EGD with multiple AVMs.  Patient came in to the ER from home due to generalized weakness nausea vomiting since this morning reports lower abdominal pain has a history of anemia is on Eliquis and Plavix episodes of vomiting which were described by the significant other is dark of clotted material, and also had melanotic stool occult positive.  Patient felt significant weakness and was unable to walk felt like too weak to walk.  Patient has a history of an upper GI bleed with multiple AVMs, also has a history of the patient intubation with tracheostomy approximately rehab time of 1 year.   Patient's baseline globin appears to be 8-10, except 1 value on 11/23 of 13.   Patient's hemoglobin was 8.3 received 1 L of fluid and his hemoglobin was 7.3  Ordered 2 units of RBCs with hemoglobins in between it   Significant nausea and abdominal, received Zofran 8 mg IV with no relief started Tigan due to prolonged QT  GI was called by the ER and is aware of the patient   Protonix 80 mg IV given by the ER  Start Protonix drip  Patient is on Eliquis and Plavix-100% sure but she thinks he did not take the Plavix or Eliquis on 1/22  GI wanted Eliquis reversed -ordered DDAVP 0.3 mg/kg and Kcentra  CT scan with contrast showed nonspecific hypodensity within the gastric body possibly reflective of hemorrhage in this patient with the history of gastric AVMs, known liver cancer, cholelithiasis, small volume abdominal pelvic ascites  S/P EGD - 3 large angioectasias in the fundus of the stomach and body of the stomach; bleeding was observed; placed clips; hemostasis achieved; induced coagulation and hemostasis achieved with argon plasma coagulation The esophagus and duodenum appeared normal.  Continue hgb q 6 hour  checks   Pt remains NPO - when able to take PO change retention enemas to PO lactulose

## 2024-01-24 NOTE — CASE MANAGEMENT
Case Management Assessment & Discharge Planning Note    Patient name Derek Walter  Location /-01 MRN [id number]  : 1945 Date 2024       Current Admission Date: 2024  Current Admission Diagnosis:Acute upper GI bleeding   Patient Active Problem List    Diagnosis Date Noted    Hepatic encephalopathy (HCC) 2024    Acute blood loss anemia 2024    Acute upper GI bleeding 2024    NA (acute kidney injury) (HCC) 2024    Stroke-like symptom 2024    History of coronary angioplasty with insertion of stent 2024    Chronic systolic heart failure (HCC) 2024    Ischemic cardiomyopathy 2024    Status post insertion of drug eluting coronary artery stent 2023    Coronary artery disease involving native coronary artery 2023    Hepatocellular carcinoma (HCC)     CAD (coronary artery disease) 2023    Hepatic flexure mass 2023    Anemia 2023    Acute on chronic diastolic HF (heart failure) (HCC) 2023    History of DVT (deep vein thrombosis) 2023    Restrictive lung disease 2023    Other emphysema (HCC) 2023    History of tobacco abuse 2023    H/O asbestos exposure 2023    Liver mass 05/10/2023    Atrial fibrillation, unspecified type (HCC) 02/15/2023    Peripheral polyneuropathy 2022    Type 2 diabetes mellitus with neurologic complication, with long-term current use of insulin (HCC) 2022    Stage 3 chronic kidney disease, unspecified whether stage 3a or 3b CKD (HCC) 2022      LOS (days): 1  Geometric Mean LOS (GMLOS) (days): 3  Days to GMLOS:1.6     OBJECTIVE:    Risk of Unplanned Readmission Score: 47.56         Current admission status: Inpatient       Preferred Pharmacy:   [id number] - [place] - [id number]  [id number]  [place] [address]  Phone: [phone] Fax: [phone]    Primary Care Provider: Ernesto Griffith  MD    Primary Insurance: BLUE CROSS [employer] REP  Secondary Insurance:     ASSESSMENT:  Active Health Care Proxies       Nilda Walter Health Care Agent - Spouse   Primary Phone: [phone] (Mobile)                 Advance Directives  Does patient have a Health Care POA?: Yes  Does patient have Advance Directives?: Yes  Advance Directives: Power of  for health care  Primary Contact: Nilda (wife)    Readmission Root Cause  30 Day Readmission: No    Patient Information  Admitted from:: Home  Mental Status: Confused  During Assessment patient was accompanied by: Not accompanied during assessment  Assessment information provided by:: Spouse  Primary Caregiver: Self  Support Systems: Self, Spouse/significant other, Family members  County of Residence: [place]  What city do you live in?: [place]  Home entry access options. Select all that apply.: Stairs  Number of steps to enter home.: 2  Type of Current Residence: [facility]  Living Arrangements: Lives w/ Spouse/significant other  Is patient a ?: No    Activities of Daily Living Prior to Admission  Functional Status: Independent  Completes ADLs independently?: Yes  Ambulates independently?: Yes  Does patient use assisted devices?: Yes  Assisted Devices (DME) used: Straight Cane  Does patient currently own DME?: Yes  What DME does the patient currently own?: Straight Cane  Does patient have a history of Outpatient Therapy (PT/OT)?: Yes  Does the patient have a history of Short-Term Rehab?: Yes  Does patient have a history of HHC?: No  Does patient currently have HHC?: No    Patient Information Continued  Does patient have prescription coverage?: Yes  Does patient receive dialysis treatments?: No  Does patient have a history of substance abuse?: No  Does patient have a history of Mental Health Diagnosis?: No    Means of Transportation  [facility]      Housing Stability: Low Risk  (1/24/2024)    Housing Stability Vital Sign      Unable to Pay for Housing in the Last Year: No     Number of Places Lived in the Last Year: 1     Unstable Housing in the Last Year: No   Food Insecurity: No Food Insecurity (1/24/2024)    Hunger Vital Sign     Worried About Running Out of Food in the Last Year: Never true     Ran Out of Food in the Last Year: Never true   Transportation Needs: No Transportation Needs (1/24/2024)    PRAPARE - Transportation     Lack of Transportation (Medical): No     Lack of Transportation (Non-Medical): No   Utilities: Not At Risk (1/24/2024)    [facility] Utilities     Threatened with loss of utilities: No       DISCHARGE DETAILS:    Discharge planning discussed with:: Chacha (wife)  Freedom of Choice: Yes     CM contacted family/caregiver?: Yes  Were Treatment Team discharge recommendations reviewed with patient/caregiver?: Yes  Did patient/caregiver verbalize understanding of patient care needs?: Yes  Were patient/caregiver advised of the risks associated with not following Treatment Team discharge recommendations?: Yes    Contacts  Patient Contacts: Chacha Walter  Relationship to Patient:: Family  Contact Method: Phone  Phone Number: [phone]  Reason/Outcome: Discharge Planning, Referral    Requested Home Health Care         Is the patient interested in HHC at discharge?: No    DME Referral Provided  Referral made for DME?: No     Additional Comments: CM called and spoke with pt's wife Chacha to discuss the role of CM and to discuss any help pt may need prior to dc. Pt lives with his wife Chacha in a ranch home with 2 DASH. Pt performed ADL's indptly pta, pt uses a cane. No hx of HHC or rehab. No hx of mental health or D&A treatment. Pt's preferred pharamcy is [facility] in [place].

## 2024-01-24 NOTE — PLAN OF CARE
Problem: Potential for Falls  Goal: Patient will remain free of falls  Description: INTERVENTIONS:  - Educate patient/family on patient safety including physical limitations  - Instruct patient to call for assistance with activity   - Consult OT/PT to assist with strengthening/mobility   - Keep Call bell within reach  - Keep bed low and locked with side rails adjusted as appropriate  - Keep care items and personal belongings within reach  - Initiate and maintain comfort rounds  - Make Fall Risk Sign visible to staff  - Offer Toileting every  Hours, in advance of need  - Initiate/Maintain alarm  - Obtain necessary fall risk management equipment  - Apply yellow socks and bracelet for high fall risk patients  - Consider moving patient to room near nurses station  Outcome: Progressing     Problem: PAIN - ADULT  Goal: Verbalizes/displays adequate comfort level or baseline comfort level  Description: Interventions:  - Encourage patient to monitor pain and request assistance  - Assess pain using appropriate pain scale  - Administer analgesics based on type and severity of pain and evaluate response  - Implement non-pharmacological measures as appropriate and evaluate response  - Consider cultural and social influences on pain and pain management  - Notify physician/advanced practitioner if interventions unsuccessful or patient reports new pain  Outcome: Progressing     Problem: Knowledge Deficit  Goal: Patient/family/caregiver demonstrates understanding of disease process, treatment plan, medications, and discharge instructions  Description: Complete learning assessment and assess knowledge base.  Interventions:  - Provide teaching at level of understanding  - Provide teaching via preferred learning methods  Outcome: Progressing     Problem: GASTROINTESTINAL - ADULT  Goal: Minimal or absence of nausea and/or vomiting  Description: INTERVENTIONS:  - Administer IV fluids if ordered to ensure adequate hydration  - Maintain  NPO status until nausea and vomiting are resolved  - Nasogastric tube if ordered  - Administer ordered antiemetic medications as needed  - Provide nonpharmacologic comfort measures as appropriate  - Advance diet as tolerated, if ordered  - Consider nutrition services referral to assist patient with adequate nutrition and appropriate food choices  Outcome: Progressing  Goal: Maintains or returns to baseline bowel function  Description: INTERVENTIONS:  - Assess bowel function  - Encourage oral fluids to ensure adequate hydration  - Administer IV fluids if ordered to ensure adequate hydration  - Administer ordered medications as needed  - Encourage mobilization and activity  - Consider nutritional services referral to assist patient with adequate nutrition and appropriate food choices  Outcome: Progressing  Goal: Maintains adequate nutritional intake  Description: INTERVENTIONS:  - Monitor percentage of each meal consumed  - Identify factors contributing to decreased intake, treat as appropriate  - Assist with meals as needed  - Monitor I&O, weight, and lab values if indicated  - Obtain nutrition services referral as needed  Outcome: Progressing  Goal: Establish and maintain optimal ostomy function  Description: INTERVENTIONS:  - Assess bowel function  - Encourage oral fluids to ensure adequate hydration  - Administer IV fluids if ordered to ensure adequate hydration   - Administer ordered medications as needed  - Encourage mobilization and activity  - Nutrition services referral to assist patient with appropriate food choices  - Assess stoma site  - Consider wound care consult   Outcome: Progressing  Goal: Oral mucous membranes remain intact  Description: INTERVENTIONS  - Assess oral mucosa and hygiene practices  - Implement preventative oral hygiene regimen  - Implement oral medicated treatments as ordered  - Initiate Nutrition services referral as needed  Outcome: Progressing     Problem: METABOLIC, FLUID AND  ELECTROLYTES - ADULT  Goal: Electrolytes maintained within normal limits  Description: INTERVENTIONS:  - Monitor labs and assess patient for signs and symptoms of electrolyte imbalances  - Administer electrolyte replacement as ordered  - Monitor response to electrolyte replacements, including repeat lab results as appropriate  - Instruct patient on fluid and nutrition as appropriate  Outcome: Progressing  Goal: Fluid balance maintained  Description: INTERVENTIONS:  - Monitor labs   - Monitor I/O and WT  - Instruct patient on fluid and nutrition as appropriate  - Assess for signs & symptoms of volume excess or deficit  Outcome: Progressing  Goal: Glucose maintained within target range  Description: INTERVENTIONS:  - Monitor Blood Glucose as ordered  - Assess for signs and symptoms of hyperglycemia and hypoglycemia  - Administer ordered medications to maintain glucose within target range  - Assess nutritional intake and initiate nutrition service referral as needed  Outcome: Progressing     Problem: HEMATOLOGIC - ADULT  Goal: Maintains hematologic stability  Description: INTERVENTIONS  - Assess for signs and symptoms of bleeding or hemorrhage  - Monitor labs  - Administer supportive blood products/factors as ordered and appropriate  Outcome: Progressing     Problem: Prexisting or High Potential for Compromised Skin Integrity  Goal: Skin integrity is maintained or improved  Description: INTERVENTIONS:  - Identify patients at risk for skin breakdown  - Assess and monitor skin integrity  - Assess and monitor nutrition and hydration status  - Monitor labs   - Assess for incontinence   - Turn and reposition patient  - Assist with mobility/ambulation  - Relieve pressure over bony prominences  - Avoid friction and shearing  - Provide appropriate hygiene as needed including keeping skin clean and dry  - Evaluate need for skin moisturizer/barrier cream  - Collaborate with interdisciplinary team   - Patient/family teaching  -  Consider wound care consult   Outcome: Progressing

## 2024-01-24 NOTE — PROGRESS NOTES
Derek Walter  [id number]    78 y.o.  male      ASSESSMENT and PLAN    Acute upper GI bleed/blood loss anemia -78-year-old male with CHF coronary disease atrial fibrillation and history of treated hepatocellular carcinoma on Eliquis and Plavix presenting with coffee-ground emesis, melena, drop in hemoglobin and altered mental status.  Previous evaluation of anemia did reveal gastric and duodenal AVMs.  Due to drop in hemoglobin, presence of obvious upper GI bleeding via emesis and hypotension urgent endoscopy was performed yesterday which showed three oozing vascular ectasias 1 of which was clipped and all of which were cauterized by APC.  Bleeding seems to have abated.  No reports of significant melena and no NG blood evident.  Hemoglobin remains low in the 7 range.  Continue to follow H&H and transfuse as needed  Continue n.p.o. for now, okay to give meds per NG tube  Continue IV PPI, pantoprazole 40 mg every 12 hours    Altered mental status - unclear how much of this is due to acute blood loss anemia, observing for possible CVA, elevated ammonia level noted.  Despite absence of any known chronic liver disease, he may have some underlying dysfunction and elevated ammonia could have been precipitated by GI bleed.  No contraindication to using lactulose.    Chief Complaint   Patient presents with    Abdominal Pain     Pt via EMS from home with c/o generalized weakness, nausea, and vomiting since the morning. Reports lower abd pain. Reports hx of anemia.       SUBJECTIVE/HPI remains ventilated.  Nonverbal.  No blood or coffee grounds per NG tube reported.  No further melena.    /56   Pulse 104   Temp 99.7 °F (37.6 °C) (Axillary)   Resp 20   Ht 6' (1.829 m)   Wt 91.2 kg (201 lb)   SpO2 100%   BMI 27.26 kg/m²     PHYSICALEXAM  General appearance: Sedated on ventilator  Head: Normocephalic, without obvious abnormality, intubated  Lungs: Coarse breath sounds bilaterally  Heart: S1, S2 normal, no murmur, click,  "rub or gallop  Abdomen: soft, non-tender; protuberant, bowel sounds normal; no masses,  no organomegaly  Neurologic: Sedated, nonverbal    Lab Results   Component Value Date    GLUCOSE 253 (H) 01/23/2024    CALCIUM 8.2 (L) 01/24/2024    K 4.4 01/24/2024    CO2 22 01/24/2024     (H) 01/24/2024    BUN 90 (H) 01/24/2024    CREATININE 1.88 (H) 01/24/2024     Lab Results   Component Value Date    WBC 17.10 (H) 01/24/2024    HGB 7.3 (L) 01/24/2024    HCT 23.0 (L) 01/24/2024    MCV 96 01/24/2024     01/24/2024     Lab Results   Component Value Date    ALT 50 01/24/2024    AST 69 (H) 01/24/2024    ALKPHOS 140 (H) 01/24/2024     No results found for: \"AMYLASE\"  Lab Results   Component Value Date    LIPASE 39 01/23/2024     Lab Results   Component Value Date    IRON 14 (L) 08/07/2023    TIBC 462 (H) 08/07/2023    FERRITIN 10 (L) 08/07/2023     Lab Results   Component Value Date    INR 1.33 (H) 01/24/2024       Counseling / Coordination of Care  Total floor / unit time spent today 30 minutes.                           "

## 2024-01-24 NOTE — SPEECH THERAPY NOTE
Speech Language/Pathology    Pt currently on vent. Cancel ST evaluation at this time and re-consult as clinically indicated.

## 2024-01-24 NOTE — OCCUPATIONAL THERAPY NOTE
Occupational Therapy Cancellation    Patient Name: Derek Walter  Today's Date: 1/24/2024 01/24/24 0733   OT Last Visit   OT Visit Date 01/24/24   Note Type   Note type Cancelled Session   Cancel Reasons Medical status   Additional Comments Pt with OT orders. Holding evaluation at this time, pending medical appropriateness, as pt is current on vent. Will continue to follow.     Jennifer Cintron MS, OTR/L

## 2024-01-24 NOTE — ASSESSMENT & PLAN NOTE
Patient has a history of coronary artery disease had 8/2023 a PCI status post cardiac catheterization drug-eluting stents x 3 in the LAD.  Ramus and left circumflex  Continue  Plavix and metoprolol when able  Lipitor with hepatic encephalopathy

## 2024-01-24 NOTE — PHYSICAL THERAPY NOTE
Physical Therapy Cancellation Note             01/24/24 0848   PT Last Visit   PT Visit Date 01/24/24   Note Type   Note type Cancelled Session   Cancel Reasons Medical status   Additional Comments PT orders received. Holding evaluation at this time pending medical appropriateness as pt is currently on the vent. Will follow and complete eval as appropriate.     Jennifer Ahmadi

## 2024-01-24 NOTE — ASSESSMENT & PLAN NOTE
Stable rate at 80-93  Maintained on Eliquis and Plavix and metoprolol  Hold all 3  Restart Toprol when able  Hold Eliquis and Plavix until okay with GI

## 2024-01-24 NOTE — PROGRESS NOTES
"[facility]  Interval Progress Note: Critical Care  Name: Derek Walter I  MRN: [id number]  Unit/Bed#: -01 I Date of Admission: 1/23/2024   Date of Service: 1/24/2024 I Hospital Day: 1    Interval Events:    Turned off sedation, pt does not follow commands over breaths the ventilator and stacks breath, with the acute onset of altered mentation and stroke like symptoms in a pat with liver cancer after radiation therapy with upper gi bleeding checked ammonia level.   Ammonia level elevated at 130   Due to recent EGD and clip placement choose to do retention enemas   GI is on consult consider further workup         Pertinent New Data:   blood pressure, pulse, temperature, respirations, and pulse oximetry    CBC:   Lab Results   Component Value Date    WBC 9.07 01/23/2024    HGB 7.8 (L) 01/23/2024    HCT 24.3 (L) 01/23/2024    MCV 98 01/23/2024     01/23/2024    RBC 2.60 (L) 01/23/2024    MCH 30.0 01/23/2024    MCHC 30.7 (L) 01/23/2024    RDW 18.2 (H) 01/23/2024    MPV 12.4 01/23/2024    NRBC 0 01/23/2024   , CMP:   Lab Results   Component Value Date    SODIUM 147 01/23/2024    K 4.9 01/23/2024     (H) 01/23/2024    CO2 23 01/23/2024    CO2 13 (L) 01/23/2024     (H) 01/23/2024    CREATININE 1.74 (H) 01/23/2024    GLUCOSE 253 (H) 01/23/2024    CALCIUM 8.2 (L) 01/23/2024    AST 66 (H) 01/23/2024    ALT 50 01/23/2024    ALKPHOS 167 (H) 01/23/2024    EGFR 36 01/23/2024   , PT/INR:   Lab Results   Component Value Date    INR 1.31 (H) 01/23/2024   , Troponin: No results found for: \"TROPONINI\", Magnesium: No components found for: \"MAG\", Phosphorous:   Lab Results   Component Value Date    PHOS 2.2 (L) 01/23/2024     chest xray, abdominal/pelvic CT, and CT headI have personally reviewed pertinent reports.        Assessment and Plan  Diagnosis: hepatic encephalopathy with high ammonia level    Plan: start with lactulose enemas q 6 hours when ok with GI can be changed vis NG " tube   Evaluation by GI for liver dysfunction    Monitor Hgb and BP   Insert rectal tube for enema     Billing Level:  During this visit, Critical care services were medically necessary as this patient had a high probability of imminent or life-threatening deterioration due to acute encephalopathy, Unresponsive, stroke, shock, acute respiratory failure, acute blood loss anemia, GI bleed, and hepatic encephalopathy  , required my direct attention, intervention, and personal management to implement the following: EKG interpretation, CXR interpretation , vent management, directing of titration of sedation, fluid management, bedside management, test review, records review, direct patient care, and documentation of findings.    I have personally provided 20 minutes on 01/24/24 of critical care time, exclusive of procedures, teaching, family meetings, and any prior time recorded by providers other than myself.    SIGNATURE: Tino Burch PA-C

## 2024-01-24 NOTE — ASSESSMENT & PLAN NOTE
patient is 78-year-old male with a history of liver cancer and radiation therapy who was admitted for an upper GI bleed with hypotension and later strokelike symptoms went unresponsive and was intubated, appears to be as cause from the liver with an ammonia level of 130  Due to recent EGD start with retention enemas 200 g by rectum q 6 hours  Input from GI is greatly appreciated  Recheck ammonia in the a.m.  Patient should have 2-3 BMs a day  Most likely source of elevated ammonia level is liver history of liver cancer

## 2024-01-24 NOTE — ASSESSMENT & PLAN NOTE
Lab Results   Component Value Date    EGFR 36 01/23/2024    EGFR 38 01/23/2024    EGFR 36 01/23/2024    CREATININE 1.74 (H) 01/23/2024    CREATININE 1.68 (H) 01/23/2024    CREATININE 1.74 (H) 01/23/2024     Patent 1.63 is approximately baseline  Monitor I's and O's  Hold for toxic agents  Hold Lasix at this time  Patient will need to be restarted on Lasix as soon as possible

## 2024-01-25 ENCOUNTER — APPOINTMENT (INPATIENT)
Dept: RADIOLOGY | Facility: HOSPITAL | Age: 79
DRG: 377 | End: 2024-01-25
Payer: COMMERCIAL

## 2024-01-25 PROBLEM — R41.82 AMS (ALTERED MENTAL STATUS): Status: ACTIVE | Noted: 2024-01-23

## 2024-01-25 LAB
ABO GROUP BLD BPU: NORMAL
ALBUMIN SERPL BCP-MCNC: 2.9 G/DL (ref 3.5–5)
ALP SERPL-CCNC: 152 U/L (ref 34–104)
ALT SERPL W P-5'-P-CCNC: 54 U/L (ref 7–52)
AMMONIA PLAS-SCNC: 61 UMOL/L (ref 18–72)
ANION GAP SERPL CALCULATED.3IONS-SCNC: 10 MMOL/L
ANION GAP SERPL CALCULATED.3IONS-SCNC: 9 MMOL/L
AST SERPL W P-5'-P-CCNC: 76 U/L (ref 13–39)
BASOPHILS # BLD AUTO: 0.03 THOUSANDS/ÂΜL (ref 0–0.1)
BASOPHILS NFR BLD AUTO: 0 % (ref 0–1)
BILIRUB DIRECT SERPL-MCNC: 0.44 MG/DL (ref 0–0.2)
BILIRUB SERPL-MCNC: 0.99 MG/DL (ref 0.2–1)
BPU ID: NORMAL
BUN SERPL-MCNC: 64 MG/DL (ref 5–25)
BUN SERPL-MCNC: 65 MG/DL (ref 5–25)
BUN SERPL-MCNC: 78 MG/DL (ref 5–25)
BUN SERPL-MCNC: 84 MG/DL (ref 5–25)
CA-I BLD-SCNC: 1.04 MMOL/L (ref 1.12–1.32)
CALCIUM SERPL-MCNC: 8.2 MG/DL (ref 8.4–10.2)
CALCIUM SERPL-MCNC: 8.3 MG/DL (ref 8.4–10.2)
CALCIUM SERPL-MCNC: 8.5 MG/DL (ref 8.4–10.2)
CALCIUM SERPL-MCNC: 9 MG/DL (ref 8.4–10.2)
CHLORIDE SERPL-SCNC: 114 MMOL/L (ref 96–108)
CHLORIDE SERPL-SCNC: 116 MMOL/L (ref 96–108)
CHLORIDE SERPL-SCNC: 119 MMOL/L (ref 96–108)
CHLORIDE SERPL-SCNC: 119 MMOL/L (ref 96–108)
CO2 SERPL-SCNC: 21 MMOL/L (ref 21–32)
CO2 SERPL-SCNC: 22 MMOL/L (ref 21–32)
CREAT SERPL-MCNC: 1.46 MG/DL (ref 0.6–1.3)
CREAT SERPL-MCNC: 1.54 MG/DL (ref 0.6–1.3)
CREAT SERPL-MCNC: 1.73 MG/DL (ref 0.6–1.3)
CREAT SERPL-MCNC: 1.81 MG/DL (ref 0.6–1.3)
CROSSMATCH: NORMAL
EOSINOPHIL # BLD AUTO: 0.05 THOUSAND/ÂΜL (ref 0–0.61)
EOSINOPHIL NFR BLD AUTO: 0 % (ref 0–6)
ERYTHROCYTE [DISTWIDTH] IN BLOOD BY AUTOMATED COUNT: 17 % (ref 11.6–15.1)
GFR SERPL CREATININE-BSD FRML MDRD: 35 ML/MIN/1.73SQ M
GFR SERPL CREATININE-BSD FRML MDRD: 37 ML/MIN/1.73SQ M
GFR SERPL CREATININE-BSD FRML MDRD: 42 ML/MIN/1.73SQ M
GFR SERPL CREATININE-BSD FRML MDRD: 45 ML/MIN/1.73SQ M
GLUCOSE SERPL-MCNC: 116 MG/DL (ref 65–140)
GLUCOSE SERPL-MCNC: 119 MG/DL (ref 65–140)
GLUCOSE SERPL-MCNC: 134 MG/DL (ref 65–140)
GLUCOSE SERPL-MCNC: 142 MG/DL (ref 65–140)
GLUCOSE SERPL-MCNC: 146 MG/DL (ref 65–140)
GLUCOSE SERPL-MCNC: 148 MG/DL (ref 65–140)
GLUCOSE SERPL-MCNC: 156 MG/DL (ref 65–140)
GLUCOSE SERPL-MCNC: 156 MG/DL (ref 65–140)
GLUCOSE SERPL-MCNC: 157 MG/DL (ref 65–140)
GLUCOSE SERPL-MCNC: 159 MG/DL (ref 65–140)
GLUCOSE SERPL-MCNC: 160 MG/DL (ref 65–140)
GLUCOSE SERPL-MCNC: 160 MG/DL (ref 65–140)
GLUCOSE SERPL-MCNC: 171 MG/DL (ref 65–140)
GLUCOSE SERPL-MCNC: 178 MG/DL (ref 65–140)
GLUCOSE SERPL-MCNC: 193 MG/DL (ref 65–140)
GLUCOSE SERPL-MCNC: 202 MG/DL (ref 65–140)
HCT VFR BLD AUTO: 22 % (ref 36.5–49.3)
HCT VFR BLD AUTO: 26.3 % (ref 36.5–49.3)
HCT VFR BLD AUTO: 26.7 % (ref 36.5–49.3)
HGB BLD-MCNC: 6.9 G/DL (ref 12–17)
HGB BLD-MCNC: 8.3 G/DL (ref 12–17)
HGB BLD-MCNC: 8.4 G/DL (ref 12–17)
IMM GRANULOCYTES # BLD AUTO: 0.07 THOUSAND/UL (ref 0–0.2)
IMM GRANULOCYTES NFR BLD AUTO: 1 % (ref 0–2)
LYMPHOCYTES # BLD AUTO: 0.79 THOUSANDS/ÂΜL (ref 0.6–4.47)
LYMPHOCYTES NFR BLD AUTO: 7 % (ref 14–44)
MCH RBC QN AUTO: 30.1 PG (ref 26.8–34.3)
MCHC RBC AUTO-ENTMCNC: 31.1 G/DL (ref 31.4–37.4)
MCV RBC AUTO: 97 FL (ref 82–98)
MONOCYTES # BLD AUTO: 1.64 THOUSAND/ÂΜL (ref 0.17–1.22)
MONOCYTES NFR BLD AUTO: 14 % (ref 4–12)
NEUTROPHILS # BLD AUTO: 9.13 THOUSANDS/ÂΜL (ref 1.85–7.62)
NEUTS SEG NFR BLD AUTO: 78 % (ref 43–75)
NRBC BLD AUTO-RTO: 0 /100 WBCS
PLATELET # BLD AUTO: 148 THOUSANDS/UL (ref 149–390)
PMV BLD AUTO: 12.7 FL (ref 8.9–12.7)
POTASSIUM SERPL-SCNC: 3.4 MMOL/L (ref 3.5–5.3)
POTASSIUM SERPL-SCNC: 3.5 MMOL/L (ref 3.5–5.3)
POTASSIUM SERPL-SCNC: 3.8 MMOL/L (ref 3.5–5.3)
POTASSIUM SERPL-SCNC: 3.9 MMOL/L (ref 3.5–5.3)
PROT SERPL-MCNC: 5.4 G/DL (ref 6.4–8.4)
RBC # BLD AUTO: 2.76 MILLION/UL (ref 3.88–5.62)
SODIUM SERPL-SCNC: 145 MMOL/L (ref 135–147)
SODIUM SERPL-SCNC: 146 MMOL/L (ref 135–147)
SODIUM SERPL-SCNC: 149 MMOL/L (ref 135–147)
SODIUM SERPL-SCNC: 150 MMOL/L (ref 135–147)
UNIT DISPENSE STATUS: NORMAL
UNIT PRODUCT CODE: NORMAL
UNIT PRODUCT VOLUME: 350 ML
UNIT RH: NORMAL
WBC # BLD AUTO: 11.71 THOUSAND/UL (ref 4.31–10.16)

## 2024-01-25 PROCEDURE — 82140 ASSAY OF AMMONIA: CPT

## 2024-01-25 PROCEDURE — P9016 RBC LEUKOCYTES REDUCED: HCPCS

## 2024-01-25 PROCEDURE — 85018 HEMOGLOBIN: CPT

## 2024-01-25 PROCEDURE — 94760 N-INVAS EAR/PLS OXIMETRY 1: CPT

## 2024-01-25 PROCEDURE — C9113 INJ PANTOPRAZOLE SODIUM, VIA: HCPCS

## 2024-01-25 PROCEDURE — 71045 X-RAY EXAM CHEST 1 VIEW: CPT

## 2024-01-25 PROCEDURE — 94003 VENT MGMT INPAT SUBQ DAY: CPT

## 2024-01-25 PROCEDURE — 99233 SBSQ HOSP IP/OBS HIGH 50: CPT | Performed by: INTERNAL MEDICINE

## 2024-01-25 PROCEDURE — 93005 ELECTROCARDIOGRAM TRACING: CPT

## 2024-01-25 PROCEDURE — 99291 CRITICAL CARE FIRST HOUR: CPT | Performed by: INTERNAL MEDICINE

## 2024-01-25 PROCEDURE — 80048 BASIC METABOLIC PNL TOTAL CA: CPT

## 2024-01-25 PROCEDURE — 85014 HEMATOCRIT: CPT

## 2024-01-25 PROCEDURE — 82948 REAGENT STRIP/BLOOD GLUCOSE: CPT

## 2024-01-25 PROCEDURE — 80076 HEPATIC FUNCTION PANEL: CPT

## 2024-01-25 PROCEDURE — 82330 ASSAY OF CALCIUM: CPT

## 2024-01-25 PROCEDURE — 94640 AIRWAY INHALATION TREATMENT: CPT

## 2024-01-25 PROCEDURE — 99233 SBSQ HOSP IP/OBS HIGH 50: CPT | Performed by: STUDENT IN AN ORGANIZED HEALTH CARE EDUCATION/TRAINING PROGRAM

## 2024-01-25 PROCEDURE — 85025 COMPLETE CBC W/AUTO DIFF WBC: CPT | Performed by: PHYSICIAN ASSISTANT

## 2024-01-25 RX ORDER — CALCIUM GLUCONATE 20 MG/ML
1 INJECTION, SOLUTION INTRAVENOUS ONCE
Status: COMPLETED | OUTPATIENT
Start: 2024-01-25 | End: 2024-01-25

## 2024-01-25 RX ORDER — DEXTROSE MONOHYDRATE 50 MG/ML
75 INJECTION, SOLUTION INTRAVENOUS CONTINUOUS
Status: DISCONTINUED | OUTPATIENT
Start: 2024-01-25 | End: 2024-01-26

## 2024-01-25 RX ORDER — CALCIUM GLUCONATE 20 MG/ML
2 INJECTION, SOLUTION INTRAVENOUS ONCE
Status: COMPLETED | OUTPATIENT
Start: 2024-01-25 | End: 2024-01-25

## 2024-01-25 RX ORDER — POTASSIUM CHLORIDE 14.9 MG/ML
20 INJECTION INTRAVENOUS ONCE
Status: COMPLETED | OUTPATIENT
Start: 2024-01-25 | End: 2024-01-26

## 2024-01-25 RX ORDER — POTASSIUM CHLORIDE 20MEQ/15ML
40 LIQUID (ML) ORAL ONCE
Status: COMPLETED | OUTPATIENT
Start: 2024-01-25 | End: 2024-01-25

## 2024-01-25 RX ADMIN — LACTULOSE 20 G: 20 SOLUTION ORAL at 09:00

## 2024-01-25 RX ADMIN — AZELASTINE 1 SPRAY: 1 SPRAY, METERED NASAL at 18:22

## 2024-01-25 RX ADMIN — DEXTROSE 75 ML/HR: 5 SOLUTION INTRAVENOUS at 01:20

## 2024-01-25 RX ADMIN — IPRATROPIUM BROMIDE 0.5 MG: 0.5 SOLUTION RESPIRATORY (INHALATION) at 14:22

## 2024-01-25 RX ADMIN — POTASSIUM CHLORIDE 40 MEQ: 1.5 SOLUTION ORAL at 18:21

## 2024-01-25 RX ADMIN — DEXTROSE 75 ML/HR: 5 SOLUTION INTRAVENOUS at 12:28

## 2024-01-25 RX ADMIN — CALCIUM GLUCONATE 1 G: 20 INJECTION, SOLUTION INTRAVENOUS at 01:43

## 2024-01-25 RX ADMIN — LACTULOSE 20 G: 20 SOLUTION ORAL at 20:35

## 2024-01-25 RX ADMIN — AZELASTINE 1 SPRAY: 1 SPRAY, METERED NASAL at 09:03

## 2024-01-25 RX ADMIN — SODIUM CHLORIDE 6 UNITS/HR: 9 INJECTION, SOLUTION INTRAVENOUS at 16:41

## 2024-01-25 RX ADMIN — THIAMINE HYDROCHLORIDE 500 MG: 100 INJECTION, SOLUTION INTRAMUSCULAR; INTRAVENOUS at 13:25

## 2024-01-25 RX ADMIN — PANTOPRAZOLE SODIUM 40 MG: 40 INJECTION, POWDER, FOR SOLUTION INTRAVENOUS at 09:00

## 2024-01-25 RX ADMIN — RIFAXIMIN 550 MG: 550 TABLET ORAL at 09:00

## 2024-01-25 RX ADMIN — LEVALBUTEROL HYDROCHLORIDE 0.63 MG: 0.63 SOLUTION RESPIRATORY (INHALATION) at 01:03

## 2024-01-25 RX ADMIN — CALCIUM GLUCONATE 2 G: 20 INJECTION, SOLUTION INTRAVENOUS at 05:54

## 2024-01-25 RX ADMIN — RIFAXIMIN 550 MG: 550 TABLET ORAL at 20:34

## 2024-01-25 RX ADMIN — IPRATROPIUM BROMIDE 0.5 MG: 0.5 SOLUTION RESPIRATORY (INHALATION) at 01:03

## 2024-01-25 RX ADMIN — LEVALBUTEROL HYDROCHLORIDE 0.63 MG: 0.63 SOLUTION RESPIRATORY (INHALATION) at 07:17

## 2024-01-25 RX ADMIN — POTASSIUM CHLORIDE 20 MEQ: 14.9 INJECTION, SOLUTION INTRAVENOUS at 18:19

## 2024-01-25 RX ADMIN — ACETAMINOPHEN 325MG 650 MG: 325 TABLET ORAL at 20:34

## 2024-01-25 RX ADMIN — CHLORHEXIDINE GLUCONATE 15 ML: 1.2 SOLUTION ORAL at 09:00

## 2024-01-25 RX ADMIN — LEVALBUTEROL HYDROCHLORIDE 0.63 MG: 0.63 SOLUTION RESPIRATORY (INHALATION) at 14:22

## 2024-01-25 RX ADMIN — LACTULOSE 20 G: 20 SOLUTION ORAL at 16:52

## 2024-01-25 RX ADMIN — LEVALBUTEROL HYDROCHLORIDE 0.63 MG: 0.63 SOLUTION RESPIRATORY (INHALATION) at 19:16

## 2024-01-25 RX ADMIN — CHLORHEXIDINE GLUCONATE 15 ML: 1.2 SOLUTION ORAL at 20:35

## 2024-01-25 RX ADMIN — PANTOPRAZOLE SODIUM 40 MG: 40 INJECTION, POWDER, FOR SOLUTION INTRAVENOUS at 20:34

## 2024-01-25 RX ADMIN — IPRATROPIUM BROMIDE 0.5 MG: 0.5 SOLUTION RESPIRATORY (INHALATION) at 19:16

## 2024-01-25 RX ADMIN — IPRATROPIUM BROMIDE 0.5 MG: 0.5 SOLUTION RESPIRATORY (INHALATION) at 07:17

## 2024-01-25 RX ADMIN — THIAMINE HYDROCHLORIDE 500 MG: 100 INJECTION, SOLUTION INTRAMUSCULAR; INTRAVENOUS at 20:34

## 2024-01-25 NOTE — ASSESSMENT & PLAN NOTE
Lab Results   Component Value Date    EGFR 34 01/24/2024    EGFR 33 01/24/2024    EGFR 35 01/24/2024    CREATININE 1.84 (H) 01/24/2024    CREATININE 1.88 (H) 01/24/2024    CREATININE 1.82 (H) 01/24/2024     Patent 1.63 is approximately baseline  Monitor I's and O's  Hold for toxic agents  Hold Lasix at this time  Patient will need to be restarted on Lasix as soon as possible

## 2024-01-25 NOTE — ASSESSMENT & PLAN NOTE
Patient is a 78-year-old male who has been seeing [facility] GI last seen 12/13/2023 patient has a history of anemia is multifactorial chronic due to cancer with iron deficiency and occult GI blood loss on Eliquis review of an EGD with multiple AVMs.  Patient came in to the ER from home due to generalized weakness nausea vomiting since this morning reports lower abdominal pain has a history of anemia is on Eliquis and Plavix episodes of vomiting which were described by the significant other is dark of clotted material, and also had melanotic stool occult positive.  Patient felt significant weakness and was unable to walk felt like too weak to walk.  Patient has a history of an upper GI bleed with multiple AVMs, also has a history of the patient intubation with tracheostomy approximately rehab time of 1 year.   Patient's baseline globin appears to be 8-10, except 1 value on 11/23 of 13.   Patient's hemoglobin was 8.3 received 1 L of fluid and his hemoglobin was 7.3  Ordered 2 units of RBCs with hemoglobins in between it   Significant nausea and abdominal, received Zofran 8 mg IV with no relief started Tigan due to prolonged QT  GI was called by the ER and is aware of the patient   Protonix 80 mg IV given by the ER  Start Protonix drip  Patient is on Eliquis and Plavix-100% sure but she thinks he did not take the Plavix or Eliquis on 1/22  GI wanted Eliquis reversed -ordered DDAVP 0.3 mg/kg and Kcentra  CT scan with contrast showed nonspecific hypodensity within the gastric body possibly reflective of hemorrhage in this patient with the history of gastric AVMs, known liver cancer, cholelithiasis, small volume abdominal pelvic ascites  S/P EGD - 3 large angioectasias in the fundus of the stomach and body of the stomach; bleeding was observed; placed clips; hemostasis achieved; induced coagulation and hemostasis achieved with argon plasma coagulation The esophagus and duodenum appeared normal.  Continue hgb q 6 hour  checks   Started on water flushes ( started at 111)  and PO lactulose for elevated ammonia levels   Restarted D5 W at 75 ml/hour

## 2024-01-25 NOTE — PROGRESS NOTES
Derek Walter  [id number]    78 y.o.  male      ASSESSMENT and PLAN    Acute upper GI bleed/blood loss anemia -78-year-old male with CHF coronary disease atrial fibrillation and history of treated hepatocellular carcinoma on Eliquis and Plavix presenting with coffee-ground emesis, melena, drop in hemoglobin and altered mental status.  Previous evaluation of anemia did reveal gastric and duodenal AVMs.  Due to drop in hemoglobin, presence of obvious upper GI bleeding via emesis and hypotension urgent endoscopy was performed 1/23/2024 showed three oozing vascular ectasias 1 of which was clipped and all of which were cauterized by APC.  Bleeding seems to have abated.  No reports of significant melena and no OG blood evident.  Hemoglobin low at 7, 6.9 last night S/P 1 unit PRBC's (total 3 since admission)  repeat Hg 8.3  Continue to follow H&H and transfuse as needed  Continue n.p.o. for now, okay to give meds per NG tube  Continue IV PPI, pantoprazole 40 mg every 12 hours  Monitor and correct electrolytes  Needs outpt oncology follow up of HCC    Altered mental status - unclear how much of this is due to acute blood loss anemia, MRI negative for acute CVA, elevated ammonia level noted.  Despite absence of any known chronic liver disease, he may have some underlying dysfunction and elevated ammonia could have been precipitated by GI bleed.  Ammonia improved with lactulose/xifaxan but mental status still poor.  Thiamine will be added per primary team.  Wife reports history of encephalopathy of unknown etiology and prolonged intubation in past. Defer to primary team regarding ruling out non-GI etiologies of altered mental status.      Chief Complaint   Patient presents with    Abdominal Pain     Pt via EMS from home with c/o generalized weakness, nausea, and vomiting since the morning. Reports lower abd pain. Reports hx of anemia.       SUBJECTIVE/HPI remains ventilated.  Nonverbal.  No blood or coffee grounds per OG tube  "reported.  Tolerating meds, and free water flushes,  no further melena. History per ICU team.    /67 (BP Location: Left arm)   Pulse (!) 106   Temp 99 °F (37.2 °C) (Esophageal)   Resp 21   Ht 6' (1.829 m)   Wt 95.5 kg (210 lb 8.6 oz)   SpO2 99%   BMI 28.55 kg/m²     PHYSICALEXAM  General appearance: Sedated on ventilator  Head: Normocephalic, without obvious abnormality, intubated, OG tube in place without coffee grounds  Lungs: Coarse breath sounds bilaterally  Heart: S1, S2 normal, no murmur, click, rub or gallop  Abdomen: soft, non-tender; protuberant, bowel sounds normal; no masses,  no organomegaly  Neurologic: Sedated, nonverbal    Lab Results   Component Value Date    GLUCOSE 253 (H) 01/23/2024    CALCIUM 8.3 (L) 01/25/2024    K 3.8 01/25/2024    CO2 21 01/25/2024     (H) 01/25/2024    BUN 78 (H) 01/25/2024    CREATININE 1.73 (H) 01/25/2024     Lab Results   Component Value Date    WBC 11.71 (H) 01/25/2024    HGB 8.3 (L) 01/25/2024    HCT 26.7 (L) 01/25/2024    MCV 97 01/25/2024     (L) 01/25/2024     Lab Results   Component Value Date    ALT 54 (H) 01/25/2024    AST 76 (H) 01/25/2024    ALKPHOS 152 (H) 01/25/2024     No results found for: \"AMYLASE\"  Lab Results   Component Value Date    LIPASE 39 01/23/2024     Lab Results   Component Value Date    IRON 14 (L) 08/07/2023    TIBC 462 (H) 08/07/2023    FERRITIN 10 (L) 08/07/2023     Lab Results   Component Value Date    INR 1.33 (H) 01/24/2024       Counseling / Coordination of Care  Total floor / unit time spent today 30 minutes.                           "

## 2024-01-25 NOTE — ASSESSMENT & PLAN NOTE
Lab Results   Component Value Date    HGBA1C 6.6 (H) 01/24/2024       Recent Labs     01/24/24  1323 01/24/24  1542 01/24/24  1741 01/24/24 2010   POCGLU 191* 178* 142* 148*         Blood Sugar Average: Last 72 hrs:  (P) [id number]    Patient has a Dexcom, on eulogy and Jardiance, Lantus and insulin  Started patient on an insulin drip to get better control of blood glucose  Restart home regimen when able

## 2024-01-25 NOTE — PROGRESS NOTES
[facility]  Progress Note  Name: Derek Walter I  MRN: [id number]  Unit/Bed#: -01 I Date of Admission: 1/23/2024   Date of Service: 1/25/2024 I Hospital Day: 2    Assessment/Plan   * Acute upper GI bleeding  Assessment & Plan  Patient is a 78-year-old male who has been seeing [facility] last seen 12/13/2023 patient has a history of anemia is multifactorial chronic due to cancer with iron deficiency and occult GI blood loss on Eliquis review of an EGD with multiple AVMs.  Patient came in to the ER from home due to generalized weakness nausea vomiting since this morning reports lower abdominal pain has a history of anemia is on Eliquis and Plavix episodes of vomiting which were described by the significant other is dark of clotted material, and also had melanotic stool occult positive.  Patient felt significant weakness and was unable to walk felt like too weak to walk.  Patient has a history of an upper GI bleed with multiple AVMs, also has a history of the patient intubation with tracheostomy approximately rehab time of 1 year.   Patient's baseline globin appears to be 8-10, except 1 value on 11/23 of 13.   Patient's hemoglobin was 8.3 received 1 L of fluid and his hemoglobin was 7.3  Ordered 2 units of RBCs with hemoglobins in between it   Significant nausea and abdominal, received Zofran 8 mg IV with no relief started Tigan due to prolonged QT  GI was called by the ER and is aware of the patient   Protonix 80 mg IV given by the ER  Start Protonix drip  Patient is on Eliquis and Plavix-100% sure but she thinks he did not take the Plavix or Eliquis on 1/22  GI wanted Eliquis reversed -ordered DDAVP 0.3 mg/kg and Kcentra  CT scan with contrast showed nonspecific hypodensity within the gastric body possibly reflective of hemorrhage in this patient with the history of gastric AVMs, known liver cancer, cholelithiasis, small volume abdominal pelvic ascites  S/P EGD - 3 large  angioectasias in the fundus of the stomach and body of the stomach; bleeding was observed; placed clips; hemostasis achieved; induced coagulation and hemostasis achieved with argon plasma coagulation The esophagus and duodenum appeared normal.  Continue hgb q 6 hour checks   Started on water flushes ( started at 111)  and PO lactulose for elevated ammonia levels   Restarted D5 W at 75 ml/hour     Acute blood loss anemia  Assessment & Plan  Hemoglobin 8.3-7.0 point  Receive 2 UPRBC hgb is stable - S/P EGD   Hgb every 6 hours   History of iron deficiency check iron panel  Start iron p.o. when able  Hgb 6.9 - 1 unit of rbc     Stroke-like symptom  Assessment & Plan  0630A RN called provider to bedside to evaluate patient for left facial droop. NIHHS 4 (left facial droop, LUE weakness +4/5, LLE weakness +4/5, LLE drift and mild dysarthria). RN states left facial droop was noticed on arrival to ICU at 0300A. RN states ED could not confirm if droop was present in ED and wife unsure if droop was new. Stroke alert called.   CTH - no acute stroke or hemorrhage  CTA H/N - no large vessel occulusion. No arterial occulusion. 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    Plan:  Patient discussed with Dr. Interiano (Neuro)   TNK not given due to unknown last known well and active GI bleed.  Goal SBP >140  Serial Neuro checks  Hold aspirin and plavix in setting of GI bleed  Hold statin while NPO - counter indicated in heaptic failure   ECHO  MRI brain was negative   Was likely related due to hepatic encephalopathy with ammonia level of 130    Hepatic encephalopathy (HCC)  Assessment & Plan  patient is 78-year-old male with a history of liver cancer and radiation therapy who was admitted for an upper GI bleed with hypotension and later strokelike symptoms went unresponsive and was intubated, appears to be as cause from the liver with an ammonia level of 130  PO lactulose 30 mg PO TID   Input from GI is greatly appreciated  Recheck ammonia  in the a.m.  Patient should have 2-3 BMs a day  Most likely source of elevated ammonia level is liver history of liver cancer  Recheck ammonina in the am     Chronic systolic heart failure (HCC)  Assessment & Plan  Wt Readings from Last 3 Encounters:   01/24/24 91.2 kg (201 lb)   12/20/23 96.6 kg (213 lb)   12/14/23 97 kg (213 lb 12.8 oz)   Patient is 78-year-old male with history of CAD post PCI in August not deemed a candidate for surgical intervention, systolic heart failure with reduced ejection fraction of 36% is on Toprol and Jardiance  Started when able  Continue Plavix when able              Hepatocellular carcinoma (HCC)  Assessment & Plan  Patient has stage II liver cancer hepatocellular carcinoma  Has a tumor thrombus which appears stable seen again on CT scan today  Status post Y 90 treatment   Follows with Dr. Beltran CARBAJAL (coronary artery disease)  Assessment & Plan  Patient has a history of coronary artery disease had 8/2023 a PCI status post cardiac catheterization drug-eluting stents x 3 in the LAD.  Ramus and left circumflex  Continue  Plavix and metoprolol when able  Lipitor with hepatic encephalopathy    History of DVT (deep vein thrombosis)  Assessment & Plan  Patient is on Eliquis is on hold in the setting of GI bleed  And was reversed with Kcentra    Atrial fibrillation, unspecified type (HCC)  Assessment & Plan  Stable rate at 80-93  Maintained on Eliquis and Plavix and metoprolol  Hold all 3  Restart Toprol when able  Hold Eliquis and Plavix until okay with GI    Type 2 diabetes mellitus with neurologic complication, with long-term current use of insulin (HCC)  Assessment & Plan  Lab Results   Component Value Date    HGBA1C 6.6 (H) 01/24/2024       Recent Labs     01/24/24  1323 01/24/24  1542 01/24/24  1741 01/24/24 2010   POCGLU 191* 178* 142* 148*         Blood Sugar Average: Last 72 hrs:  (P) [id number]    Patient has a Dexcom, on eulogy and Jardiance, Lantus and  insulin  Started patient on an insulin drip to get better control of blood glucose  Restart home regimen when able    Stage 3 chronic kidney disease, unspecified whether stage 3a or 3b CKD (HCC)  Assessment & Plan  Lab Results   Component Value Date    EGFR 34 01/24/2024    EGFR 33 01/24/2024    EGFR 35 01/24/2024    CREATININE 1.84 (H) 01/24/2024    CREATININE 1.88 (H) 01/24/2024    CREATININE 1.82 (H) 01/24/2024     Patent 1.63 is approximately baseline  Monitor I's and O's  Hold for toxic agents  Hold Lasix at this time  Patient will need to be restarted on Lasix as soon as possible             Disposition: Critical care    ICU Core Measures     Vented Patient  VAP Bundle  VAP bundle ordered     A: Assess, Prevent, and Manage Pain Has pain been assessed? Yes  Need for changes to pain regimen? NA   B: Both Spontaneous Awakening Trials (SATs) and Spontaneous Breathing Trials (SBTs) Plan to perform spontaneous awakening trial today? Yes   Plan to perform spontaneous breathing trial today? Yes   Obvious barriers to extubation? Yes   C: Choice of Sedation RASS Goal: 0 Alert and Calm  Need for changes to sedation or analgesia regimen? No   D: Delirium CAM-ICU: Unable to perform secondary to Acute cognitive dysfunction   E: Early Mobility  Plan for early mobility? Yes   F: Family Engagement Plan for family engagement today? Yes       Antibiotic Review: Awaiting culture results.     Review of Invasive Devices:            Prophylaxis:  VTE VTE covered by:    None       Stress Ulcer  covered bypantoprazole (PROTONIX) 40 mg tablet [[id number]] (Long-Term Med), pantoprazole (PROTONIX) injection 40 mg [[id number]]         Significant 24hr Events     24hr events:     Patient received 1 unit of RBC for hemoglobin of 6.9  Also received calcium repeat blood transfusion  Started the D5 water at 75 ml an hour  Patient is starting to move more remains on 6/6 PS      Subjective   Review of Systems   Unable to perform ROS: Intubated       Objective           Intubated and sedated                   Vitals I/O      Most Recent Min/Max in 24hrs   Temp 99.1 °F (37.3 °C) Temp  Min: 98.4 °F (36.9 °C)  Max: 99.7 °F (37.6 °C)   Pulse 91 Pulse  Min: 91  Max: 114   Resp 19 Resp  Min: 15  Max: 23   /65 BP  Min: 109/52  Max: 164/74   O2 Sat 100 % SpO2  Min: 97 %  Max: 100 %      Intake/Output Summary (Last 24 hours) at 1/25/2024 0459  Last data filed at 1/25/2024 0400  Gross per 24 hour   Intake 2101.65 ml   Output 1788 ml   Net 313.65 ml       Diet NPO    Invasive Monitoring           Physical Exam   Physical Exam  Vitals and nursing note reviewed.   Eyes:      Extraocular Movements: Extraocular movements intact.      Pupils: Pupils are equal, round, and reactive to light.   Skin:     General: Skin is warm, dry and not mottled extremities.      Capillary Refill: Capillary refill takes less than 2 seconds.      Coloration: Skin is not pale.   HENT:      Head: Normocephalic and atraumatic.   Cardiovascular:      Rate and Rhythm: Normal rate and regular rhythm.      Pulses: Normal pulses.      Heart sounds: Normal heart sounds.   Musculoskeletal:         General: Normal range of motion.      Cervical back: Full passive range of motion without pain, normal range of motion and neck supple.   Abdominal: General: Bowel sounds are normal.      Palpations: Abdomen is soft.   Constitutional:       Appearance: He is well-developed, normal weight and well-nourished. He is ill-appearing.      Interventions: He is sedated and intubated.   Pulmonary:      Effort: Pulmonary effort is normal. He is intubated.      Breath sounds: Normal breath sounds. No wheezing or rhonchi.   Neurological:      General: No focal deficit present.      Mental Status: He is oriented to person, place and time. Mental status is at baseline.      Sensory: Sensation is intact.      Motor: gross motor function is at baseline for patient. Strength full and intact in all extremities.             Diagnostic Studies      EKG: ST / afib   Imaging:  I have personally reviewed pertinent reports.       Medications:  Scheduled PRN   azelastine, 1 spray, BID  calcium gluconate, 2 g, Once  chlorhexidine, 15 mL, Q12H RANDALL  ipratropium, 0.5 mg, Q6H  lactulose, 20 g, TID  levalbuterol, 0.63 mg, Q6H  pantoprazole, 40 mg, Q12H RANDALL  rifaximin, 550 mg, Q12H RANDALL      acetaminophen, 650 mg, Q6H PRN  fentanyl citrate (PF), 50 mcg, Q1H PRN  trimethobenzamide, 200 mg, Q12H PRN       Continuous    dextrose, 75 mL/hr, Last Rate: 75 mL/hr (01/25/24 0120)  insulin regular (HumuLIN R,NovoLIN R) 1 Units/mL in sodium chloride 0.9 % 100 mL infusion, 0.3-21 Units/hr, Last Rate: 3 Units/hr (01/25/24 0406)  propofol, 5-50 mcg/kg/min, Last Rate: Stopped (01/24/24 0807)         Labs:    CBC    Recent Labs     01/23/24  0614 01/23/24  1005 01/24/24  0504 01/24/24  0819 01/24/24 2012 01/25/24  0015   WBC 9.07  --  17.10*  --   --   --    HGB 7.8*   < > 7.5*   < > 7.0* 6.9*   HCT 25.4*   < > 23.4*   < > 22.3* 22.0*     --  188  --   --   --     < > = values in this interval not displayed.     BMP    Recent Labs     01/24/24  1734 01/25/24  0015   SODIUM 149* 150*   K 4.0 3.9   * 119*   CO2 23 22   AGAP 10 9   BUN 92* 84*   CREATININE 1.84* 1.81*   CALCIUM 8.6 8.2*       Coags    Recent Labs     01/23/24  0614 01/24/24  0504   INR 1.31* 1.33*   PTT 34  --         Additional Electrolytes  Recent Labs     01/23/24  0614 01/23/24  1331 01/24/24  0504 01/25/24  0427   MG 2.1  --  2.2  --    PHOS 2.2*  --  3.6  --    CAIONIZED  --    < > 1.03* 1.04*    < > = values in this interval not displayed.          Blood Gas    No recent results  Recent Labs     01/23/24  2354   PHVEN 7.441*   [id number] 33.9*   PO2VEN 46.5*   HEI2QXS 22.6*   BEVEN -1.2   [id number] 81.4*    LFTs  Recent Labs     01/23/24  1608 01/24/24  0504   ALT 50 50   AST 66* 69*   ALKPHOS 167* 140*   ALB 3.3* 2.9*   TBILI 1.04* 0.97       Infectious  No recent results   Glucose  Recent Labs     01/24/24  0504 01/24/24  1329 01/24/24  1734 01/25/24  0015   GLUC 127 194* 135 116               Non-Critical Care Time Statement: I have spent a total time of 30 minutes in caring for this patient including Diagnostic results, Impressions, Documenting in the medical record, Reviewing / ordering tests, medicine, procedures  , and Obtaining or reviewing history  .     Tino Burch PA-C

## 2024-01-25 NOTE — PLAN OF CARE
Problem: Potential for Falls  Goal: Patient will remain free of falls  Description: INTERVENTIONS:  - Educate patient/family on patient safety including physical limitations  - Instruct patient to call for assistance with activity   - Consult OT/PT to assist with strengthening/mobility   - Keep Call bell within reach  - Keep bed low and locked with side rails adjusted as appropriate  - Keep care items and personal belongings within reach  - Initiate and maintain comfort rounds  - Make Fall Risk Sign visible to staff  - Offer Toileting every  Hours, in advance of need  - Initiate/Maintain alarm  - Obtain necessary fall risk management equipment:   - Apply yellow socks and bracelet for high fall risk patients  - Consider moving patient to room near nurses station  Outcome: Progressing     Problem: PAIN - ADULT  Goal: Verbalizes/displays adequate comfort level or baseline comfort level  Description: Interventions:  - Encourage patient to monitor pain and request assistance  - Assess pain using appropriate pain scale  - Administer analgesics based on type and severity of pain and evaluate response  - Implement non-pharmacological measures as appropriate and evaluate response  - Consider cultural and social influences on pain and pain management  - Notify physician/advanced practitioner if interventions unsuccessful or patient reports new pain  Outcome: Progressing     Problem: Knowledge Deficit  Goal: Patient/family/caregiver demonstrates understanding of disease process, treatment plan, medications, and discharge instructions  Description: Complete learning assessment and assess knowledge base.  Interventions:  - Provide teaching at level of understanding  - Provide teaching via preferred learning methods  Outcome: Progressing     Problem: GASTROINTESTINAL - ADULT  Goal: Minimal or absence of nausea and/or vomiting  Description: INTERVENTIONS:  - Administer IV fluids if ordered to ensure adequate hydration  -  Maintain NPO status until nausea and vomiting are resolved  - Nasogastric tube if ordered  - Administer ordered antiemetic medications as needed  - Provide nonpharmacologic comfort measures as appropriate  - Advance diet as tolerated, if ordered  - Consider nutrition services referral to assist patient with adequate nutrition and appropriate food choices  Outcome: Progressing  Goal: Maintains or returns to baseline bowel function  Description: INTERVENTIONS:  - Assess bowel function  - Encourage oral fluids to ensure adequate hydration  - Administer IV fluids if ordered to ensure adequate hydration  - Administer ordered medications as needed  - Encourage mobilization and activity  - Consider nutritional services referral to assist patient with adequate nutrition and appropriate food choices  Outcome: Progressing  Goal: Maintains adequate nutritional intake  Description: INTERVENTIONS:  - Monitor percentage of each meal consumed  - Identify factors contributing to decreased intake, treat as appropriate  - Assist with meals as needed  - Monitor I&O, weight, and lab values if indicated  - Obtain nutrition services referral as needed  Outcome: Progressing  Goal: Establish and maintain optimal ostomy function  Description: INTERVENTIONS:  - Assess bowel function  - Encourage oral fluids to ensure adequate hydration  - Administer IV fluids if ordered to ensure adequate hydration   - Administer ordered medications as needed  - Encourage mobilization and activity  - Nutrition services referral to assist patient with appropriate food choices  - Assess stoma site  - Consider wound care consult   Outcome: Progressing  Goal: Oral mucous membranes remain intact  Description: INTERVENTIONS  - Assess oral mucosa and hygiene practices  - Implement preventative oral hygiene regimen  - Implement oral medicated treatments as ordered  - Initiate Nutrition services referral as needed  Outcome: Progressing     Problem: METABOLIC, FLUID  AND ELECTROLYTES - ADULT  Goal: Electrolytes maintained within normal limits  Description: INTERVENTIONS:  - Monitor labs and assess patient for signs and symptoms of electrolyte imbalances  - Administer electrolyte replacement as ordered  - Monitor response to electrolyte replacements, including repeat lab results as appropriate  - Instruct patient on fluid and nutrition as appropriate  Outcome: Progressing  Goal: Fluid balance maintained  Description: INTERVENTIONS:  - Monitor labs   - Monitor I/O and WT  - Instruct patient on fluid and nutrition as appropriate  - Assess for signs & symptoms of volume excess or deficit  Outcome: Progressing  Goal: Glucose maintained within target range  Description: INTERVENTIONS:  - Monitor Blood Glucose as ordered  - Assess for signs and symptoms of hyperglycemia and hypoglycemia  - Administer ordered medications to maintain glucose within target range  - Assess nutritional intake and initiate nutrition service referral as needed  Outcome: Progressing     Problem: HEMATOLOGIC - ADULT  Goal: Maintains hematologic stability  Description: INTERVENTIONS  - Assess for signs and symptoms of bleeding or hemorrhage  - Monitor labs  - Administer supportive blood products/factors as ordered and appropriate  Outcome: Progressing     Problem: Prexisting or High Potential for Compromised Skin Integrity  Goal: Skin integrity is maintained or improved  Description: INTERVENTIONS:  - Identify patients at risk for skin breakdown  - Assess and monitor skin integrity  - Assess and monitor nutrition and hydration status  - Monitor labs   - Assess for incontinence   - Turn and reposition patient  - Assist with mobility/ambulation  - Relieve pressure over bony prominences  - Avoid friction and shearing  - Provide appropriate hygiene as needed including keeping skin clean and dry  - Evaluate need for skin moisturizer/barrier cream  - Collaborate with interdisciplinary team   - Patient/family  teaching  - Consider wound care consult   Outcome: Progressing     Problem: Nutrition/Hydration-ADULT  Goal: Nutrient/Hydration intake appropriate for improving, restoring or maintaining nutritional needs  Description: Monitor and assess patient's nutrition/hydration status for malnutrition. Collaborate with interdisciplinary team and initiate plan and interventions as ordered.  Monitor patient's weight and dietary intake as ordered or per policy. Utilize nutrition screening tool and intervene as necessary. Determine patient's food preferences and provide high-protein, high-caloric foods as appropriate.     INTERVENTIONS:  - Monitor oral intake, urinary output, labs, and treatment plans  - Assess nutrition and hydration status and recommend course of action  - Evaluate amount of meals eaten  - Assist patient with eating if necessary   - Allow adequate time for meals  - Recommend/ encourage appropriate diets, oral nutritional supplements, and vitamin/mineral supplements  - Order, calculate, and assess calorie counts as needed  - Recommend, monitor, and adjust tube feedings and TPN/PPN based on assessed needs  - Assess need for intravenous fluids  - Provide specific nutrition/hydration education as appropriate  - Include patient/family/caregiver in decisions related to nutrition  Outcome: Progressing     Problem: SAFETY,RESTRAINT: NV/NON-SELF DESTRUCTIVE BEHAVIOR  Goal: Remains free of harm/injury (restraint for non violent/non self-detsructive behavior)  Description: INTERVENTIONS:  - Instruct patient/family regarding restraint use   - Assess and monitor physiologic and psychological status   - Provide interventions and comfort measures to meet assessed patient needs   - Identify and implement measures to help patient regain control  - Assess readiness for release of restraint   Outcome: Progressing  Goal: Returns to optimal restraint-free functioning  Description: INTERVENTIONS:  - Assess the patient's behavior and  symptoms that indicate continued need for restraint  - Identify and implement measures to help patient regain control  - Assess readiness for release of restraint   Outcome: Progressing

## 2024-01-25 NOTE — ASSESSMENT & PLAN NOTE
0630A RN called provider to bedside to evaluate patient for left facial droop. NIHHS 4 (left facial droop, LUE weakness +4/5, LLE weakness +4/5, LLE drift and mild dysarthria). RN states left facial droop was noticed on arrival to ICU at 0300A. RN states ED could not confirm if droop was present in ED and wife unsure if droop was new. Stroke alert called.   CTH - no acute stroke or hemorrhage  CTA H/N - no large vessel occulusion. No arterial occulusion. 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    Plan:  Patient discussed with Dr. Interiano (Neuro)   TNK not given due to unknown last known well and active GI bleed.  Goal SBP >140  Serial Neuro checks  Hold aspirin and plavix in setting of GI bleed  Hold statin while NPO - counter indicated in heaptic failure   ECHO  MRI brain was negative   Was likely related due to hepatic encephalopathy with ammonia level of 130

## 2024-01-25 NOTE — ASSESSMENT & PLAN NOTE
patient is 78-year-old male with a history of liver cancer and radiation therapy who was admitted for an upper GI bleed with hypotension and later strokelike symptoms went unresponsive and was intubated, appears to be as cause from the liver with an ammonia level of 130  PO lactulose 30 mg PO TID   Input from GI is greatly appreciated  Recheck ammonia in the a.m.  Patient should have 2-3 BMs a day  Most likely source of elevated ammonia level is liver history of liver cancer  Recheck ammonina in the am

## 2024-01-25 NOTE — ASSESSMENT & PLAN NOTE
Hemoglobin 8.3-7.0 point  Receive 2 UPRBC hgb is stable - S/P EGD   Hgb every 6 hours   History of iron deficiency check iron panel  Start iron p.o. when able  Hgb 6.9 - 1 unit of rbc

## 2024-01-25 NOTE — PROGRESS NOTES
Progress Note - Neurology   Derek Walter 78 y.o. male [id number]  Unit/Bed#: /-01    Assessment/Plan:  AMS (altered mental status)  Assessment & Plan  78 year old male with history of GI bleeds with multiple AVMs, anemia, CHF, hepatocellular carcinoma stage II s/p Y90 treatment, CAD on Plavix, CKD, DM, history of DVT, HLD, HTN, RBBB, anemia and A-fib on Eliquis who originally presented to the ED on 1/23 for increasing weakness, abdominal pain and feeling generally unwell.  Patient also was found to have melanotic stools and had an episode of bloody emesis x 1.    BP on arrival 117/53. Initial workup was concerning for acute upper GIB. GI was consulted and patient received 2 units of RBCs, Kcentra and DDAVP out of concern for acute upper GI bleeding.  He was then admitted to stepdown for further monitoring.      Stroke alert initiated the morning of 1/23 for for left-sided facial droop. Exam was notable for left-sided weakness, LLE drift and mild dysarthria.  NIH 2-3.  Unknown last known well.  Neuroimaging unremarkable for acute intracranial abnormality.  IV thrombolytics were deferred due to unknown last known well and active GI bleeding requiring recent transfusions.  It was recommended to trial IV pressors for BP management out of concern for hypoperfusion (systolic BP noted to drop as low as 84); however unable to maintain SBP >140 despite IVF, albumin, 1UPRBC and phenylephrine gtt. As initial stroke like symptoms had improved, and as there was concern for cardiogenic shock in the setting ischemic cardiomyopathy (prior EF 35%) and phenylephrine gtt, systolic goal lowered with goal MAP >65.     Subsequently, patient's mental status was noted to rapidly decline after the stroke alert with patient becoming increasingly confused, agitated and increasingly lethargic. Ultimately, patient required intubation for worsening encephalopathy and airway protection.  Patient now s/p endoscopy with placement of 3  clips in three areas of angioectasis.    1/25 neuro exam (no sedation), patient stuporous with no command following and no spontaneous movement. Corneals and cough intact. L facial asymmetry, chronic. Symmetrically responds to noxious stimuli in extremities x 4 (BLEs > BUEs).     Workup:  - Labs remarkable for leukocytosis (9-->17-->11), anemia, hyperkalemia (improved).    - CT A/P with contrast:  Nonspecific strandy hyperdensities within the gastric body possibly reflective of hemorrhage in this patient with history? Gastric AVMs per chart review in [facility]. GI consultation advised.  Similar appearance of known segment 6 and 7 hepatic masses compatible with multifocal HCC compared to the previous MRI accounting for differences in modality. Somewhat worsened heterogeneous/mottled appearance of the hepatic parenchyma in the anterior right and left hepatic lobes which may reflect disease progression and/or treatment related changes. 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 Continued follow-up per oncology recommended.  Cholelithiasis.  Similar small volume abdominopelvic ascites.  - CTH  negative for acute intracranial abnormality.    - CTA head/neck revealed 50 to 66% stenosis of proximal left ICA secondary to atheromatous and atherosclerotic disease.    - Echo: EF 75%, bilateral atria appearing normal in size but not well visualized. Limited study overall due to poor visualization  - MRI brain wo negative for acute intracranial abnormalities. Findings suggestive of chronic microangiopathy.  - Ammonia 135->108->61  - A1C 6.6    MRI is negative for stroke. Continue to suspect TME in the setting of hyperammonemia, elevated BUN and acute GI bleed/acute illness. Continue serial exams. Would expect gradual improvement as metabolic derangements improve. No further inpatient neurologic recommendations at this time. Additional recommendations as per Attending Neurologist  "Attestation.    Plan:  - s/p DDAVP and Kcentra for GIB.   - Hold AC/AP in the setting of GI bleed until permitted by GI  - Continue with Atorvastatin 40 mg daily (home medication)  - Goal of normotension  - Euglycemic, normothermic goal  - Continue telemetry  - PT/OT/ST  - Frequent neuro checks. Continue to monitor and notify neurology with any changes.  - STAT CT head for any acute change in neuro exam  - Medical management and supportive care, including correction of any metabolic or infectious disturbances, as per primary team and GI .         Derek Walter will not need outpatient follow up with Neurology. He will not require outpatient neurological testing.    Subjective:   Ammonia trending down, now at 65.  S/p endoscopy with placement of 3 clips in three areas of angioectasis. Per GI, \" AMS probably due to severe anemia.\"  Starting trickle feeds.      Past Medical History:   Diagnosis Date    Anemia     Atrial fibrillation (HCC)     Eliquis    AVB (atrioventricular block)     1st degree    CAD (coronary artery disease)     CKD (chronic kidney disease), stage III (HCC)     baseline Cr 1.2-1.6    Colon polyp     Diabetes mellitus (HCC)     type 2, insulin dependent    Diverticulosis     Emphysema lung (HCC)     Former tobacco use     History of asbestos exposure     History of DVT (deep vein thrombosis)     RLE, tx w/ AC    History of GI bleed 08/2023    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, given PRBC/Venofer for anemia while in house    Hyperlipidemia     Hypertension     LAFB (left anterior fascicular block)     Liver cancer (HCC)     Liver mass 08/2023    suspected HCC, needs radioembolization    RBBB     Syncope 08/07/2023     Past Surgical History:   Procedure Laterality Date    APPENDECTOMY      BOWEL RESECTION  2014    CARDIAC CATHETERIZATION      CARDIAC CATHETERIZATION N/A 08/25/2023    Procedure: Cardiac pci;  Surgeon: Dom Garrett DO;  Location: [facility];  Service: " Cardiology    CARDIAC CATHETERIZATION N/A 2023    Procedure: Cardiac catheterization;  Surgeon: Dom Garrett DO;  Location: [facility];  Service: Cardiology    CARDIAC CATHETERIZATION N/A 2023    Procedure: Cardiac Coronary Angiogram;  Surgeon: Dom Garrett DO;  Location: [facility];  Service: Cardiology    CATARACT EXTRACTION Bilateral     COLONOSCOPY      EGD      IR Y-90 PRE-ANGIO/EMBO W/ LUNG SCAN  2023    IR Y-90 RADIOEMBOLIZATION  2023     Family History   Problem Relation Age of Onset    Hypertension Mother     Bone cancer Father     Diabetes type II Sister     Diabetes type II Sister     Esophageal cancer Brother     Colon cancer Neg Hx      Social History     Socioeconomic History    Marital status: /Civil Union     Spouse name: None    Number of children: None    Years of education: None    Highest education level: None   Occupational History    None   Tobacco Use    Smoking status: Former     Current packs/day: 0.00     Average packs/day: 1 pack/day for 30.0 years (30.0 ttl pk-yrs)     Types: Cigarettes     Start date:      Quit date:      Years since quittin.0    Smokeless tobacco: Never   Vaping Use    Vaping status: Never Used   Substance and Sexual Activity    Alcohol use: Yes     Comment: Socially    Drug use: Never    Sexual activity: None   Other Topics Concern    None   Social History Narrative    None     Social Determinants of Health     Financial Resource Strain: Low Risk  (10/13/2023)    Overall Financial Resource Strain (CARDIA)     Difficulty of Paying Living Expenses: Not hard at all   Food Insecurity: No Food Insecurity (2024)    Hunger Vital Sign     Worried About Running Out of Food in the Last Year: Never true     Ran Out of Food in the Last Year: Never true   Transportation Needs: No Transportation Needs (2024)    PRAPARE - Transportation     Lack of Transportation (Medical): No     Lack of  Transportation (Non-Medical): No   Physical Activity: Not on file   Stress: Not on file   Social Connections: Not on file   Intimate Partner Violence: Not on file   Housing Stability: Low Risk  (1/24/2024)    Housing Stability Vital Sign     Unable to Pay for Housing in the Last Year: No     Number of Places Lived in the Last Year: 1     Unstable Housing in the Last Year: No       Medications: Reviewed in detail by me.    ROS: Review of Systems   Unable to perform ROS: Intubated        Vitals: /61   Pulse 83   Temp 100 °F (37.8 °C) (Esophageal)   Resp 22   Ht 6' (1.829 m)   Wt 95.5 kg (210 lb 8.6 oz)   SpO2 100%   BMI 28.55 kg/m²     Physical Exam:   Physical Exam  Eyes:      Pupils: Pupils are equal, round, and reactive to light.   Pulmonary:      Effort: No respiratory distress.      Comments: intubated  Skin:     General: Skin is warm and dry.       Neurologic Exam     Mental Status   Stuporous. Resists eye opening. No command following. No attempts at communication.      Cranial Nerves     CN III, IV, VI   Pupils are equal, round, and reactive to light.  Conjugate gaze: present    CN V   Right corneal reflex: normal  Left corneal reflex: normal  L ptosis with facial asymmetry (chronic)  +Cough     Motor Exam   Muscle bulk: normal  Spontaneous head/neck movement.  Minimal spontaneous extremity movement noted, though on one occasion did observe patient bend each leg with minimal attempt to reposition.     Sensory Exam   Localizes to noxious stimuli  in all extremities BLEs> BUEs more briskly than day prior.       Labs: Reviewed in detail by me.     Imaging: I have personally reviewed pertinent imaging and PACS reports.     VTE Prophylaxis: SCDs    Total time spent today 15 minutes. Greater than 50% of total time was spent with the patient and / or family counseling and / or coordination of care. A description of the counseling / coordination of care: evaluating patient, reviewing MRI and labs and  discussing status with critical care team.

## 2024-01-25 NOTE — OCCUPATIONAL THERAPY NOTE
Occupational Therapy Cancellation    Patient Name: Derek Walter  Today's Date: 1/25/2024 01/25/24 0753   OT Last Visit   OT Visit Date 01/25/24   Note Type   Note type Cancelled Session   Cancel Reasons Medical status   Additional Comments OT orders continue. Pt remains on ventilator and restraints. OT to discontinue orders. Please reconsult when medically stable for evaluation.     Jennifer Cintron MS, OTR/L

## 2024-01-25 NOTE — ASSESSMENT & PLAN NOTE
Wt Readings from Last 3 Encounters:   01/24/24 91.2 kg (201 lb)   12/20/23 96.6 kg (213 lb)   12/14/23 97 kg (213 lb 12.8 oz)   Patient is 78-year-old male with history of CAD post PCI in August not deemed a candidate for surgical intervention, systolic heart failure with reduced ejection fraction of 36% is on Toprol and Jardiance  Started when able  Continue Plavix when able

## 2024-01-25 NOTE — PHYSICAL THERAPY NOTE
Physical Therapy Cancellation Note       01/25/24 1052   PT Last Visit   PT Visit Date 01/25/24   Note Type   Note type Cancelled Session   Cancel Reasons Medical status   Additional Comments Currently intubated and in restraints. Will hold until medically stable     Marlene Bernabe

## 2024-01-25 NOTE — PLAN OF CARE
Problem: Potential for Falls  Goal: Patient will remain free of falls  Description: INTERVENTIONS:  - Educate patient/family on patient safety including physical limitations  - Instruct patient to call for assistance with activity   - Consult OT/PT to assist with strengthening/mobility   - Keep Call bell within reach  - Keep bed low and locked with side rails adjusted as appropriate  - Keep care items and personal belongings within reach  - Initiate and maintain comfort rounds  - Make Fall Risk Sign visible to staff  - Offer Toileting every  Hours, in advance of need  - Initiate/Maintain alarm  - Obtain necessary fall risk management equipment:   - Apply yellow socks and bracelet for high fall risk patients  - Consider moving patient to room near nurses station  Outcome: Progressing     Problem: PAIN - ADULT  Goal: Verbalizes/displays adequate comfort level or baseline comfort level  Description: Interventions:  - Encourage patient to monitor pain and request assistance  - Assess pain using appropriate pain scale  - Administer analgesics based on type and severity of pain and evaluate response  - Implement non-pharmacological measures as appropriate and evaluate response  - Consider cultural and social influences on pain and pain management  - Notify physician/advanced practitioner if interventions unsuccessful or patient reports new pain  Outcome: Progressing     Problem: Knowledge Deficit  Goal: Patient/family/caregiver demonstrates understanding of disease process, treatment plan, medications, and discharge instructions  Description: Complete learning assessment and assess knowledge base.  Interventions:  - Provide teaching at level of understanding  - Provide teaching via preferred learning methods  Outcome: Progressing     Problem: GASTROINTESTINAL - ADULT  Goal: Minimal or absence of nausea and/or vomiting  Description: INTERVENTIONS:  - Administer IV fluids if ordered to ensure adequate hydration  -  Maintain NPO status until nausea and vomiting are resolved  - Nasogastric tube if ordered  - Administer ordered antiemetic medications as needed  - Provide nonpharmacologic comfort measures as appropriate  - Advance diet as tolerated, if ordered  - Consider nutrition services referral to assist patient with adequate nutrition and appropriate food choices  Outcome: Progressing  Goal: Maintains or returns to baseline bowel function  Description: INTERVENTIONS:  - Assess bowel function  - Encourage oral fluids to ensure adequate hydration  - Administer IV fluids if ordered to ensure adequate hydration  - Administer ordered medications as needed  - Encourage mobilization and activity  - Consider nutritional services referral to assist patient with adequate nutrition and appropriate food choices  Outcome: Progressing  Goal: Maintains adequate nutritional intake  Description: INTERVENTIONS:  - Monitor percentage of each meal consumed  - Identify factors contributing to decreased intake, treat as appropriate  - Assist with meals as needed  - Monitor I&O, weight, and lab values if indicated  - Obtain nutrition services referral as needed  Outcome: Progressing  Goal: Establish and maintain optimal ostomy function  Description: INTERVENTIONS:  - Assess bowel function  - Encourage oral fluids to ensure adequate hydration  - Administer IV fluids if ordered to ensure adequate hydration   - Administer ordered medications as needed  - Encourage mobilization and activity  - Nutrition services referral to assist patient with appropriate food choices  - Assess stoma site  - Consider wound care consult   Outcome: Progressing  Goal: Oral mucous membranes remain intact  Description: INTERVENTIONS  - Assess oral mucosa and hygiene practices  - Implement preventative oral hygiene regimen  - Implement oral medicated treatments as ordered  - Initiate Nutrition services referral as needed  Outcome: Progressing     Problem: METABOLIC, FLUID  AND ELECTROLYTES - ADULT  Goal: Electrolytes maintained within normal limits  Description: INTERVENTIONS:  - Monitor labs and assess patient for signs and symptoms of electrolyte imbalances  - Administer electrolyte replacement as ordered  - Monitor response to electrolyte replacements, including repeat lab results as appropriate  - Instruct patient on fluid and nutrition as appropriate  Outcome: Progressing  Goal: Fluid balance maintained  Description: INTERVENTIONS:  - Monitor labs   - Monitor I/O and WT  - Instruct patient on fluid and nutrition as appropriate  - Assess for signs & symptoms of volume excess or deficit  Outcome: Progressing  Goal: Glucose maintained within target range  Description: INTERVENTIONS:  - Monitor Blood Glucose as ordered  - Assess for signs and symptoms of hyperglycemia and hypoglycemia  - Administer ordered medications to maintain glucose within target range  - Assess nutritional intake and initiate nutrition service referral as needed  Outcome: Progressing     Problem: HEMATOLOGIC - ADULT  Goal: Maintains hematologic stability  Description: INTERVENTIONS  - Assess for signs and symptoms of bleeding or hemorrhage  - Monitor labs  - Administer supportive blood products/factors as ordered and appropriate  Outcome: Progressing     Problem: Prexisting or High Potential for Compromised Skin Integrity  Goal: Skin integrity is maintained or improved  Description: INTERVENTIONS:  - Identify patients at risk for skin breakdown  - Assess and monitor skin integrity  - Assess and monitor nutrition and hydration status  - Monitor labs   - Assess for incontinence   - Turn and reposition patient  - Assist with mobility/ambulation  - Relieve pressure over bony prominences  - Avoid friction and shearing  - Provide appropriate hygiene as needed including keeping skin clean and dry  - Evaluate need for skin moisturizer/barrier cream  - Collaborate with interdisciplinary team   - Patient/family  teaching  - Consider wound care consult   Outcome: Progressing     Problem: Nutrition/Hydration-ADULT  Goal: Nutrient/Hydration intake appropriate for improving, restoring or maintaining nutritional needs  Description: Monitor and assess patient's nutrition/hydration status for malnutrition. Collaborate with interdisciplinary team and initiate plan and interventions as ordered.  Monitor patient's weight and dietary intake as ordered or per policy. Utilize nutrition screening tool and intervene as necessary. Determine patient's food preferences and provide high-protein, high-caloric foods as appropriate.     INTERVENTIONS:  - Monitor oral intake, urinary output, labs, and treatment plans  - Assess nutrition and hydration status and recommend course of action  - Evaluate amount of meals eaten  - Assist patient with eating if necessary   - Allow adequate time for meals  - Recommend/ encourage appropriate diets, oral nutritional supplements, and vitamin/mineral supplements  - Order, calculate, and assess calorie counts as needed  - Recommend, monitor, and adjust tube feedings and TPN/PPN based on assessed needs  - Assess need for intravenous fluids  - Provide specific nutrition/hydration education as appropriate  - Include patient/family/caregiver in decisions related to nutrition  Outcome: Progressing

## 2024-01-26 LAB
ANION GAP SERPL CALCULATED.3IONS-SCNC: 7 MMOL/L
ANION GAP SERPL CALCULATED.3IONS-SCNC: 7 MMOL/L
ANION GAP SERPL CALCULATED.3IONS-SCNC: 8 MMOL/L
ANION GAP SERPL CALCULATED.3IONS-SCNC: 9 MMOL/L
ATRIAL RATE: 110 BPM
ATRIAL RATE: 93 BPM
BASE EXCESS BLDA CALC-SCNC: -4 MMOL/L (ref -2–3)
BASOPHILS # BLD AUTO: 0.01 THOUSANDS/ÂΜL (ref 0–0.1)
BASOPHILS NFR BLD AUTO: 0 % (ref 0–1)
BUN SERPL-MCNC: 35 MG/DL (ref 5–25)
BUN SERPL-MCNC: 39 MG/DL (ref 5–25)
BUN SERPL-MCNC: 48 MG/DL (ref 5–25)
BUN SERPL-MCNC: 53 MG/DL (ref 5–25)
CA-I BLD-SCNC: 1.1 MMOL/L (ref 1.12–1.32)
CA-I BLD-SCNC: 1.27 MMOL/L (ref 1.12–1.32)
CALCIUM SERPL-MCNC: 8.3 MG/DL (ref 8.4–10.2)
CALCIUM SERPL-MCNC: 8.3 MG/DL (ref 8.4–10.2)
CALCIUM SERPL-MCNC: 8.5 MG/DL (ref 8.4–10.2)
CALCIUM SERPL-MCNC: 8.5 MG/DL (ref 8.4–10.2)
CHLORIDE SERPL-SCNC: 112 MMOL/L (ref 96–108)
CHLORIDE SERPL-SCNC: 113 MMOL/L (ref 96–108)
CHLORIDE SERPL-SCNC: 113 MMOL/L (ref 96–108)
CHLORIDE SERPL-SCNC: 115 MMOL/L (ref 96–108)
CO2 SERPL-SCNC: 20 MMOL/L (ref 21–32)
CO2 SERPL-SCNC: 20 MMOL/L (ref 21–32)
CO2 SERPL-SCNC: 21 MMOL/L (ref 21–32)
CO2 SERPL-SCNC: 21 MMOL/L (ref 21–32)
CREAT SERPL-MCNC: 1.18 MG/DL (ref 0.6–1.3)
CREAT SERPL-MCNC: 1.27 MG/DL (ref 0.6–1.3)
CREAT SERPL-MCNC: 1.32 MG/DL (ref 0.6–1.3)
CREAT SERPL-MCNC: 1.38 MG/DL (ref 0.6–1.3)
EOSINOPHIL # BLD AUTO: 0.15 THOUSAND/ÂΜL (ref 0–0.61)
EOSINOPHIL NFR BLD AUTO: 2 % (ref 0–6)
ERYTHROCYTE [DISTWIDTH] IN BLOOD BY AUTOMATED COUNT: 16.2 % (ref 11.6–15.1)
FIO2 GAS DIL.REBREATH: 40 L
GFR SERPL CREATININE-BSD FRML MDRD: 48 ML/MIN/1.73SQ M
GFR SERPL CREATININE-BSD FRML MDRD: 51 ML/MIN/1.73SQ M
GFR SERPL CREATININE-BSD FRML MDRD: 54 ML/MIN/1.73SQ M
GFR SERPL CREATININE-BSD FRML MDRD: 59 ML/MIN/1.73SQ M
GLUCOSE SERPL-MCNC: 116 MG/DL (ref 65–140)
GLUCOSE SERPL-MCNC: 130 MG/DL (ref 65–140)
GLUCOSE SERPL-MCNC: 131 MG/DL (ref 65–140)
GLUCOSE SERPL-MCNC: 136 MG/DL (ref 65–140)
GLUCOSE SERPL-MCNC: 139 MG/DL (ref 65–140)
GLUCOSE SERPL-MCNC: 139 MG/DL (ref 65–140)
GLUCOSE SERPL-MCNC: 142 MG/DL (ref 65–140)
GLUCOSE SERPL-MCNC: 142 MG/DL (ref 65–140)
GLUCOSE SERPL-MCNC: 145 MG/DL (ref 65–140)
GLUCOSE SERPL-MCNC: 160 MG/DL (ref 65–140)
GLUCOSE SERPL-MCNC: 161 MG/DL (ref 65–140)
GLUCOSE SERPL-MCNC: 162 MG/DL (ref 65–140)
GLUCOSE SERPL-MCNC: 164 MG/DL (ref 65–140)
GLUCOSE SERPL-MCNC: 179 MG/DL (ref 65–140)
GLUCOSE SERPL-MCNC: 188 MG/DL (ref 65–140)
GLUCOSE SERPL-MCNC: 191 MG/DL (ref 65–140)
GLUCOSE SERPL-MCNC: 194 MG/DL (ref 65–140)
HCO3 BLDA-SCNC: 19.5 MMOL/L (ref 22–28)
HCT VFR BLD AUTO: 25.8 % (ref 36.5–49.3)
HCT VFR BLD AUTO: 26.3 % (ref 36.5–49.3)
HCT VFR BLD CALC: 23 % (ref 36.5–49.3)
HGB BLD-MCNC: 8.2 G/DL (ref 12–17)
HGB BLD-MCNC: 8.3 G/DL (ref 12–17)
HGB BLDA-MCNC: 7.8 G/DL (ref 12–17)
IMM GRANULOCYTES # BLD AUTO: 0.05 THOUSAND/UL (ref 0–0.2)
IMM GRANULOCYTES NFR BLD AUTO: 1 % (ref 0–2)
LYMPHOCYTES # BLD AUTO: 0.59 THOUSANDS/ÂΜL (ref 0.6–4.47)
LYMPHOCYTES NFR BLD AUTO: 6 % (ref 14–44)
MAGNESIUM SERPL-MCNC: 2.1 MG/DL (ref 1.9–2.7)
MCH RBC QN AUTO: 30.3 PG (ref 26.8–34.3)
MCHC RBC AUTO-ENTMCNC: 31.2 G/DL (ref 31.4–37.4)
MCV RBC AUTO: 97 FL (ref 82–98)
MONOCYTES # BLD AUTO: 0.99 THOUSAND/ÂΜL (ref 0.17–1.22)
MONOCYTES NFR BLD AUTO: 11 % (ref 4–12)
NEUTROPHILS # BLD AUTO: 7.37 THOUSANDS/ÂΜL (ref 1.85–7.62)
NEUTS SEG NFR BLD AUTO: 80 % (ref 43–75)
NRBC BLD AUTO-RTO: 0 /100 WBCS
PCO2 BLD: 20 MMOL/L (ref 21–32)
PCO2 BLD: 27.9 MM HG (ref 36–44)
PH BLD: 7.45 [PH] (ref 7.35–7.45)
PHOSPHATE SERPL-MCNC: 2.1 MG/DL (ref 2.3–4.1)
PHOSPHATE SERPL-MCNC: 2.8 MG/DL (ref 2.3–4.1)
PLATELET # BLD AUTO: 103 THOUSANDS/UL (ref 149–390)
PMV BLD AUTO: 11.4 FL (ref 8.9–12.7)
PO2 BLD: 142 MM HG (ref 75–129)
POTASSIUM BLD-SCNC: 3.7 MMOL/L (ref 3.5–5.3)
POTASSIUM SERPL-SCNC: 3.7 MMOL/L (ref 3.5–5.3)
POTASSIUM SERPL-SCNC: 3.9 MMOL/L (ref 3.5–5.3)
POTASSIUM SERPL-SCNC: 4.2 MMOL/L (ref 3.5–5.3)
POTASSIUM SERPL-SCNC: 4.2 MMOL/L (ref 3.5–5.3)
QRS AXIS: -43 DEGREES
QRS AXIS: -47 DEGREES
QRSD INTERVAL: 132 MS
QRSD INTERVAL: 136 MS
QT INTERVAL: 332 MS
QT INTERVAL: 400 MS
QTC INTERVAL: 447 MS
QTC INTERVAL: 456 MS
RBC # BLD AUTO: 2.71 MILLION/UL (ref 3.88–5.62)
SAO2 % BLD FROM PO2: 99 % (ref 60–85)
SODIUM BLD-SCNC: 143 MMOL/L (ref 136–145)
SODIUM SERPL-SCNC: 140 MMOL/L (ref 135–147)
SODIUM SERPL-SCNC: 141 MMOL/L (ref 135–147)
SODIUM SERPL-SCNC: 142 MMOL/L (ref 135–147)
SODIUM SERPL-SCNC: 143 MMOL/L (ref 135–147)
SPECIMEN SOURCE: ABNORMAL
T WAVE AXIS: 20 DEGREES
T WAVE AXIS: 41 DEGREES
VENTRICULAR RATE: 109 BPM
VENTRICULAR RATE: 78 BPM
WBC # BLD AUTO: 9.16 THOUSAND/UL (ref 4.31–10.16)

## 2024-01-26 PROCEDURE — C9113 INJ PANTOPRAZOLE SODIUM, VIA: HCPCS

## 2024-01-26 PROCEDURE — 99231 SBSQ HOSP IP/OBS SF/LOW 25: CPT | Performed by: INTERNAL MEDICINE

## 2024-01-26 PROCEDURE — 82330 ASSAY OF CALCIUM: CPT | Performed by: NURSE PRACTITIONER

## 2024-01-26 PROCEDURE — 82330 ASSAY OF CALCIUM: CPT

## 2024-01-26 PROCEDURE — 94003 VENT MGMT INPAT SUBQ DAY: CPT

## 2024-01-26 PROCEDURE — 84295 ASSAY OF SERUM SODIUM: CPT

## 2024-01-26 PROCEDURE — 82948 REAGENT STRIP/BLOOD GLUCOSE: CPT

## 2024-01-26 PROCEDURE — 83735 ASSAY OF MAGNESIUM: CPT | Performed by: NURSE PRACTITIONER

## 2024-01-26 PROCEDURE — 80048 BASIC METABOLIC PNL TOTAL CA: CPT

## 2024-01-26 PROCEDURE — 94760 N-INVAS EAR/PLS OXIMETRY 1: CPT

## 2024-01-26 PROCEDURE — 94640 AIRWAY INHALATION TREATMENT: CPT

## 2024-01-26 PROCEDURE — 85014 HEMATOCRIT: CPT

## 2024-01-26 PROCEDURE — 85025 COMPLETE CBC W/AUTO DIFF WBC: CPT

## 2024-01-26 PROCEDURE — 84100 ASSAY OF PHOSPHORUS: CPT | Performed by: NURSE PRACTITIONER

## 2024-01-26 PROCEDURE — NC001 PR NO CHARGE: Performed by: STUDENT IN AN ORGANIZED HEALTH CARE EDUCATION/TRAINING PROGRAM

## 2024-01-26 PROCEDURE — 36600 WITHDRAWAL OF ARTERIAL BLOOD: CPT

## 2024-01-26 PROCEDURE — 82947 ASSAY GLUCOSE BLOOD QUANT: CPT

## 2024-01-26 PROCEDURE — 85018 HEMOGLOBIN: CPT

## 2024-01-26 PROCEDURE — 84132 ASSAY OF SERUM POTASSIUM: CPT

## 2024-01-26 PROCEDURE — 84100 ASSAY OF PHOSPHORUS: CPT

## 2024-01-26 PROCEDURE — 99291 CRITICAL CARE FIRST HOUR: CPT | Performed by: INTERNAL MEDICINE

## 2024-01-26 PROCEDURE — 82803 BLOOD GASES ANY COMBINATION: CPT

## 2024-01-26 RX ORDER — LACTULOSE 10 G/15ML
30 SOLUTION ORAL 3 TIMES DAILY
Status: DISCONTINUED | OUTPATIENT
Start: 2024-01-26 | End: 2024-01-27

## 2024-01-26 RX ORDER — SODIUM CHLORIDE, SODIUM GLUCONATE, SODIUM ACETATE, POTASSIUM CHLORIDE, MAGNESIUM CHLORIDE, SODIUM PHOSPHATE, DIBASIC, AND POTASSIUM PHOSPHATE .53; .5; .37; .037; .03; .012; .00082 G/100ML; G/100ML; G/100ML; G/100ML; G/100ML; G/100ML; G/100ML
20 INJECTION, SOLUTION INTRAVENOUS CONTINUOUS
Status: DISCONTINUED | OUTPATIENT
Start: 2024-01-26 | End: 2024-01-27

## 2024-01-26 RX ORDER — POTASSIUM CHLORIDE 20MEQ/15ML
20 LIQUID (ML) ORAL ONCE
Status: COMPLETED | OUTPATIENT
Start: 2024-01-26 | End: 2024-01-26

## 2024-01-26 RX ORDER — CALCIUM GLUCONATE 20 MG/ML
1 INJECTION, SOLUTION INTRAVENOUS ONCE
Status: COMPLETED | OUTPATIENT
Start: 2024-01-26 | End: 2024-01-26

## 2024-01-26 RX ADMIN — LEVALBUTEROL HYDROCHLORIDE 0.63 MG: 0.63 SOLUTION RESPIRATORY (INHALATION) at 19:54

## 2024-01-26 RX ADMIN — IPRATROPIUM BROMIDE 0.5 MG: 0.5 SOLUTION RESPIRATORY (INHALATION) at 07:30

## 2024-01-26 RX ADMIN — IPRATROPIUM BROMIDE 0.5 MG: 0.5 SOLUTION RESPIRATORY (INHALATION) at 13:25

## 2024-01-26 RX ADMIN — THIAMINE HYDROCHLORIDE 500 MG: 100 INJECTION, SOLUTION INTRAMUSCULAR; INTRAVENOUS at 04:29

## 2024-01-26 RX ADMIN — LACTULOSE 20 G: 20 SOLUTION ORAL at 08:01

## 2024-01-26 RX ADMIN — LACTULOSE 30 G: 20 SOLUTION ORAL at 16:00

## 2024-01-26 RX ADMIN — AZELASTINE 1 SPRAY: 1 SPRAY, METERED NASAL at 08:10

## 2024-01-26 RX ADMIN — SODIUM CHLORIDE 9 UNITS/HR: 9 INJECTION, SOLUTION INTRAVENOUS at 10:01

## 2024-01-26 RX ADMIN — SODIUM CHLORIDE, SODIUM GLUCONATE, SODIUM ACETATE, POTASSIUM CHLORIDE, MAGNESIUM CHLORIDE, SODIUM PHOSPHATE, DIBASIC, AND POTASSIUM PHOSPHATE 20 ML/HR: .53; .5; .37; .037; .03; .012; .00082 INJECTION, SOLUTION INTRAVENOUS at 12:17

## 2024-01-26 RX ADMIN — PANTOPRAZOLE SODIUM 40 MG: 40 INJECTION, POWDER, FOR SOLUTION INTRAVENOUS at 21:53

## 2024-01-26 RX ADMIN — PANTOPRAZOLE SODIUM 40 MG: 40 INJECTION, POWDER, FOR SOLUTION INTRAVENOUS at 08:01

## 2024-01-26 RX ADMIN — LEVALBUTEROL HYDROCHLORIDE 0.63 MG: 0.63 SOLUTION RESPIRATORY (INHALATION) at 01:01

## 2024-01-26 RX ADMIN — LEVALBUTEROL HYDROCHLORIDE 0.63 MG: 0.63 SOLUTION RESPIRATORY (INHALATION) at 13:25

## 2024-01-26 RX ADMIN — POTASSIUM CHLORIDE 20 MEQ: 1.5 SOLUTION ORAL at 10:32

## 2024-01-26 RX ADMIN — IPRATROPIUM BROMIDE 0.5 MG: 0.5 SOLUTION RESPIRATORY (INHALATION) at 19:54

## 2024-01-26 RX ADMIN — POTASSIUM PHOSPHATE, MONOBASIC POTASSIUM PHOSPHATE, DIBASIC 12 MMOL: 224; 236 INJECTION, SOLUTION, CONCENTRATE INTRAVENOUS at 11:53

## 2024-01-26 RX ADMIN — IPRATROPIUM BROMIDE 0.5 MG: 0.5 SOLUTION RESPIRATORY (INHALATION) at 01:01

## 2024-01-26 RX ADMIN — CHLORHEXIDINE GLUCONATE 15 ML: 1.2 SOLUTION ORAL at 08:01

## 2024-01-26 RX ADMIN — DEXTROSE 75 ML/HR: 5 SOLUTION INTRAVENOUS at 00:43

## 2024-01-26 RX ADMIN — AZELASTINE 1 SPRAY: 1 SPRAY, METERED NASAL at 17:47

## 2024-01-26 RX ADMIN — CALCIUM GLUCONATE 1 G: 20 INJECTION, SOLUTION INTRAVENOUS at 10:32

## 2024-01-26 RX ADMIN — RIFAXIMIN 550 MG: 550 TABLET ORAL at 08:01

## 2024-01-26 RX ADMIN — RIFAXIMIN 550 MG: 550 TABLET ORAL at 21:53

## 2024-01-26 RX ADMIN — LACTULOSE 30 G: 20 SOLUTION ORAL at 21:53

## 2024-01-26 RX ADMIN — LEVALBUTEROL HYDROCHLORIDE 0.63 MG: 0.63 SOLUTION RESPIRATORY (INHALATION) at 07:30

## 2024-01-26 RX ADMIN — CHLORHEXIDINE GLUCONATE 15 ML: 1.2 SOLUTION ORAL at 21:53

## 2024-01-26 NOTE — ASSESSMENT & PLAN NOTE
Lab Results   Component Value Date    HGBA1C 6.6 (H) 01/24/2024       Recent Labs     01/25/24  1641 01/25/24  1809 01/25/24 2012 01/25/24 2159   POCGLU 160* 178* 202* 148*         Blood Sugar Average: Last 72 hrs:  (P) [id number]    Patient has a Dexcom, on Trulicity and Jardiance, Lantus and insulin  Continue insulin gtt for glycemic control  Restart home regimen when able

## 2024-01-26 NOTE — PHYSICAL THERAPY NOTE
Physical Therapy Cancellation Note       01/26/24 1058   PT Last Visit   PT Visit Date 01/26/24   Note Type   Note type Cancelled Session   Cancel Reasons Medical status   Additional Comments Currently intubated and in restraints. Patient has been cancelled 4x. Please re-consult when medically stable     Marlene Bernabe

## 2024-01-26 NOTE — ASSESSMENT & PLAN NOTE
Stable rate at 80-93  Maintained on Eliquis and Plavix and metoprolol as o/p, all currently held  Restart Toprol when able  Hold Eliquis and Plavix until okay with GI

## 2024-01-26 NOTE — ASSESSMENT & PLAN NOTE
Wt Readings from Last 3 Encounters:   01/25/24 95.5 kg (210 lb 8.6 oz)   12/20/23 96.6 kg (213 lb)   12/14/23 97 kg (213 lb 12.8 oz)   Patient is 78-year-old male with history of CAD post PCI in August not deemed a candidate for surgical intervention, systolic heart failure with reduced ejection fraction of 36%   Home meds: lasix, toprol, and statin   Holding home meds for now, resume as able  Daily wts

## 2024-01-26 NOTE — PLAN OF CARE
Problem: Potential for Falls  Goal: Patient will remain free of falls  Description: INTERVENTIONS:  - Educate patient/family on patient safety including physical limitations  - Instruct patient to call for assistance with activity   - Consult OT/PT to assist with strengthening/mobility   - Keep Call bell within reach  - Keep bed low and locked with side rails adjusted as appropriate  - Keep care items and personal belongings within reach  - Initiate and maintain comfort rounds  - Make Fall Risk Sign visible to staff  - Offer Toileting every 2 Hours, in advance of need  - Initiate/Maintain bed alarm  - Obtain necessary fall risk management equipment:   - Apply yellow socks and bracelet for high fall risk patients  - Consider moving patient to room near nurses station  Outcome: Progressing     Problem: PAIN - ADULT  Goal: Verbalizes/displays adequate comfort level or baseline comfort level  Description: Interventions:  - Encourage patient to monitor pain and request assistance  - Assess pain using appropriate pain scale  - Administer analgesics based on type and severity of pain and evaluate response  - Implement non-pharmacological measures as appropriate and evaluate response  - Consider cultural and social influences on pain and pain management  - Notify physician/advanced practitioner if interventions unsuccessful or patient reports new pain  Outcome: Progressing     Problem: Knowledge Deficit  Goal: Patient/family/caregiver demonstrates understanding of disease process, treatment plan, medications, and discharge instructions  Description: Complete learning assessment and assess knowledge base.  Interventions:  - Provide teaching at level of understanding  - Provide teaching via preferred learning methods  Outcome: Progressing     Problem: GASTROINTESTINAL - ADULT  Goal: Minimal or absence of nausea and/or vomiting  Description: INTERVENTIONS:  - Administer IV fluids if ordered to ensure adequate hydration  -  Maintain NPO status until nausea and vomiting are resolved  - Nasogastric tube if ordered  - Administer ordered antiemetic medications as needed  - Provide nonpharmacologic comfort measures as appropriate  - Advance diet as tolerated, if ordered  - Consider nutrition services referral to assist patient with adequate nutrition and appropriate food choices  Outcome: Progressing  Goal: Maintains or returns to baseline bowel function  Description: INTERVENTIONS:  - Assess bowel function  - Encourage oral fluids to ensure adequate hydration  - Administer IV fluids if ordered to ensure adequate hydration  - Administer ordered medications as needed  - Encourage mobilization and activity  - Consider nutritional services referral to assist patient with adequate nutrition and appropriate food choices  Outcome: Progressing  Goal: Maintains adequate nutritional intake  Description: INTERVENTIONS:  - Monitor percentage of each meal consumed  - Identify factors contributing to decreased intake, treat as appropriate  - Assist with meals as needed  - Monitor I&O, weight, and lab values if indicated  - Obtain nutrition services referral as needed  Outcome: Progressing  Goal: Establish and maintain optimal ostomy function  Description: INTERVENTIONS:  - Assess bowel function  - Encourage oral fluids to ensure adequate hydration  - Administer IV fluids if ordered to ensure adequate hydration   - Administer ordered medications as needed  - Encourage mobilization and activity  - Nutrition services referral to assist patient with appropriate food choices  - Assess stoma site  - Consider wound care consult   Outcome: Progressing  Goal: Oral mucous membranes remain intact  Description: INTERVENTIONS  - Assess oral mucosa and hygiene practices  - Implement preventative oral hygiene regimen  - Implement oral medicated treatments as ordered  - Initiate Nutrition services referral as needed  Outcome: Progressing     Problem: METABOLIC, FLUID  AND ELECTROLYTES - ADULT  Goal: Electrolytes maintained within normal limits  Description: INTERVENTIONS:  - Monitor labs and assess patient for signs and symptoms of electrolyte imbalances  - Administer electrolyte replacement as ordered  - Monitor response to electrolyte replacements, including repeat lab results as appropriate  - Instruct patient on fluid and nutrition as appropriate  Outcome: Progressing  Goal: Fluid balance maintained  Description: INTERVENTIONS:  - Monitor labs   - Monitor I/O and WT  - Instruct patient on fluid and nutrition as appropriate  - Assess for signs & symptoms of volume excess or deficit  Outcome: Progressing  Goal: Glucose maintained within target range  Description: INTERVENTIONS:  - Monitor Blood Glucose as ordered  - Assess for signs and symptoms of hyperglycemia and hypoglycemia  - Administer ordered medications to maintain glucose within target range  - Assess nutritional intake and initiate nutrition service referral as needed  Outcome: Progressing     Problem: HEMATOLOGIC - ADULT  Goal: Maintains hematologic stability  Description: INTERVENTIONS  - Assess for signs and symptoms of bleeding or hemorrhage  - Monitor labs  - Administer supportive blood products/factors as ordered and appropriate  Outcome: Progressing     Problem: Prexisting or High Potential for Compromised Skin Integrity  Goal: Skin integrity is maintained or improved  Description: INTERVENTIONS:  - Identify patients at risk for skin breakdown  - Assess and monitor skin integrity  - Assess and monitor nutrition and hydration status  - Monitor labs   - Assess for incontinence   - Turn and reposition patient  - Assist with mobility/ambulation  - Relieve pressure over bony prominences  - Avoid friction and shearing  - Provide appropriate hygiene as needed including keeping skin clean and dry  - Evaluate need for skin moisturizer/barrier cream  - Collaborate with interdisciplinary team   - Patient/family  teaching  - Consider wound care consult   Outcome: Progressing     Problem: Nutrition/Hydration-ADULT  Goal: Nutrient/Hydration intake appropriate for improving, restoring or maintaining nutritional needs  Description: Monitor and assess patient's nutrition/hydration status for malnutrition. Collaborate with interdisciplinary team and initiate plan and interventions as ordered.  Monitor patient's weight and dietary intake as ordered or per policy. Utilize nutrition screening tool and intervene as necessary. Determine patient's food preferences and provide high-protein, high-caloric foods as appropriate.     INTERVENTIONS:  - Monitor oral intake, urinary output, labs, and treatment plans  - Assess nutrition and hydration status and recommend course of action  - Evaluate amount of meals eaten  - Assist patient with eating if necessary   - Allow adequate time for meals  - Recommend/ encourage appropriate diets, oral nutritional supplements, and vitamin/mineral supplements  - Order, calculate, and assess calorie counts as needed  - Recommend, monitor, and adjust tube feedings and TPN/PPN based on assessed needs  - Assess need for intravenous fluids  - Provide specific nutrition/hydration education as appropriate  - Include patient/family/caregiver in decisions related to nutrition  Outcome: Progressing     Problem: SAFETY,RESTRAINT: NV/NON-SELF DESTRUCTIVE BEHAVIOR  Goal: Remains free of harm/injury (restraint for non violent/non self-detsructive behavior)  Description: INTERVENTIONS:  - Instruct patient/family regarding restraint use   - Assess and monitor physiologic and psychological status   - Provide interventions and comfort measures to meet assessed patient needs   - Identify and implement measures to help patient regain control  - Assess readiness for release of restraint   Outcome: Progressing  Goal: Returns to optimal restraint-free functioning  Description: INTERVENTIONS:  - Assess the patient's behavior and  symptoms that indicate continued need for restraint  - Identify and implement measures to help patient regain control  - Assess readiness for release of restraint   Outcome: Progressing

## 2024-01-26 NOTE — ASSESSMENT & PLAN NOTE
Secondary to GIB  Hemoglobin baseline 8-10  Receive 3 units PRBC this admission, last 1/25   Hgb Q12H  History of iron deficiency check iron panel  Start iron p.o. when able

## 2024-01-26 NOTE — PROGRESS NOTES
Derek Walter  [id number]    78 y.o.  male      ASSESSMENT and PLAN    Acute upper GI bleed/blood loss anemia -78-year-old male with CHF coronary disease atrial fibrillation and history of treated hepatocellular carcinoma on Eliquis and Plavix presenting with coffee-ground emesis, melena, drop in hemoglobin and altered mental status.  Previous evaluation of anemia did reveal gastric and duodenal AVMs.  Due to drop in hemoglobin, presence of obvious upper GI bleeding via emesis and hypotension urgent endoscopy was performed 1/23/2024 showed three oozing vascular ectasias 1 of which was clipped and all of which were cauterized by APC.  Bleeding seems to have abated.  No reports of significant melena and no OG blood evident.  Hemoglobin low at 6.9 S/P 3 u PRBC's  repeat Hg 8.2 stable  Continue to follow H&H and transfuse as needed  Continue tube feeds, increase to goal as tolerated, ok to give meds per OG tube  Continue IV PPI, pantoprazole 40 mg every 12 hours  Monitor and correct electrolytes  Needs outpt oncology follow up of HCC  Blood in hilum tube from upper respiratory secretions, wife reports history of nosebleeds.  If repeat bleeding consider ENT evaluation    Altered mental status - unclear how much of this is due to acute blood loss anemia, MRI negative for acute CVA, elevated ammonia level noted.  Despite absence of any known chronic liver disease, he may have some underlying dysfunction and elevated ammonia could have been precipitated by GI bleed.  Ammonia improved with lactulose (increased  to 30 TID today)/xifaxan but mental status still poor.  Wife reports history of encephalopathy of unknown etiology and prolonged intubation in past. Defer to primary team regarding ruling out non-GI etiologies of altered mental status. Neurology following.      Chief Complaint   Patient presents with    Abdominal Pain     Pt via EMS from home with c/o generalized weakness, nausea, and vomiting since the morning. Reports  "lower abd pain. Reports hx of anemia.       SUBJECTIVE/HPI remains ventilated but not sedated.  Nonverbal. Moving extremities.  No blood or coffee grounds per OG tube reported. 100 cc blood in Hilum drain from upper air way secretions. Tolerating meds, and trickle tube feeds,  no further melena. History per ICU team.    /67   Pulse 90   Temp 98.8 °F (37.1 °C)   Resp 20   Ht 6' (1.829 m)   Wt 95.5 kg (210 lb 8.6 oz)   SpO2 99%   BMI 28.55 kg/m²     PHYSICALEXAM  General appearance: On ventilator, awake but not following commands  Head: Normocephalic, without obvious abnormality, intubated, OG tube in place without coffee grounds. 100 cc red blood in hilum drain  Lungs: Coarse breath sounds bilaterally  Heart: S1, S2 normal, no murmur, click, rub or gallop  Abdomen: soft, non-tender; protuberant, bowel sounds normal; no masses,  no organomegaly  Neurologic: awake, not following commands    Lab Results   Component Value Date    GLUCOSE 194 (H) 01/26/2024    CALCIUM 8.5 01/26/2024    K 3.7 01/26/2024    CO2 20 (L) 01/26/2024     (H) 01/26/2024    BUN 48 (H) 01/26/2024    CREATININE 1.32 (H) 01/26/2024     Lab Results   Component Value Date    WBC 9.16 01/26/2024    HGB 7.8 (L) 01/26/2024    HCT 23 (L) 01/26/2024    MCV 97 01/26/2024     (L) 01/26/2024     Lab Results   Component Value Date    ALT 54 (H) 01/25/2024    AST 76 (H) 01/25/2024    ALKPHOS 152 (H) 01/25/2024     No results found for: \"AMYLASE\"  Lab Results   Component Value Date    LIPASE 39 01/23/2024     Lab Results   Component Value Date    IRON 14 (L) 08/07/2023    TIBC 462 (H) 08/07/2023    FERRITIN 10 (L) 08/07/2023     Lab Results   Component Value Date    INR 1.33 (H) 01/24/2024       Counseling / Coordination of Care  Total floor / unit time spent today 30 minutes.                           "

## 2024-01-26 NOTE — ASSESSMENT & PLAN NOTE
1/24 0630A RN called provider to bedside to evaluate patient for left facial droop. NIHHS 4 (left facial droop, LUE weakness +4/5, LLE weakness +4/5, LLE drift and mild dysarthria). RN states left facial droop was noticed on arrival to ICU at 0300A. RN states ED could not confirm if droop was present in ED and wife unsure if droop was new. Stroke alert called.   CTH - no acute stroke or hemorrhage  CTA H/N - no large vessel occulusion. No arterial occulusion. 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     Plan:  Patient discussed with Dr. Interiano (Neuro)   TNK not given due to unknown last known well and active GI bleed.  Goal SBP >140  1/24 MRI brain neg acute  1/24 ECHO: EF 75%, Mild TVR  Holding aspirin and plavix in setting of GI bleed  Hold statin while NPO     MS multifactorial with hepatic encephalopathy & ammonia level 130 (improved), hypernatremia (improved), NA on CKD (improving), and acute illness  Neurology following  Continue routine neuro checks & delirium precautions  Continue high dose thiamine

## 2024-01-26 NOTE — PROGRESS NOTES
Brief Neurology Progress Note    Pt seen this morning for follow up examination. Eyes open today, intermittently attends on both sides of the bed. Wiggles toes when asked, and appears to attempt to protrude his tongue when asked, but otherwise does not follow commands. PERRL, + VOR, + corneals, + cough. More responsive to noxious stimuli today and briskly withdraws throughout. Resisting forceful eye opening. Overall, improved from examination yesterday. Continues to have gradual improvement in examination. Suspect that he will continue to have a gradual recovery with time to recover from metabolic abnormalities noted on admission. Lower suspicion for seizure at this time. Continue to monitor exam closely. At risk for delirium.     Plan:  - From a neurologic perspective, okay for anticoagulation when safe from a GI standpoint.   - Restart home Plavix when safe from GI standpoint.   - Restart home statin when able  - BP goal per primary  - Delirium precautions  - Vascular surgery follow up for asymptomatic left ICA stenosis as noted on CTA H/N  - Rest of care per primary

## 2024-01-26 NOTE — ASSESSMENT & PLAN NOTE
Patient is a 78-year-old male who has been seeing [facility] GI last seen 12/13/2023 patient has a history of anemia is multifactorial chronic due to cancer with iron deficiency and occult GI blood loss on Eliquis review of an EGD with multiple AVMs.  Patient came in to the ER from home due to generalized weakness nausea vomiting since this morning reports lower abdominal pain has a history of anemia is on Eliquis and Plavix episodes of vomiting which were described by the significant other is dark of clotted material, and also had melanotic stool occult positive.  Patient felt significant weakness and was unable to walk felt like too weak to walk.  Patient has a history of an upper GI bleed with multiple AVMs, also has a history of the patient intubation with tracheostomy approximately rehab time of 1 year.   CT scan with contrast showed nonspecific hypodensity within the gastric body possibly reflective of hemorrhage in this patient with the history of gastric AVMs, known liver cancer, cholelithiasis, small volume abdominal pelvic ascites  Patient's baseline globin appears to be 8-10, except 1 value on 11/23 of 13.    Patient is on Eliquis and Plavix as o/p  In the ED received DDAVP 0.3 mg/kg and Kcentra  GI consulted  1/23 S/P EGD - 3 large angioectasias in the fundus of the stomach and body of the stomach; bleeding was observed; placed clips; hemostasis achieved; induced coagulation and hemostasis achieved with argon plasma coagulation The esophagus and duodenum appeared normal.  Pt received a total of 3 units PRBC this admission so far, last on 1/25/24  Monitor H/H BID  Continue PPI BID  1/25 Started on ariana Fung, advance if ok per GI

## 2024-01-26 NOTE — ASSESSMENT & PLAN NOTE
Patient has stage II liver cancer hepatocellular carcinoma  Has a tumor thrombus which appears stable seen again on admission CT  Status post Y 90 treatment    Follows with Dr. Gong as o/p

## 2024-01-26 NOTE — PROGRESS NOTES
[facility]  Progress Note  Name: Derek Walter I  MRN: [id number]  Unit/Bed#: -01 I Date of Admission: 1/23/2024   Date of Service: 1/26/2024 I Hospital Day: 3    Assessment/Plan   * Acute upper GI bleeding  Assessment & Plan  Patient is a 78-year-old male who has been seeing [facility] last seen 12/13/2023 patient has a history of anemia is multifactorial chronic due to cancer with iron deficiency and occult GI blood loss on Eliquis review of an EGD with multiple AVMs.  Patient came in to the ER from home due to generalized weakness nausea vomiting since this morning reports lower abdominal pain has a history of anemia is on Eliquis and Plavix episodes of vomiting which were described by the significant other is dark of clotted material, and also had melanotic stool occult positive.  Patient felt significant weakness and was unable to walk felt like too weak to walk.  Patient has a history of an upper GI bleed with multiple AVMs, also has a history of the patient intubation with tracheostomy approximately rehab time of 1 year.   CT scan with contrast showed nonspecific hypodensity within the gastric body possibly reflective of hemorrhage in this patient with the history of gastric AVMs, known liver cancer, cholelithiasis, small volume abdominal pelvic ascites  Patient's baseline globin appears to be 8-10, except 1 value on 11/23 of 13.    Patient is on Eliquis and Plavix as o/p  In the ED received DDAVP 0.3 mg/kg and Kcentra  GI consulted  1/23 S/P EGD - 3 large angioectasias in the fundus of the stomach and body of the stomach; bleeding was observed; placed clips; hemostasis achieved; induced coagulation and hemostasis achieved with argon plasma coagulation The esophagus and duodenum appeared normal.  Pt received a total of 3 units PRBC this admission so far, last on 1/25/24  Monitor H/H BID  Continue PPI BID  1/25 Started on trickle TFs, advance if ok per GI     Acute blood  loss anemia  Assessment & Plan  Secondary to GIB  Hemoglobin baseline 8-10  Receive 3 units PRBC this admission, last 1/25   Hgb Q12H  History of iron deficiency check iron panel  Start iron p.o. when able    AMS (altered mental status)  Assessment & Plan  1/24 0630A RN called provider to bedside to evaluate patient for left facial droop. NIHHS 4 (left facial droop, LUE weakness +4/5, LLE weakness +4/5, LLE drift and mild dysarthria). RN states left facial droop was noticed on arrival to ICU at 0300A. RN states ED could not confirm if droop was present in ED and wife unsure if droop was new. Stroke alert called.   CTH - no acute stroke or hemorrhage  CTA H/N - no large vessel occulusion. No arterial occulusion. 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     Plan:  Patient discussed with Dr. Interiano (Neuro)   TNK not given due to unknown last known well and active GI bleed.  Goal SBP >140  1/24 MRI brain neg acute  1/24 ECHO: EF 75%, Mild TVR  Holding aspirin and plavix in setting of GI bleed  Hold statin while NPO     MS multifactorial with hepatic encephalopathy & ammonia level 130 (improved), hypernatremia (improved), NA on CKD (improving), and acute illness  Neurology following  Continue routine neuro checks & delirium precautions  Continue high dose thiamine     Hepatic encephalopathy (HCC)  Assessment & Plan  patient is 78-year-old male with a history of liver cancer and radiation therapy who was admitted for an upper GI bleed with hypotension and later strokelike symptoms went unresponsive and was intubated, appears to be as cause from the liver with an ammonia level of 130  Most likely source of elevated ammonia level is liver history of liver cancer  GI following  Continue Lactulose 20 mg TID and Rifaximin   Patient should have 2-3 BMs a day  Amm level normalized to 61       Chronic systolic heart failure (HCC)  Assessment & Plan  Wt Readings from Last 3 Encounters:   01/25/24 95.5 kg (210 lb 8.6 oz)   12/20/23  96.6 kg (213 lb)   12/14/23 97 kg (213 lb 12.8 oz)   Patient is 78-year-old male with history of CAD post PCI in August not deemed a candidate for surgical intervention, systolic heart failure with reduced ejection fraction of 36%   Home meds: lasix, toprol, and statin   Holding home meds for now, resume as able  Daily wts              Hepatocellular carcinoma (HCC)  Assessment & Plan  Patient has stage II liver cancer hepatocellular carcinoma  Has a tumor thrombus which appears stable seen again on admission CT  Status post Y 90 treatment    Follows with Dr. Gong as o/p    CAD (coronary artery disease)  Assessment & Plan  Patient has a history of coronary artery disease had 8/2023 a PCI status post cardiac catheterization drug-eluting stents x 3 in the LAD.  Ramus and left circumflex  Continue home statin, Plavix, and metoprolol when able       History of DVT (deep vein thrombosis)  Assessment & Plan  Patient is on Eliquis as o/p, currently held 2/2 GIB  1/23 reversed with Kcentra     Atrial fibrillation, unspecified type (HCC)  Assessment & Plan  Stable rate at 80-93  Maintained on Eliquis and Plavix and metoprolol as o/p, all currently held  Restart Toprol when able  Hold Eliquis and Plavix until okay with GI     Type 2 diabetes mellitus with neurologic complication, with long-term current use of insulin (HCC)  Assessment & Plan  Lab Results   Component Value Date    HGBA1C 6.6 (H) 01/24/2024       Recent Labs     01/25/24  1641 01/25/24  1809 01/25/24 2012 01/25/24  2159   POCGLU 160* 178* 202* 148*         Blood Sugar Average: Last 72 hrs:  (P) 185.2319754942049792    Patient has a Dexcom, on Trulicity and Jardiance, Lantus and insulin  Continue insulin gtt for glycemic control  Restart home regimen when able     Stage 3 chronic kidney disease, unspecified whether stage 3a or 3b CKD (HCC)  Assessment & Plan  Lab Results   Component Value Date    EGFR 45 01/25/2024    EGFR 42 01/25/2024    EGFR 37 01/25/2024     CREATININE 1.46 (H) 01/25/2024    CREATININE 1.54 (H) 01/25/2024    CREATININE 1.73 (H) 01/25/2024     Cr at baseline, Last 1.46  Monitor I's and O's   Hold Lasix at this time, resume home med when able              Disposition: Critical care    ICU Core Measures     Vented Patient  VAP Bundle  VAP bundle ordered     A: Assess, Prevent, and Manage Pain Has pain been assessed? Yes  Need for changes to pain regimen? No   B: Both Spontaneous Awakening Trials (SATs) and Spontaneous Breathing Trials (SBTs) Plan to perform spontaneous awakening trial today? Yes   Plan to perform spontaneous breathing trial today? Yes   Obvious barriers to extubation? Yes   C: Choice of Sedation RASS Goal: N/A patient not on sedation  Need for changes to sedation or analgesia regimen? NA   D: Delirium CAM-ICU: Unable to perform secondary to Acute cognitive dysfunction   E: Early Mobility  Plan for early mobility? Yes   F: Family Engagement Plan for family engagement today? Yes         Review of Invasive Devices:            Prophylaxis:  VTE VTE covered by:    None       Stress Ulcer  covered bypantoprazole (PROTONIX) 40 mg tablet [[id number]] (Long-Term Med), pantoprazole (PROTONIX) injection 40 mg [[id number]]         Significant 24hr Events     24hr events: Remained on PS vent settings overnight and off sedation. Neuro exam slowly improving, but still not following commands. Pt s/p transfusion of 1 PRBC yesterday with good response.      Subjective   Review of Systems   Unable to perform ROS: Intubated      Objective                            Vitals I/O      Most Recent Min/Max in 24hrs   Temp 99.9 °F (37.7 °C) Temp  Min: 97.7 °F (36.5 °C)  Max: 100 °F (37.8 °C)   Pulse 85 Pulse  Min: 79  Max: 110   Resp 22 Resp  Min: 15  Max: 23   /59 BP  Min: 128/58  Max: 173/67   O2 Sat 99 % SpO2  Min: 96 %  Max: 100 %      Intake/Output Summary (Last 24 hours) at 1/26/2024 0200  Last data filed at 1/26/2024 0000  Gross per 24 hour   Intake  3695.63 ml   Output 2475 ml   Net 1220.63 ml       Diet Enteral/Parenteral; Tube Feeding No Oral Diet; Jevity 1.2 Damian; Continuous; 20; 250; Water; Every 4 hours    Invasive Monitoring           Physical Exam   Physical Exam  Vitals and nursing note reviewed.   Eyes:      Conjunctiva/sclera: Conjunctivae normal.      Pupils: Pupils are equal, round, and reactive to light.   Skin:     General: Skin is warm and dry.   HENT:      Head: Normocephalic and atraumatic.      Mouth/Throat:      Mouth: Mucous membranes are moist.   Cardiovascular:      Rate and Rhythm: Normal rate.      Heart sounds: Normal heart sounds.   Musculoskeletal:         General: Swelling present. Normal range of motion.   Abdominal: General: Bowel sounds are normal.      Palpations: Abdomen is soft.      Tenderness: There is no abdominal tenderness.   Constitutional:       Interventions: He is intubated and restrained. He is not sedated.  Pulmonary:      Effort: No respiratory distress. He is intubated.      Breath sounds: No wheezing.      Comments: PS 6/6 40% with SPO2 98%. LS Coarse. (+) tan secretions from ETT  Neurological:      Mental Status: He is alert.      GCS: GCS eye subscore is 4. GCS verbal subscore is 1. GCS motor subscore is 4.      Comments: Not following commands. Moving all extremities to stimuli and purposeful. Pupils 3mm b/l and brisk. Nodding head to questions, but unclear if he understands.    Genitourinary/Anorectal:  external catheter present.          Diagnostic Studies      Imaging:  I have personally reviewed pertinent reports.       Medications:  Scheduled PRN   azelastine, 1 spray, BID  chlorhexidine, 15 mL, Q12H RANDALL  ipratropium, 0.5 mg, Q6H  lactulose, 20 g, TID  levalbuterol, 0.63 mg, Q6H  pantoprazole, 40 mg, Q12H RANDALL  rifaximin, 550 mg, Q12H RANDALL  thiamine, 500 mg, Q8H   Followed by  [START ON 1/27/2024] thiamine, 250 mg, Q8H   Followed by  [START ON 1/29/2024] thiamine, 100 mg, Daily      acetaminophen, 650 mg, Q6H  PRN  fentanyl citrate (PF), 50 mcg, Q1H PRN  trimethobenzamide, 200 mg, Q12H PRN       Continuous    dextrose, 75 mL/hr, Last Rate: 75 mL/hr (01/26/24 0043)  insulin regular (HumuLIN R,NovoLIN R) 1 Units/mL in sodium chloride 0.9 % 100 mL infusion, 0.3-21 Units/hr, Last Rate: 6 Units/hr (01/26/24 0000)  propofol, 5-50 mcg/kg/min, Last Rate: Stopped (01/24/24 0807)         Labs:    CBC    Recent Labs     01/24/24  0504 01/24/24  0819 01/25/24  0427 01/25/24  1647   WBC 17.10*  --  11.71*  --    HGB 7.5*   < > 8.3* 8.4*   HCT 23.4*   < > 26.7* 26.3*     --  148*  --     < > = values in this interval not displayed.     BMP    Recent Labs     01/25/24  1647 01/26/24  0028   SODIUM 146 143   K 3.4* 3.9   * 115*   CO2 21 21   AGAP 9 7   BUN 64* 53*   CREATININE 1.46* 1.38*   CALCIUM 9.0 8.5       Coags    Recent Labs     01/24/24  0504   INR 1.33*        Additional Electrolytes  Recent Labs     01/24/24  0504 01/25/24  0427   MG 2.2  --    PHOS 3.6  --    CAIONIZED 1.03* 1.04*          Blood Gas    No recent results  No recent results LFTs  Recent Labs     01/24/24  0504 01/25/24 0427   ALT 50 54*   AST 69* 76*   ALKPHOS 140* 152*   ALB 2.9* 2.9*   TBILI 0.97 0.99       Infectious  No recent results  Glucose  Recent Labs     01/25/24  0427 01/25/24  1340 01/25/24  1647 01/26/24  0028   GLUC 142* 159* 156* 160*               Non-Critical Care Time Statement: I have spent a total time of 30 minutes in caring for this patient including Diagnostic results, Counseling / Coordination of care, Documenting in the medical record, Reviewing / ordering tests, medicine, procedures  , and Communicating with other healthcare professionals .     NADIA Rivera

## 2024-01-26 NOTE — QUICK NOTE
Provided a bedside update to patient's wife, Nilda Walter, regarding patient's current status and treatments. All questions answered. Nilda expressed understanding and appreciation.

## 2024-01-26 NOTE — ASSESSMENT & PLAN NOTE
Patient has a history of coronary artery disease had 8/2023 a PCI status post cardiac catheterization drug-eluting stents x 3 in the LAD.  Ramus and left circumflex  Continue home statin, Plavix, and metoprolol when able

## 2024-01-26 NOTE — ASSESSMENT & PLAN NOTE
patient is 78-year-old male with a history of liver cancer and radiation therapy who was admitted for an upper GI bleed with hypotension and later strokelike symptoms went unresponsive and was intubated, appears to be as cause from the liver with an ammonia level of 130  Most likely source of elevated ammonia level is liver history of liver cancer  GI following  Continue Lactulose 20 mg TID and Rifaximin   Patient should have 2-3 BMs a day  Amm level normalized to 61

## 2024-01-26 NOTE — ASSESSMENT & PLAN NOTE
Lab Results   Component Value Date    EGFR 45 01/25/2024    EGFR 42 01/25/2024    EGFR 37 01/25/2024    CREATININE 1.46 (H) 01/25/2024    CREATININE 1.54 (H) 01/25/2024    CREATININE 1.73 (H) 01/25/2024     Cr at baseline, Last 1.46  Monitor I's and O's   Hold Lasix at this time, resume home med when able

## 2024-01-27 ENCOUNTER — APPOINTMENT (INPATIENT)
Dept: RADIOLOGY | Facility: HOSPITAL | Age: 79
DRG: 377 | End: 2024-01-27
Payer: COMMERCIAL

## 2024-01-27 LAB
AMMONIA PLAS-SCNC: 31 UMOL/L (ref 18–72)
ANION GAP SERPL CALCULATED.3IONS-SCNC: 6 MMOL/L
BASOPHILS # BLD AUTO: 0.02 THOUSANDS/ÂΜL (ref 0–0.1)
BASOPHILS NFR BLD AUTO: 0 % (ref 0–1)
BUN SERPL-MCNC: 30 MG/DL (ref 5–25)
CALCIUM SERPL-MCNC: 8.1 MG/DL (ref 8.4–10.2)
CHLORIDE SERPL-SCNC: 112 MMOL/L (ref 96–108)
CO2 SERPL-SCNC: 22 MMOL/L (ref 21–32)
CREAT SERPL-MCNC: 1.17 MG/DL (ref 0.6–1.3)
EOSINOPHIL # BLD AUTO: 0.34 THOUSAND/ÂΜL (ref 0–0.61)
EOSINOPHIL NFR BLD AUTO: 4 % (ref 0–6)
ERYTHROCYTE [DISTWIDTH] IN BLOOD BY AUTOMATED COUNT: 15.7 % (ref 11.6–15.1)
GFR SERPL CREATININE-BSD FRML MDRD: 59 ML/MIN/1.73SQ M
GLUCOSE SERPL-MCNC: 107 MG/DL (ref 65–140)
GLUCOSE SERPL-MCNC: 109 MG/DL (ref 65–140)
GLUCOSE SERPL-MCNC: 118 MG/DL (ref 65–140)
GLUCOSE SERPL-MCNC: 140 MG/DL (ref 65–140)
GLUCOSE SERPL-MCNC: 146 MG/DL (ref 65–140)
GLUCOSE SERPL-MCNC: 146 MG/DL (ref 65–140)
GLUCOSE SERPL-MCNC: 150 MG/DL (ref 65–140)
GLUCOSE SERPL-MCNC: 166 MG/DL (ref 65–140)
GLUCOSE SERPL-MCNC: 175 MG/DL (ref 65–140)
GLUCOSE SERPL-MCNC: 177 MG/DL (ref 65–140)
GLUCOSE SERPL-MCNC: 188 MG/DL (ref 65–140)
GLUCOSE SERPL-MCNC: 203 MG/DL (ref 65–140)
HCT VFR BLD AUTO: 25.8 % (ref 36.5–49.3)
HCT VFR BLD AUTO: 25.8 % (ref 36.5–49.3)
HCT VFR BLD AUTO: 27.3 % (ref 36.5–49.3)
HGB BLD-MCNC: 8.1 G/DL (ref 12–17)
HGB BLD-MCNC: 8.1 G/DL (ref 12–17)
HGB BLD-MCNC: 8.7 G/DL (ref 12–17)
IMM GRANULOCYTES # BLD AUTO: 0.06 THOUSAND/UL (ref 0–0.2)
IMM GRANULOCYTES NFR BLD AUTO: 1 % (ref 0–2)
LYMPHOCYTES # BLD AUTO: 0.49 THOUSANDS/ÂΜL (ref 0.6–4.47)
LYMPHOCYTES NFR BLD AUTO: 6 % (ref 14–44)
MAGNESIUM SERPL-MCNC: 2 MG/DL (ref 1.9–2.7)
MAGNESIUM SERPL-MCNC: 2.1 MG/DL (ref 1.9–2.7)
MCH RBC QN AUTO: 29.9 PG (ref 26.8–34.3)
MCHC RBC AUTO-ENTMCNC: 31.4 G/DL (ref 31.4–37.4)
MCV RBC AUTO: 95 FL (ref 82–98)
MONOCYTES # BLD AUTO: 1.57 THOUSAND/ÂΜL (ref 0.17–1.22)
MONOCYTES NFR BLD AUTO: 19 % (ref 4–12)
NEUTROPHILS # BLD AUTO: 5.91 THOUSANDS/ÂΜL (ref 1.85–7.62)
NEUTS SEG NFR BLD AUTO: 70 % (ref 43–75)
NRBC BLD AUTO-RTO: 1 /100 WBCS
PHOSPHATE SERPL-MCNC: 2.8 MG/DL (ref 2.3–4.1)
PHOSPHATE SERPL-MCNC: 3 MG/DL (ref 2.3–4.1)
PLATELET # BLD AUTO: 111 THOUSANDS/UL (ref 149–390)
PMV BLD AUTO: 13.1 FL (ref 8.9–12.7)
POTASSIUM SERPL-SCNC: 3.9 MMOL/L (ref 3.5–5.3)
RBC # BLD AUTO: 2.71 MILLION/UL (ref 3.88–5.62)
SODIUM SERPL-SCNC: 140 MMOL/L (ref 135–147)
WBC # BLD AUTO: 8.39 THOUSAND/UL (ref 4.31–10.16)

## 2024-01-27 PROCEDURE — 94150 VITAL CAPACITY TEST: CPT

## 2024-01-27 PROCEDURE — 99291 CRITICAL CARE FIRST HOUR: CPT

## 2024-01-27 PROCEDURE — 94640 AIRWAY INHALATION TREATMENT: CPT

## 2024-01-27 PROCEDURE — 83735 ASSAY OF MAGNESIUM: CPT

## 2024-01-27 PROCEDURE — 82140 ASSAY OF AMMONIA: CPT

## 2024-01-27 PROCEDURE — 80048 BASIC METABOLIC PNL TOTAL CA: CPT

## 2024-01-27 PROCEDURE — 82948 REAGENT STRIP/BLOOD GLUCOSE: CPT

## 2024-01-27 PROCEDURE — 99232 SBSQ HOSP IP/OBS MODERATE 35: CPT | Performed by: STUDENT IN AN ORGANIZED HEALTH CARE EDUCATION/TRAINING PROGRAM

## 2024-01-27 PROCEDURE — 94760 N-INVAS EAR/PLS OXIMETRY 1: CPT

## 2024-01-27 PROCEDURE — 85014 HEMATOCRIT: CPT

## 2024-01-27 PROCEDURE — C9113 INJ PANTOPRAZOLE SODIUM, VIA: HCPCS

## 2024-01-27 PROCEDURE — 84100 ASSAY OF PHOSPHORUS: CPT

## 2024-01-27 PROCEDURE — 85025 COMPLETE CBC W/AUTO DIFF WBC: CPT

## 2024-01-27 PROCEDURE — 85018 HEMOGLOBIN: CPT

## 2024-01-27 PROCEDURE — 71045 X-RAY EXAM CHEST 1 VIEW: CPT

## 2024-01-27 RX ORDER — LEVALBUTEROL INHALATION SOLUTION 0.63 MG/3ML
0.63 SOLUTION RESPIRATORY (INHALATION) EVERY 6 HOURS PRN
Status: DISCONTINUED | OUTPATIENT
Start: 2024-01-27 | End: 2024-02-06 | Stop reason: HOSPADM

## 2024-01-27 RX ORDER — FUROSEMIDE 10 MG/ML
40 INJECTION INTRAMUSCULAR; INTRAVENOUS ONCE
Status: COMPLETED | OUTPATIENT
Start: 2024-01-27 | End: 2024-01-27

## 2024-01-27 RX ORDER — METOPROLOL SUCCINATE 25 MG/1
25 TABLET, EXTENDED RELEASE ORAL DAILY
Status: DISCONTINUED | OUTPATIENT
Start: 2024-01-27 | End: 2024-01-27

## 2024-01-27 RX ORDER — INSULIN LISPRO 100 [IU]/ML
1-5 INJECTION, SOLUTION INTRAVENOUS; SUBCUTANEOUS EVERY 6 HOURS SCHEDULED
Status: DISCONTINUED | OUTPATIENT
Start: 2024-01-27 | End: 2024-01-28

## 2024-01-27 RX ORDER — SODIUM CHLORIDE FOR INHALATION 0.9 %
3 VIAL, NEBULIZER (ML) INHALATION
Status: DISCONTINUED | OUTPATIENT
Start: 2024-01-27 | End: 2024-01-28

## 2024-01-27 RX ADMIN — CHLORHEXIDINE GLUCONATE 15 ML: 1.2 SOLUTION ORAL at 20:16

## 2024-01-27 RX ADMIN — LEVALBUTEROL HYDROCHLORIDE 0.63 MG: 0.63 SOLUTION RESPIRATORY (INHALATION) at 02:52

## 2024-01-27 RX ADMIN — Medication 12.5 MG: at 08:06

## 2024-01-27 RX ADMIN — IPRATROPIUM BROMIDE 0.5 MG: 0.5 SOLUTION RESPIRATORY (INHALATION) at 13:32

## 2024-01-27 RX ADMIN — IPRATROPIUM BROMIDE 0.5 MG: 0.5 SOLUTION RESPIRATORY (INHALATION) at 02:52

## 2024-01-27 RX ADMIN — AZELASTINE 1 SPRAY: 1 SPRAY, METERED NASAL at 18:15

## 2024-01-27 RX ADMIN — Medication 3 ML: at 13:32

## 2024-01-27 RX ADMIN — LACTULOSE 30 G: 20 SOLUTION ORAL at 08:05

## 2024-01-27 RX ADMIN — LEVALBUTEROL HYDROCHLORIDE 0.63 MG: 0.63 SOLUTION RESPIRATORY (INHALATION) at 13:32

## 2024-01-27 RX ADMIN — PANTOPRAZOLE SODIUM 40 MG: 40 INJECTION, POWDER, FOR SOLUTION INTRAVENOUS at 08:06

## 2024-01-27 RX ADMIN — Medication 3 ML: at 19:54

## 2024-01-27 RX ADMIN — CHLORHEXIDINE GLUCONATE 15 ML: 1.2 SOLUTION ORAL at 08:06

## 2024-01-27 RX ADMIN — FUROSEMIDE 40 MG: 10 INJECTION, SOLUTION INTRAMUSCULAR; INTRAVENOUS at 12:51

## 2024-01-27 RX ADMIN — AZELASTINE 1 SPRAY: 1 SPRAY, METERED NASAL at 08:06

## 2024-01-27 RX ADMIN — IPRATROPIUM BROMIDE 0.5 MG: 0.5 SOLUTION RESPIRATORY (INHALATION) at 07:55

## 2024-01-27 RX ADMIN — INSULIN LISPRO 1 UNITS: 100 INJECTION, SOLUTION INTRAVENOUS; SUBCUTANEOUS at 23:02

## 2024-01-27 RX ADMIN — RIFAXIMIN 550 MG: 550 TABLET ORAL at 08:06

## 2024-01-27 RX ADMIN — LEVALBUTEROL HYDROCHLORIDE 0.63 MG: 0.63 SOLUTION RESPIRATORY (INHALATION) at 07:55

## 2024-01-27 RX ADMIN — PANTOPRAZOLE SODIUM 40 MG: 40 INJECTION, POWDER, FOR SOLUTION INTRAVENOUS at 20:16

## 2024-01-27 RX ADMIN — SODIUM CHLORIDE 6 UNITS/HR: 9 INJECTION, SOLUTION INTRAVENOUS at 02:12

## 2024-01-27 NOTE — ASSESSMENT & PLAN NOTE
Patient is a 78-year-old male who has been seeing [facility] GI last seen 12/13/2023 patient has a history of anemia is multifactorial chronic due to cancer with iron deficiency and occult GI blood loss on Eliquis review of an EGD with multiple AVMs.  Patient came in to the ER from home due to generalized weakness nausea vomiting since this morning reports lower abdominal pain has a history of anemia is on Eliquis and Plavix episodes of vomiting which were described by the significant other is dark of clotted material, and also had melanotic stool occult positive.  Patient felt significant weakness and was unable to walk felt like too weak to walk.  Patient has a history of an upper GI bleed with multiple AVMs, also has a history of the patient intubation with tracheostomy approximately rehab time of 1 year.   CT scan with contrast showed nonspecific hypodensity within the gastric body possibly reflective of hemorrhage in this patient with the history of gastric AVMs, known liver cancer, cholelithiasis, small volume abdominal pelvic ascites  Patient's baseline hemoglobin appears to be 8-10, except 1 value on 11/23 of 13.    Patient is on Eliquis and Plavix as o/p  In the ED received DDAVP 0.3 mg/kg and Kcentra  GI consulted  1/23 S/P EGD - 3 large angioectasias in the fundus of the stomach and body of the stomach; bleeding was observed; placed clips; hemostasis achieved; induced coagulation and hemostasis achieved with argon plasma coagulation The esophagus and duodenum appeared normal.  Pt received a total of 3 units PRBC this admission so far, last on 1/25/24    Plan:  Monitor H/H Q12 hours- Hgb remains stable   Continue PPI BID  Patient extubated 1/27- pending swallow evaluation by SLP to restart diet   GI following appreciate recommendations- Need to address if stable to restart plavix

## 2024-01-27 NOTE — PROGRESS NOTES
Progress note - [facility]   Derek Walter 78 y.o. male MRN: [id number]  Unit/Bed#: -01 Encounter: [id number]    ASSESSMENT and PLAN    Upper GI bleed secondary to gastric AVMs  Acute blood loss anemia  S/P EGD with cautery and clipping bleeding gastric AVMs.  S/P 3 units packed red blood cells  No evidence of bleeding.  Hemoglobin stable around 8  -Continue PPI  -Follow H&H  -Need long and comprehensive discussions about restarting anticoagulation and antiplatelet therapy    3.  Altered mental status  Secondary to anesthesia versus some component of hepatic encephalopathy related to GI bleeding.  Mental status finally improving  -Continue rifaximin and lactulose  -Hopefully extubate today    4.  Atrial fibrillation  5. Coronary artery disease  Need to discuss pros and cons of reinitiating antiplatelet and anticoagulation therapy    6.  Hepatocellular carcinoma    Chief Complaint   Patient presents with    Abdominal Pain     Pt via EMS from home with c/o generalized weakness, nausea, and vomiting since the morning. Reports lower abd pain. Reports hx of anemia.       SUBJECTIVE/HPI   Awake on the vent.    /60   Pulse 86   Temp 99.2 °F (37.3 °C) (Axillary)   Resp 21   Ht 6' (1.829 m)   Wt 96 kg (211 lb 10.3 oz)   SpO2 97%   BMI 28.70 kg/m²     PHYSICALEXAM  General appearance: alert, appears stated age and cooperative  Eyes: PERLLA, EOMI, no icterus   Head: Normocephalic, without obvious abnormality, atraumatic  Lungs: clear to auscultation bilaterally  Heart: regular rate and rhythm, S1, S2 normal, no murmur, click, rub or gallop  Abdomen: soft, non-tender; bowel sounds normal; no masses,  no organomegaly  Extremities: extremities normal, atraumatic, no cyanosis or edema  Neurologic: Grossly normal    Lab Results   Component Value Date    GLUCOSE 194 (H) 01/26/2024    CALCIUM 8.1 (L) 01/27/2024    K 3.9 01/27/2024    CO2 22 01/27/2024     (H) 01/27/2024    BUN 30 (H)  01/27/2024    CREATININE 1.17 01/27/2024     Lab Results   Component Value Date    WBC 8.39 01/27/2024    HGB 8.1 (L) 01/27/2024    HGB 8.1 (L) 01/27/2024    HCT 25.8 (L) 01/27/2024    HCT 25.8 (L) 01/27/2024    MCV 95 01/27/2024     (L) 01/27/2024     Lab Results   Component Value Date    ALT 54 (H) 01/25/2024    AST 76 (H) 01/25/2024    ALKPHOS 152 (H) 01/25/2024       Lab Results   Component Value Date    LIPASE 39 01/23/2024     Lab Results   Component Value Date    IRON 14 (L) 08/07/2023    TIBC 462 (H) 08/07/2023    FERRITIN 10 (L) 08/07/2023     Lab Results   Component Value Date    INR 1.33 (H) 01/24/2024

## 2024-01-27 NOTE — ASSESSMENT & PLAN NOTE
Lab Results   Component Value Date    HGBA1C 6.6 (H) 01/24/2024       Recent Labs     01/26/24  1746 01/26/24 2006 01/26/24  2208 01/27/24  0025   POCGLU 191* 136 139 188*         Blood Sugar Average: Last 72 hrs:  (P) 153.7141607823883865    Patient has a Dexcom, on Trulicity and Jardiance, Lantus and insulin  Continue insulin gtt for glycemic control. Blood glucose monitoring Q2 hr while on drip  Blood glucose goal 140-180  Restart home regimen when able

## 2024-01-27 NOTE — RESPIRATORY THERAPY NOTE
Patient extubated to nasal cannula as ordered. No distress or stridor present. Tolerating well at this time.

## 2024-01-27 NOTE — ASSESSMENT & PLAN NOTE
Required intubation for airway protection 1/23 after patient became increasingly encephalopathic  Minimal vent setting. Placed on PS since 1/24 at 6/6 40% FiO2  Unable to be successfully extubated due to mental status. Currently not requiring sedation.  Having copious secretions coming from inline suction  1/27 CXR negative for acute findings. Given 1 x dose of 40 mg IV lasix   Extubated mid morning on 1/27    Plan:  Continue supplemental O2. Wean FiO2 to maintain SPO2 > 88%. Currently on 4 L NC  Aspiration precautions  Frequent mouth care  Continue scheduled Xopenex/Atrovent for airway clearance  Airway clearance protocol  Encourage IS/Flutter valve when able

## 2024-01-27 NOTE — ASSESSMENT & PLAN NOTE
patient is 78-year-old male with a history of liver cancer and radiation therapy who was admitted for an upper GI bleed with hypotension and later strokelike symptoms went unresponsive and was intubated, appears to be as cause from the liver with an ammonia level of 130  Most likely source of elevated ammonia level is liver history of liver cancer  GI following  Continue Lactulose 30 mg TID and Rifaximin   Patient should have 2-3 BMs a day  Amm level normalized to 61   Recheck Ammonia level in the AM

## 2024-01-27 NOTE — PROGRESS NOTES
Progress Note - Neurology   Derek Walter 78 y.o. male [id number]  Unit/Bed#: /-01    Assessment/Plan:  AMS (altered mental status)  Assessment & Plan  Derek Walter is a 78 year old man with history of GI bleeds with multiple AVMs, anemia, CHF, hepatocellular carcinoma stage II s/p Y90 treatment, CAD on Plavix, CKD, DM, history of DVT, HLD, HTN, RBBB, anemia and A-fib on Eliquis who originally presented to the ED on 1/23 for increasing weakness, abdominal pain and feeling generally unwell.  Patient also was found to have melanotic stools and had an episode of bloody emesis x 1. He is s/p EGD with GI and correction of GI bleeding.    Neurology has been following in light of mental status, but was initially involved due to concern for acute stroke. MRI brain was obtained with no signs of stroke. Lower suspicion for stroke/TIA. Chronic left facial weakness, at baseline.   Today, he is more awake and sustaining wakefulness more. Following some commands, which is an improvement from yesterday. Family also feels he is improving.     Workup:  - Labs remarkable for leukocytosis (9-->17-->11), anemia, hyperkalemia (improved).    - CT A/P with contrast:  Nonspecific strandy hyperdensities within the gastric body possibly reflective of hemorrhage in this patient with history? Gastric AVMs per chart review in [facility]. GI consultation advised.  Similar appearance of known segment 6 and 7 hepatic masses compatible with multifocal HCC compared to the previous MRI accounting for differences in modality. Somewhat worsened heterogeneous/mottled appearance of the hepatic parenchyma in the anterior right and left hepatic lobes which may reflect disease progression and/or treatment related changes. 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 Continued follow-up per oncology recommended.  Cholelithiasis.  Similar small volume abdominopelvic ascites.  - CTH  negative for acute intracranial  abnormality.    - CTA head/neck revealed 50 to 66% stenosis of proximal left ICA secondary to atheromatous and atherosclerotic disease.    - Echo: EF 75%, bilateral atria appearing normal in size but not well visualized. Limited study overall due to poor visualization  - MRI brain wo negative for acute intracranial abnormalities. Findings suggestive of chronic microangiopathy.  - Ammonia 135->108->61  - A1C 6.6    Neuroimaging negative for stroke. Suspect TME in the setting of hyperammonemia, elevated BUN and acute GI bleed/acute illness - would expect gradual improvement as metabolic derangements continue to improve with some lag between labs and clinical presentation. No further inpatient neurologic recommendations at this time.     Plan:  - From a neurologic perspective, okay for anticoagulation when safe from a GI standpoint.   - Restart home Plavix when safe from GI standpoint.   - Restart home statin when able  - BP goal per primary  - Delirium precautions  - Vascular surgery follow up for asymptomatic left ICA stenosis as noted on CTA H/N  - Euglycemic, normothermic goal  - Continue telemetry  - PT/OT/ST  - Frequent neuro checks. Continue to monitor and notify neurology with any changes.  - STAT CT head for any acute change in neuro exam  - Medical management and supportive care, including correction of any metabolic or infectious disturbances, as per primary team and GI.    Will follow as needed. Please reach out with questions in the interim.       Derek Walter will not need outpatient follow up with Neurology. He will not require outpatient neurological testing.    Subjective:   Family at bedside, states that he appears much improved compared to yesterday. Following some commands, answering questions with nursing earlier today according to them. He appears more awake with sustained eye opening.     Past Medical History:   Diagnosis Date    Anemia     Atrial fibrillation (HCC)     Eliquis    AVB  (atrioventricular block)     1st degree    CAD (coronary artery disease)     CKD (chronic kidney disease), stage III (HCC)     baseline Cr 1.2-1.6    Colon polyp     Diabetes mellitus (HCC)     type 2, insulin dependent    Diverticulosis     Emphysema lung (HCC)     Former tobacco use     History of asbestos exposure     History of DVT (deep vein thrombosis)     RLE, tx w/ AC    History of GI bleed 08/2023    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, given PRBC/Venofer for anemia while in house    Hyperlipidemia     Hypertension     LAFB (left anterior fascicular block)     Liver cancer (HCC)     Liver mass 08/2023    suspected HCC, needs radioembolization    RBBB     Syncope 08/07/2023     Past Surgical History:   Procedure Laterality Date    APPENDECTOMY      BOWEL RESECTION  2014    CARDIAC CATHETERIZATION      CARDIAC CATHETERIZATION N/A 08/25/2023    Procedure: Cardiac pci;  Surgeon: Dom Garrett DO;  Location: [facility];  Service: Cardiology    CARDIAC CATHETERIZATION N/A 08/21/2023    Procedure: Cardiac catheterization;  Surgeon: Dom Garrett DO;  Location: [facility];  Service: Cardiology    CARDIAC CATHETERIZATION N/A 08/21/2023    Procedure: Cardiac Coronary Angiogram;  Surgeon: Dom Garrett DO;  Location: [facility];  Service: Cardiology    CATARACT EXTRACTION Bilateral     COLONOSCOPY      EGD      IR Y-90 PRE-ANGIO/EMBO W/ LUNG SCAN  09/01/2023    IR Y-90 RADIOEMBOLIZATION  09/08/2023     Family History   Problem Relation Age of Onset    Hypertension Mother     Bone cancer Father     Diabetes type II Sister     Diabetes type II Sister     Esophageal cancer Brother     Colon cancer Neg Hx      Social History     Socioeconomic History    Marital status: /Civil Union     Spouse name: None    Number of children: None    Years of education: None    Highest education level: None   Occupational History    None   Tobacco Use    Smoking status:  Former     Current packs/day: 0.00     Average packs/day: 1 pack/day for 30.0 years (30.0 ttl pk-yrs)     Types: Cigarettes     Start date:      Quit date:      Years since quittin.0    Smokeless tobacco: Never   Vaping Use    Vaping status: Never Used   Substance and Sexual Activity    Alcohol use: Yes     Comment: Socially    Drug use: Never    Sexual activity: None   Other Topics Concern    None   Social History Narrative    None     Social Determinants of Health     Financial Resource Strain: Low Risk  (10/13/2023)    Overall Financial Resource Strain (CARDIA)     Difficulty of Paying Living Expenses: Not hard at all   Food Insecurity: No Food Insecurity (2024)    Hunger Vital Sign     Worried About Running Out of Food in the Last Year: Never true     Ran Out of Food in the Last Year: Never true   Transportation Needs: No Transportation Needs (2024)    PRAPARE - Transportation     Lack of Transportation (Medical): No     Lack of Transportation (Non-Medical): No   Physical Activity: Not on file   Stress: Not on file   Social Connections: Not on file   Intimate Partner Violence: Not on file   Housing Stability: Low Risk  (2024)    Housing Stability Vital Sign     Unable to Pay for Housing in the Last Year: No     Number of Places Lived in the Last Year: 1     Unstable Housing in the Last Year: No     ROS: Review of Systems   Unable to perform ROS: Intubated      Vitals: /66   Pulse 85   Temp 99.2 °F (37.3 °C) (Axillary)   Resp 18   Ht 6' (1.829 m)   Wt 96 kg (211 lb 10.3 oz)   SpO2 100%   BMI 28.70 kg/m²     Physical Exam:   Gen: no acute distress   CV: regular rate  Pulm: symmetric chest rise  Abd: soft, non-distended  Ext: no edema    Mental status: awake, eyes open, closes eyes when asked, follows some commands, attends when spoken to  Cranial nerves: PERRL, +corneals, +cough, forcibly closes eyes when attempting to open them. Confirmed that upper and lower left facial  "weakness is chronic and appears at baseline. Tongue midline.   Motor: spontaneously moving upper and lower extremities with no significant changes otherwise compared to prior examination, no obvious focal deficit. Wiggles toes when asked, provides a \"thumbs up\" when asked, lifts lower extremities briefly to command  Sensation: Intact to light touch. Grimaces to pain.   Coordination: Does not participate  Gait: deferred    Labs: Morning labs reviewed    Imaging: I have personally reviewed pertinent imaging and PACS reports.     VTE Prophylaxis: SCDs    Total time spent today 15 minutes. Greater than 50% of total time was spent with the patient and / or family counseling and / or coordination of care. A description of the counseling / coordination of care: evaluating patient, reviewing MRI and labs and discussing status with critical care team.         "

## 2024-01-27 NOTE — ASSESSMENT & PLAN NOTE
Lab Results   Component Value Date    EGFR 59 01/27/2024    EGFR 59 01/26/2024    EGFR 54 01/26/2024    CREATININE 1.17 01/27/2024    CREATININE 1.18 01/26/2024    CREATININE 1.27 01/26/2024     Cr at baseline, Last 1.46  Monitor I's and O's   Renally dose medications  Avoid nephrotoxic agents  Hold Lasix at this time, resume home med when able

## 2024-01-27 NOTE — ASSESSMENT & PLAN NOTE
Patient is a 78-year-old male who has been seeing [facility] GI last seen 12/13/2023 patient has a history of anemia is multifactorial chronic due to cancer with iron deficiency and occult GI blood loss on Eliquis review of an EGD with multiple AVMs.  Patient came in to the ER from home due to generalized weakness nausea vomiting since this morning reports lower abdominal pain has a history of anemia is on Eliquis and Plavix episodes of vomiting which were described by the significant other is dark of clotted material, and also had melanotic stool occult positive.  Patient felt significant weakness and was unable to walk felt like too weak to walk.  Patient has a history of an upper GI bleed with multiple AVMs, also has a history of the patient intubation with tracheostomy approximately rehab time of 1 year.   CT scan with contrast showed nonspecific hypodensity within the gastric body possibly reflective of hemorrhage in this patient with the history of gastric AVMs, known liver cancer, cholelithiasis, small volume abdominal pelvic ascites  Patient's baseline hemoglobin appears to be 8-10, except 1 value on 11/23 of 13.    Patient is on Eliquis and Plavix as o/p  In the ED received DDAVP 0.3 mg/kg and Kcentra  GI consulted  1/23 S/P EGD - 3 large angioectasias in the fundus of the stomach and body of the stomach; bleeding was observed; placed clips; hemostasis achieved; induced coagulation and hemostasis achieved with argon plasma coagulation The esophagus and duodenum appeared normal.  Pt received a total of 3 units PRBC this admission so far, last on 1/25/24    Plan:  Monitor H/H Q12 hours- Hgb remains stable   Continue PPI BID  1/25 Started on trickle TF-> titrated up to goal  GI following appreciate recommendations- Need to address if stable to restart plavix

## 2024-01-27 NOTE — ASSESSMENT & PLAN NOTE
patient is 78-year-old male with a history of liver cancer and radiation therapy who was admitted for an upper GI bleed with hypotension and later strokelike symptoms went unresponsive and was intubated, appears to be as cause from the liver with an ammonia level of 130  Most likely source of elevated ammonia level is liver history of liver cancer  GI following  Continue with Rifaximin when able   Patient should have 2-3 BMs a day  Amm level normalized to 61   Recheck Ammonia level in the AM

## 2024-01-27 NOTE — ASSESSMENT & PLAN NOTE
Lab Results   Component Value Date    HGBA1C 6.6 (H) 01/24/2024       Recent Labs     01/26/24  2208 01/27/24  0025 01/27/24  0225 01/27/24  0414   POCGLU 139 188* 146* 107         Blood Sugar Average: Last 72 hrs:  (P) [id number]    Patient has a Dexcom, on Trulicity and Jardiance, Lantus and insulin  Extubated 1/27. Currently no enteral access for TF. Insulin drip stopped. Changed to correction scale insulin  Blood glucose monitoring Q6 hours while NPO  Blood glucose goal 140-180  Pending [facility]   Hypoglycemia protocol

## 2024-01-27 NOTE — ASSESSMENT & PLAN NOTE
1/24 0630A RN called provider to bedside to evaluate patient for left facial droop. NIHHS 4 (left facial droop, LUE weakness +4/5, LLE weakness +4/5, LLE drift and mild dysarthria). RN states left facial droop was noticed on arrival to ICU at 0300A. RN states ED could not confirm if droop was present in ED and wife unsure if droop was new. Stroke alert called.   CTH - no acute stroke or hemorrhage  CTA H/N - no large vessel occulusion. No arterial occulusion. 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   1/24 MRI: Negative for acute findings   1/24 ECHO: EF 75%, Mild TVR  Mentation continuing to improve. Able to be extubated 1/27    Plan:  Patient discussed with Dr. Interiano (Neuro)   TNK not given due to unknown last known well and active GI bleed.  Goal SBP >140  1/24 ECHO: EF 75%, Mild TVR  Holding aspirin and plavix in setting of GI bleed  Hold statin while NPO   SLP consult pending  Neurology following  Neuro checks Q4 hours  Aspiration/fall precautions

## 2024-01-27 NOTE — ASSESSMENT & PLAN NOTE
Rate controled  Maintained on Eliquis and metoprolol as o/p  BP much improved. Restart metoprolol. Switch to metoprolol tartrate 12.5 mg OGT BID while patient is still intubated   Hold Eliquis and Plavix until okay with GI

## 2024-01-27 NOTE — ASSESSMENT & PLAN NOTE
1/24 0630A RN called provider to bedside to evaluate patient for left facial droop. NIHHS 4 (left facial droop, LUE weakness +4/5, LLE weakness +4/5, LLE drift and mild dysarthria). RN states left facial droop was noticed on arrival to ICU at 0300A. RN states ED could not confirm if droop was present in ED and wife unsure if droop was new. Stroke alert called.   CTH - no acute stroke or hemorrhage  CTA H/N - no large vessel occulusion. No arterial occulusion. 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   1/24 MRI: Negative for acute findings   1/24 ECHO: EF 75%, Mild TVR    Plan:  Patient discussed with Dr. Interiano (Neuro)   TNK not given due to unknown last known well and active GI bleed.  Goal SBP >140  1/24 ECHO: EF 75%, Mild TVR  Holding aspirin and plavix in setting of GI bleed  Hold statin while NPO   MS multifactorial with hepatic encephalopathy & ammonia level 130 (improved), hypernatremia (improved), NA on CKD (improving), and acute illness  Neurology following  Neuro checks Q4 hours  Continue to hold sedation

## 2024-01-27 NOTE — PROGRESS NOTES
[facility]  Progress Note  Name: Derek Walter I  MRN: [id number]  Unit/Bed#: -01 I Date of Admission: 1/23/2024   Date of Service: 1/27/2024 I Hospital Day: 4    Assessment/Plan   * Acute upper GI bleeding  Assessment & Plan  Patient is a 78-year-old male who has been seeing [facility] last seen 12/13/2023 patient has a history of anemia is multifactorial chronic due to cancer with iron deficiency and occult GI blood loss on Eliquis review of an EGD with multiple AVMs.  Patient came in to the ER from home due to generalized weakness nausea vomiting since this morning reports lower abdominal pain has a history of anemia is on Eliquis and Plavix episodes of vomiting which were described by the significant other is dark of clotted material, and also had melanotic stool occult positive.  Patient felt significant weakness and was unable to walk felt like too weak to walk.  Patient has a history of an upper GI bleed with multiple AVMs, also has a history of the patient intubation with tracheostomy approximately rehab time of 1 year.   CT scan with contrast showed nonspecific hypodensity within the gastric body possibly reflective of hemorrhage in this patient with the history of gastric AVMs, known liver cancer, cholelithiasis, small volume abdominal pelvic ascites  Patient's baseline hemoglobin appears to be 8-10, except 1 value on 11/23 of 13.    Patient is on Eliquis and Plavix as o/p  In the ED received DDAVP 0.3 mg/kg and Kcentra  GI consulted  1/23 S/P EGD - 3 large angioectasias in the fundus of the stomach and body of the stomach; bleeding was observed; placed clips; hemostasis achieved; induced coagulation and hemostasis achieved with argon plasma coagulation The esophagus and duodenum appeared normal.  Pt received a total of 3 units PRBC this admission so far, last on 1/25/24    Plan:  Monitor H/H Q12 hours- Hgb remains stable   Continue PPI BID  1/25 Started on trickle TF->  titrated up to goal  GI following appreciate recommendations- Need to address if stable to restart plavix    Acute blood loss anemia  Assessment & Plan  Secondary to GIB  Hemoglobin baseline 8-10  Receive 3 units PRBC this admission, last 1/25   Hgb Q12H  History of iron deficiency check iron panel  Start iron p.o. when able    AMS (altered mental status)  Assessment & Plan  1/24 0630A RN called provider to bedside to evaluate patient for left facial droop. NIHHS 4 (left facial droop, LUE weakness +4/5, LLE weakness +4/5, LLE drift and mild dysarthria). RN states left facial droop was noticed on arrival to ICU at 0300A. RN states ED could not confirm if droop was present in ED and wife unsure if droop was new. Stroke alert called.   CTH - no acute stroke or hemorrhage  CTA H/N - no large vessel occulusion. No arterial occulusion. 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   1/24 MRI: Negative for acute findings   1/24 ECHO: EF 75%, Mild TVR    Plan:  Patient discussed with Dr. Interiano (Neuro)   TNK not given due to unknown last known well and active GI bleed.  Goal SBP >140  1/24 ECHO: EF 75%, Mild TVR  Holding aspirin and plavix in setting of GI bleed  Hold statin while NPO   MS multifactorial with hepatic encephalopathy & ammonia level 130 (improved), hypernatremia (improved), NA on CKD (improving), and acute illness  Neurology following  Neuro checks Q4 hours  Continue to hold sedation      Acute hypoxemic respiratory failure (HCC)  Assessment & Plan  Required intubation for airway protection 1/23 after patient became increasingly encephalopathic  Minimal vent setting. Placed on PS since 1/24 at 6/6 40% FiO2  Unable to be successfully extubated due to mental status. Currently not requiring sedation.  Having copious secretions coming from inline suction    Plan:  Continue PS ventilation. Wean FiO2 to maintain SPO2 > 88%  Aspiration precautions  Frequent mouth care  Repeat CXR  Consider CT imaging if CXR  negative    Hepatic encephalopathy (HCC)  Assessment & Plan  patient is 78-year-old male with a history of liver cancer and radiation therapy who was admitted for an upper GI bleed with hypotension and later strokelike symptoms went unresponsive and was intubated, appears to be as cause from the liver with an ammonia level of 130  Most likely source of elevated ammonia level is liver history of liver cancer  GI following  Continue Lactulose 30 mg TID and Rifaximin   Patient should have 2-3 BMs a day  Amm level normalized to 61   Recheck Ammonia level in the AM      Chronic systolic heart failure (HCC)  Assessment & Plan  Wt Readings from Last 3 Encounters:   01/25/24 95.5 kg (210 lb 8.6 oz)   12/20/23 96.6 kg (213 lb)   12/14/23 97 kg (213 lb 12.8 oz)   Patient is 78-year-old male with history of CAD post PCI in August not deemed a candidate for surgical intervention, systolic heart failure with reduced ejection fraction of 36%   Home meds: lasix, toprol, and statin   Resume home meds when able   Daily wts              Hepatocellular carcinoma (HCC)  Assessment & Plan  Patient has stage II liver cancer hepatocellular carcinoma  Has a tumor thrombus which appears stable seen again on admission CT  Status post Y 90 treatment    Follows with Dr. Gong as o/p    CAD (coronary artery disease)  Assessment & Plan  Patient has a history of coronary artery disease had 8/2023 a PCI status post cardiac catheterization drug-eluting stents x 3 in the LAD.  Ramus and left circumflex  Continue home statin, Plavix, and metoprolol when able       History of DVT (deep vein thrombosis)  Assessment & Plan  Patient is on Eliquis as o/p, currently held 2/2 GIB  1/23 reversed with Kcentra     Atrial fibrillation, unspecified type (HCC)  Assessment & Plan  Rate controled  Maintained on Eliquis and metoprolol as o/p  BP much improved. Restart metoprolol. Switch to metoprolol tartrate 12.5 mg OGT BID while patient is still intubated   Hold  Eliquis and Plavix until okay with GI     Type 2 diabetes mellitus with neurologic complication, with long-term current use of insulin ([facility])  Assessment & Plan  Lab Results   Component Value Date    HGBA1C 6.6 (H) 01/24/2024       Recent Labs     01/26/24  2208 01/27/24  0025 01/27/24  0225 01/27/24  0414   POCGLU 139 188* 146* 107         Blood Sugar Average: Last 72 hrs:  (P) [id number]    Patient has a Dexcom, on Trulicity and Jardiance, Lantus and insulin  Continue insulin gtt for glycemic control. Blood glucose monitoring Q2 hr while on drip  Blood glucose goal 140-180  Restart home regimen when able     Stage 3 chronic kidney disease, unspecified whether stage 3a or 3b CKD ([facility])  Assessment & Plan  Lab Results   Component Value Date    EGFR 59 01/27/2024    EGFR 59 01/26/2024    EGFR 54 01/26/2024    CREATININE 1.17 01/27/2024    CREATININE 1.18 01/26/2024    CREATININE 1.27 01/26/2024     Cr at baseline, Last 1.46  Monitor I's and O's   Renally dose medications  Avoid nephrotoxic agents  Hold Lasix at this time, resume home med when able              Disposition: Critical care    ICU Core Measures     Vented Patient  VAP Bundle  VAP bundle ordered     A: Assess, Prevent, and Manage Pain Has pain been assessed? Yes  Need for changes to pain regimen? No   B: Both Spontaneous Awakening Trials (SATs) and Spontaneous Breathing Trials (SBTs) Plan to perform spontaneous awakening trial today? Yes   Plan to perform spontaneous breathing trial today? Yes   Obvious barriers to extubation? No   C: Choice of Sedation RASS Goal: -1 Drowsy or N/A patient not on sedation  Need for changes to sedation or analgesia regimen? No   D: Delirium CAM-ICU: Positive   E: Early Mobility  Plan for early mobility? Yes   F: Family Engagement Plan for family engagement today? Yes       Antibiotic Review: Medication for hepatic encephalopathy     Review of Invasive Devices:            Prophylaxis:  VTE VTE covered by:    None        Stress Ulcer  covered bypantoprazole (PROTONIX) 40 mg tablet [[id number]] (Long-Term Med), pantoprazole (PROTONIX) injection 40 mg [[id number]]         Significant 24hr Events     24hr events: Patient was placed on SBT previous day is currently on 6/6 40% FiO2 and is maintaining his settings at the previous day.  Patient was unable to be extubated previous day secondary to mentation.  Patient's mental status is slowly improving day by day however patient is unable to follow directions and there is concern that he will not be able to protect his own airway if ETT was removed.  Globin remained stable has not required additional blood products.  Remained hemodynamically stable over the night. No overnight events     Subjective   Review of Systems   Unable to perform ROS: Intubated      Objective                            Vitals I/O      Most Recent Min/Max in 24hrs   Temp 98.4 °F (36.9 °C) Temp  Min: 97 °F (36.1 °C)  Max: 99 °F (37.2 °C)   Pulse 89 Pulse  Min: 83  Max: 97   Resp 20 Resp  Min: 16  Max: 25   /61 BP  Min: 130/61  Max: 174/75   O2 Sat 95 % SpO2  Min: 95 %  Max: 100 %      Intake/Output Summary (Last 24 hours) at 1/27/2024 0553  Last data filed at 1/27/2024 0000  Gross per 24 hour   Intake 2305.78 ml   Output 1075 ml   Net 1230.78 ml       Diet Enteral/Parenteral; Tube Feeding No Oral Diet; Jevity 1.2 Damian; Continuous; 45; 250; Water; Every 4 hours    Invasive Monitoring           Physical Exam   Physical Exam  Eyes:      General: Vision grossly intact. No scleral icterus.     Extraocular Movements: Extraocular movements intact.      Conjunctiva/sclera: Conjunctivae normal.      Pupils: Pupils are equal, round, and reactive to light.   Skin:     General: Skin is warm and dry.      Capillary Refill: Capillary refill takes less than 2 seconds.   HENT:      Head: Normocephalic and atraumatic.      Mouth/Throat:      Mouth: Mucous membranes are dry.   Cardiovascular:      Rate and Rhythm: Normal rate and  regular rhythm.      Pulses: Normal pulses.      Heart sounds: Normal heart sounds.   Musculoskeletal:      Right lower le+ Edema present.      Left lower le+ Edema present.   Abdominal: General: Bowel sounds are normal.      Palpations: Abdomen is soft.      Tenderness: There is no abdominal tenderness.   Constitutional:       General: He is not in acute distress.     Appearance: He is ill-appearing.      Interventions: He is intubated and restrained.   Pulmonary:      Effort: Pulmonary effort is normal. He is intubated.      Breath sounds: Normal breath sounds. No wheezing, rhonchi or rales.   Neurological:      GCS: GCS eye subscore is 3. GCS verbal subscore is 1. GCS motor subscore is 5.        Corneal reflex present, cough reflex and gag reflex intact.   Genitourinary/Anorectal:  Tompkins present.          Diagnostic Studies      EKG: NSR  Imaging:  I have personally reviewed pertinent reports.   and I have personally reviewed pertinent films in PACS     Medications:  Scheduled PRN   azelastine, 1 spray, BID  chlorhexidine, 15 mL, Q12H RANDALL  ipratropium, 0.5 mg, Q6H  lactulose, 30 g, TID  levalbuterol, 0.63 mg, Q6H  metoprolol tartrate, 12.5 mg, Q12H RANDALL  pantoprazole, 40 mg, Q12H RANDALL  rifaximin, 550 mg, Q12H RANDALL      acetaminophen, 650 mg, Q6H PRN  trimethobenzamide, 200 mg, Q12H PRN       Continuous    insulin regular (HumuLIN R,NovoLIN R) 1 Units/mL in sodium chloride 0.9 % 100 mL infusion, 0.3-21 Units/hr, Last Rate: 1.5 Units/hr (24 0416)  multi-electrolyte, 20 mL/hr, Last Rate: 20 mL/hr (24 1217)         Labs:    CBC    Recent Labs     24  0435 24  1056 24  1711   WBC 9.16  --   --    HGB 8.2* 7.8* 8.3*   HCT 26.3* 23* 25.8*   *  --   --      BMP    Recent Labs     24  1711 24  0038   SODIUM 141 140   K 4.2 3.9   * 112*   CO2 21 22   AGAP 8 6   BUN 35* 30*   CREATININE 1.18 1.17   CALCIUM 8.3* 8.1*       Coags    No recent results     Additional  Electrolytes  Recent Labs     01/26/24  0435 01/26/24  1056 01/26/24  1711 01/27/24  0038   MG 2.1  --   --  2.0   PHOS 2.1*  --  2.8 2.8   CAIONIZED 1.10* 1.27  --   --           Blood Gas    No recent results  No recent results LFTs  No recent results      Infectious  No recent results  Glucose  Recent Labs     01/26/24  0435 01/26/24  1213 01/26/24  1711 01/27/24  0038   GLUC 139 164* 161* 203*               Critical Care Time Statement: Upon my evaluation, this patient had a high probability of imminent or life-threatening deterioration due to acute hypoxemic respiratory failure, GIB, acute metabolic encephalopathy, which required my direct attention, intervention, and personal management.  I spent a total of 30 minutes directly providing critical care services, including interpretation of complex medical databases, evaluating for the presence of life-threatening injuries or illnesses, ventilator management, and managing enteral or parenteral nutritional support. This time is exclusive of procedures, teaching, family meetings, and any prior time recorded by providers other than myself.      NADIA Whaley

## 2024-01-27 NOTE — ASSESSMENT & PLAN NOTE
Rate controled  Maintained on Eliquis and metoprolol as o/p  Unable to take PO metoprolol. Pending SLP. May consider IV if unable to swallow  Hold Eliquis and Plavix until okay with GI

## 2024-01-27 NOTE — ASSESSMENT & PLAN NOTE
Wt Readings from Last 3 Encounters:   01/25/24 95.5 kg (210 lb 8.6 oz)   12/20/23 96.6 kg (213 lb)   12/14/23 97 kg (213 lb 12.8 oz)   Patient is 78-year-old male with history of CAD post PCI in August not deemed a candidate for surgical intervention, systolic heart failure with reduced ejection fraction of 36%   Home meds: lasix, toprol, and statin   Resume home meds when able   Daily wts

## 2024-01-28 PROBLEM — R13.10 DYSPHAGIA: Status: ACTIVE | Noted: 2024-01-28

## 2024-01-28 LAB
ALBUMIN SERPL BCP-MCNC: 2.8 G/DL (ref 3.5–5)
ALP SERPL-CCNC: 309 U/L (ref 34–104)
ALT SERPL W P-5'-P-CCNC: 50 U/L (ref 7–52)
AMMONIA PLAS-SCNC: 23 UMOL/L (ref 18–72)
ANION GAP SERPL CALCULATED.3IONS-SCNC: 9 MMOL/L
APTT PPP: 32 SECONDS (ref 23–37)
APTT PPP: 52 SECONDS (ref 23–37)
AST SERPL W P-5'-P-CCNC: 51 U/L (ref 13–39)
BASOPHILS # BLD AUTO: 0.03 THOUSANDS/ÂΜL (ref 0–0.1)
BASOPHILS NFR BLD AUTO: 0 % (ref 0–1)
BILIRUB SERPL-MCNC: 1.77 MG/DL (ref 0.2–1)
BUN SERPL-MCNC: 29 MG/DL (ref 5–25)
CA-I BLD-SCNC: 1.06 MMOL/L (ref 1.12–1.32)
CALCIUM ALBUM COR SERPL-MCNC: 9 MG/DL (ref 8.3–10.1)
CALCIUM SERPL-MCNC: 8 MG/DL (ref 8.4–10.2)
CHLORIDE SERPL-SCNC: 107 MMOL/L (ref 96–108)
CO2 SERPL-SCNC: 24 MMOL/L (ref 21–32)
CREAT SERPL-MCNC: 1.14 MG/DL (ref 0.6–1.3)
EOSINOPHIL # BLD AUTO: 0.38 THOUSAND/ÂΜL (ref 0–0.61)
EOSINOPHIL NFR BLD AUTO: 5 % (ref 0–6)
ERYTHROCYTE [DISTWIDTH] IN BLOOD BY AUTOMATED COUNT: 15.3 % (ref 11.6–15.1)
ERYTHROCYTE [DISTWIDTH] IN BLOOD BY AUTOMATED COUNT: 15.5 % (ref 11.6–15.1)
GFR SERPL CREATININE-BSD FRML MDRD: 61 ML/MIN/1.73SQ M
GLUCOSE SERPL-MCNC: 156 MG/DL (ref 65–140)
GLUCOSE SERPL-MCNC: 163 MG/DL (ref 65–140)
GLUCOSE SERPL-MCNC: 260 MG/DL (ref 65–140)
GLUCOSE SERPL-MCNC: 262 MG/DL (ref 65–140)
GLUCOSE SERPL-MCNC: 317 MG/DL (ref 65–140)
HCT VFR BLD AUTO: 28 % (ref 36.5–49.3)
HCT VFR BLD AUTO: 31 % (ref 36.5–49.3)
HGB BLD-MCNC: 9 G/DL (ref 12–17)
HGB BLD-MCNC: 9.7 G/DL (ref 12–17)
IMM GRANULOCYTES # BLD AUTO: 0.05 THOUSAND/UL (ref 0–0.2)
IMM GRANULOCYTES NFR BLD AUTO: 1 % (ref 0–2)
INR PPP: 1.19 (ref 0.84–1.19)
LYMPHOCYTES # BLD AUTO: 0.43 THOUSANDS/ÂΜL (ref 0.6–4.47)
LYMPHOCYTES NFR BLD AUTO: 6 % (ref 14–44)
MCH RBC QN AUTO: 29.6 PG (ref 26.8–34.3)
MCH RBC QN AUTO: 30.6 PG (ref 26.8–34.3)
MCHC RBC AUTO-ENTMCNC: 31.3 G/DL (ref 31.4–37.4)
MCHC RBC AUTO-ENTMCNC: 32.1 G/DL (ref 31.4–37.4)
MCV RBC AUTO: 95 FL (ref 82–98)
MCV RBC AUTO: 95 FL (ref 82–98)
MONOCYTES # BLD AUTO: 1.31 THOUSAND/ÂΜL (ref 0.17–1.22)
MONOCYTES NFR BLD AUTO: 17 % (ref 4–12)
NEUTROPHILS # BLD AUTO: 5.62 THOUSANDS/ÂΜL (ref 1.85–7.62)
NEUTS SEG NFR BLD AUTO: 71 % (ref 43–75)
NRBC BLD AUTO-RTO: 0 /100 WBCS
PLATELET # BLD AUTO: 137 THOUSANDS/UL (ref 149–390)
PLATELET # BLD AUTO: 171 THOUSANDS/UL (ref 149–390)
PMV BLD AUTO: 12.2 FL (ref 8.9–12.7)
PMV BLD AUTO: 12.4 FL (ref 8.9–12.7)
POTASSIUM SERPL-SCNC: 3.8 MMOL/L (ref 3.5–5.3)
PROCALCITONIN SERPL-MCNC: 0.43 NG/ML
PROT SERPL-MCNC: 5.8 G/DL (ref 6.4–8.4)
PROTHROMBIN TIME: 15.6 SECONDS (ref 11.6–14.5)
RBC # BLD AUTO: 2.94 MILLION/UL (ref 3.88–5.62)
RBC # BLD AUTO: 3.28 MILLION/UL (ref 3.88–5.62)
SODIUM SERPL-SCNC: 140 MMOL/L (ref 135–147)
WBC # BLD AUTO: 7.82 THOUSAND/UL (ref 4.31–10.16)
WBC # BLD AUTO: 8.6 THOUSAND/UL (ref 4.31–10.16)

## 2024-01-28 PROCEDURE — 99232 SBSQ HOSP IP/OBS MODERATE 35: CPT | Performed by: INTERNAL MEDICINE

## 2024-01-28 PROCEDURE — 84145 PROCALCITONIN (PCT): CPT

## 2024-01-28 PROCEDURE — 82140 ASSAY OF AMMONIA: CPT

## 2024-01-28 PROCEDURE — 92526 ORAL FUNCTION THERAPY: CPT

## 2024-01-28 PROCEDURE — 93005 ELECTROCARDIOGRAM TRACING: CPT

## 2024-01-28 PROCEDURE — C9113 INJ PANTOPRAZOLE SODIUM, VIA: HCPCS

## 2024-01-28 PROCEDURE — 80053 COMPREHEN METABOLIC PANEL: CPT

## 2024-01-28 PROCEDURE — 85610 PROTHROMBIN TIME: CPT

## 2024-01-28 PROCEDURE — 85730 THROMBOPLASTIN TIME PARTIAL: CPT

## 2024-01-28 PROCEDURE — 94640 AIRWAY INHALATION TREATMENT: CPT

## 2024-01-28 PROCEDURE — 99231 SBSQ HOSP IP/OBS SF/LOW 25: CPT | Performed by: INTERNAL MEDICINE

## 2024-01-28 PROCEDURE — 82330 ASSAY OF CALCIUM: CPT

## 2024-01-28 PROCEDURE — 94760 N-INVAS EAR/PLS OXIMETRY 1: CPT

## 2024-01-28 PROCEDURE — 85027 COMPLETE CBC AUTOMATED: CPT

## 2024-01-28 PROCEDURE — 85025 COMPLETE CBC W/AUTO DIFF WBC: CPT

## 2024-01-28 PROCEDURE — 85730 THROMBOPLASTIN TIME PARTIAL: CPT | Performed by: INTERNAL MEDICINE

## 2024-01-28 PROCEDURE — 92610 EVALUATE SWALLOWING FUNCTION: CPT

## 2024-01-28 PROCEDURE — 82948 REAGENT STRIP/BLOOD GLUCOSE: CPT

## 2024-01-28 RX ORDER — INSULIN LISPRO 100 [IU]/ML
2-12 INJECTION, SOLUTION INTRAVENOUS; SUBCUTANEOUS
Status: DISCONTINUED | OUTPATIENT
Start: 2024-01-28 | End: 2024-02-06 | Stop reason: HOSPADM

## 2024-01-28 RX ORDER — ALBUMIN (HUMAN) 12.5 G/50ML
25 SOLUTION INTRAVENOUS ONCE
Status: COMPLETED | OUTPATIENT
Start: 2024-01-28 | End: 2024-01-28

## 2024-01-28 RX ORDER — POTASSIUM CHLORIDE 14.9 MG/ML
20 INJECTION INTRAVENOUS ONCE
Status: COMPLETED | OUTPATIENT
Start: 2024-01-28 | End: 2024-01-28

## 2024-01-28 RX ORDER — HEPARIN SODIUM 10000 [USP'U]/100ML
3-20 INJECTION, SOLUTION INTRAVENOUS
Status: DISCONTINUED | OUTPATIENT
Start: 2024-01-28 | End: 2024-01-30

## 2024-01-28 RX ORDER — INSULIN LISPRO 100 [IU]/ML
5 INJECTION, SOLUTION INTRAVENOUS; SUBCUTANEOUS
Status: DISCONTINUED | OUTPATIENT
Start: 2024-01-28 | End: 2024-02-06 | Stop reason: HOSPADM

## 2024-01-28 RX ORDER — CALCIUM GLUCONATE 20 MG/ML
2 INJECTION, SOLUTION INTRAVENOUS ONCE
Status: COMPLETED | OUTPATIENT
Start: 2024-01-28 | End: 2024-01-28

## 2024-01-28 RX ORDER — CALCIUM GLUCONATE 20 MG/ML
2 INJECTION, SOLUTION INTRAVENOUS ONCE
Status: DISCONTINUED | OUTPATIENT
Start: 2024-01-28 | End: 2024-01-28

## 2024-01-28 RX ADMIN — CHLORHEXIDINE GLUCONATE 15 ML: 1.2 SOLUTION ORAL at 08:25

## 2024-01-28 RX ADMIN — INSULIN LISPRO 1 UNITS: 100 INJECTION, SOLUTION INTRAVENOUS; SUBCUTANEOUS at 05:12

## 2024-01-28 RX ADMIN — RIFAXIMIN 550 MG: 550 TABLET ORAL at 21:26

## 2024-01-28 RX ADMIN — AZELASTINE 1 SPRAY: 1 SPRAY, METERED NASAL at 18:01

## 2024-01-28 RX ADMIN — PANTOPRAZOLE SODIUM 40 MG: 40 INJECTION, POWDER, FOR SOLUTION INTRAVENOUS at 08:25

## 2024-01-28 RX ADMIN — INSULIN LISPRO 5 UNITS: 100 INJECTION, SOLUTION INTRAVENOUS; SUBCUTANEOUS at 18:01

## 2024-01-28 RX ADMIN — POTASSIUM CHLORIDE 20 MEQ: 14.9 INJECTION, SOLUTION INTRAVENOUS at 06:38

## 2024-01-28 RX ADMIN — INSULIN LISPRO 8 UNITS: 100 INJECTION, SOLUTION INTRAVENOUS; SUBCUTANEOUS at 18:01

## 2024-01-28 RX ADMIN — IPRATROPIUM BROMIDE 0.5 MG: 0.5 SOLUTION RESPIRATORY (INHALATION) at 07:48

## 2024-01-28 RX ADMIN — ALBUMIN (HUMAN) 25 G: 0.25 INJECTION, SOLUTION INTRAVENOUS at 18:17

## 2024-01-28 RX ADMIN — RIFAXIMIN 550 MG: 550 TABLET ORAL at 11:57

## 2024-01-28 RX ADMIN — INSULIN DETEMIR 30 UNITS: 100 INJECTION, SOLUTION SUBCUTANEOUS at 21:26

## 2024-01-28 RX ADMIN — INSULIN LISPRO 6 UNITS: 100 INJECTION, SOLUTION INTRAVENOUS; SUBCUTANEOUS at 11:57

## 2024-01-28 RX ADMIN — Medication 3 ML: at 07:48

## 2024-01-28 RX ADMIN — CHLORHEXIDINE GLUCONATE 15 ML: 1.2 SOLUTION ORAL at 21:26

## 2024-01-28 RX ADMIN — Medication 12.5 MG: at 21:26

## 2024-01-28 RX ADMIN — PANTOPRAZOLE SODIUM 40 MG: 40 INJECTION, POWDER, FOR SOLUTION INTRAVENOUS at 21:26

## 2024-01-28 RX ADMIN — AZELASTINE 1 SPRAY: 1 SPRAY, METERED NASAL at 08:25

## 2024-01-28 RX ADMIN — LEVALBUTEROL HYDROCHLORIDE 0.63 MG: 0.63 SOLUTION RESPIRATORY (INHALATION) at 07:48

## 2024-01-28 RX ADMIN — HEPARIN SODIUM 11.1 UNITS/KG/HR: 10000 INJECTION, SOLUTION INTRAVENOUS at 15:38

## 2024-01-28 RX ADMIN — INSULIN LISPRO 6 UNITS: 100 INJECTION, SOLUTION INTRAVENOUS; SUBCUTANEOUS at 21:42

## 2024-01-28 RX ADMIN — CALCIUM GLUCONATE 2 G: 20 INJECTION, SOLUTION INTRAVENOUS at 06:23

## 2024-01-28 NOTE — PROGRESS NOTES
[facility]  Progress Note  Name: Derek Walter I  MRN: [id number]  Unit/Bed#: -01 I Date of Admission: 1/23/2024   Date of Service: 1/28/2024 I Hospital Day: 5    Assessment/Plan   * Acute upper GI bleeding  Assessment & Plan  Patient is a 78-year-old male who has been seeing [facility], [facility] last seen 12/13/2023 patient has a history of anemia is multifactorial chronic due to cancer with iron deficiency and occult GI blood loss on Eliquis review of an EGD with multiple AVMs.  Patient came in to the ER from home due to generalized weakness nausea vomiting since this morning reports lower abdominal pain has a history of anemia is on Eliquis and Plavix episodes of vomiting which were described by the significant other is dark of clotted material, and also had melanotic stool occult positive.  Patient felt significant weakness and was unable to walk felt like too weak to walk.  Patient has a history of an upper GI bleed with multiple AVMs, also has a history of the patient intubation with tracheostomy approximately rehab time of 1 year.   CT scan with contrast showed nonspecific hypodensity within the gastric body possibly reflective of hemorrhage in this patient with the history of gastric AVMs, known liver cancer, cholelithiasis, small volume abdominal pelvic ascites  Patient's baseline hemoglobin appears to be 8-10, except 1 value on 11/23 of 13.    Patient is on Eliquis and Plavix as o/p  In the ED received DDAVP 0.3 mg/kg and Kcentra  GI consulted  1/23 S/P EGD - 3 large angioectasias in the fundus of the stomach and body of the stomach; bleeding was observed; placed clips; hemostasis achieved; induced coagulation and hemostasis achieved with argon plasma coagulation The esophagus and duodenum appeared normal.  Pt received a total of 3 units PRBC this admission so far, last on 1/25/24    Plan:  Monitor H/H Q12 hours- Hgb remains stable   Continue PPI BID  Patient extubated 1/27-  pending swallow evaluation by SLP to restart diet   GI following appreciate recommendations- Need to address if stable to restart plavix    Acute blood loss anemia  Assessment & Plan  Secondary to GIB  Hemoglobin baseline 8-10  Receive 3 units PRBC this admission, last 1/25   Hgb Q12H  History of iron deficiency check iron panel  Start iron p.o. when able    AMS (altered mental status)  Assessment & Plan  1/24 0630A RN called provider to bedside to evaluate patient for left facial droop. NIHHS 4 (left facial droop, LUE weakness +4/5, LLE weakness +4/5, LLE drift and mild dysarthria). RN states left facial droop was noticed on arrival to ICU at 0300A. RN states ED could not confirm if droop was present in ED and wife unsure if droop was new. Stroke alert called.   CTH - no acute stroke or hemorrhage  CTA H/N - no large vessel occulusion. No arterial occulusion. 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   1/24 MRI: Negative for acute findings   1/24 ECHO: EF 75%, Mild TVR  Mentation continuing to improve. Able to be extubated 1/27    Plan:  Patient discussed with Dr. Interiano (Neuro)   TNK not given due to unknown last known well and active GI bleed.  Goal SBP >140  1/24 ECHO: EF 75%, Mild TVR  Holding aspirin and plavix in setting of GI bleed  Hold statin while NPO   SLP consult pending  Neurology following  Neuro checks Q4 hours  Aspiration/fall precautions       Acute hypoxemic respiratory failure (HCC)  Assessment & Plan  Required intubation for airway protection 1/23 after patient became increasingly encephalopathic  Minimal vent setting. Placed on PS since 1/24 at 6/6 40% FiO2  Unable to be successfully extubated due to mental status. Currently not requiring sedation.  Having copious secretions coming from inline suction  1/27 CXR negative for acute findings. Given 1 x dose of 40 mg IV lasix   Extubated mid morning on 1/27    Plan:  Continue supplemental O2. Wean FiO2 to maintain SPO2 > 88%. Currently on 4 L  NC  Aspiration precautions  Frequent mouth care  Continue scheduled Xopenex/Atrovent for airway clearance  Airway clearance protocol  Encourage IS/Flutter valve when able     Hepatic encephalopathy (HCC)  Assessment & Plan  patient is 78-year-old male with a history of liver cancer and radiation therapy who was admitted for an upper GI bleed with hypotension and later strokelike symptoms went unresponsive and was intubated, appears to be as cause from the liver with an ammonia level of 130  Most likely source of elevated ammonia level is liver history of liver cancer  GI following  Continue with Rifaximin when able   Patient should have 2-3 BMs a day  Amm level normalized to 61   Recheck Ammonia level in the AM      Chronic systolic heart failure (HCC)  Assessment & Plan  Wt Readings from Last 3 Encounters:   01/25/24 95.5 kg (210 lb 8.6 oz)   12/20/23 96.6 kg (213 lb)   12/14/23 97 kg (213 lb 12.8 oz)   Patient is 78-year-old male with history of CAD post PCI in August not deemed a candidate for surgical intervention, systolic heart failure with reduced ejection fraction of 36%   Home meds: lasix, toprol, and statin   Resume home meds when able   Daily wts              Hepatocellular carcinoma (HCC)  Assessment & Plan  Patient has stage II liver cancer hepatocellular carcinoma  Has a tumor thrombus which appears stable seen again on admission CT  Status post Y 90 treatment    Follows with Dr. Gong as o/p    CAD (coronary artery disease)  Assessment & Plan  Patient has a history of coronary artery disease had 8/2023 a PCI status post cardiac catheterization drug-eluting stents x 3 in the LAD.  Ramus and left circumflex  Continue home statin, Plavix, and metoprolol when able       History of DVT (deep vein thrombosis)  Assessment & Plan  Patient is on Eliquis as o/p, currently held 2/2 GIB  1/23 reversed with Kcentra     Atrial fibrillation, unspecified type (HCC)  Assessment & Plan  Rate controled  Maintained on  Eliquis and metoprolol as o/p  Unable to take PO metoprolol. Pending SLP. May consider IV if unable to swallow  Hold Eliquis and Plavix until okay with GI     Type 2 diabetes mellitus with neurologic complication, with long-term current use of insulin ([facility])  Assessment & Plan  Lab Results   Component Value Date    HGBA1C 6.6 (H) 01/24/2024       Recent Labs     01/26/24  2208 01/27/24  0025 01/27/24  0225 01/27/24  0414   POCGLU 139 188* 146* 107         Blood Sugar Average: Last 72 hrs:  (P) [id number]    Patient has a Dexcom, on Trulicity and Jardiance, Lantus and insulin  Extubated 1/27. Currently no enteral access for TF. Insulin drip stopped. Changed to correction scale insulin  Blood glucose monitoring Q6 hours while NPO  Blood glucose goal 140-180  Pending SLP   Hypoglycemia protocol     Stage 3 chronic kidney disease, unspecified whether stage 3a or 3b CKD ([facility])  Assessment & Plan  Lab Results   Component Value Date    EGFR 59 01/27/2024    EGFR 59 01/26/2024    EGFR 54 01/26/2024    CREATININE 1.17 01/27/2024    CREATININE 1.18 01/26/2024    CREATININE 1.27 01/26/2024     Cr at baseline, Last 1.46  Monitor I's and O's   Renally dose medications  Avoid nephrotoxic agents  Hold Lasix at this time, resume home med when able              Disposition: Stepdown Level 1    ICU Core Measures     A: Assess, Prevent, and Manage Pain Has pain been assessed? Yes  Need for changes to pain regimen? No   B: Both SAT/SAT  N/A   C: Choice of Sedation RASS Goal: 0 Alert and Calm or N/A patient not on sedation  Need for changes to sedation or analgesia regimen? NA   D: Delirium CAM-ICU: Positive   E: Early Mobility  Plan for early mobility? Yes   F: Family Engagement Plan for family engagement today? Yes       Antibiotic Review: On Rifaximin for hepatic encephalopathy     Review of Invasive Devices:            Prophylaxis:  VTE VTE covered by:    None       Stress Ulcer  covered bypantoprazole (PROTONIX) 40 mg  tablet [[id number]] (Long-Term Med), pantoprazole (PROTONIX) injection 40 mg [[id number]]         Significant 24hr Events     24hr events: Patient mentation continues to improve, patient was able to start following commands over the previous day and was able to be successfully extubated to 4 L nasal cannula.  Patient still encephalopathic.  Patient is slow to respond to questioning but is oriented to self and place.  Patient remains n.p.o. pending SLP evaluation.  Patient remained hemodynamically stable over the night.  No further overnight events.     Subjective   Review of Systems   Unable to perform ROS: Mental status change      Objective                            Vitals I/O      Most Recent Min/Max in 24hrs   Temp 98.7 °F (37.1 °C) Temp  Min: 98.1 °F (36.7 °C)  Max: 100 °F (37.8 °C)   Pulse 86 Pulse  Min: 85  Max: 95   Resp 21 Resp  Min: 17  Max: 23   /62 BP  Min: 120/59  Max: 174/75   O2 Sat 93 % SpO2  Min: 92 %  Max: 100 %      Intake/Output Summary (Last 24 hours) at 1/28/2024 0015  Last data filed at 1/27/2024 1813  Gross per 24 hour   Intake 1802.47 ml   Output 425 ml   Net 1377.47 ml       Diet NPO    Invasive Monitoring           Physical Exam   Physical Exam  Eyes:      General: Vision grossly intact. No scleral icterus.     Extraocular Movements: Extraocular movements intact.      Conjunctiva/sclera: Conjunctivae normal.      Pupils: Pupils are equal, round, and reactive to light.   Skin:     General: Skin is dry.      Capillary Refill: Capillary refill takes less than 2 seconds.   HENT:      Head: Normocephalic and atraumatic.      Mouth/Throat:      Mouth: Mucous membranes are dry.   Cardiovascular:      Rate and Rhythm: Normal rate and regular rhythm.      Pulses: Normal pulses.      Heart sounds: Normal heart sounds.   Musculoskeletal:         General: Normal range of motion.      Right lower leg: No edema.      Left lower leg: No edema.   Abdominal: General: Bowel sounds are normal.       Palpations: Abdomen is soft.      Tenderness: There is no abdominal tenderness.   Constitutional:       General: He is not in acute distress.     Appearance: He is ill-appearing.   Pulmonary:      Effort: Pulmonary effort is normal.      Breath sounds: Examination of the right-lower field reveals decreased breath sounds. Examination of the left-lower field reveals decreased breath sounds. Decreased breath sounds present. No wheezing, rhonchi or rales.   Neurological:      Mental Status: He is lethargic, confused, disoriented to time and disoriented to situation.      GCS: GCS eye subscore is 4. GCS verbal subscore is 5. GCS motor subscore is 6.      Cranial Nerves: Cranial nerves 2-12 are intact.      Sensory: Sensation is intact.      Motor: Strength full and intact in all extremities.        Corneal reflex present, cough reflex and gag reflex intact.            Diagnostic Studies      EKG: NSR  Imaging:  I have personally reviewed pertinent reports.   and I have personally reviewed pertinent films in PACS     Medications:  Scheduled PRN   azelastine, 1 spray, BID  chlorhexidine, 15 mL, Q12H RANDALL  insulin lispro, 1-5 Units, Q6H RANDLAL  metoprolol tartrate, 12.5 mg, Q12H RANDALL  pantoprazole, 40 mg, Q12H RANDALL  rifaximin, 550 mg, Q12H RANDALL  sodium chloride, 3 mL, TID      acetaminophen, 650 mg, Q6H PRN  ipratropium, 0.5 mg, Q6H PRN  levalbuterol, 0.63 mg, Q6H PRN  trimethobenzamide, 200 mg, Q12H PRN       Continuous          Labs:    CBC    Recent Labs     01/26/24  0435 01/26/24  1056 01/27/24  0605 01/27/24  1430   WBC 9.16  --  8.39  --    HGB 8.2*   < > 8.1*  8.1* 8.7*   HCT 26.3*   < > 25.8*  25.8* 27.3*   *  --  111*  --     < > = values in this interval not displayed.     BMP    Recent Labs     01/26/24  1711 01/27/24  0038   SODIUM 141 140   K 4.2 3.9   * 112*   CO2 21 22   AGAP 8 6   BUN 35* 30*   CREATININE 1.18 1.17   CALCIUM 8.3* 8.1*       Coags    No recent results     Additional  Electrolytes  Recent Labs     01/26/24  0435 01/26/24  1056 01/26/24  1711 01/27/24  0038 01/27/24  0605   MG 2.1  --   --  2.0 2.1   PHOS 2.1*  --    < > 2.8 3.0   CAIONIZED 1.10* 1.27  --   --   --     < > = values in this interval not displayed.          Blood Gas    No recent results  No recent results LFTs  No recent results    Infectious  No recent results  Glucose  Recent Labs     01/26/24  0435 01/26/24  1213 01/26/24  1711 01/27/24  0038   GLUC 139 164* 161* 203*               Non-Critical Care Time Statement: I have spent a total time of 30 minutes in caring for this patient including Diagnostic results, Risks and benefits of tx options, Instructions for management, Patient and family education, Importance of tx compliance, Risk factor reductions, Impressions, Documenting in the medical record, Reviewing / ordering tests, medicine, procedures  , and Obtaining or reviewing history  .     NADIA Whaley

## 2024-01-28 NOTE — ASSESSMENT & PLAN NOTE
Lab Results   Component Value Date    HGBA1C 6.6 (H) 01/24/2024       Recent Labs     01/27/24  1624 01/27/24  1749 01/27/24  2246 01/28/24  0511   POCGLU 150* 118 166* 163*     Patient has a Dexcom, on Trulicity and Jardiance, Lantus and insulin  Extubated 1/27. Currently no enteral access for TF. Insulin drip stopped. Changed to correction scale insulin  Accu-Chek ACHS with Humalog sliding scale  Blood glucose goal 140-180  Pending [facility]   Hypoglycemia protocol

## 2024-01-28 NOTE — SPEECH THERAPY NOTE
Speech-Language Pathology Bedside Swallow Evaluation      Patient Name: Derek Walter    Today's Date: 1/28/2024     Problem List  Principal Problem:    Acute upper GI bleeding  Active Problems:    Stage 3 chronic kidney disease, unspecified whether stage 3a or 3b CKD (HCC)    Type 2 diabetes mellitus with neurologic complication, with long-term current use of insulin (HCC)    Atrial fibrillation, unspecified type (HCC)    Acute hypoxemic respiratory failure (HCC)    History of DVT (deep vein thrombosis)    CAD (coronary artery disease)    Hepatocellular carcinoma (HCC)    Chronic systolic heart failure (HCC)    Acute blood loss anemia    AMS (altered mental status)    Hepatic encephalopathy (HCC)      Past Medical History  Past Medical History:   Diagnosis Date    Anemia     Atrial fibrillation (HCC)     Eliquis    AVB (atrioventricular block)     1st degree    CAD (coronary artery disease)     CKD (chronic kidney disease), stage III (HCC)     baseline Cr 1.2-1.6    Colon polyp     Diabetes mellitus (HCC)     type 2, insulin dependent    Diverticulosis     Emphysema lung (HCC)     Former tobacco use     History of asbestos exposure     History of DVT (deep vein thrombosis)     RLE, tx w/ AC    History of GI bleed 08/2023    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, given PRBC/Venofer for anemia while in house    Hyperlipidemia     Hypertension     LAFB (left anterior fascicular block)     Liver cancer (HCC)     Liver mass 08/2023    suspected HCC, needs radioembolization    RBBB     Syncope 08/07/2023       Past Surgical History  Past Surgical History:   Procedure Laterality Date    APPENDECTOMY      BOWEL RESECTION  2014    CARDIAC CATHETERIZATION      CARDIAC CATHETERIZATION N/A 08/25/2023    Procedure: Cardiac pci;  Surgeon: Dom Garrett DO;  Location: [facility];  Service: Cardiology    CARDIAC CATHETERIZATION N/A 08/21/2023    Procedure: Cardiac catheterization;  Surgeon: Dom  "DO Tami;  Location: [facility];  Service: Cardiology    CARDIAC CATHETERIZATION N/A 08/21/2023    Procedure: Cardiac Coronary Angiogram;  Surgeon: Dom Garrett DO;  Location: [facility];  Service: Cardiology    CATARACT EXTRACTION Bilateral     COLONOSCOPY      EGD      IR Y-90 PRE-ANGIO/EMBO W/ LUNG SCAN  09/01/2023    IR Y-90 RADIOEMBOLIZATION  09/08/2023       Summary   Pt presented with functional appearing oral and pharyngeal stage swallowing skills with ice chips, thick liquids and purée.  He declined trial of any soft/solid food and reported sensation of \" feeling like I am going to gag\" with trial sips of (thin) ice water.  His son Dom was present.  Education and swallowing anatomy/physiology and strategies to maximize safety of intake were reviewed.  Son provided additional nectar thick liquid in my presence and appropriately cued patient for small sips and slow rate.     Recommended Diet: puree/level 1 diet and nectar thick liquids to begin  Recommended Form of Meds: in purée or with nectar thick liquid (cut or crushed if large)  Aspiration precautions and swallowing strategies: upright posture, only feed when fully alert, slow rate of feeding, and small bites/sips  Other Recommendations: Continue frequent oral care        Current Medical Status  Derke Walter is a 78 y.o. with a medical history of chronic blood loss anemia, afibrillation on Eliquis, CAD, CKD stage III, diabetes, history of DVT, hyperlipidemia and hypertension, bowel resection, the catheterizations, ischemic cardiomyopathy EF of 35% who comes to the ER with increasing weakness and generally not feeling well globin at 8.3, with melanotic stools and vomiting blood x1.  CT scan shows possible active bleeding within the gastric fundus.       Assessment/Plan   * Acute upper GI bleeding  Assessment & Plan  Patient is a 78-year-old male who has been seeing [facility] last seen 12/13/2023 patient has a " history of anemia is multifactorial chronic due to cancer with iron deficiency and occult GI blood loss on Eliquis review of an EGD with multiple AVMs.  Patient came in to the ER from home due to generalized weakness nausea vomiting since this morning reports lower abdominal pain has a history of anemia is on Eliquis and Plavix episodes of vomiting which were described by the significant other is dark of clotted material, and also had melanotic stool occult positive.  Patient felt significant weakness and was unable to walk felt like too weak to walk;  also has a history of the patient intubation with tracheostomy approximately rehab time of 1 year.    Acute blood loss anemia   Chronic systolic heart failure (HCC)   Hepatocellular carcinoma (HCC)  CAD (coronary artery disease)  History of DVT (deep vein thrombosis)  Atrial fibrillation, unspecified type (HCC)  Type 2 diabetes mellitus with neurologic complication, with long-term current use of insulin (HCC)  Stage 3 chronic kidney disease, unspecified whether stage 3a or 3b CKD (HCC)  AMS (altered mental status), r/o CVA  Acute hypoxemic respiratory failure (HCC) Intubated 1/23-1/27).     SLP Swallowing Evaluation ordered at this time    Current Precautions:  Fall, Aspiration, Delirium    Allergies:  No known food allergies    Past medical history:  Please see H&P for details    Special Studies:  1/24 MRI fo brain: White matter changes suggestive of chronic microangiopathy.  No acute intracranial pathology.     1/27 CXR (portable): The external portion of an esophageal temperature probe is seen, however, the internal portion is not identified, presumably coiled in the nasopharynx or hypopharynx.  Chronic appearing reticular opacity in the peripheral right midlung field. No consolidation or edema.    Social/Education/Vocational Hx:  Pt lives w/wife Nilda.    Swallow Information   Current Risks for Dysphagia & Aspiration: recent intubation  Current Diet: NPO  "  Baseline Diet: regular diet and thin liquids      Baseline Assessment   Behavior/Cognition: alert, O to self, others.  Speech/Language Status: able to follow commands and limited verbal output with hoarse/harsh vocal quality noted  Patient Positioning: Patient initially evidenced difficulty maintaining upright positioning and instead leaned forward with his head placed down on the table episodically.  Over time and with encouragement, he was able to remain sitting upright.  Pain Status/Interventions/Response to Interventions: No report of pain but grimaced with swallowing.  He reported feeling \"very weak.\"       Swallow Mechanism Exam  Facial: masked facies (?  Related to poor effort)  Labial: Symmetric with fair range of motion and imitative task  Lingual: WFL in imitative tasks  Velum: unable to visualize  Mandible: adequate ROM  Dentition: adequate  Vocal quality:hoarse and strained   Volitional Cough: Fair strength.  Respiratory Status: on RA with a baseline O2 saturation of 96%, RR 20    Consistencies Assessed and Performance   Consistencies Administered: ice chips, thin liquids, nectar thick, honey thick, and puree  Materials administered included ~6 ice chips, 3 sips of water, 6 ounces of nectar thick juice, teaspoons of honey thick juice and serving of applesauce    Oral Stage: WFL with limited materials administered today.  Mastication was adequate with the materials administered today.  Bolus formation and transfer were functional with no significant oral residue noted.  No overt s/s reduced oral control.    Pharyngeal Stage: Dysphonia with presumed aspiration risk but no overt symptoms of aspiration with purée, thick liquid or minimum of ice chips  Swallow Mechanics:  Swallowing initiation appeared prompt.  Laryngeal rise was palpated and judged to be within functional limits effortful appearing swallowing with visible engagement of muscles). No coughing, throat clearing, change in vocal quality or " "respiratory status noted with purée, thick liquid or minimal of ice chips. C/O sensation of \"gag\" with limited sips of ice water.  Reduced complaint of sensation when limited to very small bolus size.    Esophageal Concerns: none reported today.    Strategies and Efficacy: Patient and son were educated in the benefit small bites/sips, slow rate & prep set    Summary and Recommendations (see above)    Results Reviewed with: patient, RN, MD, and family /son Dom.     Treatment Recommended: yes     Frequency of treatment: 3-5x weekly to begin    Patient Stated Goal: did not state today    Dysphagia LTG  -Patient will demonstrate safe and effective oral intake (without overt s/s significant oral/pharyngeal dysphagia including s/s penetration or aspiration) for the highest appropriate diet level.     Short Term Goals:  -Pt will tolerate Dysphagia 1/pureed diet and nectar thick liquid with no significant s/s oral or pharyngeal dysphagia across 1-3 diagnostic session/s    -Patient will tolerate trials of upgraded food and liquid texture with no significant s/s of oral or pharyngeal dysphagia including aspiration across multiple meals.     -If indicated, patient will comply with a Video/Modified Barium Swallow study for more complete assessment of swallowing anatomy/physiology/aspiration risk and to assess efficacy of treatment techniques     -Patient will utilize trained compensatory strategies to reduce or eliminate risk and /s oral /pharyngealdysphagia with the least restrictive consistencies    Thank you for this referral.  Please do not hesitate to contact me with any questions or concerns.    Ava Lui MS CCC-SLP  [place] License [id number]  [place] License [id number]  Available via [employer]    "

## 2024-01-28 NOTE — ASSESSMENT & PLAN NOTE
Patient is a 78-year-old male who has been seeing [facility] GI last seen 12/13/2023 patient has a history of anemia is multifactorial chronic due to cancer with iron deficiency and occult GI blood loss on Eliquis review of an EGD with multiple AVMs.  Patient came in to the ER from home due to generalized weakness nausea vomiting since this morning reports lower abdominal pain has a history of anemia is on Eliquis and Plavix episodes of vomiting which were described by the significant other is dark of clotted material, and also had melanotic stool occult positive.  Patient felt significant weakness and was unable to walk felt like too weak to walk.  Patient has a history of an upper GI bleed with multiple AVMs, also has a history of the patient intubation with tracheostomy approximately rehab time of 1 year.   CT scan with contrast showed nonspecific hypodensity within the gastric body possibly reflective of hemorrhage in this patient with the history of gastric AVMs, known liver cancer, cholelithiasis, small volume abdominal pelvic ascites  Patient's baseline hemoglobin appears to be 8-10, except 1 value on 11/23 of 13.    Patient is on Eliquis and Plavix as o/p  In the ED received DDAVP 0.3 mg/kg and Kcentra  GI consulted  1/23 S/P EGD - 3 large angioectasias in the fundus of the stomach and body of the stomach; bleeding was observed; placed clips; hemostasis achieved; induced coagulation and hemostasis achieved with argon plasma coagulation The esophagus and duodenum appeared normal.  Pt received a total of 3 units PRBC this admission so far, last on 1/25/24     Plan:  Monitor H/H Q12 hours- Hgb remains stable   Continue PPI BID  Patient extubated 1/27- pending swallow evaluation by SLP to restart diet   GI following appreciate recommendations- Need to address if stable to restart plavix

## 2024-01-28 NOTE — ASSESSMENT & PLAN NOTE
Lab Results   Component Value Date    EGFR 61 01/28/2024    EGFR 59 01/27/2024    EGFR 59 01/26/2024    CREATININE 1.14 01/28/2024    CREATININE 1.17 01/27/2024    CREATININE 1.18 01/26/2024     Cr at baseline  Monitor I's and O's   Renally dose medications  Avoid nephrotoxic agents  Hold Lasix at this time, resume home med when able

## 2024-01-28 NOTE — ASSESSMENT & PLAN NOTE
Rate controled  Maintained on Eliquis and metoprolol as o/p  Unable to take PO metoprolol. Pending SLP. May consider IV if unable to swallow  Hold Eliquis and Plavix until okay with GI   Cardiology consult regarding input for antiplatelet/anticoagulation

## 2024-01-28 NOTE — ASSESSMENT & PLAN NOTE
1/24 0630A RN called provider to bedside to evaluate patient for left facial droop. NIHHS 4 (left facial droop, LUE weakness +4/5, LLE weakness +4/5, LLE drift and mild dysarthria). RN states left facial droop was noticed on arrival to ICU at 0300A. RN states ED could not confirm if droop was present in ED and wife unsure if droop was new. Stroke alert called.   CTH - no acute stroke or hemorrhage  CTA H/N - no large vessel occulusion. No arterial occulusion. 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   1/24 MRI: Negative for acute findings   1/24 ECHO: EF 75%, Mild TVR  Mentation continuing to improve. Able to be extubated 1/27     Plan:  Patient discussed with Dr. Interiano (Neuro)   TNK not given due to unknown last known well and active GI bleed.  Goal SBP >140  1/24 ECHO: EF 75%, Mild TVR  Holding aspirin and plavix in setting of GI bleed  Hold statin while NPO   SLP consult pending  Neurology following  Neuro checks Q4 hours  Improving  Aspiration/fall precautions

## 2024-01-28 NOTE — ASSESSMENT & PLAN NOTE
Wt Readings from Last 3 Encounters:   01/28/24 93.4 kg (205 lb 14.6 oz)   12/20/23 96.6 kg (213 lb)   12/14/23 97 kg (213 lb 12.8 oz)     Patient is 78-year-old male with history of CAD post PCI in August not deemed a candidate for surgical intervention, systolic heart failure with reduced ejection fraction of 36%   Home meds: lasix, toprol, and statin   Resume home meds when able   Daily wts

## 2024-01-28 NOTE — ASSESSMENT & PLAN NOTE
patient is 78-year-old male with a history of liver cancer and radiation therapy who was admitted for an upper GI bleed with hypotension and later strokelike symptoms went unresponsive and was intubated, appears to be as cause from the liver with an ammonia level of 130  Most likely source of elevated ammonia level is liver history of liver cancer  GI following  Continue with Rifaximin when able   Patient should have 2-3 BMs a day  Amm level normalized to 61   Remains stable off lactulose

## 2024-01-28 NOTE — QUICK NOTE
Critical Care Interval Transfer Note:    Please refer to progress note from earlier today for full details.     Barriers to discharge:   78 YOM with hospital admission 1/23 for GIB  Required intubation for AMS from hyperammonemia and uremia, both resolved  Stroke alert imaging negative  Will need ongoing conversations regarding Anticoagulation choice, Eliquis vs Eliquis with plavix  Hemodynamically stable, extubated to room air  Ongoing dysphagia, NTL with puree      Consults: IP CONSULT TO NEUROLOGY  IP CONSULT TO GASTROENTEROLOGY  IP CONSULT TO CASE MANAGEMENT    Recommended to review admission imaging for incidental findings and document in discharge navigator: Chart reviewed, no known incidental findings noted at this time.      Discharge Plan: Anticipate discharge in 48-72 hrs to discharge location to be determined pending rehab evaluations.        Patient seen and evaluated by Critical Care today and deemed to be appropriate for transfer to Med Surg with Telemetry. Spoke to Loly Meng from [facility] to accept transfer. Critical care can be contacted via [employer] with any questions or concerns.

## 2024-01-28 NOTE — PLAN OF CARE
Problem: Potential for Falls  Goal: Patient will remain free of falls  Description: INTERVENTIONS:  - Educate patient/family on patient safety including physical limitations  - Instruct patient to call for assistance with activity   - Consult OT/PT to assist with strengthening/mobility   - Keep Call bell within reach  - Keep bed low and locked with side rails adjusted as appropriate  - Keep care items and personal belongings within reach  - Initiate and maintain comfort rounds  - Make Fall Risk Sign visible to staff  - Offer Toileting every 2 Hours, in advance of need  - Initiate/Maintain bed alarm  - Obtain necessary fall risk management equipment  - Apply yellow socks and bracelet for high fall risk patients  - Consider moving patient to room near nurses station  Outcome: Progressing     Problem: PAIN - ADULT  Goal: Verbalizes/displays adequate comfort level or baseline comfort level  Description: Interventions:  - Encourage patient to monitor pain and request assistance  - Assess pain using appropriate pain scale  - Administer analgesics based on type and severity of pain and evaluate response  - Implement non-pharmacological measures as appropriate and evaluate response  - Consider cultural and social influences on pain and pain management  - Notify physician/advanced practitioner if interventions unsuccessful or patient reports new pain  Outcome: Progressing     Problem: Knowledge Deficit  Goal: Patient/family/caregiver demonstrates understanding of disease process, treatment plan, medications, and discharge instructions  Description: Complete learning assessment and assess knowledge base.  Interventions:  - Provide teaching at level of understanding  - Provide teaching via preferred learning methods  Outcome: Progressing     Problem: GASTROINTESTINAL - ADULT  Goal: Minimal or absence of nausea and/or vomiting  Description: INTERVENTIONS:  - Administer IV fluids if ordered to ensure adequate hydration  -  Maintain NPO status until nausea and vomiting are resolved  - Nasogastric tube if ordered  - Administer ordered antiemetic medications as needed  - Provide nonpharmacologic comfort measures as appropriate  - Advance diet as tolerated, if ordered  - Consider nutrition services referral to assist patient with adequate nutrition and appropriate food choices  Outcome: Progressing  Goal: Maintains or returns to baseline bowel function  Description: INTERVENTIONS:  - Assess bowel function  - Encourage oral fluids to ensure adequate hydration  - Administer IV fluids if ordered to ensure adequate hydration  - Administer ordered medications as needed  - Encourage mobilization and activity  - Consider nutritional services referral to assist patient with adequate nutrition and appropriate food choices  Outcome: Progressing  Goal: Maintains adequate nutritional intake  Description: INTERVENTIONS:  - Monitor percentage of each meal consumed  - Identify factors contributing to decreased intake, treat as appropriate  - Assist with meals as needed  - Monitor I&O, weight, and lab values if indicated  - Obtain nutrition services referral as needed  Outcome: Progressing  Goal: Establish and maintain optimal ostomy function  Description: INTERVENTIONS:  - Assess bowel function  - Encourage oral fluids to ensure adequate hydration  - Administer IV fluids if ordered to ensure adequate hydration   - Administer ordered medications as needed  - Encourage mobilization and activity  - Nutrition services referral to assist patient with appropriate food choices  - Assess stoma site  - Consider wound care consult   Outcome: Progressing  Goal: Oral mucous membranes remain intact  Description: INTERVENTIONS  - Assess oral mucosa and hygiene practices  - Implement preventative oral hygiene regimen  - Implement oral medicated treatments as ordered  - Initiate Nutrition services referral as needed  Outcome: Progressing     Problem: METABOLIC, FLUID  AND ELECTROLYTES - ADULT  Goal: Electrolytes maintained within normal limits  Description: INTERVENTIONS:  - Monitor labs and assess patient for signs and symptoms of electrolyte imbalances  - Administer electrolyte replacement as ordered  - Monitor response to electrolyte replacements, including repeat lab results as appropriate  - Instruct patient on fluid and nutrition as appropriate  Outcome: Progressing  Goal: Fluid balance maintained  Description: INTERVENTIONS:  - Monitor labs   - Monitor I/O and WT  - Instruct patient on fluid and nutrition as appropriate  - Assess for signs & symptoms of volume excess or deficit  Outcome: Progressing  Goal: Glucose maintained within target range  Description: INTERVENTIONS:  - Monitor Blood Glucose as ordered  - Assess for signs and symptoms of hyperglycemia and hypoglycemia  - Administer ordered medications to maintain glucose within target range  - Assess nutritional intake and initiate nutrition service referral as needed  Outcome: Progressing     Problem: HEMATOLOGIC - ADULT  Goal: Maintains hematologic stability  Description: INTERVENTIONS  - Assess for signs and symptoms of bleeding or hemorrhage  - Monitor labs  - Administer supportive blood products/factors as ordered and appropriate  Outcome: Progressing     Problem: RESPIRATORY - ADULT  Goal: Achieves optimal ventilation and oxygenation  Description: INTERVENTIONS:  - Assess for changes in respiratory status  - Assess for changes in mentation and behavior  - Position to facilitate oxygenation and minimize respiratory effort  - Oxygen administered by appropriate delivery if ordered  - Initiate smoking cessation education as indicated  - Encourage broncho-pulmonary hygiene including cough, deep breathe, Incentive Spirometry  - Assess the need for suctioning and aspirate as needed  - Assess and instruct to report SOB or any respiratory difficulty  - Respiratory Therapy support as indicated  Outcome: Progressing      Problem: Prexisting or High Potential for Compromised Skin Integrity  Goal: Skin integrity is maintained or improved  Description: INTERVENTIONS:  - Identify patients at risk for skin breakdown  - Assess and monitor skin integrity  - Assess and monitor nutrition and hydration status  - Monitor labs   - Assess for incontinence   - Turn and reposition patient  - Assist with mobility/ambulation  - Relieve pressure over bony prominences  - Avoid friction and shearing  - Provide appropriate hygiene as needed including keeping skin clean and dry  - Evaluate need for skin moisturizer/barrier cream  - Collaborate with interdisciplinary team   - Patient/family teaching  - Consider wound care consult   Outcome: Progressing     Problem: Nutrition/Hydration-ADULT  Goal: Nutrient/Hydration intake appropriate for improving, restoring or maintaining nutritional needs  Description: Monitor and assess patient's nutrition/hydration status for malnutrition. Collaborate with interdisciplinary team and initiate plan and interventions as ordered.  Monitor patient's weight and dietary intake as ordered or per policy. Utilize nutrition screening tool and intervene as necessary. Determine patient's food preferences and provide high-protein, high-caloric foods as appropriate.     INTERVENTIONS:  - Monitor oral intake, urinary output, labs, and treatment plans  - Assess nutrition and hydration status and recommend course of action  - Evaluate amount of meals eaten  - Assist patient with eating if necessary   - Allow adequate time for meals  - Recommend/ encourage appropriate diets, oral nutritional supplements, and vitamin/mineral supplements  - Order, calculate, and assess calorie counts as needed  - Recommend, monitor, and adjust tube feedings and TPN/PPN based on assessed needs  - Assess need for intravenous fluids  - Provide specific nutrition/hydration education as appropriate  - Include patient/family/caregiver in decisions related to  nutrition  Outcome: Progressing

## 2024-01-28 NOTE — ASSESSMENT & PLAN NOTE
Required intubation for airway protection 1/23 after patient became increasingly encephalopathic  Minimal vent setting. Placed on PS since 1/24 at 6/6 40% FiO2  Unable to be successfully extubated due to mental status. Currently not requiring sedation.  Having copious secretions coming from inline suction  1/27 CXR negative for acute findings. Given 1 x dose of 40 mg IV lasix   Extubated mid morning on 1/27  Saturating well on room air     Plan:  Resolved  Aspiration precautions  Encourage IS/Flutter valve when able

## 2024-01-28 NOTE — PROGRESS NOTES
Progress note - [facility] Gastroenterology   Derek Walter 78 y.o. male MRN: [id number]  Unit/Bed#: -01 Encounter: [id number]    ASSESSMENT and PLAN       Upper GI bleed secondary to gastric AVMs  Acute blood loss anemia  S/P EGD with cautery and clipping bleeding gastric AVMs.  S/P 3 units packed red blood cells  No evidence of bleeding.  Hemoglobin stable around 8  -Continue PPI  -Follow H&H  -Need long and comprehensive discussions about restarting anticoagulation and antiplatelet therapy     3.  Altered mental status  Secondary to anesthesia versus some component of hepatic encephalopathy related to GI bleeding.  Mental status finally improving  -Continue rifaximin and lactulose  -Will need to check social work to see if rifaximin will be covered as outpatient     4.  Atrial fibrillation  5. Coronary artery disease  Need to discuss pros and cons of reinitiating antiplatelet and anticoagulation therapy  - WIll consult cardiology tomorrow     6.  Hepatocellular carcinoma      Chief Complaint   Patient presents with    Abdominal Pain     Pt via EMS from home with c/o generalized weakness, nausea, and vomiting since the morning. Reports lower abd pain. Reports hx of anemia.       SUBJECTIVE/HPI   Extubated.  No evidence of GI bleeding.  Feeding himself lunch    BP 96/56 (BP Location: Right arm)   Pulse 86   Temp 97.8 °F (36.6 °C) (Oral)   Resp 20   Ht 6' (1.829 m)   Wt 93.4 kg (205 lb 14.6 oz)   SpO2 96%   BMI 27.93 kg/m²     PHYSICALEXAM  General appearance: alert, appears stated age and cooperative  Eyes: PERLLA, EOMI, no icterus   Head: Normocephalic, without obvious abnormality, atraumatic  Lungs: clear to auscultation bilaterally  Heart: regular rate and rhythm, S1, S2 normal, no murmur, click, rub or gallop  Abdomen: soft, non-tender; bowel sounds normal; no masses,  no organomegaly  Extremities: extremities normal, atraumatic, no cyanosis or edema  Neurologic: Grossly normal    Lab Results    Component Value Date    GLUCOSE 194 (H) 01/26/2024    CALCIUM 8.0 (L) 01/28/2024    K 3.8 01/28/2024    CO2 24 01/28/2024     01/28/2024    BUN 29 (H) 01/28/2024    CREATININE 1.14 01/28/2024     Lab Results   Component Value Date    WBC 7.82 01/28/2024    HGB 9.0 (L) 01/28/2024    HCT 28.0 (L) 01/28/2024    MCV 95 01/28/2024     (L) 01/28/2024     Lab Results   Component Value Date    ALT 50 01/28/2024    AST 51 (H) 01/28/2024    ALKPHOS 309 (H) 01/28/2024     Lab Results   Component Value Date    LIPASE 39 01/23/2024     Lab Results   Component Value Date    IRON 14 (L) 08/07/2023    TIBC 462 (H) 08/07/2023    FERRITIN 10 (L) 08/07/2023     Lab Results   Component Value Date    INR 1.33 (H) 01/24/2024

## 2024-01-28 NOTE — ASSESSMENT & PLAN NOTE
Patient has a history of coronary artery disease had 8/2023 a PCI status post cardiac catheterization drug-eluting stents x 3 in the LAD.  Ramus and left circumflex  Continue home statin and metoprolol

## 2024-01-29 DIAGNOSIS — C22.0 HEPATOCELLULAR CARCINOMA (HCC): Primary | ICD-10-CM

## 2024-01-29 DIAGNOSIS — K76.82 HEPATIC ENCEPHALOPATHY (HCC): ICD-10-CM

## 2024-01-29 LAB
ALBUMIN SERPL BCP-MCNC: 3.2 G/DL (ref 3.5–5)
ALP SERPL-CCNC: 354 U/L (ref 34–104)
ALT SERPL W P-5'-P-CCNC: 46 U/L (ref 7–52)
ANION GAP SERPL CALCULATED.3IONS-SCNC: 7 MMOL/L
APTT PPP: 64 SECONDS (ref 23–37)
APTT PPP: 72 SECONDS (ref 23–37)
AST SERPL W P-5'-P-CCNC: 50 U/L (ref 13–39)
ATRIAL RATE: 89 BPM
BASOPHILS # BLD AUTO: 0.04 THOUSANDS/ÂΜL (ref 0–0.1)
BASOPHILS NFR BLD AUTO: 1 % (ref 0–1)
BILIRUB SERPL-MCNC: 1.46 MG/DL (ref 0.2–1)
BUN SERPL-MCNC: 37 MG/DL (ref 5–25)
CA-I BLD-SCNC: 0.96 MMOL/L (ref 1.12–1.32)
CALCIUM ALBUM COR SERPL-MCNC: 9.2 MG/DL (ref 8.3–10.1)
CALCIUM SERPL-MCNC: 8.6 MG/DL (ref 8.4–10.2)
CHLORIDE SERPL-SCNC: 108 MMOL/L (ref 96–108)
CO2 SERPL-SCNC: 24 MMOL/L (ref 21–32)
CREAT SERPL-MCNC: 1.25 MG/DL (ref 0.6–1.3)
EOSINOPHIL # BLD AUTO: 0.38 THOUSAND/ÂΜL (ref 0–0.61)
EOSINOPHIL NFR BLD AUTO: 5 % (ref 0–6)
ERYTHROCYTE [DISTWIDTH] IN BLOOD BY AUTOMATED COUNT: 15.5 % (ref 11.6–15.1)
GFR SERPL CREATININE-BSD FRML MDRD: 55 ML/MIN/1.73SQ M
GLUCOSE SERPL-MCNC: 101 MG/DL (ref 65–140)
GLUCOSE SERPL-MCNC: 109 MG/DL (ref 65–140)
GLUCOSE SERPL-MCNC: 159 MG/DL (ref 65–140)
GLUCOSE SERPL-MCNC: 206 MG/DL (ref 65–140)
GLUCOSE SERPL-MCNC: 242 MG/DL (ref 65–140)
HCT VFR BLD AUTO: 26 % (ref 36.5–49.3)
HCT VFR BLD AUTO: 26.3 % (ref 36.5–49.3)
HCT VFR BLD AUTO: 26.5 % (ref 36.5–49.3)
HGB BLD-MCNC: 8 G/DL (ref 12–17)
HGB BLD-MCNC: 8 G/DL (ref 12–17)
HGB BLD-MCNC: 8.2 G/DL (ref 12–17)
IMM GRANULOCYTES # BLD AUTO: 0.03 THOUSAND/UL (ref 0–0.2)
IMM GRANULOCYTES NFR BLD AUTO: 0 % (ref 0–2)
LYMPHOCYTES # BLD AUTO: 0.75 THOUSANDS/ÂΜL (ref 0.6–4.47)
LYMPHOCYTES NFR BLD AUTO: 11 % (ref 14–44)
MAGNESIUM SERPL-MCNC: 2 MG/DL (ref 1.9–2.7)
MCH RBC QN AUTO: 29.4 PG (ref 26.8–34.3)
MCHC RBC AUTO-ENTMCNC: 30.8 G/DL (ref 31.4–37.4)
MCV RBC AUTO: 96 FL (ref 82–98)
MONOCYTES # BLD AUTO: 1.28 THOUSAND/ÂΜL (ref 0.17–1.22)
MONOCYTES NFR BLD AUTO: 18 % (ref 4–12)
NEUTROPHILS # BLD AUTO: 4.55 THOUSANDS/ÂΜL (ref 1.85–7.62)
NEUTS SEG NFR BLD AUTO: 65 % (ref 43–75)
NRBC BLD AUTO-RTO: 1 /100 WBCS
P AXIS: 73 DEGREES
PHOSPHATE SERPL-MCNC: 2.6 MG/DL (ref 2.3–4.1)
PLATELET # BLD AUTO: 167 THOUSANDS/UL (ref 149–390)
PMV BLD AUTO: 12.5 FL (ref 8.9–12.7)
POTASSIUM SERPL-SCNC: 3.5 MMOL/L (ref 3.5–5.3)
PR INTERVAL: 224 MS
PROT SERPL-MCNC: 5.9 G/DL (ref 6.4–8.4)
QRS AXIS: -49 DEGREES
QRSD INTERVAL: 130 MS
QT INTERVAL: 390 MS
QTC INTERVAL: 474 MS
RBC # BLD AUTO: 2.72 MILLION/UL (ref 3.88–5.62)
SODIUM SERPL-SCNC: 139 MMOL/L (ref 135–147)
T WAVE AXIS: -18 DEGREES
VENTRICULAR RATE: 89 BPM
WBC # BLD AUTO: 7.03 THOUSAND/UL (ref 4.31–10.16)

## 2024-01-29 PROCEDURE — 83735 ASSAY OF MAGNESIUM: CPT

## 2024-01-29 PROCEDURE — 99232 SBSQ HOSP IP/OBS MODERATE 35: CPT | Performed by: INTERNAL MEDICINE

## 2024-01-29 PROCEDURE — C9113 INJ PANTOPRAZOLE SODIUM, VIA: HCPCS

## 2024-01-29 PROCEDURE — 99223 1ST HOSP IP/OBS HIGH 75: CPT | Performed by: INTERNAL MEDICINE

## 2024-01-29 PROCEDURE — 85014 HEMATOCRIT: CPT | Performed by: INTERNAL MEDICINE

## 2024-01-29 PROCEDURE — C9113 INJ PANTOPRAZOLE SODIUM, VIA: HCPCS | Performed by: INTERNAL MEDICINE

## 2024-01-29 PROCEDURE — 82330 ASSAY OF CALCIUM: CPT

## 2024-01-29 PROCEDURE — 80053 COMPREHEN METABOLIC PANEL: CPT

## 2024-01-29 PROCEDURE — 82948 REAGENT STRIP/BLOOD GLUCOSE: CPT

## 2024-01-29 PROCEDURE — 85018 HEMOGLOBIN: CPT | Performed by: INTERNAL MEDICINE

## 2024-01-29 PROCEDURE — 92526 ORAL FUNCTION THERAPY: CPT

## 2024-01-29 PROCEDURE — 84100 ASSAY OF PHOSPHORUS: CPT

## 2024-01-29 PROCEDURE — 85025 COMPLETE CBC W/AUTO DIFF WBC: CPT

## 2024-01-29 PROCEDURE — 85730 THROMBOPLASTIN TIME PARTIAL: CPT | Performed by: INTERNAL MEDICINE

## 2024-01-29 RX ORDER — ALBUMIN, HUMAN INJ 5% 5 %
SOLUTION INTRAVENOUS
Status: COMPLETED
Start: 2024-01-29 | End: 2024-01-29

## 2024-01-29 RX ORDER — ALBUMIN, HUMAN INJ 5% 5 %
25 SOLUTION INTRAVENOUS ONCE
Status: COMPLETED | OUTPATIENT
Start: 2024-01-29 | End: 2024-01-29

## 2024-01-29 RX ORDER — CALCIUM GLUCONATE 20 MG/ML
2 INJECTION, SOLUTION INTRAVENOUS ONCE
Status: COMPLETED | OUTPATIENT
Start: 2024-01-29 | End: 2024-01-29

## 2024-01-29 RX ORDER — POTASSIUM CHLORIDE 20 MEQ/1
40 TABLET, EXTENDED RELEASE ORAL ONCE
Status: COMPLETED | OUTPATIENT
Start: 2024-01-29 | End: 2024-01-29

## 2024-01-29 RX ADMIN — RIFAXIMIN 550 MG: 550 TABLET ORAL at 08:05

## 2024-01-29 RX ADMIN — POTASSIUM CHLORIDE 40 MEQ: 1500 TABLET, EXTENDED RELEASE ORAL at 05:40

## 2024-01-29 RX ADMIN — INSULIN LISPRO 4 UNITS: 100 INJECTION, SOLUTION INTRAVENOUS; SUBCUTANEOUS at 11:53

## 2024-01-29 RX ADMIN — Medication 12.5 MG: at 22:07

## 2024-01-29 RX ADMIN — CHLORHEXIDINE GLUCONATE 15 ML: 1.2 SOLUTION ORAL at 22:07

## 2024-01-29 RX ADMIN — Medication 12.5 MG: at 08:05

## 2024-01-29 RX ADMIN — INSULIN LISPRO 5 UNITS: 100 INJECTION, SOLUTION INTRAVENOUS; SUBCUTANEOUS at 11:53

## 2024-01-29 RX ADMIN — AZELASTINE 1 SPRAY: 1 SPRAY, METERED NASAL at 08:06

## 2024-01-29 RX ADMIN — INSULIN DETEMIR 30 UNITS: 100 INJECTION, SOLUTION SUBCUTANEOUS at 22:07

## 2024-01-29 RX ADMIN — RIFAXIMIN 550 MG: 550 TABLET ORAL at 22:07

## 2024-01-29 RX ADMIN — INSULIN LISPRO 5 UNITS: 100 INJECTION, SOLUTION INTRAVENOUS; SUBCUTANEOUS at 08:05

## 2024-01-29 RX ADMIN — PANTOPRAZOLE SODIUM 40 MG: 40 INJECTION, POWDER, FOR SOLUTION INTRAVENOUS at 08:05

## 2024-01-29 RX ADMIN — CALCIUM GLUCONATE 2 G: 20 INJECTION, SOLUTION INTRAVENOUS at 06:37

## 2024-01-29 RX ADMIN — INSULIN LISPRO 5 UNITS: 100 INJECTION, SOLUTION INTRAVENOUS; SUBCUTANEOUS at 19:20

## 2024-01-29 RX ADMIN — POTASSIUM PHOSPHATE, MONOBASIC POTASSIUM PHOSPHATE, DIBASIC 30 MMOL: 224; 236 INJECTION, SOLUTION, CONCENTRATE INTRAVENOUS at 08:06

## 2024-01-29 RX ADMIN — AZELASTINE 1 SPRAY: 1 SPRAY, METERED NASAL at 19:21

## 2024-01-29 RX ADMIN — ALBUMIN, HUMAN INJ 5% 25 G: 5 SOLUTION at 10:59

## 2024-01-29 RX ADMIN — INSULIN LISPRO 4 UNITS: 100 INJECTION, SOLUTION INTRAVENOUS; SUBCUTANEOUS at 22:07

## 2024-01-29 RX ADMIN — PANTOPRAZOLE SODIUM 40 MG: 40 INJECTION, POWDER, FOR SOLUTION INTRAVENOUS at 22:07

## 2024-01-29 RX ADMIN — CHLORHEXIDINE GLUCONATE 15 ML: 1.2 SOLUTION ORAL at 08:05

## 2024-01-29 RX ADMIN — INSULIN LISPRO 2 UNITS: 100 INJECTION, SOLUTION INTRAVENOUS; SUBCUTANEOUS at 19:19

## 2024-01-29 RX ADMIN — HEPARIN SODIUM 13.1 UNITS/KG/HR: 10000 INJECTION, SOLUTION INTRAVENOUS at 22:12

## 2024-01-29 RX ADMIN — ALBUMIN (HUMAN) 25 G: 12.5 INJECTION, SOLUTION INTRAVENOUS at 10:59

## 2024-01-29 RX ADMIN — HEPARIN SODIUM 13.1 UNITS/KG/HR: 10000 INJECTION, SOLUTION INTRAVENOUS at 02:34

## 2024-01-29 NOTE — PROGRESS NOTES
Progress note - [facility] Gastroenterology   Derek Walter 78 y.o. male MRN: [id number]  Unit/Bed#: -01 Encounter: [id number]    ASSESSMENT and PLAN    Upper GI bleed secondary to gastric AVMs  Acute blood loss anemia  S/P EGD with cautery and clipping bleeding gastric AVMs.  S/P 3 units packed red blood cells  No evidence of bleeding.  Hemoglobin stable around 8  -Continue PPI  -Follow H&H and signs/symptoms of bleeding now on heparin     3.  Altered mental status  Secondary to anesthesia versus some component of hepatic encephalopathy related to GI bleeding.  Mental status finally improving  -Continue rifaximin and lactulose  -Send samples prescription for rifaximin to pharmacy today.  Social work will check to see what the cost is  -Status continues to improve consider reevaluation by speech path to advanced consistency of diet since he is not happy with the puréed food     4.  Atrial fibrillation  5. Coronary artery disease  Cardiology feels strongly about anticoagulation  -Back on IV heparin  -Antiplatelet therapy?     6.  Hepatocellular carcinoma      Chief Complaint   Patient presents with    Abdominal Pain     Pt via EMS from home with c/o generalized weakness, nausea, and vomiting since the morning. Reports lower abd pain. Reports hx of anemia.       SUBJECTIVE/HPI   More awake.  No GI bleeding.  Not happy with puréed food    /61 (BP Location: Right arm)   Pulse 87   Temp 98.9 °F (37.2 °C) (Oral)   Resp (!) 26   Ht 6' (1.829 m)   Wt 91.3 kg (201 lb 4.5 oz)   SpO2 91%   BMI 27.30 kg/m²     PHYSICALEXAM  General appearance: alert, appears stated age and cooperative  Eyes: PERLLA, EOMI, no icterus   Head: Normocephalic, without obvious abnormality, atraumatic  Lungs: clear to auscultation bilaterally  Heart: regular rate and rhythm, S1, S2 normal, no murmur, click, rub or gallop  Abdomen: soft, non-tender; bowel sounds normal; no masses,  no organomegaly  Extremities: extremities  normal, atraumatic, no cyanosis or edema  Neurologic: Grossly normal    Lab Results   Component Value Date    GLUCOSE 194 (H) 01/26/2024    CALCIUM 8.6 01/29/2024    K 3.5 01/29/2024    CO2 24 01/29/2024     01/29/2024    BUN 37 (H) 01/29/2024    CREATININE 1.25 01/29/2024     Lab Results   Component Value Date    WBC 7.03 01/29/2024    HGB 8.0 (L) 01/29/2024    HCT 26.0 (L) 01/29/2024    MCV 96 01/29/2024     01/29/2024     Lab Results   Component Value Date    ALT 46 01/29/2024    AST 50 (H) 01/29/2024    ALKPHOS 354 (H) 01/29/2024       Lab Results   Component Value Date    LIPASE 39 01/23/2024     Lab Results   Component Value Date    IRON 14 (L) 08/07/2023    TIBC 462 (H) 08/07/2023    FERRITIN 10 (L) 08/07/2023     Lab Results   Component Value Date    INR 1.19 01/28/2024

## 2024-01-29 NOTE — CONSULTS
Consult - Cardiology   Derek Walter 78 y.o. male MRN: [id number]  Unit/Bed#: -01 Encounter: [id number]        Reason For Consult: Recs on anticoagulation  Outpatient Cardiologist: Dr. Almaraz               ASSESSMENT:  Acute GI bleed  Patient with history of gastric AVMs  Hemoglobin trend this admission around 7-8, patient did receive 3 units of packed red blood cells most recently on 1/25/2024 1/23/2024 EGD with 3 large angioectasias in fundus of bleeding was observed, clips were placed, and hemostasis was achieved.  Induced coagulation and hemostasis achieved with argon plasma coagulation  Presently hemoglobin trending around 8-9 at the time of this consult  Coronary artery disease  8/2023 NSTEMI  8/2023 PCI/CALLIE x 1 to LAD, PCI/CALLIE x 1 to circumflex, PCI/CALLIE X 1 to ramus   Ischemic cardiomyopathy  1/24/2024 echo: EF 75%  8/2023 SPECT imaging: EF 36%  GDMT: Toprol-XL and Jardiance  Paroxysmal atrial fibrillation  Patient remotely diagnosed with A-fib previously followed at [facility] but established locally in [place] in June 2023  Chronically maintained on Eliquis and Toprol-XL  Severe altered mental status requiring temporary intubation this admission for protection of his airway, suspect secondary to hepatic encephalopathy/blood loss/acute critical illness  Hepatocellular carcinoma  History of DVT  Type 2 diabetes    PLAN/ DISCUSSION:     Difficult situation in this patient with known AVM and recurrent GI bleeds but also with multiple indications for anticoagulation and antiplatelet therapy. For now agree with utilizing IV heparin while his clinical picture evolves. Plan to get him back on a NOAC prior to discharge  He did undergo PCI with placement of 3 drug-eluting stents about 6 months ago. Antiplatelet Rx would be recommended in this situation as well, especially for MV CAD  Watchman procedure could be considered as an outpatient however patient's family reports he has severe reactions  from anesthesia and does not do well with going under so he may not be an ideal candidate. A watchman would require multiple ANA ROSA's as well as anesthesia for the procedure itself.  Continue statin, Toprol, Jardiance on discharge    History Of Present Illness:  Derek is a pleasant 78-year-old gentleman currently hospitalized for severe GI bleeding.  He had to receive 3 units of blood for this.  He is currently been in the hospital for nearly 1 week.  He underwent urgent EGD last week with clipping of bleeding gastric AV malformations.  His hospitalization has been complicated by severe altered mental status to the point where he required several days of intubation for airway protection.  As of 1/29/2024 he was able to be extubated.  GI is following closely.  Our consultation has been requested as patient is on multiple blood thinners mainly for cardiac indications.  Presently although he is extubated he is very tired.  He was sleeping comfortably throughout my exam.  His wife and son are at bedside helping to provide history.  He has reported no chest pain or chest pressure.  He has not reported any difficulty breathing.  He just reported some bodyaches this morning to nursing.    Past Medical History:        Past Medical History:   Diagnosis Date    Anemia     Atrial fibrillation (HCC)     Eliquis    AVB (atrioventricular block)     1st degree    CAD (coronary artery disease)     CKD (chronic kidney disease), stage III (HCC)     baseline Cr 1.2-1.6    Colon polyp     Diabetes mellitus (HCC)     type 2, insulin dependent    Diverticulosis     Emphysema lung (HCC)     Former tobacco use     History of asbestos exposure     History of DVT (deep vein thrombosis)     RLE, tx w/ AC    History of GI bleed 08/2023    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, given PRBC/Venofer for anemia while in house    Hyperlipidemia     Hypertension     LAFB (left anterior fascicular block)     Liver cancer (HCC)     Liver mass  08/2023    suspected HCC, needs radioembolization    RBBB     Syncope 08/07/2023      Past Surgical History:   Procedure Laterality Date    APPENDECTOMY      BOWEL RESECTION  2014    CARDIAC CATHETERIZATION      CARDIAC CATHETERIZATION N/A 08/25/2023    Procedure: Cardiac pci;  Surgeon: Dmo Garrett DO;  [facility];  Service: Cardiology    CARDIAC CATHETERIZATION N/A 08/21/2023    Procedure: Cardiac catheterization;  Surgeon: Dom Garrett DO;  [facility];  Service: Cardiology    CARDIAC CATHETERIZATION N/A 08/21/2023    Procedure: Cardiac Coronary Angiogram;  Surgeon: Dom Garrett DO;  [facility];  Service: Cardiology    CATARACT EXTRACTION Bilateral     COLONOSCOPY      EGD      IR Y-90 PRE-ANGIO/EMBO W/ LUNG SCAN  09/01/2023    IR Y-90 RADIOEMBOLIZATION  09/08/2023        Allergy:        No Known Allergies    Medications:       Prior to Admission medications    Medication Sig Start Date End Date Taking? Authorizing Provider   acetaminophen (TYLENOL) 650 mg CR tablet Take 650 mg by mouth every 8 (eight) hours as needed for mild pain   Yes Historical Provider, MD Shawn Valencia 62.5-25 MCG/ACT inhaler inhale 1 puff by mouth and INTO THE LUNGS once daily 12/26/23  Yes Ernesto Griffith MD   atorvastatin (LIPITOR) 40 mg tablet take 1 tablet by mouth once daily after dinner 1/11/24  Yes Matty Bobo PA-C   azelastine (ASTELIN) 0.1 % nasal spray 1 spray into each nostril 2 (two) times a day Use in each nostril as directed 9/6/23  Yes Ernesto Griffith MD   Blood Glucose Monitoring Suppl (OneTouch Verio) w/Device KIT Use 4 (four) times a day 9/13/23  Yes Evaristo Jerome MD   clopidogrel (PLAVIX) 75 mg tablet Take 1 tablet (75 mg total) by mouth daily 10/13/23  Yes Ernesto Griffith MD   Continuous Blood Gluc  (Dexcom G6 ) NOHEMY Use with dexcom sensor 9/13/23  Yes Evaristo Jerome MD    Eliquis 5 MG take 1 tablet by mouth twice a day 11/9/23  Yes Ernesto Griffith MD   ferrous sulfate 325 (65 Fe) mg tablet Take 1 tablet (325 mg total) by mouth daily with breakfast 10/13/23 4/10/24 Yes Ernesto Griffith MD   furosemide (LASIX) 40 mg tablet Take 1 tablet (40 mg total) by mouth every other day 12/5/23  Yes Cornelio Vargas, DO   insulin detemir (Levemir FlexPen) 100 Units/mL injection pen 65 units daily 12/1/23  Yes Tres Kilgore,    Insulin Pen Needle (Droplet Pen Needles) 32G X 4 MM MISC use 1 PEN NEEDLE to inject MEDICATION subcutaneously four times a day 10/5/23  Yes Ernesto Griffith MD   Jardiance 10 MG TABS tablet take 1 tablet by mouth daily every morning 11/28/23  Yes Tres Kilgore,    metoprolol succinate (TOPROL-XL) 50 mg 24 hr tablet Take 0.5 tablets (25 mg total) by mouth daily 8/30/23  Yes NADIA Galeana   Multiple Vitamin (MULTIVITAMIN ADULT PO) Take 1 tablet by mouth daily   Yes Historical Provider, MD   NovoLOG FlexPen 100 units/mL injection pen INJECT 40 UNITS UNDER THE SKIN 3 TIMES DAILY WITH MEALS 9/25/23  Yes Ernesto Griffith MD   Potassium Citrate ER 15 MEQ (1620 MG) TBCR Take 1 tablet by mouth 2 (two) times a day 8/26/23  Yes Evette Dickerson MD   pregabalin (LYRICA) 100 mg capsule take 1 capsule by mouth three times a day 1/3/24  Yes Ernesto Griffith MD   sertraline (ZOLOFT) 25 mg tablet Take 25 mg by mouth daily   Yes Historical Provider, MD   SUPER B COMPLEX/C PO Take 1 tablet by mouth daily   Yes Historical Provider, MD   tamsulosin (FLOMAX) 0.4 mg Take 0.4 mg by mouth daily with dinner   Yes Historical Provider, MD   traZODone (DESYREL) 100 mg tablet take 2 tablets by mouth at bedtime 12/6/23  Yes Ernesto Griffith MD   Trulicity 0.75 MG/0.5ML injection inject 0.5 milliliters ( 0.75 milligrams ) subcutaneously every week IN THE ABDOMEN THIGHS OR OUTER AREA OF UPPER ARM ROTATE INJECTION SITES 10/17/23  Yes Ernesto Griffith MD   Continuous  Blood Gluc Sensor (Dexcom G6 Sensor) MISC Use daily as directed for CGM - Change every 10 days  Patient not taking: Reported on 2023 10/13/23   Ernesto Griffith MD   Continuous Blood Gluc Transmit (Dexcom G6 Transmitter) MISC Use daily as directed for CGM - Change every 3 months  Patient not taking: Reported on 2023 10/6/23   Tres Kilgore DO   Continuous Blood Gluc Transmit (Dexcom G6 Transmitter) MISC Use daily as directed for CGM - Change every 3 months  Patient not taking: Reported on 2023 10/13/23   Ernesto Griffith MD   glucose blood (OneTouch Verio) test strip Use as instructed  Patient not taking: Reported on 10/13/2023 9/13/23   Oluwatomisin Reyna Jerome MD   mometasone (ELOCON) 0.1 % cream Apply topically daily for 7 days 23  Ernesto Griffith MD   pantoprazole (PROTONIX) 40 mg tablet Take 1 tablet (40 mg total) by mouth 2 (two) times a day  Patient taking differently: Take 40 mg by mouth daily 23   Jennifer Titus PA-C   rifaximin (XIFAXAN) 550 mg tablet Take 1 tablet (550 mg total) by mouth every 12 (twelve) hours 24  Jake Lance MD       Family History:     Family History   Problem Relation Age of Onset    Hypertension Mother     Bone cancer Father     Diabetes type II Sister     Diabetes type II Sister     Esophageal cancer Brother     Colon cancer Neg Hx         Social History:       Social History     Socioeconomic History    Marital status: /Civil Union     Spouse name: None    Number of children: None    Years of education: None    Highest education level: None   Occupational History    None   Tobacco Use    Smoking status: Former     Current packs/day: 0.00     Average packs/day: 1 pack/day for 30.0 years (30.0 ttl pk-yrs)     Types: Cigarettes     Start date:      Quit date:      Years since quittin.0    Smokeless tobacco: Never   Vaping Use    Vaping status: Never Used   Substance and Sexual  Activity    Alcohol use: Yes     Comment: Socially    Drug use: Never    Sexual activity: None   Other Topics Concern    None   Social History Narrative    None     Social Determinants of Health     Financial Resource Strain: Low Risk  (10/13/2023)    Overall Financial Resource Strain (CARDIA)     Difficulty of Paying Living Expenses: Not hard at all   Food Insecurity: No Food Insecurity (1/24/2024)    Hunger Vital Sign     Worried About Running Out of Food in the Last Year: Never true     Ran Out of Food in the Last Year: Never true   Transportation Needs: No Transportation Needs (1/24/2024)    PRAPARE - Transportation     Lack of Transportation (Medical): No     Lack of Transportation (Non-Medical): No   Physical Activity: Not on file   Stress: Not on file   Social Connections: Not on file   Intimate Partner Violence: Not on file   Housing Stability: Low Risk  (1/24/2024)    Housing Stability Vital Sign     Unable to Pay for Housing in the Last Year: No     Number of Places Lived in the Last Year: 1     Unstable Housing in the Last Year: No       ROS:  14 point ROS negative except as outlined above  Remainder review of systems is negative    Exam:  General:  alert, oriented and in no distress, cooperative  Head: Normocephalic, atraumatic.  Eyes:  EOMI. Pupils - equal, round, reactive to accomodation.  No icterus.  Normal Conjunctiva.   Oropharynx: moist and normal-appearing mucosa  Neck: supple, symmetrical, trachea midline and no JVD  Heart:  RRR, No: murmer, rub or gallop, S1 & S2 normal   Respiratory effort / Chest Inspection: unlabored  Lungs:  normal air entry, lungs clear to auscultation and no rales, rhonchi or wheezing   Abdomen: flat, normal findings: bowel sounds normal and soft, non-tender  Lower Limbs:  no pitting edema  Pulses::  RLE - DP: present 2+                 LLE - DP: present 2+  Musculoskeletal: ROM grossly normal        DATA:      ECG:                     Telemetry: Normal sinus rhythm, . HR  70s          Echocardiogram:           Ischemic Testing:         Weights:    Wt Readings from Last 3 Encounters:   01/29/24 91.3 kg (201 lb 4.5 oz)   12/20/23 96.6 kg (213 lb)   12/14/23 97 kg (213 lb 12.8 oz)   , Body mass index is 27.3 kg/m².         Lab Studies:           Results from last 7 days   Lab Units 01/24/24  0504   TRIGLYCERIDES mg/dL 211*  211*   HDL mg/dL 16*     Results from last 7 days   Lab Units 01/29/24  0437 01/28/24  1526 01/28/24  0544   WBC Thousand/uL 7.03 8.60 7.82   HEMOGLOBIN g/dL 8.0* 9.7* 9.0*   HEMATOCRIT % 26.0* 31.0* 28.0*   PLATELETS Thousands/uL 167 171 137*   ,   Results from last 7 days   Lab Units 01/29/24  0437 01/28/24  0544 01/27/24  0038 01/26/24  1213 01/26/24  1056 01/25/24  1340 01/25/24  0427 01/23/24  1608 01/23/24  1331   POTASSIUM mmol/L 3.5 3.8 3.9   < >  --    < > 3.8   < >  --    CHLORIDE mmol/L 108 107 112*   < >  --    < > 119*   < >  --    CO2 mmol/L 24 24 22   < >  --    < > 21   < >  --    CO2, I-STAT mmol/L  --   --   --   --  20*  --   --   --  13*   BUN mg/dL 37* 29* 30*   < >  --    < > 78*   < >  --    CREATININE mg/dL 1.25 1.14 1.17   < >  --    < > 1.73*   < >  --    CALCIUM mg/dL 8.6 8.0* 8.1*   < >  --    < > 8.3*   < >  --    ALK PHOS U/L 354* 309*  --   --   --   --  152*   < >  --    ALT U/L 46 50  --   --   --   --  54*   < >  --    AST U/L 50* 51*  --   --   --   --  76*   < >  --    GLUCOSE, ISTAT mg/dl  --   --   --   --  194*  --   --   --  253*    < > = values in this interval not displayed.

## 2024-01-29 NOTE — SPEECH THERAPY NOTE
Speech Language/Pathology    Speech/Language Pathology Progress Note    Patient Name: Derek Walter  Today's Date: 1/29/2024     Problem List  Principal Problem:    Acute upper GI bleeding  Active Problems:    Stage 3 chronic kidney disease, unspecified whether stage 3a or 3b CKD (HCC)    Type 2 diabetes mellitus with neurologic complication, with long-term current use of insulin (HCC)    Atrial fibrillation, unspecified type (HCC)    Acute hypoxemic respiratory failure (HCC)    History of DVT (deep vein thrombosis)    CAD (coronary artery disease)    Hepatocellular carcinoma (HCC)    Chronic systolic heart failure (HCC)    Acute blood loss anemia    AMS (altered mental status)    Hepatic encephalopathy (HCC)    Dysphagia       Past Medical History  Past Medical History:   Diagnosis Date    Anemia     Atrial fibrillation (HCC)     Eliquis    AVB (atrioventricular block)     1st degree    CAD (coronary artery disease)     CKD (chronic kidney disease), stage III (HCC)     baseline Cr 1.2-1.6    Colon polyp     Diabetes mellitus (HCC)     type 2, insulin dependent    Diverticulosis     Emphysema lung (HCC)     Former tobacco use     History of asbestos exposure     History of DVT (deep vein thrombosis)     RLE, tx w/ AC    History of GI bleed 08/2023    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, given PRBC/Venofer for anemia while in house    Hyperlipidemia     Hypertension     LAFB (left anterior fascicular block)     Liver cancer (HCC)     Liver mass 08/2023    suspected HCC, needs radioembolization    RBBB     Syncope 08/07/2023        Past Surgical History  Past Surgical History:   Procedure Laterality Date    APPENDECTOMY      BOWEL RESECTION  2014    CARDIAC CATHETERIZATION      CARDIAC CATHETERIZATION N/A 08/25/2023    Procedure: Cardiac pci;  Surgeon: Dom Garrett DO;  [facility];  Service: Cardiology    CARDIAC CATHETERIZATION N/A 08/21/2023    Procedure: Cardiac catheterization;   Surgeon: Dom Garrett DO;  [facility];  Service: Cardiology    CARDIAC CATHETERIZATION N/A 08/21/2023    Procedure: Cardiac Coronary Angiogram;  Surgeon: Dom Garrett DO;  [facility];  Service: Cardiology    CATARACT EXTRACTION Bilateral     COLONOSCOPY      EGD      IR Y-90 PRE-ANGIO/EMBO W/ LUNG SCAN  09/01/2023    IR Y-90 RADIOEMBOLIZATION  09/08/2023         Subjective:  Pt OOB in chair with family at bedside as SLP entered the room. Pt currently on room air. No c/o pain.     Objective:  Pt seen for follow up dysphagia therapy. Pt seen in conjunction with puree lunch tray and NTL; pt agreeable to trial of thin liquids but not upgraded solids at this time. Pt able to feed self with slow rate of intake. Pt able to adequately retrieve bolus from spoon, fork, and straw. No anterior leakage. Pt with noted mastication of all puree. Bolus control and transfer appeared adequate. Swallow initiation appeared prompt. No coughing, throat clearing, or change in respiratory functioning s/p swallows. Unable to ascertain change in voice as pt had limited verbal expression.     Assessment:  No overt s/s of aspiration given puree, NTL, and thin liquids.     Plan/Recommendations:  Puree  Thin Liquids  Medications as tolerated  ST to continue to follow

## 2024-01-29 NOTE — CASE MANAGEMENT
Case Management Progress Note    Patient name Derek Walter  Location /-01 MRN [id number]  : 1945 Date 2024       LOS (days): 6  Geometric Mean LOS (GMLOS) (days): 4.6  Days to GMLOS:-1.8        OBJECTIVE:        Current admission status: Inpatient  Preferred Pharmacy:   [id number] - [place], [place] - [id number] [facility]  [address] [address]  Phone: [phone] Fax: [phone]    Primary Care Provider: Ernesto Griffith MD    Primary Insurance: [employer] REP  Secondary Insurance:     PROGRESS NOTE:    Call placed to Pt's [facility] pharmacy to check coay cost for Rifaximin. Per Marcus at [facility] pharmacy, Pt has no copay for Rifaximin. TT sent to GI provider to inform no copay cost to patient for Rifaximin.

## 2024-01-29 NOTE — PLAN OF CARE
Problem: Potential for Falls  Goal: Patient will remain free of falls  Description: INTERVENTIONS:  - Educate patient/family on patient safety including physical limitations  - Instruct patient to call for assistance with activity   - Consult OT/PT to assist with strengthening/mobility   - Keep Call bell within reach  - Keep bed low and locked with side rails adjusted as appropriate  - Keep care items and personal belongings within reach  - Initiate and maintain comfort rounds  - Make Fall Risk Sign visible to staff  - Offer Toileting every 2 Hours, in advance of need  - Initiate/Maintain bed alarm  - Obtain necessary fall risk management equipment  - Apply yellow socks and bracelet for high fall risk patients  - Consider moving patient to room near nurses station  Outcome: Progressing     Problem: PAIN - ADULT  Goal: Verbalizes/displays adequate comfort level or baseline comfort level  Description: Interventions:  - Encourage patient to monitor pain and request assistance  - Assess pain using appropriate pain scale  - Administer analgesics based on type and severity of pain and evaluate response  - Implement non-pharmacological measures as appropriate and evaluate response  - Consider cultural and social influences on pain and pain management  - Notify physician/advanced practitioner if interventions unsuccessful or patient reports new pain  Outcome: Progressing     Problem: Knowledge Deficit  Goal: Patient/family/caregiver demonstrates understanding of disease process, treatment plan, medications, and discharge instructions  Description: Complete learning assessment and assess knowledge base.  Interventions:  - Provide teaching at level of understanding  - Provide teaching via preferred learning methods  Outcome: Progressing     Problem: GASTROINTESTINAL - ADULT  Goal: Minimal or absence of nausea and/or vomiting  Description: INTERVENTIONS:  - Administer IV fluids if ordered to ensure adequate hydration  -  Maintain NPO status until nausea and vomiting are resolved  - Nasogastric tube if ordered  - Administer ordered antiemetic medications as needed  - Provide nonpharmacologic comfort measures as appropriate  - Advance diet as tolerated, if ordered  - Consider nutrition services referral to assist patient with adequate nutrition and appropriate food choices  Outcome: Progressing  Goal: Maintains or returns to baseline bowel function  Description: INTERVENTIONS:  - Assess bowel function  - Encourage oral fluids to ensure adequate hydration  - Administer IV fluids if ordered to ensure adequate hydration  - Administer ordered medications as needed  - Encourage mobilization and activity  - Consider nutritional services referral to assist patient with adequate nutrition and appropriate food choices  Outcome: Progressing  Goal: Maintains adequate nutritional intake  Description: INTERVENTIONS:  - Monitor percentage of each meal consumed  - Identify factors contributing to decreased intake, treat as appropriate  - Assist with meals as needed  - Monitor I&O, weight, and lab values if indicated  - Obtain nutrition services referral as needed  Outcome: Progressing  Goal: Establish and maintain optimal ostomy function  Description: INTERVENTIONS:  - Assess bowel function  - Encourage oral fluids to ensure adequate hydration  - Administer IV fluids if ordered to ensure adequate hydration   - Administer ordered medications as needed  - Encourage mobilization and activity  - Nutrition services referral to assist patient with appropriate food choices  - Assess stoma site  - Consider wound care consult   Outcome: Progressing  Goal: Oral mucous membranes remain intact  Description: INTERVENTIONS  - Assess oral mucosa and hygiene practices  - Implement preventative oral hygiene regimen  - Implement oral medicated treatments as ordered  - Initiate Nutrition services referral as needed  Outcome: Progressing     Problem: METABOLIC, FLUID  AND ELECTROLYTES - ADULT  Goal: Electrolytes maintained within normal limits  Description: INTERVENTIONS:  - Monitor labs and assess patient for signs and symptoms of electrolyte imbalances  - Administer electrolyte replacement as ordered  - Monitor response to electrolyte replacements, including repeat lab results as appropriate  - Instruct patient on fluid and nutrition as appropriate  Outcome: Progressing  Goal: Fluid balance maintained  Description: INTERVENTIONS:  - Monitor labs   - Monitor I/O and WT  - Instruct patient on fluid and nutrition as appropriate  - Assess for signs & symptoms of volume excess or deficit  Outcome: Progressing  Goal: Glucose maintained within target range  Description: INTERVENTIONS:  - Monitor Blood Glucose as ordered  - Assess for signs and symptoms of hyperglycemia and hypoglycemia  - Administer ordered medications to maintain glucose within target range  - Assess nutritional intake and initiate nutrition service referral as needed  Outcome: Progressing     Problem: HEMATOLOGIC - ADULT  Goal: Maintains hematologic stability  Description: INTERVENTIONS  - Assess for signs and symptoms of bleeding or hemorrhage  - Monitor labs  - Administer supportive blood products/factors as ordered and appropriate  Outcome: Progressing     Problem: RESPIRATORY - ADULT  Goal: Achieves optimal ventilation and oxygenation  Description: INTERVENTIONS:  - Assess for changes in respiratory status  - Assess for changes in mentation and behavior  - Position to facilitate oxygenation and minimize respiratory effort  - Oxygen administered by appropriate delivery if ordered  - Initiate smoking cessation education as indicated  - Encourage broncho-pulmonary hygiene including cough, deep breathe, Incentive Spirometry  - Assess the need for suctioning and aspirate as needed  - Assess and instruct to report SOB or any respiratory difficulty  - Respiratory Therapy support as indicated  Outcome: Progressing      Problem: Prexisting or High Potential for Compromised Skin Integrity  Goal: Skin integrity is maintained or improved  Description: INTERVENTIONS:  - Identify patients at risk for skin breakdown  - Assess and monitor skin integrity  - Assess and monitor nutrition and hydration status  - Monitor labs   - Assess for incontinence   - Turn and reposition patient  - Assist with mobility/ambulation  - Relieve pressure over bony prominences  - Avoid friction and shearing  - Provide appropriate hygiene as needed including keeping skin clean and dry  - Evaluate need for skin moisturizer/barrier cream  - Collaborate with interdisciplinary team   - Patient/family teaching  - Consider wound care consult   Outcome: Progressing     Problem: Nutrition/Hydration-ADULT  Goal: Nutrient/Hydration intake appropriate for improving, restoring or maintaining nutritional needs  Description: Monitor and assess patient's nutrition/hydration status for malnutrition. Collaborate with interdisciplinary team and initiate plan and interventions as ordered.  Monitor patient's weight and dietary intake as ordered or per policy. Utilize nutrition screening tool and intervene as necessary. Determine patient's food preferences and provide high-protein, high-caloric foods as appropriate.     INTERVENTIONS:  - Monitor oral intake, urinary output, labs, and treatment plans  - Assess nutrition and hydration status and recommend course of action  - Evaluate amount of meals eaten  - Assist patient with eating if necessary   - Allow adequate time for meals  - Recommend/ encourage appropriate diets, oral nutritional supplements, and vitamin/mineral supplements  - Order, calculate, and assess calorie counts as needed  - Recommend, monitor, and adjust tube feedings and TPN/PPN based on assessed needs  - Assess need for intravenous fluids  - Provide specific nutrition/hydration education as appropriate  - Include patient/family/caregiver in decisions related to  nutrition  Outcome: Progressing     Problem: SAFETY,RESTRAINT: NV/NON-SELF DESTRUCTIVE BEHAVIOR  Goal: Remains free of harm/injury (restraint for non violent/non self-detsructive behavior)  Description: INTERVENTIONS:  - Instruct patient/family regarding restraint use   - Assess and monitor physiologic and psychological status   - Provide interventions and comfort measures to meet assessed patient needs   - Identify and implement measures to help patient regain control  - Assess readiness for release of restraint   Outcome: Progressing  Goal: Returns to optimal restraint-free functioning  Description: INTERVENTIONS:  - Assess the patient's behavior and symptoms that indicate continued need for restraint  - Identify and implement measures to help patient regain control  - Assess readiness for release of restraint   Outcome: Progressing

## 2024-01-29 NOTE — PLAN OF CARE
Problem: Potential for Falls  Goal: Patient will remain free of falls  Description: INTERVENTIONS:  - Educate patient/family on patient safety including physical limitations  - Instruct patient to call for assistance with activity   - Consult OT/PT to assist with strengthening/mobility   - Keep Call bell within reach  - Keep bed low and locked with side rails adjusted as appropriate  - Keep care items and personal belongings within reach  - Initiate and maintain comfort rounds  - Make Fall Risk Sign visible to staff  - Offer Toileting every  Hours, in advance of need  - Initiate/Maintain alarm  - Obtain necessary fal risk management equipment:   - Apply yellow socks and bracelet for high fall risk patients  - Consider moving patient to room near nurses station  Outcome: Progressing     Problem: PAIN - ADULT  Goal: Verbalizes/displays adequate comfort level or baseline comfort level  Description: Interventions:  - Encourage patient to monitor pain and request assistance  - Assess pain using appropriate pain scale  - Administer analgesics based on type and severity of pain and evaluate response  - Implement non-pharmacological measures as appropriate and evaluate response  - Consider cultural and social influences on pain and pain management  - Notify physician/advanced practitioner if interventions unsuccessful or patient reports new pain  Outcome: Progressing     Problem: Knowledge Deficit  Goal: Patient/family/caregiver demonstrates understanding of disease process, treatment plan, medications, and discharge instructions  Description: Complete learning assessment and assess knowledge base.  Interventions:  - Provide teaching at level of understanding  - Provide teaching via preferred learning methods  Outcome: Progressing     Problem: GASTROINTESTINAL - ADULT  Goal: Minimal or absence of nausea and/or vomiting  Description: INTERVENTIONS:  - Administer IV fluids if ordered to ensure adequate hydration  - Maintain  NPO status until nausea and vomiting are resolved  - Nasogastric tube if ordered  - Administer ordered antiemetic medications as needed  - Provide nonpharmacologic comfort measures as appropriate  - Advance diet as tolerated, if ordered  - Consider nutrition services referral to assist patient with adequate nutrition and appropriate food choices  Outcome: Progressing  Goal: Maintains or returns to baseline bowel function  Description: INTERVENTIONS:  - Assess bowel function  - Encourage oral fluids to ensure adequate hydration  - Administer IV fluids if ordered to ensure adequate hydration  - Administer ordered medications as needed  - Encourage mobilization and activity  - Consider nutritional services referral to assist patient with adequate nutrition and appropriate food choices  Outcome: Progressing  Goal: Maintains adequate nutritional intake  Description: INTERVENTIONS:  - Monitor percentage of each meal consumed  - Identify factors contributing to decreased intake, treat as appropriate  - Assist with meals as needed  - Monitor I&O, weight, and lab values if indicated  - Obtain nutrition services referral as needed  Outcome: Progressing  Goal: Establish and maintain optimal ostomy function  Description: INTERVENTIONS:  - Assess bowel function  - Encourage oral fluids to ensure adequate hydration  - Administer IV fluids if ordered to ensure adequate hydration   - Administer ordered medications as needed  - Encourage mobilization and activity  - Nutrition services referral to assist patient with appropriate food choices  - Assess stoma site  - Consider wound care consult   Outcome: Progressing  Goal: Oral mucous membranes remain intact  Description: INTERVENTIONS  - Assess oral mucosa and hygiene practices  - Implement preventative oral hygiene regimen  - Implement oral medicated treatments as ordered  - Initiate Nutrition services referral as needed  Outcome: Progressing     Problem: METABOLIC, FLUID AND  ELECTROLYTES - ADULT  Goal: Electrolytes maintained within normal limits  Description: INTERVENTIONS:  - Monitor labs and assess patient for signs and symptoms of electrolyte imbalances  - Administer electrolyte replacement as ordered  - Monitor response to electrolyte replacements, including repeat lab results as appropriate  - Instruct patient on fluid and nutrition as appropriate  Outcome: Progressing  Goal: Fluid balance maintained  Description: INTERVENTIONS:  - Monitor labs   - Monitor I/O and WT  - Instruct patient on fluid and nutrition as appropriate  - Assess for signs & symptoms of volume excess or deficit  Outcome: Progressing  Goal: Glucose maintained within target range  Description: INTERVENTIONS:  - Monitor Blood Glucose as ordered  - Assess for signs and symptoms of hyperglycemia and hypoglycemia  - Administer ordered medications to maintain glucose within target range  - Assess nutritional intake and initiate nutrition service referral as needed  Outcome: Progressing     Problem: HEMATOLOGIC - ADULT  Goal: Maintains hematologic stability  Description: INTERVENTIONS  - Assess for signs and symptoms of bleeding or hemorrhage  - Monitor labs  - Administer supportive blood products/factors as ordered and appropriate  Outcome: Progressing     Problem: Prexisting or High Potential for Compromised Skin Integrity  Goal: Skin integrity is maintained or improved  Description: INTERVENTIONS:  - Identify patients at risk for skin breakdown  - Assess and monitor skin integrity  - Assess and monitor nutrition and hydration status  - Monitor labs   - Assess for incontinence   - Turn and reposition patient  - Assist with mobility/ambulation  - Relieve pressure over bony prominences  - Avoid friction and shearing  - Provide appropriate hygiene as needed including keeping skin clean and dry  - Evaluate need for skin moisturizer/barrier cream  - Collaborate with interdisciplinary team   - Patient/family teaching  -  Consider wound care consult   Outcome: Progressing     Problem: Nutrition/Hydration-ADULT  Goal: Nutrient/Hydration intake appropriate for improving, restoring or maintaining nutritional needs  Description: Monitor and assess patient's nutrition/hydration status for malnutrition. Collaborate with interdisciplinary team and initiate plan and interventions as ordered.  Monitor patient's weight and dietary intake as ordered or per policy. Utilize nutrition screening tool and intervene as necessary. Determine patient's food preferences and provide high-protein, high-caloric foods as appropriate.     INTERVENTIONS:  - Monitor oral intake, urinary output, labs, and treatment plans  - Assess nutrition and hydration status and recommend course of action  - Evaluate amount of meals eaten  - Assist patient with eating if necessary   - Allow adequate time for meals  - Recommend/ encourage appropriate diets, oral nutritional supplements, and vitamin/mineral supplements  - Order, calculate, and assess calorie counts as needed  - Recommend, monitor, and adjust tube feedings and TPN/PPN based on assessed needs  - Assess need for intravenous fluids  - Provide specific nutrition/hydration education as appropriate  - Include patient/family/caregiver in decisions related to nutrition  Outcome: Progressing     Problem: SAFETY,RESTRAINT: NV/NON-SELF DESTRUCTIVE BEHAVIOR  Goal: Remains free of harm/injury (restraint for non violent/non self-detsructive behavior)  Description: INTERVENTIONS:  - Instruct patient/family regarding restraint use   - Assess and monitor physiologic and psychological status   - Provide interventions and comfort measures to meet assessed patient needs   - Identify and implement measures to help patient regain control  - Assess readiness for release of restraint   Outcome: Progressing  Goal: Returns to optimal restraint-free functioning  Description: INTERVENTIONS:  - Assess the patient's behavior and symptoms  that indicate continued need for restraint  - Identify and implement measures to help patient regain control  - Assess readiness for release of restraint   Outcome: Progressing     Problem: RESPIRATORY - ADULT  Goal: Achieves optimal ventilation and oxygenation  Description: INTERVENTIONS:  - Assess for changes in respiratory status  - Assess for changes in mentation and behavior  - Position to facilitate oxygenation and minimize respiratory effort  - Oxygen administered by appropriate delivery if ordered  - Initiate smoking cessation education as indicated  - Encourage broncho-pulmonary hygiene including cough, deep breathe, Incentive Spirometry  - Assess the need for suctioning and aspirate as needed  - Assess and instruct to report SOB or any respiratory difficulty  - Respiratory Therapy support as indicated  Outcome: Progressing

## 2024-01-29 NOTE — PROGRESS NOTES
[facility]  Progress Note  Name: Derek aWlter I  MRN: [id number]  Unit/Bed#: -01 I Date of Admission: 1/23/2024   Date of Service: 1/29/2024 I Hospital Day: 6    Assessment/Plan   Hepatic encephalopathy (HCC)  Assessment & Plan  patient is 78-year-old male with a history of liver cancer and radiation therapy who was admitted for an upper GI bleed with hypotension and later strokelike symptoms went unresponsive and was intubated, appears to be as cause from the liver with an ammonia level of 130  Most likely source of elevated ammonia level is liver history of liver cancer  GI following  Continue with Rifaximin when able   Patient should have 2-3 BMs a day  Amm level normalized to 61   Remains stable off lactulose    AMS (altered mental status)  Assessment & Plan  1/24 0630A RN called provider to bedside to evaluate patient for left facial droop. NIHHS 4 (left facial droop, LUE weakness +4/5, LLE weakness +4/5, LLE drift and mild dysarthria). RN states left facial droop was noticed on arrival to ICU at 0300A. RN states ED could not confirm if droop was present in ED and wife unsure if droop was new. Stroke alert called.   CTH - no acute stroke or hemorrhage  CTA H/N - no large vessel occulusion. No arterial occulusion. 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   1/24 MRI: Negative for acute findings   1/24 ECHO: EF 75%, Mild TVR  Mentation continuing to improve. Able to be extubated 1/27     Plan:  Patient discussed with Dr. Interiano (Neuro)   TNK not given due to unknown last known well and active GI bleed.  Goal SBP >140  1/24 ECHO: EF 75%, Mild TVR  Holding aspirin and plavix in setting of GI bleed  Hold statin while NPO   SLP consult pending  Neurology following  Neuro checks Q4 hours  Improving  Aspiration/fall precautions     Acute blood loss anemia  Assessment & Plan  Secondary to GIB  Hemoglobin baseline 8-10  Receive 3 units PRBC this admission, last 1/25   Hgb Q12H  History of  iron deficiency check iron panel  Start iron p.o. when able    Chronic systolic heart failure (HCC)  Assessment & Plan  Wt Readings from Last 3 Encounters:   01/28/24 93.4 kg (205 lb 14.6 oz)   12/20/23 96.6 kg (213 lb)   12/14/23 97 kg (213 lb 12.8 oz)     Patient is 78-year-old male with history of CAD post PCI in August not deemed a candidate for surgical intervention, systolic heart failure with reduced ejection fraction of 36%   Home meds: lasix, toprol, and statin   Resume home meds when able   Daily wts    Hepatocellular carcinoma (HCC)  Assessment & Plan  Patient has stage II liver cancer hepatocellular carcinoma  Has a tumor thrombus which appears stable seen again on admission CT  Status post Y 90 treatment    Follows with Dr. Gong as o/p    CAD (coronary artery disease)  Assessment & Plan  Patient has a history of coronary artery disease had 8/2023 a PCI status post cardiac catheterization drug-eluting stents x 3 in the LAD.  Ramus and left circumflex  Continue home statin and metoprolol    History of DVT (deep vein thrombosis)  Assessment & Plan  Patient is on Eliquis as o/p, currently held 2/2 GIB  1/23 reversed with Kcentra     Acute hypoxemic respiratory failure (HCC)  Assessment & Plan  Required intubation for airway protection 1/23 after patient became increasingly encephalopathic  Minimal vent setting. Placed on PS since 1/24 at 6/6 40% FiO2  Unable to be successfully extubated due to mental status. Currently not requiring sedation.  Having copious secretions coming from inline suction  1/27 CXR negative for acute findings. Given 1 x dose of 40 mg IV lasix   Extubated mid morning on 1/27  Saturating well on room air     Plan:  Resolved  Aspiration precautions  Encourage IS/Flutter valve when able     Atrial fibrillation, unspecified type (HCC)  Assessment & Plan  Rate controled  Maintained on Eliquis and metoprolol as o/p  Unable to take PO metoprolol. Pending SLP. May consider IV if unable to  swallow  Hold Eliquis and Plavix until okay with GI   Cardiology consult regarding input for antiplatelet/anticoagulation    Type 2 diabetes mellitus with neurologic complication, with long-term current use of insulin ([facility])  Assessment & Plan  Lab Results   Component Value Date    HGBA1C 6.6 (H) 01/24/2024       Recent Labs     01/27/24  1624 01/27/24  1749 01/27/24  2246 01/28/24  0511   POCGLU 150* 118 166* 163*     Patient has a Dexcom, on Trulicity and Jardiance, Lantus and insulin  Extubated 1/27. Currently no enteral access for TF. Insulin drip stopped. Changed to correction scale insulin  Accu-Chek ACHS with Humalog sliding scale  Blood glucose goal 140-180  Pending SLP   Hypoglycemia protocol     Stage 3 chronic kidney disease, unspecified whether stage 3a or 3b CKD ([facility])  Assessment & Plan  Lab Results   Component Value Date    EGFR 61 01/28/2024    EGFR 59 01/27/2024    EGFR 59 01/26/2024    CREATININE 1.14 01/28/2024    CREATININE 1.17 01/27/2024    CREATININE 1.18 01/26/2024     Cr at baseline  Monitor I's and O's   Renally dose medications  Avoid nephrotoxic agents  Hold Lasix at this time, resume home med when able     * Acute upper GI bleeding  Assessment & Plan  Patient is a 78-year-old male who has been seeing [facility] last seen 12/13/2023 patient has a history of anemia is multifactorial chronic due to cancer with iron deficiency and occult GI blood loss on Eliquis review of an EGD with multiple AVMs.  Patient came in to the ER from home due to generalized weakness nausea vomiting since this morning reports lower abdominal pain has a history of anemia is on Eliquis and Plavix episodes of vomiting which were described by the significant other is dark of clotted material, and also had melanotic stool occult positive.  Patient felt significant weakness and was unable to walk felt like too weak to walk.  Patient has a history of an upper GI bleed with multiple AVMs, also has a history of the  patient intubation with tracheostomy approximately rehab time of 1 year.   CT scan with contrast showed nonspecific hypodensity within the gastric body possibly reflective of hemorrhage in this patient with the history of gastric AVMs, known liver cancer, cholelithiasis, small volume abdominal pelvic ascites  Patient's baseline hemoglobin appears to be 8-10, except 1 value on 11/23 of 13.    Patient is on Eliquis and Plavix as o/p  In the ED received DDAVP 0.3 mg/kg and Kcentra  GI consulted  1/23 S/P EGD - 3 large angioectasias in the fundus of the stomach and body of the stomach; bleeding was observed; placed clips; hemostasis achieved; induced coagulation and hemostasis achieved with argon plasma coagulation The esophagus and duodenum appeared normal.  Pt received a total of 3 units PRBC this admission so far, last on 1/25/24     Plan:  Monitor H/H Q12 hours- Hgb remains stable   Continue PPI BID  Patient extubated 1/27- pending swallow evaluation by SLP to restart diet   GI following appreciate recommendations- Need to address if stable to restart plavix             Labs & Imaging: I have personally reviewed pertinent reports.      VTE Prophylaxis: in place.    Code Status:   Level 1 - Full Code    Patient Centered Rounds: I have performed bedside rounds with nursing staff today.    Mobility:   Basic Mobility Inpatient Raw Score: 12  -HLM Goal: 4: Move to chair/commode  JH-HLM Achieved: 5: Stand (1 or more minutes)  HLM Goal NOT achieved. Continue with multidisciplinary rounding and encourage appropriate mobility to improve upon HLM goals.    Discussions with Specialists or Other Care Team Provider: GI    Education and Discussions with Family / Patient: Family at bedside    Total Time Spent on Date of Encounter in care of patient: 35 mins. This time was spent on one or more of the following: performing physical exam; counseling and coordination of care; obtaining or reviewing history; documenting in the  medical record; reviewing/ordering tests, medications or procedures; communicating with other healthcare professionals and discussing with patient's family/caregivers.    Current Length of Stay: 6 day(s)    Current Patient Status: Inpatient   Certification Statement: The patient will continue to require additional inpatient hospital stay due to see my assessment and plan.     Subjective:   Patient is seen and examined at bedside.  Denies any new complaints.  No active or overt rectal bleeding.  Afebrile  All other ROS are negative.    Objective:    Vitals: Blood pressure 121/61, pulse 87, temperature 98.9 °F (37.2 °C), temperature source Oral, resp. rate 21, height 6' (1.829 m), weight 91.3 kg (201 lb 4.5 oz), SpO2 92%.,Body mass index is 27.3 kg/m².  SPO2 RA Rest      Flowsheet Row ED to Hosp-Admission (Current) from 1/23/2024 in [facility] Int[facility]   SpO2 92 %   SpO2 Activity At Rest   O2 Device None (Room air)   O2 Flow Rate 4 L/min          I&O:   Intake/Output Summary (Last 24 hours) at 1/29/2024 0726  Last data filed at 1/29/2024 0600  Gross per 24 hour   Intake 906.99 ml   Output 675 ml   Net 231.99 ml       Physical Exam:    General- Alert, sitting comfortably in chair. Not in any acute distress.  Neck- Supple, No JVD  CVS- regular, S1 and S2 normal  Chest- Bilateral Air entry, No rhochi, crackles or wheezing present.  Abdomen- soft, nontender, not distended, no guarding or rigidity, BS+  Extremities-  No pedal edema, No calf tenderness  CNS-   Alert, awake and orientedx3. No focal deficits present.    Invasive Devices       Peripheral Intravenous Line  Duration             Peripheral IV 01/27/24 Distal;Right;Ventral (anterior) Forearm 1 day    Peripheral IV 01/27/24 Left;Ventral (anterior) Forearm 1 day                          Social History  reviewed  Family History   Problem Relation Age of Onset    Hypertension Mother     Bone cancer Father     Diabetes type II Sister      Diabetes type II Sister     Esophageal cancer Brother     Colon cancer Neg Hx     reviewed    Meds:  Current Facility-Administered Medications   Medication Dose Route Frequency Provider Last Rate Last Admin    acetaminophen (TYLENOL) tablet 650 mg  650 mg Oral Q6H PRN NADIA Reyes   650 mg at 01/25/24 2034    azelastine (ASTELIN) 0.1 % nasal spray 1 spray  1 spray Each Nare BID NADIA Reyes   1 spray at 01/28/24 1801    calcium gluconate 2 g in sodium chloride 0.9% 100 mL (premix)  2 g Intravenous Once Yen Hernandez PA-C 100 mL/hr at 01/29/24 0637 2 g at 01/29/24 0637    chlorhexidine (PERIDEX) 0.12 % oral rinse 15 mL  15 mL Mouth/Throat Q12H RANDALL NADIA Reyes   15 mL at 01/28/24 2126    heparin (porcine) 25,000 units in 0.45% NaCl 250 mL infusion (premix)  3-20 Units/kg/hr (Order-Specific) Intravenous Titrated NADIA Reyes 11.8 mL/hr at 01/29/24 0234 13.1 Units/kg/hr at 01/29/24 0234    insulin detemir (LEVEMIR) subcutaneous injection 30 Units  30 Units Subcutaneous HS NADIA Reyes   30 Units at 01/28/24 2126    insulin lispro (HumaLOG) 100 units/mL subcutaneous injection 2-12 Units  2-12 Units Subcutaneous TID AC NADIA Reyes   8 Units at 01/28/24 1801    insulin lispro (HumaLOG) 100 units/mL subcutaneous injection 2-12 Units  2-12 Units Subcutaneous HS NADIA Reyes   6 Units at 01/28/24 2142    insulin lispro (HumaLOG) 100 units/mL subcutaneous injection 5 Units  5 Units Subcutaneous TID With Meals NADIA Reyes   5 Units at 01/28/24 1801    ipratropium (ATROVENT) 0.02 % inhalation solution 0.5 mg  0.5 mg Nebulization Q6H PRN NADIA Reyes   0.5 mg at 01/28/24 0748    levalbuterol (XOPENEX) inhalation solution 0.63 mg  0.63 mg Nebulization Q6H PRN NADIA Reyes   0.63 mg at 01/28/24 0748    metoprolol tartrate (LOPRESSOR) partial tablet 12.5 mg  12.5 mg  Oral Q12H RANDALL Rashad Callaway CRNP   12.5 mg at 01/28/24 2126    pantoprazole (PROTONIX) injection 40 mg  40 mg Intravenous Q12H RANDALL Rashad Callaway CRNP   40 mg at 01/28/24 2126    potassium phosphates 30 mmol in sodium chloride 0.9 % 250 mL infusion  30 mmol Intravenous Once Yen Hernandez PA-C        rifaximin (XIFAXAN) tablet 550 mg  550 mg Oral Q12H RANDALL Rashad Callaway CRNP   550 mg at 01/28/24 2126    trimethobenzamide (TIGAN) IM injection 200 mg  200 mg Intramuscular Q12H PRN BARRON ReyesNP   200 mg at 01/23/24 0717      Medications Prior to Admission   Medication    acetaminophen (TYLENOL) 650 mg CR tablet    Anoro Ellipta 62.5-25 MCG/ACT inhaler    atorvastatin (LIPITOR) 40 mg tablet    azelastine (ASTELIN) 0.1 % nasal spray    Blood Glucose Monitoring Suppl (OneTouch Verio) w/Device KIT    clopidogrel (PLAVIX) 75 mg tablet    Continuous Blood Gluc  (Dexcom G6 ) NOHEMY    Eliquis 5 MG    ferrous sulfate 325 (65 Fe) mg tablet    furosemide (LASIX) 40 mg tablet    insulin detemir (Levemir FlexPen) 100 Units/mL injection pen    Insulin Pen Needle (Droplet Pen Needles) 32G X 4 MM MISC    Jardiance 10 MG TABS tablet    metoprolol succinate (TOPROL-XL) 50 mg 24 hr tablet    Multiple Vitamin (MULTIVITAMIN ADULT PO)    NovoLOG FlexPen 100 units/mL injection pen    Potassium Citrate ER 15 MEQ (1620 MG) TBCR    pregabalin (LYRICA) 100 mg capsule    sertraline (ZOLOFT) 25 mg tablet    SUPER B COMPLEX/C PO    tamsulosin (FLOMAX) 0.4 mg    traZODone (DESYREL) 100 mg tablet    Trulicity 0.75 MG/0.5ML injection    Continuous Blood Gluc Sensor (Dexcom G6 Sensor) MISC    Continuous Blood Gluc Transmit (Dexcom G6 Transmitter) MISC    Continuous Blood Gluc Transmit (Dexcom G6 Transmitter) MISC    glucose blood (OneTouch Verio) test strip    mometasone (ELOCON) 0.1 % cream    pantoprazole (PROTONIX) 40 mg tablet       Labs:  Results from last 7 days   Lab Units  "01/29/24  0437 01/28/24  1526 01/28/24  0544 01/27/24  1430 01/27/24  0605   WBC Thousand/uL 7.03 8.60 7.82  --  8.39   HEMOGLOBIN g/dL 8.0* 9.7* 9.0*   < > 8.1*  8.1*   HEMATOCRIT % 26.0* 31.0* 28.0*   < > 25.8*  25.8*   PLATELETS Thousands/uL 167 171 137*  --  111*   NEUTROS PCT % 65  --  71  --  70   LYMPHS PCT % 11*  --  6*  --  6*   MONOS PCT % 18*  --  17*  --  19*   EOS PCT % 5  --  5  --  4    < > = values in this interval not displayed.     Results from last 7 days   Lab Units 01/29/24  0437 01/28/24  0544 01/27/24  0038 01/26/24  1213 01/26/24  1056 01/25/24  1340 01/25/24  0427 01/23/24  1608 01/23/24  1331   POTASSIUM mmol/L 3.5 3.8 3.9   < >  --    < > 3.8   < >  --    CHLORIDE mmol/L 108 107 112*   < >  --    < > 119*   < >  --    CO2 mmol/L 24 24 22   < >  --    < > 21   < >  --    CO2, I-STAT mmol/L  --   --   --   --  20*  --   --   --  13*   BUN mg/dL 37* 29* 30*   < >  --    < > 78*   < >  --    CREATININE mg/dL 1.25 1.14 1.17   < >  --    < > 1.73*   < >  --    CALCIUM mg/dL 8.6 8.0* 8.1*   < >  --    < > 8.3*   < >  --    ALK PHOS U/L 354* 309*  --   --   --   --  152*   < >  --    ALT U/L 46 50  --   --   --   --  54*   < >  --    AST U/L 50* 51*  --   --   --   --  76*   < >  --    GLUCOSE, ISTAT mg/dl  --   --   --   --  194*  --   --   --  253*    < > = values in this interval not displayed.     No results found for: \"TROPONINI\", \"CKMB\", \"CKTOTAL\"  Results from last 7 days   Lab Units 01/28/24  1526 01/24/24  0504 01/23/24  0614   INR  1.19 1.33* 1.31*     Lab Results   Component Value Date    BLOODCX No Growth After 5 Days. 08/10/2023    BLOODCX No Growth After 5 Days. 08/10/2023         Imaging:  Results for orders placed during the hospital encounter of 01/23/24    XR chest portable    Narrative  CHEST    INDICATION:   line confirmation.    COMPARISON: 1/23/2024    EXAM PERFORMED/VIEWS:  XR CHEST PORTABLE      FINDINGS: The tip of the endotracheal tube is above the george. The right IJ " central venous catheter has the tip projecting over the superior vena cava. The tip of the endogastric tube appears to be in the gastric cardia, just above the level of the  diaphragm. This should probably be advanced somewhat.    Heart appears enlarged. Aorta is atherosclerotic.    There seems to be hazy infiltrate in the right lung, predominantly interstitial pattern. Again, there appears to be minimal right effusion. No pneumothorax identified.    Osseous structures appear within normal limits for patient age.    Impression  Right IJ line projects over the superior vena cava and appears grossly satisfactory. No pneumothorax seen.    Endotracheal tube appears satisfactory.    Endogastric tube should probably be advanced farther into the stomach.    Small right effusion and relatively diffuse interstitial infiltrate in the right lung    The study was marked in EPIC for immediate notification.            Workstation performed: [id number]    Results for orders placed during the hospital encounter of 11/03/23    XR chest 2 views    Narrative  CHEST    INDICATION:   cooughing up bright red blood.    COMPARISON: 8/17/2023    EXAM PERFORMED/VIEWS:  XR CHEST PA & LATERAL The frontal view was performed utilizing dual energy radiographic technique.      FINDINGS:    Cardiomediastinal silhouette appears unremarkable.    The lungs are clear.  No pneumothorax or pleural effusion.    Osseous structures appear within normal limits for patient age.    Impression  No acute cardiopulmonary disease.            Workstation performed: [id number]      Last 24 Hours Medication List:   Current Facility-Administered Medications   Medication Dose Route Frequency Provider Last Rate    acetaminophen  650 mg Oral Q6H PRN NADIA Reyes      azelastine  1 spray Each Nare BID NADIA Reyes      calcium gluconate  2 g Intravenous Once Yen Hernandez PA-C 2 g (01/29/24 0637)    chlorhexidine  15 mL Mouth/Throat  Q12H [facility] NADIA Reyes      heparin (porcine)  3-20 Units/kg/hr (Order-Specific) Intravenous Titrated NADIA Reyes 13.1 Units/kg/hr (01/29/24 0234)    insulin detemir  30 Units Subcutaneous HS NADIA Reyes      insulin lispro  2-12 Units Subcutaneous TID AC NADIA Reyes      insulin lispro  2-12 Units Subcutaneous HS NADIA Reyes      insulin lispro  5 Units Subcutaneous TID With Meals NADIA Reyes      ipratropium  0.5 mg Nebulization Q6H PRN NADIA Reyes      levalbuterol  0.63 mg Nebulization Q6H PRN NADIA Reyes      metoprolol tartrate  12.5 mg Oral Q12H [facility] NADIA Reyes      pantoprazole  40 mg Intravenous Q12H [facility] NADIA Reyes      potassium phosphate  30 mmol Intravenous Once Yen Hernandez PA-C      rifaximin  550 mg Oral Q12H [facility] Rashad Callaway, NADIA      trimethobenzamide  200 mg Intramuscular Q12H PRN NADIA Reyes          Today, Patient Was Seen By: Adrian More MD    ** Please Note: Dictation voice to text software may have been used in the creation of this document. **

## 2024-01-30 LAB
ALBUMIN SERPL BCP-MCNC: 3.3 G/DL (ref 3.5–5)
ALP SERPL-CCNC: 396 U/L (ref 34–104)
ALT SERPL W P-5'-P-CCNC: 46 U/L (ref 7–52)
ANION GAP SERPL CALCULATED.3IONS-SCNC: 8 MMOL/L
APTT PPP: 82 SECONDS (ref 23–37)
AST SERPL W P-5'-P-CCNC: 57 U/L (ref 13–39)
BASOPHILS # BLD AUTO: 0.02 THOUSANDS/ÂΜL (ref 0–0.1)
BASOPHILS NFR BLD AUTO: 0 % (ref 0–1)
BILIRUB DIRECT SERPL-MCNC: 0.47 MG/DL (ref 0–0.2)
BILIRUB SERPL-MCNC: 1.33 MG/DL (ref 0.2–1)
BUN SERPL-MCNC: 37 MG/DL (ref 5–25)
CALCIUM ALBUM COR SERPL-MCNC: 9 MG/DL (ref 8.3–10.1)
CALCIUM SERPL-MCNC: 8.4 MG/DL (ref 8.4–10.2)
CHLORIDE SERPL-SCNC: 109 MMOL/L (ref 96–108)
CO2 SERPL-SCNC: 23 MMOL/L (ref 21–32)
CREAT SERPL-MCNC: 1.17 MG/DL (ref 0.6–1.3)
EOSINOPHIL # BLD AUTO: 0.37 THOUSAND/ÂΜL (ref 0–0.61)
EOSINOPHIL NFR BLD AUTO: 6 % (ref 0–6)
ERYTHROCYTE [DISTWIDTH] IN BLOOD BY AUTOMATED COUNT: 15.7 % (ref 11.6–15.1)
FERRITIN SERPL-MCNC: 37 NG/ML (ref 24–336)
GFR SERPL CREATININE-BSD FRML MDRD: 59 ML/MIN/1.73SQ M
GLUCOSE SERPL-MCNC: 157 MG/DL (ref 65–140)
GLUCOSE SERPL-MCNC: 199 MG/DL (ref 65–140)
GLUCOSE SERPL-MCNC: 227 MG/DL (ref 65–140)
GLUCOSE SERPL-MCNC: 89 MG/DL (ref 65–140)
GLUCOSE SERPL-MCNC: 90 MG/DL (ref 65–140)
HCT VFR BLD AUTO: 26.7 % (ref 36.5–49.3)
HCT VFR BLD AUTO: 28.3 % (ref 36.5–49.3)
HGB BLD-MCNC: 8.3 G/DL (ref 12–17)
HGB BLD-MCNC: 8.6 G/DL (ref 12–17)
IMM GRANULOCYTES # BLD AUTO: 0.04 THOUSAND/UL (ref 0–0.2)
IMM GRANULOCYTES NFR BLD AUTO: 1 % (ref 0–2)
IRON SERPL-MCNC: <10 UG/DL (ref 50–212)
LYMPHOCYTES # BLD AUTO: 0.73 THOUSANDS/ÂΜL (ref 0.6–4.47)
LYMPHOCYTES NFR BLD AUTO: 12 % (ref 14–44)
MCH RBC QN AUTO: 29.4 PG (ref 26.8–34.3)
MCHC RBC AUTO-ENTMCNC: 31.1 G/DL (ref 31.4–37.4)
MCV RBC AUTO: 95 FL (ref 82–98)
MONOCYTES # BLD AUTO: 1.08 THOUSAND/ÂΜL (ref 0.17–1.22)
MONOCYTES NFR BLD AUTO: 18 % (ref 4–12)
NEUTROPHILS # BLD AUTO: 3.79 THOUSANDS/ÂΜL (ref 1.85–7.62)
NEUTS SEG NFR BLD AUTO: 63 % (ref 43–75)
NRBC BLD AUTO-RTO: 0 /100 WBCS
PLATELET # BLD AUTO: 193 THOUSANDS/UL (ref 149–390)
PMV BLD AUTO: 12.6 FL (ref 8.9–12.7)
POTASSIUM SERPL-SCNC: 4 MMOL/L (ref 3.5–5.3)
PROT SERPL-MCNC: 6 G/DL (ref 6.4–8.4)
RBC # BLD AUTO: 2.82 MILLION/UL (ref 3.88–5.62)
SODIUM SERPL-SCNC: 140 MMOL/L (ref 135–147)
TIBC SERPL-MCNC: <289 UG/DL (ref 250–450)
UIBC SERPL-MCNC: 279 UG/DL (ref 155–355)
WBC # BLD AUTO: 6.03 THOUSAND/UL (ref 4.31–10.16)

## 2024-01-30 PROCEDURE — 97163 PT EVAL HIGH COMPLEX 45 MIN: CPT

## 2024-01-30 PROCEDURE — 92526 ORAL FUNCTION THERAPY: CPT

## 2024-01-30 PROCEDURE — C9113 INJ PANTOPRAZOLE SODIUM, VIA: HCPCS | Performed by: INTERNAL MEDICINE

## 2024-01-30 PROCEDURE — 82948 REAGENT STRIP/BLOOD GLUCOSE: CPT

## 2024-01-30 PROCEDURE — 83540 ASSAY OF IRON: CPT | Performed by: INTERNAL MEDICINE

## 2024-01-30 PROCEDURE — 83550 IRON BINDING TEST: CPT | Performed by: INTERNAL MEDICINE

## 2024-01-30 PROCEDURE — 82248 BILIRUBIN DIRECT: CPT | Performed by: INTERNAL MEDICINE

## 2024-01-30 PROCEDURE — 85014 HEMATOCRIT: CPT | Performed by: INTERNAL MEDICINE

## 2024-01-30 PROCEDURE — 80053 COMPREHEN METABOLIC PANEL: CPT | Performed by: INTERNAL MEDICINE

## 2024-01-30 PROCEDURE — 97167 OT EVAL HIGH COMPLEX 60 MIN: CPT

## 2024-01-30 PROCEDURE — 85730 THROMBOPLASTIN TIME PARTIAL: CPT | Performed by: INTERNAL MEDICINE

## 2024-01-30 PROCEDURE — 85025 COMPLETE CBC W/AUTO DIFF WBC: CPT | Performed by: INTERNAL MEDICINE

## 2024-01-30 PROCEDURE — 99232 SBSQ HOSP IP/OBS MODERATE 35: CPT | Performed by: INTERNAL MEDICINE

## 2024-01-30 PROCEDURE — 85018 HEMOGLOBIN: CPT | Performed by: INTERNAL MEDICINE

## 2024-01-30 PROCEDURE — 82728 ASSAY OF FERRITIN: CPT | Performed by: INTERNAL MEDICINE

## 2024-01-30 RX ORDER — CLOPIDOGREL BISULFATE 75 MG/1
75 TABLET ORAL DAILY
Status: DISCONTINUED | OUTPATIENT
Start: 2024-01-30 | End: 2024-02-06 | Stop reason: HOSPADM

## 2024-01-30 RX ORDER — TRAZODONE HYDROCHLORIDE 100 MG/1
100 TABLET ORAL
Status: DISCONTINUED | OUTPATIENT
Start: 2024-01-30 | End: 2024-02-06 | Stop reason: HOSPADM

## 2024-01-30 RX ORDER — FUROSEMIDE 40 MG/1
40 TABLET ORAL EVERY OTHER DAY
Status: DISCONTINUED | OUTPATIENT
Start: 2024-01-31 | End: 2024-02-05

## 2024-01-30 RX ORDER — BIMATOPROST 0.1 MG/ML
1 SOLUTION/ DROPS OPHTHALMIC EVERY EVENING
COMMUNITY
Start: 2024-01-05

## 2024-01-30 RX ORDER — FERROUS SULFATE 325(65) MG
325 TABLET ORAL
Status: DISCONTINUED | OUTPATIENT
Start: 2024-01-31 | End: 2024-02-06 | Stop reason: HOSPADM

## 2024-01-30 RX ORDER — ATORVASTATIN CALCIUM 40 MG/1
40 TABLET, FILM COATED ORAL
Status: DISCONTINUED | OUTPATIENT
Start: 2024-01-30 | End: 2024-02-06 | Stop reason: HOSPADM

## 2024-01-30 RX ADMIN — INSULIN LISPRO 2 UNITS: 100 INJECTION, SOLUTION INTRAVENOUS; SUBCUTANEOUS at 17:47

## 2024-01-30 RX ADMIN — Medication 12.5 MG: at 22:40

## 2024-01-30 RX ADMIN — PANTOPRAZOLE SODIUM 40 MG: 40 INJECTION, POWDER, FOR SOLUTION INTRAVENOUS at 08:28

## 2024-01-30 RX ADMIN — ATORVASTATIN CALCIUM 40 MG: 40 TABLET, FILM COATED ORAL at 17:45

## 2024-01-30 RX ADMIN — APIXABAN 5 MG: 5 TABLET, FILM COATED ORAL at 12:19

## 2024-01-30 RX ADMIN — RIFAXIMIN 550 MG: 550 TABLET ORAL at 08:28

## 2024-01-30 RX ADMIN — INSULIN LISPRO 5 UNITS: 100 INJECTION, SOLUTION INTRAVENOUS; SUBCUTANEOUS at 08:29

## 2024-01-30 RX ADMIN — INSULIN LISPRO 5 UNITS: 100 INJECTION, SOLUTION INTRAVENOUS; SUBCUTANEOUS at 17:49

## 2024-01-30 RX ADMIN — CHLORHEXIDINE GLUCONATE 15 ML: 1.2 SOLUTION ORAL at 08:28

## 2024-01-30 RX ADMIN — INSULIN LISPRO 5 UNITS: 100 INJECTION, SOLUTION INTRAVENOUS; SUBCUTANEOUS at 12:01

## 2024-01-30 RX ADMIN — GLYCERIN 1 DROP: .002; .002; .01 SOLUTION/ DROPS OPHTHALMIC at 02:21

## 2024-01-30 RX ADMIN — INSULIN LISPRO 4 UNITS: 100 INJECTION, SOLUTION INTRAVENOUS; SUBCUTANEOUS at 11:57

## 2024-01-30 RX ADMIN — GLYCERIN 1 DROP: .002; .002; .01 SOLUTION/ DROPS OPHTHALMIC at 13:36

## 2024-01-30 RX ADMIN — Medication 12.5 MG: at 08:28

## 2024-01-30 RX ADMIN — EMPAGLIFLOZIN 10 MG: 10 TABLET, FILM COATED ORAL at 13:41

## 2024-01-30 RX ADMIN — AZELASTINE 1 SPRAY: 1 SPRAY, METERED NASAL at 08:29

## 2024-01-30 RX ADMIN — AZELASTINE 1 SPRAY: 1 SPRAY, METERED NASAL at 18:17

## 2024-01-30 RX ADMIN — CLOPIDOGREL BISULFATE 75 MG: 75 TABLET ORAL at 12:19

## 2024-01-30 RX ADMIN — RIFAXIMIN 550 MG: 550 TABLET ORAL at 22:40

## 2024-01-30 RX ADMIN — PANTOPRAZOLE SODIUM 40 MG: 40 INJECTION, POWDER, FOR SOLUTION INTRAVENOUS at 22:41

## 2024-01-30 RX ADMIN — INSULIN DETEMIR 30 UNITS: 100 INJECTION, SOLUTION SUBCUTANEOUS at 22:39

## 2024-01-30 RX ADMIN — UMECLIDINIUM BROMIDE AND VILANTEROL TRIFENATATE 1 PUFF: 62.5; 25 POWDER RESPIRATORY (INHALATION) at 13:42

## 2024-01-30 RX ADMIN — INSULIN LISPRO 2 UNITS: 100 INJECTION, SOLUTION INTRAVENOUS; SUBCUTANEOUS at 22:40

## 2024-01-30 RX ADMIN — TRAZODONE HYDROCHLORIDE 100 MG: 50 TABLET ORAL at 22:40

## 2024-01-30 RX ADMIN — APIXABAN 5 MG: 5 TABLET, FILM COATED ORAL at 17:45

## 2024-01-30 RX ADMIN — CHLORHEXIDINE GLUCONATE 15 ML: 1.2 SOLUTION ORAL at 22:40

## 2024-01-30 NOTE — ASSESSMENT & PLAN NOTE
Secondary to GIB  Hemoglobin baseline 8-10  Receive 3 units PRBC this admission, last 1/25   Hgb Q12H  History of iron deficiency check iron panel  On iron supplementation

## 2024-01-30 NOTE — ASSESSMENT & PLAN NOTE
Patient is a 78-year-old male who has been seeing [facility] GI last seen 12/13/2023 patient has a history of anemia is multifactorial chronic due to cancer with iron deficiency and occult GI blood loss on Eliquis review of an EGD with multiple AVMs.  Patient came in to the ER from home due to generalized weakness nausea vomiting since this morning reports lower abdominal pain has a history of anemia is on Eliquis and Plavix episodes of vomiting which were described by the significant other is dark of clotted material, and also had melanotic stool occult positive.  Patient felt significant weakness and was unable to walk felt like too weak to walk.  Patient has a history of an upper GI bleed with multiple AVMs, also has a history of the patient intubation with tracheostomy approximately rehab time of 1 year.   CT scan with contrast showed nonspecific hypodensity within the gastric body possibly reflective of hemorrhage in this patient with the history of gastric AVMs, known liver cancer, cholelithiasis, small volume abdominal pelvic ascites  Patient's baseline hemoglobin appears to be 8-10, except 1 value on 11/23 of 13.    Patient is on Eliquis and Plavix as o/p  In the ED received DDAVP 0.3 mg/kg and Kcentra  GI consulted  1/23 S/P EGD - 3 large angioectasias in the fundus of the stomach and body of the stomach; bleeding was observed; placed clips; hemostasis achieved; induced coagulation and hemostasis achieved with argon plasma coagulation The esophagus and duodenum appeared normal.  Pt received a total of 3 units PRBC this admission so far, last on 1/25/24     Plan:  Monitor H/H Q12 hours- Hgb remains stable   Continue PPI BID  Patient extubated 1/27  Patient is tolerating diet  GI following appreciate recommendations- Need to address if stable to restart plavix  Patient is restarted on Plavix and Eliquis after clearance from GI and cardiology.  DC heparin drip  Monitor H&H closely

## 2024-01-30 NOTE — PLAN OF CARE
Problem: PHYSICAL THERAPY ADULT  Goal: Performs mobility at highest level of function for planned discharge setting.  See evaluation for individualized goals.  Description: Treatment/Interventions: Functional transfer training, LE strengthening/ROM, Elevations, Therapeutic exercise, Endurance training, Cognitive reorientation, Equipment eval/education, Patient/family training, Bed mobility, Gait training, Spoke to nursing, OT          See flowsheet documentation for full assessment, interventions and recommendations.  Note: Prognosis: Guarded  Problem List: Decreased strength, Decreased endurance, Impaired balance, Decreased mobility, Decreased cognition, Impaired sensation, Obesity  Assessment: Patient is a 79y/o M who presents with hepatic encephalopathy, AMS, acute blood loss anemia, acute hypoxemic respiratory failure, upper GI bleed. Patient was in the ICU and had an unresponsive episode where he was intubated. He was extubated on 1/27. Ruled out for acute CVA. Patient resides with spouse in a ranch home with steps to enter. He was ind with mobility and was using a SPC. Current medical status includes obesity, bed/chair alarm, fall risk, hypotension, lightheadedness, decreased cognition, slow processing/response time, decreased sensation, decreased strength, balance, endurance and mobility. Patient required assistance of 2 for transfers. He had a drop in diastolic BP in stance and was lightheaded. Unable to progress further mobility due to symptoms. Patient is deconditioned and is not at his functional baseline. Recommending level 2 resources. Moderate intensity. The patient's AM-[facility] Basic Mobility Inpatient Short Form Raw Score is 11. A Raw score of less than or equal to 17 suggests the patient may benefit from discharge to post-acute rehabilitation services. Please also refer to the recommendation of the Physical Therapist for safe discharge planning.  Barriers to Discharge: Inaccessible home environment,  Decreased caregiver support     Rehab Resource Intensity Level, PT: II (Moderate Resource Intensity)    See flowsheet documentation for full assessment.

## 2024-01-30 NOTE — ASSESSMENT & PLAN NOTE
1/24 0630A RN called provider to bedside to evaluate patient for left facial droop. NIHHS 4 (left facial droop, LUE weakness +4/5, LLE weakness +4/5, LLE drift and mild dysarthria). RN states left facial droop was noticed on arrival to ICU at 0300A. RN states ED could not confirm if droop was present in ED and wife unsure if droop was new. Stroke alert called.   CTH - no acute stroke or hemorrhage  CTA H/N - no large vessel occulusion. No arterial occulusion. 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   1/24 MRI: Negative for acute findings   1/24 ECHO: EF 75%, Mild TVR  Mentation continuing to improve. Able to be extubated 1/27     Plan:  Patient discussed with Dr. Interiano (Neuro)   TNK not given due to unknown last known well and active GI bleed.  Goal SBP >140  1/24 ECHO: EF 75%, Mild TVR  Will restart on Plavix and Eliquis  Restart statin  SLP consult appreciated  Neurology following  Neuro checks Q4 hours  Improving  Aspiration/fall precautions

## 2024-01-30 NOTE — ASSESSMENT & PLAN NOTE
Lab Results   Component Value Date    EGFR 59 01/30/2024    EGFR 55 01/29/2024    EGFR 61 01/28/2024    CREATININE 1.17 01/30/2024    CREATININE 1.25 01/29/2024    CREATININE 1.14 01/28/2024     Cr at baseline  Monitor I's and O's   Renally dose medications  Avoid nephrotoxic agents  Hold Lasix at this time, resume home med when able

## 2024-01-30 NOTE — SPEECH THERAPY NOTE
Speech Language/Pathology    Speech/Language Pathology Progress Note    Patient Name: Derek Walter  Today's Date: 1/30/2024     Problem List  Principal Problem:    Acute upper GI bleeding  Active Problems:    Stage 3 chronic kidney disease, unspecified whether stage 3a or 3b CKD (HCC)    Type 2 diabetes mellitus with neurologic complication, with long-term current use of insulin (HCC)    Atrial fibrillation, unspecified type (HCC)    Acute hypoxemic respiratory failure (HCC)    History of DVT (deep vein thrombosis)    CAD (coronary artery disease)    Hepatocellular carcinoma (HCC)    Chronic systolic heart failure (HCC)    Acute blood loss anemia    AMS (altered mental status)    Hepatic encephalopathy (HCC)    Dysphagia       Past Medical History  Past Medical History:   Diagnosis Date    Anemia     Atrial fibrillation (HCC)     Eliquis    AVB (atrioventricular block)     1st degree    CAD (coronary artery disease)     CKD (chronic kidney disease), stage III (HCC)     baseline Cr 1.2-1.6    Colon polyp     Diabetes mellitus (HCC)     type 2, insulin dependent    Diverticulosis     Emphysema lung (HCC)     Former tobacco use     History of asbestos exposure     History of DVT (deep vein thrombosis)     RLE, tx w/ AC    History of GI bleed 08/2023    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, given PRBC/Venofer for anemia while in house    Hyperlipidemia     Hypertension     LAFB (left anterior fascicular block)     Liver cancer (HCC)     Liver mass 08/2023    suspected HCC, needs radioembolization    RBBB     Syncope 08/07/2023        Past Surgical History  Past Surgical History:   Procedure Laterality Date    APPENDECTOMY      BOWEL RESECTION  2014    CARDIAC CATHETERIZATION      CARDIAC CATHETERIZATION N/A 08/25/2023    Procedure: Cardiac pci;  Surgeon: Dom Garrett DO;  [facility];  Service: Cardiology    CARDIAC CATHETERIZATION N/A 08/21/2023    Procedure: Cardiac catheterization;   "Surgeon: Dom Garrett DO;  [facility];  Service: Cardiology    CARDIAC CATHETERIZATION N/A 08/21/2023    Procedure: Cardiac Coronary Angiogram;  Surgeon: Dom Garrett DO;  [facility];  Service: Cardiology    CATARACT EXTRACTION Bilateral     COLONOSCOPY      EGD      IR Y-90 PRE-ANGIO/EMBO W/ LUNG SCAN  09/01/2023    IR Y-90 RADIOEMBOLIZATION  09/08/2023         Subjective:  Pt OOB in chair with family at bedside as SLP entered the room. Pt is currently on room air.     Objective:  Pt seen for f/u dysphagia tx. Pt was able to retrieve bolus from spoon and bite through solids (shortbread cookie) with mild difficulty. No anterior leakage. Pt with decreased dentition; dentures at home. Slow and reduced mastication of bolus. Pt with inconsistent wince post swallows of solid as pt reports his throat \"still hurts\". Suspect increased discomfort with solid bolus due to reduced mastication. No coughing, throat clearing, change in vocal quality, or change in respiratory functioning s/p swallows.     Assessment:  No overt s/s of aspiration given thin liquids and soft solids.     Plan/Recommendations:  Mechanical Soft   Thin Liquids  Medications as Tolerated   ST to continue to follow: spouse to bring in dentures for trials of advanced textures     "

## 2024-01-30 NOTE — PHYSICAL THERAPY NOTE
PHYSICAL THERAPY EVAL  Physical Therapy Evaluation    Performed at least 2 patient identifiers during session:  Patient Active Problem List   Diagnosis    Stage 3 chronic kidney disease, unspecified whether stage 3a or 3b CKD (HCC)    Peripheral polyneuropathy    Type 2 diabetes mellitus with neurologic complication, with long-term current use of insulin (HCC)    Atrial fibrillation, unspecified type (HCC)    Liver mass    H/O asbestos exposure    History of tobacco abuse    Restrictive lung disease    Other emphysema (HCC)    Acute hypoxemic respiratory failure (HCC)    Anemia    Acute on chronic diastolic HF (heart failure) (HCC)    History of DVT (deep vein thrombosis)    CAD (coronary artery disease)    Hepatic flexure mass    Hepatocellular carcinoma (HCC)    Coronary artery disease involving native coronary artery    Status post insertion of drug eluting coronary artery stent    History of coronary angioplasty with insertion of stent    Chronic systolic heart failure (HCC)    Ischemic cardiomyopathy    Acute blood loss anemia    Acute upper GI bleeding    NA (acute kidney injury) (HCC)    AMS (altered mental status)    Hepatic encephalopathy (HCC)    Dysphagia       Past Medical History:   Diagnosis Date    Anemia     Atrial fibrillation (HCC)     Eliquis    AVB (atrioventricular block)     1st degree    CAD (coronary artery disease)     CKD (chronic kidney disease), stage III (HCC)     baseline Cr 1.2-1.6    Colon polyp     Diabetes mellitus (HCC)     type 2, insulin dependent    Diverticulosis     Emphysema lung (HCC)     Former tobacco use     History of asbestos exposure     History of DVT (deep vein thrombosis)     RLE, tx w/ AC    History of GI bleed 08/2023    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, given PRBC/Venofer for anemia while in house    Hyperlipidemia     Hypertension     LAFB (left anterior fascicular  block)     Liver cancer (HCC)     Liver mass 08/2023    suspected HCC, needs radioembolization    RBBB     Syncope 08/07/2023       Past Surgical History:   Procedure Laterality Date    APPENDECTOMY      BOWEL RESECTION  2014    CARDIAC CATHETERIZATION      CARDIAC CATHETERIZATION N/A 08/25/2023    Procedure: Cardiac pci;  Surgeon: Dom Garrett DO;  [facility];  Service: Cardiology    CARDIAC CATHETERIZATION N/A 08/21/2023    Procedure: Cardiac catheterization;  Surgeon: Dom Garrett DO;  [facility];  Service: Cardiology    CARDIAC CATHETERIZATION N/A 08/21/2023    Procedure: Cardiac Coronary Angiogram;  Surgeon: Dom Garrett DO;  [facility];  Service: Cardiology    CATARACT EXTRACTION Bilateral     COLONOSCOPY      EGD      IR Y-90 PRE-ANGIO/EMBO W/ LUNG SCAN  09/01/2023    IR Y-90 RADIOEMBOLIZATION  09/08/2023 01/30/24 0949   PT Last Visit   PT Visit Date 01/30/24   Note Type   Note type Evaluation   Pain Assessment   Pain Assessment Tool 0-10   Pain Score No Pain   Restrictions/Precautions   Weight Bearing Precautions Per Order No   Other Precautions Fall Risk   Home Living   Type of Home House   Home Layout One level  (ramp and 1 DASH)   Bathroom Shower/Tub Walk-in shower   Bathroom Equipment Grab bars in shower;Shower chair   Home Equipment Cane;Walker;Wheelchair-manual   Additional Comments Primarily using SPC   Prior Function   Level of [place] Independent with ADLs;Independent with functional mobility;Needs assistance with IADLS   Lives With Spouse   Receives Help From Family   IADLs Family/Friend/Other provides transportation;Family/Friend/Other provides meals;Family/Friend/Other provides medication management   Falls in the last 6 months 1 to 4   General   Family/Caregiver Present No   Cognition   Overall Cognitive Status Impaired   Arousal/Participation Lethargic   Orientation Level Oriented to  "person;Disoriented to time;Oriented to place  (Knew the year)   Memory Decreased recall of recent events   Following Commands Follows one step commands with increased time or repetition   Comments Slow reponse, slow processing   Subjective   Subjective \"I am dizzy.\"   LLE Assessment   LLE Assessment WFL   Light Touch   RLE Light Touch   (Tingling from peripheral neuropathy)   LLE Light Touch   (Tingling from peripheral neuropathy)   Bed Mobility   Additional Comments Received OOB in chair   Transfers   Sit to Stand 4  Minimal assistance   Additional items Armrests;Assist x 2;Verbal cues   Stand to Sit 4  Minimal assistance   Additional items Assist x 2;Armrests;Verbal cues   Additional Comments Stood for approx 3 minutes. BP in sitting 95/50 standing 93/38 (+lightheadedness) seated 91/54 (symptoms resolved.)   Ambulation/Elevation   Ambulation/Elevation Additional Comments Unable due to lightheadedness   Balance   Static Sitting Good   Dynamic Sitting Fair +   Static Standing Fair -   Dynamic Standing Fair -   Endurance Deficit   Endurance Deficit Yes   Endurance Deficit Description Easily fatigued and limited by low BP   Activity Tolerance   Activity Tolerance Patient limited by fatigue;Treatment limited secondary to medical complications (Comment)   Medical Staff Made Aware Tia JOHNSON   Nurse Made Aware Abner MEJIA   Assessment   Prognosis Guarded   Problem List Decreased strength;Decreased endurance;Impaired balance;Decreased mobility;Decreased cognition;Impaired sensation;Obesity   Assessment Patient is a 77y/o M who presents with hepatic encephalopathy, AMS, acute blood loss anemia, acute hypoxemic respiratory failure, upper GI bleed. Patient was in the ICU and had an unresponsive episode where he was intubated. He was extubated on 1/27. Ruled out for acute CVA. Patient resides with spouse in a ranch home with steps to enter. He was ind with mobility and was using a SPC. Current medical status includes obesity, " bed/chair alarm, fall risk, hypotension, lightheadedness, decreased cognition, slow processing/response time, decreased sensation, decreased strength, balance, endurance and mobility. Patient required assistance of 2 for transfers. He had a drop in diastolic BP in stance and was lightheaded. Unable to progress further mobility due to symptoms. Patient is deconditioned and is not at his functional baseline. Recommending level 2 resources. Moderate intensity. The patient's AM-[facility] Basic Mobility Inpatient Short Form Raw Score is 11. A Raw score of less than or equal to 17 suggests the patient may benefit from discharge to post-acute rehabilitation services. Please also refer to the recommendation of the Physical Therapist for safe discharge planning.   Barriers to Discharge Inaccessible home environment;Decreased caregiver support   Goals   Patient Goals None stated   STG Expiration Date 02/13/24   Short Term Goal #1 1. Perform supine<>sit with HOB flat without the use of bedrails ind 2. Perform sit<>stand transfers mod I 3. ambulate 100ft with a RW mod I level 4. Ascend/descend 2 steps with supervision.   PT Treatment Day 0   Plan   Treatment/Interventions Functional transfer training;LE strengthening/ROM;Elevations;Therapeutic exercise;Endurance training;Cognitive reorientation;Equipment eval/education;Patient/family training;Bed mobility;Gait training;Spoke to nursing;OT   PT Frequency 3-5x/wk   Discharge Recommendation   Rehab Resource Intensity Level, PT II (Moderate Resource Intensity)   AM-PAC Basic Mobility Inpatient   Turning in Flat Bed Without Bedrails 2   Lying on Back to Sitting on Edge of Flat Bed Without Bedrails 2   Moving Bed to Chair 2   Standing Up From Chair Using Arms 2   Walk in Room 2   Climb 3-5 Stairs With Railing 1   Basic Mobility Inpatient Raw Score 11   Basic Mobility Standardized Score 30.25   Highest Level Of Mobility   JH-HLM Goal 4: Move to chair/commode   JH-HLM Achieved 5: Stand (1 or  more minutes)   End of Consult   Patient Position at End of Consult Bedside chair;Bed/Chair alarm activated;All needs within reach     Marlene Bernabe PT             Patient Name: Derek Walter  Today's Date: 1/30/2024

## 2024-01-30 NOTE — ASSESSMENT & PLAN NOTE
Patient is on Eliquis as o/p, currently held 2/2 GIB  1/23 reversed with [place]   Patient will be restarted on Eliquis

## 2024-01-30 NOTE — PLAN OF CARE
Problem: Potential for Falls  Goal: Patient will remain free of falls  Description: INTERVENTIONS:  - Educate patient/family on patient safety including physical limitations  - Instruct patient to call for assistance with activity   - Consult OT/PT to assist with strengthening/mobility   - Keep Call bell within reach  - Keep bed low and locked with side rails adjusted as appropriate  - Keep care items and personal belongings within reach  - Initiate and maintain comfort rounds  - Make Fall Risk Sign visible to staff  - Offer Toileting every 2 Hours, in advance of need  - Initiate/Maintain alarm  - Obtain necessary fall risk management equipment  - Apply yellow socks and bracelet for high fall risk patients  - Consider moving patient to room near nurses station  Outcome: Progressing     Problem: PAIN - ADULT  Goal: Verbalizes/displays adequate comfort level or baseline comfort level  Description: Interventions:  - Encourage patient to monitor pain and request assistance  - Assess pain using appropriate pain scale  - Administer analgesics based on type and severity of pain and evaluate response  - Implement non-pharmacological measures as appropriate and evaluate response  - Consider cultural and social influences on pain and pain management  - Notify physician/advanced practitioner if interventions unsuccessful or patient reports new pain  Outcome: Progressing     Problem: Knowledge Deficit  Goal: Patient/family/caregiver demonstrates understanding of disease process, treatment plan, medications, and discharge instructions  Description: Complete learning assessment and assess knowledge base.  Interventions:  - Provide teaching at level of understanding  - Provide teaching via preferred learning methods  Outcome: Progressing     Problem: GASTROINTESTINAL - ADULT  Goal: Minimal or absence of nausea and/or vomiting  Description: INTERVENTIONS:  - Administer IV fluids if ordered to ensure adequate hydration  - Maintain  NPO status until nausea and vomiting are resolved  - Nasogastric tube if ordered  - Administer ordered antiemetic medications as needed  - Provide nonpharmacologic comfort measures as appropriate  - Advance diet as tolerated, if ordered  - Consider nutrition services referral to assist patient with adequate nutrition and appropriate food choices  Outcome: Progressing  Goal: Maintains or returns to baseline bowel function  Description: INTERVENTIONS:  - Assess bowel function  - Encourage oral fluids to ensure adequate hydration  - Administer IV fluids if ordered to ensure adequate hydration  - Administer ordered medications as needed  - Encourage mobilization and activity  - Consider nutritional services referral to assist patient with adequate nutrition and appropriate food choices  Outcome: Progressing  Goal: Maintains adequate nutritional intake  Description: INTERVENTIONS:  - Monitor percentage of each meal consumed  - Identify factors contributing to decreased intake, treat as appropriate  - Assist with meals as needed  - Monitor I&O, weight, and lab values if indicated  - Obtain nutrition services referral as needed  Outcome: Progressing  Goal: Establish and maintain optimal ostomy function  Description: INTERVENTIONS:  - Assess bowel function  - Encourage oral fluids to ensure adequate hydration  - Administer IV fluids if ordered to ensure adequate hydration   - Administer ordered medications as needed  - Encourage mobilization and activity  - Nutrition services referral to assist patient with appropriate food choices  - Assess stoma site  - Consider wound care consult   Outcome: Progressing  Goal: Oral mucous membranes remain intact  Description: INTERVENTIONS  - Assess oral mucosa and hygiene practices  - Implement preventative oral hygiene regimen  - Implement oral medicated treatments as ordered  - Initiate Nutrition services referral as needed  Outcome: Progressing     Problem: METABOLIC, FLUID AND  ELECTROLYTES - ADULT  Goal: Electrolytes maintained within normal limits  Description: INTERVENTIONS:  - Monitor labs and assess patient for signs and symptoms of electrolyte imbalances  - Administer electrolyte replacement as ordered  - Monitor response to electrolyte replacements, including repeat lab results as appropriate  - Instruct patient on fluid and nutrition as appropriate  Outcome: Progressing  Goal: Fluid balance maintained  Description: INTERVENTIONS:  - Monitor labs   - Monitor I/O and WT  - Instruct patient on fluid and nutrition as appropriate  - Assess for signs & symptoms of volume excess or deficit  Outcome: Progressing  Goal: Glucose maintained within target range  Description: INTERVENTIONS:  - Monitor Blood Glucose as ordered  - Assess for signs and symptoms of hyperglycemia and hypoglycemia  - Administer ordered medications to maintain glucose within target range  - Assess nutritional intake and initiate nutrition service referral as needed  Outcome: Progressing     Problem: HEMATOLOGIC - ADULT  Goal: Maintains hematologic stability  Description: INTERVENTIONS  - Assess for signs and symptoms of bleeding or hemorrhage  - Monitor labs  - Administer supportive blood products/factors as ordered and appropriate  Outcome: Progressing     Problem: Prexisting or High Potential for Compromised Skin Integrity  Goal: Skin integrity is maintained or improved  Description: INTERVENTIONS:  - Identify patients at risk for skin breakdown  - Assess and monitor skin integrity  - Assess and monitor nutrition and hydration status  - Monitor labs   - Assess for incontinence   - Turn and reposition patient  - Assist with mobility/ambulation  - Relieve pressure over bony prominences  - Avoid friction and shearing  - Provide appropriate hygiene as needed including keeping skin clean and dry  - Evaluate need for skin moisturizer/barrier cream  - Collaborate with interdisciplinary team   - Patient/family teaching  -  Consider wound care consult   Outcome: Progressing     Problem: Nutrition/Hydration-ADULT  Goal: Nutrient/Hydration intake appropriate for improving, restoring or maintaining nutritional needs  Description: Monitor and assess patient's nutrition/hydration status for malnutrition. Collaborate with interdisciplinary team and initiate plan and interventions as ordered.  Monitor patient's weight and dietary intake as ordered or per policy. Utilize nutrition screening tool and intervene as necessary. Determine patient's food preferences and provide high-protein, high-caloric foods as appropriate.     INTERVENTIONS:  - Monitor oral intake, urinary output, labs, and treatment plans  - Assess nutrition and hydration status and recommend course of action  - Evaluate amount of meals eaten  - Assist patient with eating if necessary   - Allow adequate time for meals  - Recommend/ encourage appropriate diets, oral nutritional supplements, and vitamin/mineral supplements  - Order, calculate, and assess calorie counts as needed  - Recommend, monitor, and adjust tube feedings and TPN/PPN based on assessed needs  - Assess need for intravenous fluids  - Provide specific nutrition/hydration education as appropriate  - Include patient/family/caregiver in decisions related to nutrition  Outcome: Progressing     Problem: SAFETY,RESTRAINT: NV/NON-SELF DESTRUCTIVE BEHAVIOR  Goal: Remains free of harm/injury (restraint for non violent/non self-detsructive behavior)  Description: INTERVENTIONS:  - Instruct patient/family regarding restraint use   - Assess and monitor physiologic and psychological status   - Provide interventions and comfort measures to meet assessed patient needs   - Identify and implement measures to help patient regain control  - Assess readiness for release of restraint   Outcome: Progressing  Goal: Returns to optimal restraint-free functioning  Description: INTERVENTIONS:  - Assess the patient's behavior and symptoms  that indicate continued need for restraint  - Identify and implement measures to help patient regain control  - Assess readiness for release of restraint   Outcome: Progressing     Problem: RESPIRATORY - ADULT  Goal: Achieves optimal ventilation and oxygenation  Description: INTERVENTIONS:  - Assess for changes in respiratory status  - Assess for changes in mentation and behavior  - Position to facilitate oxygenation and minimize respiratory effort  - Oxygen administered by appropriate delivery if ordered  - Initiate smoking cessation education as indicated  - Encourage broncho-pulmonary hygiene including cough, deep breathe, Incentive Spirometry  - Assess the need for suctioning and aspirate as needed  - Assess and instruct to report SOB or any respiratory difficulty  - Respiratory Therapy support as indicated  Outcome: Progressing      <<----- Click to add NO significant Past Surgical History

## 2024-01-30 NOTE — ASSESSMENT & PLAN NOTE
Lab Results   Component Value Date    HGBA1C 6.6 (H) 01/24/2024       Recent Labs     01/29/24  1619 01/29/24 2014 01/30/24  0716 01/30/24  1112   POCGLU 159* 242* 90 227*       Patient has a Dexcom, on Trulicity and Jardiance, Lantus and insulin  Extubated 1/27. Currently no enteral access for TF. Insulin drip stopped. Changed to correction scale insulin  Accu-Chek ACHS with Humalog sliding scale  Blood glucose goal 140-180  Pending [facility]   Hypoglycemia protocol

## 2024-01-30 NOTE — OCCUPATIONAL THERAPY NOTE
Occupational Therapy Evaluation      Derek Walter    1/30/2024    Principal Problem:    Acute upper GI bleeding  Active Problems:    Stage 3 chronic kidney disease, unspecified whether stage 3a or 3b CKD (HCC)    Type 2 diabetes mellitus with neurologic complication, with long-term current use of insulin (HCC)    Atrial fibrillation, unspecified type (HCC)    Acute hypoxemic respiratory failure (HCC)    History of DVT (deep vein thrombosis)    CAD (coronary artery disease)    Hepatocellular carcinoma (HCC)    Chronic systolic heart failure (HCC)    Acute blood loss anemia    AMS (altered mental status)    Hepatic encephalopathy (HCC)    Dysphagia      Past Medical History:   Diagnosis Date    Anemia     Atrial fibrillation (HCC)     Eliquis    AVB (atrioventricular block)     1st degree    CAD (coronary artery disease)     CKD (chronic kidney disease), stage III (HCC)     baseline Cr 1.2-1.6    Colon polyp     Diabetes mellitus (HCC)     type 2, insulin dependent    Diverticulosis     Emphysema lung (HCC)     Former tobacco use     History of asbestos exposure     History of DVT (deep vein thrombosis)     RLE, tx w/ AC    History of GI bleed 08/2023    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, given PRBC/Venofer for anemia while in house    Hyperlipidemia     Hypertension     LAFB (left anterior fascicular block)     Liver cancer (HCC)     Liver mass 08/2023    suspected HCC, needs radioembolization    RBBB     Syncope 08/07/2023       Past Surgical History:   Procedure Laterality Date    APPENDECTOMY      BOWEL RESECTION  2014    CARDIAC CATHETERIZATION      CARDIAC CATHETERIZATION N/A 08/25/2023    Procedure: Cardiac pci;  Surgeon: Dom Garrett DO;  Location: [facility];  Service: Cardiology    CARDIAC CATHETERIZATION N/A 08/21/2023    Procedure: Cardiac catheterization;  Surgeon: Dom Garrett DO;  Location: [facility];  Service: Cardiology    CARDIAC CATHETERIZATION N/A  08/21/2023    Procedure: Cardiac Coronary Angiogram;  Surgeon: Dom Garrett DO;  Location: [facility];  Service: Cardiology    CATARACT EXTRACTION Bilateral     COLONOSCOPY      EGD      IR Y-90 PRE-ANGIO/EMBO W/ LUNG SCAN  09/01/2023    IR Y-90 RADIOEMBOLIZATION  09/08/2023 01/30/24 1007   OT Last Visit   OT Visit Date 01/30/24   Note Type   Note type Evaluation   Pain Assessment   Pain Assessment Tool 0-10   Pain Score No Pain   Restrictions/Precautions   Weight Bearing Precautions Per Order No   Other Precautions Fall Risk   Home Living   Type of Home House   Home Layout One level  (ramp + 1STE)   Bathroom Shower/Tub Walk-in shower   Bathroom Equipment Grab bars in shower;Shower chair   Home Equipment Cane;Walker;Wheelchair-manual  (primarily using SPC at baseline)   Prior Function   Level of [place] Independent with ADLs;Independent with functional mobility;Needs assistance with IADLS   Lives With Spouse   Receives Help From Family   IADLs Family/Friend/Other provides transportation;Family/Friend/Other provides meals;Family/Friend/Other provides medication management   Falls in the last 6 months 1 to 4   General   Additional Pertinent History Pt was recently transferred to [facility] from [facility]. Pt had been on vent for several days.   Family/Caregiver Present Yes  (wife)   Subjective   Subjective Pt with delayed responses and limited verbal communications   ADL   Eating Assistance 4  Minimal Assistance   Grooming Assistance 4  Minimal Assistance   UB Bathing Assistance 3  Moderate Assistance   LB Bathing Assistance 2  Maximal Assistance   UB Dressing Assistance 3  Moderate Assistance   LB Dressing Assistance 2  Maximal Assistance   Toileting Assistance  3  Moderate Assistance   Bed Mobility   Additional Comments Received OOB in chair   Transfers   Sit to Stand 4  Minimal assistance   Additional items Assist x 2;Verbal cues   Stand to Sit 4  Minimal assistance   Additional items Assist x  2;Verbal cues   Stand pivot Unable to assess   Additional Comments Stood for approx 3 minutes. BP in sitting 95/50 standing 93/38 (+lightheadedness) seated 91/54 (symptoms resolved.)   Activity Tolerance   Activity Tolerance Patient limited by fatigue;Treatment limited secondary to medical complications (Comment)   Medical Staff Made Aware PT Bertha   Nurse Made Aware JACKIE Levine   RUE Assessment   RUE Assessment WFL  (unable to formally assess 2* cog but appears functional)   LUE Assessment   LUE Assessment WFL  (unable to formally assess 2* cog but appears functional)   Cognition   Overall Cognitive Status Impaired   Arousal/Participation Lethargic   Attention Difficulty attending to directions   Orientation Level Oriented to person;Disoriented to time;Oriented to place  (Knew the year)   Memory Decreased recall of recent events   Following Commands Follows one step commands with increased time or repetition   Comments Slow reponse, slow processing   Assessment   Limitation Decreased ADL status;Decreased UE ROM;Decreased UE strength;Decreased Safe judgement during ADL;Decreased cognition;Decreased endurance;Decreased self-care trans;Decreased high-level ADLs   Prognosis Fair   Assessment Pt is a 78 y.o. male seen for OT evaluation at [facility], admitted 1/23/2024 w/ Acute upper GI bleeding.  OT completed extensive review of pt's medical and social history. Comorbidities affecting pt's functional performance at time of assessment include: CKD, DM type 2, aFib, hx DVT, CHF, AMS, dysphagia, polyneuropathy, NA. Personal factors affecting pt at time of IE include:steps to enter environment, behavioral pattern, difficulty performing ADLS, difficulty performing IADLS , limited insight into deficits, flat affect, decreased initiation and engagement , and health management . Prior to admission, pt was living in 1SH, ramp+1STE, with wife, and was independent with ADL/mobility, wife does IADLs. Upon evaluation, pt presents  to OT below baseline due to the following performance deficits: weakness, decreased strength, decreased balance, decreased tolerance, impaired attention, impaired initiation, impaired memory, impaired sequencing, and impaired problem solving. Pt to benefit from continued skilled OT tx while in the hospital to address deficits as defined above and maximize level of functional independence w ADL's and functional mobility. Occupational Performance areas to address include: eating, grooming, bathing/shower, toilet hygiene, dressing, functional mobility, and functional transfers, bed mobility . The patient's raw score on the AM-PAC Daily Activity inpatient short form is 13, standardized score is 32.03, less than 39.4. Patients at this level are likely to benefit from DC to post-acute rehabilitation services. Based on findings, pt is of high complexity, due to medical comorbidities. Pt seen as a co-eval with PT due to the patient's co-morbidities, clinically unstable presentation, and present impairments which are a regression from the patient's baseline. At this time, OT recommendations at time of discharge are level 2.   Goals   Patient Goals none stated   Plan   Treatment Interventions ADL retraining;Functional transfer training;UE strengthening/ROM;Endurance training;Cognitive reorientation;Patient/family training;Equipment evaluation/education;Compensatory technique education;Continued evaluation;Energy conservation   Goal Expiration Date 02/10/24   OT Frequency 2-3x/wk   Discharge Recommendation   Rehab Resource Intensity Level, OT II (Moderate Resource Intensity)   AM-PAC Daily Activity Inpatient   Lower Body Dressing 2   Bathing 2   Toileting 2   Upper Body Dressing 2   Grooming 2   Eating 3   Daily Activity Raw Score 13   Daily Activity Standardized Score (Calc for Raw Score >=11) 32.03     Pt will achieve the following goals within 10 days.    *Pt will complete grooming with modified independence.    *Pt will  complete UB bathing and dressing with modified independence.    *Pt will complete LB bathing and dressing with modified independence .    *Pt will complete toileting (hygiene and clothing management) with modified independence    *Pt will complete bed mobility with modified independence, with bed flat and no side rail to prep for purposeful tasks    *Pt will perform functional transfers with modified independence in order to complete ADL routine.    *Pt will increase standing tolerance to 3-5 minutes in order to complete ADL routine.    *Pt will complete item retrieval and light home management with supervision while demonstrating good safety.    *Pt will demonstrate increased activity tolerance in order to complete ADL routine.    *Pt will participate in cognitive assessment to determine level of safety for returning home    *Pt will participate in UE therapeutic exercise in order to maximize strength for ADL transfers.    *Pt will sit on EOB for 10+ minutes for increased safety with seated activity tolerance during ADL tasks.    *Pt will identify 3-5 fall risks to ensure safety upon discharge.    Jennifer Cintron MS, OTR/L

## 2024-01-30 NOTE — PLAN OF CARE
Problem: PAIN - ADULT  Goal: Verbalizes/displays adequate comfort level or baseline comfort level  Description: Interventions:  - Encourage patient to monitor pain and request assistance  - Assess pain using appropriate pain scale  - Administer analgesics based on type and severity of pain and evaluate response  - Implement non-pharmacological measures as appropriate and evaluate response  - Consider cultural and social influences on pain and pain management  - Notify physician/advanced practitioner if interventions unsuccessful or patient reports new pain  Outcome: Progressing     Problem: Knowledge Deficit  Goal: Patient/family/caregiver demonstrates understanding of disease process, treatment plan, medications, and discharge instructions  Description: Complete learning assessment and assess knowledge base.  Interventions:  - Provide teaching at level of understanding  - Provide teaching via preferred learning methods  Outcome: Progressing     Problem: GASTROINTESTINAL - ADULT  Goal: Minimal or absence of nausea and/or vomiting  Description: INTERVENTIONS:  - Administer IV fluids if ordered to ensure adequate hydration  - Maintain NPO status until nausea and vomiting are resolved  - Nasogastric tube if ordered  - Administer ordered antiemetic medications as needed  - Provide nonpharmacologic comfort measures as appropriate  - Advance diet as tolerated, if ordered  - Consider nutrition services referral to assist patient with adequate nutrition and appropriate food choices  Outcome: Progressing     Problem: METABOLIC, FLUID AND ELECTROLYTES - ADULT  Goal: Electrolytes maintained within normal limits  Description: INTERVENTIONS:  - Monitor labs and assess patient for signs and symptoms of electrolyte imbalances  - Administer electrolyte replacement as ordered  - Monitor response to electrolyte replacements, including repeat lab results as appropriate  - Instruct patient on fluid and nutrition as  appropriate  Outcome: Progressing     Problem: Nutrition/Hydration-ADULT  Goal: Nutrient/Hydration intake appropriate for improving, restoring or maintaining nutritional needs  Description: Monitor and assess patient's nutrition/hydration status for malnutrition. Collaborate with interdisciplinary team and initiate plan and interventions as ordered.  Monitor patient's weight and dietary intake as ordered or per policy. Utilize nutrition screening tool and intervene as necessary. Determine patient's food preferences and provide high-protein, high-caloric foods as appropriate.     INTERVENTIONS:  - Monitor oral intake, urinary output, labs, and treatment plans  - Assess nutrition and hydration status and recommend course of action  - Evaluate amount of meals eaten  - Assist patient with eating if necessary   - Allow adequate time for meals  - Recommend/ encourage appropriate diets, oral nutritional supplements, and vitamin/mineral supplements  - Order, calculate, and assess calorie counts as needed  - Recommend, monitor, and adjust tube feedings and TPN/PPN based on assessed needs  - Assess need for intravenous fluids  - Provide specific nutrition/hydration education as appropriate  - Include patient/family/caregiver in decisions related to nutrition  Outcome: Progressing     Problem: RESPIRATORY - ADULT  Goal: Achieves optimal ventilation and oxygenation  Description: INTERVENTIONS:  - Assess for changes in respiratory status  - Assess for changes in mentation and behavior  - Position to facilitate oxygenation and minimize respiratory effort  - Oxygen administered by appropriate delivery if ordered  - Initiate smoking cessation education as indicated  - Encourage broncho-pulmonary hygiene including cough, deep breathe, Incentive Spirometry  - Assess the need for suctioning and aspirate as needed  - Assess and instruct to report SOB or any respiratory difficulty  - Respiratory Therapy support as indicated  Outcome:  Progressing

## 2024-01-30 NOTE — PROGRESS NOTES
Progress note - Gastroenterology   Derek Walter 78 y.o. 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  S/P 3 units packed red blood cells  No evidence of bleeding.  Hemoglobin stable around 8.  Hemoglobin 8.3 with labs today.  Heparin IV discontinued, patient restarted on Eliquis 5 mg twice daily and Plavix 75 mg daily.    -Monitor hemoglobin, transfuse less than 7  -Continue PPI  -Follow H&H and signs/symptoms of bleeding  --Iron panel pending     3.  Altered mental status  Secondary to anesthesia versus some component of hepatic encephalopathy related to GI bleeding.  Mental status improving.    -Continue rifaximin and lactulose  -Rifaximin appears to be covered by patient's insurance with no or minor co-pay.    -Per speech evaluation, diet advanced to mechanical soft, thin liquids.       4.  Atrial fibrillation  5. Coronary artery disease  Cardiology feels strongly about anticoagulation.    Heparin IV discontinued today, no Eliquis 5 mg twice daily and Plavix 75 mg daily started.  Per patient's nurse, there is a H&H scheduled for later this evening.     6.  Hepatocellular carcinoma    Chief Complaint   Patient presents with    Abdominal Pain     Pt via EMS from home with c/o generalized weakness, nausea, and vomiting since the morning. Reports lower abd pain. Reports hx of anemia.       SUBJECTIVE/HPI   Patient currently out of bed to the chair.  Patient son Troy is at the bedside.  Patient much happier with mechanical soft diet.  Discussed frequent small meals is probably best for patient and his current condition.  Per patient's nurse, patient had brown bowel movement today.    BP 91/54   Pulse 81   Temp 99.2 °F (37.3 °C)   Resp 16   Ht 6' (1.829 m)   Wt 95 kg (209 lb 7 oz)   SpO2 94%   BMI 28.40 kg/m²     PHYSICALEXAM  General appearance:  "alert, appears stated age and cooperative  Eyes: PERLLA, EOMI, no icterus   Head: Normocephalic, without obvious abnormality, atraumatic  Lungs: clear to auscultation bilaterally  Heart: regular rate and rhythm, S1, S2 normal, no murmur, click, rub or gallop  Abdomen: soft, non-tender; bowel sounds normal; no masses,  no organomegaly  Extremities: extremities normal, atraumatic, no cyanosis or edema  Neurologic: Grossly normal    Lab Results   Component Value Date    GLUCOSE 194 (H) 01/26/2024    CALCIUM 8.4 01/30/2024    K 4.0 01/30/2024    CO2 23 01/30/2024     (H) 01/30/2024    BUN 37 (H) 01/30/2024    CREATININE 1.17 01/30/2024     Lab Results   Component Value Date    WBC 6.03 01/30/2024    HGB 8.3 (L) 01/30/2024    HCT 26.7 (L) 01/30/2024    MCV 95 01/30/2024     01/30/2024     Lab Results   Component Value Date    ALT 46 01/30/2024    AST 57 (H) 01/30/2024    ALKPHOS 396 (H) 01/30/2024     No results found for: \"AMYLASE\"  Lab Results   Component Value Date    LIPASE 39 01/23/2024     Lab Results   Component Value Date    IRON 14 (L) 08/07/2023    TIBC 462 (H) 08/07/2023    FERRITIN 10 (L) 08/07/2023     Lab Results   Component Value Date    INR 1.19 01/28/2024     "

## 2024-01-30 NOTE — ASSESSMENT & PLAN NOTE
Wt Readings from Last 3 Encounters:   01/30/24 95 kg (209 lb 7 oz)   12/20/23 96.6 kg (213 lb)   12/14/23 97 kg (213 lb 12.8 oz)     Patient is 78-year-old male with history of CAD post PCI in August not deemed a candidate for surgical intervention, systolic heart failure with reduced ejection fraction of 36%   Home meds: lasix, toprol, and statin   Resume home meds when able   Daily wts

## 2024-01-30 NOTE — PROGRESS NOTES
Critical access hospital  Progress Note  Name: Derek Walter I  MRN: [id number]  Unit/Bed#: -Kevin I Date of Admission: 1/23/2024   Date of Service: 1/30/2024 I Hospital Day: 7    Assessment/Plan   Hepatic encephalopathy (HCC)  Assessment & Plan  patient is 78-year-old male with a history of liver cancer and radiation therapy who was admitted for an upper GI bleed with hypotension and later strokelike symptoms went unresponsive and was intubated, appears to be as cause from the liver with an ammonia level of 130  Most likely source of elevated ammonia level is liver history of liver cancer  GI following  Continue with Rifaximin when able   Patient should have 2-3 BMs a day  Amm level normalized to 61   Remains stable off lactulose    AMS (altered mental status)  Assessment & Plan  1/24 0630A RN called provider to bedside to evaluate patient for left facial droop. NIHHS 4 (left facial droop, LUE weakness +4/5, LLE weakness +4/5, LLE drift and mild dysarthria). RN states left facial droop was noticed on arrival to ICU at 0300A. RN states ED could not confirm if droop was present in ED and wife unsure if droop was new. Stroke alert called.   CTH - no acute stroke or hemorrhage  CTA H/N - no large vessel occulusion. No arterial occulusion. 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   1/24 MRI: Negative for acute findings   1/24 ECHO: EF 75%, Mild TVR  Mentation continuing to improve. Able to be extubated 1/27     Plan:  Patient discussed with Dr. Interiano (Neuro)   TNK not given due to unknown last known well and active GI bleed.  Goal SBP >140  1/24 ECHO: EF 75%, Mild TVR  Will restart on Plavix and Eliquis  Restart statin  SLP consult appreciated  Neurology following  Neuro checks Q4 hours  Improving  Aspiration/fall precautions     Acute blood loss anemia  Assessment & Plan  Secondary to GIB  Hemoglobin baseline 8-10  Receive 3 units PRBC this admission, last 1/25   Hgb Q12H  History of iron deficiency check  iron panel  On iron supplementation    Chronic systolic heart failure (HCC)  Assessment & Plan  Wt Readings from Last 3 Encounters:   01/30/24 95 kg (209 lb 7 oz)   12/20/23 96.6 kg (213 lb)   12/14/23 97 kg (213 lb 12.8 oz)     Patient is 78-year-old male with history of CAD post PCI in August not deemed a candidate for surgical intervention, systolic heart failure with reduced ejection fraction of 36%   Home meds: lasix, toprol, and statin   Resume home meds when able   Daily wts    Hepatocellular carcinoma (HCC)  Assessment & Plan  Patient has stage II liver cancer hepatocellular carcinoma  Has a tumor thrombus which appears stable seen again on admission CT  Status post Y 90 treatment    Follows with Dr. Gong as o/p    CAD (coronary artery disease)  Assessment & Plan  Patient has a history of coronary artery disease had 8/2023 a PCI status post cardiac catheterization drug-eluting stents x 3 in the LAD.  Ramus and left circumflex  Continue home statin and metoprolol    History of DVT (deep vein thrombosis)  Assessment & Plan  Patient is on Eliquis as o/p, currently held 2/2 GIB  1/23 reversed with Kcentra   Patient will be restarted on Eliquis    Acute hypoxemic respiratory failure (HCC)  Assessment & Plan  Required intubation for airway protection 1/23 after patient became increasingly encephalopathic  Minimal vent setting. Placed on PS since 1/24 at 6/6 40% FiO2  Unable to be successfully extubated due to mental status. Currently not requiring sedation.  Having copious secretions coming from inline suction  1/27 CXR negative for acute findings. Given 1 x dose of 40 mg IV lasix   Extubated mid morning on 1/27  Saturating well on room air     Plan:  Resolved  Aspiration precautions  Encourage IS/Flutter valve when able     Atrial fibrillation, unspecified type (HCC)  Assessment & Plan  Rate controled  Maintained on Eliquis and metoprolol as o/p  Continue on Lopressor  Hold Eliquis and Plavix until okay with GI    Cardiology consult regarding input for antiplatelet/anticoagulation appreciated  As per cardiology restart Plavix and Eliquis  Светлана with GI and patient will be restarted today and will monitor H&H closely    Type 2 diabetes mellitus with neurologic complication, with long-term current use of insulin ([facility])  Assessment & Plan  Lab Results   Component Value Date    HGBA1C 6.6 (H) 01/24/2024       Recent Labs     01/29/24  1619 01/29/24 2014 01/30/24  0716 01/30/24  1112   POCGLU 159* 242* 90 227*       Patient has a Dexcom, on Trulicity and Jardiance, Lantus and insulin  Extubated 1/27. Currently no enteral access for TF. Insulin drip stopped. Changed to correction scale insulin  Accu-Chek ACHS with Humalog sliding scale  Blood glucose goal 140-180  Pending [facility]   Hypoglycemia protocol     Stage 3 chronic kidney disease, unspecified whether stage 3a or 3b CKD ([facility])  Assessment & Plan  Lab Results   Component Value Date    EGFR 59 01/30/2024    EGFR 55 01/29/2024    EGFR 61 01/28/2024    CREATININE 1.17 01/30/2024    CREATININE 1.25 01/29/2024    CREATININE 1.14 01/28/2024     Cr at baseline  Monitor I's and O's   Renally dose medications  Avoid nephrotoxic agents  Hold Lasix at this time, resume home med when able     * Acute upper GI bleeding  Assessment & Plan  Patient is a 78-year-old male who has been seeing [facility] GI last seen 12/13/2023 patient has a history of anemia is multifactorial chronic due to cancer with iron deficiency and occult GI blood loss on Eliquis review of an EGD with multiple AVMs.  Patient came in to the ER from home due to generalized weakness nausea vomiting since this morning reports lower abdominal pain has a history of anemia is on Eliquis and Plavix episodes of vomiting which were described by the significant other is dark of clotted material, and also had melanotic stool occult positive.  Patient felt significant weakness and was unable to walk felt like too weak to walk.  Patient  has a history of an upper GI bleed with multiple AVMs, also has a history of the patient intubation with tracheostomy approximately rehab time of 1 year.   CT scan with contrast showed nonspecific hypodensity within the gastric body possibly reflective of hemorrhage in this patient with the history of gastric AVMs, known liver cancer, cholelithiasis, small volume abdominal pelvic ascites  Patient's baseline hemoglobin appears to be 8-10, except 1 value on 11/23 of 13.    Patient is on Eliquis and Plavix as o/p  In the ED received DDAVP 0.3 mg/kg and Kcentra  GI consulted  1/23 S/P EGD - 3 large angioectasias in the fundus of the stomach and body of the stomach; bleeding was observed; placed clips; hemostasis achieved; induced coagulation and hemostasis achieved with argon plasma coagulation The esophagus and duodenum appeared normal.  Pt received a total of 3 units PRBC this admission so far, last on 1/25/24     Plan:  Monitor H/H Q12 hours- Hgb remains stable   Continue PPI BID  Patient extubated 1/27  Patient is tolerating diet  GI following appreciate recommendations- Need to address if stable to restart plavix  Patient is restarted on Plavix and Eliquis after clearance from GI and cardiology.  DC heparin drip  Monitor H&H closely             Labs & Imaging: I have personally reviewed pertinent reports.      VTE Prophylaxis: in place.    Code Status:   Level 1 - Full Code    Patient Centered Rounds: I have performed bedside rounds with nursing staff today.    Mobility:   Basic Mobility Inpatient Raw Score: 11  -HLM Goal: 4: Move to chair/commode  JH-HLM Achieved: 5: Stand (1 or more minutes)  HLM Goal NOT achieved. Continue with multidisciplinary rounding and encourage appropriate mobility to improve upon HLM goals.    Discussions with Specialists or Other Care Team Provider: GI and cardiology    Education and Discussions with Family / Patient: Son at bedside    Total Time Spent on Date of Encounter in care of  patient: 35 mins. This time was spent on one or more of the following: performing physical exam; counseling and coordination of care; obtaining or reviewing history; documenting in the medical record; reviewing/ordering tests, medications or procedures; communicating with other healthcare professionals and discussing with patient's family/caregivers.    Current Length of Stay: 7 day(s)    Current Patient Status: Inpatient   Certification Statement: The patient will continue to require additional inpatient hospital stay due to see my assessment and plan.     Subjective:   Patient is seen and examined at bedside.  No new complaints.  No active or overt rectal bleeding.  Afebrile  All other ROS are negative.    Objective:    Vitals: Blood pressure 91/54, pulse 81, temperature 99.2 °F (37.3 °C), resp. rate 16, height 6' (1.829 m), weight 95 kg (209 lb 7 oz), SpO2 94%.,Body mass index is 28.4 kg/m².  SPO2 RA Rest      Flowsheet Row ED to Hosp-Admission (Current) from 1/23/2024 in [facility] Med Surg Unit   SpO2 94 %   SpO2 Activity At Rest   O2 Device None (Room air)   O2 Flow Rate 4 L/min          I&O:   Intake/Output Summary (Last 24 hours) at 1/30/2024 1227  Last data filed at 1/30/2024 0927  Gross per 24 hour   Intake 739.2 ml   Output 700 ml   Net 39.2 ml       Physical Exam:    General- Alert, sitting comfortably in chair. Not in any acute distress.  Neck- Supple, No JVD  CVS- regular, S1 and S2 normal  Chest- Bilateral Air entry, No rhochi, crackles or wheezing present.  Abdomen- soft, nontender, not distended, no guarding or rigidity, BS+  Extremities-  No pedal edema, No calf tenderness                         Normal ROM in all extremities.  CNS-   Alert, awake and orientedx3. No focal deficits present.    Invasive Devices       Peripheral Intravenous Line  Duration             Peripheral IV 01/27/24 Distal;Right;Ventral (anterior) Forearm 3 days    Peripheral IV 01/27/24 Left;Ventral  (anterior) Forearm 2 days                          Social History  reviewed  Family History   Problem Relation Age of Onset    Hypertension Mother     Bone cancer Father     Diabetes type II Sister     Diabetes type II Sister     Esophageal cancer Brother     Colon cancer Neg Hx     reviewed    Meds:  Current Facility-Administered Medications   Medication Dose Route Frequency Provider Last Rate Last Admin    acetaminophen (TYLENOL) tablet 650 mg  650 mg Oral Q6H PRN Adrian Moer MD   650 mg at 01/25/24 2034    apixaban (ELIQUIS) tablet 5 mg  5 mg Oral BID Adrian More MD   5 mg at 01/30/24 1219    atorvastatin (LIPITOR) tablet 40 mg  40 mg Oral Daily With Dinner Adrian More MD        azelastine (ASTELIN) 0.1 % nasal spray 1 spray  1 spray Each Nare BID Adrian More MD   1 spray at 01/30/24 0829    chlorhexidine (PERIDEX) 0.12 % oral rinse 15 mL  15 mL Mouth/Throat Q12H RANDALL Adrian More MD   15 mL at 01/30/24 0828    clopidogrel (PLAVIX) tablet 75 mg  75 mg Oral Daily Adrian More MD   75 mg at 01/30/24 1219    Empagliflozin (JARDIANCE) tablet 10 mg  10 mg Oral QAM Adrian More MD        [START ON 1/31/2024] ferrous sulfate tablet 325 mg  325 mg Oral Daily With Breakfast Adrian More MD        [START ON 1/31/2024] furosemide (LASIX) tablet 40 mg  40 mg Oral Every Other Day Adrian More MD        glycerin-hypromellose- (ARTIFICIAL TEARS) ophthalmic solution 1 drop  1 drop Both Eyes Q6H PRN Adrian More MD   1 drop at 01/30/24 0221    insulin detemir (LEVEMIR) subcutaneous injection 30 Units  30 Units Subcutaneous HS Adrian More MD   30 Units at 01/29/24 2207    insulin lispro (HumaLOG) 100 units/mL subcutaneous injection 2-12 Units  2-12 Units Subcutaneous TID AC Adrian More MD   4 Units at 01/30/24 1157    insulin lispro (HumaLOG) 100 units/mL subcutaneous injection 2-12 Units  2-12 Units Subcutaneous HS Adrian More MD   4 Units at 01/29/24  2207    insulin lispro (HumaLOG) 100 units/mL subcutaneous injection 5 Units  5 Units Subcutaneous TID With Meals Adrian More MD   5 Units at 01/30/24 1201    ipratropium (ATROVENT) 0.02 % inhalation solution 0.5 mg  0.5 mg Nebulization Q6H PRN Adrian More MD   0.5 mg at 01/28/24 0748    levalbuterol (XOPENEX) inhalation solution 0.63 mg  0.63 mg Nebulization Q6H PRN Adrian More MD   0.63 mg at 01/28/24 0748    metoprolol tartrate (LOPRESSOR) partial tablet 12.5 mg  12.5 mg Oral Q12H RANDALL More MD   12.5 mg at 01/30/24 0828    pantoprazole (PROTONIX) injection 40 mg  40 mg Intravenous Q12H RANDALL More MD   40 mg at 01/30/24 0828    rifaximin (XIFAXAN) tablet 550 mg  550 mg Oral Q12H RANDALL More MD   550 mg at 01/30/24 0828    traZODone (DESYREL) tablet 100 mg  100 mg Oral HS Adrian More MD        trimethobenzamide (TIGAN) IM injection 200 mg  200 mg Intramuscular Q12H PRN Adrian More MD   200 mg at 01/23/24 0717    umeclidinium-vilanterol 62.5-25 mcg/actuation inhaler 1 puff  1 puff Inhalation Daily Adrian More MD          Medications Prior to Admission   Medication    acetaminophen (TYLENOL) 650 mg CR tablet    Anoro Ellipta 62.5-25 MCG/ACT inhaler    atorvastatin (LIPITOR) 40 mg tablet    azelastine (ASTELIN) 0.1 % nasal spray    Blood Glucose Monitoring Suppl (OneTouch Verio) w/Device KIT    clopidogrel (PLAVIX) 75 mg tablet    Continuous Blood Gluc  (Dexcom G6 ) NOHEMY    Eliquis 5 MG    ferrous sulfate 325 (65 Fe) mg tablet    furosemide (LASIX) 40 mg tablet    insulin detemir (Levemir FlexPen) 100 Units/mL injection pen    Insulin Pen Needle (Droplet Pen Needles) 32G X 4 MM MISC    Jardiance 10 MG TABS tablet    metoprolol succinate (TOPROL-XL) 50 mg 24 hr tablet    Multiple Vitamin (MULTIVITAMIN ADULT PO)    NovoLOG FlexPen 100 units/mL injection pen    Potassium Citrate ER 15 MEQ (1620 MG) TBCR    pregabalin (LYRICA) 100 mg  "capsule    sertraline (ZOLOFT) 25 mg tablet    SUPER B COMPLEX/C PO    tamsulosin (FLOMAX) 0.4 mg    traZODone (DESYREL) 100 mg tablet    Trulicity 0.75 MG/0.5ML injection    Continuous Blood Gluc Sensor (Dexcom G6 Sensor) MISC    Continuous Blood Gluc Transmit (Dexcom G6 Transmitter) MISC    Continuous Blood Gluc Transmit (Dexcom G6 Transmitter) MISC    glucose blood (OneTouch Verio) test strip    mometasone (ELOCON) 0.1 % cream    pantoprazole (PROTONIX) 40 mg tablet       Labs:  Results from last 7 days   Lab Units 01/30/24  0430 01/29/24  2016 01/29/24  1048 01/29/24  0437 01/28/24  1526 01/28/24  0544   WBC Thousand/uL 6.03  --   --  7.03 8.60 7.82   HEMOGLOBIN g/dL 8.3* 8.0* 8.2* 8.0* 9.7* 9.0*   HEMATOCRIT % 26.7* 26.3* 26.5* 26.0* 31.0* 28.0*   PLATELETS Thousands/uL 193  --   --  167 171 137*   NEUTROS PCT % 63  --   --  65  --  71   LYMPHS PCT % 12*  --   --  11*  --  6*   MONOS PCT % 18*  --   --  18*  --  17*   EOS PCT % 6  --   --  5  --  5     Results from last 7 days   Lab Units 01/30/24 0430 01/29/24 0437 01/28/24  0544 01/26/24  1213 01/26/24  1056 01/23/24  1608 01/23/24  1331   POTASSIUM mmol/L 4.0 3.5 3.8   < >  --    < >  --    CHLORIDE mmol/L 109* 108 107   < >  --    < >  --    CO2 mmol/L 23 24 24   < >  --    < >  --    CO2, I-STAT mmol/L  --   --   --   --  20*  --  13*   BUN mg/dL 37* 37* 29*   < >  --    < >  --    CREATININE mg/dL 1.17 1.25 1.14   < >  --    < >  --    CALCIUM mg/dL 8.4 8.6 8.0*   < >  --    < >  --    ALK PHOS U/L 396* 354* 309*  --   --    < >  --    ALT U/L 46 46 50  --   --    < >  --    AST U/L 57* 50* 51*  --   --    < >  --    GLUCOSE, ISTAT mg/dl  --   --   --   --  194*  --  253*    < > = values in this interval not displayed.     No results found for: \"TROPONINI\", \"CKMB\", \"CKTOTAL\"  Results from last 7 days   Lab Units 01/28/24  1526 01/24/24  0504   INR  1.19 1.33*     Lab Results   Component Value Date    BLOODCX No Growth After 5 Days. 08/10/2023    BLOODCX " No Growth After 5 Days. 08/10/2023         Imaging:  Results for orders placed during the hospital encounter of 01/23/24    XR chest portable    Narrative  CHEST    INDICATION:   line confirmation.    COMPARISON: 1/23/2024    EXAM PERFORMED/VIEWS:  XR CHEST PORTABLE      FINDINGS: The tip of the endotracheal tube is above the george. The right IJ central venous catheter has the tip projecting over the superior vena cava. The tip of the endogastric tube appears to be in the gastric cardia, just above the level of the  diaphragm. This should probably be advanced somewhat.    Heart appears enlarged. Aorta is atherosclerotic.    There seems to be hazy infiltrate in the right lung, predominantly interstitial pattern. Again, there appears to be minimal right effusion. No pneumothorax identified.    Osseous structures appear within normal limits for patient age.    Impression  Right IJ line projects over the superior vena cava and appears grossly satisfactory. No pneumothorax seen.    Endotracheal tube appears satisfactory.    Endogastric tube should probably be advanced farther into the stomach.    Small right effusion and relatively diffuse interstitial infiltrate in the right lung    The study was marked in EPIC for immediate notification.            Workstation performed: [id number]    Results for orders placed during the hospital encounter of 11/03/23    XR chest 2 views    Narrative  CHEST    INDICATION:   cooughing up bright red blood.    COMPARISON: 8/17/2023    EXAM PERFORMED/VIEWS:  XR CHEST PA & LATERAL The frontal view was performed utilizing dual energy radiographic technique.      FINDINGS:    Cardiomediastinal silhouette appears unremarkable.    The lungs are clear.  No pneumothorax or pleural effusion.    Osseous structures appear within normal limits for patient age.    Impression  No acute cardiopulmonary disease.            Workstation performed: [id number]      Last 24 Hours Medication List:   Current  Facility-Administered Medications   Medication Dose Route Frequency Provider Last Rate    acetaminophen  650 mg Oral Q6H PRN Adrian More MD      apixaban  5 mg Oral BID Adrian More MD      atorvastatin  40 mg Oral Daily With Dinner Adrian More MD      azelastine  1 spray Each Nare BID Adrian More MD      chlorhexidine  15 mL Mouth/Throat Q12H [facility] Adrian More MD      clopidogrel  75 mg Oral Daily Adrian More MD      Empagliflozin  10 mg Oral QAM Adrian More MD      [START ON 1/31/2024] ferrous sulfate  325 mg Oral Daily With Breakfast Adrian More MD      [START ON 1/31/2024] furosemide  40 mg Oral Every Other Day Adrian Moer MD      glycerin-hypromellose-  1 drop Both Eyes Q6H PRN Adrian More MD      insulin detemir  30 Units Subcutaneous HS Adrian More MD      insulin lispro  2-12 Units Subcutaneous TID AC Adrian More MD      insulin lispro  2-12 Units Subcutaneous HS Adrian More MD      insulin lispro  5 Units Subcutaneous TID With Meals Adrian More MD      ipratropium  0.5 mg Nebulization Q6H PRN Adrian More MD      levalbuterol  0.63 mg Nebulization Q6H PRN Adrian More MD      metoprolol tartrate  12.5 mg Oral Q12H [facility] Adrian More MD      pantoprazole  40 mg Intravenous Q12H [facility] Adrian More MD      rifaximin  550 mg Oral Q12H [facility] Adrian More MD      traZODone  100 mg Oral HS Adrian More MD      trimethobenzamide  200 mg Intramuscular Q12H PRN Adrian More MD      umeclidinium-vilanterol  1 puff Inhalation Daily Adrian More MD          Today, Patient Was Seen By: Adrian More MD    ** Please Note: Dictation voice to text software may have been used in the creation of this document. **

## 2024-01-30 NOTE — PROGRESS NOTES
Progress Note - Cardiology   Derek Walter 78 y.o. male MRN: [id number]  Unit/Bed#: -01 Encounter: [id number]        Problem List:  Principal Problem:    Acute upper GI bleeding  Active Problems:    Stage 3 chronic kidney disease, unspecified whether stage 3a or 3b CKD (HCC)    Type 2 diabetes mellitus with neurologic complication, with long-term current use of insulin (HCC)    Atrial fibrillation, unspecified type (HCC)    Acute hypoxemic respiratory failure (HCC)    History of DVT (deep vein thrombosis)    CAD (coronary artery disease)    Hepatocellular carcinoma (HCC)    Chronic systolic heart failure (HCC)    Acute blood loss anemia    AMS (altered mental status)    Hepatic encephalopathy (HCC)    Dysphagia      Assessment:  Acute GI bleed  Patient with history of gastric AVMs  Hemoglobin trend this admission around 7-8, patient did receive 3 units of packed red blood cells most recently on 1/25/2024 1/23/2024 EGD with 3 large angioectasias in fundus of bleeding was observed, clips were placed, and hemostasis was achieved.  Induced coagulation and hemostasis achieved with argon plasma coagulation  Presently hemoglobin trending around 8-9 at the time of this consult  Coronary artery disease  8/2023 NSTEMI  8/2023 PCI/CALLIE x 1 to LAD, PCI/CALLIE x 1 to circumflex, PCI/CALLIE X 1 to ramus   Ischemic cardiomyopathy  1/24/2024 echo: EF 75%  8/2023 SPECT imaging: EF 36%  GDMT: Toprol-XL and Jardiance  Paroxysmal atrial fibrillation  Patient remotely diagnosed with A-fib previously followed at [facility] but established locally in [place] in June 2023  Chronically maintained on Eliquis and Toprol-XL  Severe altered mental status requiring temporary intubation this admission for protection of his airway, suspect secondary to hepatic encephalopathy/blood loss/acute critical illness  Hepatocellular carcinoma  History of DVT  Type 2 diabetes    Plan/ Discussion:  In light of multivessel PCI including LAD,  artery, and circumflex in August 2023 patient is recommended both NOAC and antiplatelet therapy for now, ideally until 1 year post PCI  Restart once reasonable from GI and primary team perspective  Patient presently being bridged with IV heparin  Per family request, will update his primary cardiologist   No other changes to medications from our standpoint    Subjective:  Feeling okay.  No chest pain, no shortness of breath    Vitals:  Vitals:    01/29/24 0545 01/30/24 0435   Weight: 91.3 kg (201 lb 4.5 oz) 95 kg (209 lb 7 oz)   ,  Vitals:    01/29/24 1955 01/29/24 2029 01/30/24 0435 01/30/24 0720   BP: 129/55   144/58   BP Location:       Pulse: 86   80   Resp: 14   16   Temp: 98.2 °F (36.8 °C)   99.2 °F (37.3 °C)   TempSrc:       SpO2: 94% 95%  91%   Weight:   95 kg (209 lb 7 oz)    Height:           Exam:  General: Alert awake and oriented, no acute distress  Heart:  Regular rate and rhythm, no murmurs, Normal S1, no edema    Respiratory effort/ Lungs:  Breathing comfortably on room air, clear bilaterally without wheezing, rales, crackles   Abdominal: Non-tender to palpation, + bowel sounds, soft, no masses or distension  Lower Limbs:  No edema            Telemetry:       Normal sinus rhythm, , Heart Rate 80s    Medications:    Current Facility-Administered Medications:     acetaminophen (TYLENOL) tablet 650 mg, 650 mg, Oral, Q6H PRN, Adrian More MD, 650 mg at 01/25/24 2034    azelastine (ASTELIN) 0.1 % nasal spray 1 spray, 1 spray, Each Nare, BID, Adrian More MD, 1 spray at 01/30/24 0829    chlorhexidine (PERIDEX) 0.12 % oral rinse 15 mL, 15 mL, Mouth/Throat, Q12H RANDALL, Adrian More MD, 15 mL at 01/30/24 0828    glycerin-hypromellose- (ARTIFICIAL TEARS) ophthalmic solution 1 drop, 1 drop, Both Eyes, Q6H PRN, Adrian More MD, 1 drop at 01/30/24 0221    heparin (porcine) 25,000 units in 0.45% NaCl 250 mL infusion (premix), 3-20 Units/kg/hr (Order-Specific), Intravenous, Titrated, Adrian  S Saluja, MD, Last Rate: 11.8 mL/hr at 01/29/24 2212, 13.1 Units/kg/hr at 01/29/24 2212    insulin detemir (LEVEMIR) subcutaneous injection 30 Units, 30 Units, Subcutaneous, HS, Adrian More MD, 30 Units at 01/29/24 2207    insulin lispro (HumaLOG) 100 units/mL subcutaneous injection 2-12 Units, 2-12 Units, Subcutaneous, TID AC, 2 Units at 01/29/24 1919 **AND** Fingerstick Glucose (POCT), , , 4x Daily AC and at bedtime, Adrian More MD    insulin lispro (HumaLOG) 100 units/mL subcutaneous injection 2-12 Units, 2-12 Units, Subcutaneous, HS, Adrian More MD, 4 Units at 01/29/24 2207    insulin lispro (HumaLOG) 100 units/mL subcutaneous injection 5 Units, 5 Units, Subcutaneous, TID With Meals, Adrian More MD, 5 Units at 01/30/24 0829    ipratropium (ATROVENT) 0.02 % inhalation solution 0.5 mg, 0.5 mg, Nebulization, Q6H PRN, Adrian More MD, 0.5 mg at 01/28/24 0748    levalbuterol (XOPENEX) inhalation solution 0.63 mg, 0.63 mg, Nebulization, Q6H PRN, Adrian More MD, 0.63 mg at 01/28/24 0748    metoprolol tartrate (LOPRESSOR) partial tablet 12.5 mg, 12.5 mg, Oral, Q12H [facility], 12.5 mg at 01/30/24 0828    pantoprazole (PROTONIX) injection 40 mg, 40 mg, Intravenous, Q12H Adrian PEREIRA MD, 40 mg at 01/30/24 0828    rifaximin (XIFAXAN) tablet 550 mg, 550 mg, Oral, Q12H RANDALLAdrian MD, 550 mg at 01/30/24 0828    trimethobenzamide (TIGAN) IM injection 200 mg, 200 mg, Intramuscular, Q12H PRN, Adrian More MD, 200 mg at 01/23/24 0717      Labs/Data:        Results from last 7 days   Lab Units 01/30/24  0430 01/29/24 2016 01/29/24  1048 01/29/24 0437 01/28/24  1526   WBC Thousand/uL 6.03  --   --  7.03 8.60   HEMOGLOBIN g/dL 8.3* 8.0* 8.2* 8.0* 9.7*   HEMATOCRIT % 26.7* 26.3* 26.5* 26.0* 31.0*   PLATELETS Thousands/uL 193  --   --  167 171     Results from last 7 days   Lab Units 01/30/24 0430 01/29/24 0437 01/28/24  0544   POTASSIUM mmol/L 4.0 3.5  3.8   CHLORIDE mmol/L 109* 108 107   CO2 mmol/L 23 24 24   BUN mg/dL 37* 37* 29*   CREATININE mg/dL 1.17 1.25 1.14

## 2024-01-30 NOTE — PLAN OF CARE
Problem: OCCUPATIONAL THERAPY ADULT  Goal: Performs self-care activities at highest level of function for planned discharge setting.  See evaluation for individualized goals.  Description:   Outcome: Progressing  Note: Limitation: Decreased ADL status, Decreased UE ROM, Decreased UE strength, Decreased Safe judgement during ADL, Decreased cognition, Decreased endurance, Decreased self-care trans, Decreased high-level ADLs  Prognosis: Fair  Assessment: Pt [facility] a 78 y.o. [facility] w/ Acute upper GI bleeding.  OT completed extensive review of pt's medical and social history. Comorbidities affecting pt's functional performance at time of assessment include: CKD, DM type 2, aFib, hx DVT, CHF, AMS, dysphagia, polyneuropathy, NA. Personal factors affecting pt at time of IE include:steps to enter environment, behavioral pattern, difficulty performing ADLS, difficulty performing IADLS , limited insight into deficits, flat affect, decreased initiation and engagement , and health management . Prior to admission, pt was living in 1SH, ramp+1STE, with wife, and was independent with ADL/mobility, wife does IADLs. Upon evaluation, pt presents to OT below baseline due to the following performance deficits: weakness, decreased strength, decreased balance, decreased tolerance, impaired attention, impaired initiation, impaired memory, impaired sequencing, and impaired problem solving. Pt to benefit from continued skilled OT tx while in the hospital to address deficits as defined above and maximize level of functional independence w ADL's and functional mobility. Occupational Performance areas to address include: eating, grooming, bathing/shower, toilet hygiene, dressing, functional mobility, and functional transfers, bed mobility . The patient's raw score on the AM-PAC Daily Activity inpatient short form is 13, standardized score is 32.03, less than 39.4. Patients at this  level are likely to benefit from DC to post-acute rehabilitation services. Based on findings, pt is of high complexity, due to medical comorbidities. Pt seen as a co-eval with PT due to the patient's co-morbidities, clinically unstable presentation, and present impairments which are a regression from the patient's baseline. At this time, OT recommendations at time of discharge are level 2.     Rehab Resource Intensity Level, OT: II (Moderate Resource Intensity)

## 2024-01-31 ENCOUNTER — NURSE TRIAGE (OUTPATIENT)
Age: 79
End: 2024-01-31

## 2024-01-31 LAB
ALBUMIN SERPL BCP-MCNC: 3.2 G/DL (ref 3.5–5)
ALP SERPL-CCNC: 426 U/L (ref 34–104)
ALT SERPL W P-5'-P-CCNC: 45 U/L (ref 7–52)
ANION GAP SERPL CALCULATED.3IONS-SCNC: 8 MMOL/L
APTT PPP: 35 SECONDS (ref 23–37)
AST SERPL W P-5'-P-CCNC: 56 U/L (ref 13–39)
BASOPHILS # BLD AUTO: 0.02 THOUSANDS/ÂΜL (ref 0–0.1)
BASOPHILS NFR BLD AUTO: 0 % (ref 0–1)
BILIRUB SERPL-MCNC: 1.27 MG/DL (ref 0.2–1)
BUN SERPL-MCNC: 33 MG/DL (ref 5–25)
CALCIUM ALBUM COR SERPL-MCNC: 8.9 MG/DL (ref 8.3–10.1)
CALCIUM SERPL-MCNC: 8.3 MG/DL (ref 8.4–10.2)
CHLORIDE SERPL-SCNC: 112 MMOL/L (ref 96–108)
CO2 SERPL-SCNC: 20 MMOL/L (ref 21–32)
CREAT SERPL-MCNC: 1.01 MG/DL (ref 0.6–1.3)
EOSINOPHIL # BLD AUTO: 0.3 THOUSAND/ÂΜL (ref 0–0.61)
EOSINOPHIL NFR BLD AUTO: 6 % (ref 0–6)
ERYTHROCYTE [DISTWIDTH] IN BLOOD BY AUTOMATED COUNT: 15.9 % (ref 11.6–15.1)
GFR SERPL CREATININE-BSD FRML MDRD: 71 ML/MIN/1.73SQ M
GLUCOSE SERPL-MCNC: 162 MG/DL (ref 65–140)
GLUCOSE SERPL-MCNC: 163 MG/DL (ref 65–140)
GLUCOSE SERPL-MCNC: 202 MG/DL (ref 65–140)
GLUCOSE SERPL-MCNC: 70 MG/DL (ref 65–140)
GLUCOSE SERPL-MCNC: 73 MG/DL (ref 65–140)
HCT VFR BLD AUTO: 26.2 % (ref 36.5–49.3)
HCT VFR BLD AUTO: 31 % (ref 36.5–49.3)
HGB BLD-MCNC: 8.1 G/DL (ref 12–17)
HGB BLD-MCNC: 9.4 G/DL (ref 12–17)
IMM GRANULOCYTES # BLD AUTO: 0.03 THOUSAND/UL (ref 0–0.2)
IMM GRANULOCYTES NFR BLD AUTO: 1 % (ref 0–2)
LYMPHOCYTES # BLD AUTO: 0.62 THOUSANDS/ÂΜL (ref 0.6–4.47)
LYMPHOCYTES NFR BLD AUTO: 12 % (ref 14–44)
MAGNESIUM SERPL-MCNC: 2.1 MG/DL (ref 1.9–2.7)
MCH RBC QN AUTO: 29.3 PG (ref 26.8–34.3)
MCHC RBC AUTO-ENTMCNC: 30.9 G/DL (ref 31.4–37.4)
MCV RBC AUTO: 95 FL (ref 82–98)
MONOCYTES # BLD AUTO: 1.14 THOUSAND/ÂΜL (ref 0.17–1.22)
MONOCYTES NFR BLD AUTO: 21 % (ref 4–12)
NEUTROPHILS # BLD AUTO: 3.21 THOUSANDS/ÂΜL (ref 1.85–7.62)
NEUTS SEG NFR BLD AUTO: 60 % (ref 43–75)
NRBC BLD AUTO-RTO: 0 /100 WBCS
PLATELET # BLD AUTO: 176 THOUSANDS/UL (ref 149–390)
PMV BLD AUTO: 12.3 FL (ref 8.9–12.7)
POTASSIUM SERPL-SCNC: 3.9 MMOL/L (ref 3.5–5.3)
PROT SERPL-MCNC: 5.8 G/DL (ref 6.4–8.4)
RBC # BLD AUTO: 2.76 MILLION/UL (ref 3.88–5.62)
SODIUM SERPL-SCNC: 140 MMOL/L (ref 135–147)
WBC # BLD AUTO: 5.32 THOUSAND/UL (ref 4.31–10.16)

## 2024-01-31 PROCEDURE — 82948 REAGENT STRIP/BLOOD GLUCOSE: CPT

## 2024-01-31 PROCEDURE — C9113 INJ PANTOPRAZOLE SODIUM, VIA: HCPCS | Performed by: INTERNAL MEDICINE

## 2024-01-31 PROCEDURE — 85730 THROMBOPLASTIN TIME PARTIAL: CPT | Performed by: INTERNAL MEDICINE

## 2024-01-31 PROCEDURE — 83735 ASSAY OF MAGNESIUM: CPT | Performed by: INTERNAL MEDICINE

## 2024-01-31 PROCEDURE — 80053 COMPREHEN METABOLIC PANEL: CPT | Performed by: INTERNAL MEDICINE

## 2024-01-31 PROCEDURE — 85025 COMPLETE CBC W/AUTO DIFF WBC: CPT | Performed by: INTERNAL MEDICINE

## 2024-01-31 PROCEDURE — 99232 SBSQ HOSP IP/OBS MODERATE 35: CPT | Performed by: INTERNAL MEDICINE

## 2024-01-31 PROCEDURE — 85014 HEMATOCRIT: CPT | Performed by: INTERNAL MEDICINE

## 2024-01-31 PROCEDURE — 85018 HEMOGLOBIN: CPT | Performed by: INTERNAL MEDICINE

## 2024-01-31 RX ORDER — BIMATOPROST 0.3 MG/ML
1 SOLUTION/ DROPS OPHTHALMIC EVERY EVENING
Status: DISCONTINUED | OUTPATIENT
Start: 2024-01-31 | End: 2024-02-06 | Stop reason: HOSPADM

## 2024-01-31 RX ORDER — LACTULOSE 10 G/15ML
20 SOLUTION ORAL 2 TIMES DAILY
Status: DISCONTINUED | OUTPATIENT
Start: 2024-01-31 | End: 2024-02-06 | Stop reason: HOSPADM

## 2024-01-31 RX ADMIN — INSULIN LISPRO 5 UNITS: 100 INJECTION, SOLUTION INTRAVENOUS; SUBCUTANEOUS at 11:53

## 2024-01-31 RX ADMIN — APIXABAN 5 MG: 5 TABLET, FILM COATED ORAL at 09:42

## 2024-01-31 RX ADMIN — IRON SUCROSE 200 MG: 20 INJECTION, SOLUTION INTRAVENOUS at 09:42

## 2024-01-31 RX ADMIN — INSULIN DETEMIR 30 UNITS: 100 INJECTION, SOLUTION SUBCUTANEOUS at 21:40

## 2024-01-31 RX ADMIN — INSULIN LISPRO 2 UNITS: 100 INJECTION, SOLUTION INTRAVENOUS; SUBCUTANEOUS at 17:48

## 2024-01-31 RX ADMIN — TRAZODONE HYDROCHLORIDE 100 MG: 50 TABLET ORAL at 21:41

## 2024-01-31 RX ADMIN — APIXABAN 5 MG: 5 TABLET, FILM COATED ORAL at 17:48

## 2024-01-31 RX ADMIN — FERROUS SULFATE TAB 325 MG (65 MG ELEMENTAL FE) 325 MG: 325 (65 FE) TAB at 09:42

## 2024-01-31 RX ADMIN — UMECLIDINIUM BROMIDE AND VILANTEROL TRIFENATATE 1 PUFF: 62.5; 25 POWDER RESPIRATORY (INHALATION) at 09:31

## 2024-01-31 RX ADMIN — INSULIN LISPRO 2 UNITS: 100 INJECTION, SOLUTION INTRAVENOUS; SUBCUTANEOUS at 21:40

## 2024-01-31 RX ADMIN — CHLORHEXIDINE GLUCONATE 15 ML: 1.2 SOLUTION ORAL at 09:57

## 2024-01-31 RX ADMIN — RIFAXIMIN 550 MG: 550 TABLET ORAL at 09:30

## 2024-01-31 RX ADMIN — GLYCERIN 1 DROP: .002; .002; .01 SOLUTION/ DROPS OPHTHALMIC at 09:32

## 2024-01-31 RX ADMIN — CLOPIDOGREL BISULFATE 75 MG: 75 TABLET ORAL at 09:42

## 2024-01-31 RX ADMIN — RIFAXIMIN 550 MG: 550 TABLET ORAL at 21:41

## 2024-01-31 RX ADMIN — LACTULOSE 20 G: 20 SOLUTION ORAL at 17:48

## 2024-01-31 RX ADMIN — PANTOPRAZOLE SODIUM 40 MG: 40 INJECTION, POWDER, FOR SOLUTION INTRAVENOUS at 09:30

## 2024-01-31 RX ADMIN — PANTOPRAZOLE SODIUM 40 MG: 40 INJECTION, POWDER, FOR SOLUTION INTRAVENOUS at 21:41

## 2024-01-31 RX ADMIN — CHLORHEXIDINE GLUCONATE 15 ML: 1.2 SOLUTION ORAL at 21:41

## 2024-01-31 RX ADMIN — ATORVASTATIN CALCIUM 40 MG: 40 TABLET, FILM COATED ORAL at 15:53

## 2024-01-31 RX ADMIN — INSULIN LISPRO 4 UNITS: 100 INJECTION, SOLUTION INTRAVENOUS; SUBCUTANEOUS at 11:53

## 2024-01-31 RX ADMIN — FUROSEMIDE 40 MG: 40 TABLET ORAL at 09:30

## 2024-01-31 RX ADMIN — AZELASTINE 1 SPRAY: 1 SPRAY, METERED NASAL at 09:31

## 2024-01-31 RX ADMIN — AZELASTINE 1 SPRAY: 1 SPRAY, METERED NASAL at 17:49

## 2024-01-31 RX ADMIN — Medication 12.5 MG: at 09:30

## 2024-01-31 RX ADMIN — EMPAGLIFLOZIN 10 MG: 10 TABLET, FILM COATED ORAL at 09:30

## 2024-01-31 RX ADMIN — INSULIN LISPRO 5 UNITS: 100 INJECTION, SOLUTION INTRAVENOUS; SUBCUTANEOUS at 17:48

## 2024-01-31 RX ADMIN — Medication 12.5 MG: at 21:42

## 2024-01-31 NOTE — ASSESSMENT & PLAN NOTE
Lab Results   Component Value Date    HGBA1C 6.6 (H) 01/24/2024       Recent Labs     01/30/24  1629 01/30/24  2046 01/31/24  0718 01/31/24  1138   POCGLU 157* 199* 73 202*       Patient has a Dexcom, on Trulicity and Jardiance, Lantus and insulin  Extubated 1/27. Currently no enteral access for TF. Insulin drip stopped. Changed to correction scale insulin  Accu-Chek ACHS with Humalog sliding scale  Blood glucose goal 140-180  Hypoglycemia protocol

## 2024-01-31 NOTE — ASSESSMENT & PLAN NOTE
Lab Results   Component Value Date    EGFR 71 01/31/2024    EGFR 59 01/30/2024    EGFR 55 01/29/2024    CREATININE 1.01 01/31/2024    CREATININE 1.17 01/30/2024    CREATININE 1.25 01/29/2024     Cr at baseline  Monitor I's and O's   Renally dose medications  Avoid nephrotoxic agents  Hold Lasix at this time, resume home med when able

## 2024-01-31 NOTE — ASSESSMENT & PLAN NOTE
Secondary to GIB  Hemoglobin baseline 8-10  Receive 3 units PRBC this admission, last 1/25   Hgb Q12H  History of iron deficiency   Iron panel reviewed.  On Venofer  On iron supplementation

## 2024-01-31 NOTE — CASE MANAGEMENT
Case Management Discharge Planning Note    Patient name Derek Walter  Location /-01 MRN [id number]  : 1945 Date 2024       Current Admission Date: 2024  Current Admission Diagnosis:Acute upper GI bleeding   Patient Active Problem List    Diagnosis Date Noted    Dysphagia 2024    Hepatic encephalopathy (HCC) 2024    Acute blood loss anemia 2024    Acute upper GI bleeding 2024    NA (acute kidney injury) (HCC) 2024    AMS (altered mental status) 2024    History of coronary angioplasty with insertion of stent 2024    Chronic systolic heart failure (HCC) 2024    Ischemic cardiomyopathy 2024    Status post insertion of drug eluting coronary artery stent 2023    Coronary artery disease involving native coronary artery 2023    Hepatocellular carcinoma (HCC)     CAD (coronary artery disease) 2023    Hepatic flexure mass 2023    Acute hypoxemic respiratory failure (HCC) 2023    Anemia 2023    Acute on chronic diastolic HF (heart failure) (HCC) 2023    History of DVT (deep vein thrombosis) 2023    Restrictive lung disease 2023    Other emphysema (HCC) 2023    History of tobacco abuse 2023    H/O asbestos exposure 2023    Liver mass 05/10/2023    Atrial fibrillation, unspecified type (HCC) 02/15/2023    Peripheral polyneuropathy 2022    Type 2 diabetes mellitus with neurologic complication, with long-term current use of insulin (HCC) 2022    Stage 3 chronic kidney disease, unspecified whether stage 3a or 3b CKD (HCC) 2022      LOS (days): 8  Geometric Mean LOS (GMLOS) (days): 4.6  Days to GMLOS:-3.9     OBJECTIVE:  Risk of Unplanned Readmission Score: 47.57         Current admission status: Inpatient   Preferred Pharmacy:   [id number] - [place] - [address] [facility]  [id number] [facility] [address]  Phone:  [phone] Fax: [phone]    Primary Care Provider: Ernesto Griffith MD    Primary Insurance: BLUE CROSS  REP  Secondary Insurance:     DISCHARGE DETAILS:     Placed call to Nilda Walter at [phone] to review PT/OT's recommendation for SNF rehab.  She is agreeable. [place] referrals were sent to SNF's within a 15 mile radius . Wait bed availability and family preference

## 2024-01-31 NOTE — ASSESSMENT & PLAN NOTE
Wt Readings from Last 3 Encounters:   01/31/24 95.2 kg (209 lb 14.1 oz)   12/20/23 96.6 kg (213 lb)   12/14/23 97 kg (213 lb 12.8 oz)     Patient is 78-year-old male with history of CAD post PCI in August not deemed a candidate for surgical intervention, systolic heart failure with reduced ejection fraction of 36%   Home meds: lasix, toprol, and statin   Resume home meds when able   Daily wts

## 2024-01-31 NOTE — PROGRESS NOTES
Nutrition/Diabetes Progress Report    Leo Santana was seen for Individual Diabetes Self Management Education/Training.  Instruction was given to the patient.  : Today's visit was performed with the assistance of an .   Name of : Calin #[id number]   Service used: [employer] - Video: [employer]   Video and audio disconnected about custodial thru visit today.  Reconnected with   Name of : Robert #[id number]   Service used: [employer] - Video: [employer]       Referring Provider: Jaime Shen NP  Referral is located in EMR.  Onsite Billing Provider JESSICA OLIVA MD      Diagnosis: Type 2 diabetes mellitus with hyperglycemia, without long-term current use of insulin (CMD) [E11.65]      Visit Summary:    Leo Santana is a very pleasant lady, here today for initial Diabetes Education Nutrition consult.  Patient's last visit here was on 8/25/23 with RN educator.     Patient states she is interested in discussing the following topics at this visit:    1. Blood Sugars - patient is testing FBS only.  Results past 3 weeks as follows.  Date Fasting         9/13/23 124 mg/dL         9/12/23 89 mg/dL         9/11/23 139 mg/dL         9/10/23 136 mg/dL         9/9/23 118 mg/dL         9/8/23 128 mg/dL         9/7/23 143 mg/dL         9/6/23 133 mg/dL         9/5/23 147 mg/dL         9/4/23 142 mg/dL         9/3/23 145 mg/dL         9/2/23 137 mg/dL         9/1/23 139 mg/dL         8/31/23 143 mg/dL         8/30/23 149 mg/dL         8/29/23 118 mg/dL         8/28/23  144 mg/dl         8/27/23  141 mg/dl         8/26/23  145 mg/dl         8/25/23  147 mg/dl           2. Medications - patient is taking Metformin 500mg twice daily.  States she was told to increase to 2 tabs twice daily but does not want to do that as she feels she has side effects from Metformin.  She will continue with 1  [facility]  Progress Note  Name: Derek Walter I  MRN: [id number]  Unit/Bed#: -01 I Date of Admission: 1/23/2024   Date of Service: 1/31/2024 I Hospital Day: 8    Assessment/Plan   Hepatic encephalopathy (HCC)  Assessment & Plan  patient is 78-year-old male with a history of liver cancer and radiation therapy who was admitted for an upper GI bleed with hypotension and later strokelike symptoms went unresponsive and was intubated, appears to be as cause from the liver with an ammonia level of 130  Most likely source of elevated ammonia level is liver history of liver cancer  GI following  Continue with Rifaximin when able   Patient should have 2-3 BMs a day  Amm level normalized to 61   Remains stable off lactulose    AMS (altered mental status)  Assessment & Plan  1/24 0630A RN called provider to bedside to evaluate patient for left facial droop. NIHHS 4 (left facial droop, LUE weakness +4/5, LLE weakness +4/5, LLE drift and mild dysarthria). RN states left facial droop was noticed on arrival to ICU at 0300A. RN states ED could not confirm if droop was present in ED and wife unsure if droop was new. Stroke alert called.   CTH - no acute stroke or hemorrhage  CTA H/N - no large vessel occulusion. No arterial occulusion. 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   1/24 MRI: Negative for acute findings   1/24 ECHO: EF 75%, Mild TVR  Mentation continuing to improve. Able to be extubated 1/27     Plan:  Patient discussed with Dr. Interiano (Neuro)   TNK not given due to unknown last known well and active GI bleed.  Goal SBP >140  1/24 ECHO: EF 75%, Mild TVR  Restarted on Plavix and Eliquis  Restart statin  SLP consult appreciated  Neurology following  Improving  Aspiration/fall precautions     Acute blood loss anemia  Assessment & Plan  Secondary to GIB  Hemoglobin baseline 8-10  Receive 3 units PRBC this admission, last 1/25   Hgb Q12H  History of iron deficiency   Iron panel reviewed.  On  Venofer  On iron supplementation    Chronic systolic heart failure (HCC)  Assessment & Plan  Wt Readings from Last 3 Encounters:   01/31/24 95.2 kg (209 lb 14.1 oz)   12/20/23 96.6 kg (213 lb)   12/14/23 97 kg (213 lb 12.8 oz)     Patient is 78-year-old male with history of CAD post PCI in August not deemed a candidate for surgical intervention, systolic heart failure with reduced ejection fraction of 36%   Home meds: lasix, toprol, and statin   Resume home meds when able   Daily wts    Hepatocellular carcinoma (HCC)  Assessment & Plan  Patient has stage II liver cancer hepatocellular carcinoma  Has a tumor thrombus which appears stable seen again on admission CT  Status post Y 90 treatment    Follows with Dr. Gong as o/p    CAD (coronary artery disease)  Assessment & Plan  Patient has a history of coronary artery disease had 8/2023 a PCI status post cardiac catheterization drug-eluting stents x 3 in the LAD.  Ramus and left circumflex  Continue home statin and metoprolol    History of DVT (deep vein thrombosis)  Assessment & Plan  Patient is on Eliquis as o/p, currently held 2/2 GIB  1/23 reversed with Kcentra   Patient will be restarted on Eliquis    Acute hypoxemic respiratory failure (HCC)  Assessment & Plan  Required intubation for airway protection 1/23 after patient became increasingly encephalopathic  Minimal vent setting. Placed on PS since 1/24 at 6/6 40% FiO2  Unable to be successfully extubated due to mental status. Currently not requiring sedation.  Having copious secretions coming from inline suction  1/27 CXR negative for acute findings. Given 1 x dose of 40 mg IV lasix   Extubated mid morning on 1/27  Saturating well on room air     Plan:  Resolved  Aspiration precautions  Encourage IS/Flutter valve when able     Atrial fibrillation, unspecified type (HCC)  Assessment & Plan  Rate controled  Maintained on Eliquis and metoprolol as o/p  Continue on Lopressor  Hold Eliquis and Plavix until okay with  tab twice daily unless she hears from PCP office.  I sent message to PCP following this visit today.    3. Diet/Nutrition - taught carb counting, label reading, meal plan.  Resources provided in Hungarian.      Patient questions/concerns are discussed/answered during this visit.   Leo Santana agrees to return for follow up in 1-2 months (appointment scheduled on 11/1/23), and will call sooner if questions or concerns.  Contact information is provided.      Visit Start 1530  Visit End 1440  Total time today 70 minutes  Carryover time from previous visit 5 minutes  Total minutes billed today 60 minutes  Carryover time for next visit 15 minutes    Education is provided in individual setting.  Reason: .       Barriers and Readiness to Learning:  Barriers to self-care and learning limitations:language limitations      Preferences for Learning: repetition   Readiness to learn: The patient demonstrates the ability to understand and asks questions.  Learning needs were assessed and the patient requires education in diabetes disease process/treatment options, medical nutrition therapy, physical activity, blood glucose monitoring, diabetes medications and goal setting.   Material was presented using verbal, written, demonstration and return demonstration.          Medications:   Current Outpatient Medications   Medication Sig Dispense Refill   • Blood Glucose Monitoring Suppl (Contour Next One) Kit 1 kit by Other route as directed. Test one time per day; in the morning before eating or two hours after a meal. Ok to substitute for insurance preferred lancets, test strips, and glucose meter. Lot [id number] Exp 10/31/2024 1 kit 0   • GITA CONTOUR NEXT TEST test strip Test one time per day; in the morning before eating or two hours after a meal. Ok to substitute for insurance preferred lancets, test strips, and glucose meter. 50 strip 11   • Microlet Lancets Misc Test one time per day; in the morning before  GI   Cardiology consult regarding input for antiplatelet/anticoagulation appreciated  As per cardiology restart Plavix and Eliquis  Discussed with GI and patient will be restarted 1/30 and will monitor H&H closely      Type 2 diabetes mellitus with neurologic complication, with long-term current use of insulin ([facility])  Assessment & Plan  Lab Results   Component Value Date    HGBA1C 6.6 (H) 01/24/2024       Recent Labs     01/30/24  1629 01/30/24  2046 01/31/24  0718 01/31/24  1138   POCGLU 157* 199* 73 202*       Patient has a Dexcom, on Trulicity and Jardiance, Lantus and insulin  Extubated 1/27. Currently no enteral access for TF. Insulin drip stopped. Changed to correction scale insulin  Accu-Chek ACHS with Humalog sliding scale  Blood glucose goal 140-180  Hypoglycemia protocol     Stage 3 chronic kidney disease, unspecified whether stage 3a or 3b CKD ([facility])  Assessment & Plan  Lab Results   Component Value Date    EGFR 71 01/31/2024    EGFR 59 01/30/2024    EGFR 55 01/29/2024    CREATININE 1.01 01/31/2024    CREATININE 1.17 01/30/2024    CREATININE 1.25 01/29/2024     Cr at baseline  Monitor I's and O's   Renally dose medications  Avoid nephrotoxic agents  Hold Lasix at this time, resume home med when able     * Acute upper GI bleeding  Assessment & Plan  Patient is a 78-year-old male who has been seeing [facility] GI last seen 12/13/2023 patient has a history of anemia is multifactorial chronic due to cancer with iron deficiency and occult GI blood loss on Eliquis review of an EGD with multiple AVMs.  Patient came in to the ER from home due to generalized weakness nausea vomiting since this morning reports lower abdominal pain has a history of anemia is on Eliquis and Plavix episodes of vomiting which were described by the significant other is dark of clotted material, and also had melanotic stool occult positive.  Patient felt significant weakness and was unable to walk felt like too weak to walk.  Patient has a  eating or two hours after a meal. Ok to substitute for insurance preferred lancets, test strips, and glucose meter. 100 each 11   • metFORMIN (GLUCOPHAGE-XR) 500 MG 24 hr tablet Take 2 tablets by mouth 2 times daily (with meals). 90 tablet 3   • atorvastatin (LIPITOR) 40 MG tablet Take 1 tablet by mouth daily. 90 tablet 1   • amLODIPine (NORVASC) 5 MG tablet Take 1 tablet by mouth daily. 90 tablet 3   • fexofenadine (ALLEGRA) 180 MG tablet Take 1 tablet by mouth daily. 30 tablet 5   • omeprazole (PrilOSEC) 20 MG capsule Take 1 capsule by mouth daily. 90 capsule 3   • diphenhydrAMINE (BENADRYL) 50 MG tablet Take 1 tablet by mouth every 6 hours as needed for Itching, Allergies or Sleep. 30 tablet 1   • EPINEPHrine 0.3 MG/0.3ML auto-injector Inject 0.3 mLs into the muscle 1 time as needed for Anaphylaxis. 2 each 2     No current facility-administered medications for this visit.       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 Eats smallest meal in evening; states she has heartburn in evening if she eats too much at supper.  Oral fluids: water    Food Recall / Meal&Snack Pattern:    Breakfast at 8am    Smoothie - water with oatmeal, 1/2 spoon waqas, apple or banana, and 3 nuts  And Toast - whole wheat or similar with seeds (it's brown) or a small burger [employer]   Lunch at 12 noon    bottled water, eggs with sausage or fish salad or chicken leg and beans Dinner - only eats a small meal/snack at this time    Oatmeal, or tacos with beans or meat, salad, chicken broth and maybe 1/2 glass milk    Cannot sleep if  eats too much.  Can only eat 2 tortillas, otherwise cannot sleep   Drinks lots of water during work day       Nutrition / Meal Planning  Do you eat three meals a day?  Yes - small meals; states now only eating 2 meals/day   Do you eat at about the same time each day?  yes   Do have diet limitations? (low salt, no dairy products, no wheat products)  Yes - seafood (face dairy gets swollen); milk/dairy (reflux)  Do you have any weight or eating concerns? Yes  Are you trying to lose weight; currently following a diet/meal plan? No  Is it hard to control what you eat? Yes  Do you drink regular soda or fruit drinks (orange juice, fruit punch)?  Yes- Squirt ( 1 cup a day) - reduced/eliminated  Do you eat desserts/sweets more than once or twice a week?  No  Do you drink alcohol? No     Do you \"eat out\" or get \"carry out\" more than once per week? Yes  Do you do your own cooking? Yes  Do you do your own grocery shopping?  Yes     Activity  Do you exercise at least 30 minutes/3 or more times per week?  No  If yes, what type of activity do you do? Walking- active at work.   Is there a medical reason for limiting activity?  Yes- knee pain ( saw doctor in [place] that states she shouldn't be on legs for long periods of time)      Anthropometric Measurements:  Estimated body mass index is 31.67 kg/m² as calculated from the following:    Height as of 8/7/23: 5' 3\" (1.6 m).    Weight as of 8/25/23: 81.1 kg (178 lb 12.7 oz).  Wt Readings from Last 4 Encounters:   08/25/23 81.1 kg (178 lb 12.7 oz)   08/07/23 82.2 kg (181 lb 3.5 oz)   07/28/23 81.5 kg (179 lb 9.6 oz)   07/18/23 81.9 kg (180 lb 8.9 oz)         Nutrition Diagnosis:  Food and nutrition-related knowledge deficit related to management of diabetes as evidenced by patient has many questions about diet, carbohydrates and types of foods she may eat.      Nutrition Prescription:  Recommended meal plan is 45 grams carbohydrate at meals and 0-15 grams carbohydrate at  history of an upper GI bleed with multiple AVMs, also has a history of the patient intubation with tracheostomy approximately rehab time of 1 year.   CT scan with contrast showed nonspecific hypodensity within the gastric body possibly reflective of hemorrhage in this patient with the history of gastric AVMs, known liver cancer, cholelithiasis, small volume abdominal pelvic ascites  Patient's baseline hemoglobin appears to be 8-10, except 1 value on 11/23 of 13.    Patient is on Eliquis and Plavix as o/p  In the ED received DDAVP 0.3 mg/kg and Kcentra  GI consulted  1/23 S/P EGD - 3 large angioectasias in the fundus of the stomach and body of the stomach; bleeding was observed; placed clips; hemostasis achieved; induced coagulation and hemostasis achieved with argon plasma coagulation The esophagus and duodenum appeared normal.  Pt received a total of 3 units PRBC this admission so far, last on 1/25/24     Plan:  Monitor H/H Q12 hours- Hgb remains stable   Continue PPI BID  Patient extubated 1/27  Patient is tolerating diet  GI following appreciate recommendations- Need to address if stable to restart plavix  Patient is restarted on Plavix and Eliquis after clearance from GI and cardiology.  DC heparin drip  Hemoglobin is 8.1  Monitor H&H closely           Labs & Imaging: I have personally reviewed pertinent reports.      VTE Prophylaxis: in place.    Code Status:   Level 1 - Full Code    Patient Centered Rounds: I have performed bedside rounds with nursing staff today.    Mobility:   Basic Mobility Inpatient Raw Score: 12  JH-HLM Goal: 4: Move to chair/commode  JH-HLM Achieved: 7: Walk 25 feet or more  HLM Goal NOT achieved. Continue with multidisciplinary rounding and encourage appropriate mobility to improve upon HLM goals.    Discussions with Specialists or Other Care Team Provider: GI    Education and Discussions with Family / Patient: Wife on phone    Total Time Spent on Date of Encounter in care of patient:  35 mins. This time was spent on one or more of the following: performing physical exam; counseling and coordination of care; obtaining or reviewing history; documenting in the medical record; reviewing/ordering tests, medications or procedures; communicating with other healthcare professionals and discussing with patient's family/caregivers.    Current Length of Stay: 8 day(s)    Current Patient Status: Inpatient   Certification Statement: The patient will continue to require additional inpatient hospital stay due to see my assessment and plan.     Subjective:   Patient is seen and examined at bedside.  No new complaints.  Afebrile  All other ROS are negative.    Objective:    Vitals: Blood pressure 143/56, pulse 85, temperature 97.9 °F (36.6 °C), resp. rate 16, height 6' (1.829 m), weight 95.2 kg (209 lb 14.1 oz), SpO2 97%.,Body mass index is 28.46 kg/m².  SPO2 RA Rest      Flowsheet Row ED to Hosp-Admission (Current) from 1/23/2024 in [facility] Med Surg Unit   SpO2 97 %   SpO2 Activity At Rest   O2 Device None (Room air)   O2 Flow Rate 4 L/min          I&O:   Intake/Output Summary (Last 24 hours) at 1/31/2024 1406  Last data filed at 1/31/2024 1317  Gross per 24 hour   Intake 472 ml   Output 1700 ml   Net -1228 ml       Physical Exam:    General- Alert, sitting in chair. Not in any acute distress.  Neck- Supple, No JVD  CVS- regular, S1 and S2 normal  Chest- Bilateral Air entry, No rhochi, crackles or wheezing present.  Abdomen- soft, nontender, not distended, no guarding or rigidity, BS+  Extremities-  No pedal edema, No calf tenderness  CNS-   Alert, awake and orientedx2. No focal deficits present.    Invasive Devices       Peripheral Intravenous Line  Duration             Peripheral IV 01/31/24 Dorsal (posterior);Right Hand <1 day                          Social History  reviewed  Family History   Problem Relation Age of Onset    Hypertension Mother     Bone cancer Father     Diabetes type  snacks.        Intervention:  See Diabetes Care Plan in EMR for goals and topics covered.      Monitoring and Evaluation:  Patient demonstrated LEVELOFKNOWLEDGE: Basic level of knowledge in the above areas.      Print/Written Resources Provided:   AVS  Nutrition Education Materials: My Meal Plan in Sinhala      Recommended Follow-up:  Follow-up appointment on 11/1/23.    Patient was encouraged to call educator if any questions or concerns.      Thank you for your referral.  Please contact me with any question/concerns.    Chika Alvarado, RD, CD, [facility]        Diabetes assessment reviewed and current.       II Sister     Diabetes type II Sister     Esophageal cancer Brother     Colon cancer Neg Hx     reviewed    Meds:  Current Facility-Administered Medications   Medication Dose Route Frequency Provider Last Rate Last Admin    acetaminophen (TYLENOL) tablet 650 mg  650 mg Oral Q6H PRN Adrian More MD   650 mg at 01/25/24 2034    apixaban (ELIQUIS) tablet 5 mg  5 mg Oral BID Adrian More MD   5 mg at 01/31/24 0942    atorvastatin (LIPITOR) tablet 40 mg  40 mg Oral Daily With Dinner Adrian More MD   40 mg at 01/30/24 1745    azelastine (ASTELIN) 0.1 % nasal spray 1 spray  1 spray Each Nare BID Adrian More MD   1 spray at 01/31/24 0931    bimatoprost (LUMIGAN) 0.03 % ophthalmic drops 1 drop  1 drop Both Eyes QPM Adrian More MD        chlorhexidine (PERIDEX) 0.12 % oral rinse 15 mL  15 mL Mouth/Throat Q12H RANDALL Adrian More MD   15 mL at 01/31/24 0957    clopidogrel (PLAVIX) tablet 75 mg  75 mg Oral Daily Adrian More MD   75 mg at 01/31/24 0942    Empagliflozin (JARDIANCE) tablet 10 mg  10 mg Oral QAM Adrain More MD   10 mg at 01/31/24 0930    ferrous sulfate tablet 325 mg  325 mg Oral Daily With Breakfast Adrian More MD   325 mg at 01/31/24 0942    furosemide (LASIX) tablet 40 mg  40 mg Oral Every Other Day Adrian More MD   40 mg at 01/31/24 0930    glycerin-hypromellose- (ARTIFICIAL TEARS) ophthalmic solution 1 drop  1 drop Both Eyes Q6H PRN Adrian More MD   1 drop at 01/31/24 0932    insulin detemir (LEVEMIR) subcutaneous injection 30 Units  30 Units Subcutaneous HS Adrian More MD   30 Units at 01/30/24 2239    insulin lispro (HumaLOG) 100 units/mL subcutaneous injection 2-12 Units  2-12 Units Subcutaneous TID AC Adrian More MD   4 Units at 01/31/24 1153    insulin lispro (HumaLOG) 100 units/mL subcutaneous injection 2-12 Units  2-12 Units Subcutaneous HS Adrian More MD   2 Units at 01/30/24 2240    insulin lispro (HumaLOG) 100  units/mL subcutaneous injection 5 Units  5 Units Subcutaneous TID With Meals Adrian More MD   5 Units at 01/31/24 1153    ipratropium (ATROVENT) 0.02 % inhalation solution 0.5 mg  0.5 mg Nebulization Q6H PRN Adrian More MD   0.5 mg at 01/28/24 0748    iron sucrose (VENOFER) 200 mg in sodium chloride 0.9% 100 ml IVPB 200 mg  200 mg Intravenous Daily Adrian More  mL/hr at 01/31/24 0942 200 mg at 01/31/24 0942    levalbuterol (XOPENEX) inhalation solution 0.63 mg  0.63 mg Nebulization Q6H PRN Adrian More MD   0.63 mg at 01/28/24 0748    metoprolol tartrate (LOPRESSOR) partial tablet 12.5 mg  12.5 mg Oral Q12H [facility] Adrian More MD   12.5 mg at 01/31/24 0930    pantoprazole (PROTONIX) injection 40 mg  40 mg Intravenous Q12H [facility] Adrian More MD   40 mg at 01/31/24 0930    rifaximin (XIFAXAN) tablet 550 mg  550 mg Oral Q12H [facility] Adrian More MD   550 mg at 01/31/24 0930    traZODone (DESYREL) tablet 100 mg  100 mg Oral  Adrian More MD   100 mg at 01/30/24 2240    trimethobenzamide (TIGAN) IM injection 200 mg  200 mg Intramuscular Q12H PRN Adrian More MD   200 mg at 01/23/24 0717    umeclidinium-vilanterol 62.5-25 mcg/actuation inhaler 1 puff  1 puff Inhalation Daily Adrian More MD   1 puff at 01/31/24 0931      Medications Prior to Admission   Medication    acetaminophen (TYLENOL) 650 mg CR tablet    Anoro Ellipta 62.5-25 MCG/ACT inhaler    atorvastatin (LIPITOR) 40 mg tablet    azelastine (ASTELIN) 0.1 % nasal spray    Blood Glucose Monitoring Suppl (OneTouch Verio) w/Device KIT    clopidogrel (PLAVIX) 75 mg tablet    Continuous Blood Gluc  (Dexcom G6 ) NOHEMY    Eliquis 5 MG    ferrous sulfate 325 (65 Fe) mg tablet    furosemide (LASIX) 40 mg tablet    insulin detemir (Levemir FlexPen) 100 Units/mL injection pen    Insulin Pen Needle (Droplet Pen Needles) 32G X 4 MM MISC    Jardiance 10 MG TABS tablet    Lumigan 0.01 % ophthalmic drops     "metoprolol succinate (TOPROL-XL) 50 mg 24 hr tablet    Multiple Vitamin (MULTIVITAMIN ADULT PO)    NovoLOG FlexPen 100 units/mL injection pen    Potassium Citrate ER 15 MEQ (1620 MG) TBCR    pregabalin (LYRICA) 100 mg capsule    sertraline (ZOLOFT) 25 mg tablet    SUPER B COMPLEX/C PO    tamsulosin (FLOMAX) 0.4 mg    traZODone (DESYREL) 100 mg tablet    Trulicity 0.75 MG/0.5ML injection    Continuous Blood Gluc Sensor (Dexcom G6 Sensor) MISC    Continuous Blood Gluc Transmit (Dexcom G6 Transmitter) MISC    Continuous Blood Gluc Transmit (Dexcom G6 Transmitter) MISC    glucose blood (OneTouch Verio) test strip    mometasone (ELOCON) 0.1 % cream    pantoprazole (PROTONIX) 40 mg tablet       Labs:  Results from last 7 days   Lab Units 01/31/24 0414 01/30/24  1948 01/30/24 0430 01/29/24  1048 01/29/24  0437   WBC Thousand/uL 5.32  --  6.03  --  7.03   HEMOGLOBIN g/dL 8.1* 8.6* 8.3*   < > 8.0*   HEMATOCRIT % 26.2* 28.3* 26.7*   < > 26.0*   PLATELETS Thousands/uL 176  --  193  --  167   NEUTROS PCT % 60  --  63  --  65   LYMPHS PCT % 12*  --  12*  --  11*   MONOS PCT % 21*  --  18*  --  18*   EOS PCT % 6  --  6  --  5    < > = values in this interval not displayed.     Results from last 7 days   Lab Units 01/31/24 0414 01/30/24  0430 01/29/24  0437 01/26/24  1213 01/26/24  1056   POTASSIUM mmol/L 3.9 4.0 3.5   < >  --    CHLORIDE mmol/L 112* 109* 108   < >  --    CO2 mmol/L 20* 23 24   < >  --    CO2, I-STAT mmol/L  --   --   --   --  20*   BUN mg/dL 33* 37* 37*   < >  --    CREATININE mg/dL 1.01 1.17 1.25   < >  --    CALCIUM mg/dL 8.3* 8.4 8.6   < >  --    ALK PHOS U/L 426* 396* 354*   < >  --    ALT U/L 45 46 46   < >  --    AST U/L 56* 57* 50*   < >  --    GLUCOSE, ISTAT mg/dl  --   --   --   --  194*    < > = values in this interval not displayed.     No results found for: \"TROPONINI\", \"CKMB\", \"CKTOTAL\"  Results from last 7 days   Lab Units 01/28/24  1526   INR  1.19     Lab Results   Component Value Date    " BLOODCX No Growth After 5 Days. 08/10/2023    BLOODCX No Growth After 5 Days. 08/10/2023         Imaging:  Results for orders placed during the hospital encounter of 01/23/24    XR chest portable    Narrative  CHEST    INDICATION:   line confirmation.    COMPARISON: 1/23/2024    EXAM PERFORMED/VIEWS:  XR CHEST PORTABLE      FINDINGS: The tip of the endotracheal tube is above the george. The right IJ central venous catheter has the tip projecting over the superior vena cava. The tip of the endogastric tube appears to be in the gastric cardia, just above the level of the  diaphragm. This should probably be advanced somewhat.    Heart appears enlarged. Aorta is atherosclerotic.    There seems to be hazy infiltrate in the right lung, predominantly interstitial pattern. Again, there appears to be minimal right effusion. No pneumothorax identified.    Osseous structures appear within normal limits for patient age.    Impression  Right IJ line projects over the superior vena cava and appears grossly satisfactory. No pneumothorax seen.    Endotracheal tube appears satisfactory.    Endogastric tube should probably be advanced farther into the stomach.    Small right effusion and relatively diffuse interstitial infiltrate in the right lung    The study was marked in EPIC for immediate notification.            Workstation performed: [id number]    Results for orders placed during the hospital encounter of 11/03/23    XR chest 2 views    Narrative  CHEST    INDICATION:   cooughing up bright red blood.    COMPARISON: 8/17/2023    EXAM PERFORMED/VIEWS:  XR CHEST PA & LATERAL The frontal view was performed utilizing dual energy radiographic technique.      FINDINGS:    Cardiomediastinal silhouette appears unremarkable.    The lungs are clear.  No pneumothorax or pleural effusion.    Osseous structures appear within normal limits for patient age.    Impression  No acute cardiopulmonary disease.            Workstation performed:  [id number]      Last 24 Hours Medication List:   Current Facility-Administered Medications   Medication Dose Route Frequency Provider Last Rate    acetaminophen  650 mg Oral Q6H PRN Adrian More MD      apixaban  5 mg Oral BID Adrian More MD      atorvastatin  40 mg Oral Daily With Dinner Adrian More MD      azelastine  1 spray Each Nare BID Adrian More MD      bimatoprost  1 drop Both Eyes QPM Adrian More MD      chlorhexidine  15 mL Mouth/Throat Q12H [facility] Adrian More MD      clopidogrel  75 mg Oral Daily Adrian More MD      Empagliflozin  10 mg Oral QAM Adrian More MD      ferrous sulfate  325 mg Oral Daily With Breakfast Adrian More MD      furosemide  40 mg Oral Every Other Day Adrian More MD      glycerin-hypromellose-  1 drop Both Eyes Q6H PRN Adrian More MD      insulin detemir  30 Units Subcutaneous HS Adrian More MD      insulin lispro  2-12 Units Subcutaneous TID AC Adrian More MD      insulin lispro  2-12 Units Subcutaneous  Adrian More MD      insulin lispro  5 Units Subcutaneous TID With Meals Adrian More MD      ipratropium  0.5 mg Nebulization Q6H PRN Adrian More MD      iron sucrose  200 mg Intravenous Daily Adrian More  mg (01/31/24 0942)    levalbuterol  0.63 mg Nebulization Q6H PRN Adrian More MD      metoprolol tartrate  12.5 mg Oral Q12H [facility] Adrian More MD      pantoprazole  40 mg Intravenous Q12H [facility] Adrian More MD      rifaximin  550 mg Oral Q12H [facility] Adrian More MD      traZODone  100 mg Oral HS Adrian More MD      trimethobenzamide  200 mg Intramuscular Q12H PRN Adrian More MD      umeclidinium-vilanterol  1 puff Inhalation Daily Adrian More MD          Today, Patient Was Seen By: Adrian More MD    ** Please Note: Dictation voice to text software may have been used in the creation of this document. **

## 2024-01-31 NOTE — PLAN OF CARE
Problem: Potential for Falls  Goal: Patient will remain free of falls  Description: INTERVENTIONS:  - Educate patient/family on patient safety including physical limitations  - Instruct patient to call for assistance with activity   - Consult OT/PT to assist with strengthening/mobility   - Keep Call bell within reach  - Keep bed low and locked with side rails adjusted as appropriate  - Keep care items and personal belongings within reach  - Initiate and maintain comfort rounds  - Make Fall Risk Sign visible to staff  - Offer Toileting every 2 Hours, in advance of need  - Initiate/Maintain alarm  - Obtain necessary fall risk management equipment  - Apply yellow socks and bracelet for high fall risk patients  - Consider moving patient to room near nurses station  Outcome: Progressing     Problem: PAIN - ADULT  Goal: Verbalizes/displays adequate comfort level or baseline comfort level  Description: Interventions:  - Encourage patient to monitor pain and request assistance  - Assess pain using appropriate pain scale  - Administer analgesics based on type and severity of pain and evaluate response  - Implement non-pharmacological measures as appropriate and evaluate response  - Consider cultural and social influences on pain and pain management  - Notify physician/advanced practitioner if interventions unsuccessful or patient reports new pain  Outcome: Progressing     Problem: Knowledge Deficit  Goal: Patient/family/caregiver demonstrates understanding of disease process, treatment plan, medications, and discharge instructions  Description: Complete learning assessment and assess knowledge base.  Interventions:  - Provide teaching at level of understanding  - Provide teaching via preferred learning methods  Outcome: Progressing

## 2024-01-31 NOTE — PLAN OF CARE
Problem: Potential for Falls  Goal: Patient will remain free of falls  Description: INTERVENTIONS:  - Educate patient/family on patient safety including physical limitations  - Instruct patient to call for assistance with activity   - Consult OT/PT to assist with strengthening/mobility   - Keep Call bell within reach  - Keep bed low and locked with side rails adjusted as appropriate  - Keep care items and personal belongings within reach  - Initiate and maintain comfort rounds  - Make Fall Risk Sign visible to staff  - Offer Toileting every 2 Hours, in advance of need  - Initiate/Maintain alarm  - Obtain necessary fall risk management equipment  - Apply yellow socks and bracelet for high fall risk patients  - Consider moving patient to room near nurses station  Outcome: Progressing     Problem: PAIN - ADULT  Goal: Verbalizes/displays adequate comfort level or baseline comfort level  Description: Interventions:  - Encourage patient to monitor pain and request assistance  - Assess pain using appropriate pain scale  - Administer analgesics based on type and severity of pain and evaluate response  - Implement non-pharmacological measures as appropriate and evaluate response  - Consider cultural and social influences on pain and pain management  - Notify physician/advanced practitioner if interventions unsuccessful or patient reports new pain  Outcome: Progressing     Problem: Knowledge Deficit  Goal: Patient/family/caregiver demonstrates understanding of disease process, treatment plan, medications, and discharge instructions  Description: Complete learning assessment and assess knowledge base.  Interventions:  - Provide teaching at level of understanding  - Provide teaching via preferred learning methods  Outcome: Progressing     Problem: GASTROINTESTINAL - ADULT  Goal: Minimal or absence of nausea and/or vomiting  Description: INTERVENTIONS:  - Administer IV fluids if ordered to ensure adequate hydration  - Maintain  NPO status until nausea and vomiting are resolved  - Nasogastric tube if ordered  - Administer ordered antiemetic medications as needed  - Provide nonpharmacologic comfort measures as appropriate  - Advance diet as tolerated, if ordered  - Consider nutrition services referral to assist patient with adequate nutrition and appropriate food choices  Outcome: Progressing  Goal: Maintains or returns to baseline bowel function  Description: INTERVENTIONS:  - Assess bowel function  - Encourage oral fluids to ensure adequate hydration  - Administer IV fluids if ordered to ensure adequate hydration  - Administer ordered medications as needed  - Encourage mobilization and activity  - Consider nutritional services referral to assist patient with adequate nutrition and appropriate food choices  Outcome: Progressing  Goal: Maintains adequate nutritional intake  Description: INTERVENTIONS:  - Monitor percentage of each meal consumed  - Identify factors contributing to decreased intake, treat as appropriate  - Assist with meals as needed  - Monitor I&O, weight, and lab values if indicated  - Obtain nutrition services referral as needed  Outcome: Progressing  Goal: Establish and maintain optimal ostomy function  Description: INTERVENTIONS:  - Assess bowel function  - Encourage oral fluids to ensure adequate hydration  - Administer IV fluids if ordered to ensure adequate hydration   - Administer ordered medications as needed  - Encourage mobilization and activity  - Nutrition services referral to assist patient with appropriate food choices  - Assess stoma site  - Consider wound care consult   Outcome: Progressing  Goal: Oral mucous membranes remain intact  Description: INTERVENTIONS  - Assess oral mucosa and hygiene practices  - Implement preventative oral hygiene regimen  - Implement oral medicated treatments as ordered  - Initiate Nutrition services referral as needed  Outcome: Progressing     Problem: METABOLIC, FLUID AND  ELECTROLYTES - ADULT  Goal: Electrolytes maintained within normal limits  Description: INTERVENTIONS:  - Monitor labs and assess patient for signs and symptoms of electrolyte imbalances  - Administer electrolyte replacement as ordered  - Monitor response to electrolyte replacements, including repeat lab results as appropriate  - Instruct patient on fluid and nutrition as appropriate  Outcome: Progressing  Goal: Fluid balance maintained  Description: INTERVENTIONS:  - Monitor labs   - Monitor I/O and WT  - Instruct patient on fluid and nutrition as appropriate  - Assess for signs & symptoms of volume excess or deficit  Outcome: Progressing  Goal: Glucose maintained within target range  Description: INTERVENTIONS:  - Monitor Blood Glucose as ordered  - Assess for signs and symptoms of hyperglycemia and hypoglycemia  - Administer ordered medications to maintain glucose within target range  - Assess nutritional intake and initiate nutrition service referral as needed  Outcome: Progressing     Problem: HEMATOLOGIC - ADULT  Goal: Maintains hematologic stability  Description: INTERVENTIONS  - Assess for signs and symptoms of bleeding or hemorrhage  - Monitor labs  - Administer supportive blood products/factors as ordered and appropriate  Outcome: Progressing     Problem: Prexisting or High Potential for Compromised Skin Integrity  Goal: Skin integrity is maintained or improved  Description: INTERVENTIONS:  - Identify patients at risk for skin breakdown  - Assess and monitor skin integrity  - Assess and monitor nutrition and hydration status  - Monitor labs   - Assess for incontinence   - Turn and reposition patient  - Assist with mobility/ambulation  - Relieve pressure over bony prominences  - Avoid friction and shearing  - Provide appropriate hygiene as needed including keeping skin clean and dry  - Evaluate need for skin moisturizer/barrier cream  - Collaborate with interdisciplinary team   - Patient/family teaching  -  Consider wound care consult   Outcome: Progressing     Problem: Nutrition/Hydration-ADULT  Goal: Nutrient/Hydration intake appropriate for improving, restoring or maintaining nutritional needs  Description: Monitor and assess patient's nutrition/hydration status for malnutrition. Collaborate with interdisciplinary team and initiate plan and interventions as ordered.  Monitor patient's weight and dietary intake as ordered or per policy. Utilize nutrition screening tool and intervene as necessary. Determine patient's food preferences and provide high-protein, high-caloric foods as appropriate.     INTERVENTIONS:  - Monitor oral intake, urinary output, labs, and treatment plans  - Assess nutrition and hydration status and recommend course of action  - Evaluate amount of meals eaten  - Assist patient with eating if necessary   - Allow adequate time for meals  - Recommend/ encourage appropriate diets, oral nutritional supplements, and vitamin/mineral supplements  - Order, calculate, and assess calorie counts as needed  - Recommend, monitor, and adjust tube feedings and TPN/PPN based on assessed needs  - Assess need for intravenous fluids  - Provide specific nutrition/hydration education as appropriate  - Include patient/family/caregiver in decisions related to nutrition  Outcome: Progressing     Problem: SAFETY,RESTRAINT: NV/NON-SELF DESTRUCTIVE BEHAVIOR  Goal: Remains free of harm/injury (restraint for non violent/non self-detsructive behavior)  Description: INTERVENTIONS:  - Instruct patient/family regarding restraint use   - Assess and monitor physiologic and psychological status   - Provide interventions and comfort measures to meet assessed patient needs   - Identify and implement measures to help patient regain control  - Assess readiness for release of restraint   Outcome: Progressing  Goal: Returns to optimal restraint-free functioning  Description: INTERVENTIONS:  - Assess the patient's behavior and symptoms  that indicate continued need for restraint  - Identify and implement measures to help patient regain control  - Assess readiness for release of restraint   Outcome: Progressing     Problem: RESPIRATORY - ADULT  Goal: Achieves optimal ventilation and oxygenation  Description: INTERVENTIONS:  - Assess for changes in respiratory status  - Assess for changes in mentation and behavior  - Position to facilitate oxygenation and minimize respiratory effort  - Oxygen administered by appropriate delivery if ordered  - Initiate smoking cessation education as indicated  - Encourage broncho-pulmonary hygiene including cough, deep breathe, Incentive Spirometry  - Assess the need for suctioning and aspirate as needed  - Assess and instruct to report SOB or any respiratory difficulty  - Respiratory Therapy support as indicated  Outcome: Progressing

## 2024-01-31 NOTE — TELEPHONE ENCOUNTER
Regarding: Dr Lance call back  ----- Message from Margareth Knapp sent at 1/31/2024 12:17 PM EST -----  Pt's wife would like a call from Dr Lance as pt is in the hospital with liver cancer and Dr Gong will not write an order for MRI and cancer marker. She is asking for Dr Lance to write the orders or ask for an Oncology consult and they will order it. Please have Dr Lance reach out to pt's wife.

## 2024-01-31 NOTE — ASSESSMENT & PLAN NOTE
Patient is a 78-year-old male who has been seeing [facility] GI last seen 12/13/2023 patient has a history of anemia is multifactorial chronic due to cancer with iron deficiency and occult GI blood loss on Eliquis review of an EGD with multiple AVMs.  Patient came in to the ER from home due to generalized weakness nausea vomiting since this morning reports lower abdominal pain has a history of anemia is on Eliquis and Plavix episodes of vomiting which were described by the significant other is dark of clotted material, and also had melanotic stool occult positive.  Patient felt significant weakness and was unable to walk felt like too weak to walk.  Patient has a history of an upper GI bleed with multiple AVMs, also has a history of the patient intubation with tracheostomy approximately rehab time of 1 year.   CT scan with contrast showed nonspecific hypodensity within the gastric body possibly reflective of hemorrhage in this patient with the history of gastric AVMs, known liver cancer, cholelithiasis, small volume abdominal pelvic ascites  Patient's baseline hemoglobin appears to be 8-10, except 1 value on 11/23 of 13.    Patient is on Eliquis and Plavix as o/p  In the ED received DDAVP 0.3 mg/kg and Kcentra  GI consulted  1/23 S/P EGD - 3 large angioectasias in the fundus of the stomach and body of the stomach; bleeding was observed; placed clips; hemostasis achieved; induced coagulation and hemostasis achieved with argon plasma coagulation The esophagus and duodenum appeared normal.  Pt received a total of 3 units PRBC this admission so far, last on 1/25/24     Plan:  Monitor H/H Q12 hours- Hgb remains stable   Continue PPI BID  Patient extubated 1/27  Patient is tolerating diet  GI following appreciate recommendations- Need to address if stable to restart plavix  Patient is restarted on Plavix and Eliquis after clearance from GI and cardiology.  DC heparin drip  Hemoglobin is 8.1  Monitor H&H closely

## 2024-01-31 NOTE — PROGRESS NOTES
Progress note - Gastroenterology   Derek Walter 78 y.o. male MRN: [id number]  Unit/Bed#: -01 Encounter: [id number]    ASSESSMENT and PLAN    Upper GI bleed secondary to gastric AVMs  Acute blood loss anemia  S/P EGD with cautery and clipping bleeding gastric AVMs.  S/P 3 units packed red blood cells  No evidence of bleeding.  Hemoglobin stable around 8.  Hemoglobin 8.1 with labs today.  Iron panel 1/30; TIBC 289, iron <10, ferritin 37, unable to calculate iron sat.  Heparin IV discontinued, patient restarted (1/30) on Eliquis 5 mg twice daily and Plavix 75 mg daily.  Per patient's nurse, there was a small amount of blood smear on patient's brief this morning.  Discussed monitoring patient for increased bleeding.     -Monitor hemoglobin, transfuse less than 7  -Continue PPI  -Follow H&H and signs/symptoms of bleeding  -- Venofer  mg x 3 doses (1/31 1st dose)      3.  Altered mental status  Secondary to anesthesia versus some component of hepatic encephalopathy related to GI bleeding.  Mental status improving.     -Continue rifaximin   - Lactulose d/c 1/27  -Rifaximin appears to be covered by patient's insurance with no or minor co-pay.    -Per speech evaluation, diet advanced to mechanical soft, thin liquids.       4.  Atrial fibrillation  5. Coronary artery disease  Cardiology feels strongly about anticoagulation.     Heparin IV discontinued today, no Eliquis 5 mg twice daily and Plavix 75 mg daily started.  Per patient's nurse, there is a H&H scheduled for later this evening.     6.  Hepatocellular carcinoma    Chief Complaint   Patient presents with    Abdominal Pain     Pt via EMS from home with c/o generalized weakness, nausea, and vomiting since the morning. Reports lower abd pain. Reports hx of anemia.       SUBJECTIVE/HPI   Patient out of bed to the chair.  He denies abdominal pain, nausea or vomiting.  States he is tolerating his meals well.  Patient concerned that he is voiding too much, he is on  "diuretics.  Per patient's nurse, there was a small smear of blood to patient's brief this morning.  Patient was restarted on Eliquis and Plavix yesterday.  Discussed with patient's nurse monitor for increased rectal bleeding.  Monitoring H&H.    /56   Pulse 85   Temp 97.9 °F (36.6 °C)   Resp 16   Ht 6' (1.829 m)   Wt 95.2 kg (209 lb 14.1 oz)   SpO2 97%   BMI 28.46 kg/m²     PHYSICALEXAM  General appearance: alert, appears stated age and cooperative  Eyes: PERLLA, EOMI, no icterus   Head: Normocephalic, without obvious abnormality, atraumatic  Lungs: clear to auscultation bilaterally  Heart: regular rate and rhythm, S1, S2 normal, no murmur, click, rub or gallop  Abdomen: Large, soft, non-tender; bowel sounds normal; no masses,  no organomegaly  Extremities: extremities normal, atraumatic, no cyanosis or edema  Neurologic: Grossly normal    Lab Results   Component Value Date    GLUCOSE 194 (H) 01/26/2024    CALCIUM 8.3 (L) 01/31/2024    K 3.9 01/31/2024    CO2 20 (L) 01/31/2024     (H) 01/31/2024    BUN 33 (H) 01/31/2024    CREATININE 1.01 01/31/2024     Lab Results   Component Value Date    WBC 5.32 01/31/2024    HGB 8.1 (L) 01/31/2024    HCT 26.2 (L) 01/31/2024    MCV 95 01/31/2024     01/31/2024     Lab Results   Component Value Date    ALT 45 01/31/2024    AST 56 (H) 01/31/2024    ALKPHOS 426 (H) 01/31/2024     No results found for: \"AMYLASE\"  Lab Results   Component Value Date    LIPASE 39 01/23/2024     Lab Results   Component Value Date    IRON <10 (L) 01/30/2024    TIBC <289 01/30/2024    FERRITIN 37 01/30/2024     Lab Results   Component Value Date    INR 1.19 01/28/2024     "

## 2024-01-31 NOTE — ASSESSMENT & PLAN NOTE
Rate controled  Maintained on Eliquis and metoprolol as o/p  Continue on Lopressor  Hold Eliquis and Plavix until okay with GI   Cardiology consult regarding input for antiplatelet/anticoagulation appreciated  As per cardiology restart Plavix and Eliquis  Discussed with GI and patient will be restarted 1/30 and will monitor H&H closely

## 2024-01-31 NOTE — ASSESSMENT & PLAN NOTE
1/24 0630A RN called provider to bedside to evaluate patient for left facial droop. NIHHS 4 (left facial droop, LUE weakness +4/5, LLE weakness +4/5, LLE drift and mild dysarthria). RN states left facial droop was noticed on arrival to ICU at 0300A. RN states ED could not confirm if droop was present in ED and wife unsure if droop was new. Stroke alert called.   CTH - no acute stroke or hemorrhage  CTA H/N - no large vessel occulusion. No arterial occulusion. 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   1/24 MRI: Negative for acute findings   1/24 ECHO: EF 75%, Mild TVR  Mentation continuing to improve. Able to be extubated 1/27     Plan:  Patient discussed with Dr. Interiano (Neuro)   TNK not given due to unknown last known well and active GI bleed.  Goal SBP >140  1/24 ECHO: EF 75%, Mild TVR  Restarted on Plavix and Eliquis  Restart statin  SLP consult appreciated  Neurology following  Improving  Aspiration/fall precautions

## 2024-01-31 NOTE — TELEPHONE ENCOUNTER
"Forwarding for your review. There is a 1/24/24 telephone encounter from Dr. Gong in regards to this.      Answer Assessment - Initial Assessment Questions  1. REASON FOR CALL or QUESTION: \"What is your reason for calling today?\" or \"How can I best help you?\" or \"What question do you have that I can help answer?\"      Wife requesting a call back from Dr. Lance regarding testing.    Protocols used: Information Only Call - No Triage-ADULT-[place]    "

## 2024-02-01 ENCOUNTER — TELEPHONE (OUTPATIENT)
Dept: SURGICAL ONCOLOGY | Facility: CLINIC | Age: 79
End: 2024-02-01

## 2024-02-01 LAB
AFP-TM SERPL-MCNC: ABNORMAL NG/ML (ref 0–9)
ALBUMIN SERPL BCP-MCNC: 3.4 G/DL (ref 3.5–5)
ALP SERPL-CCNC: 503 U/L (ref 34–104)
ALT SERPL W P-5'-P-CCNC: 50 U/L (ref 7–52)
AMMONIA PLAS-SCNC: 39 UMOL/L (ref 18–72)
ANION GAP SERPL CALCULATED.3IONS-SCNC: 9 MMOL/L
AST SERPL W P-5'-P-CCNC: 70 U/L (ref 13–39)
BASOPHILS # BLD AUTO: 0.04 THOUSANDS/ÂΜL (ref 0–0.1)
BASOPHILS NFR BLD AUTO: 1 % (ref 0–1)
BILIRUB SERPL-MCNC: 1.24 MG/DL (ref 0.2–1)
BUN SERPL-MCNC: 30 MG/DL (ref 5–25)
CALCIUM ALBUM COR SERPL-MCNC: 9.2 MG/DL (ref 8.3–10.1)
CALCIUM SERPL-MCNC: 8.7 MG/DL (ref 8.4–10.2)
CHLORIDE SERPL-SCNC: 110 MMOL/L (ref 96–108)
CO2 SERPL-SCNC: 20 MMOL/L (ref 21–32)
CREAT SERPL-MCNC: 1.12 MG/DL (ref 0.6–1.3)
EOSINOPHIL # BLD AUTO: 0.31 THOUSAND/ÂΜL (ref 0–0.61)
EOSINOPHIL NFR BLD AUTO: 6 % (ref 0–6)
ERYTHROCYTE [DISTWIDTH] IN BLOOD BY AUTOMATED COUNT: 16.1 % (ref 11.6–15.1)
GFR SERPL CREATININE-BSD FRML MDRD: 63 ML/MIN/1.73SQ M
GLUCOSE SERPL-MCNC: 155 MG/DL (ref 65–140)
GLUCOSE SERPL-MCNC: 162 MG/DL (ref 65–140)
GLUCOSE SERPL-MCNC: 187 MG/DL (ref 65–140)
GLUCOSE SERPL-MCNC: 69 MG/DL (ref 65–140)
GLUCOSE SERPL-MCNC: 87 MG/DL (ref 65–140)
GLUCOSE SERPL-MCNC: 87 MG/DL (ref 65–140)
HCT VFR BLD AUTO: 31.8 % (ref 36.5–49.3)
HGB BLD-MCNC: 9.3 G/DL (ref 12–17)
IMM GRANULOCYTES # BLD AUTO: 0.04 THOUSAND/UL (ref 0–0.2)
IMM GRANULOCYTES NFR BLD AUTO: 1 % (ref 0–2)
LYMPHOCYTES # BLD AUTO: 0.61 THOUSANDS/ÂΜL (ref 0.6–4.47)
LYMPHOCYTES NFR BLD AUTO: 11 % (ref 14–44)
MAGNESIUM SERPL-MCNC: 2 MG/DL (ref 1.9–2.7)
MCH RBC QN AUTO: 28.4 PG (ref 26.8–34.3)
MCHC RBC AUTO-ENTMCNC: 29.2 G/DL (ref 31.4–37.4)
MCV RBC AUTO: 97 FL (ref 82–98)
MONOCYTES # BLD AUTO: 0.79 THOUSAND/ÂΜL (ref 0.17–1.22)
MONOCYTES NFR BLD AUTO: 14 % (ref 4–12)
NEUTROPHILS # BLD AUTO: 3.72 THOUSANDS/ÂΜL (ref 1.85–7.62)
NEUTS SEG NFR BLD AUTO: 67 % (ref 43–75)
NRBC BLD AUTO-RTO: 0 /100 WBCS
PLATELET # BLD AUTO: 167 THOUSANDS/UL (ref 149–390)
PMV BLD AUTO: 11.9 FL (ref 8.9–12.7)
POTASSIUM SERPL-SCNC: 4.1 MMOL/L (ref 3.5–5.3)
PROT SERPL-MCNC: 6.5 G/DL (ref 6.4–8.4)
RBC # BLD AUTO: 3.27 MILLION/UL (ref 3.88–5.62)
SODIUM SERPL-SCNC: 139 MMOL/L (ref 135–147)
WBC # BLD AUTO: 5.51 THOUSAND/UL (ref 4.31–10.16)

## 2024-02-01 PROCEDURE — 99232 SBSQ HOSP IP/OBS MODERATE 35: CPT | Performed by: INTERNAL MEDICINE

## 2024-02-01 PROCEDURE — 82948 REAGENT STRIP/BLOOD GLUCOSE: CPT

## 2024-02-01 PROCEDURE — 82140 ASSAY OF AMMONIA: CPT | Performed by: INTERNAL MEDICINE

## 2024-02-01 PROCEDURE — C9113 INJ PANTOPRAZOLE SODIUM, VIA: HCPCS | Performed by: INTERNAL MEDICINE

## 2024-02-01 PROCEDURE — 92526 ORAL FUNCTION THERAPY: CPT

## 2024-02-01 PROCEDURE — 85025 COMPLETE CBC W/AUTO DIFF WBC: CPT | Performed by: INTERNAL MEDICINE

## 2024-02-01 PROCEDURE — 82105 ALPHA-FETOPROTEIN SERUM: CPT | Performed by: NURSE PRACTITIONER

## 2024-02-01 PROCEDURE — 83735 ASSAY OF MAGNESIUM: CPT | Performed by: INTERNAL MEDICINE

## 2024-02-01 PROCEDURE — 80053 COMPREHEN METABOLIC PANEL: CPT | Performed by: INTERNAL MEDICINE

## 2024-02-01 RX ADMIN — EMPAGLIFLOZIN 10 MG: 10 TABLET, FILM COATED ORAL at 09:27

## 2024-02-01 RX ADMIN — RIFAXIMIN 550 MG: 550 TABLET ORAL at 09:27

## 2024-02-01 RX ADMIN — LACTULOSE 20 G: 20 SOLUTION ORAL at 09:28

## 2024-02-01 RX ADMIN — PANTOPRAZOLE SODIUM 40 MG: 40 INJECTION, POWDER, FOR SOLUTION INTRAVENOUS at 09:27

## 2024-02-01 RX ADMIN — INSULIN LISPRO 2 UNITS: 100 INJECTION, SOLUTION INTRAVENOUS; SUBCUTANEOUS at 17:06

## 2024-02-01 RX ADMIN — PANTOPRAZOLE SODIUM 40 MG: 40 INJECTION, POWDER, FOR SOLUTION INTRAVENOUS at 21:54

## 2024-02-01 RX ADMIN — Medication 12.5 MG: at 09:28

## 2024-02-01 RX ADMIN — APIXABAN 5 MG: 5 TABLET, FILM COATED ORAL at 17:05

## 2024-02-01 RX ADMIN — BIMATOPROST 1 DROP: 0.3 SOLUTION/ DROPS OPHTHALMIC at 17:45

## 2024-02-01 RX ADMIN — INSULIN LISPRO 5 UNITS: 100 INJECTION, SOLUTION INTRAVENOUS; SUBCUTANEOUS at 17:06

## 2024-02-01 RX ADMIN — CLOPIDOGREL BISULFATE 75 MG: 75 TABLET ORAL at 09:27

## 2024-02-01 RX ADMIN — INSULIN LISPRO 5 UNITS: 100 INJECTION, SOLUTION INTRAVENOUS; SUBCUTANEOUS at 12:28

## 2024-02-01 RX ADMIN — RIFAXIMIN 550 MG: 550 TABLET ORAL at 21:54

## 2024-02-01 RX ADMIN — INSULIN LISPRO 2 UNITS: 100 INJECTION, SOLUTION INTRAVENOUS; SUBCUTANEOUS at 12:28

## 2024-02-01 RX ADMIN — Medication 12.5 MG: at 21:54

## 2024-02-01 RX ADMIN — AZELASTINE 1 SPRAY: 1 SPRAY, METERED NASAL at 10:17

## 2024-02-01 RX ADMIN — CHLORHEXIDINE GLUCONATE 15 ML: 1.2 SOLUTION ORAL at 10:17

## 2024-02-01 RX ADMIN — CHLORHEXIDINE GLUCONATE 15 ML: 1.2 SOLUTION ORAL at 21:54

## 2024-02-01 RX ADMIN — APIXABAN 5 MG: 5 TABLET, FILM COATED ORAL at 09:27

## 2024-02-01 RX ADMIN — LACTULOSE 20 G: 20 SOLUTION ORAL at 17:05

## 2024-02-01 RX ADMIN — TRAZODONE HYDROCHLORIDE 100 MG: 50 TABLET ORAL at 21:54

## 2024-02-01 RX ADMIN — INSULIN LISPRO 2 UNITS: 100 INJECTION, SOLUTION INTRAVENOUS; SUBCUTANEOUS at 21:54

## 2024-02-01 RX ADMIN — INSULIN DETEMIR 30 UNITS: 100 INJECTION, SOLUTION SUBCUTANEOUS at 21:54

## 2024-02-01 RX ADMIN — ATORVASTATIN CALCIUM 40 MG: 40 TABLET, FILM COATED ORAL at 17:05

## 2024-02-01 RX ADMIN — IRON SUCROSE 200 MG: 20 INJECTION, SOLUTION INTRAVENOUS at 12:07

## 2024-02-01 RX ADMIN — AZELASTINE 1 SPRAY: 1 SPRAY, METERED NASAL at 17:05

## 2024-02-01 RX ADMIN — UMECLIDINIUM BROMIDE AND VILANTEROL TRIFENATATE 1 PUFF: 62.5; 25 POWDER RESPIRATORY (INHALATION) at 10:18

## 2024-02-01 RX ADMIN — FERROUS SULFATE TAB 325 MG (65 MG ELEMENTAL FE) 325 MG: 325 (65 FE) TAB at 09:28

## 2024-02-01 NOTE — ASSESSMENT & PLAN NOTE
Lab Results   Component Value Date    HGBA1C 6.6 (H) 01/24/2024       Recent Labs     01/31/24  1604 01/31/24  2121 02/01/24  0732 02/01/24  0836   POCGLU 162* 163* 69 87       Patient has a Dexcom, on Trulicity and Jardiance, Lantus and insulin  Extubated 1/27. Currently no enteral access for TF. Insulin drip stopped. Changed to correction scale insulin  Accu-Chek ACHS with Humalog sliding scale  Blood glucose goal 140-180  Hypoglycemia protocol

## 2024-02-01 NOTE — PROGRESS NOTES
Progress note - Gastroenterology   Derek Walter 78 y.o. male MRN: [id number]  Unit/Bed#: -01 Encounter: [id number]    ASSESSMENT and PLAN    Upper GI bleed secondary to gastric AVMs  Acute blood loss anemia  S/P EGD with cautery and clipping bleeding gastric AVMs.  S/P 3 units packed red blood cells  No evidence of bleeding.  Hemoglobin stable around 8.  Hemoglobin 9.3 with labs today.  Iron panel 1/30; TIBC 289, iron <10, ferritin 37, unable to calculate iron sat.  Heparin IV discontinued, patient restarted (1/30) on Eliquis 5 mg twice daily and Plavix 75 mg daily.      -Monitor hemoglobin, transfuse less than 7  -Continue PPI  -Follow H&H and signs/symptoms of bleeding  -Oral iron supplement daily        3.  Altered mental status  Secondary to anesthesia versus some component of hepatic encephalopathy related to GI bleeding.  Mental status improving.     -Continue rifaximin   -Restarted lactulose 20 g twice daily 1/31, can titrate for 2-3 bowel movements per day  -Rifaximin appears to be covered by patient's insurance with no or minor co-pay.    -Tolerating diet advanced to mechanical soft, thin liquids.       4.  Atrial fibrillation  5. Coronary artery disease  Cardiology feels strongly about anticoagulation.     Eliquis 5 mg twice daily and Plavix 75 mg daily started.  Per patient's nurse, there is a H&H scheduled for later this evening.     6.  Hepatocellular carcinoma      Discussion with patient's wife, patient's son, Dr. Pereyra regarding treatment plan.  Awaiting results of AFP and MRI pending those results.      Chief Complaint   Patient presents with    Abdominal Pain     Pt via EMS from home with c/o generalized weakness, nausea, and vomiting since the morning. Reports lower abd pain. Reports hx of anemia.       SUBJECTIVE/HPI   Patient out to the chair.  Patient continues to be improving however wife is at the bedside and they are is concerned with him continuing to have some brain fog since he was  "ventilated at 1 time.  Discussion with Dr. Gr, patient's wife and patient's son via phone regarding treatment plan.    /61 (BP Location: Left arm)   Pulse 77   Temp 98.2 °F (36.8 °C) (Oral)   Resp 16   Ht 6' (1.829 m)   Wt 89.8 kg (198 lb)   SpO2 96%   BMI 26.85 kg/m²     PHYSICALEXAM  General appearance: alert but delayed,appears stated age and cooperative  Eyes: PERLLA, EOMI, no icterus   Head: Normocephalic, without obvious abnormality, atraumatic  Lungs: clear to auscultation bilaterally  Heart: regular rate and rhythm, S1, S2 normal, no murmur, click, rub or gallop  Abdomen: Large, soft, non-tender; bowel sounds normal; no masses,  no organomegaly  Extremities: extremities normal, atraumatic, no cyanosis.  Bilateral lower extremity edema  Neurologic: Grossly normal    Lab Results   Component Value Date    GLUCOSE 194 (H) 01/26/2024    CALCIUM 8.7 02/01/2024    K 4.1 02/01/2024    CO2 20 (L) 02/01/2024     (H) 02/01/2024    BUN 30 (H) 02/01/2024    CREATININE 1.12 02/01/2024     Lab Results   Component Value Date    WBC 5.51 02/01/2024    HGB 9.3 (L) 02/01/2024    HCT 31.8 (L) 02/01/2024    MCV 97 02/01/2024     02/01/2024     Lab Results   Component Value Date    ALT 50 02/01/2024    AST 70 (H) 02/01/2024    ALKPHOS 503 (H) 02/01/2024     No results found for: \"AMYLASE\"  Lab Results   Component Value Date    LIPASE 39 01/23/2024     Lab Results   Component Value Date    IRON <10 (L) 01/30/2024    TIBC <289 01/30/2024    FERRITIN 37 01/30/2024     Lab Results   Component Value Date    INR 1.19 01/28/2024     "

## 2024-02-01 NOTE — ASSESSMENT & PLAN NOTE
Patient is a 78-year-old male who has been seeing [facility] GI last seen 12/13/2023 patient has a history of anemia is multifactorial chronic due to cancer with iron deficiency and occult GI blood loss on Eliquis review of an EGD with multiple AVMs.  Patient came in to the ER from home due to generalized weakness nausea vomiting since this morning reports lower abdominal pain has a history of anemia is on Eliquis and Plavix episodes of vomiting which were described by the significant other is dark of clotted material, and also had melanotic stool occult positive.  Patient felt significant weakness and was unable to walk felt like too weak to walk.  Patient has a history of an upper GI bleed with multiple AVMs, also has a history of the patient intubation with tracheostomy approximately rehab time of 1 year.   CT scan with contrast showed nonspecific hypodensity within the gastric body possibly reflective of hemorrhage in this patient with the history of gastric AVMs, known liver cancer, cholelithiasis, small volume abdominal pelvic ascites  Patient's baseline hemoglobin appears to be 8-10, except 1 value on 11/23 of 13.    Patient is on Eliquis and Plavix as o/p  In the ED received DDAVP 0.3 mg/kg and Kcentra  GI consulted  1/23 S/P EGD - 3 large angioectasias in the fundus of the stomach and body of the stomach; bleeding was observed; placed clips; hemostasis achieved; induced coagulation and hemostasis achieved with argon plasma coagulation The esophagus and duodenum appeared normal.  Pt received a total of 3 units PRBC this admission so far, last on 1/25/24     Plan:  Monitor H/H Q12 hours- Hgb remains stable   Continue PPI BID  Patient extubated 1/27  Patient is tolerating diet  GI following appreciate recommendations- Need to address if stable to restart plavix  Patient is restarted on Plavix and Eliquis after clearance from GI and cardiology.  DC heparin drip  Hemoglobin is 9.3  Monitor H&H closely

## 2024-02-01 NOTE — PROGRESS NOTES
[facility]  Progress Note  Name: Derek Walter I  MRN: [id number]  Unit/Bed#: -01 I Date of Admission: 1/23/2024   Date of Service: 2/1/2024 I Hospital Day: 9    Assessment/Plan   Hepatic encephalopathy (HCC)  Assessment & Plan  patient is 78-year-old male with a history of liver cancer and radiation therapy who was admitted for an upper GI bleed with hypotension and later strokelike symptoms went unresponsive and was intubated, appears to be as cause from the liver with an ammonia level of 130  Most likely source of elevated ammonia level is liver history of liver cancer  GI following  Continue with Rifaximin when able   Patient should have 2-3 BMs a day  Ammonia level is normal  On lactulose    AMS (altered mental status)  Assessment & Plan  1/24 0630A RN called provider to bedside to evaluate patient for left facial droop. NIHHS 4 (left facial droop, LUE weakness +4/5, LLE weakness +4/5, LLE drift and mild dysarthria). RN states left facial droop was noticed on arrival to ICU at 0300A. RN states ED could not confirm if droop was present in ED and wife unsure if droop was new. Stroke alert called.   CTH - no acute stroke or hemorrhage  CTA H/N - no large vessel occulusion. No arterial occulusion. 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   1/24 MRI: Negative for acute findings   1/24 ECHO: EF 75%, Mild TVR  Mentation continuing to improve. Able to be extubated 1/27     Plan:  Patient discussed with Dr. Interiano (Neuro)   TNK not given due to unknown last known well and active GI bleed.  Goal SBP >140  1/24 ECHO: EF 75%, Mild TVR  Restarted on Plavix and Eliquis  Restart statin  SLP consult appreciated  Neurology following  Improving  Aspiration/fall precautions     Acute blood loss anemia  Assessment & Plan  Secondary to GIB  Hemoglobin baseline 8-10  Receive 3 units PRBC this admission, last 1/25   Hgb Q12H  History of iron deficiency   Iron panel reviewed.  On Venofer  On iron  supplementation    Chronic systolic heart failure (HCC)  Assessment & Plan  Wt Readings from Last 3 Encounters:   02/01/24 89.8 kg (198 lb)   12/20/23 96.6 kg (213 lb)   12/14/23 97 kg (213 lb 12.8 oz)     Patient is 78-year-old male with history of CAD post PCI in August not deemed a candidate for surgical intervention, systolic heart failure with reduced ejection fraction of 36%   Home meds: lasix, toprol, and statin   Resume home meds when able   Daily wts    Hepatocellular carcinoma (HCC)  Assessment & Plan  Patient has stage II liver cancer hepatocellular carcinoma  Has a tumor thrombus which appears stable seen again on admission CT  Status post Y 90 treatment    Follows with Dr. Gong as o/p    CAD (coronary artery disease)  Assessment & Plan  Patient has a history of coronary artery disease had 8/2023 a PCI status post cardiac catheterization drug-eluting stents x 3 in the LAD.  Ramus and left circumflex  Continue home statin and metoprolol    History of DVT (deep vein thrombosis)  Assessment & Plan  Patient is on Eliquis as o/p, currently held 2/2 GIB  1/23 reversed with Kcentra   Continue on Eliquis    Acute hypoxemic respiratory failure (HCC)  Assessment & Plan  Required intubation for airway protection 1/23 after patient became increasingly encephalopathic  Minimal vent setting. Placed on PS since 1/24 at 6/6 40% FiO2  Unable to be successfully extubated due to mental status. Currently not requiring sedation.  Having copious secretions coming from inline suction  1/27 CXR negative for acute findings. Given 1 x dose of 40 mg IV lasix   Extubated mid morning on 1/27  Saturating well on room air     Plan:  Resolved  Aspiration precautions  Encourage IS/Flutter valve when able     Atrial fibrillation, unspecified type (HCC)  Assessment & Plan  Rate controled  Maintained on Eliquis and metoprolol as o/p  Continue on Lopressor  Hold Eliquis and Plavix until okay with GI   Cardiology consult regarding input for  antiplatelet/anticoagulation appreciated  As per cardiology restart Plavix and Eliquis  Discussed with GI and patient will be restarted 1/30 and will monitor H&H closely      Type 2 diabetes mellitus with neurologic complication, with long-term current use of insulin ([facility])  Assessment & Plan  Lab Results   Component Value Date    HGBA1C 6.6 (H) 01/24/2024       Recent Labs     01/31/24  1604 01/31/24  2121 02/01/24  0732 02/01/24  0836   POCGLU 162* 163* 69 87       Patient has a Dexcom, on Trulicity and Jardiance, Lantus and insulin  Extubated 1/27. Currently no enteral access for TF. Insulin drip stopped. Changed to correction scale insulin  Accu-Chek ACHS with Humalog sliding scale  Blood glucose goal 140-180  Hypoglycemia protocol     Stage 3 chronic kidney disease, unspecified whether stage 3a or 3b CKD ([facility])  Assessment & Plan  Lab Results   Component Value Date    EGFR 63 02/01/2024    EGFR 71 01/31/2024    EGFR 59 01/30/2024    CREATININE 1.12 02/01/2024    CREATININE 1.01 01/31/2024    CREATININE 1.17 01/30/2024     Cr at baseline  Monitor I's and O's   Renally dose medications  Avoid nephrotoxic agents  Hold Lasix at this time, resume home med when able     * Acute upper GI bleeding  Assessment & Plan  Patient is a 78-year-old male who has been seeing [facility] last seen 12/13/2023 patient has a history of anemia is multifactorial chronic due to cancer with iron deficiency and occult GI blood loss on Eliquis review of an EGD with multiple AVMs.  Patient came in to the ER from home due to generalized weakness nausea vomiting since this morning reports lower abdominal pain has a history of anemia is on Eliquis and Plavix episodes of vomiting which were described by the significant other is dark of clotted material, and also had melanotic stool occult positive.  Patient felt significant weakness and was unable to walk felt like too weak to walk.  Patient has a history of an upper GI bleed with multiple  AVMs, also has a history of the patient intubation with tracheostomy approximately rehab time of 1 year.   CT scan with contrast showed nonspecific hypodensity within the gastric body possibly reflective of hemorrhage in this patient with the history of gastric AVMs, known liver cancer, cholelithiasis, small volume abdominal pelvic ascites  Patient's baseline hemoglobin appears to be 8-10, except 1 value on 11/23 of 13.    Patient is on Eliquis and Plavix as o/p  In the ED received DDAVP 0.3 mg/kg and Kcentra  GI consulted  1/23 S/P EGD - 3 large angioectasias in the fundus of the stomach and body of the stomach; bleeding was observed; placed clips; hemostasis achieved; induced coagulation and hemostasis achieved with argon plasma coagulation The esophagus and duodenum appeared normal.  Pt received a total of 3 units PRBC this admission so far, last on 1/25/24     Plan:  Monitor H/H Q12 hours- Hgb remains stable   Continue PPI BID  Patient extubated 1/27  Patient is tolerating diet  GI following appreciate recommendations- Need to address if stable to restart plavix  Patient is restarted on Plavix and Eliquis after clearance from GI and cardiology.  DC heparin drip  Hemoglobin is 9.3  Monitor H&H closely           Labs & Imaging: I have personally reviewed pertinent reports.      VTE Prophylaxis: in place.    Code Status:   Level 1 - Full Code    Patient Centered Rounds: I have performed bedside rounds with nursing staff today.    Mobility:   Basic Mobility Inpatient Raw Score: 14  -HLM Goal: 4: Move to chair/commode  JH-HLM Achieved: 5: Stand (1 or more minutes)  HLM Goal achieved. Continue to encourage appropriate mobility.    Discussions with Specialists or Other Care Team Provider: GI    Education and Discussions with Family / Patient: Spouse at bedside    Total Time Spent on Date of Encounter in care of patient: 35 mins. This time was spent on one or more of the following: performing physical exam; counseling  and coordination of care; obtaining or reviewing history; documenting in the medical record; reviewing/ordering tests, medications or procedures; communicating with other healthcare professionals and discussing with patient's family/caregivers.    Current Length of Stay: 9 day(s)    Current Patient Status: Inpatient   Certification Statement: The patient will continue to require additional inpatient hospital stay due to see my assessment and plan.     Subjective:   Patient is seen and examined at bedside.  No new complaints.  Afebrile  All other ROS are negative.    Objective:    Vitals: Blood pressure 129/61, pulse 77, temperature 98.2 °F (36.8 °C), temperature source Oral, resp. rate 16, height 6' (1.829 m), weight 89.8 kg (198 lb), SpO2 96%.,Body mass index is 26.85 kg/m².  SPO2 RA Rest      Flowsheet Row ED to Hosp-Admission (Current) from 1/23/2024 in [facility] Med [facility]   SpO2 96 %   SpO2 Activity At Rest   O2 Device None (Room air)   O2 Flow Rate 4 L/min          I&O:   Intake/Output Summary (Last 24 hours) at 2/1/2024 1005  Last data filed at 1/31/2024 2308  Gross per 24 hour   Intake 236 ml   Output 1075 ml   Net -839 ml       Physical Exam:    General- Alert, lying comfortably in bed. Not in any acute distress.  Neck- Supple, No JVD  CVS- regular, S1 and S2 normal  Chest- Bilateral Air entry, No rhochi, crackles or wheezing present.  Abdomen- soft, nontender, not distended, no guarding or rigidity, BS+  Extremities-  No pedal edema, No calf tenderness  CNS-   Alert, awake and orientedx3. No focal deficits present.    Invasive Devices       Peripheral Intravenous Line  Duration             Peripheral IV 01/31/24 Dorsal (posterior);Right Hand 1 day                          Social History  reviewed  Family History   Problem Relation Age of Onset    Hypertension Mother     Bone cancer Father     Diabetes type II Sister     Diabetes type II Sister     Esophageal cancer Brother     Colon  cancer Neg Hx     reviewed    Meds:  Current Facility-Administered Medications   Medication Dose Route Frequency Provider Last Rate Last Admin    acetaminophen (TYLENOL) tablet 650 mg  650 mg Oral Q6H PRN Adrian More MD   650 mg at 01/25/24 2034    apixaban (ELIQUIS) tablet 5 mg  5 mg Oral BID Adrian More MD   5 mg at 02/01/24 0927    atorvastatin (LIPITOR) tablet 40 mg  40 mg Oral Daily With Dinner Adrian More MD   40 mg at 01/31/24 1553    azelastine (ASTELIN) 0.1 % nasal spray 1 spray  1 spray Each Nare BID Adrian More MD   1 spray at 01/31/24 1749    bimatoprost (LUMIGAN) 0.03 % ophthalmic drops 1 drop  1 drop Both Eyes QPM Adrian More MD        chlorhexidine (PERIDEX) 0.12 % oral rinse 15 mL  15 mL Mouth/Throat Q12H RANDALL Adrian More MD   15 mL at 01/31/24 2141    clopidogrel (PLAVIX) tablet 75 mg  75 mg Oral Daily Adrian More MD   75 mg at 02/01/24 0927    Empagliflozin (JARDIANCE) tablet 10 mg  10 mg Oral QAM Adrian More MD   10 mg at 02/01/24 0927    ferrous sulfate tablet 325 mg  325 mg Oral Daily With Breakfast Adrian More MD   325 mg at 02/01/24 0928    furosemide (LASIX) tablet 40 mg  40 mg Oral Every Other Day Adrian More MD   40 mg at 01/31/24 0930    glycerin-hypromellose- (ARTIFICIAL TEARS) ophthalmic solution 1 drop  1 drop Both Eyes Q6H PRN Adrian More MD   1 drop at 01/31/24 0932    insulin detemir (LEVEMIR) subcutaneous injection 30 Units  30 Units Subcutaneous HS Adrian More MD   30 Units at 01/31/24 2140    insulin lispro (HumaLOG) 100 units/mL subcutaneous injection 2-12 Units  2-12 Units Subcutaneous TID AC Adrian More MD   2 Units at 01/31/24 1748    insulin lispro (HumaLOG) 100 units/mL subcutaneous injection 2-12 Units  2-12 Units Subcutaneous HS Adrian More MD   2 Units at 01/31/24 9672    insulin lispro (HumaLOG) 100 units/mL subcutaneous injection 5 Units  5 Units Subcutaneous TID With Meals Adrian  S Saluja, MD   5 Units at 01/31/24 1748    ipratropium (ATROVENT) 0.02 % inhalation solution 0.5 mg  0.5 mg Nebulization Q6H PRN Adrian More MD   0.5 mg at 01/28/24 0748    iron sucrose (VENOFER) 200 mg in sodium chloride 0.9% 100 ml IVPB 200 mg  200 mg Intravenous Daily Adrian More  mL/hr at 01/31/24 0942 200 mg at 01/31/24 0942    lactulose (CHRONULAC) oral solution 20 g  20 g Oral BID NADIA Kwan   20 g at 02/01/24 0928    levalbuterol (XOPENEX) inhalation solution 0.63 mg  0.63 mg Nebulization Q6H PRN Adrian More MD   0.63 mg at 01/28/24 0748    metoprolol tartrate (LOPRESSOR) partial tablet 12.5 mg  12.5 mg Oral Q12H [facility] Adrian More MD   12.5 mg at 02/01/24 0928    pantoprazole (PROTONIX) injection 40 mg  40 mg Intravenous Q12H [facility] Adrian More MD   40 mg at 02/01/24 0927    rifaximin (XIFAXAN) tablet 550 mg  550 mg Oral Q12H [facility] Adrian More MD   550 mg at 02/01/24 0927    traZODone (DESYREL) tablet 100 mg  100 mg Oral HS Adrian More MD   100 mg at 01/31/24 2141    trimethobenzamide (TIGAN) IM injection 200 mg  200 mg Intramuscular Q12H PRN Adrian More MD   200 mg at 01/23/24 0717    umeclidinium-vilanterol 62.5-25 mcg/actuation inhaler 1 puff  1 puff Inhalation Daily Adrian More MD   1 puff at 01/31/24 0931      Medications Prior to Admission   Medication    acetaminophen (TYLENOL) 650 mg CR tablet    Anoro Ellipta 62.5-25 MCG/ACT inhaler    atorvastatin (LIPITOR) 40 mg tablet    azelastine (ASTELIN) 0.1 % nasal spray    Blood Glucose Monitoring Suppl (OneTouch Verio) w/Device KIT    clopidogrel (PLAVIX) 75 mg tablet    Continuous Blood Gluc  (Dexcom G6 ) NOHEMY    Eliquis 5 MG    ferrous sulfate 325 (65 Fe) mg tablet    furosemide (LASIX) 40 mg tablet    insulin detemir (Levemir FlexPen) 100 Units/mL injection pen    Insulin Pen Needle (Droplet Pen Needles) 32G X 4 MM MISC    Jardiance 10 MG TABS tablet    Lumigan 0.01 %  "ophthalmic drops    metoprolol succinate (TOPROL-XL) 50 mg 24 hr tablet    Multiple Vitamin (MULTIVITAMIN ADULT PO)    NovoLOG FlexPen 100 units/mL injection pen    Potassium Citrate ER 15 MEQ (1620 MG) TBCR    pregabalin (LYRICA) 100 mg capsule    sertraline (ZOLOFT) 25 mg tablet    SUPER B COMPLEX/C PO    tamsulosin (FLOMAX) 0.4 mg    traZODone (DESYREL) 100 mg tablet    Trulicity 0.75 MG/0.5ML injection    Continuous Blood Gluc Sensor (Dexcom G6 Sensor) MISC    Continuous Blood Gluc Transmit (Dexcom G6 Transmitter) MISC    Continuous Blood Gluc Transmit (Dexcom G6 Transmitter) MISC    glucose blood (OneTouch Verio) test strip    mometasone (ELOCON) 0.1 % cream    pantoprazole (PROTONIX) 40 mg tablet       Labs:  Results from last 7 days   Lab Units 02/01/24 0444 01/31/24 2001 01/31/24 0414 01/30/24  1948 01/30/24  0430   WBC Thousand/uL 5.51  --  5.32  --  6.03   HEMOGLOBIN g/dL 9.3* 9.4* 8.1*   < > 8.3*   HEMATOCRIT % 31.8* 31.0* 26.2*   < > 26.7*   PLATELETS Thousands/uL 167  --  176  --  193   NEUTROS PCT % 67  --  60  --  63   LYMPHS PCT % 11*  --  12*  --  12*   MONOS PCT % 14*  --  21*  --  18*   EOS PCT % 6  --  6  --  6    < > = values in this interval not displayed.     Results from last 7 days   Lab Units 02/01/24 0444 01/31/24 0414 01/30/24  0430 01/26/24  1213 01/26/24  1056   POTASSIUM mmol/L 4.1 3.9 4.0   < >  --    CHLORIDE mmol/L 110* 112* 109*   < >  --    CO2 mmol/L 20* 20* 23   < >  --    CO2, I-STAT mmol/L  --   --   --   --  20*   BUN mg/dL 30* 33* 37*   < >  --    CREATININE mg/dL 1.12 1.01 1.17   < >  --    CALCIUM mg/dL 8.7 8.3* 8.4   < >  --    ALK PHOS U/L 503* 426* 396*   < >  --    ALT U/L 50 45 46   < >  --    AST U/L 70* 56* 57*   < >  --    GLUCOSE, ISTAT mg/dl  --   --   --   --  194*    < > = values in this interval not displayed.     No results found for: \"TROPONINI\", \"CKMB\", \"CKTOTAL\"  Results from last 7 days   Lab Units 01/28/24  1526   INR  1.19     Lab Results "   Component Value Date    BLOODCX No Growth After 5 Days. 08/10/2023    BLOODCX No Growth After 5 Days. 08/10/2023         Imaging:  Results for orders placed during the hospital encounter of 01/23/24    XR chest portable    Narrative  CHEST    INDICATION:   line confirmation.    COMPARISON: 1/23/2024    EXAM PERFORMED/VIEWS:  XR CHEST PORTABLE      FINDINGS: The tip of the endotracheal tube is above the george. The right IJ central venous catheter has the tip projecting over the superior vena cava. The tip of the endogastric tube appears to be in the gastric cardia, just above the level of the  diaphragm. This should probably be advanced somewhat.    Heart appears enlarged. Aorta is atherosclerotic.    There seems to be hazy infiltrate in the right lung, predominantly interstitial pattern. Again, there appears to be minimal right effusion. No pneumothorax identified.    Osseous structures appear within normal limits for patient age.    Impression  Right IJ line projects over the superior vena cava and appears grossly satisfactory. No pneumothorax seen.    Endotracheal tube appears satisfactory.    Endogastric tube should probably be advanced farther into the stomach.    Small right effusion and relatively diffuse interstitial infiltrate in the right lung    The study was marked in EPIC for immediate notification.            Workstation performed: [id number]    Results for orders placed during the hospital encounter of 11/03/23    XR chest 2 views    Narrative  CHEST    INDICATION:   cooughing up bright red blood.    COMPARISON: 8/17/2023    EXAM PERFORMED/VIEWS:  XR CHEST PA & LATERAL The frontal view was performed utilizing dual energy radiographic technique.      FINDINGS:    Cardiomediastinal silhouette appears unremarkable.    The lungs are clear.  No pneumothorax or pleural effusion.    Osseous structures appear within normal limits for patient age.    Impression  No acute cardiopulmonary  disease.            Workstation performed: [id number]      Last 24 Hours Medication List:   Current Facility-Administered Medications   Medication Dose Route Frequency Provider Last Rate    acetaminophen  650 mg Oral Q6H PRN Adrian More MD      apixaban  5 mg Oral BID Adrian More MD      atorvastatin  40 mg Oral Daily With Dinner Adrian More MD      azelastine  1 spray Each Nare BID Adrian More MD      bimatoprost  1 drop Both Eyes QPM Adrian More MD      chlorhexidine  15 mL Mouth/Throat Q12H [facility] Adrian More MD      clopidogrel  75 mg Oral Daily Adrian More MD      Empagliflozin  10 mg Oral QAM Adrian More MD      ferrous sulfate  325 mg Oral Daily With Breakfast Adrian More MD      furosemide  40 mg Oral Every Other Day Adrian More MD      Artificial Tears  1 drop Both Eyes Q6H PRN Adrian More MD      insulin detemir  30 Units Subcutaneous HS Adrian More MD      insulin lispro  2-12 Units Subcutaneous TID AC Adrian More MD      insulin lispro  2-12 Units Subcutaneous HS Adrian More MD      insulin lispro  5 Units Subcutaneous TID With Meals Adrian More MD      ipratropium  0.5 mg Nebulization Q6H PRN Adrian More MD      iron sucrose  200 mg Intravenous Daily Adrian More  mg (01/31/24 0942)    lactulose  20 g Oral BID NADIA Kwan      levalbuterol  0.63 mg Nebulization Q6H PRN Adrian More MD      metoprolol tartrate  12.5 mg Oral Q12H [facility] Adrian More MD      pantoprazole  40 mg Intravenous Q12H [facility] Adrian More MD      rifaximin  550 mg Oral Q12H [facility] Adrian More MD      traZODone  100 mg Oral HS Adrian More MD      trimethobenzamide  200 mg Intramuscular Q12H PRN Adrian More MD      umeclidinium-vilanterol  1 puff Inhalation Daily Adrian More MD          Today, Patient Was Seen By: Adrian More MD    ** Please Note: Dictation voice to text software  may have been used in the creation of this document. **

## 2024-02-01 NOTE — TELEPHONE ENCOUNTER
Discussed markedly elevated AFP level with the patient's wife after she called the office to let us know he was still in the hospital.  He likely has a viable tumor.  She tells me his cognition is not perfect, but is slowly improving.  He still has some underlying liver dysfunction based on his CMP.  I discussed that because he has viable tumor based on the AFP level, I would repeat the MRI with contrast, but I would probably do this once he recovers as I would rather have an MRI closer to when he received treatment.  Liver directed therapy would be ideal, but this would have to be based on his underlying liver function.  I also recommended that they have a medical oncology evaluation as newer data suggest chemotherapy after embolization has improved progression free survival.  At this point I have told her to contact our office once he is discharged and we can set up the MRI, and this will likely need to be done with anesthesia given the poor quality MRI he had as an outpatient.  All of her questions were answered.

## 2024-02-01 NOTE — ASSESSMENT & PLAN NOTE
patient is 78-year-old male with a history of liver cancer and radiation therapy who was admitted for an upper GI bleed with hypotension and later strokelike symptoms went unresponsive and was intubated, appears to be as cause from the liver with an ammonia level of 130  Most likely source of elevated ammonia level is liver history of liver cancer  GI following  Continue with Rifaximin when able   Patient should have 2-3 BMs a day  Ammonia level is normal  On lactulose

## 2024-02-01 NOTE — ASSESSMENT & PLAN NOTE
Lab Results   Component Value Date    EGFR 63 02/01/2024    EGFR 71 01/31/2024    EGFR 59 01/30/2024    CREATININE 1.12 02/01/2024    CREATININE 1.01 01/31/2024    CREATININE 1.17 01/30/2024     Cr at baseline  Monitor I's and O's   Renally dose medications  Avoid nephrotoxic agents  Hold Lasix at this time, resume home med when able

## 2024-02-01 NOTE — PLAN OF CARE
Problem: Potential for Falls  Goal: Patient will remain free of falls  Description: INTERVENTIONS:  - Educate patient/family on patient safety including physical limitations  - Instruct patient to call for assistance with activity   - Consult OT/PT to assist with strengthening/mobility   - Keep Call bell within reach  - Keep bed low and locked with side rails adjusted as appropriate  - Keep care items and personal belongings within reach  - Initiate and maintain comfort rounds  - Make Fall Risk Sign visible to staff  - Offer Toileting every 2 Hours, in advance of need  - Initiate/Maintain alarm  - Obtain necessary fall risk management equipment  - Apply yellow socks and bracelet for high fall risk patients  - Consider moving patient to room near nurses station  Outcome: Progressing     Problem: PAIN - ADULT  Goal: Verbalizes/displays adequate comfort level or baseline comfort level  Description: Interventions:  - Encourage patient to monitor pain and request assistance  - Assess pain using appropriate pain scale  - Administer analgesics based on type and severity of pain and evaluate response  - Implement non-pharmacological measures as appropriate and evaluate response  - Consider cultural and social influences on pain and pain management  - Notify physician/advanced practitioner if interventions unsuccessful or patient reports new pain  Outcome: Progressing     Problem: Knowledge Deficit  Goal: Patient/family/caregiver demonstrates understanding of disease process, treatment plan, medications, and discharge instructions  Description: Complete learning assessment and assess knowledge base.  Interventions:  - Provide teaching at level of understanding  - Provide teaching via preferred learning methods  Outcome: Progressing     Problem: GASTROINTESTINAL - ADULT  Goal: Minimal or absence of nausea and/or vomiting  Description: INTERVENTIONS:  - Administer IV fluids if ordered to ensure adequate hydration  - Maintain  NPO status until nausea and vomiting are resolved  - Nasogastric tube if ordered  - Administer ordered antiemetic medications as needed  - Provide nonpharmacologic comfort measures as appropriate  - Advance diet as tolerated, if ordered  - Consider nutrition services referral to assist patient with adequate nutrition and appropriate food choices  Outcome: Progressing  Goal: Maintains or returns to baseline bowel function  Description: INTERVENTIONS:  - Assess bowel function  - Encourage oral fluids to ensure adequate hydration  - Administer IV fluids if ordered to ensure adequate hydration  - Administer ordered medications as needed  - Encourage mobilization and activity  - Consider nutritional services referral to assist patient with adequate nutrition and appropriate food choices  Outcome: Progressing  Goal: Maintains adequate nutritional intake  Description: INTERVENTIONS:  - Monitor percentage of each meal consumed  - Identify factors contributing to decreased intake, treat as appropriate  - Assist with meals as needed  - Monitor I&O, weight, and lab values if indicated  - Obtain nutrition services referral as needed  Outcome: Progressing  Goal: Establish and maintain optimal ostomy function  Description: INTERVENTIONS:  - Assess bowel function  - Encourage oral fluids to ensure adequate hydration  - Administer IV fluids if ordered to ensure adequate hydration   - Administer ordered medications as needed  - Encourage mobilization and activity  - Nutrition services referral to assist patient with appropriate food choices  - Assess stoma site  - Consider wound care consult   Outcome: Progressing  Goal: Oral mucous membranes remain intact  Description: INTERVENTIONS  - Assess oral mucosa and hygiene practices  - Implement preventative oral hygiene regimen  - Implement oral medicated treatments as ordered  - Initiate Nutrition services referral as needed  Outcome: Progressing     Problem: METABOLIC, FLUID AND  ELECTROLYTES - ADULT  Goal: Electrolytes maintained within normal limits  Description: INTERVENTIONS:  - Monitor labs and assess patient for signs and symptoms of electrolyte imbalances  - Administer electrolyte replacement as ordered  - Monitor response to electrolyte replacements, including repeat lab results as appropriate  - Instruct patient on fluid and nutrition as appropriate  Outcome: Progressing  Goal: Fluid balance maintained  Description: INTERVENTIONS:  - Monitor labs   - Monitor I/O and WT  - Instruct patient on fluid and nutrition as appropriate  - Assess for signs & symptoms of volume excess or deficit  Outcome: Progressing  Goal: Glucose maintained within target range  Description: INTERVENTIONS:  - Monitor Blood Glucose as ordered  - Assess for signs and symptoms of hyperglycemia and hypoglycemia  - Administer ordered medications to maintain glucose within target range  - Assess nutritional intake and initiate nutrition service referral as needed  Outcome: Progressing     Problem: HEMATOLOGIC - ADULT  Goal: Maintains hematologic stability  Description: INTERVENTIONS  - Assess for signs and symptoms of bleeding or hemorrhage  - Monitor labs  - Administer supportive blood products/factors as ordered and appropriate  Outcome: Progressing     Problem: RESPIRATORY - ADULT  Goal: Achieves optimal ventilation and oxygenation  Description: INTERVENTIONS:  - Assess for changes in respiratory status  - Assess for changes in mentation and behavior  - Position to facilitate oxygenation and minimize respiratory effort  - Oxygen administered by appropriate delivery if ordered  - Initiate smoking cessation education as indicated  - Encourage broncho-pulmonary hygiene including cough, deep breathe, Incentive Spirometry  - Assess the need for suctioning and aspirate as needed  - Assess and instruct to report SOB or any respiratory difficulty  - Respiratory Therapy support as indicated  Outcome: Progressing      Problem: Prexisting or High Potential for Compromised Skin Integrity  Goal: Skin integrity is maintained or improved  Description: INTERVENTIONS:  - Identify patients at risk for skin breakdown  - Assess and monitor skin integrity  - Assess and monitor nutrition and hydration status  - Monitor labs   - Assess for incontinence   - Turn and reposition patient  - Assist with mobility/ambulation  - Relieve pressure over bony prominences  - Avoid friction and shearing  - Provide appropriate hygiene as needed including keeping skin clean and dry  - Evaluate need for skin moisturizer/barrier cream  - Collaborate with interdisciplinary team   - Patient/family teaching  - Consider wound care consult   Outcome: Progressing     Problem: Nutrition/Hydration-ADULT  Goal: Nutrient/Hydration intake appropriate for improving, restoring or maintaining nutritional needs  Description: Monitor and assess patient's nutrition/hydration status for malnutrition. Collaborate with interdisciplinary team and initiate plan and interventions as ordered.  Monitor patient's weight and dietary intake as ordered or per policy. Utilize nutrition screening tool and intervene as necessary. Determine patient's food preferences and provide high-protein, high-caloric foods as appropriate.     INTERVENTIONS:  - Monitor oral intake, urinary output, labs, and treatment plans  - Assess nutrition and hydration status and recommend course of action  - Evaluate amount of meals eaten  - Assist patient with eating if necessary   - Allow adequate time for meals  - Recommend/ encourage appropriate diets, oral nutritional supplements, and vitamin/mineral supplements  - Order, calculate, and assess calorie counts as needed  - Recommend, monitor, and adjust tube feedings and TPN/PPN based on assessed needs  - Assess need for intravenous fluids  - Provide specific nutrition/hydration education as appropriate  - Include patient/family/caregiver in decisions related to  nutrition  Outcome: Progressing     Problem: SAFETY,RESTRAINT: NV/NON-SELF DESTRUCTIVE BEHAVIOR  Goal: Remains free of harm/injury (restraint for non violent/non self-detsructive behavior)  Description: INTERVENTIONS:  - Instruct patient/family regarding restraint use   - Assess and monitor physiologic and psychological status   - Provide interventions and comfort measures to meet assessed patient needs   - Identify and implement measures to help patient regain control  - Assess readiness for release of restraint   Outcome: Progressing  Goal: Returns to optimal restraint-free functioning  Description: INTERVENTIONS:  - Assess the patient's behavior and symptoms that indicate continued need for restraint  - Identify and implement measures to help patient regain control  - Assess readiness for release of restraint   Outcome: Progressing

## 2024-02-01 NOTE — ASSESSMENT & PLAN NOTE
Patient is on Eliquis as o/p, currently held 2/2 GIB  1/23 reversed with Kcentra   Continue on Eliquis

## 2024-02-01 NOTE — ASSESSMENT & PLAN NOTE
Patient has stage II liver cancer hepatocellular carcinoma  Has a tumor thrombus which appears stable seen again on admission CT  Status post Y 90 treatment    Follows with Dr. Gong as o/p   Patient reported urinary frequency over the past couple days associated with decreasing oral intake  No dysuria  No fever or chills  Rule out UTI  Start IV antibiotic empirically  Follow on urine cultures

## 2024-02-01 NOTE — ASSESSMENT & PLAN NOTE
Wt Readings from Last 3 Encounters:   02/01/24 89.8 kg (198 lb)   12/20/23 96.6 kg (213 lb)   12/14/23 97 kg (213 lb 12.8 oz)     Patient is 78-year-old male with history of CAD post PCI in August not deemed a candidate for surgical intervention, systolic heart failure with reduced ejection fraction of 36%   Home meds: lasix, toprol, and statin   Resume home meds when able   Daily wts

## 2024-02-01 NOTE — SPEECH THERAPY NOTE
Speech Language/Pathology    Speech/Language Pathology Progress Note    Patient Name: Derek Walter  Today's Date: 2/1/2024     Problem List  Principal Problem:    Acute upper GI bleeding  Active Problems:    Stage 3 chronic kidney disease, unspecified whether stage 3a or 3b CKD (HCC)    Type 2 diabetes mellitus with neurologic complication, with long-term current use of insulin (HCC)    Atrial fibrillation, unspecified type (HCC)    Acute hypoxemic respiratory failure (HCC)    History of DVT (deep vein thrombosis)    CAD (coronary artery disease)    Hepatocellular carcinoma (HCC)    Chronic systolic heart failure (HCC)    Acute blood loss anemia    AMS (altered mental status)    Hepatic encephalopathy (HCC)    Dysphagia       Past Medical History  Past Medical History:   Diagnosis Date    Anemia     Atrial fibrillation (HCC)     Eliquis    AVB (atrioventricular block)     1st degree    CAD (coronary artery disease)     CKD (chronic kidney disease), stage III (HCC)     baseline Cr 1.2-1.6    Colon polyp     Diabetes mellitus (HCC)     type 2, insulin dependent    Diverticulosis     Emphysema lung (HCC)     Former tobacco use     History of asbestos exposure     History of DVT (deep vein thrombosis)     RLE, tx w/ AC    History of GI bleed 08/2023    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, given PRBC/Venofer for anemia while in house    Hyperlipidemia     Hypertension     LAFB (left anterior fascicular block)     Liver cancer (HCC)     Liver mass 08/2023    suspected HCC, needs radioembolization    RBBB     Syncope 08/07/2023        Past Surgical History  Past Surgical History:   Procedure Laterality Date    APPENDECTOMY      BOWEL RESECTION  2014    CARDIAC CATHETERIZATION      CARDIAC CATHETERIZATION N/A 08/25/2023    Procedure: Cardiac pci;  Surgeon: Dom Garrett DO;  Location: [facility];  Service: Cardiology    CARDIAC CATHETERIZATION N/A 08/21/2023    Procedure: Cardiac catheterization;   Surgeon: Dom Garrett DO;  Location: [facility];  Service: Cardiology    CARDIAC CATHETERIZATION N/A 08/21/2023    Procedure: Cardiac Coronary Angiogram;  Surgeon: Dom Garrett DO;  Location: [facility];  Service: Cardiology    CATARACT EXTRACTION Bilateral     COLONOSCOPY      EGD      IR Y-90 PRE-ANGIO/EMBO W/ LUNG SCAN  09/01/2023    IR Y-90 RADIOEMBOLIZATION  09/08/2023         Subjective:  Pt upright in bed as SLP entered the room. Lunch tray present. Pt currently on room air without complaints.     Objective:  Pt seen for f/u dysphagia tx in conjunction with mechanical soft lunch tray of egg salad, peaches, ice cream, and milk. Pt was able to retrieve bolus from spoon, fork, and straw. No anterior leakage. Mastication functional. Adequate oral control/transfer. Swallow initiation appeared prompt. No coughing, throat clearing, change in vocal quality, or change in respiratory functioning s/p swallows.      Pt declined upgraded solid trials as this time as he would like to wait for his dentures.     Assessment:  No overt s/s of aspiration/dysphagia given puree, mechanical soft solids, and thin liquids.     Plan/Recommendations:  Mechanical Soft   Thin Liquids  Medications as Tolerated   ST to continue to follow: will trial advanced textures when dentures are brought to facility

## 2024-02-02 LAB
ALBUMIN SERPL BCP-MCNC: 3.4 G/DL (ref 3.5–5)
ALP SERPL-CCNC: 546 U/L (ref 34–104)
ALT SERPL W P-5'-P-CCNC: 56 U/L (ref 7–52)
ANION GAP SERPL CALCULATED.3IONS-SCNC: 9 MMOL/L
AST SERPL W P-5'-P-CCNC: 73 U/L (ref 13–39)
ATRIAL RATE: 77 BPM
BASOPHILS # BLD AUTO: 0.04 THOUSANDS/ÂΜL (ref 0–0.1)
BASOPHILS NFR BLD AUTO: 1 % (ref 0–1)
BILIRUB SERPL-MCNC: 1.02 MG/DL (ref 0.2–1)
BUN SERPL-MCNC: 28 MG/DL (ref 5–25)
CALCIUM ALBUM COR SERPL-MCNC: 9.5 MG/DL (ref 8.3–10.1)
CALCIUM SERPL-MCNC: 9 MG/DL (ref 8.4–10.2)
CARDIAC TROPONIN I PNL SERPL HS: 30 NG/L (ref 8–18)
CARDIAC TROPONIN I PNL SERPL HS: 32 NG/L (ref 8–18)
CHLORIDE SERPL-SCNC: 110 MMOL/L (ref 96–108)
CO2 SERPL-SCNC: 22 MMOL/L (ref 21–32)
CREAT SERPL-MCNC: 1.16 MG/DL (ref 0.6–1.3)
EOSINOPHIL # BLD AUTO: 0.4 THOUSAND/ÂΜL (ref 0–0.61)
EOSINOPHIL NFR BLD AUTO: 6 % (ref 0–6)
ERYTHROCYTE [DISTWIDTH] IN BLOOD BY AUTOMATED COUNT: 16.5 % (ref 11.6–15.1)
GFR SERPL CREATININE-BSD FRML MDRD: 60 ML/MIN/1.73SQ M
GLUCOSE SERPL-MCNC: 110 MG/DL (ref 65–140)
GLUCOSE SERPL-MCNC: 132 MG/DL (ref 65–140)
GLUCOSE SERPL-MCNC: 149 MG/DL (ref 65–140)
GLUCOSE SERPL-MCNC: 189 MG/DL (ref 65–140)
GLUCOSE SERPL-MCNC: 61 MG/DL (ref 65–140)
GLUCOSE SERPL-MCNC: 85 MG/DL (ref 65–140)
HCT VFR BLD AUTO: 29.4 % (ref 36.5–49.3)
HGB BLD-MCNC: 8.9 G/DL (ref 12–17)
IMM GRANULOCYTES # BLD AUTO: 0.07 THOUSAND/UL (ref 0–0.2)
IMM GRANULOCYTES NFR BLD AUTO: 1 % (ref 0–2)
LYMPHOCYTES # BLD AUTO: 0.92 THOUSANDS/ÂΜL (ref 0.6–4.47)
LYMPHOCYTES NFR BLD AUTO: 13 % (ref 14–44)
MCH RBC QN AUTO: 28.3 PG (ref 26.8–34.3)
MCHC RBC AUTO-ENTMCNC: 30.3 G/DL (ref 31.4–37.4)
MCV RBC AUTO: 94 FL (ref 82–98)
MONOCYTES # BLD AUTO: 1.04 THOUSAND/ÂΜL (ref 0.17–1.22)
MONOCYTES NFR BLD AUTO: 15 % (ref 4–12)
NEUTROPHILS # BLD AUTO: 4.72 THOUSANDS/ÂΜL (ref 1.85–7.62)
NEUTS SEG NFR BLD AUTO: 64 % (ref 43–75)
NRBC BLD AUTO-RTO: 0 /100 WBCS
P AXIS: 30 DEGREES
PLATELET # BLD AUTO: 272 THOUSANDS/UL (ref 149–390)
PMV BLD AUTO: 12.6 FL (ref 8.9–12.7)
POTASSIUM SERPL-SCNC: 4 MMOL/L (ref 3.5–5.3)
PR INTERVAL: 234 MS
PROT SERPL-MCNC: 6.5 G/DL (ref 6.4–8.4)
QRS AXIS: -44 DEGREES
QRSD INTERVAL: 130 MS
QT INTERVAL: 440 MS
QTC INTERVAL: 497 MS
RBC # BLD AUTO: 3.14 MILLION/UL (ref 3.88–5.62)
SODIUM SERPL-SCNC: 141 MMOL/L (ref 135–147)
T WAVE AXIS: 22 DEGREES
VENTRICULAR RATE: 77 BPM
WBC # BLD AUTO: 7.19 THOUSAND/UL (ref 4.31–10.16)

## 2024-02-02 PROCEDURE — 93005 ELECTROCARDIOGRAM TRACING: CPT

## 2024-02-02 PROCEDURE — 80053 COMPREHEN METABOLIC PANEL: CPT | Performed by: INTERNAL MEDICINE

## 2024-02-02 PROCEDURE — 84484 ASSAY OF TROPONIN QUANT: CPT | Performed by: PHYSICIAN ASSISTANT

## 2024-02-02 PROCEDURE — 99232 SBSQ HOSP IP/OBS MODERATE 35: CPT | Performed by: INTERNAL MEDICINE

## 2024-02-02 PROCEDURE — 82948 REAGENT STRIP/BLOOD GLUCOSE: CPT

## 2024-02-02 PROCEDURE — 84484 ASSAY OF TROPONIN QUANT: CPT | Performed by: INTERNAL MEDICINE

## 2024-02-02 PROCEDURE — 97530 THERAPEUTIC ACTIVITIES: CPT

## 2024-02-02 PROCEDURE — 85025 COMPLETE CBC W/AUTO DIFF WBC: CPT | Performed by: INTERNAL MEDICINE

## 2024-02-02 PROCEDURE — C9113 INJ PANTOPRAZOLE SODIUM, VIA: HCPCS | Performed by: INTERNAL MEDICINE

## 2024-02-02 RX ADMIN — CLOPIDOGREL BISULFATE 75 MG: 75 TABLET ORAL at 08:46

## 2024-02-02 RX ADMIN — CHLORHEXIDINE GLUCONATE 15 ML: 1.2 SOLUTION ORAL at 08:46

## 2024-02-02 RX ADMIN — RIFAXIMIN 550 MG: 550 TABLET ORAL at 08:47

## 2024-02-02 RX ADMIN — BIMATOPROST 1 DROP: 0.3 SOLUTION/ DROPS OPHTHALMIC at 17:58

## 2024-02-02 RX ADMIN — LACTULOSE 20 G: 20 SOLUTION ORAL at 17:58

## 2024-02-02 RX ADMIN — INSULIN LISPRO 5 UNITS: 100 INJECTION, SOLUTION INTRAVENOUS; SUBCUTANEOUS at 12:03

## 2024-02-02 RX ADMIN — INSULIN LISPRO 5 UNITS: 100 INJECTION, SOLUTION INTRAVENOUS; SUBCUTANEOUS at 08:47

## 2024-02-02 RX ADMIN — PANTOPRAZOLE SODIUM 40 MG: 40 INJECTION, POWDER, FOR SOLUTION INTRAVENOUS at 08:47

## 2024-02-02 RX ADMIN — FERROUS SULFATE TAB 325 MG (65 MG ELEMENTAL FE) 325 MG: 325 (65 FE) TAB at 08:47

## 2024-02-02 RX ADMIN — Medication 12.5 MG: at 08:47

## 2024-02-02 RX ADMIN — APIXABAN 5 MG: 5 TABLET, FILM COATED ORAL at 08:47

## 2024-02-02 RX ADMIN — APIXABAN 5 MG: 5 TABLET, FILM COATED ORAL at 17:58

## 2024-02-02 RX ADMIN — CHLORHEXIDINE GLUCONATE 15 ML: 1.2 SOLUTION ORAL at 21:52

## 2024-02-02 RX ADMIN — IRON SUCROSE 200 MG: 20 INJECTION, SOLUTION INTRAVENOUS at 08:48

## 2024-02-02 RX ADMIN — AZELASTINE 1 SPRAY: 1 SPRAY, METERED NASAL at 17:58

## 2024-02-02 RX ADMIN — INSULIN LISPRO 5 UNITS: 100 INJECTION, SOLUTION INTRAVENOUS; SUBCUTANEOUS at 16:01

## 2024-02-02 RX ADMIN — INSULIN DETEMIR 30 UNITS: 100 INJECTION, SOLUTION SUBCUTANEOUS at 21:52

## 2024-02-02 RX ADMIN — AZELASTINE 1 SPRAY: 1 SPRAY, METERED NASAL at 08:48

## 2024-02-02 RX ADMIN — EMPAGLIFLOZIN 10 MG: 10 TABLET, FILM COATED ORAL at 08:47

## 2024-02-02 RX ADMIN — Medication 12.5 MG: at 21:54

## 2024-02-02 RX ADMIN — TRAZODONE HYDROCHLORIDE 100 MG: 50 TABLET ORAL at 21:52

## 2024-02-02 RX ADMIN — PANTOPRAZOLE SODIUM 40 MG: 40 INJECTION, POWDER, FOR SOLUTION INTRAVENOUS at 21:53

## 2024-02-02 RX ADMIN — ATORVASTATIN CALCIUM 40 MG: 40 TABLET, FILM COATED ORAL at 15:58

## 2024-02-02 RX ADMIN — INSULIN LISPRO 2 UNITS: 100 INJECTION, SOLUTION INTRAVENOUS; SUBCUTANEOUS at 12:03

## 2024-02-02 RX ADMIN — UMECLIDINIUM BROMIDE AND VILANTEROL TRIFENATATE 1 PUFF: 62.5; 25 POWDER RESPIRATORY (INHALATION) at 08:49

## 2024-02-02 RX ADMIN — RIFAXIMIN 550 MG: 550 TABLET ORAL at 21:52

## 2024-02-02 RX ADMIN — FUROSEMIDE 40 MG: 40 TABLET ORAL at 08:46

## 2024-02-02 RX ADMIN — LACTULOSE 20 G: 20 SOLUTION ORAL at 08:47

## 2024-02-02 NOTE — ASSESSMENT & PLAN NOTE
Wt Readings from Last 3 Encounters:   02/02/24 89.8 kg (197 lb 15.6 oz)   02/01/24 89.8 kg (197 lb 15.6 oz)   12/20/23 96.6 kg (213 lb)     Patient is 78-year-old male with history of CAD post PCI in August not deemed a candidate for surgical intervention, systolic heart failure with reduced ejection fraction of 36%   Home meds: lasix, toprol, and statin   Resume home meds when able   Daily wts

## 2024-02-02 NOTE — ASSESSMENT & PLAN NOTE
Patient is a 78-year-old male who has been seeing [facility] GI last seen 12/13/2023 patient has a history of anemia is multifactorial chronic due to cancer with iron deficiency and occult GI blood loss on Eliquis review of an EGD with multiple AVMs.  Patient came in to the ER from home due to generalized weakness nausea vomiting since this morning reports lower abdominal pain has a history of anemia is on Eliquis and Plavix episodes of vomiting which were described by the significant other is dark of clotted material, and also had melanotic stool occult positive.  Patient felt significant weakness and was unable to walk felt like too weak to walk.  Patient has a history of an upper GI bleed with multiple AVMs, also has a history of the patient intubation with tracheostomy approximately rehab time of 1 year.   CT scan with contrast showed nonspecific hypodensity within the gastric body possibly reflective of hemorrhage in this patient with the history of gastric AVMs, known liver cancer, cholelithiasis, small volume abdominal pelvic ascites  Patient's baseline hemoglobin appears to be 8-10, except 1 value on 11/23 of 13.    Patient is on Eliquis and Plavix as o/p  In the ED received DDAVP 0.3 mg/kg and Kcentra  GI consulted  1/23 S/P EGD - 3 large angioectasias in the fundus of the stomach and body of the stomach; bleeding was observed; placed clips; hemostasis achieved; induced coagulation and hemostasis achieved with argon plasma coagulation The esophagus and duodenum appeared normal.  Pt received a total of 3 units PRBC this admission so far, last on 1/25/24     Plan:  Monitor H/H Q12 hours- Hgb remains stable   Continue PPI BID  Patient extubated 1/27  Patient is tolerating diet  GI following appreciate recommendations- Need to address if stable to restart plavix  Patient is restarted on Plavix and Eliquis after clearance from GI and cardiology.  DC heparin drip  Hemoglobin is 8.9  Monitor H&H closely

## 2024-02-02 NOTE — OCCUPATIONAL THERAPY NOTE
Occupational Therapy Treatment Note    Patient Name: Derek Walter  Today's Date: 2/2/2024 02/02/24 0906   OT Last Visit   OT Visit Date 02/02/24   Note Type   Note Type Treatment   Pain Assessment   Pain Assessment Tool 0-10   Pain Score No Pain   Restrictions/Precautions   Weight Bearing Precautions Per Order No   Other Precautions Fall Risk;Telemetry;Bed Alarm;Chair Alarm;Cognitive   ADL   LB Dressing Assistance 2  Maximal Assistance   LB Dressing Deficit Thread RLE into underwear;Thread LLE into underwear;Pull up over hips   Bed Mobility   Additional Comments Pt received OOB in chair   Transfers   Sit to Stand 4  Minimal assistance   Additional items Assist x 1   Stand to Sit 4  Minimal assistance   Additional items Assist x 1   Stand pivot Unable to assess   Additional Comments Stood for approx 1-2 minutes with a RW with supervision.   Cognition   Overall Cognitive Status Impaired   Arousal/Participation Alert   Attention Within functional limits   Memory Decreased recall of recent events;Decreased recall of precautions   Following Commands Follows one step commands with increased time or repetition   Comments Pt alert and conversational today. Answering questions appropriately.   Activity Tolerance   Activity Tolerance Treatment limited secondary to medical complications (Comment)  (Orthostatic BP: 116/52 seated, 83/38 standing, 104/43 sitting, 98/43 sitting. RN in room and aware.)   Medical Staff Made Aware PT JACKIE [facility]   Assessment   Assessment Patient participated in Skilled OT session this date with interventions consisting of ADL re training with the use of correct body mechnaics, safety awareness and fall prevention techniques, and increase dynamic sit/ stand balance during functional activity  . Patient agreeable to OT treatment session, upon arrival patient was found seated OOB to Recliner.  Treatment session as follows: Donned brief with Max A. BP seated, 116/52. Stood with Min A. BP standing  83/38. Returned to seated, Bps 104/43 & 98/43. RN present and aware. Medically inappropriate to continue further session 2* orthostatic. Patient continues to be functioning below baseline level, occupational performance remains limited secondary to factors listed above and increased risk for falls and injury.  The patient's raw score on the AM-PAC Daily Activity inpatient short form is 16, standardized score is 35.96, less than 39.4. Patients at this level are likely to benefit from DC to post-acute rehabilitation services. From OT standpoint, recommendation at time of d/c would be level 2.   Patient to benefit from continued Occupational Therapy treatment while in the hospital to address deficits as defined above and maximize level of functional independence with ADLs and functional mobility. Pt seen as a co-tx with PT due to the patient's co-morbidities, clinically unstable presentation, and present impairments which are a regression from the patient's baseline. Pt left with call bell in reach, tray table in reach, needs met, chair alarm activated, RN present.   Plan   OT Treatment Day 1   Discharge Recommendation   Rehab Resource Intensity Level, OT II (Moderate Resource Intensity)   -PAC Daily Activity Inpatient   Lower Body Dressing 2   Bathing 2   Toileting 2   Upper Body Dressing 3   Grooming 3   Eating 4   Daily Activity Raw Score 16   Daily Activity Standardized Score (Calc for Raw Score >=11) 35.96     Jennifer Cintron MS, OTR/L

## 2024-02-02 NOTE — CASE MANAGEMENT
Case Management Progress Note    Patient name Derek Walter  Location /-01 MRN [id number]  : 1945 Date 2024       LOS (days): 10  Geometric Mean LOS (GMLOS) (days): 4.6  Days to GMLOS:-5.9        OBJECTIVE:        Current admission status: Inpatient  Preferred Pharmacy:   [id number] - [place], [place] - [id number] [facility]  [address] [address]  Phone: [phone] Fax: [phone]    Primary Care Provider: Ernesto Griffith MD    Primary Insurance: [employer]  Secondary Insurance:     PROGRESS NOTE:    Message left for pt's wife Nilda to discuss rehab options; awaiting call back.

## 2024-02-02 NOTE — ASSESSMENT & PLAN NOTE
Patient has stage II liver cancer hepatocellular carcinoma  Has a tumor thrombus which appears stable seen again on admission CT  Status post Y 90 treatment    Follows with Dr. Gong as o/p  AFP is elevated.  GI discussed with patient's wife and Dr. Gong and recommended outpatient MRI abdomen

## 2024-02-02 NOTE — PHYSICAL THERAPY NOTE
"                                                                                  PHYSICAL THERAPY NOTE     02/02/24 0905   PT Last Visit   PT Visit Date 02/02/24   Note Type   Note Type Treatment   Pain Assessment   Pain Assessment Tool 0-10   Pain Score No Pain   Restrictions/Precautions   Weight Bearing Precautions Per Order No   Other Precautions Fall Risk;Telemetry;Bed Alarm;Chair Alarm;Cognitive   General   Chart Reviewed Yes   Additional Pertinent History (S)  +Orthostatic hypotension   Response to Previous Treatment Patient with no complaints from previous session.   Family/Caregiver Present No   Cognition   Overall Cognitive Status Impaired   Arousal/Participation Alert   Attention Within functional limits   Memory Decreased recall of recent events;Decreased recall of precautions   Following Commands Follows one step commands with increased time or repetition   Comments More alert today and more conversational.   Subjective   Subjective \"I'm OK.\"   Bed Mobility   Additional Comments Received OOB in chair   Transfers   Sit to Stand 4  Minimal assistance   Additional items Assist x 1;Armrests;Verbal cues   Stand to Sit 4  Minimal assistance   Additional items Assist x 1;Armrests;Verbal cues   Additional Comments Stood for approx 1-2 minutes with a RW with supervision. Seated 116/52 standing 83/38 returned to seated position 104/43 Asymptomatic  (Nursing student and educator present for session.)   Ambulation/Elevation   Ambulation/Elevation Additional Comments Unable due to orthostatic hypotension   Balance   Static Sitting Good   Dynamic Sitting Fair +   Static Standing Fair   Dynamic Standing Fair   Endurance Deficit   Endurance Deficit Yes   Endurance Deficit Description Limited by medical status-orthostatic hypotension   Activity Tolerance   Activity Tolerance Patient limited by fatigue;Treatment limited secondary to medical complications (Comment)   Medical Staff Made Aware Tia JOHNSON   Nurse Made Aware " RN   Assessment   Prognosis Guarded   Problem List Decreased strength;Decreased endurance;Impaired balance;Decreased mobility;Decreased cognition;Obesity   Assessment Patient was received OOB in chair. More alert and conversational today. Patient followed commands well. Performed transfer with min A x 1. Significant drop in BP. +orthostatic hypotension. Not appropriate for further mobility. RN aware. Will continue to assess and progress as able. Recommending level 2 resources. Moderate intensity. The patient's AM-PAC Basic Mobility Inpatient Short Form Raw Score is 15. A Raw score of less than or equal to 17 suggests the patient may benefit from discharge to post-acute rehabilitation services. Please also refer to the recommendation of the Physical Therapist for safe discharge planning.   Barriers to Discharge Inaccessible home environment;Decreased caregiver support   Goals   Patient Goals None stasted   STG Expiration Date 02/13/24   PT Treatment Day 1   Plan   Treatment/Interventions Functional transfer training;LE strengthening/ROM;Therapeutic exercise;Endurance training;Cognitive reorientation;Patient/family training;Equipment eval/education;Bed mobility;Gait training;Spoke to nursing;OT   Progress Slow progress, medical status limitations   PT Frequency 3-5x/wk   Discharge Recommendation   Rehab Resource Intensity Level, PT II (Moderate Resource Intensity)   AM-PAC Basic Mobility Inpatient   Turning in Flat Bed Without Bedrails 3   Lying on Back to Sitting on Edge of Flat Bed Without Bedrails 3   Moving Bed to Chair 3   Standing Up From Chair Using Arms 3   Walk in Room 2   Climb 3-5 Stairs With Railing 1   Basic Mobility Inpatient Raw Score 15   Basic Mobility Standardized Score 36.97   Highest Level Of Mobility   -[facility] Goal 4: Move to chair/commode   JH-HLM Achieved 5: Stand (1 or more minutes)   Education   Education Provided Other   Patient Reinforcement needed   End of Consult   Patient Position at End  of Consult Bedside chair;Bed/Chair alarm activated;All needs within reach   End of Consult Comments RN present and aware of BP     Marlene Bernabe            Patient Name: Derek Walter  Today's Date: 2/2/2024

## 2024-02-02 NOTE — PLAN OF CARE
Problem: Potential for Falls  Goal: Patient will remain free of falls  Description: INTERVENTIONS:  - Educate patient/family on patient safety including physical limitations  - Instruct patient to call for assistance with activity   - Consult OT/PT to assist with strengthening/mobility   - Keep Call bell within reach  - Keep bed low and locked with side rails adjusted as appropriate  - Keep care items and personal belongings within reach  - Initiate and maintain comfort rounds  - Make Fall Risk Sign visible to staff  - Offer Toileting every 2 Hours, in advance of need  - Initiate/Maintain bed/chair alarm  - Obtain necessary fall risk management equipment: non-slip socks, walker  - Apply yellow socks and bracelet for high fall risk patients  - Consider moving patient to room near nurses station  Outcome: Progressing     Problem: PAIN - ADULT  Goal: Verbalizes/displays adequate comfort level or baseline comfort level  Description: Interventions:  - Encourage patient to monitor pain and request assistance  - Assess pain using appropriate pain scale  - Administer analgesics based on type and severity of pain and evaluate response  - Implement non-pharmacological measures as appropriate and evaluate response  - Consider cultural and social influences on pain and pain management  - Notify physician/advanced practitioner if interventions unsuccessful or patient reports new pain  Outcome: Progressing     Problem: Knowledge Deficit  Goal: Patient/family/caregiver demonstrates understanding of disease process, treatment plan, medications, and discharge instructions  Description: Complete learning assessment and assess knowledge base.  Interventions:  - Provide teaching at level of understanding  - Provide teaching via preferred learning methods  Outcome: Progressing     Problem: GASTROINTESTINAL - ADULT  Goal: Minimal or absence of nausea and/or vomiting  Description: INTERVENTIONS:  - Administer IV fluids if ordered to  ensure adequate hydration  - Maintain NPO status until nausea and vomiting are resolved  - Nasogastric tube if ordered  - Administer ordered antiemetic medications as needed  - Provide nonpharmacologic comfort measures as appropriate  - Advance diet as tolerated, if ordered  - Consider nutrition services referral to assist patient with adequate nutrition and appropriate food choices  Outcome: Progressing  Goal: Maintains or returns to baseline bowel function  Description: INTERVENTIONS:  - Assess bowel function  - Encourage oral fluids to ensure adequate hydration  - Administer IV fluids if ordered to ensure adequate hydration  - Administer ordered medications as needed  - Encourage mobilization and activity  - Consider nutritional services referral to assist patient with adequate nutrition and appropriate food choices  Outcome: Progressing  Goal: Maintains adequate nutritional intake  Description: INTERVENTIONS:  - Monitor percentage of each meal consumed  - Identify factors contributing to decreased intake, treat as appropriate  - Assist with meals as needed  - Monitor I&O, weight, and lab values if indicated  - Obtain nutrition services referral as needed  Outcome: Progressing  Goal: Oral mucous membranes remain intact  Description: INTERVENTIONS  - Assess oral mucosa and hygiene practices  - Implement preventative oral hygiene regimen  - Implement oral medicated treatments as ordered  - Initiate Nutrition services referral as needed  Outcome: Progressing     Problem: METABOLIC, FLUID AND ELECTROLYTES - ADULT  Goal: Electrolytes maintained within normal limits  Description: INTERVENTIONS:  - Monitor labs and assess patient for signs and symptoms of electrolyte imbalances  - Administer electrolyte replacement as ordered  - Monitor response to electrolyte replacements, including repeat lab results as appropriate  - Instruct patient on fluid and nutrition as appropriate  Outcome: Progressing  Goal: Fluid balance  maintained  Description: INTERVENTIONS:  - Monitor labs   - Monitor I/O and WT  - Instruct patient on fluid and nutrition as appropriate  - Assess for signs & symptoms of volume excess or deficit  Outcome: Progressing  Goal: Glucose maintained within target range  Description: INTERVENTIONS:  - Monitor Blood Glucose as ordered  - Assess for signs and symptoms of hyperglycemia and hypoglycemia  - Administer ordered medications to maintain glucose within target range  - Assess nutritional intake and initiate nutrition service referral as needed  Outcome: Progressing     Problem: HEMATOLOGIC - ADULT  Goal: Maintains hematologic stability  Description: INTERVENTIONS  - Assess for signs and symptoms of bleeding or hemorrhage  - Monitor labs  - Administer supportive blood products/factors as ordered and appropriate  Outcome: Progressing     Problem: Prexisting or High Potential for Compromised Skin Integrity  Goal: Skin integrity is maintained or improved  Description: INTERVENTIONS:  - Identify patients at risk for skin breakdown  - Assess and monitor skin integrity  - Assess and monitor nutrition and hydration status  - Monitor labs   - Assess for incontinence   - Turn and reposition patient  - Assist with mobility/ambulation  - Relieve pressure over bony prominences  - Avoid friction and shearing  - Provide appropriate hygiene as needed including keeping skin clean and dry  - Evaluate need for skin moisturizer/barrier cream  - Collaborate with interdisciplinary team   - Patient/family teaching  - Consider wound care consult   Outcome: Progressing     Problem: Nutrition/Hydration-ADULT  Goal: Nutrient/Hydration intake appropriate for improving, restoring or maintaining nutritional needs  Description: Monitor and assess patient's nutrition/hydration status for malnutrition. Collaborate with interdisciplinary team and initiate plan and interventions as ordered.  Monitor patient's weight and dietary intake as ordered or  per policy. Utilize nutrition screening tool and intervene as necessary. Determine patient's food preferences and provide high-protein, high-caloric foods as appropriate.     INTERVENTIONS:  - Monitor oral intake, urinary output, labs, and treatment plans  - Assess nutrition and hydration status and recommend course of action  - Evaluate amount of meals eaten  - Assist patient with eating if necessary   - Allow adequate time for meals  - Recommend/ encourage appropriate diets, oral nutritional supplements, and vitamin/mineral supplements  - Order, calculate, and assess calorie counts as needed  - Recommend, monitor, and adjust tube feedings and TPN/PPN based on assessed needs  - Assess need for intravenous fluids  - Provide specific nutrition/hydration education as appropriate  - Include patient/family/caregiver in decisions related to nutrition  Outcome: Progressing     Problem: RESPIRATORY - ADULT  Goal: Achieves optimal ventilation and oxygenation  Description: INTERVENTIONS:  - Assess for changes in respiratory status  - Assess for changes in mentation and behavior  - Position to facilitate oxygenation and minimize respiratory effort  - Oxygen administered by appropriate delivery if ordered  - Initiate smoking cessation education as indicated  - Encourage broncho-pulmonary hygiene including cough, deep breathe, Incentive Spirometry  - Assess the need for suctioning and aspirate as needed  - Assess and instruct to report SOB or any respiratory difficulty  - Respiratory Therapy support as indicated  Outcome: Progressing

## 2024-02-02 NOTE — ASSESSMENT & PLAN NOTE
Lab Results   Component Value Date    EGFR 60 02/02/2024    EGFR 63 02/01/2024    EGFR 71 01/31/2024    CREATININE 1.16 02/02/2024    CREATININE 1.12 02/01/2024    CREATININE 1.01 01/31/2024     Cr at baseline  Monitor I's and O's   Renally dose medications  Avoid nephrotoxic agents  On Lasix

## 2024-02-02 NOTE — PLAN OF CARE
Problem: OCCUPATIONAL THERAPY ADULT  Goal: Performs self-care activities at highest level of function for planned discharge setting.  See evaluation for individualized goals.  Description:  Outcome: Progressing  Note: Limitation: Decreased ADL status, Decreased UE ROM, Decreased UE strength, Decreased Safe judgement during ADL, Decreased cognition, Decreased endurance, Decreased self-care trans, Decreased high-level ADLs  Prognosis: Fair  Assessment: Patient participated in Skilled OT session this date with interventions consisting of ADL re training with the use of correct body mechnaics, safety awareness and fall prevention techniques, and increase dynamic sit/ stand balance during functional activity  . Patient agreeable to OT treatment session, upon arrival patient was found seated OOB to Recliner.  Treatment session as follows: Donned brief with Max A. BP seated, 116/52. Stood with Min A. BP standing 83/38. Returned to seated, Bps 104/43 & 98/43. RN present and aware. Medically inappropriate to continue further session 2* orthostatic. Patient continues to be functioning below baseline level, occupational performance remains limited secondary to factors listed above and increased risk for falls and injury.  The patient's raw score on the -PAC Daily Activity inpatient short form is 16, standardized score is 35.96, less than 39.4. Patients at this level are likely to benefit from DC to post-acute rehabilitation services. From OT standpoint, recommendation at time of d/c would be level 2.   Patient to benefit from continued Occupational Therapy treatment while in the hospital to address deficits as defined above and maximize level of functional independence with ADLs and functional mobility. Pt seen as a co-tx with PT due to the patient's co-morbidities, clinically unstable presentation, and present impairments which are a regression from the patient's baseline. Pt left with call bell in reach, tray table in  reach, needs met, chair alarm activated, RN present.     Rehab Resource Intensity Level, OT: II (Moderate Resource Intensity)

## 2024-02-02 NOTE — PLAN OF CARE
Problem: PHYSICAL THERAPY ADULT  Goal: Performs mobility at highest level of function for planned discharge setting.  See evaluation for individualized goals.  Description: Treatment/Interventions: Functional transfer training, LE strengthening/ROM, Elevations, Therapeutic exercise, Endurance training, Cognitive reorientation, Equipment eval/education, Patient/family training, Bed mobility, Gait training, Spoke to nursing, OT          See flowsheet documentation for full assessment, interventions and recommendations.  2/2/2024 1025 by Marlene Bernabe, PT  Outcome: Not Progressing  Note: Prognosis: Guarded  Problem List: Decreased strength, Decreased endurance, Impaired balance, Decreased mobility, Decreased cognition, Obesity  Assessment: Patient was received OOB in chair. More alert and conversational today. Patient followed commands well. Performed transfer with min A x 1. Significant drop in BP. +orthostatic hypotension. Not appropriate for further mobility. RN aware. Will continue to assess and progress as able. Recommending level 2 resources. Moderate intensity. The patient's AM-PAC Basic Mobility Inpatient Short Form Raw Score is 15. A Raw score of less than or equal to 17 suggests the patient may benefit from discharge to post-acute rehabilitation services. Please also refer to the recommendation of the Physical Therapist for safe discharge planning.  Barriers to Discharge: Inaccessible home environment, Decreased caregiver support     Rehab Resource Intensity Level, PT: II (Moderate Resource Intensity)    See flowsheet documentation for full assessment.     2/2/2024 1025 by Marlene Bernabe, PT  Note: Prognosis: Guarded  Problem List: Decreased strength, Decreased endurance, Impaired balance, Decreased mobility, Decreased cognition, Obesity  Assessment: Patient was received OOB in chair. More alert and conversational today. Patient followed commands well. Performed transfer with min A x 1. Significant  drop in BP. +orthostatic hypotension. Not appropriate for further mobility. RN aware. Will continue to assess and progress as able. Recommending level 2 resources. Moderate intensity. The patient's AM-PAC Basic Mobility Inpatient Short Form Raw Score is 15. A Raw score of less than or equal to 17 suggests the patient may benefit from discharge to post-acute rehabilitation services. Please also refer to the recommendation of the Physical Therapist for safe discharge planning.  Barriers to Discharge: Inaccessible home environment, Decreased caregiver support     Rehab Resource Intensity Level, PT: II (Moderate Resource Intensity)    See flowsheet documentation for full assessment.

## 2024-02-02 NOTE — PROGRESS NOTES
Progress note - Gastroenterology   Derek Walter 78 y.o. male MRN: [id number]  Unit/Bed#: -01 Encounter: [id number]    ASSESSMENT and PLAN    Upper GI bleed secondary to gastric AVMs  Acute blood loss anemia  S/P EGD with cautery and clipping bleeding gastric AVMs.  S/P 3 units packed red blood cells  No evidence of bleeding.  Hemoglobin stable around 8.  Hemoglobin 8.9 with labs today.  Iron panel 1/30; TIBC 289, iron <10, ferritin 37, unable to calculate iron sat.  Heparin IV discontinued, patient restarted (1/30) on Eliquis 5 mg twice daily and Plavix 75 mg daily.        -Monitor hemoglobin, transfuse less than 7  -Continue PPI  -Follow H&H and signs/symptoms of bleeding  -IV Venofer x 3 doses complete, oral iron supplement daily        3.  Altered mental status  Secondary to anesthesia versus some component of hepatic encephalopathy related to GI bleeding.  Mental status improving.     -Continue rifaximin   -Restarted lactulose 20 g twice daily 1/31, can titrate for 2-3 bowel movements per day  -Rifaximin appears to be covered by patient's insurance with no or minor co-pay.    -Tolerating diet advanced to mechanical soft, thin liquids.      MRI ordered 2/1 was discontinued after results of AFP 88123.57, patient's wife spoke with Dr. Gong, he prefers MRI to be done closer when the patient receives treatment.  MRI canceled and deferred.     4.  Atrial fibrillation  5. Coronary artery disease  Cardiology feels strongly about anticoagulation.     Eliquis 5 mg twice daily and Plavix 75 mg daily started.     6.  Hepatocellular carcinoma    Chief Complaint   Patient presents with    Abdominal Pain     Pt via EMS from home with c/o generalized weakness, nausea, and vomiting since the morning. Reports lower abd pain. Reports hx of anemia.       SUBJECTIVE/HPI   Patient is out of bed to chair.  He does appear much more alert today.  States he is tolerating his meals well.  States he is having at least 1-2 bowel  "movements per day.  He denies any abdominal pain, nausea or vomiting.    BP (!) 98/43   Pulse 70   Temp 98.5 °F (36.9 °C) (Oral)   Resp 15   Ht 6' (1.829 m)   Wt 89.8 kg (197 lb 15.6 oz)   SpO2 97%   BMI 26.85 kg/m²     PHYSICALEXAM  General appearance: alert, appears stated age and cooperative, patient appears more alert today  Eyes: PERLLA, EOMI, no icterus   Head: Normocephalic, without obvious abnormality, atraumatic  Lungs: clear to auscultation bilaterally  Heart: regular rate and rhythm, S1, S2 normal, no murmur, click, rub or gallop  Abdomen: Large, soft, non-tender; bowel sounds normal; no masses,  no organomegaly  Extremities: extremities normal, atraumatic, no cyanosis or edema  Neurologic: Grossly normal    Lab Results   Component Value Date    GLUCOSE 194 (H) 01/26/2024    CALCIUM 9.0 02/02/2024    K 4.0 02/02/2024    CO2 22 02/02/2024     (H) 02/02/2024    BUN 28 (H) 02/02/2024    CREATININE 1.16 02/02/2024     Lab Results   Component Value Date    WBC 7.19 02/02/2024    HGB 8.9 (L) 02/02/2024    HCT 29.4 (L) 02/02/2024    MCV 94 02/02/2024     02/02/2024     Lab Results   Component Value Date    ALT 56 (H) 02/02/2024    AST 73 (H) 02/02/2024    ALKPHOS 546 (H) 02/02/2024     No results found for: \"AMYLASE\"  Lab Results   Component Value Date    LIPASE 39 01/23/2024     Lab Results   Component Value Date    IRON <10 (L) 01/30/2024    TIBC <289 01/30/2024    FERRITIN 37 01/30/2024     Lab Results   Component Value Date    INR 1.19 01/28/2024     "

## 2024-02-02 NOTE — ASSESSMENT & PLAN NOTE
Lab Results   Component Value Date    HGBA1C 6.6 (H) 01/24/2024       Recent Labs     02/01/24  1607 02/01/24 2013 02/02/24  0745 02/02/24  0844   POCGLU 155* 187* 61* 110       Patient has a Dexcom, on Trulicity and Jardiance, Lantus and insulin  Extubated 1/27. Currently no enteral access for TF. Insulin drip stopped. Changed to correction scale insulin  Accu-Chek ACHS with Humalog sliding scale  Blood glucose goal 140-180  Hypoglycemia protocol

## 2024-02-02 NOTE — PROGRESS NOTES
On license of [facility]  Progress Note  Name: Derek Walter I  MRN: [id number]  Unit/Bed#: -01 I Date of Admission: 1/23/2024   Date of Service: 2/2/2024 I Hospital Day: 10    Assessment/Plan   Hepatic encephalopathy (HCC)  Assessment & Plan  patient is 78-year-old male with a history of liver cancer and radiation therapy who was admitted for an upper GI bleed with hypotension and later strokelike symptoms went unresponsive and was intubated, appears to be as cause from the liver with an ammonia level of 130  Most likely source of elevated ammonia level is liver history of liver cancer  GI following  Continue with Rifaximin when able   Patient should have 2-3 BMs a day  Ammonia level is normal  On lactulose    AMS (altered mental status)  Assessment & Plan  1/24 0630A RN called provider to bedside to evaluate patient for left facial droop. NIHHS 4 (left facial droop, LUE weakness +4/5, LLE weakness +4/5, LLE drift and mild dysarthria). RN states left facial droop was noticed on arrival to ICU at 0300A. RN states ED could not confirm if droop was present in ED and wife unsure if droop was new. Stroke alert called.   CTH - no acute stroke or hemorrhage  CTA H/N - no large vessel occulusion. No arterial occulusion. 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   1/24 MRI: Negative for acute findings   1/24 ECHO: EF 75%, Mild TVR  Mentation continuing to improve. Able to be extubated 1/27     Plan:  Patient discussed with Dr. Interiano (Neuro)   TNK not given due to unknown last known well and active GI bleed.  Goal SBP >140  1/24 ECHO: EF 75%, Mild TVR  Restarted on Plavix and Eliquis  Restart statin  SLP consult appreciated  Neurology following  Improving  Aspiration/fall precautions     Acute blood loss anemia  Assessment & Plan  Secondary to GIB  Hemoglobin baseline 8-10  Receive 3 units PRBC this admission, last 1/25   Hgb Q12H  History of iron deficiency   Iron panel reviewed.  On Venofer  On iron  supplementation    Chronic systolic heart failure (HCC)  Assessment & Plan  Wt Readings from Last 3 Encounters:   02/02/24 89.8 kg (197 lb 15.6 oz)   02/01/24 89.8 kg (197 lb 15.6 oz)   12/20/23 96.6 kg (213 lb)     Patient is 78-year-old male with history of CAD post PCI in August not deemed a candidate for surgical intervention, systolic heart failure with reduced ejection fraction of 36%   Home meds: lasix, toprol, and statin   Resume home meds when able   Daily wts    Hepatocellular carcinoma (HCC)  Assessment & Plan  Patient has stage II liver cancer hepatocellular carcinoma  Has a tumor thrombus which appears stable seen again on admission CT  Status post Y 90 treatment    Follows with Dr. Gong as o/p  AFP is elevated.  GI discussed with patient's wife and Dr. Gong and recommended outpatient MRI abdomen    CAD (coronary artery disease)  Assessment & Plan  Patient has a history of coronary artery disease had 8/2023 a PCI status post cardiac catheterization drug-eluting stents x 3 in the LAD.  Ramus and left circumflex  Continue home statin and metoprolol    History of DVT (deep vein thrombosis)  Assessment & Plan  Patient is on Eliquis as o/p, currently held 2/2 GIB  1/23 reversed with Kcentra   Continue on Eliquis    Acute hypoxemic respiratory failure (HCC)  Assessment & Plan  Required intubation for airway protection 1/23 after patient became increasingly encephalopathic  Minimal vent setting. Placed on PS since 1/24 at 6/6 40% FiO2  Unable to be successfully extubated due to mental status. Currently not requiring sedation.  Having copious secretions coming from inline suction  1/27 CXR negative for acute findings. Given 1 x dose of 40 mg IV lasix   Extubated mid morning on 1/27  Saturating well on room air     Plan:  Resolved  Aspiration precautions  Encourage IS/Flutter valve when able     Atrial fibrillation, unspecified type (HCC)  Assessment & Plan  Rate controled  Maintained on Eliquis and metoprolol  as o/p  Continue on Lopressor  Hold Eliquis and Plavix until okay with GI   Cardiology consult regarding input for antiplatelet/anticoagulation appreciated  As per cardiology restart Plavix and Eliquis  Discussed with GI and patient will be restarted 1/30 and will monitor H&H closely      Type 2 diabetes mellitus with neurologic complication, with long-term current use of insulin ([facility])  Assessment & Plan  Lab Results   Component Value Date    HGBA1C 6.6 (H) 01/24/2024       Recent Labs     02/01/24  1607 02/01/24 2013 02/02/24  0745 02/02/24  0844   POCGLU 155* 187* 61* 110       Patient has a Dexcom, on Trulicity and Jardiance, Lantus and insulin  Extubated 1/27. Currently no enteral access for TF. Insulin drip stopped. Changed to correction scale insulin  Accu-Chek ACHS with Humalog sliding scale  Blood glucose goal 140-180  Hypoglycemia protocol     Stage 3 chronic kidney disease, unspecified whether stage 3a or 3b CKD ([facility])  Assessment & Plan  Lab Results   Component Value Date    EGFR 60 02/02/2024    EGFR 63 02/01/2024    EGFR 71 01/31/2024    CREATININE 1.16 02/02/2024    CREATININE 1.12 02/01/2024    CREATININE 1.01 01/31/2024     Cr at baseline  Monitor I's and O's   Renally dose medications  Avoid nephrotoxic agents  On Lasix    * Acute upper GI bleeding  Assessment & Plan  Patient is a 78-year-old male who has been seeing [facility] last seen 12/13/2023 patient has a history of anemia is multifactorial chronic due to cancer with iron deficiency and occult GI blood loss on Eliquis review of an EGD with multiple AVMs.  Patient came in to the ER from home due to generalized weakness nausea vomiting since this morning reports lower abdominal pain has a history of anemia is on Eliquis and Plavix episodes of vomiting which were described by the significant other is dark of clotted material, and also had melanotic stool occult positive.  Patient felt significant weakness and was unable to walk felt like too  weak to walk.  Patient has a history of an upper GI bleed with multiple AVMs, also has a history of the patient intubation with tracheostomy approximately rehab time of 1 year.   CT scan with contrast showed nonspecific hypodensity within the gastric body possibly reflective of hemorrhage in this patient with the history of gastric AVMs, known liver cancer, cholelithiasis, small volume abdominal pelvic ascites  Patient's baseline hemoglobin appears to be 8-10, except 1 value on 11/23 of 13.    Patient is on Eliquis and Plavix as o/p  In the ED received DDAVP 0.3 mg/kg and Kcentra  GI consulted  1/23 S/P EGD - 3 large angioectasias in the fundus of the stomach and body of the stomach; bleeding was observed; placed clips; hemostasis achieved; induced coagulation and hemostasis achieved with argon plasma coagulation The esophagus and duodenum appeared normal.  Pt received a total of 3 units PRBC this admission so far, last on 1/25/24     Plan:  Monitor H/H Q12 hours- Hgb remains stable   Continue PPI BID  Patient extubated 1/27  Patient is tolerating diet  GI following appreciate recommendations- Need to address if stable to restart plavix  Patient is restarted on Plavix and Eliquis after clearance from GI and cardiology.  DC heparin drip  Hemoglobin is 8.9  Monitor H&H closely             Labs & Imaging: I have personally reviewed pertinent reports.      VTE Prophylaxis: in place.    Code Status:   Level 1 - Full Code    Patient Centered Rounds: I have performed bedside rounds with nursing staff today.    Mobility:   Basic Mobility Inpatient Raw Score: 19  JH-HLM Goal: 6: Walk 10 steps or more  JH-HLM Achieved: 6: Walk 10 steps or more  HLM Goal NOT achieved. Continue with multidisciplinary rounding and encourage appropriate mobility to improve upon HLM goals.    Discussions with Specialists or Other Care Team Provider: GI    Education and Discussions with Family / Patient: Spoke with son    Total Time Spent on Date  of Encounter in care of patient: 35 mins. This time was spent on one or more of the following: performing physical exam; counseling and coordination of care; obtaining or reviewing history; documenting in the medical record; reviewing/ordering tests, medications or procedures; communicating with other healthcare professionals and discussing with patient's family/caregivers.    Current Length of Stay: 10 day(s)    Current Patient Status: Inpatient   Certification Statement: The patient will continue to require additional inpatient hospital stay due to see my assessment and plan.     Subjective:   Patient is seen and examined at bedside.  No new complaints.  Afebrile  All other ROS are negative.    Objective:    Vitals: Blood pressure (!) 98/43, pulse 70, temperature 98.5 °F (36.9 °C), temperature source Oral, resp. rate 15, height 6' (1.829 m), weight 89.8 kg (197 lb 15.6 oz), SpO2 99%.,Body mass index is 26.85 kg/m².  SPO2 RA Rest      Flowsheet Row ED to Hosp-Admission (Current) from 1/23/2024 in [facility]   SpO2 99 %   SpO2 Activity At Rest   O2 Device None (Room air)   O2 Flow Rate 4 L/min          I&O:   Intake/Output Summary (Last 24 hours) at 2/2/2024 1002  Last data filed at 2/2/2024 0614  Gross per 24 hour   Intake --   Output 225 ml   Net -225 ml       Physical Exam:    General- Alert, sitting in chair. Not in any acute distress.  Neck- Supple, No JVD  CVS- regular, S1 and S2 normal  Chest- Bilateral Air entry, No rhochi, crackles or wheezing present.  Abdomen- soft, nontender, not distended, no guarding or rigidity, BS+  Extremities-  No pedal edema, No calf tenderness                         Normal ROM in all extremities.  CNS-   Alert, awake and orientedx3. No focal deficits present.    Invasive Devices       Peripheral Intravenous Line  Duration             Peripheral IV 02/02/24 Dorsal (posterior);Left Hand <1 day                          Social History   reviewed  Family History   Problem Relation Age of Onset    Hypertension Mother     Bone cancer Father     Diabetes type II Sister     Diabetes type II Sister     Esophageal cancer Brother     Colon cancer Neg Hx     reviewed    Meds:  Current Facility-Administered Medications   Medication Dose Route Frequency Provider Last Rate Last Admin    acetaminophen (TYLENOL) tablet 650 mg  650 mg Oral Q6H PRN Adrian More MD   650 mg at 01/25/24 2034    apixaban (ELIQUIS) tablet 5 mg  5 mg Oral BID Adrian More MD   5 mg at 02/02/24 0847    atorvastatin (LIPITOR) tablet 40 mg  40 mg Oral Daily With Dinner Adrian More MD   40 mg at 02/01/24 1705    azelastine (ASTELIN) 0.1 % nasal spray 1 spray  1 spray Each Nare BID Adrian More MD   1 spray at 02/02/24 0848    bimatoprost (LUMIGAN) 0.03 % ophthalmic drops 1 drop  1 drop Both Eyes QPM Adrian More MD   1 drop at 02/01/24 1745    chlorhexidine (PERIDEX) 0.12 % oral rinse 15 mL  15 mL Mouth/Throat Q12H RANDALL Adrian More MD   15 mL at 02/02/24 0846    clopidogrel (PLAVIX) tablet 75 mg  75 mg Oral Daily Adrian More MD   75 mg at 02/02/24 0846    Empagliflozin (JARDIANCE) tablet 10 mg  10 mg Oral QAM Adrian More MD   10 mg at 02/02/24 0847    ferrous sulfate tablet 325 mg  325 mg Oral Daily With Breakfast Adrian More MD   325 mg at 02/02/24 0847    furosemide (LASIX) tablet 40 mg  40 mg Oral Every Other Day Adrian More MD   40 mg at 02/02/24 0846    glycerin-hypromellose- (ARTIFICIAL TEARS) ophthalmic solution 1 drop  1 drop Both Eyes Q6H PRN Adrian More MD   1 drop at 01/31/24 0932    insulin detemir (LEVEMIR) subcutaneous injection 30 Units  30 Units Subcutaneous HS Adrian More MD   30 Units at 02/01/24 2154    insulin lispro (HumaLOG) 100 units/mL subcutaneous injection 2-12 Units  2-12 Units Subcutaneous TID AC Adrian More MD   2 Units at 02/01/24 1700    insulin lispro (HumaLOG) 100 units/mL  subcutaneous injection 2-12 Units  2-12 Units Subcutaneous HS Adrian More MD   2 Units at 02/01/24 2154    insulin lispro (HumaLOG) 100 units/mL subcutaneous injection 5 Units  5 Units Subcutaneous TID With Meals Adrian More MD   5 Units at 02/02/24 0847    ipratropium (ATROVENT) 0.02 % inhalation solution 0.5 mg  0.5 mg Nebulization Q6H PRN Adrian More MD   0.5 mg at 01/28/24 0748    lactulose (CHRONULAC) oral solution 20 g  20 g Oral BID NADIA Kwan   20 g at 02/02/24 0847    levalbuterol (XOPENEX) inhalation solution 0.63 mg  0.63 mg Nebulization Q6H PRN Adrian More MD   0.63 mg at 01/28/24 0748    metoprolol tartrate (LOPRESSOR) partial tablet 12.5 mg  12.5 mg Oral Q12H [facility] Adrian More MD   12.5 mg at 02/02/24 0847    pantoprazole (PROTONIX) injection 40 mg  40 mg Intravenous Q12H [facility] Adrian More MD   40 mg at 02/02/24 0847    rifaximin (XIFAXAN) tablet 550 mg  550 mg Oral Q12H [facility] Adrian More MD   550 mg at 02/02/24 0847    traZODone (DESYREL) tablet 100 mg  100 mg Oral  Adrian More MD   100 mg at 02/01/24 2154    trimethobenzamide (TIGAN) IM injection 200 mg  200 mg Intramuscular Q12H PRN Adrian More MD   200 mg at 01/23/24 0717    umeclidinium-vilanterol 62.5-25 mcg/actuation inhaler 1 puff  1 puff Inhalation Daily Adrian More MD   1 puff at 02/02/24 0849      Medications Prior to Admission   Medication    acetaminophen (TYLENOL) 650 mg CR tablet    Anoro Ellipta 62.5-25 MCG/ACT inhaler    atorvastatin (LIPITOR) 40 mg tablet    azelastine (ASTELIN) 0.1 % nasal spray    Blood Glucose Monitoring Suppl (OneTouch Verio) w/Device KIT    clopidogrel (PLAVIX) 75 mg tablet    Continuous Blood Gluc  (Dexcom G6 ) NOHEMY    Eliquis 5 MG    ferrous sulfate 325 (65 Fe) mg tablet    furosemide (LASIX) 40 mg tablet    insulin detemir (Levemir FlexPen) 100 Units/mL injection pen    Insulin Pen Needle (Droplet Pen Needles) 32G X 4 MM MISC  "   Jardiance 10 MG TABS tablet    Lumigan 0.01 % ophthalmic drops    metoprolol succinate (TOPROL-XL) 50 mg 24 hr tablet    Multiple Vitamin (MULTIVITAMIN ADULT PO)    NovoLOG FlexPen 100 units/mL injection pen    Potassium Citrate ER 15 MEQ (1620 MG) TBCR    pregabalin (LYRICA) 100 mg capsule    sertraline (ZOLOFT) 25 mg tablet    SUPER B COMPLEX/C PO    tamsulosin (FLOMAX) 0.4 mg    traZODone (DESYREL) 100 mg tablet    Trulicity 0.75 MG/0.5ML injection    Continuous Blood Gluc Sensor (Dexcom G6 Sensor) MISC    Continuous Blood Gluc Transmit (Dexcom G6 Transmitter) MISC    Continuous Blood Gluc Transmit (Dexcom G6 Transmitter) MISC    glucose blood (OneTouch Verio) test strip    mometasone (ELOCON) 0.1 % cream    pantoprazole (PROTONIX) 40 mg tablet       Labs:  Results from last 7 days   Lab Units 02/02/24 0308 02/01/24 0444 01/31/24 2001 01/31/24  0414   WBC Thousand/uL 7.19 5.51  --  5.32   HEMOGLOBIN g/dL 8.9* 9.3* 9.4* 8.1*   HEMATOCRIT % 29.4* 31.8* 31.0* 26.2*   PLATELETS Thousands/uL 272 167  --  176   NEUTROS PCT % 64 67  --  60   LYMPHS PCT % 13* 11*  --  12*   MONOS PCT % 15* 14*  --  21*   EOS PCT % 6 6  --  6     Results from last 7 days   Lab Units 02/02/24 0308 02/01/24 0444 01/31/24  0414 01/26/24  1213 01/26/24  1056   POTASSIUM mmol/L 4.0 4.1 3.9   < >  --    CHLORIDE mmol/L 110* 110* 112*   < >  --    CO2 mmol/L 22 20* 20*   < >  --    CO2, I-STAT mmol/L  --   --   --   --  20*   BUN mg/dL 28* 30* 33*   < >  --    CREATININE mg/dL 1.16 1.12 1.01   < >  --    CALCIUM mg/dL 9.0 8.7 8.3*   < >  --    ALK PHOS U/L 546* 503* 426*   < >  --    ALT U/L 56* 50 45   < >  --    AST U/L 73* 70* 56*   < >  --    GLUCOSE, ISTAT mg/dl  --   --   --   --  194*    < > = values in this interval not displayed.     No results found for: \"TROPONINI\", \"CKMB\", \"CKTOTAL\"  Results from last 7 days   Lab Units 01/28/24  1526   INR  1.19     Lab Results   Component Value Date    BLOODCX No Growth After 5 Days. " 08/10/2023    BLOODCX No Growth After 5 Days. 08/10/2023         Imaging:  Results for orders placed during the hospital encounter of 01/23/24    XR chest portable    Narrative  CHEST    INDICATION:   line confirmation.    COMPARISON: 1/23/2024    EXAM PERFORMED/VIEWS:  XR CHEST PORTABLE      FINDINGS: The tip of the endotracheal tube is above the george. The right IJ central venous catheter has the tip projecting over the superior vena cava. The tip of the endogastric tube appears to be in the gastric cardia, just above the level of the  diaphragm. This should probably be advanced somewhat.    Heart appears enlarged. Aorta is atherosclerotic.    There seems to be hazy infiltrate in the right lung, predominantly interstitial pattern. Again, there appears to be minimal right effusion. No pneumothorax identified.    Osseous structures appear within normal limits for patient age.    Impression  Right IJ line projects over the superior vena cava and appears grossly satisfactory. No pneumothorax seen.    Endotracheal tube appears satisfactory.    Endogastric tube should probably be advanced farther into the stomach.    Small right effusion and relatively diffuse interstitial infiltrate in the right lung    The study was marked in EPIC for immediate notification.            Workstation performed: [id number]    Results for orders placed during the hospital encounter of 11/03/23    XR chest 2 views    Narrative  CHEST    INDICATION:   cooughing up bright red blood.    COMPARISON: 8/17/2023    EXAM PERFORMED/VIEWS:  XR CHEST PA & LATERAL The frontal view was performed utilizing dual energy radiographic technique.      FINDINGS:    Cardiomediastinal silhouette appears unremarkable.    The lungs are clear.  No pneumothorax or pleural effusion.    Osseous structures appear within normal limits for patient age.    Impression  No acute cardiopulmonary disease.            Workstation performed: [id number]      Last 24 Hours  Medication List:   Current Facility-Administered Medications   Medication Dose Route Frequency Provider Last Rate    acetaminophen  650 mg Oral Q6H PRN Adrian More MD      apixaban  5 mg Oral BID Adrian More MD      atorvastatin  40 mg Oral Daily With Dinner Adrian More MD      azelastine  1 spray Each Nare BID Adrian More MD      bimatoprost  1 drop Both Eyes QPM Adrian More MD      chlorhexidine  15 mL Mouth/Throat Q12H [facility] Adrian More MD      clopidogrel  75 mg Oral Daily Adrian More MD      Empagliflozin  10 mg Oral QAM Adrian More MD      ferrous sulfate  325 mg Oral Daily With Breakfast Adrian More MD      furosemide  40 mg Oral Every Other Day Adrian More MD      Artificial Tears  1 drop Both Eyes Q6H PRN Adrian More MD      insulin detemir  30 Units Subcutaneous HS Adrian More MD      insulin lispro  2-12 Units Subcutaneous TID AC Adrian More MD      insulin lispro  2-12 Units Subcutaneous HS Adrian More MD      insulin lispro  5 Units Subcutaneous TID With Meals Adrian More MD      ipratropium  0.5 mg Nebulization Q6H PRN Adrian More MD      lactulose  20 g Oral BID NADIA Kwan      levalbuterol  0.63 mg Nebulization Q6H PRN Adrian More MD      metoprolol tartrate  12.5 mg Oral Q12H [facility] Adrian More MD      pantoprazole  40 mg Intravenous Q12H [facility] Adrian More MD      rifaximin  550 mg Oral Q12H [facility] Adrian More MD      traZODone  100 mg Oral HS Adrian More MD      trimethobenzamide  200 mg Intramuscular Q12H PRN Adrian More MD      umeclidinium-vilanterol  1 puff Inhalation Daily Adrian More MD          Today, Patient Was Seen By: Adrian More MD    ** Please Note: Dictation voice to text software may have been used in the creation of this document. **

## 2024-02-02 NOTE — CASE MANAGEMENT
Case Management Progress Note    Patient name Derek Walter  Location /-01 MRN [id number]  : 1945 Date 2024       LOS (days): 10  Geometric Mean LOS (GMLOS) (days): 4.6  Days to GMLOS:-5.9        OBJECTIVE:        Current admission status: Inpatient  Preferred Pharmacy:   [id number] - [place], [place] - [id number] [facility]  [address] [address]  Phone: [phone] Fax: [phone]    Primary Care Provider: Ernesto Griffith MD    Primary Insurance: [employer]  Secondary Insurance:     PROGRESS NOTE:    Received a call back from pt's wife Nilda to discuss rehab choices. Nilda discussed potentially having pt return home with home care services. Nilda is requesting to discuss with pt this evening and will inform CM of their preference.     CM reviewed accepting SNF's; Nilda states if they decide on rehab would prefer [facility].  SNF list left at patient bedside.

## 2024-02-02 NOTE — PLAN OF CARE
Problem: Potential for Falls  Goal: Patient will remain free of falls  Description: INTERVENTIONS:  - Educate patient/family on patient safety including physical limitations  - Instruct patient to call for assistance with activity   - Consult OT/PT to assist with strengthening/mobility   - Keep Call bell within reach  - Keep bed low and locked with side rails adjusted as appropriate  - Keep care items and personal belongings within reach  - Initiate and maintain comfort rounds  - Make Fall Risk Sign visible to staff  - Offer Toileting every 2 Hours, in advance of need  - Initiate/Maintain alarm  - Obtain necessary fall risk management equipment  - Apply yellow socks and bracelet for high fall risk patients  - Consider moving patient to room near nurses station  Outcome: Progressing     Problem: PAIN - ADULT  Goal: Verbalizes/displays adequate comfort level or baseline comfort level  Description: Interventions:  - Encourage patient to monitor pain and request assistance  - Assess pain using appropriate pain scale  - Administer analgesics based on type and severity of pain and evaluate response  - Implement non-pharmacological measures as appropriate and evaluate response  - Consider cultural and social influences on pain and pain management  - Notify physician/advanced practitioner if interventions unsuccessful or patient reports new pain  Outcome: Progressing     Problem: Knowledge Deficit  Goal: Patient/family/caregiver demonstrates understanding of disease process, treatment plan, medications, and discharge instructions  Description: Complete learning assessment and assess knowledge base.  Interventions:  - Provide teaching at level of understanding  - Provide teaching via preferred learning methods  Outcome: Progressing     Problem: GASTROINTESTINAL - ADULT  Goal: Minimal or absence of nausea and/or vomiting  Description: INTERVENTIONS:  - Administer IV fluids if ordered to ensure adequate hydration  - Maintain  NPO status until nausea and vomiting are resolved  - Nasogastric tube if ordered  - Administer ordered antiemetic medications as needed  - Provide nonpharmacologic comfort measures as appropriate  - Advance diet as tolerated, if ordered  - Consider nutrition services referral to assist patient with adequate nutrition and appropriate food choices  Outcome: Progressing  Goal: Maintains or returns to baseline bowel function  Description: INTERVENTIONS:  - Assess bowel function  - Encourage oral fluids to ensure adequate hydration  - Administer IV fluids if ordered to ensure adequate hydration  - Administer ordered medications as needed  - Encourage mobilization and activity  - Consider nutritional services referral to assist patient with adequate nutrition and appropriate food choices  Outcome: Progressing  Goal: Maintains adequate nutritional intake  Description: INTERVENTIONS:  - Monitor percentage of each meal consumed  - Identify factors contributing to decreased intake, treat as appropriate  - Assist with meals as needed  - Monitor I&O, weight, and lab values if indicated  - Obtain nutrition services referral as needed  Outcome: Progressing  Goal: Oral mucous membranes remain intact  Description: INTERVENTIONS  - Assess oral mucosa and hygiene practices  - Implement preventative oral hygiene regimen  - Implement oral medicated treatments as ordered  - Initiate Nutrition services referral as needed  Outcome: Progressing     Problem: METABOLIC, FLUID AND ELECTROLYTES - ADULT  Goal: Electrolytes maintained within normal limits  Description: INTERVENTIONS:  - Monitor labs and assess patient for signs and symptoms of electrolyte imbalances  - Administer electrolyte replacement as ordered  - Monitor response to electrolyte replacements, including repeat lab results as appropriate  - Instruct patient on fluid and nutrition as appropriate  Outcome: Progressing  Goal: Fluid balance maintained  Description:  INTERVENTIONS:  - Monitor labs   - Monitor I/O and WT  - Instruct patient on fluid and nutrition as appropriate  - Assess for signs & symptoms of volume excess or deficit  Outcome: Progressing  Goal: Glucose maintained within target range  Description: INTERVENTIONS:  - Monitor Blood Glucose as ordered  - Assess for signs and symptoms of hyperglycemia and hypoglycemia  - Administer ordered medications to maintain glucose within target range  - Assess nutritional intake and initiate nutrition service referral as needed  Outcome: Progressing     Problem: HEMATOLOGIC - ADULT  Goal: Maintains hematologic stability  Description: INTERVENTIONS  - Assess for signs and symptoms of bleeding or hemorrhage  - Monitor labs  - Administer supportive blood products/factors as ordered and appropriate  Outcome: Progressing     Problem: RESPIRATORY - ADULT  Goal: Achieves optimal ventilation and oxygenation  Description: INTERVENTIONS:  - Assess for changes in respiratory status  - Assess for changes in mentation and behavior  - Position to facilitate oxygenation and minimize respiratory effort  - Oxygen administered by appropriate delivery if ordered  - Initiate smoking cessation education as indicated  - Encourage broncho-pulmonary hygiene including cough, deep breathe, Incentive Spirometry  - Assess the need for suctioning and aspirate as needed  - Assess and instruct to report SOB or any respiratory difficulty  - Respiratory Therapy support as indicated  Outcome: Progressing     Problem: Prexisting or High Potential for Compromised Skin Integrity  Goal: Skin integrity is maintained or improved  Description: INTERVENTIONS:  - Identify patients at risk for skin breakdown  - Assess and monitor skin integrity  - Assess and monitor nutrition and hydration status  - Monitor labs   - Assess for incontinence   - Turn and reposition patient  - Assist with mobility/ambulation  - Relieve pressure over bony prominences  - Avoid friction and  shearing  - Provide appropriate hygiene as needed including keeping skin clean and dry  - Evaluate need for skin moisturizer/barrier cream  - Collaborate with interdisciplinary team   - Patient/family teaching  - Consider wound care consult   Outcome: Progressing     Problem: Nutrition/Hydration-ADULT  Goal: Nutrient/Hydration intake appropriate for improving, restoring or maintaining nutritional needs  Description: Monitor and assess patient's nutrition/hydration status for malnutrition. Collaborate with interdisciplinary team and initiate plan and interventions as ordered.  Monitor patient's weight and dietary intake as ordered or per policy. Utilize nutrition screening tool and intervene as necessary. Determine patient's food preferences and provide high-protein, high-caloric foods as appropriate.     INTERVENTIONS:  - Monitor oral intake, urinary output, labs, and treatment plans  - Assess nutrition and hydration status and recommend course of action  - Evaluate amount of meals eaten  - Assist patient with eating if necessary   - Allow adequate time for meals  - Recommend/ encourage appropriate diets, oral nutritional supplements, and vitamin/mineral supplements  - Order, calculate, and assess calorie counts as needed  - Recommend, monitor, and adjust tube feedings and TPN/PPN based on assessed needs  - Assess need for intravenous fluids  - Provide specific nutrition/hydration education as appropriate  - Include patient/family/caregiver in decisions related to nutrition  Outcome: Progressing

## 2024-02-03 LAB
ANION GAP SERPL CALCULATED.3IONS-SCNC: 9 MMOL/L
BASOPHILS # BLD AUTO: 0.06 THOUSANDS/ÂΜL (ref 0–0.1)
BASOPHILS NFR BLD AUTO: 1 % (ref 0–1)
BUN SERPL-MCNC: 26 MG/DL (ref 5–25)
CALCIUM SERPL-MCNC: 8.9 MG/DL (ref 8.4–10.2)
CHLORIDE SERPL-SCNC: 110 MMOL/L (ref 96–108)
CO2 SERPL-SCNC: 20 MMOL/L (ref 21–32)
CREAT SERPL-MCNC: 1.18 MG/DL (ref 0.6–1.3)
EOSINOPHIL # BLD AUTO: 0.36 THOUSAND/ÂΜL (ref 0–0.61)
EOSINOPHIL NFR BLD AUTO: 5 % (ref 0–6)
ERYTHROCYTE [DISTWIDTH] IN BLOOD BY AUTOMATED COUNT: 17.1 % (ref 11.6–15.1)
GFR SERPL CREATININE-BSD FRML MDRD: 59 ML/MIN/1.73SQ M
GLUCOSE SERPL-MCNC: 118 MG/DL (ref 65–140)
GLUCOSE SERPL-MCNC: 144 MG/DL (ref 65–140)
GLUCOSE SERPL-MCNC: 147 MG/DL (ref 65–140)
GLUCOSE SERPL-MCNC: 149 MG/DL (ref 65–140)
GLUCOSE SERPL-MCNC: 96 MG/DL (ref 65–140)
HCT VFR BLD AUTO: 32.9 % (ref 36.5–49.3)
HGB BLD-MCNC: 9.9 G/DL (ref 12–17)
IMM GRANULOCYTES # BLD AUTO: 0.14 THOUSAND/UL (ref 0–0.2)
IMM GRANULOCYTES NFR BLD AUTO: 2 % (ref 0–2)
LYMPHOCYTES # BLD AUTO: 1.02 THOUSANDS/ÂΜL (ref 0.6–4.47)
LYMPHOCYTES NFR BLD AUTO: 13 % (ref 14–44)
MCH RBC QN AUTO: 28.7 PG (ref 26.8–34.3)
MCHC RBC AUTO-ENTMCNC: 30.1 G/DL (ref 31.4–37.4)
MCV RBC AUTO: 95 FL (ref 82–98)
MONOCYTES # BLD AUTO: 1.1 THOUSAND/ÂΜL (ref 0.17–1.22)
MONOCYTES NFR BLD AUTO: 14 % (ref 4–12)
NEUTROPHILS # BLD AUTO: 5.16 THOUSANDS/ÂΜL (ref 1.85–7.62)
NEUTS SEG NFR BLD AUTO: 65 % (ref 43–75)
NRBC BLD AUTO-RTO: 0 /100 WBCS
PLATELET # BLD AUTO: 286 THOUSANDS/UL (ref 149–390)
PMV BLD AUTO: 12.1 FL (ref 8.9–12.7)
POTASSIUM SERPL-SCNC: 4.3 MMOL/L (ref 3.5–5.3)
RBC # BLD AUTO: 3.45 MILLION/UL (ref 3.88–5.62)
SODIUM SERPL-SCNC: 139 MMOL/L (ref 135–147)
WBC # BLD AUTO: 7.84 THOUSAND/UL (ref 4.31–10.16)

## 2024-02-03 PROCEDURE — C9113 INJ PANTOPRAZOLE SODIUM, VIA: HCPCS | Performed by: INTERNAL MEDICINE

## 2024-02-03 PROCEDURE — 85025 COMPLETE CBC W/AUTO DIFF WBC: CPT | Performed by: INTERNAL MEDICINE

## 2024-02-03 PROCEDURE — 80048 BASIC METABOLIC PNL TOTAL CA: CPT | Performed by: INTERNAL MEDICINE

## 2024-02-03 PROCEDURE — 99232 SBSQ HOSP IP/OBS MODERATE 35: CPT | Performed by: INTERNAL MEDICINE

## 2024-02-03 PROCEDURE — 82948 REAGENT STRIP/BLOOD GLUCOSE: CPT

## 2024-02-03 RX ADMIN — PANTOPRAZOLE SODIUM 40 MG: 40 INJECTION, POWDER, FOR SOLUTION INTRAVENOUS at 21:10

## 2024-02-03 RX ADMIN — CLOPIDOGREL BISULFATE 75 MG: 75 TABLET ORAL at 08:12

## 2024-02-03 RX ADMIN — AZELASTINE 1 SPRAY: 1 SPRAY, METERED NASAL at 08:12

## 2024-02-03 RX ADMIN — UMECLIDINIUM BROMIDE AND VILANTEROL TRIFENATATE 1 PUFF: 62.5; 25 POWDER RESPIRATORY (INHALATION) at 08:12

## 2024-02-03 RX ADMIN — INSULIN LISPRO 5 UNITS: 100 INJECTION, SOLUTION INTRAVENOUS; SUBCUTANEOUS at 08:13

## 2024-02-03 RX ADMIN — ATORVASTATIN CALCIUM 40 MG: 40 TABLET, FILM COATED ORAL at 17:10

## 2024-02-03 RX ADMIN — LACTULOSE 20 G: 20 SOLUTION ORAL at 08:12

## 2024-02-03 RX ADMIN — AZELASTINE 1 SPRAY: 1 SPRAY, METERED NASAL at 17:10

## 2024-02-03 RX ADMIN — FERROUS SULFATE TAB 325 MG (65 MG ELEMENTAL FE) 325 MG: 325 (65 FE) TAB at 08:12

## 2024-02-03 RX ADMIN — LACTULOSE 20 G: 20 SOLUTION ORAL at 17:10

## 2024-02-03 RX ADMIN — Medication 12.5 MG: at 08:12

## 2024-02-03 RX ADMIN — TRAZODONE HYDROCHLORIDE 100 MG: 50 TABLET ORAL at 21:10

## 2024-02-03 RX ADMIN — Medication 12.5 MG: at 21:10

## 2024-02-03 RX ADMIN — INSULIN DETEMIR 30 UNITS: 100 INJECTION, SOLUTION SUBCUTANEOUS at 21:11

## 2024-02-03 RX ADMIN — PANTOPRAZOLE SODIUM 40 MG: 40 INJECTION, POWDER, FOR SOLUTION INTRAVENOUS at 08:12

## 2024-02-03 RX ADMIN — INSULIN LISPRO 5 UNITS: 100 INJECTION, SOLUTION INTRAVENOUS; SUBCUTANEOUS at 17:11

## 2024-02-03 RX ADMIN — RIFAXIMIN 550 MG: 550 TABLET ORAL at 08:12

## 2024-02-03 RX ADMIN — INSULIN LISPRO 5 UNITS: 100 INJECTION, SOLUTION INTRAVENOUS; SUBCUTANEOUS at 12:32

## 2024-02-03 RX ADMIN — BIMATOPROST 1 DROP: 0.3 SOLUTION/ DROPS OPHTHALMIC at 17:10

## 2024-02-03 RX ADMIN — APIXABAN 5 MG: 5 TABLET, FILM COATED ORAL at 08:12

## 2024-02-03 RX ADMIN — RIFAXIMIN 550 MG: 550 TABLET ORAL at 21:10

## 2024-02-03 RX ADMIN — APIXABAN 5 MG: 5 TABLET, FILM COATED ORAL at 17:10

## 2024-02-03 RX ADMIN — CHLORHEXIDINE GLUCONATE 15 ML: 1.2 SOLUTION ORAL at 21:10

## 2024-02-03 RX ADMIN — CHLORHEXIDINE GLUCONATE 15 ML: 1.2 SOLUTION ORAL at 08:12

## 2024-02-03 RX ADMIN — EMPAGLIFLOZIN 10 MG: 10 TABLET, FILM COATED ORAL at 08:12

## 2024-02-03 NOTE — PLAN OF CARE
Problem: Potential for Falls  Goal: Patient will remain free of falls  Description: INTERVENTIONS:  - Educate patient/family on patient safety including physical limitations  - Instruct patient to call for assistance with activity   - Consult OT/PT to assist with strengthening/mobility   - Keep Call bell within reach  - Keep bed low and locked with side rails adjusted as appropriate  - Keep care items and personal belongings within reach  - Initiate and maintain comfort rounds  - Make Fall Risk Sign visible to staff  - Offer Toileting every 2 Hours, in advance of need  - Initiate/Maintain alarm  - Obtain necessary fall risk management equipment  - Apply yellow socks and bracelet for high fall risk patients  - Consider moving patient to room near nurses station  Outcome: Progressing     Problem: PAIN - ADULT  Goal: Verbalizes/displays adequate comfort level or baseline comfort level  Description: Interventions:  - Encourage patient to monitor pain and request assistance  - Assess pain using appropriate pain scale  - Administer analgesics based on type and severity of pain and evaluate response  - Implement non-pharmacological measures as appropriate and evaluate response  - Consider cultural and social influences on pain and pain management  - Notify physician/advanced practitioner if interventions unsuccessful or patient reports new pain  Outcome: Progressing     Problem: Knowledge Deficit  Goal: Patient/family/caregiver demonstrates understanding of disease process, treatment plan, medications, and discharge instructions  Description: Complete learning assessment and assess knowledge base.  Interventions:  - Provide teaching at level of understanding  - Provide teaching via preferred learning methods  Outcome: Progressing     Problem: GASTROINTESTINAL - ADULT  Goal: Minimal or absence of nausea and/or vomiting  Description: INTERVENTIONS:  - Administer IV fluids if ordered to ensure adequate hydration  - Maintain  NPO status until nausea and vomiting are resolved  - Nasogastric tube if ordered  - Administer ordered antiemetic medications as needed  - Provide nonpharmacologic comfort measures as appropriate  - Advance diet as tolerated, if ordered  - Consider nutrition services referral to assist patient with adequate nutrition and appropriate food choices  Outcome: Progressing  Goal: Maintains or returns to baseline bowel function  Description: INTERVENTIONS:  - Assess bowel function  - Encourage oral fluids to ensure adequate hydration  - Administer IV fluids if ordered to ensure adequate hydration  - Administer ordered medications as needed  - Encourage mobilization and activity  - Consider nutritional services referral to assist patient with adequate nutrition and appropriate food choices  Outcome: Progressing  Goal: Maintains adequate nutritional intake  Description: INTERVENTIONS:  - Monitor percentage of each meal consumed  - Identify factors contributing to decreased intake, treat as appropriate  - Assist with meals as needed  - Monitor I&O, weight, and lab values if indicated  - Obtain nutrition services referral as needed  Outcome: Progressing  Goal: Oral mucous membranes remain intact  Description: INTERVENTIONS  - Assess oral mucosa and hygiene practices  - Implement preventative oral hygiene regimen  - Implement oral medicated treatments as ordered  - Initiate Nutrition services referral as needed  Outcome: Progressing     Problem: METABOLIC, FLUID AND ELECTROLYTES - ADULT  Goal: Electrolytes maintained within normal limits  Description: INTERVENTIONS:  - Monitor labs and assess patient for signs and symptoms of electrolyte imbalances  - Administer electrolyte replacement as ordered  - Monitor response to electrolyte replacements, including repeat lab results as appropriate  - Instruct patient on fluid and nutrition as appropriate  Outcome: Progressing  Goal: Fluid balance maintained  Description:  INTERVENTIONS:  - Monitor labs   - Monitor I/O and WT  - Instruct patient on fluid and nutrition as appropriate  - Assess for signs & symptoms of volume excess or deficit  Outcome: Progressing  Goal: Glucose maintained within target range  Description: INTERVENTIONS:  - Monitor Blood Glucose as ordered  - Assess for signs and symptoms of hyperglycemia and hypoglycemia  - Administer ordered medications to maintain glucose within target range  - Assess nutritional intake and initiate nutrition service referral as needed  Outcome: Progressing     Problem: HEMATOLOGIC - ADULT  Goal: Maintains hematologic stability  Description: INTERVENTIONS  - Assess for signs and symptoms of bleeding or hemorrhage  - Monitor labs  - Administer supportive blood products/factors as ordered and appropriate  Outcome: Progressing     Problem: RESPIRATORY - ADULT  Goal: Achieves optimal ventilation and oxygenation  Description: INTERVENTIONS:  - Assess for changes in respiratory status  - Assess for changes in mentation and behavior  - Position to facilitate oxygenation and minimize respiratory effort  - Oxygen administered by appropriate delivery if ordered  - Initiate smoking cessation education as indicated  - Encourage broncho-pulmonary hygiene including cough, deep breathe, Incentive Spirometry  - Assess the need for suctioning and aspirate as needed  - Assess and instruct to report SOB or any respiratory difficulty  - Respiratory Therapy support as indicated  Outcome: Progressing

## 2024-02-03 NOTE — ASSESSMENT & PLAN NOTE
1/24 0630A RN called provider to bedside to evaluate patient for left facial droop. NIHHS 4 (left facial droop, LUE weakness +4/5, LLE weakness +4/5, LLE drift and mild dysarthria). RN states left facial droop was noticed on arrival to ICU at 0300A. RN states ED could not confirm if droop was present in ED and wife unsure if droop was new. Stroke alert called.   CTH - no acute stroke or hemorrhage  CTA H/N - no large vessel occulusion. No arterial occulusion. 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   1/24 MRI: Negative for acute findings   1/24 ECHO: EF 75%, Mild TVR  Mentation continuing to improve. Able to be extubated 1/27     Plan:  Patient discussed with Dr. Interiano (Neuro)   TNK not given due to unknown last known well and active GI bleed.  Goal SBP >140  1/24 ECHO: EF 75%, Mild TVR  Restarted on Plavix and Eliquis  Restart statin  SLP consult appreciated  Neurology on board  Aspiration/fall precautions   Improved

## 2024-02-03 NOTE — ASSESSMENT & PLAN NOTE
patient is 78-year-old male with a history of liver cancer and radiation therapy who was admitted for an upper GI bleed with hypotension and later strokelike symptoms went unresponsive and was intubated, appears to be as cause from the liver with an ammonia level of 130  Most likely source of elevated ammonia level is liver history of liver cancer  GI following  Continue with Rifaximin when able   Patient should have 2-3 BMs a day  Ammonia level is normal  On lactulose  Improved  Awaiting rehab placement

## 2024-02-03 NOTE — ASSESSMENT & PLAN NOTE
Rate controled  Maintained on Eliquis and metoprolol as o/p  Continue on Lopressor  Hold Eliquis and Plavix until okay with GI   Cardiology consult regarding input for antiplatelet/anticoagulation appreciated  As per cardiology restart Plavix and Eliquis  Discussed with GI and patient was restarted on anticoagulation 1/30 and monitor H&H closely

## 2024-02-03 NOTE — CASE MANAGEMENT
Case Management Discharge Planning Note    Patient name Derek Walter  Location /-01 MRN [id number]  : 1945 Date 2/3/2024       Current Admission Date: 2024  Current Admission Diagnosis:Acute upper GI bleeding   Patient Active Problem List    Diagnosis Date Noted    Dysphagia 2024    Hepatic encephalopathy (HCC) 2024    Acute blood loss anemia 2024    Acute upper GI bleeding 2024    NA (acute kidney injury) (HCC) 2024    AMS (altered mental status) 2024    History of coronary angioplasty with insertion of stent 2024    Chronic systolic heart failure (HCC) 2024    Ischemic cardiomyopathy 2024    Status post insertion of drug eluting coronary artery stent 2023    Coronary artery disease involving native coronary artery 2023    Hepatocellular carcinoma (HCC)     CAD (coronary artery disease) 2023    Hepatic flexure mass 2023    Acute hypoxemic respiratory failure (HCC) 2023    Anemia 2023    Acute on chronic diastolic HF (heart failure) (HCC) 2023    History of DVT (deep vein thrombosis) 2023    Restrictive lung disease 2023    Other emphysema (HCC) 2023    History of tobacco abuse 2023    H/O asbestos exposure 2023    Liver mass 05/10/2023    Atrial fibrillation, unspecified type (HCC) 02/15/2023    Peripheral polyneuropathy 2022    Type 2 diabetes mellitus with neurologic complication, with long-term current use of insulin (HCC) 2022    Stage 3 chronic kidney disease, unspecified whether stage 3a or 3b CKD (HCC) 2022      LOS (days): 11  Geometric Mean LOS (GMLOS) (days): 4.6  Days to GMLOS:-6.8     OBJECTIVE:  Risk of Unplanned Readmission Score: 42.09         Current admission status: Inpatient   Preferred Pharmacy:   [id number] - [place] - [id number] [facility]  [id number] [facility] [address]  Phone:  [phone] Fax: [phone]    Primary Care Provider: Ernesto Griffith MD    Primary Insurance: BLUE CROSS  REP  Secondary Insurance:     DISCHARGE DETAILS:     Spoke with the pt's wife regarding her STR choice for the pt. She chose [facility].   CM reserved [facility] in [facility] and requested their NPI information to initiate the insurance auth request.   CM to send request to DC Support once the NPI information has been received.   Updated Dr More.         Received NPIs from [employer], requested that the insurance auth be started by CM Support.

## 2024-02-03 NOTE — ASSESSMENT & PLAN NOTE
Lab Results   Component Value Date    EGFR 59 02/03/2024    EGFR 60 02/02/2024    EGFR 63 02/01/2024    CREATININE 1.18 02/03/2024    CREATININE 1.16 02/02/2024    CREATININE 1.12 02/01/2024     Cr at baseline  Monitor I's and O's   Renally dose medications  Avoid nephrotoxic agents  On Lasix

## 2024-02-03 NOTE — ASSESSMENT & PLAN NOTE
Wt Readings from Last 3 Encounters:   02/03/24 87.4 kg (192 lb 9.6 oz)   02/01/24 89.8 kg (197 lb 15.6 oz)   12/20/23 96.6 kg (213 lb)     Patient is 78-year-old male with history of CAD post PCI in August not deemed a candidate for surgical intervention, systolic heart failure with reduced ejection fraction of 36%   Home meds: lasix, toprol, and statin   Resume home meds when able   Daily wts

## 2024-02-03 NOTE — ASSESSMENT & PLAN NOTE
Secondary to GIB  Hemoglobin baseline 8-10  Receive 3 units PRBC this admission, last 1/25   Trend H&H  History of iron deficiency   Iron panel reviewed.  On Venofer  On iron supplementation

## 2024-02-03 NOTE — ASSESSMENT & PLAN NOTE
Lab Results   Component Value Date    HGBA1C 6.6 (H) 01/24/2024       Recent Labs     02/02/24  0844 02/02/24  1046 02/02/24  1551 02/02/24  1754   POCGLU 110 189* 149* 132       Patient has a Dexcom, on Trulicity and Jardiance, Lantus and insulin  Extubated 1/27. Currently no enteral access for TF. Insulin drip stopped. Changed to correction scale insulin  Accu-Chek ACHS with Humalog sliding scale  Blood glucose goal 140-180  Hypoglycemia protocol

## 2024-02-03 NOTE — PROGRESS NOTES
[facility]  Progress Note  Name: Derek Walter I  MRN: [id number]  Unit/Bed#: -01 I Date of Admission: 1/23/2024   Date of Service: 2/3/2024 I Hospital Day: 11    Assessment/Plan   Hepatic encephalopathy (HCC)  Assessment & Plan  patient is 78-year-old male with a history of liver cancer and radiation therapy who was admitted for an upper GI bleed with hypotension and later strokelike symptoms went unresponsive and was intubated, appears to be as cause from the liver with an ammonia level of 130  Most likely source of elevated ammonia level is liver history of liver cancer  GI following  Continue with Rifaximin when able   Patient should have 2-3 BMs a day  Ammonia level is normal  On lactulose  Improved  Awaiting rehab placement    AMS (altered mental status)  Assessment & Plan  1/24 0630A RN called provider to bedside to evaluate patient for left facial droop. NIHHS 4 (left facial droop, LUE weakness +4/5, LLE weakness +4/5, LLE drift and mild dysarthria). RN states left facial droop was noticed on arrival to ICU at 0300A. RN states ED could not confirm if droop was present in ED and wife unsure if droop was new. Stroke alert called.   CTH - no acute stroke or hemorrhage  CTA H/N - no large vessel occulusion. No arterial occulusion. 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   1/24 MRI: Negative for acute findings   1/24 ECHO: EF 75%, Mild TVR  Mentation continuing to improve. Able to be extubated 1/27     Plan:  Patient discussed with Dr. Interiano (Neuro)   TNK not given due to unknown last known well and active GI bleed.  Goal SBP >140  1/24 ECHO: EF 75%, Mild TVR  Restarted on Plavix and Eliquis  Restart statin  SLP consult appreciated  Neurology on board  Aspiration/fall precautions   Improved    Acute blood loss anemia  Assessment & Plan  Secondary to GIB  Hemoglobin baseline 8-10  Receive 3 units PRBC this admission, last 1/25   Trend H&H  History of iron deficiency   Iron panel  reviewed.  On Venofer  On iron supplementation    Chronic systolic heart failure (HCC)  Assessment & Plan  Wt Readings from Last 3 Encounters:   02/03/24 87.4 kg (192 lb 9.6 oz)   02/01/24 89.8 kg (197 lb 15.6 oz)   12/20/23 96.6 kg (213 lb)     Patient is 78-year-old male with history of CAD post PCI in August not deemed a candidate for surgical intervention, systolic heart failure with reduced ejection fraction of 36%   Home meds: lasix, toprol, and statin   Resume home meds when able   Daily wts    Hepatocellular carcinoma (HCC)  Assessment & Plan  Patient has stage II liver cancer hepatocellular carcinoma  Has a tumor thrombus which appears stable seen again on admission CT  Status post Y 90 treatment    Follows with Dr. Gong as o/p  AFP is elevated.  GI discussed with patient's wife and Dr. Gong and recommended outpatient MRI abdomen    CAD (coronary artery disease)  Assessment & Plan  Patient has a history of coronary artery disease had 8/2023 a PCI status post cardiac catheterization drug-eluting stents x 3 in the LAD.  Ramus and left circumflex  Continue home statin and metoprolol    History of DVT (deep vein thrombosis)  Assessment & Plan  Patient is on Eliquis as o/p, currently held 2/2 GIB  1/23 reversed with Kcentra   Continue on Eliquis    Acute hypoxemic respiratory failure (HCC)  Assessment & Plan  Required intubation for airway protection 1/23 after patient became increasingly encephalopathic  Minimal vent setting. Placed on PS since 1/24 at 6/6 40% FiO2  Unable to be successfully extubated due to mental status. Currently not requiring sedation.  Having copious secretions coming from inline suction  1/27 CXR negative for acute findings. Given 1 x dose of 40 mg IV lasix   Extubated mid morning on 1/27  Saturating well on room air     Plan:  Resolved  Aspiration precautions  Encourage IS/Flutter valve when able     Atrial fibrillation, unspecified type (HCC)  Assessment & Plan  Rate  controled  Maintained on Eliquis and metoprolol as o/p  Continue on Lopressor  Hold Eliquis and Plavix until okay with GI   Cardiology consult regarding input for antiplatelet/anticoagulation appreciated  As per cardiology restart Plavix and Eliquis  Discussed with GI and patient was restarted on anticoagulation 1/30 and monitor H&H closely      Type 2 diabetes mellitus with neurologic complication, with long-term current use of insulin ([facility])  Assessment & Plan  Lab Results   Component Value Date    HGBA1C 6.6 (H) 01/24/2024       Recent Labs     02/02/24  0844 02/02/24  1046 02/02/24  1551 02/02/24  1754   POCGLU 110 189* 149* 132       Patient has a Dexcom, on Trulicity and Jardiance, Lantus and insulin  Extubated 1/27. Currently no enteral access for TF. Insulin drip stopped. Changed to correction scale insulin  Accu-Chek ACHS with Humalog sliding scale  Blood glucose goal 140-180  Hypoglycemia protocol     Stage 3 chronic kidney disease, unspecified whether stage 3a or 3b CKD ([facility])  Assessment & Plan  Lab Results   Component Value Date    EGFR 59 02/03/2024    EGFR 60 02/02/2024    EGFR 63 02/01/2024    CREATININE 1.18 02/03/2024    CREATININE 1.16 02/02/2024    CREATININE 1.12 02/01/2024     Cr at baseline  Monitor I's and O's   Renally dose medications  Avoid nephrotoxic agents  On Lasix    * Acute upper GI bleeding  Assessment & Plan  Patient is a 78-year-old male who has been seeing [facility] last seen 12/13/2023 patient has a history of anemia is multifactorial chronic due to cancer with iron deficiency and occult GI blood loss on Eliquis review of an EGD with multiple AVMs.  Patient came in to the ER from home due to generalized weakness nausea vomiting since this morning reports lower abdominal pain has a history of anemia is on Eliquis and Plavix episodes of vomiting which were described by the significant other is dark of clotted material, and also had melanotic stool occult positive.  Patient felt  significant weakness and was unable to walk felt like too weak to walk.  Patient has a history of an upper GI bleed with multiple AVMs, also has a history of the patient intubation with tracheostomy approximately rehab time of 1 year.   CT scan with contrast showed nonspecific hypodensity within the gastric body possibly reflective of hemorrhage in this patient with the history of gastric AVMs, known liver cancer, cholelithiasis, small volume abdominal pelvic ascites  Patient's baseline hemoglobin appears to be 8-10, except 1 value on 11/23 of 13.    Patient is on Eliquis and Plavix as o/p  In the ED received DDAVP 0.3 mg/kg and Kcentra  GI consulted  1/23 S/P EGD - 3 large angioectasias in the fundus of the stomach and body of the stomach; bleeding was observed; placed clips; hemostasis achieved; induced coagulation and hemostasis achieved with argon plasma coagulation The esophagus and duodenum appeared normal.  Pt received a total of 3 units PRBC this admission so far, last on 1/25/24     Plan:  Monitor H/H Q12 hours- Hgb remains stable   Continue PPI BID  Patient extubated 1/27  Patient is tolerating diet  GI following appreciate recommendations- Need to address if stable to restart plavix  Patient is restarted on Plavix and Eliquis after clearance from GI and cardiology.  DC heparin drip  Hemoglobin is 9.9  Monitor H&H closely             Labs & Imaging: I have personally reviewed pertinent reports.      VTE Prophylaxis: in place.    Code Status:   Level 1 - Full Code    Patient Centered Rounds: I have performed bedside rounds with nursing staff today.    Mobility:   Basic Mobility Inpatient Raw Score: 15  -HLM Goal: 4: Move to chair/commode  -HLM Achieved: 5: Stand (1 or more minutes)  HLM Goal NOT achieved. Continue with multidisciplinary rounding and encourage appropriate mobility to improve upon HLM goals.    Discussions with Specialists or Other Care Team Provider: GI    Education and Discussions with  Family / Patient: Wife on phone    Total Time Spent on Date of Encounter in care of patient: 35 mins. This time was spent on one or more of the following: performing physical exam; counseling and coordination of care; obtaining or reviewing history; documenting in the medical record; reviewing/ordering tests, medications or procedures; communicating with other healthcare professionals and discussing with patient's family/caregivers.    Current Length of Stay: 11 day(s)    Current Patient Status: Inpatient   Certification Statement: The patient will continue to require additional inpatient hospital stay due to see my assessment and plan.     Subjective:   Patient is seen and examined at bedside.  No new complaints.  Afebrile  All other ROS are negative.    Objective:    Vitals: Blood pressure 137/58, pulse 83, temperature 98.3 °F (36.8 °C), resp. rate 14, height 6' (1.829 m), weight 87.4 kg (192 lb 9.6 oz), SpO2 95%.,Body mass index is 26.12 kg/m².  SPO2 RA Rest      Flowsheet Row ED to Hosp-Admission (Current) from 1/23/2024 in [facility] Med Surg Unit   SpO2 95 %   SpO2 Activity At Rest   O2 Device None (Room air)   O2 Flow Rate 4 L/min          I&O:   Intake/Output Summary (Last 24 hours) at 2/3/2024 0733  Last data filed at 2/3/2024 0306  Gross per 24 hour   Intake --   Output 500 ml   Net -500 ml       Physical Exam:    General- Alert, lying comfortably in bed. Not in any acute distress.  Neck- Supple, No JVD  CVS- regular, S1 and S2 normal  Chest- Bilateral Air entry, No rhochi, crackles or wheezing present.  Abdomen- soft, nontender, not distended, no guarding or rigidity, BS+  Extremities-  No pedal edema, No calf tenderness                         Normal ROM in all extremities.  CNS-   Alert, awake and orientedx3. No focal deficits present.    Invasive Devices       Peripheral Intravenous Line  Duration             Peripheral IV 02/02/24 Dorsal (posterior);Left Hand 1 day                           Social History  reviewed  Family History   Problem Relation Age of Onset    Hypertension Mother     Bone cancer Father     Diabetes type II Sister     Diabetes type II Sister     Esophageal cancer Brother     Colon cancer Neg Hx     reviewed    Meds:  Current Facility-Administered Medications   Medication Dose Route Frequency Provider Last Rate Last Admin    acetaminophen (TYLENOL) tablet 650 mg  650 mg Oral Q6H PRN Adrian More MD   650 mg at 01/25/24 2034    apixaban (ELIQUIS) tablet 5 mg  5 mg Oral BID Adrian More MD   5 mg at 02/02/24 1758    atorvastatin (LIPITOR) tablet 40 mg  40 mg Oral Daily With Dinner Adrian More MD   40 mg at 02/02/24 1558    azelastine (ASTELIN) 0.1 % nasal spray 1 spray  1 spray Each Nare BID Adrian More MD   1 spray at 02/02/24 1758    bimatoprost (LUMIGAN) 0.03 % ophthalmic drops 1 drop  1 drop Both Eyes QPM Adrian More MD   1 drop at 02/02/24 1758    chlorhexidine (PERIDEX) 0.12 % oral rinse 15 mL  15 mL Mouth/Throat Q12H [facility]ALL Adrian More MD   15 mL at 02/02/24 2152    clopidogrel (PLAVIX) tablet 75 mg  75 mg Oral Daily Adrian More MD   75 mg at 02/02/24 0846    Empagliflozin (JARDIANCE) tablet 10 mg  10 mg Oral QAM Adrian More MD   10 mg at 02/02/24 0847    ferrous sulfate tablet 325 mg  325 mg Oral Daily With Breakfast Adrian More MD   325 mg at 02/02/24 0847    furosemide (LASIX) tablet 40 mg  40 mg Oral Every Other Day Adrian More MD   40 mg at 02/02/24 0846    glycerin-hypromellose- (ARTIFICIAL TEARS) ophthalmic solution 1 drop  1 drop Both Eyes Q6H PRN Adrian More MD   1 drop at 01/31/24 0932    insulin detemir (LEVEMIR) subcutaneous injection 30 Units  30 Units Subcutaneous HS Adrian More MD   30 Units at 02/02/24 2152    insulin lispro (HumaLOG) 100 units/mL subcutaneous injection 2-12 Units  2-12 Units Subcutaneous TID AC Adrian More MD   2 Units at 02/02/24 1203    insulin lispro  (HumaLOG) 100 units/mL subcutaneous injection 2-12 Units  2-12 Units Subcutaneous HS Adrian More MD   2 Units at 02/01/24 2154    insulin lispro (HumaLOG) 100 units/mL subcutaneous injection 5 Units  5 Units Subcutaneous TID With Meals Adrian More MD   5 Units at 02/02/24 1601    ipratropium (ATROVENT) 0.02 % inhalation solution 0.5 mg  0.5 mg Nebulization Q6H PRN Adrian More MD   0.5 mg at 01/28/24 0748    lactulose (CHRONULAC) oral solution 20 g  20 g Oral BID NADIA Kwan   20 g at 02/02/24 1758    levalbuterol (XOPENEX) inhalation solution 0.63 mg  0.63 mg Nebulization Q6H PRN Adrian More MD   0.63 mg at 01/28/24 0748    metoprolol tartrate (LOPRESSOR) partial tablet 12.5 mg  12.5 mg Oral Q12H [facility] Adrian More MD   12.5 mg at 02/02/24 2154    pantoprazole (PROTONIX) injection 40 mg  40 mg Intravenous Q12H [facility] Adrian More MD   40 mg at 02/02/24 2153    rifaximin (XIFAXAN) tablet 550 mg  550 mg Oral Q12H [facility] Adrian More MD   550 mg at 02/02/24 2152    traZODone (DESYREL) tablet 100 mg  100 mg Oral  Adrian More MD   100 mg at 02/02/24 2152    trimethobenzamide (TIGAN) IM injection 200 mg  200 mg Intramuscular Q12H PRN Adrian More MD   200 mg at 01/23/24 0717    umeclidinium-vilanterol 62.5-25 mcg/actuation inhaler 1 puff  1 puff Inhalation Daily Adrian More MD   1 puff at 02/02/24 0849      Medications Prior to Admission   Medication    acetaminophen (TYLENOL) 650 mg CR tablet    Anoro Ellipta 62.5-25 MCG/ACT inhaler    atorvastatin (LIPITOR) 40 mg tablet    azelastine (ASTELIN) 0.1 % nasal spray    Blood Glucose Monitoring Suppl (OneTouch Verio) w/Device KIT    clopidogrel (PLAVIX) 75 mg tablet    Continuous Blood Gluc  (Dexcom G6 ) NOHEMY    Eliquis 5 MG    ferrous sulfate 325 (65 Fe) mg tablet    furosemide (LASIX) 40 mg tablet    insulin detemir (Levemir FlexPen) 100 Units/mL injection pen    Insulin Pen Needle (Droplet Pen  "Needles) 32G X 4 MM MISC    Jardiance 10 MG TABS tablet    Lumigan 0.01 % ophthalmic drops    metoprolol succinate (TOPROL-XL) 50 mg 24 hr tablet    Multiple Vitamin (MULTIVITAMIN ADULT PO)    NovoLOG FlexPen 100 units/mL injection pen    Potassium Citrate ER 15 MEQ (1620 MG) TBCR    pregabalin (LYRICA) 100 mg capsule    sertraline (ZOLOFT) 25 mg tablet    SUPER B COMPLEX/C PO    tamsulosin (FLOMAX) 0.4 mg    traZODone (DESYREL) 100 mg tablet    Trulicity 0.75 MG/0.5ML injection    Continuous Blood Gluc Sensor (Dexcom G6 Sensor) MISC    Continuous Blood Gluc Transmit (Dexcom G6 Transmitter) MISC    Continuous Blood Gluc Transmit (Dexcom G6 Transmitter) MISC    glucose blood (OneTouch Verio) test strip    mometasone (ELOCON) 0.1 % cream    pantoprazole (PROTONIX) 40 mg tablet       Labs:  Results from last 7 days   Lab Units 02/03/24 0319 02/02/24 0308 02/01/24  0444   WBC Thousand/uL 7.84 7.19 5.51   HEMOGLOBIN g/dL 9.9* 8.9* 9.3*   HEMATOCRIT % 32.9* 29.4* 31.8*   PLATELETS Thousands/uL 286 272 167   NEUTROS PCT % 65 64 67   LYMPHS PCT % 13* 13* 11*   MONOS PCT % 14* 15* 14*   EOS PCT % 5 6 6     Results from last 7 days   Lab Units 02/03/24 0319 02/02/24  0308 02/01/24  0444 01/31/24  0414   POTASSIUM mmol/L 4.3 4.0 4.1 3.9   CHLORIDE mmol/L 110* 110* 110* 112*   CO2 mmol/L 20* 22 20* 20*   BUN mg/dL 26* 28* 30* 33*   CREATININE mg/dL 1.18 1.16 1.12 1.01   CALCIUM mg/dL 8.9 9.0 8.7 8.3*   ALK PHOS U/L  --  546* 503* 426*   ALT U/L  --  56* 50 45   AST U/L  --  73* 70* 56*     No results found for: \"TROPONINI\", \"CKMB\", \"CKTOTAL\"  Results from last 7 days   Lab Units 01/28/24  1526   INR  1.19     Lab Results   Component Value Date    BLOODCX No Growth After 5 Days. 08/10/2023    BLOODCX No Growth After 5 Days. 08/10/2023         Imaging:  Results for orders placed during the hospital encounter of 01/23/24    XR chest portable    Narrative  CHEST    INDICATION:   line confirmation.    COMPARISON: " 1/23/2024    EXAM PERFORMED/VIEWS:  XR CHEST PORTABLE      FINDINGS: The tip of the endotracheal tube is above the george. The right IJ central venous catheter has the tip projecting over the superior vena cava. The tip of the endogastric tube appears to be in the gastric cardia, just above the level of the  diaphragm. This should probably be advanced somewhat.    Heart appears enlarged. Aorta is atherosclerotic.    There seems to be hazy infiltrate in the right lung, predominantly interstitial pattern. Again, there appears to be minimal right effusion. No pneumothorax identified.    Osseous structures appear within normal limits for patient age.    Impression  Right IJ line projects over the superior vena cava and appears grossly satisfactory. No pneumothorax seen.    Endotracheal tube appears satisfactory.    Endogastric tube should probably be advanced farther into the stomach.    Small right effusion and relatively diffuse interstitial infiltrate in the right lung    The study was marked in EPIC for immediate notification.            Workstation performed: [id number]    Results for orders placed during the hospital encounter of 11/03/23    XR chest 2 views    Narrative  CHEST    INDICATION:   cooughing up bright red blood.    COMPARISON: 8/17/2023    EXAM PERFORMED/VIEWS:  XR CHEST PA & LATERAL The frontal view was performed utilizing dual energy radiographic technique.      FINDINGS:    Cardiomediastinal silhouette appears unremarkable.    The lungs are clear.  No pneumothorax or pleural effusion.    Osseous structures appear within normal limits for patient age.    Impression  No acute cardiopulmonary disease.            Workstation performed: [id number]      Last 24 Hours Medication List:   Current Facility-Administered Medications   Medication Dose Route Frequency Provider Last Rate    acetaminophen  650 mg Oral Q6H PRN Adrian More MD      apixaban  5 mg Oral BID Adrian More MD      atorvastatin   40 mg Oral Daily With Dinner Adrian More MD      azelastine  1 spray Each Nare BID Adrian More MD      bimatoprost  1 drop Both Eyes QPM Adrian More MD      chlorhexidine  15 mL Mouth/Throat Q12H [facility] Adrian More MD      clopidogrel  75 mg Oral Daily Adrian More MD      Empagliflozin  10 mg Oral QAM Adrian More MD      ferrous sulfate  325 mg Oral Daily With Breakfast Adrian More MD      furosemide  40 mg Oral Every Other Day Adrian More MD      Artificial Tears  1 drop Both Eyes Q6H PRN Adrian More MD      insulin detemir  30 Units Subcutaneous HS Adrian More MD      insulin lispro  2-12 Units Subcutaneous TID AC Adrian More MD      insulin lispro  2-12 Units Subcutaneous HS Adrian More MD      insulin lispro  5 Units Subcutaneous TID With Meals Adrian More MD      ipratropium  0.5 mg Nebulization Q6H PRN Adrian More MD      lactulose  20 g Oral BID NADIA Kwan      levalbuterol  0.63 mg Nebulization Q6H PRN Adrian More MD      metoprolol tartrate  12.5 mg Oral Q12H [facility] Adrian More MD      pantoprazole  40 mg Intravenous Q12H [facility] dArian More MD      rifaximin  550 mg Oral Q12H [facility] Adrian More MD      traZODone  100 mg Oral HS Adrian More MD      trimethobenzamide  200 mg Intramuscular Q12H PRN Adrian More MD      umeclidinium-vilanterol  1 puff Inhalation Daily Adrian More MD          Today, Patient Was Seen By: Adrian More MD    ** Please Note: Dictation voice to text software may have been used in the creation of this document. **

## 2024-02-03 NOTE — ASSESSMENT & PLAN NOTE
Patient is a 78-year-old male who has been seeing [facility] GI last seen 12/13/2023 patient has a history of anemia is multifactorial chronic due to cancer with iron deficiency and occult GI blood loss on Eliquis review of an EGD with multiple AVMs.  Patient came in to the ER from home due to generalized weakness nausea vomiting since this morning reports lower abdominal pain has a history of anemia is on Eliquis and Plavix episodes of vomiting which were described by the significant other is dark of clotted material, and also had melanotic stool occult positive.  Patient felt significant weakness and was unable to walk felt like too weak to walk.  Patient has a history of an upper GI bleed with multiple AVMs, also has a history of the patient intubation with tracheostomy approximately rehab time of 1 year.   CT scan with contrast showed nonspecific hypodensity within the gastric body possibly reflective of hemorrhage in this patient with the history of gastric AVMs, known liver cancer, cholelithiasis, small volume abdominal pelvic ascites  Patient's baseline hemoglobin appears to be 8-10, except 1 value on 11/23 of 13.    Patient is on Eliquis and Plavix as o/p  In the ED received DDAVP 0.3 mg/kg and Kcentra  GI consulted  1/23 S/P EGD - 3 large angioectasias in the fundus of the stomach and body of the stomach; bleeding was observed; placed clips; hemostasis achieved; induced coagulation and hemostasis achieved with argon plasma coagulation The esophagus and duodenum appeared normal.  Pt received a total of 3 units PRBC this admission so far, last on 1/25/24     Plan:  Monitor H/H Q12 hours- Hgb remains stable   Continue PPI BID  Patient extubated 1/27  Patient is tolerating diet  GI following appreciate recommendations- Need to address if stable to restart plavix  Patient is restarted on Plavix and Eliquis after clearance from GI and cardiology.  DC heparin drip  Hemoglobin is 9.9  Monitor H&H closely

## 2024-02-04 LAB
ALBUMIN SERPL BCP-MCNC: 3.4 G/DL (ref 3.5–5)
ALP SERPL-CCNC: 525 U/L (ref 34–104)
ALT SERPL W P-5'-P-CCNC: 49 U/L (ref 7–52)
ANION GAP SERPL CALCULATED.3IONS-SCNC: 8 MMOL/L
AST SERPL W P-5'-P-CCNC: 66 U/L (ref 13–39)
BASOPHILS # BLD AUTO: 0.05 THOUSANDS/ÂΜL (ref 0–0.1)
BASOPHILS NFR BLD AUTO: 1 % (ref 0–1)
BILIRUB SERPL-MCNC: 0.96 MG/DL (ref 0.2–1)
BUN SERPL-MCNC: 27 MG/DL (ref 5–25)
CALCIUM ALBUM COR SERPL-MCNC: 9.4 MG/DL (ref 8.3–10.1)
CALCIUM SERPL-MCNC: 8.9 MG/DL (ref 8.4–10.2)
CHLORIDE SERPL-SCNC: 109 MMOL/L (ref 96–108)
CO2 SERPL-SCNC: 24 MMOL/L (ref 21–32)
CREAT SERPL-MCNC: 1.19 MG/DL (ref 0.6–1.3)
EOSINOPHIL # BLD AUTO: 0.32 THOUSAND/ÂΜL (ref 0–0.61)
EOSINOPHIL NFR BLD AUTO: 4 % (ref 0–6)
ERYTHROCYTE [DISTWIDTH] IN BLOOD BY AUTOMATED COUNT: 18.1 % (ref 11.6–15.1)
GFR SERPL CREATININE-BSD FRML MDRD: 58 ML/MIN/1.73SQ M
GLUCOSE SERPL-MCNC: 107 MG/DL (ref 65–140)
GLUCOSE SERPL-MCNC: 109 MG/DL (ref 65–140)
GLUCOSE SERPL-MCNC: 117 MG/DL (ref 65–140)
GLUCOSE SERPL-MCNC: 150 MG/DL (ref 65–140)
GLUCOSE SERPL-MCNC: 235 MG/DL (ref 65–140)
HCT VFR BLD AUTO: 32.7 % (ref 36.5–49.3)
HGB BLD-MCNC: 9.6 G/DL (ref 12–17)
IMM GRANULOCYTES # BLD AUTO: 0.11 THOUSAND/UL (ref 0–0.2)
IMM GRANULOCYTES NFR BLD AUTO: 1 % (ref 0–2)
LYMPHOCYTES # BLD AUTO: 0.91 THOUSANDS/ÂΜL (ref 0.6–4.47)
LYMPHOCYTES NFR BLD AUTO: 11 % (ref 14–44)
MCH RBC QN AUTO: 28.2 PG (ref 26.8–34.3)
MCHC RBC AUTO-ENTMCNC: 29.4 G/DL (ref 31.4–37.4)
MCV RBC AUTO: 96 FL (ref 82–98)
MONOCYTES # BLD AUTO: 1.01 THOUSAND/ÂΜL (ref 0.17–1.22)
MONOCYTES NFR BLD AUTO: 12 % (ref 4–12)
NEUTROPHILS # BLD AUTO: 5.88 THOUSANDS/ÂΜL (ref 1.85–7.62)
NEUTS SEG NFR BLD AUTO: 71 % (ref 43–75)
NRBC BLD AUTO-RTO: 0 /100 WBCS
PLATELET # BLD AUTO: 312 THOUSANDS/UL (ref 149–390)
PMV BLD AUTO: 12.3 FL (ref 8.9–12.7)
POTASSIUM SERPL-SCNC: 4 MMOL/L (ref 3.5–5.3)
PROT SERPL-MCNC: 6.8 G/DL (ref 6.4–8.4)
RBC # BLD AUTO: 3.4 MILLION/UL (ref 3.88–5.62)
SODIUM SERPL-SCNC: 141 MMOL/L (ref 135–147)
WBC # BLD AUTO: 8.28 THOUSAND/UL (ref 4.31–10.16)

## 2024-02-04 PROCEDURE — 99232 SBSQ HOSP IP/OBS MODERATE 35: CPT | Performed by: INTERNAL MEDICINE

## 2024-02-04 PROCEDURE — 82948 REAGENT STRIP/BLOOD GLUCOSE: CPT

## 2024-02-04 PROCEDURE — 85025 COMPLETE CBC W/AUTO DIFF WBC: CPT | Performed by: INTERNAL MEDICINE

## 2024-02-04 PROCEDURE — C9113 INJ PANTOPRAZOLE SODIUM, VIA: HCPCS | Performed by: INTERNAL MEDICINE

## 2024-02-04 PROCEDURE — 80053 COMPREHEN METABOLIC PANEL: CPT | Performed by: INTERNAL MEDICINE

## 2024-02-04 RX ADMIN — ATORVASTATIN CALCIUM 40 MG: 40 TABLET, FILM COATED ORAL at 17:19

## 2024-02-04 RX ADMIN — RIFAXIMIN 550 MG: 550 TABLET ORAL at 08:14

## 2024-02-04 RX ADMIN — APIXABAN 5 MG: 5 TABLET, FILM COATED ORAL at 08:14

## 2024-02-04 RX ADMIN — APIXABAN 5 MG: 5 TABLET, FILM COATED ORAL at 17:19

## 2024-02-04 RX ADMIN — INSULIN LISPRO 4 UNITS: 100 INJECTION, SOLUTION INTRAVENOUS; SUBCUTANEOUS at 12:31

## 2024-02-04 RX ADMIN — PANTOPRAZOLE SODIUM 40 MG: 40 INJECTION, POWDER, FOR SOLUTION INTRAVENOUS at 08:13

## 2024-02-04 RX ADMIN — INSULIN LISPRO 5 UNITS: 100 INJECTION, SOLUTION INTRAVENOUS; SUBCUTANEOUS at 17:19

## 2024-02-04 RX ADMIN — INSULIN DETEMIR 30 UNITS: 100 INJECTION, SOLUTION SUBCUTANEOUS at 21:06

## 2024-02-04 RX ADMIN — AZELASTINE 1 SPRAY: 1 SPRAY, METERED NASAL at 17:20

## 2024-02-04 RX ADMIN — PANTOPRAZOLE SODIUM 40 MG: 40 INJECTION, POWDER, FOR SOLUTION INTRAVENOUS at 21:00

## 2024-02-04 RX ADMIN — LACTULOSE 20 G: 20 SOLUTION ORAL at 08:13

## 2024-02-04 RX ADMIN — FUROSEMIDE 40 MG: 40 TABLET ORAL at 08:14

## 2024-02-04 RX ADMIN — CHLORHEXIDINE GLUCONATE 15 ML: 1.2 SOLUTION ORAL at 20:58

## 2024-02-04 RX ADMIN — Medication 12.5 MG: at 20:59

## 2024-02-04 RX ADMIN — TRIMETHOBENZAMIDE HYDROCHLORIDE 200 MG: 100 INJECTION INTRAMUSCULAR at 14:46

## 2024-02-04 RX ADMIN — Medication 12.5 MG: at 08:14

## 2024-02-04 RX ADMIN — INSULIN LISPRO 5 UNITS: 100 INJECTION, SOLUTION INTRAVENOUS; SUBCUTANEOUS at 08:14

## 2024-02-04 RX ADMIN — BIMATOPROST 1 DROP: 0.3 SOLUTION/ DROPS OPHTHALMIC at 17:20

## 2024-02-04 RX ADMIN — INSULIN LISPRO 5 UNITS: 100 INJECTION, SOLUTION INTRAVENOUS; SUBCUTANEOUS at 12:30

## 2024-02-04 RX ADMIN — FERROUS SULFATE TAB 325 MG (65 MG ELEMENTAL FE) 325 MG: 325 (65 FE) TAB at 08:14

## 2024-02-04 RX ADMIN — EMPAGLIFLOZIN 10 MG: 10 TABLET, FILM COATED ORAL at 08:14

## 2024-02-04 RX ADMIN — TRAZODONE HYDROCHLORIDE 100 MG: 50 TABLET ORAL at 21:06

## 2024-02-04 RX ADMIN — UMECLIDINIUM BROMIDE AND VILANTEROL TRIFENATATE 1 PUFF: 62.5; 25 POWDER RESPIRATORY (INHALATION) at 08:13

## 2024-02-04 RX ADMIN — AZELASTINE 1 SPRAY: 1 SPRAY, METERED NASAL at 08:13

## 2024-02-04 RX ADMIN — CLOPIDOGREL BISULFATE 75 MG: 75 TABLET ORAL at 08:14

## 2024-02-04 RX ADMIN — RIFAXIMIN 550 MG: 550 TABLET ORAL at 20:59

## 2024-02-04 NOTE — PROGRESS NOTES
[facility]  Progress Note  Name: Derek Walter I  MRN: [id number]  Unit/Bed#: -01 I Date of Admission: 1/23/2024   Date of Service: 2/4/2024 I Hospital Day: 12    Assessment/Plan   Hepatic encephalopathy (HCC)  Assessment & Plan  patient is 78-year-old male with a history of liver cancer and radiation therapy who was admitted for an upper GI bleed with hypotension and later strokelike symptoms went unresponsive and was intubated, appears to be as cause from the liver with an ammonia level of 130  Most likely source of elevated ammonia level is liver history of liver cancer  GI following  Continue with Rifaximin when able   Patient should have 2-3 BMs a day  Ammonia level is normal  On lactulose  Improved  Awaiting rehab placement    AMS (altered mental status)  Assessment & Plan  1/24 0630A RN called provider to bedside to evaluate patient for left facial droop. NIHHS 4 (left facial droop, LUE weakness +4/5, LLE weakness +4/5, LLE drift and mild dysarthria). RN states left facial droop was noticed on arrival to ICU at 0300A. RN states ED could not confirm if droop was present in ED and wife unsure if droop was new. Stroke alert called.   CTH - no acute stroke or hemorrhage  CTA H/N - no large vessel occulusion. No arterial occulusion. 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   1/24 MRI: Negative for acute findings   1/24 ECHO: EF 75%, Mild TVR  Mentation continuing to improve. Able to be extubated 1/27     Plan:  Patient discussed with Dr. Interiano (Neuro)   TNK not given due to unknown last known well and active GI bleed.  Goal SBP >140  1/24 ECHO: EF 75%, Mild TVR  Restarted on Plavix and Eliquis  Restart statin  SLP consult appreciated  Neurology on board  Aspiration/fall precautions   Improved    Acute blood loss anemia  Assessment & Plan  Secondary to GIB  Hemoglobin baseline 8-10  Receive 3 units PRBC this admission, last 1/25   Trend H&H  History of iron deficiency   Iron panel  reviewed.  Received Venofer  On iron supplementation    Chronic systolic heart failure (HCC)  Assessment & Plan  Wt Readings from Last 3 Encounters:   02/04/24 88 kg (194 lb 0.1 oz)   02/01/24 89.8 kg (197 lb 15.6 oz)   12/20/23 96.6 kg (213 lb)     Patient is 78-year-old male with history of CAD post PCI in August not deemed a candidate for surgical intervention, systolic heart failure with reduced ejection fraction of 36%   Home meds: lasix, toprol, and statin   Resume home meds when able   Daily wts    Hepatocellular carcinoma (HCC)  Assessment & Plan  Patient has stage II liver cancer hepatocellular carcinoma  Has a tumor thrombus which appears stable seen again on admission CT  Status post Y 90 treatment    Follows with Dr. Gong as o/p  AFP is elevated.  GI discussed with patient's wife and Dr. Gong and recommended outpatient MRI abdomen    CAD (coronary artery disease)  Assessment & Plan  Patient has a history of coronary artery disease had 8/2023 a PCI status post cardiac catheterization drug-eluting stents x 3 in the LAD.  Ramus and left circumflex  Continue home statin and metoprolol    History of DVT (deep vein thrombosis)  Assessment & Plan  Patient is on Eliquis as o/p, currently held 2/2 GIB  1/23 reversed with Kcentra   Continue on Eliquis    Acute hypoxemic respiratory failure (HCC)  Assessment & Plan  Required intubation for airway protection 1/23 after patient became increasingly encephalopathic  Minimal vent setting. Placed on PS since 1/24 at 6/6 40% FiO2  Unable to be successfully extubated due to mental status. Currently not requiring sedation.  Having copious secretions coming from inline suction  1/27 CXR negative for acute findings. Given 1 x dose of 40 mg IV lasix   Extubated mid morning on 1/27  Saturating well on room air     Plan:  Resolved  Aspiration precautions  Encourage IS/Flutter valve when able     Atrial fibrillation, unspecified type (HCC)  Assessment & Plan  Rate  controled  Maintained on Eliquis and metoprolol as o/p  Continue on Lopressor  Hold Eliquis and Plavix until okay with GI   Cardiology consult regarding input for antiplatelet/anticoagulation appreciated  As per cardiology restart Plavix and Eliquis  Discussed with GI and patient was restarted on anticoagulation 1/30 and monitor H&H closely      Type 2 diabetes mellitus with neurologic complication, with long-term current use of insulin (Formerly [facility])  Assessment & Plan  Lab Results   Component Value Date    HGBA1C 6.6 (H) 01/24/2024       Recent Labs     02/03/24  1124 02/03/24  1604 02/03/24  2054 02/04/24  0724   POCGLU 149* 144* 147* 109       Patient has a Dexcom, on Trulicity and Jardiance, Lantus and insulin  Extubated 1/27. Currently no enteral access for TF. Insulin drip stopped. Changed to correction scale insulin  Accu-Chek ACHS with Humalog sliding scale  Blood glucose goal 140-180  Hypoglycemia protocol     Stage 3 chronic kidney disease, unspecified whether stage 3a or 3b CKD (Former[facility])  Assessment & Plan  Lab Results   Component Value Date    EGFR 58 02/04/2024    EGFR 59 02/03/2024    EGFR 60 02/02/2024    CREATININE 1.19 02/04/2024    CREATININE 1.18 02/03/2024    CREATININE 1.16 02/02/2024     Cr at baseline  Monitor I's and O's   Renally dose medications  Avoid nephrotoxic agents  On Lasix    * Acute upper GI bleeding  Assessment & Plan  Patient is a 78-year-old male who has been seeing [facility] GI last seen 12/13/2023 patient has a history of anemia is multifactorial chronic due to cancer with iron deficiency and occult GI blood loss on Eliquis review of an EGD with multiple AVMs.  Patient came in to the ER from home due to generalized weakness nausea vomiting since this morning reports lower abdominal pain has a history of anemia is on Eliquis and Plavix episodes of vomiting which were described by the significant other is dark of clotted material, and also had melanotic stool occult positive.  Patient felt  significant weakness and was unable to walk felt like too weak to walk.  Patient has a history of an upper GI bleed with multiple AVMs, also has a history of the patient intubation with tracheostomy approximately rehab time of 1 year.   CT scan with contrast showed nonspecific hypodensity within the gastric body possibly reflective of hemorrhage in this patient with the history of gastric AVMs, known liver cancer, cholelithiasis, small volume abdominal pelvic ascites  Patient's baseline hemoglobin appears to be 8-10, except 1 value on 11/23 of 13.    Patient is on Eliquis and Plavix as o/p  In the ED received DDAVP 0.3 mg/kg and Kcentra  GI consulted  1/23 S/P EGD - 3 large angioectasias in the fundus of the stomach and body of the stomach; bleeding was observed; placed clips; hemostasis achieved; induced coagulation and hemostasis achieved with argon plasma coagulation The esophagus and duodenum appeared normal.  Pt received a total of 3 units PRBC this admission so far, last on 1/25/24     Plan:  Monitor H/H Q12 hours- Hgb remains stable   Continue PPI BID  Patient extubated 1/27  Patient is tolerating diet  GI following appreciate recommendations- Need to address if stable to restart plavix  Patient is restarted on Plavix and Eliquis after clearance from GI and cardiology.  DC heparin drip  Hemoglobin is 9.6  Monitor H&H closely           Labs & Imaging: I have personally reviewed pertinent reports.      VTE Prophylaxis: in place.    Code Status:   Level 1 - Full Code    Patient Centered Rounds: I have performed bedside rounds with nursing staff today.    Mobility:   Basic Mobility Inpatient Raw Score: 15  -HLM Goal: 4: Move to chair/commode  -HLM Achieved: 5: Stand (1 or more minutes)  HLM Goal NOT achieved. Continue with multidisciplinary rounding and encourage appropriate mobility to improve upon HLM goals.    Discussions with Specialists or Other Care Team Provider: RN    Education and Discussions with  Family / Patient: Wife on phone    Total Time Spent on Date of Encounter in care of patient: 35 mins. This time was spent on one or more of the following: performing physical exam; counseling and coordination of care; obtaining or reviewing history; documenting in the medical record; reviewing/ordering tests, medications or procedures; communicating with other healthcare professionals and discussing with patient's family/caregivers.    Current Length of Stay: 12 day(s)    Current Patient Status: Inpatient   Certification Statement: The patient will continue to require additional inpatient hospital stay due to see my assessment and plan.     Subjective:   Patient is seen and examined at bedside.  No new complaints.  Afebrile  All other ROS are negative.    Objective:    Vitals: Blood pressure 144/60, pulse 78, temperature 98.3 °F (36.8 °C), temperature source Oral, resp. rate 16, height 6' (1.829 m), weight 88 kg (194 lb 0.1 oz), SpO2 95%.,Body mass index is 26.31 kg/m².  SPO2 RA Rest      Flowsheet Row ED to Hosp-Admission (Current) from 1/23/2024 in [facility] Med Surg Unit   SpO2 95 %   SpO2 Activity At Rest   O2 Device None (Room air)   O2 Flow Rate 4 L/min          I&O: No intake or output data in the 24 hours ending 02/04/24 1104    Physical Exam:    General- Alert, lying comfortably in bed. Not in any acute distress.  Neck- Supple, No JVD  CVS- regular, S1 and S2 normal  Chest- Bilateral Air entry, No rhochi, crackles or wheezing present.  Abdomen- soft, nontender, not distended, no guarding or rigidity, BS+  Extremities-  No pedal edema, No calf tenderness                         Normal ROM in all extremities.  CNS-   Alert, awake and orientedx3. No focal deficits present.    Invasive Devices       Peripheral Intravenous Line  Duration             Peripheral IV 02/02/24 Dorsal (posterior);Left Hand 2 days                          Social History  reviewed  Family History   Problem Relation  Age of Onset    Hypertension Mother     Bone cancer Father     Diabetes type II Sister     Diabetes type II Sister     Esophageal cancer Brother     Colon cancer Neg Hx     reviewed    Meds:  Current Facility-Administered Medications   Medication Dose Route Frequency Provider Last Rate Last Admin    acetaminophen (TYLENOL) tablet 650 mg  650 mg Oral Q6H PRN Adrian More MD   650 mg at 01/25/24 2034    apixaban (ELIQUIS) tablet 5 mg  5 mg Oral BID Adrian More MD   5 mg at 02/04/24 0814    atorvastatin (LIPITOR) tablet 40 mg  40 mg Oral Daily With Dinner Adrian More MD   40 mg at 02/03/24 1710    azelastine (ASTELIN) 0.1 % nasal spray 1 spray  1 spray Each Nare BID Adrian More MD   1 spray at 02/04/24 0813    bimatoprost (LUMIGAN) 0.03 % ophthalmic drops 1 drop  1 drop Both Eyes QPM Adrian More MD   1 drop at 02/03/24 1710    chlorhexidine (PERIDEX) 0.12 % oral rinse 15 mL  15 mL Mouth/Throat Q12H RANDALL Adrian More MD   15 mL at 02/03/24 2110    clopidogrel (PLAVIX) tablet 75 mg  75 mg Oral Daily Adrian More MD   75 mg at 02/04/24 0814    Empagliflozin (JARDIANCE) tablet 10 mg  10 mg Oral QAM Adrian More MD   10 mg at 02/04/24 0814    ferrous sulfate tablet 325 mg  325 mg Oral Daily With Breakfast Adrian More MD   325 mg at 02/04/24 0814    furosemide (LASIX) tablet 40 mg  40 mg Oral Every Other Day Adrian More MD   40 mg at 02/04/24 0814    glycerin-hypromellose- (ARTIFICIAL TEARS) ophthalmic solution 1 drop  1 drop Both Eyes Q6H PRN Adrian More MD   1 drop at 01/31/24 0932    insulin detemir (LEVEMIR) subcutaneous injection 30 Units  30 Units Subcutaneous HS Adrian More MD   30 Units at 02/03/24 2111    insulin lispro (HumaLOG) 100 units/mL subcutaneous injection 2-12 Units  2-12 Units Subcutaneous TID AC Adrian More MD   2 Units at 02/02/24 1203    insulin lispro (HumaLOG) 100 units/mL subcutaneous injection 2-12 Units  2-12 Units  Subcutaneous HS Adrian More MD   2 Units at 02/01/24 2154    insulin lispro (HumaLOG) 100 units/mL subcutaneous injection 5 Units  5 Units Subcutaneous TID With Meals Adrian More MD   5 Units at 02/04/24 0814    ipratropium (ATROVENT) 0.02 % inhalation solution 0.5 mg  0.5 mg Nebulization Q6H PRN Adrian More MD   0.5 mg at 01/28/24 0748    lactulose (CHRONULAC) oral solution 20 g  20 g Oral BID NADIA Kwan   20 g at 02/04/24 0813    levalbuterol (XOPENEX) inhalation solution 0.63 mg  0.63 mg Nebulization Q6H PRN Adrian More MD   0.63 mg at 01/28/24 0748    metoprolol tartrate (LOPRESSOR) partial tablet 12.5 mg  12.5 mg Oral Q12H [facility] Adrian More MD   12.5 mg at 02/04/24 0814    pantoprazole (PROTONIX) injection 40 mg  40 mg Intravenous Q12H [facility] Adrian More MD   40 mg at 02/04/24 0813    rifaximin (XIFAXAN) tablet 550 mg  550 mg Oral Q12H [facility] Adrian More MD   550 mg at 02/04/24 0814    traZODone (DESYREL) tablet 100 mg  100 mg Oral  Adrian More MD   100 mg at 02/03/24 2110    trimethobenzamide (TIGAN) IM injection 200 mg  200 mg Intramuscular Q12H PRN Adrian More MD   200 mg at 01/23/24 0717    umeclidinium-vilanterol 62.5-25 mcg/actuation inhaler 1 puff  1 puff Inhalation Daily Adrian More MD   1 puff at 02/04/24 0813      Medications Prior to Admission   Medication    acetaminophen (TYLENOL) 650 mg CR tablet    Anoro Ellipta 62.5-25 MCG/ACT inhaler    atorvastatin (LIPITOR) 40 mg tablet    azelastine (ASTELIN) 0.1 % nasal spray    Blood Glucose Monitoring Suppl (OneTouch Verio) w/Device KIT    clopidogrel (PLAVIX) 75 mg tablet    Continuous Blood Gluc  (Dexcom G6 ) NOHEMY    Eliquis 5 MG    ferrous sulfate 325 (65 Fe) mg tablet    furosemide (LASIX) 40 mg tablet    insulin detemir (Levemir FlexPen) 100 Units/mL injection pen    Insulin Pen Needle (Droplet Pen Needles) 32G X 4 MM MISC    Jardiance 10 MG TABS tablet    Lumigan 0.01  "% ophthalmic drops    metoprolol succinate (TOPROL-XL) 50 mg 24 hr tablet    Multiple Vitamin (MULTIVITAMIN ADULT PO)    NovoLOG FlexPen 100 units/mL injection pen    Potassium Citrate ER 15 MEQ (1620 MG) TBCR    pregabalin (LYRICA) 100 mg capsule    sertraline (ZOLOFT) 25 mg tablet    SUPER B COMPLEX/C PO    tamsulosin (FLOMAX) 0.4 mg    traZODone (DESYREL) 100 mg tablet    Trulicity 0.75 MG/0.5ML injection    Continuous Blood Gluc Sensor (Dexcom G6 Sensor) MISC    Continuous Blood Gluc Transmit (Dexcom G6 Transmitter) MISC    Continuous Blood Gluc Transmit (Dexcom G6 Transmitter) MISC    glucose blood (OneTouch Verio) test strip    mometasone (ELOCON) 0.1 % cream    pantoprazole (PROTONIX) 40 mg tablet       Labs:  Results from last 7 days   Lab Units 02/04/24 0303 02/03/24 0319 02/02/24  0308   WBC Thousand/uL 8.28 7.84 7.19   HEMOGLOBIN g/dL 9.6* 9.9* 8.9*   HEMATOCRIT % 32.7* 32.9* 29.4*   PLATELETS Thousands/uL 312 286 272   NEUTROS PCT % 71 65 64   LYMPHS PCT % 11* 13* 13*   MONOS PCT % 12 14* 15*   EOS PCT % 4 5 6     Results from last 7 days   Lab Units 02/04/24 0303 02/03/24 0319 02/02/24 0308 02/01/24  0444   POTASSIUM mmol/L 4.0 4.3 4.0 4.1   CHLORIDE mmol/L 109* 110* 110* 110*   CO2 mmol/L 24 20* 22 20*   BUN mg/dL 27* 26* 28* 30*   CREATININE mg/dL 1.19 1.18 1.16 1.12   CALCIUM mg/dL 8.9 8.9 9.0 8.7   ALK PHOS U/L 525*  --  546* 503*   ALT U/L 49  --  56* 50   AST U/L 66*  --  73* 70*     No results found for: \"TROPONINI\", \"CKMB\", \"CKTOTAL\"  Results from last 7 days   Lab Units 01/28/24  1526   INR  1.19     Lab Results   Component Value Date    BLOODCX No Growth After 5 Days. 08/10/2023    BLOODCX No Growth After 5 Days. 08/10/2023         Imaging:  Results for orders placed during the hospital encounter of 01/23/24    XR chest portable    Narrative  CHEST    INDICATION:   line confirmation.    COMPARISON: 1/23/2024    EXAM PERFORMED/VIEWS:  XR CHEST PORTABLE      FINDINGS: The tip of the " endotracheal tube is above the george. The right IJ central venous catheter has the tip projecting over the superior vena cava. The tip of the endogastric tube appears to be in the gastric cardia, just above the level of the  diaphragm. This should probably be advanced somewhat.    Heart appears enlarged. Aorta is atherosclerotic.    There seems to be hazy infiltrate in the right lung, predominantly interstitial pattern. Again, there appears to be minimal right effusion. No pneumothorax identified.    Osseous structures appear within normal limits for patient age.    Impression  Right IJ line projects over the superior vena cava and appears grossly satisfactory. No pneumothorax seen.    Endotracheal tube appears satisfactory.    Endogastric tube should probably be advanced farther into the stomach.    Small right effusion and relatively diffuse interstitial infiltrate in the right lung    The study was marked in EPIC for immediate notification.            Workstation performed: [id number]    Results for orders placed during the hospital encounter of 11/03/23    XR chest 2 views    Narrative  CHEST    INDICATION:   cooughing up bright red blood.    COMPARISON: 8/17/2023    EXAM PERFORMED/VIEWS:  XR CHEST PA & LATERAL The frontal view was performed utilizing dual energy radiographic technique.      FINDINGS:    Cardiomediastinal silhouette appears unremarkable.    The lungs are clear.  No pneumothorax or pleural effusion.    Osseous structures appear within normal limits for patient age.    Impression  No acute cardiopulmonary disease.            Workstation performed: [id number]      Last 24 Hours Medication List:   Current Facility-Administered Medications   Medication Dose Route Frequency Provider Last Rate    acetaminophen  650 mg Oral Q6H PRN Adrian More MD      apixaban  5 mg Oral BID Adrian Moer MD      atorvastatin  40 mg Oral Daily With Dinner Adrian More MD      azelastine  1 spray Each  Nare BID Adrian More MD      bimatoprost  1 drop Both Eyes QPM Adrian More MD      chlorhexidine  15 mL Mouth/Throat Q12H [facility] Adrian More MD      clopidogrel  75 mg Oral Daily Adrian More MD      Empagliflozin  10 mg Oral QAM Adrian More MD      ferrous sulfate  325 mg Oral Daily With Breakfast Adrian More MD      furosemide  40 mg Oral Every Other Day Adrian More MD      Artificial Tears  1 drop Both Eyes Q6H PRN Adrian More MD      insulin detemir  30 Units Subcutaneous HS Adrian More MD      insulin lispro  2-12 Units Subcutaneous TID AC Adrian More MD      insulin lispro  2-12 Units Subcutaneous HS Adrian More MD      insulin lispro  5 Units Subcutaneous TID With Meals Adrian More MD      ipratropium  0.5 mg Nebulization Q6H PRN Adrian More MD      lactulose  20 g Oral BID NADIA Kwan      levalbuterol  0.63 mg Nebulization Q6H PRN Adrian More MD      metoprolol tartrate  12.5 mg Oral Q12H [facility] Adrian More MD      pantoprazole  40 mg Intravenous Q12H [facility] Adrian More MD      rifaximin  550 mg Oral Q12H [facility] Adrian More MD      traZODone  100 mg Oral HS Adrian More MD      trimethobenzamide  200 mg Intramuscular Q12H PRN Adrian More MD      umeclidinium-vilanterol  1 puff Inhalation Daily Adrian More MD          Today, Patient Was Seen By: Adrian More MD    ** Please Note: Dictation voice to text software may have been used in the creation of this document. **

## 2024-02-04 NOTE — PLAN OF CARE
Problem: Potential for Falls  Goal: Patient will remain free of falls  Description: INTERVENTIONS:  - Educate patient/family on patient safety including physical limitations  - Instruct patient to call for assistance with activity   - Consult OT/PT to assist with strengthening/mobility   - Keep Call bell within reach  - Keep bed low and locked with side rails adjusted as appropriate  - Keep care items and personal belongings within reach  - Initiate and maintain comfort rounds  - Make Fall Risk Sign visible to staff  - Offer Toileting every 2 Hours, in advance of need  - Initiate/Maintain alarm  - Obtain necessary fall risk management equipment  - Apply yellow socks and bracelet for high fall risk patients  - Consider moving patient to room near nurses station  Outcome: Progressing     Problem: PAIN - ADULT  Goal: Verbalizes/displays adequate comfort level or baseline comfort level  Description: Interventions:  - Encourage patient to monitor pain and request assistance  - Assess pain using appropriate pain scale  - Administer analgesics based on type and severity of pain and evaluate response  - Implement non-pharmacological measures as appropriate and evaluate response  - Consider cultural and social influences on pain and pain management  - Notify physician/advanced practitioner if interventions unsuccessful or patient reports new pain  Outcome: Progressing     Problem: Knowledge Deficit  Goal: Patient/family/caregiver demonstrates understanding of disease process, treatment plan, medications, and discharge instructions  Description: Complete learning assessment and assess knowledge base.  Interventions:  - Provide teaching at level of understanding  - Provide teaching via preferred learning methods  Outcome: Progressing     Problem: GASTROINTESTINAL - ADULT  Goal: Minimal or absence of nausea and/or vomiting  Description: INTERVENTIONS:  - Administer IV fluids if ordered to ensure adequate hydration  - Maintain  NPO status until nausea and vomiting are resolved  - Nasogastric tube if ordered  - Administer ordered antiemetic medications as needed  - Provide nonpharmacologic comfort measures as appropriate  - Advance diet as tolerated, if ordered  - Consider nutrition services referral to assist patient with adequate nutrition and appropriate food choices  Outcome: Progressing  Goal: Maintains or returns to baseline bowel function  Description: INTERVENTIONS:  - Assess bowel function  - Encourage oral fluids to ensure adequate hydration  - Administer IV fluids if ordered to ensure adequate hydration  - Administer ordered medications as needed  - Encourage mobilization and activity  - Consider nutritional services referral to assist patient with adequate nutrition and appropriate food choices  Outcome: Progressing  Goal: Maintains adequate nutritional intake  Description: INTERVENTIONS:  - Monitor percentage of each meal consumed  - Identify factors contributing to decreased intake, treat as appropriate  - Assist with meals as needed  - Monitor I&O, weight, and lab values if indicated  - Obtain nutrition services referral as needed  Outcome: Progressing  Goal: Oral mucous membranes remain intact  Description: INTERVENTIONS  - Assess oral mucosa and hygiene practices  - Implement preventative oral hygiene regimen  - Implement oral medicated treatments as ordered  - Initiate Nutrition services referral as needed  Outcome: Progressing     Problem: METABOLIC, FLUID AND ELECTROLYTES - ADULT  Goal: Electrolytes maintained within normal limits  Description: INTERVENTIONS:  - Monitor labs and assess patient for signs and symptoms of electrolyte imbalances  - Administer electrolyte replacement as ordered  - Monitor response to electrolyte replacements, including repeat lab results as appropriate  - Instruct patient on fluid and nutrition as appropriate  Outcome: Progressing  Goal: Fluid balance maintained  Description:  INTERVENTIONS:  - Monitor labs   - Monitor I/O and WT  - Instruct patient on fluid and nutrition as appropriate  - Assess for signs & symptoms of volume excess or deficit  Outcome: Progressing  Goal: Glucose maintained within target range  Description: INTERVENTIONS:  - Monitor Blood Glucose as ordered  - Assess for signs and symptoms of hyperglycemia and hypoglycemia  - Administer ordered medications to maintain glucose within target range  - Assess nutritional intake and initiate nutrition service referral as needed  Outcome: Progressing     Problem: HEMATOLOGIC - ADULT  Goal: Maintains hematologic stability  Description: INTERVENTIONS  - Assess for signs and symptoms of bleeding or hemorrhage  - Monitor labs  - Administer supportive blood products/factors as ordered and appropriate  Outcome: Progressing

## 2024-02-04 NOTE — ASSESSMENT & PLAN NOTE
Lab Results   Component Value Date    HGBA1C 6.6 (H) 01/24/2024       Recent Labs     02/03/24  1124 02/03/24  1604 02/03/24 2054 02/04/24  0724   POCGLU 149* 144* 147* 109       Patient has a Dexcom, on Trulicity and Jardiance, Lantus and insulin  Extubated 1/27. Currently no enteral access for TF. Insulin drip stopped. Changed to correction scale insulin  Accu-Chek ACHS with Humalog sliding scale  Blood glucose goal 140-180  Hypoglycemia protocol    MRN: [id number]

## 2024-02-04 NOTE — ASSESSMENT & PLAN NOTE
Secondary to GIB  Hemoglobin baseline 8-10  Receive 3 units PRBC this admission, last 1/25   Trend H&H  History of iron deficiency   Iron panel reviewed.  Received Venofer  On iron supplementation

## 2024-02-04 NOTE — ASSESSMENT & PLAN NOTE
Wt Readings from Last 3 Encounters:   02/04/24 88 kg (194 lb 0.1 oz)   02/01/24 89.8 kg (197 lb 15.6 oz)   12/20/23 96.6 kg (213 lb)     Patient is 78-year-old male with history of CAD post PCI in August not deemed a candidate for surgical intervention, systolic heart failure with reduced ejection fraction of 36%   Home meds: lasix, toprol, and statin   Resume home meds when able   Daily wts   Yes - the patient is able to be screened

## 2024-02-04 NOTE — ASSESSMENT & PLAN NOTE
Patient is a 78-year-old male who has been seeing [facility] GI last seen 12/13/2023 patient has a history of anemia is multifactorial chronic due to cancer with iron deficiency and occult GI blood loss on Eliquis review of an EGD with multiple AVMs.  Patient came in to the ER from home due to generalized weakness nausea vomiting since this morning reports lower abdominal pain has a history of anemia is on Eliquis and Plavix episodes of vomiting which were described by the significant other is dark of clotted material, and also had melanotic stool occult positive.  Patient felt significant weakness and was unable to walk felt like too weak to walk.  Patient has a history of an upper GI bleed with multiple AVMs, also has a history of the patient intubation with tracheostomy approximately rehab time of 1 year.   CT scan with contrast showed nonspecific hypodensity within the gastric body possibly reflective of hemorrhage in this patient with the history of gastric AVMs, known liver cancer, cholelithiasis, small volume abdominal pelvic ascites  Patient's baseline hemoglobin appears to be 8-10, except 1 value on 11/23 of 13.    Patient is on Eliquis and Plavix as o/p  In the ED received DDAVP 0.3 mg/kg and Kcentra  GI consulted  1/23 S/P EGD - 3 large angioectasias in the fundus of the stomach and body of the stomach; bleeding was observed; placed clips; hemostasis achieved; induced coagulation and hemostasis achieved with argon plasma coagulation The esophagus and duodenum appeared normal.  Pt received a total of 3 units PRBC this admission so far, last on 1/25/24     Plan:  Monitor H/H Q12 hours- Hgb remains stable   Continue PPI BID  Patient extubated 1/27  Patient is tolerating diet  GI following appreciate recommendations- Need to address if stable to restart plavix  Patient is restarted on Plavix and Eliquis after clearance from GI and cardiology.  DC heparin drip  Hemoglobin is 9.6  Monitor H&H closely

## 2024-02-04 NOTE — PLAN OF CARE
Problem: Potential for Falls  Goal: Patient will remain free of falls  Description: INTERVENTIONS:  - Educate patient/family on patient safety including physical limitations  - Instruct patient to call for assistance with activity   - Consult OT/PT to assist with strengthening/mobility   - Keep Call bell within reach  - Keep bed low and locked with side rails adjusted as appropriate  - Keep care items and personal belongings within reach  - Initiate and maintain comfort rounds  - Make Fall Risk Sign visible to staff  - Offer Toileting every 2 Hours, in advance of need  - Initiate/Maintain alarm  - Obtain necessary fall risk management equipment  - Apply yellow socks and bracelet for high fall risk patients  - Consider moving patient to room near nurses station  Outcome: Progressing     Problem: PAIN - ADULT  Goal: Verbalizes/displays adequate comfort level or baseline comfort level  Description: Interventions:  - Encourage patient to monitor pain and request assistance  - Assess pain using appropriate pain scale  - Administer analgesics based on type and severity of pain and evaluate response  - Implement non-pharmacological measures as appropriate and evaluate response  - Consider cultural and social influences on pain and pain management  - Notify physician/advanced practitioner if interventions unsuccessful or patient reports new pain  Outcome: Progressing     Problem: Knowledge Deficit  Goal: Patient/family/caregiver demonstrates understanding of disease process, treatment plan, medications, and discharge instructions  Description: Complete learning assessment and assess knowledge base.  Interventions:  - Provide teaching at level of understanding  - Provide teaching via preferred learning methods  Outcome: Progressing     Problem: GASTROINTESTINAL - ADULT  Goal: Minimal or absence of nausea and/or vomiting  Description: INTERVENTIONS:  - Administer IV fluids if ordered to ensure adequate hydration  - Maintain  NPO status until nausea and vomiting are resolved  - Nasogastric tube if ordered  - Administer ordered antiemetic medications as needed  - Provide nonpharmacologic comfort measures as appropriate  - Advance diet as tolerated, if ordered  - Consider nutrition services referral to assist patient with adequate nutrition and appropriate food choices  Outcome: Progressing  Goal: Maintains or returns to baseline bowel function  Description: INTERVENTIONS:  - Assess bowel function  - Encourage oral fluids to ensure adequate hydration  - Administer IV fluids if ordered to ensure adequate hydration  - Administer ordered medications as needed  - Encourage mobilization and activity  - Consider nutritional services referral to assist patient with adequate nutrition and appropriate food choices  Outcome: Progressing  Goal: Maintains adequate nutritional intake  Description: INTERVENTIONS:  - Monitor percentage of each meal consumed  - Identify factors contributing to decreased intake, treat as appropriate  - Assist with meals as needed  - Monitor I&O, weight, and lab values if indicated  - Obtain nutrition services referral as needed  Outcome: Progressing  Goal: Oral mucous membranes remain intact  Description: INTERVENTIONS  - Assess oral mucosa and hygiene practices  - Implement preventative oral hygiene regimen  - Implement oral medicated treatments as ordered  - Initiate Nutrition services referral as needed  Outcome: Progressing     Problem: METABOLIC, FLUID AND ELECTROLYTES - ADULT  Goal: Electrolytes maintained within normal limits  Description: INTERVENTIONS:  - Monitor labs and assess patient for signs and symptoms of electrolyte imbalances  - Administer electrolyte replacement as ordered  - Monitor response to electrolyte replacements, including repeat lab results as appropriate  - Instruct patient on fluid and nutrition as appropriate  Outcome: Progressing  Goal: Fluid balance maintained  Description:  INTERVENTIONS:  - Monitor labs   - Monitor I/O and WT  - Instruct patient on fluid and nutrition as appropriate  - Assess for signs & symptoms of volume excess or deficit  Outcome: Progressing  Goal: Glucose maintained within target range  Description: INTERVENTIONS:  - Monitor Blood Glucose as ordered  - Assess for signs and symptoms of hyperglycemia and hypoglycemia  - Administer ordered medications to maintain glucose within target range  - Assess nutritional intake and initiate nutrition service referral as needed  Outcome: Progressing     Problem: HEMATOLOGIC - ADULT  Goal: Maintains hematologic stability  Description: INTERVENTIONS  - Assess for signs and symptoms of bleeding or hemorrhage  - Monitor labs  - Administer supportive blood products/factors as ordered and appropriate  Outcome: Progressing     Problem: Prexisting or High Potential for Compromised Skin Integrity  Goal: Skin integrity is maintained or improved  Description: INTERVENTIONS:  - Identify patients at risk for skin breakdown  - Assess and monitor skin integrity  - Assess and monitor nutrition and hydration status  - Monitor labs   - Assess for incontinence   - Turn and reposition patient  - Assist with mobility/ambulation  - Relieve pressure over bony prominences  - Avoid friction and shearing  - Provide appropriate hygiene as needed including keeping skin clean and dry  - Evaluate need for skin moisturizer/barrier cream  - Collaborate with interdisciplinary team   - Patient/family teaching  - Consider wound care consult   Outcome: Progressing     Problem: Nutrition/Hydration-ADULT  Goal: Nutrient/Hydration intake appropriate for improving, restoring or maintaining nutritional needs  Description: Monitor and assess patient's nutrition/hydration status for malnutrition. Collaborate with interdisciplinary team and initiate plan and interventions as ordered.  Monitor patient's weight and dietary intake as ordered or per policy. Utilize  nutrition screening tool and intervene as necessary. Determine patient's food preferences and provide high-protein, high-caloric foods as appropriate.     INTERVENTIONS:  - Monitor oral intake, urinary output, labs, and treatment plans  - Assess nutrition and hydration status and recommend course of action  - Evaluate amount of meals eaten  - Assist patient with eating if necessary   - Allow adequate time for meals  - Recommend/ encourage appropriate diets, oral nutritional supplements, and vitamin/mineral supplements  - Order, calculate, and assess calorie counts as needed  - Recommend, monitor, and adjust tube feedings and TPN/PPN based on assessed needs  - Assess need for intravenous fluids  - Provide specific nutrition/hydration education as appropriate  - Include patient/family/caregiver in decisions related to nutrition  Outcome: Progressing     Problem: RESPIRATORY - ADULT  Goal: Achieves optimal ventilation and oxygenation  Description: INTERVENTIONS:  - Assess for changes in respiratory status  - Assess for changes in mentation and behavior  - Position to facilitate oxygenation and minimize respiratory effort  - Oxygen administered by appropriate delivery if ordered  - Initiate smoking cessation education as indicated  - Encourage broncho-pulmonary hygiene including cough, deep breathe, Incentive Spirometry  - Assess the need for suctioning and aspirate as needed  - Assess and instruct to report SOB or any respiratory difficulty  - Respiratory Therapy support as indicated  Outcome: Progressing

## 2024-02-04 NOTE — ASSESSMENT & PLAN NOTE
Lab Results   Component Value Date    EGFR 58 02/04/2024    EGFR 59 02/03/2024    EGFR 60 02/02/2024    CREATININE 1.19 02/04/2024    CREATININE 1.18 02/03/2024    CREATININE 1.16 02/02/2024     Cr at baseline  Monitor I's and O's   Renally dose medications  Avoid nephrotoxic agents  On Lasix

## 2024-02-05 PROBLEM — I95.1 ORTHOSTATIC HYPOTENSION: Status: ACTIVE | Noted: 2024-02-05

## 2024-02-05 LAB
ANION GAP SERPL CALCULATED.3IONS-SCNC: 8 MMOL/L
BASOPHILS # BLD AUTO: 0.04 THOUSANDS/ÂΜL (ref 0–0.1)
BASOPHILS NFR BLD AUTO: 1 % (ref 0–1)
BUN SERPL-MCNC: 29 MG/DL (ref 5–25)
CALCIUM SERPL-MCNC: 8.4 MG/DL (ref 8.4–10.2)
CHLORIDE SERPL-SCNC: 109 MMOL/L (ref 96–108)
CO2 SERPL-SCNC: 21 MMOL/L (ref 21–32)
CREAT SERPL-MCNC: 1.15 MG/DL (ref 0.6–1.3)
EOSINOPHIL # BLD AUTO: 0.28 THOUSAND/ÂΜL (ref 0–0.61)
EOSINOPHIL NFR BLD AUTO: 4 % (ref 0–6)
ERYTHROCYTE [DISTWIDTH] IN BLOOD BY AUTOMATED COUNT: 18.6 % (ref 11.6–15.1)
GFR SERPL CREATININE-BSD FRML MDRD: 61 ML/MIN/1.73SQ M
GLUCOSE SERPL-MCNC: 160 MG/DL (ref 65–140)
GLUCOSE SERPL-MCNC: 185 MG/DL (ref 65–140)
GLUCOSE SERPL-MCNC: 199 MG/DL (ref 65–140)
GLUCOSE SERPL-MCNC: 67 MG/DL (ref 65–140)
GLUCOSE SERPL-MCNC: 70 MG/DL (ref 65–140)
HCT VFR BLD AUTO: 30 % (ref 36.5–49.3)
HGB BLD-MCNC: 9.2 G/DL (ref 12–17)
IMM GRANULOCYTES # BLD AUTO: 0.1 THOUSAND/UL (ref 0–0.2)
IMM GRANULOCYTES NFR BLD AUTO: 1 % (ref 0–2)
LYMPHOCYTES # BLD AUTO: 0.81 THOUSANDS/ÂΜL (ref 0.6–4.47)
LYMPHOCYTES NFR BLD AUTO: 10 % (ref 14–44)
MCH RBC QN AUTO: 29.2 PG (ref 26.8–34.3)
MCHC RBC AUTO-ENTMCNC: 30.7 G/DL (ref 31.4–37.4)
MCV RBC AUTO: 95 FL (ref 82–98)
MONOCYTES # BLD AUTO: 1.08 THOUSAND/ÂΜL (ref 0.17–1.22)
MONOCYTES NFR BLD AUTO: 14 % (ref 4–12)
NEUTROPHILS # BLD AUTO: 5.54 THOUSANDS/ÂΜL (ref 1.85–7.62)
NEUTS SEG NFR BLD AUTO: 70 % (ref 43–75)
NRBC BLD AUTO-RTO: 0 /100 WBCS
PLATELET # BLD AUTO: 293 THOUSANDS/UL (ref 149–390)
PMV BLD AUTO: 11.8 FL (ref 8.9–12.7)
POTASSIUM SERPL-SCNC: 4.1 MMOL/L (ref 3.5–5.3)
RBC # BLD AUTO: 3.15 MILLION/UL (ref 3.88–5.62)
SODIUM SERPL-SCNC: 138 MMOL/L (ref 135–147)
WBC # BLD AUTO: 7.85 THOUSAND/UL (ref 4.31–10.16)

## 2024-02-05 PROCEDURE — 80048 BASIC METABOLIC PNL TOTAL CA: CPT | Performed by: INTERNAL MEDICINE

## 2024-02-05 PROCEDURE — 93005 ELECTROCARDIOGRAM TRACING: CPT

## 2024-02-05 PROCEDURE — 92526 ORAL FUNCTION THERAPY: CPT

## 2024-02-05 PROCEDURE — C9113 INJ PANTOPRAZOLE SODIUM, VIA: HCPCS | Performed by: INTERNAL MEDICINE

## 2024-02-05 PROCEDURE — 82948 REAGENT STRIP/BLOOD GLUCOSE: CPT

## 2024-02-05 PROCEDURE — 99232 SBSQ HOSP IP/OBS MODERATE 35: CPT | Performed by: INTERNAL MEDICINE

## 2024-02-05 PROCEDURE — 97530 THERAPEUTIC ACTIVITIES: CPT

## 2024-02-05 PROCEDURE — 85025 COMPLETE CBC W/AUTO DIFF WBC: CPT | Performed by: INTERNAL MEDICINE

## 2024-02-05 RX ORDER — SODIUM CHLORIDE, SODIUM GLUCONATE, SODIUM ACETATE, POTASSIUM CHLORIDE, MAGNESIUM CHLORIDE, SODIUM PHOSPHATE, DIBASIC, AND POTASSIUM PHOSPHATE .53; .5; .37; .037; .03; .012; .00082 G/100ML; G/100ML; G/100ML; G/100ML; G/100ML; G/100ML; G/100ML
75 INJECTION, SOLUTION INTRAVENOUS CONTINUOUS
Status: DISPENSED | OUTPATIENT
Start: 2024-02-05 | End: 2024-02-05

## 2024-02-05 RX ADMIN — CHLORHEXIDINE GLUCONATE 15 ML: 1.2 SOLUTION ORAL at 22:26

## 2024-02-05 RX ADMIN — INSULIN LISPRO 5 UNITS: 100 INJECTION, SOLUTION INTRAVENOUS; SUBCUTANEOUS at 12:18

## 2024-02-05 RX ADMIN — PANTOPRAZOLE SODIUM 40 MG: 40 INJECTION, POWDER, FOR SOLUTION INTRAVENOUS at 07:39

## 2024-02-05 RX ADMIN — LACTULOSE 20 G: 20 SOLUTION ORAL at 17:47

## 2024-02-05 RX ADMIN — AZELASTINE 1 SPRAY: 1 SPRAY, METERED NASAL at 17:49

## 2024-02-05 RX ADMIN — INSULIN LISPRO 5 UNITS: 100 INJECTION, SOLUTION INTRAVENOUS; SUBCUTANEOUS at 17:47

## 2024-02-05 RX ADMIN — APIXABAN 5 MG: 5 TABLET, FILM COATED ORAL at 07:41

## 2024-02-05 RX ADMIN — Medication 12.5 MG: at 22:20

## 2024-02-05 RX ADMIN — INSULIN DETEMIR 30 UNITS: 100 INJECTION, SOLUTION SUBCUTANEOUS at 22:24

## 2024-02-05 RX ADMIN — RIFAXIMIN 550 MG: 550 TABLET ORAL at 22:22

## 2024-02-05 RX ADMIN — AZELASTINE 1 SPRAY: 1 SPRAY, METERED NASAL at 07:43

## 2024-02-05 RX ADMIN — BIMATOPROST 1 DROP: 0.3 SOLUTION/ DROPS OPHTHALMIC at 17:49

## 2024-02-05 RX ADMIN — FERROUS SULFATE TAB 325 MG (65 MG ELEMENTAL FE) 325 MG: 325 (65 FE) TAB at 07:41

## 2024-02-05 RX ADMIN — APIXABAN 5 MG: 5 TABLET, FILM COATED ORAL at 17:47

## 2024-02-05 RX ADMIN — UMECLIDINIUM BROMIDE AND VILANTEROL TRIFENATATE 1 PUFF: 62.5; 25 POWDER RESPIRATORY (INHALATION) at 07:42

## 2024-02-05 RX ADMIN — INSULIN LISPRO 2 UNITS: 100 INJECTION, SOLUTION INTRAVENOUS; SUBCUTANEOUS at 12:18

## 2024-02-05 RX ADMIN — CHLORHEXIDINE GLUCONATE 15 ML: 1.2 SOLUTION ORAL at 07:42

## 2024-02-05 RX ADMIN — INSULIN LISPRO 2 UNITS: 100 INJECTION, SOLUTION INTRAVENOUS; SUBCUTANEOUS at 17:47

## 2024-02-05 RX ADMIN — TRAZODONE HYDROCHLORIDE 100 MG: 50 TABLET ORAL at 22:22

## 2024-02-05 RX ADMIN — PANTOPRAZOLE SODIUM 40 MG: 40 INJECTION, POWDER, FOR SOLUTION INTRAVENOUS at 22:20

## 2024-02-05 RX ADMIN — CLOPIDOGREL BISULFATE 75 MG: 75 TABLET ORAL at 07:41

## 2024-02-05 RX ADMIN — Medication 12.5 MG: at 07:41

## 2024-02-05 RX ADMIN — RIFAXIMIN 550 MG: 550 TABLET ORAL at 07:41

## 2024-02-05 RX ADMIN — SODIUM CHLORIDE, SODIUM GLUCONATE, SODIUM ACETATE, POTASSIUM CHLORIDE, MAGNESIUM CHLORIDE, SODIUM PHOSPHATE, DIBASIC, AND POTASSIUM PHOSPHATE 75 ML/HR: .53; .5; .37; .037; .03; .012; .00082 INJECTION, SOLUTION INTRAVENOUS at 10:18

## 2024-02-05 RX ADMIN — ATORVASTATIN CALCIUM 40 MG: 40 TABLET, FILM COATED ORAL at 17:47

## 2024-02-05 RX ADMIN — EMPAGLIFLOZIN 10 MG: 10 TABLET, FILM COATED ORAL at 07:41

## 2024-02-05 NOTE — ASSESSMENT & PLAN NOTE
Patient has stage II liver cancer hepatocellular carcinoma  Has a tumor thrombus which appears stable seen again on admission CT  Status post Y 90 treatment    Follows with Dr. Gong as o/p  AFP is elevated.    GI discussed with patient's wife and Dr. Gong and recommended outpatient MRI abdomen   98

## 2024-02-05 NOTE — ASSESSMENT & PLAN NOTE
1/24 0630A RN called provider to bedside to evaluate patient for left facial droop. NIHHS 4 (left facial droop, LUE weakness +4/5, LLE weakness +4/5, LLE drift and mild dysarthria). RN states left facial droop was noticed on arrival to ICU at 0300A. RN states ED could not confirm if droop was present in ED and wife unsure if droop was new. Stroke alert called.   CTH - no acute stroke or hemorrhage  CTA H/N - no large vessel occulusion. No arterial occulusion. 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   1/24 MRI: Negative for acute findings   1/24 ECHO: EF 75%, Mild TVR  Mentation continuing to improve. Able to be extubated 1/27     Plan:  Patient discussed with Dr. Interiano (Neuro)   TNK not given due to unknown last known well and active GI bleed.  Goal SBP >140  1/24 ECHO: EF 75%, Mild TVR  Restarted on Plavix and Eliquis  Restart statin    Aspiration/fall precautions   Improved

## 2024-02-05 NOTE — ASSESSMENT & PLAN NOTE
Patient found to have positive orthostatic vitals during PT/OT evaluation  Will hold Lasix for today  Continue gentle IV hydration  Recheck orthostatic vitals in the morning  Fall and safety precautions

## 2024-02-05 NOTE — PLAN OF CARE
Problem: OCCUPATIONAL THERAPY ADULT  Goal: Performs self-care activities at highest level of function for planned discharge setting.  See evaluation for individualized goals.  Description:  Outcome: Progressing  Note: Limitation: Decreased ADL status, Decreased UE ROM, Decreased UE strength, Decreased Safe judgement during ADL, Decreased cognition, Decreased endurance, Decreased self-care trans, Decreased high-level ADLs  Prognosis: Fair  Assessment: Patient participated in Skilled OT session this date with interventions consisting of safety awareness and fall prevention techniques and  therapeutic activities to: increase activity tolerance . Patient agreeable to OT treatment session, upon arrival patient was found seated OOB to Recliner.  Treatment session as follows: /48 upon arrival. Pt stood with supervision and good use of hand placement without Vcs. BP 77/39 in standing. Returned to seated position. BP 97/40 and legs elevated before ending session. RN & MD made aware of orthostatic BP.  Patient continues to be functioning below baseline level, occupational performance remains limited secondary to factors listed above and increased risk for falls and injury.  The patient's raw score on the -PAC Daily Activity inpatient short form is 16, standardized score is 35.96, less than 39.4. Patients at this level are likely to benefit from DC to post-acute rehabilitation services. From OT standpoint, recommendation at time of d/c would be level 2.   Patient to benefit from continued Occupational Therapy treatment while in the hospital to address deficits as defined above and maximize level of functional independence with ADLs and functional mobility. Pt seen as a co-tx with PT due to the patient's co-morbidities, clinically unstable presentation, and present impairments which are a regression from the patient's baseline. Pt left with call bell in reach, tray table in reach, needs met, chair alarm activated.      Rehab Resource Intensity Level, OT: II (Moderate Resource Intensity)

## 2024-02-05 NOTE — OCCUPATIONAL THERAPY NOTE
Occupational Therapy Treatment Note    Patient Name: Derek Walter  Today's Date: 2/5/2024 02/05/24 0935   OT Last Visit   OT Visit Date 02/05/24   Note Type   Note Type Treatment for insurance authorization   Pain Assessment   Pain Assessment Tool 0-10   Pain Score No Pain   Restrictions/Precautions   Weight Bearing Precautions Per Order No   Other Precautions Fall Risk;Telemetry;Bed Alarm;Chair Alarm   ADL   Toileting Comments No needs at this time. Too orthostatic to progress to ADL session.   Bed Mobility   Additional Comments Received OOB in chair   Transfers   Sit to Stand 5  Supervision   Additional items Armrests   Stand to Sit 5  Supervision   Additional items Armrests   Stand pivot Unable to assess   Additional Comments Stood for orthostatic BP  (117/48 seated. 77/39 standing. 97/40 seated.)   Cognition   Overall Cognitive Status Impaired   Arousal/Participation Alert;Cooperative   Attention Attends with cues to redirect   Orientation Level Oriented to person;Oriented to place   Memory Decreased recall of recent events;Decreased recall of precautions   Following Commands Follows one step commands without difficulty   Comments Flat affect   Activity Tolerance   Activity Tolerance (S)  Treatment limited secondary to medical complications (Comment)  (orthostatic BP)   Medical Staff Made Aware PT Bertha, MD Gino Monroyr, CM Amol   Assessment   Assessment Patient participated in Skilled OT session this date with interventions consisting of safety awareness and fall prevention techniques and  therapeutic activities to: increase activity tolerance . Patient agreeable to OT treatment session, upon arrival patient was found seated OOB to Recliner.  Treatment session as follows: /48 upon arrival. Pt stood with supervision and good use of hand placement without Vcs. BP 77/39 in standing. Returned to seated position. BP 97/40 and legs elevated before ending session. RN & MD made aware of orthostatic BP.   Patient continues to be functioning below baseline level, occupational performance remains limited secondary to factors listed above and increased risk for falls and injury.  The patient's raw score on the AM-PAC Daily Activity inpatient short form is 16, standardized score is 35.96, less than 39.4. Patients at this level are likely to benefit from DC to post-acute rehabilitation services. From OT standpoint, recommendation at time of d/c would be level 2.   Patient to benefit from continued Occupational Therapy treatment while in the hospital to address deficits as defined above and maximize level of functional independence with ADLs and functional mobility. Pt seen as a co-tx with PT due to the patient's co-morbidities, clinically unstable presentation, and present impairments which are a regression from the patient's baseline. Pt left with call bell in reach, tray table in reach, needs met, chair alarm activated.   Plan   OT Treatment Day 2   Discharge Recommendation   Rehab Resource Intensity Level, OT II (Moderate Resource Intensity)   AM-PAC Daily Activity Inpatient   Lower Body Dressing 2   Bathing 2   Toileting 2   Upper Body Dressing 3   Grooming 3   Eating 4   Daily Activity Raw Score 16   Daily Activity Standardized Score (Calc for Raw Score >=11) 35.96     Jennifer Cintron MS, OTR/L

## 2024-02-05 NOTE — CASE MANAGEMENT
Case Management Progress Note    Patient name Derek Walter  Location /-01 MRN [id number]  : 1945 Date 2024       LOS (days): 13  Geometric Mean LOS (GMLOS) (days): 4.6  Days to GMLOS:-8.9        OBJECTIVE:        Current admission status: Inpatient  Preferred Pharmacy:   [id number] - [place], [place] - [id number] [address]  Phone: [phone] Fax: [phone]    Primary Care Provider: Ernesto Griffith MD    Primary Insurance: [employer]  Secondary Insurance:     PROGRESS NOTE:  Updated [facility] admissions on Pt's status via [facility]. Will need to obtain insurance auth prior to discharge.

## 2024-02-05 NOTE — ASSESSMENT & PLAN NOTE
Lab Results   Component Value Date    HGBA1C 6.6 (H) 01/24/2024       Recent Labs     02/04/24  2104 02/05/24  0727 02/05/24  0930 02/05/24  1200   POCGLU 117 67 199* 185*     Patient has a Dexcom, on Trulicity and Jardiance, Lantus and insulin  Extubated 1/27. Currently no enteral access for TF. Insulin drip stopped. Changed to correction scale insulin  Accu-Chek ACHS with Humalog sliding scale  Blood glucose goal 140-180  Hypoglycemia protocol

## 2024-02-05 NOTE — PROGRESS NOTES
[facility]  Progress Note  Name: Derek Walter I  MRN: [id number]  Unit/Bed#: -01 I Date of Admission: 1/23/2024   Date of Service: 2/5/2024 I Hospital Day: 13    Assessment/Plan   Orthostatic hypotension  Assessment & Plan  Patient found to have positive orthostatic vitals during PT/OT evaluation  Will hold Lasix for today  Continue gentle IV hydration  Recheck orthostatic vitals in the morning  Fall and safety precautions    Hepatic encephalopathy (HCC)  Assessment & Plan  patient is 78-year-old male with a history of liver cancer and radiation therapy who was admitted for an upper GI bleed with hypotension and later strokelike symptoms went unresponsive and was intubated, appears to be as cause from the liver with an ammonia level of 130  Most likely source of elevated ammonia level is liver history of liver cancer  GI following  Continue with Rifaximin when able   Patient should have 2-3 BMs a day  Ammonia level is normal  On lactulose  Improved  Awaiting rehab placement    AMS (altered mental status)  Assessment & Plan  1/24 0630A RN called provider to bedside to evaluate patient for left facial droop. NIHHS 4 (left facial droop, LUE weakness +4/5, LLE weakness +4/5, LLE drift and mild dysarthria). RN states left facial droop was noticed on arrival to ICU at 0300A. RN states ED could not confirm if droop was present in ED and wife unsure if droop was new. Stroke alert called.   CTH - no acute stroke or hemorrhage  CTA H/N - no large vessel occulusion. No arterial occulusion. 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   1/24 MRI: Negative for acute findings   1/24 ECHO: EF 75%, Mild TVR  Mentation continuing to improve. Able to be extubated 1/27     Plan:  Patient discussed with Dr. Interiano (Neuro)   TNK not given due to unknown last known well and active GI bleed.  Goal SBP >140  1/24 ECHO: EF 75%, Mild TVR  Restarted on Plavix and Eliquis  Restart statin    Aspiration/fall precautions    Improved    Acute blood loss anemia  Assessment & Plan  Secondary to GIB  Hemoglobin baseline 8-10  Receive 3 units PRBC this admission, last 1/25   Trend H&H  History of iron deficiency   Iron panel reviewed.  Received Venofer  On iron supplementation    Chronic systolic heart failure (HCC)  Assessment & Plan  Wt Readings from Last 3 Encounters:   02/05/24 86.9 kg (191 lb 9.3 oz)   02/01/24 89.8 kg (197 lb 15.6 oz)   12/20/23 96.6 kg (213 lb)     Patient is 78-year-old male with history of CAD post PCI in August not deemed a candidate for surgical intervention, systolic heart failure with reduced ejection fraction of 36%   Home meds: lasix, toprol, and statin   Continue to hold Lasix secondary to orthostatic hypotension  Continue to monitor volume status clinically  Daily wts    Hepatocellular carcinoma (HCC)  Assessment & Plan  Patient has stage II liver cancer hepatocellular carcinoma  Has a tumor thrombus which appears stable seen again on admission CT  Status post Y 90 treatment    Follows with Dr. Gong as o/p  AFP is elevated.    GI discussed with patient's wife and Dr. Gong and recommended outpatient MRI abdomen    CAD (coronary artery disease)  Assessment & Plan  Patient has a history of coronary artery disease had 8/2023 a PCI status post cardiac catheterization drug-eluting stents x 3 in the LAD.  Ramus and left circumflex  Continue home statin and metoprolol    History of DVT (deep vein thrombosis)  Assessment & Plan  Patient is on Eliquis as o/p, currently held 2/2 GIB  1/23 reversed with Kcentra   Continue on Eliquis    Acute hypoxemic respiratory failure (HCC)  Assessment & Plan  Required intubation for airway protection 1/23 after patient became increasingly encephalopathic  Minimal vent setting. Placed on PS since 1/24 at 6/6 40% FiO2  Unable to be successfully extubated due to mental status. Currently not requiring sedation.  Having copious secretions coming from inline suction  1/27 CXR  negative for acute findings. Given 1 x dose of 40 mg IV lasix   Extubated mid morning on 1/27  Saturating well on room air     Plan:  Resolved  Aspiration precautions  Encourage IS/Flutter valve when able     Atrial fibrillation, unspecified type ([facility])  Assessment & Plan  Rate controled  Maintained on Eliquis and metoprolol as o/p  Continue on Lopressor  Hold Eliquis and Plavix until okay with GI   Cardiology consult regarding input for antiplatelet/anticoagulation appreciated  As per cardiology restart Plavix and Eliquis  Discussed with GI and patient was restarted on anticoagulation 1/30 and monitor H&H closely      Type 2 diabetes mellitus with neurologic complication, with long-term current use of insulin ([facility])  Assessment & Plan  Lab Results   Component Value Date    HGBA1C 6.6 (H) 01/24/2024       Recent Labs     02/04/24  2104 02/05/24  0727 02/05/24  0930 02/05/24  1200   POCGLU 117 67 199* 185*     Patient has a Dexcom, on Trulicity and Jardiance, Lantus and insulin  Extubated 1/27. Currently no enteral access for TF. Insulin drip stopped. Changed to correction scale insulin  Accu-Chek ACHS with Humalog sliding scale  Blood glucose goal 140-180  Hypoglycemia protocol     Stage 3 chronic kidney disease, unspecified whether stage 3a or 3b CKD ([facility])  Assessment & Plan  Lab Results   Component Value Date    EGFR 61 02/05/2024    EGFR 58 02/04/2024    EGFR 59 02/03/2024    CREATININE 1.15 02/05/2024    CREATININE 1.19 02/04/2024    CREATININE 1.18 02/03/2024     Cr at baseline  Monitor I's and O's   Renally dose medications  Avoid nephrotoxic agents  On Lasix    * Acute upper GI bleeding  Assessment & Plan  Patient is a 78-year-old male who has been seeing [facility] last seen 12/13/2023 patient has a history of anemia is multifactorial chronic due to cancer with iron deficiency and occult GI blood loss on Eliquis review of an EGD with multiple AVMs.  Patient came in to the ER from home due to generalized  weakness nausea vomiting since this morning reports lower abdominal pain has a history of anemia is on Eliquis and Plavix episodes of vomiting which were described by the significant other is dark of clotted material, and also had melanotic stool occult positive.  Patient felt significant weakness and was unable to walk felt like too weak to walk.  Patient has a history of an upper GI bleed with multiple AVMs, also has a history of the patient intubation with tracheostomy approximately rehab time of 1 year.   CT scan with contrast showed nonspecific hypodensity within the gastric body possibly reflective of hemorrhage in this patient with the history of gastric AVMs, known liver cancer, cholelithiasis, small volume abdominal pelvic ascites  Patient's baseline hemoglobin appears to be 8-10, except 1 value on 11/23 of 13.    Patient is on Eliquis and Plavix as o/p  In the ED received DDAVP 0.3 mg/kg and Kcentra  GI consulted  1/23 S/P EGD - 3 large angioectasias in the fundus of the stomach and body of the stomach; bleeding was observed; placed clips; hemostasis achieved; induced coagulation and hemostasis achieved with argon plasma coagulation The esophagus and duodenum appeared normal.  Pt received a total of 3 units PRBC this admission so far, last on 1/25/24     Recent Labs     02/03/24  0319 02/04/24  0303 02/05/24  0421   HGB 9.9* 9.6* 9.2*      Plan:  Monitor H/H Q12 hours- Hgb remains stable   Continue PPI BID  Patient extubated 1/27  Patient is tolerating diet  Patient is restarted on Plavix and Eliquis after clearance from GI and cardiology.  DC heparin drip  Hemoglobin stable  Monitor H&H closely               VTE Pharmacologic Prophylaxis:   Moderate Risk (Score 3-4) - Pharmacological DVT Prophylaxis Ordered: apixaban (Eliquis).    Mobility:   Basic Mobility Inpatient Raw Score: 15  -HLM Goal: 4: Move to chair/commode  JH-HLM Achieved: 5: Stand (1 or more minutes)  HLM Goal achieved. Continue to  encourage appropriate mobility.    Patient Centered Rounds: I performed bedside rounds with nursing staff today.   Discussions with Specialists or Other Care Team Provider: yes    Education and Discussions with Family / Patient:  yes.     Total Time Spent on Date of Encounter in care of patient: 35 mins. This time was spent on one or more of the following: performing physical exam; counseling and coordination of care; obtaining or reviewing history; documenting in the medical record; reviewing/ordering tests, medications or procedures; communicating with other healthcare professionals and discussing with patient's family/caregivers.    Current Length of Stay: 13 day(s)  Current Patient Status: Inpatient   Certification Statement: The patient will continue to require additional inpatient hospital stay due to pending stabilization  Discharge Plan: Anticipate discharge in 24-48 hrs to rehab facility.    Code Status: Level 1 - Full Code    Subjective:   Seen and examined at bedside  Complaining of dizziness  Complaining of right-sided chest pain that is migrated 3  Denies diaphoresis  Denies confusion  Orthostatic vitals positive  Rest of ROS negative    Objective:     Vitals:   Temp (24hrs), Av.2 °F (36.8 °C), Min:97.6 °F (36.4 °C), Max:99 °F (37.2 °C)    Temp:  [97.6 °F (36.4 °C)-99 °F (37.2 °C)] 97.6 °F (36.4 °C)  HR:  [62-84] 63  Resp:  [16-18] 16  BP: ()/(39-54) 108/45  SpO2:  [91 %-98 %] 98 %  Body mass index is 25.98 kg/m².     Input and Output Summary (last 24 hours):     Intake/Output Summary (Last 24 hours) at 2024 1249  Last data filed at 2024 1107  Gross per 24 hour   Intake 600 ml   Output 850 ml   Net -250 ml       Physical Exam:   Physical Exam  Vitals reviewed.   Constitutional:       Appearance: Normal appearance.   HENT:      Head: Atraumatic.   Eyes:      General: No scleral icterus.  Cardiovascular:      Rate and Rhythm: Normal rate and regular rhythm.      Heart sounds: Normal  heart sounds.   Pulmonary:      Effort: No respiratory distress.      Breath sounds: Normal breath sounds.   Abdominal:      General: Bowel sounds are normal. There is distension.      Tenderness: There is no abdominal tenderness.   Musculoskeletal:      Cervical back: Neck supple.      Right lower leg: No edema.      Left lower leg: No edema.   Skin:     General: Skin is warm and dry.      Capillary Refill: Capillary refill takes less than 2 seconds.   Neurological:      Mental Status: He is alert. Mental status is at baseline.   Psychiatric:         Mood and Affect: Mood normal.          Additional Data:     Labs:  Results from last 7 days   Lab Units 02/05/24  0421   WBC Thousand/uL 7.85   HEMOGLOBIN g/dL 9.2*   HEMATOCRIT % 30.0*   PLATELETS Thousands/uL 293   NEUTROS PCT % 70   LYMPHS PCT % 10*   MONOS PCT % 14*   EOS PCT % 4     Results from last 7 days   Lab Units 02/05/24  0421 02/04/24  0303   SODIUM mmol/L 138 141   POTASSIUM mmol/L 4.1 4.0   CHLORIDE mmol/L 109* 109*   CO2 mmol/L 21 24   BUN mg/dL 29* 27*   CREATININE mg/dL 1.15 1.19   ANION GAP mmol/L 8 8   CALCIUM mg/dL 8.4 8.9   ALBUMIN g/dL  --  3.4*   TOTAL BILIRUBIN mg/dL  --  0.96   ALK PHOS U/L  --  525*   ALT U/L  --  49   AST U/L  --  66*   GLUCOSE RANDOM mg/dL 70 107         Results from last 7 days   Lab Units 02/05/24  1200 02/05/24  0930 02/05/24  0727 02/04/24  2104 02/04/24  1606 02/04/24  1131 02/04/24  0724 02/03/24  2054 02/03/24  1604 02/03/24  1124 02/03/24  0736 02/02/24  1754   POC GLUCOSE mg/dl 185* 199* 67 117 150* 235* 109 147* 144* 149* 96 132               Lines/Drains:  Invasive Devices       Peripheral Intravenous Line  Duration             Peripheral IV 02/02/24 Dorsal (posterior);Left Hand 3 days                          Imaging: No pertinent imaging reviewed.    Recent Cultures (last 7 days):         Last 24 Hours Medication List:   Current Facility-Administered Medications   Medication Dose Route Frequency Provider Last  Rate    acetaminophen  650 mg Oral Q6H PRN Adrian More MD      apixaban  5 mg Oral BID Adrian More MD      atorvastatin  40 mg Oral Daily With Dinner Adrian More MD      azelastine  1 spray Each Nare BID Adrian More MD      bimatoprost  1 drop Both Eyes QPM Adrian More MD      chlorhexidine  15 mL Mouth/Throat Q12H [facility] Adrian More MD      clopidogrel  75 mg Oral Daily Adrian More MD      Empagliflozin  10 mg Oral QAM Adrian More MD      ferrous sulfate  325 mg Oral Daily With Breakfast Adrian More MD      Artificial Tears  1 drop Both Eyes Q6H PRN Adrian More MD      insulin detemir  30 Units Subcutaneous HS Adrian More MD      insulin lispro  2-12 Units Subcutaneous TID AC Adrian More MD      insulin lispro  2-12 Units Subcutaneous HS Adrian More MD      insulin lispro  5 Units Subcutaneous TID With Meals Adrian More MD      ipratropium  0.5 mg Nebulization Q6H PRN Adrian More MD      lactulose  20 g Oral BID NADIA Kwan      levalbuterol  0.63 mg Nebulization Q6H PRN Adrian More MD      metoprolol tartrate  12.5 mg Oral Q12H [facility] Adrian More MD      multi-electrolyte  75 mL/hr Intravenous Continuous Brianna Victoria MD 75 mL/hr (02/05/24 1018)    pantoprazole  40 mg Intravenous Q12H [facility] Adrian More MD      rifaximin  550 mg Oral Q12H [facility] Adrian More MD      traZODone  100 mg Oral HS Adrian More MD      trimethobenzamide  200 mg Intramuscular Q12H PRN Adrian More MD      umeclidinium-vilanterol  1 puff Inhalation Daily Adrian More MD          Today, Patient Was Seen By: Brianna Victoria MD    **Please Note: This note may have been constructed using a voice recognition system.**

## 2024-02-05 NOTE — SPEECH THERAPY NOTE
Speech Language/Pathology  Speech/Language Pathology Progress Note    Patient Name: Derek Walter  Today's Date: 2/5/2024     Problem List  Principal Problem:    Acute upper GI bleeding  Active Problems:    Stage 3 chronic kidney disease, unspecified whether stage 3a or 3b CKD (HCC)    Type 2 diabetes mellitus with neurologic complication, with long-term current use of insulin (HCC)    Atrial fibrillation, unspecified type (HCC)    Acute hypoxemic respiratory failure (HCC)    History of DVT (deep vein thrombosis)    CAD (coronary artery disease)    Hepatocellular carcinoma (HCC)    Chronic systolic heart failure (HCC)    Acute blood loss anemia    AMS (altered mental status)    Hepatic encephalopathy (HCC)    Dysphagia    Orthostatic hypotension       Past Medical History  Past Medical History:   Diagnosis Date    Anemia     Atrial fibrillation (HCC)     Eliquis    AVB (atrioventricular block)     1st degree    CAD (coronary artery disease)     CKD (chronic kidney disease), stage III (HCC)     baseline Cr 1.2-1.6    Colon polyp     Diabetes mellitus (HCC)     type 2, insulin dependent    Diverticulosis     Emphysema lung (HCC)     Former tobacco use     History of asbestos exposure     History of DVT (deep vein thrombosis)     RLE, tx w/ AC    History of GI bleed 08/2023    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, given PRBC/Venofer for anemia while in house    Hyperlipidemia     Hypertension     LAFB (left anterior fascicular block)     Liver cancer (HCC)     Liver mass 08/2023    suspected HCC, needs radioembolization    RBBB     Syncope 08/07/2023        Past Surgical History  Past Surgical History:   Procedure Laterality Date    APPENDECTOMY      BOWEL RESECTION  2014    CARDIAC CATHETERIZATION      CARDIAC CATHETERIZATION N/A 08/25/2023    Procedure: Cardiac pci;  Surgeon: Dom Garrett DO;  Location: [facility];  Service: Cardiology    CARDIAC CATHETERIZATION N/A 08/21/2023    Procedure:  Cardiac catheterization;  Surgeon: Dom Garrett DO;  Location: [facility];  Service: Cardiology    CARDIAC CATHETERIZATION N/A 08/21/2023    Procedure: Cardiac Coronary Angiogram;  Surgeon: Dom Garrett DO;  Location: [facility];  Service: Cardiology    CATARACT EXTRACTION Bilateral     COLONOSCOPY      EGD      IR Y-90 PRE-ANGIO/EMBO W/ LUNG SCAN  09/01/2023    IR Y-90 RADIOEMBOLIZATION  09/08/2023         Subjective:  Pt OOB in chair with lunch tray present as SLP entered the room. Lunch tray already consumed. Pt on room air, no complaints given.     Objective:  Pt seen for skilled dysphagia tx today to target advanced trials. Pt agreeable to trial peanut butter crackers. Mild difficulty biting through solids. Slow but effective mastication. Oral control and transfer appeared adequate. Swallow initiation appeared prompt. Mild oral residue s/p swallows in which patient independently cleared given liquid wash. No coughing, throat clearing, change in vocal quality, change in respiratory quality, or c/o globus sensation post swallows.     Assessment:  No overt s/s of aspiration given regular solids and thin liquids. Spouse to bring in dentures today per phone call with pt/SLP.    Plan/Recommendations:  Advance diet to dysphagia level 3  ST to continue to follow

## 2024-02-05 NOTE — PHYSICAL THERAPY NOTE
"                                                                                  PHYSICAL THERAPY NOTE       02/05/24 0936   PT Last Visit   PT Visit Date 02/05/24   Note Type   Note Type Treatment for insurance authorization   Pain Assessment   Pain Assessment Tool 0-10   Pain Score No Pain   Restrictions/Precautions   Weight Bearing Precautions Per Order No   Other Precautions Fall Risk;Bed Alarm;Chair Alarm;Cognitive   General   Chart Reviewed Yes   Additional Pertinent History (S)  +Orthostatic hypotension   Response to Previous Treatment Patient with no complaints from previous session.   Family/Caregiver Present No   Cognition   Overall Cognitive Status Impaired   Arousal/Participation Alert   Attention Attends with cues to redirect   Memory Decreased recall of recent events;Decreased recall of precautions   Following Commands Follows one step commands with increased time or repetition   Comments Flat affect, slow response   Subjective   Subjective \"I'm feeling OK.\"   Bed Mobility   Additional Comments Received OOB in chair   Transfers   Sit to Stand 5  Supervision   Additional items Armrests;Verbal cues   Stand to Sit 5  Supervision   Additional items Verbal cues;Armrests   Additional Comments Stood for approx 1 minute for BP check. Sitting. 117/48 standing 77/39 Asymptomatic sitting 97/40   Balance   Static Sitting Good   Dynamic Sitting Fair +   Static Standing Fair +   Endurance Deficit   Endurance Deficit Yes   Endurance Deficit Description Limited by medical status   Activity Tolerance   Activity Tolerance Treatment limited secondary to medical complications (Comment)  (Orthostatic hypotension)   Medical Staff Made Aware Tia JOHNSON  (tiger text to Dr. Victoria regarding orthostatic hypotension. Made aware that patient has been orthostatic for the past 3 P.T. sessions and has only been able to perform transfers.)   Nurse Made Aware Rashida MEJIA- made aware of BP   Assessment   Prognosis Guarded   Problem List " Decreased strength;Decreased endurance;Impaired balance;Decreased mobility;Decreased cognition;Impaired judgement;Decreased safety awareness;Obesity   Assessment Patient was received OOB in chair. He progressed since last session and completed a sit<>stand transfer with supervision. Patient continues with flat affect and little verbal communication. Patient continues to be orthostatic with significant drop in standing. Not appropriate for further mobility. MD, CM, and RN made aware. Patient is deconditioned and is at risk for falls due to deconditioning and medical status. Recommending level 2 resources. Moderate intensity. The patient's -[facility] Basic Mobility Inpatient Short Form Raw Score is 15. A Raw score of less than or equal to 17 suggests the patient may benefit from discharge to post-acute rehabilitation services. Please also refer to the recommendation of the Physical Therapist for safe discharge planning.   Barriers to Discharge Inaccessible home environment;Decreased caregiver support   Goals   Patient Goals None stated   STG Expiration Date 02/13/24   PT Treatment Day 2   Plan   Treatment/Interventions Functional transfer training;LE strengthening/ROM;Therapeutic exercise;Endurance training;Cognitive reorientation;Patient/family training;Equipment eval/education;Bed mobility;Gait training;Spoke to MD;Spoke to nursing;Spoke to case management;OT   Progress Slow progress, medical status limitations   PT Frequency 3-5x/wk   Discharge Recommendation   Rehab Resource Intensity Level, PT II (Moderate Resource Intensity)   AM-PAC Basic Mobility Inpatient   Turning in Flat Bed Without Bedrails 3   Lying on Back to Sitting on Edge of Flat Bed Without Bedrails 3   Moving Bed to Chair 3   Standing Up From Chair Using Arms 3   Walk in Room 2   Climb 3-5 Stairs With Railing 1   Basic Mobility Inpatient Raw Score 15   Basic Mobility Standardized Score 36.97   Highest Level Of Mobility   -[employer] Goal 4: Move to  chair/commode   -M Achieved 5: Stand (1 or more minutes)   Education   Education Provided Mobility training   Patient Reinforcement needed   End of Consult   Patient Position at End of Consult Bedside chair;Bed/Chair alarm activated;All needs within reach   End of Consult Comments BP stable.     Marlene Bernabe            Patient Name: Derek Walter  Today's Date: 2/5/2024

## 2024-02-05 NOTE — ASSESSMENT & PLAN NOTE
Patient is a 78-year-old male who has been seeing [facility] last seen 12/13/2023 patient has a history of anemia is multifactorial chronic due to cancer with iron deficiency and occult GI blood loss on Eliquis review of an EGD with multiple AVMs.  Patient came in to the ER from home due to generalized weakness nausea vomiting since this morning reports lower abdominal pain has a history of anemia is on Eliquis and Plavix episodes of vomiting which were described by the significant other is dark of clotted material, and also had melanotic stool occult positive.  Patient felt significant weakness and was unable to walk felt like too weak to walk.  Patient has a history of an upper GI bleed with multiple AVMs, also has a history of the patient intubation with tracheostomy approximately rehab time of 1 year.   CT scan with contrast showed nonspecific hypodensity within the gastric body possibly reflective of hemorrhage in this patient with the history of gastric AVMs, known liver cancer, cholelithiasis, small volume abdominal pelvic ascites  Patient's baseline hemoglobin appears to be 8-10, except 1 value on 11/23 of 13.    Patient is on Eliquis and Plavix as o/p  In the ED received DDAVP 0.3 mg/kg and Kcentra  GI consulted  1/23 S/P EGD - 3 large angioectasias in the fundus of the stomach and body of the stomach; bleeding was observed; placed clips; hemostasis achieved; induced coagulation and hemostasis achieved with argon plasma coagulation The esophagus and duodenum appeared normal.  Pt received a total of 3 units PRBC this admission so far, last on 1/25/24     Recent Labs     02/03/24  0319 02/04/24  0303 02/05/24  0421   HGB 9.9* 9.6* 9.2*      Plan:  Monitor H/H Q12 hours- Hgb remains stable   Continue PPI BID  Patient extubated 1/27  Patient is tolerating diet  Patient is restarted on Plavix and Eliquis after clearance from GI and cardiology.  DC heparin drip  Hemoglobin stable  Monitor H&H closely

## 2024-02-05 NOTE — ASSESSMENT & PLAN NOTE
Lab Results   Component Value Date    EGFR 61 02/05/2024    EGFR 58 02/04/2024    EGFR 59 02/03/2024    CREATININE 1.15 02/05/2024    CREATININE 1.19 02/04/2024    CREATININE 1.18 02/03/2024     Cr at baseline  Monitor I's and O's   Renally dose medications  Avoid nephrotoxic agents  On Lasix

## 2024-02-05 NOTE — PLAN OF CARE
Problem: PHYSICAL THERAPY ADULT  Goal: Performs mobility at highest level of function for planned discharge setting.  See evaluation for individualized goals.  Description: Treatment/Interventions: Functional transfer training, LE strengthening/ROM, Elevations, Therapeutic exercise, Endurance training, Cognitive reorientation, Equipment eval/education, Patient/family training, Bed mobility, Gait training, Spoke to nursing, OT          See flowsheet documentation for full assessment, interventions and recommendations.  Outcome: Not Progressing  Note: Prognosis: Guarded  Problem List: Decreased strength, Decreased endurance, Impaired balance, Decreased mobility, Decreased cognition, Impaired judgement, Decreased safety awareness, Obesity  Assessment: Patient was received OOB in chair. He progressed since last session and completed a sit<>stand transfer with supervision. Patient continues with flat affect and little verbal communication. Patient continues to be orthostatic with significant drop in standing. Not appropriate for further mobility. MD, CM, and RN made aware. Patient is deconditioned and is at risk for falls due to deconditioning and medical status. Recommending level 2 resources. Moderate intensity. The patient's AM-PAC Basic Mobility Inpatient Short Form Raw Score is 15. A Raw score of less than or equal to 17 suggests the patient may benefit from discharge to post-acute rehabilitation services. Please also refer to the recommendation of the Physical Therapist for safe discharge planning.  Barriers to Discharge: Inaccessible home environment, Decreased caregiver support     Rehab Resource Intensity Level, PT: II (Moderate Resource Intensity)    See flowsheet documentation for full assessment.

## 2024-02-05 NOTE — ASSESSMENT & PLAN NOTE
Wt Readings from Last 3 Encounters:   02/05/24 86.9 kg (191 lb 9.3 oz)   02/01/24 89.8 kg (197 lb 15.6 oz)   12/20/23 96.6 kg (213 lb)     Patient is 78-year-old male with history of CAD post PCI in August not deemed a candidate for surgical intervention, systolic heart failure with reduced ejection fraction of 36%   Home meds: lasix, toprol, and statin   Continue to hold Lasix secondary to orthostatic hypotension  Continue to monitor volume status clinically  Daily wts

## 2024-02-06 VITALS
OXYGEN SATURATION: 97 % | WEIGHT: 194 LBS | SYSTOLIC BLOOD PRESSURE: 121 MMHG | BODY MASS INDEX: 26.28 KG/M2 | DIASTOLIC BLOOD PRESSURE: 46 MMHG | TEMPERATURE: 98.5 F | HEART RATE: 75 BPM | HEIGHT: 72 IN | RESPIRATION RATE: 16 BRPM

## 2024-02-06 LAB
ALBUMIN SERPL BCP-MCNC: 3.1 G/DL (ref 3.5–5)
ALP SERPL-CCNC: 472 U/L (ref 34–104)
ALT SERPL W P-5'-P-CCNC: 43 U/L (ref 7–52)
ANION GAP SERPL CALCULATED.3IONS-SCNC: 9 MMOL/L
AST SERPL W P-5'-P-CCNC: 77 U/L (ref 13–39)
ATRIAL RATE: 32 BPM
BASOPHILS # BLD AUTO: 0.04 THOUSANDS/ÂΜL (ref 0–0.1)
BASOPHILS NFR BLD AUTO: 1 % (ref 0–1)
BILIRUB SERPL-MCNC: 0.89 MG/DL (ref 0.2–1)
BUN SERPL-MCNC: 29 MG/DL (ref 5–25)
CALCIUM ALBUM COR SERPL-MCNC: 9 MG/DL (ref 8.3–10.1)
CALCIUM SERPL-MCNC: 8.3 MG/DL (ref 8.4–10.2)
CHLORIDE SERPL-SCNC: 108 MMOL/L (ref 96–108)
CO2 SERPL-SCNC: 22 MMOL/L (ref 21–32)
CREAT SERPL-MCNC: 1.06 MG/DL (ref 0.6–1.3)
EOSINOPHIL # BLD AUTO: 0.22 THOUSAND/ÂΜL (ref 0–0.61)
EOSINOPHIL NFR BLD AUTO: 3 % (ref 0–6)
ERYTHROCYTE [DISTWIDTH] IN BLOOD BY AUTOMATED COUNT: 18.3 % (ref 11.6–15.1)
GFR SERPL CREATININE-BSD FRML MDRD: 67 ML/MIN/1.73SQ M
GLUCOSE SERPL-MCNC: 101 MG/DL (ref 65–140)
GLUCOSE SERPL-MCNC: 110 MG/DL (ref 65–140)
GLUCOSE SERPL-MCNC: 174 MG/DL (ref 65–140)
GLUCOSE SERPL-MCNC: 178 MG/DL (ref 65–140)
GLUCOSE SERPL-MCNC: 183 MG/DL (ref 65–140)
HCT VFR BLD AUTO: 31.4 % (ref 36.5–49.3)
HGB BLD-MCNC: 9.5 G/DL (ref 12–17)
IMM GRANULOCYTES # BLD AUTO: 0.07 THOUSAND/UL (ref 0–0.2)
IMM GRANULOCYTES NFR BLD AUTO: 1 % (ref 0–2)
LYMPHOCYTES # BLD AUTO: 0.68 THOUSANDS/ÂΜL (ref 0.6–4.47)
LYMPHOCYTES NFR BLD AUTO: 10 % (ref 14–44)
MCH RBC QN AUTO: 29 PG (ref 26.8–34.3)
MCHC RBC AUTO-ENTMCNC: 30.3 G/DL (ref 31.4–37.4)
MCV RBC AUTO: 96 FL (ref 82–98)
MONOCYTES # BLD AUTO: 0.99 THOUSAND/ÂΜL (ref 0.17–1.22)
MONOCYTES NFR BLD AUTO: 15 % (ref 4–12)
NEUTROPHILS # BLD AUTO: 4.68 THOUSANDS/ÂΜL (ref 1.85–7.62)
NEUTS SEG NFR BLD AUTO: 70 % (ref 43–75)
NRBC BLD AUTO-RTO: 0 /100 WBCS
PLATELET # BLD AUTO: 276 THOUSANDS/UL (ref 149–390)
PMV BLD AUTO: 12.1 FL (ref 8.9–12.7)
POTASSIUM SERPL-SCNC: 4.6 MMOL/L (ref 3.5–5.3)
PROT SERPL-MCNC: 6.4 G/DL (ref 6.4–8.4)
QRS AXIS: -42 DEGREES
QRSD INTERVAL: 126 MS
QT INTERVAL: 462 MS
QTC INTERVAL: 468 MS
RBC # BLD AUTO: 3.28 MILLION/UL (ref 3.88–5.62)
SODIUM SERPL-SCNC: 139 MMOL/L (ref 135–147)
T WAVE AXIS: -13 DEGREES
VENTRICULAR RATE: 62 BPM
WBC # BLD AUTO: 6.68 THOUSAND/UL (ref 4.31–10.16)

## 2024-02-06 PROCEDURE — 93010 ELECTROCARDIOGRAM REPORT: CPT | Performed by: INTERNAL MEDICINE

## 2024-02-06 PROCEDURE — 99232 SBSQ HOSP IP/OBS MODERATE 35: CPT | Performed by: INTERNAL MEDICINE

## 2024-02-06 PROCEDURE — 82948 REAGENT STRIP/BLOOD GLUCOSE: CPT

## 2024-02-06 PROCEDURE — C9113 INJ PANTOPRAZOLE SODIUM, VIA: HCPCS | Performed by: INTERNAL MEDICINE

## 2024-02-06 PROCEDURE — 80053 COMPREHEN METABOLIC PANEL: CPT | Performed by: INTERNAL MEDICINE

## 2024-02-06 PROCEDURE — 85025 COMPLETE CBC W/AUTO DIFF WBC: CPT | Performed by: INTERNAL MEDICINE

## 2024-02-06 PROCEDURE — 99239 HOSP IP/OBS DSCHRG MGMT >30: CPT | Performed by: INTERNAL MEDICINE

## 2024-02-06 RX ORDER — INSULIN DETEMIR 100 [IU]/ML
INJECTION, SOLUTION SUBCUTANEOUS
Qty: 30 ML | Refills: 0 | Status: SHIPPED | OUTPATIENT
Start: 2024-02-06

## 2024-02-06 RX ORDER — INSULIN LISPRO 100 [IU]/ML
5 INJECTION, SOLUTION INTRAVENOUS; SUBCUTANEOUS
Start: 2024-02-06

## 2024-02-06 RX ORDER — LACTULOSE 10 G/15ML
20 SOLUTION ORAL 2 TIMES DAILY
Qty: 240 ML | Refills: 0 | Status: SHIPPED | OUTPATIENT
Start: 2024-02-06

## 2024-02-06 RX ADMIN — PANTOPRAZOLE SODIUM 40 MG: 40 INJECTION, POWDER, FOR SOLUTION INTRAVENOUS at 08:31

## 2024-02-06 RX ADMIN — RIFAXIMIN 550 MG: 550 TABLET ORAL at 08:31

## 2024-02-06 RX ADMIN — APIXABAN 5 MG: 5 TABLET, FILM COATED ORAL at 08:31

## 2024-02-06 RX ADMIN — EMPAGLIFLOZIN 10 MG: 10 TABLET, FILM COATED ORAL at 08:31

## 2024-02-06 RX ADMIN — INSULIN LISPRO 2 UNITS: 100 INJECTION, SOLUTION INTRAVENOUS; SUBCUTANEOUS at 12:14

## 2024-02-06 RX ADMIN — AZELASTINE 1 SPRAY: 1 SPRAY, METERED NASAL at 08:32

## 2024-02-06 RX ADMIN — INSULIN LISPRO 5 UNITS: 100 INJECTION, SOLUTION INTRAVENOUS; SUBCUTANEOUS at 12:14

## 2024-02-06 RX ADMIN — Medication 12.5 MG: at 08:31

## 2024-02-06 RX ADMIN — UMECLIDINIUM BROMIDE AND VILANTEROL TRIFENATATE 1 PUFF: 62.5; 25 POWDER RESPIRATORY (INHALATION) at 08:32

## 2024-02-06 RX ADMIN — FERROUS SULFATE TAB 325 MG (65 MG ELEMENTAL FE) 325 MG: 325 (65 FE) TAB at 08:31

## 2024-02-06 RX ADMIN — CLOPIDOGREL BISULFATE 75 MG: 75 TABLET ORAL at 08:31

## 2024-02-06 NOTE — CASE MANAGEMENT
Case Management Discharge Planning Note    Patient name Derek Walter  Location /-01 MRN [id number]  : 1945 Date 2024       Current Admission Date: 2024  Current Admission Diagnosis:Acute upper GI bleeding   Patient Active Problem List    Diagnosis Date Noted    Orthostatic hypotension 2024    Dysphagia 2024    Hepatic encephalopathy (HCC) 2024    Acute blood loss anemia 2024    Acute upper GI bleeding 2024    NA (acute kidney injury) (HCC) 2024    AMS (altered mental status) 2024    History of coronary angioplasty with insertion of stent 2024    Chronic systolic heart failure (HCC) 2024    Ischemic cardiomyopathy 2024    Status post insertion of drug eluting coronary artery stent 2023    Coronary artery disease involving native coronary artery 2023    Hepatocellular carcinoma (HCC)     CAD (coronary artery disease) 2023    Hepatic flexure mass 2023    Acute hypoxemic respiratory failure (HCC) 2023    Anemia 2023    Acute on chronic diastolic HF (heart failure) (HCC) 2023    History of DVT (deep vein thrombosis) 2023    Restrictive lung disease 2023    Other emphysema (HCC) 2023    History of tobacco abuse 2023    H/O asbestos exposure 2023    Liver mass 05/10/2023    Atrial fibrillation, unspecified type (HCC) 02/15/2023    Peripheral polyneuropathy 2022    Type 2 diabetes mellitus with neurologic complication, with long-term current use of insulin (HCC) 2022    Stage 3 chronic kidney disease, unspecified whether stage 3a or 3b CKD (HCC) 2022      LOS (days): 14  Geometric Mean LOS (GMLOS) (days): 4.6  Days to GMLOS:-9.9     OBJECTIVE:  Risk of Unplanned Readmission Score: 47.49         Current admission status: Inpatient   Preferred Pharmacy:   [id number] - [place] - [address]  [facility] [address]  Phone: [phone] Fax: [phone]    Primary Care Provider: Ernesto Griffith MD    Primary Insurance: [employer]  Secondary Insurance:     DISCHARGE DETAILS:                                                                                                               Facility Insurance Auth Number: [id number]

## 2024-02-06 NOTE — CASE MANAGEMENT
Case Management Discharge Planning Note    Patient name Derek Walter  Location /-01 MRN [id number]  : 1945 Date 2024       Current Admission Date: 2024  Current Admission Diagnosis:Acute upper GI bleeding   Patient Active Problem List    Diagnosis Date Noted    Orthostatic hypotension 2024    Dysphagia 2024    Hepatic encephalopathy (HCC) 2024    Acute blood loss anemia 2024    Acute upper GI bleeding 2024    NA (acute kidney injury) (HCC) 2024    AMS (altered mental status) 2024    History of coronary angioplasty with insertion of stent 2024    Chronic systolic heart failure (HCC) 2024    Ischemic cardiomyopathy 2024    Status post insertion of drug eluting coronary artery stent 2023    Coronary artery disease involving native coronary artery 2023    Hepatocellular carcinoma (HCC)     CAD (coronary artery disease) 2023    Hepatic flexure mass 2023    Acute hypoxemic respiratory failure (HCC) 2023    Anemia 2023    Acute on chronic diastolic HF (heart failure) (HCC) 2023    History of DVT (deep vein thrombosis) 2023    Restrictive lung disease 2023    Other emphysema (HCC) 2023    History of tobacco abuse 2023    H/O asbestos exposure 2023    Liver mass 05/10/2023    Atrial fibrillation, unspecified type (HCC) 02/15/2023    Peripheral polyneuropathy 2022    Type 2 diabetes mellitus with neurologic complication, with long-term current use of insulin (HCC) 2022    Stage 3 chronic kidney disease, unspecified whether stage 3a or 3b CKD (HCC) 2022      LOS (days): 14  Geometric Mean LOS (GMLOS) (days): 4.6  Days to GMLOS:-9.9     OBJECTIVE:  Risk of Unplanned Readmission Score: 47.49         Current admission status: Inpatient   Preferred Pharmacy:   [id number] - [place] - [address]  [facility] [address]  Phone: [phone] Fax: [phone]    Primary Care Provider: Ernesto Griffith MD    Primary Insurance: [employer] RE[employer]  Secondary Insurance:     DISCHARGE DETAILS:  Additional Comments: Auth obtained from  discharge support and [facility] admissions via [facility]. [facility] to transport Pt to [facility] at 4pm. Pt's nurse(Rashida), [facility] admissions and Pt's wife informed of transport time. TT [facility]  and ambulance auth given.

## 2024-02-06 NOTE — DISCHARGE INSTR - AVS FIRST PAGE
Patient needs outpatient follow-up with gastroenterology and cardiology  Continue with basal insulin 30 units at bedtime  Short acting insulin 5 units with meals  Continue with mechanical soft diet/thin liquids, continue assessment with speech therapy  For chest pain, ongoing for days, patient was evaluated by cardiology, no further workup warranted since this is atypical for angina.  Please treat with conservative management like lidocaine patches, Tylenol as needed  Continue with Eliquis and Plavix  Continue with Lopressor  Continue with rifaximin and lactulose, with goal bowel movements of 2-3 every day

## 2024-02-06 NOTE — CASE MANAGEMENT
[facility] received request for authorization from Care Manager.  Authorization request for: [facility]  Facility Name: [facility]  NPI: [id number]  Facility MD:  Dr Farhat Khan   NPI: [id number]  Authorization initiated by contacting insurance:  [employer]  Via: Phone #  [phone]    [facility] has received approved authorization from insurance:   [employer]  Authorization received for: [facility]  Facility: [facility]   Authorization #: [id number]  Start of Care: 2/6  Next Review Date: 2/12  Continued Stay Care Coordinator:  none given   Submit next review to: Call [phone]  Transport auth #: [id number]   Care Manager notified: Dyan Feliciano

## 2024-02-06 NOTE — NURSING NOTE
"Patient wife pulled RN aside about ongoing concerns of patient chest pain. Upon assessment of patient, patient states it was a \"dull pain\" and was not sharp, crushing or changing since his EKG earlier in the evening. RN educated patient on signs/symptoms to monitor and let RN know of any concerns/changes. Vitals stable.   "

## 2024-02-06 NOTE — PLAN OF CARE
Problem: Potential for Falls  Goal: Patient will remain free of falls  Description: INTERVENTIONS:  - Educate patient/family on patient safety including physical limitations  - Instruct patient to call for assistance with activity   - Consult OT/PT to assist with strengthening/mobility   - Keep Call bell within reach  - Keep bed low and locked with side rails adjusted as appropriate  - Keep care items and personal belongings within reach  - Initiate and maintain comfort rounds  - Make Fall Risk Sign visible to staff  - Offer Toileting every 2 Hours, in advance of need  - Initiate/Maintain alarm  - Obtain necessary fall risk management equipment  - Apply yellow socks and bracelet for high fall risk patients  - Consider moving patient to room near nurses station  Outcome: Progressing     Problem: Knowledge Deficit  Goal: Patient/family/caregiver demonstrates understanding of disease process, treatment plan, medications, and discharge instructions  Description: Complete learning assessment and assess knowledge base.  Interventions:  - Provide teaching at level of understanding  - Provide teaching via preferred learning methods  Outcome: Progressing     Problem: GASTROINTESTINAL - ADULT  Goal: Minimal or absence of nausea and/or vomiting  Description: INTERVENTIONS:  - Administer IV fluids if ordered to ensure adequate hydration  - Maintain NPO status until nausea and vomiting are resolved  - Nasogastric tube if ordered  - Administer ordered antiemetic medications as needed  - Provide nonpharmacologic comfort measures as appropriate  - Advance diet as tolerated, if ordered  - Consider nutrition services referral to assist patient with adequate nutrition and appropriate food choices  Outcome: Progressing     Problem: GASTROINTESTINAL - ADULT  Goal: Maintains or returns to baseline bowel function  Description: INTERVENTIONS:  - Assess bowel function  - Encourage oral fluids to ensure adequate hydration  - Administer IV  fluids if ordered to ensure adequate hydration  - Administer ordered medications as needed  - Encourage mobilization and activity  - Consider nutritional services referral to assist patient with adequate nutrition and appropriate food choices  Outcome: Progressing     Problem: GASTROINTESTINAL - ADULT  Goal: Maintains adequate nutritional intake  Description: INTERVENTIONS:  - Monitor percentage of each meal consumed  - Identify factors contributing to decreased intake, treat as appropriate  - Assist with meals as needed  - Monitor I&O, weight, and lab values if indicated  - Obtain nutrition services referral as needed  Outcome: Progressing     Problem: GASTROINTESTINAL - ADULT  Goal: Oral mucous membranes remain intact  Description: INTERVENTIONS  - Assess oral mucosa and hygiene practices  - Implement preventative oral hygiene regimen  - Implement oral medicated treatments as ordered  - Initiate Nutrition services referral as needed  Outcome: Progressing     Problem: HEMATOLOGIC - ADULT  Goal: Maintains hematologic stability  Description: INTERVENTIONS  - Assess for signs and symptoms of bleeding or hemorrhage  - Monitor labs  - Administer supportive blood products/factors as ordered and appropriate  Outcome: Progressing     Problem: Prexisting or High Potential for Compromised Skin Integrity  Goal: Skin integrity is maintained or improved  Description: INTERVENTIONS:  - Identify patients at risk for skin breakdown  - Assess and monitor skin integrity  - Assess and monitor nutrition and hydration status  - Monitor labs   - Assess for incontinence   - Turn and reposition patient  - Assist with mobility/ambulation  - Relieve pressure over bony prominences  - Avoid friction and shearing  - Provide appropriate hygiene as needed including keeping skin clean and dry  - Evaluate need for skin moisturizer/barrier cream  - Collaborate with interdisciplinary team   - Patient/family teaching  - Consider wound care consult    Outcome: Progressing     Problem: Nutrition/Hydration-ADULT  Goal: Nutrient/Hydration intake appropriate for improving, restoring or maintaining nutritional needs  Description: Monitor and assess patient's nutrition/hydration status for malnutrition. Collaborate with interdisciplinary team and initiate plan and interventions as ordered.  Monitor patient's weight and dietary intake as ordered or per policy. Utilize nutrition screening tool and intervene as necessary. Determine patient's food preferences and provide high-protein, high-caloric foods as appropriate.     INTERVENTIONS:  - Monitor oral intake, urinary output, labs, and treatment plans  - Assess nutrition and hydration status and recommend course of action  - Evaluate amount of meals eaten  - Assist patient with eating if necessary   - Allow adequate time for meals  - Recommend/ encourage appropriate diets, oral nutritional supplements, and vitamin/mineral supplements  - Order, calculate, and assess calorie counts as needed  - Recommend, monitor, and adjust tube feedings and TPN/PPN based on assessed needs  - Assess need for intravenous fluids  - Provide specific nutrition/hydration education as appropriate  - Include patient/family/caregiver in decisions related to nutrition  Outcome: Progressing     Problem: RESPIRATORY - ADULT  Goal: Achieves optimal ventilation and oxygenation  Description: INTERVENTIONS:  - Assess for changes in respiratory status  - Assess for changes in mentation and behavior  - Position to facilitate oxygenation and minimize respiratory effort  - Oxygen administered by appropriate delivery if ordered  - Initiate smoking cessation education as indicated  - Encourage broncho-pulmonary hygiene including cough, deep breathe, Incentive Spirometry  - Assess the need for suctioning and aspirate as needed  - Assess and instruct to report SOB or any respiratory difficulty  - Respiratory Therapy support as indicated  Outcome: Progressing

## 2024-02-06 NOTE — CASE MANAGEMENT
Case Management Discharge Planning Note    Patient name Derek Walter  Location /-01 MRN [id number]  : 1945 Date 2024       Current Admission Date: 2024  Current Admission Diagnosis:Acute upper GI bleeding   Patient Active Problem List    Diagnosis Date Noted    Orthostatic hypotension 2024    Dysphagia 2024    Hepatic encephalopathy (HCC) 2024    Acute blood loss anemia 2024    Acute upper GI bleeding 2024    NA (acute kidney injury) (HCC) 2024    AMS (altered mental status) 2024    History of coronary angioplasty with insertion of stent 2024    Chronic systolic heart failure (HCC) 2024    Ischemic cardiomyopathy 2024    Status post insertion of drug eluting coronary artery stent 2023    Coronary artery disease involving native coronary artery 2023    Hepatocellular carcinoma (HCC)     CAD (coronary artery disease) 2023    Hepatic flexure mass 2023    Acute hypoxemic respiratory failure (HCC) 2023    Anemia 2023    Acute on chronic diastolic HF (heart failure) (HCC) 2023    History of DVT (deep vein thrombosis) 2023    Restrictive lung disease 2023    Other emphysema (HCC) 2023    History of tobacco abuse 2023    H/O asbestos exposure 2023    Liver mass 05/10/2023    Atrial fibrillation, unspecified type (HCC) 02/15/2023    Peripheral polyneuropathy 2022    Type 2 diabetes mellitus with neurologic complication, with long-term current use of insulin (HCC) 2022    Stage 3 chronic kidney disease, unspecified whether stage 3a or 3b CKD (HCC) 2022      LOS (days): 14  Geometric Mean LOS (GMLOS) (days): 4.6  Days to GMLOS:-9.8     OBJECTIVE:  Risk of Unplanned Readmission Score: 47.49         Current admission status: Inpatient   Preferred Pharmacy:   [id number] - [place] - [address]  [place]  [facility] [address]  Phone: [phone] Fax: [phone]    Primary Care Provider: Ernesto Griffith MD    Primary Insurance: [employer]  Secondary Insurance:     DISCHARGE DETAILS:    IMM Given (Date):: 02/06/24  IMM Given to:: Family  IMM reviewed with patient's caregiver, patient's caregiver agrees with discharge determination.      Additional Comments: Spoke with Pt's wife(Nilda re: discharge planning. Pt's wife reports she spoke with provider and in agreement for discharge today to [facility]. Auth request sent to  discharge support to obtain SNF auth. [facility]. Await determination and time.

## 2024-02-06 NOTE — PROGRESS NOTES
Progress Note - Cardiology   Derek Walter 78 y.o. male MRN: [id number]  Unit/Bed#: -01 Encounter: [id number]        Problem List:  Principal Problem:    Acute upper GI bleeding  Active Problems:    Stage 3 chronic kidney disease, unspecified whether stage 3a or 3b CKD (HCC)    Type 2 diabetes mellitus with neurologic complication, with long-term current use of insulin (HCC)    Atrial fibrillation, unspecified type (HCC)    Acute hypoxemic respiratory failure (HCC)    History of DVT (deep vein thrombosis)    CAD (coronary artery disease)    Hepatocellular carcinoma (HCC)    Chronic systolic heart failure (HCC)    Acute blood loss anemia    AMS (altered mental status)    Hepatic encephalopathy (HCC)    Dysphagia    Orthostatic hypotension      Assessment:  GIB, resolved  Chest pain  Coronary artery disease  8/2023 NSTEMI  8/2023 PCI/CALLIE x 1 to LAD, PCI/CALLIE x 1 to circumflex, PCI/CALLIE X 1 to ramus   Ischemic cardiomyopathy w/ recovery of EF  1/24/2024 echo: EF 75%  8/2023 SPECT imaging: EF 36%  GDMT: Toprol-XL and Jardiance  Paroxysmal atrial fibrillation  Patient remotely diagnosed with A-fib previously followed at [facility] but established locally in [place] in June 2023  Chronically maintained on Eliquis and Toprol-XL  Hepatocellular carcinoma  History of DVT  Type 2 diabetes    Plan/ Discussion:  The chest discomfort that he describes sounds atypical for angina. Reviewed EKG from yesterday which shows sinus rhythm with first-degree AV block and no ischemic changes. He has had constant dull pain for days which is gradually resolving spontaneously. By history it does sound noncardiac. If he were to have in-stent thrombosis he would likely have more dramatic symptoms and EKG changes. At this point no imminent plans for a repeat ischemic evaluation.  Continue Eliquis and Plavix  Continue Toprol and Jardiance  Continue atorvastatin  He will need close outpatient follow-up, message sent to cardiology  office    Subjective:  Having some dull chest pain today.  This has been constant for several days.  There is no aggravating or alleviating factors.  It is constant all day.  He is not sure if it feels similar to his heart attack.  Breathing is otherwise stable.  Pain is very mild today and he tells me he is planning to be released to rehab.    Vitals:  Vitals:    02/05/24 0422 02/06/24 0429   Weight: 86.9 kg (191 lb 9.3 oz) 88 kg (194 lb 0.1 oz)   ,  Vitals:    02/05/24 1932 02/05/24 2219 02/06/24 0429 02/06/24 0714   BP: 114/54 143/55  140/56   BP Location:       Pulse: 78 79  79   Resp: 14   16   Temp: 98.2 °F (36.8 °C)   98.4 °F (36.9 °C)   TempSrc:       SpO2: 94% 93%  92%   Weight:   88 kg (194 lb 0.1 oz)    Height:           Exam:  General: Alert awake and oriented, no acute distress  Heart:  Regular rate and rhythm, no murmurs, Normal S1, no edema    Respiratory effort/ Lungs:  Breathing comfortably on room air, clear bilaterally without wheezing, rales, crackles   Abdominal: Non-tender to palpation, + bowel sounds, soft, no masses or distension  Lower Limbs:  No edema            Telemetry:           Medications:    Current Facility-Administered Medications:     acetaminophen (TYLENOL) tablet 650 mg, 650 mg, Oral, Q6H PRN, Adrian More MD, 650 mg at 01/25/24 2034    apixaban (ELIQUIS) tablet 5 mg, 5 mg, Oral, BID, Adrian More MD, 5 mg at 02/06/24 0831    atorvastatin (LIPITOR) tablet 40 mg, 40 mg, Oral, Daily With Dinner, Adrian More MD, 40 mg at 02/05/24 1747    azelastine (ASTELIN) 0.1 % nasal spray 1 spray, 1 spray, Each Nare, BID, Adrian More MD, 1 spray at 02/06/24 0832    bimatoprost (LUMIGAN) 0.03 % ophthalmic drops 1 drop, 1 drop, Both Eyes, QPM, Adrian More MD, 1 drop at 02/05/24 1749    chlorhexidine (PERIDEX) 0.12 % oral rinse 15 mL, 15 mL, Mouth/Throat, Q12H [facility], Adrian More MD, 15 mL at 02/05/24 2226    clopidogrel (PLAVIX) tablet 75 mg, 75 mg, Oral, Daily,  Adrian More MD, 75 mg at 02/06/24 0831    Empagliflozin (JARDIANCE) tablet 10 mg, 10 mg, Oral, QAM, Adrian More MD, 10 mg at 02/06/24 0831    ferrous sulfate tablet 325 mg, 325 mg, Oral, Daily With Breakfast, Adrian More MD, 325 mg at 02/06/24 0831    glycerin-hypromellose- (ARTIFICIAL TEARS) ophthalmic solution 1 drop, 1 drop, Both Eyes, Q6H PRN, Adrian More MD, 1 drop at 01/31/24 0932    insulin detemir (LEVEMIR) subcutaneous injection 30 Units, 30 Units, Subcutaneous, HS, Adrian More MD, 30 Units at 02/05/24 2224    insulin lispro (HumaLOG) 100 units/mL subcutaneous injection 2-12 Units, 2-12 Units, Subcutaneous, TID AC, 2 Units at 02/05/24 1747 **AND** Fingerstick Glucose (POCT), , , 4x Daily AC and at bedtime, Adrian More MD    insulin lispro (HumaLOG) 100 units/mL subcutaneous injection 2-12 Units, 2-12 Units, Subcutaneous, HS, Adrian More MD, 2 Units at 02/01/24 2154    insulin lispro (HumaLOG) 100 units/mL subcutaneous injection 5 Units, 5 Units, Subcutaneous, TID With Meals, Adrian More MD, 5 Units at 02/05/24 1747    ipratropium (ATROVENT) 0.02 % inhalation solution 0.5 mg, 0.5 mg, Nebulization, Q6H PRN, Adrian More MD, 0.5 mg at 01/28/24 0748    lactulose (CHRONULAC) oral solution 20 g, 20 g, Oral, BID, NADIA Kwan, 20 g at 02/05/24 1747    levalbuterol (XOPENEX) inhalation solution 0.63 mg, 0.63 mg, Nebulization, Q6H PRN, Adrian More MD, 0.63 mg at 01/28/24 0748    metoprolol tartrate (LOPRESSOR) partial tablet 12.5 mg, 12.5 mg, Oral, [facility]12H [facility], 12.5 mg at 02/06/24 0831    pantoprazole (PROTONIX) injection 40 mg, 40 mg, Intravenous, Q12H [facility], Adrian More MD, 40 mg at 02/06/24 0831    rifaximin (XIFAXAN) tablet 550 mg, 550 mg, Oral, Q12H [facility], Adrian More MD, 550 mg at 02/06/24 0831    traZODone (DESYREL) tablet 100 mg, 100 mg, Oral, , Adrian More MD, 100 mg at 02/05/24 2222     trimethobenzamide (TIGAN) IM injection 200 mg, 200 mg, Intramuscular, Q12H PRN, Adrian More MD, 200 mg at 02/04/24 1446    umeclidinium-vilanterol 62.5-25 mcg/actuation inhaler 1 puff, 1 puff, Inhalation, Daily, Adrian More MD, 1 puff at 02/06/24 0832      Labs/Data:        Results from last 7 days   Lab Units 02/06/24 0442 02/05/24  0421 02/04/24  0303   WBC Thousand/uL 6.68 7.85 8.28   HEMOGLOBIN g/dL 9.5* 9.2* 9.6*   HEMATOCRIT % 31.4* 30.0* 32.7*   PLATELETS Thousands/uL 276 293 312     Results from last 7 days   Lab Units 02/06/24 0442 02/05/24  0421 02/04/24  0303   POTASSIUM mmol/L 4.6 4.1 4.0   CHLORIDE mmol/L 108 109* 109*   CO2 mmol/L 22 21 24   BUN mg/dL 29* 29* 27*   CREATININE mg/dL 1.06 1.15 1.19

## 2024-02-07 ENCOUNTER — NURSING HOME VISIT (OUTPATIENT)
Dept: FAMILY MEDICINE CLINIC | Facility: CLINIC | Age: 79
End: 2024-02-07
Payer: COMMERCIAL

## 2024-02-07 DIAGNOSIS — I48.91 ATRIAL FIBRILLATION, UNSPECIFIED TYPE (HCC): ICD-10-CM

## 2024-02-07 DIAGNOSIS — Z79.4 TYPE 2 DIABETES MELLITUS WITH DIABETIC POLYNEUROPATHY, WITH LONG-TERM CURRENT USE OF INSULIN (HCC): ICD-10-CM

## 2024-02-07 DIAGNOSIS — K76.82 HEPATIC ENCEPHALOPATHY (HCC): ICD-10-CM

## 2024-02-07 DIAGNOSIS — I25.10 CORONARY ARTERY DISEASE INVOLVING NATIVE HEART WITHOUT ANGINA PECTORIS, UNSPECIFIED VESSEL OR LESION TYPE: ICD-10-CM

## 2024-02-07 DIAGNOSIS — D62 ACUTE BLOOD LOSS ANEMIA: ICD-10-CM

## 2024-02-07 DIAGNOSIS — K92.2 ACUTE UPPER GI BLEEDING: Primary | ICD-10-CM

## 2024-02-07 DIAGNOSIS — I50.33 ACUTE ON CHRONIC DIASTOLIC HF (HEART FAILURE) (HCC): ICD-10-CM

## 2024-02-07 DIAGNOSIS — E11.42 TYPE 2 DIABETES MELLITUS WITH DIABETIC POLYNEUROPATHY, WITH LONG-TERM CURRENT USE OF INSULIN (HCC): ICD-10-CM

## 2024-02-07 DIAGNOSIS — I25.10 CORONARY ARTERY DISEASE INVOLVING NATIVE CORONARY ARTERY OF NATIVE HEART WITHOUT ANGINA PECTORIS: ICD-10-CM

## 2024-02-07 DIAGNOSIS — C22.0 HEPATOCELLULAR CARCINOMA (HCC): ICD-10-CM

## 2024-02-07 PROCEDURE — 99306 1ST NF CARE HIGH MDM 50: CPT | Performed by: FAMILY MEDICINE

## 2024-02-07 NOTE — PROGRESS NOTES
[facility]  [address] [place] [address]  Facility: [facility]    NAME: Derek Walter  AGE: 78 y.o. SEX: male    DATE OF ENCOUNTER: 2/7/2024    Code status:  Full Code    Assessment and Plan     1. Acute upper GI bleeding    2. Hepatocellular carcinoma (HCC)    3. Type 2 diabetes mellitus with diabetic polyneuropathy, with long-term current use of insulin (HCC)    4. Acute on chronic diastolic HF (heart failure) (HCC)    5. Atrial fibrillation, unspecified type (HCC)    6. Coronary artery disease involving native heart without angina pectoris, unspecified vessel or lesion type    7. Coronary artery disease involving native coronary artery of native heart without angina pectoris    8. Hepatic encephalopathy (HCC)    9. Acute blood loss anemia        All medications and routine orders were reviewed and updated as needed.    Plan discussed with: Patient    Chief Complaint     Seen for admission at Nursing Home    History of Present Illness     78-year-old male here after hospitalization for acute upper GI bleed resulting in anemia.  The patient has a history of hepatic cellular carcinoma and has had hepatic encephalopathy.  He has not tolerated the lactulose well.  His bleeding was stabilized and he is now back on aspirin Plavix and Eliquis.  We will monitor his labs carefully.  Patient lives with his wife in a one-story home and previously is independent with most activities of daily living.  He recently moved to our area.  He notes occasional dyspnea.  His appetite is at baseline.  He reports loose bowel with some incontinence related to lactulose.  He has no dysuria.  He gets some swallowing difficulty.  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, given PRBC/Venofer for anemia while in house    Hyperlipidemia     Hypertension     LAFB (left anterior fascicular block)     Liver cancer (HCC)     Liver mass 08/2023    suspected HCC, needs radioembolization    RBBB     Syncope 08/07/2023     Past Surgical History:   Procedure Laterality Date    APPENDECTOMY      BOWEL RESECTION  2014    CARDIAC CATHETERIZATION      CARDIAC CATHETERIZATION N/A 08/25/2023    Procedure: Cardiac pci;  Surgeon: Dom Garrett DO;  Location: [facility];  Service: Cardiology    CARDIAC CATHETERIZATION N/A 08/21/2023    Procedure: Cardiac catheterization;  Surgeon: Dom Garrett DO;  Location: [facility];  Service: Cardiology    CARDIAC CATHETERIZATION N/A 08/21/2023    Procedure: Cardiac Coronary Angiogram;  Surgeon: Dom Garrett DO;  Location: [facility];  Service: Cardiology    CATARACT EXTRACTION Bilateral     COLONOSCOPY      EGD      IR Y-90 PRE-ANGIO/EMBO W/ LUNG SCAN  09/01/2023    IR Y-90 RADIOEMBOLIZATION  09/08/2023     Family History   Problem Relation Age of Onset    Hypertension Mother     Bone cancer Father     Diabetes type II Sister     Diabetes type II Sister     Esophageal cancer Brother     Colon cancer Neg Hx      Social History     Socioeconomic History    Marital status: /Civil Union     Spouse name: None    Number of children: None    Years of education: None    Highest education level: None   Occupational History    None   Tobacco Use    Smoking status: Former     Current packs/day: 0.00     Average packs/day: 1 pack/day for 30.0 years (30.0 ttl pk-yrs)     Types: Cigarettes      Start date:      Quit date:      Years since quittin.1    Smokeless tobacco: Never   Vaping Use    Vaping status: Never Used   Substance and Sexual Activity    Alcohol use: Yes     Comment: Socially    Drug use: Never    Sexual activity: None   Other Topics Concern    None   Social History Narrative    None     Social Determinants of Health     Financial Resource Strain: Low Risk  (10/13/2023)    Overall Financial Resource Strain (CARDIA)     Difficulty of Paying Living Expenses: Not hard at all   Food Insecurity: No Food Insecurity (2024)    Hunger Vital Sign     Worried About Running Out of Food in the Last Year: Never true     Ran Out of Food in the Last Year: Never true   Transportation Needs: No Transportation Needs (2024)    PRAPARE - Transportation     Lack of Transportation (Medical): No     Lack of Transportation (Non-Medical): No   Physical Activity: Not on file   Stress: Not on file   Social Connections: Not on file   Intimate Partner Violence: Not on file   Housing Stability: Low Risk  (2024)    Housing Stability Vital Sign     Unable to Pay for Housing in the Last Year: No     Number of Places Lived in the Last Year: 1     Unstable Housing in the Last Year: No       Allergies:  No Known Allergies    Review of Systems     Review of Systems   Constitutional:  Negative for activity change, appetite change, chills, diaphoresis, fatigue and unexpected weight change.   HENT:  Negative for congestion, ear discharge, ear pain, hearing loss, nosebleeds and rhinorrhea.    Eyes:  Negative for pain, redness, itching and visual disturbance.   Respiratory:  Negative for cough, choking, chest tightness and shortness of breath.    Cardiovascular:  Positive for chest pain. Negative for leg swelling.   Gastrointestinal:  Positive for abdominal pain and blood in stool. Negative for constipation, diarrhea and nausea.        Hematemesis   Endocrine: Negative for cold intolerance, polydipsia and  polyphagia.   Genitourinary:  Negative for dysuria, frequency, hematuria and urgency.   Musculoskeletal:  Positive for gait problem. Negative for arthralgias, back pain, joint swelling, neck pain and neck stiffness.   Skin:  Negative for color change and rash.   Allergic/Immunologic: Negative for environmental allergies and food allergies.   Neurological:  Positive for weakness. Negative for dizziness, tremors, seizures, speech difficulty, numbness and headaches.   Hematological:  Negative for adenopathy. Does not bruise/bleed easily.   Psychiatric/Behavioral:  Positive for confusion. Negative for behavioral problems, dysphoric mood, hallucinations and self-injury.        Medications and orders     All medications reviewed and updated in alf EMR.      Objective     Vitals: per nursing home record    Physical Exam  Constitutional:       General: He is not in acute distress.     Appearance: He is well-developed. He is not diaphoretic.   HENT:      Head: Normocephalic and atraumatic.      Right Ear: External ear normal.      Left Ear: External ear normal.      Nose: Nose normal.      Mouth/Throat:      Pharynx: No oropharyngeal exudate.   Eyes:      General: No scleral icterus.        Right eye: No discharge.         Left eye: No discharge.      Conjunctiva/sclera: Conjunctivae normal.      Pupils: Pupils are equal, round, and reactive to light.   Neck:      Thyroid: No thyromegaly.   Cardiovascular:      Rate and Rhythm: Normal rate. Rhythm irregular.      Heart sounds: Normal heart sounds. No murmur heard.  Pulmonary:      Effort: Pulmonary effort is normal.      Breath sounds: Normal breath sounds. No wheezing or rales.   Abdominal:      General: Bowel sounds are normal.      Palpations: Abdomen is soft. There is no mass.      Tenderness: There is abdominal tenderness. There is no guarding.   Musculoskeletal:         General: No tenderness. Normal range of motion.      Cervical back: Normal range of motion  and neck supple.   Lymphadenopathy:      Cervical: No cervical adenopathy.   Skin:     General: Skin is warm and dry.   Neurological:      Mental Status: He is alert and oriented to person, place, and time.      Motor: Weakness present.      Deep Tendon Reflexes: Reflexes are normal and symmetric.   Psychiatric:         Thought Content: Thought content normal.         Judgment: Judgment normal.         Pertinent Laboratory/Diagnostic Studies:   The following labs/studies were reviewed please see chart or hospital paperwork for details.  Diagnostic studies from the hospital were reviewed    - Admit for PT OT medical therapy.  We will need to monitor his ammonia level as well as his labs.  I requested that we reduce his dose of lactulose to 10 cc daily and titrate up based upon his ammonia levels    Farhat Khan DO  2/7/2024 3:05 PM

## 2024-02-07 NOTE — UTILIZATION REVIEW
NOTIFICATION OF ADMISSION DISCHARGE   This is a Notification of Discharge from [facility]. Please be advised that this patient has been discharge from our facility. Below you will find the admission and discharge date and time including the patient’s disposition.   UTILIZATION REVIEW CONTACT:  Carrie Palmer  Utilization   Network Utilization Review Department  Phone: [phone] carefully listen to the prompts. All voicemails are confidential.  Email: [email or web address]     ADMISSION INFORMATION  PRESENTATION DATE: 1/23/2024 12:14 AM  OBERVATION ADMISSION DATE:   INPATIENT ADMISSION DATE: 1/23/24  2:31 AM   DISCHARGE DATE: 2/6/2024  4:34 PM   DISPOSITION:Non [facility] SNF/TCU/SNU    Network Utilization Review Department  ATTENTION: Please call with any questions or concerns to [phone] and carefully listen to the prompts so that you are directed to the right person. All voicemails are confidential.   For Discharge needs, contact Care Management DC Support Team at [phone] opt. [phone]  Send all requests for admission clinical reviews, approved or denied determinations and any other requests to dedicated fax number below belonging to the campus where the patient is receiving treatment. List of dedicated fax numbers for the Facilities:  FACILITY NAME UR FAX NUMBER   ADMISSION DENIALS (Administrative/Medical Necessity) [phone]   DISCHARGE SUPPORT TEAM ([facility]) [phone]   PARENT CHILD HEALTH (Maternity/NICU/Pediatrics) [phone]   [facility] [phone]   [facility] [phone]   [facility] [phone]   [facility] [phone]   [facility] [phone]   [facility] [phone]   [facility] [phone]   [facility]  [phone]   [facility] [phone]   [facility] [phone]   [facility] [phone]   [facility] [phone]

## 2024-02-08 ENCOUNTER — TELEPHONE (OUTPATIENT)
Dept: ENDOCRINOLOGY | Facility: CLINIC | Age: 79
End: 2024-02-08

## 2024-02-08 PROBLEM — R41.82 AMS (ALTERED MENTAL STATUS): Status: RESOLVED | Noted: 2024-01-23 | Resolved: 2024-02-08

## 2024-02-08 PROBLEM — J96.01 ACUTE HYPOXEMIC RESPIRATORY FAILURE (HCC): Status: RESOLVED | Noted: 2023-08-07 | Resolved: 2024-02-08

## 2024-02-08 PROBLEM — R07.89 ATYPICAL CHEST PAIN: Status: ACTIVE | Noted: 2024-02-08

## 2024-02-08 NOTE — ASSESSMENT & PLAN NOTE
Evaluated by cardiology, atypical for angina  No further workup warranted  Continue with lidocaine patches and Tylenol as needed

## 2024-02-08 NOTE — ASSESSMENT & PLAN NOTE
patient is 78-year-old male with a history of liver cancer and radiation therapy who was admitted for an upper GI bleed with hypotension and later strokelike symptoms went unresponsive and was intubated, appears to be as cause from the liver with an ammonia level of 130  Most likely source of elevated ammonia level is liver history of liver cancer  GI following  Continue with Rifaximin when able   Patient should have 2-3 BMs a day  Ammonia level is normal  On lactulose  Improved  Discharged to [facility] for rehab

## 2024-02-08 NOTE — ASSESSMENT & PLAN NOTE
Lab Results   Component Value Date    HGBA1C 6.6 (H) 01/24/2024       Recent Labs     02/05/24  1603 02/05/24  2113 02/06/24  0711 02/06/24  1108   POCGLU 160* 183* 101 174*       Patient has a Dexcom, on Trulicity and Jardiance, Lantus and insulin    Blood glucose goal 140-180  Hypoglycemia protocol

## 2024-02-08 NOTE — ASSESSMENT & PLAN NOTE
Wt Readings from Last 3 Encounters:   02/06/24 88 kg (194 lb 0.1 oz)   02/01/24 89.8 kg (197 lb 15.6 oz)   12/20/23 96.6 kg (213 lb)     Patient is 78-year-old male with history of CAD post PCI in August not deemed a candidate for surgical intervention, systolic heart failure with reduced ejection fraction of 36%   Home meds: lasix, toprol, and statin   Continue to monitor volume status clinically

## 2024-02-08 NOTE — ASSESSMENT & PLAN NOTE
Patient found to have positive orthostatic vitals during PT/OT evaluation  Improved after holding Lasix and giving gentle IV hydration  Continue with fall and safety precautions

## 2024-02-08 NOTE — ASSESSMENT & PLAN NOTE
Lab Results   Component Value Date    EGFR 67 02/06/2024    EGFR 61 02/05/2024    EGFR 58 02/04/2024    CREATININE 1.06 02/06/2024    CREATININE 1.15 02/05/2024    CREATININE 1.19 02/04/2024     Cr at baseline  Monitor I's and O's   Renally dose medications  Avoid nephrotoxic agents  On Lasix

## 2024-02-08 NOTE — TELEPHONE ENCOUNTER
Received referral for Derek Walter.     Left voicemail for patient to contact office to schedule a Follow-up Appointment. Last OV 9-13-23.

## 2024-02-08 NOTE — ASSESSMENT & PLAN NOTE
Patient is a 78-year-old male who has been seeing [facility] GI last seen 12/13/2023 patient has a history of anemia is multifactorial chronic due to cancer with iron deficiency and occult GI blood loss on Eliquis review of an EGD with multiple AVMs.  Patient came in to the ER from home due to generalized weakness nausea vomiting since this morning reports lower abdominal pain has a history of anemia is on Eliquis and Plavix episodes of vomiting which were described by the significant other is dark of clotted material, and also had melanotic stool occult positive.  Patient felt significant weakness and was unable to walk felt like too weak to walk.  Patient has a history of an upper GI bleed with multiple AVMs, also has a history of the patient intubation with tracheostomy approximately rehab time of 1 year.   CT scan with contrast showed nonspecific hypodensity within the gastric body possibly reflective of hemorrhage in this patient with the history of gastric AVMs, known liver cancer, cholelithiasis, small volume abdominal pelvic ascites  Patient's baseline hemoglobin appears to be 8-10, except 1 value on 11/23 of 13.    Patient is on Eliquis and Plavix as o/p  In the ED received DDAVP 0.3 mg/kg and Kcentra  GI consulted  1/23 S/P EGD - 3 large angioectasias in the fundus of the stomach and body of the stomach; bleeding was observed; placed clips; hemostasis achieved; induced coagulation and hemostasis achieved with argon plasma coagulation The esophagus and duodenum appeared normal.  Pt received a total of 3 units PRBC this admission so far, last on 1/25/24     Recent Labs     02/06/24  0442   HGB 9.5*        Plan:    Continue PPI BID  Patient extubated 1/27  Patient is tolerating diet  Patient is restarted on Plavix and Eliquis after clearance from GI and cardiology.  DC heparin drip  Hemoglobin stable

## 2024-02-08 NOTE — ASSESSMENT & PLAN NOTE
Rate controled  Maintained on Eliquis and metoprolol as o/p  Continue on Lopressor  Hold Eliquis and Plavix until okay with GI   Cardiology consult regarding input for antiplatelet/anticoagulation appreciated  As per cardiology restart Plavix and Eliquis  Discussed with GI and patient was restarted on anticoagulation 1/30

## 2024-02-08 NOTE — DISCHARGE SUMMARY
[facility]  Discharge- Derek Walter 1945, 78 y.o. male MRN: [id number]  Unit/Bed#: MS [facility]-01 Encounter: [id number]  Primary Care Provider: Ernesto Griffith MD   Date and time admitted to hospital: 1/23/2024 12:14 AM    Atypical chest pain  Assessment & Plan  Evaluated by cardiology, atypical for angina  No further workup warranted  Continue with lidocaine patches and Tylenol as needed    Orthostatic hypotension  Assessment & Plan  Patient found to have positive orthostatic vitals during PT/OT evaluation  Improved after holding Lasix and giving gentle IV hydration  Continue with fall and safety precautions    Hepatic encephalopathy (HCC)  Assessment & Plan  patient is 78-year-old male with a history of liver cancer and radiation therapy who was admitted for an upper GI bleed with hypotension and later strokelike symptoms went unresponsive and was intubated, appears to be as cause from the liver with an ammonia level of 130  Most likely source of elevated ammonia level is liver history of liver cancer  GI following  Continue with Rifaximin when able   Patient should have 2-3 BMs a day  Ammonia level is normal  On lactulose  Improved  Discharged to [facility] for rehab    Acute blood loss anemia  Assessment & Plan  Secondary to GIB  Hemoglobin baseline 8-10  Receive 3 units PRBC this admission, last 1/25   Trend H&H  History of iron deficiency   Iron panel reviewed.  Received Venofer  On iron supplementation    Chronic systolic heart failure (HCC)  Assessment & Plan  Wt Readings from Last 3 Encounters:   02/06/24 88 kg (194 lb 0.1 oz)   02/01/24 89.8 kg (197 lb 15.6 oz)   12/20/23 96.6 kg (213 lb)     Patient is 78-year-old male with history of CAD post PCI in August not deemed a candidate for surgical intervention, systolic heart failure with reduced ejection fraction of 36%   Home meds: lasix, toprol, and statin   Continue to monitor volume status clinically      Hepatocellular  carcinoma (HCC)  Assessment & Plan  Patient has stage II liver cancer hepatocellular carcinoma  Has a tumor thrombus which appears stable seen again on admission CT  Status post Y 90 treatment    Follows with Dr. Gong as o/p  AFP is elevated.    GI discussed with patient's wife and Dr. Gong and recommended outpatient MRI abdomen    CAD (coronary artery disease)  Assessment & Plan  Patient has a history of coronary artery disease had 8/2023 a PCI status post cardiac catheterization drug-eluting stents x 3 in the LAD.  Ramus and left circumflex  Continue home statin and metoprolol    History of DVT (deep vein thrombosis)  Assessment & Plan  Patient is on Eliquis as o/p, currently held 2/2 GIB  1/23 reversed with Kcentra   Continue on Eliquis    Atrial fibrillation, unspecified type (HCC)  Assessment & Plan  Rate controled  Maintained on Eliquis and metoprolol as o/p  Continue on Lopressor  Hold Eliquis and Plavix until okay with GI   Cardiology consult regarding input for antiplatelet/anticoagulation appreciated  As per cardiology restart Plavix and Eliquis  Discussed with GI and patient was restarted on anticoagulation 1/30       Type 2 diabetes mellitus with neurologic complication, with long-term current use of insulin (HCC)  Assessment & Plan  Lab Results   Component Value Date    HGBA1C 6.6 (H) 01/24/2024       Recent Labs     02/05/24  1603 02/05/24  2113 02/06/24  0711 02/06/24  1108   POCGLU 160* 183* 101 174*       Patient has a Dexcom, on Trulicity and Jardiance, Lantus and insulin    Blood glucose goal 140-180  Hypoglycemia protocol     Stage 3 chronic kidney disease, unspecified whether stage 3a or 3b CKD (HCC)  Assessment & Plan  Lab Results   Component Value Date    EGFR 67 02/06/2024    EGFR 61 02/05/2024    EGFR 58 02/04/2024    CREATININE 1.06 02/06/2024    CREATININE 1.15 02/05/2024    CREATININE 1.19 02/04/2024     Cr at baseline  Monitor I's and O's   Renally dose medications  Avoid nephrotoxic  agents  On Lasix    * Acute upper GI bleeding  Assessment & Plan  Patient is a 78-year-old male who has been seeing [facility] last seen 12/13/2023 patient has a history of anemia is multifactorial chronic due to cancer with iron deficiency and occult GI blood loss on Eliquis review of an EGD with multiple AVMs.  Patient came in to the ER from home due to generalized weakness nausea vomiting since this morning reports lower abdominal pain has a history of anemia is on Eliquis and Plavix episodes of vomiting which were described by the significant other is dark of clotted material, and also had melanotic stool occult positive.  Patient felt significant weakness and was unable to walk felt like too weak to walk.  Patient has a history of an upper GI bleed with multiple AVMs, also has a history of the patient intubation with tracheostomy approximately rehab time of 1 year.   CT scan with contrast showed nonspecific hypodensity within the gastric body possibly reflective of hemorrhage in this patient with the history of gastric AVMs, known liver cancer, cholelithiasis, small volume abdominal pelvic ascites  Patient's baseline hemoglobin appears to be 8-10, except 1 value on 11/23 of 13.    Patient is on Eliquis and Plavix as o/p  In the ED received DDAVP 0.3 mg/kg and Kcentra  GI consulted  1/23 S/P EGD - 3 large angioectasias in the fundus of the stomach and body of the stomach; bleeding was observed; placed clips; hemostasis achieved; induced coagulation and hemostasis achieved with argon plasma coagulation The esophagus and duodenum appeared normal.  Pt received a total of 3 units PRBC this admission so far, last on 1/25/24     Recent Labs     02/06/24  0442   HGB 9.5*        Plan:    Continue PPI BID  Patient extubated 1/27  Patient is tolerating diet  Patient is restarted on Plavix and Eliquis after clearance from GI and cardiology.  DC heparin drip  Hemoglobin stable      AMS (altered mental status)-resolved as  of 2/8/2024  Assessment & Plan  1/24 0630A RN called provider to bedside to evaluate patient for left facial droop. NIHHS 4 (left facial droop, LUE weakness +4/5, LLE weakness +4/5, LLE drift and mild dysarthria). RN states left facial droop was noticed on arrival to ICU at 0300A. RN states ED could not confirm if droop was present in ED and wife unsure if droop was new. Stroke alert called.   CTH - no acute stroke or hemorrhage  CTA H/N - no large vessel occulusion. No arterial occulusion. 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   1/24 MRI: Negative for acute findings   1/24 ECHO: EF 75%, Mild TVR  Mentation continuing to improve. Able to be extubated 1/27     Plan:  Patient discussed with Dr. Interiano (Neuro)   TNK not given due to unknown last known well and active GI bleed.  Goal SBP >140  1/24 ECHO: EF 75%, Mild TVR  Restarted on Plavix and Eliquis  Restart statin    Aspiration/fall precautions   Improved    Acute hypoxemic respiratory failure (HCC)-resolved as of 2/8/2024  Assessment & Plan  Required intubation for airway protection 1/23 after patient became increasingly encephalopathic  Minimal vent setting. Placed on PS since 1/24 at 6/6 40% FiO2  Unable to be successfully extubated due to mental status. Currently not requiring sedation.  Having copious secretions coming from inline suction  1/27 CXR negative for acute findings. Given 1 x dose of 40 mg IV lasix   Extubated mid morning on 1/27  Saturating well on room air     Plan:  Resolved  Aspiration precautions  Encourage IS/Flutter valve when able               Medical Problems       Resolved Problems  Date Reviewed: 2/8/2024            Resolved    Acute hypoxemic respiratory failure (HCC) 2/8/2024     Resolved by  Brianna Victoria MD    AMS (altered mental status) 2/8/2024     Resolved by  Brianna Victoria MD          Admission Date:   Admission Orders (From admission, onward)       Ordered        01/23/24 0231  INPATIENT ADMISSION  Once                             Admitting Diagnosis: Acute blood loss anemia [D62]  Acute upper GI bleeding [K92.2]  Abdominal pain [R10.9]  Anemia [D64.9]    HPI: as per, Tino Burch PA-C , on 1/23  Derek Walter is a 78 y.o. with a medical history of chronic blood loss anemia, afibrillation on Eliquis, CAD, CKD stage III, diabetes, history of DVT, hyperlipidemia and hypertension, bowel resection, the catheterizations, ischemic cardiomyopathy EF of 35% who comes to the ER with increasing weakness and generally not feeling well globin at 8.3, with melanotic stools and vomiting blood x1.  CT scan shows possible active bleeding within the gastric fundus.   Patient blood pressure was stable 1 low pressure of 90/50 covered to 112 250, patient is receiving 2 units of RBCs, Kcentra and DDAVP  Patient is admitted as a stepdown to 2 to slim    Procedures Performed:   Orders Placed This Encounter   Procedures    Critical Care    Intubation       Summary of Hospital Course: Patient with multiple comorbidities presented with strokelike symptoms and upper GI bleed.  Was briefly in the ICU, intubated for AMS and hypotension.  Throughout the hospital stay, patient was closely followed by gastroenterology, cardiology and neurology.  Patient underwent EGD, that showed 3 large angioectasias at the fundus of stomach and body of stomach.  Patient was continued on Protonix, anticoagulation was held in the antrum and resumed once recommended by GI.  He received 3 units of PRBC while inpatient.  Hemoglobin at discharge was at his baseline.    Stroke workup with no signs of stroke.  As per neurology, lower suspicion for stroke/TIA.  His chronic left-sided facial weakness, remained at baseline.  Mental status improved throughout the hospital stay.  Continue with Eliquis, Plavix and statin  Continue with delirium precautions.    Hepatocellular carcinoma, patient needs outpatient MRI abdomen.  Continue with rifaximin and lactulose.  Outpatient gastroenterology  follow-up.    Atypical chest pain-patient was evaluated by cardiology, no concern for angina.  Outpatient cardiology follow-up recommended    Discharge plan was discussed with patient's spouse over phone at length.  All questions were answered to satisfaction.    Significant Findings, Care, Treatment and Services Provided: see above     Complications: none    Condition at Discharge: stable         Discharge instructions/Information to patient and family:   See after visit summary for information provided to patient and family.      Provisions for Follow-Up Care:  See after visit summary for information related to follow-up care and any pertinent home health orders.      PCP: Ernesto Griffith MD    Disposition: Short-term rehab at [facility]     Planned Readmission: no    Discharge Statement   I spent 37 minutes discharging the patient. This time was spent on the day of discharge. I had direct contact with the patient on the day of discharge. Additional documentation is required if more than 30 minutes were spent on discharge.     Discharge Medications:  See after visit summary for reconciled discharge medications provided to patient and family.

## 2024-02-09 ENCOUNTER — NURSING HOME VISIT (OUTPATIENT)
Dept: FAMILY MEDICINE CLINIC | Facility: CLINIC | Age: 79
End: 2024-02-09
Payer: COMMERCIAL

## 2024-02-09 DIAGNOSIS — K76.82 HEPATIC ENCEPHALOPATHY (HCC): ICD-10-CM

## 2024-02-09 DIAGNOSIS — Z79.4 TYPE 2 DIABETES MELLITUS WITH DIABETIC POLYNEUROPATHY, WITH LONG-TERM CURRENT USE OF INSULIN (HCC): ICD-10-CM

## 2024-02-09 DIAGNOSIS — I25.10 CORONARY ARTERY DISEASE INVOLVING NATIVE HEART WITHOUT ANGINA PECTORIS, UNSPECIFIED VESSEL OR LESION TYPE: ICD-10-CM

## 2024-02-09 DIAGNOSIS — E11.42 TYPE 2 DIABETES MELLITUS WITH DIABETIC POLYNEUROPATHY, WITH LONG-TERM CURRENT USE OF INSULIN (HCC): ICD-10-CM

## 2024-02-09 DIAGNOSIS — K92.2 ACUTE UPPER GI BLEEDING: Primary | ICD-10-CM

## 2024-02-09 DIAGNOSIS — N18.30 STAGE 3 CHRONIC KIDNEY DISEASE, UNSPECIFIED WHETHER STAGE 3A OR 3B CKD (HCC): ICD-10-CM

## 2024-02-09 DIAGNOSIS — R13.10 DYSPHAGIA, UNSPECIFIED TYPE: ICD-10-CM

## 2024-02-09 DIAGNOSIS — D62 ACUTE BLOOD LOSS ANEMIA: ICD-10-CM

## 2024-02-09 DIAGNOSIS — C22.0 HEPATOCELLULAR CARCINOMA (HCC): ICD-10-CM

## 2024-02-09 DIAGNOSIS — Z86.718 HISTORY OF DVT (DEEP VEIN THROMBOSIS): ICD-10-CM

## 2024-02-09 DIAGNOSIS — I48.91 ATRIAL FIBRILLATION, UNSPECIFIED TYPE (HCC): ICD-10-CM

## 2024-02-09 PROCEDURE — 99308 SBSQ NF CARE LOW MDM 20: CPT | Performed by: FAMILY MEDICINE

## 2024-02-10 LAB
ATRIAL RATE: 77 BPM
P AXIS: 30 DEGREES
PR INTERVAL: 234 MS
QRS AXIS: -44 DEGREES
QRSD INTERVAL: 130 MS
QT INTERVAL: 440 MS
QTC INTERVAL: 497 MS
T WAVE AXIS: 22 DEGREES
VENTRICULAR RATE: 77 BPM

## 2024-02-13 ENCOUNTER — NURSING HOME VISIT (OUTPATIENT)
Dept: FAMILY MEDICINE CLINIC | Facility: CLINIC | Age: 79
End: 2024-02-13
Payer: COMMERCIAL

## 2024-02-13 DIAGNOSIS — K76.82 HEPATIC ENCEPHALOPATHY (HCC): ICD-10-CM

## 2024-02-13 DIAGNOSIS — Z86.718 HISTORY OF DVT (DEEP VEIN THROMBOSIS): ICD-10-CM

## 2024-02-13 DIAGNOSIS — C22.0 HEPATOCELLULAR CARCINOMA (HCC): ICD-10-CM

## 2024-02-13 DIAGNOSIS — I50.33 ACUTE ON CHRONIC DIASTOLIC HF (HEART FAILURE) (HCC): ICD-10-CM

## 2024-02-13 DIAGNOSIS — I25.10 CAD (CORONARY ARTERY DISEASE): ICD-10-CM

## 2024-02-13 DIAGNOSIS — K92.2 ACUTE UPPER GI BLEEDING: Primary | ICD-10-CM

## 2024-02-13 DIAGNOSIS — N18.30 STAGE 3 CHRONIC KIDNEY DISEASE, UNSPECIFIED WHETHER STAGE 3A OR 3B CKD (HCC): ICD-10-CM

## 2024-02-13 DIAGNOSIS — E11.42 TYPE 2 DIABETES MELLITUS WITH DIABETIC POLYNEUROPATHY, WITH LONG-TERM CURRENT USE OF INSULIN (HCC): ICD-10-CM

## 2024-02-13 DIAGNOSIS — Z79.4 TYPE 2 DIABETES MELLITUS WITH DIABETIC POLYNEUROPATHY, WITH LONG-TERM CURRENT USE OF INSULIN (HCC): ICD-10-CM

## 2024-02-13 DIAGNOSIS — R13.10 DYSPHAGIA, UNSPECIFIED TYPE: ICD-10-CM

## 2024-02-13 DIAGNOSIS — D62 ACUTE BLOOD LOSS ANEMIA: ICD-10-CM

## 2024-02-13 PROCEDURE — 99309 SBSQ NF CARE MODERATE MDM 30: CPT | Performed by: FAMILY MEDICINE

## 2024-02-13 NOTE — PROGRESS NOTES
[facility]  [address] [place] [address]  Facility: [facility]    NAME: Derek Walter  AGE: 78 y.o. SEX: male    DATE OF ENCOUNTER: 2/13/2024    Code status:  DNR w/ Hospitalization    Assessment and Plan     1. Acute upper GI bleeding    2. Dysphagia, unspecified type    3. Hepatocellular carcinoma (HCC)    4. Type 2 diabetes mellitus with diabetic polyneuropathy, with long-term current use of insulin (HCC)    5. Acute on chronic diastolic HF (heart failure) (HCC)    6. Hepatic encephalopathy (HCC)    7. Acute blood loss anemia    8. Stage 3 chronic kidney disease, unspecified whether stage 3a or 3b CKD (HCC)    9. History of DVT (deep vein thrombosis)        All medications and routine orders were reviewed and updated as needed.    Plan discussed with: Patient    Chief Complaint     Interim evaluation    History of Present Illness     The patient continues to tolerate his anticoagulation and has had no further bleeding.  His hemoglobin has stabilized above 10.  Fortunately his ammonia level is elevated at 90.  He has only received 2 doses of the lactulose at 10 cc/day.  He has a difficult time because of its effect on his loose bowels.  He feels as though he is making progress with therapy gaining strength and conditioning.  Appetite has been satisfactory.  Blood sugars have been low and we will need to reduce his insulin doses.  He also needs to receive a bedtime snack hemoglobin stable at 10.  Ammonia level    The following portions of the patient's history were reviewed and updated as appropriate: current medications, past family history, past medical history, past social history, past surgical history and problem list.    Allergies:  No Known Allergies    Review of Systems     Review of Systems   Constitutional:  Negative for activity change, appetite change, chills, diaphoresis, fatigue and unexpected weight change.   HENT:  Negative for congestion, ear discharge, ear pain,  hearing loss, nosebleeds and rhinorrhea.    Eyes:  Negative for pain, redness, itching and visual disturbance.   Respiratory:  Negative for cough, choking, chest tightness and shortness of breath.    Cardiovascular:  Negative for chest pain and leg swelling.   Gastrointestinal:  Positive for blood in stool. Negative for abdominal pain, constipation, diarrhea and nausea.   Endocrine: Negative for cold intolerance, polydipsia and polyphagia.   Genitourinary:  Negative for dysuria, frequency, hematuria and urgency.   Musculoskeletal:  Negative for arthralgias, back pain, gait problem, joint swelling, neck pain and neck stiffness.   Skin:  Negative for color change and rash.   Allergic/Immunologic: Negative for environmental allergies and food allergies.   Neurological:  Negative for dizziness, tremors, seizures, speech difficulty, numbness and headaches.   Hematological:  Negative for adenopathy. Does not bruise/bleed easily.   Psychiatric/Behavioral:  Negative for behavioral problems, dysphoric mood, hallucinations and self-injury.        Medications and orders     All medications reviewed and updated in detention EMR.      Objective     Vitals: per nursing home records    Physical Exam  Constitutional:       General: He is not in acute distress.     Appearance: He is well-developed. He is not diaphoretic.   HENT:      Head: Normocephalic and atraumatic.      Right Ear: External ear normal.      Left Ear: External ear normal.      Nose: Nose normal.      Mouth/Throat:      Pharynx: No oropharyngeal exudate.   Eyes:      General: No scleral icterus.        Right eye: No discharge.         Left eye: No discharge.      Conjunctiva/sclera: Conjunctivae normal.      Pupils: Pupils are equal, round, and reactive to light.   Neck:      Thyroid: No thyromegaly.   Cardiovascular:      Rate and Rhythm: Normal rate. Rhythm irregular.      Heart sounds: Murmur heard.   Pulmonary:      Effort: Pulmonary effort is normal.       Breath sounds: Normal breath sounds. No wheezing or rales.   Abdominal:      General: Bowel sounds are normal.      Palpations: Abdomen is soft. There is no mass.      Tenderness: There is no abdominal tenderness. There is no guarding.   Musculoskeletal:         General: No tenderness. Normal range of motion.      Cervical back: Normal range of motion and neck supple.   Lymphadenopathy:      Cervical: No cervical adenopathy.   Skin:     General: Skin is warm and dry.   Neurological:      Mental Status: He is alert. He is disoriented.      Motor: Weakness present.      Coordination: Coordination abnormal.      Deep Tendon Reflexes: Reflexes are normal and symmetric.   Psychiatric:         Thought Content: Thought content normal.         Judgment: Judgment normal.         Pertinent Laboratory/Diagnostic Studies:     The following labs were reviewed please see chart or hospital paperwork for details.    Space for lab dictation hemoglobin stable at 10 9.  Ammonia noted at 90    Continue lactulose 10 cc daily.  Recheck ammonia level.  Reduce insulin-      Farhat Khan,   2/13/2024 3:16 PM

## 2024-02-14 RX ORDER — ATORVASTATIN CALCIUM 40 MG/1
TABLET, FILM COATED ORAL
Qty: 90 TABLET | Refills: 1 | Status: SHIPPED | OUTPATIENT
Start: 2024-02-14

## 2024-02-16 ENCOUNTER — NURSING HOME VISIT (OUTPATIENT)
Dept: FAMILY MEDICINE CLINIC | Facility: CLINIC | Age: 79
End: 2024-02-16
Payer: COMMERCIAL

## 2024-02-16 ENCOUNTER — HOME HEALTH ADMISSION (OUTPATIENT)
Dept: HOME HEALTH SERVICES | Facility: HOME HEALTHCARE | Age: 79
End: 2024-02-16
Payer: COMMERCIAL

## 2024-02-16 DIAGNOSIS — E11.42 TYPE 2 DIABETES MELLITUS WITH DIABETIC POLYNEUROPATHY, WITH LONG-TERM CURRENT USE OF INSULIN (HCC): ICD-10-CM

## 2024-02-16 DIAGNOSIS — Z79.4 TYPE 2 DIABETES MELLITUS WITH DIABETIC POLYNEUROPATHY, WITH LONG-TERM CURRENT USE OF INSULIN (HCC): ICD-10-CM

## 2024-02-16 DIAGNOSIS — R13.10 DYSPHAGIA, UNSPECIFIED TYPE: ICD-10-CM

## 2024-02-16 DIAGNOSIS — C22.0 HEPATOCELLULAR CARCINOMA (HCC): ICD-10-CM

## 2024-02-16 DIAGNOSIS — D62 ACUTE BLOOD LOSS ANEMIA: ICD-10-CM

## 2024-02-16 DIAGNOSIS — Z86.718 HISTORY OF DVT (DEEP VEIN THROMBOSIS): ICD-10-CM

## 2024-02-16 DIAGNOSIS — K92.2 ACUTE UPPER GI BLEEDING: Primary | ICD-10-CM

## 2024-02-16 DIAGNOSIS — I50.33 ACUTE ON CHRONIC DIASTOLIC HF (HEART FAILURE) (HCC): ICD-10-CM

## 2024-02-16 DIAGNOSIS — I48.91 ATRIAL FIBRILLATION, UNSPECIFIED TYPE (HCC): ICD-10-CM

## 2024-02-16 DIAGNOSIS — K76.82 HEPATIC ENCEPHALOPATHY (HCC): ICD-10-CM

## 2024-02-16 PROCEDURE — 99308 SBSQ NF CARE LOW MDM 20: CPT | Performed by: FAMILY MEDICINE

## 2024-02-16 NOTE — PROGRESS NOTES
[facility]  [address] [place] [address]  Facility: [facility]    NAME: Derek Walter  AGE: 78 y.o. SEX: male    DATE OF ENCOUNTER: 2/16/2024    Code status:  DNR w/ Hospitalization    Assessment and Plan     1. Acute upper GI bleeding    2. Dysphagia, unspecified type    3. Hepatocellular carcinoma (HCC)    4. Type 2 diabetes mellitus with diabetic polyneuropathy, with long-term current use of insulin (HCC)    5. Acute on chronic diastolic HF (heart failure) (HCC)    6. Atrial fibrillation, unspecified type (HCC)    7. Hepatic encephalopathy (HCC)    8. Acute blood loss anemia    9. History of DVT (deep vein thrombosis)        All medications and routine orders were reviewed and updated as needed.    Plan discussed with: Family member    Chief Complaint     Interim evaluation    History of Present Illness     The patient is performing well in therapy.  He is gaining strength.  He seen no further bleeding.  His ammonia level came down nicely to 25.  He is not receiving lactulose every day.  His mentation is clear.  His appetite is improving.  His bowels are stable.  He denies any dysuria.  Ammonia level is normal at 25    The following portions of the patient's history were reviewed and updated as appropriate: current medications, past family history, past medical history, past social history, past surgical history and problem list.    Allergies:  No Known Allergies    Review of Systems     Review of Systems   Constitutional:  Negative for activity change, appetite change, chills, diaphoresis, fatigue and unexpected weight change.   HENT:  Negative for congestion, ear discharge, ear pain, hearing loss, nosebleeds and rhinorrhea.    Eyes:  Negative for pain, redness, itching and visual disturbance.   Respiratory:  Negative for cough, choking, chest tightness and shortness of breath.    Cardiovascular:  Negative for chest pain and leg swelling.   Gastrointestinal:  Positive for blood in  stool. Negative for abdominal pain, constipation, diarrhea and nausea.   Endocrine: Negative for cold intolerance, polydipsia and polyphagia.   Genitourinary:  Negative for dysuria, frequency, hematuria and urgency.   Musculoskeletal:  Negative for arthralgias, back pain, gait problem, joint swelling, neck pain and neck stiffness.   Skin:  Negative for color change and rash.   Allergic/Immunologic: Negative for environmental allergies and food allergies.   Neurological:  Positive for weakness. Negative for dizziness, tremors, seizures, speech difficulty, numbness and headaches.   Hematological:  Negative for adenopathy. Does not bruise/bleed easily.   Psychiatric/Behavioral:  Negative for behavioral problems, dysphoric mood, hallucinations and self-injury.        Medications and orders     All medications reviewed and updated in custodial EMR.      Objective     Vitals: per nursing home records    Physical Exam  Constitutional:       General: He is not in acute distress.     Appearance: He is well-developed. He is not diaphoretic.   HENT:      Head: Normocephalic and atraumatic.      Right Ear: External ear normal.      Left Ear: External ear normal.      Nose: Nose normal.      Mouth/Throat:      Pharynx: No oropharyngeal exudate.   Eyes:      General: No scleral icterus.        Right eye: No discharge.         Left eye: No discharge.      Conjunctiva/sclera: Conjunctivae normal.      Pupils: Pupils are equal, round, and reactive to light.   Neck:      Thyroid: No thyromegaly.   Cardiovascular:      Rate and Rhythm: Normal rate. Rhythm irregular.      Heart sounds: Normal heart sounds. No murmur heard.  Pulmonary:      Effort: Pulmonary effort is normal.      Breath sounds: Normal breath sounds. No wheezing or rales.   Abdominal:      General: Bowel sounds are normal.      Palpations: Abdomen is soft. There is no mass.      Tenderness: There is no abdominal tenderness. There is no guarding.   Musculoskeletal:          General: No tenderness. Normal range of motion.      Cervical back: Normal range of motion and neck supple.   Lymphadenopathy:      Cervical: No cervical adenopathy.   Skin:     General: Skin is warm and dry.   Neurological:      Mental Status: He is alert and oriented to person, place, and time.      Motor: Weakness present.      Deep Tendon Reflexes: Reflexes are normal and symmetric.   Psychiatric:         Thought Content: Thought content normal.         Judgment: Judgment normal.         Pertinent Laboratory/Diagnostic Studies:     The following labs were reviewed please see chart or hospital paperwork for details.    Space for lab dictation ammonia level is normal    - Maintain the current therapy plan and medication regimen follow-up    Farhat Khan DO  2/16/2024 10:55 AM

## 2024-02-18 ENCOUNTER — HOSPITAL ENCOUNTER (EMERGENCY)
Facility: HOSPITAL | Age: 79
Discharge: HOME | End: 2024-02-19
Attending: EMERGENCY MEDICINE | Admitting: EMERGENCY MEDICINE
Payer: COMMERCIAL

## 2024-02-18 DIAGNOSIS — R04.0 RIGHT-SIDED EPISTAXIS: Primary | ICD-10-CM

## 2024-02-18 LAB
APTT PPP: 35 SECONDS (ref 23–37)
BASOPHILS # BLD AUTO: 0.04 THOUSANDS/ÂΜL (ref 0–0.1)
BASOPHILS NFR BLD AUTO: 1 % (ref 0–1)
EOSINOPHIL # BLD AUTO: 0.34 THOUSAND/ÂΜL (ref 0–0.61)
EOSINOPHIL NFR BLD AUTO: 5 % (ref 0–6)
ERYTHROCYTE [DISTWIDTH] IN BLOOD BY AUTOMATED COUNT: 17.2 % (ref 11.6–15.1)
HCT VFR BLD AUTO: 38.2 % (ref 36.5–49.3)
HGB BLD-MCNC: 11.8 G/DL (ref 12–17)
IMM GRANULOCYTES # BLD AUTO: 0.02 THOUSAND/UL (ref 0–0.2)
IMM GRANULOCYTES NFR BLD AUTO: 0 % (ref 0–2)
LYMPHOCYTES # BLD AUTO: 0.7 THOUSANDS/ÂΜL (ref 0.6–4.47)
LYMPHOCYTES NFR BLD AUTO: 11 % (ref 14–44)
MCH RBC QN AUTO: 29.4 PG (ref 26.8–34.3)
MCHC RBC AUTO-ENTMCNC: 30.9 G/DL (ref 31.4–37.4)
MCV RBC AUTO: 95 FL (ref 82–98)
MONOCYTES # BLD AUTO: 1 THOUSAND/ÂΜL (ref 0.17–1.22)
MONOCYTES NFR BLD AUTO: 15 % (ref 4–12)
NEUTROPHILS # BLD AUTO: 4.52 THOUSANDS/ÂΜL (ref 1.85–7.62)
NEUTS SEG NFR BLD AUTO: 68 % (ref 43–75)
NRBC BLD AUTO-RTO: 0 /100 WBCS
PLATELET # BLD AUTO: 199 THOUSANDS/UL (ref 149–390)
PMV BLD AUTO: 12.2 FL (ref 8.9–12.7)
RBC # BLD AUTO: 4.01 MILLION/UL (ref 3.88–5.62)
WBC # BLD AUTO: 6.62 THOUSAND/UL (ref 4.31–10.16)

## 2024-02-18 PROCEDURE — 85730 THROMBOPLASTIN TIME PARTIAL: CPT

## 2024-02-18 PROCEDURE — 85025 COMPLETE CBC W/AUTO DIFF WBC: CPT

## 2024-02-18 PROCEDURE — 99284 EMERGENCY DEPT VISIT MOD MDM: CPT

## 2024-02-18 PROCEDURE — 36415 COLL VENOUS BLD VENIPUNCTURE: CPT

## 2024-02-18 PROCEDURE — 30901 CONTROL OF NOSEBLEED: CPT

## 2024-02-18 RX ORDER — TRANEXAMIC ACID 100 MG/ML
1000 INJECTION, SOLUTION INTRAVENOUS ONCE
Status: COMPLETED | OUTPATIENT
Start: 2024-02-18 | End: 2024-02-18

## 2024-02-18 RX ORDER — LIDOCAINE HYDROCHLORIDE AND EPINEPHRINE 10; 10 MG/ML; UG/ML
10 INJECTION, SOLUTION INFILTRATION; PERINEURAL ONCE
Status: COMPLETED | OUTPATIENT
Start: 2024-02-18 | End: 2024-02-18

## 2024-02-18 RX ORDER — OXYMETAZOLINE HYDROCHLORIDE 0.05 G/100ML
2 SPRAY NASAL ONCE
Status: COMPLETED | OUTPATIENT
Start: 2024-02-18 | End: 2024-02-18

## 2024-02-18 RX ADMIN — TRANEXAMIC ACID 1000 MG: 100 INJECTION, SOLUTION INTRAVENOUS at 23:17

## 2024-02-18 RX ADMIN — LIDOCAINE HYDROCHLORIDE,EPINEPHRINE BITARTRATE 10 ML: 10; .01 INJECTION, SOLUTION INFILTRATION; PERINEURAL at 23:17

## 2024-02-18 RX ADMIN — OXYMETAZOLINE HYDROCHLORIDE 2 SPRAY: 0.05 SPRAY NASAL at 23:17

## 2024-02-19 VITALS
WEIGHT: 143 LBS | HEART RATE: 78 BPM | DIASTOLIC BLOOD PRESSURE: 60 MMHG | RESPIRATION RATE: 18 BRPM | BODY MASS INDEX: 19.39 KG/M2 | SYSTOLIC BLOOD PRESSURE: 132 MMHG | OXYGEN SATURATION: 99 % | TEMPERATURE: 97.9 F

## 2024-02-19 NOTE — ED PROVIDER NOTES
History  Chief Complaint   Patient presents with    Nose Bleed     Nose bleed for 45 min. Denies injury to face. Takes blood thinners.      This is a 79 y/o male with PMH afib on eliquis, T2DM insulin dependent, anemia, HTN, HLD who presents to the ER today for epistaxis. Patient reports about an hour ago he was going to bed his right nostril just started to bleed. States it was bleeding a lot. Didn't stop until EMS got there and clamped it. He said he could feel the blood going down the back of his throat. He denies feeling dizzy, lightheaded, weak or having a headache. Does have a previous history of epistaxis.       History provided by:  Patient   used: No        Prior to Admission Medications   Prescriptions Last Dose Informant Patient Reported? Taking?   Anoro Ellipta 62.5-25 MCG/ACT inhaler   No No   Sig: inhale 1 puff by mouth and INTO THE LUNGS once daily   Blood Glucose Monitoring Suppl (OneTouch Verio) w/Device KIT  Self No No   Sig: Use 4 (four) times a day   Continuous Blood Gluc  (Dexcom G6 ) NOHEMY  Self No No   Sig: Use with dexcom sensor   Continuous Blood Gluc Sensor (Dexcom G6 Sensor) MISC   No No   Sig: Use daily as directed for CGM - Change every 10 days   Patient not taking: Reported on 12/13/2023   Continuous Blood Gluc Transmit (Dexcom G6 Transmitter) MISC   No No   Sig: Use daily as directed for CGM - Change every 3 months   Patient not taking: Reported on 12/13/2023   Continuous Blood Gluc Transmit (Dexcom G6 Transmitter) MISC   No No   Sig: Use daily as directed for CGM - Change every 3 months   Patient not taking: Reported on 12/13/2023   Eliquis 5 MG   No No   Sig: take 1 tablet by mouth twice a day   Insulin Pen Needle (Droplet Pen Needles) 32G X 4 MM MISC   No No   Sig: use 1 PEN NEEDLE to inject MEDICATION subcutaneously four times a day   Jardiance 10 MG TABS tablet   No No   Sig: take 1 tablet by mouth daily every morning   Lumigan 0.01 % ophthalmic  drops   Yes No   Sig: Administer 1 drop to both eyes every evening   Multiple Vitamin (MULTIVITAMIN ADULT PO)  Self Yes No   Sig: Take 1 tablet by mouth daily   acetaminophen (TYLENOL) 650 mg CR tablet  Self Yes No   Sig: Take 650 mg by mouth every 8 (eight) hours as needed for mild pain   atorvastatin (LIPITOR) 40 mg tablet   No No   Sig: take 1 tablet by mouth once daily after dinner   azelastine (ASTELIN) 0.1 % nasal spray  Self No No   Si spray into each nostril 2 (two) times a day Use in each nostril as directed   clopidogrel (PLAVIX) 75 mg tablet   No No   Sig: Take 1 tablet (75 mg total) by mouth daily   ferrous sulfate 325 (65 Fe) mg tablet   No No   Sig: Take 1 tablet (325 mg total) by mouth daily with breakfast   furosemide (LASIX) 40 mg tablet   No No   Sig: Take 1 tablet (40 mg total) by mouth every other day   glucose blood (OneTouch Verio) test strip  Self No No   Sig: Use as instructed   Patient not taking: Reported on 10/13/2023   insulin detemir (Levemir FlexPen) 100 Units/mL injection pen   No No   Si units daily   insulin lispro (HumaLOG) 100 units/mL injection   No No   Sig: Inject 5 Units under the skin 3 (three) times a day with meals   lactulose (CHRONULAC) 10 g/15 mL solution   No No   Sig: Take 30 mL (20 g total) by mouth 2 (two) times a day   metoprolol tartrate (LOPRESSOR) 25 mg tablet   No No   Sig: Take 0.5 tablets (12.5 mg total) by mouth every 12 (twelve) hours   pantoprazole (PROTONIX) 40 mg tablet  Self No No   Sig: Take 1 tablet (40 mg total) by mouth 2 (two) times a day   Patient taking differently: Take 40 mg by mouth daily   rifaximin (XIFAXAN) 550 mg tablet   No No   Sig: Take 1 tablet (550 mg total) by mouth every 12 (twelve) hours   sertraline (ZOLOFT) 25 mg tablet  Self Yes No   Sig: Take 25 mg by mouth daily   tamsulosin (FLOMAX) 0.4 mg  Self Yes No   Sig: Take 0.4 mg by mouth daily with dinner   traZODone (DESYREL) 100 mg tablet   No No   Sig: take 2 tablets by  mouth at bedtime      Facility-Administered Medications: None       Past Medical History:   Diagnosis Date    Anemia     Atrial fibrillation (HCC)     Eliquis    AVB (atrioventricular block)     1st degree    CAD (coronary artery disease)     CKD (chronic kidney disease), stage III (HCC)     baseline Cr 1.2-1.6    Colon polyp     Diabetes mellitus (HCC)     type 2, insulin dependent    Diverticulosis     Emphysema lung (HCC)     Former tobacco use     History of asbestos exposure     History of DVT (deep vein thrombosis)     RLE, tx w/ AC    History of GI bleed 08/2023    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, given PRBC/Venofer for anemia while in house    Hyperlipidemia     Hypertension     LAFB (left anterior fascicular block)     Liver cancer (HCC)     Liver mass 08/2023    suspected HCC, needs radioembolization    RBBB     Syncope 08/07/2023       Past Surgical History:   Procedure Laterality Date    APPENDECTOMY      BOWEL RESECTION  2014    CARDIAC CATHETERIZATION      CARDIAC CATHETERIZATION N/A 08/25/2023    Procedure: Cardiac pci;  Surgeon: Dom Garrett DO;  Location: [facility];  Service: Cardiology    CARDIAC CATHETERIZATION N/A 08/21/2023    Procedure: Cardiac catheterization;  Surgeon: Dom Garrett DO;  Location: [facility];  Service: Cardiology    CARDIAC CATHETERIZATION N/A 08/21/2023    Procedure: Cardiac Coronary Angiogram;  Surgeon: Dom Garrett DO;  Location: [facility];  Service: Cardiology    CATARACT EXTRACTION Bilateral     COLONOSCOPY      EGD      IR Y-90 PRE-ANGIO/EMBO W/ LUNG SCAN  09/01/2023    IR Y-90 RADIOEMBOLIZATION  09/08/2023       Family History   Problem Relation Age of Onset    Hypertension Mother     Bone cancer Father     Diabetes type II Sister     Diabetes type II Sister     Esophageal cancer Brother     Colon cancer Neg Hx      I have reviewed and agree with the history as documented.    E-Cigarette/Vaping     E-Cigarette Use Never User      E-Cigarette/Vaping Substances    Nicotine No     THC No     CBD No     Flavoring No      Social History     Tobacco Use    Smoking status: Former     Current packs/day: 0.00     Average packs/day: 1 pack/day for 30.0 years (30.0 ttl pk-yrs)     Types: Cigarettes     Start date:      Quit date:      Years since quittin.1    Smokeless tobacco: Never   Vaping Use    Vaping status: Never Used   Substance Use Topics    Alcohol use: Yes     Comment: Socially    Drug use: Never       Review of Systems   HENT:  Positive for nosebleeds.    Neurological:  Negative for dizziness, light-headedness and headaches.       Physical Exam  Physical Exam  Vitals and nursing note reviewed.   Constitutional:       General: He is awake.      Appearance: Normal appearance. He is well-developed.   HENT:      Head: Normocephalic and atraumatic.      Right Ear: External ear normal.      Left Ear: External ear normal.      Nose:      Right Nostril: Epistaxis (dried blood in nare, active pulsing blood in nostril) present.      Comments: Dried blood in mouth and oropharynx  Eyes:      General: No scleral icterus.     Extraocular Movements: Extraocular movements intact.   Cardiovascular:      Rate and Rhythm: Normal rate and regular rhythm.      Heart sounds: Normal heart sounds, S1 normal and S2 normal. No murmur heard.     No gallop.   Pulmonary:      Effort: Pulmonary effort is normal.      Breath sounds: Normal breath sounds. No wheezing, rhonchi or rales.   Musculoskeletal:         General: Normal range of motion.      Cervical back: Normal range of motion.   Skin:     General: Skin is warm and dry.   Neurological:      General: No focal deficit present.      Mental Status: He is alert.   Psychiatric:         Attention and Perception: Attention and perception normal.         Mood and Affect: Mood normal.         Behavior: Behavior normal. Behavior is cooperative.         Vital Signs  ED Triage  Vitals   Temperature Pulse Respirations Blood Pressure SpO2   02/18/24 2258 02/18/24 2258 02/18/24 2258 02/18/24 2258 02/18/24 2258   97.9 °F (36.6 °C) 64 18 154/70 99 %      Temp src Heart Rate Source Patient Position - Orthostatic VS BP Location FiO2 (%)   -- 02/18/24 2300 02/18/24 2300 02/18/24 2300 --    Monitor Sitting Left arm       Pain Score       02/18/24 2258       No Pain           Vitals:    02/18/24 2258 02/18/24 2300 02/18/24 2330   BP: 154/70 156/68 153/68   Pulse: 64 64 65   Patient Position - Orthostatic VS:  Sitting          Visual Acuity      ED Medications  Medications   tranexamic acid 100mg/mL (for epistaxis) 1,000 mg (1,000 mg Nasal Given 2/18/24 2317)   lidocaine-epinephrine (XYLOCAINE/EPINEPHRINE) 1 %-1:100,000 injection 10 mL (10 mL Infiltration Given 2/18/24 2317)   oxymetazoline (AFRIN) 0.05 % nasal spray 2 spray (2 sprays Each Nare Given 2/18/24 2317)       Diagnostic Studies  Results Reviewed       Procedure Component Value Units Date/Time    APTT [[id number]]  (Normal) Collected: 02/18/24 2317    Lab Status: Final result Specimen: Blood from Arm, Right Updated: 02/18/24 2337     PTT 35 seconds     CBC and differential [[id number]]  (Abnormal) Collected: 02/18/24 2317    Lab Status: Final result Specimen: Blood from Arm, Right Updated: 02/18/24 2324     WBC 6.62 Thousand/uL      RBC 4.01 Million/uL      Hemoglobin 11.8 g/dL      Hematocrit 38.2 %      MCV 95 fL      MCH 29.4 pg      MCHC 30.9 g/dL      RDW 17.2 %      MPV 12.2 fL      Platelets 199 Thousands/uL      nRBC 0 /100 WBCs      Neutrophils Relative 68 %      Immat GRANS % 0 %      Lymphocytes Relative 11 %      Monocytes Relative 15 %      Eosinophils Relative 5 %      Basophils Relative 1 %      Neutrophils Absolute 4.52 Thousands/µL      Immature Grans Absolute 0.02 Thousand/uL      Lymphocytes Absolute 0.70 Thousands/µL      Monocytes Absolute 1.00 Thousand/µL      Eosinophils Absolute 0.34 Thousand/µL      Basophils Absolute  0.04 Thousands/µL                    No orders to display              Procedures  Epistaxis management    Date/Time: 2/18/2024 11:18 PM    Performed by: Gissel Salinas PA-C  Authorized by: Gissel Salinas PA-C  Universal Protocol:  Consent: Verbal consent obtained.  Risks and benefits: risks, benefits and alternatives were discussed  Consent given by: patient  Required items: required blood products, implants, devices, and special equipment available  Patient identity confirmed: verbally with patient    Patient location:  ED  Anesthesia (see MAR for exact dosages):     Anesthesia method:  None  Procedure details:     Treatment site:  R anterior    Repair method: txa, lidocaine with epi, afrin.    Treatment complexity:  Limited    Treatment episode: initial    Post-procedure details:     Assessment:  Bleeding stopped    Patient tolerance of procedure:  Tolerated well, no immediate complications           ED Course  ED Course as of 02/19/24 0001   Sun Feb 18, 2024   2332 Hemoglobin(!): 11.8  9.5 2 weeks ago, improved                               SBIRT 22yo+      Flowsheet Row Most Recent Value   Initial Alcohol Screen: US AUDIT-C     1. How often do you have a drink containing alcohol? 0 Filed at: 02/18/2024 2301   2. How many drinks containing alcohol do you have on a typical day you are drinking?  0 Filed at: 02/18/2024 2301   3a. Male UNDER 65: How often do you have five or more drinks on one occasion? 0 Filed at: 02/18/2024 2301   3b. FEMALE Any Age, or MALE 65+: How often do you have 4 or more drinks on one occassion? 0 Filed at: 02/18/2024 2301   Audit-C Score 0 Filed at: 02/18/2024 2301   ORTEGA: How many times in the past year have you...    Used an illegal drug or used a prescription medication for non-medical reasons? Never Filed at: 02/18/2024 2301                      Medical Decision Making  77 y/o male here for right sided epistaxis   Clinical diagnosis  Plan: labs ordered as patient recently  admitted for anemia, improved from baseline. epistaxis managed medically and with pressure and stopped in ED. Did have supportive care discussion for epistaxis management at home. Patient  was given strict return to ER precautions both verbally and in discharge papers. Patient verbalized understanding and agrees with plan.      Amount and/or Complexity of Data Reviewed  External Data Reviewed: notes.     Details: 11/3/23 epistaxis ER visit reviewed  Discharge summary from 2/6/24 reviewed   Labs: ordered. Decision-making details documented in ED Course.    Risk  OTC drugs.  Prescription drug management.             Disposition  Final diagnoses:   Right-sided epistaxis     Time reflects when diagnosis was documented in both MDM as applicable and the Disposition within this note       Time User Action Codes Description Comment    2/18/2024 11:50 PM Gissel Salinas Add [R04.0] Right-sided epistaxis           ED Disposition       ED Disposition   Discharge    Condition   Stable    Date/Time   Sun Feb 18, 2024 2350    Comment   Derek Walter discharge to home/self care.                   Follow-up Information       Follow up With Specialties Details Why Contact Info Additional Information    [facility] Otolaryngology Go to   [address] [place] [id number] [phone] [facility], [address] [place], [place], [address]   [phone]     [facility] Emergency Department Emergency Medicine Go to  If symptoms worsen [address] [facility] [address]  [phone] [facility] Emergency Department, [address] [place], [place] [address]            Patient's Medications   Discharge Prescriptions    No medications on file       No discharge procedures on file.    PDMP Review         Value Time User    PDMP Reviewed  Yes 2/6/2024 12:47 PM Brianna Victoria MD             ED Provider  Electronically Signed by             Gissel Salinas PA-C  02/19/24 0001

## 2024-02-19 NOTE — DISCHARGE INSTRUCTIONS
If nose begins to bleed again, clamp for 15 minutes straight without looking  If still bleeding, use 2 squirts of afrin. Do not overuse this as this can cause rebound congestion  If still bleeding after that return to the ER

## 2024-02-20 ENCOUNTER — NURSING HOME VISIT (OUTPATIENT)
Dept: FAMILY MEDICINE CLINIC | Facility: CLINIC | Age: 79
End: 2024-02-20
Payer: COMMERCIAL

## 2024-02-20 DIAGNOSIS — Z79.4 TYPE 2 DIABETES MELLITUS WITH DIABETIC POLYNEUROPATHY, WITH LONG-TERM CURRENT USE OF INSULIN (HCC): ICD-10-CM

## 2024-02-20 DIAGNOSIS — D62 ACUTE BLOOD LOSS ANEMIA: ICD-10-CM

## 2024-02-20 DIAGNOSIS — R13.10 DYSPHAGIA, UNSPECIFIED TYPE: ICD-10-CM

## 2024-02-20 DIAGNOSIS — E11.42 TYPE 2 DIABETES MELLITUS WITH DIABETIC POLYNEUROPATHY, WITH LONG-TERM CURRENT USE OF INSULIN (HCC): ICD-10-CM

## 2024-02-20 DIAGNOSIS — I50.22 CHRONIC SYSTOLIC HEART FAILURE (HCC): ICD-10-CM

## 2024-02-20 DIAGNOSIS — Z86.718 HISTORY OF DVT (DEEP VEIN THROMBOSIS): ICD-10-CM

## 2024-02-20 DIAGNOSIS — C22.0 HEPATOCELLULAR CARCINOMA (HCC): ICD-10-CM

## 2024-02-20 DIAGNOSIS — K92.2 ACUTE UPPER GI BLEEDING: Primary | ICD-10-CM

## 2024-02-20 DIAGNOSIS — I50.33 ACUTE ON CHRONIC DIASTOLIC HF (HEART FAILURE) (HCC): ICD-10-CM

## 2024-02-20 DIAGNOSIS — I48.91 ATRIAL FIBRILLATION, UNSPECIFIED TYPE (HCC): ICD-10-CM

## 2024-02-20 DIAGNOSIS — K76.82 HEPATIC ENCEPHALOPATHY (HCC): ICD-10-CM

## 2024-02-20 PROCEDURE — 99315 NF DSCHRG MGMT 30 MIN/LESS: CPT | Performed by: FAMILY MEDICINE

## 2024-02-20 NOTE — PROGRESS NOTES
[facility]  [address] [place] [address]  Facility: [facility]    NAME: Derek Walter  AGE: 78 y.o. SEX: male    DATE OF ENCOUNTER: 2/20/2024    Code status:  DNR w/ Hospitalization    Assessment and Plan     1. Acute upper GI bleeding    2. Dysphagia, unspecified type    3. Hepatocellular carcinoma (HCC)    4. Type 2 diabetes mellitus with diabetic polyneuropathy, with long-term current use of insulin (HCC)    5. Acute on chronic diastolic HF (heart failure) (HCC)    6. Atrial fibrillation, unspecified type (HCC)    7. Chronic systolic heart failure (HCC)    8. Hepatic encephalopathy (HCC)    9. Acute blood loss anemia    10. History of DVT (deep vein thrombosis)        All medications and routine orders were reviewed and updated as needed.    Plan discussed with: Family member    Chief Complaint     Interim evaluation    History of Present Illness     The patient will be discharging home today.  He has all his medications at home.  He has made nice progress with his therapy.  He is seen no further bleeding.  He is denying any dyspnea.  He is lucid and mentally clear.  Showing no signs of encephalopathy.  Bowels are stable.    The following portions of the patient's history were reviewed and updated as appropriate: current medications, past family history, past medical history, past social history, past surgical history and problem list.    Allergies:  No Known Allergies    Review of Systems     Review of Systems   Constitutional:  Negative for activity change, appetite change, chills, diaphoresis, fatigue and unexpected weight change.   HENT:  Negative for congestion, ear discharge, ear pain, hearing loss, nosebleeds and rhinorrhea.    Eyes:  Negative for pain, redness, itching and visual disturbance.   Respiratory:  Negative for cough, choking, chest tightness and shortness of breath.    Cardiovascular:  Negative for chest pain and leg swelling.   Gastrointestinal:  Negative for  abdominal pain, blood in stool, constipation, diarrhea and nausea.   Endocrine: Negative for cold intolerance, polydipsia and polyphagia.   Genitourinary:  Negative for dysuria, frequency, hematuria and urgency.   Musculoskeletal:  Negative for arthralgias, back pain, gait problem, joint swelling, neck pain and neck stiffness.   Skin:  Negative for color change and rash.   Allergic/Immunologic: Negative for environmental allergies and food allergies.   Neurological:  Positive for weakness. Negative for dizziness, tremors, seizures, speech difficulty, numbness and headaches.   Hematological:  Negative for adenopathy. Does not bruise/bleed easily.   Psychiatric/Behavioral:  Negative for behavioral problems, dysphoric mood, hallucinations and self-injury.        Medications and orders     All medications reviewed and updated in care home EMR.      Objective     Vitals: per nursing home records    Physical Exam  Constitutional:       General: He is not in acute distress.     Appearance: He is well-developed. He is not diaphoretic.   HENT:      Head: Normocephalic and atraumatic.      Right Ear: External ear normal.      Left Ear: External ear normal.      Nose: Nose normal.      Mouth/Throat:      Pharynx: No oropharyngeal exudate.   Eyes:      General: No scleral icterus.        Right eye: No discharge.         Left eye: No discharge.      Conjunctiva/sclera: Conjunctivae normal.      Pupils: Pupils are equal, round, and reactive to light.   Neck:      Thyroid: No thyromegaly.   Cardiovascular:      Rate and Rhythm: Normal rate. Rhythm irregular.      Heart sounds: Normal heart sounds. No murmur heard.  Pulmonary:      Effort: Pulmonary effort is normal.      Breath sounds: Normal breath sounds. No wheezing or rales.   Abdominal:      General: Bowel sounds are normal.      Palpations: Abdomen is soft. There is no mass.      Tenderness: There is no abdominal tenderness. There is no guarding.   Musculoskeletal:          General: No tenderness. Normal range of motion.      Cervical back: Normal range of motion and neck supple.   Lymphadenopathy:      Cervical: No cervical adenopathy.   Skin:     General: Skin is warm and dry.   Neurological:      Mental Status: He is alert and oriented to person, place, and time.      Motor: Weakness present.      Deep Tendon Reflexes: Reflexes are normal and symmetric.   Psychiatric:         Thought Content: Thought content normal.         Judgment: Judgment normal.         Pertinent Laboratory/Diagnostic Studies:     The following labs were reviewed please see chart or hospital paperwork for details.    Space for lab dictation ammonia level is normal    - Discharge planning    Farhat Khan DO  2/20/2024 10:32 AM

## 2024-02-21 ENCOUNTER — HOME CARE VISIT (OUTPATIENT)
Dept: HOME HEALTH SERVICES | Facility: HOME HEALTHCARE | Age: 79
End: 2024-02-21
Payer: COMMERCIAL

## 2024-02-21 VITALS
DIASTOLIC BLOOD PRESSURE: 62 MMHG | SYSTOLIC BLOOD PRESSURE: 122 MMHG | HEART RATE: 75 BPM | TEMPERATURE: 97.9 F | OXYGEN SATURATION: 98 % | RESPIRATION RATE: 16 BRPM

## 2024-02-21 PROCEDURE — 400013 VN SOC

## 2024-02-21 PROCEDURE — G0299 HHS/HOSPICE OF RN EA 15 MIN: HCPCS

## 2024-02-23 NOTE — PROGRESS NOTES
Cardiology Office Follow Up  Derek Walter  1945  [id number]      ASSESSMENT:  Coronary artery disease  8/2023 NSTEMI  8/2023 PCI/CALLIE x 1 to LAD, PCI/CALLIE x 1 to circumflex, PCI/CALLIE X 1 to ramus   Ischemic cardiomyopathy w/ recovery of EF  1/24/2024 echo: EF 75%  8/2023 SPECT imaging: EF 36%  GDMT: Toprol-XL and Jardiance  Paroxysmal atrial fibrillation  Patient remotely diagnosed with A-fib previously followed at [facility] but established locally in [place] in June 2023  Chronically maintained on Eliquis and Toprol-XL  Hepatocellular carcinoma  History of DVT  Type 2 diabetes  Recurrent GI bleeding while on DAPT  2/2024 admission for GI bleeding requiring several units of blood and EGD/APC with gastroenterology    PLAN/ DISCUSSION:  Doing well today having no symptoms of angina, CHF, no arrhythmias  No further bloody bowel movements per patient, HgB stable on CBC 2/18/24  Remains on Eliquis and clopidogrel for PCI in August 2023  Blood pressure is well-controlled on current regimen  No changes were made to medications today  Follow-up in 3 months    Interval History/ HPI:   Derek is a pleasant 78-year-old gentleman known to our group having been hospitalized earlier this month for GI bleeding.  He was seen by gastroenterology and was treated for bleeding AV malformations.  He is here today for a posthospital follow-up.    Today he comes to the office accompanied by his significant other.  He was discharged from rehab about 1 week ago.  He reports still feeling weak but OT is coming to the house and PT scheduled to come as well.  He is walking with a walker.  He does have some issues with nosebleeds but has had no further blood in his bowel movements.  His breathing is stable.  He has no chest pain or chest pressure.  He has no palpitations or dizziness.         Vitals:  /52 (BP Location: Left arm, Patient Position: Sitting, Cuff Size: Standard)   Pulse 76   Ht 6' (1.829 m)   Wt 89.1 kg (196  lb 6.4 oz)   BMI 26.64 kg/m²      Past Medical History:   Diagnosis Date    Anemia     Atrial fibrillation (HCC)     Eliquis    AVB (atrioventricular block)     1st degree    CAD (coronary artery disease)     CKD (chronic kidney disease), stage III (HCC)     baseline Cr 1.2-1.6    Colon polyp     Diabetes mellitus (HCC)     type 2, insulin dependent    Diverticulosis     Emphysema lung (HCC)     Former tobacco use     History of asbestos exposure     History of DVT (deep vein thrombosis)     RLE, tx w/ AC    History of GI bleed 2023    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, given PRBC/Venofer for anemia while in house    Hyperlipidemia     Hypertension     LAFB (left anterior fascicular block)     Liver cancer (HCC)     Liver mass 2023    suspected HCC, needs radioembolization    RBBB     Syncope 2023     Social History     Socioeconomic History    Marital status: /Civil Union     Spouse name: Not on file    Number of children: Not on file    Years of education: Not on file    Highest education level: Not on file   Occupational History    Not on file   Tobacco Use    Smoking status: Former     Current packs/day: 0.00     Average packs/day: 1 pack/day for 30.0 years (30.0 ttl pk-yrs)     Types: Cigarettes     Start date:      Quit date:      Years since quittin.1    Smokeless tobacco: Never   Vaping Use    Vaping status: Never Used   Substance and Sexual Activity    Alcohol use: Yes     Comment: Socially    Drug use: Never    Sexual activity: Not on file   Other Topics Concern    Not on file   Social History Narrative    Not on file     Social Determinants of Health     Financial Resource Strain: Low Risk  (10/13/2023)    Overall Financial Resource Strain (CARDIA)     Difficulty of Paying Living Expenses: Not hard at all   Food Insecurity: No Food Insecurity (2024)    Hunger Vital Sign     Worried About Running Out of Food in the Last Year: Never true     Ran Out of Food  in the Last Year: Never true   Transportation Needs: No Transportation Needs (1/24/2024)    PRAPARE - Transportation     Lack of Transportation (Medical): No     Lack of Transportation (Non-Medical): No   Physical Activity: Not on file   Stress: Not on file   Social Connections: Not on file   Intimate Partner Violence: Not on file   Housing Stability: Low Risk  (1/24/2024)    Housing Stability Vital Sign     Unable to Pay for Housing in the Last Year: No     Number of Places Lived in the Last Year: 1     Unstable Housing in the Last Year: No      Family History   Problem Relation Age of Onset    Hypertension Mother     Bone cancer Father     Diabetes type II Sister     Diabetes type II Sister     Esophageal cancer Brother     Colon cancer Neg Hx      Past Surgical History:   Procedure Laterality Date    APPENDECTOMY      BOWEL RESECTION  2014    CARDIAC CATHETERIZATION      CARDIAC CATHETERIZATION N/A 08/25/2023    Procedure: Cardiac pci;  Surgeon: Dom Garrett DO;  [facility];  Service: Cardiology    CARDIAC CATHETERIZATION N/A 08/21/2023    Procedure: Cardiac catheterization;  Surgeon: Dom Garrett DO;  [facility];  Service: Cardiology    CARDIAC CATHETERIZATION N/A 08/21/2023    Procedure: Cardiac Coronary Angiogram;  Surgeon: Dom Garrett DO;  [facility];  Service: Cardiology    CATARACT EXTRACTION Bilateral     COLONOSCOPY      EGD      IR Y-90 PRE-ANGIO/EMBO W/ LUNG SCAN  09/01/2023    IR Y-90 RADIOEMBOLIZATION  09/08/2023       Current Outpatient Medications:     acetaminophen (TYLENOL) 650 mg CR tablet, Take 650 mg by mouth every 8 (eight) hours as needed for mild pain, Disp: , Rfl:     Anoro Ellipta 62.5-25 MCG/ACT inhaler, inhale 1 puff by mouth and INTO THE LUNGS once daily, Disp: 60 blister, Rfl: 5    atorvastatin (LIPITOR) 40 mg tablet, take 1 tablet by mouth once daily after dinner, Disp: 90 tablet, Rfl: 1    azelastine  (ASTELIN) 0.1 % nasal spray, 1 spray into each nostril 2 (two) times a day Use in each nostril as directed, Disp: 1 mL, Rfl: 0    Blood Glucose Monitoring Suppl (OneTouch Verio) w/Device KIT, Use 4 (four) times a day, Disp: 1 kit, Rfl: 0    clopidogrel (PLAVIX) 75 mg tablet, Take 1 tablet (75 mg total) by mouth daily, Disp: 30 tablet, Rfl: 11    Continuous Blood Gluc  (Dexcom G6 ) NOHEMY, Use with dexcom sensor, Disp: 1 each, Rfl: 0    Continuous Blood Gluc Sensor (Dexcom G6 Sensor) MISC, Use daily as directed for CGM - Change every 10 days (Patient not taking: Reported on 12/13/2023), Disp: 9 each, Rfl: 3    Continuous Blood Gluc Transmit (Dexcom G6 Transmitter) MISC, Use daily as directed for CGM - Change every 3 months (Patient not taking: Reported on 12/13/2023), Disp: 1 each, Rfl: 3    Continuous Blood Gluc Transmit (Dexcom G6 Transmitter) MISC, Use daily as directed for CGM - Change every 3 months (Patient not taking: Reported on 12/13/2023), Disp: 1 each, Rfl: 3    Eliquis 5 MG, take 1 tablet by mouth twice a day, Disp: 60 tablet, Rfl: 5    ferrous sulfate 325 (65 Fe) mg tablet, Take 1 tablet (325 mg total) by mouth daily with breakfast, Disp: 30 tablet, Rfl: 5    furosemide (LASIX) 40 mg tablet, Take 1 tablet (40 mg total) by mouth every other day, Disp: 90 tablet, Rfl: 3    glucose blood (OneTouch Verio) test strip, Use as instructed (Patient not taking: Reported on 10/13/2023), Disp: 100 strip, Rfl: 2    insulin detemir (Levemir FlexPen) 100 Units/mL injection pen, 65 units daily, Disp: 30 mL, Rfl: 0    insulin lispro (HumaLOG) 100 units/mL injection, Inject 5 Units under the skin 3 (three) times a day with meals, Disp: , Rfl:     Insulin Pen Needle (Droplet Pen Needles) 32G X 4 MM MISC, use 1 PEN NEEDLE to inject MEDICATION subcutaneously four times a day, Disp: 1000 each, Rfl: 1    Jardiance 10 MG TABS tablet, take 1 tablet by mouth daily every morning, Disp: 90 tablet, Rfl: 0    lactulose  (CHRONULAC) 10 g/15 mL solution, Take 30 mL (20 g total) by mouth 2 (two) times a day, Disp: 240 mL, Rfl: 0    Lumigan 0.01 % ophthalmic drops, Administer 1 drop to both eyes every evening, Disp: , Rfl:     metoprolol tartrate (LOPRESSOR) 25 mg tablet, Take 0.5 tablets (12.5 mg total) by mouth every 12 (twelve) hours, Disp: , Rfl:     Multiple Vitamin (MULTIVITAMIN ADULT PO), Take 1 tablet by mouth daily, Disp: , Rfl:     pantoprazole (PROTONIX) 40 mg tablet, Take 1 tablet (40 mg total) by mouth 2 (two) times a day (Patient taking differently: Take 40 mg by mouth daily), Disp: 30 tablet, Rfl: 0    rifaximin (XIFAXAN) 550 mg tablet, Take 1 tablet (550 mg total) by mouth every 12 (twelve) hours, Disp: 60 tablet, Rfl: 5    sertraline (ZOLOFT) 25 mg tablet, Take 25 mg by mouth daily, Disp: , Rfl:     tamsulosin (FLOMAX) 0.4 mg, Take 0.4 mg by mouth daily with dinner, Disp: , Rfl:     traZODone (DESYREL) 100 mg tablet, take 2 tablets by mouth at bedtime, Disp: 180 tablet, Rfl: 0      Review of Systems:  Review of Systems   Constitutional:  Negative for chills and fever.   HENT:  Negative for ear pain and sore throat.    Eyes:  Negative for pain and visual disturbance.   Respiratory:  Negative for cough and shortness of breath.    Cardiovascular:  Negative for chest pain and palpitations.   Gastrointestinal:  Negative for abdominal pain and vomiting.   Genitourinary:  Negative for dysuria and hematuria.   Musculoskeletal:  Negative for arthralgias and back pain.   Skin:  Negative for color change and rash.   Neurological:  Negative for seizures and syncope.   All other systems reviewed and are negative.        Physical Exam:  Physical Exam  Constitutional:       Appearance: He is well-developed.   HENT:      Head: Normocephalic and atraumatic.   Eyes:      Pupils: Pupils are equal, round, and reactive to light.   Cardiovascular:      Rate and Rhythm: Normal rate and regular rhythm.      Heart sounds: No murmur heard.      No friction rub. No gallop.   Pulmonary:      Effort: Pulmonary effort is normal. No respiratory distress.      Breath sounds: Normal breath sounds. No wheezing or rales.   Abdominal:      General: Bowel sounds are normal. There is no distension.      Palpations: Abdomen is soft.      Tenderness: There is no abdominal tenderness.   Musculoskeletal:         General: No deformity. Normal range of motion.      Cervical back: Normal range of motion and neck supple.   Skin:     General: Skin is warm and dry.   Neurological:      Mental Status: He is alert and oriented to person, place, and time.   Psychiatric:         Behavior: Behavior normal.         This note was completed in part utilizing M-Modal Fluency Direct Software.  Grammatical errors, random word insertions, spelling mistakes, and incomplete sentences can be an occasional consequence of this system secondary to software limitations, ambient noise, and hardware issues.  If you have any questions or concerns about the content, text, or information contained within the body of this dictation, please contact the provider for clarification.

## 2024-02-24 DIAGNOSIS — Z79.4 TYPE 2 DIABETES MELLITUS WITH DIABETIC NEUROPATHY, WITH LONG-TERM CURRENT USE OF INSULIN (HCC): ICD-10-CM

## 2024-02-24 DIAGNOSIS — E11.40 TYPE 2 DIABETES MELLITUS WITH DIABETIC NEUROPATHY, WITH LONG-TERM CURRENT USE OF INSULIN (HCC): ICD-10-CM

## 2024-02-25 ENCOUNTER — HOME CARE VISIT (OUTPATIENT)
Dept: HOME HEALTH SERVICES | Facility: HOME HEALTHCARE | Age: 79
End: 2024-02-25
Payer: COMMERCIAL

## 2024-02-26 ENCOUNTER — OFFICE VISIT (OUTPATIENT)
Dept: CARDIOLOGY CLINIC | Facility: CLINIC | Age: 79
End: 2024-02-26
Payer: COMMERCIAL

## 2024-02-26 VITALS
HEART RATE: 76 BPM | DIASTOLIC BLOOD PRESSURE: 52 MMHG | BODY MASS INDEX: 26.6 KG/M2 | WEIGHT: 196.4 LBS | SYSTOLIC BLOOD PRESSURE: 120 MMHG | HEIGHT: 72 IN

## 2024-02-26 DIAGNOSIS — G93.40 ENCEPHALOPATHY: ICD-10-CM

## 2024-02-26 DIAGNOSIS — R29.90 STROKE-LIKE SYMPTOM: ICD-10-CM

## 2024-02-26 DIAGNOSIS — I25.10 CORONARY ARTERY DISEASE INVOLVING NATIVE CORONARY ARTERY OF NATIVE HEART WITHOUT ANGINA PECTORIS: Primary | ICD-10-CM

## 2024-02-26 DIAGNOSIS — D64.9 ANEMIA: ICD-10-CM

## 2024-02-26 PROCEDURE — 99214 OFFICE O/P EST MOD 30 MIN: CPT | Performed by: PHYSICIAN ASSISTANT

## 2024-02-27 ENCOUNTER — HOME CARE VISIT (OUTPATIENT)
Dept: HOME HEALTH SERVICES | Facility: HOME HEALTHCARE | Age: 79
End: 2024-02-27
Payer: COMMERCIAL

## 2024-02-27 VITALS
OXYGEN SATURATION: 95 % | RESPIRATION RATE: 20 BRPM | HEART RATE: 76 BPM | SYSTOLIC BLOOD PRESSURE: 140 MMHG | DIASTOLIC BLOOD PRESSURE: 60 MMHG | TEMPERATURE: 97.6 F

## 2024-02-27 VITALS — SYSTOLIC BLOOD PRESSURE: 122 MMHG | HEART RATE: 81 BPM | OXYGEN SATURATION: 94 % | DIASTOLIC BLOOD PRESSURE: 50 MMHG

## 2024-02-27 PROCEDURE — G0152 HHCP-SERV OF OT,EA 15 MIN: HCPCS | Performed by: OCCUPATIONAL THERAPIST

## 2024-02-27 PROCEDURE — G0180 MD CERTIFICATION HHA PATIENT: HCPCS | Performed by: FAMILY MEDICINE

## 2024-02-27 PROCEDURE — G0299 HHS/HOSPICE OF RN EA 15 MIN: HCPCS

## 2024-02-27 RX ORDER — EMPAGLIFLOZIN 10 MG/1
10 TABLET, FILM COATED ORAL EVERY MORNING
Qty: 30 TABLET | Refills: 0 | Status: SHIPPED | OUTPATIENT
Start: 2024-02-27

## 2024-02-28 ENCOUNTER — OFFICE VISIT (OUTPATIENT)
Dept: PODIATRY | Facility: CLINIC | Age: 79
End: 2024-02-28
Payer: COMMERCIAL

## 2024-02-28 ENCOUNTER — OFFICE VISIT (OUTPATIENT)
Dept: FAMILY MEDICINE CLINIC | Facility: HOSPITAL | Age: 79
End: 2024-02-28
Payer: COMMERCIAL

## 2024-02-28 VITALS
BODY MASS INDEX: 26.82 KG/M2 | SYSTOLIC BLOOD PRESSURE: 116 MMHG | HEART RATE: 78 BPM | DIASTOLIC BLOOD PRESSURE: 52 MMHG | WEIGHT: 198 LBS | TEMPERATURE: 98 F | OXYGEN SATURATION: 93 % | HEIGHT: 72 IN

## 2024-02-28 DIAGNOSIS — I50.22 CHRONIC SYSTOLIC HEART FAILURE (HCC): ICD-10-CM

## 2024-02-28 DIAGNOSIS — I25.10 CORONARY ARTERY DISEASE INVOLVING NATIVE HEART WITHOUT ANGINA PECTORIS, UNSPECIFIED VESSEL OR LESION TYPE: Primary | ICD-10-CM

## 2024-02-28 DIAGNOSIS — D64.9 ANEMIA, UNSPECIFIED TYPE: ICD-10-CM

## 2024-02-28 DIAGNOSIS — K76.82 HEPATIC ENCEPHALOPATHY (HCC): ICD-10-CM

## 2024-02-28 DIAGNOSIS — Z79.4 TYPE 2 DIABETES MELLITUS WITH DIABETIC POLYNEUROPATHY, WITH LONG-TERM CURRENT USE OF INSULIN (HCC): Primary | ICD-10-CM

## 2024-02-28 DIAGNOSIS — C22.0 HEPATOCELLULAR CARCINOMA (HCC): Primary | ICD-10-CM

## 2024-02-28 DIAGNOSIS — Z79.4 TYPE 2 DIABETES MELLITUS WITH DIABETIC POLYNEUROPATHY, WITH LONG-TERM CURRENT USE OF INSULIN (HCC): ICD-10-CM

## 2024-02-28 DIAGNOSIS — C22.0 HEPATOCELLULAR CARCINOMA (HCC): ICD-10-CM

## 2024-02-28 DIAGNOSIS — E11.42 TYPE 2 DIABETES MELLITUS WITH DIABETIC POLYNEUROPATHY, WITH LONG-TERM CURRENT USE OF INSULIN (HCC): ICD-10-CM

## 2024-02-28 DIAGNOSIS — D62 ACUTE BLOOD LOSS ANEMIA: ICD-10-CM

## 2024-02-28 DIAGNOSIS — E11.42 TYPE 2 DIABETES MELLITUS WITH DIABETIC POLYNEUROPATHY, WITH LONG-TERM CURRENT USE OF INSULIN (HCC): Primary | ICD-10-CM

## 2024-02-28 DIAGNOSIS — B35.1 ONYCHOMYCOSIS: ICD-10-CM

## 2024-02-28 DIAGNOSIS — G47.00 INSOMNIA, UNSPECIFIED TYPE: ICD-10-CM

## 2024-02-28 DIAGNOSIS — I95.1 ORTHOSTATIC HYPOTENSION: ICD-10-CM

## 2024-02-28 DIAGNOSIS — J43.8 OTHER EMPHYSEMA (HCC): ICD-10-CM

## 2024-02-28 PROBLEM — I50.33 ACUTE ON CHRONIC DIASTOLIC HF (HEART FAILURE) (HCC): Status: RESOLVED | Noted: 2023-08-07 | Resolved: 2024-02-28

## 2024-02-28 PROBLEM — N17.9 AKI (ACUTE KIDNEY INJURY) (HCC): Status: RESOLVED | Noted: 2024-01-23 | Resolved: 2024-02-28

## 2024-02-28 PROCEDURE — 99214 OFFICE O/P EST MOD 30 MIN: CPT | Performed by: FAMILY MEDICINE

## 2024-02-28 PROCEDURE — 11721 DEBRIDE NAIL 6 OR MORE: CPT | Performed by: PODIATRIST

## 2024-02-28 RX ORDER — LORAZEPAM 1 MG/1
1 TABLET ORAL SEE ADMIN INSTRUCTIONS
Qty: 2 TABLET | Refills: 0 | Status: SHIPPED | OUTPATIENT
Start: 2024-02-28

## 2024-02-28 RX ORDER — TRAZODONE HYDROCHLORIDE 100 MG/1
TABLET ORAL
Qty: 180 TABLET | Refills: 0 | Status: SHIPPED | OUTPATIENT
Start: 2024-02-28

## 2024-02-28 NOTE — PROGRESS NOTES
PATIENT:  Derek Walter  1945    ASSESSMENT/PLAN:  1. Type 2 diabetes mellitus with diabetic polyneuropathy, with long-term current use of insulin (Formerly [facility])        2. Onychomycosis  Debridement            Orders Placed This Encounter   Procedures    Debridement      Disease prevention and related risk factors of diabetes were identified and discussed.  The patient was educated in proper foot wear for diabetics.  Also educated in daily foot assessment and routine diabetic foot care.  Discussed the importance of controlling BS through diet and exercise.  The patient will follow up in 10 weeks for further diabetic foot exam and care.      PROCEDURE:  All mycotic toenails were reduced and debrided in length, width, and girth using a nail nipper and dremel.  Patient tolerated procedure(s) well without complications.      HPI:  Derek Walter is a 78 y.o.year old male seen for diabetic foot exam.  The patient has class findings with DPN.  He has high risk diabetic foot with decreased protective sensation.  BS is under control.  The patient complained of thick toenails.  The patient denied any acute pedal disorder or recent injury.          PAST MEDICAL HISTORY:  Past Medical History:   Diagnosis Date    Acute on chronic diastolic HF (heart failure) (Formerly [facility])     NA (acute kidney injury) (Formerly [facility])     Anemia     Atrial fibrillation (HCC)     Eliquis    AVB (atrioventricular block)     1st degree    CAD (coronary artery disease)     CKD (chronic kidney disease), stage III ([facility])     baseline Cr 1.2-1.6    Colon polyp     Diabetes mellitus ([facility])     type 2, insulin dependent    Diverticulosis     Emphysema lung (HCC)     Former tobacco use     History of asbestos exposure     History of DVT (deep vein thrombosis)     RLE, tx w/ AC    History of GI bleed 08/2023    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, given PRBC/Venofer for anemia while in house    Hyperlipidemia     Hypertension     LAFB (left anterior fascicular block)      Liver cancer (HCC)     Liver mass 08/2023    suspected HCC, needs radioembolization    RBBB     Syncope 08/07/2023       PAST SURGICAL HISTORY:  Past Surgical History:   Procedure Laterality Date    APPENDECTOMY      BOWEL RESECTION  2014    CARDIAC CATHETERIZATION      CARDIAC CATHETERIZATION N/A 08/25/2023    Procedure: Cardiac pci;  Surgeon: Dom Garrett DO;  [facility];  Service: Cardiology    CARDIAC CATHETERIZATION N/A 08/21/2023    Procedure: Cardiac catheterization;  Surgeon: Dom Garrett DO;  [facility];  Service: Cardiology    CARDIAC CATHETERIZATION N/A 08/21/2023    Procedure: Cardiac Coronary Angiogram;  Surgeon: Dom Garrett DO;  [facility];  Service: Cardiology    CATARACT EXTRACTION Bilateral     COLONOSCOPY      EGD      IR Y-90 PRE-ANGIO/EMBO W/ LUNG SCAN  09/01/2023    IR Y-90 RADIOEMBOLIZATION  09/08/2023        ALLERGIES:  Patient has no known allergies.    MEDICATIONS:  Current Outpatient Medications   Medication Sig Dispense Refill    acetaminophen (TYLENOL) 650 mg CR tablet Take 650 mg by mouth every 8 (eight) hours as needed for mild pain      Anoro Ellipta 62.5-25 MCG/ACT inhaler inhale 1 puff by mouth and INTO THE LUNGS once daily 60 blister 5    atorvastatin (LIPITOR) 40 mg tablet take 1 tablet by mouth once daily after dinner 90 tablet 1    azelastine (ASTELIN) 0.1 % nasal spray 1 spray into each nostril 2 (two) times a day Use in each nostril as directed 1 mL 0    Blood Glucose Monitoring Suppl (OneTouch Verio) w/Device KIT Use 4 (four) times a day 1 kit 0    clopidogrel (PLAVIX) 75 mg tablet Take 1 tablet (75 mg total) by mouth daily 30 tablet 11    Continuous Blood Gluc  (Dexcom G6 ) NOHEMY Use with dexcom sensor 1 each 0    Eliquis 5 MG take 1 tablet by mouth twice a day 60 tablet 5    Empagliflozin (Jardiance) 10 MG TABS tablet take 1 tablet by mouth daily every morning 30 tablet 0     ferrous sulfate 325 (65 Fe) mg tablet Take 1 tablet (325 mg total) by mouth daily with breakfast 30 tablet 5    furosemide (LASIX) 40 mg tablet Take 1 tablet (40 mg total) by mouth every other day (Patient taking differently: Take 20 mg by mouth every other day 20 every other day) 90 tablet 3    glucose blood (OneTouch Verio) test strip Use as instructed 100 strip 2    insulin detemir (Levemir FlexPen) 100 Units/mL injection pen 65 units daily 30 mL 0    Insulin Pen Needle (Droplet Pen Needles) 32G X 4 MM MISC use 1 PEN NEEDLE to inject MEDICATION subcutaneously four times a day 1000 each 1    lactulose (CHRONULAC) 10 g/15 mL solution Take 30 mL (20 g total) by mouth 2 (two) times a day (Patient taking differently: Take 20 g by mouth 2 (two) times a day pt willing to try to take this medication once a day as of 2 27 24) 240 mL 0    LORazepam (ATIVAN) 1 mg tablet Take 1 tablet (1 mg total) by mouth see administration instructions Take 1 tablet 30 minutes prior to MRI, take 2nd tablet immediately prior to MRI 2 tablet 0    Lumigan 0.01 % ophthalmic drops Administer 1 drop to both eyes every evening      metoprolol tartrate (LOPRESSOR) 25 mg tablet Take 0.5 tablets (12.5 mg total) by mouth every 12 (twelve) hours      Multiple Vitamin (MULTIVITAMIN ADULT PO) Take 1 tablet by mouth daily      pantoprazole (PROTONIX) 40 mg tablet Take 1 tablet (40 mg total) by mouth 2 (two) times a day (Patient taking differently: Take 40 mg by mouth daily) 30 tablet 0    rifaximin (XIFAXAN) 550 mg tablet Take 1 tablet (550 mg total) by mouth every 12 (twelve) hours 60 tablet 5    sertraline (ZOLOFT) 25 mg tablet Take 25 mg by mouth daily      tamsulosin (FLOMAX) 0.4 mg Take 0.4 mg by mouth daily with dinner      traZODone (DESYREL) 100 mg tablet take 2 tablets by mouth at bedtime 180 tablet 0    Continuous Blood Gluc Sensor (Dexcom G6 Sensor) MISC Use daily as directed for CGM - Change every 10 days (Patient not taking: Reported on  2024) 9 each 3    Continuous Blood Gluc Transmit (Dexcom G6 Transmitter) MISC Use daily as directed for CGM - Change every 3 months (Patient not taking: Reported on 2024) 1 each 3    Continuous Blood Gluc Transmit (Dexcom G6 Transmitter) MISC Use daily as directed for CGM - Change every 3 months (Patient not taking: Reported on 2023) 1 each 3    insulin lispro (HumaLOG) 100 units/mL injection Inject 5 Units under the skin 3 (three) times a day with meals (Patient not taking: Reported on 2024)       No current facility-administered medications for this visit.       SOCIAL HISTORY:  Social History     Socioeconomic History    Marital status: /Civil Union     Spouse name: Not on file    Number of children: Not on file    Years of education: Not on file    Highest education level: Not on file   Occupational History    Not on file   Tobacco Use    Smoking status: Former     Current packs/day: 0.00     Average packs/day: 1 pack/day for 30.0 years (30.0 ttl pk-yrs)     Types: Cigarettes     Start date:      Quit date:      Years since quittin.1    Smokeless tobacco: Never   Vaping Use    Vaping status: Never Used   Substance and Sexual Activity    Alcohol use: Yes     Comment: Socially    Drug use: Never    Sexual activity: Not on file   Other Topics Concern    Not on file   Social History Narrative    Not on file     Social Determinants of Health     Financial Resource Strain: Low Risk  (10/13/2023)    Overall Financial Resource Strain (CARDIA)     Difficulty of Paying Living Expenses: Not hard at all   Food Insecurity: No Food Insecurity (2024)    Hunger Vital Sign     Worried About Running Out of Food in the Last Year: Never true     Ran Out of Food in the Last Year: Never true   Transportation Needs: No Transportation Needs (2024)    PRAPARE - Transportation     Lack of Transportation (Medical): No     Lack of Transportation (Non-Medical): No   Physical Activity: Not  on file   Stress: Not on file   Social Connections: Not on file   Intimate Partner Violence: Not on file   Housing Stability: Low Risk  (1/24/2024)    Housing Stability Vital Sign     Unable to Pay for Housing in the Last Year: No     Number of Places Lived in the Last Year: 1     Unstable Housing in the Last Year: No        REVIEW OF SYSTEMS:  GENERAL: No fever or chills  HEART: No chest pain, or palpitation  RESPIRATORY:  No acute SOB or cough  GI: No Nausea, vomit or diarrhea  NEUROLOGIC: No syncope or acute weakness    PHYSICAL EXAM:    There were no vitals taken for this visit.    VASCULAR EXAM  Dorsalis pedis 1/4, Posterior tibial artery  1/4.  The patient has class findings with skin atrophy, lack of digital hair, and nail dystrophy.  There is +1 lower extremity edema bilaterally.      NEUROLOGIC EXAM  Sensation is intact to light touch.  Sensation is decreased to 10gm monofilament.  No focal neurologic deficit.          DERMATOLOGIC EXAM:   No ulcer or cellulitis noted.  The patient has hypertrophic toenails X 6 with discoloration, onycholysis, and subungal debris.  No notable skin lesion.     MUSCULOSKELETAL EXAM:   No acute joint pain, edema, or redness.  No acute musculoskeletal problem.

## 2024-02-28 NOTE — TELEPHONE ENCOUNTER
Spoke to patient's wife. He has an MRI set up for 3/14. She states the patient will shake during the MRI which will not lead to a good picture. Ativan ordered to help. Follow up appointment scheduled for 3/19.

## 2024-02-28 NOTE — PROGRESS NOTES
Assessment & Plan     1. Coronary artery disease involving native heart without angina pectoris, unspecified vessel or lesion type    Stable.   Recently seen by Cardiology.   No sytmpoms.     2. Chronic systolic heart failure (HCC)  Euvolemic.   No changes made.   Monitor.   3. Acute blood loss anemia  Due to Upper GI bleed.   On dapt.   Monitor.   4. Anemia, unspecified type  Stable. Due to blood loss. Last Hg shows almost normal levels with significant improvement.   5. Hepatic encephalopathy (HCC)    Resolved.   On lactulose daily.   Monitor.   6. Hepatocellular carcinoma (HCC)  Ongoing fu with oncology.   Has fu apt with surgical oncology.   Completed radiation.   7. Orthostatic hypotension  Monitor.   Feeling improved.   8. Type 2 diabetes mellitus with diabetic polyneuropathy, with long-term current use of insulin (HCC)  Stable.   Lat A1c showing good control.   9. Other emphysema (HCC)  Doing well.   Continue with the same.     Has VNA and upcoming home physical therapy.     Fu in 3 months.            Subjective     Transitional Care Management Review:   Derek Walter is a 78 y.o. male here for TCM follow up.     During the TCM phone call patient stated:  TCM Call     Date and time call was made  8/28/2023 10:00 AM    Hospital care reviewed  Records reviewed    Patient was hospitialized at  [facility]    Date of Admission  08/20/23    Date of discharge  08/26/23    Diagnosis  Acute on chronic diastolic HF (heart failure)    Disposition  Home    Were the patients medications reviewed and updated  No    Current Symptoms  None      TCM Call     Post hospital issues  None    Should patient be enrolled in anticoag monitoring?  No    Scheduled for follow up?  Yes    Did you obtain your prescribed medications  Yes    Do you need help managing your prescriptions or medications  No    Is transportation to your appointment needed  No    I have advised the patient to call PCP with any new or worsening symptoms   Chacha Jackman MA        Derek is here for fu/tcm from [facility] for rehabilitation.   Was at rehab following hospitalization due to upper gi bleed.   He is back on dapt and doing well. No significant bleeding. He does have  some nose bleed but minimal.   Eating is improved.   Feeling stronger and did well with therapies.   He will be having home physical therapy.   Using a walker.     Known hepatocellular ca and had episode of enceophalopathy. Improved with lactulose. He continues on daily dosing of lactulose with about 3 stools a day.   Mentation is at baseline.       Review of Systems   Constitutional: Negative.  Negative for activity change, appetite change, chills and diaphoresis.   HENT:  Negative for congestion and dental problem.    Respiratory: Negative.  Negative for apnea, chest tightness, shortness of breath and wheezing.    Cardiovascular: Negative.  Negative for chest pain, palpitations and leg swelling.   Gastrointestinal: Negative.  Negative for abdominal distention, abdominal pain, constipation, diarrhea and nausea.   Genitourinary: Negative.  Negative for difficulty urinating, dysuria and frequency.       Objective     /52   Pulse 78   Temp 98 °F (36.7 °C)   Ht 6' (1.829 m)   Wt 89.8 kg (198 lb)   SpO2 93%   BMI 26.85 kg/m²      Physical Exam  Vitals reviewed.   Constitutional:       General: He is not in acute distress.     Appearance: Normal appearance. He is well-developed. He is not ill-appearing.   HENT:      Head: Normocephalic and atraumatic.      Right Ear: External ear normal.      Left Ear: External ear normal.      Mouth/Throat:      Mouth: Mucous membranes are moist.      Pharynx: Oropharynx is clear.   Eyes:      Extraocular Movements: Extraocular movements intact.      Conjunctiva/sclera: Conjunctivae normal.      Pupils: Pupils are equal, round, and reactive to light.   Cardiovascular:      Rate and Rhythm: Normal rate and regular rhythm.      Heart sounds: Normal  heart sounds. No murmur heard.  Pulmonary:      Effort: Pulmonary effort is normal.      Breath sounds: Normal breath sounds.   Abdominal:      General: Bowel sounds are normal. There is no distension.      Palpations: Abdomen is soft. There is no mass.      Tenderness: There is no abdominal tenderness. There is no guarding.      Hernia: No hernia is present.   Genitourinary:     Penis: Normal.    Musculoskeletal:         General: Normal range of motion.      Cervical back: Normal range of motion and neck supple.   Skin:     General: Skin is warm and dry.      Capillary Refill: Capillary refill takes less than 2 seconds.   Neurological:      General: No focal deficit present.      Mental Status: He is alert and oriented to person, place, and time.   Psychiatric:         Mood and Affect: Mood normal.         Behavior: Behavior normal.       Medications have been reviewed by provider in current encounter    Ernesto Griffith MD

## 2024-02-29 ENCOUNTER — TELEPHONE (OUTPATIENT)
Age: 79
End: 2024-02-29

## 2024-02-29 ENCOUNTER — HOME CARE VISIT (OUTPATIENT)
Dept: HOME HEALTH SERVICES | Facility: HOME HEALTHCARE | Age: 79
End: 2024-02-29
Payer: COMMERCIAL

## 2024-02-29 PROCEDURE — G0299 HHS/HOSPICE OF RN EA 15 MIN: HCPCS

## 2024-02-29 NOTE — TELEPHONE ENCOUNTER
Patients GI provider:  Dr. Lance    Number to return call: [phone]    Reason for call: Stephanie Man from [facility] visiting nurses called in requesting to speak to a nurse in regards to pts lactulose. Transferred call to the nurse line and Kacie took the call.     Scheduled procedure/appointment date if applicable: Appt 6/11/24

## 2024-02-29 NOTE — TELEPHONE ENCOUNTER
Hospitalization 1/23/24-- 2/6/2024 Hepatocellular carcinoma GI Bleed   Last OV 12/13/23  Next OV: 6/11/24    Stephanie states pt is doing well and was dc'd on lactulose BID. Pt was not taking and encourage to start. Pt took 1 tablespoon and was in the bathroom for 45-1 hr with watery stool/diarrhea.    Stephanie # [phone] Verbal recommendations are ok.

## 2024-03-01 ENCOUNTER — HOME CARE VISIT (OUTPATIENT)
Dept: HOME HEALTH SERVICES | Facility: HOME HEALTHCARE | Age: 79
End: 2024-03-01
Payer: COMMERCIAL

## 2024-03-01 VITALS
DIASTOLIC BLOOD PRESSURE: 70 MMHG | SYSTOLIC BLOOD PRESSURE: 120 MMHG | OXYGEN SATURATION: 98 % | RESPIRATION RATE: 18 BRPM | TEMPERATURE: 97.5 F | HEART RATE: 76 BPM

## 2024-03-01 VITALS — DIASTOLIC BLOOD PRESSURE: 68 MMHG | HEART RATE: 77 BPM | OXYGEN SATURATION: 92 % | SYSTOLIC BLOOD PRESSURE: 138 MMHG

## 2024-03-01 PROCEDURE — G0151 HHCP-SERV OF PT,EA 15 MIN: HCPCS

## 2024-03-04 ENCOUNTER — HOSPITAL ENCOUNTER (EMERGENCY)
Facility: HOSPITAL | Age: 79
Discharge: HOME | End: 2024-03-04
Attending: EMERGENCY MEDICINE
Payer: COMMERCIAL

## 2024-03-04 ENCOUNTER — HOME CARE VISIT (OUTPATIENT)
Dept: HOME HEALTH SERVICES | Facility: HOME HEALTHCARE | Age: 79
End: 2024-03-04
Payer: COMMERCIAL

## 2024-03-04 VITALS
WEIGHT: 197 LBS | BODY MASS INDEX: 26.72 KG/M2 | TEMPERATURE: 97.3 F | DIASTOLIC BLOOD PRESSURE: 70 MMHG | OXYGEN SATURATION: 95 % | SYSTOLIC BLOOD PRESSURE: 134 MMHG | RESPIRATION RATE: 18 BRPM | HEART RATE: 94 BPM

## 2024-03-04 VITALS
DIASTOLIC BLOOD PRESSURE: 75 MMHG | TEMPERATURE: 98.4 F | HEART RATE: 88 BPM | OXYGEN SATURATION: 97 % | RESPIRATION RATE: 18 BRPM | WEIGHT: 198 LBS | BODY MASS INDEX: 26.85 KG/M2 | SYSTOLIC BLOOD PRESSURE: 116 MMHG

## 2024-03-04 DIAGNOSIS — D64.9 CHRONIC ANEMIA: ICD-10-CM

## 2024-03-04 DIAGNOSIS — R04.0 RIGHT-SIDED EPISTAXIS: Primary | ICD-10-CM

## 2024-03-04 LAB
ABO GROUP BLD: NORMAL
ANION GAP SERPL CALCULATED.3IONS-SCNC: 8 MMOL/L
APTT PPP: 38 SECONDS (ref 23–37)
BASOPHILS # BLD AUTO: 0.02 THOUSANDS/ÂΜL (ref 0–0.1)
BASOPHILS NFR BLD AUTO: 0 % (ref 0–1)
BLD GP AB SCN SERPL QL: NEGATIVE
BUN SERPL-MCNC: 37 MG/DL (ref 5–25)
CALCIUM SERPL-MCNC: 8.8 MG/DL (ref 8.4–10.2)
CHLORIDE SERPL-SCNC: 107 MMOL/L (ref 96–108)
CO2 SERPL-SCNC: 25 MMOL/L (ref 21–32)
CREAT SERPL-MCNC: 1.19 MG/DL (ref 0.6–1.3)
EOSINOPHIL # BLD AUTO: 0.36 THOUSAND/ÂΜL (ref 0–0.61)
EOSINOPHIL NFR BLD AUTO: 6 % (ref 0–6)
ERYTHROCYTE [DISTWIDTH] IN BLOOD BY AUTOMATED COUNT: 18.5 % (ref 11.6–15.1)
GFR SERPL CREATININE-BSD FRML MDRD: 58 ML/MIN/1.73SQ M
GLUCOSE SERPL-MCNC: 84 MG/DL (ref 65–140)
HCT VFR BLD AUTO: 35.3 % (ref 36.5–49.3)
HGB BLD-MCNC: 11 G/DL (ref 12–17)
IMM GRANULOCYTES # BLD AUTO: 0.03 THOUSAND/UL (ref 0–0.2)
IMM GRANULOCYTES NFR BLD AUTO: 1 % (ref 0–2)
INR PPP: 1.4 (ref 0.84–1.19)
LYMPHOCYTES # BLD AUTO: 0.57 THOUSANDS/ÂΜL (ref 0.6–4.47)
LYMPHOCYTES NFR BLD AUTO: 9 % (ref 14–44)
MCH RBC QN AUTO: 29.6 PG (ref 26.8–34.3)
MCHC RBC AUTO-ENTMCNC: 31.2 G/DL (ref 31.4–37.4)
MCV RBC AUTO: 95 FL (ref 82–98)
MONOCYTES # BLD AUTO: 0.8 THOUSAND/ÂΜL (ref 0.17–1.22)
MONOCYTES NFR BLD AUTO: 13 % (ref 4–12)
NEUTROPHILS # BLD AUTO: 4.36 THOUSANDS/ÂΜL (ref 1.85–7.62)
NEUTS SEG NFR BLD AUTO: 71 % (ref 43–75)
NRBC BLD AUTO-RTO: 0 /100 WBCS
PLATELET # BLD AUTO: 186 THOUSANDS/UL (ref 149–390)
PMV BLD AUTO: 12.5 FL (ref 8.9–12.7)
POTASSIUM SERPL-SCNC: 4.2 MMOL/L (ref 3.5–5.3)
PROTHROMBIN TIME: 17.6 SECONDS (ref 11.6–14.5)
RBC # BLD AUTO: 3.72 MILLION/UL (ref 3.88–5.62)
RH BLD: POSITIVE
SODIUM SERPL-SCNC: 140 MMOL/L (ref 135–147)
SPECIMEN EXPIRATION DATE: NORMAL
WBC # BLD AUTO: 6.14 THOUSAND/UL (ref 4.31–10.16)

## 2024-03-04 PROCEDURE — 86900 BLOOD TYPING SEROLOGIC ABO: CPT | Performed by: EMERGENCY MEDICINE

## 2024-03-04 PROCEDURE — 86850 RBC ANTIBODY SCREEN: CPT | Performed by: EMERGENCY MEDICINE

## 2024-03-04 PROCEDURE — 85730 THROMBOPLASTIN TIME PARTIAL: CPT | Performed by: EMERGENCY MEDICINE

## 2024-03-04 PROCEDURE — 30901 CONTROL OF NOSEBLEED: CPT | Performed by: EMERGENCY MEDICINE

## 2024-03-04 PROCEDURE — 85025 COMPLETE CBC W/AUTO DIFF WBC: CPT | Performed by: EMERGENCY MEDICINE

## 2024-03-04 PROCEDURE — 36415 COLL VENOUS BLD VENIPUNCTURE: CPT | Performed by: EMERGENCY MEDICINE

## 2024-03-04 PROCEDURE — 99284 EMERGENCY DEPT VISIT MOD MDM: CPT | Performed by: EMERGENCY MEDICINE

## 2024-03-04 PROCEDURE — 80048 BASIC METABOLIC PNL TOTAL CA: CPT | Performed by: EMERGENCY MEDICINE

## 2024-03-04 PROCEDURE — 86901 BLOOD TYPING SEROLOGIC RH(D): CPT | Performed by: EMERGENCY MEDICINE

## 2024-03-04 PROCEDURE — 99283 EMERGENCY DEPT VISIT LOW MDM: CPT

## 2024-03-04 PROCEDURE — 85610 PROTHROMBIN TIME: CPT | Performed by: EMERGENCY MEDICINE

## 2024-03-04 PROCEDURE — G0299 HHS/HOSPICE OF RN EA 15 MIN: HCPCS

## 2024-03-04 RX ORDER — TRANEXAMIC ACID 100 MG/ML
500 INJECTION, SOLUTION INTRAVENOUS ONCE
Status: COMPLETED | OUTPATIENT
Start: 2024-03-04 | End: 2024-03-04

## 2024-03-04 RX ORDER — OXYMETAZOLINE HYDROCHLORIDE 0.05 G/100ML
2 SPRAY NASAL ONCE
Status: COMPLETED | OUTPATIENT
Start: 2024-03-04 | End: 2024-03-04

## 2024-03-04 RX ORDER — CEPHALEXIN 500 MG/1
500 CAPSULE ORAL EVERY 6 HOURS SCHEDULED
Qty: 20 CAPSULE | Refills: 0 | Status: SHIPPED | OUTPATIENT
Start: 2024-03-04 | End: 2024-03-09

## 2024-03-04 RX ADMIN — TRANEXAMIC ACID 500 MG: 100 INJECTION, SOLUTION INTRAVENOUS at 11:54

## 2024-03-04 RX ADMIN — OXYMETAZOLINE HYDROCHLORIDE 2 SPRAY: 0.05 SPRAY NASAL at 11:54

## 2024-03-04 NOTE — ED PROVIDER NOTES
History  Chief Complaint   Patient presents with    Nose Bleed     Pt to ED for nose bleed. Pt states it has been bleeding since 0530 today and can feel blood going down back of throat. 2 thinners. States this has happened before and needed to be cauterized, one time packed.     78-year-old male presents for evaluation of nosebleed that has been going on since last evening.  The patient states that he felt blood dripping down the back of his throat and woke him from sleep and has had persistent bleeding.  The patient denies any recent picking or nasal trauma.  The patient is on Eliquis and Plavix.  Patient states that he has had nosebleeds in the past.  Patient denies any associated chest pain, pressure, SOB, lightheadedness or syncope      Nose Bleed      Prior to Admission Medications   Prescriptions Last Dose Informant Patient Reported? Taking?   Anoro Ellipta 62.5-25 MCG/ACT inhaler  Child No No   Sig: inhale 1 puff by mouth and INTO THE LUNGS once daily   Blood Glucose Monitoring Suppl (OneTouch Verio) w/Device KIT  Self, Child No No   Sig: Use 4 (four) times a day   Continuous Blood Gluc  (Dexcom G6 ) NOHEMY  Self, Child No No   Sig: Use with dexcom sensor   Continuous Blood Gluc Sensor (Dexcom G6 Sensor) MISC  Child No No   Sig: Use daily as directed for CGM - Change every 10 days   Patient not taking: Reported on 2/26/2024   Continuous Blood Gluc Transmit (Dexcom G6 Transmitter) MISC  Child No No   Sig: Use daily as directed for CGM - Change every 3 months   Patient not taking: Reported on 2/26/2024   Continuous Blood Gluc Transmit (Dexcom G6 Transmitter) MISC  Child No No   Sig: Use daily as directed for CGM - Change every 3 months   Patient not taking: Reported on 12/13/2023   Eliquis 5 MG  Child No No   Sig: take 1 tablet by mouth twice a day   Empagliflozin (Jardiance) 10 MG TABS tablet   No No   Sig: take 1 tablet by mouth daily every morning   Insulin Pen Needle (Droplet Pen Needles) 32G  X 4 MM MISC  Child No No   Sig: use 1 PEN NEEDLE to inject MEDICATION subcutaneously four times a day   LORazepam (ATIVAN) 1 mg tablet   No No   Sig: Take 1 tablet (1 mg total) by mouth see administration instructions Take 1 tablet 30 minutes prior to MRI, take 2nd tablet immediately prior to MRI   Lumigan 0.01 % ophthalmic drops  Child Yes No   Sig: Administer 1 drop to both eyes every evening   Multiple Vitamin (MULTIVITAMIN ADULT PO)  Self, Child Yes No   Sig: Take 1 tablet by mouth daily   acetaminophen (TYLENOL) 650 mg CR tablet  Self, Child Yes No   Sig: Take 650 mg by mouth every 8 (eight) hours as needed for mild pain   atorvastatin (LIPITOR) 40 mg tablet  Child No No   Sig: take 1 tablet by mouth once daily after dinner   azelastine (ASTELIN) 0.1 % nasal spray  Self, Child No No   Si spray into each nostril 2 (two) times a day Use in each nostril as directed   clopidogrel (PLAVIX) 75 mg tablet  Child No No   Sig: Take 1 tablet (75 mg total) by mouth daily   ferrous sulfate 325 (65 Fe) mg tablet  Child No No   Sig: Take 1 tablet (325 mg total) by mouth daily with breakfast   furosemide (LASIX) 40 mg tablet  Child No No   Sig: Take 1 tablet (40 mg total) by mouth every other day   Patient taking differently: Take 20 mg by mouth every other day 20 every other day   glucose blood (OneTouch Verio) test strip  Self, Child No No   Sig: Use as instructed   insulin detemir (Levemir FlexPen) 100 Units/mL injection pen  Child No No   Si units daily   insulin lispro (HumaLOG) 100 units/mL injection  Child No No   Sig: Inject 5 Units under the skin 3 (three) times a day with meals   Patient not taking: Reported on 2024   lactulose (CHRONULAC) 10 g/15 mL solution  Self No No   Sig: Take 30 mL (20 g total) by mouth 2 (two) times a day   Patient taking differently: Take 20 g by mouth 2 (two) times a day pt willing to try to take this medication once a day as of 24   metoprolol tartrate (LOPRESSOR) 25 mg  tablet  Child No No   Sig: Take 0.5 tablets (12.5 mg total) by mouth every 12 (twelve) hours   pantoprazole (PROTONIX) 40 mg tablet  Self, Child No No   Sig: Take 1 tablet (40 mg total) by mouth 2 (two) times a day   Patient taking differently: Take 40 mg by mouth daily   sertraline (ZOLOFT) 25 mg tablet  Self, Child Yes No   Sig: Take 25 mg by mouth daily   tamsulosin (FLOMAX) 0.4 mg  Self, Child Yes No   Sig: Take 0.4 mg by mouth daily with dinner   traZODone (DESYREL) 100 mg tablet   No No   Sig: take 2 tablets by mouth at bedtime      Facility-Administered Medications: None       Past Medical History:   Diagnosis Date    Acute on chronic diastolic HF (heart failure) (HCC)     NA (acute kidney injury) (HCC)     Anemia     Atrial fibrillation (HCC)     Eliquis    AVB (atrioventricular block)     1st degree    CAD (coronary artery disease)     CKD (chronic kidney disease), stage III (HCC)     baseline Cr 1.2-1.6    Colon polyp     Diabetes mellitus (HCC)     type 2, insulin dependent    Diverticulosis     Emphysema lung (HCC)     Former tobacco use     History of asbestos exposure     History of DVT (deep vein thrombosis)     RLE, tx w/ AC    History of GI bleed 08/2023    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, given PRBC/Venofer for anemia while in house    Hyperlipidemia     Hypertension     LAFB (left anterior fascicular block)     Liver cancer (HCC)     Liver mass 08/2023    suspected HCC, needs radioembolization    RBBB     Syncope 08/07/2023       Past Surgical History:   Procedure Laterality Date    APPENDECTOMY      BOWEL RESECTION  2014    CARDIAC CATHETERIZATION      CARDIAC CATHETERIZATION N/A 08/25/2023    Procedure: Cardiac pci;  Surgeon: Dom Garrett DO;  Location: [facility];  Service: Cardiology    CARDIAC CATHETERIZATION N/A 08/21/2023    Procedure: Cardiac catheterization;  Surgeon: Dom Garrett DO;  Location: [facility];  Service: Cardiology    CARDIAC  CATHETERIZATION N/A 2023    Procedure: Cardiac Coronary Angiogram;  Surgeon: Dom Garrett DO;  Location: [facility];  Service: Cardiology    CATARACT EXTRACTION Bilateral     COLONOSCOPY      EGD      IR Y-90 PRE-ANGIO/EMBO W/ LUNG SCAN  2023    IR Y-90 RADIOEMBOLIZATION  2023       Family History   Problem Relation Age of Onset    Hypertension Mother     Bone cancer Father     Diabetes type II Sister     Diabetes type II Sister     Esophageal cancer Brother     Colon cancer Neg Hx      I have reviewed and agree with the history as documented.    E-Cigarette/Vaping    E-Cigarette Use Never User      E-Cigarette/Vaping Substances    Nicotine No     THC No     CBD No     Flavoring No      Social History     Tobacco Use    Smoking status: Former     Current packs/day: 0.00     Average packs/day: 1 pack/day for 30.0 years (30.0 ttl pk-yrs)     Types: Cigarettes     Start date:      Quit date:      Years since quittin.1    Smokeless tobacco: Never   Vaping Use    Vaping status: Never Used   Substance Use Topics    Alcohol use: Yes     Comment: Socially    Drug use: Never       Review of Systems   HENT:  Positive for nosebleeds.        Physical Exam  Physical Exam  Vitals and nursing note reviewed.   Constitutional:       Appearance: He is well-developed.   HENT:      Head: Normocephalic and atraumatic.      Right Ear: External ear normal.      Left Ear: External ear normal.      Nose:      Right Nostril: Epistaxis present.   Eyes:      General: No scleral icterus.  Pulmonary:      Effort: Pulmonary effort is normal. No respiratory distress.   Abdominal:      General: There is no distension.   Musculoskeletal:         General: Normal range of motion.      Cervical back: Normal range of motion.   Skin:     Findings: No rash.   Neurological:      Mental Status: He is alert. Mental status is at baseline.   Psychiatric:         Mood and Affect: Mood normal.         Vital  Signs  ED Triage Vitals [03/04/24 1124]   Temperature Pulse Respirations Blood Pressure SpO2   98.4 °F (36.9 °C) 88 18 116/75 97 %      Temp src Heart Rate Source Patient Position - Orthostatic VS BP Location FiO2 (%)   -- -- Sitting Left arm --      Pain Score       No Pain           Vitals:    03/04/24 1124   BP: 116/75   Pulse: 88   Patient Position - Orthostatic VS: Sitting         Visual Acuity      ED Medications  Medications   oxymetazoline (AFRIN) 0.05 % nasal spray 2 spray (2 sprays Each Nare Given by Other 3/4/24 1154)   tranexamic acid 100mg/mL (for epistaxis) 500 mg (500 mg Nasal Given by Other 3/4/24 1154)       Diagnostic Studies  Results Reviewed       Procedure Component Value Units Date/Time    Basic metabolic panel [[id number]]  (Abnormal) Collected: 03/04/24 1154    Lab Status: Final result Specimen: Blood from Arm, Left Updated: 03/04/24 1216     Sodium 140 mmol/L      Potassium 4.2 mmol/L      Chloride 107 mmol/L      CO2 25 mmol/L      ANION GAP 8 mmol/L      BUN 37 mg/dL      Creatinine 1.19 mg/dL      Glucose 84 mg/dL      Calcium 8.8 mg/dL      eGFR 58 ml/min/1.73sq m     Narrative:      National Kidney Disease Foundation guidelines for Chronic Kidney Disease (CKD):     Stage 1 with normal or high GFR (GFR > 90 mL/min/1.73 square meters)    Stage 2 Mild CKD (GFR = 60-89 mL/min/1.73 square meters)    Stage 3A Moderate CKD (GFR = 45-59 mL/min/1.73 square meters)    Stage 3B Moderate CKD (GFR = 30-44 mL/min/1.73 square meters)    Stage 4 Severe CKD (GFR = 15-29 mL/min/1.73 square meters)    Stage 5 End Stage CKD (GFR <15 mL/min/1.73 square meters)  Note: GFR calculation is accurate only with a steady state creatinine    Protime-INR [[id number]]  (Abnormal) Collected: 03/04/24 1154    Lab Status: Final result Specimen: Blood from Arm, Left Updated: 03/04/24 1212     Protime 17.6 seconds      INR 1.40    APTT [[id number]]  (Abnormal) Collected: 03/04/24 1154    Lab Status: Final result Specimen:  Blood from Arm, Left Updated: 03/04/24 1212     PTT 38 seconds     CBC and differential [[id number]]  (Abnormal) Collected: 03/04/24 1154    Lab Status: Final result Specimen: Blood from Arm, Left Updated: 03/04/24 1158     WBC 6.14 Thousand/uL      RBC 3.72 Million/uL      Hemoglobin 11.0 g/dL      Hematocrit 35.3 %      MCV 95 fL      MCH 29.6 pg      MCHC 31.2 g/dL      RDW 18.5 %      MPV 12.5 fL      Platelets 186 Thousands/uL      nRBC 0 /100 WBCs      Neutrophils Relative 71 %      Immat GRANS % 1 %      Lymphocytes Relative 9 %      Monocytes Relative 13 %      Eosinophils Relative 6 %      Basophils Relative 0 %      Neutrophils Absolute 4.36 Thousands/µL      Immature Grans Absolute 0.03 Thousand/uL      Lymphocytes Absolute 0.57 Thousands/µL      Monocytes Absolute 0.80 Thousand/µL      Eosinophils Absolute 0.36 Thousand/µL      Basophils Absolute 0.02 Thousands/µL                    No orders to display              Procedures  Epistaxis management    Date/Time: 3/4/2024 12:33 PM    Performed by: Logan Bryant DO  Authorized by: Logan Bryant DO  Universal Protocol:  Consent: Verbal consent obtained.  Patient identity confirmed: verbally with patient    Patient location:  ED  Anesthesia (see MAR for exact dosages):     Anesthesia method:  None  Procedure details:     Treatment site:  R anterior    Hemostasis method:  Merocel sponge    Approach:  External    Spec Headlamp used: Yes      Treatment complexity:  Limited    Treatment episode: initial    Post-procedure details:     Assessment:  Bleeding stopped    Patient tolerance of procedure:  Tolerated well, no immediate complications  Comments:      Pretreatment with oxymetazoline.  Merocel sponge inserted and 5 mL of TXA placed into right nare using 20-gauge Angiocath.           ED Course  ED Course as of 03/04/24 1635   Mon Mar 04, 2024   1200 Hemoglobin(!): 11.0  essentially unchanged when compared this   1230 Bleeding stopped upon  reevaluation.  There is no bleeding in the posterior oropharynx upon reevaluation.                               SBIRT 22yo+      Flowsheet Row Most Recent Value   Initial Alcohol Screen: US AUDIT-C     1. How often do you have a drink containing alcohol? 0 Filed at: 03/04/2024 1125   2. How many drinks containing alcohol do you have on a typical day you are drinking?  0 Filed at: 03/04/2024 1125   3a. Male UNDER 65: How often do you have five or more drinks on one occasion? 0 Filed at: 03/04/2024 1125   3b. FEMALE Any Age, or MALE 65+: How often do you have 4 or more drinks on one occassion? 0 Filed at: 03/04/2024 1125   Audit-C Score 0 Filed at: 03/04/2024 1125   ORTEGA: How many times in the past year have you...    Used an illegal drug or used a prescription medication for non-medical reasons? Never Filed at: 03/04/2024 1125                      Medical Decision Making  Plan is for management of the right-sided epistaxis.  Will check blood work to rule out blood loss anemia    The patient (and any family present) verbalized understanding of the discharge instructions and warnings that would necessitate return to the Emergency Department.    All questions were answered prior to discharge.    Amount and/or Complexity of Data Reviewed  Labs: ordered. Decision-making details documented in ED Course.    Risk  OTC drugs.  Prescription drug management.             Disposition  Final diagnoses:   Right-sided epistaxis   Chronic anemia     Time reflects when diagnosis was documented in both MDM as applicable and the Disposition within this note       Time User Action Codes Description Comment    3/4/2024 12:31 PM Logan Bryant Add [R04.0] Right-sided epistaxis     3/4/2024 12:31 PM Logan Bryant Add [D64.9] Chronic anemia           ED Disposition       ED Disposition   Discharge    Condition   Stable    Date/Time   Mon Mar 4, 2024 1231    Comment   Derek Walter discharge to home/self care.                   Follow-up  Information       Follow up With Specialties Details Why Contact Info Additional Information    [facility] Schedule an appointment as soon as possible for a visit  For further evaluation [address]  [address]  [facility] [address] [phone] [facility], [address], [place] [address]            Discharge Medication List as of 3/4/2024 12:32 PM        CONTINUE these medications which have NOT CHANGED    Details   acetaminophen (TYLENOL) 650 mg CR tablet Take 650 mg by mouth every 8 (eight) hours as needed for mild pain, Historical Med      Anoro Ellipta 62.5-25 MCG/ACT inhaler inhale 1 puff by mouth and INTO THE LUNGS once daily, Normal      atorvastatin (LIPITOR) 40 mg tablet take 1 tablet by mouth once daily after dinner, Normal      azelastine (ASTELIN) 0.1 % nasal spray 1 spray into each nostril 2 (two) times a day Use in each nostril as directed, Starting Wed 9/6/2023, Normal      Blood Glucose Monitoring Suppl (OneTouch Verio) w/Device KIT Use 4 (four) times a day, Starting Wed 9/13/2023, Normal      clopidogrel (PLAVIX) 75 mg tablet Take 1 tablet (75 mg total) by mouth daily, Starting Fri 10/13/2023, Normal      Continuous Blood Gluc  (Dexcom G6 ) NOHEMY Use with dexcom sensor, Normal      Continuous Blood Gluc Sensor (Dexcom G6 Sensor) MISC Use daily as directed for CGM - Change every 10 days, Normal      !! Continuous Blood Gluc Transmit (Dexcom G6 Transmitter) MISC Use daily as directed for CGM - Change every 3 months, Normal      !! Continuous Blood Gluc Transmit (Dexcom G6 Transmitter) MISC Use daily as directed for CGM - Change every 3 months, Normal      Eliquis 5 MG take 1 tablet by mouth twice a day, Normal      Empagliflozin (Jardiance) 10 MG TABS tablet take 1 tablet by mouth daily every morning, Starting Tue 2/27/2024, Normal      ferrous sulfate 325 (65 Fe) mg tablet Take 1 tablet (325 mg  total) by mouth daily with breakfast, Starting Fri 10/13/2023, Until Wed 4/10/2024, Normal      furosemide (LASIX) 40 mg tablet Take 1 tablet (40 mg total) by mouth every other day, Starting Tue 12/5/2023, Normal      glucose blood (OneTouch Verio) test strip Use as instructed, Normal      insulin detemir (Levemir FlexPen) 100 Units/mL injection pen 65 units daily, Normal      insulin lispro (HumaLOG) 100 units/mL injection Inject 5 Units under the skin 3 (three) times a day with meals, Starting Tue 2/6/2024, No Print      Insulin Pen Needle (Droplet Pen Needles) 32G X 4 MM MISC use 1 PEN NEEDLE to inject MEDICATION subcutaneously four times a day, Normal      lactulose (CHRONULAC) 10 g/15 mL solution Take 30 mL (20 g total) by mouth 2 (two) times a day, Starting Tue 2/6/2024, Normal      LORazepam (ATIVAN) 1 mg tablet Take 1 tablet (1 mg total) by mouth see administration instructions Take 1 tablet 30 minutes prior to MRI, take 2nd tablet immediately prior to MRI, Starting Wed 2/28/2024, Normal      Lumigan 0.01 % ophthalmic drops Administer 1 drop to both eyes every evening, Starting Fri 1/5/2024, Historical Med      metoprolol tartrate (LOPRESSOR) 25 mg tablet Take 0.5 tablets (12.5 mg total) by mouth every 12 (twelve) hours, Starting Tue 2/6/2024, No Print      Multiple Vitamin (MULTIVITAMIN ADULT PO) Take 1 tablet by mouth daily, Historical Med      pantoprazole (PROTONIX) 40 mg tablet Take 1 tablet (40 mg total) by mouth 2 (two) times a day, Starting Mon 8/14/2023, Normal      sertraline (ZOLOFT) 25 mg tablet Take 25 mg by mouth daily, Historical Med      tamsulosin (FLOMAX) 0.4 mg Take 0.4 mg by mouth daily with dinner, Historical Med      traZODone (DESYREL) 100 mg tablet take 2 tablets by mouth at bedtime, Normal       !! - Potential duplicate medications found. Please discuss with provider.          No discharge procedures on file.    PDMP Review         Value Time User    PDMP Reviewed  Yes 2/28/2024   2:13 PM Perez Gong MD            ED Provider  Electronically Signed by             Logan Bryant DO  03/04/24 2974

## 2024-03-05 ENCOUNTER — VBI (OUTPATIENT)
Dept: FAMILY MEDICINE CLINIC | Facility: HOSPITAL | Age: 79
End: 2024-03-05

## 2024-03-05 ENCOUNTER — HOME CARE VISIT (OUTPATIENT)
Dept: HOME HEALTH SERVICES | Facility: HOME HEALTHCARE | Age: 79
End: 2024-03-05
Payer: COMMERCIAL

## 2024-03-05 VITALS
HEART RATE: 77 BPM | OXYGEN SATURATION: 95 % | RESPIRATION RATE: 19 BRPM | SYSTOLIC BLOOD PRESSURE: 124 MMHG | TEMPERATURE: 97.1 F | DIASTOLIC BLOOD PRESSURE: 73 MMHG

## 2024-03-05 DIAGNOSIS — J44.1 CHRONIC OBSTRUCTIVE PULMONARY DISEASE WITH ACUTE EXACERBATION (HCC): ICD-10-CM

## 2024-03-05 PROCEDURE — G0157 HHC PT ASSISTANT EA 15: HCPCS

## 2024-03-05 NOTE — TELEPHONE ENCOUNTER
03/05/24 10:10 AM    Patient contacted post ED visit, [facility] department spoke with patient/caregiver and outreach was successful.    Thank you.  Lili Abad PG VALUE BASED VIR

## 2024-03-05 NOTE — TELEPHONE ENCOUNTER
Spoke with home care.  Patient reports w/o taking lactulose for last 3 days he has had 3-5 soft BM daily and at times diarrhea.  He does not want to use the lactulose.

## 2024-03-06 RX ORDER — UMECLIDINIUM BROMIDE AND VILANTEROL TRIFENATATE 62.5; 25 UG/1; UG/1
1 POWDER RESPIRATORY (INHALATION) DAILY
Qty: 180 BLISTER | Refills: 1 | Status: SHIPPED | OUTPATIENT
Start: 2024-03-06 | End: 2024-09-02

## 2024-03-07 ENCOUNTER — HOME CARE VISIT (OUTPATIENT)
Dept: HOME HEALTH SERVICES | Facility: HOME HEALTHCARE | Age: 79
End: 2024-03-07
Payer: COMMERCIAL

## 2024-03-07 VITALS
TEMPERATURE: 97.7 F | DIASTOLIC BLOOD PRESSURE: 73 MMHG | RESPIRATION RATE: 19 BRPM | OXYGEN SATURATION: 98 % | HEART RATE: 69 BPM | SYSTOLIC BLOOD PRESSURE: 128 MMHG

## 2024-03-07 VITALS
HEART RATE: 70 BPM | DIASTOLIC BLOOD PRESSURE: 58 MMHG | OXYGEN SATURATION: 94 % | TEMPERATURE: 97.6 F | RESPIRATION RATE: 16 BRPM | SYSTOLIC BLOOD PRESSURE: 120 MMHG

## 2024-03-07 PROCEDURE — G0300 HHS/HOSPICE OF LPN EA 15 MIN: HCPCS

## 2024-03-07 PROCEDURE — G0157 HHC PT ASSISTANT EA 15: HCPCS

## 2024-03-11 ENCOUNTER — NURSE TRIAGE (OUTPATIENT)
Age: 79
End: 2024-03-11

## 2024-03-11 ENCOUNTER — HOME CARE VISIT (OUTPATIENT)
Dept: HOME HEALTH SERVICES | Facility: HOME HEALTHCARE | Age: 79
End: 2024-03-11
Payer: COMMERCIAL

## 2024-03-11 VITALS
SYSTOLIC BLOOD PRESSURE: 140 MMHG | DIASTOLIC BLOOD PRESSURE: 60 MMHG | TEMPERATURE: 96.9 F | RESPIRATION RATE: 18 BRPM | OXYGEN SATURATION: 96 % | HEART RATE: 72 BPM

## 2024-03-11 DIAGNOSIS — E11.65 TYPE 2 DIABETES MELLITUS WITH HYPERGLYCEMIA, WITH LONG-TERM CURRENT USE OF INSULIN (HCC): ICD-10-CM

## 2024-03-11 DIAGNOSIS — Z79.4 TYPE 2 DIABETES MELLITUS WITH HYPERGLYCEMIA, WITH LONG-TERM CURRENT USE OF INSULIN (HCC): ICD-10-CM

## 2024-03-11 PROCEDURE — G0299 HHS/HOSPICE OF RN EA 15 MIN: HCPCS

## 2024-03-12 ENCOUNTER — HOME CARE VISIT (OUTPATIENT)
Dept: HOME HEALTH SERVICES | Facility: HOME HEALTHCARE | Age: 79
End: 2024-03-12
Payer: COMMERCIAL

## 2024-03-12 VITALS
RESPIRATION RATE: 19 BRPM | OXYGEN SATURATION: 97 % | SYSTOLIC BLOOD PRESSURE: 124 MMHG | DIASTOLIC BLOOD PRESSURE: 78 MMHG | HEART RATE: 81 BPM | TEMPERATURE: 96.7 F

## 2024-03-12 PROCEDURE — G0157 HHC PT ASSISTANT EA 15: HCPCS

## 2024-03-13 RX ORDER — INSULIN ASPART 100 [IU]/ML
INJECTION, SOLUTION INTRAVENOUS; SUBCUTANEOUS
Qty: 36 ML | Refills: 2 | Status: SHIPPED | OUTPATIENT
Start: 2024-03-13 | End: 2024-03-22 | Stop reason: SDUPTHER

## 2024-03-14 ENCOUNTER — HOSPITAL ENCOUNTER (OUTPATIENT)
Dept: MRI IMAGING | Facility: HOSPITAL | Age: 79
End: 2024-03-14
Attending: SURGERY
Payer: COMMERCIAL

## 2024-03-14 ENCOUNTER — HOME CARE VISIT (OUTPATIENT)
Dept: HOME HEALTH SERVICES | Facility: HOME HEALTHCARE | Age: 79
End: 2024-03-14
Payer: COMMERCIAL

## 2024-03-14 VITALS — SYSTOLIC BLOOD PRESSURE: 144 MMHG | HEART RATE: 82 BPM | OXYGEN SATURATION: 96 % | DIASTOLIC BLOOD PRESSURE: 80 MMHG

## 2024-03-14 DIAGNOSIS — C22.0 HEPATOCELLULAR CARCINOMA (HCC): ICD-10-CM

## 2024-03-14 PROCEDURE — 74183 MRI ABD W/O CNTR FLWD CNTR: CPT

## 2024-03-14 PROCEDURE — A9585 GADOBUTROL INJECTION: HCPCS | Performed by: SURGERY

## 2024-03-14 PROCEDURE — G1004 CDSM NDSC: HCPCS

## 2024-03-14 PROCEDURE — G0151 HHCP-SERV OF PT,EA 15 MIN: HCPCS

## 2024-03-14 RX ORDER — GADOBUTROL 604.72 MG/ML
9 INJECTION INTRAVENOUS
Status: COMPLETED | OUTPATIENT
Start: 2024-03-14 | End: 2024-03-14

## 2024-03-14 RX ADMIN — GADOBUTROL 9 ML: 604.72 INJECTION INTRAVENOUS at 16:24

## 2024-03-15 ENCOUNTER — TELEPHONE (OUTPATIENT)
Age: 79
End: 2024-03-15

## 2024-03-15 ENCOUNTER — TELEPHONE (OUTPATIENT)
Dept: FAMILY MEDICINE CLINIC | Facility: HOSPITAL | Age: 79
End: 2024-03-15

## 2024-03-15 ENCOUNTER — HOME CARE VISIT (OUTPATIENT)
Dept: HOME HEALTH SERVICES | Facility: HOME HEALTHCARE | Age: 79
End: 2024-03-15
Payer: COMMERCIAL

## 2024-03-15 VITALS
RESPIRATION RATE: 18 BRPM | DIASTOLIC BLOOD PRESSURE: 56 MMHG | SYSTOLIC BLOOD PRESSURE: 120 MMHG | TEMPERATURE: 97.6 F | OXYGEN SATURATION: 98 %

## 2024-03-15 DIAGNOSIS — Z79.4 TYPE 2 DIABETES MELLITUS WITH HYPERGLYCEMIA, WITH LONG-TERM CURRENT USE OF INSULIN (HCC): ICD-10-CM

## 2024-03-15 DIAGNOSIS — E11.65 TYPE 2 DIABETES MELLITUS WITH HYPERGLYCEMIA, WITH LONG-TERM CURRENT USE OF INSULIN (HCC): ICD-10-CM

## 2024-03-15 DIAGNOSIS — E11.40 TYPE 2 DIABETES MELLITUS WITH DIABETIC NEUROPATHY, WITH LONG-TERM CURRENT USE OF INSULIN (HCC): ICD-10-CM

## 2024-03-15 DIAGNOSIS — K21.9 GASTROESOPHAGEAL REFLUX DISEASE WITHOUT ESOPHAGITIS: ICD-10-CM

## 2024-03-15 DIAGNOSIS — Z79.4 TYPE 2 DIABETES MELLITUS WITH DIABETIC NEUROPATHY, WITH LONG-TERM CURRENT USE OF INSULIN (HCC): ICD-10-CM

## 2024-03-15 PROCEDURE — G0299 HHS/HOSPICE OF RN EA 15 MIN: HCPCS

## 2024-03-15 RX ORDER — PREGABALIN 100 MG/1
100 CAPSULE ORAL 3 TIMES DAILY
Qty: 270 CAPSULE | Refills: 1 | Status: SHIPPED | OUTPATIENT
Start: 2024-03-15 | End: 2024-09-11

## 2024-03-15 RX ORDER — PANTOPRAZOLE SODIUM 40 MG/1
40 TABLET, DELAYED RELEASE ORAL 2 TIMES DAILY
Qty: 180 TABLET | Refills: 1 | Status: SHIPPED | OUTPATIENT
Start: 2024-03-15 | End: 2024-09-11

## 2024-03-15 NOTE — TELEPHONE ENCOUNTER
Stephanie, nurse from [facility] calling to report that patient and wife made her aware that patient is having some lower blood sugars lately.  Patient has a Dexcom, and it has been alarming at times with blood sugars in the 60s. Patient denies any symptoms.  Per Stephanei, patient's wife reports patient has not been eating as much lately, but still takes his novolog 30 units 3 times per day with meals.  Per Stephanie on the home care med list they still have the 5 units with meals from his discharge from hospital in Jan. Patient also taking 65 units of levemir nightly.  She reports patient has also had some reading as high as 300.     Stephanie asks if patient should lower his Novolog dose? Appointment also scheduled for patient (April 1st-soonest available)  as he is due for a follow up.  Stephanie said patient may need a virtual visit if this is possible.  Informed Stephanie will send a message.  She asks that we call her back with provider's reply at [phone], and also call patient's wife Nilda.

## 2024-03-15 NOTE — TELEPHONE ENCOUNTER
Fyi - [facility] Stephanie wanted to let you know that GI put Derek on Xifanxan 550 2x a day.   Also is allowed to take imodium as needed

## 2024-03-15 NOTE — TELEPHONE ENCOUNTER
Per pharm - dexcom sensor needs a prior auth.  Please call [phone].    2.  Needs lyrica sent to pharm.  This was canceled in his chart by a hospitalist on 2/6, so I cannot send refill request.

## 2024-03-18 ENCOUNTER — HOME CARE VISIT (OUTPATIENT)
Dept: HOME HEALTH SERVICES | Facility: HOME HEALTHCARE | Age: 79
End: 2024-03-18
Payer: COMMERCIAL

## 2024-03-18 PROCEDURE — G0299 HHS/HOSPICE OF RN EA 15 MIN: HCPCS

## 2024-03-19 ENCOUNTER — HOME CARE VISIT (OUTPATIENT)
Dept: HOME HEALTH SERVICES | Facility: HOME HEALTHCARE | Age: 79
End: 2024-03-19
Payer: COMMERCIAL

## 2024-03-19 ENCOUNTER — OFFICE VISIT (OUTPATIENT)
Dept: SURGICAL ONCOLOGY | Facility: CLINIC | Age: 79
End: 2024-03-19
Payer: COMMERCIAL

## 2024-03-19 VITALS
TEMPERATURE: 97.7 F | RESPIRATION RATE: 16 BRPM | SYSTOLIC BLOOD PRESSURE: 120 MMHG | BODY MASS INDEX: 27.5 KG/M2 | WEIGHT: 203 LBS | OXYGEN SATURATION: 97 % | DIASTOLIC BLOOD PRESSURE: 62 MMHG | HEART RATE: 73 BPM | HEIGHT: 72 IN

## 2024-03-19 DIAGNOSIS — C22.0 HEPATOCELLULAR CARCINOMA (HCC): Primary | ICD-10-CM

## 2024-03-19 PROCEDURE — 99215 OFFICE O/P EST HI 40 MIN: CPT | Performed by: SURGERY

## 2024-03-19 RX ORDER — MULTIVITAMIN WITH FOLIC ACID 400 MCG
TABLET ORAL DAILY
COMMUNITY
Start: 2024-02-07

## 2024-03-19 RX ORDER — INSULIN GLARGINE 100 [IU]/ML
INJECTION, SOLUTION SUBCUTANEOUS DAILY
COMMUNITY
Start: 2024-03-07 | End: 2024-03-22

## 2024-03-19 RX ORDER — LATANOPROST 50 UG/ML
SOLUTION/ DROPS OPHTHALMIC DAILY
COMMUNITY
Start: 2024-02-07

## 2024-03-19 NOTE — LETTER
March 19, 2024     Ernesto Griffith MD  [address]  [address]  [place] [address]    Patient: Derek Walter   YOB: 1945   Date of Visit: 3/19/2024       Dear Dr. Griffith:    Thank you for referring Derek Walter to me for evaluation. Below are my notes for this consultation.    If you have questions, please do not hesitate to call me. I look forward to following your patient along with you.         Sincerely,        Perez Gong MD        CC: DO Sony Gatica MD Darius Desai, MD  3/19/2024 11:55 AM  Sign when Signing Visit               Surgical Oncology Follow Up       [facility] [address] [address]  [phone]    Derek Walter  1945  [id number]  [facility] [place] [id number] [facility] [address]  [phone]    Diagnoses and all orders for this visit:    Hepatocellular carcinoma (HCC)  -     Ambulatory referral to Palliative Care; Future  -     Ambulatory referral to Hematology / Oncology; Future  -     CT chest wo contrast; Future  -     CBC and differential; Future  -     Comprehensive metabolic panel; Future  -     Protime-INR; Future  -     CBC and differential  -     Comprehensive metabolic panel  -     Protime-INR    Other orders  -     Lantus SoloStar 100 units/mL SOPN; in the morning  -     latanoprost (XALATAN) 0.005 % ophthalmic solution; daily  -     Multiple Vitamin (Tab-A-Ever) TABS; in the morning        Chief Complaint   Patient presents with   • Follow-up       No follow-ups on file.      Oncology History   Hepatocellular carcinoma (HCC)    Initial Diagnosis    Hepatocellular carcinoma (HCC)     9/5/2023 -  Cancer Staged    Staging form: Liver (Excluding Intrahepatic Bile Ducts), AJCC 8th Edition  - Clinical stage from 9/5/2023: Stage II (cT2, cN0, cM0) - Signed by  Sony Echeverria MD on 9/5/2023  Histopathologic type: Hepatocellular carcinoma, NOS           Staging: HCC  Treatment history: Y90, September 2023  Current treatment: Observation  Disease status: Rising AFP with the entire left lobe involved with tumor.    History of Present Illness: Patient returns after his hospitalization for bleeding.  At that time, he was found to have an elevated AFP level over 68,000.  Follow-up MRI from March 14, 2023 revealed diffuse restricted diffusion on the entire left lobe.  The segment 6 lesion also appears to have viable tumor.  I personally reviewed the films and discussed this with interventional radiology.  He is feeling better.  He is walking with a walker.  He has lost over 40 pounds since his hospitalization.    Review of Systems  Complete ROS Surg Onc:   Complete ROS Surg Onc:   Constitutional: The patient has fatigue and weight loss.  Eyes: No complaints of visual problems, no scleral icterus.   ENT: no complaints of ear pain, no hoarseness, no difficulty swallowing,  no tinnitus and no new masses in head, oral cavity, or neck.   Cardiovascular: No complaints of chest pain, no palpitations, no ankle edema.   Respiratory: No complaints of shortness of breath, no cough.   Gastrointestinal: No complaints of jaundice, no bloody stools, no pale stools.   Genitourinary: No complaints of dysuria, no hematuria, no nocturia, no frequent urination, no urethral discharge.   Musculoskeletal: No complaints of weakness, paralysis, joint stiffness or arthralgias.  Integumentary: No complaints of rash, no new lesions.   Neurological: No complaints of convulsions, no seizures, no dizziness.   Hematologic/Lymphatic: No complaints of easy bruising.   Endocrine:  No hot or cold intolerance.  No polydipsia, polyphagia, or polyuria.  Allergy/immunology:  No environmental allergies.  No food allergies.  Not immunocompromised.  Skin:  No pallor or rash.  No wound.        Patient Active  Problem List   Diagnosis   • Stage 3 chronic kidney disease, unspecified whether stage 3a or 3b CKD (HCC)   • Peripheral polyneuropathy   • Type 2 diabetes mellitus with neurologic complication, with long-term current use of insulin (HCC)   • Atrial fibrillation, unspecified type (HCC)   • Liver mass   • H/O asbestos exposure   • History of tobacco abuse   • Restrictive lung disease   • Other emphysema (HCC)   • Anemia   • History of DVT (deep vein thrombosis)   • CAD (coronary artery disease)   • Hepatic flexure mass   • Hepatocellular carcinoma (HCC)   • Coronary artery disease involving native coronary artery   • Status post insertion of drug eluting coronary artery stent   • History of coronary angioplasty with insertion of stent   • Chronic systolic heart failure (HCC)   • Ischemic cardiomyopathy   • Acute blood loss anemia   • Acute upper GI bleeding   • Hepatic encephalopathy (HCC)   • Dysphagia   • Orthostatic hypotension   • Atypical chest pain     Past Medical History:   Diagnosis Date   • Acute on chronic diastolic HF (heart failure) (HCC)    • NA (acute kidney injury) (HCC)    • Anemia    • Atrial fibrillation (HCC)     Eliquis   • AVB (atrioventricular block)     1st degree   • CAD (coronary artery disease)    • CKD (chronic kidney disease), stage III (HCC)     baseline Cr 1.2-1.6   • Colon polyp    • Diabetes mellitus (HCC)     type 2, insulin dependent   • Diverticulosis    • Emphysema lung (HCC)    • Former tobacco use    • History of asbestos exposure    • History of DVT (deep vein thrombosis)     RLE, tx w/ AC   • History of GI bleed 08/2023    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, given PRBC/Venofer for anemia while in house   • Hyperlipidemia    • Hypertension    • LAFB (left anterior fascicular block)    • Liver cancer (HCC)    • Liver mass 08/2023    suspected HCC, needs radioembolization   • RBBB    • Syncope 08/07/2023     Past Surgical History:   Procedure Laterality Date   •  APPENDECTOMY     • BOWEL RESECTION     • CARDIAC CATHETERIZATION     • CARDIAC CATHETERIZATION N/A 2023    Procedure: Cardiac pci;  Surgeon: Dom Garrett DO;  Location: [facility];  Service: Cardiology   • CARDIAC CATHETERIZATION N/A 2023    Procedure: Cardiac catheterization;  Surgeon: Dom Garrett DO;  [facility];  Service: Cardiology   • CARDIAC CATHETERIZATION N/A 2023    Procedure: Cardiac Coronary Angiogram;  Surgeon: Dom Garrett DO;  [facility]: [facility];  Service: Cardiology   • CATARACT EXTRACTION Bilateral    • COLONOSCOPY     • EGD     • IR Y-90 PRE-ANGIO/EMBO W/ LUNG SCAN  2023   • IR Y-90 RADIOEMBOLIZATION  2023     Family History   Problem Relation Age of Onset   • Hypertension Mother    • Bone cancer Father    • Diabetes type II Sister    • Diabetes type II Sister    • Esophageal cancer Brother    • Colon cancer Neg Hx      Social History     Socioeconomic History   • Marital status: /Civil Union     Spouse name: Not on file   • Number of children: Not on file   • Years of education: Not on file   • Highest education level: Not on file   Occupational History   • Not on file   Tobacco Use   • Smoking status: Former     Current packs/day: 0.00     Average packs/day: 1 pack/day for 30.0 years (30.0 ttl pk-yrs)     Types: Cigarettes     Start date:      Quit date:      Years since quittin.2   • Smokeless tobacco: Never   Vaping Use   • Vaping status: Never Used   Substance and Sexual Activity   • Alcohol use: Yes     Comment: Socially   • Drug use: Never   • Sexual activity: Not on file   Other Topics Concern   • Not on file   Social History Narrative   • Not on file     Social Determinants of Health     Financial Resource Strain: Low Risk  (10/13/2023)    Overall Financial Resource Strain (CARDIA)    • Difficulty of Paying Living Expenses: Not hard at all   Food Insecurity: No Food  Insecurity (1/24/2024)    Hunger Vital Sign    • Worried About Running Out of Food in the Last Year: Never true    • Ran Out of Food in the Last Year: Never true   Transportation Needs: No Transportation Needs (1/24/2024)    PRAPARE - Transportation    • Lack of Transportation (Medical): No    • Lack of Transportation (Non-Medical): No   Physical Activity: Not on file   Stress: Not on file   Social Connections: Not on file   Intimate Partner Violence: Not on file   Housing Stability: Low Risk  (1/24/2024)    Housing Stability Vital Sign    • Unable to Pay for Housing in the Last Year: No    • Number of Places Lived in the Last Year: 1    • Unstable Housing in the Last Year: No       Current Outpatient Medications:   •  acetaminophen (TYLENOL) 650 mg CR tablet, Take 650 mg by mouth every 8 (eight) hours as needed for mild pain, Disp: , Rfl:   •  atorvastatin (LIPITOR) 40 mg tablet, take 1 tablet by mouth once daily after dinner, Disp: 90 tablet, Rfl: 1  •  azelastine (ASTELIN) 0.1 % nasal spray, 1 spray into each nostril 2 (two) times a day Use in each nostril as directed, Disp: 1 mL, Rfl: 0  •  Blood Glucose Monitoring Suppl (OneTouch Verio) w/Device KIT, Use 4 (four) times a day, Disp: 1 kit, Rfl: 0  •  clopidogrel (PLAVIX) 75 mg tablet, Take 1 tablet (75 mg total) by mouth daily, Disp: 30 tablet, Rfl: 11  •  Continuous Blood Gluc  (Dexcom G6 ) NOHEMY, Use with dexcom sensor, Disp: 1 each, Rfl: 0  •  Eliquis 5 MG, take 1 tablet by mouth twice a day, Disp: 60 tablet, Rfl: 5  •  Empagliflozin (Jardiance) 10 MG TABS tablet, take 1 tablet by mouth daily every morning, Disp: 30 tablet, Rfl: 0  •  ferrous sulfate 325 (65 Fe) mg tablet, Take 1 tablet (325 mg total) by mouth daily with breakfast, Disp: 30 tablet, Rfl: 5  •  furosemide (LASIX) 40 mg tablet, Take 1 tablet (40 mg total) by mouth every other day, Disp: 90 tablet, Rfl: 3  •  glucose blood (OneTouch Verio) test strip, Use as instructed, Disp: 100  strip, Rfl: 2  •  insulin detemir (Levemir FlexPen) 100 Units/mL injection pen, 65 units daily, Disp: 30 mL, Rfl: 0  •  Insulin Pen Needle (Droplet Pen Needles) 32G X 4 MM MISC, use 1 PEN NEEDLE to inject MEDICATION subcutaneously four times a day, Disp: 1000 each, Rfl: 1  •  lactulose (CHRONULAC) 10 g/15 mL solution, Take 30 mL (20 g total) by mouth 2 (two) times a day (Patient taking differently: Take 20 g by mouth 2 (two) times a day pt takes lactulose only on days where is is not having 2 to 3 soft bms daily), Disp: 240 mL, Rfl: 0  •  Lantus SoloStar 100 units/mL SOPN, in the morning, Disp: , Rfl:   •  latanoprost (XALATAN) 0.005 % ophthalmic solution, daily, Disp: , Rfl:   •  loperamide (Imodium A-D) 2 mg capsule, Take 2 mg by mouth 4 (four) times a day as needed for diarrhea. may take imodium as long as pt is having at least 2 to 3 soft stools a day  Indications: Diarrhea, Disp: , Rfl:   •  LORazepam (ATIVAN) 1 mg tablet, Take 1 tablet (1 mg total) by mouth see administration instructions Take 1 tablet 30 minutes prior to MRI, take 2nd tablet immediately prior to MRI, Disp: 2 tablet, Rfl: 0  •  Lumigan 0.01 % ophthalmic drops, Administer 1 drop to both eyes every evening, Disp: , Rfl:   •  metoprolol tartrate (LOPRESSOR) 25 mg tablet, Take 0.5 tablets (12.5 mg total) by mouth every 12 (twelve) hours, Disp: , Rfl:   •  Multiple Vitamin (MULTIVITAMIN ADULT PO), Take 1 tablet by mouth daily, Disp: , Rfl:   •  Multiple Vitamin (Tab-A-Ever) TABS, in the morning, Disp: , Rfl:   •  NovoLOG FlexPen 100 units/mL injection pen, INJECT 40 UNITS UNDER THE SKIN 3 TIMES DAILY WITH MEALS, Disp: 36 mL, Rfl: 2  •  pantoprazole (PROTONIX) 40 mg tablet, Take 1 tablet (40 mg total) by mouth 2 (two) times a day, Disp: 180 tablet, Rfl: 1  •  pregabalin (LYRICA) 100 mg capsule, Take 1 capsule (100 mg total) by mouth 3 (three) times a day, Disp: 270 capsule, Rfl: 1  •  pregabalin (LYRICA) 100 mg capsule, Take 100 mg by mouth 3  (three) times a day. Indications: Diabetes with Nerve Disease, Disp: , Rfl:   •  rifaximin (Xifaxan) 550 mg tablet, Take 550 mg by mouth 2 (two) times a day. Indications: Impaired Brain Function due to Liver Disease, Disp: , Rfl:   •  sertraline (ZOLOFT) 25 mg tablet, Take 25 mg by mouth daily, Disp: , Rfl:   •  tamsulosin (FLOMAX) 0.4 mg, Take 0.4 mg by mouth daily with dinner, Disp: , Rfl:   •  traZODone (DESYREL) 100 mg tablet, take 2 tablets by mouth at bedtime, Disp: 180 tablet, Rfl: 0  •  umeclidinium-vilanterol (Anoro Ellipta) 62.5-25 mcg/actuation inhaler, Inhale 1 puff daily, Disp: 180 blister, Rfl: 1  •  Continuous Blood Gluc Sensor (Dexcom G6 Sensor) MISC, Use daily as directed for CGM - Change every 10 days (Patient not taking: Reported on 2/26/2024), Disp: 9 each, Rfl: 3  •  Continuous Blood Gluc Transmit (Dexcom G6 Transmitter) MISC, Use daily as directed for CGM - Change every 3 months (Patient not taking: Reported on 2/26/2024), Disp: 1 each, Rfl: 3  •  Continuous Blood Gluc Transmit (Dexcom G6 Transmitter) MISC, Use daily as directed for CGM - Change every 3 months (Patient not taking: Reported on 12/13/2023), Disp: 1 each, Rfl: 3  •  insulin lispro (HumaLOG) 100 units/mL injection, Inject 5 Units under the skin 3 (three) times a day with meals (Patient not taking: Reported on 2/28/2024), Disp: , Rfl:   No Known Allergies  Vitals:    03/19/24 1114   BP: 120/62   Pulse: 73   Resp: 16   Temp: 97.7 °F (36.5 °C)   SpO2: 97%       Physical Exam  Constitutional: General appearance: The Patient is well-developed and well-nourished who appears the stated age in no acute distress. Patient is pleasant and talkative.     HEENT:  Normocephalic.  Sclerae are anicteric. Mucous membranes are moist. Neck is supple without adenopathy. No JVD.     Abdomen: Abdomen is soft, non-tender, non-distended and without masses.     Extremities: There is no clubbing or cyanosis. There is no edema.  Symmetric.  Neuro: Grossly  nonfocal. Gait is normal with  walker.     Skin: Warm, anicteric.    Psych:  Patient is pleasant and talkative.  Breasts:        Pathology:  [unfilled]    Labs:      Imaging  MRI abdomen w wo contrast    Result Date: 3/18/2024  Narrative: MRI - ABDOMEN - WITH AND WITHOUT CONTRAST INDICATION: 78 years / Male. C22.0: Liver cell carcinoma. Post Y90 radio embolization via the left and right hepatic arteries (September 8, 2023). AFP: 68,304 (February 1, 2024) COMPARISON: MR abdomen December 7, 2023. Correlation with MR abdomen July 6, 2023 and CT abdomen pelvis January 23, 2024. TECHNIQUE: Multiplanar/multisequence MRI of the abdomen was performed before and after administration of contrast. IV Contrast: 9 mL of Gadobutrol injection (SINGLE-DOSE) FINDINGS: LOWER CHEST: Unremarkable. LIVER: Nodular hepatic surface contour and enlargement of the caudate lobe are indicative of cirrhosis. 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 Diffuse abnormal restricted diffusion with corresponding heterogeneous low T1 signal throughout the left hepatic lobe has increased since the prior study and is indicative of infiltrative hepatocellular carcinoma (for example series 3, image 49; series 8, image 45).The largest of the lesions in the left hepatic lobe is in segment 2 and measures 2.1 x 1.7 cm (series 9, image 39). Lesion 1: Location: Segment 7 Size: 3.6 x 3.3 cm (series 9, image 34). Previously 4.4 x 4.2 cm on December 7, 2023. Pretreatment size was 3.9 x 3.7 cm. Imaging features: No enhancement or washout. Lesion 2: Location: Segment 6 Size: 2.5 x  1.7 cm (series 9, image 82). Previously 2.2 x 1.9 cm on December 7, 2023. Pretreatment pretreatment size was 2.2 x 2.2 cm. Imaging features: Peripheral rim enhancement without washout. Restricted diffusion is present (series 3, image 58). BILE DUCTS: No intrahepatic or extrahepatic bile duct dilation.  GALLBLADDER: Cholelithiasis without acute cholecystitis. PANCREAS: Unremarkable. ADRENAL GLANDS: Unremarkable. SPLEEN: Normal. KIDNEYS/PROXIMAL URETERS: No hydroureteronephrosis. No suspicious renal mass. Several bilateral renal cysts measuring up to 8.7 cm on the left BOWEL: No dilated loops of bowel. PERITONEUM/RETROPERITONEUM: New small volume perihepatic and right lower quadrant ascites. LYMPH NODES: No abdominal lymphadenopathy. VESSELS: No aneurysm. ABDOMINAL WALL: Unremarkable BONES: No suspicious osseous lesion.     Impression: Since December 7, 2023: 1.  Increase in diffuse infiltrative hepatocellular carcinoma throughout the entirety of the left hepatic lobe with no significant change in tumor thrombus in the left branch of the portal vein. 2.  Unchanged size of the treated hepatic segment 6 lesion with possible viable tumor. 3.  Decreased size of the treated hepatic segment 7 lesion without definitive findings of viable tumor. 4.  New small volume perihepatic and right lower quadrant ascites. Workstation performed: [id number]     I personally reviewed and interpreted the above laboratory and imaging data.    Discussion/Summary: 78-year-old male with multifocal HCC that was associated with tumor thrombus.  He was treated with Y90 and his AFP level has risen again.  His MRI appears to show progression of disease with involvement of his entire right lobe.  Discussion with IR, it does not appear that all areas of tumor in his liver can be treated with liver directed therapy alone.  I would recommend that he consider systemic therapy since his last set of labs shows that he has preserved liver function.  I will set him up with medical oncology to discuss.  Since he is not sure he wishes to pursue therapy, I will also set him up with palliative care to discuss goals of care.  We also discussed potential suspected life expectancy given no treatment of his tumor.  I will obtain baseline labs, as this will  likely help medical oncology to determine if he can undergo chemotherapy and immunotherapy safely.  At this point if he has treatment with medical oncology, I will see him again in the future should the need arise.  Should he decide to forego care and focus on his quality of life, I will potentially see him back with repeat imaging.  He and his wife are agreeable to this plan.  All their questions were answered.

## 2024-03-19 NOTE — PROGRESS NOTES
Surgical Oncology Follow Up       [facility] [place]  [address] [facility] [address]  [phone]    Derek Walter  1945  [id number]  [facility] [place]  [address] [facility] [address]  [phone]    Diagnoses and all orders for this visit:    Hepatocellular carcinoma (HCC)  -     Ambulatory referral to Palliative Care; Future  -     Ambulatory referral to Hematology / Oncology; Future  -     CT chest wo contrast; Future  -     CBC and differential; Future  -     Comprehensive metabolic panel; Future  -     Protime-INR; Future  -     CBC and differential  -     Comprehensive metabolic panel  -     Protime-INR    Other orders  -     Lantus SoloStar 100 units/mL SOPN; in the morning  -     latanoprost (XALATAN) 0.005 % ophthalmic solution; daily  -     Multiple Vitamin (Tab-A-Ever) TABS; in the morning        Chief Complaint   Patient presents with    Follow-up       No follow-ups on file.      Oncology History   Hepatocellular carcinoma (HCC)    Initial Diagnosis    Hepatocellular carcinoma (HCC)     9/5/2023 -  Cancer Staged    Staging form: Liver (Excluding Intrahepatic Bile Ducts), AJCC 8th Edition  - Clinical stage from 9/5/2023: Stage II (cT2, cN0, cM0) - Signed by Sony Echeverria MD on 9/5/2023  Histopathologic type: Hepatocellular carcinoma, NOS           Staging: HCC  Treatment history: Y90, September 2023  Current treatment: Observation  Disease status: Rising AFP with the entire left lobe involved with tumor.    History of Present Illness: Patient returns after his hospitalization for bleeding.  At that time, he was found to have an elevated AFP level over 68,000.  Follow-up MRI from March 14, 2023 revealed diffuse restricted diffusion on the entire left lobe.  The segment 6 lesion also appears to have viable tumor.  I personally  reviewed the films and discussed this with interventional radiology.  He is feeling better.  He is walking with a walker.  He has lost over 40 pounds since his hospitalization.    Review of Systems  Complete ROS Surg Onc:   Complete ROS Surg Onc:   Constitutional: The patient has fatigue and weight loss.  Eyes: No complaints of visual problems, no scleral icterus.   ENT: no complaints of ear pain, no hoarseness, no difficulty swallowing,  no tinnitus and no new masses in head, oral cavity, or neck.   Cardiovascular: No complaints of chest pain, no palpitations, no ankle edema.   Respiratory: No complaints of shortness of breath, no cough.   Gastrointestinal: No complaints of jaundice, no bloody stools, no pale stools.   Genitourinary: No complaints of dysuria, no hematuria, no nocturia, no frequent urination, no urethral discharge.   Musculoskeletal: No complaints of weakness, paralysis, joint stiffness or arthralgias.  Integumentary: No complaints of rash, no new lesions.   Neurological: No complaints of convulsions, no seizures, no dizziness.   Hematologic/Lymphatic: No complaints of easy bruising.   Endocrine:  No hot or cold intolerance.  No polydipsia, polyphagia, or polyuria.  Allergy/immunology:  No environmental allergies.  No food allergies.  Not immunocompromised.  Skin:  No pallor or rash.  No wound.        Patient Active Problem List   Diagnosis    Stage 3 chronic kidney disease, unspecified whether stage 3a or 3b CKD (HCC)    Peripheral polyneuropathy    Type 2 diabetes mellitus with neurologic complication, with long-term current use of insulin (HCC)    Atrial fibrillation, unspecified type (HCC)    Liver mass    H/O asbestos exposure    History of tobacco abuse    Restrictive lung disease    Other emphysema (HCC)    Anemia    History of DVT (deep vein thrombosis)    CAD (coronary artery disease)    Hepatic flexure mass    Hepatocellular carcinoma (HCC)    Coronary artery disease involving native  coronary artery    Status post insertion of drug eluting coronary artery stent    History of coronary angioplasty with insertion of stent    Chronic systolic heart failure (HCC)    Ischemic cardiomyopathy    Acute blood loss anemia    Acute upper GI bleeding    Hepatic encephalopathy (HCC)    Dysphagia    Orthostatic hypotension    Atypical chest pain     Past Medical History:   Diagnosis Date    Acute on chronic diastolic HF (heart failure) (HCC)     NA (acute kidney injury) (HCC)     Anemia     Atrial fibrillation (HCC)     Eliquis    AVB (atrioventricular block)     1st degree    CAD (coronary artery disease)     CKD (chronic kidney disease), stage III (HCC)     baseline Cr 1.2-1.6    Colon polyp     Diabetes mellitus (HCC)     type 2, insulin dependent    Diverticulosis     Emphysema lung (HCC)     Former tobacco use     History of asbestos exposure     History of DVT (deep vein thrombosis)     RLE, tx w/ AC    History of GI bleed 08/2023    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, given PRBC/Venofer for anemia while in house    Hyperlipidemia     Hypertension     LAFB (left anterior fascicular block)     Liver cancer (HCC)     Liver mass 08/2023    suspected HCC, needs radioembolization    RBBB     Syncope 08/07/2023     Past Surgical History:   Procedure Laterality Date    APPENDECTOMY      BOWEL RESECTION  2014    CARDIAC CATHETERIZATION      CARDIAC CATHETERIZATION N/A 08/25/2023    Procedure: Cardiac pci;  Surgeon: Dom Garrett DO;  Location: [facility];  Service: Cardiology    CARDIAC CATHETERIZATION N/A 08/21/2023    Procedure: Cardiac catheterization;  Surgeon: Dom Garrett DO;  [facility]: [facility];  Service: Cardiology    CARDIAC CATHETERIZATION N/A 08/21/2023    Procedure: Cardiac Coronary Angiogram;  Surgeon: Dom Garrett DO;  Location: [facility];  Service: Cardiology    CATARACT EXTRACTION Bilateral     COLONOSCOPY      EGD      IR Y-90  PRE-ANGIO/EMBO W/ LUNG SCAN  2023    IR Y-90 RADIOEMBOLIZATION  2023     Family History   Problem Relation Age of Onset    Hypertension Mother     Bone cancer Father     Diabetes type II Sister     Diabetes type II Sister     Esophageal cancer Brother     Colon cancer Neg Hx      Social History     Socioeconomic History    Marital status: /Civil Union     Spouse name: Not on file    Number of children: Not on file    Years of education: Not on file    Highest education level: Not on file   Occupational History    Not on file   Tobacco Use    Smoking status: Former     Current packs/day: 0.00     Average packs/day: 1 pack/day for 30.0 years (30.0 ttl pk-yrs)     Types: Cigarettes     Start date:      Quit date:      Years since quittin.2    Smokeless tobacco: Never   Vaping Use    Vaping status: Never Used   Substance and Sexual Activity    Alcohol use: Yes     Comment: Socially    Drug use: Never    Sexual activity: Not on file   Other Topics Concern    Not on file   Social History Narrative    Not on file     Social Determinants of Health     Financial Resource Strain: Low Risk  (10/13/2023)    Overall Financial Resource Strain (CARDIA)     Difficulty of Paying Living Expenses: Not hard at all   Food Insecurity: No Food Insecurity (2024)    Hunger Vital Sign     Worried About Running Out of Food in the Last Year: Never true     Ran Out of Food in the Last Year: Never true   Transportation Needs: No Transportation Needs (2024)    PRAPARE - Transportation     Lack of Transportation (Medical): No     Lack of Transportation (Non-Medical): No   Physical Activity: Not on file   Stress: Not on file   Social Connections: Not on file   Intimate Partner Violence: Not on file   Housing Stability: Low Risk  (2024)    Housing Stability Vital Sign     Unable to Pay for Housing in the Last Year: No     Number of Places Lived in the Last Year: 1     Unstable Housing in the Last Year:  No       Current Outpatient Medications:     acetaminophen (TYLENOL) 650 mg CR tablet, Take 650 mg by mouth every 8 (eight) hours as needed for mild pain, Disp: , Rfl:     atorvastatin (LIPITOR) 40 mg tablet, take 1 tablet by mouth once daily after dinner, Disp: 90 tablet, Rfl: 1    azelastine (ASTELIN) 0.1 % nasal spray, 1 spray into each nostril 2 (two) times a day Use in each nostril as directed, Disp: 1 mL, Rfl: 0    Blood Glucose Monitoring Suppl (OneTouch Verio) w/Device KIT, Use 4 (four) times a day, Disp: 1 kit, Rfl: 0    clopidogrel (PLAVIX) 75 mg tablet, Take 1 tablet (75 mg total) by mouth daily, Disp: 30 tablet, Rfl: 11    Continuous Blood Gluc  (Dexcom G6 ) NOHEMY, Use with dexcom sensor, Disp: 1 each, Rfl: 0    Eliquis 5 MG, take 1 tablet by mouth twice a day, Disp: 60 tablet, Rfl: 5    Empagliflozin (Jardiance) 10 MG TABS tablet, take 1 tablet by mouth daily every morning, Disp: 30 tablet, Rfl: 0    ferrous sulfate 325 (65 Fe) mg tablet, Take 1 tablet (325 mg total) by mouth daily with breakfast, Disp: 30 tablet, Rfl: 5    furosemide (LASIX) 40 mg tablet, Take 1 tablet (40 mg total) by mouth every other day, Disp: 90 tablet, Rfl: 3    glucose blood (OneTouch Verio) test strip, Use as instructed, Disp: 100 strip, Rfl: 2    insulin detemir (Levemir FlexPen) 100 Units/mL injection pen, 65 units daily, Disp: 30 mL, Rfl: 0    Insulin Pen Needle (Droplet Pen Needles) 32G X 4 MM MISC, use 1 PEN NEEDLE to inject MEDICATION subcutaneously four times a day, Disp: 1000 each, Rfl: 1    lactulose (CHRONULAC) 10 g/15 mL solution, Take 30 mL (20 g total) by mouth 2 (two) times a day (Patient taking differently: Take 20 g by mouth 2 (two) times a day pt takes lactulose only on days where is is not having 2 to 3 soft bms daily), Disp: 240 mL, Rfl: 0    Lantus SoloStar 100 units/mL SOPN, in the morning, Disp: , Rfl:     latanoprost (XALATAN) 0.005 % ophthalmic solution, daily, Disp: , Rfl:     loperamide  (Imodium A-D) 2 mg capsule, Take 2 mg by mouth 4 (four) times a day as needed for diarrhea. may take imodium as long as pt is having at least 2 to 3 soft stools a day  Indications: Diarrhea, Disp: , Rfl:     LORazepam (ATIVAN) 1 mg tablet, Take 1 tablet (1 mg total) by mouth see administration instructions Take 1 tablet 30 minutes prior to MRI, take 2nd tablet immediately prior to MRI, Disp: 2 tablet, Rfl: 0    Lumigan 0.01 % ophthalmic drops, Administer 1 drop to both eyes every evening, Disp: , Rfl:     metoprolol tartrate (LOPRESSOR) 25 mg tablet, Take 0.5 tablets (12.5 mg total) by mouth every 12 (twelve) hours, Disp: , Rfl:     Multiple Vitamin (MULTIVITAMIN ADULT PO), Take 1 tablet by mouth daily, Disp: , Rfl:     Multiple Vitamin (Tab-A-Ever) TABS, in the morning, Disp: , Rfl:     NovoLOG FlexPen 100 units/mL injection pen, INJECT 40 UNITS UNDER THE SKIN 3 TIMES DAILY WITH MEALS, Disp: 36 mL, Rfl: 2    pantoprazole (PROTONIX) 40 mg tablet, Take 1 tablet (40 mg total) by mouth 2 (two) times a day, Disp: 180 tablet, Rfl: 1    pregabalin (LYRICA) 100 mg capsule, Take 1 capsule (100 mg total) by mouth 3 (three) times a day, Disp: 270 capsule, Rfl: 1    pregabalin (LYRICA) 100 mg capsule, Take 100 mg by mouth 3 (three) times a day. Indications: Diabetes with Nerve Disease, Disp: , Rfl:     rifaximin (Xifaxan) 550 mg tablet, Take 550 mg by mouth 2 (two) times a day. Indications: Impaired Brain Function due to Liver Disease, Disp: , Rfl:     sertraline (ZOLOFT) 25 mg tablet, Take 25 mg by mouth daily, Disp: , Rfl:     tamsulosin (FLOMAX) 0.4 mg, Take 0.4 mg by mouth daily with dinner, Disp: , Rfl:     traZODone (DESYREL) 100 mg tablet, take 2 tablets by mouth at bedtime, Disp: 180 tablet, Rfl: 0    umeclidinium-vilanterol (Anoro Ellipta) 62.5-25 mcg/actuation inhaler, Inhale 1 puff daily, Disp: 180 blister, Rfl: 1    Continuous Blood Gluc Sensor (Dexcom G6 Sensor) MISC, Use daily as directed for CGM - Change every  10 days (Patient not taking: Reported on 2/26/2024), Disp: 9 each, Rfl: 3    Continuous Blood Gluc Transmit (Dexcom G6 Transmitter) MISC, Use daily as directed for CGM - Change every 3 months (Patient not taking: Reported on 2/26/2024), Disp: 1 each, Rfl: 3    Continuous Blood Gluc Transmit (Dexcom G6 Transmitter) MISC, Use daily as directed for CGM - Change every 3 months (Patient not taking: Reported on 12/13/2023), Disp: 1 each, Rfl: 3    insulin lispro (HumaLOG) 100 units/mL injection, Inject 5 Units under the skin 3 (three) times a day with meals (Patient not taking: Reported on 2/28/2024), Disp: , Rfl:   No Known Allergies  Vitals:    03/19/24 1114   BP: 120/62   Pulse: 73   Resp: 16   Temp: 97.7 °F (36.5 °C)   SpO2: 97%       Physical Exam  Constitutional: General appearance: The Patient is well-developed and well-nourished who appears the stated age in no acute distress. Patient is pleasant and talkative.     HEENT:  Normocephalic.  Sclerae are anicteric. Mucous membranes are moist. Neck is supple without adenopathy. No JVD.     Abdomen: Abdomen is soft, non-tender, non-distended and without masses.     Extremities: There is no clubbing or cyanosis. There is no edema.  Symmetric.  Neuro: Grossly nonfocal. Gait is normal with  walker.     Skin: Warm, anicteric.    Psych:  Patient is pleasant and talkative.  Breasts:        Pathology:  [unfilled]    Labs:      Imaging  MRI abdomen w wo contrast    Result Date: 3/18/2024  Narrative: MRI - ABDOMEN - WITH AND WITHOUT CONTRAST INDICATION: 78 years / Male. C22.0: Liver cell carcinoma. Post Y90 radio embolization via the left and right hepatic arteries (September 8, 2023). AFP: 68,304 (February 1, 2024) COMPARISON: MR abdomen December 7, 2023. Correlation with MR abdomen July 6, 2023 and CT abdomen pelvis January 23, 2024. TECHNIQUE: Multiplanar/multisequence MRI of the abdomen was performed before and after administration of contrast. IV Contrast: 9 mL of  Gadobutrol injection (SINGLE-DOSE) FINDINGS: LOWER CHEST: Unremarkable. LIVER: Nodular hepatic surface contour and enlargement of the caudate lobe are indicative of cirrhosis. 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 Diffuse abnormal restricted diffusion with corresponding heterogeneous low T1 signal throughout the left hepatic lobe has increased since the prior study and is indicative of infiltrative hepatocellular carcinoma (for example series 3, image 49; series 8, image 45).The largest of the lesions in the left hepatic lobe is in segment 2 and measures 2.1 x 1.7 cm (series 9, image 39). Lesion 1: Location: Segment 7 Size: 3.6 x 3.3 cm (series 9, image 34). Previously 4.4 x 4.2 cm on December 7, 2023. Pretreatment size was 3.9 x 3.7 cm. Imaging features: No enhancement or washout. Lesion 2: Location: Segment 6 Size: 2.5 x  1.7 cm (series 9, image 82). Previously 2.2 x 1.9 cm on December 7, 2023. Pretreatment pretreatment size was 2.2 x 2.2 cm. Imaging features: Peripheral rim enhancement without washout. Restricted diffusion is present (series 3, image 58). BILE DUCTS: No intrahepatic or extrahepatic bile duct dilation. GALLBLADDER: Cholelithiasis without acute cholecystitis. PANCREAS: Unremarkable. ADRENAL GLANDS: Unremarkable. SPLEEN: Normal. KIDNEYS/PROXIMAL URETERS: No hydroureteronephrosis. No suspicious renal mass. Several bilateral renal cysts measuring up to 8.7 cm on the left BOWEL: No dilated loops of bowel. PERITONEUM/RETROPERITONEUM: New small volume perihepatic and right lower quadrant ascites. LYMPH NODES: No abdominal lymphadenopathy. VESSELS: No aneurysm. ABDOMINAL WALL: Unremarkable BONES: No suspicious osseous lesion.     Impression: Since December 7, 2023: 1.  Increase in diffuse infiltrative hepatocellular carcinoma throughout the entirety of the left hepatic lobe with no significant change in tumor thrombus in the  left branch of the portal vein. 2.  Unchanged size of the treated hepatic segment 6 lesion with possible viable tumor. 3.  Decreased size of the treated hepatic segment 7 lesion without definitive findings of viable tumor. 4.  New small volume perihepatic and right lower quadrant ascites. Workstation performed: [id number]     I personally reviewed and interpreted the above laboratory and imaging data.    Discussion/Summary: 78-year-old male with multifocal HCC that was associated with tumor thrombus.  He was treated with Y90 and his AFP level has risen again.  His MRI appears to show progression of disease with involvement of his entire right lobe.  Discussion with IR, it does not appear that all areas of tumor in his liver can be treated with liver directed therapy alone.  I would recommend that he consider systemic therapy since his last set of labs shows that he has preserved liver function.  I will set him up with medical oncology to discuss.  Since he is not sure he wishes to pursue therapy, I will also set him up with palliative care to discuss goals of care.  We also discussed potential suspected life expectancy given no treatment of his tumor.  I will obtain baseline labs, as this will likely help medical oncology to determine if he can undergo chemotherapy and immunotherapy safely.  At this point if he has treatment with medical oncology, I will see him again in the future should the need arise.  Should he decide to forego care and focus on his quality of life, I will potentially see him back with repeat imaging.  He and his wife are agreeable to this plan.  All their questions were answered.

## 2024-03-20 ENCOUNTER — TELEPHONE (OUTPATIENT)
Dept: ENDOCRINOLOGY | Facility: CLINIC | Age: 79
End: 2024-03-20

## 2024-03-20 VITALS
RESPIRATION RATE: 18 BRPM | HEART RATE: 72 BPM | SYSTOLIC BLOOD PRESSURE: 120 MMHG | DIASTOLIC BLOOD PRESSURE: 64 MMHG | OXYGEN SATURATION: 97 % | TEMPERATURE: 97.6 F

## 2024-03-20 NOTE — TELEPHONE ENCOUNTER
I received notification from home care that patient decrease his Humalog to 10 units with meals.  Can we confirm how much Levemir he is taking at this time as we may need to decrease this as well?  He also has Lantus listed in his medication list since I last saw him.  Can we confirm if he is taking Levemir or Lantus?

## 2024-03-21 ENCOUNTER — TELEPHONE (OUTPATIENT)
Dept: PALLIATIVE MEDICINE | Facility: CLINIC | Age: 79
End: 2024-03-21

## 2024-03-21 ENCOUNTER — HOME CARE VISIT (OUTPATIENT)
Dept: HOME HEALTH SERVICES | Facility: HOME HEALTHCARE | Age: 79
End: 2024-03-21
Payer: COMMERCIAL

## 2024-03-21 ENCOUNTER — TELEPHONE (OUTPATIENT)
Dept: FAMILY MEDICINE CLINIC | Facility: HOSPITAL | Age: 79
End: 2024-03-21

## 2024-03-21 ENCOUNTER — TELEPHONE (OUTPATIENT)
Dept: HEMATOLOGY ONCOLOGY | Facility: CLINIC | Age: 79
End: 2024-03-21

## 2024-03-21 VITALS
HEART RATE: 64 BPM | RESPIRATION RATE: 18 BRPM | SYSTOLIC BLOOD PRESSURE: 132 MMHG | BODY MASS INDEX: 26.72 KG/M2 | OXYGEN SATURATION: 96 % | WEIGHT: 197 LBS | DIASTOLIC BLOOD PRESSURE: 60 MMHG | TEMPERATURE: 97 F

## 2024-03-21 VITALS — TEMPERATURE: 97.1 F | DIASTOLIC BLOOD PRESSURE: 68 MMHG | RESPIRATION RATE: 19 BRPM | SYSTOLIC BLOOD PRESSURE: 112 MMHG

## 2024-03-21 PROCEDURE — G0157 HHC PT ASSISTANT EA 15: HCPCS

## 2024-03-21 PROCEDURE — G0299 HHS/HOSPICE OF RN EA 15 MIN: HCPCS

## 2024-03-21 NOTE — TELEPHONE ENCOUNTER
Spoke with spouse she said she will call us back to set up a appointment once she talks to someone about his chemotherapy.

## 2024-03-21 NOTE — TELEPHONE ENCOUNTER
I called Derek in response to a referral that was received for patient to establish care with Medical Oncology.     Outreach was made to schedule a consultation.    I left a voicemail explaining the reason for my call and advised patient to call [facility] at [phone].  Another attempt will be made to contact patient.

## 2024-03-21 NOTE — TELEPHONE ENCOUNTER
Patients wife left the following message about a prior auth    Hi, my name is Nilda Walter [phone]. I'm calling for my  Derek Walter. He needs a pre auth for his Dexcom to the insurance company and Doctor Markos needs to do it. If you need to call me, please call me back. Thank you.

## 2024-03-22 ENCOUNTER — TELEPHONE (OUTPATIENT)
Dept: FAMILY MEDICINE CLINIC | Facility: HOSPITAL | Age: 79
End: 2024-03-22

## 2024-03-22 ENCOUNTER — PATIENT OUTREACH (OUTPATIENT)
Dept: HEMATOLOGY ONCOLOGY | Facility: CLINIC | Age: 79
End: 2024-03-22

## 2024-03-22 RX ORDER — INSULIN GLARGINE 100 [IU]/ML
INJECTION, SOLUTION SUBCUTANEOUS
Qty: 30 ML | Refills: 1 | Status: SHIPPED | OUTPATIENT
Start: 2024-03-22

## 2024-03-22 RX ORDER — INSULIN ASPART 100 [IU]/ML
INJECTION, SOLUTION INTRAVENOUS; SUBCUTANEOUS
Qty: 30 ML | Refills: 2 | Status: SHIPPED | OUTPATIENT
Start: 2024-03-22

## 2024-03-22 NOTE — PROGRESS NOTES
Intake received.  Patient referred to medical oncology for consideration for systemic therapy by Dr. Gong after liver directed therapy with increased AFP.  Phone outreach to the patient to follow up with him.  He reports slight generalized abdominal pain onset about 4 weeks ago.  He as a referral to palliative care and I offered to schedule him an appointment, I explained that he can see palliative if he chooses treatment or not.  He explained that he is not sure if he will pursue treatment but will decide if he will see palliative care after his appointment with Dr. Carvajal on 4/1/24.  I gave Derek my contact information and encouraged him to call me if he has any questions or concerns.  He thanked me.

## 2024-03-22 NOTE — TELEPHONE ENCOUNTER
Reviewed updated note from visiting nurse stating patient taking Lantus 65 units nightly and Novolog 10 units with meals.  Given the decline in sugars overnight recommend decrease Lantus to 50 units at night.  Continue Novolog as is.  Let me know any questions.

## 2024-03-22 NOTE — TELEPHONE ENCOUNTER
Left detailed message for patient that I tried doing PA but it said it was not needed. Maybe patient just needs a script?

## 2024-03-22 NOTE — TELEPHONE ENCOUNTER
My name is Nilda Walter. I'm calling on behalf of my  Derke Walter [id number]. He needs a pre auth for his Dexcom at [facility] and he can't get it until he gets that he needs it ASAP. Please ask Doctor Antonia to provide it today. Thank you very much. You can call me at [phone].  You received a voice mail from ANNA FUENTES

## 2024-03-24 ENCOUNTER — TELEPHONE (OUTPATIENT)
Age: 79
End: 2024-03-24

## 2024-03-24 NOTE — TELEPHONE ENCOUNTER
PA for Graciela Gonzales    Submitted via    [x]CMM-KEY [id number]  []SurescLendMeYourLiteracy-Case ID #    []Faxed to plan   []Other website    []Phone call Case ID #      Office notes sent, clinical questions answered. Awaiting determination    Turnaround time for your insurance to make a decision on your Prior Authorization can take 7-21 business days.

## 2024-03-25 ENCOUNTER — TELEPHONE (OUTPATIENT)
Dept: FAMILY MEDICINE CLINIC | Facility: HOSPITAL | Age: 79
End: 2024-03-25

## 2024-03-25 ENCOUNTER — TELEPHONE (OUTPATIENT)
Dept: CARDIOLOGY CLINIC | Facility: CLINIC | Age: 79
End: 2024-03-25

## 2024-03-25 ENCOUNTER — HOME CARE VISIT (OUTPATIENT)
Dept: HOME HEALTH SERVICES | Facility: HOME HEALTHCARE | Age: 79
End: 2024-03-25
Payer: COMMERCIAL

## 2024-03-25 VITALS
RESPIRATION RATE: 18 BRPM | HEART RATE: 76 BPM | OXYGEN SATURATION: 98 % | TEMPERATURE: 97.8 F | BODY MASS INDEX: 26.99 KG/M2 | SYSTOLIC BLOOD PRESSURE: 118 MMHG | DIASTOLIC BLOOD PRESSURE: 54 MMHG | WEIGHT: 199 LBS

## 2024-03-25 DIAGNOSIS — E11.40 TYPE 2 DIABETES MELLITUS WITH DIABETIC NEUROPATHY, WITH LONG-TERM CURRENT USE OF INSULIN (HCC): ICD-10-CM

## 2024-03-25 DIAGNOSIS — Z79.4 TYPE 2 DIABETES MELLITUS WITH DIABETIC NEUROPATHY, WITH LONG-TERM CURRENT USE OF INSULIN (HCC): ICD-10-CM

## 2024-03-25 PROCEDURE — G0299 HHS/HOSPICE OF RN EA 15 MIN: HCPCS

## 2024-03-25 NOTE — Clinical Note
As noted in epic,  this RN instructed pt to decrease levimir to 50 units at night. pt has 3 leveimir pens left in the home then will begin lantus. Pt decreased novolog again himself to 10 units in am and lunch and 5 units at dinner fyi. Pt to see you april 1st but would like a virtual appt as he is too fatigued to get out of the home regularly cuco that early in the am.

## 2024-03-25 NOTE — TELEPHONE ENCOUNTER
LEFT VOICEMAIL FOR [facility] TO GIVE ME A CALL BACK. Patients wife said that prior auths go through them. I left a voicemail hoping they will call back so we can figure out what's going on with patients benefit.

## 2024-03-25 NOTE — TELEPHONE ENCOUNTER
PA for Lantus not required     Reason (screenshot if applicable)              Message sent to provider pool no

## 2024-03-25 NOTE — TELEPHONE ENCOUNTER
BEREKET Brown from  [facility] called stating pt had a streak of blood in his urine on Saturday and continues to have spotty nose bleeds.

## 2024-03-25 NOTE — Clinical Note
pt reports blood in urine x 2 this weekend. Once toilet bowl was yellow with red strike per pt and another time bowl had some pink urine per wife. Still spots of blood on tissues in the home. Pt denies signs of uti and reports he does not have a urologist. This RN called [facility] to report the same as pt is on eliqus and plavix.

## 2024-03-25 NOTE — TELEPHONE ENCOUNTER
My name is Nilda Walter. I'm calling for Derek Walter [id number]. Last week I called and I needed somebody to call for his prescription pre authorization, but I'm not sure if they called the right people. His prescriptions are all done through [facility] and I'm not sure if you called [facility] or not. Can you please call [facility] instead and then call me back. My number is [phone]. All the pre-orders have to go to [facility] [place]. OK, thank you. Bye.

## 2024-03-26 ENCOUNTER — DOCUMENTATION (OUTPATIENT)
Dept: CARDIOLOGY CLINIC | Facility: CLINIC | Age: 79
End: 2024-03-26

## 2024-03-26 ENCOUNTER — HOME CARE VISIT (OUTPATIENT)
Dept: HOME HEALTH SERVICES | Facility: HOME HEALTHCARE | Age: 79
End: 2024-03-26
Payer: COMMERCIAL

## 2024-03-26 PROCEDURE — G0157 HHC PT ASSISTANT EA 15: HCPCS

## 2024-03-26 RX ORDER — EMPAGLIFLOZIN 10 MG/1
10 TABLET, FILM COATED ORAL EVERY MORNING
Qty: 30 TABLET | Refills: 5 | Status: SHIPPED | OUTPATIENT
Start: 2024-03-26

## 2024-03-26 NOTE — TELEPHONE ENCOUNTER
Thank you, if this persists we may need to have him see urology. He has some lab work pending including CBC so we can see where his blood count is falling

## 2024-03-27 ENCOUNTER — TELEPHONE (OUTPATIENT)
Dept: ENDOCRINOLOGY | Facility: CLINIC | Age: 79
End: 2024-03-27

## 2024-03-28 ENCOUNTER — HOME CARE VISIT (OUTPATIENT)
Dept: HOME HEALTH SERVICES | Facility: HOME HEALTHCARE | Age: 79
End: 2024-03-28
Payer: COMMERCIAL

## 2024-03-28 ENCOUNTER — TELEPHONE (OUTPATIENT)
Dept: HOME HEALTH SERVICES | Facility: HOME HEALTHCARE | Age: 79
End: 2024-03-28

## 2024-03-28 VITALS — HEART RATE: 84 BPM | DIASTOLIC BLOOD PRESSURE: 70 MMHG | OXYGEN SATURATION: 92 % | SYSTOLIC BLOOD PRESSURE: 148 MMHG

## 2024-03-28 PROCEDURE — G0151 HHCP-SERV OF PT,EA 15 MIN: HCPCS

## 2024-03-31 DIAGNOSIS — D64.9 ACUTE ANEMIA: ICD-10-CM

## 2024-04-01 ENCOUNTER — HOSPITAL ENCOUNTER (OUTPATIENT)
Dept: CT IMAGING | Facility: HOSPITAL | Age: 79
Discharge: HOME | End: 2024-04-01
Attending: SURGERY
Payer: COMMERCIAL

## 2024-04-01 ENCOUNTER — OFFICE VISIT (OUTPATIENT)
Age: 79
End: 2024-04-01
Payer: COMMERCIAL

## 2024-04-01 ENCOUNTER — TELEPHONE (OUTPATIENT)
Dept: HEMATOLOGY ONCOLOGY | Facility: CLINIC | Age: 79
End: 2024-04-01

## 2024-04-01 VITALS
DIASTOLIC BLOOD PRESSURE: 72 MMHG | HEIGHT: 72 IN | OXYGEN SATURATION: 92 % | HEART RATE: 92 BPM | WEIGHT: 203 LBS | TEMPERATURE: 97.4 F | RESPIRATION RATE: 18 BRPM | SYSTOLIC BLOOD PRESSURE: 134 MMHG | BODY MASS INDEX: 27.5 KG/M2

## 2024-04-01 DIAGNOSIS — C22.0 HEPATOCELLULAR CARCINOMA (HCC): ICD-10-CM

## 2024-04-01 DIAGNOSIS — C22.0 HEPATOCELLULAR CARCINOMA (HCC): Primary | ICD-10-CM

## 2024-04-01 PROCEDURE — 71250 CT THORAX DX C-: CPT

## 2024-04-01 PROCEDURE — 99215 OFFICE O/P EST HI 40 MIN: CPT | Performed by: INTERNAL MEDICINE

## 2024-04-01 PROCEDURE — G2211 COMPLEX E/M VISIT ADD ON: HCPCS | Performed by: INTERNAL MEDICINE

## 2024-04-01 RX ORDER — SODIUM CHLORIDE 9 MG/ML
20 INJECTION, SOLUTION INTRAVENOUS ONCE
OUTPATIENT
Start: 2024-04-15

## 2024-04-01 RX ORDER — INSULIN LISPRO 100 [IU]/ML
INJECTION, SOLUTION INTRAVENOUS; SUBCUTANEOUS
COMMUNITY
Start: 2024-02-07

## 2024-04-01 RX ORDER — FERROUS SULFATE 325(65) MG
1 TABLET ORAL DAILY
Qty: 30 TABLET | Refills: 5 | Status: SHIPPED | OUTPATIENT
Start: 2024-04-01

## 2024-04-01 RX ORDER — ONDANSETRON HYDROCHLORIDE 8 MG/1
8 TABLET, FILM COATED ORAL EVERY 8 HOURS PRN
Qty: 20 TABLET | Refills: 0 | Status: SHIPPED | OUTPATIENT
Start: 2024-04-01

## 2024-04-01 NOTE — TELEPHONE ENCOUNTER
Appointment Confirmation   Who are you speaking with? Spouse   If it is not the patient, are they listed on an active communication consent form? Yes   Which provider is the appointment scheduled with?  Dr. Carvajal   When is the appointment scheduled?  Please list date and time 4/1 11am   At which location is the appointment scheduled to take place? [place]   Did caller verbalize understanding of appointment details? Yes FEW MIN LATE WILL BE

## 2024-04-01 NOTE — H&P (VIEW-ONLY)
[facility]OLOG[facility]  [address]  [facility] [address]  [facility] [id number] [phone]     Date of Visit: 4/1/2024  Name: Derek Walter   YOB: 1945         Assessment/Plan  In summary, Derek Walter is a 79 y.o. male with multiple co morbidities presenting with progressive multifocal HCC, previously treated with Y90 spheres. ECOG 2, Child Souza B. AFP as of Feb 2024 was 68,304. CT chest scheduled for later today. Discussed course so far, natural history of HCC, treatment based on NCCN guidelines and prognostic information. Goal of treatment is disease control, and not curative intent.    Given cardiac history and GI bleeds, do not recommend using Avastin-based therapy. Discussed systemic therapy with Tremelimumab plus durvalumab instead.    The rationale and potential benefits, as well as the risks of acute and late side effects and potential toxicities of immunotherapy were reviewed with the patient. Literature provided. Consent was signed.    We will follow up with him to see if he decides on pursuing treatment. They have decided to call us later this week with their decision.  They are aware that regardless, overall prognosis is guarded.     Return for Office visit with labs prior, Labs - See Treatment Plan.     All the patient's questions were answered to their apparent satisfaction.  Patient has been strongly urged to call with any questions or concerns.     Total time spent reviewing EMR, seeing patient in clinic visit, documenting visit, placing treatment orders, and communicating with other medical providers: 60 mins        Oncology History   Hepatocellular carcinoma (HCC)    Initial Diagnosis    Hepatocellular carcinoma (HCC)     9/5/2023 -  Cancer Staged    Staging form: Liver (Excluding Intrahepatic Bile Ducts), AJCC 8th Edition  - Clinical stage from 9/5/2023: Stage II (cT2, cN0, cM0) - Signed by Sony Echeverria MD on 9/5/2023  Histopathologic  type: Hepatocellular carcinoma, NOS       4/8/2024 -  Chemotherapy    alteplase (CATHFLO), 2 mg, Intracatheter, Every 1 Minute as needed, 0 of 6 cycles  durvalumab (IMFINZI) IVPB, 1,500 mg (original dose 10 mg/kg), Intravenous, Once, 0 of 6 cycles  Dose modification: 1,500 mg (original dose 10 mg/kg, Cycle 1, Reason: Other (Must fill in a comment), Comment: standard dose)  tremelimumab-actl (IMJUDO) IVPB, 300 mg (100 % of original dose 300 mg), Intravenous, Once, 0 of 7 cycles  Dose modification: 300 mg (original dose 300 mg, Cycle 0), 300 mg (original dose 300 mg, Cycle 1)        Cancer Staging   Hepatocellular carcinoma (HCC)  Staging form: Liver (Excluding Intrahepatic Bile Ducts), AJCC 8th Edition  - Clinical stage from 9/5/2023: Stage II (cT2, cN0, cM0) - Signed by Sony Echeverria MD on 9/5/2023     Treatment Details   Treatment goal Palliative   Plan Name OP Durvalumab & Tremelimumab Q 28 Days   Status Active   Start Date 4/8/2024 (Planned)   End Date 8/26/2024 (Planned)   Provider Gael Carvajal MD   Chemotherapy alteplase (CATHFLO), 2 mg, Intracatheter, Every 1 Minute as needed, 0 of 6 cycles    durvalumab (IMFINZI) IVPB, 1,500 mg (original dose 10 mg/kg), Intravenous, Once, 0 of 6 cycles  Dose modification: 1,500 mg (original dose 10 mg/kg, Cycle 1, Reason: Other (Must fill in a comment), Comment: standard dose)    tremelimumab-actl (IMJUDO) IVPB, 300 mg (100 % of original dose 300 mg), Intravenous, Once, 0 of 7 cycles  Dose modification: 300 mg (original dose 300 mg, Cycle 0), 300 mg (original dose 300 mg, Cycle 1)          HISTORY OF PRESENT ILLNESS:   Derek Walter is a 79 y.o. male  who has been referred for management of hepatocellular carcinoma.  Past medical history significant for atrial fibrillation on Eliquis, cardiac stents on Plavix.  He was seen in ED in May 2023 for an abnormal CXR had CTA PE study that was negative for PE showed incidental finding of hepatic lobe abnormality and  advised to follow up with his PCP. Further imaging was ordered but was deferred by a couple of months due to abnormal renal function. MRI abdomen 7/6/2023 - confirmed numerous hepatic lesions and enhancing thrombus. Seg 7 lesion 3.7cm, seg 4B measuring 2.2cm, seg 5 dominant lesion measuring 1.4cm.     AFP from July 2023 was over 18K.    He was treated with Y90 in Sept 2023.    MRI liver from 12/7/23- stable tumors    He was admitted to the hospital in early February 2024 with abdominal pain.  He was found to have an upper GI bleed from 3 large angio ectasis in the fundus of the stomach and body of the stomach.  These were coagulated.  He required multiple units of packed red blood cell.  He later developed strokelike symptoms and was found unresponsive, requiring intubation.  This was thought to be due to elevated ammonia levels.  He has been following GI closely. CT abdomen - worsened heterogeneous/mottled appearance of the hepatic parenchyma in the anterior right and left hepatic lobes which may reflect disease progression. AFP now over 68K.    MRI from 3/14/24 showed progression of disease with involvement of the entire right lobe.  IR did not feel that all areas of tumor could be encompassed with further liver directed therapy.   saw the patient on March 19, 2024 and referred him to medical oncology to discuss systemic therapy.    He is here today with his wife for an initial consult    Subjective  RUQ abdominal pain +  Fatigue +  Appetite is poor      REVIEW OF SYSTEMS:    14 point review of systems otherwise  negative      Current Outpatient Medications:     acetaminophen (TYLENOL) 650 mg CR tablet, Take 650 mg by mouth every 8 (eight) hours as needed for mild pain, Disp: , Rfl:     atorvastatin (LIPITOR) 40 mg tablet, take 1 tablet by mouth once daily after dinner, Disp: 90 tablet, Rfl: 1    azelastine (ASTELIN) 0.1 % nasal spray, 1 spray into each nostril 2 (two) times a day Use in each nostril as  directed, Disp: 1 mL, Rfl: 0    Blood Glucose Monitoring Suppl (OneTouch Verio) w/Device KIT, Use 4 (four) times a day, Disp: 1 kit, Rfl: 0    clopidogrel (PLAVIX) 75 mg tablet, Take 1 tablet (75 mg total) by mouth daily, Disp: 30 tablet, Rfl: 11    Continuous Blood Gluc  (Dexcom G6 ) NOHEMY, Use with dexcom sensor, Disp: 1 each, Rfl: 0    Eliquis 5 MG, take 1 tablet by mouth twice a day, Disp: 60 tablet, Rfl: 5    Empagliflozin (Jardiance) 10 MG TABS tablet, take 1 tablet by mouth daily every morning, Disp: 30 tablet, Rfl: 5    FeroSul 325 (65 Fe) MG tablet, take 1 tablet by mouth daily with food, Disp: 30 tablet, Rfl: 5    furosemide (LASIX) 40 mg tablet, Take 1 tablet (40 mg total) by mouth every other day, Disp: 90 tablet, Rfl: 3    glucose blood (OneTouch Verio) test strip, Use as instructed, Disp: 100 strip, Rfl: 2    insulin aspart (NovoLOG FlexPen) 100 UNIT/ML injection pen, 10 units with meals. Dose change, Disp: 30 mL, Rfl: 2    Insulin Glargine Solostar (Lantus SoloStar) 100 UNIT/ML SOPN, 50 units nightly, Disp: 30 mL, Rfl: 1    insulin lispro (HumaLOG) 100 units/mL injection pen, , Disp: , Rfl:     Insulin Pen Needle (Droplet Pen Needles) 32G X 4 MM MISC, use 1 PEN NEEDLE to inject MEDICATION subcutaneously four times a day, Disp: 1000 each, Rfl: 1    lactulose (CHRONULAC) 10 g/15 mL solution, Take 30 mL (20 g total) by mouth 2 (two) times a day (Patient taking differently: Take 20 g by mouth 2 (two) times a day pt takes lactulose only on days where is is not having 2 to 3 soft bms daily), Disp: 240 mL, Rfl: 0    latanoprost (XALATAN) 0.005 % ophthalmic solution, daily, Disp: , Rfl:     loperamide (Imodium A-D) 2 mg capsule, Take 2 mg by mouth 4 (four) times a day as needed for diarrhea. may take imodium as long as pt is having at least 2 to 3 soft stools a day  Indications: Diarrhea, Disp: , Rfl:     LORazepam (ATIVAN) 1 mg tablet, Take 1 tablet (1 mg total) by mouth see administration  instructions Take 1 tablet 30 minutes prior to MRI, take 2nd tablet immediately prior to MRI, Disp: 2 tablet, Rfl: 0    Lumigan 0.01 % ophthalmic drops, Administer 1 drop to both eyes every evening, Disp: , Rfl:     metoprolol tartrate (LOPRESSOR) 25 mg tablet, Take 0.5 tablets (12.5 mg total) by mouth every 12 (twelve) hours, Disp: , Rfl:     Multiple Vitamin (MULTIVITAMIN ADULT PO), Take 1 tablet by mouth daily, Disp: , Rfl:     Multiple Vitamin (Tab-A-Ever) TABS, in the morning, Disp: , Rfl:     pantoprazole (PROTONIX) 40 mg tablet, Take 1 tablet (40 mg total) by mouth 2 (two) times a day, Disp: 180 tablet, Rfl: 1    pregabalin (LYRICA) 100 mg capsule, Take 1 capsule (100 mg total) by mouth 3 (three) times a day, Disp: 270 capsule, Rfl: 1    pregabalin (LYRICA) 100 mg capsule, Take 100 mg by mouth 3 (three) times a day. Indications: Diabetes with Nerve Disease, Disp: , Rfl:     rifaximin (Xifaxan) 550 mg tablet, Take 550 mg by mouth 2 (two) times a day. Indications: Impaired Brain Function due to Liver Disease, Disp: , Rfl:     sertraline (ZOLOFT) 25 mg tablet, Take 25 mg by mouth daily, Disp: , Rfl:     tamsulosin (FLOMAX) 0.4 mg, Take 0.4 mg by mouth daily with dinner, Disp: , Rfl:     traZODone (DESYREL) 100 mg tablet, take 2 tablets by mouth at bedtime, Disp: 180 tablet, Rfl: 0    umeclidinium-vilanterol (Anoro Ellipta) 62.5-25 mcg/actuation inhaler, Inhale 1 puff daily, Disp: 180 blister, Rfl: 1    Continuous Blood Gluc Sensor (Dexcom G6 Sensor) MISC, Use daily as directed for CGM - Change every 10 days (Patient not taking: Reported on 2/26/2024), Disp: 9 each, Rfl: 3    Continuous Blood Gluc Transmit (Dexcom G6 Transmitter) MISC, Use daily as directed for CGM - Change every 3 months (Patient not taking: Reported on 2/26/2024), Disp: 1 each, Rfl: 3    Continuous Blood Gluc Transmit (Dexcom G6 Transmitter) MISC, Use daily as directed for CGM - Change every 3 months (Patient not taking: Reported on  12/13/2023), Disp: 1 each, Rfl: 3     No Known Allergies     ACTIVE PROBLEMS:  Patient Active Problem List   Diagnosis    Stage 3 chronic kidney disease, unspecified whether stage 3a or 3b CKD (HCC)    Peripheral polyneuropathy    Type 2 diabetes mellitus with neurologic complication, with long-term current use of insulin (HCC)    Atrial fibrillation, unspecified type (HCC)    Liver mass    H/O asbestos exposure    History of tobacco abuse    Restrictive lung disease    Other emphysema (HCC)    Anemia    History of DVT (deep vein thrombosis)    CAD (coronary artery disease)    Hepatic flexure mass    Hepatocellular carcinoma (HCC)    Coronary artery disease involving native coronary artery    Status post insertion of drug eluting coronary artery stent    History of coronary angioplasty with insertion of stent    Chronic systolic heart failure (HCC)    Ischemic cardiomyopathy    Acute blood loss anemia    Acute upper GI bleeding    Hepatic encephalopathy (HCC)    Dysphagia    Orthostatic hypotension    Atypical chest pain          Past Medical History:   Diagnosis Date    Acute on chronic diastolic HF (heart failure) (HCC)     NA (acute kidney injury) (HCC)     Anemia     Atrial fibrillation (HCC)     Eliquis    AVB (atrioventricular block)     1st degree    CAD (coronary artery disease)     CKD (chronic kidney disease), stage III (HCC)     baseline Cr 1.2-1.6    Colon polyp     Diabetes mellitus (HCC)     type 2, insulin dependent    Diverticulosis     Emphysema lung (HCC)     Former tobacco use     History of asbestos exposure     History of DVT (deep vein thrombosis)     RLE, tx w/ AC    History of GI bleed 08/2023    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, given PRBC/Venofer for anemia while in house    Hyperlipidemia     Hypertension     LAFB (left anterior fascicular block)     Liver cancer (HCC)     Liver mass 08/2023    suspected HCC, needs radioembolization    RBBB     Syncope 08/07/2023        Past  Surgical History:   Procedure Laterality Date    APPENDECTOMY      BOWEL RESECTION  2014    CARDIAC CATHETERIZATION      CARDIAC CATHETERIZATION N/A 2023    Procedure: Cardiac pci;  Surgeon: Dom Garrett DO;  Location: [facility];  Service: Cardiology    CARDIAC CATHETERIZATION N/A 2023    Procedure: Cardiac catheterization;  Surgeon: Dom Garrett DO;  Location: [facility];  Service: Cardiology    CARDIAC CATHETERIZATION N/A 2023    Procedure: Cardiac Coronary Angiogram;  Surgeon: Dom Garrett DO;  Location: [facility];  Service: Cardiology    CATARACT EXTRACTION Bilateral     COLONOSCOPY      EGD      IR Y-90 PRE-ANGIO/EMBO W/ LUNG SCAN  2023    IR Y-90 RADIOEMBOLIZATION  2023        Social History     Socioeconomic History    Marital status: /Civil Union     Spouse name: Not on file    Number of children: Not on file    Years of education: Not on file    Highest education level: Not on file   Occupational History    Not on file   Tobacco Use    Smoking status: Former     Current packs/day: 0.00     Average packs/day: 1 pack/day for 30.0 years (30.0 ttl pk-yrs)     Types: Cigarettes     Start date:      Quit date:      Years since quittin.2    Smokeless tobacco: Never   Vaping Use    Vaping status: Never Used   Substance and Sexual Activity    Alcohol use: Yes     Comment: Socially    Drug use: Never    Sexual activity: Not on file   Other Topics Concern    Not on file   Social History Narrative    Not on file     Social Determinants of Health     Financial Resource Strain: Low Risk  (10/13/2023)    Overall Financial Resource Strain (CARDIA)     Difficulty of Paying Living Expenses: Not hard at all   Food Insecurity: No Food Insecurity (2024)    Hunger Vital Sign     Worried About Running Out of Food in the Last Year: Never true     Ran Out of Food in the Last Year: Never true   Transportation Needs: No  Transportation Needs (1/24/2024)    PRAPARE - Transportation     Lack of Transportation (Medical): No     Lack of Transportation (Non-Medical): No   Physical Activity: Not on file   Stress: Not on file   Social Connections: Not on file   Intimate Partner Violence: Not on file   Housing Stability: Low Risk  (1/24/2024)    Housing Stability Vital Sign     Unable to Pay for Housing in the Last Year: No     Number of Places Lived in the Last Year: 1     Unstable Housing in the Last Year: No        Family History   Problem Relation Age of Onset    Hypertension Mother     Bone cancer Father     Diabetes type II Sister     Diabetes type II Sister     Esophageal cancer Brother     Colon cancer Neg Hx         Objective        Physical Exam  ECOG performance status: 2  Blood pressure 134/72, pulse 92, temperature (!) 97.4 °F (36.3 °C), temperature source Tympanic, resp. rate 18, height 6' (1.829 m), weight 92.1 kg (203 lb), SpO2 92%.     General appearance: Not in acute distress, appears chronically ill. Alert and oriented.    Normal breath sounds bilaterally.  Clear to auscultation without any added sounds  Heart sounds are normal.  No murmurs noted.    Abdomen is distended . Tenderness in the RUQ   Extremities edematous    I reviewed lab data in the chart as noted above.  Additional labs as noted below    WBC   Date Value Ref Range Status   03/04/2024 6.14 4.31 - 10.16 Thousand/uL Final   02/18/2024 6.62 4.31 - 10.16 Thousand/uL Final   02/06/2024 6.68 4.31 - 10.16 Thousand/uL Final     Hemoglobin   Date Value Ref Range Status   03/04/2024 11.0 (L) 12.0 - 17.0 g/dL Final   02/18/2024 11.8 (L) 12.0 - 17.0 g/dL Final   02/06/2024 9.5 (L) 12.0 - 17.0 g/dL Final     Platelets   Date Value Ref Range Status   03/04/2024 186 149 - 390 Thousands/uL Final   02/18/2024 199 149 - 390 Thousands/uL Final   02/06/2024 276 149 - 390 Thousands/uL Final     MCV   Date Value Ref Range Status   03/04/2024 95 82 - 98 fL Final   02/18/2024 95  82 - 98 fL Final   02/06/2024 96 82 - 98 fL Final      Potassium   Date Value Ref Range Status   03/04/2024 4.2 3.5 - 5.3 mmol/L Final   02/06/2024 4.6 3.5 - 5.3 mmol/L Final   02/05/2024 4.1 3.5 - 5.3 mmol/L Final   11/01/2023 4.7 3.5 - 5.2 mmol/L Final   11/01/2023 4.7 3.5 - 5.2 mmol/L Final   07/10/2023 5.2 3.5 - 5.2 mmol/L Final     Chloride   Date Value Ref Range Status   03/04/2024 107 96 - 108 mmol/L Final   02/06/2024 108 96 - 108 mmol/L Final   02/05/2024 109 (H) 96 - 108 mmol/L Final   11/01/2023 107 (H) 96 - 106 mmol/L Final   11/01/2023 107 (H) 96 - 106 mmol/L Final   07/10/2023 108 (H) 96 - 106 mmol/L Final     CO2   Date Value Ref Range Status   03/04/2024 25 21 - 32 mmol/L Final   02/06/2024 22 21 - 32 mmol/L Final   02/05/2024 21 21 - 32 mmol/L Final   11/01/2023 20 20 - 29 mmol/L Final   11/01/2023 19 (L) 20 - 29 mmol/L Final   07/10/2023 17 (L) 20 - 29 mmol/L Final     CO2, i-STAT   Date Value Ref Range Status   01/26/2024 20 (L) 21 - 32 mmol/L Final   01/23/2024 13 (L) 21 - 32 mmol/L Final     BUN   Date Value Ref Range Status   03/04/2024 37 (H) 5 - 25 mg/dL Final   02/06/2024 29 (H) 5 - 25 mg/dL Final   02/05/2024 29 (H) 5 - 25 mg/dL Final   11/01/2023 45 (H) 8 - 27 mg/dL Final   11/01/2023 44 (H) 8 - 27 mg/dL Final   07/10/2023 35 (H) 8 - 27 mg/dL Final     Creatinine   Date Value Ref Range Status   03/04/2024 1.19 0.60 - 1.30 mg/dL Final     Comment:     Standardized to IDMS reference method   02/06/2024 1.06 0.60 - 1.30 mg/dL Final     Comment:     Standardized to IDMS reference method   02/05/2024 1.15 0.60 - 1.30 mg/dL Final     Comment:     Standardized to IDMS reference method     Glucose   Date Value Ref Range Status   03/04/2024 84 65 - 140 mg/dL Final     Comment:     If the patient is fasting, the ADA then defines impaired fasting glucose as > 100 mg/dL and diabetes as > or equal to 123 mg/dL.   02/06/2024 110 65 - 140 mg/dL Final     Comment:     If the patient is fasting, the ADA  then defines impaired fasting glucose as > 100 mg/dL and diabetes as > or equal to 123 mg/dL.   02/05/2024 70 65 - 140 mg/dL Final     Comment:     If the patient is fasting, the ADA then defines impaired fasting glucose as > 100 mg/dL and diabetes as > or equal to 123 mg/dL.     Glucose, Random   Date Value Ref Range Status   11/01/2023 356 (H) 70 - 99 mg/dL Final   11/01/2023 356 (H) 70 - 99 mg/dL Final   07/10/2023 165 (H) 70 - 99 mg/dL Final     Calcium   Date Value Ref Range Status   03/04/2024 8.8 8.4 - 10.2 mg/dL Final   02/06/2024 8.3 (L) 8.4 - 10.2 mg/dL Final   02/05/2024 8.4 8.4 - 10.2 mg/dL Final     Albumin   Date Value Ref Range Status   02/06/2024 3.1 (L) 3.5 - 5.0 g/dL Final   02/04/2024 3.4 (L) 3.5 - 5.0 g/dL Final   02/02/2024 3.4 (L) 3.5 - 5.0 g/dL Final     Total Bilirubin   Date Value Ref Range Status   02/06/2024 0.89 0.20 - 1.00 mg/dL Final     Comment:     Use of this assay is not recommended for patients undergoing treatment with eltrombopag due to the potential for falsely elevated results.  N-acetyl-p-benzoquinone imine (metabolite of Acetaminophen) will generate erroneously low results in samples for patients that have taken an overdose of Acetaminophen.   02/04/2024 0.96 0.20 - 1.00 mg/dL Final     Comment:     Use of this assay is not recommended for patients undergoing treatment with eltrombopag due to the potential for falsely elevated results.  N-acetyl-p-benzoquinone imine (metabolite of Acetaminophen) will generate erroneously low results in samples for patients that have taken an overdose of Acetaminophen.   02/02/2024 1.02 (H) 0.20 - 1.00 mg/dL Final     Comment:     Use of this assay is not recommended for patients undergoing treatment with eltrombopag due to the potential for falsely elevated results.  N-acetyl-p-benzoquinone imine (metabolite of Acetaminophen) will generate erroneously low results in samples for patients that have taken an overdose of Acetaminophen.     TOTAL  "BILIRUBIN   Date Value Ref Range Status   11/01/2023 0.6 0.0 - 1.2 mg/dL Final   05/15/2023 0.4 0.0 - 1.2 mg/dL Final   01/06/2023 0.2 0.0 - 1.2 mg/dL Final     Alkaline Phosphatase   Date Value Ref Range Status   02/06/2024 472 (H) 34 - 104 U/L Final   02/04/2024 525 (H) 34 - 104 U/L Final   02/02/2024 546 (H) 34 - 104 U/L Final     AST   Date Value Ref Range Status   02/06/2024 77 (H) 13 - 39 U/L Final   02/04/2024 66 (H) 13 - 39 U/L Final   02/02/2024 73 (H) 13 - 39 U/L Final   11/01/2023 97 (H) 0 - 40 IU/L Final   05/15/2023 40 0 - 40 IU/L Final   01/06/2023 30 0 - 40 IU/L Final     ALT   Date Value Ref Range Status   02/06/2024 43 7 - 52 U/L Final     Comment:     Specimen collection should occur prior to Sulfasalazine administration due to the potential for falsely depressed results.    02/04/2024 49 7 - 52 U/L Final     Comment:     Specimen collection should occur prior to Sulfasalazine administration due to the potential for falsely depressed results.    02/02/2024 56 (H) 7 - 52 U/L Final     Comment:     Specimen collection should occur prior to Sulfasalazine administration due to the potential for falsely depressed results.    11/01/2023 69 (H) 0 - 44 IU/L Final   05/15/2023 37 0 - 44 IU/L Final   01/06/2023 30 0 - 44 IU/L Final      No results found for: \"LDH\"  TSH   Date Value Ref Range Status   01/06/2023 3.720 0.450 - 4.500 uIU/mL Final   08/26/2022 4.000 0.450 - 4.500 uIU/mL Final     No results found for: \"X1KHJPA\"   No results found for: \"FREET4\"      RECENT IMAGING:  Procedure: MRI abdomen w wo contrast    Result Date: 3/18/2024  Narrative: MRI - ABDOMEN - WITH AND WITHOUT CONTRAST INDICATION: 78 years / Male. C22.0: Liver cell carcinoma. Post Y90 radio embolization via the left and right hepatic arteries (September 8, 2023). AFP: 68,304 (February 1, 2024) COMPARISON: MR abdomen December 7, 2023. Correlation with MR abdomen July 6, 2023 and CT abdomen pelvis January 23, 2024. TECHNIQUE: " Multiplanar/multisequence MRI of the abdomen was performed before and after administration of contrast. IV Contrast: 9 mL of Gadobutrol injection (SINGLE-DOSE) FINDINGS: LOWER CHEST: Unremarkable. LIVER: Nodular hepatic surface contour and enlargement of the caudate lobe are indicative of cirrhosis. 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 Diffuse abnormal restricted diffusion with corresponding heterogeneous low T1 signal throughout the left hepatic lobe has increased since the prior study and is indicative of infiltrative hepatocellular carcinoma (for example series 3, image 49; series 8, image 45).The largest of the lesions in the left hepatic lobe is in segment 2 and measures 2.1 x 1.7 cm (series 9, image 39). Lesion 1: Location: Segment 7 Size: 3.6 x 3.3 cm (series 9, image 34). Previously 4.4 x 4.2 cm on December 7, 2023. Pretreatment size was 3.9 x 3.7 cm. Imaging features: No enhancement or washout. Lesion 2: Location: Segment 6 Size: 2.5 x  1.7 cm (series 9, image 82). Previously 2.2 x 1.9 cm on December 7, 2023. Pretreatment pretreatment size was 2.2 x 2.2 cm. Imaging features: Peripheral rim enhancement without washout. Restricted diffusion is present (series 3, image 58). BILE DUCTS: No intrahepatic or extrahepatic bile duct dilation. GALLBLADDER: Cholelithiasis without acute cholecystitis. PANCREAS: Unremarkable. ADRENAL GLANDS: Unremarkable. SPLEEN: Normal. KIDNEYS/PROXIMAL URETERS: No hydroureteronephrosis. No suspicious renal mass. Several bilateral renal cysts measuring up to 8.7 cm on the left BOWEL: No dilated loops of bowel. PERITONEUM/RETROPERITONEUM: New small volume perihepatic and right lower quadrant ascites. LYMPH NODES: No abdominal lymphadenopathy. VESSELS: No aneurysm. ABDOMINAL WALL: Unremarkable BONES: No suspicious osseous lesion.     Impression: Since December 7, 2023: 1.  Increase in diffuse infiltrative  "hepatocellular carcinoma throughout the entirety of the left hepatic lobe with no significant change in tumor thrombus in the left branch of the portal vein. 2.  Unchanged size of the treated hepatic segment 6 lesion with possible viable tumor. 3.  Decreased size of the treated hepatic segment 7 lesion without definitive findings of viable tumor. 4.  New small volume perihepatic and right lower quadrant ascites. Workstation performed: [id number]         Portions of the record may have been created with voice recognition software.  Occasional wrong word or \"sound a like\" substitutions may have occurred due to the inherent limitations [id number] voice recognition [id number].  Read the chart carefully and recognize, using context, where substitutions have [email or web address]    "

## 2024-04-01 NOTE — PROGRESS NOTES
[facility]  [address]  [facility] [address]  [facility] [id number] [phone]     Date of Visit: 4/1/2024  Name: Derek Walter   YOB: 1945         Assessment/Plan  In summary, Derek Walter is a 79 y.o. male with multiple co morbidities presenting with progressive multifocal HCC, previously treated with Y90 spheres. ECOG 2, Child Souza B. AFP as of Feb 2024 was 68,304. CT chest scheduled for later today. Discussed course so far, natural history of HCC, treatment based on NCCN guidelines and prognostic information. Goal of treatment is disease control, and not curative intent.    Given cardiac history and GI bleeds, do not recommend using Avastin-based therapy. Discussed systemic therapy with Tremelimumab plus durvalumab instead.    The rationale and potential benefits, as well as the risks of acute and late side effects and potential toxicities of immunotherapy were reviewed with the patient. Literature provided. Consent was signed.    We will follow up with him to see if he decides on pursuing treatment. They have decided to call us later this week with their decision.  They are aware that regardless, overall prognosis is guarded.     Return for Office visit with labs prior, Labs - See Treatment Plan.     All the patient's questions were answered to their apparent satisfaction.  Patient has been strongly urged to call with any questions or concerns.     Total time spent reviewing EMR, seeing patient in clinic visit, documenting visit, placing treatment orders, and communicating with other medical providers: 60 mins        Oncology History   Hepatocellular carcinoma (HCC)    Initial Diagnosis    Hepatocellular carcinoma (HCC)     9/5/2023 -  Cancer Staged    Staging form: Liver (Excluding Intrahepatic Bile Ducts), AJCC 8th Edition  - Clinical stage from 9/5/2023: Stage II (cT2, cN0, cM0) - Signed by Sony Echeverria MD on 9/5/2023  Histopathologic  type: Hepatocellular carcinoma, NOS       4/8/2024 -  Chemotherapy    alteplase (CATHFLO), 2 mg, Intracatheter, Every 1 Minute as needed, 0 of 6 cycles  durvalumab (IMFINZI) IVPB, 1,500 mg (original dose 10 mg/kg), Intravenous, Once, 0 of 6 cycles  Dose modification: 1,500 mg (original dose 10 mg/kg, Cycle 1, Reason: Other (Must fill in a comment), Comment: standard dose)  tremelimumab-actl (IMJUDO) IVPB, 300 mg (100 % of original dose 300 mg), Intravenous, Once, 0 of 7 cycles  Dose modification: 300 mg (original dose 300 mg, Cycle 0), 300 mg (original dose 300 mg, Cycle 1)        Cancer Staging   Hepatocellular carcinoma (HCC)  Staging form: Liver (Excluding Intrahepatic Bile Ducts), AJCC 8th Edition  - Clinical stage from 9/5/2023: Stage II (cT2, cN0, cM0) - Signed by Sony Echeverria MD on 9/5/2023     Treatment Details   Treatment goal Palliative   Plan Name OP Durvalumab & Tremelimumab Q 28 Days   Status Active   Start Date 4/8/2024 (Planned)   End Date 8/26/2024 (Planned)   Provider Gael Carvajal MD   Chemotherapy alteplase (CATHFLO), 2 mg, Intracatheter, Every 1 Minute as needed, 0 of 6 cycles    durvalumab (IMFINZI) IVPB, 1,500 mg (original dose 10 mg/kg), Intravenous, Once, 0 of 6 cycles  Dose modification: 1,500 mg (original dose 10 mg/kg, Cycle 1, Reason: Other (Must fill in a comment), Comment: standard dose)    tremelimumab-actl (IMJUDO) IVPB, 300 mg (100 % of original dose 300 mg), Intravenous, Once, 0 of 7 cycles  Dose modification: 300 mg (original dose 300 mg, Cycle 0), 300 mg (original dose 300 mg, Cycle 1)          HISTORY OF PRESENT ILLNESS:   Derek Walter is a 79 y.o. male  who has been referred for management of hepatocellular carcinoma.  Past medical history significant for atrial fibrillation on Eliquis, cardiac stents on Plavix.  He was seen in ED in May 2023 for an abnormal CXR had CTA PE study that was negative for PE showed incidental finding of hepatic lobe abnormality and  advised to follow up with his PCP. Further imaging was ordered but was deferred by a couple of months due to abnormal renal function. MRI abdomen 7/6/2023 - confirmed numerous hepatic lesions and enhancing thrombus. Seg 7 lesion 3.7cm, seg 4B measuring 2.2cm, seg 5 dominant lesion measuring 1.4cm.     AFP from July 2023 was over 18K.    He was treated with Y90 in Sept 2023.    MRI liver from 12/7/23- stable tumors    He was admitted to the hospital in early February 2024 with abdominal pain.  He was found to have an upper GI bleed from 3 large angio ectasis in the fundus of the stomach and body of the stomach.  These were coagulated.  He required multiple units of packed red blood cell.  He later developed strokelike symptoms and was found unresponsive, requiring intubation.  This was thought to be due to elevated ammonia levels.  He has been following GI closely. CT abdomen - worsened heterogeneous/mottled appearance of the hepatic parenchyma in the anterior right and left hepatic lobes which may reflect disease progression. AFP now over 68K.    MRI from 3/14/24 showed progression of disease with involvement of the entire right lobe.  IR did not feel that all areas of tumor could be encompassed with further liver directed therapy.   saw the patient on March 19, 2024 and referred him to medical oncology to discuss systemic therapy.    He is here today with his wife for an initial consult    Subjective  RUQ abdominal pain +  Fatigue +  Appetite is poor      REVIEW OF SYSTEMS:    14 point review of systems otherwise  negative      Current Outpatient Medications:     acetaminophen (TYLENOL) 650 mg CR tablet, Take 650 mg by mouth every 8 (eight) hours as needed for mild pain, Disp: , Rfl:     atorvastatin (LIPITOR) 40 mg tablet, take 1 tablet by mouth once daily after dinner, Disp: 90 tablet, Rfl: 1    azelastine (ASTELIN) 0.1 % nasal spray, 1 spray into each nostril 2 (two) times a day Use in each nostril as  directed, Disp: 1 mL, Rfl: 0    Blood Glucose Monitoring Suppl (OneTouch Verio) w/Device KIT, Use 4 (four) times a day, Disp: 1 kit, Rfl: 0    clopidogrel (PLAVIX) 75 mg tablet, Take 1 tablet (75 mg total) by mouth daily, Disp: 30 tablet, Rfl: 11    Continuous Blood Gluc  (Dexcom G6 ) NOHEMY, Use with dexcom sensor, Disp: 1 each, Rfl: 0    Eliquis 5 MG, take 1 tablet by mouth twice a day, Disp: 60 tablet, Rfl: 5    Empagliflozin (Jardiance) 10 MG TABS tablet, take 1 tablet by mouth daily every morning, Disp: 30 tablet, Rfl: 5    FeroSul 325 (65 Fe) MG tablet, take 1 tablet by mouth daily with food, Disp: 30 tablet, Rfl: 5    furosemide (LASIX) 40 mg tablet, Take 1 tablet (40 mg total) by mouth every other day, Disp: 90 tablet, Rfl: 3    glucose blood (OneTouch Verio) test strip, Use as instructed, Disp: 100 strip, Rfl: 2    insulin aspart (NovoLOG FlexPen) 100 UNIT/ML injection pen, 10 units with meals. Dose change, Disp: 30 mL, Rfl: 2    Insulin Glargine Solostar (Lantus SoloStar) 100 UNIT/ML SOPN, 50 units nightly, Disp: 30 mL, Rfl: 1    insulin lispro (HumaLOG) 100 units/mL injection pen, , Disp: , Rfl:     Insulin Pen Needle (Droplet Pen Needles) 32G X 4 MM MISC, use 1 PEN NEEDLE to inject MEDICATION subcutaneously four times a day, Disp: 1000 each, Rfl: 1    lactulose (CHRONULAC) 10 g/15 mL solution, Take 30 mL (20 g total) by mouth 2 (two) times a day (Patient taking differently: Take 20 g by mouth 2 (two) times a day pt takes lactulose only on days where is is not having 2 to 3 soft bms daily), Disp: 240 mL, Rfl: 0    latanoprost (XALATAN) 0.005 % ophthalmic solution, daily, Disp: , Rfl:     loperamide (Imodium A-D) 2 mg capsule, Take 2 mg by mouth 4 (four) times a day as needed for diarrhea. may take imodium as long as pt is having at least 2 to 3 soft stools a day  Indications: Diarrhea, Disp: , Rfl:     LORazepam (ATIVAN) 1 mg tablet, Take 1 tablet (1 mg total) by mouth see administration  instructions Take 1 tablet 30 minutes prior to MRI, take 2nd tablet immediately prior to MRI, Disp: 2 tablet, Rfl: 0    Lumigan 0.01 % ophthalmic drops, Administer 1 drop to both eyes every evening, Disp: , Rfl:     metoprolol tartrate (LOPRESSOR) 25 mg tablet, Take 0.5 tablets (12.5 mg total) by mouth every 12 (twelve) hours, Disp: , Rfl:     Multiple Vitamin (MULTIVITAMIN ADULT PO), Take 1 tablet by mouth daily, Disp: , Rfl:     Multiple Vitamin (Tab-A-Ever) TABS, in the morning, Disp: , Rfl:     pantoprazole (PROTONIX) 40 mg tablet, Take 1 tablet (40 mg total) by mouth 2 (two) times a day, Disp: 180 tablet, Rfl: 1    pregabalin (LYRICA) 100 mg capsule, Take 1 capsule (100 mg total) by mouth 3 (three) times a day, Disp: 270 capsule, Rfl: 1    pregabalin (LYRICA) 100 mg capsule, Take 100 mg by mouth 3 (three) times a day. Indications: Diabetes with Nerve Disease, Disp: , Rfl:     rifaximin (Xifaxan) 550 mg tablet, Take 550 mg by mouth 2 (two) times a day. Indications: Impaired Brain Function due to Liver Disease, Disp: , Rfl:     sertraline (ZOLOFT) 25 mg tablet, Take 25 mg by mouth daily, Disp: , Rfl:     tamsulosin (FLOMAX) 0.4 mg, Take 0.4 mg by mouth daily with dinner, Disp: , Rfl:     traZODone (DESYREL) 100 mg tablet, take 2 tablets by mouth at bedtime, Disp: 180 tablet, Rfl: 0    umeclidinium-vilanterol (Anoro Ellipta) 62.5-25 mcg/actuation inhaler, Inhale 1 puff daily, Disp: 180 blister, Rfl: 1    Continuous Blood Gluc Sensor (Dexcom G6 Sensor) MISC, Use daily as directed for CGM - Change every 10 days (Patient not taking: Reported on 2/26/2024), Disp: 9 each, Rfl: 3    Continuous Blood Gluc Transmit (Dexcom G6 Transmitter) MISC, Use daily as directed for CGM - Change every 3 months (Patient not taking: Reported on 2/26/2024), Disp: 1 each, Rfl: 3    Continuous Blood Gluc Transmit (Dexcom G6 Transmitter) MISC, Use daily as directed for CGM - Change every 3 months (Patient not taking: Reported on  12/13/2023), Disp: 1 each, Rfl: 3     No Known Allergies     ACTIVE PROBLEMS:  Patient Active Problem List   Diagnosis    Stage 3 chronic kidney disease, unspecified whether stage 3a or 3b CKD (HCC)    Peripheral polyneuropathy    Type 2 diabetes mellitus with neurologic complication, with long-term current use of insulin (HCC)    Atrial fibrillation, unspecified type (HCC)    Liver mass    H/O asbestos exposure    History of tobacco abuse    Restrictive lung disease    Other emphysema (HCC)    Anemia    History of DVT (deep vein thrombosis)    CAD (coronary artery disease)    Hepatic flexure mass    Hepatocellular carcinoma (HCC)    Coronary artery disease involving native coronary artery    Status post insertion of drug eluting coronary artery stent    History of coronary angioplasty with insertion of stent    Chronic systolic heart failure (HCC)    Ischemic cardiomyopathy    Acute blood loss anemia    Acute upper GI bleeding    Hepatic encephalopathy (HCC)    Dysphagia    Orthostatic hypotension    Atypical chest pain          Past Medical History:   Diagnosis Date    Acute on chronic diastolic HF (heart failure) (HCC)     NA (acute kidney injury) (HCC)     Anemia     Atrial fibrillation (HCC)     Eliquis    AVB (atrioventricular block)     1st degree    CAD (coronary artery disease)     CKD (chronic kidney disease), stage III (HCC)     baseline Cr 1.2-1.6    Colon polyp     Diabetes mellitus (HCC)     type 2, insulin dependent    Diverticulosis     Emphysema lung (HCC)     Former tobacco use     History of asbestos exposure     History of DVT (deep vein thrombosis)     RLE, tx w/ AC    History of GI bleed 08/2023    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, given PRBC/Venofer for anemia while in house    Hyperlipidemia     Hypertension     LAFB (left anterior fascicular block)     Liver cancer (HCC)     Liver mass 08/2023    suspected HCC, needs radioembolization    RBBB     Syncope 08/07/2023        Past  Surgical History:   Procedure Laterality Date    APPENDECTOMY      BOWEL RESECTION  2014    CARDIAC CATHETERIZATION      CARDIAC CATHETERIZATION N/A 2023    Procedure: Cardiac pci;  Surgeon: Dom Garrett DO;  Location: [facility];  Service: Cardiology    CARDIAC CATHETERIZATION N/A 2023    Procedure: Cardiac catheterization;  Surgeon: Dom Garrett DO;  Location: [facility];  Service: Cardiology    CARDIAC CATHETERIZATION N/A 2023    Procedure: Cardiac Coronary Angiogram;  Surgeon: Dom Garrett DO;  Location: [facility];  Service: Cardiology    CATARACT EXTRACTION Bilateral     COLONOSCOPY      EGD      IR Y-90 PRE-ANGIO/EMBO W/ LUNG SCAN  2023    IR Y-90 RADIOEMBOLIZATION  2023        Social History     Socioeconomic History    Marital status: /Civil Union     Spouse name: Not on file    Number of children: Not on file    Years of education: Not on file    Highest education level: Not on file   Occupational History    Not on file   Tobacco Use    Smoking status: Former     Current packs/day: 0.00     Average packs/day: 1 pack/day for 30.0 years (30.0 ttl pk-yrs)     Types: Cigarettes     Start date:      Quit date:      Years since quittin.2    Smokeless tobacco: Never   Vaping Use    Vaping status: Never Used   Substance and Sexual Activity    Alcohol use: Yes     Comment: Socially    Drug use: Never    Sexual activity: Not on file   Other Topics Concern    Not on file   Social History Narrative    Not on file     Social Determinants of Health     Financial Resource Strain: Low Risk  (10/13/2023)    Overall Financial Resource Strain (CARDIA)     Difficulty of Paying Living Expenses: Not hard at all   Food Insecurity: No Food Insecurity (2024)    Hunger Vital Sign     Worried About Running Out of Food in the Last Year: Never true     Ran Out of Food in the Last Year: Never true   Transportation Needs: No  Transportation Needs (1/24/2024)    PRAPARE - Transportation     Lack of Transportation (Medical): No     Lack of Transportation (Non-Medical): No   Physical Activity: Not on file   Stress: Not on file   Social Connections: Not on file   Intimate Partner Violence: Not on file   Housing Stability: Low Risk  (1/24/2024)    Housing Stability Vital Sign     Unable to Pay for Housing in the Last Year: No     Number of Places Lived in the Last Year: 1     Unstable Housing in the Last Year: No        Family History   Problem Relation Age of Onset    Hypertension Mother     Bone cancer Father     Diabetes type II Sister     Diabetes type II Sister     Esophageal cancer Brother     Colon cancer Neg Hx         Objective        Physical Exam  ECOG performance status: 2  Blood pressure 134/72, pulse 92, temperature (!) 97.4 °F (36.3 °C), temperature source Tympanic, resp. rate 18, height 6' (1.829 m), weight 92.1 kg (203 lb), SpO2 92%.     General appearance: Not in acute distress, appears chronically ill. Alert and oriented.    Normal breath sounds bilaterally.  Clear to auscultation without any added sounds  Heart sounds are normal.  No murmurs noted.    Abdomen is distended . Tenderness in the RUQ   Extremities edematous    I reviewed lab data in the chart as noted above.  Additional labs as noted below    WBC   Date Value Ref Range Status   03/04/2024 6.14 4.31 - 10.16 Thousand/uL Final   02/18/2024 6.62 4.31 - 10.16 Thousand/uL Final   02/06/2024 6.68 4.31 - 10.16 Thousand/uL Final     Hemoglobin   Date Value Ref Range Status   03/04/2024 11.0 (L) 12.0 - 17.0 g/dL Final   02/18/2024 11.8 (L) 12.0 - 17.0 g/dL Final   02/06/2024 9.5 (L) 12.0 - 17.0 g/dL Final     Platelets   Date Value Ref Range Status   03/04/2024 186 149 - 390 Thousands/uL Final   02/18/2024 199 149 - 390 Thousands/uL Final   02/06/2024 276 149 - 390 Thousands/uL Final     MCV   Date Value Ref Range Status   03/04/2024 95 82 - 98 fL Final   02/18/2024 95  82 - 98 fL Final   02/06/2024 96 82 - 98 fL Final      Potassium   Date Value Ref Range Status   03/04/2024 4.2 3.5 - 5.3 mmol/L Final   02/06/2024 4.6 3.5 - 5.3 mmol/L Final   02/05/2024 4.1 3.5 - 5.3 mmol/L Final   11/01/2023 4.7 3.5 - 5.2 mmol/L Final   11/01/2023 4.7 3.5 - 5.2 mmol/L Final   07/10/2023 5.2 3.5 - 5.2 mmol/L Final     Chloride   Date Value Ref Range Status   03/04/2024 107 96 - 108 mmol/L Final   02/06/2024 108 96 - 108 mmol/L Final   02/05/2024 109 (H) 96 - 108 mmol/L Final   11/01/2023 107 (H) 96 - 106 mmol/L Final   11/01/2023 107 (H) 96 - 106 mmol/L Final   07/10/2023 108 (H) 96 - 106 mmol/L Final     CO2   Date Value Ref Range Status   03/04/2024 25 21 - 32 mmol/L Final   02/06/2024 22 21 - 32 mmol/L Final   02/05/2024 21 21 - 32 mmol/L Final   11/01/2023 20 20 - 29 mmol/L Final   11/01/2023 19 (L) 20 - 29 mmol/L Final   07/10/2023 17 (L) 20 - 29 mmol/L Final     CO2, i-STAT   Date Value Ref Range Status   01/26/2024 20 (L) 21 - 32 mmol/L Final   01/23/2024 13 (L) 21 - 32 mmol/L Final     BUN   Date Value Ref Range Status   03/04/2024 37 (H) 5 - 25 mg/dL Final   02/06/2024 29 (H) 5 - 25 mg/dL Final   02/05/2024 29 (H) 5 - 25 mg/dL Final   11/01/2023 45 (H) 8 - 27 mg/dL Final   11/01/2023 44 (H) 8 - 27 mg/dL Final   07/10/2023 35 (H) 8 - 27 mg/dL Final     Creatinine   Date Value Ref Range Status   03/04/2024 1.19 0.60 - 1.30 mg/dL Final     Comment:     Standardized to IDMS reference method   02/06/2024 1.06 0.60 - 1.30 mg/dL Final     Comment:     Standardized to IDMS reference method   02/05/2024 1.15 0.60 - 1.30 mg/dL Final     Comment:     Standardized to IDMS reference method     Glucose   Date Value Ref Range Status   03/04/2024 84 65 - 140 mg/dL Final     Comment:     If the patient is fasting, the ADA then defines impaired fasting glucose as > 100 mg/dL and diabetes as > or equal to 123 mg/dL.   02/06/2024 110 65 - 140 mg/dL Final     Comment:     If the patient is fasting, the ADA  then defines impaired fasting glucose as > 100 mg/dL and diabetes as > or equal to 123 mg/dL.   02/05/2024 70 65 - 140 mg/dL Final     Comment:     If the patient is fasting, the ADA then defines impaired fasting glucose as > 100 mg/dL and diabetes as > or equal to 123 mg/dL.     Glucose, Random   Date Value Ref Range Status   11/01/2023 356 (H) 70 - 99 mg/dL Final   11/01/2023 356 (H) 70 - 99 mg/dL Final   07/10/2023 165 (H) 70 - 99 mg/dL Final     Calcium   Date Value Ref Range Status   03/04/2024 8.8 8.4 - 10.2 mg/dL Final   02/06/2024 8.3 (L) 8.4 - 10.2 mg/dL Final   02/05/2024 8.4 8.4 - 10.2 mg/dL Final     Albumin   Date Value Ref Range Status   02/06/2024 3.1 (L) 3.5 - 5.0 g/dL Final   02/04/2024 3.4 (L) 3.5 - 5.0 g/dL Final   02/02/2024 3.4 (L) 3.5 - 5.0 g/dL Final     Total Bilirubin   Date Value Ref Range Status   02/06/2024 0.89 0.20 - 1.00 mg/dL Final     Comment:     Use of this assay is not recommended for patients undergoing treatment with eltrombopag due to the potential for falsely elevated results.  N-acetyl-p-benzoquinone imine (metabolite of Acetaminophen) will generate erroneously low results in samples for patients that have taken an overdose of Acetaminophen.   02/04/2024 0.96 0.20 - 1.00 mg/dL Final     Comment:     Use of this assay is not recommended for patients undergoing treatment with eltrombopag due to the potential for falsely elevated results.  N-acetyl-p-benzoquinone imine (metabolite of Acetaminophen) will generate erroneously low results in samples for patients that have taken an overdose of Acetaminophen.   02/02/2024 1.02 (H) 0.20 - 1.00 mg/dL Final     Comment:     Use of this assay is not recommended for patients undergoing treatment with eltrombopag due to the potential for falsely elevated results.  N-acetyl-p-benzoquinone imine (metabolite of Acetaminophen) will generate erroneously low results in samples for patients that have taken an overdose of Acetaminophen.     TOTAL  "BILIRUBIN   Date Value Ref Range Status   11/01/2023 0.6 0.0 - 1.2 mg/dL Final   05/15/2023 0.4 0.0 - 1.2 mg/dL Final   01/06/2023 0.2 0.0 - 1.2 mg/dL Final     Alkaline Phosphatase   Date Value Ref Range Status   02/06/2024 472 (H) 34 - 104 U/L Final   02/04/2024 525 (H) 34 - 104 U/L Final   02/02/2024 546 (H) 34 - 104 U/L Final     AST   Date Value Ref Range Status   02/06/2024 77 (H) 13 - 39 U/L Final   02/04/2024 66 (H) 13 - 39 U/L Final   02/02/2024 73 (H) 13 - 39 U/L Final   11/01/2023 97 (H) 0 - 40 IU/L Final   05/15/2023 40 0 - 40 IU/L Final   01/06/2023 30 0 - 40 IU/L Final     ALT   Date Value Ref Range Status   02/06/2024 43 7 - 52 U/L Final     Comment:     Specimen collection should occur prior to Sulfasalazine administration due to the potential for falsely depressed results.    02/04/2024 49 7 - 52 U/L Final     Comment:     Specimen collection should occur prior to Sulfasalazine administration due to the potential for falsely depressed results.    02/02/2024 56 (H) 7 - 52 U/L Final     Comment:     Specimen collection should occur prior to Sulfasalazine administration due to the potential for falsely depressed results.    11/01/2023 69 (H) 0 - 44 IU/L Final   05/15/2023 37 0 - 44 IU/L Final   01/06/2023 30 0 - 44 IU/L Final      No results found for: \"LDH\"  TSH   Date Value Ref Range Status   01/06/2023 3.720 0.450 - 4.500 uIU/mL Final   08/26/2022 4.000 0.450 - 4.500 uIU/mL Final     No results found for: \"H4WVBFV\"   No results found for: \"FREET4\"      RECENT IMAGING:  Procedure: MRI abdomen w wo contrast    Result Date: 3/18/2024  Narrative: MRI - ABDOMEN - WITH AND WITHOUT CONTRAST INDICATION: 78 years / Male. C22.0: Liver cell carcinoma. Post Y90 radio embolization via the left and right hepatic arteries (September 8, 2023). AFP: 68,304 (February 1, 2024) COMPARISON: MR abdomen December 7, 2023. Correlation with MR abdomen July 6, 2023 and CT abdomen pelvis January 23, 2024. TECHNIQUE: " Multiplanar/multisequence MRI of the abdomen was performed before and after administration of contrast. IV Contrast: 9 mL of Gadobutrol injection (SINGLE-DOSE) FINDINGS: LOWER CHEST: Unremarkable. LIVER: Nodular hepatic surface contour and enlargement of the caudate lobe are indicative of cirrhosis. 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 Diffuse abnormal restricted diffusion with corresponding heterogeneous low T1 signal throughout the left hepatic lobe has increased since the prior study and is indicative of infiltrative hepatocellular carcinoma (for example series 3, image 49; series 8, image 45).The largest of the lesions in the left hepatic lobe is in segment 2 and measures 2.1 x 1.7 cm (series 9, image 39). Lesion 1: Location: Segment 7 Size: 3.6 x 3.3 cm (series 9, image 34). Previously 4.4 x 4.2 cm on December 7, 2023. Pretreatment size was 3.9 x 3.7 cm. Imaging features: No enhancement or washout. Lesion 2: Location: Segment 6 Size: 2.5 x  1.7 cm (series 9, image 82). Previously 2.2 x 1.9 cm on December 7, 2023. Pretreatment pretreatment size was 2.2 x 2.2 cm. Imaging features: Peripheral rim enhancement without washout. Restricted diffusion is present (series 3, image 58). BILE DUCTS: No intrahepatic or extrahepatic bile duct dilation. GALLBLADDER: Cholelithiasis without acute cholecystitis. PANCREAS: Unremarkable. ADRENAL GLANDS: Unremarkable. SPLEEN: Normal. KIDNEYS/PROXIMAL URETERS: No hydroureteronephrosis. No suspicious renal mass. Several bilateral renal cysts measuring up to 8.7 cm on the left BOWEL: No dilated loops of bowel. PERITONEUM/RETROPERITONEUM: New small volume perihepatic and right lower quadrant ascites. LYMPH NODES: No abdominal lymphadenopathy. VESSELS: No aneurysm. ABDOMINAL WALL: Unremarkable BONES: No suspicious osseous lesion.     Impression: Since December 7, 2023: 1.  Increase in diffuse infiltrative  "hepatocellular carcinoma throughout the entirety of the left hepatic lobe with no significant change in tumor thrombus in the left branch of the portal vein. 2.  Unchanged size of the treated hepatic segment 6 lesion with possible viable tumor. 3.  Decreased size of the treated hepatic segment 7 lesion without definitive findings of viable tumor. 4.  New small volume perihepatic and right lower quadrant ascites. Workstation performed: [id number]         Portions of the record may have been created with voice recognition software.  Occasional wrong word or \"sound a like\" substitutions may have occurred due to the inherent limitations of voice recognition software.  Read the chart carefully and recognize, using context, where substitutions have occurred.    "

## 2024-04-01 NOTE — TELEPHONE ENCOUNTER
Called and spoke to Nilda. Confirmed that Derek is agreeable to starting treatment. Port placement ordered to be done at IR. Will reach out to infusion schedulers to start treatment after port placement. Also sending a script for zofran to their pharmacy.

## 2024-04-01 NOTE — TELEPHONE ENCOUNTER
Per  as long as patient is here by 11:30 she will see patient as she has a meeting shortly after.    Thanks.

## 2024-04-01 NOTE — TELEPHONE ENCOUNTER
"Received voicemail from patient's wife:     \"renee Barlow. This is Nilda Jose Angel. I'm calling for Derek. His birthday is 1945. He's decided to go along with the immunotherapy. If you need to call me back, my number is [phone]. That's our home number and I'm here to talk to you if you need to call me. OK. Thank you. It's now 2:47 PM on April first. OK, thank you. Eleanor.\"    "

## 2024-04-02 ENCOUNTER — TELEPHONE (OUTPATIENT)
Dept: HOME HEALTH SERVICES | Facility: HOME HEALTHCARE | Age: 79
End: 2024-04-02

## 2024-04-02 ENCOUNTER — TELEPHONE (OUTPATIENT)
Dept: INTERVENTIONAL RADIOLOGY/VASCULAR | Facility: HOSPITAL | Age: 79
End: 2024-04-02

## 2024-04-02 RX ORDER — CEFAZOLIN SODIUM 2 G/50ML
2000 SOLUTION INTRAVENOUS ONCE
Status: CANCELLED | OUTPATIENT
Start: 2024-04-02 | End: 2024-04-02

## 2024-04-02 RX ORDER — SODIUM CHLORIDE 9 MG/ML
75 INJECTION, SOLUTION INTRAVENOUS CONTINUOUS
Status: CANCELLED | OUTPATIENT
Start: 2024-04-02

## 2024-04-03 ENCOUNTER — TELEPHONE (OUTPATIENT)
Age: 79
End: 2024-04-03

## 2024-04-03 ENCOUNTER — HOME CARE VISIT (OUTPATIENT)
Dept: HOME HEALTH SERVICES | Facility: HOME HEALTHCARE | Age: 79
End: 2024-04-03
Payer: COMMERCIAL

## 2024-04-03 VITALS
OXYGEN SATURATION: 97 % | HEART RATE: 80 BPM | SYSTOLIC BLOOD PRESSURE: 126 MMHG | TEMPERATURE: 97.8 F | DIASTOLIC BLOOD PRESSURE: 50 MMHG | RESPIRATION RATE: 16 BRPM

## 2024-04-03 DIAGNOSIS — C22.0 HEPATOCELLULAR CARCINOMA (HCC): Primary | ICD-10-CM

## 2024-04-03 PROCEDURE — G0299 HHS/HOSPICE OF RN EA 15 MIN: HCPCS

## 2024-04-03 NOTE — TELEPHONE ENCOUNTER
What would be a preferred day of the week that would work best for your infusion appointment? Any day  Do you prefer mornings or afternoons for your appointments? No earlier than 10AM  Are there any days or dates that do not work for your schedule, including any upcoming vacations? 5/8, 5/28, 5/29, 6/5 through 6/12  We are going to try our best to schedule you at the infusion center closest to your home.  In the event that we are unable to what would be your next preferred infusion site or sites? [facility]  Do you have transportation to take you to all of your appointments? yes  Would you like the infusion center to draw labs from your port? (disregard if patient doesn't have a port or need labs for infusion appointment) yes

## 2024-04-03 NOTE — TELEPHONE ENCOUNTER
Please schedule treatment to begin after port placement.He will need a follow up prior to c2 with dr mays. Thanks!

## 2024-04-04 ENCOUNTER — HOME CARE VISIT (OUTPATIENT)
Dept: HOME HEALTH SERVICES | Facility: HOME HEALTHCARE | Age: 79
End: 2024-04-04
Payer: COMMERCIAL

## 2024-04-04 VITALS
DIASTOLIC BLOOD PRESSURE: 64 MMHG | SYSTOLIC BLOOD PRESSURE: 127 MMHG | HEART RATE: 63 BPM | OXYGEN SATURATION: 93 % | RESPIRATION RATE: 19 BRPM | TEMPERATURE: 97.3 F

## 2024-04-04 PROCEDURE — G0157 HHC PT ASSISTANT EA 15: HCPCS

## 2024-04-05 ENCOUNTER — HOSPITAL ENCOUNTER (OUTPATIENT)
Dept: INTERVENTIONAL RADIOLOGY/VASCULAR | Facility: HOSPITAL | Age: 79
Discharge: HOME | End: 2024-04-05
Attending: INTERNAL MEDICINE
Payer: COMMERCIAL

## 2024-04-05 VITALS
DIASTOLIC BLOOD PRESSURE: 58 MMHG | SYSTOLIC BLOOD PRESSURE: 127 MMHG | WEIGHT: 196 LBS | BODY MASS INDEX: 26.55 KG/M2 | OXYGEN SATURATION: 91 % | TEMPERATURE: 97.8 F | HEIGHT: 72 IN | RESPIRATION RATE: 15 BRPM | HEART RATE: 86 BPM

## 2024-04-05 DIAGNOSIS — C22.0 HEPATOCELLULAR CARCINOMA (HCC): ICD-10-CM

## 2024-04-05 LAB — GLUCOSE SERPL-MCNC: 170 MG/DL (ref 65–140)

## 2024-04-05 PROCEDURE — 76937 US GUIDE VASCULAR ACCESS: CPT

## 2024-04-05 PROCEDURE — 99152 MOD SED SAME PHYS/QHP 5/>YRS: CPT

## 2024-04-05 PROCEDURE — 36561 INSERT TUNNELED CV CATH: CPT

## 2024-04-05 PROCEDURE — C1788 PORT, INDWELLING, IMP: HCPCS

## 2024-04-05 PROCEDURE — 77001 FLUOROGUIDE FOR VEIN DEVICE: CPT | Performed by: RADIOLOGY

## 2024-04-05 PROCEDURE — 76937 US GUIDE VASCULAR ACCESS: CPT | Performed by: RADIOLOGY

## 2024-04-05 PROCEDURE — 82948 REAGENT STRIP/BLOOD GLUCOSE: CPT

## 2024-04-05 PROCEDURE — 36561 INSERT TUNNELED CV CATH: CPT | Performed by: RADIOLOGY

## 2024-04-05 RX ORDER — LIDOCAINE HYDROCHLORIDE AND EPINEPHRINE 10; 10 MG/ML; UG/ML
INJECTION, SOLUTION INFILTRATION; PERINEURAL AS NEEDED
Status: COMPLETED | OUTPATIENT
Start: 2024-04-05 | End: 2024-04-05

## 2024-04-05 RX ORDER — MIDAZOLAM HYDROCHLORIDE 2 MG/2ML
INJECTION, SOLUTION INTRAMUSCULAR; INTRAVENOUS AS NEEDED
Status: COMPLETED | OUTPATIENT
Start: 2024-04-05 | End: 2024-04-05

## 2024-04-05 RX ORDER — FENTANYL CITRATE 50 UG/ML
INJECTION, SOLUTION INTRAMUSCULAR; INTRAVENOUS AS NEEDED
Status: COMPLETED | OUTPATIENT
Start: 2024-04-05 | End: 2024-04-05

## 2024-04-05 RX ORDER — SODIUM CHLORIDE 9 MG/ML
75 INJECTION, SOLUTION INTRAVENOUS CONTINUOUS
Status: DISCONTINUED | OUTPATIENT
Start: 2024-04-05 | End: 2024-04-06 | Stop reason: HOSPADM

## 2024-04-05 RX ORDER — CEFAZOLIN SODIUM 2 G/50ML
2000 SOLUTION INTRAVENOUS ONCE
Status: COMPLETED | OUTPATIENT
Start: 2024-04-05 | End: 2024-04-05

## 2024-04-05 RX ADMIN — MIDAZOLAM 2 MG: 1 INJECTION INTRAMUSCULAR; INTRAVENOUS at 08:54

## 2024-04-05 RX ADMIN — CEFAZOLIN SODIUM 2000 MG: 2 SOLUTION INTRAVENOUS at 08:30

## 2024-04-05 RX ADMIN — LIDOCAINE HYDROCHLORIDE,EPINEPHRINE BITARTRATE 15 ML: 10; .01 INJECTION, SOLUTION INFILTRATION; PERINEURAL at 08:54

## 2024-04-05 RX ADMIN — FENTANYL CITRATE 100 MCG: 50 INJECTION, SOLUTION INTRAMUSCULAR; INTRAVENOUS at 08:54

## 2024-04-05 NOTE — BRIEF OP NOTE (RAD/CATH)
IR PORT PLACEMENT  Procedure Note    PATIENT NAME: Derek Walter  : 1945  MRN: [id number]     Pre-op Diagnosis:   1. Hepatocellular carcinoma (HCC)      Post-op Diagnosis:   1. Hepatocellular carcinoma (HCC)        Surgeon:   Randy Shanks DO  Assistants:     No qualified resident was available.    Estimated Blood Loss: None  Findings: Right chest IR approach port placed.    Specimens: none    Complications:  none    Anesthesia: conscious sedation and local    Randy Shanks DO     Date: 2024  Time: 9:23 AM

## 2024-04-05 NOTE — INTERVAL H&P NOTE
H&P reviewed. After examining the patient, I find no changed to the H&P since it had been written.    /62   Pulse 87   Temp 98.1 °F (36.7 °C) (Temporal)   Resp 18   Ht 6' (1.829 m)   Wt 88.9 kg (196 lb)   SpO2 96%   BMI 26.58 kg/m²     Patient re-evaluated. Accept as history and physical.    Randy Shanks, DO/April 5, 2024/8:32 AM

## 2024-04-05 NOTE — SEDATION DOCUMENTATION
Port placed. Patient tolerated well. Hemodynamically stable for transfer to PACU. Report and care given to primary RN.

## 2024-04-05 NOTE — DISCHARGE INSTRUCTIONS
Implanted Venous Access Port     WHAT YOU NEED TO KNOW:   An implanted venous access port is a device used to give treatments and take blood. It may also be called a central venous access device (CVAD). The port is a small container that is placed under your skin, usually in your upper chest. The port is attached to a catheter that enters a large vein.   DISCHARGE INSTRUCTIONS:   Resume your normal diet. Small sips of flat soda will help with mild nausea.  Prevent an infection:   Wash your hands often.  Use soap and water. Clean your hands before and after you care for your port. Remind everyone who cares for your port to wash their hands.   Check your skin for infection every day.  Look for redness, swelling, or fluid oozing from the port site.  Care for your port:   1. You may shower beginning 48 hours after procedure.     2.  Leave glue in place.    3. It is normal for some bruising to occur.    4. Use Tylenol for pain.    5. Limit use of arm on the side that your port was placed. Lift nothing heavier than 5 pounds for 1 week, and then gradually increase activity as tolerated.    6. DO NOT apply ointment, lotion or cream to port site until incision is healed. Allow glue to fall off. DO NOT attempt to peel glue from skin even it it begins to flake.     7. After the port incision is healed you may swim, bathe.  Notify the Interventional Radiologist if you have any of the followin. Fever above 101 F    2. Increased redness or swelling after 1st day.     3. Increased pain after 1st day.    4. Any sign of infection (drainage from port site, skin separation, hot to touch).    5. Persistent nausea or vomiting.    Contact Interventional Radiology at [phone] ([facility] PATIENTS: Contact Interventional Radiology at [phone]) ([facility] PATIENTS: Contact Interventional Radiology at [phone]).

## 2024-04-06 ENCOUNTER — HOME CARE VISIT (OUTPATIENT)
Dept: HOME HEALTH SERVICES | Facility: HOME HEALTHCARE | Age: 79
End: 2024-04-06
Payer: COMMERCIAL

## 2024-04-06 VITALS
HEART RATE: 86 BPM | SYSTOLIC BLOOD PRESSURE: 130 MMHG | TEMPERATURE: 97.4 F | OXYGEN SATURATION: 97 % | DIASTOLIC BLOOD PRESSURE: 60 MMHG | RESPIRATION RATE: 16 BRPM

## 2024-04-06 PROCEDURE — G0299 HHS/HOSPICE OF RN EA 15 MIN: HCPCS

## 2024-04-08 ENCOUNTER — TELEPHONE (OUTPATIENT)
Dept: HOME HEALTH SERVICES | Facility: HOME HEALTHCARE | Age: 79
End: 2024-04-08

## 2024-04-08 ENCOUNTER — HOME CARE VISIT (OUTPATIENT)
Dept: HOME HEALTH SERVICES | Facility: HOME HEALTHCARE | Age: 79
End: 2024-04-08
Payer: COMMERCIAL

## 2024-04-08 PROBLEM — Z95.828 PORT-A-CATH IN PLACE: Status: ACTIVE | Noted: 2024-01-01

## 2024-04-09 ENCOUNTER — HOME CARE VISIT (OUTPATIENT)
Dept: HOME HEALTH SERVICES | Facility: HOME HEALTHCARE | Age: 79
End: 2024-04-09
Payer: COMMERCIAL

## 2024-04-09 ENCOUNTER — TELEPHONE (OUTPATIENT)
Dept: SURGICAL ONCOLOGY | Facility: CLINIC | Age: 79
End: 2024-04-09

## 2024-04-09 VITALS
SYSTOLIC BLOOD PRESSURE: 112 MMHG | HEART RATE: 76 BPM | DIASTOLIC BLOOD PRESSURE: 63 MMHG | TEMPERATURE: 97.1 F | RESPIRATION RATE: 19 BRPM | OXYGEN SATURATION: 94 %

## 2024-04-09 PROCEDURE — G0157 HHC PT ASSISTANT EA 15: HCPCS

## 2024-04-09 NOTE — TELEPHONE ENCOUNTER
Patient on 04/15/24 DARS report  High OOP   Self pay balance  No funding available for Diagnosis  Added to Wait & Watch Lists  E mail sent to [facility] for CC application.    Call placed to the patient to review and advise but went to .  I left my call back information and a brief message regarding my role as an Oncology Patient Advocate.

## 2024-04-09 NOTE — TELEPHONE ENCOUNTER
Oncology Finance Advocacy Intake and Intervention  Oncology Finance Counselor/Advocate placed call to patient. This writer informed patient that this writer is here to assist patient with billing questions, financial assistance, payment/payment plans, quotes, copayment assistance, insurance optimization, and insurance navigation.    This writer conducted a thorough benefit review of copayment, deductible, and out of pocket cost. This information is documented below and has been reviewed with patient.     Copayment:$40.00  Deductible:  Out of Pocket Cost:$5,650.00 Remaining $2,845.00  Insurance optimization (Limited benefit vs self-pay):  Patient assistance status:Patient Outreach   Free Drug Applications:N/A  Oral Chemo Application:N/A  BIN#:  PCN#:  GRP#:  Copay:$  Interventions:    Patient on 04/15/24 DARS report  High OOP   Self pay balance  No funding available for Diagnosis  Added to Wait & Watch Lists  E mail sent to [facility] for CC application.     Return call came from the patient's wife Nilda and I would explain my role as an Oncology Patient Advocate.  I would review the patient's insurance and look for funding for their diagnosis and drugs used in the patient's treatment plan.    In his case regretfully there is nothing open at this time for his diagnosis so I would put the patient on a Wait or Watch list with various foundations should funding become available for his diagnosis I would be contacted and in turn contact the patient so we can submit for funding before any others with the same diagnosis gets the funding before us.    Also, due to the fact I see the patient has a self pay balance I have requested an application via e mail to our Hospital Financial Counselors for possible assistance through [facility].  The application usually comes in the mail within 7-10 business days. Should the patient has any questions regarding filling out the application or chooses to set up applying in person they can  contact the Hospital Financial Counselors at [phone] for a quicker determination.    The patient's wife was requesting an itemized list of bills from 2023 and current 2024 so she could structure how to cleanup those bills.  I suggested she could possibly be assisted by the Business Office at [phone].    Information above was review thoroughly with patient and patient was advise of possible assistance programs/interventions. If any question arise patient can contact this writer at below information. This information was given to patient at time of contact.      Miguelito Martin  Phone:[phone]  Email: [email or web address]

## 2024-04-10 ENCOUNTER — TELEPHONE (OUTPATIENT)
Dept: SURGICAL ONCOLOGY | Facility: CLINIC | Age: 79
End: 2024-04-10

## 2024-04-10 NOTE — TELEPHONE ENCOUNTER
Spoke to patient's wife and informed her the chest CT was clear. No metastatic disease in the chest. She was appreciative of phone call.

## 2024-04-11 ENCOUNTER — HOME CARE VISIT (OUTPATIENT)
Dept: HOME HEALTH SERVICES | Facility: HOME HEALTHCARE | Age: 79
End: 2024-04-11
Payer: COMMERCIAL

## 2024-04-11 VITALS
DIASTOLIC BLOOD PRESSURE: 68 MMHG | HEART RATE: 74 BPM | WEIGHT: 172 LBS | BODY MASS INDEX: 23.33 KG/M2 | SYSTOLIC BLOOD PRESSURE: 122 MMHG | TEMPERATURE: 97.5 F | RESPIRATION RATE: 18 BRPM | OXYGEN SATURATION: 96 %

## 2024-04-11 PROCEDURE — G0299 HHS/HOSPICE OF RN EA 15 MIN: HCPCS

## 2024-04-12 ENCOUNTER — HOSPITAL ENCOUNTER (OUTPATIENT)
Dept: INFUSION CENTER | Facility: HOSPITAL | Age: 79
End: 2024-04-12
Payer: COMMERCIAL

## 2024-04-12 DIAGNOSIS — C22.0 HEPATOCELLULAR CARCINOMA (HCC): Primary | ICD-10-CM

## 2024-04-12 DIAGNOSIS — Z95.828 PORT-A-CATH IN PLACE: Primary | ICD-10-CM

## 2024-04-12 DIAGNOSIS — M04.8 OTHER AUTOINFLAMMATORY SYNDROMES (HCC): ICD-10-CM

## 2024-04-12 DIAGNOSIS — C22.0 HEPATOCELLULAR CARCINOMA (HCC): ICD-10-CM

## 2024-04-12 LAB
AFP-TM SERPL-MCNC: ABNORMAL NG/ML (ref 0–9)
ALBUMIN SERPL BCP-MCNC: 2.5 G/DL (ref 3.5–5)
ALP SERPL-CCNC: 1003 U/L (ref 34–104)
ALT SERPL W P-5'-P-CCNC: 177 U/L (ref 7–52)
ANION GAP SERPL CALCULATED.3IONS-SCNC: 10 MMOL/L (ref 4–13)
AST SERPL W P-5'-P-CCNC: 556 U/L (ref 13–39)
BASOPHILS # BLD AUTO: 0.04 THOUSANDS/ÂΜL (ref 0–0.1)
BASOPHILS NFR BLD AUTO: 0 % (ref 0–1)
BILIRUB SERPL-MCNC: 7.09 MG/DL (ref 0.2–1)
BUN SERPL-MCNC: 61 MG/DL (ref 5–25)
CALCIUM ALBUM COR SERPL-MCNC: 9.4 MG/DL (ref 8.3–10.1)
CALCIUM SERPL-MCNC: 8.2 MG/DL (ref 8.4–10.2)
CHLORIDE SERPL-SCNC: 106 MMOL/L (ref 96–108)
CO2 SERPL-SCNC: 21 MMOL/L (ref 21–32)
CREAT SERPL-MCNC: 1.99 MG/DL (ref 0.6–1.3)
EOSINOPHIL # BLD AUTO: 0.25 THOUSAND/ÂΜL (ref 0–0.61)
EOSINOPHIL NFR BLD AUTO: 3 % (ref 0–6)
ERYTHROCYTE [DISTWIDTH] IN BLOOD BY AUTOMATED COUNT: 20.7 % (ref 11.6–15.1)
GFR SERPL CREATININE-BSD FRML MDRD: 31 ML/MIN/1.73SQ M
GLUCOSE SERPL-MCNC: 407 MG/DL (ref 65–140)
HCT VFR BLD AUTO: 27.6 % (ref 36.5–49.3)
HGB BLD-MCNC: 8.6 G/DL (ref 12–17)
IMM GRANULOCYTES # BLD AUTO: 0.09 THOUSAND/UL (ref 0–0.2)
IMM GRANULOCYTES NFR BLD AUTO: 1 % (ref 0–2)
LYMPHOCYTES # BLD AUTO: 0.53 THOUSANDS/ÂΜL (ref 0.6–4.47)
LYMPHOCYTES NFR BLD AUTO: 6 % (ref 14–44)
MCH RBC QN AUTO: 26.9 PG (ref 26.8–34.3)
MCHC RBC AUTO-ENTMCNC: 31.2 G/DL (ref 31.4–37.4)
MCV RBC AUTO: 86 FL (ref 82–98)
MONOCYTES # BLD AUTO: 1.17 THOUSAND/ÂΜL (ref 0.17–1.22)
MONOCYTES NFR BLD AUTO: 13 % (ref 4–12)
NEUTROPHILS # BLD AUTO: 7.25 THOUSANDS/ÂΜL (ref 1.85–7.62)
NEUTS SEG NFR BLD AUTO: 77 % (ref 43–75)
NRBC BLD AUTO-RTO: 0 /100 WBCS
PLATELET # BLD AUTO: 225 THOUSANDS/UL (ref 149–390)
POTASSIUM SERPL-SCNC: 4.5 MMOL/L (ref 3.5–5.3)
PROT SERPL-MCNC: 6.4 G/DL (ref 6.4–8.4)
RBC # BLD AUTO: 3.2 MILLION/UL (ref 3.88–5.62)
SODIUM SERPL-SCNC: 137 MMOL/L (ref 135–147)
T3FREE SERPL-MCNC: 1.94 PG/ML (ref 2.5–3.9)
TSH SERPL DL<=0.05 MIU/L-ACNC: 3.29 UIU/ML (ref 0.45–4.5)
WBC # BLD AUTO: 9.33 THOUSAND/UL (ref 4.31–10.16)

## 2024-04-12 PROCEDURE — 82105 ALPHA-FETOPROTEIN SERUM: CPT | Performed by: INTERNAL MEDICINE

## 2024-04-12 PROCEDURE — 84481 FREE ASSAY (FT-3): CPT | Performed by: INTERNAL MEDICINE

## 2024-04-12 PROCEDURE — 80053 COMPREHEN METABOLIC PANEL: CPT | Performed by: INTERNAL MEDICINE

## 2024-04-12 PROCEDURE — 85025 COMPLETE CBC W/AUTO DIFF WBC: CPT | Performed by: INTERNAL MEDICINE

## 2024-04-12 PROCEDURE — 84443 ASSAY THYROID STIM HORMONE: CPT | Performed by: INTERNAL MEDICINE

## 2024-04-12 NOTE — PLAN OF CARE
Problem: SAFETY ADULT  Goal: Patient will remain free of falls  Description: INTERVENTIONS:  - Educate patient/family on patient safety including physical limitations  - Instruct patient to call for assistance with activity   - Keep bed low and locked with side rails adjusted as appropriate  - Keep care items and personal belongings within reach  - Initiate and maintain comfort rounds  - Consider moving patient to room near nurses station  Outcome: Progressing     Problem: Knowledge Deficit  Goal: Patient/family/caregiver demonstrates understanding of disease process, treatment plan, medications, and discharge instructions  Description: Complete learning assessment and assess knowledge base.  Interventions:  - Provide teaching at level of understanding  - Provide teaching via preferred learning methods  Outcome: Progressing

## 2024-04-12 NOTE — PROGRESS NOTES
Derek Walter  tolerated treatment well with no complications.      Derek Walter is aware of future appt on 4/15 at 1230.     AVS printed and given to Derek Walter:  No (Declined by Derek Walter)

## 2024-04-13 LAB
AFP-TM SERPL-MCNC: ABNORMAL NG/ML (ref 0–8.4)
ALBUMIN SERPL-MCNC: 2.7 G/DL (ref 3.8–4.8)
ALBUMIN/GLOB SERPL: 0.9 {RATIO} (ref 1.2–2.2)
ALP SERPL-CCNC: 1057 IU/L (ref 44–121)
ALT SERPL-CCNC: 136 IU/L (ref 0–44)
AST SERPL-CCNC: 386 IU/L (ref 0–40)
BASOPHILS # BLD AUTO: 0 X10E3/UL (ref 0–0.2)
BASOPHILS NFR BLD AUTO: 1 %
BILIRUB SERPL-MCNC: 2.6 MG/DL (ref 0–1.2)
BUN SERPL-MCNC: 39 MG/DL (ref 8–27)
BUN/CREAT SERPL: 25 (ref 10–24)
CALCIUM SERPL-MCNC: 8.2 MG/DL (ref 8.6–10.2)
CHLORIDE SERPL-SCNC: 104 MMOL/L (ref 96–106)
CO2 SERPL-SCNC: 21 MMOL/L (ref 20–29)
CREAT SERPL-MCNC: 1.53 MG/DL (ref 0.76–1.27)
EGFR: 46 ML/MIN/1.73
EOSINOPHIL # BLD AUTO: 0.3 X10E3/UL (ref 0–0.4)
EOSINOPHIL NFR BLD AUTO: 5 %
ERYTHROCYTE [DISTWIDTH] IN BLOOD BY AUTOMATED COUNT: 16.7 % (ref 11.6–15.4)
GLOBULIN SER-MCNC: 3.1 G/DL (ref 1.5–4.5)
GLUCOSE SERPL-MCNC: 278 MG/DL (ref 70–99)
HCT VFR BLD AUTO: 29.8 % (ref 37.5–51)
HGB BLD-MCNC: 9.2 G/DL (ref 13–17.7)
IMM GRANULOCYTES # BLD: 0 X10E3/UL (ref 0–0.1)
IMM GRANULOCYTES NFR BLD: 0 %
LYMPHOCYTES # BLD AUTO: 0.4 X10E3/UL (ref 0.7–3.1)
LYMPHOCYTES NFR BLD AUTO: 6 %
MCH RBC QN AUTO: 27.8 PG (ref 26.6–33)
MCHC RBC AUTO-ENTMCNC: 30.9 G/DL (ref 31.5–35.7)
MCV RBC AUTO: 90 FL (ref 79–97)
MONOCYTES # BLD AUTO: 0.8 X10E3/UL (ref 0.1–0.9)
MONOCYTES NFR BLD AUTO: 13 %
NEUTROPHILS # BLD AUTO: 4.8 X10E3/UL (ref 1.4–7)
NEUTROPHILS NFR BLD AUTO: 75 %
PLATELET # BLD AUTO: 218 X10E3/UL (ref 150–450)
POTASSIUM SERPL-SCNC: 4.5 MMOL/L (ref 3.5–5.2)
PROT SERPL-MCNC: 5.8 G/DL (ref 6–8.5)
RBC # BLD AUTO: 3.31 X10E6/UL (ref 4.14–5.8)
SODIUM SERPL-SCNC: 141 MMOL/L (ref 134–144)
T3FREE SERPL-MCNC: 2.4 PG/ML (ref 2–4.4)
TSH SERPL DL<=0.005 MIU/L-ACNC: 4.29 UIU/ML (ref 0.45–4.5)
WBC # BLD AUTO: 6.5 X10E3/UL (ref 3.4–10.8)

## 2024-04-15 NOTE — TELEPHONE ENCOUNTER
This writer received a phone call from the patients wife Nilda looking for a referral to Hospice and to cancel the patients appointment in medical oncology for today's date. Urgent email sent to office RN for assistance and follow up with the patients wife.

## 2024-04-15 NOTE — TELEPHONE ENCOUNTER
Voicemail:    Grant, it's Nilda Horcaneloan. Can you give me a call back real quick, please? [phone]. I need to know if we can get a bed. I need a hospital bed for my  right away because my son, my son is leaving and I can't handle my  by myself. So I want to know if we can get a hospital bed ASAP delivered today. OK. Thank you.

## 2024-04-15 NOTE — CASE COMMUNICATION
For RLOC Derek Walter  [birth year] 78 yo male. Current [facility] PT.Referred for Hepatocellular carcinoma. Not seeking further treatment. Other dx Stage 3 CKD, DM2, Afib, Restrictive lung disease, CAD, CHF, labs bun 61 creat 1.99 ast 556 alt 177 alk phos 1,003, AFP >303,000 PPS 40 ECOG 4     Approve RLOC?    Approved for RLOC by DR Block 4.15.24  D [facility]

## 2024-04-15 NOTE — TELEPHONE ENCOUNTER
Called and spoke to patient's wife. She said that Derek's condition is declining and they have agreed to start hospice services. She would like to order a hospital bed and asked if it was possible to be delivered today. I did place the order and made a note for asap delivery. Also reached out to his A nurse Stephanie to see if it would be able to be delivered any quicker under hospice services. If so, I can cancel our order. Referral placed for hospice. Oncology and infusion appointments to be canceled.

## 2024-04-19 NOTE — PROGRESS NOTES
4/19/2024 10:52 AM  [facility] home patient requests PCA started for improved symptom management.  Filled electronically via Epic as per [place] State Law.    Requested Prescriptions     Signed Prescriptions Disp Refills    HYDROmorphone (Dilaudid) 1 mg/mL 100 mL 0     Sig: Route of Administration: Intravenous;  Basal Rate: 0.1 mg/hr;  Bolus Dose: 0.3 mg;  Bolus Interval: 10 minutes;  Max Bolus Doses/hr: 6       NADIA Contreras  [facility]  Hospice Answering Service: [phone]  You can find me on [employer]!

## 2024-04-20 ENCOUNTER — HOME CARE VISIT (OUTPATIENT)
Dept: HOME HOSPICE | Facility: HOSPICE | Age: 79
End: 2024-04-20
Payer: MEDICARE

## 2024-04-20 ENCOUNTER — HOME CARE VISIT (OUTPATIENT)
Dept: HOME HEALTH SERVICES | Facility: HOME HEALTHCARE | Age: 79
End: 2024-04-20
Payer: MEDICARE

## 2024-04-23 ENCOUNTER — HOME CARE VISIT (OUTPATIENT)
Dept: HOME HOSPICE | Facility: HOSPICE | Age: 79
End: 2024-04-23
Payer: MEDICARE

## 2024-05-02 ENCOUNTER — HOME CARE VISIT (OUTPATIENT)
Dept: HOME HOSPICE | Facility: HOSPICE | Age: 79
End: 2024-05-02
Payer: MEDICARE

## 2024-12-31 NOTE — ASSESSMENT & PLAN NOTE
Patient has a history of coronary artery disease had 8/2023 a PCI status post cardiac catheterization drug-eluting stents x 3 in the LAD.  Ramus and left circumflex  Continue home statin, Plavix, and metoprolol when able      Medical diagnosis, workup, and treatment plan discussed with patient and grandson. Patient agreeable with current plan.

## 2025-02-23 NOTE — ASSESSMENT & PLAN NOTE
Rate controled  Maintained on Eliquis and metoprolol as o/p  Continue on Lopressor  Hold Eliquis and Plavix until okay with GI   Cardiology consult regarding input for antiplatelet/anticoagulation appreciated  As per cardiology restart Plavix and Eliquis  Discussed with GI and patient will be restarted 1/30 and will monitor H&H closely     24

## 2025-05-11 NOTE — ASSESSMENT & PLAN NOTE
· Suspected HCC with multiple hepatic masses on imaging and AFP >18k, has not been confirmed by tissue diagnosis.  Following with Dr. Kay Osuna with surgical oncology as an outpatient, did not feel like a biopsy was necessary and was referred to Dr. Alberto Escalante for Y90 radioembolization  · Continue outpatient follow up show

## (undated) DEVICE — TREK CORONARY DILATATION CATHETER 2.50 MM X 15 MM / RAPID-EXCHANGE: Brand: TREK

## (undated) DEVICE — DGW .035 FC J3MM 260CM TEF: Brand: EMERALD

## (undated) DEVICE — Device: Brand: ASAHI SILVERWAY

## (undated) DEVICE — TR BAND RADIAL ARTERY COMPRESSION DEVICE: Brand: TR BAND

## (undated) DEVICE — TREK CORONARY DILATATION CATHETER 2.50 MM X 20 MM / RAPID-EXCHANGE: Brand: TREK

## (undated) DEVICE — Device: Brand: MINAMO

## (undated) DEVICE — CATH IVUS EAGLE EYE PLATINUM 5FR 150CM

## (undated) DEVICE — CATH GUIDE LAUNCHER 6FR EBU 3.5

## (undated) DEVICE — GUIDEWIRE FFR VERRATA PLUS 185CM STR

## (undated) DEVICE — NC TREK NEO™ CORONARY DILATATION CATHETER 3.50 MM X 15 MM / RAPID-EXCHANGE: Brand: NC TREK NEO™

## (undated) DEVICE — RUNTHROUGH NS EXTRA FLOPPY PTCA GUIDEWIRE: Brand: RUNTHROUGH

## (undated) DEVICE — GLIDESHEATH BASIC HYDROPHILIC COATED INTRODUCER SHEATH: Brand: GLIDESHEATH

## (undated) DEVICE — NC TREK NEO™ CORONARY DILATATION CATHETER 3.50 MM X 20 MM / RAPID-EXCHANGE: Brand: NC TREK NEO™

## (undated) DEVICE — GLIDESHEATH SLENDER STAINLESS STEEL KIT: Brand: GLIDESHEATH SLENDER

## (undated) DEVICE — GUIDEWIRE WHOLEY HI TORQUE INTERM MOD J .035 145CM

## (undated) DEVICE — RADIFOCUS OPTITORQUE ANGIOGRAPHIC CATHETER: Brand: OPTITORQUE